# Patient Record
Sex: FEMALE | Race: WHITE | Employment: FULL TIME
[De-identification: names, ages, dates, MRNs, and addresses within clinical notes are randomized per-mention and may not be internally consistent; named-entity substitution may affect disease eponyms.]

---

## 2017-01-03 ENCOUNTER — OFFICE VISIT (OUTPATIENT)
Dept: INTERNAL MEDICINE | Facility: CLINIC | Age: 52
End: 2017-01-03

## 2017-01-03 VITALS
OXYGEN SATURATION: 96 % | HEART RATE: 103 BPM | BODY MASS INDEX: 36.98 KG/M2 | SYSTOLIC BLOOD PRESSURE: 140 MMHG | DIASTOLIC BLOOD PRESSURE: 88 MMHG | HEIGHT: 68 IN | TEMPERATURE: 98.6 F | WEIGHT: 244 LBS

## 2017-01-03 DIAGNOSIS — J45.31 MILD PERSISTENT ASTHMA WITH ACUTE EXACERBATION: Primary | ICD-10-CM

## 2017-01-03 DIAGNOSIS — J20.6 ACUTE BRONCHITIS DUE TO RHINOVIRUS: ICD-10-CM

## 2017-01-03 PROCEDURE — 99214 OFFICE O/P EST MOD 30 MIN: CPT | Performed by: NURSE PRACTITIONER

## 2017-01-03 RX ORDER — PREDNISONE 20 MG/1
20 TABLET ORAL 2 TIMES DAILY
Qty: 14 TABLET | Refills: 0 | Status: SHIPPED | OUTPATIENT
Start: 2017-01-03 | End: 2017-01-10

## 2017-01-03 RX ORDER — LEVOFLOXACIN 500 MG/1
500 TABLET, FILM COATED ORAL DAILY
Qty: 10 TABLET | Refills: 0 | Status: SHIPPED | OUTPATIENT
Start: 2017-01-03 | End: 2017-01-13

## 2017-01-03 ASSESSMENT — ENCOUNTER SYMPTOMS
DIARRHEA: 0
SORE THROAT: 0
RHINORRHEA: 1
PHOTOPHOBIA: 0
SHORTNESS OF BREATH: 1
COUGH: 1
WHEEZING: 1
CONSTIPATION: 0

## 2017-02-24 ENCOUNTER — OFFICE VISIT (OUTPATIENT)
Dept: INTERNAL MEDICINE | Facility: CLINIC | Age: 52
End: 2017-02-24

## 2017-02-24 VITALS
WEIGHT: 244 LBS | HEART RATE: 84 BPM | BODY MASS INDEX: 37.1 KG/M2 | SYSTOLIC BLOOD PRESSURE: 150 MMHG | DIASTOLIC BLOOD PRESSURE: 90 MMHG | RESPIRATION RATE: 18 BRPM

## 2017-02-24 DIAGNOSIS — Z12.31 ENCOUNTER FOR SCREENING MAMMOGRAM FOR BREAST CANCER: ICD-10-CM

## 2017-02-24 DIAGNOSIS — E66.01 MORBID OBESITY DUE TO EXCESS CALORIES (HCC): ICD-10-CM

## 2017-02-24 DIAGNOSIS — I10 ESSENTIAL HYPERTENSION: Primary | ICD-10-CM

## 2017-02-24 PROCEDURE — 99214 OFFICE O/P EST MOD 30 MIN: CPT | Performed by: NURSE PRACTITIONER

## 2017-02-24 RX ORDER — HYDROCHLOROTHIAZIDE 50 MG/1
50 TABLET ORAL DAILY
Qty: 30 TABLET | Refills: 5 | Status: SHIPPED | OUTPATIENT
Start: 2017-02-24 | End: 2017-03-07 | Stop reason: SDUPTHER

## 2017-02-24 ASSESSMENT — ENCOUNTER SYMPTOMS
WHEEZING: 0
COUGH: 0
SINUS PRESSURE: 0
VOMITING: 0
CONSTIPATION: 0
DIARRHEA: 0
BLOOD IN STOOL: 0
TROUBLE SWALLOWING: 0
ABDOMINAL PAIN: 0
NAUSEA: 0
SORE THROAT: 0
SHORTNESS OF BREATH: 0

## 2017-03-07 DIAGNOSIS — I10 ESSENTIAL HYPERTENSION: ICD-10-CM

## 2017-03-07 RX ORDER — HYDROCHLOROTHIAZIDE 50 MG/1
50 TABLET ORAL DAILY
Qty: 30 TABLET | Refills: 5 | Status: SHIPPED | OUTPATIENT
Start: 2017-03-07 | End: 2017-05-19 | Stop reason: SDUPTHER

## 2017-05-19 ENCOUNTER — HOSPITAL ENCOUNTER (OUTPATIENT)
Age: 52
Discharge: HOME | End: 2017-05-19
Payer: COMMERCIAL

## 2017-05-19 ENCOUNTER — OFFICE VISIT (OUTPATIENT)
Dept: PRIMARY CARE CLINIC | Age: 52
End: 2017-05-19
Payer: COMMERCIAL

## 2017-05-19 VITALS
RESPIRATION RATE: 18 BRPM | HEART RATE: 68 BPM | DIASTOLIC BLOOD PRESSURE: 80 MMHG | SYSTOLIC BLOOD PRESSURE: 112 MMHG | BODY MASS INDEX: 33.21 KG/M2 | HEIGHT: 69 IN | WEIGHT: 224.2 LBS

## 2017-05-19 DIAGNOSIS — I10 ESSENTIAL HYPERTENSION: Primary | ICD-10-CM

## 2017-05-19 DIAGNOSIS — Z13.6 SCREENING FOR CARDIOVASCULAR CONDITION: ICD-10-CM

## 2017-05-19 DIAGNOSIS — E03.9 HYPOTHYROIDISM, UNSPECIFIED TYPE: ICD-10-CM

## 2017-05-19 DIAGNOSIS — I10 ESSENTIAL HYPERTENSION: ICD-10-CM

## 2017-05-19 LAB
ALBUMIN SERPL-MCNC: 4.5 G/DL (ref 3.5–5.2)
ALBUMIN/GLOBULIN RATIO: 1.4 (ref 1–2.5)
ALP BLD-CCNC: 88 U/L (ref 35–104)
ALT SERPL-CCNC: 13 U/L (ref 5–33)
ANION GAP SERPL CALCULATED.3IONS-SCNC: 13 MMOL/L (ref 9–17)
AST SERPL-CCNC: 21 U/L
BILIRUB SERPL-MCNC: 0.4 MG/DL (ref 0.3–1.2)
BUN BLDV-MCNC: 20 MG/DL (ref 6–20)
BUN/CREAT BLD: NORMAL (ref 9–20)
CALCIUM SERPL-MCNC: 9.7 MG/DL (ref 8.6–10.4)
CHLORIDE BLD-SCNC: 101 MMOL/L (ref 98–107)
CHOLESTEROL/HDL RATIO: 3.3
CHOLESTEROL: 152 MG/DL
CO2: 27 MMOL/L (ref 20–31)
CREAT SERPL-MCNC: 0.8 MG/DL (ref 0.5–0.9)
GFR AFRICAN AMERICAN: >60 ML/MIN
GFR NON-AFRICAN AMERICAN: >60 ML/MIN
GFR SERPL CREATININE-BSD FRML MDRD: NORMAL ML/MIN/{1.73_M2}
GFR SERPL CREATININE-BSD FRML MDRD: NORMAL ML/MIN/{1.73_M2}
GLUCOSE BLD-MCNC: 99 MG/DL (ref 70–99)
HDLC SERPL-MCNC: 46 MG/DL
LDL CHOLESTEROL: 78 MG/DL (ref 0–130)
POTASSIUM SERPL-SCNC: 3.9 MMOL/L (ref 3.7–5.3)
SODIUM BLD-SCNC: 141 MMOL/L (ref 135–144)
TOTAL PROTEIN: 7.8 G/DL (ref 6.4–8.3)
TRIGL SERPL-MCNC: 139 MG/DL
TSH SERPL DL<=0.05 MIU/L-ACNC: 1.96 MIU/L (ref 0.3–5)
VLDLC SERPL CALC-MCNC: NORMAL MG/DL (ref 1–30)

## 2017-05-19 PROCEDURE — 80061 LIPID PANEL: CPT

## 2017-05-19 PROCEDURE — 80053 COMPREHEN METABOLIC PANEL: CPT

## 2017-05-19 PROCEDURE — 99214 OFFICE O/P EST MOD 30 MIN: CPT | Performed by: NURSE PRACTITIONER

## 2017-05-19 PROCEDURE — 36415 COLL VENOUS BLD VENIPUNCTURE: CPT

## 2017-05-19 PROCEDURE — 84443 ASSAY THYROID STIM HORMONE: CPT

## 2017-05-19 RX ORDER — HYDROCHLOROTHIAZIDE 50 MG/1
50 TABLET ORAL DAILY
Qty: 90 TABLET | Refills: 2 | Status: SHIPPED | OUTPATIENT
Start: 2017-05-19 | End: 2018-03-30 | Stop reason: SDUPTHER

## 2017-05-19 ASSESSMENT — ENCOUNTER SYMPTOMS
ABDOMINAL PAIN: 0
VOMITING: 0
BLOOD IN STOOL: 0
SORE THROAT: 0
COUGH: 0
CONSTIPATION: 0
TROUBLE SWALLOWING: 0
SINUS PRESSURE: 0
SHORTNESS OF BREATH: 0
WHEEZING: 0
DIARRHEA: 0
NAUSEA: 0

## 2017-05-19 ASSESSMENT — PATIENT HEALTH QUESTIONNAIRE - PHQ9
2. FEELING DOWN, DEPRESSED OR HOPELESS: 0
SUM OF ALL RESPONSES TO PHQ QUESTIONS 1-9: 0
1. LITTLE INTEREST OR PLEASURE IN DOING THINGS: 0
SUM OF ALL RESPONSES TO PHQ9 QUESTIONS 1 & 2: 0

## 2017-05-22 ENCOUNTER — TELEPHONE (OUTPATIENT)
Dept: PRIMARY CARE CLINIC | Age: 52
End: 2017-05-22

## 2017-07-28 DIAGNOSIS — M17.0 PRIMARY OSTEOARTHRITIS OF BOTH KNEES: ICD-10-CM

## 2017-07-28 RX ORDER — CELECOXIB 100 MG/1
CAPSULE ORAL
Qty: 60 CAPSULE | Refills: 5 | Status: SHIPPED | OUTPATIENT
Start: 2017-07-28 | End: 2018-09-16 | Stop reason: SDUPTHER

## 2017-11-17 ENCOUNTER — HOSPITAL ENCOUNTER (OUTPATIENT)
Age: 52
Discharge: HOME | End: 2017-11-17
Payer: COMMERCIAL

## 2017-11-17 ENCOUNTER — HOSPITAL ENCOUNTER (OUTPATIENT)
Dept: GENERAL RADIOLOGY | Age: 52
Discharge: HOME | End: 2017-11-17
Payer: COMMERCIAL

## 2017-11-17 ENCOUNTER — HOSPITAL ENCOUNTER (OUTPATIENT)
Dept: MAMMOGRAPHY | Age: 52
Discharge: HOME | End: 2017-11-17
Payer: COMMERCIAL

## 2017-11-17 ENCOUNTER — TELEPHONE (OUTPATIENT)
Dept: PRIMARY CARE CLINIC | Age: 52
End: 2017-11-17

## 2017-11-17 DIAGNOSIS — Z12.31 ENCOUNTER FOR SCREENING MAMMOGRAM FOR BREAST CANCER: ICD-10-CM

## 2017-11-17 DIAGNOSIS — R92.8 ABNORMAL MAMMOGRAM: Primary | ICD-10-CM

## 2017-11-17 DIAGNOSIS — N20.0 CALCULUS OF KIDNEY: ICD-10-CM

## 2017-11-17 DIAGNOSIS — N64.89 BREAST ASYMMETRY: ICD-10-CM

## 2017-11-17 PROCEDURE — 74000 XR ABDOMEN LIMITED (KUB): CPT

## 2017-11-17 PROCEDURE — G0202 SCR MAMMO BI INCL CAD: HCPCS

## 2017-11-17 NOTE — TELEPHONE ENCOUNTER
----- Message from Cherelle Garcia CNP sent at 11/17/2017  1:11 PM EST -----  Please let her know that she needs additional imaging as her mammogram shows asymmetry with dense breast tissue which makes it difficult to get a clear view on just a screening mamm.   Order placed for spot compression with possible US

## 2017-11-18 ENCOUNTER — APPOINTMENT (OUTPATIENT)
Dept: GENERAL RADIOLOGY | Age: 52
DRG: 669 | End: 2017-11-18
Payer: COMMERCIAL

## 2017-11-18 ENCOUNTER — HOSPITAL ENCOUNTER (EMERGENCY)
Age: 52
Discharge: HOME | DRG: 669 | End: 2017-11-18
Attending: EMERGENCY MEDICINE | Admitting: UROLOGY
Payer: COMMERCIAL

## 2017-11-18 ENCOUNTER — ANESTHESIA EVENT (OUTPATIENT)
Dept: OPERATING ROOM | Age: 52
DRG: 669 | End: 2017-11-18
Payer: COMMERCIAL

## 2017-11-18 ENCOUNTER — ANESTHESIA (OUTPATIENT)
Dept: OPERATING ROOM | Age: 52
DRG: 669 | End: 2017-11-18
Payer: COMMERCIAL

## 2017-11-18 ENCOUNTER — APPOINTMENT (OUTPATIENT)
Dept: CT IMAGING | Age: 52
DRG: 669 | End: 2017-11-18
Payer: COMMERCIAL

## 2017-11-18 VITALS
RESPIRATION RATE: 16 BRPM | SYSTOLIC BLOOD PRESSURE: 139 MMHG | DIASTOLIC BLOOD PRESSURE: 79 MMHG | OXYGEN SATURATION: 99 %

## 2017-11-18 VITALS
BODY MASS INDEX: 35.61 KG/M2 | TEMPERATURE: 98.8 F | RESPIRATION RATE: 16 BRPM | HEIGHT: 68 IN | SYSTOLIC BLOOD PRESSURE: 134 MMHG | OXYGEN SATURATION: 97 % | HEART RATE: 67 BPM | DIASTOLIC BLOOD PRESSURE: 80 MMHG | WEIGHT: 235 LBS

## 2017-11-18 DIAGNOSIS — N20.0 NEPHROLITHIASIS: Primary | ICD-10-CM

## 2017-11-18 DIAGNOSIS — N13.9 OBSTRUCTIVE UROPATHY: ICD-10-CM

## 2017-11-18 LAB
-: NORMAL
ABSOLUTE EOS #: 0.2 K/UL (ref 0–0.44)
ABSOLUTE IMMATURE GRANULOCYTE: 0.06 K/UL (ref 0–0.3)
ABSOLUTE LYMPH #: 2.76 K/UL (ref 1.1–3.7)
ABSOLUTE MONO #: 0.79 K/UL (ref 0.1–1.2)
AMORPHOUS: NORMAL
ANION GAP SERPL CALCULATED.3IONS-SCNC: 14 MMOL/L (ref 9–17)
BACTERIA: NORMAL
BASOPHILS # BLD: 0 % (ref 0–2)
BASOPHILS ABSOLUTE: 0.04 K/UL (ref 0–0.2)
BILIRUBIN URINE: NEGATIVE
BUN BLDV-MCNC: 18 MG/DL (ref 6–20)
BUN/CREAT BLD: ABNORMAL (ref 9–20)
CALCIUM SERPL-MCNC: 9.5 MG/DL (ref 8.6–10.4)
CASTS UA: NORMAL /LPF (ref 0–8)
CHLORIDE BLD-SCNC: 99 MMOL/L (ref 98–107)
CO2: 27 MMOL/L (ref 20–31)
COLOR: YELLOW
COMMENT UA: ABNORMAL
CREAT SERPL-MCNC: 0.75 MG/DL (ref 0.5–0.9)
CRYSTALS, UA: NORMAL /HPF
DIFFERENTIAL TYPE: ABNORMAL
EOSINOPHILS RELATIVE PERCENT: 2 % (ref 1–4)
EPITHELIAL CELLS UA: NORMAL /HPF (ref 0–5)
GFR AFRICAN AMERICAN: >60 ML/MIN
GFR NON-AFRICAN AMERICAN: >60 ML/MIN
GFR SERPL CREATININE-BSD FRML MDRD: ABNORMAL ML/MIN/{1.73_M2}
GFR SERPL CREATININE-BSD FRML MDRD: ABNORMAL ML/MIN/{1.73_M2}
GLUCOSE BLD-MCNC: 133 MG/DL (ref 70–99)
GLUCOSE URINE: NEGATIVE
HCT VFR BLD CALC: 45.2 % (ref 36.3–47.1)
HEMOGLOBIN: 14 G/DL (ref 11.9–15.1)
IMMATURE GRANULOCYTES: 1 %
KETONES, URINE: NEGATIVE
LEUKOCYTE ESTERASE, URINE: NEGATIVE
LYMPHOCYTES # BLD: 27 % (ref 24–43)
MCH RBC QN AUTO: 26.1 PG (ref 25.2–33.5)
MCHC RBC AUTO-ENTMCNC: 31 G/DL (ref 28.4–34.8)
MCV RBC AUTO: 84.2 FL (ref 82.6–102.9)
MONOCYTES # BLD: 8 % (ref 3–12)
MUCUS: NORMAL
NITRITE, URINE: NEGATIVE
OTHER OBSERVATIONS UA: NORMAL
PDW BLD-RTO: 14.1 % (ref 11.8–14.4)
PH UA: 7.5 (ref 5–8)
PLATELET # BLD: 268 K/UL (ref 138–453)
PLATELET ESTIMATE: ABNORMAL
PMV BLD AUTO: 10.3 FL (ref 8.1–13.5)
POTASSIUM SERPL-SCNC: 3.5 MMOL/L (ref 3.7–5.3)
PROTEIN UA: ABNORMAL
RBC # BLD: 5.37 M/UL (ref 3.95–5.11)
RBC # BLD: ABNORMAL 10*6/UL
RBC UA: NORMAL /HPF (ref 0–4)
RENAL EPITHELIAL, UA: NORMAL /HPF
SEG NEUTROPHILS: 62 % (ref 36–65)
SEGMENTED NEUTROPHILS ABSOLUTE COUNT: 6.56 K/UL (ref 1.5–8.1)
SODIUM BLD-SCNC: 140 MMOL/L (ref 135–144)
SPECIFIC GRAVITY UA: 1.01 (ref 1–1.03)
TRICHOMONAS: NORMAL
TURBIDITY: CLEAR
URINE HGB: ABNORMAL
UROBILINOGEN, URINE: NORMAL
WBC # BLD: 10.4 K/UL (ref 3.5–11.3)
WBC # BLD: ABNORMAL 10*3/UL
WBC UA: NORMAL /HPF (ref 0–5)
YEAST: NORMAL

## 2017-11-18 PROCEDURE — 74176 CT ABD & PELVIS W/O CONTRAST: CPT

## 2017-11-18 PROCEDURE — 80048 BASIC METABOLIC PNL TOTAL CA: CPT

## 2017-11-18 PROCEDURE — 6360000002 HC RX W HCPCS

## 2017-11-18 PROCEDURE — 96375 TX/PRO/DX INJ NEW DRUG ADDON: CPT

## 2017-11-18 PROCEDURE — C1769 GUIDE WIRE: HCPCS | Performed by: UROLOGY

## 2017-11-18 PROCEDURE — 2500000003 HC RX 250 WO HCPCS: Performed by: NURSE ANESTHETIST, CERTIFIED REGISTERED

## 2017-11-18 PROCEDURE — 6360000002 HC RX W HCPCS: Performed by: EMERGENCY MEDICINE

## 2017-11-18 PROCEDURE — 96374 THER/PROPH/DIAG INJ IV PUSH: CPT

## 2017-11-18 PROCEDURE — 85025 COMPLETE CBC W/AUTO DIFF WBC: CPT

## 2017-11-18 PROCEDURE — 7100000010 HC PHASE II RECOVERY - FIRST 15 MIN: Performed by: UROLOGY

## 2017-11-18 PROCEDURE — C2617 STENT, NON-COR, TEM W/O DEL: HCPCS | Performed by: UROLOGY

## 2017-11-18 PROCEDURE — 3600000002 HC SURGERY LEVEL 2 BASE: Performed by: UROLOGY

## 2017-11-18 PROCEDURE — 7100000011 HC PHASE II RECOVERY - ADDTL 15 MIN: Performed by: UROLOGY

## 2017-11-18 PROCEDURE — 74420 UROGRAPHY RTRGR +-KUB: CPT

## 2017-11-18 PROCEDURE — 3700000001 HC ADD 15 MINUTES (ANESTHESIA): Performed by: UROLOGY

## 2017-11-18 PROCEDURE — 99285 EMERGENCY DEPT VISIT HI MDM: CPT

## 2017-11-18 PROCEDURE — 2580000003 HC RX 258: Performed by: NURSE ANESTHETIST, CERTIFIED REGISTERED

## 2017-11-18 PROCEDURE — 2580000003 HC RX 258: Performed by: EMERGENCY MEDICINE

## 2017-11-18 PROCEDURE — 96376 TX/PRO/DX INJ SAME DRUG ADON: CPT

## 2017-11-18 PROCEDURE — 81001 URINALYSIS AUTO W/SCOPE: CPT

## 2017-11-18 PROCEDURE — C1758 CATHETER, URETERAL: HCPCS | Performed by: UROLOGY

## 2017-11-18 PROCEDURE — 6360000002 HC RX W HCPCS: Performed by: NURSE ANESTHETIST, CERTIFIED REGISTERED

## 2017-11-18 PROCEDURE — 3700000000 HC ANESTHESIA ATTENDED CARE: Performed by: UROLOGY

## 2017-11-18 PROCEDURE — 3600000012 HC SURGERY LEVEL 2 ADDTL 15MIN: Performed by: UROLOGY

## 2017-11-18 PROCEDURE — 2580000003 HC RX 258: Performed by: UROLOGY

## 2017-11-18 DEVICE — URETERAL STENT
Type: IMPLANTABLE DEVICE | Status: FUNCTIONAL
Brand: POLARIS™ ULTRA

## 2017-11-18 RX ORDER — ONDANSETRON 2 MG/ML
4 INJECTION INTRAMUSCULAR; INTRAVENOUS ONCE
Status: COMPLETED | OUTPATIENT
Start: 2017-11-18 | End: 2017-11-18

## 2017-11-18 RX ORDER — DOCUSATE SODIUM 100 MG/1
100 CAPSULE, LIQUID FILLED ORAL 2 TIMES DAILY PRN
Qty: 30 CAPSULE | Refills: 1 | Status: ON HOLD | OUTPATIENT
Start: 2017-11-18 | End: 2018-09-14

## 2017-11-18 RX ORDER — ONDANSETRON 2 MG/ML
4 INJECTION INTRAMUSCULAR; INTRAVENOUS
Status: DISCONTINUED | OUTPATIENT
Start: 2017-11-18 | End: 2017-11-18 | Stop reason: HOSPADM

## 2017-11-18 RX ORDER — HYDROCODONE BITARTRATE AND ACETAMINOPHEN 5; 325 MG/1; MG/1
2 TABLET ORAL EVERY 6 HOURS PRN
Qty: 30 TABLET | Refills: 0 | Status: SHIPPED | OUTPATIENT
Start: 2017-11-18 | End: 2018-04-30 | Stop reason: ALTCHOICE

## 2017-11-18 RX ORDER — ONDANSETRON 2 MG/ML
INJECTION INTRAMUSCULAR; INTRAVENOUS
Status: COMPLETED
Start: 2017-11-18 | End: 2017-11-18

## 2017-11-18 RX ORDER — PROMETHAZINE HYDROCHLORIDE 25 MG/ML
25 INJECTION, SOLUTION INTRAMUSCULAR; INTRAVENOUS ONCE
Status: COMPLETED | OUTPATIENT
Start: 2017-11-18 | End: 2017-11-18

## 2017-11-18 RX ORDER — SODIUM CHLORIDE 9 MG/ML
INJECTION, SOLUTION INTRAVENOUS CONTINUOUS PRN
Status: DISCONTINUED | OUTPATIENT
Start: 2017-11-18 | End: 2017-11-18 | Stop reason: SDUPTHER

## 2017-11-18 RX ORDER — DEXAMETHASONE SODIUM PHOSPHATE 10 MG/ML
INJECTION INTRAMUSCULAR; INTRAVENOUS PRN
Status: DISCONTINUED | OUTPATIENT
Start: 2017-11-18 | End: 2017-11-18 | Stop reason: SDUPTHER

## 2017-11-18 RX ORDER — FENTANYL CITRATE 50 UG/ML
50 INJECTION, SOLUTION INTRAMUSCULAR; INTRAVENOUS ONCE
Status: COMPLETED | OUTPATIENT
Start: 2017-11-18 | End: 2017-11-18

## 2017-11-18 RX ORDER — 0.9 % SODIUM CHLORIDE 0.9 %
1000 INTRAVENOUS SOLUTION INTRAVENOUS ONCE
Status: COMPLETED | OUTPATIENT
Start: 2017-11-18 | End: 2017-11-18

## 2017-11-18 RX ORDER — LIDOCAINE HYDROCHLORIDE 10 MG/ML
INJECTION, SOLUTION EPIDURAL; INFILTRATION; INTRACAUDAL; PERINEURAL PRN
Status: DISCONTINUED | OUTPATIENT
Start: 2017-11-18 | End: 2017-11-18 | Stop reason: SDUPTHER

## 2017-11-18 RX ORDER — PROPOFOL 10 MG/ML
INJECTION, EMULSION INTRAVENOUS PRN
Status: DISCONTINUED | OUTPATIENT
Start: 2017-11-18 | End: 2017-11-18 | Stop reason: SDUPTHER

## 2017-11-18 RX ORDER — FENTANYL CITRATE 50 UG/ML
INJECTION, SOLUTION INTRAMUSCULAR; INTRAVENOUS PRN
Status: DISCONTINUED | OUTPATIENT
Start: 2017-11-18 | End: 2017-11-18 | Stop reason: SDUPTHER

## 2017-11-18 RX ORDER — ONDANSETRON 2 MG/ML
INJECTION INTRAMUSCULAR; INTRAVENOUS PRN
Status: DISCONTINUED | OUTPATIENT
Start: 2017-11-18 | End: 2017-11-18 | Stop reason: SDUPTHER

## 2017-11-18 RX ORDER — FENTANYL CITRATE 50 UG/ML
25 INJECTION, SOLUTION INTRAMUSCULAR; INTRAVENOUS EVERY 5 MIN PRN
Status: DISCONTINUED | OUTPATIENT
Start: 2017-11-18 | End: 2017-11-18 | Stop reason: HOSPADM

## 2017-11-18 RX ORDER — TAMSULOSIN HYDROCHLORIDE 0.4 MG/1
0.4 CAPSULE ORAL DAILY
Qty: 30 CAPSULE | Refills: 0 | Status: SHIPPED | OUTPATIENT
Start: 2017-11-18 | End: 2017-11-27 | Stop reason: ALTCHOICE

## 2017-11-18 RX ORDER — OXYBUTYNIN CHLORIDE 10 MG/1
10 TABLET, EXTENDED RELEASE ORAL DAILY
Qty: 30 TABLET | Refills: 0 | Status: SHIPPED | OUTPATIENT
Start: 2017-11-18 | End: 2017-11-27 | Stop reason: ALTCHOICE

## 2017-11-18 RX ORDER — MEPERIDINE HYDROCHLORIDE 50 MG/ML
12.5 INJECTION INTRAMUSCULAR; INTRAVENOUS; SUBCUTANEOUS EVERY 5 MIN PRN
Status: DISCONTINUED | OUTPATIENT
Start: 2017-11-18 | End: 2017-11-18 | Stop reason: HOSPADM

## 2017-11-18 RX ORDER — MIDAZOLAM HYDROCHLORIDE 1 MG/ML
INJECTION INTRAMUSCULAR; INTRAVENOUS PRN
Status: DISCONTINUED | OUTPATIENT
Start: 2017-11-18 | End: 2017-11-18 | Stop reason: SDUPTHER

## 2017-11-18 RX ORDER — FENTANYL CITRATE 50 UG/ML
50 INJECTION, SOLUTION INTRAMUSCULAR; INTRAVENOUS EVERY 5 MIN PRN
Status: DISCONTINUED | OUTPATIENT
Start: 2017-11-18 | End: 2017-11-18 | Stop reason: HOSPADM

## 2017-11-18 RX ORDER — ONDANSETRON 2 MG/ML
4 INJECTION INTRAMUSCULAR; INTRAVENOUS ONCE
Status: DISCONTINUED | OUTPATIENT
Start: 2017-11-18 | End: 2017-11-18

## 2017-11-18 RX ORDER — CIPROFLOXACIN 2 MG/ML
INJECTION, SOLUTION INTRAVENOUS PRN
Status: DISCONTINUED | OUTPATIENT
Start: 2017-11-18 | End: 2017-11-18 | Stop reason: SDUPTHER

## 2017-11-18 RX ORDER — MAGNESIUM HYDROXIDE 1200 MG/15ML
LIQUID ORAL CONTINUOUS PRN
Status: DISCONTINUED | OUTPATIENT
Start: 2017-11-18 | End: 2017-11-18 | Stop reason: HOSPADM

## 2017-11-18 RX ADMIN — PROPOFOL 30 MG: 10 INJECTION, EMULSION INTRAVENOUS at 16:25

## 2017-11-18 RX ADMIN — FENTANYL CITRATE 50 MCG: 50 INJECTION INTRAMUSCULAR; INTRAVENOUS at 09:37

## 2017-11-18 RX ADMIN — ONDANSETRON 4 MG: 2 INJECTION, SOLUTION INTRAMUSCULAR; INTRAVENOUS at 09:37

## 2017-11-18 RX ADMIN — PROPOFOL 10 MG: 10 INJECTION, EMULSION INTRAVENOUS at 16:31

## 2017-11-18 RX ADMIN — SODIUM CHLORIDE: 9 INJECTION, SOLUTION INTRAVENOUS at 16:19

## 2017-11-18 RX ADMIN — ONDANSETRON 4 MG: 2 INJECTION, SOLUTION INTRAMUSCULAR; INTRAVENOUS at 10:38

## 2017-11-18 RX ADMIN — MIDAZOLAM HYDROCHLORIDE 1 MG: 1 INJECTION, SOLUTION INTRAMUSCULAR; INTRAVENOUS at 16:25

## 2017-11-18 RX ADMIN — HYDROMORPHONE HYDROCHLORIDE 0.5 MG: 1 INJECTION, SOLUTION INTRAMUSCULAR; INTRAVENOUS; SUBCUTANEOUS at 15:43

## 2017-11-18 RX ADMIN — HYDROMORPHONE HYDROCHLORIDE 0.5 MG: 1 INJECTION, SOLUTION INTRAMUSCULAR; INTRAVENOUS; SUBCUTANEOUS at 14:04

## 2017-11-18 RX ADMIN — LIDOCAINE HYDROCHLORIDE 50 MG: 10 INJECTION, SOLUTION EPIDURAL; INFILTRATION; INTRACAUDAL; PERINEURAL at 16:25

## 2017-11-18 RX ADMIN — HYDROMORPHONE HYDROCHLORIDE 1 MG: 1 INJECTION, SOLUTION INTRAMUSCULAR; INTRAVENOUS; SUBCUTANEOUS at 10:38

## 2017-11-18 RX ADMIN — FENTANYL CITRATE 50 MCG: 50 INJECTION INTRAMUSCULAR; INTRAVENOUS at 16:25

## 2017-11-18 RX ADMIN — CIPROFLOXACIN 400 MG: 2 INJECTION, SOLUTION INTRAVENOUS at 16:27

## 2017-11-18 RX ADMIN — ONDANSETRON 4 MG: 2 INJECTION INTRAMUSCULAR; INTRAVENOUS at 13:00

## 2017-11-18 RX ADMIN — PROMETHAZINE HYDROCHLORIDE 25 MG: 25 INJECTION INTRAMUSCULAR; INTRAVENOUS at 15:43

## 2017-11-18 RX ADMIN — SODIUM CHLORIDE 1000 ML: 9 INJECTION, SOLUTION INTRAVENOUS at 09:36

## 2017-11-18 RX ADMIN — ONDANSETRON 4 MG: 2 INJECTION INTRAMUSCULAR; INTRAVENOUS at 16:31

## 2017-11-18 RX ADMIN — DEXAMETHASONE SODIUM PHOSPHATE 10 MG: 10 INJECTION INTRAMUSCULAR; INTRAVENOUS at 16:34

## 2017-11-18 ASSESSMENT — PAIN SCALES - GENERAL
PAINLEVEL_OUTOF10: 6
PAINLEVEL_OUTOF10: 8
PAINLEVEL_OUTOF10: 6
PAINLEVEL_OUTOF10: 0
PAINLEVEL_OUTOF10: 8
PAINLEVEL_OUTOF10: 0
PAINLEVEL_OUTOF10: 2
PAINLEVEL_OUTOF10: 0
PAINLEVEL_OUTOF10: 8
PAINLEVEL_OUTOF10: 2
PAINLEVEL_OUTOF10: 5

## 2017-11-18 ASSESSMENT — ENCOUNTER SYMPTOMS
SHORTNESS OF BREATH: 0
DIARRHEA: 0
VOMITING: 0
COUGH: 0
EYE PAIN: 0
ABDOMINAL PAIN: 1
NAUSEA: 1
SORE THROAT: 0
EYE DISCHARGE: 0

## 2017-11-18 NOTE — ANESTHESIA PRE PROCEDURE
Provider, MD   solifenacin (VESICARE) 10 MG tablet Take 10 mg by mouth as needed. 12/23/14  Historical Provider, MD       Current medications:    No current facility-administered medications for this encounter. Current Outpatient Prescriptions   Medication Sig Dispense Refill    celecoxib (CELEBREX) 100 MG capsule Take 1 capsule by mouth 2 times daily 60 capsule 5    hydrochlorothiazide (HYDRODIURIL) 50 MG tablet Take 1 tablet by mouth daily 90 tablet 2    Triprolidine-Pseudoephedrine (ANTIHISTAMINE PO) Take by mouth      omeprazole (PRILOSEC) 20 MG delayed release capsule Take 1 capsule by mouth 2 times daily 60 capsule 5    solifenacin (VESICARE) 10 MG tablet Take 1 tablet by mouth as needed. 90 tablet 3    acetaminophen (TYLENOL) 500 MG tablet Take 1,000 mg by mouth every 4 hours as needed for Pain.  ibuprofen (ADVIL;MOTRIN) 600 MG tablet Take 1 tablet by mouth every 6 hours as needed for Pain. 30 tablet 0    Pot & Sod Cit-Cit Ac (POLYCITRA PO) Take 30 mg by mouth as needed.  mometasone (ASMANEX 30 METERED DOSES) 220 MCG/INH inhaler Inhale 1 puff into the lungs daily 1 Inhaler 3    fluticasone (FLONASE) 50 MCG/ACT nasal spray 1 spray by Nasal route daily 1 Bottle 3    albuterol sulfate HFA (PROAIR HFA) 108 (90 BASE) MCG/ACT inhaler Inhale 2 puffs into the lungs every 6 hours as needed for Wheezing 1 Inhaler 3    levothyroxine (SYNTHROID) 50 MCG tablet Take 1 tablet by mouth daily.  90 tablet 3    Omega-3 Fatty Acids (OMEGA 3 PO) Take 1 tablet by mouth daily          Allergies:  No Known Allergies    Problem List:    Patient Active Problem List   Diagnosis Code    Kidney stones N20.0    Hypertension I10    Hypothyroidism E03.9    CKD (chronic kidney disease) stage 1, GFR 90 ml/min or greater N18.1    Cystinuria (HCC) E72.01    Hyperuricemia E79.0    Proteinuria R80.9    Knee pain, acute M25.569    Osteoarthritis of both knees M17.0    GERD (gastroesophageal reflux disease) K21.9    Sleep apnea G47.30    Midline low back pain with right-sided sciatica M54.41    Mixed hyperlipidemia E78.2    Morbid obesity due to excess calories ([facility]) E66.01       Past Medical History:        Diagnosis Date    CKD (chronic kidney disease) stage 1, GFR 90 ml/min or greater     Cystinuria ([address]     Hypertension     resolved    Hyperuricemia     Hypothyroidism     Kidney stones     Proteinuria        Past Surgical History:        Procedure Laterality Date    KIDNEY STONE SURGERY      x2    KNEE ARTHROSCOPY Right     KNEE SURGERY Right 1998    TOTAL NEPHRECTOMY  1988    left       Social History:    Social History   Substance Use Topics    Smoking status: Never Smoker    Smokeless tobacco: Never Used    Alcohol use 0.0 oz/week      Comment: rare                                Counseling given: Not Answered      Vital Signs (Current):   Vitals:    11/18/17 0855 11/18/17 1452   BP: (!) 190/108 131/85   Pulse: 67    Resp: 17    Temp: 98.2 °F (36.8 °C)    TempSrc: Oral    SpO2: 100% 95%   Weight: 235 lb (106.6 kg)    Height: 5' 8\" (1.727 m)                                               BP Readings from Last 3 Encounters:   11/18/17 131/85   05/19/17 112/80   02/24/17 (!) 150/90       NPO Status:                                                                                 BMI:   Wt Readings from Last 3 Encounters:   11/18/17 235 lb (106.6 kg)   05/19/17 224 lb 3.2 oz (101.7 kg)   02/24/17 244 lb (110.7 kg)     Body mass index is 35.73 kg/m².     CBC:   Lab Results   Component Value Date    WBC 10.4 11/18/2017    RBC 5.37 11/18/2017    RBC 4.66 03/22/2012    HGB 14.0 11/18/2017    HCT 45.2 11/18/2017    MCV 84.2 11/18/2017    RDW 14.1 11/18/2017     11/18/2017     03/22/2012       CMP:   Lab Results   Component Value Date     11/18/2017    K 3.5 11/18/2017    CL 99 11/18/2017    CO2 27 11/18/2017    BUN 18 11/18/2017    CREATININE 0.75 11/18/2017    GFRAA >60 11/18/2017 LABGLOM >60 11/18/2017    GLUCOSE 133 11/18/2017    GLUCOSE 88 03/22/2012    PROT 7.8 05/19/2017    CALCIUM 9.5 11/18/2017    BILITOT 0.40 05/19/2017    ALKPHOS 88 05/19/2017    AST 21 05/19/2017    ALT 13 05/19/2017       POC Tests: No results for input(s): POCGLU, POCNA, POCK, POCCL, POCBUN, POCHEMO, POCHCT in the last 72 hours. Coags: No results found for: PROTIME, INR, APTT    HCG (If Applicable): No results found for: PREGTESTUR, PREGSERUM, HCG, HCGQUANT     ABGs: No results found for: PHART, PO2ART, RUH3ZEJ, UTB8NOJ, BEART, T4MKRQCK     Type & Screen (If Applicable):  No results found for: LABABO, [address]    Anesthesia Evaluation  Patient summary reviewed and Nursing notes reviewed no history of anesthetic complications:   Airway: Mallampati: III  TM distance: >3 FB   Neck ROM: full  Mouth opening: > = 3 FB Dental: normal exam         Pulmonary:normal exam    (+) sleep apnea:                             Cardiovascular:    (+) hypertension:,                   Neuro/Psych:   Negative Neuro/Psych ROS              GI/Hepatic/Renal:   (+) GERD:,           Endo/Other:    (+) hypothyroidism::., .                 Abdominal:   (+) obese,         Vascular: negative vascular ROS. Anesthesia Plan      MAC and general     ASA 3       Induction: intravenous. MIPS: Postoperative opioids intended. Anesthetic plan and risks discussed with patient. Plan discussed with CRNA.                   Evita Srivastava MD   11/18/2017

## 2017-11-18 NOTE — ED NOTES
Resting on stretcher and ivf almost complete. Awaiting possible surgery / admission orders.        Yang Cochran RN  11/18/17 4364

## 2017-11-18 NOTE — H&P
capsule by mouth 2 times daily 11/21/16  Yes Nazia Grier CNP   solifenacin (VESICARE) 10 MG tablet Take 1 tablet by mouth as needed. 12/23/14  Yes Rahel Villa CNP   acetaminophen (TYLENOL) 500 MG tablet Take 1,000 mg by mouth every 4 hours as needed for Pain. Yes Historical Provider, MD   ibuprofen (ADVIL;MOTRIN) 600 MG tablet Take 1 tablet by mouth every 6 hours as needed for Pain. 4/18/14  Yes Nikki Morales MD   Pot & Sod Cit-Cit Ac (POLYCITRA PO) Take 30 mg by mouth as needed. Yes Historical Provider, MD   mometasone (ASMANEX 30 METERED DOSES) 220 MCG/INH inhaler Inhale 1 puff into the lungs daily 12/14/16   Marialuisa Levy MD   fluticasone (FLONASE) 50 MCG/ACT nasal spray 1 spray by Nasal route daily 9/30/16   Loraine Gorman MD   albuterol sulfate HFA (PROAIR HFA) 108 (90 BASE) MCG/ACT inhaler Inhale 2 puffs into the lungs every 6 hours as needed for Wheezing 2/5/16   Eduardo Orr CNP   levothyroxine (SYNTHROID) 50 MCG tablet Take 1 tablet by mouth daily. 12/23/14   Rahel Villa CNP   Omega-3 Fatty Acids (OMEGA 3 PO) Take 1 tablet by mouth daily     Historical Provider, MD   solifenacin (VESICARE) 10 MG tablet Take 10 mg by mouth as needed. 12/23/14  Historical Provider, MD       Allergies:  Review of patient's allergies indicates no known allergies. Social History:    Social History     Social History    Marital status: Single     Spouse name: N/A    Number of children: N/A    Years of education: N/A     Occupational History    Not on file.      Social History Main Topics    Smoking status: Never Smoker    Smokeless tobacco: Never Used    Alcohol use 0.0 oz/week      Comment: rare    Drug use: No    Sexual activity: No     Other Topics Concern    Not on file     Social History Narrative    No narrative on file       Family History:    Family History   Problem Relation Age of Onset    High Blood Pressure Mother     Hypertension Mother    Sonia Melgoza Emphysema Mother

## 2017-11-18 NOTE — ED PROVIDER NOTES
Social History Narrative    No narrative on file       Family History   Problem Relation Age of Onset    High Blood Pressure Mother     Hypertension Mother     Emphysema Mother     Diabetes Father     Cancer Father     Heart Attack Father     Hypertension Father     Heart Disease Father     Hypertension Sister     Heart Disease Maternal Grandmother     Prostate Cancer Maternal Grandfather     Heart Disease Paternal Grandfather     Stroke Paternal Grandfather        Allergies:  Review of patient's allergies indicates no known allergies. Home Medications:  Prior to Admission medications    Medication Sig Start Date End Date Taking? Authorizing Provider   HYDROcodone-acetaminophen (NORCO) 5-325 MG per tablet Take 2 tablets by mouth every 6 hours as needed for Pain . 11/18/17  Yes Hipolito Villaseñor MD   docusate sodium (COLACE) 100 MG capsule Take 1 capsule by mouth 2 times daily as needed for Constipation 11/18/17  Yes Hipolito Villaseñor MD   oxybutynin (DITROPAN XL) 10 MG extended release tablet Take 1 tablet by mouth daily 11/18/17  Yes Hipolito Villaseñor MD   tamsulosin [facility]) 0.4 MG capsule Take 1 capsule by mouth daily 11/18/17  Yes Hipolito Villaseñor MD   celecoxib (CELEBREX) 100 MG capsule Take 1 capsule by mouth 2 times daily 7/28/17  Yes Nazia Rocha CNP   hydrochlorothiazide (HYDRODIURIL) 50 MG tablet Take 1 tablet by mouth daily 5/19/17  Yes Gregg Sanchez CNP   Triprolidine-Pseudoephedrine (ANTIHISTAMINE PO) Take by mouth   Yes Historical Provider, MD   omeprazole (PRILOSEC) 20 MG delayed release capsule Take 1 capsule by mouth 2 times daily 11/21/16  Yes Nazia Rocha CNP   solifenacin (VESICARE) 10 MG tablet Take 1 tablet by mouth as needed. 12/23/14  Yes Rahel Villa CNP   acetaminophen (TYLENOL) 500 MG tablet Take 1,000 mg by mouth every 4 hours as needed for Pain.    Yes Historical Provider, MD   ibuprofen (ADVIL;MOTRIN) 600 MG tablet Take 1 tablet by mouth every Phase I & II - metered glucose    Insert peripheral IV    Discharge patient       MEDICATIONS ORDERED:  Orders Placed This Encounter   Medications    ondansetron (ZOFRAN) 4 MG/2ML injection     WILFRID GILMORE: cabinet override    DISCONTD: ondansetron (ZOFRAN) injection 4 mg    fentaNYL (SUBLIMAZE) injection 50 mcg    0.9 % sodium chloride bolus    HYDROmorphone (DILAUDID) injection 1 mg    ondansetron (ZOFRAN) injection 4 mg    ondansetron (ZOFRAN) injection 4 mg    HYDROmorphone (DILAUDID) injection 0.5 mg    HYDROmorphone (DILAUDID) injection 0.5 mg    promethazine (PHENERGAN) injection 25 mg    ondansetron (ZOFRAN) injection 4 mg    meperidine (DEMEROL) injection 12.5 mg    fentaNYL (SUBLIMAZE) injection 25 mcg    fentaNYL (SUBLIMAZE) injection 50 mcg    HYDROcodone-acetaminophen (NORCO) 5-325 MG per tablet     Sig: Take 2 tablets by mouth every 6 hours as needed for Pain .      Dispense:  30 tablet     Refill:  0    docusate sodium (COLACE) 100 MG capsule     Sig: Take 1 capsule by mouth 2 times daily as needed for Constipation     Dispense:  30 capsule     Refill:  1    oxybutynin (DITROPAN XL) 10 MG extended release tablet     Sig: Take 1 tablet by mouth daily     Dispense:  30 tablet     Refill:  0    tamsulosin (FLOMAX) 0.4 MG capsule     Sig: Take 1 capsule by mouth daily     Dispense:  30 capsule     Refill:  0    sodium chloride 0.9 % irrigation       DDX: Nephrolithiasis, hydronephrosis, pyelonephritis, UTI, infected kidney stone    DIAGNOSTIC RESULTS / EMERGENCY DEPARTMENT COURSE / MDM     LABS:  Results for orders placed or performed during the hospital encounter of 11/18/17   UA W/REFLEX CULTURE   Result Value Ref Range    Color, UA YELLOW YEL    Turbidity UA CLEAR CLEAR    Glucose, Ur NEGATIVE NEG    Bilirubin Urine NEGATIVE NEG    Ketones, Urine NEGATIVE NEG    Specific Gravity, UA 1.015 1.005 - 1.030    Urine Hgb TRACE (A) NEG    pH, UA 7.5 5.0 - 8.0    Protein, UA 1+ (A) NEG

## 2017-11-19 ENCOUNTER — HOSPITAL ENCOUNTER (INPATIENT)
Age: 52
LOS: 2 days | Discharge: HOME | DRG: 669 | End: 2017-11-22
Attending: EMERGENCY MEDICINE | Admitting: UROLOGY
Payer: COMMERCIAL

## 2017-11-19 ENCOUNTER — APPOINTMENT (OUTPATIENT)
Dept: GENERAL RADIOLOGY | Age: 52
DRG: 669 | End: 2017-11-19
Payer: COMMERCIAL

## 2017-11-19 DIAGNOSIS — I10 HYPERTENSION, UNSPECIFIED TYPE: ICD-10-CM

## 2017-11-19 DIAGNOSIS — R11.0 NAUSEA: ICD-10-CM

## 2017-11-19 DIAGNOSIS — D72.829 LEUKOCYTOSIS, UNSPECIFIED TYPE: ICD-10-CM

## 2017-11-19 DIAGNOSIS — N39.0 URINARY TRACT INFECTION WITHOUT HEMATURIA, SITE UNSPECIFIED: Primary | ICD-10-CM

## 2017-11-19 LAB
-: NORMAL
ABSOLUTE EOS #: <0.03 K/UL (ref 0–0.44)
ABSOLUTE IMMATURE GRANULOCYTE: 0.15 K/UL (ref 0–0.3)
ABSOLUTE LYMPH #: 2.15 K/UL (ref 1.1–3.7)
ABSOLUTE MONO #: 1.26 K/UL (ref 0.1–1.2)
ALBUMIN SERPL-MCNC: 3.8 G/DL (ref 3.5–5.2)
ALBUMIN/GLOBULIN RATIO: 1.1 (ref 1–2.5)
ALP BLD-CCNC: 97 U/L (ref 35–104)
ALT SERPL-CCNC: 14 U/L (ref 5–33)
AMORPHOUS: NORMAL
ANION GAP SERPL CALCULATED.3IONS-SCNC: 15 MMOL/L (ref 9–17)
AST SERPL-CCNC: 21 U/L
BACTERIA: NORMAL
BASOPHILS # BLD: 0 % (ref 0–2)
BASOPHILS ABSOLUTE: 0.03 K/UL (ref 0–0.2)
BILIRUB SERPL-MCNC: 0.35 MG/DL (ref 0.3–1.2)
BILIRUBIN URINE: NEGATIVE
BUN BLDV-MCNC: 28 MG/DL (ref 6–20)
BUN/CREAT BLD: ABNORMAL (ref 9–20)
CALCIUM SERPL-MCNC: 9.4 MG/DL (ref 8.6–10.4)
CASTS UA: NORMAL /LPF (ref 0–8)
CHLORIDE BLD-SCNC: 100 MMOL/L (ref 98–107)
CO2: 24 MMOL/L (ref 20–31)
COLOR: YELLOW
COMMENT UA: ABNORMAL
CREAT SERPL-MCNC: 1.27 MG/DL (ref 0.5–0.9)
CRYSTALS, UA: NORMAL /HPF
DIFFERENTIAL TYPE: ABNORMAL
EOSINOPHILS RELATIVE PERCENT: 0 % (ref 1–4)
EPITHELIAL CELLS UA: NORMAL /HPF (ref 0–5)
GFR AFRICAN AMERICAN: 53 ML/MIN
GFR NON-AFRICAN AMERICAN: 44 ML/MIN
GFR SERPL CREATININE-BSD FRML MDRD: ABNORMAL ML/MIN/{1.73_M2}
GFR SERPL CREATININE-BSD FRML MDRD: ABNORMAL ML/MIN/{1.73_M2}
GLUCOSE BLD-MCNC: 135 MG/DL (ref 70–99)
GLUCOSE URINE: NEGATIVE
HCT VFR BLD CALC: 42.4 % (ref 36.3–47.1)
HEMOGLOBIN: 13.4 G/DL (ref 11.9–15.1)
IMMATURE GRANULOCYTES: 1 %
INR BLD: 0.9
KETONES, URINE: NEGATIVE
LACTIC ACID, WHOLE BLOOD: 3.1 MMOL/L (ref 0.7–2.1)
LEUKOCYTE ESTERASE, URINE: ABNORMAL
LYMPHOCYTES # BLD: 12 % (ref 24–43)
MCH RBC QN AUTO: 26.3 PG (ref 25.2–33.5)
MCHC RBC AUTO-ENTMCNC: 31.6 G/DL (ref 28.4–34.8)
MCV RBC AUTO: 83.1 FL (ref 82.6–102.9)
MONOCYTES # BLD: 7 % (ref 3–12)
MUCUS: NORMAL
NITRITE, URINE: NEGATIVE
OTHER OBSERVATIONS UA: NORMAL
PARTIAL THROMBOPLASTIN TIME: 21.2 SEC (ref 21.3–31.3)
PDW BLD-RTO: 13.9 % (ref 11.8–14.4)
PH UA: 6 (ref 5–8)
PLATELET # BLD: 274 K/UL (ref 138–453)
PLATELET ESTIMATE: ABNORMAL
PMV BLD AUTO: 9.2 FL (ref 8.1–13.5)
POTASSIUM SERPL-SCNC: 3.9 MMOL/L (ref 3.7–5.3)
PROTEIN UA: ABNORMAL
PROTHROMBIN TIME: 10.2 SEC (ref 9.4–12.6)
RBC # BLD: 5.1 M/UL (ref 3.95–5.11)
RBC # BLD: ABNORMAL 10*6/UL
RBC UA: NORMAL /HPF (ref 0–4)
RENAL EPITHELIAL, UA: NORMAL /HPF
SEG NEUTROPHILS: 80 % (ref 36–65)
SEGMENTED NEUTROPHILS ABSOLUTE COUNT: 14.69 K/UL (ref 1.5–8.1)
SODIUM BLD-SCNC: 139 MMOL/L (ref 135–144)
SPECIFIC GRAVITY UA: 1.02 (ref 1–1.03)
TOTAL PROTEIN: 7.2 G/DL (ref 6.4–8.3)
TRICHOMONAS: NORMAL
TURBIDITY: ABNORMAL
URINE HGB: ABNORMAL
UROBILINOGEN, URINE: NORMAL
WBC # BLD: 18.3 K/UL (ref 3.5–11.3)
WBC # BLD: ABNORMAL 10*3/UL
WBC UA: NORMAL /HPF (ref 0–5)
YEAST: NORMAL

## 2017-11-19 PROCEDURE — 80053 COMPREHEN METABOLIC PANEL: CPT

## 2017-11-19 PROCEDURE — 81001 URINALYSIS AUTO W/SCOPE: CPT

## 2017-11-19 PROCEDURE — 85025 COMPLETE CBC W/AUTO DIFF WBC: CPT

## 2017-11-19 PROCEDURE — 74000 XR ABDOMEN LIMITED (KUB): CPT

## 2017-11-19 PROCEDURE — 85730 THROMBOPLASTIN TIME PARTIAL: CPT

## 2017-11-19 PROCEDURE — 85610 PROTHROMBIN TIME: CPT

## 2017-11-19 PROCEDURE — 96375 TX/PRO/DX INJ NEW DRUG ADDON: CPT

## 2017-11-19 PROCEDURE — 83605 ASSAY OF LACTIC ACID: CPT

## 2017-11-19 PROCEDURE — 6360000002 HC RX W HCPCS: Performed by: EMERGENCY MEDICINE

## 2017-11-19 PROCEDURE — 99285 EMERGENCY DEPT VISIT HI MDM: CPT

## 2017-11-19 PROCEDURE — 87040 BLOOD CULTURE FOR BACTERIA: CPT

## 2017-11-19 PROCEDURE — 87086 URINE CULTURE/COLONY COUNT: CPT

## 2017-11-19 PROCEDURE — 96374 THER/PROPH/DIAG INJ IV PUSH: CPT

## 2017-11-19 PROCEDURE — 87205 SMEAR GRAM STAIN: CPT

## 2017-11-19 RX ORDER — ONDANSETRON 2 MG/ML
4 INJECTION INTRAMUSCULAR; INTRAVENOUS ONCE
Status: COMPLETED | OUTPATIENT
Start: 2017-11-19 | End: 2017-11-19

## 2017-11-19 RX ORDER — KETOROLAC TROMETHAMINE 30 MG/ML
30 INJECTION, SOLUTION INTRAMUSCULAR; INTRAVENOUS ONCE
Status: COMPLETED | OUTPATIENT
Start: 2017-11-19 | End: 2017-11-19

## 2017-11-19 RX ORDER — MORPHINE SULFATE 4 MG/ML
4 INJECTION, SOLUTION INTRAMUSCULAR; INTRAVENOUS ONCE
Status: COMPLETED | OUTPATIENT
Start: 2017-11-19 | End: 2017-11-19

## 2017-11-19 RX ADMIN — ONDANSETRON 4 MG: 2 INJECTION, SOLUTION INTRAMUSCULAR; INTRAVENOUS at 22:31

## 2017-11-19 RX ADMIN — MORPHINE SULFATE 4 MG: 4 INJECTION INTRAVENOUS at 22:31

## 2017-11-19 RX ADMIN — KETOROLAC TROMETHAMINE 30 MG: 30 INJECTION, SOLUTION INTRAMUSCULAR at 22:31

## 2017-11-19 ASSESSMENT — ENCOUNTER SYMPTOMS
CHOKING: 0
PHOTOPHOBIA: 0
BLOOD IN STOOL: 0
WHEEZING: 0
VOMITING: 0
SHORTNESS OF BREATH: 0
ABDOMINAL PAIN: 0
COLOR CHANGE: 0
FACIAL SWELLING: 0
STRIDOR: 0
TROUBLE SWALLOWING: 0
DIARRHEA: 0
NAUSEA: 0
CHEST TIGHTNESS: 0

## 2017-11-19 ASSESSMENT — PAIN SCALES - GENERAL
PAINLEVEL_OUTOF10: 8
PAINLEVEL_OUTOF10: 8

## 2017-11-19 ASSESSMENT — PAIN DESCRIPTION - DESCRIPTORS: DESCRIPTORS: SHARP

## 2017-11-19 ASSESSMENT — PAIN DESCRIPTION - PAIN TYPE: TYPE: CHRONIC PAIN;SURGICAL PAIN

## 2017-11-19 ASSESSMENT — PAIN SCALES - WONG BAKER: WONGBAKER_NUMERICALRESPONSE: 0

## 2017-11-19 ASSESSMENT — PAIN DESCRIPTION - FREQUENCY: FREQUENCY: CONTINUOUS

## 2017-11-19 ASSESSMENT — PAIN DESCRIPTION - ONSET: ONSET: ON-GOING

## 2017-11-19 ASSESSMENT — PAIN DESCRIPTION - PROGRESSION: CLINICAL_PROGRESSION: NOT CHANGED

## 2017-11-19 ASSESSMENT — PAIN DESCRIPTION - ORIENTATION: ORIENTATION: RIGHT

## 2017-11-20 PROBLEM — N20.1 RIGHT URETERAL STONE: Status: ACTIVE | Noted: 2017-11-20

## 2017-11-20 PROBLEM — N39.0 UTI (URINARY TRACT INFECTION): Status: ACTIVE | Noted: 2017-11-20

## 2017-11-20 PROBLEM — N17.9 ACUTE KIDNEY INJURY (HCC): Status: ACTIVE | Noted: 2017-11-20

## 2017-11-20 PROBLEM — Z90.5 SOLITARY KIDNEY, ACQUIRED: Status: ACTIVE | Noted: 2017-11-20

## 2017-11-20 PROBLEM — E66.9 OBESITY (BMI 30-39.9): Status: ACTIVE | Noted: 2017-11-20

## 2017-11-20 LAB
ANION GAP SERPL CALCULATED.3IONS-SCNC: 11 MMOL/L (ref 9–17)
BUN BLDV-MCNC: 25 MG/DL (ref 6–20)
BUN/CREAT BLD: ABNORMAL (ref 9–20)
CALCIUM SERPL-MCNC: 8.6 MG/DL (ref 8.6–10.4)
CHLORIDE BLD-SCNC: 106 MMOL/L (ref 98–107)
CO2: 26 MMOL/L (ref 20–31)
CREAT SERPL-MCNC: 1.22 MG/DL (ref 0.5–0.9)
CULTURE: NO GROWTH
CULTURE: NORMAL
GFR AFRICAN AMERICAN: 56 ML/MIN
GFR NON-AFRICAN AMERICAN: 46 ML/MIN
GFR SERPL CREATININE-BSD FRML MDRD: ABNORMAL ML/MIN/{1.73_M2}
GFR SERPL CREATININE-BSD FRML MDRD: ABNORMAL ML/MIN/{1.73_M2}
GLUCOSE BLD-MCNC: 110 MG/DL (ref 70–99)
HCT VFR BLD CALC: 40 % (ref 36.3–47.1)
HEMOGLOBIN: 12 G/DL (ref 11.9–15.1)
LACTIC ACID, WHOLE BLOOD: 2.2 MMOL/L (ref 0.7–2.1)
Lab: NORMAL
MCH RBC QN AUTO: 26.3 PG (ref 25.2–33.5)
MCHC RBC AUTO-ENTMCNC: 30 G/DL (ref 28.4–34.8)
MCV RBC AUTO: 87.7 FL (ref 82.6–102.9)
PDW BLD-RTO: 14.3 % (ref 11.8–14.4)
PLATELET # BLD: 212 K/UL (ref 138–453)
PMV BLD AUTO: 9.3 FL (ref 8.1–13.5)
POTASSIUM SERPL-SCNC: 3.7 MMOL/L (ref 3.7–5.3)
RBC # BLD: 4.56 M/UL (ref 3.95–5.11)
SODIUM BLD-SCNC: 143 MMOL/L (ref 135–144)
SPECIMEN DESCRIPTION: NORMAL
STATUS: NORMAL
WBC # BLD: 13.1 K/UL (ref 3.5–11.3)

## 2017-11-20 PROCEDURE — 99254 IP/OBS CNSLTJ NEW/EST MOD 60: CPT | Performed by: INTERNAL MEDICINE

## 2017-11-20 PROCEDURE — 83605 ASSAY OF LACTIC ACID: CPT

## 2017-11-20 PROCEDURE — G0378 HOSPITAL OBSERVATION PER HR: HCPCS

## 2017-11-20 PROCEDURE — 94640 AIRWAY INHALATION TREATMENT: CPT

## 2017-11-20 PROCEDURE — 1200000000 HC SEMI PRIVATE

## 2017-11-20 PROCEDURE — 6360000002 HC RX W HCPCS: Performed by: STUDENT IN AN ORGANIZED HEALTH CARE EDUCATION/TRAINING PROGRAM

## 2017-11-20 PROCEDURE — 6370000000 HC RX 637 (ALT 250 FOR IP): Performed by: STUDENT IN AN ORGANIZED HEALTH CARE EDUCATION/TRAINING PROGRAM

## 2017-11-20 PROCEDURE — 6360000002 HC RX W HCPCS: Performed by: EMERGENCY MEDICINE

## 2017-11-20 PROCEDURE — 2580000003 HC RX 258: Performed by: EMERGENCY MEDICINE

## 2017-11-20 PROCEDURE — 36415 COLL VENOUS BLD VENIPUNCTURE: CPT

## 2017-11-20 PROCEDURE — 80048 BASIC METABOLIC PNL TOTAL CA: CPT

## 2017-11-20 PROCEDURE — 85027 COMPLETE CBC AUTOMATED: CPT

## 2017-11-20 PROCEDURE — 2580000003 HC RX 258: Performed by: STUDENT IN AN ORGANIZED HEALTH CARE EDUCATION/TRAINING PROGRAM

## 2017-11-20 PROCEDURE — 96375 TX/PRO/DX INJ NEW DRUG ADDON: CPT

## 2017-11-20 RX ORDER — HYDROCODONE BITARTRATE AND ACETAMINOPHEN 5; 325 MG/1; MG/1
2 TABLET ORAL EVERY 4 HOURS PRN
Status: DISCONTINUED | OUTPATIENT
Start: 2017-11-20 | End: 2017-11-22 | Stop reason: HOSPADM

## 2017-11-20 RX ORDER — DOCUSATE SODIUM 100 MG/1
100 CAPSULE, LIQUID FILLED ORAL 2 TIMES DAILY PRN
Status: DISCONTINUED | OUTPATIENT
Start: 2017-11-20 | End: 2017-11-22 | Stop reason: HOSPADM

## 2017-11-20 RX ORDER — ALBUTEROL SULFATE 90 UG/1
2 AEROSOL, METERED RESPIRATORY (INHALATION) EVERY 6 HOURS PRN
Status: DISCONTINUED | OUTPATIENT
Start: 2017-11-20 | End: 2017-11-22 | Stop reason: HOSPADM

## 2017-11-20 RX ORDER — SODIUM CHLORIDE 0.9 % (FLUSH) 0.9 %
10 SYRINGE (ML) INJECTION EVERY 12 HOURS SCHEDULED
Status: DISCONTINUED | OUTPATIENT
Start: 2017-11-20 | End: 2017-11-22 | Stop reason: HOSPADM

## 2017-11-20 RX ORDER — PANTOPRAZOLE SODIUM 40 MG/1
40 TABLET, DELAYED RELEASE ORAL
Status: DISCONTINUED | OUTPATIENT
Start: 2017-11-20 | End: 2017-11-22 | Stop reason: HOSPADM

## 2017-11-20 RX ORDER — MORPHINE SULFATE 2 MG/ML
2 INJECTION, SOLUTION INTRAMUSCULAR; INTRAVENOUS
Status: DISCONTINUED | OUTPATIENT
Start: 2017-11-20 | End: 2017-11-21

## 2017-11-20 RX ORDER — OXYCODONE HYDROCHLORIDE AND ACETAMINOPHEN 5; 325 MG/1; MG/1
2 TABLET ORAL EVERY 4 HOURS PRN
Status: DISCONTINUED | OUTPATIENT
Start: 2017-11-20 | End: 2017-11-20

## 2017-11-20 RX ORDER — FLUTICASONE PROPIONATE 50 MCG
1 SPRAY, SUSPENSION (ML) NASAL DAILY
Status: DISCONTINUED | OUTPATIENT
Start: 2017-11-20 | End: 2017-11-22 | Stop reason: HOSPADM

## 2017-11-20 RX ORDER — HYDROCHLOROTHIAZIDE 50 MG/1
50 TABLET ORAL DAILY
Status: DISCONTINUED | OUTPATIENT
Start: 2017-11-20 | End: 2017-11-22 | Stop reason: HOSPADM

## 2017-11-20 RX ORDER — ONDANSETRON 2 MG/ML
4 INJECTION INTRAMUSCULAR; INTRAVENOUS EVERY 6 HOURS PRN
Status: DISCONTINUED | OUTPATIENT
Start: 2017-11-20 | End: 2017-11-22 | Stop reason: HOSPADM

## 2017-11-20 RX ORDER — LEVOTHYROXINE SODIUM 0.05 MG/1
50 TABLET ORAL DAILY
Status: DISCONTINUED | OUTPATIENT
Start: 2017-11-20 | End: 2017-11-22 | Stop reason: HOSPADM

## 2017-11-20 RX ORDER — HYDROCODONE BITARTRATE AND ACETAMINOPHEN 5; 325 MG/1; MG/1
1 TABLET ORAL EVERY 4 HOURS PRN
Status: DISCONTINUED | OUTPATIENT
Start: 2017-11-20 | End: 2017-11-22 | Stop reason: HOSPADM

## 2017-11-20 RX ORDER — TAMSULOSIN HYDROCHLORIDE 0.4 MG/1
0.4 CAPSULE ORAL DAILY
Status: DISCONTINUED | OUTPATIENT
Start: 2017-11-20 | End: 2017-11-22 | Stop reason: HOSPADM

## 2017-11-20 RX ORDER — SODIUM CHLORIDE 9 MG/ML
INJECTION, SOLUTION INTRAVENOUS CONTINUOUS
Status: DISCONTINUED | OUTPATIENT
Start: 2017-11-20 | End: 2017-11-22 | Stop reason: HOSPADM

## 2017-11-20 RX ORDER — MORPHINE SULFATE 4 MG/ML
4 INJECTION, SOLUTION INTRAMUSCULAR; INTRAVENOUS
Status: DISCONTINUED | OUTPATIENT
Start: 2017-11-20 | End: 2017-11-21

## 2017-11-20 RX ORDER — SODIUM CHLORIDE 0.9 % (FLUSH) 0.9 %
10 SYRINGE (ML) INJECTION PRN
Status: DISCONTINUED | OUTPATIENT
Start: 2017-11-20 | End: 2017-11-22 | Stop reason: HOSPADM

## 2017-11-20 RX ORDER — OXYCODONE HYDROCHLORIDE AND ACETAMINOPHEN 5; 325 MG/1; MG/1
1 TABLET ORAL EVERY 4 HOURS PRN
Status: DISCONTINUED | OUTPATIENT
Start: 2017-11-20 | End: 2017-11-20

## 2017-11-20 RX ORDER — OXYBUTYNIN CHLORIDE 10 MG/1
10 TABLET, EXTENDED RELEASE ORAL NIGHTLY
Status: DISCONTINUED | OUTPATIENT
Start: 2017-11-20 | End: 2017-11-20 | Stop reason: SDUPTHER

## 2017-11-20 RX ORDER — OXYBUTYNIN CHLORIDE 10 MG/1
10 TABLET, EXTENDED RELEASE ORAL DAILY
Status: DISCONTINUED | OUTPATIENT
Start: 2017-11-20 | End: 2017-11-22 | Stop reason: HOSPADM

## 2017-11-20 RX ORDER — CELECOXIB 100 MG/1
100 CAPSULE ORAL DAILY
Status: DISCONTINUED | OUTPATIENT
Start: 2017-11-20 | End: 2017-11-22 | Stop reason: HOSPADM

## 2017-11-20 RX ADMIN — CELECOXIB 100 MG: 100 CAPSULE ORAL at 09:39

## 2017-11-20 RX ADMIN — BECLOMETHASONE DIPROPIONATE 1 PUFF: 40 AEROSOL, METERED RESPIRATORY (INHALATION) at 08:18

## 2017-11-20 RX ADMIN — MORPHINE SULFATE 2 MG: 2 INJECTION, SOLUTION INTRAMUSCULAR; INTRAVENOUS at 11:32

## 2017-11-20 RX ADMIN — ONDANSETRON 4 MG: 2 INJECTION, SOLUTION INTRAMUSCULAR; INTRAVENOUS at 13:50

## 2017-11-20 RX ADMIN — HYDROCHLOROTHIAZIDE 50 MG: 50 TABLET ORAL at 09:39

## 2017-11-20 RX ADMIN — HYDROCODONE BITARTRATE AND ACETAMINOPHEN 2 TABLET: 5; 325 TABLET ORAL at 19:05

## 2017-11-20 RX ADMIN — ONDANSETRON 4 MG: 2 INJECTION, SOLUTION INTRAMUSCULAR; INTRAVENOUS at 22:29

## 2017-11-20 RX ADMIN — TAMSULOSIN HYDROCHLORIDE 0.4 MG: 0.4 CAPSULE ORAL at 09:39

## 2017-11-20 RX ADMIN — SODIUM CHLORIDE: 9 INJECTION, SOLUTION INTRAVENOUS at 20:22

## 2017-11-20 RX ADMIN — OXYCODONE HYDROCHLORIDE AND ACETAMINOPHEN 2 TABLET: 5; 325 TABLET ORAL at 13:52

## 2017-11-20 RX ADMIN — SODIUM CHLORIDE: 9 INJECTION, SOLUTION INTRAVENOUS at 02:57

## 2017-11-20 RX ADMIN — BECLOMETHASONE DIPROPIONATE 1 PUFF: 40 AEROSOL, METERED RESPIRATORY (INHALATION) at 20:11

## 2017-11-20 RX ADMIN — DOCUSATE SODIUM 100 MG: 100 CAPSULE, LIQUID FILLED ORAL at 19:05

## 2017-11-20 RX ADMIN — HYDROMORPHONE HYDROCHLORIDE 0.5 MG: 1 INJECTION, SOLUTION INTRAMUSCULAR; INTRAVENOUS; SUBCUTANEOUS at 23:06

## 2017-11-20 RX ADMIN — CEFTRIAXONE SODIUM 1 G: 1 INJECTION, POWDER, FOR SOLUTION INTRAMUSCULAR; INTRAVENOUS at 01:05

## 2017-11-20 RX ADMIN — FLUTICASONE PROPIONATE 1 SPRAY: 50 SPRAY, METERED NASAL at 09:42

## 2017-11-20 RX ADMIN — OXYBUTYNIN CHLORIDE 10 MG: 10 TABLET, FILM COATED, EXTENDED RELEASE ORAL at 09:39

## 2017-11-20 RX ADMIN — PANTOPRAZOLE SODIUM 40 MG: 40 TABLET, DELAYED RELEASE ORAL at 09:39

## 2017-11-20 RX ADMIN — OXYCODONE HYDROCHLORIDE AND ACETAMINOPHEN 2 TABLET: 5; 325 TABLET ORAL at 02:08

## 2017-11-20 RX ADMIN — MORPHINE SULFATE 4 MG: 4 INJECTION INTRAVENOUS at 14:59

## 2017-11-20 ASSESSMENT — PAIN SCALES - GENERAL
PAINLEVEL_OUTOF10: 3
PAINLEVEL_OUTOF10: 8
PAINLEVEL_OUTOF10: 6
PAINLEVEL_OUTOF10: 6
PAINLEVEL_OUTOF10: 7
PAINLEVEL_OUTOF10: 5
PAINLEVEL_OUTOF10: 8
PAINLEVEL_OUTOF10: 8
PAINLEVEL_OUTOF10: 4

## 2017-11-20 ASSESSMENT — PAIN DESCRIPTION - PAIN TYPE: TYPE: ACUTE PAIN

## 2017-11-20 ASSESSMENT — PAIN DESCRIPTION - ORIENTATION: ORIENTATION: RIGHT

## 2017-11-20 ASSESSMENT — ENCOUNTER SYMPTOMS
NAUSEA: 1
SHORTNESS OF BREATH: 0
COUGH: 0
BLURRED VISION: 0
BACK PAIN: 1
HEARTBURN: 0
ABDOMINAL PAIN: 1
BLOOD IN STOOL: 0
HEMOPTYSIS: 0

## 2017-11-20 ASSESSMENT — PAIN DESCRIPTION - LOCATION: LOCATION: FLANK

## 2017-11-20 NOTE — ED NOTES
Pt resting on cot with friend at bedside, rr even and unlabored, no distress, remains alert and oriented x4, monitor in place, bed locked in lowest position, will cont to monitor.       Armand Velez RN  11/19/17 3911

## 2017-11-20 NOTE — H&P
[facility], & [address] [facility], [address] [place] [address]  Urology History and Physical    Patient:  Cyndy Ragland  MRN: [id number]  YOB: 1965    CHIEF COMPLAINT:  Right flank pain    HISTORY OF PRESENT ILLNESS:   The patient is a 45 y.o. female who is POD1 from a right ureteral stent for a 4.3mm right ureteral stone. She returns to the ER with chills, nausea, and right flank pain which progressed throughout the day today and got acutely worse around 430pm. She has a history of cystine stones and two PCNLs for staghorn calculus in 2006 on the right side, as well as a left radical nephrectomy for a non-functioning kidney in 1998. She denies any dysuria, but feels pressure when urinating. WBC 18, Cr 1.2. KUB shows good position of ureteral stent. Patient's old records, notes and chart reviewed and summarized above. Past Medical History:    Past Medical History:   Diagnosis Date    CKD (chronic kidney disease) stage 1, GFR 90 ml/min or greater     Cystinuria ([address]     Hypertension     resolved    Hyperuricemia     Hypothyroidism     Kidney stones     Proteinuria        Past Surgical History:    Past Surgical History:   Procedure Laterality Date    CYSTOSCOPY Right 11/18/2017    stent insertion/retrograde pyelogram    KIDNEY STONE SURGERY      x2    KNEE ARTHROSCOPY Right     KNEE SURGERY Right 1998    TOTAL NEPHRECTOMY  1988    left     Previous Urologic Surgery: none  Medications Prior to Admission:    Prior to Admission medications    Medication Sig Start Date End Date Taking? Authorizing Provider   HYDROcodone-acetaminophen (NORCO) 5-325 MG per tablet Take 2 tablets by mouth every 6 hours as needed for Pain .  11/18/17   Barbera Ormond, MD   docusate sodium (COLACE) 100 MG capsule Take 1 capsule by mouth 2 times daily as needed for Constipation 11/18/17   Barbera Ormond, MD   oxybutynin (DITROPAN XL) 10 MG extended release tablet Take 1 tablet by mouth daily 11/18/17   Barbera Ormond, MD   tamsulosin Not on file. Social History Main Topics    Smoking status: Never Smoker    Smokeless tobacco: Never Used    Alcohol use 0.0 oz/week      Comment: rare    Drug use: No    Sexual activity: No     Other Topics Concern    Not on file     Social History Narrative    No narrative on file       Family History:    Family History   Problem Relation Age of Onset    High Blood Pressure Mother     Hypertension Mother     Emphysema Mother     Diabetes Father     Cancer Father     Heart Attack Father     Hypertension Father     Heart Disease Father     Hypertension Sister     Heart Disease Maternal Grandmother     Prostate Cancer Maternal Grandfather     Heart Disease Paternal Grandfather     Stroke Paternal Grandfather        REVIEW OF SYSTEMS:  All systems reviewed and negative except for that already noted in the HPI. Physical Exam:      This a 45 y.o. female   Patient Vitals for the past 24 hrs:   BP Temp Pulse Resp SpO2 Height Weight   11/19/17 2052 (!) 179/89 98.1 °F (36.7 °C) 74 18 97 % 5' 8\" (1.727 m) 235 lb (106.6 kg)     Constitutional: Patient in no acute distress. Neuro: Alert and oriented to person, place and time. Psych: mood and affect normal  HEENT negative  Lungs: Respiratory effort is normal  Cardiovascular: Normal peripheral pulses  Abdomen: Soft, mildly tender in the RLQ, non-distended  Right CVA TTP   Lymphatics: No palpable lymphadenopathy. Bladder non-tender and not distended.       LABS:   Recent Labs      11/18/17   0938  11/19/17   2108   WBC  10.4  18.3*   HGB  14.0  13.4   HCT  45.2  42.4   MCV  84.2  83.1   PLT  268  274     Recent Labs      11/18/17   0938  11/19/17   2108   NA  140  139   K  3.5*  3.9   CL  99  100   CO2  27  24   BUN  18  28*   CREATININE  0.75  1.27*       Additional Lab/culture results:    Urinalysis: Recent Labs      11/19/17   2242   COLORU  YELLOW   PHUR  6.0   WBCUA  20 TO 50   RBCUA  TOO NUMEROUS TO COUNT   MUCUS  NOT REPORTED   TRICHOMONAS  NOT

## 2017-11-20 NOTE — ED PROVIDER NOTES
[facility] [facility]  Emergency Department  Faculty Sign-Out Addendum     Care of Shaila Leyva was assumed from previous attending and is being seen for Post-op Problem (rt sided kidney stent placement, pt states 4.7 cm obstructive kidney stone, need surgery per pt. )  . The patient's initial evaluation and plan have been discussed with the prior provider who initially evaluated the patient.       EMERGENCY DEPARTMENT COURSE / MEDICAL DECISION MAKING:       MEDICATIONS GIVEN:  Orders Placed This Encounter   Medications    morphine (PF) injection 4 mg    ondansetron (ZOFRAN) injection 4 mg    ketorolac (TORADOL) injection 30 mg    cefTRIAXone (ROCEPHIN) 1 g in sterile water 10 mL IV syringe       LABS / RADIOLOGY:     Labs Reviewed   URINALYSIS - Abnormal; Notable for the following:        Result Value    Turbidity UA CLOUDY (*)     Urine Hgb LARGE (*)     Protein, UA 2+ (*)     Leukocyte Esterase, Urine MODERATE (*)     All other components within normal limits   CBC WITH AUTO DIFFERENTIAL - Abnormal; Notable for the following:     WBC 18.3 (*)     Seg Neutrophils 80 (*)     Lymphocytes 12 (*)     Eosinophils % 0 (*)     Immature Granulocytes 1 (*)     Segs Absolute 14.69 (*)     Absolute Mono # 1.26 (*)     All other components within normal limits   COMPREHENSIVE METABOLIC PANEL - Abnormal; Notable for the following:     Glucose 135 (*)     BUN 28 (*)     CREATININE 1.27 (*)     GFR Non- 44 (*)     GFR  53 (*)     All other components within normal limits   APTT - Abnormal; Notable for the following:     PTT 21.2 (*)     All other components within normal limits   LACTIC ACID, WHOLE BLOOD - Abnormal; Notable for the following:     Lactic Acid, Whole Blood 3.1 (*)     All other components within normal limits   CULTURE BLOOD #1   URINE CULTURE   CULTURE BLOOD #1   PROTIME-INR   LACTIC ACID, WHOLE BLOOD   MICROSCOPIC URINALYSIS       Ct Abdomen Pelvis Wo Iv

## 2017-11-20 NOTE — CARE COORDINATION
[address] Transitions 2017    Patient: Gopal Abbott Patient : 1965   MRN: [id number]    Reason for Admission: UTI    RARS: [facility] RARS 2.5/5     Readmission Risk  Patient Active Problem List   Diagnosis    Kidney stones    Hypertension    Hypothyroidism    CKD (chronic kidney disease) stage 1, GFR 90 ml/min or greater    C[facility] ([facility]    Hyperuricemia    Proteinuria    Knee pain, acute    Osteoarthritis of both knees    GERD (gastroesophageal reflux disease)    Sleep apnea    Midline low back pain with right-sided sciatica    Mixed hyperlipidemia    Morbid obesity due to excess calories ([address]    UTI (urinary tract infection)    Solitary kidney, acquired    Right ureteral stone       Inpatient Assessment  Care Transitions Summary    Care Transitions Inpatient Review  Medication Review  Are you able to afford your medications?:  Yes  How often do you have difficulty taking your medications?:  I always take them as prescribed. Housing Review  Who do you live with?:  Alone  Social Support  Do you have a ?:  No  Do you have a [address] [place] [address]?:  No  Durable Medical Equipment  Functional Review  Ability to seek help/take action for Emergent/Urgent situations i.e. fire, crime, inclement weather or health crisis. :  Independent  Ability handle personal hygiene needs (bathing/dressing/grooming): Independent  Ability to manage medications: Independent  Ability to prepare food:  Independent  Ability to maintain home (clean home, laundry): Independent  Ability to drive and/or has transportation:  Independent  Ability to do shopping:  Independent  Ability to manage finances: Independent  Is patient able to live independently?:  Yes  Hearing and Vision  Care Transitions Interventions       Care Transitions following. [facility] contact information left @ bedside.   Patient sleeping soundly    Follow Up   Future Appointments  Date Time Provider Department

## 2017-11-20 NOTE — CONSULTS
Inhalation, BID  hydrochlorothiazide (HYDRODIURIL) tablet 50 mg, 50 mg, Oral, Daily  celecoxib (CELEBREX) capsule 100 mg, 100 mg, Oral, Daily  docusate sodium (COLACE) capsule 100 mg, 100 mg, Oral, BID PRN  oxybutynin (DITROPAN-XL) extended release tablet 10 mg, 10 mg, Oral, Daily  tamsulosin (FLOMAX) capsule 0.4 mg, 0.4 mg, Oral, Daily  sodium chloride flush 0.9 % injection 10 mL, 10 mL, Intravenous, 2 times per day  sodium chloride flush 0.9 % injection 10 mL, 10 mL, Intravenous, PRN  0.9 % sodium chloride infusion, , Intravenous, Continuous  ondansetron (ZOFRAN) injection 4 mg, 4 mg, Intravenous, Q6H PRN  morphine injection 2 mg, 2 mg, Intravenous, Q2H PRN **OR** morphine (PF) injection 4 mg, 4 mg, Intravenous, Q2H PRN  [START ON 11/21/2017] cefTRIAXone (ROCEPHIN) 1 g in sterile water 10 mL IV syringe, 1 g, Intravenous, Q24H  oxyCODONE-acetaminophen (PERCOCET) 5-325 MG per tablet 1 tablet, 1 tablet, Oral, Q4H PRN **OR** oxyCODONE-acetaminophen (PERCOCET) 5-325 MG per tablet 2 tablet, 2 tablet, Oral, Q4H PRN    Allergies:  Review of patient's allergies indicates no known allergies. Social History:    TOBACCO:   reports that she has never smoked. She has never used smokeless tobacco.  ETOH:   reports that she drinks alcohol. Family History:       Problem Relation Age of Onset    High Blood Pressure Mother     Hypertension Mother     Emphysema Mother     Diabetes Father     Cancer Father     Heart Attack Father     Hypertension Father     Heart Disease Father     Hypertension Sister     Heart Disease Maternal Grandmother     Prostate Cancer Maternal Grandfather     Heart Disease Paternal Grandfather     Stroke Paternal Grandfather        REVIEW OF SYSTEMS:    Review of Systems   Constitutional: Positive for chills, diaphoresis and malaise/fatigue. Negative for fever. HENT: Negative for hearing loss. Eyes: Negative for blurred vision.    Respiratory: Negative for cough, hemoptysis and shortness NOT REPORTED     Platelet Estimate NOT REPORTED     Seg Neutrophils 80 (H) 36 - 65 %    Lymphocytes 12 (L) 24 - 43 %    Monocytes 7 3 - 12 %    Eosinophils % 0 (L) 1 - 4 %    Basophils 0 0 - 2 %    Immature Granulocytes 1 (H) 0 %    Segs Absolute 14.69 (H) 1.50 - 8.10 k/uL    Absolute Lymph # 2.15 1.10 - 3.70 k/uL    Absolute Mono # 1.26 (H) 0.10 - 1.20 k/uL    Absolute Eos # <0.03 0.00 - 0.44 k/uL    Basophils # 0.03 0.00 - 0.20 k/uL    Absolute Immature Granulocyte 0.15 0.00 - 0.30 k/uL   Comprehensive Metabolic Panel    Collection Time: 11/19/17  9:08 PM   Result Value Ref Range    Glucose 135 (H) 70 - 99 mg/dL    BUN 28 (H) 6 - 20 mg/dL    CREATININE 1.27 (H) 0.50 - 0.90 mg/dL    Bun/Cre Ratio NOT REPORTED 9 - 20    Calcium 9.4 8.6 - 10.4 mg/dL    Sodium 139 135 - 144 mmol/L    Potassium 3.9 3.7 - 5.3 mmol/L    Chloride 100 98 - 107 mmol/L    CO2 24 20 - 31 mmol/L    Anion Gap 15 9 - 17 mmol/L    Alkaline Phosphatase 97 35 - 104 U/L    ALT 14 5 - 33 U/L    AST 21 <32 U/L    Total Bilirubin 0.35 0.3 - 1.2 mg/dL    Total Protein 7.2 6.4 - 8.3 g/dL    Alb 3.8 3.5 - 5.2 g/dL    Albumin/Globulin Ratio 1.1 1.0 - 2.5    GFR Non- 44 (L) >60 mL/min    GFR  53 (L) >60 mL/min    GFR Comment          GFR Staging NOT REPORTED    Protime-INR    Collection Time: 11/19/17  9:08 PM   Result Value Ref Range    Protime 10.2 9.4 - 12.6 sec    INR 0.9    APTT    Collection Time: 11/19/17  9:08 PM   Result Value Ref Range    PTT 21.2 (L) 21.3 - 31.3 sec   Lactic Acid, Whole Blood    Collection Time: 11/19/17  9:08 PM   Result Value Ref Range    Lactic Acid, Whole Blood 3.1 (H) 0.7 - 2.1 mmol/L   Culture Blood #1    Collection Time: 11/19/17  9:18 PM   Result Value Ref Range    Specimen Description . BLOOD     Special Requests l ac 9ml     Culture NO GROWTH 17 HOURS     Culture       [address] [address] [address] [phone]    Status Pending    Urinalysis    Collection Time: 11/19/17 10:42 PM   Result Value Ref Range    Color, UA YELLOW YEL    Turbidity UA CLOUDY (A) CLEAR    Glucose, Ur NEGATIVE NEG    Bilirubin Urine NEGATIVE NEG    Ketones, Urine NEGATIVE NEG    Specific Gravity, UA 1.016 1.005 - 1.030    Urine Hgb LARGE (A) NEG    pH, UA 6.0 5.0 - 8.0    Protein, UA 2+ (A) NEG    Urobilinogen, Urine Normal NORM    Nitrite, Urine NEGATIVE NEG    Leukocyte Esterase, Urine MODERATE (A) NEG    Urinalysis Comments NOT REPORTED    Urine Culture    Collection Time: 11/19/17 10:42 PM   Result Value Ref Range    Specimen Description . URINE     Special Requests NOT REPORTED     Culture CULTURE IN PROGRESS     Culture       [address] [place] [address] Drive [address] [facility][1], [address] [place] [address] [phone]    Status Pending    Microscopic Urinalysis    Collection Time: 11/19/17 10:42 PM   Result Value Ref Range    -          WBC, UA 20 TO 50 0 - 5 /HPF    RBC, UA TOO NUMEROUS TO COUNT 0 - 4 /HPF    Casts UA 2 TO 5 HYALINE 0 - 8 /LPF    Crystals UA NOT REPORTED NONE /HPF    Epithelial Cells UA 5 TO 10 0 - 5 /HPF    Renal Epithelial, Urine NOT REPORTED 0 /HPF    Bacteria, UA NOT REPORTED NONE    Mucus, UA NOT REPORTED NONE    Trichomonas, UA NOT REPORTED NONE    Amorphous, UA NOT REPORTED NONE    Other Observations UA NOT REPORTED NREQ    Yeast, UA NOT REPORTED NONE   Lactic Acid, Whole Blood    Collection Time: 11/20/17 12:49 AM   Result Value Ref Range    Lactic Acid, Whole Blood 2.2 (H) 0.7 - 2.1 mmol/L   CBC    Collection Time: 11/20/17  1:34 PM   Result Value Ref Range    WBC 13.1 (H) 3.5 - 11.3 k/uL    RBC 4.56 3.95 - 5.11 m/uL    Hemoglobin 12.0 11.9 - 15.1 g/dL    Hematocrit 40.0 36.3 - 47.1 %    MCV 87.7 82.6 - 102.9 fL    MCH 26.3 25.2 - 33.5 pg    MCHC 30.0 28.4 - 34.8 g/dL    RDW 14.3 11.8 - 14.4 %    Platelets 953 965 - 864 k/uL    MPV 9.3 8.1 - 13.5 fL   BASIC METABOLIC PANEL    Collection Time: 11/20/17  1:34 PM   Result Value Ref Range    Glucose 110 (H) 70 - 99 mg/dL    BUN 25 (H) 6 - 20 mg/dL

## 2017-11-20 NOTE — ED NOTES
Pt continues to rest in bed, no distress noted.        Izabela Gil, LORETTA  11/20/17 [id number] Ayush Shah RN  11/20/17 5609

## 2017-11-20 NOTE — ED PROVIDER NOTES
daily 9/30/16  Yes Adan Meza MD   albuterol sulfate HFA (PROAIR HFA) 108 (90 BASE) MCG/ACT inhaler Inhale 2 puffs into the lungs every 6 hours as needed for Wheezing 2/5/16  Yes Nazia Kearney CNP   levothyroxine (SYNTHROID) 50 MCG tablet Take 1 tablet by mouth daily. 12/23/14  Yes Rahel Villa CNP   solifenacin (VESICARE) 10 MG tablet Take 1 tablet by mouth as needed. 12/23/14  Yes Rahel Villa CNP   acetaminophen (TYLENOL) 500 MG tablet Take 1,000 mg by mouth every 4 hours as needed for Pain. Yes Historical Provider, MD   ibuprofen (ADVIL;MOTRIN) 600 MG tablet Take 1 tablet by mouth every 6 hours as needed for Pain. 4/18/14  Yes Hemalatha Mazariegos MD   Omega-3 Fatty Acids (OMEGA 3 PO) Take 1 tablet by mouth daily    Yes Historical Provider, MD   Pot & Sod Cit-Cit Ac (POLYCITRA PO) Take 30 mg by mouth as needed. Yes Historical Provider, MD   solifenacin (VESICARE) 10 MG tablet Take 10 mg by mouth as needed. 12/23/14  Historical Provider, MD       REVIEW OF SYSTEMS    (2-9 systems for level 4, 10 or more for level 5)      Review of Systems   Constitutional: Positive for chills. Negative for activity change, appetite change, diaphoresis and fever. HENT: Negative for facial swelling, nosebleeds and trouble swallowing. Eyes: Negative for photophobia and visual disturbance. Respiratory: Negative for choking, chest tightness, shortness of breath, wheezing and stridor. Cardiovascular: Negative for chest pain and leg swelling. Gastrointestinal: Negative for abdominal pain, blood in stool, diarrhea, nausea and vomiting. Genitourinary: Positive for difficulty urinating and flank pain. Negative for decreased urine volume, dysuria, enuresis and hematuria. Musculoskeletal: Negative for joint swelling and neck pain. Skin: Negative for color change, pallor and rash. Neurological: Negative for dizziness, syncope, facial asymmetry, speech difficulty, light-headedness and numbness. Whole Blood    Microscopic Urinalysis    DIET GENERAL;    Telemetry Monitoring    Telemetry monitoring    Inpatient consult to Urology    Inpatient consult to Hospitalist    Insert peripheral IV    PATIENT STATUS (FROM ED OR OR/PROCEDURAL) Inpatient    PATIENT STATUS (FROM ED OR OR/PROCEDURAL) Inpatient       MEDICATIONS ORDERED:  Orders Placed This Encounter   Medications    morphine (PF) injection 4 mg    ondansetron (ZOFRAN) injection 4 mg    ketorolac (TORADOL) injection 30 mg    cefTRIAXone (ROCEPHIN) 1 g in sterile water 10 mL IV syringe    cefTRIAXone (ROCEPHIN) 1 g in sterile water 10 mL IV syringe    OR Linked Order Group     oxyCODONE-acetaminophen (PERCOCET) 5-325 MG per tablet 1 tablet     oxyCODONE-acetaminophen (PERCOCET) 5-325 MG per tablet 2 tablet       DDX: Meningitis, encephalitis, AOM, strep, URI/ viral, PNA, PE, UTI/ pyelo, abdominal (appy, GB, pancreatitis, sbp, PID), skin, neutropenic fever (chemo)    (Hyperthermia DDX: NMS/SS/MH, heat stroke, cocaine, CNS lesion)    Evaluate for: low BP, AMS, chemotherapy use, HIV, Medication use (NMS, SS, MH), environmental exposure, productive cough, urinary symptoms, abscess, meningeal symptoms, SIRS/ sepsis risk factors      DIAGNOSTIC RESULTS / EMERGENCY DEPARTMENT COURSE / MDM     LABS:  Results for orders placed or performed during the hospital encounter of 11/19/17   Culture Blood #1   Result Value Ref Range    Specimen Description . BLOOD     Special Requests l ac 9ml     Culture NO GROWTH 1 HOUR     Culture       [facility] [address] [address] [phone]    Status Pending    Urinalysis   Result Value Ref Range    Color, UA YELLOW YEL    Turbidity UA CLOUDY (A) CLEAR    Glucose, Ur NEGATIVE NEG    Bilirubin Urine NEGATIVE NEG    Ketones, Urine NEGATIVE NEG    Specific Gravity, UA 1.016 1.005 - 1.030    Urine Hgb LARGE (A) NEG    pH, UA 6.0 5.0 - 8.0    Protein, UA 2+ (A) NEG    Urobilinogen, Urine Normal NORM Nitrite, Urine NEGATIVE NEG    Leukocyte Esterase, Urine MODERATE (A) NEG    Urinalysis Comments NOT REPORTED    CBC Auto Differential   Result Value Ref Range    WBC 18.3 (H) 3.5 - 11.3 k/uL    RBC 5.10 3.95 - 5.11 m/uL    Hemoglobin 13.4 11.9 - 15.1 g/dL    Hematocrit 42.4 36.3 - 47.1 %    MCV 83.1 82.6 - 102.9 fL    MCH 26.3 25.2 - 33.5 pg    MCHC 31.6 28.4 - 34.8 g/dL    RDW 13.9 11.8 - 14.4 %    Platelets 345 659 - 174 k/uL    MPV 9.2 8.1 - 13.5 fL    Differential Type NOT REPORTED     WBC Morphology NOT REPORTED     RBC Morphology NOT REPORTED     Platelet Estimate NOT REPORTED     Seg Neutrophils 80 (H) 36 - 65 %    Lymphocytes 12 (L) 24 - 43 %    Monocytes 7 3 - 12 %    Eosinophils % 0 (L) 1 - 4 %    Basophils 0 0 - 2 %    Immature Granulocytes 1 (H) 0 %    Segs Absolute 14.69 (H) 1.50 - 8.10 k/uL    Absolute Lymph # 2.15 1.10 - 3.70 k/uL    Absolute Mono # 1.26 (H) 0.10 - 1.20 k/uL    Absolute Eos # <0.03 0.00 - 0.44 k/uL    Basophils # 0.03 0.00 - 0.20 k/uL    Absolute Immature Granulocyte 0.15 0.00 - 0.30 k/uL   Comprehensive Metabolic Panel   Result Value Ref Range    Glucose 135 (H) 70 - 99 mg/dL    BUN 28 (H) 6 - 20 mg/dL    CREATININE 1.27 (H) 0.50 - 0.90 mg/dL    Bun/Cre Ratio NOT REPORTED 9 - 20    Calcium 9.4 8.6 - 10.4 mg/dL    Sodium 139 135 - 144 mmol/L    Potassium 3.9 3.7 - 5.3 mmol/L    Chloride 100 98 - 107 mmol/L    CO2 24 20 - 31 mmol/L    Anion Gap 15 9 - 17 mmol/L    Alkaline Phosphatase 97 35 - 104 U/L    ALT 14 5 - 33 U/L    AST 21 <32 U/L    Total Bilirubin 0.35 0.3 - 1.2 mg/dL    Total Protein 7.2 6.4 - 8.3 g/dL    Alb 3.8 3.5 - 5.2 g/dL    Albumin/Globulin Ratio 1.1 1.0 - 2.5    GFR Non- 44 (L) >60 mL/min    GFR  53 (L) >60 mL/min    GFR Comment          GFR Staging NOT REPORTED    Protime-INR   Result Value Ref Range    Protime 10.2 9.4 - 12.6 sec    INR 0.9    APTT   Result Value Ref Range    PTT 21.2 (L) 21.3 - 31.3 sec   Lactic Acid, Whole

## 2017-11-21 ENCOUNTER — ANESTHESIA EVENT (OUTPATIENT)
Dept: OPERATING ROOM | Age: 52
DRG: 669 | End: 2017-11-21
Payer: COMMERCIAL

## 2017-11-21 ENCOUNTER — APPOINTMENT (OUTPATIENT)
Dept: GENERAL RADIOLOGY | Age: 52
DRG: 669 | End: 2017-11-21
Payer: COMMERCIAL

## 2017-11-21 ENCOUNTER — ANESTHESIA (OUTPATIENT)
Dept: OPERATING ROOM | Age: 52
DRG: 669 | End: 2017-11-21
Payer: COMMERCIAL

## 2017-11-21 VITALS — OXYGEN SATURATION: 90 % | TEMPERATURE: 99.1 F | SYSTOLIC BLOOD PRESSURE: 168 MMHG | DIASTOLIC BLOOD PRESSURE: 100 MMHG

## 2017-11-21 PROBLEM — G44.209 TENSION TYPE HEADACHE: Status: ACTIVE | Noted: 2017-11-21

## 2017-11-21 LAB
ABSOLUTE EOS #: 0.51 K/UL (ref 0–0.44)
ABSOLUTE IMMATURE GRANULOCYTE: 0.05 K/UL (ref 0–0.3)
ABSOLUTE LYMPH #: 2.91 K/UL (ref 1.1–3.7)
ABSOLUTE MONO #: 0.85 K/UL (ref 0.1–1.2)
ANION GAP SERPL CALCULATED.3IONS-SCNC: 15 MMOL/L (ref 9–17)
BASOPHILS # BLD: 0 % (ref 0–2)
BASOPHILS ABSOLUTE: 0.03 K/UL (ref 0–0.2)
BUN BLDV-MCNC: 22 MG/DL (ref 6–20)
BUN/CREAT BLD: ABNORMAL (ref 9–20)
CALCIUM SERPL-MCNC: 8.4 MG/DL (ref 8.6–10.4)
CHLORIDE BLD-SCNC: 105 MMOL/L (ref 98–107)
CO2: 23 MMOL/L (ref 20–31)
CREAT SERPL-MCNC: 1.09 MG/DL (ref 0.5–0.9)
DIFFERENTIAL TYPE: ABNORMAL
EOSINOPHILS RELATIVE PERCENT: 5 % (ref 1–4)
GFR AFRICAN AMERICAN: >60 ML/MIN
GFR NON-AFRICAN AMERICAN: 52 ML/MIN
GFR SERPL CREATININE-BSD FRML MDRD: ABNORMAL ML/MIN/{1.73_M2}
GFR SERPL CREATININE-BSD FRML MDRD: ABNORMAL ML/MIN/{1.73_M2}
GLUCOSE BLD-MCNC: 86 MG/DL (ref 70–99)
HCT VFR BLD CALC: 37.5 % (ref 36.3–47.1)
HEMOGLOBIN: 11.3 G/DL (ref 11.9–15.1)
IMMATURE GRANULOCYTES: 1 %
LYMPHOCYTES # BLD: 29 % (ref 24–43)
MCH RBC QN AUTO: 26.5 PG (ref 25.2–33.5)
MCHC RBC AUTO-ENTMCNC: 30.1 G/DL (ref 28.4–34.8)
MCV RBC AUTO: 88 FL (ref 82.6–102.9)
MONOCYTES # BLD: 9 % (ref 3–12)
PDW BLD-RTO: 14.6 % (ref 11.8–14.4)
PLATELET # BLD: 244 K/UL (ref 138–453)
PLATELET ESTIMATE: ABNORMAL
PMV BLD AUTO: 10.4 FL (ref 8.1–13.5)
POTASSIUM SERPL-SCNC: 4 MMOL/L (ref 3.7–5.3)
RBC # BLD: 4.26 M/UL (ref 3.95–5.11)
RBC # BLD: ABNORMAL 10*6/UL
SEG NEUTROPHILS: 56 % (ref 36–65)
SEGMENTED NEUTROPHILS ABSOLUTE COUNT: 5.54 K/UL (ref 1.5–8.1)
SODIUM BLD-SCNC: 143 MMOL/L (ref 135–144)
WBC # BLD: 9.9 K/UL (ref 3.5–11.3)
WBC # BLD: ABNORMAL 10*3/UL

## 2017-11-21 PROCEDURE — 2500000003 HC RX 250 WO HCPCS: Performed by: NURSE ANESTHETIST, CERTIFIED REGISTERED

## 2017-11-21 PROCEDURE — C2617 STENT, NON-COR, TEM W/O DEL: HCPCS | Performed by: UROLOGY

## 2017-11-21 PROCEDURE — 3700000000 HC ANESTHESIA ATTENDED CARE: Performed by: UROLOGY

## 2017-11-21 PROCEDURE — 3600000004 HC SURGERY LEVEL 4 BASE: Performed by: UROLOGY

## 2017-11-21 PROCEDURE — 1200000000 HC SEMI PRIVATE

## 2017-11-21 PROCEDURE — 6370000000 HC RX 637 (ALT 250 FOR IP): Performed by: STUDENT IN AN ORGANIZED HEALTH CARE EDUCATION/TRAINING PROGRAM

## 2017-11-21 PROCEDURE — 6360000002 HC RX W HCPCS: Performed by: NURSE ANESTHETIST, CERTIFIED REGISTERED

## 2017-11-21 PROCEDURE — 99232 SBSQ HOSP IP/OBS MODERATE 35: CPT | Performed by: INTERNAL MEDICINE

## 2017-11-21 PROCEDURE — 6360000002 HC RX W HCPCS: Performed by: ANESTHESIOLOGY

## 2017-11-21 PROCEDURE — 2580000003 HC RX 258: Performed by: STUDENT IN AN ORGANIZED HEALTH CARE EDUCATION/TRAINING PROGRAM

## 2017-11-21 PROCEDURE — 2580000003 HC RX 258: Performed by: UROLOGY

## 2017-11-21 PROCEDURE — 94640 AIRWAY INHALATION TREATMENT: CPT

## 2017-11-21 PROCEDURE — C1758 CATHETER, URETERAL: HCPCS | Performed by: UROLOGY

## 2017-11-21 PROCEDURE — 94762 N-INVAS EAR/PLS OXIMTRY CONT: CPT

## 2017-11-21 PROCEDURE — 3600000014 HC SURGERY LEVEL 4 ADDTL 15MIN: Performed by: UROLOGY

## 2017-11-21 PROCEDURE — G0378 HOSPITAL OBSERVATION PER HR: HCPCS

## 2017-11-21 PROCEDURE — 6370000000 HC RX 637 (ALT 250 FOR IP): Performed by: INTERNAL MEDICINE

## 2017-11-21 PROCEDURE — 85025 COMPLETE CBC W/AUTO DIFF WBC: CPT

## 2017-11-21 PROCEDURE — 0T768DZ DILATION OF RIGHT URETER WITH INTRALUMINAL DEVICE, VIA NATURAL OR ARTIFICIAL OPENING ENDOSCOPIC: ICD-10-PCS | Performed by: UROLOGY

## 2017-11-21 PROCEDURE — 3700000001 HC ADD 15 MINUTES (ANESTHESIA): Performed by: UROLOGY

## 2017-11-21 PROCEDURE — C1773 RET DEV, INSERTABLE: HCPCS | Performed by: UROLOGY

## 2017-11-21 PROCEDURE — 80048 BASIC METABOLIC PNL TOTAL CA: CPT

## 2017-11-21 PROCEDURE — C1769 GUIDE WIRE: HCPCS | Performed by: UROLOGY

## 2017-11-21 PROCEDURE — 82365 CALCULUS SPECTROSCOPY: CPT

## 2017-11-21 PROCEDURE — 74000 XR ABDOMEN LIMITED (KUB): CPT

## 2017-11-21 PROCEDURE — 6360000002 HC RX W HCPCS: Performed by: STUDENT IN AN ORGANIZED HEALTH CARE EDUCATION/TRAINING PROGRAM

## 2017-11-21 PROCEDURE — 7100000000 HC PACU RECOVERY - FIRST 15 MIN: Performed by: UROLOGY

## 2017-11-21 PROCEDURE — 0TC38ZZ EXTIRPATION OF MATTER FROM RIGHT KIDNEY PELVIS, VIA NATURAL OR ARTIFICIAL OPENING ENDOSCOPIC: ICD-10-PCS | Performed by: UROLOGY

## 2017-11-21 PROCEDURE — 2500000003 HC RX 250 WO HCPCS: Performed by: STUDENT IN AN ORGANIZED HEALTH CARE EDUCATION/TRAINING PROGRAM

## 2017-11-21 PROCEDURE — 7100000001 HC PACU RECOVERY - ADDTL 15 MIN: Performed by: UROLOGY

## 2017-11-21 PROCEDURE — 36415 COLL VENOUS BLD VENIPUNCTURE: CPT

## 2017-11-21 DEVICE — UNIVERSA SOFT URETERAL STENT AND POSITIONER WITH HYDROPHILIC COATING AND MONOFILAMENT TETHER
Type: IMPLANTABLE DEVICE | Site: URETER | Status: FUNCTIONAL
Brand: UNIVERSA

## 2017-11-21 RX ORDER — MEPERIDINE HYDROCHLORIDE 50 MG/ML
12.5 INJECTION INTRAMUSCULAR; INTRAVENOUS; SUBCUTANEOUS EVERY 5 MIN PRN
Status: DISCONTINUED | OUTPATIENT
Start: 2017-11-21 | End: 2017-11-21 | Stop reason: HOSPADM

## 2017-11-21 RX ORDER — BUTALBITAL, ACETAMINOPHEN AND CAFFEINE 50; 325; 40 MG/1; MG/1; MG/1
1 TABLET ORAL EVERY 4 HOURS PRN
Status: DISCONTINUED | OUTPATIENT
Start: 2017-11-21 | End: 2017-11-22 | Stop reason: HOSPADM

## 2017-11-21 RX ORDER — MAGNESIUM HYDROXIDE 1200 MG/15ML
LIQUID ORAL CONTINUOUS PRN
Status: DISCONTINUED | OUTPATIENT
Start: 2017-11-21 | End: 2017-11-21 | Stop reason: HOSPADM

## 2017-11-21 RX ORDER — FENTANYL CITRATE 50 UG/ML
25 INJECTION, SOLUTION INTRAMUSCULAR; INTRAVENOUS EVERY 5 MIN PRN
Status: DISCONTINUED | OUTPATIENT
Start: 2017-11-21 | End: 2017-11-21 | Stop reason: HOSPADM

## 2017-11-21 RX ORDER — PROPOFOL 10 MG/ML
INJECTION, EMULSION INTRAVENOUS PRN
Status: DISCONTINUED | OUTPATIENT
Start: 2017-11-21 | End: 2017-11-21 | Stop reason: SDUPTHER

## 2017-11-21 RX ORDER — FENTANYL CITRATE 50 UG/ML
50 INJECTION, SOLUTION INTRAMUSCULAR; INTRAVENOUS EVERY 5 MIN PRN
Status: DISCONTINUED | OUTPATIENT
Start: 2017-11-21 | End: 2017-11-21 | Stop reason: HOSPADM

## 2017-11-21 RX ORDER — FENTANYL CITRATE 50 UG/ML
INJECTION, SOLUTION INTRAMUSCULAR; INTRAVENOUS PRN
Status: DISCONTINUED | OUTPATIENT
Start: 2017-11-21 | End: 2017-11-21 | Stop reason: SDUPTHER

## 2017-11-21 RX ORDER — DEXAMETHASONE SODIUM PHOSPHATE 10 MG/ML
INJECTION INTRAMUSCULAR; INTRAVENOUS PRN
Status: DISCONTINUED | OUTPATIENT
Start: 2017-11-21 | End: 2017-11-21 | Stop reason: SDUPTHER

## 2017-11-21 RX ORDER — ONDANSETRON 2 MG/ML
4 INJECTION INTRAMUSCULAR; INTRAVENOUS ONCE
Status: COMPLETED | OUTPATIENT
Start: 2017-11-21 | End: 2017-11-21

## 2017-11-21 RX ORDER — ACETAMINOPHEN 325 MG/1
650 TABLET ORAL EVERY 4 HOURS PRN
Status: DISCONTINUED | OUTPATIENT
Start: 2017-11-21 | End: 2017-11-22 | Stop reason: HOSPADM

## 2017-11-21 RX ORDER — ROCURONIUM BROMIDE 10 MG/ML
INJECTION, SOLUTION INTRAVENOUS PRN
Status: DISCONTINUED | OUTPATIENT
Start: 2017-11-21 | End: 2017-11-21 | Stop reason: SDUPTHER

## 2017-11-21 RX ORDER — ONDANSETRON 2 MG/ML
INJECTION INTRAMUSCULAR; INTRAVENOUS PRN
Status: DISCONTINUED | OUTPATIENT
Start: 2017-11-21 | End: 2017-11-21 | Stop reason: SDUPTHER

## 2017-11-21 RX ORDER — GLYCOPYRROLATE 0.2 MG/ML
INJECTION INTRAMUSCULAR; INTRAVENOUS PRN
Status: DISCONTINUED | OUTPATIENT
Start: 2017-11-21 | End: 2017-11-21 | Stop reason: SDUPTHER

## 2017-11-21 RX ORDER — ONDANSETRON 2 MG/ML
4 INJECTION INTRAMUSCULAR; INTRAVENOUS
Status: DISCONTINUED | OUTPATIENT
Start: 2017-11-21 | End: 2017-11-21 | Stop reason: HOSPADM

## 2017-11-21 RX ORDER — LIDOCAINE HYDROCHLORIDE 10 MG/ML
INJECTION, SOLUTION EPIDURAL; INFILTRATION; INTRACAUDAL; PERINEURAL PRN
Status: DISCONTINUED | OUTPATIENT
Start: 2017-11-21 | End: 2017-11-21 | Stop reason: SDUPTHER

## 2017-11-21 RX ADMIN — HYDROCODONE BITARTRATE AND ACETAMINOPHEN 1 TABLET: 5; 325 TABLET ORAL at 21:31

## 2017-11-21 RX ADMIN — FENTANYL CITRATE 50 MCG: 50 INJECTION INTRAMUSCULAR; INTRAVENOUS at 12:00

## 2017-11-21 RX ADMIN — BECLOMETHASONE DIPROPIONATE 1 PUFF: 40 AEROSOL, METERED RESPIRATORY (INHALATION) at 22:08

## 2017-11-21 RX ADMIN — OXYBUTYNIN CHLORIDE 10 MG: 10 TABLET, FILM COATED, EXTENDED RELEASE ORAL at 08:30

## 2017-11-21 RX ADMIN — GLYCOPYRROLATE 0.8 MG: 0.2 INJECTION, SOLUTION INTRAMUSCULAR; INTRAVENOUS at 11:45

## 2017-11-21 RX ADMIN — ROCURONIUM BROMIDE 40 MG: 10 INJECTION INTRAVENOUS at 10:49

## 2017-11-21 RX ADMIN — DOCUSATE SODIUM 100 MG: 100 CAPSULE, LIQUID FILLED ORAL at 20:26

## 2017-11-21 RX ADMIN — ACETAMINOPHEN 650 MG: 325 TABLET ORAL at 15:40

## 2017-11-21 RX ADMIN — ONDANSETRON 4 MG: 2 INJECTION, SOLUTION INTRAMUSCULAR; INTRAVENOUS at 11:43

## 2017-11-21 RX ADMIN — ONDANSETRON 4 MG: 2 INJECTION, SOLUTION INTRAMUSCULAR; INTRAVENOUS at 10:30

## 2017-11-21 RX ADMIN — SODIUM CHLORIDE: 9 INJECTION, SOLUTION INTRAVENOUS at 20:26

## 2017-11-21 RX ADMIN — PROPOFOL 200 MG: 10 INJECTION, EMULSION INTRAVENOUS at 10:49

## 2017-11-21 RX ADMIN — TAMSULOSIN HYDROCHLORIDE 0.4 MG: 0.4 CAPSULE ORAL at 08:30

## 2017-11-21 RX ADMIN — DEXAMETHASONE SODIUM PHOSPHATE 10 MG: 10 INJECTION INTRAMUSCULAR; INTRAVENOUS at 11:01

## 2017-11-21 RX ADMIN — CEFTRIAXONE SODIUM 1 G: 1 INJECTION, POWDER, FOR SOLUTION INTRAMUSCULAR; INTRAVENOUS at 03:33

## 2017-11-21 RX ADMIN — BECLOMETHASONE DIPROPIONATE 1 PUFF: 40 AEROSOL, METERED RESPIRATORY (INHALATION) at 07:59

## 2017-11-21 RX ADMIN — HYDROCHLOROTHIAZIDE 50 MG: 50 TABLET ORAL at 08:30

## 2017-11-21 RX ADMIN — LIDOCAINE HYDROCHLORIDE 50 MG: 10 INJECTION, SOLUTION EPIDURAL; INFILTRATION; INTRACAUDAL; PERINEURAL at 10:49

## 2017-11-21 RX ADMIN — BUTALBITAL, ACETAMINOPHEN, AND CAFFEINE 1 TABLET: 50; 325; 40 TABLET ORAL at 18:52

## 2017-11-21 RX ADMIN — FENTANYL CITRATE 50 MCG: 50 INJECTION INTRAMUSCULAR; INTRAVENOUS at 12:07

## 2017-11-21 RX ADMIN — FENTANYL CITRATE 50 MCG: 50 INJECTION INTRAMUSCULAR; INTRAVENOUS at 10:53

## 2017-11-21 RX ADMIN — HYDROCODONE BITARTRATE AND ACETAMINOPHEN 1 TABLET: 5; 325 TABLET ORAL at 03:39

## 2017-11-21 RX ADMIN — FENTANYL CITRATE 50 MCG: 50 INJECTION INTRAMUSCULAR; INTRAVENOUS at 10:49

## 2017-11-21 RX ADMIN — NEOSTIGMINE METHYLSULFATE 5 MG: 1 INJECTION, SOLUTION INTRAMUSCULAR; INTRAVENOUS; SUBCUTANEOUS at 11:45

## 2017-11-21 RX ADMIN — SODIUM CHLORIDE: 9 INJECTION, SOLUTION INTRAVENOUS at 03:40

## 2017-11-21 RX ADMIN — Medication 10 ML: at 08:31

## 2017-11-21 ASSESSMENT — ENCOUNTER SYMPTOMS
BLOOD IN STOOL: 0
BLURRED VISION: 0
COUGH: 0
SPUTUM PRODUCTION: 0
DOUBLE VISION: 0
SHORTNESS OF BREATH: 0
NAUSEA: 1
VOMITING: 0

## 2017-11-21 ASSESSMENT — PAIN SCALES - GENERAL
PAINLEVEL_OUTOF10: 0
PAINLEVEL_OUTOF10: 6
PAINLEVEL_OUTOF10: 7
PAINLEVEL_OUTOF10: 2
PAINLEVEL_OUTOF10: 4
PAINLEVEL_OUTOF10: 2
PAINLEVEL_OUTOF10: 2
PAINLEVEL_OUTOF10: 6
PAINLEVEL_OUTOF10: 4
PAINLEVEL_OUTOF10: 3
PAINLEVEL_OUTOF10: 6

## 2017-11-21 ASSESSMENT — PAIN DESCRIPTION - ONSET
ONSET: GRADUAL
ONSET: ON-GOING

## 2017-11-21 ASSESSMENT — PAIN DESCRIPTION - LOCATION
LOCATION: FLANK
LOCATION: HEAD
LOCATION: HEAD
LOCATION: FLANK
LOCATION: HEAD;EYE

## 2017-11-21 ASSESSMENT — PAIN DESCRIPTION - PAIN TYPE
TYPE: ACUTE PAIN
TYPE: SURGICAL PAIN;ACUTE PAIN
TYPE: ACUTE PAIN

## 2017-11-21 ASSESSMENT — PAIN DESCRIPTION - DESCRIPTORS
DESCRIPTORS: CRAMPING;BURNING
DESCRIPTORS: ACHING;CONSTANT

## 2017-11-21 ASSESSMENT — PAIN DESCRIPTION - ORIENTATION
ORIENTATION: RIGHT

## 2017-11-21 ASSESSMENT — PAIN DESCRIPTION - PROGRESSION: CLINICAL_PROGRESSION: NOT CHANGED

## 2017-11-21 ASSESSMENT — PAIN DESCRIPTION - FREQUENCY: FREQUENCY: INTERMITTENT

## 2017-11-21 NOTE — PROGRESS NOTES
Dr Ashwini Solis notified for a signout
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  Will follow clinical response  F/u urine culture  Ambulate/EPCs/IS  Home meds  Pain control  Nausea control  NPO diet    [facility]  6:35 AM 11/20/2017
Normal rate and regular rhythm. Pulmonary/Chest: Effort normal and breath sounds normal. No respiratory distress. She has no wheezes. She has no rales. Abdominal: Soft. Bowel sounds are normal. She exhibits no distension. There is no tenderness. Musculoskeletal: She exhibits no edema or tenderness. Lymphadenopathy:     She has no cervical adenopathy. Neurological: She is alert. Skin: Skin is warm and dry. She is not diaphoretic. Data:     I/O (24Hr):     Intake/Output Summary (Last 24 hours) at 11/21/17 1836  Last data filed at 11/21/17 1824   Gross per 24 hour   Intake             1390 ml   Output             4250 ml   Net            -2860 ml       Labs:    Hematology:  Recent Labs      11/19/17 2108 11/20/17 1334 11/21/17   0632   WBC  18.3*  13.1*  9.9   HGB  13.4  12.0  11.3*   HCT  42.4  40.0  37.5   PLT  274  212  244   INR  0.9   --    --      Chemistry:  Recent Labs      11/19/17 2108 11/20/17 1334  11/21/17   0632   NA  139  143  143   K  3.9  3.7  4.0   CL  100  106  105   CO2  24  26  23   GLUCOSE  135*  110*  86   BUN  28*  25*  22*   CREATININE  1.27*  1.22*  1.09*   CALCIUM  9.4  8.6  8.4*     Recent Labs      11/19/17 2108   PROT  7.2   LABALBU  3.8   AST  21   ALT  14   ALKPHOS  97   BILITOT  0.35       Lab Results   Component Value Date/Time    SPECIAL NOT REPORTED 11/19/2017 10:42 PM     Lab Results   Component Value Date/Time    CULTURE NO GROWTH 11/19/2017 10:42 PM    CULTURE  11/19/2017 10:42 PM     [facility] [address] [facility], [address] [place] [address] [phone]       No results found for: POCPH, PHART, PH, POCPCO2, OZV2NXQ, PCO2, POCPO2, PO2ART, PO2, POCHCO3, BUM0RJW, HCO3, NBEA, PBEA, BEART, BE, THGBART, THB, MPF6LVT, CYKB5RKV, B2YZHXGN, O2SAT, FIO2    Radiology:  KUB  FINDINGS:  Intraoperative fluoroscopic spot images of the abdomen demonstrate double  pigtail right ureteral stent in place.  There are surgical clips in the left  abdomen. Enoch Garcia is

## 2017-11-22 VITALS
OXYGEN SATURATION: 97 % | RESPIRATION RATE: 18 BRPM | WEIGHT: 250.2 LBS | BODY MASS INDEX: 37.92 KG/M2 | HEIGHT: 68 IN | TEMPERATURE: 97.9 F | HEART RATE: 52 BPM | DIASTOLIC BLOOD PRESSURE: 72 MMHG | SYSTOLIC BLOOD PRESSURE: 148 MMHG

## 2017-11-22 PROBLEM — G44.201 ACUTE INTRACTABLE TENSION-TYPE HEADACHE: Status: ACTIVE | Noted: 2017-11-21

## 2017-11-22 PROBLEM — N20.0 KIDNEY STONE: Status: ACTIVE | Noted: 2017-11-22

## 2017-11-22 PROBLEM — R73.02 GLUCOSE INTOLERANCE (IMPAIRED GLUCOSE TOLERANCE): Status: ACTIVE | Noted: 2017-11-22

## 2017-11-22 LAB
ANION GAP SERPL CALCULATED.3IONS-SCNC: 15 MMOL/L (ref 9–17)
BUN BLDV-MCNC: 16 MG/DL (ref 6–20)
BUN/CREAT BLD: ABNORMAL (ref 9–20)
CALCIUM SERPL-MCNC: 8.8 MG/DL (ref 8.6–10.4)
CHLORIDE BLD-SCNC: 102 MMOL/L (ref 98–107)
CO2: 25 MMOL/L (ref 20–31)
CREAT SERPL-MCNC: 0.92 MG/DL (ref 0.5–0.9)
GFR AFRICAN AMERICAN: >60 ML/MIN
GFR NON-AFRICAN AMERICAN: >60 ML/MIN
GFR SERPL CREATININE-BSD FRML MDRD: ABNORMAL ML/MIN/{1.73_M2}
GFR SERPL CREATININE-BSD FRML MDRD: ABNORMAL ML/MIN/{1.73_M2}
GLUCOSE BLD-MCNC: 121 MG/DL (ref 70–99)
HCT VFR BLD CALC: 37.6 % (ref 36.3–47.1)
HEMOGLOBIN: 11.6 G/DL (ref 11.9–15.1)
MCH RBC QN AUTO: 26.4 PG (ref 25.2–33.5)
MCHC RBC AUTO-ENTMCNC: 30.9 G/DL (ref 28.4–34.8)
MCV RBC AUTO: 85.6 FL (ref 82.6–102.9)
PDW BLD-RTO: 13.8 % (ref 11.8–14.4)
PLATELET # BLD: 225 K/UL (ref 138–453)
PMV BLD AUTO: 9.4 FL (ref 8.1–13.5)
POTASSIUM SERPL-SCNC: 3.8 MMOL/L (ref 3.7–5.3)
RBC # BLD: 4.39 M/UL (ref 3.95–5.11)
SODIUM BLD-SCNC: 142 MMOL/L (ref 135–144)
WBC # BLD: 10 K/UL (ref 3.5–11.3)

## 2017-11-22 PROCEDURE — 6370000000 HC RX 637 (ALT 250 FOR IP): Performed by: STUDENT IN AN ORGANIZED HEALTH CARE EDUCATION/TRAINING PROGRAM

## 2017-11-22 PROCEDURE — 80048 BASIC METABOLIC PNL TOTAL CA: CPT

## 2017-11-22 PROCEDURE — 99232 SBSQ HOSP IP/OBS MODERATE 35: CPT | Performed by: INTERNAL MEDICINE

## 2017-11-22 PROCEDURE — G0378 HOSPITAL OBSERVATION PER HR: HCPCS

## 2017-11-22 PROCEDURE — 36415 COLL VENOUS BLD VENIPUNCTURE: CPT

## 2017-11-22 PROCEDURE — 94762 N-INVAS EAR/PLS OXIMTRY CONT: CPT

## 2017-11-22 PROCEDURE — 94640 AIRWAY INHALATION TREATMENT: CPT

## 2017-11-22 PROCEDURE — 6360000002 HC RX W HCPCS: Performed by: STUDENT IN AN ORGANIZED HEALTH CARE EDUCATION/TRAINING PROGRAM

## 2017-11-22 PROCEDURE — 2580000003 HC RX 258: Performed by: STUDENT IN AN ORGANIZED HEALTH CARE EDUCATION/TRAINING PROGRAM

## 2017-11-22 PROCEDURE — 85027 COMPLETE CBC AUTOMATED: CPT

## 2017-11-22 RX ORDER — OXYBUTYNIN CHLORIDE 10 MG/1
10 TABLET, EXTENDED RELEASE ORAL DAILY
Qty: 5 TABLET | Refills: 0 | Status: SHIPPED | OUTPATIENT
Start: 2017-11-22 | End: 2017-11-24 | Stop reason: SDUPTHER

## 2017-11-22 RX ORDER — DOCUSATE SODIUM 100 MG/1
100 CAPSULE, LIQUID FILLED ORAL 2 TIMES DAILY PRN
Qty: 30 CAPSULE | Refills: 1 | Status: SHIPPED | OUTPATIENT
Start: 2017-11-22 | End: 2017-11-24 | Stop reason: SDUPTHER

## 2017-11-22 RX ORDER — TAMSULOSIN HYDROCHLORIDE 0.4 MG/1
0.4 CAPSULE ORAL DAILY
Qty: 5 CAPSULE | Refills: 0 | Status: SHIPPED | OUTPATIENT
Start: 2017-11-22 | End: 2017-11-24

## 2017-11-22 RX ORDER — HYDROCODONE BITARTRATE AND ACETAMINOPHEN 5; 325 MG/1; MG/1
2 TABLET ORAL EVERY 6 HOURS PRN
Qty: 15 TABLET | Refills: 0 | Status: SHIPPED | OUTPATIENT
Start: 2017-11-22 | End: 2017-11-24 | Stop reason: SDUPTHER

## 2017-11-22 RX ADMIN — FLUTICASONE PROPIONATE 1 SPRAY: 50 SPRAY, METERED NASAL at 08:19

## 2017-11-22 RX ADMIN — CEFTRIAXONE SODIUM 1 G: 1 INJECTION, POWDER, FOR SOLUTION INTRAMUSCULAR; INTRAVENOUS at 01:07

## 2017-11-22 RX ADMIN — BECLOMETHASONE DIPROPIONATE 1 PUFF: 40 AEROSOL, METERED RESPIRATORY (INHALATION) at 07:57

## 2017-11-22 RX ADMIN — TAMSULOSIN HYDROCHLORIDE 0.4 MG: 0.4 CAPSULE ORAL at 08:19

## 2017-11-22 RX ADMIN — OXYBUTYNIN CHLORIDE 10 MG: 10 TABLET, FILM COATED, EXTENDED RELEASE ORAL at 08:19

## 2017-11-22 RX ADMIN — SODIUM CHLORIDE: 9 INJECTION, SOLUTION INTRAVENOUS at 03:51

## 2017-11-22 RX ADMIN — HYDROCHLOROTHIAZIDE 50 MG: 50 TABLET ORAL at 08:19

## 2017-11-22 RX ADMIN — PANTOPRAZOLE SODIUM 40 MG: 40 TABLET, DELAYED RELEASE ORAL at 05:59

## 2017-11-22 RX ADMIN — CELECOXIB 100 MG: 100 CAPSULE ORAL at 08:18

## 2017-11-22 ASSESSMENT — ENCOUNTER SYMPTOMS
BLOOD IN STOOL: 0
DOUBLE VISION: 0
BLURRED VISION: 0
SPUTUM PRODUCTION: 0
COUGH: 0
NAUSEA: 0
VOMITING: 0
SHORTNESS OF BREATH: 0

## 2017-11-22 ASSESSMENT — PAIN DESCRIPTION - ORIENTATION
ORIENTATION: RIGHT
ORIENTATION: RIGHT

## 2017-11-22 ASSESSMENT — PAIN SCALES - GENERAL
PAINLEVEL_OUTOF10: 2

## 2017-11-22 ASSESSMENT — PAIN DESCRIPTION - LOCATION
LOCATION: HEAD;EYE
LOCATION: EYE;HEAD

## 2017-11-22 ASSESSMENT — PAIN DESCRIPTION - PAIN TYPE
TYPE: ACUTE PAIN
TYPE: ACUTE PAIN

## 2017-11-24 ENCOUNTER — CARE COORDINATION (OUTPATIENT)
Dept: CASE MANAGEMENT | Age: 52
End: 2017-11-24

## 2017-11-24 NOTE — CARE COORDINATION
[facility] Transitions Initial Follow Up Call    Call within 2 business days of discharge: Yes    Patient: Rigo Perez Patient : 1965   MRN: [id number]    Reason for Admission: UTI, Right ureteral stent, catheter  Discharge Date: 17   RARS: [facility] Risk Score: 2.5, CM 5     Spoke with: patient  Facility[facility]  Non-face-to-face services provided:  Scheduled appointment with PCP-patient prefers to schedule appt around her own work schedule  Scheduled appointment with Sowmya Ontiveros will schedule urology  Obtained and reviewed discharge summary and/or continuity of care documents  Assessment and support for treatment adherence and medication management-reviewed meds with patient    Care Transitions 24 Hour Call    Schedule Follow Up Appointment with PCP:  Declined  Do you have any ongoing symptoms?:  Yes  Patient-reported symptoms:  Other (Comment: generalized urinary discomfort - ureteral catheter still in)  Interventions for patient-reported symptoms:  Other (Comment: fomax, norco prn, ditropan)  Do you have a copy of your discharge instructions?:  Yes  Do you have all of your prescriptions and are they filled?:  Yes  Have you been contacted by a [address]?:  No  Have you scheduled your follow up appointment?:  No (Comment: patient wants to schedule on own around work schedule)  Were you discharged with any Home Care or Post Acute Services:  No  Do you have support at home?:  Alone  Do you feel like you have everything you need to keep you well at home?:  Yes  Care Transitions Interventions       Spoke with patient who says she is feeling \"just OK. \"  Right ureteral stent catheter is still in place & patient will remove her catheter this week-end in a couple days. Patient is RN with [employer]. No questions about removal of her cath. She prefers to schedule her own urology & PCP transitional appointment because of her work schedule.   She has not taken percocet, but has it if needed. Taking flomax + ditropan. Transitional Care following.     Future Appointments  Date Time Provider Ender Oliver   12/4/2017 10:00 AM STA MAMMO RM 1 STAZ MAMMO STA Radiolog   12/4/2017 10:30 AM STA ULTRASOUND RM 3 STAZ US STA Radiolog     Spencer Cabrera RN

## 2017-11-25 LAB
CULTURE: NORMAL
CULTURE: NORMAL
Lab: NORMAL
SPECIMEN DESCRIPTION: NORMAL
STATUS: NORMAL
STONE COMPOSITION: NORMAL
STONE DESCRIPTION: NORMAL
STONE MASS: 29 MG
STONE NUMBER: 1
STONE SIZE: NORMAL MM

## 2017-11-27 ENCOUNTER — OFFICE VISIT (OUTPATIENT)
Dept: PRIMARY CARE CLINIC | Age: 52
End: 2017-11-27
Payer: COMMERCIAL

## 2017-11-27 VITALS
RESPIRATION RATE: 18 BRPM | WEIGHT: 237.2 LBS | SYSTOLIC BLOOD PRESSURE: 120 MMHG | HEART RATE: 80 BPM | OXYGEN SATURATION: 100 % | BODY MASS INDEX: 35.95 KG/M2 | DIASTOLIC BLOOD PRESSURE: 82 MMHG | HEIGHT: 68 IN

## 2017-11-27 DIAGNOSIS — R11.0 NAUSEA IN ADULT: Primary | ICD-10-CM

## 2017-11-27 DIAGNOSIS — R10.13 EPIGASTRIC PAIN: ICD-10-CM

## 2017-11-27 DIAGNOSIS — N20.1 RIGHT URETERAL STONE: ICD-10-CM

## 2017-11-27 DIAGNOSIS — R14.2 BELCHING: ICD-10-CM

## 2017-11-27 DIAGNOSIS — Z90.5 SOLITARY KIDNEY, ACQUIRED: ICD-10-CM

## 2017-11-27 PROCEDURE — 99214 OFFICE O/P EST MOD 30 MIN: CPT | Performed by: NURSE PRACTITIONER

## 2017-11-27 ASSESSMENT — ENCOUNTER SYMPTOMS
SHORTNESS OF BREATH: 0
COUGH: 0
CONSTIPATION: 0
SORE THROAT: 0
DIARRHEA: 0
SINUS PRESSURE: 0
WHEEZING: 0
VOMITING: 0
ABDOMINAL PAIN: 1
NAUSEA: 1
TROUBLE SWALLOWING: 0
BLOOD IN STOOL: 0

## 2017-11-27 NOTE — PROGRESS NOTES
Visit Information    Have you changed or started any medications since your last visit including any over-the-counter medicines, vitamins, or herbal medicines? no   Are you having any side effects from any of your medications? -  no  Have you stopped taking any of your medications? Is so, why? -  yes - see med list    Have you seen any other physician or provider since your last visit? Yes - Records Obtained  Have you had any other diagnostic tests since your last visit? Yes - Records Obtained  Have you been seen in the emergency room and/or had an admission to a hospital since we last saw you? Yes - Records Obtained  Have you had your routine dental cleaning in the past 6 months? yes - august    Have you activated your [employer] account? If not, what are your barriers?  Yes     Patient Care Team:  Polina Kumar DO as PCP - Leann Goldmann, CNP as PCP - S Attributed Provider  Vy Regan MD as Consulting Physician (Urology)  Farhat Macdonald MD as Consulting Physician  Deidra Maldonado MD as Consulting Physician (Pulmonology)  Rico Odom RN as Care Transition  Kiran Catherine RN as Registered Nurse  Cyndi Chi RN as Care Transition    Medical History Review  Past Medical, Family, and Social History reviewed and does not contribute to the patient presenting condition    Health Maintenance   Topic Date Due    Colon cancer screen colonoscopy  03/02/2013    Flu vaccine (1) 09/01/2017    Cervical cancer screen  12/11/2017    Pneumococcal med risk (1 of 1 - PPSV23) 05/19/2018 (Originally [birth year]2/1982)    DTaP/Tdap/Td vaccine (1 - Tdap) 10/15/2023 (Originally 10/16/2013)    Diabetes screen  11/02/2019    Breast cancer screen  11/17/2019    Lipid screen  05/19/2022    Hepatitis C screen  Completed    HIV screen  Completed

## 2017-11-27 NOTE — PROGRESS NOTES
by mouth 2 times daily 60 capsule 5    fluticasone (FLONASE) 50 MCG/ACT nasal spray 1 spray by Nasal route daily 1 Bottle 3    albuterol sulfate HFA (PROAIR HFA) 108 (90 BASE) MCG/ACT inhaler Inhale 2 puffs into the lungs every 6 hours as needed for Wheezing 1 Inhaler 3    solifenacin (VESICARE) 10 MG tablet Take 1 tablet by mouth as needed. 90 tablet 3    acetaminophen (TYLENOL) 500 MG tablet Take 1,000 mg by mouth every 4 hours as needed for Pain.  ibuprofen (ADVIL;MOTRIN) 600 MG tablet Take 1 tablet by mouth every 6 hours as needed for Pain. 30 tablet 0    Omega-3 Fatty Acids (OMEGA 3 PO) Take 1 tablet by mouth daily       Pot & Sod Cit-Cit Ac (POLYCITRA PO) Take 30 mg by mouth as needed.  HYDROcodone-acetaminophen (NORCO) 5-325 MG per tablet Take 2 tablets by mouth every 6 hours as needed for Pain . 30 tablet 0    mometasone (ASMANEX 30 METERED DOSES) 220 MCG/INH inhaler Inhale 1 puff into the lungs daily 1 Inhaler 3     No current facility-administered medications for this visit. No Known Allergies    Health Maintenance   Topic Date Due    Colon cancer screen colonoscopy  03/02/2013    Flu vaccine (1) 09/01/2017    Cervical cancer screen  12/11/2017    Pneumococcal med risk (1 of 1 - PPSV23) 05/19/2018 (Originally [birth year]2/1982)    DTaP/Tdap/Td vaccine (1 - Tdap) 10/15/2023 (Originally 10/16/2013)    Diabetes screen  11/02/2019    Breast cancer screen  11/17/2019    Lipid screen  05/19/2022    Hepatitis C screen  Completed    HIV screen  Completed       Subjective:      Review of Systems   Constitutional: Negative for activity change, appetite change, chills, fatigue, fever and unexpected weight change. HENT: Negative for congestion, ear pain, hearing loss, sinus pressure, sore throat and trouble swallowing. Eyes: Negative for visual disturbance. Respiratory: Negative for cough, shortness of breath and wheezing.     Cardiovascular: Negative for chest pain, palpitations and leg CANCELED: POCT Urinalysis no Micro   3. Solitary kidney, acquired  CANCELED: POCT Urinalysis no Micro   4. Belching  US Gallbladder Ruq   5. Epigastric pain  US Gallbladder Ruq             Plan:      Return if symptoms worsen or fail to improve. Orders Placed This Encounter   Procedures    US Gallbladder Ruq     Standing Status:   Future     Standing Expiration Date:   11/27/2018     Order Specific Question:   Reason for exam:     Answer:   RUQ pain     Nausea, belching, epigastric pain-US of gall bladder, if negative will consider GI referral for EGD. Has past history of acute gastroparesis in 2006 but has been off medications for past 10 years . Labs  and abdominal CT from ED reviewed and discussed   Patient given educational materials - see patient instructions. Discussed use, benefit, and side effects of prescribed medications. All patient questions answered. Pt voiced understanding. Reviewed health maintenance. Instructed to continue current medications, diet and exercise. Patient agreed with treatment plan. Follow up as directed.      Electronically signed by Davey Kayser, CNP on 11/27/2017 at 5:05 PM

## 2017-11-28 NOTE — OP NOTE
FACILITY:  [address] [place] [address]  Emanuel Everett  1965  [id number]    DATE: 11/28/17  SURGEON:  Dr. Hugo Beltran MD  ASSISTANT: Dr. Shani Hennessy MD  PREOPERATIVE DIAGNOSIS: Right sided kidney stone  POSTOPERATIVE DIAGNOSIS: Right sided kidney stone  PROCEDURES PERFORMED:  1. Cystoscopy. 2. Right sided ureteroscopy with holmium laser lithotripsy  3. Right sided stent placement. DRAINS: A Right sided 5fr x 26cm double J ureteral stent (with string)  SPECIMEN: none  ANESTHESIA: General  ESTIMATED BLOOD LOSS: None.   COMPLICATIONS: None.     INDICATIONS FOR PROCEDURE:  Skyler Cesar is a 45 y.o. female presents for Right sided calculus. After the risks, benefits, alternatives, of the procedure were discussed with the patient, informed consent was obtained. The patient elected to proceed.     DETAILS OF THE PROCEDURE:  The patient was brought back from the preoperative holding area to the  operating suite, and was transferred to the operating table where the patient lay in  supine position. EPC's were in place, connected to the machine and the machine was turned on before induction. General endotracheal anesthesia was induced, and patient was prepped and draped in standard surgical fashion after being placed in dorsolithotomy position. A proper timeout was performed, preoperative antibiotics were given. We began by inserting a cystoscope with a 22 Luxembourger sheath and 30 degree lens into the patient's urethral meatus and advancing into the bladder without complication. A pan cystoscopy was preformed and the bladder appeared unremarkable. We then focused our attention on the Right ureteral orifice, which we cannulated with our glidewire, advanced up to renal pelvis. We then used a  dual lumen catheter to place a second wire. Once in good position, we advanced our flexible ureteroscope over the working wire to the renal pelvis under direct visualization.   We identified the renal calculus and using a 200 micron holmium laser fiber we fragmented the calculus. It was dusted and the fragments appeared to be under 1 millimeter and could pass easily. At this time a complete pyeloscopy was preformed and no other substantial fragments were identified. We withdrew the ureteroscope and visualized the entire ureter. No stone fragments were identified. No damage to the ureter was identified. At this time, over the remaining glidewire, we placed a 5 x 26 double J ureteral stent in the usual fashion, and we noted appropriate placement in the upper collecting system using fluoroscopy. There was a good curl noted in the bladder. We decided to leave a string at the end of the stent, which was attached with benzoin and steri-strips. The patient's bladder was drained and removed the scope and the procedure was subsequently terminated. Dr. Cash Valdivia was present for all critical portions of the procedure.     Plan:  Discharge home in good condition  The patient can pull the stent in 3-5 days

## 2017-11-29 ENCOUNTER — HOSPITAL ENCOUNTER (OUTPATIENT)
Dept: ULTRASOUND IMAGING | Age: 52
Discharge: HOME | End: 2017-11-29
Payer: COMMERCIAL

## 2017-11-29 ENCOUNTER — CARE COORDINATION (OUTPATIENT)
Dept: CASE MANAGEMENT | Age: 52
End: 2017-11-29

## 2017-11-29 DIAGNOSIS — R14.2 BELCHING: ICD-10-CM

## 2017-11-29 DIAGNOSIS — R10.13 EPIGASTRIC PAIN: ICD-10-CM

## 2017-11-29 DIAGNOSIS — R11.0 NAUSEA IN ADULT: ICD-10-CM

## 2017-11-29 DIAGNOSIS — R05.8 COUGH PRESENT FOR GREATER THAN 3 WEEKS: ICD-10-CM

## 2017-11-29 PROCEDURE — 76705 ECHO EXAM OF ABDOMEN: CPT

## 2017-11-29 NOTE — CARE COORDINATION
[facility] [facility] Transitions Follow Up Call  2017    Patient: Lynda Tomlinson  Patient : 1965   MRN: [id number]    Reason for Admission: renal calculi  Discharge Date: 17 RARS: Risk Score: 2.5, CM 5    Spoke with: patient    Care Transitions Subsequent and Final Call    Subsequent and Final Calls  Do you have any ongoing symptoms?:  Yes  Onset of Patient-reported symptoms: In the past 7 days  Patient-reported symptoms:  Vomiting, Nausea  Interventions for patient-reported symptoms:  Other  Have your medications changed?:  No  Do you have any questions related to your medications?:  No  Do you currently have any active services?:  No  Do you have any needs or concerns that I can assist you with?:  No  Care Transitions Interventions  Other Interventions:          Patient reports nausea & vomiting resolving - taking fluids & eating. She was seen @ same day appt on  & had gall bladder US today. Has follow-up with urology on . 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     Follow Up: urology   Future Appointments  Date Time Provider Ender Oliver   2017 10:00 AM STA MAMMO RM 1 [facility] MAMMO STA Radiolog   2017 10:30 AM STA ULTRASOUND RM 3 STAZ US STA Radiolog       Glen Reynolds RN

## 2017-11-29 NOTE — TELEPHONE ENCOUNTER
stone     Obesity (BMI 30-39. 9)     Acute kidney injury ([address]     Leukocytosis     Acute intractable tension-type headache     Glucose intolerance (impaired glucose tolerance)     Kidney stone

## 2017-11-30 ENCOUNTER — TELEPHONE (OUTPATIENT)
Dept: PRIMARY CARE CLINIC | Age: 52
End: 2017-11-30

## 2017-11-30 RX ORDER — OMEPRAZOLE 20 MG/1
20 CAPSULE, DELAYED RELEASE ORAL 2 TIMES DAILY
Qty: 180 CAPSULE | Refills: 3 | Status: SHIPPED | OUTPATIENT
Start: 2017-11-30 | End: 2018-06-08 | Stop reason: SDUPTHER

## 2017-12-04 ENCOUNTER — HOSPITAL ENCOUNTER (OUTPATIENT)
Dept: MAMMOGRAPHY | Age: 52
Discharge: HOME | End: 2017-12-04
Payer: COMMERCIAL

## 2017-12-04 ENCOUNTER — HOSPITAL ENCOUNTER (OUTPATIENT)
Dept: ULTRASOUND IMAGING | Age: 52
Discharge: HOME | End: 2017-12-04
Payer: COMMERCIAL

## 2017-12-04 ENCOUNTER — HOSPITAL ENCOUNTER (OUTPATIENT)
Age: 52
Discharge: HOME | End: 2017-12-04
Payer: COMMERCIAL

## 2017-12-04 ENCOUNTER — CARE COORDINATION (OUTPATIENT)
Dept: CASE MANAGEMENT | Age: 52
End: 2017-12-04

## 2017-12-04 ENCOUNTER — HOSPITAL ENCOUNTER (OUTPATIENT)
Age: 52
End: 2017-12-04
Payer: COMMERCIAL

## 2017-12-04 DIAGNOSIS — R92.8 ABNORMAL MAMMOGRAM: ICD-10-CM

## 2017-12-04 DIAGNOSIS — N64.89 BREAST ASYMMETRY: ICD-10-CM

## 2017-12-04 DIAGNOSIS — N13.30 HYDRONEPHROSIS, UNSPECIFIED HYDRONEPHROSIS TYPE: ICD-10-CM

## 2017-12-04 LAB
ANION GAP SERPL CALCULATED.3IONS-SCNC: 13 MMOL/L (ref 9–17)
BUN BLDV-MCNC: 15 MG/DL (ref 6–20)
BUN/CREAT BLD: 19 (ref 9–20)
CALCIUM SERPL-MCNC: 9.5 MG/DL (ref 8.6–10.4)
CHLORIDE BLD-SCNC: 102 MMOL/L (ref 98–107)
CO2: 26 MMOL/L (ref 20–31)
CREAT SERPL-MCNC: 0.81 MG/DL (ref 0.5–0.9)
GFR AFRICAN AMERICAN: >60 ML/MIN
GFR NON-AFRICAN AMERICAN: >60 ML/MIN
GFR SERPL CREATININE-BSD FRML MDRD: ABNORMAL ML/MIN/{1.73_M2}
GFR SERPL CREATININE-BSD FRML MDRD: ABNORMAL ML/MIN/{1.73_M2}
GLUCOSE BLD-MCNC: 103 MG/DL (ref 70–99)
POTASSIUM SERPL-SCNC: 4.1 MMOL/L (ref 3.7–5.3)
SODIUM BLD-SCNC: 141 MMOL/L (ref 135–144)

## 2017-12-04 PROCEDURE — 76770 US EXAM ABDO BACK WALL COMP: CPT

## 2017-12-04 PROCEDURE — 80048 BASIC METABOLIC PNL TOTAL CA: CPT

## 2017-12-04 PROCEDURE — G0206 DX MAMMO INCL CAD UNI: HCPCS

## 2017-12-04 PROCEDURE — 76642 ULTRASOUND BREAST LIMITED: CPT

## 2017-12-04 PROCEDURE — 36415 COLL VENOUS BLD VENIPUNCTURE: CPT

## 2017-12-05 ENCOUNTER — TELEPHONE (OUTPATIENT)
Dept: PRIMARY CARE CLINIC | Age: 52
End: 2017-12-05

## 2018-01-29 ENCOUNTER — OFFICE VISIT (OUTPATIENT)
Dept: PRIMARY CARE CLINIC | Age: 53
End: 2018-01-29
Payer: COMMERCIAL

## 2018-01-29 VITALS
DIASTOLIC BLOOD PRESSURE: 98 MMHG | BODY MASS INDEX: 37.1 KG/M2 | TEMPERATURE: 98.1 F | HEIGHT: 68 IN | SYSTOLIC BLOOD PRESSURE: 158 MMHG | RESPIRATION RATE: 18 BRPM | WEIGHT: 244.8 LBS

## 2018-01-29 DIAGNOSIS — R20.0 BILATERAL HAND NUMBNESS: ICD-10-CM

## 2018-01-29 DIAGNOSIS — B02.9 HERPES ZOSTER WITHOUT COMPLICATION: Primary | ICD-10-CM

## 2018-01-29 PROCEDURE — 99214 OFFICE O/P EST MOD 30 MIN: CPT | Performed by: NURSE PRACTITIONER

## 2018-01-29 RX ORDER — ACYCLOVIR 50 MG/G
CREAM TOPICAL
Qty: 1 TUBE | Refills: 0 | Status: SHIPPED | OUTPATIENT
Start: 2018-01-29 | End: 2018-04-30 | Stop reason: ALTCHOICE

## 2018-01-29 RX ORDER — VALACYCLOVIR HYDROCHLORIDE 1 G/1
1000 TABLET, FILM COATED ORAL 3 TIMES DAILY
Qty: 21 TABLET | Refills: 0 | Status: SHIPPED | OUTPATIENT
Start: 2018-01-29 | End: 2018-04-30 | Stop reason: ALTCHOICE

## 2018-01-29 RX ORDER — PREDNISONE 50 MG/1
50 TABLET ORAL DAILY
Qty: 7 TABLET | Refills: 0 | Status: SHIPPED | OUTPATIENT
Start: 2018-01-29 | End: 2018-02-05

## 2018-01-29 ASSESSMENT — ENCOUNTER SYMPTOMS
CONSTIPATION: 0
COUGH: 0
BLOOD IN STOOL: 0
SINUS PRESSURE: 0
SORE THROAT: 0
VOMITING: 0
TROUBLE SWALLOWING: 0
DIARRHEA: 0
NAUSEA: 0
ABDOMINAL PAIN: 0
SHORTNESS OF BREATH: 0
WHEEZING: 0

## 2018-01-29 NOTE — PROGRESS NOTES
[address]  [address]  [place] [address]  Dept: [phone]  Dept Fax: [phone]    Mark Merrill is a 45 y.o. female who presents today for her medical conditions/complaints as noted below. Mark Merrill is c/o of   Chief Complaint   Patient presents with    Rash     discovered rash this am, on uppere right chest above breast and r axilla area, pain started over weekend    Back Pain     under right scapula, just can't get comfortable, started over weekend, feels like tight muscle pain    Numbness     bilat hands         HPI:     Here today concerned for possible shingles  Started noticing a lot of tenderness/pain along her bra line  Woke this AM with a rash in the area from breast to armpit and around to her scapular region  The pain in her scapular region is the most severe    Also asking about intermittent numbness in hands, often go numb at night but also happens during day randomly  Has been gradually worsening over the past few months      Rash   This is a new problem. The current episode started in the past 7 days. The problem has been gradually worsening since onset. The affected locations include the chest, torso and back. The rash is characterized by redness. She was exposed to nothing. Pertinent negatives include no congestion, cough, diarrhea, fatigue, fever, shortness of breath, sore throat or vomiting. Past treatments include nothing.        Hemoglobin A1C (%)   Date Value   11/02/2016 5.6             ( goal A1C is < 7)   No results found for: LABMICR  LDL Cholesterol (mg/dL)   Date Value   05/19/2017 78   11/02/2016 135 (H)   06/07/2013 123 (H)       (goal LDL is <100)   AST (U/L)   Date Value   11/19/2017 21     ALT (U/L)   Date Value   11/19/2017 14     BUN (mg/dL)   Date Value   12/04/2017 15     BP Readings from Last 3 Encounters:   01/29/18 (!) 158/98   11/27/17 120/82   11/22/17 (!) 148/72          (goal 120/80)    Past Medical History:   Diagnosis Date    Arthritis     CKD (chronic kidney disease) stage 1, GFR 90 ml/min or greater     [address] [address]     Hypertension     resolved    Hyperuricemia     Hypothyroidism     Kidney stones     Proteinuria     Snores       Past Surgical History:   Procedure Laterality Date    CYSTO/URETERO/PYELOSCOPY, CALCULUS TX Right 11/21/2017    HOLMIUM LASER - CYSTOSCOPY, RIGHT URETEROSCOPY, RIGHT STENT EXCHANGE,STONE BASKETING performed by Brien Golden MD at [facility] Right 11/18/2017    stent insertion/retrograde pyelogram    CYSTOSCOPY Right 11/18/2017    CYSTOSCOPY URETERAL STENT INSERTION,RIGHT performed by Jesusita Jean MD at [facility] Right 11/21/2017    ureteroscopy, stent, stone basket    KIDNEY STONE SURGERY      x2    KNEE ARTHROSCOPY Right     KNEE SURGERY Right 1998    TOTAL NEPHRECTOMY  1988    left       Family History   Problem Relation Age of Onset    High Blood Pressure Mother     Hypertension Mother    [employer] Emphysema Mother     Diabetes Father     Cancer Father     Heart Attack Father     Hypertension Father     Heart Disease Father     Hypertension Sister     Heart Disease Maternal Grandmother     Prostate Cancer Maternal Grandfather     Heart Disease Paternal Grandfather     Stroke Paternal Grandfather        Social History   Substance Use Topics    Smoking status: Never Smoker    Smokeless tobacco: Never Used    Alcohol use 0.0 oz/week      Comment: rare      Current Outpatient Prescriptions   Medication Sig Dispense Refill    valACYclovir (VALTREX) 1 g tablet Take 1 tablet by mouth 3 times daily 21 tablet 0    acyclovir (ZOVIRAX) 5 % CREA Apply to affected areas 2-3 times per day 1 Tube 0    predniSONE (DELTASONE) 50 MG tablet Take 1 tablet by mouth daily for 7 days 7 tablet 0    omeprazole (PRILOSEC) 20 MG delayed release capsule Take 1 capsule by mouth 2 times daily 180 capsule 3    HYDROcodone-acetaminophen fatigue, fever and unexpected weight change. HENT: Negative for congestion, ear pain, hearing loss, sinus pressure, sore throat and trouble swallowing. Eyes: Negative for visual disturbance. Respiratory: Negative for cough, shortness of breath and wheezing. Cardiovascular: Negative for chest pain, palpitations and leg swelling. Gastrointestinal: Negative for abdominal pain, blood in stool, constipation, diarrhea, nausea and vomiting. Endocrine: Negative for cold intolerance, heat intolerance, polydipsia, polyphagia and polyuria. Genitourinary: Negative for difficulty urinating, frequency, hematuria and urgency. Musculoskeletal: Negative for arthralgias and myalgias. Skin: Positive for rash. Allergic/Immunologic: Negative for environmental allergies. Neurological: Positive for numbness. Negative for dizziness, weakness, light-headedness and headaches. Psychiatric/Behavioral: Negative for confusion. The patient is not nervous/anxious. Objective:     Physical Exam   Constitutional: She is oriented to person, place, and time. She appears well-developed and well-nourished. HENT:   Head: Normocephalic. Eyes: Conjunctivae and EOM are normal. Pupils are equal, round, and reactive to light. Neck: Normal range of motion. Cardiovascular: Normal rate, regular rhythm, normal heart sounds and intact distal pulses. No murmur heard. Pulmonary/Chest: Effort normal and breath sounds normal. She has no wheezes. Abdominal: Soft. Bowel sounds are normal. She exhibits no distension. Musculoskeletal: Normal range of motion. Neurological: She is alert and oriented to person, place, and time. Skin: Skin is warm and dry. Rash noted. Rash is maculopapular. Right breast, axilla with scattered red, raise maculopapular rash, no blistering or drainage  No visible rash at this time in her area of pain on back   Psychiatric: She has a normal mood and affect.  Her behavior is normal. Judgment and thought content normal.     BP (!) 158/98 (Site: Left Arm, Position: Sitting, Cuff Size: Large Adult) Comment: patient in a lot of pain  Temp 98.1 °F (36.7 °C)   Resp 18   Ht 5' 8\" (1.727 m)   Wt 244 lb 12.8 oz (111 kg)   BMI 37.22 kg/m²     Assessment:      1. Herpes zoster without complication  valACYclovir (VALTREX) 1 g tablet    acyclovir (ZOVIRAX) 5 % CREA    predniSONE (DELTASONE) 50 MG tablet   2. Bilateral hand numbness  EMG             Plan:      Return if symptoms worsen or fail to improve. Orders Placed This Encounter   Procedures    EMG     Standing Status:   Future     Standing Expiration Date:   1/29/2019     Order Specific Question:   Which body part? Answer:   bilateral arms, hands     Orders Placed This Encounter   Medications    valACYclovir (VALTREX) 1 g tablet     Sig: Take 1 tablet by mouth 3 times daily     Dispense:  21 tablet     Refill:  0    acyclovir (ZOVIRAX) 5 % CREA     Sig: Apply to affected areas 2-3 times per day     Dispense:  1 Tube     Refill:  0    predniSONE (DELTASONE) 50 MG tablet     Sig: Take 1 tablet by mouth daily for 7 days     Dispense:  7 tablet     Refill:  0      Shingles-valtrex, zovirax cream, prednisone for pain. Once rash resolves, if area on scapular region of back remains painful will consider possible imaging as may be unrelated to her current rash  Hand numbness-suspect carpal tunnel but may be impingement at elbow or shoulder given her symptoms. Also some concern for cervical spine involvement. Will start with EMG, may need c-spine imaging   Patient given educational materials - see patient instructions. Discussed use, benefit, and side effects of prescribed medications. All patient questions answered. Pt voiced understanding. Reviewed health maintenance. Instructed to continue current medications, diet and exercise. Patient agreed with treatment plan. Follow up as directed.      Electronically signed by Adriana Jackson CNP on 1/29/2018

## 2018-02-09 ENCOUNTER — TELEPHONE (OUTPATIENT)
Dept: PRIMARY CARE CLINIC | Age: 53
End: 2018-02-09

## 2018-02-09 DIAGNOSIS — B02.29 POSTHERPETIC NEURALGIA: Primary | ICD-10-CM

## 2018-02-09 RX ORDER — GABAPENTIN 300 MG/1
CAPSULE ORAL
Qty: 90 CAPSULE | Refills: 1 | Status: SHIPPED | OUTPATIENT
Start: 2018-02-09 | End: 2018-04-30 | Stop reason: ALTCHOICE

## 2018-03-23 DIAGNOSIS — I10 ESSENTIAL HYPERTENSION: ICD-10-CM

## 2018-03-23 DIAGNOSIS — R05.8 COUGH PRESENT FOR GREATER THAN 3 WEEKS: ICD-10-CM

## 2018-03-23 RX ORDER — HYDROCHLOROTHIAZIDE 50 MG/1
50 TABLET ORAL DAILY
Qty: 90 TABLET | Refills: 2 | Status: CANCELLED | OUTPATIENT
Start: 2018-03-23

## 2018-03-23 RX ORDER — OMEPRAZOLE 20 MG/1
20 CAPSULE, DELAYED RELEASE ORAL 2 TIMES DAILY
Qty: 180 CAPSULE | Refills: 3 | Status: CANCELLED | OUTPATIENT
Start: 2018-03-23

## 2018-03-30 DIAGNOSIS — I10 ESSENTIAL HYPERTENSION: ICD-10-CM

## 2018-04-02 RX ORDER — HYDROCHLOROTHIAZIDE 50 MG/1
50 TABLET ORAL DAILY
Qty: 90 TABLET | Refills: 2 | Status: SHIPPED | OUTPATIENT
Start: 2018-04-02 | End: 2019-01-08 | Stop reason: SDUPTHER

## 2018-04-03 ENCOUNTER — HOSPITAL ENCOUNTER (EMERGENCY)
Age: 53
Discharge: HOME | End: 2018-04-03
Attending: EMERGENCY MEDICINE
Payer: COMMERCIAL

## 2018-04-03 ENCOUNTER — APPOINTMENT (OUTPATIENT)
Dept: CT IMAGING | Age: 53
End: 2018-04-03
Payer: COMMERCIAL

## 2018-04-03 ENCOUNTER — NURSE TRIAGE (OUTPATIENT)
Dept: OTHER | Facility: CLINIC | Age: 53
End: 2018-04-03

## 2018-04-03 VITALS
BODY MASS INDEX: 37.59 KG/M2 | HEIGHT: 68 IN | HEART RATE: 88 BPM | SYSTOLIC BLOOD PRESSURE: 143 MMHG | OXYGEN SATURATION: 93 % | RESPIRATION RATE: 28 BRPM | TEMPERATURE: 97.9 F | WEIGHT: 248 LBS | DIASTOLIC BLOOD PRESSURE: 74 MMHG

## 2018-04-03 DIAGNOSIS — R10.9 FLANK PAIN: Primary | ICD-10-CM

## 2018-04-03 DIAGNOSIS — N20.0 KIDNEY STONE: ICD-10-CM

## 2018-04-03 LAB
-: NORMAL
ABSOLUTE EOS #: 0.18 K/UL (ref 0–0.44)
ABSOLUTE IMMATURE GRANULOCYTE: 0.06 K/UL (ref 0–0.3)
ABSOLUTE LYMPH #: 2.66 K/UL (ref 1.1–3.7)
ABSOLUTE MONO #: 0.85 K/UL (ref 0.1–1.2)
AMORPHOUS: NORMAL
ANION GAP SERPL CALCULATED.3IONS-SCNC: 17 MMOL/L (ref 9–17)
BACTERIA: NORMAL
BASOPHILS # BLD: 0 % (ref 0–2)
BASOPHILS ABSOLUTE: <0.03 K/UL (ref 0–0.2)
BILIRUBIN URINE: NEGATIVE
BUN BLDV-MCNC: 17 MG/DL (ref 6–20)
BUN/CREAT BLD: ABNORMAL (ref 9–20)
CALCIUM SERPL-MCNC: 9.2 MG/DL (ref 8.6–10.4)
CASTS UA: NORMAL /LPF (ref 0–8)
CHLORIDE BLD-SCNC: 100 MMOL/L (ref 98–107)
CO2: 23 MMOL/L (ref 20–31)
COLOR: YELLOW
COMMENT UA: ABNORMAL
CREAT SERPL-MCNC: 0.77 MG/DL (ref 0.5–0.9)
CRYSTALS, UA: NORMAL /HPF
DIFFERENTIAL TYPE: ABNORMAL
EOSINOPHILS RELATIVE PERCENT: 2 % (ref 1–4)
EPITHELIAL CELLS UA: NORMAL /HPF (ref 0–5)
GFR AFRICAN AMERICAN: >60 ML/MIN
GFR NON-AFRICAN AMERICAN: >60 ML/MIN
GFR SERPL CREATININE-BSD FRML MDRD: ABNORMAL ML/MIN/{1.73_M2}
GFR SERPL CREATININE-BSD FRML MDRD: ABNORMAL ML/MIN/{1.73_M2}
GLUCOSE BLD-MCNC: 104 MG/DL (ref 70–99)
GLUCOSE URINE: NEGATIVE
HCT VFR BLD CALC: 42.5 % (ref 36.3–47.1)
HEMOGLOBIN: 14 G/DL (ref 11.9–15.1)
IMMATURE GRANULOCYTES: 1 %
KETONES, URINE: NEGATIVE
LEUKOCYTE ESTERASE, URINE: NEGATIVE
LYMPHOCYTES # BLD: 26 % (ref 24–43)
MCH RBC QN AUTO: 26.5 PG (ref 25.2–33.5)
MCHC RBC AUTO-ENTMCNC: 32.9 G/DL (ref 28.4–34.8)
MCV RBC AUTO: 80.3 FL (ref 82.6–102.9)
MONOCYTES # BLD: 8 % (ref 3–12)
MUCUS: NORMAL
NITRITE, URINE: NEGATIVE
NRBC AUTOMATED: 0 PER 100 WBC
OTHER OBSERVATIONS UA: NORMAL
PDW BLD-RTO: 14 % (ref 11.8–14.4)
PH UA: 7 (ref 5–8)
PLATELET # BLD: 258 K/UL (ref 138–453)
PLATELET ESTIMATE: ABNORMAL
PMV BLD AUTO: 9.8 FL (ref 8.1–13.5)
POTASSIUM SERPL-SCNC: 3.5 MMOL/L (ref 3.7–5.3)
PROTEIN UA: ABNORMAL
RBC # BLD: 5.29 M/UL (ref 3.95–5.11)
RBC # BLD: ABNORMAL 10*6/UL
RBC UA: NORMAL /HPF (ref 0–4)
RENAL EPITHELIAL, UA: NORMAL /HPF
SEG NEUTROPHILS: 64 % (ref 36–65)
SEGMENTED NEUTROPHILS ABSOLUTE COUNT: 6.68 K/UL (ref 1.5–8.1)
SODIUM BLD-SCNC: 140 MMOL/L (ref 135–144)
SPECIFIC GRAVITY UA: 1.01 (ref 1–1.03)
TRICHOMONAS: NORMAL
TURBIDITY: CLEAR
URINE HGB: ABNORMAL
UROBILINOGEN, URINE: NORMAL
WBC # BLD: 10.5 K/UL (ref 3.5–11.3)
WBC # BLD: ABNORMAL 10*3/UL
WBC UA: NORMAL /HPF (ref 0–5)
YEAST: NORMAL

## 2018-04-03 PROCEDURE — 81001 URINALYSIS AUTO W/SCOPE: CPT

## 2018-04-03 PROCEDURE — 74176 CT ABD & PELVIS W/O CONTRAST: CPT

## 2018-04-03 PROCEDURE — G0383 LEV 4 HOSP TYPE B ED VISIT: HCPCS

## 2018-04-03 PROCEDURE — 96375 TX/PRO/DX INJ NEW DRUG ADDON: CPT

## 2018-04-03 PROCEDURE — 2580000003 HC RX 258: Performed by: STUDENT IN AN ORGANIZED HEALTH CARE EDUCATION/TRAINING PROGRAM

## 2018-04-03 PROCEDURE — 96374 THER/PROPH/DIAG INJ IV PUSH: CPT

## 2018-04-03 PROCEDURE — 80048 BASIC METABOLIC PNL TOTAL CA: CPT

## 2018-04-03 PROCEDURE — 6360000002 HC RX W HCPCS: Performed by: STUDENT IN AN ORGANIZED HEALTH CARE EDUCATION/TRAINING PROGRAM

## 2018-04-03 PROCEDURE — 85025 COMPLETE CBC W/AUTO DIFF WBC: CPT

## 2018-04-03 RX ORDER — DIPHENHYDRAMINE HYDROCHLORIDE 50 MG/ML
25 INJECTION INTRAMUSCULAR; INTRAVENOUS ONCE
Status: COMPLETED | OUTPATIENT
Start: 2018-04-03 | End: 2018-04-03

## 2018-04-03 RX ORDER — KETOROLAC TROMETHAMINE 30 MG/ML
30 INJECTION, SOLUTION INTRAMUSCULAR; INTRAVENOUS ONCE
Status: COMPLETED | OUTPATIENT
Start: 2018-04-03 | End: 2018-04-03

## 2018-04-03 RX ORDER — ONDANSETRON 2 MG/ML
4 INJECTION INTRAMUSCULAR; INTRAVENOUS ONCE
Status: COMPLETED | OUTPATIENT
Start: 2018-04-03 | End: 2018-04-03

## 2018-04-03 RX ORDER — MORPHINE SULFATE 10 MG/ML
8 INJECTION, SOLUTION INTRAMUSCULAR; INTRAVENOUS ONCE
Status: COMPLETED | OUTPATIENT
Start: 2018-04-03 | End: 2018-04-03

## 2018-04-03 RX ORDER — 0.9 % SODIUM CHLORIDE 0.9 %
1000 INTRAVENOUS SOLUTION INTRAVENOUS ONCE
Status: COMPLETED | OUTPATIENT
Start: 2018-04-03 | End: 2018-04-03

## 2018-04-03 RX ORDER — OXYCODONE HYDROCHLORIDE AND ACETAMINOPHEN 5; 325 MG/1; MG/1
1 TABLET ORAL EVERY 6 HOURS PRN
Qty: 20 TABLET | Refills: 0 | Status: SHIPPED | OUTPATIENT
Start: 2018-04-03 | End: 2018-04-08

## 2018-04-03 RX ADMIN — KETOROLAC TROMETHAMINE 30 MG: 30 INJECTION, SOLUTION INTRAMUSCULAR at 14:28

## 2018-04-03 RX ADMIN — DIPHENHYDRAMINE HYDROCHLORIDE 25 MG: 50 INJECTION, SOLUTION INTRAMUSCULAR; INTRAVENOUS at 14:28

## 2018-04-03 RX ADMIN — MORPHINE SULFATE 8 MG: 10 INJECTION, SOLUTION INTRAMUSCULAR; INTRAVENOUS at 14:29

## 2018-04-03 RX ADMIN — SODIUM CHLORIDE 1000 ML: 9 INJECTION, SOLUTION INTRAVENOUS at 14:28

## 2018-04-03 RX ADMIN — ONDANSETRON 4 MG: 2 INJECTION INTRAMUSCULAR; INTRAVENOUS at 14:28

## 2018-04-03 ASSESSMENT — ENCOUNTER SYMPTOMS
NAUSEA: 1
SHORTNESS OF BREATH: 0
VOMITING: 0
EYE REDNESS: 0
COUGH: 0
EYE ITCHING: 0
RHINORRHEA: 0
DIARRHEA: 0
BACK PAIN: 0
ABDOMINAL PAIN: 1

## 2018-04-03 ASSESSMENT — PAIN DESCRIPTION - LOCATION: LOCATION: FLANK

## 2018-04-03 ASSESSMENT — PAIN DESCRIPTION - PAIN TYPE: TYPE: ACUTE PAIN

## 2018-04-03 ASSESSMENT — PAIN SCALES - GENERAL
PAINLEVEL_OUTOF10: 10
PAINLEVEL_OUTOF10: 10

## 2018-04-03 ASSESSMENT — PAIN DESCRIPTION - ORIENTATION: ORIENTATION: RIGHT

## 2018-04-04 ENCOUNTER — CARE COORDINATION (OUTPATIENT)
Dept: CARE COORDINATION | Age: 53
End: 2018-04-04

## 2018-04-12 PROBLEM — N39.0 UTI (URINARY TRACT INFECTION): Status: RESOLVED | Noted: 2017-11-20 | Resolved: 2018-04-12

## 2018-04-30 ENCOUNTER — OFFICE VISIT (OUTPATIENT)
Dept: PRIMARY CARE CLINIC | Age: 53
End: 2018-04-30
Payer: COMMERCIAL

## 2018-04-30 VITALS
OXYGEN SATURATION: 97 % | HEIGHT: 68 IN | DIASTOLIC BLOOD PRESSURE: 86 MMHG | WEIGHT: 255 LBS | SYSTOLIC BLOOD PRESSURE: 126 MMHG | RESPIRATION RATE: 18 BRPM | BODY MASS INDEX: 38.65 KG/M2 | HEART RATE: 84 BPM

## 2018-04-30 DIAGNOSIS — J45.20 MILD INTERMITTENT ASTHMA WITHOUT COMPLICATION: ICD-10-CM

## 2018-04-30 DIAGNOSIS — Z13.1 SCREENING FOR DIABETES MELLITUS: ICD-10-CM

## 2018-04-30 DIAGNOSIS — M21.611 BUNION OF GREAT TOE OF RIGHT FOOT: ICD-10-CM

## 2018-04-30 DIAGNOSIS — I10 ESSENTIAL HYPERTENSION: Primary | ICD-10-CM

## 2018-04-30 DIAGNOSIS — E78.2 MIXED HYPERLIPIDEMIA: ICD-10-CM

## 2018-04-30 PROCEDURE — 99214 OFFICE O/P EST MOD 30 MIN: CPT | Performed by: NURSE PRACTITIONER

## 2018-04-30 ASSESSMENT — ENCOUNTER SYMPTOMS
SINUS PRESSURE: 0
VOMITING: 0
NAUSEA: 0
WHEEZING: 0
CONSTIPATION: 0
BLOOD IN STOOL: 0
SORE THROAT: 0
ABDOMINAL PAIN: 0
COUGH: 0
TROUBLE SWALLOWING: 0
DIARRHEA: 0
SHORTNESS OF BREATH: 1

## 2018-05-14 ENCOUNTER — EMPLOYEE WELLNESS (OUTPATIENT)
Dept: OTHER | Age: 53
End: 2018-05-14

## 2018-05-14 LAB
CHOLESTEROL/HDL RATIO: 6.2
CHOLESTEROL: 243 MG/DL
ESTIMATED AVERAGE GLUCOSE: 117 MG/DL
GLUCOSE BLD-MCNC: 139 MG/DL (ref 70–99)
HBA1C MFR BLD: 5.7 % (ref 4–6)
HDLC SERPL-MCNC: 39 MG/DL
LDL CHOLESTEROL DIRECT: 107 MG/DL
LDL CHOLESTEROL: ABNORMAL MG/DL (ref 0–130)
PATIENT FASTING?: NO
TRIGL SERPL-MCNC: 572 MG/DL
VLDLC SERPL CALC-MCNC: ABNORMAL MG/DL (ref 1–30)

## 2018-05-21 VITALS — BODY MASS INDEX: 37.86 KG/M2 | WEIGHT: 249 LBS

## 2018-06-08 DIAGNOSIS — R05.8 COUGH PRESENT FOR GREATER THAN 3 WEEKS: ICD-10-CM

## 2018-06-08 RX ORDER — OMEPRAZOLE 20 MG/1
20 CAPSULE, DELAYED RELEASE ORAL 2 TIMES DAILY
Qty: 180 CAPSULE | Refills: 3 | Status: SHIPPED | OUTPATIENT
Start: 2018-06-08 | End: 2019-09-26 | Stop reason: SDUPTHER

## 2018-07-04 ENCOUNTER — ANESTHESIA (OUTPATIENT)
Dept: OPERATING ROOM | Age: 53
End: 2018-07-04
Payer: COMMERCIAL

## 2018-07-04 ENCOUNTER — APPOINTMENT (OUTPATIENT)
Dept: CT IMAGING | Age: 53
End: 2018-07-04
Payer: COMMERCIAL

## 2018-07-04 ENCOUNTER — APPOINTMENT (OUTPATIENT)
Dept: GENERAL RADIOLOGY | Age: 53
End: 2018-07-04
Payer: COMMERCIAL

## 2018-07-04 ENCOUNTER — HOSPITAL ENCOUNTER (EMERGENCY)
Age: 53
Discharge: HOME | End: 2018-07-05
Attending: EMERGENCY MEDICINE | Admitting: UROLOGY
Payer: COMMERCIAL

## 2018-07-04 ENCOUNTER — NURSE TRIAGE (OUTPATIENT)
Dept: OTHER | Facility: CLINIC | Age: 53
End: 2018-07-04

## 2018-07-04 ENCOUNTER — ANESTHESIA EVENT (OUTPATIENT)
Dept: OPERATING ROOM | Age: 53
End: 2018-07-04
Payer: COMMERCIAL

## 2018-07-04 DIAGNOSIS — R31.9 HEMATURIA, UNSPECIFIED TYPE: ICD-10-CM

## 2018-07-04 DIAGNOSIS — N20.0 KIDNEY STONE: ICD-10-CM

## 2018-07-04 DIAGNOSIS — N20.1 CALCULUS OF URETER: ICD-10-CM

## 2018-07-04 DIAGNOSIS — R10.9 RIGHT FLANK PAIN: Primary | ICD-10-CM

## 2018-07-04 PROBLEM — N20.9 UROLITHIASIS: Status: ACTIVE | Noted: 2018-07-04

## 2018-07-04 LAB
-: ABNORMAL
ABSOLUTE EOS #: 0.16 K/UL (ref 0–0.44)
ABSOLUTE IMMATURE GRANULOCYTE: 0.07 K/UL (ref 0–0.3)
ABSOLUTE LYMPH #: 2.45 K/UL (ref 1.1–3.7)
ABSOLUTE MONO #: 0.88 K/UL (ref 0.1–1.2)
AMORPHOUS: ABNORMAL
ANION GAP SERPL CALCULATED.3IONS-SCNC: 15 MMOL/L (ref 9–17)
BACTERIA: ABNORMAL
BASOPHILS # BLD: 0 % (ref 0–2)
BASOPHILS ABSOLUTE: 0.03 K/UL (ref 0–0.2)
BILIRUBIN URINE: NEGATIVE
BUN BLDV-MCNC: 22 MG/DL (ref 6–20)
BUN/CREAT BLD: ABNORMAL (ref 9–20)
CALCIUM SERPL-MCNC: 9.4 MG/DL (ref 8.6–10.4)
CASTS UA: ABNORMAL /LPF (ref 0–8)
CHLORIDE BLD-SCNC: 100 MMOL/L (ref 98–107)
CO2: 23 MMOL/L (ref 20–31)
COLOR: YELLOW
CREAT SERPL-MCNC: 0.82 MG/DL (ref 0.5–0.9)
CRYSTALS, UA: ABNORMAL /HPF
DIFFERENTIAL TYPE: ABNORMAL
EOSINOPHILS RELATIVE PERCENT: 1 % (ref 1–4)
EPITHELIAL CELLS UA: ABNORMAL /HPF (ref 0–5)
GFR AFRICAN AMERICAN: >60 ML/MIN
GFR NON-AFRICAN AMERICAN: >60 ML/MIN
GFR SERPL CREATININE-BSD FRML MDRD: ABNORMAL ML/MIN/{1.73_M2}
GFR SERPL CREATININE-BSD FRML MDRD: ABNORMAL ML/MIN/{1.73_M2}
GLUCOSE BLD-MCNC: 121 MG/DL (ref 70–99)
GLUCOSE URINE: NEGATIVE
HCT VFR BLD CALC: 44.2 % (ref 36.3–47.1)
HEMOGLOBIN: 13.9 G/DL (ref 11.9–15.1)
IMMATURE GRANULOCYTES: 1 %
KETONES, URINE: NEGATIVE
LEUKOCYTE ESTERASE, URINE: NEGATIVE
LYMPHOCYTES # BLD: 19 % (ref 24–43)
MCH RBC QN AUTO: 26.2 PG (ref 25.2–33.5)
MCHC RBC AUTO-ENTMCNC: 31.4 G/DL (ref 28.4–34.8)
MCV RBC AUTO: 83.4 FL (ref 82.6–102.9)
MONOCYTES # BLD: 7 % (ref 3–12)
MUCUS: ABNORMAL
NITRITE, URINE: NEGATIVE
NRBC AUTOMATED: 0 PER 100 WBC
OTHER OBSERVATIONS UA: ABNORMAL
PDW BLD-RTO: 14.9 % (ref 11.8–14.4)
PH UA: 7 (ref 5–8)
PLATELET # BLD: 260 K/UL (ref 138–453)
PLATELET ESTIMATE: ABNORMAL
PMV BLD AUTO: 9.2 FL (ref 8.1–13.5)
POTASSIUM SERPL-SCNC: 3.8 MMOL/L (ref 3.7–5.3)
PROTEIN UA: ABNORMAL
RBC # BLD: 5.3 M/UL (ref 3.95–5.11)
RBC # BLD: ABNORMAL 10*6/UL
RBC UA: ABNORMAL /HPF (ref 0–4)
RENAL EPITHELIAL, UA: ABNORMAL /HPF
SEG NEUTROPHILS: 72 % (ref 36–65)
SEGMENTED NEUTROPHILS ABSOLUTE COUNT: 9.02 K/UL (ref 1.5–8.1)
SODIUM BLD-SCNC: 138 MMOL/L (ref 135–144)
SPECIFIC GRAVITY UA: 1.02 (ref 1–1.03)
TRICHOMONAS: ABNORMAL
TURBIDITY: CLEAR
URINE HGB: ABNORMAL
UROBILINOGEN, URINE: NORMAL
WBC # BLD: 12.6 K/UL (ref 3.5–11.3)
WBC # BLD: ABNORMAL 10*3/UL
WBC UA: ABNORMAL /HPF (ref 0–5)
YEAST: ABNORMAL

## 2018-07-04 PROCEDURE — G0378 HOSPITAL OBSERVATION PER HR: HCPCS

## 2018-07-04 PROCEDURE — 7100000011 HC PHASE II RECOVERY - ADDTL 15 MIN: Performed by: UROLOGY

## 2018-07-04 PROCEDURE — 2580000003 HC RX 258: Performed by: NURSE ANESTHETIST, CERTIFIED REGISTERED

## 2018-07-04 PROCEDURE — 3700000001 HC ADD 15 MINUTES (ANESTHESIA): Performed by: UROLOGY

## 2018-07-04 PROCEDURE — 6360000002 HC RX W HCPCS: Performed by: STUDENT IN AN ORGANIZED HEALTH CARE EDUCATION/TRAINING PROGRAM

## 2018-07-04 PROCEDURE — 6370000000 HC RX 637 (ALT 250 FOR IP): Performed by: STUDENT IN AN ORGANIZED HEALTH CARE EDUCATION/TRAINING PROGRAM

## 2018-07-04 PROCEDURE — 6360000002 HC RX W HCPCS: Performed by: EMERGENCY MEDICINE

## 2018-07-04 PROCEDURE — 3700000000 HC ANESTHESIA ATTENDED CARE: Performed by: UROLOGY

## 2018-07-04 PROCEDURE — C2617 STENT, NON-COR, TEM W/O DEL: HCPCS | Performed by: UROLOGY

## 2018-07-04 PROCEDURE — 2580000003 HC RX 258: Performed by: STUDENT IN AN ORGANIZED HEALTH CARE EDUCATION/TRAINING PROGRAM

## 2018-07-04 PROCEDURE — 3600000012 HC SURGERY LEVEL 2 ADDTL 15MIN: Performed by: UROLOGY

## 2018-07-04 PROCEDURE — G0384 LEV 5 HOSP TYPE B ED VISIT: HCPCS

## 2018-07-04 PROCEDURE — 2500000003 HC RX 250 WO HCPCS: Performed by: NURSE ANESTHETIST, CERTIFIED REGISTERED

## 2018-07-04 PROCEDURE — 96375 TX/PRO/DX INJ NEW DRUG ADDON: CPT

## 2018-07-04 PROCEDURE — C1769 GUIDE WIRE: HCPCS | Performed by: UROLOGY

## 2018-07-04 PROCEDURE — 7100000010 HC PHASE II RECOVERY - FIRST 15 MIN: Performed by: UROLOGY

## 2018-07-04 PROCEDURE — 85025 COMPLETE CBC W/AUTO DIFF WBC: CPT

## 2018-07-04 PROCEDURE — 96376 TX/PRO/DX INJ SAME DRUG ADON: CPT

## 2018-07-04 PROCEDURE — 80048 BASIC METABOLIC PNL TOTAL CA: CPT

## 2018-07-04 PROCEDURE — 6360000002 HC RX W HCPCS: Performed by: NURSE ANESTHETIST, CERTIFIED REGISTERED

## 2018-07-04 PROCEDURE — 96374 THER/PROPH/DIAG INJ IV PUSH: CPT

## 2018-07-04 PROCEDURE — 74018 RADEX ABDOMEN 1 VIEW: CPT

## 2018-07-04 PROCEDURE — 74176 CT ABD & PELVIS W/O CONTRAST: CPT

## 2018-07-04 PROCEDURE — 81001 URINALYSIS AUTO W/SCOPE: CPT

## 2018-07-04 PROCEDURE — 3600000002 HC SURGERY LEVEL 2 BASE: Performed by: UROLOGY

## 2018-07-04 DEVICE — URETERAL STENT
Type: IMPLANTABLE DEVICE | Site: URETER | Status: FUNCTIONAL
Brand: POLARIS™ ULTRA

## 2018-07-04 RX ORDER — CELECOXIB 100 MG/1
100 CAPSULE ORAL DAILY
Status: CANCELLED | OUTPATIENT
Start: 2018-07-05

## 2018-07-04 RX ORDER — FLUTICASONE PROPIONATE 50 MCG
1 SPRAY, SUSPENSION (ML) NASAL DAILY
Status: CANCELLED | OUTPATIENT
Start: 2018-07-05

## 2018-07-04 RX ORDER — MEPERIDINE HYDROCHLORIDE 50 MG/ML
12.5 INJECTION INTRAMUSCULAR; INTRAVENOUS; SUBCUTANEOUS EVERY 5 MIN PRN
Status: DISCONTINUED | OUTPATIENT
Start: 2018-07-04 | End: 2018-07-05 | Stop reason: HOSPADM

## 2018-07-04 RX ORDER — HYDRALAZINE HYDROCHLORIDE 20 MG/ML
5 INJECTION INTRAMUSCULAR; INTRAVENOUS EVERY 10 MIN PRN
Status: DISCONTINUED | OUTPATIENT
Start: 2018-07-04 | End: 2018-07-05 | Stop reason: HOSPADM

## 2018-07-04 RX ORDER — MORPHINE SULFATE 10 MG/ML
8 INJECTION, SOLUTION INTRAMUSCULAR; INTRAVENOUS ONCE
Status: COMPLETED | OUTPATIENT
Start: 2018-07-04 | End: 2018-07-04

## 2018-07-04 RX ORDER — OXYCODONE HYDROCHLORIDE AND ACETAMINOPHEN 5; 325 MG/1; MG/1
1 TABLET ORAL ONCE
Status: COMPLETED | OUTPATIENT
Start: 2018-07-04 | End: 2018-07-04

## 2018-07-04 RX ORDER — KETOROLAC TROMETHAMINE 15 MG/ML
15 INJECTION, SOLUTION INTRAMUSCULAR; INTRAVENOUS ONCE
Status: COMPLETED | OUTPATIENT
Start: 2018-07-04 | End: 2018-07-04

## 2018-07-04 RX ORDER — DOCUSATE SODIUM 100 MG/1
100 CAPSULE, LIQUID FILLED ORAL 2 TIMES DAILY
Status: CANCELLED | OUTPATIENT
Start: 2018-07-04

## 2018-07-04 RX ORDER — OXYCODONE HYDROCHLORIDE AND ACETAMINOPHEN 5; 325 MG/1; MG/1
1 TABLET ORAL EVERY 4 HOURS PRN
Status: CANCELLED | OUTPATIENT
Start: 2018-07-04

## 2018-07-04 RX ORDER — PROPOFOL 10 MG/ML
INJECTION, EMULSION INTRAVENOUS PRN
Status: DISCONTINUED | OUTPATIENT
Start: 2018-07-04 | End: 2018-07-05 | Stop reason: SDUPTHER

## 2018-07-04 RX ORDER — TROSPIUM CHLORIDE 20 MG/1
20 TABLET, FILM COATED ORAL
Status: CANCELLED | OUTPATIENT
Start: 2018-07-05

## 2018-07-04 RX ORDER — DIPHENHYDRAMINE HYDROCHLORIDE 50 MG/ML
12.5 INJECTION INTRAMUSCULAR; INTRAVENOUS
Status: ACTIVE | OUTPATIENT
Start: 2018-07-04 | End: 2018-07-04

## 2018-07-04 RX ORDER — OXYCODONE HYDROCHLORIDE AND ACETAMINOPHEN 5; 325 MG/1; MG/1
1 TABLET ORAL PRN
Status: ACTIVE | OUTPATIENT
Start: 2018-07-04 | End: 2018-07-04

## 2018-07-04 RX ORDER — TAMSULOSIN HYDROCHLORIDE 0.4 MG/1
0.4 CAPSULE ORAL DAILY
Status: DISCONTINUED | OUTPATIENT
Start: 2018-07-04 | End: 2018-07-05 | Stop reason: HOSPADM

## 2018-07-04 RX ORDER — OXYCODONE HYDROCHLORIDE AND ACETAMINOPHEN 5; 325 MG/1; MG/1
2 TABLET ORAL EVERY 4 HOURS PRN
Status: CANCELLED | OUTPATIENT
Start: 2018-07-04

## 2018-07-04 RX ORDER — TAMSULOSIN HYDROCHLORIDE 0.4 MG/1
0.4 CAPSULE ORAL DAILY
Status: CANCELLED | OUTPATIENT
Start: 2018-07-05

## 2018-07-04 RX ORDER — OXYCODONE HYDROCHLORIDE AND ACETAMINOPHEN 5; 325 MG/1; MG/1
1-2 TABLET ORAL EVERY 4 HOURS PRN
Qty: 30 TABLET | Refills: 0 | Status: SHIPPED | OUTPATIENT
Start: 2018-07-04 | End: 2018-07-11

## 2018-07-04 RX ORDER — FENTANYL CITRATE 50 UG/ML
25 INJECTION, SOLUTION INTRAMUSCULAR; INTRAVENOUS EVERY 5 MIN PRN
Status: DISCONTINUED | OUTPATIENT
Start: 2018-07-04 | End: 2018-07-05 | Stop reason: HOSPADM

## 2018-07-04 RX ORDER — DOCUSATE SODIUM 100 MG/1
100 CAPSULE, LIQUID FILLED ORAL 2 TIMES DAILY PRN
Status: CANCELLED | OUTPATIENT
Start: 2018-07-04

## 2018-07-04 RX ORDER — ONDANSETRON 2 MG/ML
4 INJECTION INTRAMUSCULAR; INTRAVENOUS
Status: ACTIVE | OUTPATIENT
Start: 2018-07-04 | End: 2018-07-04

## 2018-07-04 RX ORDER — ACETAMINOPHEN 325 MG/1
650 TABLET ORAL EVERY 6 HOURS PRN
Status: CANCELLED | OUTPATIENT
Start: 2018-07-04

## 2018-07-04 RX ORDER — PROMETHAZINE HYDROCHLORIDE 25 MG/ML
12.5 INJECTION, SOLUTION INTRAMUSCULAR; INTRAVENOUS ONCE
Status: COMPLETED | OUTPATIENT
Start: 2018-07-04 | End: 2018-07-04

## 2018-07-04 RX ORDER — OXYCODONE HYDROCHLORIDE AND ACETAMINOPHEN 5; 325 MG/1; MG/1
2 TABLET ORAL PRN
Status: ACTIVE | OUTPATIENT
Start: 2018-07-04 | End: 2018-07-04

## 2018-07-04 RX ORDER — FENTANYL CITRATE 50 UG/ML
INJECTION, SOLUTION INTRAMUSCULAR; INTRAVENOUS PRN
Status: DISCONTINUED | OUTPATIENT
Start: 2018-07-04 | End: 2018-07-05 | Stop reason: SDUPTHER

## 2018-07-04 RX ORDER — MIDAZOLAM HYDROCHLORIDE 1 MG/ML
INJECTION INTRAMUSCULAR; INTRAVENOUS PRN
Status: DISCONTINUED | OUTPATIENT
Start: 2018-07-04 | End: 2018-07-05 | Stop reason: SDUPTHER

## 2018-07-04 RX ORDER — LABETALOL HYDROCHLORIDE 5 MG/ML
5 INJECTION, SOLUTION INTRAVENOUS EVERY 10 MIN PRN
Status: DISCONTINUED | OUTPATIENT
Start: 2018-07-04 | End: 2018-07-05 | Stop reason: HOSPADM

## 2018-07-04 RX ORDER — MORPHINE SULFATE 4 MG/ML
2 INJECTION, SOLUTION INTRAMUSCULAR; INTRAVENOUS EVERY 5 MIN PRN
Status: DISCONTINUED | OUTPATIENT
Start: 2018-07-04 | End: 2018-07-05 | Stop reason: HOSPADM

## 2018-07-04 RX ORDER — 0.9 % SODIUM CHLORIDE 0.9 %
1000 INTRAVENOUS SOLUTION INTRAVENOUS ONCE
Status: COMPLETED | OUTPATIENT
Start: 2018-07-04 | End: 2018-07-04

## 2018-07-04 RX ORDER — PANTOPRAZOLE SODIUM 40 MG/1
40 TABLET, DELAYED RELEASE ORAL
Status: CANCELLED | OUTPATIENT
Start: 2018-07-05

## 2018-07-04 RX ORDER — HYDROCHLOROTHIAZIDE 50 MG/1
1 TABLET ORAL DAILY
Status: CANCELLED | OUTPATIENT
Start: 2018-07-05

## 2018-07-04 RX ORDER — TAMSULOSIN HYDROCHLORIDE 0.4 MG/1
0.4 CAPSULE ORAL DAILY
Qty: 30 CAPSULE | Refills: 1 | Status: SHIPPED | OUTPATIENT
Start: 2018-07-04 | End: 2018-09-17 | Stop reason: ALTCHOICE

## 2018-07-04 RX ORDER — ONDANSETRON 2 MG/ML
4 INJECTION INTRAMUSCULAR; INTRAVENOUS ONCE
Status: COMPLETED | OUTPATIENT
Start: 2018-07-04 | End: 2018-07-04

## 2018-07-04 RX ORDER — SODIUM CHLORIDE 9 MG/ML
INJECTION, SOLUTION INTRAVENOUS CONTINUOUS
Status: CANCELLED | OUTPATIENT
Start: 2018-07-04

## 2018-07-04 RX ORDER — LIDOCAINE HYDROCHLORIDE 10 MG/ML
INJECTION, SOLUTION EPIDURAL; INFILTRATION; INTRACAUDAL; PERINEURAL PRN
Status: DISCONTINUED | OUTPATIENT
Start: 2018-07-04 | End: 2018-07-05 | Stop reason: SDUPTHER

## 2018-07-04 RX ORDER — MORPHINE SULFATE 4 MG/ML
2 INJECTION, SOLUTION INTRAMUSCULAR; INTRAVENOUS EVERY 4 HOURS PRN
Status: CANCELLED | OUTPATIENT
Start: 2018-07-04

## 2018-07-04 RX ORDER — FENTANYL CITRATE 50 UG/ML
50 INJECTION, SOLUTION INTRAMUSCULAR; INTRAVENOUS EVERY 5 MIN PRN
Status: DISCONTINUED | OUTPATIENT
Start: 2018-07-04 | End: 2018-07-05 | Stop reason: HOSPADM

## 2018-07-04 RX ORDER — ALBUTEROL SULFATE 90 UG/1
2 AEROSOL, METERED RESPIRATORY (INHALATION) EVERY 6 HOURS PRN
Status: CANCELLED | OUTPATIENT
Start: 2018-07-04

## 2018-07-04 RX ORDER — SODIUM CHLORIDE 9 MG/ML
INJECTION, SOLUTION INTRAVENOUS CONTINUOUS
Status: DISCONTINUED | OUTPATIENT
Start: 2018-07-04 | End: 2018-07-05 | Stop reason: HOSPADM

## 2018-07-04 RX ORDER — CEFAZOLIN SODIUM 1 G/3ML
INJECTION, POWDER, FOR SOLUTION INTRAMUSCULAR; INTRAVENOUS PRN
Status: DISCONTINUED | OUTPATIENT
Start: 2018-07-04 | End: 2018-07-05 | Stop reason: SDUPTHER

## 2018-07-04 RX ORDER — OXYBUTYNIN CHLORIDE 10 MG/1
10 TABLET, EXTENDED RELEASE ORAL DAILY
Qty: 30 TABLET | Refills: 0 | Status: ON HOLD | OUTPATIENT
Start: 2018-07-04 | End: 2018-09-14

## 2018-07-04 RX ORDER — ONDANSETRON 2 MG/ML
4 INJECTION INTRAMUSCULAR; INTRAVENOUS EVERY 6 HOURS PRN
Status: CANCELLED | OUTPATIENT
Start: 2018-07-04

## 2018-07-04 RX ADMIN — MORPHINE SULFATE 8 MG: 10 INJECTION INTRAVENOUS at 23:20

## 2018-07-04 RX ADMIN — MORPHINE SULFATE 8 MG: 10 INJECTION INTRAVENOUS at 20:15

## 2018-07-04 RX ADMIN — PROPOFOL 50 MG: 10 INJECTION, EMULSION INTRAVENOUS at 23:54

## 2018-07-04 RX ADMIN — MIDAZOLAM HYDROCHLORIDE 2 MG: 1 INJECTION, SOLUTION INTRAMUSCULAR; INTRAVENOUS at 23:50

## 2018-07-04 RX ADMIN — ONDANSETRON 4 MG: 2 INJECTION, SOLUTION INTRAMUSCULAR; INTRAVENOUS at 18:35

## 2018-07-04 RX ADMIN — PROPOFOL 50 MG: 10 INJECTION, EMULSION INTRAVENOUS at 23:57

## 2018-07-04 RX ADMIN — LIDOCAINE HYDROCHLORIDE 50 MG: 10 INJECTION, SOLUTION EPIDURAL; INFILTRATION; INTRACAUDAL; PERINEURAL at 23:50

## 2018-07-04 RX ADMIN — CEFAZOLIN 2000 MG: 330 INJECTION, POWDER, FOR SOLUTION INTRAMUSCULAR; INTRAVENOUS at 23:53

## 2018-07-04 RX ADMIN — SODIUM CHLORIDE, POTASSIUM CHLORIDE, SODIUM LACTATE AND CALCIUM CHLORIDE: 600; 310; 30; 20 INJECTION, SOLUTION INTRAVENOUS at 23:43

## 2018-07-04 RX ADMIN — PROMETHAZINE HYDROCHLORIDE 12.5 MG: 25 INJECTION INTRAMUSCULAR; INTRAVENOUS at 19:41

## 2018-07-04 RX ADMIN — SODIUM CHLORIDE 1000 ML: 9 INJECTION, SOLUTION INTRAVENOUS at 18:35

## 2018-07-04 RX ADMIN — TAMSULOSIN HYDROCHLORIDE 0.4 MG: 0.4 CAPSULE ORAL at 21:08

## 2018-07-04 RX ADMIN — KETOROLAC TROMETHAMINE 15 MG: 15 INJECTION, SOLUTION INTRAMUSCULAR; INTRAVENOUS at 19:51

## 2018-07-04 RX ADMIN — FENTANYL CITRATE 100 MCG: 50 INJECTION INTRAMUSCULAR; INTRAVENOUS at 23:50

## 2018-07-04 RX ADMIN — KETOROLAC TROMETHAMINE 15 MG: 15 INJECTION, SOLUTION INTRAMUSCULAR; INTRAVENOUS at 18:35

## 2018-07-04 RX ADMIN — OXYCODONE HYDROCHLORIDE AND ACETAMINOPHEN 1 TABLET: 5; 325 TABLET ORAL at 19:13

## 2018-07-04 ASSESSMENT — PAIN SCALES - GENERAL
PAINLEVEL_OUTOF10: 4
PAINLEVEL_OUTOF10: 7
PAINLEVEL_OUTOF10: 8
PAINLEVEL_OUTOF10: 8
PAINLEVEL_OUTOF10: 10
PAINLEVEL_OUTOF10: 8
PAINLEVEL_OUTOF10: 7

## 2018-07-04 ASSESSMENT — PULMONARY FUNCTION TESTS
PIF_VALUE: 1
PIF_VALUE: 29
PIF_VALUE: 7
PIF_VALUE: 1
PIF_VALUE: 20
PIF_VALUE: 1
PIF_VALUE: 19
PIF_VALUE: 28

## 2018-07-04 ASSESSMENT — PAIN DESCRIPTION - ORIENTATION
ORIENTATION: RIGHT
ORIENTATION: RIGHT

## 2018-07-04 ASSESSMENT — PAIN DESCRIPTION - FREQUENCY
FREQUENCY: CONTINUOUS
FREQUENCY: CONTINUOUS

## 2018-07-04 ASSESSMENT — ENCOUNTER SYMPTOMS
ABDOMINAL PAIN: 0
NAUSEA: 1
ABDOMINAL DISTENTION: 0
DIARRHEA: 0
CONSTIPATION: 0
SHORTNESS OF BREATH: 0
VOMITING: 1

## 2018-07-04 ASSESSMENT — PAIN DESCRIPTION - DESCRIPTORS: DESCRIPTORS: BURNING

## 2018-07-04 ASSESSMENT — PAIN DESCRIPTION - PAIN TYPE
TYPE: ACUTE PAIN
TYPE: ACUTE PAIN

## 2018-07-04 ASSESSMENT — PAIN DESCRIPTION - ONSET: ONSET: ON-GOING

## 2018-07-04 ASSESSMENT — PAIN DESCRIPTION - PROGRESSION: CLINICAL_PROGRESSION: GRADUALLY IMPROVING

## 2018-07-04 ASSESSMENT — PAIN DESCRIPTION - LOCATION
LOCATION: FLANK;BACK;GROIN
LOCATION: FLANK

## 2018-07-04 NOTE — ED PROVIDER NOTES
[address]  ED  Emergency Department Encounter  Emergency Medicine Resident     Pt Name: Gregoria Neves  MRN: [id number]  [place] 1965  Date of evaluation: 7/4/18  PCP:  MIGDALIA Salguero CNP    CHIEF COMPLAINT       Chief Complaint   Patient presents with    Flank Pain     right sided        HISTORY OF PRESENT ILLNESS  (Location/Symptom, Timing/Onset, Context/Setting, Quality, Duration, Modifying Factors, Severity.)      Gregoria Neves is a 52 y.o. female who presents with right sided flank pain similar to her previous kidney stones. She has a history of cystine kidney stones that she has had a left nephrectomy for. She started feeling unwell yesterday and this morning she started feeling \"flu-like\". She has had decreased appetite today and has only drank a few sips of diet coke today. Over the past few hours it has turned to flank pain and now groin pain. She denies any fever, changes in bowel movements or pain, burning, difficulty with urination or hematuria, chest pain, shortness of breath. She has had decreased urination today. She has been nauseous and vomited twice on the car ride here. She was seen 4/18/18 for stones and a CT showed multiple right sided renal calculi, stable moderate right sided hydronephrosis w/o obstruction and multiple punctate calculi in the bladder. PAST MEDICAL / SURGICAL / SOCIAL / FAMILY HISTORY      has a past medical history of Arthritis; CKD (chronic kidney disease) stage 1, GFR 90 ml/min or greater; Cystinuria ([address] Hypertension; Hyperuricemia; Hypothyroidism; Kidney stones; Proteinuria; and Snores. has a past surgical history that includes total nephrectomy (1988); knee surgery (Right, 1998); Knee arthroscopy (Right); Kidney stone surgery; Cystocopy (Right, 11/18/2017); Cystoscopy (Right, 11/18/2017); Cystoscopy (Right, 11/21/2017); and cysto/uretero/pyeloscopy, calculus tx (Right, 11/21/2017).     Social History     Social History    Marital status: Single     Spouse name: N/A    Number of children: N/A    Years of education: N/A     Occupational History    Not on file. Social History Main Topics    Smoking status: Never Smoker    Smokeless tobacco: Never Used    Alcohol use 0.0 oz/week      Comment: rare    Drug use: No    Sexual activity: No     Other Topics Concern    Not on file     Social History Narrative    No narrative on file       Family History   Problem Relation Age of Onset    High Blood Pressure Mother     Hypertension Mother     Emphysema Mother     Diabetes Father     Cancer Father     Heart Attack Father     Hypertension Father     Heart Disease Father     Hypertension Sister     Heart Disease Maternal Grandmother     Prostate Cancer Maternal Grandfather     Heart Disease Paternal Grandfather     Stroke Paternal Grandfather        Allergies:  Patient has no known allergies. Home Medications:  Prior to Admission medications    Medication Sig Start Date End Date Taking? Authorizing Provider   omeprazole (PRILOSEC) 20 MG delayed release capsule Take 1 capsule by mouth 2 times daily 6/8/18  Yes MIGDALIA Santamaria CNP   mometasone [facility]) 220 MCG/INH inhaler Inhale 1 puff into the lungs daily 4/30/18  Yes MIGDALIA Block CNP   hydrochlorothiazide (HYDRODIURIL) 50 MG tablet TAKE 1 TABLET BY MOUTH DAILY 4/2/18  Yes MIGDALIA Block CNP   celecoxib (CELEBREX) 100 MG capsule Take 1 capsule by mouth 2 times daily 7/28/17  Yes MIGDALIA Block CNP   albuterol sulfate HFA (PROAIR HFA) 108 (90 BASE) MCG/ACT inhaler Inhale 2 puffs into the lungs every 6 hours as needed for Wheezing 2/5/16  Yes MIGDALIA Block CNP   solifenacin (VESICARE) 10 MG tablet Take 1 tablet by mouth as needed. 12/23/14  Yes Ginger BullimMIGDALIA cedeño CNP   acetaminophen (TYLENOL) 500 MG tablet Take 1,000 mg by mouth every 4 hours as needed for Pain.    Yes Historical Provider, MD   docusate gallop and no friction rub. No murmur heard. Pulmonary/Chest: Breath sounds normal. She is in respiratory distress. She has no wheezes. She has no rales. Abdominal: Soft. Bowel sounds are normal. She exhibits no distension. There is no tenderness. There is no rebound and no guarding. Musculoskeletal: She exhibits no edema. Neurological: She is alert and oriented to person, place, and time. Skin: Skin is warm and dry. She is not diaphoretic. Psychiatric: She has a normal mood and affect.        DIFFERENTIAL  DIAGNOSIS     PLAN (LABS / IMAGING / EKG):  Orders Placed This Encounter   Procedures    CT ABDOMEN PELVIS WO CONTRAST    CBC Auto Differential    BASIC METABOLIC PANEL    URINALYSIS WITH MICROSCOPIC       MEDICATIONS ORDERED:  Orders Placed This Encounter   Medications    ketorolac (TORADOL) injection 15 mg    0.9 % sodium chloride bolus    ondansetron (ZOFRAN) injection 4 mg    oxyCODONE-acetaminophen (PERCOCET) 5-325 MG per tablet 1 tablet    tamsulosin (FLOMAX) capsule 0.4 mg    ketorolac (TORADOL) injection 15 mg    promethazine (PHENERGAN) injection 12.5 mg    morphine (PF) injection 8 mg       DDX: Kidney stone, pyelonephritis, urinary tract infection    DIAGNOSTIC RESULTS / EMERGENCY DEPARTMENT COURSE / MDM     LABS:  Results for orders placed or performed during the hospital encounter of 07/04/18   CBC Auto Differential   Result Value Ref Range    WBC 12.6 (H) 3.5 - 11.3 k/uL    RBC 5.30 (H) 3.95 - 5.11 m/uL    Hemoglobin 13.9 11.9 - 15.1 g/dL    Hematocrit 44.2 36.3 - 47.1 %    MCV 83.4 82.6 - 102.9 fL    MCH 26.2 25.2 - 33.5 pg    MCHC 31.4 28.4 - 34.8 g/dL    RDW 14.9 (H) 11.8 - 14.4 %    Platelets 839 239 - 847 k/uL    MPV 9.2 8.1 - 13.5 fL    NRBC Automated 0.0 0.0 per 100 WBC    Differential Type NOT REPORTED     Seg Neutrophils 72 (H) 36 - 65 %    Lymphocytes 19 (L) 24 - 43 %    Monocytes 7 3 - 12 %    Eosinophils % 1 1 - 4 %    Basophils 0 0 - 2 %    Immature Granulocytes Patient appears uncomfortable on room unable to find a position of comfort on bed. Her lungs are clear to auscultation, abdomen soft nontender, negative CVA tenderness. Patient complaining of nausea. She patient's symptoms with antiemetics, pain control, IV fluids. We'll obtain basic labs including CBC, BMP, UA, and will perform bedside ultrasound. Patient is stating that this pain is what her case in cephalexin the past.  Patient does have a history of cystine stones also with urology. RADIOLOGY:  None    EKG  None    All EKG's are interpreted by the Emergency Department Physician who either signs or Co-signs this chart in the absence of a cardiologist.    EMERGENCY DEPARTMENT COURSE:  Bedside ultrasound did show mild to moderate hydronephrosis. 8:25 PM multiple antiemetics, and pain medications given with minimal relief of symptoms. Patient complaining of persistent left flank pain. Patient will likely require admission for synthetic control will obtain CT abdomen and pelvis. Patient was signed out to Dr. Julio Wright. PROCEDURES:  None    CONSULTS:  None    CRITICAL CARE:  None    FINAL IMPRESSION      1. Right flank pain    2. Kidney stone    3. Hematuria, unspecified type          DISPOSITION / PLAN     DISPOSITION  Likely admission for symptomatically control, disposition per Dr. Julio Wright following CT abdomen and pelvis. PATIENT REFERRED TO:  No follow-up provider specified.     DISCHARGE MEDICATIONS:  New Prescriptions    No medications on file       Jenelle Holstein, DO  Emergency Medicine Resident    (Please note that portions of this note were completed with a voice recognition program.  Efforts were made to edit the dictations but occasionally words are mis-transcribed.)           Jenelle Holstein, DO  07/04/18 2030

## 2018-07-04 NOTE — ED NOTES
Pt to ED c/o right sided flank pain beginning this morning. Pt states that she has history of kidney stones in the past and that she feels the same as she did before. Pt states that they pain radiates from her flank into her groin, pt c/o urinary urgency but denies any dysuria.       Teri Mina RN  07/04/18 [id number]

## 2018-07-04 NOTE — ED NOTES
Dr. Matthew Amato at bedside re-evaluating patient and updating on [address] [facility]  07/04/18 1921

## 2018-07-04 NOTE — ED NOTES
Dr. Andrea Catherine at bedside re-evaluating patient and updating on lab results and poc.      Martin Truong RN  07/04/18 2401

## 2018-07-05 VITALS
OXYGEN SATURATION: 98 % | RESPIRATION RATE: 16 BRPM | DIASTOLIC BLOOD PRESSURE: 76 MMHG | TEMPERATURE: 97.5 F | BODY MASS INDEX: 37.89 KG/M2 | HEIGHT: 68 IN | HEART RATE: 75 BPM | WEIGHT: 250 LBS | SYSTOLIC BLOOD PRESSURE: 128 MMHG

## 2018-07-05 VITALS — DIASTOLIC BLOOD PRESSURE: 70 MMHG | SYSTOLIC BLOOD PRESSURE: 133 MMHG | OXYGEN SATURATION: 90 %

## 2018-07-05 PROCEDURE — 2500000003 HC RX 250 WO HCPCS: Performed by: UROLOGY

## 2018-07-05 PROCEDURE — G0378 HOSPITAL OBSERVATION PER HR: HCPCS

## 2018-07-05 PROCEDURE — 6360000002 HC RX W HCPCS: Performed by: NURSE ANESTHETIST, CERTIFIED REGISTERED

## 2018-07-05 PROCEDURE — 6360000002 HC RX W HCPCS: Performed by: ANESTHESIOLOGY

## 2018-07-05 PROCEDURE — 6370000000 HC RX 637 (ALT 250 FOR IP): Performed by: ANESTHESIOLOGY

## 2018-07-05 RX ORDER — SODIUM CHLORIDE, SODIUM LACTATE, POTASSIUM CHLORIDE, CALCIUM CHLORIDE 600; 310; 30; 20 MG/100ML; MG/100ML; MG/100ML; MG/100ML
INJECTION, SOLUTION INTRAVENOUS CONTINUOUS PRN
Status: DISCONTINUED | OUTPATIENT
Start: 2018-07-04 | End: 2018-07-05 | Stop reason: SDUPTHER

## 2018-07-05 RX ORDER — OXYCODONE HYDROCHLORIDE AND ACETAMINOPHEN 5; 325 MG/1; MG/1
1 TABLET ORAL ONCE
Status: COMPLETED | OUTPATIENT
Start: 2018-07-05 | End: 2018-07-05

## 2018-07-05 RX ORDER — WATER 1000 ML/1000ML
INJECTION, SOLUTION INTRAVENOUS PRN
Status: DISCONTINUED | OUTPATIENT
Start: 2018-07-05 | End: 2018-07-05 | Stop reason: HOSPADM

## 2018-07-05 RX ORDER — ONDANSETRON 4 MG/1
4 TABLET, FILM COATED ORAL EVERY 6 HOURS PRN
Qty: 20 TABLET | Refills: 0 | Status: SHIPPED | OUTPATIENT
Start: 2018-07-05 | End: 2019-03-18 | Stop reason: ALTCHOICE

## 2018-07-05 RX ADMIN — FENTANYL CITRATE 25 MCG: 50 INJECTION INTRAMUSCULAR; INTRAVENOUS at 00:48

## 2018-07-05 RX ADMIN — OXYCODONE HYDROCHLORIDE AND ACETAMINOPHEN 1 TABLET: 5; 325 TABLET ORAL at 01:09

## 2018-07-05 RX ADMIN — PROPOFOL 50 MG: 10 INJECTION, EMULSION INTRAVENOUS at 00:00

## 2018-07-05 RX ADMIN — FENTANYL CITRATE 25 MCG: 50 INJECTION INTRAMUSCULAR; INTRAVENOUS at 00:43

## 2018-07-05 ASSESSMENT — PULMONARY FUNCTION TESTS
PIF_VALUE: 0
PIF_VALUE: 6
PIF_VALUE: 0
PIF_VALUE: 16

## 2018-07-05 ASSESSMENT — PAIN SCALES - GENERAL
PAINLEVEL_OUTOF10: 4
PAINLEVEL_OUTOF10: 3
PAINLEVEL_OUTOF10: 3
PAINLEVEL_OUTOF10: 4
PAINLEVEL_OUTOF10: 4
PAINLEVEL_OUTOF10: 3
PAINLEVEL_OUTOF10: 3

## 2018-07-05 NOTE — CONSULTS
Lisa Eaton, Stephani Elliott, [facility]  Urology History and Physical    Patient:  Sally Cruz  MRN: [id number]  YOB: 1965    CHIEF COMPLAINT:  Obstructing right ureteral stone with solitary kidney    HISTORY OF PRESENT ILLNESS:   The patient is a 52 y.o. female who presents with right flank pain. She was found to have a two right ureteral stones, 7mm in maximum size, with moderate hydroureteronephrosis. She had her left kidney removed in 1980s by Dr. Bobby Mistry for non-functioning kidney and makes cystine stones. She does try to alkalinize her urine by taking over the counter medications. No denies any fevers or dysuria. She did pass stones up to 8mm in the past, but had right URS and HLL with  [facility], [facility] in November 2017. She states she does not tolerate stents well. Patient's old records, notes and chart reviewed and summarized above.     Past Medical History:    Past Medical History:   Diagnosis Date    Arthritis     CKD (chronic kidney disease) stage 1, GFR 90 ml/min or greater     Cystinuria ([address]     Hypertension     resolved    Hyperuricemia     Hypothyroidism     Kidney stones     Proteinuria     Snores        Past Surgical History:    Past Surgical History:   Procedure Laterality Date    CYSTO/URETERO/PYELOSCOPY, CALCULUS TX Right 11/21/2017    HOLMIUM LASER - CYSTOSCOPY, RIGHT URETEROSCOPY, RIGHT STENT EXCHANGE,STONE BASKETING performed by Wanda Rios MD at [address] [place] [address] Right 11/18/2017    stent insertion/retrograde pyelogram    CYSTOSCOPY Right 11/18/2017    CYSTOSCOPY URETERAL STENT INSERTION,RIGHT performed by Tim Lowry MD at [address] [place] [address] Right 11/21/2017    ureteroscopy, stent, stone basket    KIDNEY STONE SURGERY      x2    KNEE ARTHROSCOPY Right     KNEE SURGERY Right 1998    TOTAL NEPHRECTOMY  1988    left       Medications Prior to Admission:    Prior to Admission medications    Medication Sig Start Date End Date Taking? Authorizing Provider   omeprazole (PRILOSEC) 20 MG delayed release capsule Take 1 capsule by mouth 2 times daily 6/8/18  Yes MIGDALIA Palm CNP   mometasone [facility]) 220 MCG/INH inhaler Inhale 1 puff into the lungs daily 4/30/18  Yes MIGDALIA Parker CNP   hydrochlorothiazide (HYDRODIURIL) 50 MG tablet TAKE 1 TABLET BY MOUTH DAILY 4/2/18  Yes MIGDALIA Parker CNP   celecoxib (CELEBREX) 100 MG capsule Take 1 capsule by mouth 2 times daily 7/28/17  Yes MIGDALIA Parker CNP   albuterol sulfate HFA (PROAIR HFA) 108 (90 BASE) MCG/ACT inhaler Inhale 2 puffs into the lungs every 6 hours as needed for Wheezing 2/5/16  Yes MIGDALIA Parker CNP   solifenacin (VESICARE) 10 MG tablet Take 1 tablet by mouth as needed. 12/23/14  Yes MIGDALIA Frank CNP   acetaminophen (TYLENOL) 500 MG tablet Take 1,000 mg by mouth every 4 hours as needed for Pain. Yes Historical Provider, MD   docusate sodium (COLACE) 100 MG capsule Take 1 capsule by mouth 2 times daily as needed for Constipation 11/18/17   Cora Garcia MD   Triprolidine-Pseudoephedrine (ANTIHISTAMINE PO) Take by mouth    Historical Provider, MD   fluticasone (FLONASE) 50 MCG/ACT nasal spray 1 spray by Nasal route daily 9/30/16   Felipe Cornell MD   Omega-3 Fatty Acids (OMEGA 3 PO) Take 1 tablet by mouth daily     Historical Provider, MD   Pot & Sod Cit-Cit Ac (POLYCITRA PO) Take 30 mg by mouth as needed. Historical Provider, MD   solifenacin (VESICARE) 10 MG tablet Take 10 mg by mouth as needed. 12/23/14  Historical Provider, MD       Allergies:  Patient has no known allergies. Social History:    Social History     Social History    Marital status: Single     Spouse name: N/A    Number of children: N/A    Years of education: N/A     Occupational History    Not on file.      Social History Main Topics    Smoking status: Never Smoker    Smokeless tobacco: Never Used    Alcohol use 0.0 oz/week      Comment: rare    Drug use: No    Sexual activity: No     Other Topics Concern    Not on file     Social History Narrative    No narrative on file       Family History:    Family History   Problem Relation Age of Onset    High Blood Pressure Mother     Hypertension Mother    [place] Emphysema Mother     Diabetes Father     Cancer Father     Heart Attack Father     Hypertension Father     Heart Disease Father     Hypertension Sister     Heart Disease Maternal Grandmother     Prostate Cancer Maternal Grandfather     Heart Disease Paternal Grandfather     Stroke Paternal Grandfather        REVIEW OF SYSTEMS:    Constitutional: negative  Eyes: negative  Respiratory: negative  Cardiovascular: negative  Gastrointestinal: negative  Genitourinary: see HPI  Musculoskeletal: negative  Skin: negative   Neurological: negative  Hematological/Lymphatic: negative  Psychological: negative    Physical Exam:      This a 52 y.o. female   Patient Vitals for the past 24 hrs:   BP Temp Temp src Pulse Resp SpO2 Height Weight   07/04/18 2116 (!) 141/72 - - - - 95 % - -   07/04/18 2031 131/62 - - 76 16 97 % - -   07/04/18 2016 (!) 180/95 - - 70 21 100 % - -   07/04/18 1803 (!) 195/108 98.4 °F (36.9 °C) Oral 65 17 100 % 5' 8\" (1.727 m) 250 lb (113.4 kg)     Constitutional: Patient in no acute distress. Neuro: Alert and oriented to person, place and time. Psych: mood and affect normal  Lungs: Respiratory effort is normal  Cardiovascular: Normal peripheral pulses  Abdomen: Soft, non-tender, non-distended  Mild Right CVA tenderness to palpation    Bladder non-tender and not distended.   Lower extremities: No edema or calf tenderness bilaterally      LABS:   Recent Labs      07/04/18   1834   WBC  12.6*   HGB  13.9   HCT  44.2   MCV  83.4   PLT  260     Recent Labs      07/04/18   1834   NA  138   K  3.8   CL  100   CO2  23   BUN  22*   CREATININE  0.82       Additional Lab/culture results:    Urinalysis: Recent Labs 07/04/18 1935   COLORU  YELLOW   PHUR  7.0   WBCUA  5 TO 10   RBCUA  20 TO 50   MUCUS  NOT REPORTED   TRICHOMONAS  NOT REPORTED   YEAST  NOT REPORTED   BACTERIA  NOT REPORTED   SPECGRAV  1.018   LEUKOCYTESUR  NEGATIVE   UROBILINOGEN  Normal   BILIRUBINUR  NEGATIVE        -----------------------------------------------------------------  Imaging Results:    Imaging was independently reviewed and confirmed with report. 7/4 CT A/P without contrast:    Moderately severe hydronephrosis on the right due to 2 mid ureteroliths   measuring up to 5 x 7 mm.  Surgically absent left kidney. Assessment and Plan   Impression:      52year old female with right obstructing calculus, 7mm, with solitary right kidney.     Plan:     OR for cystoscopy, right ureteral stent placement  Informed consent obtained  Will plan for preop abx

## 2018-07-05 NOTE — ED PROVIDER NOTES
Take 1 tablet by mouth every 6 hours as needed for Nausea or Vomiting     Dispense:  20 tablet     Refill:  0    oxyCODONE-acetaminophen (PERCOCET) 5-325 MG per tablet 1 tablet       LABS / RADIOLOGY:     Labs Reviewed   CBC WITH AUTO DIFFERENTIAL - Abnormal; Notable for the following:        Result Value    WBC 12.6 (*)     RBC 5.30 (*)     RDW 14.9 (*)     Seg Neutrophils 72 (*)     Lymphocytes 19 (*)     Immature Granulocytes 1 (*)     Segs Absolute 9.02 (*)     All other components within normal limits   BASIC METABOLIC PANEL - Abnormal; Notable for the following:     Glucose 121 (*)     BUN 22 (*)     All other components within normal limits   URINALYSIS WITH MICROSCOPIC - Abnormal; Notable for the following:     Urine Hgb MODERATE (*)     Protein, UA 3+ (*)     All other components within normal limits       Ct Abdomen Pelvis Wo Contrast    Result Date: 7/4/2018  EXAMINATION: CT OF THE ABDOMEN AND PELVIS WITHOUT CONTRAST 7/4/2018 9:02 pm TECHNIQUE: CT of the abdomen and pelvis was performed without the administration of intravenous contrast. Multiplanar reformatted images are provided for review. Dose modulation, iterative reconstruction, and/or weight based adjustment of the mA/kV was utilized to reduce the radiation dose to as low as reasonably achievable. COMPARISON: April 3, 2018 HISTORY: ORDERING SYSTEM PROVIDED HISTORY: Hx kidney stones, R flank pain, hydro on bedside US FINDINGS: Lower Chest: Moderate cardiomegaly. Organs: Fat containing umbilical hernia. The liver, spleen, pancreas, and adrenals appear normal.  Gallbladder normal. Left kidney is surgically absent. There is moderately severe hydronephrosis of the right kidney due to 2 mid ureterolith measuring up to 5 x 7 mm. The bladder appears normal. GI/Bowel: The stomache,small bowel, and colon appear normal.  Normal appendix Pelvis: Uterus normal. Peritoneum/Retroperitoneum: The abdominal aorta and iliac arteries are normal in caliber.  There is

## 2018-07-05 NOTE — ANESTHESIA POSTPROCEDURE EVALUATION
Department of Anesthesiology  Postprocedure Note    Patient: Silver Ivory  MRN: [id number]  [place]: 1965  Date of evaluation: 7/5/2018  Time:  12:16 AM     Procedure Summary     Date:  07/04/18 Room / Location:  [address]    Anesthesia Start:  2343 Anesthesia Stop:  07/05/18 0012    Procedure:  CYSTOSCOPY URETERAL STENT INSERTION, RIGHT (Right ) Diagnosis:  (Hydronephrosis)    Surgeon:  Teri Rosado MD Responsible Provider:  Hernandez Hutson MD    Anesthesia Type:  MAC, general ASA Status:  2 - Emergent          Anesthesia Type: MAC, general    Imani Phase I:      Imani Phase II:      Last vitals: Reviewed and per EMR flowsheets.        Anesthesia Post Evaluation    Patient location during evaluation: PACU  Patient participation: complete - patient participated  Level of consciousness: awake and alert  Pain score: 2  Airway patency: patent  Nausea & Vomiting: no nausea and no vomiting  Complications: no  Cardiovascular status: hemodynamically stable  Respiratory status: room air  Hydration status: stable

## 2018-07-05 NOTE — ED NOTES
Patient placed on full cardiac monitor and medicated for pain. Call light within reach, will continue to monitor.       Stanton Swan RN  07/04/18 2019

## 2018-07-05 NOTE — ANESTHESIA PRE PROCEDURE
CALCIUM 9.4 07/04/2018    BILITOT 0.35 11/19/2017    ALKPHOS 97 11/19/2017    AST 21 11/19/2017    ALT 14 11/19/2017       POC Tests: No results for input(s): POCGLU, POCNA, POCK, POCCL, POCBUN, POCHEMO, POCHCT in the last 72 hours. Coags:   Lab Results   Component Value Date    PROTIME 10.2 11/19/2017    INR 0.9 11/19/2017    APTT 21.2 11/19/2017       HCG (If Applicable): No results found for: PREGTESTUR, PREGSERUM, HCG, HCGQUANT     ABGs: No results found for: PHART, PO2ART, XLB3XJF, MGD8ZBN, BEART, H8MBLKYC     Type & Screen (If Applicable):  No results found for: LABABO, LABRH    Anesthesia Evaluation    Airway: Mallampati: III  TM distance: <3 FB   Neck ROM: full   Dental:          Pulmonary:   (+) sleep apnea:  decreased breath sounds,  asthma:                            Cardiovascular:    (+) hypertension:,         Rhythm: regular  Rate: normal                    Neuro/Psych:   (+) headaches:,             GI/Hepatic/Renal:   (+) GERD:, renal disease: kidney stones,           Endo/Other:    (+) hypothyroidism::., .                 Abdominal:   (+) obese,         Vascular:                                        Anesthesia Plan      MAC and general     ASA 2 - emergent     (ESTHER HTN Hypothyroid)  Induction: intravenous. Anesthetic plan and risks discussed with patient. Plan discussed with CRNA.                   Ambreen Francois MD   7/4/2018

## 2018-07-05 NOTE — ED NOTES
Patient back to room from xray, NAD noted rr even and unlabored. Patient stating pain remains at 4/10 and is very tolerable. Patient taken off nasal cannula, oxygen saturation 97% on room air. Call light within reach, will continue to monitor.       Hosea Rojas RN  07/04/18 3627

## 2018-07-05 NOTE — ED NOTES
Patient's oxygen saturation down to 84% placed on 3 liters O2 via nasal cannula, oxygen saturation up to 98%. Patient stating her pain is down to 4/10 and much more bearable. Denies any further needs at this time, friend at bedside. Call light within reach, will continue to monitor.       Bairon Zuniga RN  07/04/18 9655

## 2018-07-05 NOTE — OP NOTE
Dr. Alida Hammonds MD      [facility] [place]  07/05/18    Patient:  Janell Dalton  MRN: [id number]  YOB: 1965    Surgeon: Alida Hammonds MD  Assistant: Sarah Francois MD    Pre-op Diagnosis: Right obstructing ureteral stone, 7mm, solitary R kidney  Post-op Diagnosis: Same    Procedure:   Cystoscopy with Right Ureteral Stent Placement    Anesthesia: Monitor Anesthesia Care  Complications: None  OR Blood Loss: Minimal  Fluids: Cystalloids  Implants: Right [id number] Double-J Ureteral Stent  Specimens: None    Narrative of the Procedure:    After informed consent was obtained in the preoperative area, the patient was taken back to the operating room and transferred to the operating table in supine position. EPC cuffs were placed. The machine was turned on. Anesthesia was induced and antibiotics were given. The patient was placed in modified dorsal lithotomy position and sterilely prepped and draped in a standard fashion. A timeout occurred. Two patient identifiers were used. We entered the urethra with a 22 [employer] scope. The Right ureteral orifice was visualized and carefully cannulated with a straight 0.035 Glidewire. This was advanced into the kidney with fluoroscopic guidance. Under direct visualization the stent was advanced over the wire until it was in proper location. The Glidewire was then removed. A curl could be seen in the Right renal pelvis under using fluoroscopic vision, and in the bladder under direct visualization. The patients bladder was drained. All instrumentation was removed. The patient was then awakened, extubated, and discharged back to the PACU in good and stable condition. Follow-Up: Dr. Katlyn Horowitz in 1 week for definitive stone management.     Sarah Francois  Electronically signed on 7/5/2018 at 12:09 AM

## 2018-07-05 NOTE — H&P
Taking? Authorizing Provider   omeprazole (PRILOSEC) 20 MG delayed release capsule Take 1 capsule by mouth 2 times daily 6/8/18  Yes MIGDALIA Brown CNP   mometasone [facility]) 220 MCG/INH inhaler Inhale 1 puff into the lungs daily 4/30/18  Yes MIGDALIA Hobbs CNP   hydrochlorothiazide (HYDRODIURIL) 50 MG tablet TAKE 1 TABLET BY MOUTH DAILY 4/2/18  Yes MIGDALIA Hobbs CNP   celecoxib (CELEBREX) 100 MG capsule Take 1 capsule by mouth 2 times daily 7/28/17  Yes MIGDALIA Hobbs CNP   albuterol sulfate HFA (PROAIR HFA) 108 (90 BASE) MCG/ACT inhaler Inhale 2 puffs into the lungs every 6 hours as needed for Wheezing 2/5/16  Yes MIGDALIA Hobbs CNP   solifenacin (VESICARE) 10 MG tablet Take 1 tablet by mouth as needed. 12/23/14  Yes MIGDALIA Frank CNP   acetaminophen (TYLENOL) 500 MG tablet Take 1,000 mg by mouth every 4 hours as needed for Pain. Yes Historical Provider, MD   docusate sodium (COLACE) 100 MG capsule Take 1 capsule by mouth 2 times daily as needed for Constipation 11/18/17   Pa Quinteros MD   Triprolidine-Pseudoephedrine (ANTIHISTAMINE PO) Take by mouth    Historical Provider, MD   fluticasone (FLONASE) 50 MCG/ACT nasal spray 1 spray by Nasal route daily 9/30/16   Bal Magana MD   Omega-3 Fatty Acids (OMEGA 3 PO) Take 1 tablet by mouth daily     Historical Provider, MD   Pot & Sod Cit-Cit Ac (POLYCITRA PO) Take 30 mg by mouth as needed. Historical Provider, MD   solifenacin (VESICARE) 10 MG tablet Take 10 mg by mouth as needed. 12/23/14  Historical Provider, MD       Allergies:  Patient has no known allergies. Social History:    Social History     Social History    Marital status: Single     Spouse name: N/A    Number of children: N/A    Years of education: N/A     Occupational History    Not on file.      Social History Main Topics    Smoking status: Never Smoker    Smokeless tobacco: Never Used    Alcohol use 0.0 oz/week      Comment: rare    Drug use: No    Sexual activity: No     Other Topics Concern    Not on file     Social History Narrative    No narrative on file       Family History:    Family History   Problem Relation Age of Onset    High Blood Pressure Mother     Hypertension Mother    [place] Emphysema Mother     Diabetes Father     Cancer Father     Heart Attack Father     Hypertension Father     Heart Disease Father     Hypertension Sister     Heart Disease Maternal Grandmother     Prostate Cancer Maternal Grandfather     Heart Disease Paternal Grandfather     Stroke Paternal Grandfather        REVIEW OF SYSTEMS:    Constitutional: negative  Eyes: negative  Respiratory: negative  Cardiovascular: negative  Gastrointestinal: negative  Genitourinary: see HPI  Musculoskeletal: negative  Skin: negative   Neurological: negative  Hematological/Lymphatic: negative  Psychological: negative    Physical Exam:      This a 52 y.o. female   Patient Vitals for the past 24 hrs:   BP Temp Temp src Pulse Resp SpO2 Height Weight   07/04/18 2116 (!) 141/72 - - - - 95 % - -   07/04/18 2031 131/62 - - 76 16 97 % - -   07/04/18 2016 (!) 180/95 - - 70 21 100 % - -   07/04/18 1803 (!) 195/108 98.4 °F (36.9 °C) Oral 65 17 100 % 5' 8\" (1.727 m) 250 lb (113.4 kg)     Constitutional: Patient in no acute distress. Neuro: Alert and oriented to person, place and time. Psych: mood and affect normal  Lungs: Respiratory effort is normal  Cardiovascular: Normal peripheral pulses  Abdomen: Soft, non-tender, non-distended  Mild Right CVA tenderness to palpation    Bladder non-tender and not distended.   Lower extremities: No edema or calf tenderness bilaterally      LABS:   Recent Labs      07/04/18   1834   WBC  12.6*   HGB  13.9   HCT  44.2   MCV  83.4   PLT  260     Recent Labs      07/04/18   1834   NA  138   K  3.8   CL  100   CO2  23   BUN  22*   CREATININE  0.82       Additional Lab/culture results:    Urinalysis: Recent Labs

## 2018-07-10 ENCOUNTER — APPOINTMENT (OUTPATIENT)
Dept: GENERAL RADIOLOGY | Age: 53
End: 2018-07-10
Attending: UROLOGY
Payer: COMMERCIAL

## 2018-07-10 ENCOUNTER — ANESTHESIA EVENT (OUTPATIENT)
Dept: OPERATING ROOM | Age: 53
End: 2018-07-10
Payer: COMMERCIAL

## 2018-07-10 ENCOUNTER — ANESTHESIA (OUTPATIENT)
Dept: OPERATING ROOM | Age: 53
End: 2018-07-10
Payer: COMMERCIAL

## 2018-07-10 ENCOUNTER — HOSPITAL ENCOUNTER (OUTPATIENT)
Age: 53
Setting detail: OUTPATIENT SURGERY
Discharge: HOME | End: 2018-07-10
Attending: UROLOGY | Admitting: UROLOGY
Payer: COMMERCIAL

## 2018-07-10 VITALS — SYSTOLIC BLOOD PRESSURE: 126 MMHG | TEMPERATURE: 95.8 F | DIASTOLIC BLOOD PRESSURE: 79 MMHG | OXYGEN SATURATION: 96 %

## 2018-07-10 VITALS
HEIGHT: 68 IN | DIASTOLIC BLOOD PRESSURE: 89 MMHG | RESPIRATION RATE: 16 BRPM | BODY MASS INDEX: 37.76 KG/M2 | WEIGHT: 249.13 LBS | TEMPERATURE: 97.9 F | HEART RATE: 78 BPM | OXYGEN SATURATION: 97 % | SYSTOLIC BLOOD PRESSURE: 122 MMHG

## 2018-07-10 DIAGNOSIS — N20.1 CALCULUS OF URETER: Primary | ICD-10-CM

## 2018-07-10 LAB
EKG ATRIAL RATE: 67 BPM
EKG P AXIS: 31 DEGREES
EKG P-R INTERVAL: 158 MS
EKG Q-T INTERVAL: 428 MS
EKG QRS DURATION: 100 MS
EKG QTC CALCULATION (BAZETT): 452 MS
EKG R AXIS: -3 DEGREES
EKG T AXIS: 18 DEGREES
EKG VENTRICULAR RATE: 67 BPM
POC POTASSIUM: 4.4 MMOL/L (ref 3.5–4.5)

## 2018-07-10 PROCEDURE — 93005 ELECTROCARDIOGRAM TRACING: CPT

## 2018-07-10 PROCEDURE — 7100000001 HC PACU RECOVERY - ADDTL 15 MIN: Performed by: UROLOGY

## 2018-07-10 PROCEDURE — 2500000003 HC RX 250 WO HCPCS: Performed by: NURSE ANESTHETIST, CERTIFIED REGISTERED

## 2018-07-10 PROCEDURE — 2580000003 HC RX 258: Performed by: NURSE ANESTHETIST, CERTIFIED REGISTERED

## 2018-07-10 PROCEDURE — 3700000000 HC ANESTHESIA ATTENDED CARE: Performed by: UROLOGY

## 2018-07-10 PROCEDURE — C1769 GUIDE WIRE: HCPCS | Performed by: UROLOGY

## 2018-07-10 PROCEDURE — 3600000014 HC SURGERY LEVEL 4 ADDTL 15MIN: Performed by: UROLOGY

## 2018-07-10 PROCEDURE — 7100000040 HC SPAR PHASE II RECOVERY - FIRST 15 MIN: Performed by: UROLOGY

## 2018-07-10 PROCEDURE — 2720000010 HC SURG SUPPLY STERILE: Performed by: UROLOGY

## 2018-07-10 PROCEDURE — C2617 STENT, NON-COR, TEM W/O DEL: HCPCS | Performed by: UROLOGY

## 2018-07-10 PROCEDURE — 3700000001 HC ADD 15 MINUTES (ANESTHESIA): Performed by: UROLOGY

## 2018-07-10 PROCEDURE — 6360000002 HC RX W HCPCS: Performed by: NURSE ANESTHETIST, CERTIFIED REGISTERED

## 2018-07-10 PROCEDURE — C1894 INTRO/SHEATH, NON-LASER: HCPCS | Performed by: UROLOGY

## 2018-07-10 PROCEDURE — 74018 RADEX ABDOMEN 1 VIEW: CPT

## 2018-07-10 PROCEDURE — 2580000003 HC RX 258: Performed by: ANESTHESIOLOGY

## 2018-07-10 PROCEDURE — 6360000002 HC RX W HCPCS

## 2018-07-10 PROCEDURE — 7100000000 HC PACU RECOVERY - FIRST 15 MIN: Performed by: UROLOGY

## 2018-07-10 PROCEDURE — 82365 CALCULUS SPECTROSCOPY: CPT

## 2018-07-10 PROCEDURE — C1773 RET DEV, INSERTABLE: HCPCS | Performed by: UROLOGY

## 2018-07-10 PROCEDURE — 3600000004 HC SURGERY LEVEL 4 BASE: Performed by: UROLOGY

## 2018-07-10 PROCEDURE — 7100000041 HC SPAR PHASE II RECOVERY - ADDTL 15 MIN: Performed by: UROLOGY

## 2018-07-10 PROCEDURE — C1758 CATHETER, URETERAL: HCPCS | Performed by: UROLOGY

## 2018-07-10 PROCEDURE — 84132 ASSAY OF SERUM POTASSIUM: CPT

## 2018-07-10 DEVICE — URETERAL STENT
Type: IMPLANTABLE DEVICE | Site: URETER | Status: FUNCTIONAL
Brand: POLARIS™ ULTRA

## 2018-07-10 RX ORDER — CIPROFLOXACIN 2 MG/ML
INJECTION, SOLUTION INTRAVENOUS PRN
Status: DISCONTINUED | OUTPATIENT
Start: 2018-07-10 | End: 2018-07-10 | Stop reason: SDUPTHER

## 2018-07-10 RX ORDER — DEXAMETHASONE SODIUM PHOSPHATE 10 MG/ML
INJECTION INTRAMUSCULAR; INTRAVENOUS PRN
Status: DISCONTINUED | OUTPATIENT
Start: 2018-07-10 | End: 2018-07-10 | Stop reason: SDUPTHER

## 2018-07-10 RX ORDER — OXYCODONE HYDROCHLORIDE AND ACETAMINOPHEN 5; 325 MG/1; MG/1
1 TABLET ORAL EVERY 6 HOURS PRN
Qty: 15 TABLET | Refills: 0 | Status: SHIPPED | OUTPATIENT
Start: 2018-07-10 | End: 2018-07-13

## 2018-07-10 RX ORDER — FENTANYL CITRATE 50 UG/ML
50 INJECTION, SOLUTION INTRAMUSCULAR; INTRAVENOUS EVERY 5 MIN PRN
Status: DISCONTINUED | OUTPATIENT
Start: 2018-07-10 | End: 2018-07-10

## 2018-07-10 RX ORDER — LIDOCAINE HYDROCHLORIDE 10 MG/ML
INJECTION, SOLUTION EPIDURAL; INFILTRATION; INTRACAUDAL; PERINEURAL PRN
Status: DISCONTINUED | OUTPATIENT
Start: 2018-07-10 | End: 2018-07-10 | Stop reason: SDUPTHER

## 2018-07-10 RX ORDER — SODIUM CHLORIDE, SODIUM LACTATE, POTASSIUM CHLORIDE, CALCIUM CHLORIDE 600; 310; 30; 20 MG/100ML; MG/100ML; MG/100ML; MG/100ML
INJECTION, SOLUTION INTRAVENOUS CONTINUOUS
Status: DISCONTINUED | OUTPATIENT
Start: 2018-07-10 | End: 2018-07-10 | Stop reason: HOSPADM

## 2018-07-10 RX ORDER — CIPROFLOXACIN 2 MG/ML
400 INJECTION, SOLUTION INTRAVENOUS
Status: DISCONTINUED | OUTPATIENT
Start: 2018-07-10 | End: 2018-07-10 | Stop reason: HOSPADM

## 2018-07-10 RX ORDER — SODIUM CHLORIDE, SODIUM LACTATE, POTASSIUM CHLORIDE, CALCIUM CHLORIDE 600; 310; 30; 20 MG/100ML; MG/100ML; MG/100ML; MG/100ML
INJECTION, SOLUTION INTRAVENOUS CONTINUOUS PRN
Status: DISCONTINUED | OUTPATIENT
Start: 2018-07-10 | End: 2018-07-10 | Stop reason: SDUPTHER

## 2018-07-10 RX ORDER — ONDANSETRON 2 MG/ML
INJECTION INTRAMUSCULAR; INTRAVENOUS PRN
Status: DISCONTINUED | OUTPATIENT
Start: 2018-07-10 | End: 2018-07-10 | Stop reason: SDUPTHER

## 2018-07-10 RX ORDER — FENTANYL CITRATE 50 UG/ML
INJECTION, SOLUTION INTRAMUSCULAR; INTRAVENOUS PRN
Status: DISCONTINUED | OUTPATIENT
Start: 2018-07-10 | End: 2018-07-10 | Stop reason: SDUPTHER

## 2018-07-10 RX ORDER — PROPOFOL 10 MG/ML
INJECTION, EMULSION INTRAVENOUS PRN
Status: DISCONTINUED | OUTPATIENT
Start: 2018-07-10 | End: 2018-07-10 | Stop reason: SDUPTHER

## 2018-07-10 RX ORDER — KETOROLAC TROMETHAMINE 30 MG/ML
INJECTION, SOLUTION INTRAMUSCULAR; INTRAVENOUS PRN
Status: DISCONTINUED | OUTPATIENT
Start: 2018-07-10 | End: 2018-07-10 | Stop reason: SDUPTHER

## 2018-07-10 RX ORDER — MIDAZOLAM HYDROCHLORIDE 1 MG/ML
INJECTION INTRAMUSCULAR; INTRAVENOUS PRN
Status: DISCONTINUED | OUTPATIENT
Start: 2018-07-10 | End: 2018-07-10 | Stop reason: SDUPTHER

## 2018-07-10 RX ORDER — FENTANYL CITRATE 50 UG/ML
INJECTION, SOLUTION INTRAMUSCULAR; INTRAVENOUS
Status: COMPLETED
Start: 2018-07-10 | End: 2018-07-10

## 2018-07-10 RX ORDER — DIPHENHYDRAMINE HYDROCHLORIDE 50 MG/ML
INJECTION INTRAMUSCULAR; INTRAVENOUS PRN
Status: DISCONTINUED | OUTPATIENT
Start: 2018-07-10 | End: 2018-07-10 | Stop reason: SDUPTHER

## 2018-07-10 RX ORDER — FENTANYL CITRATE 50 UG/ML
25 INJECTION, SOLUTION INTRAMUSCULAR; INTRAVENOUS EVERY 5 MIN PRN
Status: DISCONTINUED | OUTPATIENT
Start: 2018-07-10 | End: 2018-07-10

## 2018-07-10 RX ADMIN — LIDOCAINE HYDROCHLORIDE 50 MG: 10 INJECTION, SOLUTION EPIDURAL; INFILTRATION; INTRACAUDAL; PERINEURAL at 10:21

## 2018-07-10 RX ADMIN — PHENYLEPHRINE HYDROCHLORIDE 100 MCG: 10 INJECTION INTRAVENOUS at 11:29

## 2018-07-10 RX ADMIN — FENTANYL CITRATE 25 MCG: 50 INJECTION INTRAMUSCULAR; INTRAVENOUS at 10:55

## 2018-07-10 RX ADMIN — PHENYLEPHRINE HYDROCHLORIDE 100 MCG: 10 INJECTION INTRAVENOUS at 11:10

## 2018-07-10 RX ADMIN — FENTANYL CITRATE 25 MCG: 50 INJECTION INTRAMUSCULAR; INTRAVENOUS at 10:21

## 2018-07-10 RX ADMIN — FENTANYL CITRATE 50 MCG: 50 INJECTION INTRAMUSCULAR; INTRAVENOUS at 10:29

## 2018-07-10 RX ADMIN — ONDANSETRON 4 MG: 2 INJECTION, SOLUTION INTRAMUSCULAR; INTRAVENOUS at 12:35

## 2018-07-10 RX ADMIN — CIPROFLOXACIN 400 MG: 2 INJECTION, SOLUTION INTRAVENOUS at 10:29

## 2018-07-10 RX ADMIN — PHENYLEPHRINE HYDROCHLORIDE 100 MCG: 10 INJECTION INTRAVENOUS at 11:25

## 2018-07-10 RX ADMIN — PROPOFOL 10 MG: 10 INJECTION, EMULSION INTRAVENOUS at 12:36

## 2018-07-10 RX ADMIN — DEXAMETHASONE SODIUM PHOSPHATE 10 MG: 10 INJECTION INTRAMUSCULAR; INTRAVENOUS at 10:27

## 2018-07-10 RX ADMIN — SODIUM CHLORIDE, POTASSIUM CHLORIDE, SODIUM LACTATE AND CALCIUM CHLORIDE: 600; 310; 30; 20 INJECTION, SOLUTION INTRAVENOUS at 10:17

## 2018-07-10 RX ADMIN — KETOROLAC TROMETHAMINE 15 MG: 30 INJECTION, SOLUTION INTRAMUSCULAR; INTRAVENOUS at 12:23

## 2018-07-10 RX ADMIN — ONDANSETRON 4 MG: 2 INJECTION, SOLUTION INTRAMUSCULAR; INTRAVENOUS at 12:31

## 2018-07-10 RX ADMIN — FENTANYL CITRATE 50 MCG: 50 INJECTION, SOLUTION INTRAMUSCULAR; INTRAVENOUS at 13:08

## 2018-07-10 RX ADMIN — MIDAZOLAM HYDROCHLORIDE 2 MG: 1 INJECTION, SOLUTION INTRAMUSCULAR; INTRAVENOUS at 10:21

## 2018-07-10 RX ADMIN — DIPHENHYDRAMINE HYDROCHLORIDE 25 MG: 50 INJECTION, SOLUTION INTRAMUSCULAR; INTRAVENOUS at 12:36

## 2018-07-10 RX ADMIN — PHENYLEPHRINE HYDROCHLORIDE 100 MCG: 10 INJECTION INTRAVENOUS at 11:15

## 2018-07-10 RX ADMIN — PHENYLEPHRINE HYDROCHLORIDE 100 MCG: 10 INJECTION INTRAVENOUS at 11:21

## 2018-07-10 RX ADMIN — PHENYLEPHRINE HYDROCHLORIDE 100 MCG: 10 INJECTION INTRAVENOUS at 11:33

## 2018-07-10 RX ADMIN — SODIUM CHLORIDE, POTASSIUM CHLORIDE, SODIUM LACTATE AND CALCIUM CHLORIDE: 600; 310; 30; 20 INJECTION, SOLUTION INTRAVENOUS at 10:21

## 2018-07-10 RX ADMIN — PROPOFOL 200 MG: 10 INJECTION, EMULSION INTRAVENOUS at 10:21

## 2018-07-10 ASSESSMENT — PULMONARY FUNCTION TESTS
PIF_VALUE: 3
PIF_VALUE: 18
PIF_VALUE: 3
PIF_VALUE: 6
PIF_VALUE: 3
PIF_VALUE: 17
PIF_VALUE: 5
PIF_VALUE: 3
PIF_VALUE: 1
PIF_VALUE: 3
PIF_VALUE: 17
PIF_VALUE: 2
PIF_VALUE: 17
PIF_VALUE: 3
PIF_VALUE: 18
PIF_VALUE: 17
PIF_VALUE: 3
PIF_VALUE: 17
PIF_VALUE: 3
PIF_VALUE: 1
PIF_VALUE: 3
PIF_VALUE: 3
PIF_VALUE: 18
PIF_VALUE: 18
PIF_VALUE: 3
PIF_VALUE: 17
PIF_VALUE: 3
PIF_VALUE: 3
PIF_VALUE: 17
PIF_VALUE: 8
PIF_VALUE: 3
PIF_VALUE: 18
PIF_VALUE: 3
PIF_VALUE: 6
PIF_VALUE: 3
PIF_VALUE: 17
PIF_VALUE: 3
PIF_VALUE: 1
PIF_VALUE: 3
PIF_VALUE: 17
PIF_VALUE: 18
PIF_VALUE: 3
PIF_VALUE: 17
PIF_VALUE: 6
PIF_VALUE: 3
PIF_VALUE: 1
PIF_VALUE: 18
PIF_VALUE: 18
PIF_VALUE: 3
PIF_VALUE: 2
PIF_VALUE: 3
PIF_VALUE: 9
PIF_VALUE: 3
PIF_VALUE: 14
PIF_VALUE: 3
PIF_VALUE: 3
PIF_VALUE: 2
PIF_VALUE: 3
PIF_VALUE: 3
PIF_VALUE: 18
PIF_VALUE: 3
PIF_VALUE: 15
PIF_VALUE: 1
PIF_VALUE: 5
PIF_VALUE: 3
PIF_VALUE: 5
PIF_VALUE: 3
PIF_VALUE: 13
PIF_VALUE: 2
PIF_VALUE: 3
PIF_VALUE: 15
PIF_VALUE: 3
PIF_VALUE: 17
PIF_VALUE: 3
PIF_VALUE: 3
PIF_VALUE: 17
PIF_VALUE: 17
PIF_VALUE: 15
PIF_VALUE: 3
PIF_VALUE: 4
PIF_VALUE: 1
PIF_VALUE: 4

## 2018-07-10 ASSESSMENT — PAIN DESCRIPTION - LOCATION
LOCATION: ABDOMEN

## 2018-07-10 ASSESSMENT — PAIN DESCRIPTION - DESCRIPTORS
DESCRIPTORS: PRESSURE
DESCRIPTORS: PRESSURE;ACHING
DESCRIPTORS: SORE
DESCRIPTORS: PRESSURE
DESCRIPTORS: PRESSURE

## 2018-07-10 ASSESSMENT — PAIN SCALES - WONG BAKER
WONGBAKER_NUMERICALRESPONSE: 0
WONGBAKER_NUMERICALRESPONSE: 10

## 2018-07-10 ASSESSMENT — PAIN DESCRIPTION - PAIN TYPE
TYPE: SURGICAL PAIN

## 2018-07-10 ASSESSMENT — PAIN DESCRIPTION - ORIENTATION
ORIENTATION: LOWER

## 2018-07-10 ASSESSMENT — PAIN SCALES - GENERAL
PAINLEVEL_OUTOF10: 5
PAINLEVEL_OUTOF10: 5
PAINLEVEL_OUTOF10: 10

## 2018-07-10 ASSESSMENT — PAIN - FUNCTIONAL ASSESSMENT: PAIN_FUNCTIONAL_ASSESSMENT: 0-10

## 2018-07-10 NOTE — ANESTHESIA PRE PROCEDURE
Urolithiasis N20.9       Past Medical History:        Diagnosis Date    Arthritis     CKD (chronic kidney disease) stage 1, GFR 90 ml/min or greater     Cystinuria ([address]     Hypertension     resolved    Hyperuricemia     Hypothyroidism     Kidney stones     Proteinuria     Snores        Past Surgical History:        Procedure Laterality Date    CYSTO/URETERO/PYELOSCOPY, CALCULUS TX Right 11/21/2017    HOLMIUM LASER - CYSTOSCOPY, RIGHT URETEROSCOPY, RIGHT STENT EXCHANGE,STONE BASKETING performed by Gary Murrell MD at [address] [place] [address] Drive Right 11/18/2017    stent insertion/retrograde pyelogram    CYSTOSCOPY Right 11/18/2017    CYSTOSCOPY URETERAL STENT INSERTION,RIGHT performed by Alta Pacheco MD at [address] [place] [address] Drive Right 11/21/2017    ureteroscopy, stent, stone basket    CYSTOSCOPY Right 07/04/2018    CYSTOSCOPY URETERAL STENT INSERTION, RIGHT (Right     KIDNEY STONE SURGERY      x2    KNEE ARTHROSCOPY Right     KNEE SURGERY Right 1998    [place] CYSTOSCOPY,INSERT URETERAL STENT Right 7/4/2018    CYSTOSCOPY URETERAL STENT INSERTION, RIGHT performed by Balbina Alexis MD at [address] [place] [address] Avenue    left       Social History:    Social History   Substance Use Topics    Smoking status: Never Smoker    Smokeless tobacco: Never Used    Alcohol use 0.0 oz/week      Comment: rare                                Counseling given: Not Answered      Vital Signs (Current): There were no vitals filed for this visit.                                            BP Readings from Last 3 Encounters:   07/05/18 128/76   07/05/18 133/70   04/30/18 126/86       NPO Status:                                                                                 BMI:   Wt Readings from Last 3 Encounters:   07/04/18 250 lb (113.4 kg)   05/14/18 249 lb (112.9 kg)   04/30/18 255 lb (115.7 kg)     There is no height or weight on file to calculate BMI.    CBC:   Lab Results   Component Value Date    WBC 12.6

## 2018-07-10 NOTE — ANESTHESIA POSTPROCEDURE EVALUATION
Department of Anesthesiology  Postprocedure Note    Patient: Pamela Krueger  MRN: [id number]  [place]: 1965  Date of evaluation: 7/10/2018  Time:  2:06 PM     Procedure Summary     Date:  07/10/18 Room / Location:  [facility] OR  / [facility] OR    Anesthesia Start:  1020 Anesthesia Stop:  1244    Procedure:  CYSTOSCOPY, RIGHT URETEROSCOPY, HOLMIUM LASER LITHO WITH STONE BASKETING, RIGHT STENT EXCHANGE (N/A Bladder) Diagnosis:  (RIGHT KIDNEY STONE)    Surgeon:  Sulema Velasco MD Responsible Provider:  Bret Bishop MD    Anesthesia Type:  MAC, general ASA Status:  3 - Emergent          Anesthesia Type: MAC, general    Imani Phase I: Imani Score: 10    Imani Phase II: Imani Score: 10    Last vitals: Reviewed and per EMR flowsheets.        Anesthesia Post Evaluation    Patient location during evaluation: PACU  Patient participation: complete - patient participated  Level of consciousness: awake and alert  Pain score: 2  Airway patency: patent  Nausea & Vomiting: no nausea and no vomiting  Complications: no  Cardiovascular status: hemodynamically stable  Respiratory status: acceptable  Hydration status: euvolemic

## 2018-07-10 NOTE — OP NOTE
FACILITY:  [place], [place]  Sinan Lott  1965  [id number]    DATE: 07/10/18  SURGEON:  Dr. Umer Rod MD  ASSISTANT: None  PREOPERATIVE DIAGNOSIS: Right sided kidney stone  POSTOPERATIVE DIAGNOSIS: Right sided kidney stone  PROCEDURES PERFORMED:  1. Cystoscopy. 2. Right sided ureteroscopy with holmium laser lithotripsy, and stone basketing   3. Right sided stent exchange. DRAINS: A Right sided 6x26 double J ureteral stent (with string)  SPECIMEN: none  ANESTHESIA: General  ESTIMATED BLOOD LOSS: None.   COMPLICATIONS: None.     INDICATIONS FOR PROCEDURE:  Sinan Lott is a 52 y.o. female presents for Right sided calculus. She has history of right solitary kidney with 2 mid ureteral stones, recently stent placed 7/4/18. Her stones measure 8 mm, and 1.3 cm in the mid ureter. There is also another 8 mm stone in the upper pole. After the risks, benefits, alternatives, of the procedure were discussed with the patient, informed consent was obtained. The patient elected to proceed.     DETAILS OF THE PROCEDURE:  The patient was brought back from the preoperative holding area to the operating suite, and was transferred to the operating table where the patient lay in supine position. EPC's were in place, connected to the machine and the machine was turned on before induction. General endotracheal anesthesia was induced, and patient was prepped and draped in standard surgical fashion after being placed in dorsolithotomy position. A proper timeout was performed, preoperative antibiotics were given. We began by inserting a cystoscope with a 22 Turkmen sheath and 30 degree lens into the patient's urethral meatus and advancing into the bladder without complication. A pan cystoscopy was preformed and the bladder appeared unremarkable. We then focused our attention on the Right ureteral orifice, grasped the ureteral stent and pulled it to the meatus.   We cannulated with our glidewire, advanced up to renal pelvis. We then used a dual lumen catheter to place a second wire. Once in good position, we advanced a rigid ureteroscope under direct visualization to the stone found in the mid ureter. We used a 365 nm holmium laser fiber to fragment the stone. There was a significant stone burden. We used a escape stone basket to remove the stone burden. We then switched to a flexible ureteroscope advanced under direct visualization to the renal pelvis. We identified the upper pole renal calculus and using a 200 micron holmium laser fiber we fragmented the calculus. It was dusted and the fragments appeared to be under 1 millimeter and could pass easily. At this time a complete pyeloscopy was preformed and no other substantial fragments were identified. We withdrew the ureteroscope and visualized the entire ureter. No significant stone fragments were identified. No damage to the ureter was identified. At this time, over the remaining glidewire, we placed a 6x26 cm double J ureteral stent in the usual fashion, and we noted appropriate placement in the upper collecting system using fluoroscopy. There was a good curl noted in the bladder. We decided to leave a string at the end of the stent, which was attached with benzoin and steri-strips. The patient's bladder was drained and removed the scope and the procedure was subsequently terminated. Plan:  Discharge home in good condition  The patient can pull the stent on Monday 7/16. General recommendations are given in the discharge instructions.

## 2018-07-10 NOTE — H&P
[facility]  Urology H&P    Patient:  Bryanna Del Rio  MRN: [id number]  YOB: 1965    CHIEF COMPLAINT:  Right kidney stone    HISTORY OF PRESENT ILLNESS:   The patient is a 52 y.o. female who presents with history of right solitary kidney with 2 mid ureteral stones. She had a double J ureteral stent placed 7/4/18. Pt doing well. Pain is controlled. Denies fever, chills, nausea, vomiting, chest pain, difficulty breathing, SOB. 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 Golden Mcnamara for non-functioning kidney. She has history of cystine stones. She does try to alkalinize her urine by taking over the counter medications. No denies any fevers or dysuria. Patient's old records, notes and chart reviewed and summarized above.     Past Medical History:    Past Medical History:   Diagnosis Date    Arthritis     CKD (chronic kidney disease) stage 1, GFR 90 ml/min or greater     Cystinuria ([address]     Hypertension     resolved    Hyperuricemia     Hypothyroidism     Kidney stones     Proteinuria     Snores        Past Surgical History:    Past Surgical History:   Procedure Laterality Date    CYSTO/URETERO/PYELOSCOPY, CALCULUS [place] Right 11/21/2017    HOLMIUM LASER - CYSTOSCOPY, RIGHT URETEROSCOPY, RIGHT STENT EXCHANGE,STONE BASKETING performed by Jasson Romero MD at [address] [place] [address] Right 11/18/2017    stent insertion/retrograde pyelogram    CYSTOSCOPY Right 11/18/2017    CYSTOSCOPY URETERAL STENT INSERTION,RIGHT performed by Marciano Montoya MD at [address] [place] [address] Right 11/21/2017    ureteroscopy, stent, stone basket    CYSTOSCOPY Right 07/04/2018    CYSTOSCOPY URETERAL STENT INSERTION, RIGHT (Right     KIDNEY STONE SURGERY      x2    KNEE ARTHROSCOPY Right     KNEE SURGERY Right 1998    [place] CYSTOSCOPY,INSERT URETERAL STENT Right 7/4/2018    CYSTOSCOPY URETERAL STENT INSERTION, RIGHT performed by Melissa Poon MD at [place] [address]  TOTAL NEPHRECTOMY  1988    left     Previous Urologic Surgery: Left nephrectomy, multiple stone surgeries. Medications Prior to Admission:    Prior to Admission medications    Medication Sig Start Date End Date Taking? Authorizing Provider   ondansetron (ZOFRAN) 4 MG tablet Take 1 tablet by mouth every 6 hours as needed for Nausea or Vomiting 7/5/18  Yes Maddie Cordova MD   tamsulosin (FLOMAX) 0.4 MG capsule Take 1 capsule by mouth daily 7/4/18  Yes Maddie Cordova MD   oxybutynin (DITROPAN-XL) 10 MG extended release tablet Take 1 tablet by mouth daily 7/4/18 8/3/18 Yes Maddie Cordova MD   oxyCODONE-acetaminophen (PERCOCET) 5-325 MG per tablet Take 1-2 tablets by mouth every 4 hours as needed for Pain for up to 7 days. . 7/4/18 7/11/18 Yes Maddie Cordova MD   omeprazole (PRILOSEC) 20 MG delayed release capsule Take 1 capsule by mouth 2 times daily 6/8/18  Yes MIGDALIA Brown CNP   hydrochlorothiazide (HYDRODIURIL) 50 MG tablet TAKE 1 TABLET BY MOUTH DAILY 4/2/18  Yes MIGDALIA Hobbs CNP   celecoxib (CELEBREX) 100 MG capsule Take 1 capsule by mouth 2 times daily 7/28/17  Yes MIGDALIA Hobbs CNP   acetaminophen (TYLENOL) 500 MG tablet Take 1,000 mg by mouth every 4 hours as needed for Pain.    Yes Historical Provider, MD   Omega-3 Fatty Acids (OMEGA 3 PO) Take 1 tablet by mouth daily    Yes Historical Provider, MD   mometasone [facility]) 220 MCG/INH inhaler Inhale 1 puff into the lungs daily 4/30/18   MIGDALIA Mauricio CNP   docusate sodium (COLACE) 100 MG capsule Take 1 capsule by mouth 2 times daily as needed for Constipation 11/18/17   Pa Quinteros MD   Triprolidine-Pseudoephedrine (ANTIHISTAMINE PO) Take by mouth    Historical Provider, MD   fluticasone (FLONASE) 50 MCG/ACT nasal spray 1 spray by Nasal route daily 9/30/16   Bal Magana MD   albuterol sulfate HFA (PROAIR HFA) 108 (90 BASE) MCG/ACT inhaler Inhale 2 puffs into the lungs every 6 hours as needed for Wheezing 2/5/16   MIGDALIA Forbes CNP   solifenacin (VESICARE) 10 MG tablet Take 1 tablet by mouth as needed. 12/23/14   MIGDALIA Frank CNP   Pot & Sod Cit-Cit Ac (POLYCITRA PO) Take 30 mg by mouth as needed. Historical Provider, MD   solifenacin (VESICARE) 10 MG tablet Take 10 mg by mouth as needed. 12/23/14  Historical Provider, MD       Allergies:  Patient has no known allergies. Social History:    Social History     Social History    Marital status: Single     Spouse name: N/A    Number of children: N/A    Years of education: N/A     Occupational History    Not on file. Social History Main Topics    Smoking status: Never Smoker    Smokeless tobacco: Never Used    Alcohol use 0.0 oz/week      Comment: rare    Drug use: No    Sexual activity: No     Other Topics Concern    Not on file     Social History Narrative    No narrative on file       Family History:    Family History   Problem Relation Age of Onset    High Blood Pressure Mother     Hypertension Mother     Emphysema Mother     Diabetes Father     Cancer Father     Heart Attack Father     Hypertension Father     Heart Disease Father     Hypertension Sister     Heart Disease Maternal Grandmother     Prostate Cancer Maternal Grandfather     Heart Disease Paternal Grandfather     Stroke Paternal Grandfather      Previous Urologic Family history: none  REVIEW OF SYSTEMS:  All systems reviewed and negative except for that already noted in the HPI. Physical Exam:      This a 52 y.o. female   Patient Vitals for the past 24 hrs:   BP Temp Temp src Pulse Resp SpO2 Height Weight   07/10/18 0922 (!) 145/91 99 °F (37.2 °C) Temporal 77 16 97 % 5' 8\" (1.727 m) 249 lb 2 oz (113 kg)     Constitutional: Patient in no acute distress. Neuro: Alert and oriented to person, place and time.   Psych: mood and affect normal  HEENT negative  Lungs: Respiratory effort is normal  Cardiovascular: Normal peripheral pulses  Abdomen: Soft, non-tender, non-distended with no CVA, flank pain or hepatosplenomegaly. No hernias. Kidneys normal.  Lymphatics: No palpable lymphadenopathy. Bladder non-tender and not distended. Pelvic exam:  External genitalia normal  Urethral and urethral meatus normal  Vagina normal with no evidence of pelvic prolapse  Uterus normal  Adnexa normal  Anus and perineum normal  Rectal exam not indicated    LABS:   No results for input(s): WBC, HGB, HCT, MCV, PLT in the last 72 hours. No results for input(s): NA, K, CL, CO2, PHOS, BUN, CREATININE in the last 72 hours. Invalid input(s): CA    Additional Lab/culture results:    Urinalysis: No results for input(s): COLORU, PHUR, LABCAST, WBCUA, RBCUA, MUCUS, TRICHOMONAS, YEAST, BACTERIA, CLARITYU, SPECGRAV, LEUKOCYTESUR, UROBILINOGEN, [place] in the last 72 hours. Invalid input(s): NITRATE, GLUCOSEUKETONESUAMORPHOUS     -----------------------------------------------------------------  Imaging Results:  Right mid ureteral stones x2. Assessment and Plan   Impression:    Patient Active Problem List   Diagnosis    Kidney stones    Essential hypertension    Acquired hypothyroidism    CKD (chronic kidney disease) stage 1, GFR 90 ml/min or greater    Cystinuria ([address]    Hyperuricemia    Proteinuria    Knee pain, acute    Osteoarthritis of both knees    Gastroesophageal reflux disease without esophagitis    Obstructive sleep apnea syndrome    Midline low back pain with right-sided sciatica    Mixed hyperlipidemia    Solitary kidney, acquired    Right ureteral stone    Obesity (BMI 30-39. 9)    Acute kidney injury ([address]    Leukocytosis    Acute intractable tension-type headache    Glucose intolerance (impaired glucose tolerance)    Kidney stone    Bunion of great toe of right foot    Mild intermittent asthma without complication    Urolithiasis       Plan:    To OR for Right ureteroscopy and HLL  Cipro for preoperative antibiotics.

## 2018-07-15 LAB
STONE COMPOSITION: NORMAL
STONE DESCRIPTION: NORMAL
STONE MASS: 213 MG
STONE NUMBER: NORMAL
STONE SIZE: NORMAL MM

## 2018-08-13 ENCOUNTER — HOSPITAL ENCOUNTER (OUTPATIENT)
Dept: ULTRASOUND IMAGING | Age: 53
Discharge: HOME | End: 2018-08-15
Payer: COMMERCIAL

## 2018-08-13 DIAGNOSIS — R10.9 RIGHT FLANK PAIN: ICD-10-CM

## 2018-08-13 PROCEDURE — 76770 US EXAM ABDO BACK WALL COMP: CPT

## 2018-08-14 ENCOUNTER — HOSPITAL ENCOUNTER (OUTPATIENT)
Dept: CT IMAGING | Age: 53
Discharge: HOME | End: 2018-08-16
Payer: COMMERCIAL

## 2018-08-14 DIAGNOSIS — N20.2 CALCULUS OF KIDNEY WITH CALCULUS OF URETER: ICD-10-CM

## 2018-08-14 PROCEDURE — 74176 CT ABD & PELVIS W/O CONTRAST: CPT

## 2018-09-05 ENCOUNTER — HOSPITAL ENCOUNTER (OUTPATIENT)
Age: 53
Discharge: HOME | End: 2018-09-05
Payer: COMMERCIAL

## 2018-09-05 ENCOUNTER — HOSPITAL ENCOUNTER (OUTPATIENT)
Dept: GENERAL RADIOLOGY | Age: 53
Discharge: HOME | End: 2018-09-07
Payer: COMMERCIAL

## 2018-09-05 DIAGNOSIS — N20.2 CALCULUS OF KIDNEY AND URETER: ICD-10-CM

## 2018-09-05 PROCEDURE — 74018 RADEX ABDOMEN 1 VIEW: CPT

## 2018-09-13 ENCOUNTER — APPOINTMENT (OUTPATIENT)
Dept: CT IMAGING | Age: 53
DRG: 694 | End: 2018-09-13
Payer: COMMERCIAL

## 2018-09-13 ENCOUNTER — HOSPITAL ENCOUNTER (INPATIENT)
Age: 53
LOS: 1 days | Discharge: HOME | DRG: 694 | End: 2018-09-14
Attending: EMERGENCY MEDICINE | Admitting: UROLOGY
Payer: COMMERCIAL

## 2018-09-13 ENCOUNTER — NURSE TRIAGE (OUTPATIENT)
Dept: OTHER | Facility: CLINIC | Age: 53
End: 2018-09-13

## 2018-09-13 DIAGNOSIS — N13.2 HYDRONEPHROSIS WITH URINARY OBSTRUCTION DUE TO URETERAL CALCULUS: Primary | ICD-10-CM

## 2018-09-13 DIAGNOSIS — N20.0 KIDNEY STONES: ICD-10-CM

## 2018-09-13 DIAGNOSIS — N20.0 NEPHROLITHIASIS: ICD-10-CM

## 2018-09-13 LAB
-: NORMAL
ABSOLUTE EOS #: 0.25 K/UL (ref 0–0.44)
ABSOLUTE IMMATURE GRANULOCYTE: 0.05 K/UL (ref 0–0.3)
ABSOLUTE LYMPH #: 2.45 K/UL (ref 1.1–3.7)
ABSOLUTE MONO #: 0.81 K/UL (ref 0.1–1.2)
AMORPHOUS: NORMAL
ANION GAP SERPL CALCULATED.3IONS-SCNC: 16 MMOL/L (ref 9–17)
BACTERIA: NORMAL
BASOPHILS # BLD: 0 % (ref 0–2)
BASOPHILS ABSOLUTE: 0.03 K/UL (ref 0–0.2)
BILIRUBIN URINE: NEGATIVE
BUN BLDV-MCNC: 16 MG/DL (ref 6–20)
BUN/CREAT BLD: ABNORMAL (ref 9–20)
CALCIUM SERPL-MCNC: 10.2 MG/DL (ref 8.6–10.4)
CASTS UA: NORMAL /LPF (ref 0–2)
CHLORIDE BLD-SCNC: 101 MMOL/L (ref 98–107)
CO2: 20 MMOL/L (ref 20–31)
COLOR: YELLOW
COMMENT UA: ABNORMAL
CREAT SERPL-MCNC: 0.79 MG/DL (ref 0.5–0.9)
CRYSTALS, UA: NORMAL /HPF
DIFFERENTIAL TYPE: ABNORMAL
EOSINOPHILS RELATIVE PERCENT: 2 % (ref 1–4)
EPITHELIAL CELLS UA: NORMAL /HPF (ref 0–5)
GFR AFRICAN AMERICAN: >60 ML/MIN
GFR NON-AFRICAN AMERICAN: >60 ML/MIN
GFR SERPL CREATININE-BSD FRML MDRD: ABNORMAL ML/MIN/{1.73_M2}
GFR SERPL CREATININE-BSD FRML MDRD: ABNORMAL ML/MIN/{1.73_M2}
GLUCOSE BLD-MCNC: 120 MG/DL (ref 70–99)
GLUCOSE URINE: NEGATIVE
HCT VFR BLD CALC: 43.7 % (ref 36.3–47.1)
HEMOGLOBIN: 14.1 G/DL (ref 11.9–15.1)
IMMATURE GRANULOCYTES: 1 %
INR BLD: 0.9
KETONES, URINE: NEGATIVE
LEUKOCYTE ESTERASE, URINE: NEGATIVE
LYMPHOCYTES # BLD: 22 % (ref 24–43)
MCH RBC QN AUTO: 26.7 PG (ref 25.2–33.5)
MCHC RBC AUTO-ENTMCNC: 32.3 G/DL (ref 28.4–34.8)
MCV RBC AUTO: 82.8 FL (ref 82.6–102.9)
MONOCYTES # BLD: 7 % (ref 3–12)
MUCUS: NORMAL
NITRITE, URINE: NEGATIVE
NRBC AUTOMATED: 0 PER 100 WBC
OTHER OBSERVATIONS UA: NORMAL
PARTIAL THROMBOPLASTIN TIME: 24.2 SEC (ref 20.5–30.5)
PDW BLD-RTO: 14.4 % (ref 11.8–14.4)
PH UA: 7 (ref 5–8)
PLATELET # BLD: 287 K/UL (ref 138–453)
PLATELET ESTIMATE: ABNORMAL
PMV BLD AUTO: 9.5 FL (ref 8.1–13.5)
POTASSIUM SERPL-SCNC: 3.6 MMOL/L (ref 3.7–5.3)
PROTEIN UA: ABNORMAL
PROTHROMBIN TIME: 9.8 SEC (ref 9–12)
RBC # BLD: 5.28 M/UL (ref 3.95–5.11)
RBC # BLD: ABNORMAL 10*6/UL
RBC UA: NORMAL /HPF (ref 0–2)
RENAL EPITHELIAL, UA: NORMAL /HPF
SEG NEUTROPHILS: 68 % (ref 36–65)
SEGMENTED NEUTROPHILS ABSOLUTE COUNT: 7.46 K/UL (ref 1.5–8.1)
SODIUM BLD-SCNC: 137 MMOL/L (ref 135–144)
SPECIFIC GRAVITY UA: 1.02 (ref 1–1.03)
TRICHOMONAS: NORMAL
TURBIDITY: ABNORMAL
URINE HGB: NEGATIVE
UROBILINOGEN, URINE: NORMAL
WBC # BLD: 11.1 K/UL (ref 3.5–11.3)
WBC # BLD: ABNORMAL 10*3/UL
WBC UA: NORMAL /HPF (ref 0–5)
YEAST: NORMAL

## 2018-09-13 PROCEDURE — 2580000003 HC RX 258: Performed by: EMERGENCY MEDICINE

## 2018-09-13 PROCEDURE — 1200000000 HC SEMI PRIVATE

## 2018-09-13 PROCEDURE — 80048 BASIC METABOLIC PNL TOTAL CA: CPT

## 2018-09-13 PROCEDURE — 85730 THROMBOPLASTIN TIME PARTIAL: CPT

## 2018-09-13 PROCEDURE — 85025 COMPLETE CBC W/AUTO DIFF WBC: CPT

## 2018-09-13 PROCEDURE — 74176 CT ABD & PELVIS W/O CONTRAST: CPT

## 2018-09-13 PROCEDURE — 99285 EMERGENCY DEPT VISIT HI MDM: CPT

## 2018-09-13 PROCEDURE — 85610 PROTHROMBIN TIME: CPT

## 2018-09-13 PROCEDURE — 81001 URINALYSIS AUTO W/SCOPE: CPT

## 2018-09-13 PROCEDURE — 6360000002 HC RX W HCPCS: Performed by: EMERGENCY MEDICINE

## 2018-09-13 RX ORDER — PROMETHAZINE HYDROCHLORIDE 25 MG/ML
12.5 INJECTION, SOLUTION INTRAMUSCULAR; INTRAVENOUS ONCE
Status: COMPLETED | OUTPATIENT
Start: 2018-09-13 | End: 2018-09-13

## 2018-09-13 RX ORDER — SODIUM CHLORIDE 9 MG/ML
INJECTION, SOLUTION INTRAVENOUS CONTINUOUS
Status: DISCONTINUED | OUTPATIENT
Start: 2018-09-13 | End: 2018-09-14 | Stop reason: HOSPADM

## 2018-09-13 RX ORDER — MORPHINE SULFATE 4 MG/ML
4 INJECTION, SOLUTION INTRAMUSCULAR; INTRAVENOUS ONCE
Status: COMPLETED | OUTPATIENT
Start: 2018-09-13 | End: 2018-09-13

## 2018-09-13 RX ORDER — ONDANSETRON 2 MG/ML
4 INJECTION INTRAMUSCULAR; INTRAVENOUS ONCE
Status: COMPLETED | OUTPATIENT
Start: 2018-09-13 | End: 2018-09-13

## 2018-09-13 RX ORDER — 0.9 % SODIUM CHLORIDE 0.9 %
1000 INTRAVENOUS SOLUTION INTRAVENOUS ONCE
Status: COMPLETED | OUTPATIENT
Start: 2018-09-13 | End: 2018-09-13

## 2018-09-13 RX ADMIN — HYDROMORPHONE HYDROCHLORIDE 1 MG: 1 INJECTION, SOLUTION INTRAMUSCULAR; INTRAVENOUS; SUBCUTANEOUS at 16:34

## 2018-09-13 RX ADMIN — MORPHINE SULFATE 4 MG: 4 INJECTION INTRAVENOUS at 15:35

## 2018-09-13 RX ADMIN — HYDROMORPHONE HYDROCHLORIDE 1 MG: 1 INJECTION, SOLUTION INTRAMUSCULAR; INTRAVENOUS; SUBCUTANEOUS at 15:54

## 2018-09-13 RX ADMIN — HYDROMORPHONE HYDROCHLORIDE 0.5 MG: 1 INJECTION, SOLUTION INTRAMUSCULAR; INTRAVENOUS; SUBCUTANEOUS at 23:43

## 2018-09-13 RX ADMIN — SODIUM CHLORIDE 1000 ML: 9 INJECTION, SOLUTION INTRAVENOUS at 15:35

## 2018-09-13 RX ADMIN — ONDANSETRON 4 MG: 2 INJECTION INTRAMUSCULAR; INTRAVENOUS at 15:35

## 2018-09-13 RX ADMIN — PROMETHAZINE HYDROCHLORIDE 12.5 MG: 25 INJECTION, SOLUTION INTRAMUSCULAR; INTRAVENOUS at 23:43

## 2018-09-13 RX ADMIN — SODIUM CHLORIDE: 9 INJECTION, SOLUTION INTRAVENOUS at 21:49

## 2018-09-13 RX ADMIN — PROMETHAZINE HYDROCHLORIDE 12.5 MG: 25 INJECTION, SOLUTION INTRAMUSCULAR; INTRAVENOUS at 15:54

## 2018-09-13 ASSESSMENT — PAIN SCALES - GENERAL
PAINLEVEL_OUTOF10: 8
PAINLEVEL_OUTOF10: 5
PAINLEVEL_OUTOF10: 7
PAINLEVEL_OUTOF10: 8

## 2018-09-13 ASSESSMENT — ENCOUNTER SYMPTOMS
VOMITING: 1
NAUSEA: 1
ABDOMINAL PAIN: 1
BACK PAIN: 0
SHORTNESS OF BREATH: 0
CHEST TIGHTNESS: 0

## 2018-09-13 NOTE — H&P
[facility] [place], [place]  Urology H&P    Patient:  Colby Crowe  MRN: [id number]  YOB: 1965    CHIEF COMPLAINT:  Nephrolithiasis    HISTORY OF PRESENT ILLNESS:   The patient is a 52 y.o. female who presents with 5 hours constant, waxing/waning R flank pain radiating to groin associated with nausea, 8/10 at worst. Denies fevers, chills, vomiting, burning dysuria, frequency, chest pain, or shortness of breath. She presented from work to [place] [address] with these issues. ROSEMARIE at bedside showed HDN, and given patient's solitary kidney, CT ordered which showed 3mm R midureteral calculus with HDN. Urology consulted for these findings. Patient currently comfortable, making urine at the moment. Pain better controlled, however she continues to have mild nausea. Per chart review, makes cystine stones. Patient's old records, notes and chart reviewed and summarized above.     Past Medical History:    Past Medical History:   Diagnosis Date    Arthritis     CKD (chronic kidney disease) stage 1, GFR 90 ml/min or greater     Cystinuria ([address]     Hypertension     resolved    Hyperuricemia     Hypothyroidism     Kidney stones     Proteinuria     Snores        Past Surgical History:    Past Surgical History:   Procedure Laterality Date    CYSTO/URETERO/PYELOSCOPY, CALCULUS TX Right 11/21/2017    HOLMIUM LASER - CYSTOSCOPY, RIGHT URETEROSCOPY, RIGHT STENT EXCHANGE,STONE BASKETING performed by Peg Orozco MD at [address] Right 11/18/2017    CYSTOSCOPY URETERAL STENT INSERTION,RIGHT performed by Darius Forbes MD at [facility] Right 07/10/2018     CYSTOSCOPY, RIGHT URETEROSCOPY, HOLMIUM LASER LITHO WITH STONE BASKETING, RIGHT STENT EXCHANGE     KIDNEY STONE SURGERY      x2    KNEE ARTHROSCOPY Right     KNEE SURGERY Right 1998    [place] CYSTO/URETERO/PYELOSCOPY, CALCULUS TX N/A 7/10/2018    CYSTOSCOPY, RIGHT URETEROSCOPY, HOLMIUM LASER LITHO WITH STONE acetaminophen (TYLENOL) 500 MG tablet Take 1,000 mg by mouth every 4 hours as needed for Pain. Historical Provider, MD   Omega-3 Fatty Acids (OMEGA 3 PO) Take 1 tablet by mouth daily     Historical Provider, MD   Pot & Sod Cit-Cit Ac (POLYCITRA PO) Take 30 mg by mouth as needed. Historical Provider, MD   solifenacin (VESICARE) 10 MG tablet Take 10 mg by mouth as needed. 12/23/14  Historical Provider, MD       Allergies:  Patient has no known allergies. Social History:    Social History     Social History    Marital status: Single     Spouse name: N/A    Number of children: N/A    Years of education: N/A     Occupational History    Not on file. Social History Main Topics    Smoking status: Never Smoker    Smokeless tobacco: Never Used    Alcohol use 0.0 oz/week      Comment: rare    Drug use: No    Sexual activity: No     Other Topics Concern    Not on file     Social History Narrative    No narrative on file     Family History:    Family History   Problem Relation Age of Onset    High Blood Pressure Mother     Hypertension Mother     Emphysema Mother     Diabetes Father     Cancer Father     Heart Attack Father     Hypertension Father     Heart Disease Father     Hypertension Sister     Heart Disease Maternal Grandmother     Prostate Cancer Maternal Grandfather     Heart Disease Paternal Grandfather     Stroke Paternal Grandfather      REVIEW OF SYSTEMS:  All systems reviewed and negative except for that already noted in the HPI. Physical Exam:      This a 52 y.o. female   Patient Vitals for the past 24 hrs:   BP Temp Pulse Resp SpO2 Height Weight   09/13/18 1701 (!) 145/91 - - - 96 % - -   09/13/18 1652 (!) 146/97 - 71 - 97 % - -   09/13/18 1605 (!) 180/92 98.1 °F (36.7 °C) 85 22 100 % 5' 8\" (1.727 m) 249 lb (112.9 kg)     Constitutional: Patient in no acute distress. Neuro: Alert and oriented to person, place and time.   Psych: mood and affect normal  HEENT

## 2018-09-13 NOTE — ED NOTES
Patient ambulatory to restroom. Instructed on how to obtain clean catch urine specimen.      Radha Culver, [place]  09/13/18 3226

## 2018-09-13 NOTE — ED PROVIDER NOTES
[facility]URN [facility]  Emergency Department Encounter  Emergency Medicine Resident     Pt Name: Pamela Krueger  MRN: [id number]  [place] 1965  Date of evaluation: 9/13/18  PCP:  MIGDALIA Garcia CNP    CHIEF COMPLAINT       Chief Complaint   Patient presents with    Flank Pain     right sided       HISTORY OF PRESENT ILLNESS  (Location/Symptom, Timing/Onset, Context/Setting, Quality, Duration, Modifying Factors, Severity.)      Pamela Krueger is a 52 y.o. female who presents with Right flank pain. Patient has a known 7-1/2 mm stone. She follows with Dr. Pal Pinto with urology. She scheduled. HE and a week or so. She states that she's had increasing pain throughout the day and has become intractable. She notes last time she voided was proximal May 2 hours ago. Denies any blood. She does state this is her kidney stone pain. Patient has a history of only one kidney. Denies any recent fevers or chills. She states she's been nauseated and vomiting from the pain. 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     [facility] / SURGICAL / SOCIAL / FAMILY HISTORY      has a past medical history of Arthritis; CKD (chronic kidney disease) stage 1, GFR 90 ml/min or greater; Cystinuria ([address] Hypertension; Hyperuricemia; Hypothyroidism; Kidney stones; Proteinuria; and Snores. has a past surgical history that includes total nephrectomy (1988); knee surgery (Right, 1998); Knee arthroscopy (Right); Kidney stone surgery; Cystoscopy (Right, 11/18/2017); cysto/uretero/pyeloscopy, calculus tx (Right, 11/21/2017); pr cystoscopy,insert ureteral stent (Right, 7/4/2018); Cystoscopy (Right, 07/10/2018); and pr cysto/uretero/pyeloscopy, calculus tx (N/A, 7/10/2018). Social History     Social History    Marital status: Single     Spouse name: N/A    Number of children: N/A    Years of education: N/A     Occupational History    Not on file.      Social History Main Topics    Smoking status: Never Smoker    Smokeless tobacco: Never Used    Alcohol use 0.0 oz/week      Comment: rare    Drug use: No    Sexual activity: No     Other Topics Concern    Not on file     Social History Narrative    No narrative on file       Family History   Problem Relation Age of Onset    High Blood Pressure Mother     Hypertension Mother    Howell Dakins Emphysema Mother     Diabetes Father     Cancer Father     Heart Attack Father     Hypertension Father     Heart Disease Father     Hypertension Sister     Heart Disease Maternal Grandmother     Prostate Cancer Maternal Grandfather     Heart Disease Paternal Grandfather     Stroke Paternal Grandfather        Allergies:  Patient has no known allergies. Home Medications:  Prior to Admission medications    Medication Sig Start Date End Date Taking?  Authorizing Provider   ondansetron (ZOFRAN) 4 MG tablet Take 1 tablet by mouth every 6 hours as needed for Nausea or Vomiting 7/5/18   Izabela Wang MD   tamsulosin (FLOMAX) 0.4 MG capsule Take 1 capsule by mouth daily 7/4/18   Izabela Wang MD   oxybutynin (DITROPAN-XL) 10 MG extended release tablet Take 1 tablet by mouth daily 7/4/18 8/3/18  Izabela Wang MD   omeprazole (PRILOSEC) 20 MG delayed release capsule Take 1 capsule by mouth 2 times daily 6/8/18   MIGDALIA Martins - CNP   mometasone [facility]) 220 MCG/INH inhaler Inhale 1 puff into the lungs daily 4/30/18   MIGDALIA Salguero CNP   hydrochlorothiazide (HYDRODIURIL) 50 MG tablet TAKE 1 TABLET BY MOUTH DAILY 4/2/18   MIGDALIA Salguero CNP   docusate sodium (COLACE) 100 MG capsule Take 1 capsule by mouth 2 times daily as needed for Constipation 11/18/17   Mike Patel MD   celecoxib (CELEBREX) 100 MG capsule Take 1 capsule by mouth 2 times daily 7/28/17   MIGDALIA Salguero CNP   Triprolidine-Pseudoephedrine (ANTIHISTAMINE PO) Take by mouth    Historical Provider, MD   fluticasone (FLONASE) 50 MCG/ACT nasal spray 1 spray by moist.   Cardiovascular: Normal rate, regular rhythm and intact distal pulses. Pulmonary/Chest: Effort normal and breath sounds normal. No respiratory distress. She has no wheezes. She has no rales. Abdominal: She exhibits no distension. There is tenderness. There is no rebound and no guarding. Musculoskeletal: Normal range of motion. She exhibits no edema. Neurological: She is alert and oriented to person, place, and time. No cranial nerve deficit. She exhibits normal muscle tone. Coordination normal.   Skin: Skin is warm and dry. No rash noted. She is not diaphoretic. No erythema. Psychiatric: She has a normal mood and affect. Her behavior is normal.   Nursing note and vitals reviewed. DIFFERENTIAL  DIAGNOSIS     PLAN (LABS / IMAGING / EKG):  Orders Placed This Encounter   Procedures    CT ABDOMEN PELVIS WO CONTRAST Additional Contrast? None    URINALYSIS    CBC WITH AUTO DIFFERENTIAL    BASIC METABOLIC PANEL    Protime-INR    APTT    Microscopic Urinalysis    Basic Metabolic Panel w/ Reflex to MG    CBC    DIET GENERAL;    Diet NPO, After Midnight    Vital signs per unit routine    Tobacco cessation education    Notify physician    Up as tolerated    Intake and output    Place intermittent pneumatic compression device    Strain all urine    Full Code    Inpatient consult to Urology    Initiate Oxygen Therapy Protocol    Insert peripheral IV    PATIENT STATUS (FROM ED OR OR/PROCEDURAL) Inpatient       MEDICATIONS ORDERED:  Orders Placed This Encounter   Medications    0.9 % sodium chloride bolus    morphine (PF) injection 4 mg    ondansetron (ZOFRAN) injection 4 mg    HYDROmorphone (DILAUDID) injection 1 mg    promethazine (PHENERGAN) injection 12.5 mg    HYDROmorphone (DILAUDID) injection 1 mg    solifenacin (VESICARE) tablet 10 mg     Order Specific Question:   Please select a reason the therapeutic interchange was not accepted:      Answer:   Rabia Baez for Pharmacy to Substitute    albuterol sulfate  (90 Base) MCG/ACT inhaler 2 puff    fluticasone (FLONASE) 50 MCG/ACT nasal spray 1 spray    docusate sodium (COLACE) capsule 100 mg    hydrochlorothiazide (HYDRODIURIL) tablet 50 mg    mometasone (ASMANEX) 220 MCG/INH inhaler 1 puff     Order Specific Question:   Please select a reason the therapeutic interchange was not accepted:      Answer:   Nirmala Hays for Pharmacy to Substitute    omeprazole (PRILOSEC) delayed release capsule 20 mg    sodium chloride flush 0.9 % injection 10 mL    sodium chloride flush 0.9 % injection 10 mL    magnesium hydroxide (MILK OF MAGNESIA) 400 MG/5ML suspension 30 mL    ondansetron (ZOFRAN) injection 4 mg    0.9 % sodium chloride infusion    OR Linked Order Group     HYDROcodone-acetaminophen (NORCO) 5-325 MG per tablet 1 tablet     HYDROcodone-acetaminophen (NORCO) 5-325 MG per tablet 2 tablet    OR Linked Order Group     HYDROmorphone (DILAUDID) injection 0.25 mg     HYDROmorphone (DILAUDID) injection 0.5 mg    tamsulosin (FLOMAX) capsule 0.4 mg    0.9 % sodium chloride infusion    HYDROmorphone (DILAUDID) injection 0.5 mg    promethazine (PHENERGAN) injection 12.5 mg       DDX:nephrolithiasis, pyelonephritis    DIAGNOSTIC RESULTS / EMERGENCY DEPARTMENT COURSE / MDM     LABS:  Results for orders placed or performed during the hospital encounter of 09/13/18   URINALYSIS   Result Value Ref Range    Color, UA YELLOW YEL    Turbidity UA 2+ (A) CLEAR    Glucose, Ur NEGATIVE NEG    Bilirubin Urine NEGATIVE NEG    Ketones, Urine NEGATIVE NEG    Specific Gravity, UA 1.022 1.005 - 1.030    Urine Hgb NEGATIVE NEG    pH, UA 7.0 5.0 - 8.0    Protein, UA 3+ (A) NEG    Urobilinogen, Urine Normal NORM    Nitrite, Urine NEGATIVE NEG    Leukocyte Esterase, Urine NEGATIVE NEG    Urinalysis Comments NOT REPORTED    CBC WITH AUTO DIFFERENTIAL   Result Value Ref Range    WBC 11.1 3.5 - 11.3 k/uL    RBC 5.28 (H) 3.95 - 5.11 m/uL    Hemoglobin 14.1 11.9 IMPRESSION: Patient presents with right flank pain ongoing since today. Minimal worsening over the day. History of kidney stones as well as solitary right kidney. She is been passing urine. Last meal was at noon today, approximately 3 hours ago. Patient is distressed appearing appears uncomfortable. Plan is for labs, pain control and consultation with urology. RADIOLOGY:  Ct Abdomen Pelvis Wo Contrast Additional Contrast? None    Result Date: 9/13/2018  EXAMINATION: CT OF THE ABDOMEN AND PELVIS WITHOUT CONTRAST 9/13/2018 5:37 pm TECHNIQUE: CT of the abdomen and pelvis was performed without the administration of intravenous contrast. Multiplanar reformatted images are provided for review. Dose modulation, iterative reconstruction, and/or weight based adjustment of the mA/kV was utilized to reduce the radiation dose to as low as reasonably achievable. COMPARISON: 08/14/2018. HISTORY: ORDERING SYSTEM PROVIDED HISTORY: nephrolithiasis TECHNOLOGIST PROVIDED HISTORY: FINDINGS: Lower Chest: Patchy ground-glass opacification at the lung bases, likely hypoaeration. Organs: Redemonstration of hepatic steatosis. No focal hepatic abnormality. The unenhanced spleen, pancreas and adrenal glands are unremarkable. No acute biliary findings. Previous left nephrectomy. Moderate right-sided hydronephrosis and hydroureter with a 3 mm distal right ureteral calculus identified. Additional nonobstructive right-sided nephrolithiasis, the largest stone measuring 7 mm in the middle pole. GI/Bowel: No pericolonic inflammatory changes. Moderate distention of the appendix with gas with no acute periappendiceal inflammatory changes. The changes are relatively stable. No small bowel distension. The stomach and duodenal C-loop are intact. Pelvis: The uterus and adnexal structures are unremarkable. No free pelvic fluid. The bladder is contracted. Peritoneum/Retroperitoneum: The abdominal aorta is normal in caliber.   [address] [facility]  [address]  [phone]            DISCHARGE MEDICATIONS:  Current Discharge Medication List          Justyn Hutchins DO  Emergency Medicine Resident    (Please note that portions of this note were completed with a voice recognition program.  Efforts were made to edit the dictations but occasionally words are mis-transcribed. )        Justyn Hutchins DO  Resident  09/14/18 [id number]

## 2018-09-14 ENCOUNTER — APPOINTMENT (OUTPATIENT)
Dept: ULTRASOUND IMAGING | Age: 53
DRG: 694 | End: 2018-09-14
Payer: COMMERCIAL

## 2018-09-14 ENCOUNTER — APPOINTMENT (OUTPATIENT)
Dept: GENERAL RADIOLOGY | Age: 53
DRG: 694 | End: 2018-09-14
Payer: COMMERCIAL

## 2018-09-14 VITALS
OXYGEN SATURATION: 93 % | HEART RATE: 76 BPM | SYSTOLIC BLOOD PRESSURE: 139 MMHG | TEMPERATURE: 98.6 F | WEIGHT: 259.92 LBS | RESPIRATION RATE: 16 BRPM | HEIGHT: 68 IN | BODY MASS INDEX: 39.39 KG/M2 | DIASTOLIC BLOOD PRESSURE: 85 MMHG

## 2018-09-14 LAB
ANION GAP SERPL CALCULATED.3IONS-SCNC: 12 MMOL/L (ref 9–17)
BUN BLDV-MCNC: 14 MG/DL (ref 6–20)
BUN/CREAT BLD: ABNORMAL (ref 9–20)
CALCIUM SERPL-MCNC: 9.3 MG/DL (ref 8.6–10.4)
CHLORIDE BLD-SCNC: 102 MMOL/L (ref 98–107)
CO2: 27 MMOL/L (ref 20–31)
CREAT SERPL-MCNC: 0.81 MG/DL (ref 0.5–0.9)
GFR AFRICAN AMERICAN: >60 ML/MIN
GFR NON-AFRICAN AMERICAN: >60 ML/MIN
GFR SERPL CREATININE-BSD FRML MDRD: ABNORMAL ML/MIN/{1.73_M2}
GFR SERPL CREATININE-BSD FRML MDRD: ABNORMAL ML/MIN/{1.73_M2}
GLUCOSE BLD-MCNC: 98 MG/DL (ref 70–99)
HCT VFR BLD CALC: 39.4 % (ref 36.3–47.1)
HEMOGLOBIN: 12.4 G/DL (ref 11.9–15.1)
MAGNESIUM: 1.9 MG/DL (ref 1.6–2.6)
MCH RBC QN AUTO: 26.9 PG (ref 25.2–33.5)
MCHC RBC AUTO-ENTMCNC: 31.5 G/DL (ref 28.4–34.8)
MCV RBC AUTO: 85.5 FL (ref 82.6–102.9)
NRBC AUTOMATED: 0 PER 100 WBC
PDW BLD-RTO: 14.6 % (ref 11.8–14.4)
PLATELET # BLD: 254 K/UL (ref 138–453)
PMV BLD AUTO: 9.5 FL (ref 8.1–13.5)
POTASSIUM SERPL-SCNC: 3.4 MMOL/L (ref 3.7–5.3)
RBC # BLD: 4.61 M/UL (ref 3.95–5.11)
SODIUM BLD-SCNC: 141 MMOL/L (ref 135–144)
WBC # BLD: 8.7 K/UL (ref 3.5–11.3)

## 2018-09-14 PROCEDURE — 2580000003 HC RX 258: Performed by: STUDENT IN AN ORGANIZED HEALTH CARE EDUCATION/TRAINING PROGRAM

## 2018-09-14 PROCEDURE — 85027 COMPLETE CBC AUTOMATED: CPT

## 2018-09-14 PROCEDURE — 82365 CALCULUS SPECTROSCOPY: CPT

## 2018-09-14 PROCEDURE — 2580000003 HC RX 258: Performed by: EMERGENCY MEDICINE

## 2018-09-14 PROCEDURE — 83735 ASSAY OF MAGNESIUM: CPT

## 2018-09-14 PROCEDURE — 80048 BASIC METABOLIC PNL TOTAL CA: CPT

## 2018-09-14 PROCEDURE — 6370000000 HC RX 637 (ALT 250 FOR IP): Performed by: STUDENT IN AN ORGANIZED HEALTH CARE EDUCATION/TRAINING PROGRAM

## 2018-09-14 PROCEDURE — 76770 US EXAM ABDO BACK WALL COMP: CPT

## 2018-09-14 PROCEDURE — 36415 COLL VENOUS BLD VENIPUNCTURE: CPT

## 2018-09-14 RX ORDER — HYDROCODONE BITARTRATE AND ACETAMINOPHEN 5; 325 MG/1; MG/1
2 TABLET ORAL EVERY 4 HOURS PRN
Status: DISCONTINUED | OUTPATIENT
Start: 2018-09-14 | End: 2018-09-14 | Stop reason: HOSPADM

## 2018-09-14 RX ORDER — SODIUM CHLORIDE 9 MG/ML
INJECTION, SOLUTION INTRAVENOUS CONTINUOUS
Status: DISCONTINUED | OUTPATIENT
Start: 2018-09-14 | End: 2018-09-14

## 2018-09-14 RX ORDER — TROSPIUM CHLORIDE 20 MG/1
20 TABLET, FILM COATED ORAL
Status: DISCONTINUED | OUTPATIENT
Start: 2018-09-14 | End: 2018-09-14 | Stop reason: HOSPADM

## 2018-09-14 RX ORDER — HYDROCODONE BITARTRATE AND ACETAMINOPHEN 5; 325 MG/1; MG/1
1 TABLET ORAL EVERY 4 HOURS PRN
Status: DISCONTINUED | OUTPATIENT
Start: 2018-09-14 | End: 2018-09-14 | Stop reason: HOSPADM

## 2018-09-14 RX ORDER — FLUTICASONE PROPIONATE 110 UG/1
1 AEROSOL, METERED RESPIRATORY (INHALATION) 2 TIMES DAILY
Status: DISCONTINUED | OUTPATIENT
Start: 2018-09-14 | End: 2018-09-14 | Stop reason: HOSPADM

## 2018-09-14 RX ORDER — FLUTICASONE PROPIONATE 50 MCG
1 SPRAY, SUSPENSION (ML) NASAL DAILY
Status: DISCONTINUED | OUTPATIENT
Start: 2018-09-14 | End: 2018-09-14 | Stop reason: HOSPADM

## 2018-09-14 RX ORDER — SODIUM CHLORIDE 0.9 % (FLUSH) 0.9 %
10 SYRINGE (ML) INJECTION EVERY 12 HOURS SCHEDULED
Status: DISCONTINUED | OUTPATIENT
Start: 2018-09-14 | End: 2018-09-14 | Stop reason: HOSPADM

## 2018-09-14 RX ORDER — SODIUM CHLORIDE 0.9 % (FLUSH) 0.9 %
10 SYRINGE (ML) INJECTION PRN
Status: DISCONTINUED | OUTPATIENT
Start: 2018-09-14 | End: 2018-09-14 | Stop reason: HOSPADM

## 2018-09-14 RX ORDER — SOLIFENACIN SUCCINATE 5 MG/1
10 TABLET, FILM COATED ORAL DAILY
Status: DISCONTINUED | OUTPATIENT
Start: 2018-09-14 | End: 2018-09-14

## 2018-09-14 RX ORDER — DOCUSATE SODIUM 100 MG/1
100 CAPSULE, LIQUID FILLED ORAL 2 TIMES DAILY PRN
Status: DISCONTINUED | OUTPATIENT
Start: 2018-09-14 | End: 2018-09-14 | Stop reason: HOSPADM

## 2018-09-14 RX ORDER — HYDROCHLOROTHIAZIDE 50 MG/1
50 TABLET ORAL DAILY
Status: DISCONTINUED | OUTPATIENT
Start: 2018-09-14 | End: 2018-09-14 | Stop reason: HOSPADM

## 2018-09-14 RX ORDER — ALBUTEROL SULFATE 90 UG/1
2 AEROSOL, METERED RESPIRATORY (INHALATION) EVERY 6 HOURS PRN
Status: DISCONTINUED | OUTPATIENT
Start: 2018-09-14 | End: 2018-09-14 | Stop reason: HOSPADM

## 2018-09-14 RX ORDER — TAMSULOSIN HYDROCHLORIDE 0.4 MG/1
0.4 CAPSULE ORAL DAILY
Status: DISCONTINUED | OUTPATIENT
Start: 2018-09-14 | End: 2018-09-14 | Stop reason: HOSPADM

## 2018-09-14 RX ORDER — OMEPRAZOLE 20 MG/1
20 CAPSULE, DELAYED RELEASE ORAL 2 TIMES DAILY
Status: DISCONTINUED | OUTPATIENT
Start: 2018-09-14 | End: 2018-09-14 | Stop reason: HOSPADM

## 2018-09-14 RX ORDER — ONDANSETRON 2 MG/ML
4 INJECTION INTRAMUSCULAR; INTRAVENOUS EVERY 6 HOURS PRN
Status: DISCONTINUED | OUTPATIENT
Start: 2018-09-14 | End: 2018-09-14 | Stop reason: HOSPADM

## 2018-09-14 RX ADMIN — TAMSULOSIN HYDROCHLORIDE 0.4 MG: 0.4 CAPSULE ORAL at 14:15

## 2018-09-14 RX ADMIN — SODIUM CHLORIDE: 9 INJECTION, SOLUTION INTRAVENOUS at 00:45

## 2018-09-14 RX ADMIN — SODIUM CHLORIDE: 9 INJECTION, SOLUTION INTRAVENOUS at 00:30

## 2018-09-14 RX ADMIN — TROSPIUM CHLORIDE 20 MG: 20 TABLET ORAL at 14:15

## 2018-09-14 RX ADMIN — SODIUM CHLORIDE: 9 INJECTION, SOLUTION INTRAVENOUS at 06:44

## 2018-09-14 ASSESSMENT — PAIN DESCRIPTION - DESCRIPTORS: DESCRIPTORS: CRAMPING;SQUEEZING

## 2018-09-14 ASSESSMENT — PAIN DESCRIPTION - PAIN TYPE: TYPE: ACUTE PAIN

## 2018-09-14 ASSESSMENT — PAIN DESCRIPTION - PROGRESSION
CLINICAL_PROGRESSION: NOT CHANGED

## 2018-09-14 ASSESSMENT — PAIN DESCRIPTION - LOCATION: LOCATION: BACK

## 2018-09-14 ASSESSMENT — PAIN DESCRIPTION - ORIENTATION: ORIENTATION: RIGHT

## 2018-09-14 ASSESSMENT — PAIN DESCRIPTION - ONSET: ONSET: ON-GOING

## 2018-09-14 ASSESSMENT — PAIN DESCRIPTION - FREQUENCY: FREQUENCY: CONTINUOUS

## 2018-09-14 ASSESSMENT — PAIN SCALES - GENERAL: PAINLEVEL_OUTOF10: 2

## 2018-09-14 ASSESSMENT — PAIN DESCRIPTION - DIRECTION: RADIATING_TOWARDS: RIGHT GROIN

## 2018-09-14 NOTE — FLOWSHEET NOTE
Pre-Surgical Spiritual Care Note  Stopped to see pt per Surgery Schedule for 9/14. Pt was laying in bed in ED #30 when  stopped. She indicated that she has several kidney stones & that she had been scheduled for lithotripsy next week, but this episode came up today. Pt has a hx of kidney stones & she indicated that she hopes that maybe through tomorrow's procedure & follow up she will be able to over them for good. Pt is a RN on the Trauma Team. She told  that she used to work for [employer], so she well knows the hospital & the 's face was \"familiar\" to her. Pt expressed satisfaction as she told  about the support she received from the other Team members today. Pt was very calm & open to 's presence. She readily accepted his offer of prayer for tomorrow's procedure to go well. Chaplains will remain available to offer spiritual and emotional support as needed. Rev. Eva Cohenly, [address] [place] [address] Road     09/13/18 8701   Encounter Summary   Services provided to: Patient   Referral/Consult From: Multi-disciplinary team  (Surgery Schedule)   Support System Parent; Caodaism/varun community   Place of [address] [facility]   Continue Visiting (9/13)   Complexity of Encounter Low   Length of Encounter 15 minutes   Spiritual Assessment Completed Yes   Routine   Type Pre-procedure  (Initial)   Assessment Calm; Approachable; Anxious; Hopeful;Coping   Intervention Sustaining presence/ Ministry of presence; Active listening;Explored feelings, thoughts, concerns; Discussed illness/injury and it's impact; Discussed meaning/purpose;Nurtured hope;Prayer  (left Spiritual Care info)   Outcome Receptive; Acceptance; Coping;Encouraged; Hopeful;Engaged in conversation;Comfort;Expressed gratitude

## 2018-09-14 NOTE — ED NOTES
PT updated to POC. PT resting in bed. NAD noted. RR even and unlabored.         Leanna Bustos RN  09/13/18 9911

## 2018-09-14 NOTE — CARE COORDINATION
Discharge 1 [facility] Case Management Department  Written by: Avelina Prieto RN    Patient Name: Gregoria Neves  Attending Provider: Vincent Wright MD  Admit Date: 2018  3:10 PM  MRN: [id number]  Account: [de-identified]                     : 1965  Discharge Date:       Disposition: home    Avelina Prieto RN
Coordinator?:  No  Durable Medical Equipment  Functional Review  Ability to seek help/take action for Emergent/Urgent situations i.e. fire, crime, inclement weather or health crisis. :  Independent  Ability handle personal hygiene needs (bathing/dressing/grooming): Independent  Ability to manage medications: Independent  Ability to prepare food:  Independent  Ability to maintain home (clean home, laundry): Independent  Ability to drive and/or has transportation:  Independent  Ability to do shopping:  Independent  Ability to manage finances: Independent  Is patient able to live independently?:  Yes  Hearing and Vision  Visual Impairment:  Reading glasses  Care Transitions Interventions         Follow Up  No future appointments. Health Maintenance  There are no preventive care reminders to display for this patient.     Mela Barba MA

## 2018-09-14 NOTE — PLAN OF CARE
Problem:  Activity:  Goal: Ability to tolerate increased activity will improve  Ability to tolerate increased activity will improve   Outcome: Completed Date Met: 09/14/18      Problem: SAFETY  Goal: Free from accidental physical injury  Outcome: Completed Date Met: 09/14/18    Goal: Free from intentional harm  Outcome: Completed Date Met: 09/14/18

## 2018-09-14 NOTE — PROGRESS NOTES
Discharge and follow up instructions discussed. Questions answered. Patient's IV access removed. Patient did not want to be taken out by w/c, preferred to ambulate.

## 2018-09-14 NOTE — PLAN OF CARE
Problem: Pain:  Goal: Pain level will decrease  Pain level will decrease   Outcome: Ongoing    Goal: Control of acute pain  Control of acute pain   Outcome: Ongoing    Goal: Control of chronic pain  Control of chronic pain   Outcome: Ongoing      Problem:  Activity:  Goal: Ability to tolerate increased activity will improve  Ability to tolerate increased activity will improve  Outcome: Ongoing      Problem: SAFETY  Goal: Free from accidental physical injury  Outcome: Ongoing    Goal: Free from intentional harm  Outcome: Ongoing

## 2018-09-15 ENCOUNTER — CARE COORDINATION (OUTPATIENT)
Dept: CASE MANAGEMENT | Age: 53
End: 2018-09-15

## 2018-09-16 ENCOUNTER — CARE COORDINATION (OUTPATIENT)
Dept: CASE MANAGEMENT | Age: 53
End: 2018-09-16

## 2018-09-16 NOTE — CARE COORDINATION
[address] Transitions Initial Follow Up Call    Call within 2 business days of discharge: Yes    Patient: Lety Sawant Patient : 1965   MRN: <[id number]>  Reason for Admission: There are no discharge diagnoses documented for the most recent discharge. Discharge Date: 18 RARS: Readmission Risk Score: 8     Spoke with: [address]: [address]   Non-face-to-face services provided:  Obtained and reviewed discharge summary and/or continuity of care documents    Care Transitions 24 Hour Call    Do you have any ongoing symptoms?:  No  Do you have a copy of your discharge instructions?:  Yes  Do you have all of your prescriptions and are they filled?:  Yes  Have you been contacted by a [facility] Pharmacist?:  No  Have you scheduled your follow up appointment?:  No  Were you discharged with any Home Care or Post Acute Services:  No  Do you have support at home?:  Alone  Are you an active caregiver in your home?:  No  Care Transitions Interventions         Follow Up : Spoke with patient, she was having a lot of difficulty with her phone, was at times difficult to hear. Pt did verify the medications she was to stop taking. Pt is totally independent at home and has no needs at all. Pt has an ultrasound on 18. And she will be following up with her physician. Pt said she has been doing very well since returning home and was very appreciative of the call today. Will sign off for care transitions as no further needs identified   No future appointments.     Bao Galvan RN

## 2018-09-18 LAB
STONE COMPOSITION: NORMAL
STONE DESCRIPTION: NORMAL
STONE MASS: 13 MG
STONE NUMBER: 1
STONE SIZE: NORMAL MM

## 2018-10-09 ENCOUNTER — ANESTHESIA EVENT (OUTPATIENT)
Dept: OPERATING ROOM | Age: 53
End: 2018-10-09
Payer: COMMERCIAL

## 2018-10-10 ENCOUNTER — HOSPITAL ENCOUNTER (OUTPATIENT)
Age: 53
Setting detail: OUTPATIENT SURGERY
Discharge: HOME | End: 2018-10-10
Attending: UROLOGY | Admitting: UROLOGY
Payer: COMMERCIAL

## 2018-10-10 ENCOUNTER — APPOINTMENT (OUTPATIENT)
Dept: GENERAL RADIOLOGY | Age: 53
End: 2018-10-10
Attending: UROLOGY
Payer: COMMERCIAL

## 2018-10-10 ENCOUNTER — ANESTHESIA (OUTPATIENT)
Dept: OPERATING ROOM | Age: 53
End: 2018-10-10
Payer: COMMERCIAL

## 2018-10-10 VITALS
OXYGEN SATURATION: 95 % | HEIGHT: 68 IN | HEART RATE: 62 BPM | RESPIRATION RATE: 15 BRPM | BODY MASS INDEX: 37.76 KG/M2 | TEMPERATURE: 96.8 F | SYSTOLIC BLOOD PRESSURE: 112 MMHG | WEIGHT: 249.12 LBS | DIASTOLIC BLOOD PRESSURE: 68 MMHG

## 2018-10-10 VITALS — TEMPERATURE: 96.7 F | SYSTOLIC BLOOD PRESSURE: 118 MMHG | DIASTOLIC BLOOD PRESSURE: 78 MMHG | OXYGEN SATURATION: 100 %

## 2018-10-10 DIAGNOSIS — N20.0 KIDNEY STONES: Primary | ICD-10-CM

## 2018-10-10 PROCEDURE — 7100000040 HC SPAR PHASE II RECOVERY - FIRST 15 MIN: Performed by: UROLOGY

## 2018-10-10 PROCEDURE — 74018 RADEX ABDOMEN 1 VIEW: CPT

## 2018-10-10 PROCEDURE — 7100000000 HC PACU RECOVERY - FIRST 15 MIN: Performed by: UROLOGY

## 2018-10-10 PROCEDURE — 2500000003 HC RX 250 WO HCPCS: Performed by: NURSE ANESTHETIST, CERTIFIED REGISTERED

## 2018-10-10 PROCEDURE — 2580000003 HC RX 258: Performed by: UROLOGY

## 2018-10-10 PROCEDURE — 6360000002 HC RX W HCPCS: Performed by: ANESTHESIOLOGY

## 2018-10-10 PROCEDURE — 3700000000 HC ANESTHESIA ATTENDED CARE: Performed by: UROLOGY

## 2018-10-10 PROCEDURE — 6360000002 HC RX W HCPCS: Performed by: NURSE ANESTHETIST, CERTIFIED REGISTERED

## 2018-10-10 PROCEDURE — 3700000001 HC ADD 15 MINUTES (ANESTHESIA): Performed by: UROLOGY

## 2018-10-10 PROCEDURE — 2709999900 HC NON-CHARGEABLE SUPPLY: Performed by: UROLOGY

## 2018-10-10 PROCEDURE — 7100000001 HC PACU RECOVERY - ADDTL 15 MIN: Performed by: UROLOGY

## 2018-10-10 PROCEDURE — 6370000000 HC RX 637 (ALT 250 FOR IP): Performed by: ANESTHESIOLOGY

## 2018-10-10 PROCEDURE — 3600000004 HC SURGERY LEVEL 4 BASE: Performed by: UROLOGY

## 2018-10-10 PROCEDURE — C1769 GUIDE WIRE: HCPCS | Performed by: UROLOGY

## 2018-10-10 PROCEDURE — 2580000003 HC RX 258: Performed by: ANESTHESIOLOGY

## 2018-10-10 PROCEDURE — C1758 CATHETER, URETERAL: HCPCS | Performed by: UROLOGY

## 2018-10-10 PROCEDURE — 3600000014 HC SURGERY LEVEL 4 ADDTL 15MIN: Performed by: UROLOGY

## 2018-10-10 PROCEDURE — C2617 STENT, NON-COR, TEM W/O DEL: HCPCS | Performed by: UROLOGY

## 2018-10-10 PROCEDURE — 6360000002 HC RX W HCPCS

## 2018-10-10 PROCEDURE — 6360000002 HC RX W HCPCS: Performed by: UROLOGY

## 2018-10-10 DEVICE — UNIVERSA SOFT URETERAL STENT AND POSITIONER WITH HYDROPHILIC COATING AND MONOFILAMENT TETHER
Type: IMPLANTABLE DEVICE | Site: URETER | Status: FUNCTIONAL
Brand: UNIVERSA

## 2018-10-10 RX ORDER — PROPOFOL 10 MG/ML
INJECTION, EMULSION INTRAVENOUS PRN
Status: DISCONTINUED | OUTPATIENT
Start: 2018-10-10 | End: 2018-10-10 | Stop reason: SDUPTHER

## 2018-10-10 RX ORDER — DIPHENHYDRAMINE HYDROCHLORIDE 50 MG/ML
INJECTION INTRAMUSCULAR; INTRAVENOUS PRN
Status: DISCONTINUED | OUTPATIENT
Start: 2018-10-10 | End: 2018-10-10 | Stop reason: SDUPTHER

## 2018-10-10 RX ORDER — KETOROLAC TROMETHAMINE 30 MG/ML
INJECTION, SOLUTION INTRAMUSCULAR; INTRAVENOUS PRN
Status: DISCONTINUED | OUTPATIENT
Start: 2018-10-10 | End: 2018-10-10 | Stop reason: SDUPTHER

## 2018-10-10 RX ORDER — DOCUSATE SODIUM 100 MG/1
100 CAPSULE, LIQUID FILLED ORAL 2 TIMES DAILY
Qty: 30 CAPSULE | Refills: 0 | Status: SHIPPED | OUTPATIENT
Start: 2018-10-10 | End: 2019-03-18 | Stop reason: ALTCHOICE

## 2018-10-10 RX ORDER — FENTANYL CITRATE 50 UG/ML
INJECTION, SOLUTION INTRAMUSCULAR; INTRAVENOUS PRN
Status: DISCONTINUED | OUTPATIENT
Start: 2018-10-10 | End: 2018-10-10 | Stop reason: SDUPTHER

## 2018-10-10 RX ORDER — TAMSULOSIN HYDROCHLORIDE 0.4 MG/1
0.4 CAPSULE ORAL DAILY
Qty: 5 CAPSULE | Refills: 0 | Status: SHIPPED | OUTPATIENT
Start: 2018-10-10 | End: 2018-10-25 | Stop reason: ALTCHOICE

## 2018-10-10 RX ORDER — MAGNESIUM HYDROXIDE 1200 MG/15ML
LIQUID ORAL CONTINUOUS PRN
Status: DISCONTINUED | OUTPATIENT
Start: 2018-10-10 | End: 2018-10-10 | Stop reason: HOSPADM

## 2018-10-10 RX ORDER — OXYCODONE HYDROCHLORIDE AND ACETAMINOPHEN 5; 325 MG/1; MG/1
1 TABLET ORAL EVERY 4 HOURS PRN
Qty: 18 TABLET | Refills: 0 | Status: SHIPPED | OUTPATIENT
Start: 2018-10-10 | End: 2018-10-13

## 2018-10-10 RX ORDER — SCOLOPAMINE TRANSDERMAL SYSTEM 1 MG/1
1 PATCH, EXTENDED RELEASE TRANSDERMAL ONCE
Status: DISCONTINUED | OUTPATIENT
Start: 2018-10-10 | End: 2018-10-10 | Stop reason: HOSPADM

## 2018-10-10 RX ORDER — DEXAMETHASONE SODIUM PHOSPHATE 10 MG/ML
INJECTION INTRAMUSCULAR; INTRAVENOUS PRN
Status: DISCONTINUED | OUTPATIENT
Start: 2018-10-10 | End: 2018-10-10 | Stop reason: SDUPTHER

## 2018-10-10 RX ORDER — MAGNESIUM HYDROXIDE 1200 MG/15ML
LIQUID ORAL PRN
Status: DISCONTINUED | OUTPATIENT
Start: 2018-10-10 | End: 2018-10-10 | Stop reason: HOSPADM

## 2018-10-10 RX ORDER — DEXAMETHASONE SODIUM PHOSPHATE 10 MG/ML
8 INJECTION INTRAMUSCULAR; INTRAVENOUS ONCE
Status: COMPLETED | OUTPATIENT
Start: 2018-10-10 | End: 2018-10-10

## 2018-10-10 RX ORDER — SODIUM CHLORIDE, SODIUM LACTATE, POTASSIUM CHLORIDE, CALCIUM CHLORIDE 600; 310; 30; 20 MG/100ML; MG/100ML; MG/100ML; MG/100ML
INJECTION, SOLUTION INTRAVENOUS CONTINUOUS
Status: DISCONTINUED | OUTPATIENT
Start: 2018-10-10 | End: 2018-10-10 | Stop reason: HOSPADM

## 2018-10-10 RX ORDER — OXYCODONE HYDROCHLORIDE AND ACETAMINOPHEN 5; 325 MG/1; MG/1
1 TABLET ORAL
Status: COMPLETED | OUTPATIENT
Start: 2018-10-10 | End: 2018-10-10

## 2018-10-10 RX ORDER — LIDOCAINE HYDROCHLORIDE 10 MG/ML
INJECTION, SOLUTION EPIDURAL; INFILTRATION; INTRACAUDAL; PERINEURAL PRN
Status: DISCONTINUED | OUTPATIENT
Start: 2018-10-10 | End: 2018-10-10 | Stop reason: SDUPTHER

## 2018-10-10 RX ORDER — CEPHALEXIN 500 MG/1
500 CAPSULE ORAL 3 TIMES DAILY
Qty: 9 CAPSULE | Refills: 0 | Status: SHIPPED | OUTPATIENT
Start: 2018-10-10 | End: 2018-10-13

## 2018-10-10 RX ORDER — ONDANSETRON 2 MG/ML
INJECTION INTRAMUSCULAR; INTRAVENOUS PRN
Status: DISCONTINUED | OUTPATIENT
Start: 2018-10-10 | End: 2018-10-10 | Stop reason: SDUPTHER

## 2018-10-10 RX ADMIN — HYDROMORPHONE HYDROCHLORIDE 0.25 MG: 1 INJECTION, SOLUTION INTRAMUSCULAR; INTRAVENOUS; SUBCUTANEOUS at 09:44

## 2018-10-10 RX ADMIN — DIPHENHYDRAMINE HYDROCHLORIDE 12.5 MG: 50 INJECTION, SOLUTION INTRAMUSCULAR; INTRAVENOUS at 08:39

## 2018-10-10 RX ADMIN — HYDROMORPHONE HYDROCHLORIDE 0.5 MG: 1 INJECTION, SOLUTION INTRAMUSCULAR; INTRAVENOUS; SUBCUTANEOUS at 09:31

## 2018-10-10 RX ADMIN — FENTANYL CITRATE 25 MCG: 50 INJECTION INTRAMUSCULAR; INTRAVENOUS at 08:31

## 2018-10-10 RX ADMIN — FENTANYL CITRATE 25 MCG: 50 INJECTION INTRAMUSCULAR; INTRAVENOUS at 08:30

## 2018-10-10 RX ADMIN — HYDROMORPHONE HYDROCHLORIDE 0.25 MG: 1 INJECTION, SOLUTION INTRAMUSCULAR; INTRAVENOUS; SUBCUTANEOUS at 09:56

## 2018-10-10 RX ADMIN — DEXAMETHASONE SODIUM PHOSPHATE 10 MG: 10 INJECTION INTRAMUSCULAR; INTRAVENOUS at 08:28

## 2018-10-10 RX ADMIN — KETOROLAC TROMETHAMINE 30 MG: 30 INJECTION, SOLUTION INTRAMUSCULAR at 08:46

## 2018-10-10 RX ADMIN — ONDANSETRON 4 MG: 2 INJECTION INTRAMUSCULAR; INTRAVENOUS at 08:49

## 2018-10-10 RX ADMIN — FENTANYL CITRATE 25 MCG: 50 INJECTION INTRAMUSCULAR; INTRAVENOUS at 08:34

## 2018-10-10 RX ADMIN — PROPOFOL 200 MG: 10 INJECTION, EMULSION INTRAVENOUS at 08:24

## 2018-10-10 RX ADMIN — LIDOCAINE HYDROCHLORIDE 50 MG: 10 INJECTION, SOLUTION EPIDURAL; INFILTRATION; INTRACAUDAL; PERINEURAL at 08:24

## 2018-10-10 RX ADMIN — OXYCODONE HYDROCHLORIDE AND ACETAMINOPHEN 1 TABLET: 5; 325 TABLET ORAL at 10:18

## 2018-10-10 RX ADMIN — HYDROMORPHONE HYDROCHLORIDE 0.5 MG: 1 INJECTION, SOLUTION INTRAMUSCULAR; INTRAVENOUS; SUBCUTANEOUS at 09:37

## 2018-10-10 RX ADMIN — DEXAMETHASONE SODIUM PHOSPHATE 8 MG: 10 INJECTION INTRAMUSCULAR; INTRAVENOUS at 07:39

## 2018-10-10 RX ADMIN — FENTANYL CITRATE 25 MCG: 50 INJECTION INTRAMUSCULAR; INTRAVENOUS at 08:28

## 2018-10-10 RX ADMIN — SODIUM CHLORIDE, POTASSIUM CHLORIDE, SODIUM LACTATE AND CALCIUM CHLORIDE: 600; 310; 30; 20 INJECTION, SOLUTION INTRAVENOUS at 07:45

## 2018-10-10 RX ADMIN — Medication 2 G: at 08:28

## 2018-10-10 RX ADMIN — HYDROMORPHONE HYDROCHLORIDE 0.25 MG: 1 INJECTION, SOLUTION INTRAMUSCULAR; INTRAVENOUS; SUBCUTANEOUS at 09:51

## 2018-10-10 ASSESSMENT — PAIN SCALES - GENERAL
PAINLEVEL_OUTOF10: 3
PAINLEVEL_OUTOF10: 4
PAINLEVEL_OUTOF10: 3
PAINLEVEL_OUTOF10: 3
PAINLEVEL_OUTOF10: 6
PAINLEVEL_OUTOF10: 7
PAINLEVEL_OUTOF10: 4
PAINLEVEL_OUTOF10: 7
PAINLEVEL_OUTOF10: 7

## 2018-10-10 ASSESSMENT — PULMONARY FUNCTION TESTS
PIF_VALUE: 5
PIF_VALUE: 1
PIF_VALUE: 4
PIF_VALUE: 3
PIF_VALUE: 8
PIF_VALUE: 7
PIF_VALUE: 8
PIF_VALUE: 8
PIF_VALUE: 7
PIF_VALUE: 19
PIF_VALUE: 8
PIF_VALUE: 6
PIF_VALUE: 7
PIF_VALUE: 3
PIF_VALUE: 8
PIF_VALUE: 2
PIF_VALUE: 3
PIF_VALUE: 8
PIF_VALUE: 8
PIF_VALUE: 3
PIF_VALUE: 8
PIF_VALUE: 8
PIF_VALUE: 19
PIF_VALUE: 2
PIF_VALUE: 8
PIF_VALUE: 23
PIF_VALUE: 0
PIF_VALUE: 8
PIF_VALUE: 4
PIF_VALUE: 6
PIF_VALUE: 6
PIF_VALUE: 7
PIF_VALUE: 4
PIF_VALUE: 8
PIF_VALUE: 1

## 2018-10-10 ASSESSMENT — PAIN DESCRIPTION - LOCATION
LOCATION: PELVIS
LOCATION: PELVIS

## 2018-10-10 ASSESSMENT — PAIN - FUNCTIONAL ASSESSMENT: PAIN_FUNCTIONAL_ASSESSMENT: 0-10

## 2018-10-10 ASSESSMENT — PAIN DESCRIPTION - PAIN TYPE: TYPE: SURGICAL PAIN

## 2018-10-10 NOTE — ANESTHESIA POSTPROCEDURE EVALUATION
Department of Anesthesiology  Postprocedure Note    Patient: Shandra Roland  MRN: [id number]  [place]: 1965  Date of evaluation: 10/10/2018  Time:  12:22 PM     Procedure Summary     Date:  10/10/18 Room / Location:  [place] [address] / [facility] OR    Anesthesia Start:  0820 Anesthesia Stop:  6436    Procedure:  HOLMIUM LASER STANDBY, CYSTO, RIGHT URETEROSCOPY, RIGHT STENT PLACEMENT (Right ) Diagnosis:  (RIGHT KIDNEY STONE)    Surgeon:  Amador Vo MD Responsible Provider:  Mliton Christian MD    Anesthesia Type:  general ASA Status:  2          Anesthesia Type: general    Imani Phase I: Imani Score: 10    Imani Phase II: Imani Score: 10    Last vitals: Reviewed and per EMR flowsheets.        Anesthesia Post Evaluation    Patient location during evaluation: PACU  Patient participation: complete - patient participated  Level of consciousness: awake and alert  Airway patency: patent  Nausea & Vomiting: no nausea and no vomiting  Complications: no  Cardiovascular status: hemodynamically stable  Respiratory status: room air  Hydration status: euvolemic

## 2018-10-18 ENCOUNTER — HOSPITAL ENCOUNTER (OUTPATIENT)
Age: 53
Setting detail: SPECIMEN
Discharge: HOME | End: 2018-10-18
Payer: COMMERCIAL

## 2018-10-23 LAB — DERMATOLOGY PATHOLOGY REPORT: NORMAL

## 2018-10-25 ENCOUNTER — HOSPITAL ENCOUNTER (OUTPATIENT)
Dept: GENERAL RADIOLOGY | Age: 53
Discharge: HOME | End: 2018-10-27
Payer: COMMERCIAL

## 2018-10-25 ENCOUNTER — TELEPHONE (OUTPATIENT)
Dept: PRIMARY CARE CLINIC | Age: 53
End: 2018-10-25

## 2018-10-25 ENCOUNTER — OFFICE VISIT (OUTPATIENT)
Dept: PRIMARY CARE CLINIC | Age: 53
End: 2018-10-25
Payer: COMMERCIAL

## 2018-10-25 ENCOUNTER — HOSPITAL ENCOUNTER (OUTPATIENT)
Age: 53
Discharge: HOME | End: 2018-10-27
Payer: COMMERCIAL

## 2018-10-25 VITALS
HEART RATE: 66 BPM | DIASTOLIC BLOOD PRESSURE: 60 MMHG | RESPIRATION RATE: 18 BRPM | BODY MASS INDEX: 36.71 KG/M2 | SYSTOLIC BLOOD PRESSURE: 115 MMHG | HEIGHT: 68 IN | WEIGHT: 242.2 LBS

## 2018-10-25 DIAGNOSIS — Z23 NEED FOR PNEUMOCOCCAL VACCINATION: ICD-10-CM

## 2018-10-25 DIAGNOSIS — M25.562 ACUTE PAIN OF LEFT KNEE: Primary | ICD-10-CM

## 2018-10-25 DIAGNOSIS — M17.12 TRICOMPARTMENT OSTEOARTHRITIS OF LEFT KNEE: Primary | ICD-10-CM

## 2018-10-25 DIAGNOSIS — M25.562 ACUTE PAIN OF LEFT KNEE: ICD-10-CM

## 2018-10-25 DIAGNOSIS — M17.0 PRIMARY OSTEOARTHRITIS OF BOTH KNEES: ICD-10-CM

## 2018-10-25 PROCEDURE — 90732 PPSV23 VACC 2 YRS+ SUBQ/IM: CPT | Performed by: NURSE PRACTITIONER

## 2018-10-25 PROCEDURE — 73560 X-RAY EXAM OF KNEE 1 OR 2: CPT

## 2018-10-25 PROCEDURE — 90471 IMMUNIZATION ADMIN: CPT | Performed by: NURSE PRACTITIONER

## 2018-10-25 PROCEDURE — 99214 OFFICE O/P EST MOD 30 MIN: CPT | Performed by: NURSE PRACTITIONER

## 2018-10-25 RX ORDER — OXYBUTYNIN CHLORIDE 10 MG/1
10 TABLET, EXTENDED RELEASE ORAL DAILY
Status: ON HOLD | COMMUNITY
End: 2019-08-02 | Stop reason: SDUPTHER

## 2018-10-25 ASSESSMENT — PATIENT HEALTH QUESTIONNAIRE - PHQ9
2. FEELING DOWN, DEPRESSED OR HOPELESS: 0
1. LITTLE INTEREST OR PLEASURE IN DOING THINGS: 0
SUM OF ALL RESPONSES TO PHQ QUESTIONS 1-9: 0
SUM OF ALL RESPONSES TO PHQ QUESTIONS 1-9: 0
SUM OF ALL RESPONSES TO PHQ9 QUESTIONS 1 & 2: 0

## 2018-10-25 ASSESSMENT — ENCOUNTER SYMPTOMS
SORE THROAT: 0
BLOOD IN STOOL: 0
DIARRHEA: 0
WHEEZING: 0
CONSTIPATION: 0
SHORTNESS OF BREATH: 0
SINUS PRESSURE: 0
TROUBLE SWALLOWING: 0
COUGH: 0
NAUSEA: 0
ABDOMINAL PAIN: 0
VOMITING: 0

## 2018-10-25 NOTE — PROGRESS NOTES
[facility] PRIMARY CARE  [address] [place] [address] [address]  [place] [address]  Dept: [phone]  Dept Fax: [phone]    Alexsander Keith is a 52 y.o. female who presentstoday for her medical conditions/complaints as noted below. Alexsander Keith is c/o of  Chief Complaint   Patient presents with    Knee Pain     severe pain x2 weeks Left knee       HPI:     Here today for worsening left knee pain  Has been bothering her for a few months but the past 2 weeks has significantly worsened  She is forced to go down steps backward due to the pain  She has been using heating pad which helps some and tylenol  Has been trying to walk for exercise and has become very limited  Taking celebrex daily, did use percocet  last night that she had left over from a recent kidney stone and this seemed to help      Knee Pain    There was no injury mechanism. The pain is present in the left knee. The quality of the pain is described as aching and stabbing. The pain is at a severity of 10/10. The pain is severe. The pain has been fluctuating since onset. Pertinent negatives include no inability to bear weight, numbness or tingling. The symptoms are aggravated by weight bearing. She has tried NSAIDs, heat and elevation (compression) for the symptoms. The treatment provided mild relief.        Hemoglobin A1C (%)   Date Value   05/14/2018 5.7   11/02/2016 5.6             ( goal A1C is < 7)   No results found for: LABMICR  LDL Cholesterol (mg/dL)   Date Value   05/14/2018 05/19/2017 78   11/02/2016 135 (H)       (goal LDL is <100)   AST (U/L)   Date Value   11/19/2017 21     ALT (U/L)   Date Value   11/19/2017 14     BUN (mg/dL)   Date Value   09/14/2018 14     BP Readings from Last 3 Encounters:   10/25/18 115/60   10/10/18 118/78   10/10/18 112/68          ([id number])    Past Medical History:   Diagnosis Date    Arthritis     Asthma     CKD (chronic kidney disease) stage 1, GFR 90 ml/min or

## 2018-10-29 DIAGNOSIS — M25.562 LEFT KNEE PAIN, UNSPECIFIED CHRONICITY: Primary | ICD-10-CM

## 2018-10-31 ENCOUNTER — OFFICE VISIT (OUTPATIENT)
Dept: ORTHOPEDIC SURGERY | Age: 53
End: 2018-10-31
Payer: COMMERCIAL

## 2018-10-31 VITALS — HEIGHT: 68 IN | WEIGHT: 242 LBS | BODY MASS INDEX: 36.68 KG/M2

## 2018-10-31 DIAGNOSIS — M17.12 ARTHRITIS OF LEFT KNEE: Primary | ICD-10-CM

## 2018-10-31 PROCEDURE — 20610 DRAIN/INJ JOINT/BURSA W/O US: CPT | Performed by: ORTHOPAEDIC SURGERY

## 2018-10-31 PROCEDURE — 99243 OFF/OP CNSLTJ NEW/EST LOW 30: CPT | Performed by: ORTHOPAEDIC SURGERY

## 2018-10-31 RX ORDER — BUPIVACAINE HYDROCHLORIDE 2.5 MG/ML
2 INJECTION, SOLUTION INFILTRATION; PERINEURAL ONCE
Status: COMPLETED | OUTPATIENT
Start: 2018-10-31 | End: 2018-11-02

## 2018-10-31 RX ORDER — METHYLPREDNISOLONE ACETATE 80 MG/ML
80 INJECTION, SUSPENSION INTRA-ARTICULAR; INTRALESIONAL; INTRAMUSCULAR; SOFT TISSUE ONCE
Status: COMPLETED | OUTPATIENT
Start: 2018-10-31 | End: 2018-11-02

## 2018-10-31 ASSESSMENT — ENCOUNTER SYMPTOMS
COUGH: 0
CONSTIPATION: 0
NAUSEA: 0
DIARRHEA: 0

## 2018-10-31 NOTE — PROGRESS NOTES
[facility]  [facility]  [address] [place] [address] [address] [place] [id number]  Dept: [phone]    Ambulatory Orthopedic Consult      CHIEF COMPLAINT:    Chief Complaint   Patient presents with    Knee Pain     left       HISTORY OF PRESENT ILLNESS:      The patient is a 52 y.o. female who is being seen at the request of  [facility] for consultation and evaluation of  Left knee pain. Fady Gee  presents for left knee pain that has been present for  1 years. The patient does not recall a specific injury. Patient states that she started to walk more for weight loss and noticed increase pain to the knee. The pain improves with rest.  The pain worsens with  stair climbing and arising from a seated position. Instability is not noted. The patient has not had a previous corticosteroid injection. The patient has not had previous physical therapy for this problem. The patient has tried oral NSAIDs for this problem previously. Patient takes Celebrex daily. She also uses a Aspercream daily. 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        Past Medical History:    Past Medical History:   Diagnosis Date    Arthritis     Asthma     CKD (chronic kidney disease) stage 1, GFR 90 ml/min or greater     Cystinuria ([address]     Flank pain     Hypertension     resolved    Hyperuricemia     Hypothyroidism     Kidney stone     pt states passed one stone today,possibly surgery to be delayed    Kidney stones     Proteinuria     Snores     Wears glasses        Past Surgical History:    Past Surgical History:   Procedure Laterality Date    CYSTO/URETERO/PYELOSCOPY, CALCULUS TX Right 11/21/2017    HOLMIUM LASER - CYSTOSCOPY, RIGHT URETEROSCOPY, RIGHT STENT EXCHANGE,STONE BASKETING performed by Susi Flannery MD at [address] [place] [address] Right 11/18/2017    CYSTOSCOPY URETERAL STENT INSERTION,RIGHT performed by

## 2018-11-02 RX ADMIN — METHYLPREDNISOLONE ACETATE 80 MG: 80 INJECTION, SUSPENSION INTRA-ARTICULAR; INTRALESIONAL; INTRAMUSCULAR; SOFT TISSUE at 09:52

## 2018-11-02 RX ADMIN — BUPIVACAINE HYDROCHLORIDE 5 MG: 2.5 INJECTION, SOLUTION INFILTRATION; PERINEURAL at 09:52

## 2018-11-29 ENCOUNTER — HOSPITAL ENCOUNTER (OUTPATIENT)
Dept: ULTRASOUND IMAGING | Age: 53
Discharge: HOME | End: 2018-12-01
Payer: COMMERCIAL

## 2018-11-29 DIAGNOSIS — N20.0 KIDNEY STONES: ICD-10-CM

## 2018-11-29 DIAGNOSIS — N20.0 NEPHROLITHIASIS: ICD-10-CM

## 2018-11-29 PROCEDURE — 76770 US EXAM ABDO BACK WALL COMP: CPT

## 2018-12-07 ENCOUNTER — HOSPITAL ENCOUNTER (OUTPATIENT)
Age: 53
Discharge: HOME | End: 2018-12-09
Payer: COMMERCIAL

## 2018-12-07 ENCOUNTER — HOSPITAL ENCOUNTER (OUTPATIENT)
Age: 53
Discharge: HOME | End: 2018-12-07
Payer: COMMERCIAL

## 2018-12-07 ENCOUNTER — HOSPITAL ENCOUNTER (OUTPATIENT)
Dept: GENERAL RADIOLOGY | Age: 53
Discharge: HOME | End: 2018-12-09
Payer: COMMERCIAL

## 2018-12-07 DIAGNOSIS — R10.84 ABDOMINAL PAIN, GENERALIZED: ICD-10-CM

## 2018-12-07 LAB
CALCIUM SERPL-MCNC: 9.6 MG/DL (ref 8.6–10.4)
PTH INTACT: 55.49 PG/ML (ref 15–65)
URIC ACID: 8.1 MG/DL (ref 2.4–5.7)

## 2018-12-07 PROCEDURE — 74018 RADEX ABDOMEN 1 VIEW: CPT

## 2018-12-07 PROCEDURE — 84550 ASSAY OF BLOOD/URIC ACID: CPT

## 2018-12-07 PROCEDURE — 83970 ASSAY OF PARATHORMONE: CPT

## 2018-12-07 PROCEDURE — 36415 COLL VENOUS BLD VENIPUNCTURE: CPT

## 2018-12-07 PROCEDURE — 82310 ASSAY OF CALCIUM: CPT

## 2019-01-07 NOTE — ANESTHESIA PRE PROCEDURE
Department of Anesthesiology  Preprocedure Note       Name:  Bhavana Reyes   Age:  45 y.o.  :  1965                                          MRN:  [id number]         Date:  2017      Surgeon: Joanie Caro):  Tennille Arizmendi MD    Procedure: Procedure(s):  HOLMIUM LASER - CYSTOSCOPY, URETEROSCOPY, LITHOTRIPSY, POSSIBLE STENT PLACEMENT    Medications prior to admission:   Prior to Admission medications    Medication Sig Start Date End Date Taking? Authorizing Provider   HYDROcodone-acetaminophen (NORCO) 5-325 MG per tablet Take 2 tablets by mouth every 6 hours as needed for Pain . 17  Yes Ismael Landaverde MD   oxybutynin (DITROPAN XL) 10 MG extended release tablet Take 1 tablet by mouth daily 17  Yes Ismael Landaverde MD   tamsulosin [facility]) 0.4 MG capsule Take 1 capsule by mouth daily 17  Yes Ismael Landaverde MD   celecoxib (CELEBREX) 100 MG capsule Take 1 capsule by mouth 2 times daily 17  Yes Nazia Serrano CNP   hydrochlorothiazide (HYDRODIURIL) 50 MG tablet Take 1 tablet by mouth daily 17  Yes Nazia Serrano CNP   Triprolidine-Pseudoephedrine (ANTIHISTAMINE PO) Take by mouth   Yes Historical Provider, MD   omeprazole (PRILOSEC) 20 MG delayed release capsule Take 1 capsule by mouth 2 times daily 16  Yes Nazia Serrano CNP   solifenacin (VESICARE) 10 MG tablet Take 1 tablet by mouth as needed. 14  Yes Rahel Villa CNP   acetaminophen (TYLENOL) 500 MG tablet Take 1,000 mg by mouth every 4 hours as needed for Pain. Yes Historical Provider, MD   ibuprofen (ADVIL;MOTRIN) 600 MG tablet Take 1 tablet by mouth every 6 hours as needed for Pain.  14  Yes Drew Toledo MD   docusate sodium (COLACE) 100 MG capsule Take 1 capsule by mouth 2 times daily as needed for Constipation 17   Ismael Landaverde MD   mometasone (ASMANEX 30 METERED DOSES) 220 MCG/INH inhaler Inhale 1 puff into the lungs daily 16   Ely Mejia MD   fluticasone (FLONASE) 50 MCG/ACT nasal spray 1 spray by Nasal route daily 9/30/16   Mane Lubin MD   albuterol sulfate HFA (PROAIR HFA) 108 (90 BASE) MCG/ACT inhaler Inhale 2 puffs into the lungs every 6 hours as needed for Wheezing 2/5/16   Stanley Huffman CNP   levothyroxine (SYNTHROID) 50 MCG tablet Take 1 tablet by mouth daily. 12/23/14   Rahel Villa CNP   Omega-3 Fatty Acids (OMEGA 3 PO) Take 1 tablet by mouth daily     Historical Provider, MD   Pot & Sod Cit-Cit Ac (POLYCITRA PO) Take 30 mg by mouth as needed. Historical Provider, MD   solifenacin (VESICARE) 10 MG tablet Take 10 mg by mouth as needed.   12/23/14  Historical Provider, MD       Current medications:    Current Facility-Administered Medications   Medication Dose Route Frequency Provider Last Rate Last Dose    [MAR Hold] HYDROmorphone (DILAUDID) injection 0.5 mg  0.5 mg Intravenous Q3H PRN Chapin Sparks MD        [facility] Hold] levothyroxine (SYNTHROID) tablet 50 mcg  50 mcg Oral Daily Khai Salari        [MAR Hold] albuterol sulfate  (90 Base) MCG/ACT inhaler 2 puff  2 puff Inhalation Q6H PRN Khai Salari        [MAR Hold] fluticasone (FLONASE) 50 MCG/ACT nasal spray 1 spray  1 spray Nasal Daily Khai Salari   1 spray at 11/20/17 0942    [MAR Hold] pantoprazole (PROTONIX) tablet 40 mg  40 mg Oral QAM AC Khai Salari   40 mg at 11/20/17 0939    [MAR Hold] beclomethasone (QVAR) 40 MCG/ACT inhaler 1 puff  1 puff Inhalation BID Khai Salari   1 puff at 11/21/17 0759    [MAR Hold] hydrochlorothiazide (HYDRODIURIL) tablet 50 mg  50 mg Oral Daily Khai Salari   50 mg at 11/21/17 0830    [MAR Hold] celecoxib (CELEBREX) capsule 100 mg  100 mg Oral Daily Khai Salari   100 mg at 11/20/17 0939    [MAR Hold] docusate sodium (COLACE) capsule 100 mg  100 mg Oral BID PRN Khai Salari   100 mg at 11/20/17 1905    [MAR Hold] oxybutynin (DITROPAN-XL) extended release tablet 10 mg  10 mg Oral Daily Khai Ramírez   10 mg at 11/21/17 0830    [facility] 11/21/2017    GLUCOSE 88 03/22/2012    PROT 7.2 11/19/2017    CALCIUM 8.4 11/21/2017    BILITOT 0.35 11/19/2017    ALKPHOS 97 11/19/2017    AST 21 11/19/2017    ALT 14 11/19/2017       POC Tests: No results for input(s): POCGLU, POCNA, POCK, POCCL, POCBUN, POCHEMO, POCHCT in the last 72 hours. Coags:   Lab Results   Component Value Date    PROTIME 10.2 11/19/2017    INR 0.9 11/19/2017    APTT 21.2 11/19/2017       HCG (If Applicable): No results found for: PREGTESTUR, PREGSERUM, HCG, HCGQUANT     ABGs: No results found for: PHART, PO2ART, QKF4EUK, HKW6DJQ, BEART, B8PBNRUZ     Type & Screen (If Applicable):  No results found for: LABABO, [address]    Anesthesia Evaluation   Patient summary reviewed and Nursing notes reviewed no history of anesthetic complications:   Airway: Mallampati:  III   TM distance: >3 FB    Neck ROM: full   Mouth opening: > = 3 FB Dental:    (+) caps  Comment: Multiple caps, all intact    Pulmonary:normal exam     (+) sleep apnea:                             Cardiovascular:    (+)  hypertension:,                   Neuro/Psych:   Negative Neuro/Psych ROS              GI/Hepatic/Renal:   (+)  GERD:,           Endo/Other:    (+)  hypothyroidism::., .                 Abdominal:   (+)  obese,         Vascular: negative vascular ROS. Anesthesia Plan      MAC and general     ASA 3        Induction: intravenous. MIPS:  Postoperative opioids intended. Anesthetic plan and risks discussed with patient. Plan discussed with CRNA.                   Maria G Ho MD   11/21/2017 stable, improved Improved

## 2019-01-08 DIAGNOSIS — I10 ESSENTIAL HYPERTENSION: ICD-10-CM

## 2019-01-09 RX ORDER — HYDROCHLOROTHIAZIDE 50 MG/1
TABLET ORAL
Qty: 90 TABLET | Refills: 1 | Status: SHIPPED | OUTPATIENT
Start: 2019-01-09 | End: 2019-05-01

## 2019-01-17 ENCOUNTER — OFFICE VISIT (OUTPATIENT)
Dept: PRIMARY CARE CLINIC | Age: 54
End: 2019-01-17
Payer: COMMERCIAL

## 2019-01-17 VITALS
RESPIRATION RATE: 18 BRPM | HEIGHT: 68 IN | TEMPERATURE: 98 F | HEART RATE: 88 BPM | SYSTOLIC BLOOD PRESSURE: 138 MMHG | DIASTOLIC BLOOD PRESSURE: 88 MMHG | BODY MASS INDEX: 37.28 KG/M2 | WEIGHT: 246 LBS

## 2019-01-17 DIAGNOSIS — J01.40 ACUTE NON-RECURRENT PANSINUSITIS: Primary | ICD-10-CM

## 2019-01-17 PROCEDURE — 99213 OFFICE O/P EST LOW 20 MIN: CPT | Performed by: NURSE PRACTITIONER

## 2019-01-17 RX ORDER — AMOXICILLIN AND CLAVULANATE POTASSIUM 875; 125 MG/1; MG/1
1 TABLET, FILM COATED ORAL 2 TIMES DAILY
Qty: 20 TABLET | Refills: 0 | Status: SHIPPED | OUTPATIENT
Start: 2019-01-17 | End: 2019-01-27

## 2019-01-17 ASSESSMENT — ENCOUNTER SYMPTOMS
BLOOD IN STOOL: 0
SHORTNESS OF BREATH: 0
CHEST TIGHTNESS: 0
WHEEZING: 0
VOICE CHANGE: 1
DIARRHEA: 0
SORE THROAT: 0
CONSTIPATION: 0
VOMITING: 0
SINUS PAIN: 1
COUGH: 1
ABDOMINAL PAIN: 0
RHINORRHEA: 0
SINUS PRESSURE: 1
HOARSE VOICE: 1
NAUSEA: 0
TROUBLE SWALLOWING: 0

## 2019-02-11 RX ORDER — FLUCONAZOLE 150 MG/1
150 TABLET ORAL ONCE
Qty: 1 TABLET | Refills: 0 | Status: SHIPPED | OUTPATIENT
Start: 2019-02-11 | End: 2019-02-11

## 2019-02-19 ENCOUNTER — TELEPHONE (OUTPATIENT)
Dept: PRIMARY CARE CLINIC | Age: 54
End: 2019-02-19

## 2019-02-19 DIAGNOSIS — I10 ESSENTIAL HYPERTENSION: Primary | ICD-10-CM

## 2019-02-19 RX ORDER — LISINOPRIL 10 MG/1
10 TABLET ORAL DAILY
Qty: 30 TABLET | Refills: 3 | Status: SHIPPED | OUTPATIENT
Start: 2019-02-19 | End: 2019-05-01

## 2019-03-18 ENCOUNTER — OFFICE VISIT (OUTPATIENT)
Dept: PRIMARY CARE CLINIC | Age: 54
End: 2019-03-18
Payer: COMMERCIAL

## 2019-03-18 VITALS
SYSTOLIC BLOOD PRESSURE: 139 MMHG | HEART RATE: 80 BPM | BODY MASS INDEX: 38.95 KG/M2 | WEIGHT: 257 LBS | DIASTOLIC BLOOD PRESSURE: 76 MMHG | RESPIRATION RATE: 18 BRPM | HEIGHT: 68 IN

## 2019-03-18 DIAGNOSIS — I10 ESSENTIAL HYPERTENSION: Primary | ICD-10-CM

## 2019-03-18 PROCEDURE — 99214 OFFICE O/P EST MOD 30 MIN: CPT | Performed by: NURSE PRACTITIONER

## 2019-03-18 RX ORDER — IRBESARTAN AND HYDROCHLOROTHIAZIDE 150; 12.5 MG/1; MG/1
1 TABLET, FILM COATED ORAL DAILY
Qty: 30 TABLET | Refills: 2 | Status: SHIPPED | OUTPATIENT
Start: 2019-03-18 | End: 2019-05-01 | Stop reason: SDUPTHER

## 2019-03-18 ASSESSMENT — PATIENT HEALTH QUESTIONNAIRE - PHQ9
SUM OF ALL RESPONSES TO PHQ QUESTIONS 1-9: 0
SUM OF ALL RESPONSES TO PHQ9 QUESTIONS 1 & 2: 0
SUM OF ALL RESPONSES TO PHQ QUESTIONS 1-9: 0
2. FEELING DOWN, DEPRESSED OR HOPELESS: 0
1. LITTLE INTEREST OR PLEASURE IN DOING THINGS: 0

## 2019-03-18 ASSESSMENT — ENCOUNTER SYMPTOMS
SHORTNESS OF BREATH: 0
COUGH: 0
SORE THROAT: 0
SINUS PRESSURE: 0
BLOOD IN STOOL: 0
ABDOMINAL PAIN: 0
DIARRHEA: 0
CONSTIPATION: 0
TROUBLE SWALLOWING: 0
VOMITING: 0
NAUSEA: 0
WHEEZING: 0

## 2019-03-27 ENCOUNTER — TELEPHONE (OUTPATIENT)
Dept: PRIMARY CARE CLINIC | Age: 54
End: 2019-03-27

## 2019-05-01 ENCOUNTER — OFFICE VISIT (OUTPATIENT)
Dept: PRIMARY CARE CLINIC | Age: 54
End: 2019-05-01
Payer: COMMERCIAL

## 2019-05-01 VITALS
OXYGEN SATURATION: 97 % | DIASTOLIC BLOOD PRESSURE: 78 MMHG | HEIGHT: 68 IN | WEIGHT: 261 LBS | SYSTOLIC BLOOD PRESSURE: 134 MMHG | BODY MASS INDEX: 39.56 KG/M2 | HEART RATE: 80 BPM

## 2019-05-01 DIAGNOSIS — I10 ESSENTIAL HYPERTENSION: ICD-10-CM

## 2019-05-01 DIAGNOSIS — I10 ESSENTIAL HYPERTENSION: Primary | ICD-10-CM

## 2019-05-01 DIAGNOSIS — R53.82 CHRONIC FATIGUE: ICD-10-CM

## 2019-05-01 DIAGNOSIS — E66.01 CLASS 2 SEVERE OBESITY WITH SERIOUS COMORBIDITY AND BODY MASS INDEX (BMI) OF 39.0 TO 39.9 IN ADULT, UNSPECIFIED OBESITY TYPE (HCC): ICD-10-CM

## 2019-05-01 PROBLEM — E66.812 CLASS 2 SEVERE OBESITY WITH SERIOUS COMORBIDITY AND BODY MASS INDEX (BMI) OF 39.0 TO 39.9 IN ADULT: Status: ACTIVE | Noted: 2019-05-01

## 2019-05-01 PROCEDURE — 99213 OFFICE O/P EST LOW 20 MIN: CPT | Performed by: INTERNAL MEDICINE

## 2019-05-01 RX ORDER — HYDROCHLOROTHIAZIDE 12.5 MG/1
12.5 CAPSULE, GELATIN COATED ORAL EVERY MORNING
Qty: 60 CAPSULE | Refills: 1 | Status: CANCELLED | OUTPATIENT
Start: 2019-05-01

## 2019-05-01 RX ORDER — HYDROCHLOROTHIAZIDE 12.5 MG/1
12.5 CAPSULE, GELATIN COATED ORAL EVERY MORNING
Qty: 60 CAPSULE | Refills: 1 | Status: SHIPPED | OUTPATIENT
Start: 2019-05-01 | End: 2019-05-01 | Stop reason: SDUPTHER

## 2019-05-01 RX ORDER — HYDROCHLOROTHIAZIDE 12.5 MG/1
12.5 CAPSULE, GELATIN COATED ORAL EVERY MORNING
Qty: 60 CAPSULE | Refills: 1 | Status: SHIPPED | OUTPATIENT
Start: 2019-05-01 | End: 2019-06-03 | Stop reason: ALTCHOICE

## 2019-05-01 RX ORDER — IRBESARTAN AND HYDROCHLOROTHIAZIDE 150; 12.5 MG/1; MG/1
2 TABLET, FILM COATED ORAL DAILY
Qty: 60 TABLET | Refills: 1 | Status: SHIPPED | OUTPATIENT
Start: 2019-05-01 | End: 2019-06-03

## 2019-05-01 RX ORDER — IRBESARTAN AND HYDROCHLOROTHIAZIDE 150; 12.5 MG/1; MG/1
2 TABLET, FILM COATED ORAL DAILY
Qty: 60 TABLET | Refills: 1 | Status: SHIPPED | OUTPATIENT
Start: 2019-05-01 | End: 2019-05-01 | Stop reason: SDUPTHER

## 2019-05-01 RX ORDER — IRBESARTAN AND HYDROCHLOROTHIAZIDE 150; 12.5 MG/1; MG/1
2 TABLET, FILM COATED ORAL DAILY
Qty: 60 TABLET | Refills: 1 | Status: CANCELLED | OUTPATIENT
Start: 2019-05-01

## 2019-05-02 ASSESSMENT — ENCOUNTER SYMPTOMS
DIARRHEA: 0
COUGH: 0
CONSTIPATION: 0
SINUS PRESSURE: 0
SINUS PAIN: 0
SHORTNESS OF BREATH: 0
ABDOMINAL DISTENTION: 0
NAUSEA: 0
BACK PAIN: 0
WHEEZING: 0
ABDOMINAL PAIN: 0
VOMITING: 0

## 2019-05-03 NOTE — PROGRESS NOTES
CYSTOSCOPY Right 11/18/2017    CYSTOSCOPY URETERAL STENT INSERTION,RIGHT performed by Alexander Sandoval MD at [address] Right 07/10/2018     CYSTOSCOPY, RIGHT URETEROSCOPY, HOLMIUM LASER LITHO WITH STONE BASKETING, RIGHT STENT EXCHANGE     CYSTOURETHROSCOPY Right 10/10/2018    HOLMIUM LASER STANDBY, CYSTO, RIGHT URETEROSCOPY, RIGHT STENT PLACEMENT     KIDNEY STONE SURGERY      x2    KNEE ARTHROSCOPY Right     KNEE SURGERY Right 1998    [place] CYSTO[place]URETERO/[place]ELOS[place], [place] N/A 7/10/2018    CYSTOSCOPY, RIGHT URETEROSCOPY, HOLMIUM LASER LITHO WITH STONE BASKETING, RIGHT STENT EXCHANGE performed by Akua Carl., MD at [address] [facility], [place] Right 10/10/2018    HOLMIUM LASER STANDBY, CYSTO, RIGHT URETEROSCOPY, RIGHT STENT PLACEMENT performed by Caleb Roberson MD at [address] [facility], Right 7/4/2018    CYSTOSCOPY URETERAL STENT INSERTION, RIGHT performed by Kelvin Rendon MD at [address] [facility]    left       Family History   Problem Relation Age of Onset    High Blood Pressure Mother     Hypertension Mother    Bam  Emphysema Mother     Diabetes Father     Cancer Father     Heart Attack Father     Hypertension Father     Heart Disease Father     Hypertension Sister     Heart Disease Maternal Grandmother     Prostate Cancer Maternal Grandfather     Heart Disease Paternal Grandfather     Stroke Paternal Grandfather        Social History     Tobacco Use    Smoking status: Never Smoker    Smokeless tobacco: Never Used   Substance Use Topics    Alcohol use:  Yes     Alcohol/week: 0.0 oz     Comment: rare      Current Outpatient Medications   Medication Sig Dispense Refill    irbesartan-hydrochlorothiazide (AVALIDE) 150-12.5 MG per tablet Take 2 tablets by mouth daily 60 tablet 1    hydrochlorothiazide (MICROZIDE) 12.5 MG capsule Take 1 capsule by mouth every morning 60 capsule 1    oxybutynin (DITROPAN-XL) 10 Cardiovascular: Negative for chest pain and palpitations. Gastrointestinal: Negative for abdominal distention, abdominal pain, constipation, diarrhea, nausea and vomiting. Endocrine: Negative for cold intolerance, heat intolerance, polydipsia, polyphagia and polyuria. Genitourinary: Negative for difficulty urinating, menstrual problem, vaginal bleeding and vaginal discharge. Musculoskeletal: Negative for back pain and joint swelling. Skin: Negative for rash. Neurological: Negative for numbness and headaches. Psychiatric/Behavioral: Negative for sleep disturbance. The patient is not nervous/anxious. All other systems reviewed and are negative. Objective:     Physical Exam   Constitutional: She is oriented to person, place, and time. She appears well-developed and well-nourished. She is active. No distress. HENT:   Head: Normocephalic and atraumatic. Right Ear: Hearing normal.   Left Ear: Hearing normal.   Mouth/Throat: Uvula is midline, oropharynx is clear and moist and mucous membranes are normal.   Eyes: Pupils are equal, round, and reactive to light. Conjunctivae are normal. No scleral icterus. Neck: Normal range of motion, full passive range of motion without pain and phonation normal. Neck supple. No JVD present. No thyroid mass and no thyromegaly present. Cardiovascular: Normal rate, regular rhythm, normal heart sounds and intact distal pulses. Exam reveals no decreased pulses. No murmur heard. Pulses:       Carotid pulses are 2+ on the right side, and 2+ on the left side. Radial pulses are 2+ on the right side, and 2+ on the left side. Pulmonary/Chest: Effort normal and breath sounds normal. No accessory muscle usage. No apnea. No respiratory distress. She has no wheezes. She has no rales. Abdominal: Soft. Bowel sounds are normal. She exhibits no distension. There is no tenderness. Musculoskeletal: Normal range of motion. She exhibits no edema or deformity. Standing Status:   Future     Standing Expiration Date:   5/1/2020     Orders Placed This Encounter   Medications    DISCONTD: irbesartan-hydrochlorothiazide (AVALIDE) 150-12.5 MG per tablet     Sig: Take 2 tablets by mouth daily     Dispense:  60 tablet     Refill:  1    DISCONTD: hydrochlorothiazide (MICROZIDE) 12.5 MG capsule     Sig: Take 1 capsule by mouth every morning     Dispense:  60 capsule     Refill:  1    irbesartan-hydrochlorothiazide (AVALIDE) 150-12.5 MG per tablet     Sig: Take 2 tablets by mouth daily     Dispense:  60 tablet     Refill:  1    hydrochlorothiazide (MICROZIDE) 12.5 MG capsule     Sig: Take 1 capsule by mouth every morning     Dispense:  60 capsule     Refill:  1         Patient given educational materials - see patient instructions. Discussed use, benefit, and side effects of prescribedmedications. All patient questions answered. Pt voiced understanding. Reviewed health maintenance. Instructed to continue current medications, diet and exercise. Patient agreed with treatment plan. Follow up as directed. Of the given duration appointment visit, Dr. Emma Mack MD  spent at least 50% of the face-to-face time in counseling, explanation of diagnosis, planning of further management, and answering all questions. Electronically signed by Emma Mack MD on 5/2/2019 at 11:08 PM      Please note that this chart was generated using voice recognition Dragon dictation software. Although every effort was made to ensure the accuracy of this automatedtranscription, some errors in transcription may have occurred.

## 2019-05-29 ENCOUNTER — HOSPITAL ENCOUNTER (OUTPATIENT)
Dept: GENERAL RADIOLOGY | Age: 54
Discharge: HOME | End: 2019-05-31
Payer: COMMERCIAL

## 2019-05-29 ENCOUNTER — HOSPITAL ENCOUNTER (OUTPATIENT)
Age: 54
Discharge: HOME | End: 2019-05-31
Payer: COMMERCIAL

## 2019-05-29 DIAGNOSIS — M19.049 ARTHRITIS OF HAND: ICD-10-CM

## 2019-05-29 DIAGNOSIS — M19.049 ARTHRITIS OF HAND: Primary | ICD-10-CM

## 2019-05-29 PROCEDURE — 73130 X-RAY EXAM OF HAND: CPT

## 2019-06-03 ENCOUNTER — OFFICE VISIT (OUTPATIENT)
Dept: PRIMARY CARE CLINIC | Age: 54
End: 2019-06-03
Payer: COMMERCIAL

## 2019-06-03 VITALS
BODY MASS INDEX: 39.1 KG/M2 | SYSTOLIC BLOOD PRESSURE: 148 MMHG | HEART RATE: 80 BPM | RESPIRATION RATE: 18 BRPM | HEIGHT: 68 IN | DIASTOLIC BLOOD PRESSURE: 90 MMHG | WEIGHT: 258 LBS

## 2019-06-03 DIAGNOSIS — J45.20 MILD INTERMITTENT ASTHMA WITHOUT COMPLICATION: ICD-10-CM

## 2019-06-03 DIAGNOSIS — I10 ESSENTIAL HYPERTENSION: Primary | ICD-10-CM

## 2019-06-03 DIAGNOSIS — M72.2 PLANTAR FASCIITIS, RIGHT: ICD-10-CM

## 2019-06-03 DIAGNOSIS — G56.01 CARPAL TUNNEL SYNDROME OF RIGHT WRIST: ICD-10-CM

## 2019-06-03 DIAGNOSIS — J30.2 SEASONAL ALLERGIES: ICD-10-CM

## 2019-06-03 DIAGNOSIS — E03.9 ACQUIRED HYPOTHYROIDISM: ICD-10-CM

## 2019-06-03 DIAGNOSIS — M65.311 TRIGGER THUMB, RIGHT THUMB: ICD-10-CM

## 2019-06-03 DIAGNOSIS — R53.83 FATIGUE, UNSPECIFIED TYPE: ICD-10-CM

## 2019-06-03 PROCEDURE — 99214 OFFICE O/P EST MOD 30 MIN: CPT | Performed by: NURSE PRACTITIONER

## 2019-06-03 RX ORDER — ALBUTEROL SULFATE 90 UG/1
2 AEROSOL, METERED RESPIRATORY (INHALATION) EVERY 6 HOURS PRN
Qty: 1 INHALER | Refills: 5 | Status: SHIPPED | OUTPATIENT
Start: 2019-06-03 | End: 2021-10-28 | Stop reason: SDUPTHER

## 2019-06-03 RX ORDER — PREDNISONE 50 MG/1
50 TABLET ORAL DAILY
Qty: 7 TABLET | Refills: 0 | Status: SHIPPED | OUTPATIENT
Start: 2019-06-03 | End: 2019-06-10

## 2019-06-03 RX ORDER — IRBESARTAN AND HYDROCHLOROTHIAZIDE 300; 12.5 MG/1; MG/1
1 TABLET, FILM COATED ORAL DAILY
Qty: 30 TABLET | Refills: 3 | Status: SHIPPED | OUTPATIENT
Start: 2019-06-03 | End: 2019-08-06 | Stop reason: SINTOL

## 2019-06-03 RX ORDER — FLUTICASONE PROPIONATE 50 MCG
1 SPRAY, SUSPENSION (ML) NASAL DAILY PRN
Qty: 1 BOTTLE | Refills: 5 | Status: SHIPPED | OUTPATIENT
Start: 2019-06-03 | End: 2019-09-25

## 2019-06-03 RX ORDER — MELOXICAM 15 MG/1
15 TABLET ORAL DAILY
Qty: 90 TABLET | Refills: 1 | Status: SHIPPED | OUTPATIENT
Start: 2019-06-03 | End: 2019-11-20 | Stop reason: SDUPTHER

## 2019-06-03 ASSESSMENT — ENCOUNTER SYMPTOMS
NAUSEA: 0
SORE THROAT: 0
BLOOD IN STOOL: 0
SHORTNESS OF BREATH: 0
SINUS PRESSURE: 0
VOMITING: 0
DIARRHEA: 0
WHEEZING: 0
TROUBLE SWALLOWING: 0
ABDOMINAL PAIN: 0
CONSTIPATION: 0
COUGH: 0

## 2019-06-03 NOTE — PROGRESS NOTES
Chronic Disease Visit Information    BP Readings from Last 3 Encounters:   05/01/19 134/78   03/18/19 139/76   01/17/19 138/88          Hemoglobin A1C (%)   Date Value   05/14/2018 5.7   11/02/2016 5.6     LDL Cholesterol (mg/dL)   Date Value   05/14/2018          HDL (mg/dL)   Date Value   05/14/2018 39 (L)     BUN (mg/dL)   Date Value   09/14/2018 14     CREATININE (mg/dL)   Date Value   09/14/2018 0.81     Glucose (mg/dL)   Date Value   09/14/2018 98   03/22/2012 88            Have you changed or started any medications since your last visit including any over-the-counter medicines, vitamins, or herbal medicines? no   Are you having any side effects from any of your medications? -  no  Have you stopped taking any of your medications? Is so, why? -  no    Have you seen any other physician or provider since your last visit? No  Have you had any other diagnostic tests since your last visit? No  Have you been seen in the emergency room and/or had an admission to a hospital since we last saw you? No  Have you had your annual diabetic retinal (eye) exam? No  Have you had your routine dental cleaning in the past 6 months? yes - March    Have you activated your [employer] account? If not, what are your barriers?  Yes     Patient Care Team:  MIGDALIA Acosta CNP as PCP - General (Family Nurse Practitioner)  MIGDALIA Acosta CNP as PCP - [facility]  Shanelle Harrington MD as Consulting Physician (Urology)  Jennifer Vazquez MD as Consulting Physician  Brenda Rock MD as Consulting Physician (Pulmonology)         Medical History Review  Past Medical, Family, and Social History reviewed and does contribute to the patient presenting condition    Health Maintenance   Topic Date Due    Shingles Vaccine (1 of 2) 03/02/2013    Colon cancer screen colonoscopy  03/02/2013    Cervical cancer screen  12/11/2017    TSH testing  05/19/2018    A1C test (Diabetic or Prediabetic)  05/14/2019    DTaP/Tdap/Td vaccine (1 - Tdap) 10/15/2023 (Originally 3/2/1982)    Potassium monitoring  09/14/2019    Creatinine monitoring  09/14/2019    Breast cancer screen  12/04/2019    Lipid screen  05/14/2023    Flu vaccine  Completed    Pneumococcal 0-64 years Vaccine  Completed    Hepatitis C screen  Completed    HIV screen  Completed

## 2019-06-03 NOTE — PROGRESS NOTES
oriented to person, place, and time. She appears well-developed and well-nourished. HENT:   Head: Normocephalic. Eyes: Pupils are equal, round, and reactive to light. Conjunctivae and EOM are normal.   Neck: Normal range of motion. Cardiovascular: Normal rate, regular rhythm, normal heart sounds and intact distal pulses. No murmur heard. Pulmonary/Chest: Effort normal and breath sounds normal. She has no wheezes. Abdominal: Soft. Bowel sounds are normal. She exhibits no distension. Musculoskeletal: Normal range of motion. Neurological: She is alert and oriented to person, place, and time. Skin: Skin is warm and dry. Psychiatric: She has a normal mood and affect. Her behavior is normal. Judgment and thought content normal.     BP (!) 148/90 (Site: Left Upper Arm, Position: Sitting, Cuff Size: Large Adult)   Pulse 80   Resp 18   Ht 5' 8\" (1.727 m)   Wt 258 lb (117 kg)   BMI 39.23 kg/m²     Assessment:       Diagnosis Orders   1. Essential hypertension  irbesartan-hydrochlorothiazide (AVALIDE) 300-12.5 MG per tablet    TSH without Reflex   2. Acquired hypothyroidism     3. Carpal tunnel syndrome of right wrist  Mendoza England MD, Orthopedic Surgery, [place]    meloxicam (MOBIC) 15 MG tablet   4. Trigger thumb, right thumb  Mendoza England MD, Orthopedic Surgery, [place]    meloxicam (MOBIC) 15 MG tablet    predniSONE (DELTASONE) 50 MG tablet   5. Mild intermittent asthma without complication  albuterol sulfate HFA (PROAIR HFA) 108 (90 Base) MCG/ACT inhaler   6. Seasonal allergies  fluticasone (FLONASE) 50 MCG/ACT nasal spray   7. Plantar fasciitis, right  meloxicam (MOBIC) 15 MG tablet    predniSONE (DELTASONE) 50 MG tablet   8. Fatigue, unspecified type  TSH without Reflex             Plan:      Return in about 1 month (around 7/1/2019) for hypertension check.     Orders Placed This Encounter   Procedures    TSH without Reflex     Standing Status:   Future     Standing Expiration Date: 6/3/2020   Isma Pepe MD, Orthopedic Surgery, [place]     Referral Priority:   Routine     Referral Type:   Eval and Treat     Referral Reason:   Specialty Services Required     Referred to Provider:   Toby Dowling MD     Requested Specialty:   Orthopedic Surgery     Number of Visits Requested:   1        Orders Placed This Encounter   Medications    albuterol sulfate HFA (PROAIR HFA) 108 (90 Base) MCG/ACT inhaler     Sig: Inhale 2 puffs into the lungs every 6 hours as needed for Wheezing     Dispense:  1 Inhaler     Refill:  5    fluticasone (FLONASE) 50 MCG/ACT nasal spray     Si spray by Nasal route daily as needed for Allergies     Dispense:  1 Bottle     Refill:  5    meloxicam (MOBIC) 15 MG tablet     Sig: Take 1 tablet by mouth daily     Dispense:  90 tablet     Refill:  1    predniSONE (DELTASONE) 50 MG tablet     Sig: Take 1 tablet by mouth daily for 7 days     Dispense:  7 tablet     Refill:  0    irbesartan-hydrochlorothiazide (AVALIDE) 300-12.5 MG per tablet     Sig: Take 1 tablet by mouth daily     Dispense:  30 tablet     Refill:  3      HTN-reviewed appropriate dosage for her avalide as had not increased her dose as directed, new rx sent, continue to check home BPs, call if still no improvement  Hypothyroidism, fatigue-check TSH, not currently on medication  Right trigger thumb, carpal tunnel-ortho referral, advised OTC thumb/wrist splint. Stop celebrex, try mobic, short course prednisone  Plantar fasciitis-reviewed stretches, mobic and short course prednisone, encouraged returning to podiatry if does not improve   Patient given educational materials - see patient instructions. Discussed use, benefit, and side effects of prescribed medications. All patientquestions answered. Pt voiced understanding. Reviewed health maintenance. Instructedto continue current medications, diet and exercise. Patient agreed with treatmentplan. Follow up as directed.      Electronicallysigned by Jose Bonilla, MIGDALIA - CNP on 6/3/2019 at 5:34 PM

## 2019-06-07 ENCOUNTER — HOSPITAL ENCOUNTER (OUTPATIENT)
Age: 54
Discharge: HOME | End: 2019-06-07
Payer: COMMERCIAL

## 2019-06-07 DIAGNOSIS — I10 ESSENTIAL HYPERTENSION: ICD-10-CM

## 2019-06-07 DIAGNOSIS — R53.83 FATIGUE, UNSPECIFIED TYPE: ICD-10-CM

## 2019-06-07 LAB — TSH SERPL DL<=0.05 MIU/L-ACNC: 0.72 MIU/L (ref 0.3–5)

## 2019-06-07 PROCEDURE — 84443 ASSAY THYROID STIM HORMONE: CPT

## 2019-06-07 PROCEDURE — 36415 COLL VENOUS BLD VENIPUNCTURE: CPT

## 2019-06-10 ENCOUNTER — TELEPHONE (OUTPATIENT)
Dept: PRIMARY CARE CLINIC | Age: 54
End: 2019-06-10

## 2019-06-10 DIAGNOSIS — I10 ESSENTIAL HYPERTENSION: Primary | ICD-10-CM

## 2019-06-10 NOTE — TELEPHONE ENCOUNTER
Advise she try support hose particularly while at work  It is unlikely that this is from either medication but would like her to have labs checked

## 2019-07-22 ENCOUNTER — TELEPHONE (OUTPATIENT)
Dept: PRIMARY CARE CLINIC | Age: 54
End: 2019-07-22

## 2019-08-01 ENCOUNTER — HOSPITAL ENCOUNTER (INPATIENT)
Age: 54
LOS: 1 days | Discharge: HOME | DRG: 661 | End: 2019-08-02
Attending: EMERGENCY MEDICINE | Admitting: UROLOGY
Payer: COMMERCIAL

## 2019-08-01 ENCOUNTER — APPOINTMENT (OUTPATIENT)
Dept: CT IMAGING | Age: 54
DRG: 661 | End: 2019-08-01
Payer: COMMERCIAL

## 2019-08-01 ENCOUNTER — APPOINTMENT (OUTPATIENT)
Dept: ULTRASOUND IMAGING | Age: 54
DRG: 661 | End: 2019-08-01
Payer: COMMERCIAL

## 2019-08-01 ENCOUNTER — NURSE TRIAGE (OUTPATIENT)
Dept: OTHER | Facility: CLINIC | Age: 54
End: 2019-08-01

## 2019-08-01 DIAGNOSIS — R10.9 RIGHT FLANK PAIN: Primary | ICD-10-CM

## 2019-08-01 DIAGNOSIS — N20.0 KIDNEY STONE: ICD-10-CM

## 2019-08-01 PROBLEM — N13.8 URINARY TRACT OBSTRUCTION BY KIDNEY STONE: Status: ACTIVE | Noted: 2019-08-01

## 2019-08-01 LAB
-: NORMAL
ALBUMIN SERPL-MCNC: 4.2 G/DL (ref 3.5–5.2)
ALBUMIN/GLOBULIN RATIO: 1.2 (ref 1–2.5)
ALP BLD-CCNC: 117 U/L (ref 35–104)
ALT SERPL-CCNC: 50 U/L (ref 5–33)
AMORPHOUS: NORMAL
ANION GAP SERPL CALCULATED.3IONS-SCNC: 15 MMOL/L (ref 9–17)
AST SERPL-CCNC: 43 U/L
BACTERIA: NORMAL
BILIRUB SERPL-MCNC: 0.21 MG/DL (ref 0.3–1.2)
BILIRUBIN URINE: NEGATIVE
BUN BLDV-MCNC: 26 MG/DL (ref 6–20)
BUN/CREAT BLD: ABNORMAL (ref 9–20)
CALCIUM SERPL-MCNC: 9.7 MG/DL (ref 8.6–10.4)
CASTS UA: NORMAL /LPF (ref 0–8)
CHLORIDE BLD-SCNC: 100 MMOL/L (ref 98–107)
CO2: 21 MMOL/L (ref 20–31)
COLOR: YELLOW
COMMENT UA: ABNORMAL
CREAT SERPL-MCNC: 0.99 MG/DL (ref 0.5–0.9)
CRYSTALS, UA: NORMAL /HPF
EPITHELIAL CELLS UA: NORMAL /HPF (ref 0–5)
GFR AFRICAN AMERICAN: >60 ML/MIN
GFR NON-AFRICAN AMERICAN: 58 ML/MIN
GFR SERPL CREATININE-BSD FRML MDRD: ABNORMAL ML/MIN/{1.73_M2}
GFR SERPL CREATININE-BSD FRML MDRD: ABNORMAL ML/MIN/{1.73_M2}
GLUCOSE BLD-MCNC: 127 MG/DL (ref 70–99)
GLUCOSE URINE: NEGATIVE
HCT VFR BLD CALC: 41.9 % (ref 36.3–47.1)
HEMOGLOBIN: 13.4 G/DL (ref 11.9–15.1)
KETONES, URINE: NEGATIVE
LEUKOCYTE ESTERASE, URINE: ABNORMAL
MCH RBC QN AUTO: 26.7 PG (ref 25.2–33.5)
MCHC RBC AUTO-ENTMCNC: 32 G/DL (ref 28.4–34.8)
MCV RBC AUTO: 83.5 FL (ref 82.6–102.9)
MUCUS: NORMAL
NITRITE, URINE: NEGATIVE
NRBC AUTOMATED: 0 PER 100 WBC
OTHER OBSERVATIONS UA: NORMAL
PDW BLD-RTO: 14.8 % (ref 11.8–14.4)
PH UA: 5.5 (ref 5–8)
PLATELET # BLD: 265 K/UL (ref 138–453)
PMV BLD AUTO: 9.7 FL (ref 8.1–13.5)
POTASSIUM SERPL-SCNC: 3.8 MMOL/L (ref 3.7–5.3)
PROTEIN UA: NEGATIVE
RBC # BLD: 5.02 M/UL (ref 3.95–5.11)
RBC UA: NORMAL /HPF (ref 0–2)
RENAL EPITHELIAL, UA: NORMAL /HPF
SODIUM BLD-SCNC: 136 MMOL/L (ref 135–144)
SPECIFIC GRAVITY UA: 1.02 (ref 1–1.03)
TOTAL PROTEIN: 7.7 G/DL (ref 6.4–8.3)
TRICHOMONAS: NORMAL
TURBIDITY: ABNORMAL
URINE HGB: ABNORMAL
UROBILINOGEN, URINE: NORMAL
WBC # BLD: 8.8 K/UL (ref 3.5–11.3)
WBC UA: NORMAL /HPF (ref 0–5)
YEAST: NORMAL

## 2019-08-01 PROCEDURE — 96376 TX/PRO/DX INJ SAME DRUG ADON: CPT

## 2019-08-01 PROCEDURE — 99285 EMERGENCY DEPT VISIT HI MDM: CPT

## 2019-08-01 PROCEDURE — 2580000003 HC RX 258: Performed by: STUDENT IN AN ORGANIZED HEALTH CARE EDUCATION/TRAINING PROGRAM

## 2019-08-01 PROCEDURE — 6360000002 HC RX W HCPCS

## 2019-08-01 PROCEDURE — 87086 URINE CULTURE/COLONY COUNT: CPT

## 2019-08-01 PROCEDURE — 80053 COMPREHEN METABOLIC PANEL: CPT

## 2019-08-01 PROCEDURE — 96374 THER/PROPH/DIAG INJ IV PUSH: CPT

## 2019-08-01 PROCEDURE — 6360000002 HC RX W HCPCS: Performed by: EMERGENCY MEDICINE

## 2019-08-01 PROCEDURE — 96375 TX/PRO/DX INJ NEW DRUG ADDON: CPT

## 2019-08-01 PROCEDURE — 6360000002 HC RX W HCPCS: Performed by: STUDENT IN AN ORGANIZED HEALTH CARE EDUCATION/TRAINING PROGRAM

## 2019-08-01 PROCEDURE — 6370000000 HC RX 637 (ALT 250 FOR IP): Performed by: STUDENT IN AN ORGANIZED HEALTH CARE EDUCATION/TRAINING PROGRAM

## 2019-08-01 PROCEDURE — G0378 HOSPITAL OBSERVATION PER HR: HCPCS

## 2019-08-01 PROCEDURE — 76770 US EXAM ABDO BACK WALL COMP: CPT

## 2019-08-01 PROCEDURE — 82365 CALCULUS SPECTROSCOPY: CPT

## 2019-08-01 PROCEDURE — 74176 CT ABD & PELVIS W/O CONTRAST: CPT

## 2019-08-01 PROCEDURE — 85027 COMPLETE CBC AUTOMATED: CPT

## 2019-08-01 PROCEDURE — 81001 URINALYSIS AUTO W/SCOPE: CPT

## 2019-08-01 RX ORDER — MELOXICAM 7.5 MG/1
15 TABLET ORAL DAILY
Status: DISCONTINUED | OUTPATIENT
Start: 2019-08-01 | End: 2019-08-02 | Stop reason: HOSPADM

## 2019-08-01 RX ORDER — ACETAMINOPHEN 325 MG/1
650 TABLET ORAL EVERY 4 HOURS PRN
Status: DISCONTINUED | OUTPATIENT
Start: 2019-08-01 | End: 2019-08-02 | Stop reason: HOSPADM

## 2019-08-01 RX ORDER — IRBESARTAN AND HYDROCHLOROTHIAZIDE 300; 12.5 MG/1; MG/1
1 TABLET, FILM COATED ORAL DAILY
Status: DISCONTINUED | OUTPATIENT
Start: 2019-08-01 | End: 2019-08-01

## 2019-08-01 RX ORDER — OXYBUTYNIN CHLORIDE 10 MG/1
10 TABLET, EXTENDED RELEASE ORAL DAILY
Status: DISCONTINUED | OUTPATIENT
Start: 2019-08-01 | End: 2019-08-02 | Stop reason: HOSPADM

## 2019-08-01 RX ORDER — MORPHINE SULFATE 4 MG/ML
2 INJECTION, SOLUTION INTRAMUSCULAR; INTRAVENOUS
Status: DISCONTINUED | OUTPATIENT
Start: 2019-08-01 | End: 2019-08-02 | Stop reason: HOSPADM

## 2019-08-01 RX ORDER — DOCUSATE SODIUM 100 MG/1
100 CAPSULE, LIQUID FILLED ORAL 2 TIMES DAILY
Status: DISCONTINUED | OUTPATIENT
Start: 2019-08-01 | End: 2019-08-02 | Stop reason: HOSPADM

## 2019-08-01 RX ORDER — OXYCODONE HYDROCHLORIDE AND ACETAMINOPHEN 5; 325 MG/1; MG/1
1 TABLET ORAL EVERY 4 HOURS PRN
Status: DISCONTINUED | OUTPATIENT
Start: 2019-08-01 | End: 2019-08-02 | Stop reason: HOSPADM

## 2019-08-01 RX ORDER — ALBUTEROL SULFATE 90 UG/1
2 AEROSOL, METERED RESPIRATORY (INHALATION) EVERY 6 HOURS PRN
Status: DISCONTINUED | OUTPATIENT
Start: 2019-08-01 | End: 2019-08-02 | Stop reason: HOSPADM

## 2019-08-01 RX ORDER — LOSARTAN POTASSIUM 50 MG/1
100 TABLET ORAL DAILY
Status: DISCONTINUED | OUTPATIENT
Start: 2019-08-01 | End: 2019-08-02 | Stop reason: HOSPADM

## 2019-08-01 RX ORDER — SODIUM CHLORIDE 0.9 % (FLUSH) 0.9 %
10 SYRINGE (ML) INJECTION PRN
Status: DISCONTINUED | OUTPATIENT
Start: 2019-08-01 | End: 2019-08-02 | Stop reason: HOSPADM

## 2019-08-01 RX ORDER — SODIUM CHLORIDE 0.9 % (FLUSH) 0.9 %
10 SYRINGE (ML) INJECTION EVERY 12 HOURS SCHEDULED
Status: DISCONTINUED | OUTPATIENT
Start: 2019-08-01 | End: 2019-08-02 | Stop reason: HOSPADM

## 2019-08-01 RX ORDER — OXYCODONE HYDROCHLORIDE AND ACETAMINOPHEN 5; 325 MG/1; MG/1
2 TABLET ORAL EVERY 4 HOURS PRN
Status: DISCONTINUED | OUTPATIENT
Start: 2019-08-01 | End: 2019-08-02 | Stop reason: HOSPADM

## 2019-08-01 RX ORDER — FLUTICASONE PROPIONATE 110 UG/1
2 AEROSOL, METERED RESPIRATORY (INHALATION) 2 TIMES DAILY
Status: DISCONTINUED | OUTPATIENT
Start: 2019-08-01 | End: 2019-08-02 | Stop reason: HOSPADM

## 2019-08-01 RX ORDER — ONDANSETRON 2 MG/ML
4 INJECTION INTRAMUSCULAR; INTRAVENOUS ONCE
Status: COMPLETED | OUTPATIENT
Start: 2019-08-01 | End: 2019-08-01

## 2019-08-01 RX ORDER — SODIUM CHLORIDE 9 MG/ML
INJECTION, SOLUTION INTRAVENOUS CONTINUOUS
Status: DISCONTINUED | OUTPATIENT
Start: 2019-08-01 | End: 2019-08-02 | Stop reason: HOSPADM

## 2019-08-01 RX ORDER — PANTOPRAZOLE SODIUM 40 MG/1
40 TABLET, DELAYED RELEASE ORAL
Status: DISCONTINUED | OUTPATIENT
Start: 2019-08-02 | End: 2019-08-02 | Stop reason: HOSPADM

## 2019-08-01 RX ORDER — PROMETHAZINE HYDROCHLORIDE 25 MG/ML
12.5 INJECTION, SOLUTION INTRAMUSCULAR; INTRAVENOUS ONCE
Status: COMPLETED | OUTPATIENT
Start: 2019-08-01 | End: 2019-08-01

## 2019-08-01 RX ORDER — ONDANSETRON 2 MG/ML
INJECTION INTRAMUSCULAR; INTRAVENOUS
Status: COMPLETED
Start: 2019-08-01 | End: 2019-08-01

## 2019-08-01 RX ORDER — HYDROCHLOROTHIAZIDE 25 MG/1
12.5 TABLET ORAL DAILY
Status: DISCONTINUED | OUTPATIENT
Start: 2019-08-01 | End: 2019-08-02 | Stop reason: HOSPADM

## 2019-08-01 RX ORDER — KETOROLAC TROMETHAMINE 15 MG/ML
15 INJECTION, SOLUTION INTRAMUSCULAR; INTRAVENOUS ONCE
Status: COMPLETED | OUTPATIENT
Start: 2019-08-01 | End: 2019-08-01

## 2019-08-01 RX ORDER — FLUTICASONE PROPIONATE 50 MCG
1 SPRAY, SUSPENSION (ML) NASAL DAILY PRN
Status: DISCONTINUED | OUTPATIENT
Start: 2019-08-01 | End: 2019-08-02 | Stop reason: HOSPADM

## 2019-08-01 RX ORDER — TAMSULOSIN HYDROCHLORIDE 0.4 MG/1
0.4 CAPSULE ORAL DAILY
Status: DISCONTINUED | OUTPATIENT
Start: 2019-08-01 | End: 2019-08-02 | Stop reason: HOSPADM

## 2019-08-01 RX ADMIN — ONDANSETRON 4 MG: 2 INJECTION INTRAMUSCULAR; INTRAVENOUS at 07:22

## 2019-08-01 RX ADMIN — SODIUM CHLORIDE: 9 INJECTION, SOLUTION INTRAVENOUS at 13:06

## 2019-08-01 RX ADMIN — PROMETHAZINE HYDROCHLORIDE 12.5 MG: 25 INJECTION INTRAMUSCULAR; INTRAVENOUS at 08:10

## 2019-08-01 RX ADMIN — HYDROMORPHONE HYDROCHLORIDE 0.5 MG: 1 INJECTION, SOLUTION INTRAMUSCULAR; INTRAVENOUS; SUBCUTANEOUS at 10:06

## 2019-08-01 RX ADMIN — KETOROLAC TROMETHAMINE 15 MG: 15 INJECTION, SOLUTION INTRAMUSCULAR; INTRAVENOUS at 07:46

## 2019-08-01 RX ADMIN — TAMSULOSIN HYDROCHLORIDE 0.4 MG: 0.4 CAPSULE ORAL at 10:06

## 2019-08-01 RX ADMIN — HYDROMORPHONE HYDROCHLORIDE 0.5 MG: 1 INJECTION, SOLUTION INTRAMUSCULAR; INTRAVENOUS; SUBCUTANEOUS at 07:54

## 2019-08-01 RX ADMIN — ONDANSETRON 4 MG: 2 INJECTION INTRAMUSCULAR; INTRAVENOUS at 07:56

## 2019-08-01 RX ADMIN — HYDROMORPHONE HYDROCHLORIDE 0.5 MG: 1 INJECTION, SOLUTION INTRAMUSCULAR; INTRAVENOUS; SUBCUTANEOUS at 08:19

## 2019-08-01 ASSESSMENT — PAIN SCALES - GENERAL
PAINLEVEL_OUTOF10: 6
PAINLEVEL_OUTOF10: 9
PAINLEVEL_OUTOF10: 9
PAINLEVEL_OUTOF10: 6
PAINLEVEL_OUTOF10: 9
PAINLEVEL_OUTOF10: 8
PAINLEVEL_OUTOF10: 3

## 2019-08-01 ASSESSMENT — PAIN DESCRIPTION - ORIENTATION
ORIENTATION: RIGHT

## 2019-08-01 ASSESSMENT — PAIN DESCRIPTION - LOCATION
LOCATION: FLANK

## 2019-08-01 ASSESSMENT — PAIN DESCRIPTION - FREQUENCY
FREQUENCY: CONTINUOUS
FREQUENCY: CONTINUOUS

## 2019-08-01 ASSESSMENT — PAIN DESCRIPTION - PAIN TYPE
TYPE: ACUTE PAIN

## 2019-08-01 ASSESSMENT — PAIN DESCRIPTION - DESCRIPTORS: DESCRIPTORS: CRAMPING;CRUSHING

## 2019-08-01 NOTE — H&P
Paternal Grandfather        REVIEW OF SYSTEMS:    Constitutional: negative  Eyes: negative  Respiratory: negative  Cardiovascular: negative  Gastrointestinal: negative  Genitourinary: see HPI  Musculoskeletal: negative  Skin: negative   Neurological: negative  Hematological/Lymphatic: negative  Psychological: negative    Physical Exam:      This a 62 y.o. female   Patient Vitals for the past 24 hrs:   BP Temp Temp src Pulse Resp SpO2 Height Weight   08/01/19 0713 (!) 174/92 -- -- 68 -- 98 % -- --   08/01/19 0707 -- 97.9 °F (36.6 °C) Oral -- 19 -- 5' 9\" (1.753 m) 250 lb (113.4 kg)     Constitutional: Patient in no acute distress. Neuro: Alert and oriented to person, place and time. Psych: mood and affect normal  Lungs: Respiratory effort is normal  Cardiovascular: Normal peripheral pulses  Abdomen: Soft, non-tender, non-distended  Mild right CVA tenderness to palpation  Bladder non-tender and not distended. Lower extremities: No edema or calf tenderness bilaterally    LABS:   Recent Labs     08/01/19  0746   WBC 8.8   HGB 13.4   HCT 41.9   MCV 83.5        Recent Labs     08/01/19  0746      K 3.8      CO2 21   BUN 26*   CREATININE 0.99*       Additional Lab/culture results:    Urinalysis: No results for input(s): COLORU, PHUR, LABCAST, WBCUA, RBCUA, MUCUS, TRICHOMONAS, YEAST, BACTERIA, CLARITYU, SPECGRAV, LEUKOCYTESUR, UROBILINOGEN, BILIRUBINUR, BLOODU in the last 72 hours. Invalid input(s): NITRATE, GLUCOSEUKETONESUAMORPHOUS     -----------------------------------------------------------------  Imaging Results:    Imaging was independently reviewed and confirmed with report.     8/1/19 CT Abdomen and Pelvis without contrast  - Right hydroureteroenephrosis  - 1.5 cm staghorn calculus  - Two obstructing ureteral stones 2-5mm  - Absent left kidney    Assessment and Plan   Impression:      The patient is a 62 y.o. 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 Ruchi Rooney)  - History of Cystine stones    Plan:     - NPO with plan for right ureteral stent  - Strain all urine  - IVF at 125 cc/hr  - Flomax

## 2019-08-01 NOTE — CONSULTS
Lisa Betancourt, Shahid Feng, Jessie Haywood, & America Womack  Urology Consult    Patient:  Rafael Spann  MRN: [id number]  YOB: 1965    CHIEF COMPLAINT:  Right flank pain    HISTORY OF PRESENT ILLNESS:   The patient is a 62 y.o. female with a history of right flank pain since 5 am. She also had nausea and vomiting. No fevers. She has urinated once since then, but cannot void yet in the ER. No dysuria or gross hematuria. Patient has a history of a solitary right kidney after she had a left simple nephrectomy by Dr. Sher Moscoso in 1988 for a left non-functioning kidney that had stones. She also has a history of cystine stones and alkalinizes her urine with over the counter medications. Patient's old records, notes and chart reviewed and summarized above.     Past Medical History:    Past Medical History:   Diagnosis Date    Arthritis     Asthma     CKD (chronic kidney disease) stage 1, GFR 90 ml/min or greater     Cystinuria (HCC)     Flank pain     Hypertension     resolved    Hyperuricemia     Hypothyroidism     Kidney stone     pt states passed one stone today,possibly surgery to be delayed    Kidney stones     Proteinuria     Snores     Wears glasses        Past Surgical History:    Past Surgical History:   Procedure Laterality Date    CYSTO/URETERO/PYELOSCOPY, CALCULUS TX Right 11/21/2017    HOLMIUM LASER - CYSTOSCOPY, RIGHT URETEROSCOPY, RIGHT STENT EXCHANGE,STONE BASKETING performed by Danette Ramirez MD at [address] [place] [address] Right 11/18/2017    CYSTOSCOPY URETERAL STENT INSERTION,RIGHT performed by Elmer Shen MD at [address] [place] [address] Right 07/10/2018     CYSTOSCOPY, RIGHT URETEROSCOPY, HOLMIUM LASER LITHO WITH STONE BASKETING, RIGHT STENT EXCHANGE     CYSTOURETHROSCOPY Right 10/10/2018    HOLMIUM LASER STANDBY, CYSTO, RIGHT URETEROSCOPY, RIGHT STENT PLACEMENT     KIDNEY STONE SURGERY      x2    KNEE ARTHROSCOPY Right     KNEE SURGERY Right 1998    [place] CYSTO/URETERO/PYELOSCOPY, CALCULUS [place] N/A 7/10/2018    CYSTOSCOPY, RIGHT URETEROSCOPY, HOLMIUM LASER LITHO WITH STONE BASKETING, RIGHT STENT EXCHANGE performed by Jennifer Mcdowell MD at [address] [place] [address], CALCULUS [place] Right 10/10/2018    HOLMIUM LASER STANDBY, CYSTO, RIGHT URETEROSCOPY, RIGHT STENT PLACEMENT performed by Porter Meier MD at [address] Right 7/4/2018    CYSTOSCOPY URETERAL STENT INSERTION, RIGHT performed by Villa Rivera MD at [address] [place] [address]       Medications Prior to Admission:    Prior to Admission medications    Medication Sig Start Date End Date Taking? Authorizing Provider   albuterol sulfate HFA (PROAIR HFA) 108 (90 Base) MCG/ACT inhaler Inhale 2 puffs into the lungs every 6 hours as needed for Wheezing 6/3/19   MIGDALIA Oliver CNP   fluticasone (FLONASE) 50 MCG/ACT nasal spray 1 spray by Nasal route daily as needed for Allergies 6/3/19   MIGDALIA Oliver CNP   meloxicam (MOBIC) 15 MG tablet Take 1 tablet by mouth daily 6/3/19   MIGDALIA Oliver CNP   irbesartan-hydrochlorothiazide (AVALIDE) 300-12.5 MG per tablet Take 1 tablet by mouth daily 6/3/19   MIGDALIA Oliver CNP   oxybutynin (DITROPAN-XL) 10 MG extended release tablet Take 10 mg by mouth daily    Historical Provider, MD   omeprazole (PRILOSEC) 20 MG delayed release capsule Take 1 capsule by mouth 2 times daily 6/8/18   MookMIGDALIA Avalos CNP   mometasone [facility]) 220 MCG/INH inhaler Inhale 1 puff into the lungs daily  Patient taking differently: Inhale 1 puff into the lungs daily as needed  4/30/18   MIGDALIA Oliver CNP   acetaminophen (TYLENOL) 500 MG tablet Take 1,000 mg by mouth every 4 hours as needed for Pain.     Historical Provider, MD   Omega-3 Fatty Acids (OMEGA 3 PO) Take 1 tablet by mouth daily     Historical Provider, MD   solifenacin (VESICARE) 10 MG tablet Take 10 mg by Grandfather        REVIEW OF SYSTEMS:    Constitutional: negative  Eyes: negative  Respiratory: negative  Cardiovascular: negative  Gastrointestinal: negative  Genitourinary: see HPI  Musculoskeletal: negative  Skin: negative   Neurological: negative  Hematological/Lymphatic: negative  Psychological: negative    Physical Exam:      This a 62 y.o. female   Patient Vitals for the past 24 hrs:   BP Temp Temp src Pulse Resp SpO2 Height Weight   08/01/19 0713 (!) 174/92 -- -- 68 -- 98 % -- --   08/01/19 0707 -- 97.9 °F (36.6 °C) Oral -- 19 -- 5' 9\" (1.753 m) 250 lb (113.4 kg)     Constitutional: Patient in no acute distress. Neuro: Alert and oriented to person, place and time. Psych: mood and affect normal  Lungs: Respiratory effort is normal  Cardiovascular: Normal peripheral pulses  Abdomen: Soft, non-tender, non-distended  Mild right CVA tenderness to palpation  Bladder non-tender and not distended. Lower extremities: No edema or calf tenderness bilaterally    LABS:   Recent Labs     08/01/19  0746   WBC 8.8   HGB 13.4   HCT 41.9   MCV 83.5        Recent Labs     08/01/19  0746      K 3.8      CO2 21   BUN 26*   CREATININE 0.99*       Additional Lab/culture results:    Urinalysis: No results for input(s): COLORU, PHUR, LABCAST, WBCUA, RBCUA, MUCUS, TRICHOMONAS, YEAST, BACTERIA, CLARITYU, SPECGRAV, LEUKOCYTESUR, UROBILINOGEN, BILIRUBINUR, BLOODU in the last 72 hours. Invalid input(s): NITRATE, GLUCOSEUKETONESUAMORPHOUS     -----------------------------------------------------------------  Imaging Results:    Imaging was independently reviewed and confirmed with report.     8/1/19 CT Abdomen and Pelvis without contrast  - Right hydroureteroenephrosis  - 1.5 cm staghorn calculus  - Two obstructing ureteral stones 2-5mm  - Absent left kidney    Assessment and Plan   Impression:      The patient is a 62 y.o. female admitted with      Problem List  - Solitary right kidney s/p let simple nephrectomy

## 2019-08-01 NOTE — ED PROVIDER NOTES
Allergies:  Patient has no known allergies. Home Medications:  Prior to Admission medications    Medication Sig Start Date End Date Taking? Authorizing Provider   oxybutynin (DITROPAN-XL) 10 MG extended release tablet Take 1 tablet by mouth daily 8/2/19  Yes Shaylee Gamble MD   tamsulosin [facility]) 0.4 MG capsule Take 1 capsule by mouth daily 8/3/19  Yes Shaylee Gamble MD   phenazopyridine (PYRIDIUM) 200 MG tablet Take 1 tablet by mouth 3 times daily as needed for Pain (bladder pain) 8/2/19 8/12/19 Yes Shaylee Gamble MD   docusate sodium (COLACE, DULCOLAX) 100 MG CAPS Take 100 mg by mouth 2 times daily 8/2/19  Yes Shaylee Gamble MD   oxyCODONE-acetaminophen (PERCOCET) 5-325 MG per tablet Take 1 tablet by mouth every 4 hours as needed for Pain for up to 3 days. 8/2/19 8/5/19 Yes Shaylee Gamble MD   albuterol sulfate HFA (PROAIR HFA) 108 (90 Base) MCG/ACT inhaler Inhale 2 puffs into the lungs every 6 hours as needed for Wheezing 6/3/19   MIGDALIA Gillis CNP   fluticasone (FLONASE) 50 MCG/ACT nasal spray 1 spray by Nasal route daily as needed for Allergies 6/3/19   MIGDALIA Gillis CNP   meloxicam (MOBIC) 15 MG tablet Take 1 tablet by mouth daily 6/3/19   MIGDALIA Gillis CNP   irbesartan-hydrochlorothiazide (AVALIDE) 300-12.5 MG per tablet Take 1 tablet by mouth daily 6/3/19   MIGDALIA Gillis CNP   omeprazole (PRILOSEC) 20 MG delayed release capsule Take 1 capsule by mouth 2 times daily 6/8/18   MIGDALIA Jane CNP   mometasone [facility]) 220 MCG/INH inhaler Inhale 1 puff into the lungs daily  Patient taking differently: Inhale 1 puff into the lungs daily as needed  4/30/18   MIGDALIA Gillis CNP   acetaminophen (TYLENOL) 500 MG tablet Take 1,000 mg by mouth every 4 hours as needed for Pain.     Historical Provider, MD   Omega-3 Fatty Acids (OMEGA 3 PO) Take 1 tablet by mouth daily     Historical Provider, MD   solifenacin oxyCODONE-acetaminophen (PERCOCET) 5-325 MG per tablet     Sig: Take 1 tablet by mouth every 4 hours as needed for Pain for up to 3 days. Dispense:  15 tablet     Refill:  0     Reduce doses taken as pain becomes manageable       DDX: Nephrolithiasis, urinary tract infection, cystitis, appendicitis, ovarian torsion, appendicitis    Initial MDM/Plan: 62 y.o. female who presents with concern for sudden onset right-sided flank pain associated with nausea and vomiting. Patient has a significant history of nephrolithiasis with associated unilateral sided kidney. Patient had left kidney removed in 1988 due to cystinuria problems. Patient has history of multiple stent placement. Patient follows up with urologist currently. Plan to obtain basic labs, CMP, CBC,  urinalysis with urine culture. Plan to consult urology. Plan to get diagnostic imaging based on urology recommendations. Disposition planning pending patient's clinical picture after laboratory values and imaging studies.     DIAGNOSTIC RESULTS / EMERGENCYDEPARTMENT COURSE / MDM     LABS:  Labs Reviewed   CBC - Abnormal; Notable for the following components:       Result Value    RDW 14.8 (*)     All other components within normal limits   COMPREHENSIVE METABOLIC PANEL - Abnormal; Notable for the following components:    Glucose 127 (*)     BUN 26 (*)     CREATININE 0.99 (*)     Alkaline Phosphatase 117 (*)     ALT 50 (*)     AST 43 (*)     Total Bilirubin 0.21 (*)     GFR Non- 58 (*)     All other components within normal limits   URINE RT REFLEX TO CULTURE - Abnormal; Notable for the following components:    Turbidity UA CLOUDY (*)     Urine Hgb MODERATE (*)     Leukocyte Esterase, Urine TRACE (*)     All other components within normal limits   CBC - Abnormal; Notable for the following components:    Hemoglobin 11.6 (*)     RDW 14.8 (*)     All other components within normal limits   BASIC METABOLIC PANEL - Abnormal; Notable for the

## 2019-08-02 ENCOUNTER — ANESTHESIA EVENT (OUTPATIENT)
Dept: OPERATING ROOM | Age: 54
DRG: 661 | End: 2019-08-02
Payer: COMMERCIAL

## 2019-08-02 ENCOUNTER — APPOINTMENT (OUTPATIENT)
Dept: CT IMAGING | Age: 54
DRG: 661 | End: 2019-08-02
Payer: COMMERCIAL

## 2019-08-02 ENCOUNTER — APPOINTMENT (OUTPATIENT)
Dept: GENERAL RADIOLOGY | Age: 54
DRG: 661 | End: 2019-08-02
Payer: COMMERCIAL

## 2019-08-02 ENCOUNTER — ANESTHESIA (OUTPATIENT)
Dept: OPERATING ROOM | Age: 54
DRG: 661 | End: 2019-08-02
Payer: COMMERCIAL

## 2019-08-02 VITALS
HEIGHT: 69 IN | HEART RATE: 77 BPM | TEMPERATURE: 98.1 F | RESPIRATION RATE: 16 BRPM | SYSTOLIC BLOOD PRESSURE: 144 MMHG | OXYGEN SATURATION: 99 % | BODY MASS INDEX: 37.03 KG/M2 | WEIGHT: 250 LBS | DIASTOLIC BLOOD PRESSURE: 83 MMHG

## 2019-08-02 VITALS — DIASTOLIC BLOOD PRESSURE: 65 MMHG | OXYGEN SATURATION: 100 % | SYSTOLIC BLOOD PRESSURE: 134 MMHG

## 2019-08-02 LAB
ANION GAP SERPL CALCULATED.3IONS-SCNC: 11 MMOL/L (ref 9–17)
BUN BLDV-MCNC: 25 MG/DL (ref 6–20)
BUN/CREAT BLD: ABNORMAL (ref 9–20)
CALCIUM SERPL-MCNC: 8.6 MG/DL (ref 8.6–10.4)
CHLORIDE BLD-SCNC: 107 MMOL/L (ref 98–107)
CO2: 19 MMOL/L (ref 20–31)
CREAT SERPL-MCNC: 1.16 MG/DL (ref 0.5–0.9)
CULTURE: NORMAL
GFR AFRICAN AMERICAN: 59 ML/MIN
GFR NON-AFRICAN AMERICAN: 48 ML/MIN
GFR SERPL CREATININE-BSD FRML MDRD: ABNORMAL ML/MIN/{1.73_M2}
GFR SERPL CREATININE-BSD FRML MDRD: ABNORMAL ML/MIN/{1.73_M2}
GLUCOSE BLD-MCNC: 118 MG/DL (ref 70–99)
HCT VFR BLD CALC: 39.2 % (ref 36.3–47.1)
HEMOGLOBIN: 11.6 G/DL (ref 11.9–15.1)
Lab: NORMAL
MCH RBC QN AUTO: 26.1 PG (ref 25.2–33.5)
MCHC RBC AUTO-ENTMCNC: 29.6 G/DL (ref 28.4–34.8)
MCV RBC AUTO: 88.3 FL (ref 82.6–102.9)
NRBC AUTOMATED: 0 PER 100 WBC
PDW BLD-RTO: 14.8 % (ref 11.8–14.4)
PLATELET # BLD: 218 K/UL (ref 138–453)
PMV BLD AUTO: 9.6 FL (ref 8.1–13.5)
POTASSIUM SERPL-SCNC: 4.3 MMOL/L (ref 3.7–5.3)
RBC # BLD: 4.44 M/UL (ref 3.95–5.11)
SODIUM BLD-SCNC: 137 MMOL/L (ref 135–144)
SPECIMEN DESCRIPTION: NORMAL
WBC # BLD: 10.5 K/UL (ref 3.5–11.3)

## 2019-08-02 PROCEDURE — 80048 BASIC METABOLIC PNL TOTAL CA: CPT

## 2019-08-02 PROCEDURE — 2580000003 HC RX 258: Performed by: STUDENT IN AN ORGANIZED HEALTH CARE EDUCATION/TRAINING PROGRAM

## 2019-08-02 PROCEDURE — 2709999900 HC NON-CHARGEABLE SUPPLY: Performed by: UROLOGY

## 2019-08-02 PROCEDURE — 6360000002 HC RX W HCPCS: Performed by: STUDENT IN AN ORGANIZED HEALTH CARE EDUCATION/TRAINING PROGRAM

## 2019-08-02 PROCEDURE — 3700000000 HC ANESTHESIA ATTENDED CARE: Performed by: UROLOGY

## 2019-08-02 PROCEDURE — 2580000003 HC RX 258: Performed by: NURSE ANESTHETIST, CERTIFIED REGISTERED

## 2019-08-02 PROCEDURE — 96376 TX/PRO/DX INJ SAME DRUG ADON: CPT

## 2019-08-02 PROCEDURE — 0T768DZ DILATION OF RIGHT URETER WITH INTRALUMINAL DEVICE, VIA NATURAL OR ARTIFICIAL OPENING ENDOSCOPIC: ICD-10-PCS | Performed by: UROLOGY

## 2019-08-02 PROCEDURE — 2580000003 HC RX 258: Performed by: UROLOGY

## 2019-08-02 PROCEDURE — 6370000000 HC RX 637 (ALT 250 FOR IP): Performed by: STUDENT IN AN ORGANIZED HEALTH CARE EDUCATION/TRAINING PROGRAM

## 2019-08-02 PROCEDURE — 3600000002 HC SURGERY LEVEL 2 BASE: Performed by: UROLOGY

## 2019-08-02 PROCEDURE — C1769 GUIDE WIRE: HCPCS | Performed by: UROLOGY

## 2019-08-02 PROCEDURE — 96372 THER/PROPH/DIAG INJ SC/IM: CPT

## 2019-08-02 PROCEDURE — 3600000012 HC SURGERY LEVEL 2 ADDTL 15MIN: Performed by: UROLOGY

## 2019-08-02 PROCEDURE — 3700000001 HC ADD 15 MINUTES (ANESTHESIA): Performed by: UROLOGY

## 2019-08-02 PROCEDURE — 1200000000 HC SEMI PRIVATE

## 2019-08-02 PROCEDURE — 74018 RADEX ABDOMEN 1 VIEW: CPT

## 2019-08-02 PROCEDURE — C2617 STENT, NON-COR, TEM W/O DEL: HCPCS | Performed by: UROLOGY

## 2019-08-02 PROCEDURE — 36415 COLL VENOUS BLD VENIPUNCTURE: CPT

## 2019-08-02 PROCEDURE — 2500000003 HC RX 250 WO HCPCS: Performed by: NURSE ANESTHETIST, CERTIFIED REGISTERED

## 2019-08-02 PROCEDURE — 74176 CT ABD & PELVIS W/O CONTRAST: CPT

## 2019-08-02 PROCEDURE — 85027 COMPLETE CBC AUTOMATED: CPT

## 2019-08-02 PROCEDURE — 7100000011 HC PHASE II RECOVERY - ADDTL 15 MIN: Performed by: UROLOGY

## 2019-08-02 PROCEDURE — 6360000002 HC RX W HCPCS: Performed by: NURSE ANESTHETIST, CERTIFIED REGISTERED

## 2019-08-02 PROCEDURE — 7100000010 HC PHASE II RECOVERY - FIRST 15 MIN: Performed by: UROLOGY

## 2019-08-02 PROCEDURE — 96375 TX/PRO/DX INJ NEW DRUG ADDON: CPT

## 2019-08-02 DEVICE — URETERAL STENT
Type: IMPLANTABLE DEVICE | Status: FUNCTIONAL
Brand: POLARIS™ ULTRA

## 2019-08-02 RX ORDER — OXYCODONE HYDROCHLORIDE AND ACETAMINOPHEN 5; 325 MG/1; MG/1
1 TABLET ORAL EVERY 4 HOURS PRN
Qty: 15 TABLET | Refills: 0 | Status: SHIPPED | OUTPATIENT
Start: 2019-08-02 | End: 2019-08-05

## 2019-08-02 RX ORDER — SODIUM CHLORIDE 9 MG/ML
INJECTION, SOLUTION INTRAVENOUS CONTINUOUS PRN
Status: DISCONTINUED | OUTPATIENT
Start: 2019-08-02 | End: 2019-08-02 | Stop reason: SDUPTHER

## 2019-08-02 RX ORDER — PHENAZOPYRIDINE HYDROCHLORIDE 200 MG/1
200 TABLET, FILM COATED ORAL 3 TIMES DAILY PRN
Qty: 30 TABLET | Refills: 0 | Status: SHIPPED | OUTPATIENT
Start: 2019-08-02 | End: 2019-08-12

## 2019-08-02 RX ORDER — LIDOCAINE HYDROCHLORIDE 10 MG/ML
INJECTION, SOLUTION EPIDURAL; INFILTRATION; INTRACAUDAL; PERINEURAL PRN
Status: DISCONTINUED | OUTPATIENT
Start: 2019-08-02 | End: 2019-08-02 | Stop reason: SDUPTHER

## 2019-08-02 RX ORDER — MAGNESIUM HYDROXIDE 1200 MG/15ML
LIQUID ORAL CONTINUOUS PRN
Status: COMPLETED | OUTPATIENT
Start: 2019-08-02 | End: 2019-08-02

## 2019-08-02 RX ORDER — CEFAZOLIN SODIUM 1 G/3ML
INJECTION, POWDER, FOR SOLUTION INTRAMUSCULAR; INTRAVENOUS PRN
Status: DISCONTINUED | OUTPATIENT
Start: 2019-08-02 | End: 2019-08-02 | Stop reason: SDUPTHER

## 2019-08-02 RX ORDER — FENTANYL CITRATE 50 UG/ML
INJECTION, SOLUTION INTRAMUSCULAR; INTRAVENOUS PRN
Status: DISCONTINUED | OUTPATIENT
Start: 2019-08-02 | End: 2019-08-02 | Stop reason: SDUPTHER

## 2019-08-02 RX ORDER — OXYBUTYNIN CHLORIDE 10 MG/1
10 TABLET, EXTENDED RELEASE ORAL DAILY
Qty: 30 TABLET | Refills: 0 | Status: ON HOLD | OUTPATIENT
Start: 2019-08-02 | End: 2019-08-24 | Stop reason: SDUPTHER

## 2019-08-02 RX ORDER — PHENAZOPYRIDINE HYDROCHLORIDE 100 MG/1
200 TABLET, FILM COATED ORAL
Status: DISCONTINUED | OUTPATIENT
Start: 2019-08-02 | End: 2019-08-02 | Stop reason: HOSPADM

## 2019-08-02 RX ORDER — PROPOFOL 10 MG/ML
INJECTION, EMULSION INTRAVENOUS PRN
Status: DISCONTINUED | OUTPATIENT
Start: 2019-08-02 | End: 2019-08-02 | Stop reason: SDUPTHER

## 2019-08-02 RX ORDER — ONDANSETRON 2 MG/ML
4 INJECTION INTRAMUSCULAR; INTRAVENOUS EVERY 6 HOURS PRN
Status: DISCONTINUED | OUTPATIENT
Start: 2019-08-02 | End: 2019-08-02 | Stop reason: HOSPADM

## 2019-08-02 RX ORDER — PROPOFOL 10 MG/ML
INJECTION, EMULSION INTRAVENOUS CONTINUOUS PRN
Status: DISCONTINUED | OUTPATIENT
Start: 2019-08-02 | End: 2019-08-02 | Stop reason: SDUPTHER

## 2019-08-02 RX ORDER — PSEUDOEPHEDRINE HCL 30 MG
100 TABLET ORAL 2 TIMES DAILY
Qty: 30 CAPSULE | Refills: 0 | Status: SHIPPED | OUTPATIENT
Start: 2019-08-02 | End: 2019-08-15 | Stop reason: ALTCHOICE

## 2019-08-02 RX ORDER — TAMSULOSIN HYDROCHLORIDE 0.4 MG/1
0.4 CAPSULE ORAL DAILY
Qty: 30 CAPSULE | Refills: 0 | Status: ON HOLD | OUTPATIENT
Start: 2019-08-03 | End: 2019-08-24 | Stop reason: SDUPTHER

## 2019-08-02 RX ORDER — ONDANSETRON 2 MG/ML
4 INJECTION INTRAMUSCULAR; INTRAVENOUS
Status: DISCONTINUED | OUTPATIENT
Start: 2019-08-02 | End: 2019-08-02 | Stop reason: HOSPADM

## 2019-08-02 RX ORDER — LABETALOL HYDROCHLORIDE 5 MG/ML
5 INJECTION, SOLUTION INTRAVENOUS EVERY 10 MIN PRN
Status: DISCONTINUED | OUTPATIENT
Start: 2019-08-02 | End: 2019-08-02 | Stop reason: HOSPADM

## 2019-08-02 RX ORDER — FENTANYL CITRATE 50 UG/ML
25 INJECTION, SOLUTION INTRAMUSCULAR; INTRAVENOUS EVERY 5 MIN PRN
Status: DISCONTINUED | OUTPATIENT
Start: 2019-08-02 | End: 2019-08-02 | Stop reason: HOSPADM

## 2019-08-02 RX ORDER — ONDANSETRON 2 MG/ML
INJECTION INTRAMUSCULAR; INTRAVENOUS PRN
Status: DISCONTINUED | OUTPATIENT
Start: 2019-08-02 | End: 2019-08-02 | Stop reason: SDUPTHER

## 2019-08-02 RX ORDER — PROMETHAZINE HYDROCHLORIDE 25 MG/ML
12.5 INJECTION, SOLUTION INTRAMUSCULAR; INTRAVENOUS ONCE
Status: COMPLETED | OUTPATIENT
Start: 2019-08-02 | End: 2019-08-02

## 2019-08-02 RX ADMIN — LIDOCAINE HYDROCHLORIDE 50 MG: 10 INJECTION, SOLUTION EPIDURAL; INFILTRATION; INTRACAUDAL; PERINEURAL at 09:17

## 2019-08-02 RX ADMIN — ACETAMINOPHEN 650 MG: 325 TABLET ORAL at 10:40

## 2019-08-02 RX ADMIN — SODIUM CHLORIDE: 9 INJECTION, SOLUTION INTRAVENOUS at 00:20

## 2019-08-02 RX ADMIN — CEFAZOLIN 2000 MG: 1 INJECTION, POWDER, FOR SOLUTION INTRAMUSCULAR; INTRAVENOUS at 09:04

## 2019-08-02 RX ADMIN — SODIUM CHLORIDE: 9 INJECTION, SOLUTION INTRAVENOUS at 09:04

## 2019-08-02 RX ADMIN — ONDANSETRON 4 MG: 2 INJECTION INTRAMUSCULAR; INTRAVENOUS at 01:40

## 2019-08-02 RX ADMIN — ONDANSETRON 4 MG: 2 INJECTION, SOLUTION INTRAMUSCULAR; INTRAVENOUS at 09:19

## 2019-08-02 RX ADMIN — PROPOFOL 75 MCG/KG/MIN: 10 INJECTION, EMULSION INTRAVENOUS at 09:19

## 2019-08-02 RX ADMIN — HYDROMORPHONE HYDROCHLORIDE 0.5 MG: 1 INJECTION, SOLUTION INTRAMUSCULAR; INTRAVENOUS; SUBCUTANEOUS at 03:03

## 2019-08-02 RX ADMIN — PHENAZOPYRIDINE HYDROCHLORIDE 200 MG: 100 TABLET ORAL at 12:55

## 2019-08-02 RX ADMIN — PHENAZOPYRIDINE HYDROCHLORIDE 200 MG: 100 TABLET ORAL at 16:25

## 2019-08-02 RX ADMIN — OXYCODONE HYDROCHLORIDE AND ACETAMINOPHEN 1 TABLET: 5; 325 TABLET ORAL at 12:29

## 2019-08-02 RX ADMIN — FENTANYL CITRATE 50 MCG: 50 INJECTION INTRAMUSCULAR; INTRAVENOUS at 09:17

## 2019-08-02 RX ADMIN — MORPHINE SULFATE 2 MG: 4 INJECTION INTRAVENOUS at 01:30

## 2019-08-02 RX ADMIN — OXYCODONE HYDROCHLORIDE AND ACETAMINOPHEN 1 TABLET: 5; 325 TABLET ORAL at 16:25

## 2019-08-02 RX ADMIN — PROPOFOL 50 MG: 10 INJECTION, EMULSION INTRAVENOUS at 09:17

## 2019-08-02 RX ADMIN — FENTANYL CITRATE 50 MCG: 50 INJECTION INTRAMUSCULAR; INTRAVENOUS at 09:38

## 2019-08-02 RX ADMIN — PROMETHAZINE HYDROCHLORIDE 12.5 MG: 25 INJECTION INTRAMUSCULAR; INTRAVENOUS at 03:03

## 2019-08-02 RX ADMIN — SODIUM CHLORIDE: 9 INJECTION, SOLUTION INTRAVENOUS at 10:01

## 2019-08-02 ASSESSMENT — PAIN DESCRIPTION - DESCRIPTORS: DESCRIPTORS: HEAVINESS;DULL

## 2019-08-02 ASSESSMENT — PULMONARY FUNCTION TESTS
PIF_VALUE: 0
PIF_VALUE: 0
PIF_VALUE: 1
PIF_VALUE: 0
PIF_VALUE: 1
PIF_VALUE: 0

## 2019-08-02 ASSESSMENT — PAIN - FUNCTIONAL ASSESSMENT: PAIN_FUNCTIONAL_ASSESSMENT: 0-10

## 2019-08-02 ASSESSMENT — PAIN SCALES - GENERAL
PAINLEVEL_OUTOF10: 5
PAINLEVEL_OUTOF10: 8
PAINLEVEL_OUTOF10: 7
PAINLEVEL_OUTOF10: 4
PAINLEVEL_OUTOF10: 3
PAINLEVEL_OUTOF10: 4
PAINLEVEL_OUTOF10: 9

## 2019-08-02 NOTE — PLAN OF CARE
Problem: Pain:  Goal: Pain level will decrease  Description  Pain level will decrease  Outcome: Ongoing  Writer to give prn pain medication as ordered      Problem: Pain:  Goal: Control of acute pain  Description  Control of acute pain  Outcome: Ongoing  Writer to assess pain hourly and medicate as needed

## 2019-08-02 NOTE — OP NOTE
Dr. Ely Puckett MD      [facility]. [facility]. [place]  08/02/19    DATE:  08/02/19    SURGEON:   Ely Puckett MD    ASSISTANT:  Gus Cunningham MD.       PREOPERATIVE DIAGNOSIS:  URETERAL STONE    POSTOPERATIVE DIAGNOSIS:  same    PROCEDURE PERFORMED:  CYSTOSCOPY RT URETERAL STENT INSERTION    ANESTHESIA:  Monitor Anesthesia Care    COMPLICATIONS:  None. ESTIMATED BLOOD LOSS:  * No values recorded between 8/2/2019  9:12 AM and 8/2/2019  9:39 AM *     DRAINS:  Right 6x26 Ureteral stent(s)    SPECIMENS:  none    INDICATIONS FOR PROCEDURE:  The patient is a 62 y.o. female with a history of solitary right kidney with flank pain and multiple stones in need of stent placement. The risks and benefits of the procedure as well as possible alternatives and complications were discussed and she consented    DETAILS OF THE PROCEDURE:  The patient was correctly identified in the preoperative holding area. she was brought back to the operating room and placed in the supine position. Conscious sedation was administered, and ancef was given for prophylaxis. she was then placed in the dorsal lithotomy position and prepped and draped in the usual sterile fashion. Once an appropriate timeout had been performed a 25 Greek cystoscope with a 30 degree lens was inserted through the urethra into the bladder. A thorough and complete cystoscopy was performed with no abnormalities to the bladder mucosa. The ureteral orifices were located in the correct orthotopic location. Urine was collected for culture. The right was localized. A glidewire was then inserted through the scope into the UO and localized in the renal pelvis under fluoroscopy. A 6x26 ureteral stent was placed over the wire into the ureteral orifice. It was localized in the correct position with good curl in the renal pelvis under fluoroscopy.    The bladder was then drained and the cystoscope was removed.    The patient tolerated the procedure well and

## 2019-08-02 NOTE — CARE COORDINATION
[address] Transitions Interview     2019    Patient: Yaya Nunez Patient : 1965   MRN: [id number]  Reason for Admission: Flank Pain  RARS: Readmission Risk Score: 9         Spoke with: Yaya Nunez    Met with patient in room. 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 Patient here for flank pain under observation status. Had stent placed and plans to discharge today. Contact information for CTN provided to patient. CTN will not follow d/t observation status. Readmission Risk  Patient Active Problem List   Diagnosis    Kidney stones    Essential hypertension    Acquired hypothyroidism    CKD (chronic kidney disease) stage 1, GFR 90 ml/min or greater    [facility]ystinuria ([facility] [address]    Hyperuricemia    Proteinuria    Knee pain, acute    Osteoarthritis of both knees    Gastroesophageal reflux disease without esophagitis    Obstructive sleep apnea syndrome    Midline low back pain with right-sided sciatica    Mixed hyperlipidemia    Solitary kidney, acquired    Right ureteral stone    Obesity (BMI 30-39. 9)    Acute kidney injury ([address]    Leukocytosis    Acute intractable tension-type headache    Glucose intolerance (impaired glucose tolerance)    Kidney stone    Bunion of great toe of right foot    Mild intermittent asthma without complication    Urolithiasis    Nephrolithiasis    Class 2 severe obesity with serious comorbidity and body mass index (BMI) of 39.0 to 39.9 in adult [facility])    Urinary tract obstruction by kidney stone       Inpatient Assessment  Care Transitions Summary    Care Transitions Inpatient Review  Medication Review  Do you have all of your prescriptions and are they filled?:  Yes   Are you able to afford your medications?:  Yes  How often do you have difficulty taking your medications?:  I always take them as prescribed.   Housing Review  Who do you live with?:  Alone  Are you an active caregiver in your home?:  No  Social Support  Durable Medical Equipment  Functional Review  Ability to seek help/take action for Emergent/Urgent situations i.e. fire, crime, inclement weather or health crisis. :  Independent  Ability handle personal hygiene needs (bathing/dressing/grooming): Independent  Ability to manage medications: Independent  Ability to prepare food:  Independent  Ability to maintain home (clean home, laundry): Independent  Ability to drive and/or has transportation:  Independent  Ability to do shopping:  Independent  Ability to manage finances:   Independent  Is patient able to live independently?:  Yes  Hearing and Vision  Visual Impairment:  Reading glasses  Care Transitions Interventions         Follow Up  Future Appointments   Date Time Provider Ender Oliver   8/6/2019  3:30 PM Precious Sloan MD [id number] [facility]       Health Maintenance  Health Maintenance Due   Topic Date Due    A1C test (Diabetic or Prediabetic)  05/14/2019       Adriel Byrd RN

## 2019-08-02 NOTE — DISCHARGE SUMMARY
DISCHARGE SUMMARY NOTE:      Patient Identification  PATIENT: Trav Tsai is a 62 y.o. female. MRN: [id number]  :  1965  Admit Date:  2019  Discharge date:   19                                  Disposition: home  Discharged Condition:  good  Discharge Diagnoses:   Patient Active Problem List   Diagnosis    Kidney stones    Essential hypertension    Acquired hypothyroidism    CKD (chronic kidney disease) stage 1, GFR 90 ml/min or greater    Cystinuria ([address]    Hyperuricemia    Proteinuria    Knee pain, acute    Osteoarthritis of both knees    Gastroesophageal reflux disease without esophagitis    Obstructive sleep apnea syndrome    Midline low back pain with right-sided sciatica    Mixed hyperlipidemia    Solitary kidney, acquired    Right ureteral stone    Obesity (BMI 30-39. 9)    Acute kidney injury ([address]    Leukocytosis    Acute intractable tension-type headache    Glucose intolerance (impaired glucose tolerance)    Kidney stone    Bunion of great toe of right foot    Mild intermittent asthma without complication    Urolithiasis    Nephrolithiasis    Class 2 severe obesity with serious comorbidity and body mass index (BMI) of 39.0 to 39.9 in [facility])    Urinary tract obstruction by kidney stone       Consults: none    Surgery: CYSTOSCOPY RT URETERAL STENT INSERTION     Patient Instructions: Activity: no driving under the influence of pain medications  Diet: As tolerated  Patient told to follow up with Dr Rosemarie Melo in 1 week(s). Discharge Medications:    Sonia Harris   Home Medication Instructions [facility]:[id number]    Printed on:19 1442   Medication Information                      acetaminophen (TYLENOL) 500 MG tablet  Take 1,000 mg by mouth every 4 hours as needed for Pain.              albuterol sulfate HFA (PROAIR HFA) 108 (90 Base) MCG/ACT inhaler  Inhale 2 puffs into the lungs every 6 hours as needed for Wheezing             docusate sodium (COLACE, before discharge. The patient agrees to discharge, understands discharge instructions, and agrees to follow up.          Melvin Fox  2:42 PM 8/2/2019

## 2019-08-02 NOTE — PROGRESS NOTES
Urology Progress Note    CC:  Right ureteral stones  Subjective: Irene Warren is a 62 y.o. female. His/Her current Diet is: Diet NPO, After Midnight Exceptions are: Sips with Meds. The patient is * No surgery found * from   No acute urologic events overnight. No chest pain, shortness of breath, , fevers, chills. She reports a severe pinching pain.  Required IV narcotics  nausea, vomiting    Patient Vitals for the past 24 hrs:   BP Temp Temp src Pulse Resp SpO2 Height Weight   08/01/19 2200 136/67 98.7 °F (37.1 °C) Axillary 75 18 98 % -- --   08/01/19 1500 112/77 98.3 °F (36.8 °C) -- 82 18 98 % -- --   08/01/19 1412 (!) 103/54 -- -- -- -- -- -- --   08/01/19 1403 (!) 97/57 98.1 °F (36.7 °C) Oral 73 -- 98 % -- --   08/01/19 0713 (!) 174/92 -- -- 68 -- 98 % -- --   08/01/19 0707 -- 97.9 °F (36.6 °C) Oral -- 19 -- 5' 9\" (1.753 m) 250 lb (113.4 kg)       Intake/Output Summary (Last 24 hours) at 8/2/2019 0609  Last data filed at 8/1/2019 1800  Gross per 24 hour   Intake 1782 ml   Output 200 ml   Net 1582 ml       Recent Labs     08/01/19  0746   WBC 8.8   HGB 13.4   HCT 41.9   MCV 83.5        Recent Labs     08/01/19  0746      K 3.8      CO2 21   BUN 26*   CREATININE 0.99*       Recent Labs     08/01/19  1219   COLORU YELLOW   PHUR 5.5   WBCUA 2 TO 5   RBCUA 50    MUCUS NOT REPORTED   TRICHOMONAS NOT REPORTED   YEAST NOT REPORTED   BACTERIA NOT REPORTED   SPECGRAV 1.018   LEUKOCYTESUR TRACE*   UROBILINOGEN Normal   BILIRUBINUR NEGATIVE       Current Facility-Administered Medications   Medication Dose Route Frequency Provider Last Rate Last Dose    ondansetron (ZOFRAN) injection 4 mg  4 mg Intravenous Q6H PRN Perri Sam MD   4 mg at 08/02/19 0140    tamsulosin (FLOMAX) capsule 0.4 mg  0.4 mg Oral Daily Veronika MD Adriana   0.4 mg at 08/01/19 1006    0.9 % sodium chloride infusion   Intravenous Continuous Perri Sam  mL/hr at 08/02/19 0020      albuterol sulfate HFA calf ttp bilaterally  EPC cuffs on and functioning billaterally      Interval Imaging Findings:  CT a/p 8/2/19:  Right ureteral stones causing right hydronephrosis. Culture Results:  Pending urine culture    Impression:       The patient is a 62 y.o. female admitted with       Problem List  - Solitary right kidney s/p let simple nephrectomy for non-functioning kidney (1988 with Dr. Priscila Santiago)  - History of Cystine stones     Plan:      - NPO with plan for right ureteral stent today. Consent obtained. - Strain all urine  - IVF   - recurrence of symptoms and remaining stones on CT AP that was repeated.   - 70Mica Dunn  Urology Resident, PGY3  6:09 AM 8/2/2019

## 2019-08-02 NOTE — ANESTHESIA PRE PROCEDURE
Department of Anesthesiology  Preprocedure Note       Name:  Bruce Barth   Age:  62 y.o.  :  1965                                          MRN:  [id number]         Date:  2019      Surgeon: Belén Pride):  Travon Burns MD    Procedure: CYSTOSCOPY RT URETERAL STENT INSERTION (Right )    Medications prior to admission:   Prior to Admission medications    Medication Sig Start Date End Date Taking? Authorizing Provider   albuterol sulfate HFA (PROAIR HFA) 108 (90 Base) MCG/ACT inhaler Inhale 2 puffs into the lungs every 6 hours as needed for Wheezing 6/3/19   MIGDALIA Dunn CNP   fluticasone (FLONASE) 50 MCG/ACT nasal spray 1 spray by Nasal route daily as needed for Allergies 6/3/19   MIGDALIA Dunn CNP   meloxicam (MOBIC) 15 MG tablet Take 1 tablet by mouth daily 6/3/19   MIGDALIA Dunn CNP   irbesartan-hydrochlorothiazide (AVALIDE) 300-12.5 MG per tablet Take 1 tablet by mouth daily 6/3/19   MIGDALIA Dunn CNP   oxybutynin (DITROPAN-XL) 10 MG extended release tablet Take 10 mg by mouth daily    Historical Provider, MD   omeprazole (PRILOSEC) 20 MG delayed release capsule Take 1 capsule by mouth 2 times daily 18   MIGDALIA Crow CNP   mometasone [facility]) 220 MCG/INH inhaler Inhale 1 puff into the lungs daily  Patient taking differently: Inhale 1 puff into the lungs daily as needed  18   MIGDALIA Dunn CNP   acetaminophen (TYLENOL) 500 MG tablet Take 1,000 mg by mouth every 4 hours as needed for Pain. Historical Provider, MD   Omega-3 Fatty Acids (OMEGA 3 PO) Take 1 tablet by mouth daily     Historical Provider, MD   solifenacin (VESICARE) 10 MG tablet Take 10 mg by mouth as needed.   14  Historical Provider, MD       Current medications:    Current Facility-Administered Medications   Medication Dose Route Frequency Provider Last Rate Last Dose    [MAR Hold] ondansetron (ZOFRAN) injection 4 mg  4 mg Intravenous Q6H PRN Dangelo White 100 mg Oral Daily Precious Romero MD        And    [MAR Hold] hydrochlorothiazide (HYDRODIURIL) tablet 12.5 mg  12.5 mg Oral Daily Precious Romero MD        [MAR Hold] fluticasone (FLOVENT HFA) 110 MCG/ACT inhaler 2 puff  2 puff Inhalation BID Precious Romero MD           Allergies:  No Known Allergies    Problem List:    Patient Active Problem List   Diagnosis Code    Kidney stones N20.0    Essential hypertension I10    Acquired hypothyroidism E03.9    CKD (chronic kidney disease) stage 1, GFR 90 ml/min or greater N18.1    Cystinuria ([address] E72.01    Hyperuricemia E79.0    Proteinuria R80.9    Knee pain, acute M25.569    Osteoarthritis of both knees M17.0    Gastroesophageal reflux disease without esophagitis K21.9    Obstructive sleep apnea syndrome G47.33    Midline low back pain with right-sided sciatica M54.41    Mixed hyperlipidemia E78.2    Solitary kidney, acquired Z90.5    Right ureteral stone N20.1    Obesity (BMI 30-39. 9) E66.9    Acute kidney injury ([address] N17.9    Leukocytosis D72.829    Acute intractable tension-type headache G44. 201    Glucose intolerance (impaired glucose tolerance) R73.02    Kidney stone N20.0    Bunion of great toe of right foot M21.611    Mild intermittent asthma without complication Y83.99    Urolithiasis N20.9    Nephrolithiasis N20.0    Class 2 severe obesity with serious comorbidity and body mass index (BMI) of 39.0 to 39.9 in adult ([facility]) E66.01, Z68.39    Urinary tract obstruction by kidney stone N20.0, N13.8       Past Medical History:        Diagnosis Date    Arthritis     Asthma     CKD (chronic kidney disease) stage 1, GFR 90 ml/min or greater     Cystinuria ([address]     Flank pain     Hypertension     resolved    Hyperuricemia     Hypothyroidism     Kidney stone     pt states passed one stone today,possibly surgery to be delayed    Kidney stones     Proteinuria     Snores     Wears glasses        Past Surgical History:        Procedure Laterality Date    CYSTO/URETERO/PYELOSCOPY, CALCULUS [place] Right 11/21/2017    HOLMIUM LASER - CYSTOSCOPY, RIGHT URETEROSCOPY, RIGHT STENT EXCHANGE,STONE BASKETING performed by Daphne Ty MD at [facility] Right 11/18/2017    CYSTOSCOPY URETERAL STENT INSERTION,RIGHT performed by Deysi Krueger MD at [address] [place] [address] Right 07/10/2018     CYSTOSCOPY, RIGHT URETEROSCOPY, HOLMIUM LASER LITHO WITH STONE BASKETING, RIGHT STENT EXCHANGE     CYSTOURETHROSCOPY Right 10/10/2018    HOLMIUM LASER STANDBY, CYSTO, RIGHT URETEROSCOPY, RIGHT STENT PLACEMENT     KIDNEY STONE SURGERY      x2    KNEE ARTHROSCOPY Right     KNEE SURGERY Right 1998    CA CYSTO/URETERO/PYELOSCOPY, CALCULUS [place] N/A 7/10/2018    CYSTOSCOPY, RIGHT URETEROSCOPY, HOLMIUM LASER LITHO WITH STONE BASKETING, RIGHT STENT EXCHANGE performed by Polina Rock MD at [address] [place] [address], CALCULUS [place] Right 10/10/2018    HOLMIUM LASER STANDBY, CYSTO, RIGHT URETEROSCOPY, RIGHT STENT PLACEMENT performed by Daphne Ty MD at [facility], Right 7/4/2018    CYSTOSCOPY URETERAL STENT INSERTION, RIGHT performed by Nicole Reina MD at [facility]    left       Social History:    Social History     Tobacco Use    Smoking status: Never Smoker    Smokeless tobacco: Never Used   Substance Use Topics    Alcohol use:  Yes     Alcohol/week: 0.0 standard drinks     Comment: rare                                Counseling given: Not Answered      Vital Signs (Current):   Vitals:    08/01/19 1500 08/01/19 2200 08/02/19 0800 08/02/19 0847   BP: 112/77 136/67 (!) 148/71 132/79   Pulse: 82 75 77 84   Resp: 18 18 18 16   Temp: 98.3 °F (36.8 °C) 98.7 °F (37.1 °C) 98.9 °F (37.2 °C) 98.8 °F (37.1 °C)   TempSrc:  Axillary  Temporal   SpO2: 98% 98% 94% 95%   Weight:       Height:                                                  BP Readings from Last 3 Encounters:   08/02/19 132/79 06/03/19 (!) 148/90   05/01/19 134/78       NPO Status: Time of last liquid consumption: 2300                        Time of last solid consumption: 1830                        Date of last liquid consumption: 08/01/19                        Date of last solid food consumption: 08/01/19    BMI:   Wt Readings from Last 3 Encounters:   08/01/19 250 lb (113.4 kg)   06/03/19 258 lb (117 kg)   05/01/19 261 lb (118.4 kg)     Body mass index is 36.92 kg/m². CBC:   Lab Results   Component Value Date    WBC 10.5 08/02/2019    RBC 4.44 08/02/2019    RBC 4.66 03/22/2012    HGB 11.6 08/02/2019    HCT 39.2 08/02/2019    MCV 88.3 08/02/2019    RDW 14.8 08/02/2019     08/02/2019     03/22/2012       CMP:   Lab Results   Component Value Date     08/02/2019    K 4.3 08/02/2019     08/02/2019    CO2 19 08/02/2019    BUN 25 08/02/2019    CREATININE 1.16 08/02/2019    GFRAA 59 08/02/2019    LABGLOM 48 08/02/2019    GLUCOSE 118 08/02/2019    GLUCOSE 88 03/22/2012    PROT 7.7 08/01/2019    CALCIUM 8.6 08/02/2019    BILITOT 0.21 08/01/2019    ALKPHOS 117 08/01/2019    AST 43 08/01/2019    ALT 50 08/01/2019       POC Tests: No results for input(s): POCGLU, POCNA, POCK, POCCL, POCBUN, POCHEMO, POCHCT in the last 72 hours.     Coags:   Lab Results   Component Value Date    PROTIME 9.8 09/13/2018    INR 0.9 09/13/2018    APTT 24.2 09/13/2018       HCG (If Applicable): No results found for: PREGTESTUR, PREGSERUM, HCG, HCGQUANT     ABGs: No results found for: PHART, PO2ART, CBZ1AFS, QBE5UPC, BEART, G4TVFGUI     Type & Screen (If Applicable):  No results found for: LABABO, [address]    Anesthesia Evaluation  Patient summary reviewed and Nursing notes reviewed no history of anesthetic complications:   Airway: Mallampati: III        Dental: normal exam         Pulmonary:   (+) sleep apnea:  decreased breath sounds,  asthma:                            Cardiovascular:  Exercise tolerance: good (>4 METS),   (+) hypertension:,         Rhythm: regular                   ROS comment: NSR ; no acute changes     Neuro/Psych:   (+) neuromuscular disease:, headaches:,             GI/Hepatic/Renal:   (+) GERD:,          ROS comment: ureteral calculus. Endo/Other:    (+) hypothyroidism::., .                 Abdominal:   (+) obese,         Vascular:                                      Anesthesia Plan      general and MAC     ASA 3     (ASA 3  Plan MAC  Patient was evaluated prior to OR transfer)  Induction: intravenous. Anesthetic plan and risks discussed with patient.                       Albaro Tyson MD   8/2/2019

## 2019-08-03 ENCOUNTER — CARE COORDINATION (OUTPATIENT)
Dept: CASE MANAGEMENT | Age: 54
End: 2019-08-03

## 2019-08-04 ENCOUNTER — CARE COORDINATION (OUTPATIENT)
Dept: CASE MANAGEMENT | Age: 54
End: 2019-08-04

## 2019-08-04 DIAGNOSIS — N20.1 CALCULUS OF URETER: Primary | ICD-10-CM

## 2019-08-04 LAB
STONE COMPOSITION: NORMAL
STONE DESCRIPTION: NORMAL
STONE MASS: 16 MG
STONE NUMBER: 3
STONE SIZE: NORMAL MM

## 2019-08-04 NOTE — CARE COORDINATION
[address] [facility] Transitions Initial Follow Up Call - call attempt #2 - LVM message & then patient returned call to CTN      Call within 2 business days of discharge: Yes    Patient: Kaela Brewer Patient : 1965   MRN: [id number]  Reason for Admission: kidney stone - right ureteral stent   Discharge Date: 19 RARS: Readmission Risk Score: 9  CM 5    Last Discharge [facility]       Complaint Diagnosis Description Type Department Provider    19 Flank Pain Right flank pain . .. ED to Hosp-Admission (Discharged) (ADMITTED) Paulina Murray MD; Padmini Alba Hym. .. Facility: [facility]  Non-face-to-face services provided:  Scheduled appointment with PCP-routed for appt  Scheduled appointment with Specialist-uro - one week  Obtained and reviewed discharge summary and/or continuity of care documents   1111F completed    Patient returned call to CTN. Verbalizes \"not being operational\" with this kidney stone. No specific complaints other than discomfort for which she is taking percocet as needed. Describes how she knew she was obstructed again prior to admission - she has a history of kidney stones. Stent is in place & she will f/u with urology this upcoming week - she is calling to schedule. She confirms she is taking all prescribed medications, but has to  new supply of oxybutrin on Monday. Verbalizes frustration as to how this is interfering with her job - discussing FMLA. 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 Patient is staying hydrated. Receptive to Care Transitions - will complete MMO link for [facility] & will continue to follow. Patient informed of CTN contact number.         Follow Up  Future Appointments   Date Time Provider Ender Oliver   2019  3:30 PM Ambar Granados MD [address] [facility]       Robin Soliz RN

## 2019-08-05 ENCOUNTER — TELEPHONE (OUTPATIENT)
Dept: PRIMARY CARE CLINIC | Age: 54
End: 2019-08-05

## 2019-08-06 PROBLEM — R05.9 COUGH: Status: ACTIVE | Noted: 2019-08-06

## 2019-08-06 PROBLEM — M25.50 ARTHRALGIA: Status: ACTIVE | Noted: 2019-08-06

## 2019-08-06 PROBLEM — R06.09 DYSPNEA ON EXERTION: Status: ACTIVE | Noted: 2019-08-06

## 2019-08-06 PROBLEM — R53.83 OTHER FATIGUE: Status: ACTIVE | Noted: 2019-08-06

## 2019-08-08 ENCOUNTER — CARE COORDINATION (OUTPATIENT)
Dept: CASE MANAGEMENT | Age: 54
End: 2019-08-08

## 2019-08-14 ENCOUNTER — CARE COORDINATION (OUTPATIENT)
Dept: CASE MANAGEMENT | Age: 54
End: 2019-08-14

## 2019-08-15 ENCOUNTER — HOSPITAL ENCOUNTER (OUTPATIENT)
Dept: PREADMISSION TESTING | Age: 54
Discharge: HOME | End: 2019-08-19
Payer: COMMERCIAL

## 2019-08-15 ENCOUNTER — HOSPITAL ENCOUNTER (OUTPATIENT)
Age: 54
Discharge: HOME | End: 2019-08-17
Payer: COMMERCIAL

## 2019-08-15 ENCOUNTER — HOSPITAL ENCOUNTER (OUTPATIENT)
Dept: GENERAL RADIOLOGY | Age: 54
Discharge: HOME | End: 2019-08-17
Payer: COMMERCIAL

## 2019-08-15 ENCOUNTER — HOSPITAL ENCOUNTER (OUTPATIENT)
Age: 54
Discharge: HOME | End: 2019-08-15
Payer: COMMERCIAL

## 2019-08-15 VITALS
SYSTOLIC BLOOD PRESSURE: 120 MMHG | WEIGHT: 255 LBS | BODY MASS INDEX: 38.65 KG/M2 | DIASTOLIC BLOOD PRESSURE: 86 MMHG | OXYGEN SATURATION: 98 % | HEIGHT: 68 IN | HEART RATE: 69 BPM | RESPIRATION RATE: 16 BRPM | TEMPERATURE: 98.1 F

## 2019-08-15 DIAGNOSIS — R05.9 COUGH: ICD-10-CM

## 2019-08-15 DIAGNOSIS — N18.1 CKD (CHRONIC KIDNEY DISEASE) STAGE 1, GFR 90 ML/MIN OR GREATER: ICD-10-CM

## 2019-08-15 DIAGNOSIS — R53.83 OTHER FATIGUE: ICD-10-CM

## 2019-08-15 DIAGNOSIS — R06.09 DYSPNEA ON EXERTION: ICD-10-CM

## 2019-08-15 DIAGNOSIS — M25.50 ARTHRALGIA, UNSPECIFIED JOINT: ICD-10-CM

## 2019-08-15 LAB
ALBUMIN (CALCULATED): 4 G/DL (ref 3.2–5.2)
ALBUMIN PERCENT: 58 % (ref 45–65)
ALPHA 1 PERCENT: 3 % (ref 3–6)
ALPHA 2 PERCENT: 12 % (ref 6–13)
ALPHA-1-GLOBULIN: 0.2 G/DL (ref 0.1–0.4)
ALPHA-2-GLOBULIN: 0.8 G/DL (ref 0.5–0.9)
ANION GAP SERPL CALCULATED.3IONS-SCNC: 11 MMOL/L (ref 9–17)
BETA GLOBULIN: 0.9 G/DL (ref 0.5–1.1)
BETA PERCENT: 12 % (ref 11–19)
BUN BLDV-MCNC: 20 MG/DL (ref 6–20)
C-REACTIVE PROTEIN: 15.9 MG/L (ref 0–5)
CHLORIDE BLD-SCNC: 100 MMOL/L (ref 98–107)
CO2: 26 MMOL/L (ref 20–31)
CREAT SERPL-MCNC: 1.04 MG/DL (ref 0.5–0.9)
GAMMA GLOBULIN %: 15 % (ref 9–20)
GAMMA GLOBULIN: 1.1 G/DL (ref 0.5–1.5)
GFR AFRICAN AMERICAN: >60 ML/MIN
GFR NON-AFRICAN AMERICAN: 55 ML/MIN
GFR SERPL CREATININE-BSD FRML MDRD: ABNORMAL ML/MIN/{1.73_M2}
GFR SERPL CREATININE-BSD FRML MDRD: ABNORMAL ML/MIN/{1.73_M2}
HCT VFR BLD CALC: 43 % (ref 36.3–47.1)
HEMOGLOBIN: 13.1 G/DL (ref 11.9–15.1)
INR BLD: 0.9
MCH RBC QN AUTO: 25.7 PG (ref 25.2–33.5)
MCHC RBC AUTO-ENTMCNC: 30.5 G/DL (ref 28.4–34.8)
MCV RBC AUTO: 84.5 FL (ref 82.6–102.9)
NRBC AUTOMATED: 0 PER 100 WBC
PARTIAL THROMBOPLASTIN TIME: 22.1 SEC (ref 20.5–30.5)
PATHOLOGIST: NORMAL
PDW BLD-RTO: 14.4 % (ref 11.8–14.4)
PLATELET # BLD: 325 K/UL (ref 138–453)
PMV BLD AUTO: 8.9 FL (ref 8.1–13.5)
POTASSIUM SERPL-SCNC: 4.4 MMOL/L (ref 3.7–5.3)
PROTEIN ELECTROPHORESIS, SERUM: NORMAL
PROTHROMBIN TIME: 9.5 SEC (ref 9–12)
RBC # BLD: 5.09 M/UL (ref 3.95–5.11)
RHEUMATOID FACTOR: 12.7 IU/ML
SEDIMENTATION RATE, ERYTHROCYTE: 32 MM (ref 0–20)
SODIUM BLD-SCNC: 137 MMOL/L (ref 135–144)
THYROXINE, FREE: 1.23 NG/DL (ref 0.93–1.7)
TOTAL PROT. SUM,%: 100 % (ref 98–102)
TOTAL PROT. SUM: 7 G/DL (ref 6.3–8.2)
TOTAL PROTEIN: 7 G/DL (ref 6.4–8.3)
TSH SERPL DL<=0.05 MIU/L-ACNC: 2.5 MIU/L (ref 0.3–5)
WBC # BLD: 8.6 K/UL (ref 3.5–11.3)

## 2019-08-15 PROCEDURE — 84155 ASSAY OF PROTEIN SERUM: CPT

## 2019-08-15 PROCEDURE — 85730 THROMBOPLASTIN TIME PARTIAL: CPT

## 2019-08-15 PROCEDURE — 84520 ASSAY OF UREA NITROGEN: CPT

## 2019-08-15 PROCEDURE — 85651 RBC SED RATE NONAUTOMATED: CPT

## 2019-08-15 PROCEDURE — 80051 ELECTROLYTE PANEL: CPT

## 2019-08-15 PROCEDURE — 82565 ASSAY OF CREATININE: CPT

## 2019-08-15 PROCEDURE — 71046 X-RAY EXAM CHEST 2 VIEWS: CPT

## 2019-08-15 PROCEDURE — 84443 ASSAY THYROID STIM HORMONE: CPT

## 2019-08-15 PROCEDURE — 86038 ANTINUCLEAR ANTIBODIES: CPT

## 2019-08-15 PROCEDURE — 87086 URINE CULTURE/COLONY COUNT: CPT

## 2019-08-15 PROCEDURE — 86140 C-REACTIVE PROTEIN: CPT

## 2019-08-15 PROCEDURE — 85610 PROTHROMBIN TIME: CPT

## 2019-08-15 PROCEDURE — 85027 COMPLETE CBC AUTOMATED: CPT

## 2019-08-15 PROCEDURE — 36415 COLL VENOUS BLD VENIPUNCTURE: CPT

## 2019-08-15 PROCEDURE — 84165 PROTEIN E-PHORESIS SERUM: CPT

## 2019-08-15 PROCEDURE — 84439 ASSAY OF FREE THYROXINE: CPT

## 2019-08-15 PROCEDURE — 86431 RHEUMATOID FACTOR QUANT: CPT

## 2019-08-15 RX ORDER — OXYCODONE HYDROCHLORIDE AND ACETAMINOPHEN 5; 325 MG/1; MG/1
2 TABLET ORAL 2 TIMES DAILY PRN
Status: ON HOLD | COMMUNITY
End: 2019-08-24 | Stop reason: SDUPTHER

## 2019-08-15 RX ORDER — SODIUM CHLORIDE 9 MG/ML
INJECTION, SOLUTION INTRAVENOUS CONTINUOUS
Status: CANCELLED | OUTPATIENT
Start: 2019-08-15

## 2019-08-15 SDOH — HEALTH STABILITY: MENTAL HEALTH: HOW OFTEN DO YOU HAVE A DRINK CONTAINING ALCOHOL?: MONTHLY OR LESS

## 2019-08-15 ASSESSMENT — PAIN SCALES - GENERAL: PAINLEVEL_OUTOF10: 3

## 2019-08-15 ASSESSMENT — PAIN DESCRIPTION - ORIENTATION: ORIENTATION: RIGHT

## 2019-08-15 ASSESSMENT — PAIN DESCRIPTION - FREQUENCY: FREQUENCY: INTERMITTENT

## 2019-08-15 ASSESSMENT — PAIN DESCRIPTION - LOCATION: LOCATION: GROIN

## 2019-08-15 ASSESSMENT — PAIN DESCRIPTION - PAIN TYPE: TYPE: CHRONIC PAIN

## 2019-08-15 ASSESSMENT — PAIN DESCRIPTION - ONSET: ONSET: ON-GOING

## 2019-08-15 NOTE — H&P
times daily as needed for Pain., Disp: , Rfl:     irbesartan-hydrochlorothiazide (AVALIDE) 150-12.5 MG per tablet, Take 1 tablet by mouth daily, Disp: 30 tablet, Rfl: 2    amLODIPine (NORVASC) 2.5 MG tablet, Take 1 tablet by mouth daily, Disp: 30 tablet, Rfl: 3    oxybutynin (DITROPAN-XL) 10 MG extended release tablet, Take 1 tablet by mouth daily (Patient taking differently: Take 10 mg by mouth 2 times daily ), Disp: 30 tablet, Rfl: 0    tamsulosin (FLOMAX) 0.4 MG capsule, Take 1 capsule by mouth daily, Disp: 30 capsule, Rfl: 0    albuterol sulfate HFA (PROAIR HFA) 108 (90 Base) MCG/ACT inhaler, Inhale 2 puffs into the lungs every 6 hours as needed for Wheezing, Disp: 1 Inhaler, Rfl: 5    fluticasone (FLONASE) 50 MCG/ACT nasal spray, 1 spray by Nasal route daily as needed for Allergies, Disp: 1 Bottle, Rfl: 5    meloxicam (MOBIC) 15 MG tablet, Take 1 tablet by mouth daily, Disp: 90 tablet, Rfl: 1    omeprazole (PRILOSEC) 20 MG delayed release capsule, Take 1 capsule by mouth 2 times daily, Disp: 180 capsule, Rfl: 3    mometasone (ASMANEX) 220 MCG/INH inhaler, Inhale 1 puff into the lungs daily (Patient taking differently: Inhale 1 puff into the lungs daily as needed ), Disp: 1 Inhaler, Rfl: 3    acetaminophen (TYLENOL) 500 MG tablet, Take 1,000 mg by mouth every 4 hours as needed for Pain., Disp: , Rfl:     Omega-3 Fatty Acids (OMEGA 3 PO), Take 1 tablet by mouth daily , Disp: , Rfl:   Allergies  has No Known Allergies. Family History  family history includes Cancer in her father; Diabetes in her father; Emphysema in her paternal grandmother; Heart Attack in her maternal grandmother; Heart Disease in her father, maternal grandmother, and paternal grandfather; High Blood Pressure in her mother; Hypertension in her father and sister; Other in her father; Prostate Cancer in her maternal grandfather; Stroke in her paternal grandfather. Social History   reports that she has never smoked.  She has never used smokeless tobacco.   reports that she drinks alcohol. reports that she does not use drugs. Marital Status: single  Children: none  Occupation: Trauma Coordinator at [employer] 104:  negative   EYES:  Lens implants bilateral  HENT:  negative   RESPIRATORY:  dry cough  CARDIOVASCULAR:  negative   GASTROINTESTINAL:  nausea  GENITOURINARY:  negative hematuria and   INTEGUMENT/BREAST:  negative   HEMATOLOGIC/LYMPHATIC:  negative   ALLERGIC/IMMUNOLOGIC:  negative hay fever  ENDOCRINE:  negative   MUSCULOSKELETAL:  stiff joints  NEUROLOGICAL:  negative tremor  BEHAVIOR/PSYCH:  negative     OBJECTIVE:     VITALS:  height is 5' 8\" (1.727 m) and weight is 255 lb (115.7 kg). Her oral temperature is 98.1 °F (36.7 °C). Her blood pressure is 120/86 and her pulse is 69. Her respiration is 16 and oxygen saturation is 98%. CONSTITUTIONAL: Alert & oriented x 3, no acute distress. SKIN:  Warm and dry, no rash or erythema. HEAD:  Normocephalic, atraumatic. EYES: PERRLA. EOMs intact. EARS:  Hearing grossly normal.    NOSE:  Nares patent. Septum midline. No rhinorrhea   MOUTH/THROAT:  Unremarkable   NECK: Supple with no lymphadenopathy. LUNGS: Clear to auscultation throughout. No wheezes, rales or rhonchi. CARDIOVASCULAR: Heart rate regular. Rhythm without murmur, click, gallop or rub. ABDOMEN: Soft, non tender, non distended, no masses or organomegaly. EXTREMITIES: No clubbing, cyanosis or edema. Tremor of both hands. TESTING:     EKG: NSR 8/6/19  Labs pending: drawn 8/15/2019   Chest XRay: 8/15/19    IMPRESSION:   1.  Hydronephrosis with renal and ureteral calculus obstruction  - right  2.  has a past medical history of Arthritis, Asthma, CKD (chronic kidney disease) stage 1, GFR 90 ml/min or greater, Class 2 severe obesity with serious comorbidity and body mass index (BMI) of 39.0 to 39.9 in adult [facility]) (5/1/2019), Cystinuria ([facility] Dyspnea on exertion (8/6/2019), Flank pain, Gastroesophageal reflux disease without esophagitis, GERD (gastroesophageal reflux disease), Glucose intolerance (impaired glucose tolerance) (11/22/2017), Hypertension, Hyperuricemia, Hypothyroidism, Kidney stone, Kidney stones, Midline low back pain with right-sided sciatica (6/2/2016), Mixed hyperlipidemia (11/6/2016), Proteinuria, Snores, Solitary kidney, acquired (11/20/2017), Urinary tract obstruction by kidney stone (8/1/2019), and Wears glasses. PLAN:   1. Cystoscopy, insertion of occlusive balloon - right  2. Right nephrostomy tube placement with wire to bladder  3.  Right percutaneous nephrolithotomy    Martín MONTERO-BC, AE-C  Electronically signed 8/15/2019 at 9:57 AM

## 2019-08-16 LAB
ANTI-NUCLEAR ANTIBODY (ANA): NEGATIVE
CULTURE: NORMAL
Lab: NORMAL
SPECIMEN DESCRIPTION: NORMAL

## 2019-08-16 RX ORDER — CIPROFLOXACIN 2 MG/ML
400 INJECTION, SOLUTION INTRAVENOUS ONCE
Status: CANCELLED | OUTPATIENT
Start: 2019-08-16 | End: 2019-08-16

## 2019-08-19 ENCOUNTER — OFFICE VISIT (OUTPATIENT)
Dept: PRIMARY CARE CLINIC | Age: 54
End: 2019-08-19
Payer: COMMERCIAL

## 2019-08-19 VITALS
SYSTOLIC BLOOD PRESSURE: 130 MMHG | DIASTOLIC BLOOD PRESSURE: 64 MMHG | RESPIRATION RATE: 18 BRPM | OXYGEN SATURATION: 96 % | HEART RATE: 93 BPM

## 2019-08-19 DIAGNOSIS — N61.1 LEFT BREAST ABSCESS: Primary | ICD-10-CM

## 2019-08-19 DIAGNOSIS — R73.03 PREDIABETES: ICD-10-CM

## 2019-08-19 PROCEDURE — 99214 OFFICE O/P EST MOD 30 MIN: CPT | Performed by: INTERNAL MEDICINE

## 2019-08-19 PROCEDURE — 10060 I&D ABSCESS SIMPLE/SINGLE: CPT | Performed by: INTERNAL MEDICINE

## 2019-08-19 RX ORDER — METFORMIN HYDROCHLORIDE 500 MG/1
500 TABLET, EXTENDED RELEASE ORAL
Qty: 90 TABLET | Refills: 0 | Status: SHIPPED | OUTPATIENT
Start: 2019-08-19 | End: 2019-12-11

## 2019-08-19 RX ORDER — SULFAMETHOXAZOLE AND TRIMETHOPRIM 800; 160 MG/1; MG/1
1 TABLET ORAL 2 TIMES DAILY
Qty: 14 TABLET | Refills: 0 | Status: SHIPPED | OUTPATIENT
Start: 2019-08-19 | End: 2019-08-26

## 2019-08-20 ASSESSMENT — ENCOUNTER SYMPTOMS
SINUS PRESSURE: 0
DIARRHEA: 0
SHORTNESS OF BREATH: 0
VOMITING: 0
WHEEZING: 0
BACK PAIN: 0
NAUSEA: 0
SINUS PAIN: 0
ABDOMINAL DISTENTION: 0
ABDOMINAL PAIN: 0
CONSTIPATION: 0
COUGH: 0

## 2019-08-21 NOTE — PROGRESS NOTES
medications for this visit. No Known Allergies    Health Maintenance   Topic Date Due    Shingles Vaccine (1 of 2) 03/02/2013    Colon cancer screen colonoscopy  03/02/2013    Cervical cancer screen  12/11/2017    A1C test (Diabetic or Prediabetic)  05/14/2019    DTaP/Tdap/Td vaccine (1 - Tdap) 10/15/2023 (Originally 3/2/1982)    Flu vaccine (1) 09/01/2019    Breast cancer screen  12/04/2019    TSH testing  08/15/2020    Potassium monitoring  08/15/2020    Creatinine monitoring  08/15/2020    Lipid screen  05/14/2023    Pneumococcal 0-64 years Vaccine  Completed    Hepatitis C screen  Completed    HIV screen  Completed       Subjective:      Review of Systems   Constitutional: Negative for activity change, appetite change, chills, fatigue and fever. HENT: Negative for congestion, ear pain, hearing loss, nosebleeds, sinus pressure, sinus pain and sneezing. Eyes: Negative for visual disturbance. Respiratory: Negative for cough, shortness of breath and wheezing. Cardiovascular: Negative for chest pain and palpitations. Gastrointestinal: Negative for abdominal distention, abdominal pain, constipation, diarrhea, nausea and vomiting. Endocrine: Negative for cold intolerance, heat intolerance, polydipsia, polyphagia and polyuria. Genitourinary: Negative for difficulty urinating, menstrual problem, vaginal bleeding and vaginal discharge. Musculoskeletal: Negative for back pain and joint swelling. Skin: Negative for rash. Neurological: Negative for numbness and headaches. Psychiatric/Behavioral: Negative for sleep disturbance. The patient is not nervous/anxious. All other systems reviewed and are negative. Objective:     Physical Exam   Constitutional: She is oriented to person, place, and time. Vital signs are normal. She appears well-developed and well-nourished. She is active. No distress. HENT:   Head: Normocephalic and atraumatic.    Right Ear: Hearing normal.   Left

## 2019-08-23 ENCOUNTER — HOSPITAL ENCOUNTER (INPATIENT)
Age: 54
LOS: 1 days | Discharge: HOME | DRG: 659 | End: 2019-08-27
Attending: UROLOGY | Admitting: UROLOGY
Payer: COMMERCIAL

## 2019-08-23 ENCOUNTER — APPOINTMENT (OUTPATIENT)
Dept: GENERAL RADIOLOGY | Age: 54
DRG: 659 | End: 2019-08-23
Attending: UROLOGY
Payer: COMMERCIAL

## 2019-08-23 ENCOUNTER — ANESTHESIA (OUTPATIENT)
Dept: OPERATING ROOM | Age: 54
DRG: 659 | End: 2019-08-23
Payer: COMMERCIAL

## 2019-08-23 ENCOUNTER — ANESTHESIA EVENT (OUTPATIENT)
Dept: OPERATING ROOM | Age: 54
DRG: 659 | End: 2019-08-23
Payer: COMMERCIAL

## 2019-08-23 ENCOUNTER — ANESTHESIA (OUTPATIENT)
Dept: INTERVENTIONAL RADIOLOGY/VASCULAR | Age: 54
DRG: 659 | End: 2019-08-23
Payer: COMMERCIAL

## 2019-08-23 ENCOUNTER — ANESTHESIA EVENT (OUTPATIENT)
Dept: INTERVENTIONAL RADIOLOGY/VASCULAR | Age: 54
DRG: 659 | End: 2019-08-23
Payer: COMMERCIAL

## 2019-08-23 ENCOUNTER — HOSPITAL ENCOUNTER (OUTPATIENT)
Dept: INTERVENTIONAL RADIOLOGY/VASCULAR | Age: 54
Discharge: HOME | DRG: 659 | End: 2019-08-25
Attending: UROLOGY
Payer: COMMERCIAL

## 2019-08-23 VITALS — DIASTOLIC BLOOD PRESSURE: 87 MMHG | OXYGEN SATURATION: 99 % | SYSTOLIC BLOOD PRESSURE: 132 MMHG

## 2019-08-23 VITALS
DIASTOLIC BLOOD PRESSURE: 69 MMHG | RESPIRATION RATE: 20 BRPM | TEMPERATURE: 94.1 F | OXYGEN SATURATION: 100 % | SYSTOLIC BLOOD PRESSURE: 114 MMHG

## 2019-08-23 VITALS — OXYGEN SATURATION: 96 % | SYSTOLIC BLOOD PRESSURE: 75 MMHG | DIASTOLIC BLOOD PRESSURE: 48 MMHG | TEMPERATURE: 95.8 F

## 2019-08-23 DIAGNOSIS — N20.0 CALCULUS OF KIDNEY: Primary | ICD-10-CM

## 2019-08-23 DIAGNOSIS — R09.02 OXYGEN DESATURATION: ICD-10-CM

## 2019-08-23 DIAGNOSIS — N20.0 KIDNEY STONE: ICD-10-CM

## 2019-08-23 DIAGNOSIS — J90 PLEURAL EFFUSION ON RIGHT: ICD-10-CM

## 2019-08-23 LAB
ANION GAP SERPL CALCULATED.3IONS-SCNC: 16 MMOL/L (ref 9–17)
BUN BLDV-MCNC: 20 MG/DL (ref 6–20)
BUN/CREAT BLD: ABNORMAL (ref 9–20)
CALCIUM SERPL-MCNC: 8.7 MG/DL (ref 8.6–10.4)
CHLORIDE BLD-SCNC: 104 MMOL/L (ref 98–107)
CO2: 19 MMOL/L (ref 20–31)
CREAT SERPL-MCNC: 1.66 MG/DL (ref 0.5–0.9)
GFR AFRICAN AMERICAN: 39 ML/MIN
GFR NON-AFRICAN AMERICAN: 32 ML/MIN
GFR NON-AFRICAN AMERICAN: 47 ML/MIN
GFR SERPL CREATININE-BSD FRML MDRD: 57 ML/MIN
GFR SERPL CREATININE-BSD FRML MDRD: ABNORMAL ML/MIN/{1.73_M2}
GLUCOSE BLD-MCNC: 144 MG/DL (ref 70–99)
GLUCOSE BLD-MCNC: 97 MG/DL (ref 74–100)
HCT VFR BLD CALC: 39.2 % (ref 36.3–47.1)
HEMOGLOBIN: 11.8 G/DL (ref 11.9–15.1)
MCH RBC QN AUTO: 26.2 PG (ref 25.2–33.5)
MCHC RBC AUTO-ENTMCNC: 30.1 G/DL (ref 28.4–34.8)
MCV RBC AUTO: 86.9 FL (ref 82.6–102.9)
NRBC AUTOMATED: 0 PER 100 WBC
PDW BLD-RTO: 14.8 % (ref 11.8–14.4)
PLATELET # BLD: 261 K/UL (ref 138–453)
PMV BLD AUTO: 9.3 FL (ref 8.1–13.5)
POC CREATININE: 1.19 MG/DL (ref 0.51–1.19)
POC POTASSIUM: 4.6 MMOL/L (ref 3.5–4.5)
POTASSIUM SERPL-SCNC: 4.9 MMOL/L (ref 3.7–5.3)
RBC # BLD: 4.51 M/UL (ref 3.95–5.11)
SODIUM BLD-SCNC: 139 MMOL/L (ref 135–144)
WBC # BLD: 11.8 K/UL (ref 3.5–11.3)

## 2019-08-23 PROCEDURE — C2628 CATHETER, OCCLUSION: HCPCS | Performed by: UROLOGY

## 2019-08-23 PROCEDURE — 85027 COMPLETE CBC AUTOMATED: CPT

## 2019-08-23 PROCEDURE — 2580000003 HC RX 258: Performed by: ANESTHESIOLOGY

## 2019-08-23 PROCEDURE — 2580000003 HC RX 258: Performed by: UROLOGY

## 2019-08-23 PROCEDURE — 2709999900 HC NON-CHARGEABLE SUPPLY

## 2019-08-23 PROCEDURE — 3700000000 HC ANESTHESIA ATTENDED CARE

## 2019-08-23 PROCEDURE — 84132 ASSAY OF SERUM POTASSIUM: CPT

## 2019-08-23 PROCEDURE — 6360000002 HC RX W HCPCS: Performed by: STUDENT IN AN ORGANIZED HEALTH CARE EDUCATION/TRAINING PROGRAM

## 2019-08-23 PROCEDURE — 2500000003 HC RX 250 WO HCPCS: Performed by: ANESTHESIOLOGY

## 2019-08-23 PROCEDURE — 3700000001 HC ADD 15 MINUTES (ANESTHESIA): Performed by: UROLOGY

## 2019-08-23 PROCEDURE — 6360000002 HC RX W HCPCS: Performed by: ANESTHESIOLOGY

## 2019-08-23 PROCEDURE — 74018 RADEX ABDOMEN 1 VIEW: CPT

## 2019-08-23 PROCEDURE — C1726 CATH, BAL DIL, NON-VASCULAR: HCPCS | Performed by: UROLOGY

## 2019-08-23 PROCEDURE — 82947 ASSAY GLUCOSE BLOOD QUANT: CPT

## 2019-08-23 PROCEDURE — 3700000000 HC ANESTHESIA ATTENDED CARE: Performed by: UROLOGY

## 2019-08-23 PROCEDURE — G0378 HOSPITAL OBSERVATION PER HR: HCPCS

## 2019-08-23 PROCEDURE — C2617 STENT, NON-COR, TEM W/O DEL: HCPCS | Performed by: UROLOGY

## 2019-08-23 PROCEDURE — 2580000003 HC RX 258: Performed by: STUDENT IN AN ORGANIZED HEALTH CARE EDUCATION/TRAINING PROGRAM

## 2019-08-23 PROCEDURE — C1769 GUIDE WIRE: HCPCS

## 2019-08-23 PROCEDURE — 2720000010 HC SURG SUPPLY STERILE: Performed by: UROLOGY

## 2019-08-23 PROCEDURE — C1894 INTRO/SHEATH, NON-LASER: HCPCS | Performed by: UROLOGY

## 2019-08-23 PROCEDURE — 50433 PLMT NEPHROURETERAL CATHETER: CPT | Performed by: RADIOLOGY

## 2019-08-23 PROCEDURE — 6360000002 HC RX W HCPCS: Performed by: NURSE ANESTHETIST, CERTIFIED REGISTERED

## 2019-08-23 PROCEDURE — 6360000002 HC RX W HCPCS: Performed by: PHYSICIAN ASSISTANT

## 2019-08-23 PROCEDURE — 3600000014 HC SURGERY LEVEL 4 ADDTL 15MIN: Performed by: UROLOGY

## 2019-08-23 PROCEDURE — C1769 GUIDE WIRE: HCPCS | Performed by: UROLOGY

## 2019-08-23 PROCEDURE — 6370000000 HC RX 637 (ALT 250 FOR IP): Performed by: STUDENT IN AN ORGANIZED HEALTH CARE EDUCATION/TRAINING PROGRAM

## 2019-08-23 PROCEDURE — 80048 BASIC METABOLIC PNL TOTAL CA: CPT

## 2019-08-23 PROCEDURE — 0TC08ZZ EXTIRPATION OF MATTER FROM RIGHT KIDNEY, VIA NATURAL OR ARTIFICIAL OPENING ENDOSCOPIC: ICD-10-PCS | Performed by: UROLOGY

## 2019-08-23 PROCEDURE — 3700000001 HC ADD 15 MINUTES (ANESTHESIA)

## 2019-08-23 PROCEDURE — 6370000000 HC RX 637 (ALT 250 FOR IP): Performed by: ANESTHESIOLOGY

## 2019-08-23 PROCEDURE — 2500000003 HC RX 250 WO HCPCS: Performed by: NURSE ANESTHETIST, CERTIFIED REGISTERED

## 2019-08-23 PROCEDURE — 3600000004 HC SURGERY LEVEL 4 BASE: Performed by: UROLOGY

## 2019-08-23 PROCEDURE — 6360000004 HC RX CONTRAST MEDICATION: Performed by: UROLOGY

## 2019-08-23 PROCEDURE — 94640 AIRWAY INHALATION TREATMENT: CPT

## 2019-08-23 PROCEDURE — 2580000003 HC RX 258: Performed by: NURSE ANESTHETIST, CERTIFIED REGISTERED

## 2019-08-23 PROCEDURE — 2709999900 HC NON-CHARGEABLE SUPPLY: Performed by: UROLOGY

## 2019-08-23 PROCEDURE — 6370000000 HC RX 637 (ALT 250 FOR IP): Performed by: UROLOGY

## 2019-08-23 PROCEDURE — 82365 CALCULUS SPECTROSCOPY: CPT

## 2019-08-23 PROCEDURE — BT1D0ZZ FLUOROSCOPY OF RIGHT KIDNEY, URETER AND BLADDER USING HIGH OSMOLAR CONTRAST: ICD-10-PCS | Performed by: UROLOGY

## 2019-08-23 PROCEDURE — 3600000003 HC SURGERY LEVEL 3 BASE: Performed by: UROLOGY

## 2019-08-23 PROCEDURE — 7100000001 HC PACU RECOVERY - ADDTL 15 MIN: Performed by: UROLOGY

## 2019-08-23 PROCEDURE — 7100000000 HC PACU RECOVERY - FIRST 15 MIN: Performed by: UROLOGY

## 2019-08-23 PROCEDURE — 0T768DZ DILATION OF RIGHT URETER WITH INTRALUMINAL DEVICE, VIA NATURAL OR ARTIFICIAL OPENING ENDOSCOPIC: ICD-10-PCS | Performed by: UROLOGY

## 2019-08-23 PROCEDURE — 0TP5X0Z REMOVAL OF DRAINAGE DEVICE FROM KIDNEY, EXTERNAL APPROACH: ICD-10-PCS | Performed by: UROLOGY

## 2019-08-23 PROCEDURE — 3600000013 HC SURGERY LEVEL 3 ADDTL 15MIN: Performed by: UROLOGY

## 2019-08-23 PROCEDURE — 82565 ASSAY OF CREATININE: CPT

## 2019-08-23 PROCEDURE — 0TC68ZZ EXTIRPATION OF MATTER FROM RIGHT URETER, VIA NATURAL OR ARTIFICIAL OPENING ENDOSCOPIC: ICD-10-PCS | Performed by: UROLOGY

## 2019-08-23 DEVICE — URETERAL STENT
Type: IMPLANTABLE DEVICE | Status: FUNCTIONAL
Brand: POLARIS™ ULTRA

## 2019-08-23 RX ORDER — CIPROFLOXACIN 2 MG/ML
400 INJECTION, SOLUTION INTRAVENOUS ONCE
Status: DISCONTINUED | OUTPATIENT
Start: 2019-08-23 | End: 2019-08-23 | Stop reason: SDUPTHER

## 2019-08-23 RX ORDER — FENTANYL CITRATE 50 UG/ML
50 INJECTION, SOLUTION INTRAMUSCULAR; INTRAVENOUS EVERY 5 MIN PRN
Status: DISCONTINUED | OUTPATIENT
Start: 2019-08-23 | End: 2019-08-23

## 2019-08-23 RX ORDER — LIDOCAINE HYDROCHLORIDE 10 MG/ML
1 INJECTION, SOLUTION EPIDURAL; INFILTRATION; INTRACAUDAL; PERINEURAL
Status: COMPLETED | OUTPATIENT
Start: 2019-08-23 | End: 2019-08-23

## 2019-08-23 RX ORDER — OXYCODONE HYDROCHLORIDE AND ACETAMINOPHEN 5; 325 MG/1; MG/1
1 TABLET ORAL EVERY 4 HOURS PRN
Status: DISCONTINUED | OUTPATIENT
Start: 2019-08-23 | End: 2019-08-27 | Stop reason: HOSPADM

## 2019-08-23 RX ORDER — ROCURONIUM BROMIDE 10 MG/ML
INJECTION, SOLUTION INTRAVENOUS PRN
Status: DISCONTINUED | OUTPATIENT
Start: 2019-08-23 | End: 2019-08-23 | Stop reason: SDUPTHER

## 2019-08-23 RX ORDER — PROPOFOL 10 MG/ML
INJECTION, EMULSION INTRAVENOUS CONTINUOUS PRN
Status: DISCONTINUED | OUTPATIENT
Start: 2019-08-23 | End: 2019-08-23 | Stop reason: SDUPTHER

## 2019-08-23 RX ORDER — SCOLOPAMINE TRANSDERMAL SYSTEM 1 MG/1
1 PATCH, EXTENDED RELEASE TRANSDERMAL ONCE
Status: DISCONTINUED | OUTPATIENT
Start: 2019-08-23 | End: 2019-08-26 | Stop reason: HOSPADM

## 2019-08-23 RX ORDER — ULTRASOUND COUPLING MEDIUM
GEL (GRAM) TOPICAL PRN
Status: DISCONTINUED | OUTPATIENT
Start: 2019-08-23 | End: 2019-08-23 | Stop reason: ALTCHOICE

## 2019-08-23 RX ORDER — OXYCODONE HYDROCHLORIDE AND ACETAMINOPHEN 5; 325 MG/1; MG/1
1 TABLET ORAL EVERY 4 HOURS PRN
Status: DISCONTINUED | OUTPATIENT
Start: 2019-08-23 | End: 2019-08-23

## 2019-08-23 RX ORDER — FENTANYL CITRATE 50 UG/ML
25 INJECTION, SOLUTION INTRAMUSCULAR; INTRAVENOUS EVERY 5 MIN PRN
Status: DISCONTINUED | OUTPATIENT
Start: 2019-08-23 | End: 2019-08-23

## 2019-08-23 RX ORDER — ALBUTEROL SULFATE 90 UG/1
2 AEROSOL, METERED RESPIRATORY (INHALATION) EVERY 6 HOURS PRN
Status: DISCONTINUED | OUTPATIENT
Start: 2019-08-23 | End: 2019-08-27 | Stop reason: HOSPADM

## 2019-08-23 RX ORDER — FLUTICASONE PROPIONATE 50 MCG
1 SPRAY, SUSPENSION (ML) NASAL DAILY PRN
Status: DISCONTINUED | OUTPATIENT
Start: 2019-08-23 | End: 2019-08-27 | Stop reason: HOSPADM

## 2019-08-23 RX ORDER — FENTANYL CITRATE 50 UG/ML
INJECTION, SOLUTION INTRAMUSCULAR; INTRAVENOUS PRN
Status: DISCONTINUED | OUTPATIENT
Start: 2019-08-23 | End: 2019-08-23 | Stop reason: SDUPTHER

## 2019-08-23 RX ORDER — SODIUM CHLORIDE 9 MG/ML
INJECTION, SOLUTION INTRAVENOUS CONTINUOUS
Status: DISCONTINUED | OUTPATIENT
Start: 2019-08-23 | End: 2019-08-25

## 2019-08-23 RX ORDER — TAMSULOSIN HYDROCHLORIDE 0.4 MG/1
0.4 CAPSULE ORAL DAILY
Status: DISCONTINUED | OUTPATIENT
Start: 2019-08-23 | End: 2019-08-27 | Stop reason: HOSPADM

## 2019-08-23 RX ORDER — MIDAZOLAM HYDROCHLORIDE 1 MG/ML
1 INJECTION INTRAMUSCULAR; INTRAVENOUS EVERY 10 MIN PRN
Status: DISCONTINUED | OUTPATIENT
Start: 2019-08-23 | End: 2019-08-26 | Stop reason: HOSPADM

## 2019-08-23 RX ORDER — SODIUM CHLORIDE 9 MG/ML
INJECTION, SOLUTION INTRAVENOUS CONTINUOUS PRN
Status: DISCONTINUED | OUTPATIENT
Start: 2019-08-23 | End: 2019-08-23 | Stop reason: SDUPTHER

## 2019-08-23 RX ORDER — LABETALOL HYDROCHLORIDE 5 MG/ML
5 INJECTION, SOLUTION INTRAVENOUS EVERY 10 MIN PRN
Status: DISCONTINUED | OUTPATIENT
Start: 2019-08-23 | End: 2019-08-23

## 2019-08-23 RX ORDER — ONDANSETRON 2 MG/ML
4 INJECTION INTRAMUSCULAR; INTRAVENOUS
Status: DISCONTINUED | OUTPATIENT
Start: 2019-08-23 | End: 2019-08-23

## 2019-08-23 RX ORDER — PROPOFOL 10 MG/ML
INJECTION, EMULSION INTRAVENOUS PRN
Status: DISCONTINUED | OUTPATIENT
Start: 2019-08-23 | End: 2019-08-23 | Stop reason: SDUPTHER

## 2019-08-23 RX ORDER — PROMETHAZINE HYDROCHLORIDE 25 MG/ML
6.25 INJECTION, SOLUTION INTRAMUSCULAR; INTRAVENOUS
Status: DISCONTINUED | OUTPATIENT
Start: 2019-08-23 | End: 2019-08-23

## 2019-08-23 RX ORDER — PANTOPRAZOLE SODIUM 40 MG/1
40 TABLET, DELAYED RELEASE ORAL
Status: DISCONTINUED | OUTPATIENT
Start: 2019-08-24 | End: 2019-08-27 | Stop reason: HOSPADM

## 2019-08-23 RX ORDER — MORPHINE SULFATE 2 MG/ML
2 INJECTION, SOLUTION INTRAMUSCULAR; INTRAVENOUS EVERY 4 HOURS PRN
Status: DISCONTINUED | OUTPATIENT
Start: 2019-08-23 | End: 2019-08-27 | Stop reason: HOSPADM

## 2019-08-23 RX ORDER — CEFAZOLIN SODIUM 1 G/50ML
1 INJECTION, SOLUTION INTRAVENOUS EVERY 8 HOURS
Status: DISCONTINUED | OUTPATIENT
Start: 2019-08-23 | End: 2019-08-24

## 2019-08-23 RX ORDER — LIDOCAINE HYDROCHLORIDE 10 MG/ML
INJECTION, SOLUTION EPIDURAL; INFILTRATION; INTRACAUDAL; PERINEURAL PRN
Status: DISCONTINUED | OUTPATIENT
Start: 2019-08-23 | End: 2019-08-23 | Stop reason: SDUPTHER

## 2019-08-23 RX ORDER — SODIUM CHLORIDE 0.9 % (FLUSH) 0.9 %
10 SYRINGE (ML) INJECTION EVERY 12 HOURS SCHEDULED
Status: DISCONTINUED | OUTPATIENT
Start: 2019-08-23 | End: 2019-08-27 | Stop reason: HOSPADM

## 2019-08-23 RX ORDER — MIDAZOLAM HYDROCHLORIDE 1 MG/ML
INJECTION INTRAMUSCULAR; INTRAVENOUS
Status: DISPENSED
Start: 2019-08-23 | End: 2019-08-23

## 2019-08-23 RX ORDER — ONDANSETRON 4 MG/1
4 TABLET, FILM COATED ORAL 4 TIMES DAILY PRN
Status: DISCONTINUED | OUTPATIENT
Start: 2019-08-23 | End: 2019-08-23

## 2019-08-23 RX ORDER — GLYCOPYRROLATE 1 MG/5 ML
SYRINGE (ML) INTRAVENOUS PRN
Status: DISCONTINUED | OUTPATIENT
Start: 2019-08-23 | End: 2019-08-23 | Stop reason: SDUPTHER

## 2019-08-23 RX ORDER — 0.9 % SODIUM CHLORIDE 0.9 %
VIAL (ML) INJECTION PRN
Status: DISCONTINUED | OUTPATIENT
Start: 2019-08-23 | End: 2019-08-23 | Stop reason: ALTCHOICE

## 2019-08-23 RX ORDER — FLUTICASONE PROPIONATE 110 UG/1
2 AEROSOL, METERED RESPIRATORY (INHALATION) 2 TIMES DAILY
Status: DISCONTINUED | OUTPATIENT
Start: 2019-08-23 | End: 2019-08-27 | Stop reason: HOSPADM

## 2019-08-23 RX ORDER — CIPROFLOXACIN 2 MG/ML
400 INJECTION, SOLUTION INTRAVENOUS ONCE
Status: DISCONTINUED | OUTPATIENT
Start: 2019-08-23 | End: 2019-08-27 | Stop reason: HOSPADM

## 2019-08-23 RX ORDER — OXYCODONE HYDROCHLORIDE AND ACETAMINOPHEN 5; 325 MG/1; MG/1
2 TABLET ORAL EVERY 4 HOURS PRN
Status: DISCONTINUED | OUTPATIENT
Start: 2019-08-23 | End: 2019-08-27 | Stop reason: HOSPADM

## 2019-08-23 RX ORDER — SODIUM CHLORIDE 0.9 % (FLUSH) 0.9 %
10 SYRINGE (ML) INJECTION PRN
Status: DISCONTINUED | OUTPATIENT
Start: 2019-08-23 | End: 2019-08-27 | Stop reason: HOSPADM

## 2019-08-23 RX ORDER — 0.9 % SODIUM CHLORIDE 0.9 %
500 INTRAVENOUS SOLUTION INTRAVENOUS
Status: DISCONTINUED | OUTPATIENT
Start: 2019-08-23 | End: 2019-08-23

## 2019-08-23 RX ORDER — ONDANSETRON 2 MG/ML
4 INJECTION INTRAMUSCULAR; INTRAVENOUS EVERY 6 HOURS PRN
Status: DISCONTINUED | OUTPATIENT
Start: 2019-08-23 | End: 2019-08-27 | Stop reason: HOSPADM

## 2019-08-23 RX ORDER — FENTANYL CITRATE 50 UG/ML
25 INJECTION, SOLUTION INTRAMUSCULAR; INTRAVENOUS EVERY 5 MIN PRN
Status: DISCONTINUED | OUTPATIENT
Start: 2019-08-23 | End: 2019-08-26 | Stop reason: HOSPADM

## 2019-08-23 RX ORDER — HYDRALAZINE HYDROCHLORIDE 20 MG/ML
5 INJECTION INTRAMUSCULAR; INTRAVENOUS EVERY 10 MIN PRN
Status: DISCONTINUED | OUTPATIENT
Start: 2019-08-23 | End: 2019-08-23

## 2019-08-23 RX ORDER — ONDANSETRON 2 MG/ML
INJECTION INTRAMUSCULAR; INTRAVENOUS PRN
Status: DISCONTINUED | OUTPATIENT
Start: 2019-08-23 | End: 2019-08-23 | Stop reason: SDUPTHER

## 2019-08-23 RX ORDER — ONDANSETRON 2 MG/ML
4 INJECTION INTRAMUSCULAR; INTRAVENOUS DAILY PRN
Status: DISCONTINUED | OUTPATIENT
Start: 2019-08-23 | End: 2019-08-26 | Stop reason: HOSPADM

## 2019-08-23 RX ORDER — DOCUSATE SODIUM 100 MG/1
100 CAPSULE, LIQUID FILLED ORAL 2 TIMES DAILY
Status: DISCONTINUED | OUTPATIENT
Start: 2019-08-23 | End: 2019-08-27 | Stop reason: HOSPADM

## 2019-08-23 RX ORDER — MAGNESIUM HYDROXIDE 1200 MG/15ML
LIQUID ORAL CONTINUOUS PRN
Status: COMPLETED | OUTPATIENT
Start: 2019-08-23 | End: 2019-08-23

## 2019-08-23 RX ORDER — CEFAZOLIN SODIUM 1 G/3ML
INJECTION, POWDER, FOR SOLUTION INTRAMUSCULAR; INTRAVENOUS PRN
Status: DISCONTINUED | OUTPATIENT
Start: 2019-08-23 | End: 2019-08-23 | Stop reason: SDUPTHER

## 2019-08-23 RX ORDER — OXYBUTYNIN CHLORIDE 10 MG/1
10 TABLET, EXTENDED RELEASE ORAL DAILY
Status: DISCONTINUED | OUTPATIENT
Start: 2019-08-23 | End: 2019-08-27 | Stop reason: HOSPADM

## 2019-08-23 RX ORDER — SODIUM CHLORIDE, SODIUM LACTATE, POTASSIUM CHLORIDE, CALCIUM CHLORIDE 600; 310; 30; 20 MG/100ML; MG/100ML; MG/100ML; MG/100ML
INJECTION, SOLUTION INTRAVENOUS CONTINUOUS
Status: DISCONTINUED | OUTPATIENT
Start: 2019-08-23 | End: 2019-08-26 | Stop reason: HOSPADM

## 2019-08-23 RX ORDER — MAGNESIUM HYDROXIDE 1200 MG/15ML
LIQUID ORAL PRN
Status: DISCONTINUED | OUTPATIENT
Start: 2019-08-23 | End: 2019-08-23 | Stop reason: ALTCHOICE

## 2019-08-23 RX ORDER — SODIUM CHLORIDE 9 MG/ML
INJECTION, SOLUTION INTRAVENOUS CONTINUOUS
Status: DISCONTINUED | OUTPATIENT
Start: 2019-08-23 | End: 2019-08-23

## 2019-08-23 RX ORDER — AMLODIPINE BESYLATE 2.5 MG/1
2.5 TABLET ORAL DAILY
Status: DISCONTINUED | OUTPATIENT
Start: 2019-08-23 | End: 2019-08-27 | Stop reason: HOSPADM

## 2019-08-23 RX ORDER — DEXAMETHASONE SODIUM PHOSPHATE 10 MG/ML
INJECTION INTRAMUSCULAR; INTRAVENOUS PRN
Status: DISCONTINUED | OUTPATIENT
Start: 2019-08-23 | End: 2019-08-23 | Stop reason: SDUPTHER

## 2019-08-23 RX ORDER — DIPHENHYDRAMINE HYDROCHLORIDE 50 MG/ML
12.5 INJECTION INTRAMUSCULAR; INTRAVENOUS
Status: DISCONTINUED | OUTPATIENT
Start: 2019-08-23 | End: 2019-08-23

## 2019-08-23 RX ORDER — MEPERIDINE HYDROCHLORIDE 50 MG/ML
12.5 INJECTION INTRAMUSCULAR; INTRAVENOUS; SUBCUTANEOUS EVERY 5 MIN PRN
Status: DISCONTINUED | OUTPATIENT
Start: 2019-08-23 | End: 2019-08-23

## 2019-08-23 RX ADMIN — PHENYLEPHRINE HYDROCHLORIDE 100 MCG: 10 INJECTION INTRAVENOUS at 15:08

## 2019-08-23 RX ADMIN — LIDOCAINE HYDROCHLORIDE 50 MG: 10 INJECTION, SOLUTION EPIDURAL; INFILTRATION; INTRACAUDAL at 12:44

## 2019-08-23 RX ADMIN — FENTANYL CITRATE 25 MCG: 50 INJECTION, SOLUTION INTRAMUSCULAR; INTRAVENOUS at 17:04

## 2019-08-23 RX ADMIN — PHENYLEPHRINE HYDROCHLORIDE 100 MCG: 10 INJECTION INTRAVENOUS at 15:27

## 2019-08-23 RX ADMIN — Medication 1 MG: at 16:02

## 2019-08-23 RX ADMIN — OXYCODONE HYDROCHLORIDE AND ACETAMINOPHEN 1 TABLET: 5; 325 TABLET ORAL at 17:23

## 2019-08-23 RX ADMIN — FLUTICASONE PROPIONATE 2 PUFF: 110 AEROSOL, METERED RESPIRATORY (INHALATION) at 20:33

## 2019-08-23 RX ADMIN — PROPOFOL 200 MG: 10 INJECTION, EMULSION INTRAVENOUS at 12:44

## 2019-08-23 RX ADMIN — ONDANSETRON 4 MG: 2 INJECTION INTRAMUSCULAR; INTRAVENOUS at 23:28

## 2019-08-23 RX ADMIN — ROCURONIUM BROMIDE 40 MG: 10 INJECTION INTRAVENOUS at 12:45

## 2019-08-23 RX ADMIN — MORPHINE SULFATE 2 MG: 2 INJECTION, SOLUTION INTRAMUSCULAR; INTRAVENOUS at 21:58

## 2019-08-23 RX ADMIN — FENTANYL CITRATE 25 MCG: 50 INJECTION INTRAMUSCULAR; INTRAVENOUS at 11:51

## 2019-08-23 RX ADMIN — OXYBUTYNIN CHLORIDE 10 MG: 10 TABLET, EXTENDED RELEASE ORAL at 17:23

## 2019-08-23 RX ADMIN — MIDAZOLAM HYDROCHLORIDE 1 MG: 1 INJECTION, SOLUTION INTRAMUSCULAR; INTRAVENOUS at 11:19

## 2019-08-23 RX ADMIN — DEXAMETHASONE SODIUM PHOSPHATE 10 MG: 10 INJECTION INTRAMUSCULAR; INTRAVENOUS at 13:13

## 2019-08-23 RX ADMIN — PHENYLEPHRINE HYDROCHLORIDE 50 MCG: 10 INJECTION INTRAVENOUS at 15:02

## 2019-08-23 RX ADMIN — FENTANYL CITRATE 50 MCG: 50 INJECTION INTRAMUSCULAR; INTRAVENOUS at 12:44

## 2019-08-23 RX ADMIN — LIDOCAINE HYDROCHLORIDE 50 MG: 10 INJECTION, SOLUTION EPIDURAL; INFILTRATION; INTRACAUDAL; PERINEURAL at 11:55

## 2019-08-23 RX ADMIN — FENTANYL CITRATE 50 MCG: 50 INJECTION INTRAMUSCULAR; INTRAVENOUS at 12:36

## 2019-08-23 RX ADMIN — CEFAZOLIN 2000 MG: 1 INJECTION, POWDER, FOR SOLUTION INTRAMUSCULAR; INTRAVENOUS at 15:11

## 2019-08-23 RX ADMIN — CEFAZOLIN SODIUM 1 G: 1 INJECTION, SOLUTION INTRAVENOUS at 21:58

## 2019-08-23 RX ADMIN — FENTANYL CITRATE 100 MCG: 50 INJECTION INTRAMUSCULAR; INTRAVENOUS at 13:43

## 2019-08-23 RX ADMIN — PHENYLEPHRINE HYDROCHLORIDE 100 MCG: 10 INJECTION INTRAVENOUS at 13:31

## 2019-08-23 RX ADMIN — DOCUSATE SODIUM 100 MG: 100 CAPSULE, LIQUID FILLED ORAL at 20:54

## 2019-08-23 RX ADMIN — OXYCODONE HYDROCHLORIDE AND ACETAMINOPHEN 2 TABLET: 5; 325 TABLET ORAL at 23:28

## 2019-08-23 RX ADMIN — ONDANSETRON 4 MG: 2 INJECTION, SOLUTION INTRAMUSCULAR; INTRAVENOUS at 16:03

## 2019-08-23 RX ADMIN — ROCURONIUM BROMIDE 50 MG: 10 INJECTION INTRAVENOUS at 14:08

## 2019-08-23 RX ADMIN — PHENYLEPHRINE HYDROCHLORIDE 100 MCG: 10 INJECTION INTRAVENOUS at 13:23

## 2019-08-23 RX ADMIN — TAMSULOSIN HYDROCHLORIDE 0.4 MG: 0.4 CAPSULE ORAL at 20:54

## 2019-08-23 RX ADMIN — FENTANYL CITRATE 25 MCG: 50 INJECTION INTRAMUSCULAR; INTRAVENOUS at 11:55

## 2019-08-23 RX ADMIN — SODIUM CHLORIDE: 9 INJECTION, SOLUTION INTRAVENOUS at 11:16

## 2019-08-23 RX ADMIN — FENTANYL CITRATE 50 MCG: 50 INJECTION INTRAMUSCULAR; INTRAVENOUS at 11:48

## 2019-08-23 RX ADMIN — PROPOFOL 40 MCG/KG/MIN: 10 INJECTION, EMULSION INTRAVENOUS at 11:55

## 2019-08-23 RX ADMIN — LIDOCAINE HYDROCHLORIDE 5 MG: 10 INJECTION, SOLUTION EPIDURAL; INFILTRATION; INTRACAUDAL; PERINEURAL at 12:13

## 2019-08-23 RX ADMIN — ROCURONIUM BROMIDE 10 MG: 10 INJECTION INTRAVENOUS at 13:44

## 2019-08-23 RX ADMIN — NEOSTIGMINE METHYLSULFATE 5 MG: 1 INJECTION, SOLUTION INTRAMUSCULAR; INTRAVENOUS; SUBCUTANEOUS at 16:02

## 2019-08-23 RX ADMIN — PHENYLEPHRINE HYDROCHLORIDE 100 MCG: 10 INJECTION INTRAVENOUS at 13:29

## 2019-08-23 RX ADMIN — IOVERSOL 20 ML: 509 INJECTION INTRA-ARTERIAL; INTRAVENOUS at 13:53

## 2019-08-23 RX ADMIN — SODIUM CHLORIDE: 9 INJECTION, SOLUTION INTRAVENOUS at 14:04

## 2019-08-23 RX ADMIN — Medication 2 G: at 12:06

## 2019-08-23 RX ADMIN — PHENYLEPHRINE HYDROCHLORIDE 200 MCG: 10 INJECTION INTRAVENOUS at 13:50

## 2019-08-23 RX ADMIN — PHENYLEPHRINE HYDROCHLORIDE 50 MCG: 10 INJECTION INTRAVENOUS at 15:00

## 2019-08-23 RX ADMIN — SODIUM CHLORIDE: 9 INJECTION, SOLUTION INTRAVENOUS at 17:00

## 2019-08-23 ASSESSMENT — PULMONARY FUNCTION TESTS
PIF_VALUE: 28
PIF_VALUE: 20
PIF_VALUE: 20
PIF_VALUE: 1
PIF_VALUE: 28
PIF_VALUE: 1
PIF_VALUE: 28
PIF_VALUE: 0
PIF_VALUE: 28
PIF_VALUE: 29
PIF_VALUE: 26
PIF_VALUE: 1
PIF_VALUE: 28
PIF_VALUE: 27
PIF_VALUE: 28
PIF_VALUE: 3
PIF_VALUE: 1
PIF_VALUE: 28
PIF_VALUE: 27
PIF_VALUE: 32
PIF_VALUE: 1
PIF_VALUE: 30
PIF_VALUE: 28
PIF_VALUE: 40
PIF_VALUE: 20
PIF_VALUE: 30
PIF_VALUE: 8
PIF_VALUE: 28
PIF_VALUE: 28
PIF_VALUE: 26
PIF_VALUE: 23
PIF_VALUE: 27
PIF_VALUE: 31
PIF_VALUE: 28
PIF_VALUE: 28
PIF_VALUE: 0
PIF_VALUE: 28
PIF_VALUE: 1
PIF_VALUE: 1
PIF_VALUE: 28
PIF_VALUE: 28
PIF_VALUE: 30
PIF_VALUE: 28
PIF_VALUE: 29
PIF_VALUE: 1
PIF_VALUE: 28
PIF_VALUE: 1
PIF_VALUE: 29
PIF_VALUE: 28
PIF_VALUE: 1
PIF_VALUE: 30
PIF_VALUE: 28
PIF_VALUE: 28
PIF_VALUE: 30
PIF_VALUE: 28
PIF_VALUE: 29
PIF_VALUE: 1
PIF_VALUE: 29
PIF_VALUE: 30
PIF_VALUE: 30
PIF_VALUE: 28
PIF_VALUE: 29
PIF_VALUE: 28
PIF_VALUE: 5
PIF_VALUE: 26
PIF_VALUE: 28
PIF_VALUE: 1
PIF_VALUE: 28
PIF_VALUE: 20
PIF_VALUE: 28
PIF_VALUE: 29
PIF_VALUE: 28
PIF_VALUE: 28
PIF_VALUE: 32
PIF_VALUE: 28
PIF_VALUE: 8
PIF_VALUE: 26
PIF_VALUE: 28
PIF_VALUE: 28
PIF_VALUE: 1
PIF_VALUE: 28
PIF_VALUE: 27
PIF_VALUE: 19
PIF_VALUE: 29
PIF_VALUE: 29
PIF_VALUE: 28
PIF_VALUE: 28
PIF_VALUE: 30
PIF_VALUE: 29
PIF_VALUE: 28
PIF_VALUE: 29
PIF_VALUE: 23
PIF_VALUE: 28
PIF_VALUE: 0
PIF_VALUE: 1
PIF_VALUE: 34
PIF_VALUE: 29
PIF_VALUE: 29
PIF_VALUE: 1
PIF_VALUE: 28
PIF_VALUE: 0
PIF_VALUE: 28
PIF_VALUE: 28
PIF_VALUE: 1
PIF_VALUE: 28
PIF_VALUE: 26
PIF_VALUE: 28
PIF_VALUE: 26
PIF_VALUE: 29
PIF_VALUE: 27
PIF_VALUE: 21
PIF_VALUE: 29
PIF_VALUE: 20
PIF_VALUE: 2
PIF_VALUE: 19
PIF_VALUE: 1
PIF_VALUE: 28
PIF_VALUE: 28
PIF_VALUE: 31
PIF_VALUE: 28
PIF_VALUE: 28
PIF_VALUE: 1
PIF_VALUE: 28
PIF_VALUE: 3
PIF_VALUE: 1
PIF_VALUE: 29
PIF_VALUE: 29
PIF_VALUE: 1
PIF_VALUE: 28
PIF_VALUE: 29
PIF_VALUE: 31
PIF_VALUE: 30
PIF_VALUE: 30
PIF_VALUE: 1
PIF_VALUE: 28
PIF_VALUE: 28
PIF_VALUE: 29
PIF_VALUE: 27
PIF_VALUE: 28
PIF_VALUE: 28
PIF_VALUE: 1
PIF_VALUE: 28
PIF_VALUE: 27
PIF_VALUE: 28
PIF_VALUE: 28
PIF_VALUE: 0
PIF_VALUE: 1
PIF_VALUE: 29
PIF_VALUE: 28
PIF_VALUE: 27
PIF_VALUE: 28
PIF_VALUE: 26
PIF_VALUE: 28
PIF_VALUE: 30
PIF_VALUE: 28
PIF_VALUE: 27
PIF_VALUE: 27
PIF_VALUE: 28
PIF_VALUE: 1
PIF_VALUE: 36
PIF_VALUE: 1
PIF_VALUE: 1
PIF_VALUE: 28
PIF_VALUE: 29
PIF_VALUE: 27
PIF_VALUE: 2
PIF_VALUE: 28
PIF_VALUE: 30
PIF_VALUE: 30
PIF_VALUE: 28
PIF_VALUE: 1
PIF_VALUE: 28
PIF_VALUE: 28
PIF_VALUE: 30
PIF_VALUE: 1
PIF_VALUE: 28
PIF_VALUE: 20
PIF_VALUE: 27
PIF_VALUE: 20
PIF_VALUE: 28
PIF_VALUE: 29
PIF_VALUE: 0
PIF_VALUE: 28
PIF_VALUE: 1
PIF_VALUE: 27
PIF_VALUE: 30
PIF_VALUE: 8
PIF_VALUE: 27
PIF_VALUE: 29
PIF_VALUE: 27
PIF_VALUE: 27
PIF_VALUE: 1
PIF_VALUE: 28
PIF_VALUE: 1
PIF_VALUE: 28
PIF_VALUE: 29
PIF_VALUE: 3
PIF_VALUE: 28
PIF_VALUE: 28
PIF_VALUE: 26
PIF_VALUE: 19
PIF_VALUE: 1
PIF_VALUE: 29
PIF_VALUE: 28
PIF_VALUE: 28
PIF_VALUE: 1
PIF_VALUE: 2
PIF_VALUE: 28
PIF_VALUE: 1
PIF_VALUE: 28
PIF_VALUE: 28
PIF_VALUE: 1
PIF_VALUE: 33
PIF_VALUE: 28
PIF_VALUE: 28
PIF_VALUE: 1
PIF_VALUE: 28
PIF_VALUE: 31

## 2019-08-23 ASSESSMENT — PAIN DESCRIPTION - DESCRIPTORS
DESCRIPTORS: BURNING
DESCRIPTORS: BURNING
DESCRIPTORS: SQUEEZING;PRESSURE

## 2019-08-23 ASSESSMENT — PAIN DESCRIPTION - LOCATION: LOCATION: BACK;OTHER (COMMENT)

## 2019-08-23 ASSESSMENT — ENCOUNTER SYMPTOMS
SHORTNESS OF BREATH: 1
SHORTNESS OF BREATH: 1

## 2019-08-23 ASSESSMENT — PAIN SCALES - GENERAL
PAINLEVEL_OUTOF10: 4
PAINLEVEL_OUTOF10: 5
PAINLEVEL_OUTOF10: 10
PAINLEVEL_OUTOF10: 4
PAINLEVEL_OUTOF10: 9

## 2019-08-23 ASSESSMENT — PAIN DESCRIPTION - PAIN TYPE: TYPE: ACUTE PAIN;SURGICAL PAIN

## 2019-08-23 ASSESSMENT — PAIN DESCRIPTION - ONSET: ONSET: ON-GOING

## 2019-08-23 ASSESSMENT — PAIN DESCRIPTION - ORIENTATION: ORIENTATION: RIGHT

## 2019-08-23 ASSESSMENT — PAIN - FUNCTIONAL ASSESSMENT: PAIN_FUNCTIONAL_ASSESSMENT: 0-10

## 2019-08-23 ASSESSMENT — PAIN DESCRIPTION - FREQUENCY: FREQUENCY: CONTINUOUS

## 2019-08-23 ASSESSMENT — LIFESTYLE VARIABLES: SMOKING_STATUS: 0

## 2019-08-23 NOTE — ANESTHESIA PRE PROCEDURE
Right 11/21/2017    HOLMIUM LASER - CYSTOSCOPY, RIGHT URETEROSCOPY, RIGHT STENT EXCHANGE,STONE BASKETING performed by Jhonny Epperson MD at [address] [facility] Right 11/18/2017    CYSTOSCOPY URETERAL STENT INSERTION,RIGHT performed by Brie Light MD at [address] [facility] Right 8/2/2019    CYSTOSCOPY RT URETERAL STENT INSERTION performed by Celestino Brennan MD at [address] [address] Bilateral 2008    KNEE ARTHROSCOPY Right 1998, 2003    X2    [place] CYSTO/URETERO/PYELOSCOPY, CALCULUS [place] N/A 7/10/2018    CYSTOSCOPY, RIGHT URETEROSCOPY, HOLMIUM LASER LITHO WITH STONE BASKETING, RIGHT STENT EXCHANGE performed by Isabel Burk MD at [address] [facility], CALCULUS [place] Right 10/10/2018    HOLMIUM LASER STANDBY, CYSTO, RIGHT URETEROSCOPY, RIGHT STENT PLACEMENT performed by Jhonny Epperson MD at [facility] Right 7/4/2018    CYSTOSCOPY URETERAL STENT INSERTION, RIGHT performed by Valentine Dominguez MD at [facility] Left     TOTAL NEPHRECTOMY Left 1988       Social History:    Social History     Tobacco Use    Smoking status: Never Smoker    Smokeless tobacco: Never Used   Substance Use Topics    Alcohol use:  Yes     Alcohol/week: 0.0 standard drinks     Frequency: Monthly or less     Comment: rare                                Counseling given: Not Answered      Vital Signs (Current):   Vitals:    08/23/19 1042   BP: 133/87   Pulse: 78   Resp: 16   Temp: 99.3 °F (37.4 °C)   TempSrc: Temporal   SpO2: 97%   Weight: 255 lb (115.7 kg)   Height: 5' 8\" (1.727 m)                                              BP Readings from Last 3 Encounters:   08/23/19 133/87   08/15/19 120/86   08/19/19 130/64       NPO Status: Time of last liquid consumption: 2130                        Time of last solid consumption: 2130                        Date of last liquid consumption: 08/22/19                        Date of last solid food consumption: 08/22/19    BMI:   Wt Readings from Last 3 Encounters:   08/23/19 255 lb (115.7 kg)   08/15/19 255 lb (115.7 kg)   08/06/19 255 lb (115.7 kg)     Body mass index is 38.77 kg/m². CBC:   Lab Results   Component Value Date    WBC 8.6 08/15/2019    RBC 5.09 08/15/2019    RBC 4.66 03/22/2012    HGB 13.1 08/15/2019    HCT 43.0 08/15/2019    MCV 84.5 08/15/2019    RDW 14.4 08/15/2019     08/15/2019     03/22/2012       CMP:   Lab Results   Component Value Date     08/15/2019    K 4.4 08/15/2019     08/15/2019    CO2 26 08/15/2019    BUN 20 08/15/2019    CREATININE 1.04 08/15/2019    GFRAA >60 08/15/2019    LABGLOM 55 08/15/2019    GLUCOSE 118 08/02/2019    GLUCOSE 88 03/22/2012    PROT 7.0 08/15/2019    CALCIUM 8.6 08/02/2019    BILITOT 0.21 08/01/2019    ALKPHOS 117 08/01/2019    AST 43 08/01/2019    ALT 50 08/01/2019       POC Tests: No results for input(s): POCGLU, POCNA, POCK, POCCL, POCBUN, POCHEMO, POCHCT in the last 72 hours.     Coags:   Lab Results   Component Value Date    PROTIME 9.5 08/15/2019    INR 0.9 08/15/2019    APTT 22.1 08/15/2019       HCG (If Applicable): No results found for: PREGTESTUR, PREGSERUM, HCG, HCGQUANT     ABGs: No results found for: PHART, PO2ART, ZTY4GUY, JIC3CWO, BEART, T8CMYTRV     Type & Screen (If Applicable):  No results found for: LABABO, [address]    Anesthesia Evaluation  Patient summary reviewed no history of anesthetic complications:   Airway: Mallampati: II  TM distance: >3 FB   Neck ROM: full  Mouth opening: > = 3 FB Dental:          Pulmonary:normal exam    (+) shortness of breath: no interval change,  sleep apnea: on noncompliant,  asthma: seasonal asthma,                            Cardiovascular:    (+) hypertension: no interval change and moderate, GRANADOS:,       ECG reviewed  Rhythm: regular  Rate: normal                    Neuro/Psych:   (+) neuromuscular disease:, headaches: migraine headaches,             GI/Hepatic/Renal:   (+) GERD:

## 2019-08-23 NOTE — BRIEF OP NOTE
Brief Postoperative Note    Sherlyn De Leon  YOB: 1965  [id number]    Pre-operative Diagnosis: Right nephrolithiasis    Post-operative Diagnosis: Same    Procedure: Percutaneous catheter guidewire to bladder placement    Anesthesia: General    Surgeons/Assistants: Emanuel    Estimated Blood Loss: less than 50     Complications: None    Specimens: Was Not Obtained    Findings: posterior upper pole access    Electronically signed by Vane Stacy MD on 8/23/2019 at 2:27 PM

## 2019-08-23 NOTE — ANESTHESIA POSTPROCEDURE EVALUATION
Department of Anesthesiology  Postprocedure Note    Patient: Jeromy Oliva  MRN: [id number]  [place]: 1965  Date of evaluation: 8/23/2019  Time:  6:36 PM     Procedure Summary     Date:  08/23/19 Room / Location:  [facility] OR [address] [place] [address] OR    Anesthesia Start:  1401 Anesthesia Stop:  1628    Procedure:  HOLMIUM LASER, NEPHROLITHOTOMY PERCUTANEOUS, LITHOCLAST, C-ARM (Right ) Diagnosis:  (RIGHT KIDNEY STONE)    Surgeon:  Lester Walton MD Responsible Provider:  Fili Vega MD    Anesthesia Type:  MAC, general ASA Status:  3          Anesthesia Type: MAC, general    Imani Phase I: Imani Score: 9    Imani Phase II:      Last vitals: Reviewed and per EMR flowsheets.        Anesthesia Post Evaluation    Patient location during evaluation: PACU  Patient participation: complete - patient participated  Level of consciousness: awake and alert  Pain score: 3  Airway patency: patent  Nausea & Vomiting: no nausea and no vomiting  Complications: no  Cardiovascular status: hemodynamically stable  Respiratory status: acceptable  Hydration status: euvolemic

## 2019-08-23 NOTE — ANESTHESIA PRE PROCEDURE
reflux disease)     Glucose intolerance (impaired glucose tolerance) 11/22/2017    Hypertension     Hyperuricemia     Hypothyroidism     Kidney stones     Midline low back pain with right-sided sciatica 6/2/2016    Mixed hyperlipidemia 11/6/2016    Proteinuria     Snores     Solitary kidney, acquired 11/20/2017    Urinary tract obstruction by kidney stone 8/1/2019    Wears glasses        Past Surgical History:        Procedure Laterality Date    CYSTO/URETERO/PYELOSCOPY, CALCULUS TX Right 11/21/2017    HOLMIUM LASER - CYSTOSCOPY, RIGHT URETEROSCOPY, RIGHT STENT EXCHANGE,STONE BASKETING performed by Vale Long MD at [address] [place] [address] Right 11/18/2017    CYSTOSCOPY URETERAL STENT INSERTION,RIGHT performed by Liliane Yo MD at [address] [place] [address] Right 8/2/2019    CYSTOSCOPY RT URETERAL STENT INSERTION performed by Raven Villa MD at [id number] [place] [address] Bilateral 2008    KNEE ARTHROSCOPY Right 1998, 2003    X2    [place] CYSTO/URETERO/PYELOSCOPY, CALCULUS TX N/A 7/10/2018    CYSTOSCOPY, RIGHT URETEROSCOPY, HOLMIUM LASER LITHO WITH STONE BASKETING, RIGHT STENT EXCHANGE performed by Kayla Portillo MD at [address] [place] [address], CALCULUS TX Right 10/10/2018    HOLMIUM LASER STANDBY, CYSTO, RIGHT URETEROSCOPY, RIGHT STENT PLACEMENT performed by Vale Long MD at [facility], Right 7/4/2018    CYSTOSCOPY URETERAL STENT INSERTION, RIGHT performed by Princess Eric MD at [address] [place] [address] Left     TOTAL NEPHRECTOMY Left 1988       Social History:    Social History     Tobacco Use    Smoking status: Never Smoker    Smokeless tobacco: Never Used   Substance Use Topics    Alcohol use: Yes     Alcohol/week: 0.0 standard drinks     Frequency: Monthly or less     Comment: rare                                Counseling given: Not Answered      Vital Signs (Current):    There were no vitals filed for

## 2019-08-23 NOTE — H&P
Pre-op History and Physical  Rosina Paulson PA-C    Patient:  Leola Bhatti  MRN: [id number]  YOB: 1965    HISTORY OF PRESENT ILLNESS:     The patient is a 62 y.o. female who presents with right ureteral and kidney stones. 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 Here for cystoscopy with occlusive balloon and PCNL. Patient's old records, notes and chart reviewed and summarized above. Rosina Paulson PA-C independently reviewed the images and verified the radiology reports from:    CTAP 8/2/19 Multiple right ureteral stones measuring 4-5 mm each in distal ureter. Nonobstructing stones in kidney largest in inferior pole measure 2.0cm. Xr Chest Standard (2 Vw)    Result Date: 8/15/2019  EXAMINATION: TWO XRAY VIEWS OF THE CHEST 8/15/2019 9:28 am COMPARISON: 11/02/2016 HISTORY: ORDERING SYSTEM PROVIDED HISTORY: Other fatigue TECHNOLOGIST PROVIDED HISTORY: cough Initial exam FINDINGS: No focal infiltrate, pleural effusion or pneumothorax is demonstrated. There is stable elevation of the right hemidiaphragm. The heart is enlarged. There is no evidence of edema. No acute osseous abnormality is seen.      No acute cardiopulmonary disease         Past Medical History:    Past Medical History:   Diagnosis Date    Arthritis     Asthma     Benign familial tremor     CKD (chronic kidney disease) stage 1, GFR 90 ml/min or greater     Class 2 severe obesity with serious comorbidity and body mass index (BMI) of 39.0 to 39.9 in adult ([facility] 5/1/2019    Cystinuria ([facility]     Dyspnea on exertion 8/6/2019    Flank pain     Gastroesophageal reflux disease without esophagitis     GERD (gastroesophageal reflux disease)     Glucose intolerance (impaired glucose tolerance) 11/22/2017    Hypertension     Hyperuricemia     Hypothyroidism     Kidney stones     Midline low back pain with right-sided sciatica 6/2/2016    Mixed Financial resource strain: Not on file    Food insecurity:     Worry: Not on file     Inability: Not on file    Transportation needs:     Medical: Not on file     Non-medical: Not on file   Tobacco Use    Smoking status: Never Smoker    Smokeless tobacco: Never Used   Substance and Sexual Activity    Alcohol use:  Yes     Alcohol/week: 0.0 standard drinks     Frequency: Monthly or less     Comment: rare    Drug use: Never    Sexual activity: Not Currently   Lifestyle    Physical activity:     Days per week: Not on file     Minutes per session: Not on file    Stress: Not on file   Relationships    Social connections:     Talks on phone: Not on file     Gets together: Not on file     Attends Cheondo[employer] service: Not on file     Active member of club or organization: Not on file     Attends meetings of clubs or organizations: Not on file     Relationship status: Not on file    Intimate partner violence:     Fear of current or ex partner: Not on file     Emotionally abused: Not on file     Physically abused: Not on file     Forced sexual activity: Not on file   Other Topics Concern    Not on file   Social History Narrative    Not on file       Family History:    Family History   Problem Relation Age of Onset    High Blood Pressure Mother     Diabetes Father     Cancer Father         tongue    Hypertension Father     Heart Disease Father         stents    Other Father         Mylofibrosis    Hypertension Sister     Heart Disease Maternal Grandmother     Heart Attack Maternal Grandmother     Prostate Cancer Maternal Grandfather     Heart Disease Paternal Grandfather     Stroke Paternal Grandfather     Emphysema Paternal Grandmother        REVIEW OF SYSTEMS:  Constitutional: negative  Eyes: negative  Respiratory: negative  Cardiovascular: negative  Gastrointestinal: negative  Genitourinary: no acute issues  Musculoskeletal: negative  Skin: negative   Neurological: negative  Hematological/Lymphatic: complication    Urolithiasis    Nephrolithiasis    Class 2 severe obesity with serious comorbidity and body mass index (BMI) of 39.0 to 39.9 in adult [facility])    Urinary tract obstruction by kidney stone    Dyspnea on exertion    Arthralgia    Other fatigue    Cough       Plan: Cysto with right occlusive balloon and PCNL in OR today.     Consent obtained    Marysol Bowling PA-C  9:59 AM 8/23/2019

## 2019-08-23 NOTE — ANESTHESIA POSTPROCEDURE EVALUATION
Department of Anesthesiology  Postprocedure Note    Patient: Austin Branch  MRN: [id number]  [place]: 1965  Date of evaluation: 8/23/2019  Time:  2:14 PM     Procedure Summary     Date:  08/23/19 Room / Location:  [facility] Special Procedures    Anesthesia Start:  2492 Anesthesia Stop:      Procedure:  IR NEPHROSTOMY PERCUTANEOUS RIGHT Diagnosis:       Kidney stone      Kidney stone      (Rt wire to bladder)    Scheduled Providers:  [facility] Interventional Radiologist; MIGDALIA Lakhani - CRNA Responsible Provider:  Sil Castle MD    Anesthesia Type:  MAC ASA Status:  3          Anesthesia Type: MAC    Imani Phase I:      Imani Phase II:      Last vitals: Reviewed and per EMR flowsheets.        Anesthesia Post Evaluation    Patient location during evaluation: PACU  Patient participation: complete - patient participated  Level of consciousness: awake  Pain score: 1  Airway patency: patent  Nausea & Vomiting: no nausea and no vomiting  Complications: no  Cardiovascular status: blood pressure returned to baseline and hemodynamically stable  Respiratory status: acceptable  Hydration status: euvolemic  Comments: Kept intubated to OR

## 2019-08-23 NOTE — BRIEF OP NOTE
Brief Postoperative Note  ______________________________________________________________    Patient: Marlon Pinzon  YOB: 1965  MRN: [id number]  Date of Procedure: 8/23/2019    Pre-Op Diagnosis: RIGHT KIDNEY STONE    Post-Op Diagnosis: Same       Procedure(s):  HOLMIUM LASER, NEPHROLITHOTOMY PERCUTANEOUS, LITHOCLAST, C-ARM    Anesthesia: General, Monitor Anesthesia Care    Surgeon(s):  MD Emerson Ford MD    Assistant: Alison Musa MD    Estimated Blood Loss (mL): less than 50     Complications: None    Specimens:   ID Type Source Tests Collected by Time Destination   A : RIGHT KIDNEY STONES Stone (Calculus) Kidney SURGICAL PATHOLOGY Emerson uBnn MD 8/23/2019 1551        Implants:  Implant Name Type Inv. Item Serial No.  Lot No. LRB No. Used   STENT URET 2 POLARIS W/O GW [id number] Stent:Urological STENT URET 2 POLARIS W/O GW [id number]  [place] [employer]: [address] [place] [address] [id number] Right 1         Drains:   Nephrostomy 1 Right 18 fr (Active)       Urethral Catheter Double-lumen; Latex 16 fr (Active)       Findings: We did find balloon from the occlusion balloon catheter inflated in ureter, this was taken down immediately. Remove all stones. Significant edema of ureter around the iliac vessel region.  Stent on right placed    Alison Musa MD  Date: 8/23/2019  Time: 4:17 PM

## 2019-08-23 NOTE — ANESTHESIA PRE PROCEDURE
POCGLU, POCNA, POCK, POCCL, POCBUN, POCHEMO, POCHCT in the last 72 hours. Coags:   Lab Results   Component Value Date    PROTIME 9.5 08/15/2019    INR 0.9 08/15/2019    APTT 22.1 08/15/2019       HCG (If Applicable): No results found for: PREGTESTUR, PREGSERUM, HCG, HCGQUANT     ABGs: No results found for: PHART, PO2ART, ROE4IAD, KGC4CCS, BEART, F1HSPFFB     Type & Screen (If Applicable):  No results found for: LABABO, [address]    Anesthesia Evaluation  Patient summary reviewed and Nursing notes reviewed no history of anesthetic complications:   Airway: Mallampati: III       Mouth opening: > = 3 FB Dental: normal exam         Pulmonary:   (+) sleep apnea: on noncompliant,  decreased breath sounds,  asthma: allergic asthma,     (-) not a current smoker                           Cardiovascular:  Exercise tolerance: good (>4 METS),   (+) hypertension:, GRANADOS:,     (-) past MI and CAD      Rhythm: regular                   ROS comment: NSR ; no acute changes     Neuro/Psych:   (+) neuromuscular disease:, headaches:,    (-) seizures, TIA and CVA           GI/Hepatic/Renal:   (+) GERD: well controlled,          ROS comment: ureteral calculus. Endo/Other:    (+) hypothyroidism::., .                 Abdominal:   (+) obese,         Vascular:     - DVT and PE. Anesthesia Plan      MAC     ASA 3       Induction: intravenous. Anesthetic plan and risks discussed with patient. Plan discussed with CRNA.                   Norma Pettit MD   8/23/2019

## 2019-08-24 ENCOUNTER — APPOINTMENT (OUTPATIENT)
Dept: GENERAL RADIOLOGY | Age: 54
DRG: 659 | End: 2019-08-24
Attending: UROLOGY
Payer: COMMERCIAL

## 2019-08-24 ENCOUNTER — APPOINTMENT (OUTPATIENT)
Dept: CT IMAGING | Age: 54
DRG: 659 | End: 2019-08-24
Attending: UROLOGY
Payer: COMMERCIAL

## 2019-08-24 LAB
ANION GAP SERPL CALCULATED.3IONS-SCNC: 15 MMOL/L (ref 9–17)
BUN BLDV-MCNC: 24 MG/DL (ref 6–20)
BUN/CREAT BLD: ABNORMAL (ref 9–20)
CALCIUM SERPL-MCNC: 8.9 MG/DL (ref 8.6–10.4)
CHLORIDE BLD-SCNC: 102 MMOL/L (ref 98–107)
CO2: 21 MMOL/L (ref 20–31)
CREAT SERPL-MCNC: 1.88 MG/DL (ref 0.5–0.9)
GFR AFRICAN AMERICAN: 34 ML/MIN
GFR NON-AFRICAN AMERICAN: 28 ML/MIN
GFR SERPL CREATININE-BSD FRML MDRD: ABNORMAL ML/MIN/{1.73_M2}
GFR SERPL CREATININE-BSD FRML MDRD: ABNORMAL ML/MIN/{1.73_M2}
GLUCOSE BLD-MCNC: 147 MG/DL (ref 70–99)
HCT VFR BLD CALC: 40.8 % (ref 36.3–47.1)
HEMOGLOBIN: 11.9 G/DL (ref 11.9–15.1)
MCH RBC QN AUTO: 25.7 PG (ref 25.2–33.5)
MCHC RBC AUTO-ENTMCNC: 29.2 G/DL (ref 28.4–34.8)
MCV RBC AUTO: 88.1 FL (ref 82.6–102.9)
NRBC AUTOMATED: 0 PER 100 WBC
PDW BLD-RTO: 15.1 % (ref 11.8–14.4)
PLATELET # BLD: 334 K/UL (ref 138–453)
PMV BLD AUTO: 9.2 FL (ref 8.1–13.5)
POTASSIUM SERPL-SCNC: 4.6 MMOL/L (ref 3.7–5.3)
RBC # BLD: 4.63 M/UL (ref 3.95–5.11)
SODIUM BLD-SCNC: 138 MMOL/L (ref 135–144)
WBC # BLD: 18.2 K/UL (ref 3.5–11.3)

## 2019-08-24 PROCEDURE — G0378 HOSPITAL OBSERVATION PER HR: HCPCS

## 2019-08-24 PROCEDURE — 2580000003 HC RX 258: Performed by: STUDENT IN AN ORGANIZED HEALTH CARE EDUCATION/TRAINING PROGRAM

## 2019-08-24 PROCEDURE — 80048 BASIC METABOLIC PNL TOTAL CA: CPT

## 2019-08-24 PROCEDURE — 96365 THER/PROPH/DIAG IV INF INIT: CPT

## 2019-08-24 PROCEDURE — 6370000000 HC RX 637 (ALT 250 FOR IP): Performed by: STUDENT IN AN ORGANIZED HEALTH CARE EDUCATION/TRAINING PROGRAM

## 2019-08-24 PROCEDURE — 6360000002 HC RX W HCPCS: Performed by: STUDENT IN AN ORGANIZED HEALTH CARE EDUCATION/TRAINING PROGRAM

## 2019-08-24 PROCEDURE — 71045 X-RAY EXAM CHEST 1 VIEW: CPT

## 2019-08-24 PROCEDURE — 94640 AIRWAY INHALATION TREATMENT: CPT

## 2019-08-24 PROCEDURE — 74176 CT ABD & PELVIS W/O CONTRAST: CPT

## 2019-08-24 PROCEDURE — 96376 TX/PRO/DX INJ SAME DRUG ADON: CPT

## 2019-08-24 PROCEDURE — 94760 N-INVAS EAR/PLS OXIMETRY 1: CPT

## 2019-08-24 PROCEDURE — 96375 TX/PRO/DX INJ NEW DRUG ADDON: CPT

## 2019-08-24 PROCEDURE — 36415 COLL VENOUS BLD VENIPUNCTURE: CPT

## 2019-08-24 PROCEDURE — 51798 US URINE CAPACITY MEASURE: CPT

## 2019-08-24 PROCEDURE — 85027 COMPLETE CBC AUTOMATED: CPT

## 2019-08-24 RX ORDER — CYCLOBENZAPRINE HCL 10 MG
10 TABLET ORAL EVERY 6 HOURS PRN
Status: DISCONTINUED | OUTPATIENT
Start: 2019-08-24 | End: 2019-08-27 | Stop reason: HOSPADM

## 2019-08-24 RX ORDER — TAMSULOSIN HYDROCHLORIDE 0.4 MG/1
0.4 CAPSULE ORAL DAILY
Qty: 30 CAPSULE | Refills: 0 | Status: SHIPPED | OUTPATIENT
Start: 2019-08-24 | End: 2019-12-11

## 2019-08-24 RX ORDER — OXYBUTYNIN CHLORIDE 10 MG/1
10 TABLET, EXTENDED RELEASE ORAL DAILY
Qty: 30 TABLET | Refills: 0 | Status: ON HOLD | OUTPATIENT
Start: 2019-08-24 | End: 2019-12-12 | Stop reason: HOSPADM

## 2019-08-24 RX ORDER — CYCLOBENZAPRINE HCL 10 MG
10 TABLET ORAL ONCE
Status: COMPLETED | OUTPATIENT
Start: 2019-08-24 | End: 2019-08-24

## 2019-08-24 RX ORDER — OXYCODONE HYDROCHLORIDE AND ACETAMINOPHEN 5; 325 MG/1; MG/1
1 TABLET ORAL 2 TIMES DAILY PRN
Qty: 20 TABLET | Refills: 0 | Status: SHIPPED | OUTPATIENT
Start: 2019-08-24 | End: 2019-08-31

## 2019-08-24 RX ORDER — OXYBUTYNIN CHLORIDE 5 MG/1
5 TABLET ORAL ONCE
Status: COMPLETED | OUTPATIENT
Start: 2019-08-24 | End: 2019-08-24

## 2019-08-24 RX ADMIN — CEFAZOLIN 1 G: 1 INJECTION, POWDER, FOR SOLUTION INTRAMUSCULAR; INTRAVENOUS at 22:39

## 2019-08-24 RX ADMIN — FLUTICASONE PROPIONATE 2 PUFF: 110 AEROSOL, METERED RESPIRATORY (INHALATION) at 20:05

## 2019-08-24 RX ADMIN — HYDROMORPHONE HYDROCHLORIDE 0.5 MG: 1 INJECTION, SOLUTION INTRAMUSCULAR; INTRAVENOUS; SUBCUTANEOUS at 20:46

## 2019-08-24 RX ADMIN — DOCUSATE SODIUM 100 MG: 100 CAPSULE, LIQUID FILLED ORAL at 09:14

## 2019-08-24 RX ADMIN — MORPHINE SULFATE 2 MG: 2 INJECTION, SOLUTION INTRAMUSCULAR; INTRAVENOUS at 14:01

## 2019-08-24 RX ADMIN — CYCLOBENZAPRINE 10 MG: 10 TABLET, FILM COATED ORAL at 21:30

## 2019-08-24 RX ADMIN — OXYCODONE HYDROCHLORIDE AND ACETAMINOPHEN 1 TABLET: 5; 325 TABLET ORAL at 09:14

## 2019-08-24 RX ADMIN — CEFAZOLIN SODIUM 1 G: 1 INJECTION, SOLUTION INTRAVENOUS at 06:23

## 2019-08-24 RX ADMIN — OXYCODONE HYDROCHLORIDE AND ACETAMINOPHEN 1 TABLET: 5; 325 TABLET ORAL at 19:20

## 2019-08-24 RX ADMIN — SODIUM CHLORIDE, PRESERVATIVE FREE 10 ML: 5 INJECTION INTRAVENOUS at 20:45

## 2019-08-24 RX ADMIN — OXYCODONE HYDROCHLORIDE AND ACETAMINOPHEN 1 TABLET: 5; 325 TABLET ORAL at 13:42

## 2019-08-24 RX ADMIN — OXYCODONE HYDROCHLORIDE AND ACETAMINOPHEN 2 TABLET: 5; 325 TABLET ORAL at 23:35

## 2019-08-24 RX ADMIN — FLUTICASONE PROPIONATE 2 PUFF: 110 AEROSOL, METERED RESPIRATORY (INHALATION) at 08:17

## 2019-08-24 RX ADMIN — OXYBUTYNIN CHLORIDE 5 MG: 5 TABLET ORAL at 02:23

## 2019-08-24 RX ADMIN — MORPHINE SULFATE 2 MG: 2 INJECTION, SOLUTION INTRAMUSCULAR; INTRAVENOUS at 00:42

## 2019-08-24 RX ADMIN — CEFAZOLIN 1 G: 1 INJECTION, POWDER, FOR SOLUTION INTRAMUSCULAR; INTRAVENOUS at 13:06

## 2019-08-24 RX ADMIN — OXYCODONE HYDROCHLORIDE AND ACETAMINOPHEN 2 TABLET: 5; 325 TABLET ORAL at 04:20

## 2019-08-24 RX ADMIN — HYDROMORPHONE HYDROCHLORIDE 0.5 MG: 1 INJECTION, SOLUTION INTRAMUSCULAR; INTRAVENOUS; SUBCUTANEOUS at 17:46

## 2019-08-24 RX ADMIN — MORPHINE SULFATE 2 MG: 2 INJECTION, SOLUTION INTRAMUSCULAR; INTRAVENOUS at 03:13

## 2019-08-24 RX ADMIN — PANTOPRAZOLE SODIUM 40 MG: 40 TABLET, DELAYED RELEASE ORAL at 06:23

## 2019-08-24 RX ADMIN — CYCLOBENZAPRINE 10 MG: 10 TABLET, FILM COATED ORAL at 15:03

## 2019-08-24 RX ADMIN — OXYCODONE HYDROCHLORIDE AND ACETAMINOPHEN 1 TABLET: 5; 325 TABLET ORAL at 13:05

## 2019-08-24 RX ADMIN — DOCUSATE SODIUM 100 MG: 100 CAPSULE, LIQUID FILLED ORAL at 20:45

## 2019-08-24 RX ADMIN — OXYCODONE HYDROCHLORIDE AND ACETAMINOPHEN 1 TABLET: 5; 325 TABLET ORAL at 19:28

## 2019-08-24 ASSESSMENT — PAIN SCALES - GENERAL
PAINLEVEL_OUTOF10: 5
PAINLEVEL_OUTOF10: 9
PAINLEVEL_OUTOF10: 8
PAINLEVEL_OUTOF10: 4
PAINLEVEL_OUTOF10: 8
PAINLEVEL_OUTOF10: 9
PAINLEVEL_OUTOF10: 4
PAINLEVEL_OUTOF10: 8
PAINLEVEL_OUTOF10: 6
PAINLEVEL_OUTOF10: 10
PAINLEVEL_OUTOF10: 9
PAINLEVEL_OUTOF10: 5
PAINLEVEL_OUTOF10: 10
PAINLEVEL_OUTOF10: 9
PAINLEVEL_OUTOF10: 8
PAINLEVEL_OUTOF10: 4

## 2019-08-24 ASSESSMENT — PAIN DESCRIPTION - PAIN TYPE: TYPE: ACUTE PAIN

## 2019-08-24 ASSESSMENT — PAIN DESCRIPTION - ORIENTATION: ORIENTATION: RIGHT

## 2019-08-24 ASSESSMENT — PAIN DESCRIPTION - DESCRIPTORS: DESCRIPTORS: ACHING;CRUSHING

## 2019-08-24 ASSESSMENT — PAIN DESCRIPTION - LOCATION: LOCATION: FLANK

## 2019-08-24 ASSESSMENT — PAIN DESCRIPTION - FREQUENCY: FREQUENCY: CONTINUOUS

## 2019-08-24 NOTE — DISCHARGE SUMMARY
DISCHARGE SUMMARY NOTE:      Patient Identification  PATIENT: Nikki Pedroza is a 62 y.o. female. MRN: [id number]  :  1965  Admit Date:  2019  Discharge date:   No discharge date for patient encounter. Disposition: home  Discharged Condition:  good  Discharge Diagnoses:   Patient Active Problem List   Diagnosis    Kidney stones    Essential hypertension    Acquired hypothyroidism    CKD (chronic kidney disease) stage 1, GFR 90 ml/min or greater    Cystinuria ([address]    Hyperuricemia    Proteinuria    Knee pain, acute    Osteoarthritis of both knees    Gastroesophageal reflux disease without esophagitis    ESTHER (obstructive sleep apnea)    Midline low back pain with right-sided sciatica    Mixed hyperlipidemia    Solitary kidney, acquired    Right ureteral stone    Obesity (BMI 30-39. 9)    Acute kidney injury ([address]    Leukocytosis    Acute intractable tension-type headache    Glucose intolerance (impaired glucose tolerance)    Kidney stone    Bunion of great toe of right foot    Mild intermittent asthma without complication    Urolithiasis    Nephrolithiasis    Class 2 severe obesity with serious comorbidity and body mass index (BMI) of 39.0 to 39.9 in adult [facility])    Urinary tract obstruction by kidney stone    Dyspnea on exertion    Arthralgia    Other fatigue    Cough       Consults: none    Surgery: Procedure(s):  HOLMIUM LASER, NEPHROLITHOTOMY PERCUTANEOUS, LITHOCLAST, C-ARM     Patient Instructions: Activity: As tolerated  Diet: As tolerated  Patient told to follow up with Dr. Kory Higginbotham  in 2 week(s). Discharge Medications:    Warren Mascorro   Home Medication Instructions ORP:[id number]    Printed on:19 1342   Medication Information                      acetaminophen (TYLENOL) 500 MG tablet  Take 1,000 mg by mouth every 4 hours as needed for Pain.              albuterol sulfate HFA (PROAIR HFA) 108 (90 Base) MCG/ACT

## 2019-08-25 ENCOUNTER — APPOINTMENT (OUTPATIENT)
Dept: GENERAL RADIOLOGY | Age: 54
DRG: 659 | End: 2019-08-25
Attending: UROLOGY
Payer: COMMERCIAL

## 2019-08-25 LAB
ANION GAP SERPL CALCULATED.3IONS-SCNC: 12 MMOL/L (ref 9–17)
BUN BLDV-MCNC: 27 MG/DL (ref 6–20)
BUN/CREAT BLD: ABNORMAL (ref 9–20)
CALCIUM SERPL-MCNC: 8.7 MG/DL (ref 8.6–10.4)
CHLORIDE BLD-SCNC: 103 MMOL/L (ref 98–107)
CO2: 22 MMOL/L (ref 20–31)
CREAT SERPL-MCNC: 1.92 MG/DL (ref 0.5–0.9)
D-DIMER QUANTITATIVE: 0.46 MG/L FEU
GFR AFRICAN AMERICAN: 33 ML/MIN
GFR NON-AFRICAN AMERICAN: 27 ML/MIN
GFR SERPL CREATININE-BSD FRML MDRD: ABNORMAL ML/MIN/{1.73_M2}
GFR SERPL CREATININE-BSD FRML MDRD: ABNORMAL ML/MIN/{1.73_M2}
GLUCOSE BLD-MCNC: 125 MG/DL (ref 70–99)
HCT VFR BLD CALC: 37 % (ref 36.3–47.1)
HEMOGLOBIN: 10.6 G/DL (ref 11.9–15.1)
MCH RBC QN AUTO: 25.3 PG (ref 25.2–33.5)
MCHC RBC AUTO-ENTMCNC: 28.6 G/DL (ref 28.4–34.8)
MCV RBC AUTO: 88.3 FL (ref 82.6–102.9)
NRBC AUTOMATED: 0 PER 100 WBC
PDW BLD-RTO: 15.3 % (ref 11.8–14.4)
PLATELET # BLD: 247 K/UL (ref 138–453)
PMV BLD AUTO: 8.8 FL (ref 8.1–13.5)
POTASSIUM SERPL-SCNC: 4.8 MMOL/L (ref 3.7–5.3)
RBC # BLD: 4.19 M/UL (ref 3.95–5.11)
SODIUM BLD-SCNC: 137 MMOL/L (ref 135–144)
WBC # BLD: 12.8 K/UL (ref 3.5–11.3)

## 2019-08-25 PROCEDURE — 6370000000 HC RX 637 (ALT 250 FOR IP): Performed by: STUDENT IN AN ORGANIZED HEALTH CARE EDUCATION/TRAINING PROGRAM

## 2019-08-25 PROCEDURE — 94660 CPAP INITIATION&MGMT: CPT

## 2019-08-25 PROCEDURE — 6360000002 HC RX W HCPCS: Performed by: STUDENT IN AN ORGANIZED HEALTH CARE EDUCATION/TRAINING PROGRAM

## 2019-08-25 PROCEDURE — 94640 AIRWAY INHALATION TREATMENT: CPT

## 2019-08-25 PROCEDURE — 36600 WITHDRAWAL OF ARTERIAL BLOOD: CPT

## 2019-08-25 PROCEDURE — 85379 FIBRIN DEGRADATION QUANT: CPT

## 2019-08-25 PROCEDURE — 96376 TX/PRO/DX INJ SAME DRUG ADON: CPT

## 2019-08-25 PROCEDURE — 96375 TX/PRO/DX INJ NEW DRUG ADDON: CPT

## 2019-08-25 PROCEDURE — G0378 HOSPITAL OBSERVATION PER HR: HCPCS

## 2019-08-25 PROCEDURE — 71045 X-RAY EXAM CHEST 1 VIEW: CPT

## 2019-08-25 PROCEDURE — 85027 COMPLETE CBC AUTOMATED: CPT

## 2019-08-25 PROCEDURE — 2700000000 HC OXYGEN THERAPY PER DAY

## 2019-08-25 PROCEDURE — 2580000003 HC RX 258: Performed by: STUDENT IN AN ORGANIZED HEALTH CARE EDUCATION/TRAINING PROGRAM

## 2019-08-25 PROCEDURE — 96366 THER/PROPH/DIAG IV INF ADDON: CPT

## 2019-08-25 PROCEDURE — 82803 BLOOD GASES ANY COMBINATION: CPT

## 2019-08-25 PROCEDURE — 94760 N-INVAS EAR/PLS OXIMETRY 1: CPT

## 2019-08-25 PROCEDURE — 36415 COLL VENOUS BLD VENIPUNCTURE: CPT

## 2019-08-25 PROCEDURE — 80048 BASIC METABOLIC PNL TOTAL CA: CPT

## 2019-08-25 RX ORDER — ALBUTEROL SULFATE 2.5 MG/3ML
2.5 SOLUTION RESPIRATORY (INHALATION)
Status: DISCONTINUED | OUTPATIENT
Start: 2019-08-25 | End: 2019-08-27 | Stop reason: HOSPADM

## 2019-08-25 RX ORDER — ACETAMINOPHEN 325 MG/1
650 TABLET ORAL ONCE
Status: COMPLETED | OUTPATIENT
Start: 2019-08-25 | End: 2019-08-25

## 2019-08-25 RX ADMIN — CEFAZOLIN 1 G: 1 INJECTION, POWDER, FOR SOLUTION INTRAMUSCULAR; INTRAVENOUS at 22:44

## 2019-08-25 RX ADMIN — OXYBUTYNIN CHLORIDE 10 MG: 10 TABLET, EXTENDED RELEASE ORAL at 12:24

## 2019-08-25 RX ADMIN — TAMSULOSIN HYDROCHLORIDE 0.4 MG: 0.4 CAPSULE ORAL at 12:25

## 2019-08-25 RX ADMIN — DOCUSATE SODIUM 100 MG: 100 CAPSULE, LIQUID FILLED ORAL at 12:24

## 2019-08-25 RX ADMIN — ACETAMINOPHEN 650 MG: 325 TABLET ORAL at 22:42

## 2019-08-25 RX ADMIN — HYDROMORPHONE HYDROCHLORIDE 0.5 MG: 1 INJECTION, SOLUTION INTRAMUSCULAR; INTRAVENOUS; SUBCUTANEOUS at 06:29

## 2019-08-25 RX ADMIN — ONDANSETRON 4 MG: 2 INJECTION INTRAMUSCULAR; INTRAVENOUS at 19:20

## 2019-08-25 RX ADMIN — OXYCODONE HYDROCHLORIDE AND ACETAMINOPHEN 2 TABLET: 5; 325 TABLET ORAL at 03:30

## 2019-08-25 RX ADMIN — CEFAZOLIN 1 G: 1 INJECTION, POWDER, FOR SOLUTION INTRAMUSCULAR; INTRAVENOUS at 16:54

## 2019-08-25 RX ADMIN — HYDROMORPHONE HYDROCHLORIDE 0.5 MG: 1 INJECTION, SOLUTION INTRAMUSCULAR; INTRAVENOUS; SUBCUTANEOUS at 02:00

## 2019-08-25 RX ADMIN — CYCLOBENZAPRINE 10 MG: 10 TABLET, FILM COATED ORAL at 14:36

## 2019-08-25 RX ADMIN — CYCLOBENZAPRINE 10 MG: 10 TABLET, FILM COATED ORAL at 03:30

## 2019-08-25 RX ADMIN — SODIUM CHLORIDE, PRESERVATIVE FREE 10 ML: 5 INJECTION INTRAVENOUS at 22:43

## 2019-08-25 RX ADMIN — FLUTICASONE PROPIONATE 2 PUFF: 110 AEROSOL, METERED RESPIRATORY (INHALATION) at 08:50

## 2019-08-25 RX ADMIN — PANTOPRAZOLE SODIUM 40 MG: 40 TABLET, DELAYED RELEASE ORAL at 06:29

## 2019-08-25 ASSESSMENT — PAIN SCALES - GENERAL
PAINLEVEL_OUTOF10: 7
PAINLEVEL_OUTOF10: 8
PAINLEVEL_OUTOF10: 5
PAINLEVEL_OUTOF10: 2
PAINLEVEL_OUTOF10: 8
PAINLEVEL_OUTOF10: 7

## 2019-08-26 ENCOUNTER — APPOINTMENT (OUTPATIENT)
Dept: CT IMAGING | Age: 54
DRG: 659 | End: 2019-08-26
Attending: UROLOGY
Payer: COMMERCIAL

## 2019-08-26 PROBLEM — R09.02 OXYGEN DESATURATION: Status: ACTIVE | Noted: 2019-08-26

## 2019-08-26 LAB
ALLEN TEST: POSITIVE
ANION GAP SERPL CALCULATED.3IONS-SCNC: 10 MMOL/L (ref 9–17)
BUN BLDV-MCNC: 13 MG/DL (ref 6–20)
BUN/CREAT BLD: ABNORMAL (ref 9–20)
CALCIUM SERPL-MCNC: 8.9 MG/DL (ref 8.6–10.4)
CHLORIDE BLD-SCNC: 104 MMOL/L (ref 98–107)
CO2: 23 MMOL/L (ref 20–31)
CREAT SERPL-MCNC: 0.73 MG/DL (ref 0.5–0.9)
FIO2: 1
GFR AFRICAN AMERICAN: >60 ML/MIN
GFR NON-AFRICAN AMERICAN: >60 ML/MIN
GFR SERPL CREATININE-BSD FRML MDRD: ABNORMAL ML/MIN/{1.73_M2}
GFR SERPL CREATININE-BSD FRML MDRD: ABNORMAL ML/MIN/{1.73_M2}
GLUCOSE BLD-MCNC: 113 MG/DL (ref 70–99)
HCT VFR BLD CALC: 36 % (ref 36.3–47.1)
HEMOGLOBIN: 10.4 G/DL (ref 11.9–15.1)
MCH RBC QN AUTO: 25.9 PG (ref 25.2–33.5)
MCHC RBC AUTO-ENTMCNC: 28.9 G/DL (ref 28.4–34.8)
MCV RBC AUTO: 89.6 FL (ref 82.6–102.9)
MODE: ABNORMAL
NEGATIVE BASE EXCESS, ART: 1 (ref 0–2)
NRBC AUTOMATED: 0 PER 100 WBC
O2 DEVICE/FLOW/%: ABNORMAL
PATIENT TEMP: ABNORMAL
PDW BLD-RTO: 14.8 % (ref 11.8–14.4)
PLATELET # BLD: 215 K/UL (ref 138–453)
PMV BLD AUTO: 8.9 FL (ref 8.1–13.5)
POC HCO3: 24.8 MMOL/L (ref 21–28)
POC O2 SATURATION: 40 % (ref 94–98)
POC PCO2 TEMP: ABNORMAL MM HG
POC PCO2: 43.3 MM HG (ref 35–48)
POC PH TEMP: ABNORMAL
POC PH: 7.37 (ref 7.35–7.45)
POC PO2 TEMP: ABNORMAL MM HG
POC PO2: 23.8 MM HG (ref 83–108)
POSITIVE BASE EXCESS, ART: ABNORMAL (ref 0–3)
POTASSIUM SERPL-SCNC: 4.3 MMOL/L (ref 3.7–5.3)
RBC # BLD: 4.02 M/UL (ref 3.95–5.11)
SAMPLE SITE: ABNORMAL
SODIUM BLD-SCNC: 137 MMOL/L (ref 135–144)
TCO2 (CALC), ART: 26 MMOL/L (ref 22–29)
WBC # BLD: 9.3 K/UL (ref 3.5–11.3)

## 2019-08-26 PROCEDURE — 96366 THER/PROPH/DIAG IV INF ADDON: CPT

## 2019-08-26 PROCEDURE — 6370000000 HC RX 637 (ALT 250 FOR IP): Performed by: STUDENT IN AN ORGANIZED HEALTH CARE EDUCATION/TRAINING PROGRAM

## 2019-08-26 PROCEDURE — 71250 CT THORAX DX C-: CPT

## 2019-08-26 PROCEDURE — 6360000002 HC RX W HCPCS: Performed by: STUDENT IN AN ORGANIZED HEALTH CARE EDUCATION/TRAINING PROGRAM

## 2019-08-26 PROCEDURE — 36415 COLL VENOUS BLD VENIPUNCTURE: CPT

## 2019-08-26 PROCEDURE — 80048 BASIC METABOLIC PNL TOTAL CA: CPT

## 2019-08-26 PROCEDURE — 99254 IP/OBS CNSLTJ NEW/EST MOD 60: CPT | Performed by: INTERNAL MEDICINE

## 2019-08-26 PROCEDURE — 94640 AIRWAY INHALATION TREATMENT: CPT

## 2019-08-26 PROCEDURE — 1200000000 HC SEMI PRIVATE

## 2019-08-26 PROCEDURE — 96376 TX/PRO/DX INJ SAME DRUG ADON: CPT

## 2019-08-26 PROCEDURE — 85027 COMPLETE CBC AUTOMATED: CPT

## 2019-08-26 PROCEDURE — 2580000003 HC RX 258: Performed by: STUDENT IN AN ORGANIZED HEALTH CARE EDUCATION/TRAINING PROGRAM

## 2019-08-26 RX ORDER — ACETAMINOPHEN 325 MG/1
650 TABLET ORAL EVERY 4 HOURS PRN
Status: DISCONTINUED | OUTPATIENT
Start: 2019-08-26 | End: 2019-08-27 | Stop reason: HOSPADM

## 2019-08-26 RX ORDER — IRBESARTAN AND HYDROCHLOROTHIAZIDE 150; 12.5 MG/1; MG/1
1 TABLET, FILM COATED ORAL DAILY
Status: CANCELLED | OUTPATIENT
Start: 2019-08-26

## 2019-08-26 RX ORDER — PROMETHAZINE HYDROCHLORIDE 25 MG/ML
6.25 INJECTION, SOLUTION INTRAMUSCULAR; INTRAVENOUS ONCE
Status: COMPLETED | OUTPATIENT
Start: 2019-08-26 | End: 2019-08-26

## 2019-08-26 RX ADMIN — OXYCODONE HYDROCHLORIDE AND ACETAMINOPHEN 1 TABLET: 5; 325 TABLET ORAL at 20:35

## 2019-08-26 RX ADMIN — CEFAZOLIN 1 G: 1 INJECTION, POWDER, FOR SOLUTION INTRAMUSCULAR; INTRAVENOUS at 06:38

## 2019-08-26 RX ADMIN — ACETAMINOPHEN 650 MG: 325 TABLET ORAL at 08:21

## 2019-08-26 RX ADMIN — ACETAMINOPHEN 650 MG: 325 TABLET ORAL at 16:50

## 2019-08-26 RX ADMIN — SODIUM CHLORIDE, PRESERVATIVE FREE 10 ML: 5 INJECTION INTRAVENOUS at 20:53

## 2019-08-26 RX ADMIN — AMLODIPINE BESYLATE 2.5 MG: 2.5 TABLET ORAL at 08:21

## 2019-08-26 RX ADMIN — PANTOPRAZOLE SODIUM 40 MG: 40 TABLET, DELAYED RELEASE ORAL at 06:38

## 2019-08-26 RX ADMIN — ONDANSETRON 4 MG: 2 INJECTION INTRAMUSCULAR; INTRAVENOUS at 08:18

## 2019-08-26 RX ADMIN — CEFAZOLIN 1 G: 1 INJECTION, POWDER, FOR SOLUTION INTRAMUSCULAR; INTRAVENOUS at 17:54

## 2019-08-26 RX ADMIN — DOCUSATE SODIUM 100 MG: 100 CAPSULE, LIQUID FILLED ORAL at 08:21

## 2019-08-26 RX ADMIN — OXYBUTYNIN CHLORIDE 10 MG: 10 TABLET, EXTENDED RELEASE ORAL at 08:21

## 2019-08-26 RX ADMIN — PROMETHAZINE HYDROCHLORIDE 6.25 MG: 25 INJECTION INTRAMUSCULAR; INTRAVENOUS at 20:29

## 2019-08-26 RX ADMIN — FLUTICASONE PROPIONATE 2 PUFF: 110 AEROSOL, METERED RESPIRATORY (INHALATION) at 08:52

## 2019-08-26 RX ADMIN — FLUTICASONE PROPIONATE 2 PUFF: 110 AEROSOL, METERED RESPIRATORY (INHALATION) at 20:09

## 2019-08-26 RX ADMIN — TAMSULOSIN HYDROCHLORIDE 0.4 MG: 0.4 CAPSULE ORAL at 08:21

## 2019-08-26 RX ADMIN — ONDANSETRON 4 MG: 2 INJECTION INTRAMUSCULAR; INTRAVENOUS at 16:50

## 2019-08-26 ASSESSMENT — PAIN DESCRIPTION - LOCATION
LOCATION: FLANK
LOCATION: HEAD;FLANK

## 2019-08-26 ASSESSMENT — PAIN DESCRIPTION - PROGRESSION

## 2019-08-26 ASSESSMENT — PAIN SCALES - GENERAL
PAINLEVEL_OUTOF10: 5
PAINLEVEL_OUTOF10: 3
PAINLEVEL_OUTOF10: 6
PAINLEVEL_OUTOF10: 4
PAINLEVEL_OUTOF10: 5
PAINLEVEL_OUTOF10: 1

## 2019-08-26 ASSESSMENT — PAIN DESCRIPTION - ORIENTATION: ORIENTATION: RIGHT

## 2019-08-26 ASSESSMENT — PAIN DESCRIPTION - PAIN TYPE
TYPE: ACUTE PAIN
TYPE: ACUTE PAIN

## 2019-08-26 ASSESSMENT — PAIN DESCRIPTION - ONSET: ONSET: ON-GOING

## 2019-08-26 ASSESSMENT — PAIN DESCRIPTION - FREQUENCY: FREQUENCY: INTERMITTENT

## 2019-08-26 ASSESSMENT — PAIN DESCRIPTION - DESCRIPTORS: DESCRIPTORS: ACHING;CRUSHING

## 2019-08-26 NOTE — PROGRESS NOTES
Urology Progress Note      Subjective: Gail Young is a 62 y.o. female. His/Her current Diet is: DIET GENERAL;. The patient is  Post-Op from Procedure(s):  HOLMIUM LASER, NEPHROLITHOTOMY PERCUTANEOUS, LITHOCLAST, C-ARM  Saturating in 90s on 2 L NC oxygen. No fevers or chills. No nausea or vomiting. No calf pain. Pain Controlled. Ambulating. Tolerating PO Diet. Vitals and Labs:  Vitals:    08/25/19 1942 08/25/19 2004 08/25/19 2243 08/26/19 0102   BP: (!) 148/87  (!) 141/79 132/77   Pulse: 87  70 67   Resp: 26 16 26 24   Temp: 99.5 °F (37.5 °C)  98.7 °F (37.1 °C) 98.2 °F (36.8 °C)   TempSrc: Oral  Axillary Temporal   SpO2: 92%  91% 93%   Weight:       Height:         I/O last 3 completed shifts: In: 525 [P.O.:400; I.V.:125]  Out: 1450 [Urine:1450]    Recent Labs     08/23/19  1702 08/24/19  1525 08/25/19  0922   WBC 11.8* 18.2* 12.8*   HGB 11.8* 11.9 10.6*   HCT 39.2 40.8 37.0   MCV 86.9 88.1 88.3    334 247     Recent Labs     08/23/19  1702 08/24/19  1525 08/25/19  0922    138 137   K 4.9 4.6 4.8    102 103   CO2 19* 21 22   BUN 20 24* 27*   CREATININE 1.66* 1.88* 1.92*       No results for input(s): COLORU, PHUR, LABCAST, WBCUA, RBCUA, MUCUS, TRICHOMONAS, YEAST, BACTERIA, CLARITYU, SPECGRAV, LEUKOCYTESUR, UROBILINOGEN, BILIRUBINUR, BLOODU in the last 72 hours. Invalid input(s): NITRATE, GLUCOSEUKETONESUAMORPHOUS      Physical Exam:  NAD  A/O x 3  RRR  Non labored breathing. Abdomen soft, appropriately-tender, non-distended. No CVA tenderness. PCNT removed, dressing in place  No calf pain. EPCs on. Machine turned on. CXR: 8/25/19:  Bibasilar opacities possibly representing pleural effusions and atelectasis. Impression:    Gail Young is a 62 y.o. female with   Problem(s):  - renal stone s/p right PCNL on 8/23  - oxygen desaturations     Plan:  Regular diet. Pain control. Incentive spirometry times 10 per hour. EPCs. Ambulate.   F/u am labs  Appreciate pulmonology recommendations, now satting mid 90s on mask ventilation      Ben Jones  6:46 AM 8/26/2019     Attending Physician Statement  I have discussed the care of Eliel Alejandro including pertinent history and exam findings with the resident. I have seen and examined the patient and the key elements of all parts of the encounter have been performed by me. I agree with the assessment, plan, and orders as documented by the resident. (GC Modifier). I agree. Appreciate pulmonary recs. Supportive care.     Dr. Gordo Caldwell MD

## 2019-08-26 NOTE — PLAN OF CARE
Problem: Falls - Risk of:  Goal: Will remain free from falls  Description  Will remain free from falls  Outcome: Ongoing  Pt remained free from falls this shift. Pt educated on the use of the call light and the importance of non skid footwear. Pt remained positioned in bed with call light within reach and side rails up x2.   Goal: Absence of physical injury  Description  Absence of physical injury  Outcome: Ongoing

## 2019-08-26 NOTE — PROGRESS NOTES
Katy Tavera, PPatient Assessment complete. Urolithiasis [N20.9] . Vitals:    08/25/19 2004   BP:    Pulse:    Resp: 16   Temp:    SpO2:    . Patients home meds are   Prior to Admission medications    Medication Sig Start Date End Date Taking? Authorizing Provider   oxyCODONE-acetaminophen (PERCOCET) 5-325 MG per tablet Take 1 tablet by mouth 2 times daily as needed for Pain for up to 7 days. 8/24/19 8/31/19 Yes Francisca Villeda MD   tamsulosin [facility]) 0.4 MG capsule Take 1 capsule by mouth daily 8/24/19  Yes Francisca Villeda MD   oxybutynin (DITROPAN-XL) 10 MG extended release tablet Take 1 tablet by mouth daily 8/24/19  Yes Francisca Villeda MD   sulfamethoxazole-trimethoprim (BACTRIM DS) 800-160 MG per tablet Take 1 tablet by mouth 2 times daily for 7 days 8/19/19 8/26/19 Yes Amy Ayoub MD   irbesartan-hydrochlorothiazide (AVALIDE) 150-12.5 MG per tablet Take 1 tablet by mouth daily 8/6/19  Yes Junaid Love MD   amLODIPine (NORVASC) 2.5 MG tablet Take 1 tablet by mouth daily 8/6/19  Yes Junaid Love MD   albuterol sulfate HFA (PROAIR HFA) 108 (90 Base) MCG/ACT inhaler Inhale 2 puffs into the lungs every 6 hours as needed for Wheezing 6/3/19  Yes MIGDALIA Jimenez CNP   meloxicam (MOBIC) 15 MG tablet Take 1 tablet by mouth daily 6/3/19  Yes MIGDALIA Loving CNP   omeprazole (PRILOSEC) 20 MG delayed release capsule Take 1 capsule by mouth 2 times daily 6/8/18  Yes MIGDALIA Stockton CNP   mometasone [facility]) 220 MCG/INH inhaler Inhale 1 puff into the lungs daily  Patient taking differently: Inhale 1 puff into the lungs daily as needed  4/30/18  Yes MIGDALIA Jimenez CNP   acetaminophen (TYLENOL) 500 MG tablet Take 1,000 mg by mouth every 4 hours as needed for Pain.    Yes Historical Provider, MD   Omega-3 Fatty Acids (OMEGA 3 PO) Take 1 tablet by mouth daily    Yes Historical Provider, MD   metFORMIN (GLUCOPHAGE-XR) 500 MG extended release tablet Take 1 tablet by mouth daily

## 2019-08-27 ENCOUNTER — APPOINTMENT (OUTPATIENT)
Dept: GENERAL RADIOLOGY | Age: 54
DRG: 659 | End: 2019-08-27
Attending: UROLOGY
Payer: COMMERCIAL

## 2019-08-27 VITALS
TEMPERATURE: 97.6 F | RESPIRATION RATE: 20 BRPM | WEIGHT: 255 LBS | HEIGHT: 68 IN | SYSTOLIC BLOOD PRESSURE: 139 MMHG | HEART RATE: 62 BPM | OXYGEN SATURATION: 96 % | BODY MASS INDEX: 38.65 KG/M2 | DIASTOLIC BLOOD PRESSURE: 85 MMHG

## 2019-08-27 LAB
ANION GAP SERPL CALCULATED.3IONS-SCNC: 8 MMOL/L (ref 9–17)
BUN BLDV-MCNC: 11 MG/DL (ref 6–20)
BUN/CREAT BLD: ABNORMAL (ref 9–20)
CALCIUM SERPL-MCNC: 9.2 MG/DL (ref 8.6–10.4)
CHLORIDE BLD-SCNC: 102 MMOL/L (ref 98–107)
CO2: 29 MMOL/L (ref 20–31)
CREAT SERPL-MCNC: 0.76 MG/DL (ref 0.5–0.9)
GFR AFRICAN AMERICAN: >60 ML/MIN
GFR NON-AFRICAN AMERICAN: >60 ML/MIN
GFR SERPL CREATININE-BSD FRML MDRD: ABNORMAL ML/MIN/{1.73_M2}
GFR SERPL CREATININE-BSD FRML MDRD: ABNORMAL ML/MIN/{1.73_M2}
GLUCOSE BLD-MCNC: 90 MG/DL (ref 70–99)
HCT VFR BLD CALC: 34.7 % (ref 36.3–47.1)
HEMOGLOBIN: 10.1 G/DL (ref 11.9–15.1)
MCH RBC QN AUTO: 25.4 PG (ref 25.2–33.5)
MCHC RBC AUTO-ENTMCNC: 29.1 G/DL (ref 28.4–34.8)
MCV RBC AUTO: 87.4 FL (ref 82.6–102.9)
NRBC AUTOMATED: 0 PER 100 WBC
PDW BLD-RTO: 14.6 % (ref 11.8–14.4)
PLATELET # BLD: 233 K/UL (ref 138–453)
PMV BLD AUTO: 9.1 FL (ref 8.1–13.5)
POTASSIUM SERPL-SCNC: 4.5 MMOL/L (ref 3.7–5.3)
RBC # BLD: 3.97 M/UL (ref 3.95–5.11)
SODIUM BLD-SCNC: 139 MMOL/L (ref 135–144)
WBC # BLD: 8.7 K/UL (ref 3.5–11.3)

## 2019-08-27 PROCEDURE — 6370000000 HC RX 637 (ALT 250 FOR IP): Performed by: STUDENT IN AN ORGANIZED HEALTH CARE EDUCATION/TRAINING PROGRAM

## 2019-08-27 PROCEDURE — 99233 SBSQ HOSP IP/OBS HIGH 50: CPT | Performed by: INTERNAL MEDICINE

## 2019-08-27 PROCEDURE — 6360000002 HC RX W HCPCS: Performed by: STUDENT IN AN ORGANIZED HEALTH CARE EDUCATION/TRAINING PROGRAM

## 2019-08-27 PROCEDURE — 36415 COLL VENOUS BLD VENIPUNCTURE: CPT

## 2019-08-27 PROCEDURE — 71046 X-RAY EXAM CHEST 2 VIEWS: CPT

## 2019-08-27 PROCEDURE — 85027 COMPLETE CBC AUTOMATED: CPT

## 2019-08-27 PROCEDURE — 80048 BASIC METABOLIC PNL TOTAL CA: CPT

## 2019-08-27 PROCEDURE — 2580000003 HC RX 258: Performed by: STUDENT IN AN ORGANIZED HEALTH CARE EDUCATION/TRAINING PROGRAM

## 2019-08-27 RX ORDER — POLYETHYLENE GLYCOL 3350 17 G/17G
17 POWDER, FOR SOLUTION ORAL DAILY
Qty: 510 G | Refills: 0 | Status: SHIPPED | OUTPATIENT
Start: 2019-08-27 | End: 2019-09-11

## 2019-08-27 RX ORDER — ONDANSETRON 4 MG/1
4 TABLET, FILM COATED ORAL EVERY 8 HOURS PRN
Qty: 30 TABLET | Refills: 0 | Status: SHIPPED | OUTPATIENT
Start: 2019-08-27 | End: 2020-01-07 | Stop reason: ALTCHOICE

## 2019-08-27 RX ADMIN — AMLODIPINE BESYLATE 2.5 MG: 2.5 TABLET ORAL at 09:24

## 2019-08-27 RX ADMIN — PANTOPRAZOLE SODIUM 40 MG: 40 TABLET, DELAYED RELEASE ORAL at 05:07

## 2019-08-27 RX ADMIN — ONDANSETRON 4 MG: 2 INJECTION INTRAMUSCULAR; INTRAVENOUS at 16:23

## 2019-08-27 RX ADMIN — OXYCODONE HYDROCHLORIDE AND ACETAMINOPHEN 1 TABLET: 5; 325 TABLET ORAL at 16:23

## 2019-08-27 RX ADMIN — CEFAZOLIN 1 G: 1 INJECTION, POWDER, FOR SOLUTION INTRAMUSCULAR; INTRAVENOUS at 03:39

## 2019-08-27 RX ADMIN — TAMSULOSIN HYDROCHLORIDE 0.4 MG: 0.4 CAPSULE ORAL at 09:24

## 2019-08-27 RX ADMIN — OXYBUTYNIN CHLORIDE 10 MG: 10 TABLET, EXTENDED RELEASE ORAL at 09:24

## 2019-08-27 RX ADMIN — FLUTICASONE PROPIONATE 2 PUFF: 110 AEROSOL, METERED RESPIRATORY (INHALATION) at 08:44

## 2019-08-27 RX ADMIN — CEFAZOLIN 1 G: 1 INJECTION, POWDER, FOR SOLUTION INTRAMUSCULAR; INTRAVENOUS at 11:49

## 2019-08-27 RX ADMIN — DOCUSATE SODIUM 100 MG: 100 CAPSULE, LIQUID FILLED ORAL at 09:24

## 2019-08-27 RX ADMIN — SODIUM CHLORIDE, PRESERVATIVE FREE 10 ML: 5 INJECTION INTRAVENOUS at 09:24

## 2019-08-27 RX ADMIN — OXYCODONE HYDROCHLORIDE AND ACETAMINOPHEN 1 TABLET: 5; 325 TABLET ORAL at 01:19

## 2019-08-27 ASSESSMENT — PAIN SCALES - GENERAL
PAINLEVEL_OUTOF10: 5

## 2019-08-27 NOTE — PROGRESS NOTES
Urology Progress Note      Subjective: Amanda Bautista is a 62 y.o. female. His/Her current Diet is: DIET GENERAL;. The patient is  Post-Op from Procedure(s):  HOLMIUM LASER, NEPHROLITHOTOMY PERCUTANEOUS, LITHOCLAST, C-ARM  Saturating in 90s on 2 L NC oxygen. Drops to 80s off oxygen. No fevers or chills. No chest pain. No nausea or vomiting. No calf pain. Pain Controlled. Ambulating in hallway  Tolerating PO Diet. Vitals and Labs:  Vitals:    08/26/19 2124 08/26/19 2253 08/27/19 0115 08/27/19 0330   BP: (!) 147/79 (!) 123/52 (!) 145/85 (!) 114/59   Pulse: 62 56 58 53   Resp: 20 19 17   Temp: 98.2 °F (36.8 °C) 97.8 °F (36.6 °C)  98.4 °F (36.9 °C)   TempSrc: Oral Oral  Oral   SpO2: 96% 98%  95%   Weight:       Height:         I/O last 3 completed shifts: In: 900 [P.O.:800; I.V.:100]  Out: 4650 [Urine:4650]    Recent Labs     08/24/19  1525 08/25/19  0922 08/26/19  1104   WBC 18.2* 12.8* 9.3   HGB 11.9 10.6* 10.4*   HCT 40.8 37.0 36.0*   MCV 88.1 88.3 89.6    247 215     Recent Labs     08/24/19  1525 08/25/19  0922 08/26/19  1104    137 137   K 4.6 4.8 4.3    103 104   CO2 21 22 23   BUN 24* 27* 13   CREATININE 1.88* 1.92* 0.73       No results for input(s): COLORU, PHUR, LABCAST, WBCUA, RBCUA, MUCUS, TRICHOMONAS, YEAST, BACTERIA, CLARITYU, SPECGRAV, LEUKOCYTESUR, UROBILINOGEN, BILIRUBINUR, BLOODU in the last 72 hours. Invalid input(s): NITRATE, GLUCOSEUKETONESUAMORPHOUS      Physical Exam:  NAD  A/O x 3  RRR  Non labored breathing. Abdomen soft, appropriately-tender, non-distended. No CVA tenderness. PCNT removed, dressing in place  No calf pain. EPCs on. Machine turned on. CXR: 8/25/19:  Bibasilar opacities possibly representing pleural effusions and atelectasis.    CT Chest 8/26/19: moderate rt Pleural effusion  Impression:    Amanda Bautista is a 62 y.o. female with   Problem(s):  - renal stone s/p right PCNL on 8/23  - oxygen desaturations     Plan:  Regular diet.  Pain control. Incentive spirometry times 10 per hour. EPCs. Ambulate. Appreciate pulmonology recommendations, now satting mid 90s on NC oxygen. Vladimirrach Thurmanr  6:19 AM 8/27/2019     Attending Physician Statement  I have discussed the care of the patient, including pertinent history and exam findings, with the resident. I have seen and examined the patient and the key elements of all parts of the encounter have been performed by me. I have reviewed all laboratory findings and imaging reports/films. I agree with the assessment, plan and orders as documented by the resident.   (GC Modifier)

## 2019-08-27 NOTE — PROGRESS NOTES
PULMONARY PROGRESS NOTE      Patient:  Juhi Gomez  YOB: 1965    MRN: [id number]     Acct: [de-identified]     Admit date: 8/23/2019    REASON FOR INITIAL CONSULT:-   Acute hypoxic respiratory failure/  Postop pleural effusion/atelectasis    Pt seen and Chart reviewed. Events since yesterday reviewed, chart seen imaging studies reviewed and lab seen. Overnight her hypoxia since yesterday continue to improve she was on 2 L nasal cannula yesterday and then this morning she was down to 1 L and at rest she is on room air. She had used BiPAP intermittently during the daytime yesterday when she was sleeping a total of 2 to 3 hours she did not use BiPAP last night  She has home O2 evaluation done ordered by urology service and on room air at rest she was 93% and on walking she dropped to 87% and home O2 was suggested as planned by urology to discharge the patient today. Her creatinine is improved and it is normalized today creatinine is about 0.76, hemoglobin is 10 and platelet count 320 with WBC count of 8.7. She had a chest x-ray done this morning which showed improvement in lung volume improving pleural effusion and shows right basilar subsegmental atelectasis. Subjective:     According to patient she is feeling much better as compared to yesterday, she is ambulating   She does have cough cough is mostly dry mostly when ambulating and when she tried to take deep breath and does incentive spirometry. Denies chest pain, hemoptysis, fever, sweating. She denies vomiting abdominal pain and bleeding per rectum denies hematuria.       Review of Systems -   CONSTITUTIONAL:  negative for  fevers, chills, sweats, malaise, anorexia and weight loss  EYES:  negative for  double vision, blurred vision, dry eyes, eye discharge, visual disturbance, irritation, redness and icterus  HEENT:  negative for  nasal congestion, epistaxis, sore mouth, sore throat, hoarseness and voice change  RESPIRATORY:   Positive for CALCIUM 9.2     Ionized Calcium:No results for input(s): IONCA in the last 72 hours. Magnesium:No results for input(s): MG in the last 72 hours. Phosphorus:No results for input(s): PHOS in the last 72 hours. BNP:No results for input(s): BNP in the last 72 hours. Glucose:No results for input(s): POCGLU in the last 72 hours. HgbA1C: No results for input(s): LABA1C in the last 72 hours. INR: No results for input(s): INR in the last 72 hours. Hepatic: No results for input(s): ALKPHOS, ALT, AST, PROT, BILITOT, BILIDIR, LABALBU in the last 72 hours. Amylase and Lipase:No results for input(s): LACTA, AMYLASE in the last 72 hours. Lactic Acid: No results for input(s): LACTA in the last 72 hours. CARDIAC ENZYMES:No results for input(s): CKTOTAL, CKMB, CKMBINDEX, TROPONINI in the last 72 hours. BNP: No results for input(s): BNP in the last 72 hours. Lipids: No results for input(s): CHOL, TRIG, HDL, LDLCALC in the last 72 hours. Invalid input(s): LDL  ABGs: No results found for: PH, PCO2, PO2, HCO3, O2SAT  Thyroid:   Lab Results   Component Value Date    TSH 2.50 08/15/2019      Urinalysis: No results for input(s): BACTERIA, BLOODU, CLARITYU, COLORU, PHUR, PROTEINU, RBCUA, SPECGRAV, BILIRUBINUR, NITRU, WBCUA, LEUKOCYTESUR, GLUCOSEU in the last 72 hours. CULTURES:    CXR  08/27/2019 and 08/25/2019  Pleural effusion on right has improved bilateral lung volume is better able to see right costophrenic angle much better today as compared to yesterday and now more atelectasis is apparent at the base likely secondary to increase in pleural effusion on the right side, mild left basilar atelectasis is present. CT Scans  Scan chest 08/26/2019  Moderate right pleural effusion.  Right lower lobe opacity with air   bronchograms may represent atelectasis versus pneumonia.       Recommend follow-up to exclude an underlying lesion.  Consider 3 months.         CT scan abdomen 08/24/2019  1.  Interval placement of right

## 2019-08-27 NOTE — DISCHARGE SUMMARY
albuterol sulfate HFA (PROAIR HFA) 108 (90 Base) MCG/ACT inhaler  Inhale 2 puffs into the lungs every 6 hours as needed for Wheezing             amLODIPine (NORVASC) 2.5 MG tablet  Take 1 tablet by mouth daily             fluticasone (FLONASE) 50 MCG/ACT nasal spray  1 spray by Nasal route daily as needed for Allergies             irbesartan-hydrochlorothiazide (AVALIDE) 150-12.5 MG per tablet  Take 1 tablet by mouth daily             meloxicam (MOBIC) 15 MG tablet  Take 1 tablet by mouth daily             metFORMIN (GLUCOPHAGE-XR) 500 MG extended release tablet  Take 1 tablet by mouth daily (with breakfast)             mometasone (ASMANEX) 220 MCG/INH inhaler  Inhale 1 puff into the lungs daily             Omega-3 Fatty Acids (OMEGA 3 PO)  Take 1 tablet by mouth daily              omeprazole (PRILOSEC) 20 MG delayed release capsule  Take 1 capsule by mouth 2 times daily             ondansetron (ZOFRAN) 4 MG tablet  Take 1 tablet by mouth every 8 hours as needed for Nausea or Vomiting             oxybutynin (DITROPAN-XL) 10 MG extended release tablet  Take 1 tablet by mouth daily             oxyCODONE-acetaminophen (PERCOCET) 5-325 MG per tablet  Take 1 tablet by mouth 2 times daily as needed for Pain for up to 7 days. polyethylene glycol (GLYCOLAX) powder  Take 17 g by mouth daily             tamsulosin (FLOMAX) 0.4 MG capsule  Take 1 capsule by mouth daily                 Hospital course: Manny Figueredo is a 63 yo admitted after right PCNL. She is solitary kidney. They were admitted for postop pain control and monitor urine output and pain. POD2 she did have decreased oxygen saturation and some confusion. CXR on 8/25/19 did show bilbasilar opacities favoring pleural effusion and pulmonology was consulted. CT chest was done 8/26/19 showing right pleural effusion. POD3 she was sating in the 90s on 2L NC oxygen and was weaned to room air POD4.  Home O2 consult was found to need continued oxygen at home and pulmonology did order. Pulmonology ok with discharge as of POD4 with repeat cxr in 1 week. Hemoglobin and creatinine stable at time of discharge. The patient had pain controlled with PO meds, tolerated diet, and was ambulating appropriately prior to discharge. The patient was afebrile for 24 hrs before discharge. All unnecessary drains and catheters were removed at the appropriate times. The patient agrees to discharge, understands discharge instructions, and agrees to follow up in 2 weeks with Dr Jennifer Valencia for repeat ureteroscopy. She will remain off work until this time and a note was given.       Meme Mccall PA-C  4:42 PM 8/27/2019

## 2019-08-28 ENCOUNTER — TELEPHONE (OUTPATIENT)
Dept: PRIMARY CARE CLINIC | Age: 54
End: 2019-08-28

## 2019-08-28 ENCOUNTER — CARE COORDINATION (OUTPATIENT)
Dept: CASE MANAGEMENT | Age: 54
End: 2019-08-28

## 2019-08-28 DIAGNOSIS — N20.0 KIDNEY STONES: Primary | ICD-10-CM

## 2019-08-28 LAB
STONE COMPOSITION: NORMAL
STONE DESCRIPTION: NORMAL
STONE MASS: 435 MG
STONE NUMBER: NORMAL
STONE SIZE: > 9 MM

## 2019-08-29 ENCOUNTER — TELEPHONE (OUTPATIENT)
Dept: SURGERY | Age: 54
End: 2019-08-29

## 2019-09-02 ENCOUNTER — HOSPITAL ENCOUNTER (OUTPATIENT)
Dept: GENERAL RADIOLOGY | Age: 54
Discharge: HOME | End: 2019-09-04
Payer: COMMERCIAL

## 2019-09-02 ENCOUNTER — HOSPITAL ENCOUNTER (OUTPATIENT)
Age: 54
Discharge: HOME | End: 2019-09-04
Payer: COMMERCIAL

## 2019-09-02 DIAGNOSIS — J90 PLEURAL EFFUSION ON RIGHT: ICD-10-CM

## 2019-09-02 PROCEDURE — 71046 X-RAY EXAM CHEST 2 VIEWS: CPT

## 2019-09-03 ENCOUNTER — OFFICE VISIT (OUTPATIENT)
Dept: PULMONOLOGY | Age: 54
End: 2019-09-03
Payer: COMMERCIAL

## 2019-09-03 VITALS
DIASTOLIC BLOOD PRESSURE: 100 MMHG | OXYGEN SATURATION: 95 % | HEART RATE: 68 BPM | BODY MASS INDEX: 38.65 KG/M2 | RESPIRATION RATE: 16 BRPM | TEMPERATURE: 97.8 F | SYSTOLIC BLOOD PRESSURE: 166 MMHG | WEIGHT: 255 LBS | HEIGHT: 68 IN

## 2019-09-03 DIAGNOSIS — Z98.890 POSTOPERATIVE HYPOXIA: Primary | ICD-10-CM

## 2019-09-03 DIAGNOSIS — J45.20 MILD INTERMITTENT ASTHMA WITHOUT COMPLICATION: ICD-10-CM

## 2019-09-03 DIAGNOSIS — R09.02 POSTOPERATIVE HYPOXIA: Primary | ICD-10-CM

## 2019-09-03 DIAGNOSIS — G47.33 OBSTRUCTIVE SLEEP APNEA: ICD-10-CM

## 2019-09-03 DIAGNOSIS — J90 PLEURAL EFFUSION ON RIGHT: ICD-10-CM

## 2019-09-03 PROCEDURE — 99214 OFFICE O/P EST MOD 30 MIN: CPT | Performed by: INTERNAL MEDICINE

## 2019-09-04 ENCOUNTER — CARE COORDINATION (OUTPATIENT)
Dept: CASE MANAGEMENT | Age: 54
End: 2019-09-04

## 2019-09-04 NOTE — CARE COORDINATION
[facility] Transitions Follow Up Call    2019    Patient: Juan Jose Machado  Patient : 1965   MRN: [id number]  Reason for Admission: Right renal stones, lithotomy, oxygen desaturation  Discharge Date: 19 RARS: Readmission Risk Score: 9         1st attempt to reach patient for Care Transitions.  Voice mail full and unable to leave message or contact information      Follow Up  Future Appointments   Date Time Provider Ender Oliver   2019  1:45 PM SCHEDULE, [facility]ULAR ECHO [facility] [address] Heart a   2019  2:15 PM Vicky Sarah MD [facility] [address] Heart a   2019  1:00 PM SCHEDULE, [facility] RESPIRATORY SPEC Resp Spec [id number]   2019  1:30 PM Leona Neff MD Resp Spec [facility]   2019  3:00 PM MIGDALIA Uribe - CNP [facility] Bib Swift RN

## 2019-09-05 PROBLEM — R05.9 COUGH: Status: RESOLVED | Noted: 2019-08-06 | Resolved: 2019-09-05

## 2019-09-06 ENCOUNTER — CARE COORDINATION (OUTPATIENT)
Dept: CASE MANAGEMENT | Age: 54
End: 2019-09-06

## 2019-09-10 ENCOUNTER — CARE COORDINATION (OUTPATIENT)
Dept: CASE MANAGEMENT | Age: 54
End: 2019-09-10

## 2019-09-11 RX ORDER — OXYCODONE HYDROCHLORIDE AND ACETAMINOPHEN 5; 325 MG/1; MG/1
2 TABLET ORAL 2 TIMES DAILY PRN
Status: ON HOLD | COMMUNITY
End: 2019-09-13 | Stop reason: SDUPTHER

## 2019-09-13 ENCOUNTER — ANESTHESIA (OUTPATIENT)
Dept: OPERATING ROOM | Age: 54
End: 2019-09-13
Payer: COMMERCIAL

## 2019-09-13 ENCOUNTER — ANESTHESIA EVENT (OUTPATIENT)
Dept: OPERATING ROOM | Age: 54
End: 2019-09-13
Payer: COMMERCIAL

## 2019-09-13 ENCOUNTER — HOSPITAL ENCOUNTER (OUTPATIENT)
Age: 54
Setting detail: OUTPATIENT SURGERY
Discharge: HOME | End: 2019-09-13
Attending: UROLOGY | Admitting: UROLOGY
Payer: COMMERCIAL

## 2019-09-13 ENCOUNTER — APPOINTMENT (OUTPATIENT)
Dept: GENERAL RADIOLOGY | Age: 54
End: 2019-09-13
Attending: UROLOGY
Payer: COMMERCIAL

## 2019-09-13 VITALS
SYSTOLIC BLOOD PRESSURE: 133 MMHG | RESPIRATION RATE: 18 BRPM | WEIGHT: 255 LBS | OXYGEN SATURATION: 96 % | HEART RATE: 73 BPM | HEIGHT: 68 IN | TEMPERATURE: 98.4 F | DIASTOLIC BLOOD PRESSURE: 76 MMHG | BODY MASS INDEX: 38.65 KG/M2

## 2019-09-13 VITALS
SYSTOLIC BLOOD PRESSURE: 114 MMHG | OXYGEN SATURATION: 99 % | TEMPERATURE: 96.7 F | DIASTOLIC BLOOD PRESSURE: 74 MMHG | RESPIRATION RATE: 18 BRPM

## 2019-09-13 DIAGNOSIS — G89.18 POST-OP PAIN: Primary | ICD-10-CM

## 2019-09-13 PROCEDURE — 6360000002 HC RX W HCPCS: Performed by: NURSE ANESTHETIST, CERTIFIED REGISTERED

## 2019-09-13 PROCEDURE — 7100000000 HC PACU RECOVERY - FIRST 15 MIN: Performed by: UROLOGY

## 2019-09-13 PROCEDURE — 6370000000 HC RX 637 (ALT 250 FOR IP): Performed by: ANESTHESIOLOGY

## 2019-09-13 PROCEDURE — 2580000003 HC RX 258: Performed by: NURSE ANESTHETIST, CERTIFIED REGISTERED

## 2019-09-13 PROCEDURE — C2617 STENT, NON-COR, TEM W/O DEL: HCPCS | Performed by: UROLOGY

## 2019-09-13 PROCEDURE — 2709999900 HC NON-CHARGEABLE SUPPLY: Performed by: UROLOGY

## 2019-09-13 PROCEDURE — 3600000004 HC SURGERY LEVEL 4 BASE: Performed by: UROLOGY

## 2019-09-13 PROCEDURE — 6360000002 HC RX W HCPCS: Performed by: ANESTHESIOLOGY

## 2019-09-13 PROCEDURE — 6360000002 HC RX W HCPCS: Performed by: STUDENT IN AN ORGANIZED HEALTH CARE EDUCATION/TRAINING PROGRAM

## 2019-09-13 PROCEDURE — 6360000004 HC RX CONTRAST MEDICATION: Performed by: UROLOGY

## 2019-09-13 PROCEDURE — 3600000014 HC SURGERY LEVEL 4 ADDTL 15MIN: Performed by: UROLOGY

## 2019-09-13 PROCEDURE — 2580000003 HC RX 258: Performed by: ANESTHESIOLOGY

## 2019-09-13 PROCEDURE — C1769 GUIDE WIRE: HCPCS | Performed by: UROLOGY

## 2019-09-13 PROCEDURE — 7100000040 HC SPAR PHASE II RECOVERY - FIRST 15 MIN: Performed by: UROLOGY

## 2019-09-13 PROCEDURE — C1758 CATHETER, URETERAL: HCPCS | Performed by: UROLOGY

## 2019-09-13 PROCEDURE — 2720000010 HC SURG SUPPLY STERILE: Performed by: UROLOGY

## 2019-09-13 PROCEDURE — 2580000003 HC RX 258: Performed by: UROLOGY

## 2019-09-13 PROCEDURE — 2500000003 HC RX 250 WO HCPCS: Performed by: NURSE ANESTHETIST, CERTIFIED REGISTERED

## 2019-09-13 PROCEDURE — 3700000001 HC ADD 15 MINUTES (ANESTHESIA): Performed by: UROLOGY

## 2019-09-13 PROCEDURE — 74018 RADEX ABDOMEN 1 VIEW: CPT

## 2019-09-13 PROCEDURE — 3700000000 HC ANESTHESIA ATTENDED CARE: Performed by: UROLOGY

## 2019-09-13 PROCEDURE — 6370000000 HC RX 637 (ALT 250 FOR IP): Performed by: NURSE ANESTHETIST, CERTIFIED REGISTERED

## 2019-09-13 PROCEDURE — 7100000001 HC PACU RECOVERY - ADDTL 15 MIN: Performed by: UROLOGY

## 2019-09-13 DEVICE — URETERAL STENT
Type: IMPLANTABLE DEVICE | Site: URETER | Status: FUNCTIONAL
Brand: POLARIS™ ULTRA

## 2019-09-13 RX ORDER — ROCURONIUM BROMIDE 10 MG/ML
INJECTION, SOLUTION INTRAVENOUS PRN
Status: DISCONTINUED | OUTPATIENT
Start: 2019-09-13 | End: 2019-09-13 | Stop reason: SDUPTHER

## 2019-09-13 RX ORDER — PROPOFOL 10 MG/ML
INJECTION, EMULSION INTRAVENOUS PRN
Status: DISCONTINUED | OUTPATIENT
Start: 2019-09-13 | End: 2019-09-13 | Stop reason: SDUPTHER

## 2019-09-13 RX ORDER — LIDOCAINE HYDROCHLORIDE 10 MG/ML
INJECTION, SOLUTION EPIDURAL; INFILTRATION; INTRACAUDAL; PERINEURAL PRN
Status: DISCONTINUED | OUTPATIENT
Start: 2019-09-13 | End: 2019-09-13 | Stop reason: SDUPTHER

## 2019-09-13 RX ORDER — ONDANSETRON 2 MG/ML
4 INJECTION INTRAMUSCULAR; INTRAVENOUS
Status: DISCONTINUED | OUTPATIENT
Start: 2019-09-13 | End: 2019-09-13 | Stop reason: HOSPADM

## 2019-09-13 RX ORDER — OXYCODONE HYDROCHLORIDE AND ACETAMINOPHEN 5; 325 MG/1; MG/1
1 TABLET ORAL ONCE
Status: COMPLETED | OUTPATIENT
Start: 2019-09-13 | End: 2019-09-13

## 2019-09-13 RX ORDER — MAGNESIUM HYDROXIDE 1200 MG/15ML
LIQUID ORAL CONTINUOUS PRN
Status: COMPLETED | OUTPATIENT
Start: 2019-09-13 | End: 2019-09-13

## 2019-09-13 RX ORDER — SODIUM CHLORIDE, SODIUM LACTATE, POTASSIUM CHLORIDE, CALCIUM CHLORIDE 600; 310; 30; 20 MG/100ML; MG/100ML; MG/100ML; MG/100ML
INJECTION, SOLUTION INTRAVENOUS CONTINUOUS PRN
Status: DISCONTINUED | OUTPATIENT
Start: 2019-09-13 | End: 2019-09-13 | Stop reason: SDUPTHER

## 2019-09-13 RX ORDER — SODIUM CHLORIDE, SODIUM LACTATE, POTASSIUM CHLORIDE, CALCIUM CHLORIDE 600; 310; 30; 20 MG/100ML; MG/100ML; MG/100ML; MG/100ML
INJECTION, SOLUTION INTRAVENOUS CONTINUOUS
Status: DISCONTINUED | OUTPATIENT
Start: 2019-09-13 | End: 2019-09-13 | Stop reason: HOSPADM

## 2019-09-13 RX ORDER — MIDAZOLAM HYDROCHLORIDE 1 MG/ML
1 INJECTION INTRAMUSCULAR; INTRAVENOUS
Status: COMPLETED | OUTPATIENT
Start: 2019-09-13 | End: 2019-09-13

## 2019-09-13 RX ORDER — OXYCODONE HYDROCHLORIDE AND ACETAMINOPHEN 5; 325 MG/1; MG/1
1 TABLET ORAL EVERY 6 HOURS PRN
Qty: 10 TABLET | Refills: 0 | Status: SHIPPED | OUTPATIENT
Start: 2019-09-13 | End: 2019-09-23

## 2019-09-13 RX ORDER — ALBUTEROL SULFATE 90 UG/1
AEROSOL, METERED RESPIRATORY (INHALATION) PRN
Status: DISCONTINUED | OUTPATIENT
Start: 2019-09-13 | End: 2019-09-13 | Stop reason: SDUPTHER

## 2019-09-13 RX ORDER — FENTANYL CITRATE 50 UG/ML
INJECTION, SOLUTION INTRAMUSCULAR; INTRAVENOUS PRN
Status: DISCONTINUED | OUTPATIENT
Start: 2019-09-13 | End: 2019-09-13 | Stop reason: SDUPTHER

## 2019-09-13 RX ORDER — FENTANYL CITRATE 50 UG/ML
25 INJECTION, SOLUTION INTRAMUSCULAR; INTRAVENOUS EVERY 5 MIN PRN
Status: DISCONTINUED | OUTPATIENT
Start: 2019-09-13 | End: 2019-09-13 | Stop reason: HOSPADM

## 2019-09-13 RX ORDER — DEXAMETHASONE SODIUM PHOSPHATE 10 MG/ML
INJECTION INTRAMUSCULAR; INTRAVENOUS PRN
Status: DISCONTINUED | OUTPATIENT
Start: 2019-09-13 | End: 2019-09-13 | Stop reason: SDUPTHER

## 2019-09-13 RX ORDER — CIPROFLOXACIN 500 MG/1
500 TABLET, FILM COATED ORAL 2 TIMES DAILY
Qty: 6 TABLET | Refills: 0 | Status: SHIPPED | OUTPATIENT
Start: 2019-09-13 | End: 2019-09-16

## 2019-09-13 RX ORDER — LIDOCAINE HYDROCHLORIDE 10 MG/ML
1 INJECTION, SOLUTION EPIDURAL; INFILTRATION; INTRACAUDAL; PERINEURAL
Status: DISCONTINUED | OUTPATIENT
Start: 2019-09-13 | End: 2019-09-13 | Stop reason: HOSPADM

## 2019-09-13 RX ORDER — ONDANSETRON 2 MG/ML
INJECTION INTRAMUSCULAR; INTRAVENOUS PRN
Status: DISCONTINUED | OUTPATIENT
Start: 2019-09-13 | End: 2019-09-13 | Stop reason: SDUPTHER

## 2019-09-13 RX ORDER — POTASSIUM CITRATE 10 MEQ/1
10 TABLET, EXTENDED RELEASE ORAL
Qty: 90 TABLET | Refills: 1 | Status: SHIPPED | OUTPATIENT
Start: 2019-09-13 | End: 2019-12-11

## 2019-09-13 RX ORDER — ALBUTEROL SULFATE 2.5 MG/3ML
2.5 SOLUTION RESPIRATORY (INHALATION) EVERY 6 HOURS PRN
Status: DISCONTINUED | OUTPATIENT
Start: 2019-09-13 | End: 2019-09-13 | Stop reason: HOSPADM

## 2019-09-13 RX ORDER — SCOLOPAMINE TRANSDERMAL SYSTEM 1 MG/1
1 PATCH, EXTENDED RELEASE TRANSDERMAL
Status: DISCONTINUED | OUTPATIENT
Start: 2019-09-13 | End: 2019-09-13 | Stop reason: HOSPADM

## 2019-09-13 RX ORDER — LABETALOL HYDROCHLORIDE 5 MG/ML
5 INJECTION, SOLUTION INTRAVENOUS EVERY 10 MIN PRN
Status: DISCONTINUED | OUTPATIENT
Start: 2019-09-13 | End: 2019-09-13 | Stop reason: HOSPADM

## 2019-09-13 RX ADMIN — FENTANYL CITRATE 25 MCG: 50 INJECTION INTRAMUSCULAR; INTRAVENOUS at 09:47

## 2019-09-13 RX ADMIN — ALBUTEROL SULFATE 2 PUFF: 90 AEROSOL, METERED RESPIRATORY (INHALATION) at 08:38

## 2019-09-13 RX ADMIN — FENTANYL CITRATE 25 MCG: 50 INJECTION INTRAMUSCULAR; INTRAVENOUS at 09:42

## 2019-09-13 RX ADMIN — ONDANSETRON 4 MG: 2 INJECTION, SOLUTION INTRAMUSCULAR; INTRAVENOUS at 08:21

## 2019-09-13 RX ADMIN — LIDOCAINE HYDROCHLORIDE 50 MG: 10 INJECTION, SOLUTION EPIDURAL; INFILTRATION; INTRACAUDAL; PERINEURAL at 08:26

## 2019-09-13 RX ADMIN — ALBUTEROL SULFATE 2.5 MG: 2.5 SOLUTION RESPIRATORY (INHALATION) at 07:16

## 2019-09-13 RX ADMIN — SODIUM CHLORIDE, POTASSIUM CHLORIDE, SODIUM LACTATE AND CALCIUM CHLORIDE: 600; 310; 30; 20 INJECTION, SOLUTION INTRAVENOUS at 07:38

## 2019-09-13 RX ADMIN — FENTANYL CITRATE 100 MCG: 50 INJECTION INTRAMUSCULAR; INTRAVENOUS at 08:26

## 2019-09-13 RX ADMIN — OXYCODONE HYDROCHLORIDE AND ACETAMINOPHEN 1 TABLET: 5; 325 TABLET ORAL at 10:17

## 2019-09-13 RX ADMIN — MIDAZOLAM HYDROCHLORIDE 1 MG: 1 INJECTION, SOLUTION INTRAMUSCULAR; INTRAVENOUS at 07:39

## 2019-09-13 RX ADMIN — DEXAMETHASONE SODIUM PHOSPHATE 10 MG: 10 INJECTION INTRAMUSCULAR; INTRAVENOUS at 08:34

## 2019-09-13 RX ADMIN — SODIUM CHLORIDE, POTASSIUM CHLORIDE, SODIUM LACTATE AND CALCIUM CHLORIDE: 600; 310; 30; 20 INJECTION, SOLUTION INTRAVENOUS at 08:19

## 2019-09-13 RX ADMIN — Medication 2 G: at 08:34

## 2019-09-13 RX ADMIN — ROCURONIUM BROMIDE 30 MG: 10 INJECTION INTRAVENOUS at 08:26

## 2019-09-13 RX ADMIN — PROPOFOL 200 MG: 10 INJECTION, EMULSION INTRAVENOUS at 08:26

## 2019-09-13 ASSESSMENT — PAIN DESCRIPTION - PROGRESSION
CLINICAL_PROGRESSION: NOT CHANGED
CLINICAL_PROGRESSION: RAPIDLY WORSENING
CLINICAL_PROGRESSION: GRADUALLY IMPROVING
CLINICAL_PROGRESSION: NOT CHANGED

## 2019-09-13 ASSESSMENT — PAIN DESCRIPTION - FREQUENCY
FREQUENCY: CONTINUOUS
FREQUENCY: CONTINUOUS

## 2019-09-13 ASSESSMENT — PULMONARY FUNCTION TESTS
PIF_VALUE: 28
PIF_VALUE: 27
PIF_VALUE: 2
PIF_VALUE: 22
PIF_VALUE: 29
PIF_VALUE: 30
PIF_VALUE: 28
PIF_VALUE: 28
PIF_VALUE: 31
PIF_VALUE: 0
PIF_VALUE: 29
PIF_VALUE: 23
PIF_VALUE: 29
PIF_VALUE: 1
PIF_VALUE: 28
PIF_VALUE: 3
PIF_VALUE: 29
PIF_VALUE: 28
PIF_VALUE: 0
PIF_VALUE: 1
PIF_VALUE: 27
PIF_VALUE: 28
PIF_VALUE: 28
PIF_VALUE: 2
PIF_VALUE: 28
PIF_VALUE: 29
PIF_VALUE: 28
PIF_VALUE: 0
PIF_VALUE: 28
PIF_VALUE: 0
PIF_VALUE: 3
PIF_VALUE: 28
PIF_VALUE: 28
PIF_VALUE: 2
PIF_VALUE: 26
PIF_VALUE: 28
PIF_VALUE: 27
PIF_VALUE: 29
PIF_VALUE: 28
PIF_VALUE: 2
PIF_VALUE: 28

## 2019-09-13 ASSESSMENT — PAIN DESCRIPTION - LOCATION
LOCATION: FLANK
LOCATION: PERINEUM
LOCATION: OTHER (COMMENT)
LOCATION: PERINEUM

## 2019-09-13 ASSESSMENT — PAIN DESCRIPTION - PAIN TYPE
TYPE: SURGICAL PAIN
TYPE: ACUTE PAIN
TYPE: SURGICAL PAIN
TYPE: SURGICAL PAIN

## 2019-09-13 ASSESSMENT — PAIN SCALES - GENERAL
PAINLEVEL_OUTOF10: 5
PAINLEVEL_OUTOF10: 4
PAINLEVEL_OUTOF10: 4
PAINLEVEL_OUTOF10: 7
PAINLEVEL_OUTOF10: 5
PAINLEVEL_OUTOF10: 5
PAINLEVEL_OUTOF10: 7
PAINLEVEL_OUTOF10: 7

## 2019-09-13 ASSESSMENT — PAIN DESCRIPTION - DESCRIPTORS
DESCRIPTORS: BURNING
DESCRIPTORS: ACHING;CRAMPING
DESCRIPTORS: BURNING
DESCRIPTORS: BURNING

## 2019-09-13 ASSESSMENT — PAIN SCALES - WONG BAKER: WONGBAKER_NUMERICALRESPONSE: 0

## 2019-09-13 ASSESSMENT — ENCOUNTER SYMPTOMS: SHORTNESS OF BREATH: 1

## 2019-09-13 ASSESSMENT — PAIN DESCRIPTION - ORIENTATION: ORIENTATION: RIGHT

## 2019-09-13 ASSESSMENT — PAIN - FUNCTIONAL ASSESSMENT
PAIN_FUNCTIONAL_ASSESSMENT: PREVENTS OR INTERFERES WITH MANY ACTIVE NOT PASSIVE ACTIVITIES
PAIN_FUNCTIONAL_ASSESSMENT: PREVENTS OR INTERFERES SOME ACTIVE ACTIVITIES AND ADLS

## 2019-09-13 ASSESSMENT — PAIN DESCRIPTION - ONSET
ONSET: AWAKENED FROM SLEEP
ONSET: ON-GOING

## 2019-09-13 NOTE — ANESTHESIA PRE PROCEDURE
Department of Anesthesiology  Preprocedure Note       Name:  Kal Wilson   Age:  62 y.o.  :  1965                                          MRN:  [id number]         Date:  2019      Surgeon: Carolyn Carter):  Reyes Daunt, MD    Procedure: HOLMIUM - CYSTO, URETEROSCOPY, LITHOTRIPSY, STENT PLACEMENT (Right )    Medications prior to admission:   Prior to Admission medications    Medication Sig Start Date End Date Taking? Authorizing Provider   ondansetron (ZOFRAN) 4 MG tablet Take 1 tablet by mouth every 8 hours as needed for Nausea or Vomiting 19  Yes Jennifer Godinez PA-C   tamsulosin (FLOMAX) 0.4 MG capsule Take 1 capsule by mouth daily 19  Yes Emeterio Arreola MD   oxybutynin (DITROPAN-XL) 10 MG extended release tablet Take 1 tablet by mouth daily  Patient taking differently: Take 10 mg by mouth 2 times daily  19  Yes Emeterio Arreola MD   irbesartan-hydrochlorothiazide (AVALIDE) 150-12.5 MG per tablet Take 1 tablet by mouth daily 19  Yes Tania Layton MD   amLODIPine (NORVASC) 2.5 MG tablet Take 1 tablet by mouth daily 19  Yes Tania Layton MD   albuterol sulfate HFA (PROAIR HFA) 108 (90 Base) MCG/ACT inhaler Inhale 2 puffs into the lungs every 6 hours as needed for Wheezing 6/3/19  Yes MIGDALIA Beckett CNP   meloxicam (MOBIC) 15 MG tablet Take 1 tablet by mouth daily 6/3/19  Yes MIGDALIA Beckett CNP   omeprazole (PRILOSEC) 20 MG delayed release capsule Take 1 capsule by mouth 2 times daily 18  Yes MargareMIGDALIA Perez CNP   OMEGA-3 FATTY ACIDS PO Take 1 tablet by mouth daily    Historical Provider, MD   oxyCODONE-acetaminophen (PERCOCET) 5-325 MG per tablet Take 2 tablets by mouth 2 times daily as needed.     Historical Provider, MD   metFORMIN (GLUCOPHAGE-XR) 500 MG extended release tablet Take 1 tablet by mouth daily (with breakfast) 19   Livier Arreola MD   fluticasone (FLONASE) 50 MCG/ACT nasal spray 1 spray by Nasal route daily as needed for Allergies 6/3/19   Ricardo Shay, APRN - CNP   acetaminophen (TYLENOL) 500 MG tablet Take 1,000 mg by mouth every 4 hours as needed for Pain. Historical Provider, MD   solifenacin (VESICARE) 10 MG tablet Take 10 mg by mouth as needed. 12/23/14  Historical Provider, MD       Current medications:    Current Facility-Administered Medications   Medication Dose Route Frequency Provider Last Rate Last Dose    ceFAZolin (ANCEF) 2 g in dextrose 5 % 50 mL IVPB  2 g Intravenous On Call to Hi Marie MD        lactated ringers infusion   Intravenous Continuous Spenser Stauffer MD        lidocaine PF 1 % injection 1 mL  1 mL Intradermal Once PRN Spenser Stauffer MD        midazolam (VERSED) injection 1 mg  1 mg Intravenous Once PRN Spenser Stauffer MD        albuterol (PROVENTIL) nebulizer solution 2.5 mg  2.5 mg Nebulization Q6H PRN Spenser Stauffer MD           Allergies: Allergies   Allergen Reactions    Seasonal Itching and Other (See Comments)     STUFFY NOSE, HEADACHE. ITCHY NOSE, FACE. Problem List:    Patient Active Problem List   Diagnosis Code    Kidney stones N20.0    Essential hypertension I10    Acquired hypothyroidism E03.9    CKD (chronic kidney disease) stage 1, GFR 90 ml/min or greater N18.1    Cystinuria ([address] E72.01    Hyperuricemia E79.0    Proteinuria R80.9    Knee pain, acute M25.569    Osteoarthritis of both knees M17.0    Gastroesophageal reflux disease without esophagitis K21.9    ESTHER (obstructive sleep apnea) G47.33    Midline low back pain with right-sided sciatica M54.41    Mixed hyperlipidemia E78.2    Solitary kidney, acquired Z90.5    Right ureteral stone N20.1    Obesity (BMI 30-39. 9) E66.9    LILIAN (acute kidney injury) ([address] N17.9    Leukocytosis D72.829    Acute intractable tension-type headache G44. 201    Glucose intolerance (impaired glucose tolerance) R73.02    Kidney stone N20.0    Bunion of great toe of right foot M21.611    Mild Component Value Date    WBC 8.7 08/27/2019    RBC 3.97 08/27/2019    RBC 4.66 03/22/2012    HGB 10.1 08/27/2019    HCT 34.7 08/27/2019    MCV 87.4 08/27/2019    RDW 14.6 08/27/2019     08/27/2019     03/22/2012       CMP:   Lab Results   Component Value Date     08/27/2019    K 4.5 08/27/2019     08/27/2019    CO2 29 08/27/2019    BUN 11 08/27/2019    CREATININE 0.76 08/27/2019    GFRAA >60 08/27/2019    LABGLOM >60 08/27/2019    GLUCOSE 90 08/27/2019    GLUCOSE 88 03/22/2012    PROT 7.0 08/15/2019    CALCIUM 9.2 08/27/2019    BILITOT 0.21 08/01/2019    ALKPHOS 117 08/01/2019    AST 43 08/01/2019    ALT 50 08/01/2019       POC Tests: No results for input(s): POCGLU, POCNA, POCK, POCCL, POCBUN, POCHEMO, POCHCT in the last 72 hours. Coags:   Lab Results   Component Value Date    PROTIME 9.5 08/15/2019    INR 0.9 08/15/2019    APTT 22.1 08/15/2019       HCG (If Applicable): No results found for: PREGTESTUR, PREGSERUM, HCG, HCGQUANT     ABGs: No results found for: PHART, PO2ART, GHJ0UHT, USW4GIZ, BEART, M2SFWCNL     Type & Screen (If Applicable):  No results found for: LABABO, [address]    Anesthesia Evaluation  Patient summary reviewed history of anesthetic complications (post op resp insufficiency): Airway: Mallampati: III        Dental: normal exam         Pulmonary:   (+) shortness of breath:  sleep apnea:  decreased breath sounds,  asthma:                           ROS comment: Bilateral basal atelectasis : improved from last week. No SOB at rest.   Cardiovascular:    (+) hypertension:, GRANADOS:,         Rhythm: regular                   ROS comment: Denies chest pain or dizziness or SOB at rest.   Has been using o2 at home since last surgery and respiratory difficulties     Neuro/Psych:   (+) neuromuscular disease:, headaches:,             GI/Hepatic/Renal:   (+) GERD:,          ROS comment: Nephrolithiasis  Has had ureteral stent placement 8/23/19.    Endo/Other:    (+) Diabetes,

## 2019-09-15 NOTE — OP NOTE
would not advance through the stent. The stent was encrusted. We then elected to go alongside the stent with a flexible ureteroscope until we are able to enter into the ureter and passed a wire under direct visualization into the upper collecting system. We then used a  dual lumen catheter to place a second wire after removing the old stent. Once in good position, we advanced our flexible ureteroscope over the working wire to the renal pelvis under direct visualization. we noted the proximal curl of the indwelling stent was encrusted with stones. We began lasering the stones into many small fragments. We identified   Other small renal calculus and using a 200 micron holmium laser fiber we fragmented the calculus. It was dusted and the fragments appeared to be under 1 millimeter and could pass easily. At this time a complete pyeloscopy was performed and no other substantial fragments were identified. We did not use a stone basket to basket out any larger fragments. We withdrew the ureteroscope and visualized the entire ureter. No stone fragments were identified. No damage to the ureter was identified. At this time, over the remaining glidewire, we placed a 6 x 26 stent in the usual fashion, and we noted appropriate placement in the upper collecting system using fluoroscopy. There was a good curl noted in the bladder. We decided to leave a string at the end of the stent, which was attached with benzoin and steri-strips. The patient's bladder was drained and removed the scope and the procedure was subsequently terminated. I was present for all critical portions of the procedure. Plan:  Discharge home in good condition  The patient can pull the stent in 5 days   the patient has cysteine stones and will be started on potassium citrate therapy.

## 2019-09-18 ENCOUNTER — HOSPITAL ENCOUNTER (OUTPATIENT)
Age: 54
Discharge: HOME | End: 2019-09-18
Payer: COMMERCIAL

## 2019-09-18 PROCEDURE — 87086 URINE CULTURE/COLONY COUNT: CPT

## 2019-09-19 LAB
CULTURE: NORMAL
Lab: NORMAL
SPECIMEN DESCRIPTION: NORMAL

## 2019-09-20 PROBLEM — R93.1 ABNORMAL ECHOCARDIOGRAM: Status: ACTIVE | Noted: 2019-09-20

## 2019-09-20 PROBLEM — I51.7 LVH (LEFT VENTRICULAR HYPERTROPHY): Status: ACTIVE | Noted: 2019-09-20

## 2019-09-23 ENCOUNTER — OFFICE VISIT (OUTPATIENT)
Dept: PRIMARY CARE CLINIC | Age: 54
End: 2019-09-23
Payer: COMMERCIAL

## 2019-09-23 ENCOUNTER — HOSPITAL ENCOUNTER (OUTPATIENT)
Age: 54
Discharge: HOME | End: 2019-09-23
Payer: COMMERCIAL

## 2019-09-23 VITALS
HEART RATE: 88 BPM | RESPIRATION RATE: 18 BRPM | SYSTOLIC BLOOD PRESSURE: 130 MMHG | HEIGHT: 68 IN | DIASTOLIC BLOOD PRESSURE: 88 MMHG | BODY MASS INDEX: 38.65 KG/M2 | WEIGHT: 255 LBS

## 2019-09-23 DIAGNOSIS — N60.02 CYST OF LEFT BREAST: ICD-10-CM

## 2019-09-23 DIAGNOSIS — R11.0 NAUSEA IN ADULT: Primary | ICD-10-CM

## 2019-09-23 DIAGNOSIS — R73.09 ELEVATED GLUCOSE: ICD-10-CM

## 2019-09-23 DIAGNOSIS — E66.01 CLASS 2 SEVERE OBESITY DUE TO EXCESS CALORIES WITH SERIOUS COMORBIDITY AND BODY MASS INDEX (BMI) OF 38.0 TO 38.9 IN ADULT (HCC): ICD-10-CM

## 2019-09-23 DIAGNOSIS — D64.9 LOW HEMOGLOBIN: ICD-10-CM

## 2019-09-23 DIAGNOSIS — R35.0 URINARY FREQUENCY: ICD-10-CM

## 2019-09-23 DIAGNOSIS — Z12.39 SCREENING FOR BREAST CANCER: ICD-10-CM

## 2019-09-23 DIAGNOSIS — N20.1 RIGHT URETERAL STONE: ICD-10-CM

## 2019-09-23 LAB
-: ABNORMAL
ABSOLUTE EOS #: 0.33 K/UL (ref 0–0.44)
ABSOLUTE IMMATURE GRANULOCYTE: 0.04 K/UL (ref 0–0.3)
ABSOLUTE LYMPH #: 2.63 K/UL (ref 1.1–3.7)
ABSOLUTE MONO #: 0.74 K/UL (ref 0.1–1.2)
AMORPHOUS: ABNORMAL
BACTERIA: ABNORMAL
BASOPHILS # BLD: 0 % (ref 0–2)
BASOPHILS ABSOLUTE: 0.03 K/UL (ref 0–0.2)
CASTS UA: ABNORMAL /LPF (ref 0–2)
CRYSTALS, UA: ABNORMAL /HPF
DIFFERENTIAL TYPE: ABNORMAL
EOSINOPHILS RELATIVE PERCENT: 3 % (ref 1–4)
EPITHELIAL CELLS UA: ABNORMAL /HPF (ref 0–5)
FERRITIN: 117 UG/L (ref 13–150)
HCT VFR BLD CALC: 42.7 % (ref 36.3–47.1)
HEMOGLOBIN: 12.6 G/DL (ref 11.9–15.1)
IMMATURE GRANULOCYTES: 0 %
IRON SATURATION: 13 % (ref 20–55)
IRON: 39 UG/DL (ref 37–145)
LYMPHOCYTES # BLD: 23 % (ref 24–43)
MCH RBC QN AUTO: 25.1 PG (ref 25.2–33.5)
MCHC RBC AUTO-ENTMCNC: 29.5 G/DL (ref 28.4–34.8)
MCV RBC AUTO: 85.1 FL (ref 82.6–102.9)
MONOCYTES # BLD: 7 % (ref 3–12)
MUCUS: ABNORMAL
NRBC AUTOMATED: 0 PER 100 WBC
OTHER OBSERVATIONS UA: ABNORMAL
PDW BLD-RTO: 14.9 % (ref 11.8–14.4)
PLATELET # BLD: 258 K/UL (ref 138–453)
PLATELET ESTIMATE: ABNORMAL
PMV BLD AUTO: 10.3 FL (ref 8.1–13.5)
RBC # BLD: 5.02 M/UL (ref 3.95–5.11)
RBC # BLD: ABNORMAL 10*6/UL
RBC UA: ABNORMAL /HPF (ref 0–2)
RENAL EPITHELIAL, UA: ABNORMAL /HPF
SEG NEUTROPHILS: 67 % (ref 36–65)
SEGMENTED NEUTROPHILS ABSOLUTE COUNT: 7.52 K/UL (ref 1.5–8.1)
TOTAL IRON BINDING CAPACITY: 301 UG/DL (ref 250–450)
TRICHOMONAS: ABNORMAL
UNSATURATED IRON BINDING CAPACITY: 262 UG/DL (ref 112–347)
WBC # BLD: 11.3 K/UL (ref 3.5–11.3)
WBC # BLD: ABNORMAL 10*3/UL
WBC UA: ABNORMAL /HPF (ref 0–5)
YEAST: ABNORMAL

## 2019-09-23 PROCEDURE — 83540 ASSAY OF IRON: CPT

## 2019-09-23 PROCEDURE — 83036 HEMOGLOBIN GLYCOSYLATED A1C: CPT

## 2019-09-23 PROCEDURE — 36415 COLL VENOUS BLD VENIPUNCTURE: CPT

## 2019-09-23 PROCEDURE — 81001 URINALYSIS AUTO W/SCOPE: CPT

## 2019-09-23 PROCEDURE — 85025 COMPLETE CBC W/AUTO DIFF WBC: CPT

## 2019-09-23 PROCEDURE — 99215 OFFICE O/P EST HI 40 MIN: CPT | Performed by: NURSE PRACTITIONER

## 2019-09-23 PROCEDURE — 83550 IRON BINDING TEST: CPT

## 2019-09-23 PROCEDURE — 87086 URINE CULTURE/COLONY COUNT: CPT

## 2019-09-23 PROCEDURE — 82728 ASSAY OF FERRITIN: CPT

## 2019-09-23 ASSESSMENT — ENCOUNTER SYMPTOMS
WHEEZING: 0
NAUSEA: 1
SINUS PRESSURE: 0
COUGH: 0
VOMITING: 0
TROUBLE SWALLOWING: 0
SORE THROAT: 0
DIARRHEA: 0
BLOOD IN STOOL: 0
CONSTIPATION: 0
SHORTNESS OF BREATH: 0
ABDOMINAL PAIN: 0

## 2019-09-24 ENCOUNTER — HOSPITAL ENCOUNTER (OUTPATIENT)
Dept: MAMMOGRAPHY | Age: 54
Discharge: HOME | End: 2019-09-26
Payer: COMMERCIAL

## 2019-09-24 DIAGNOSIS — R92.8 ABNORMAL MAMMOGRAM: Primary | ICD-10-CM

## 2019-09-24 DIAGNOSIS — Z12.39 SCREENING FOR BREAST CANCER: ICD-10-CM

## 2019-09-24 LAB
ESTIMATED AVERAGE GLUCOSE: 123 MG/DL
HBA1C MFR BLD: 5.9 % (ref 4–6)

## 2019-09-24 PROCEDURE — 77063 BREAST TOMOSYNTHESIS BI: CPT

## 2019-09-25 ENCOUNTER — HOSPITAL ENCOUNTER (OUTPATIENT)
Age: 54
Discharge: HOME | End: 2019-09-27
Payer: COMMERCIAL

## 2019-09-25 ENCOUNTER — OFFICE VISIT (OUTPATIENT)
Dept: PULMONOLOGY | Age: 54
End: 2019-09-25
Payer: COMMERCIAL

## 2019-09-25 ENCOUNTER — HOSPITAL ENCOUNTER (OUTPATIENT)
Dept: GENERAL RADIOLOGY | Age: 54
Discharge: HOME | End: 2019-09-27
Payer: COMMERCIAL

## 2019-09-25 VITALS
WEIGHT: 255 LBS | HEART RATE: 90 BPM | BODY MASS INDEX: 38.65 KG/M2 | OXYGEN SATURATION: 97 % | SYSTOLIC BLOOD PRESSURE: 129 MMHG | DIASTOLIC BLOOD PRESSURE: 88 MMHG | RESPIRATION RATE: 12 BRPM | HEIGHT: 68 IN

## 2019-09-25 DIAGNOSIS — J45.20 MILD INTERMITTENT ASTHMA WITHOUT COMPLICATION: ICD-10-CM

## 2019-09-25 DIAGNOSIS — J90 PLEURAL EFFUSION ON RIGHT: Primary | ICD-10-CM

## 2019-09-25 DIAGNOSIS — G47.33 OSA (OBSTRUCTIVE SLEEP APNEA): ICD-10-CM

## 2019-09-25 DIAGNOSIS — R09.02 POSTOPERATIVE HYPOXIA: ICD-10-CM

## 2019-09-25 DIAGNOSIS — J90 PLEURAL EFFUSION ON RIGHT: ICD-10-CM

## 2019-09-25 DIAGNOSIS — Z98.890 POSTOPERATIVE HYPOXIA: ICD-10-CM

## 2019-09-25 LAB
CULTURE: NORMAL
Lab: NORMAL
SPECIMEN DESCRIPTION: NORMAL

## 2019-09-25 PROCEDURE — 94618 PULMONARY STRESS TESTING: CPT | Performed by: INTERNAL MEDICINE

## 2019-09-25 PROCEDURE — 99214 OFFICE O/P EST MOD 30 MIN: CPT | Performed by: INTERNAL MEDICINE

## 2019-09-25 PROCEDURE — 71046 X-RAY EXAM CHEST 2 VIEWS: CPT

## 2019-09-25 NOTE — PROGRESS NOTES
atelectasis post first surgery and chest x-ray on last visit showed improvement in pleural effusion and atelectasis and she was supposed to have chest x-ray done today she did not have the chest x-ray done and she told me that she will get it done in [place]. Sleep questionnaire  Snores at night, denies waking up with a snoring. Has no witnessed apnea. Has waking up with choking and gasping sensation. No dry mouth upon awakening. Some time fatigue and tiredness during the day. Positive history of sleep apnea. Goes to sleep at 10 to 11  pm, wakes up 6 am. It takes 30 minutes to fall asleep. Wakes up 2-3 times at night to go to bathroom. Takes no nap during the day. No headache in am. No car wrecks or near wrecks because of the sleepiness. No nodding off while driving. No weight gain. No forgetfulness or decreased concentration. No nasal congestion or obstruction at night. No significant caffienated drinks or alcohol. No leg jerks during sleep. No restless feelings in legs at night. No numbness or burning in leg or feet. No leg aches or cramps . Initial history and evaluation  She was recently admitted to hospital for urology procedure postoperatively she had hypoxia and she was seen in today she is for follow-up appointment.   She has history of nephrolithiasis history of left nephrectomy in the past for nonfunctioning kidney and admitted this time on 08/23/2019 for percutaneous nephro lithotomy, procedure was done and her nephrostomy tube was removed 2 days later and she started having hypoxia postprocedure, requiring oxygen and BiPAP she had initial CT scan on 24th which shows a small right pleural effusion and bibasilar atelectasis, a CT scan of the chest was done in the hospital which was negative for pulmonary embolism and shows increase right basilar atelectasis and small pleural effusion, while she was in the hospital she gradually improved with ambulation physical therapy BiPAP and oxygen and her inhaled corticosteroids as symptoms are not persistent  If she has persistent symptoms during the spring then she may need ICS during the spring. Flonase to be used as needed. Vaccinations recommended for flu and she want to take it at work  Up to date with vaccinations from [address] an active lifestyle     Discussed sleep study on last visit few weeks ago, she does not want sleep study at this time. Information provided about sleep apnea  Wt loss is recommended   Follow good sleep hygeine instructions  Questions answered to pt's/family's satisfaction    Follow-up in office in 3-4 months  It was my pleasure to evaluate Mara Wilson today. Please call with questions. Please note that this chart was generated using voice recognition Dragon dictation software. Although every effort was made to ensure the accuracy of this automated transcription, some errors in transcription may have occurred.     Roland Tamez MD             9/25/2019, 1:44 PM

## 2019-09-26 ENCOUNTER — HOSPITAL ENCOUNTER (OUTPATIENT)
Dept: WOMENS IMAGING | Age: 54
Discharge: HOME | End: 2019-09-28
Payer: COMMERCIAL

## 2019-09-26 DIAGNOSIS — N63.20 BREAST MASS, LEFT: ICD-10-CM

## 2019-09-26 DIAGNOSIS — R05.8 COUGH PRESENT FOR GREATER THAN 3 WEEKS: ICD-10-CM

## 2019-09-26 PROCEDURE — 76642 ULTRASOUND BREAST LIMITED: CPT

## 2019-09-27 RX ORDER — OMEPRAZOLE 20 MG/1
CAPSULE, DELAYED RELEASE ORAL
Qty: 180 CAPSULE | Refills: 1 | Status: SHIPPED | OUTPATIENT
Start: 2019-09-27 | End: 2019-11-20 | Stop reason: SDUPTHER

## 2019-10-22 ENCOUNTER — HOSPITAL ENCOUNTER (OUTPATIENT)
Dept: ULTRASOUND IMAGING | Age: 54
Discharge: HOME | End: 2019-10-24
Payer: COMMERCIAL

## 2019-10-22 DIAGNOSIS — N20.0 CALCULUS OF KIDNEY: ICD-10-CM

## 2019-10-22 PROCEDURE — 76775 US EXAM ABDO BACK WALL LIM: CPT

## 2019-10-28 ENCOUNTER — OFFICE VISIT (OUTPATIENT)
Dept: PRIMARY CARE CLINIC | Age: 54
End: 2019-10-28
Payer: COMMERCIAL

## 2019-10-28 VITALS
HEART RATE: 68 BPM | TEMPERATURE: 99 F | SYSTOLIC BLOOD PRESSURE: 112 MMHG | HEIGHT: 68 IN | RESPIRATION RATE: 18 BRPM | DIASTOLIC BLOOD PRESSURE: 74 MMHG | WEIGHT: 246 LBS | BODY MASS INDEX: 37.28 KG/M2

## 2019-10-28 DIAGNOSIS — N20.0 RENAL STONES: ICD-10-CM

## 2019-10-28 DIAGNOSIS — N61.1 ABSCESS OF BREAST, RIGHT: Primary | ICD-10-CM

## 2019-10-28 PROCEDURE — 99214 OFFICE O/P EST MOD 30 MIN: CPT | Performed by: NURSE PRACTITIONER

## 2019-10-28 RX ORDER — SULFAMETHOXAZOLE AND TRIMETHOPRIM 800; 160 MG/1; MG/1
1 TABLET ORAL 2 TIMES DAILY
Qty: 28 TABLET | Refills: 0 | Status: SHIPPED | OUTPATIENT
Start: 2019-10-28 | End: 2019-12-11

## 2019-10-28 ASSESSMENT — ENCOUNTER SYMPTOMS
SORE THROAT: 0
SINUS PRESSURE: 0
COUGH: 0
BLOOD IN STOOL: 0
TROUBLE SWALLOWING: 0
VOMITING: 0
NAUSEA: 0
SHORTNESS OF BREATH: 0
WHEEZING: 0
CONSTIPATION: 0
ABDOMINAL PAIN: 0
DIARRHEA: 0

## 2019-11-01 ENCOUNTER — HOSPITAL ENCOUNTER (OUTPATIENT)
Dept: CT IMAGING | Age: 54
Discharge: HOME | End: 2019-11-03
Payer: COMMERCIAL

## 2019-11-01 DIAGNOSIS — N20.0 CALCULUS OF KIDNEY: ICD-10-CM

## 2019-11-01 PROCEDURE — 74176 CT ABD & PELVIS W/O CONTRAST: CPT

## 2019-11-20 ENCOUNTER — TELEPHONE (OUTPATIENT)
Dept: PRIMARY CARE CLINIC | Age: 54
End: 2019-11-20

## 2019-11-20 DIAGNOSIS — R05.8 COUGH PRESENT FOR GREATER THAN 3 WEEKS: ICD-10-CM

## 2019-11-20 DIAGNOSIS — M65.311 TRIGGER THUMB, RIGHT THUMB: ICD-10-CM

## 2019-11-20 DIAGNOSIS — M72.2 PLANTAR FASCIITIS, RIGHT: ICD-10-CM

## 2019-11-20 DIAGNOSIS — G56.01 CARPAL TUNNEL SYNDROME OF RIGHT WRIST: ICD-10-CM

## 2019-11-20 RX ORDER — MELOXICAM 15 MG/1
15 TABLET ORAL DAILY
Qty: 90 TABLET | Refills: 3 | Status: SHIPPED | OUTPATIENT
Start: 2019-11-20 | End: 2021-01-04

## 2019-11-20 RX ORDER — OMEPRAZOLE 20 MG/1
CAPSULE, DELAYED RELEASE ORAL
Qty: 180 CAPSULE | Refills: 3 | Status: SHIPPED | OUTPATIENT
Start: 2019-11-20 | End: 2021-03-22

## 2019-11-22 ENCOUNTER — OFFICE VISIT (OUTPATIENT)
Dept: ORTHOPEDIC SURGERY | Age: 54
End: 2019-11-22
Payer: COMMERCIAL

## 2019-11-22 VITALS — WEIGHT: 246.03 LBS | HEIGHT: 68 IN | BODY MASS INDEX: 37.29 KG/M2

## 2019-11-22 DIAGNOSIS — M75.42 SHOULDER IMPINGEMENT, LEFT: Primary | ICD-10-CM

## 2019-11-22 PROCEDURE — 20610 DRAIN/INJ JOINT/BURSA W/O US: CPT | Performed by: ORTHOPAEDIC SURGERY

## 2019-11-22 PROCEDURE — 99213 OFFICE O/P EST LOW 20 MIN: CPT | Performed by: ORTHOPAEDIC SURGERY

## 2019-11-22 RX ORDER — BUPIVACAINE HYDROCHLORIDE 2.5 MG/ML
2 INJECTION, SOLUTION INFILTRATION; PERINEURAL ONCE
Status: COMPLETED | OUTPATIENT
Start: 2019-11-22 | End: 2019-11-22

## 2019-11-22 RX ORDER — METHYLPREDNISOLONE ACETATE 80 MG/ML
80 INJECTION, SUSPENSION INTRA-ARTICULAR; INTRALESIONAL; INTRAMUSCULAR; SOFT TISSUE ONCE
Status: COMPLETED | OUTPATIENT
Start: 2019-11-22 | End: 2019-11-22

## 2019-11-22 RX ADMIN — BUPIVACAINE HYDROCHLORIDE 5 MG: 2.5 INJECTION, SOLUTION INFILTRATION; PERINEURAL at 14:54

## 2019-11-22 RX ADMIN — METHYLPREDNISOLONE ACETATE 80 MG: 80 INJECTION, SUSPENSION INTRA-ARTICULAR; INTRALESIONAL; INTRAMUSCULAR; SOFT TISSUE at 14:55

## 2019-11-22 ASSESSMENT — ENCOUNTER SYMPTOMS
NAUSEA: 0
COUGH: 0
DIARRHEA: 0
CONSTIPATION: 0

## 2019-12-06 ENCOUNTER — OFFICE VISIT (OUTPATIENT)
Dept: PRIMARY CARE CLINIC | Age: 54
End: 2019-12-06
Payer: COMMERCIAL

## 2019-12-06 VITALS
HEART RATE: 63 BPM | SYSTOLIC BLOOD PRESSURE: 126 MMHG | WEIGHT: 233.2 LBS | OXYGEN SATURATION: 97 % | RESPIRATION RATE: 15 BRPM | DIASTOLIC BLOOD PRESSURE: 86 MMHG | BODY MASS INDEX: 35.34 KG/M2 | HEIGHT: 68 IN

## 2019-12-06 DIAGNOSIS — G89.29 CHRONIC LEFT SHOULDER PAIN: ICD-10-CM

## 2019-12-06 DIAGNOSIS — G47.62 SLEEP RELATED LEG CRAMPS: ICD-10-CM

## 2019-12-06 DIAGNOSIS — M25.562 PAIN IN JOINT OF LEFT KNEE: ICD-10-CM

## 2019-12-06 DIAGNOSIS — I10 ESSENTIAL HYPERTENSION: Primary | ICD-10-CM

## 2019-12-06 DIAGNOSIS — N61.1 BREAST ABSCESS: ICD-10-CM

## 2019-12-06 DIAGNOSIS — M25.512 CHRONIC LEFT SHOULDER PAIN: ICD-10-CM

## 2019-12-06 DIAGNOSIS — N20.0 KIDNEY STONES: ICD-10-CM

## 2019-12-06 PROCEDURE — 99214 OFFICE O/P EST MOD 30 MIN: CPT | Performed by: NURSE PRACTITIONER

## 2019-12-06 RX ORDER — TRAMADOL HYDROCHLORIDE 50 MG/1
50 TABLET ORAL EVERY 8 HOURS PRN
Qty: 21 TABLET | Refills: 0 | Status: ON HOLD | OUTPATIENT
Start: 2019-12-06 | End: 2019-12-12 | Stop reason: HOSPADM

## 2019-12-06 ASSESSMENT — ENCOUNTER SYMPTOMS
WHEEZING: 0
BLOOD IN STOOL: 0
TROUBLE SWALLOWING: 0
SINUS PRESSURE: 0
BACK PAIN: 1
DIARRHEA: 0
CONSTIPATION: 0
SHORTNESS OF BREATH: 0

## 2019-12-07 ENCOUNTER — HOSPITAL ENCOUNTER (OUTPATIENT)
Age: 54
Discharge: HOME | End: 2019-12-07
Payer: COMMERCIAL

## 2019-12-07 DIAGNOSIS — G47.62 SLEEP RELATED LEG CRAMPS: ICD-10-CM

## 2019-12-07 LAB
ANION GAP SERPL CALCULATED.3IONS-SCNC: 15 MMOL/L (ref 9–17)
BUN BLDV-MCNC: 31 MG/DL (ref 6–20)
BUN/CREAT BLD: ABNORMAL (ref 9–20)
CALCIUM SERPL-MCNC: 9.9 MG/DL (ref 8.6–10.4)
CHLORIDE BLD-SCNC: 105 MMOL/L (ref 98–107)
CO2: 22 MMOL/L (ref 20–31)
CREAT SERPL-MCNC: 0.78 MG/DL (ref 0.5–0.9)
GFR AFRICAN AMERICAN: >60 ML/MIN
GFR NON-AFRICAN AMERICAN: >60 ML/MIN
GFR SERPL CREATININE-BSD FRML MDRD: ABNORMAL ML/MIN/{1.73_M2}
GFR SERPL CREATININE-BSD FRML MDRD: ABNORMAL ML/MIN/{1.73_M2}
GLUCOSE BLD-MCNC: 97 MG/DL (ref 70–99)
MAGNESIUM, IONIZED: 0.63 MMOL/L (ref 0.45–0.6)
POTASSIUM SERPL-SCNC: 4.5 MMOL/L (ref 3.7–5.3)
SODIUM BLD-SCNC: 142 MMOL/L (ref 135–144)

## 2019-12-07 PROCEDURE — 83735 ASSAY OF MAGNESIUM: CPT

## 2019-12-07 PROCEDURE — 80048 BASIC METABOLIC PNL TOTAL CA: CPT

## 2019-12-07 PROCEDURE — 36415 COLL VENOUS BLD VENIPUNCTURE: CPT

## 2019-12-12 ENCOUNTER — HOSPITAL ENCOUNTER (OUTPATIENT)
Age: 54
Setting detail: OUTPATIENT SURGERY
Discharge: HOME | End: 2019-12-12
Attending: UROLOGY | Admitting: UROLOGY
Payer: COMMERCIAL

## 2019-12-12 ENCOUNTER — ANESTHESIA EVENT (OUTPATIENT)
Dept: OPERATING ROOM | Age: 54
End: 2019-12-12
Payer: COMMERCIAL

## 2019-12-12 ENCOUNTER — APPOINTMENT (OUTPATIENT)
Dept: GENERAL RADIOLOGY | Age: 54
End: 2019-12-12
Attending: UROLOGY
Payer: COMMERCIAL

## 2019-12-12 ENCOUNTER — ANESTHESIA (OUTPATIENT)
Dept: OPERATING ROOM | Age: 54
End: 2019-12-12
Payer: COMMERCIAL

## 2019-12-12 VITALS
RESPIRATION RATE: 18 BRPM | BODY MASS INDEX: 34.86 KG/M2 | HEIGHT: 68 IN | WEIGHT: 230 LBS | SYSTOLIC BLOOD PRESSURE: 138 MMHG | DIASTOLIC BLOOD PRESSURE: 76 MMHG | OXYGEN SATURATION: 92 % | HEART RATE: 58 BPM | TEMPERATURE: 97.2 F

## 2019-12-12 VITALS — DIASTOLIC BLOOD PRESSURE: 89 MMHG | TEMPERATURE: 96.1 F | OXYGEN SATURATION: 93 % | SYSTOLIC BLOOD PRESSURE: 165 MMHG

## 2019-12-12 DIAGNOSIS — G89.18 POST-OPERATIVE PAIN: Primary | ICD-10-CM

## 2019-12-12 PROCEDURE — C2617 STENT, NON-COR, TEM W/O DEL: HCPCS | Performed by: UROLOGY

## 2019-12-12 PROCEDURE — 3600000014 HC SURGERY LEVEL 4 ADDTL 15MIN: Performed by: UROLOGY

## 2019-12-12 PROCEDURE — 6360000002 HC RX W HCPCS: Performed by: NURSE ANESTHETIST, CERTIFIED REGISTERED

## 2019-12-12 PROCEDURE — 3700000000 HC ANESTHESIA ATTENDED CARE: Performed by: UROLOGY

## 2019-12-12 PROCEDURE — C1758 CATHETER, URETERAL: HCPCS | Performed by: UROLOGY

## 2019-12-12 PROCEDURE — 7100000000 HC PACU RECOVERY - FIRST 15 MIN: Performed by: UROLOGY

## 2019-12-12 PROCEDURE — 2500000003 HC RX 250 WO HCPCS: Performed by: NURSE ANESTHETIST, CERTIFIED REGISTERED

## 2019-12-12 PROCEDURE — 2720000010 HC SURG SUPPLY STERILE: Performed by: UROLOGY

## 2019-12-12 PROCEDURE — 6360000002 HC RX W HCPCS: Performed by: ANESTHESIOLOGY

## 2019-12-12 PROCEDURE — 3600000004 HC SURGERY LEVEL 4 BASE: Performed by: UROLOGY

## 2019-12-12 PROCEDURE — 6370000000 HC RX 637 (ALT 250 FOR IP): Performed by: ANESTHESIOLOGY

## 2019-12-12 PROCEDURE — 3700000001 HC ADD 15 MINUTES (ANESTHESIA): Performed by: UROLOGY

## 2019-12-12 PROCEDURE — 7100000040 HC SPAR PHASE II RECOVERY - FIRST 15 MIN: Performed by: UROLOGY

## 2019-12-12 PROCEDURE — 6360000002 HC RX W HCPCS: Performed by: PHYSICIAN ASSISTANT

## 2019-12-12 PROCEDURE — C1769 GUIDE WIRE: HCPCS | Performed by: UROLOGY

## 2019-12-12 PROCEDURE — 7100000001 HC PACU RECOVERY - ADDTL 15 MIN: Performed by: UROLOGY

## 2019-12-12 PROCEDURE — 7100000041 HC SPAR PHASE II RECOVERY - ADDTL 15 MIN: Performed by: UROLOGY

## 2019-12-12 PROCEDURE — 2580000003 HC RX 258: Performed by: NURSE ANESTHETIST, CERTIFIED REGISTERED

## 2019-12-12 PROCEDURE — 2709999900 HC NON-CHARGEABLE SUPPLY: Performed by: UROLOGY

## 2019-12-12 PROCEDURE — 74018 RADEX ABDOMEN 1 VIEW: CPT

## 2019-12-12 DEVICE — UNIVERSA SOFT URETERAL STENT AND POSITIONER WITH HYDROPHILIC COATING AND MONOFILAMENT TETHER
Type: IMPLANTABLE DEVICE | Site: URETER | Status: FUNCTIONAL
Brand: UNIVERSA

## 2019-12-12 RX ORDER — DEXAMETHASONE SODIUM PHOSPHATE 10 MG/ML
INJECTION INTRAMUSCULAR; INTRAVENOUS PRN
Status: DISCONTINUED | OUTPATIENT
Start: 2019-12-12 | End: 2019-12-12 | Stop reason: SDUPTHER

## 2019-12-12 RX ORDER — PROPOFOL 10 MG/ML
INJECTION, EMULSION INTRAVENOUS CONTINUOUS PRN
Status: DISCONTINUED | OUTPATIENT
Start: 2019-12-12 | End: 2019-12-12 | Stop reason: SDUPTHER

## 2019-12-12 RX ORDER — OXYCODONE HYDROCHLORIDE AND ACETAMINOPHEN 5; 325 MG/1; MG/1
1 TABLET ORAL PRN
Status: DISCONTINUED | OUTPATIENT
Start: 2019-12-12 | End: 2019-12-12 | Stop reason: HOSPADM

## 2019-12-12 RX ORDER — METOPROLOL TARTRATE 5 MG/5ML
INJECTION INTRAVENOUS PRN
Status: DISCONTINUED | OUTPATIENT
Start: 2019-12-12 | End: 2019-12-12 | Stop reason: SDUPTHER

## 2019-12-12 RX ORDER — OXYBUTYNIN CHLORIDE 10 MG/1
10 TABLET, EXTENDED RELEASE ORAL DAILY
Qty: 30 TABLET | Refills: 3 | Status: SHIPPED | OUTPATIENT
Start: 2019-12-12 | End: 2021-04-06

## 2019-12-12 RX ORDER — MIDAZOLAM HYDROCHLORIDE 1 MG/ML
INJECTION INTRAMUSCULAR; INTRAVENOUS PRN
Status: DISCONTINUED | OUTPATIENT
Start: 2019-12-12 | End: 2019-12-12 | Stop reason: SDUPTHER

## 2019-12-12 RX ORDER — PROPOFOL 10 MG/ML
INJECTION, EMULSION INTRAVENOUS PRN
Status: DISCONTINUED | OUTPATIENT
Start: 2019-12-12 | End: 2019-12-12 | Stop reason: SDUPTHER

## 2019-12-12 RX ORDER — ONDANSETRON 2 MG/ML
INJECTION INTRAMUSCULAR; INTRAVENOUS PRN
Status: DISCONTINUED | OUTPATIENT
Start: 2019-12-12 | End: 2019-12-12 | Stop reason: SDUPTHER

## 2019-12-12 RX ORDER — HYDRALAZINE HYDROCHLORIDE 20 MG/ML
5 INJECTION INTRAMUSCULAR; INTRAVENOUS EVERY 10 MIN PRN
Status: DISCONTINUED | OUTPATIENT
Start: 2019-12-12 | End: 2019-12-12 | Stop reason: HOSPADM

## 2019-12-12 RX ORDER — ONDANSETRON 2 MG/ML
4 INJECTION INTRAMUSCULAR; INTRAVENOUS
Status: CANCELLED | OUTPATIENT
Start: 2019-12-12 | End: 2019-12-12

## 2019-12-12 RX ORDER — TAMSULOSIN HYDROCHLORIDE 0.4 MG/1
0.4 CAPSULE ORAL DAILY
Qty: 90 CAPSULE | Refills: 3 | Status: SHIPPED | OUTPATIENT
Start: 2019-12-12 | End: 2020-01-07

## 2019-12-12 RX ORDER — SODIUM CHLORIDE, SODIUM LACTATE, POTASSIUM CHLORIDE, CALCIUM CHLORIDE 600; 310; 30; 20 MG/100ML; MG/100ML; MG/100ML; MG/100ML
INJECTION, SOLUTION INTRAVENOUS CONTINUOUS PRN
Status: DISCONTINUED | OUTPATIENT
Start: 2019-12-12 | End: 2019-12-12 | Stop reason: SDUPTHER

## 2019-12-12 RX ORDER — OXYCODONE HYDROCHLORIDE AND ACETAMINOPHEN 5; 325 MG/1; MG/1
2 TABLET ORAL PRN
Status: DISCONTINUED | OUTPATIENT
Start: 2019-12-12 | End: 2019-12-12 | Stop reason: HOSPADM

## 2019-12-12 RX ORDER — SUCCINYLCHOLINE/SOD CL,ISO/PF 100 MG/5ML
SYRINGE (ML) INTRAVENOUS PRN
Status: DISCONTINUED | OUTPATIENT
Start: 2019-12-12 | End: 2019-12-12 | Stop reason: SDUPTHER

## 2019-12-12 RX ORDER — MEPERIDINE HYDROCHLORIDE 50 MG/ML
12.5 INJECTION INTRAMUSCULAR; INTRAVENOUS; SUBCUTANEOUS EVERY 5 MIN PRN
Status: DISCONTINUED | OUTPATIENT
Start: 2019-12-12 | End: 2019-12-12 | Stop reason: HOSPADM

## 2019-12-12 RX ORDER — FENTANYL CITRATE 50 UG/ML
25 INJECTION, SOLUTION INTRAMUSCULAR; INTRAVENOUS EVERY 5 MIN PRN
Status: DISCONTINUED | OUTPATIENT
Start: 2019-12-12 | End: 2019-12-12 | Stop reason: HOSPADM

## 2019-12-12 RX ORDER — FENTANYL CITRATE 50 UG/ML
50 INJECTION, SOLUTION INTRAMUSCULAR; INTRAVENOUS EVERY 5 MIN PRN
Status: DISCONTINUED | OUTPATIENT
Start: 2019-12-12 | End: 2019-12-12 | Stop reason: HOSPADM

## 2019-12-12 RX ORDER — SULFAMETHOXAZOLE AND TRIMETHOPRIM 800; 160 MG/1; MG/1
1 TABLET ORAL 2 TIMES DAILY
Qty: 6 TABLET | Refills: 0 | Status: SHIPPED | OUTPATIENT
Start: 2019-12-12 | End: 2019-12-15

## 2019-12-12 RX ORDER — LIDOCAINE HYDROCHLORIDE 10 MG/ML
1 INJECTION, SOLUTION EPIDURAL; INFILTRATION; INTRACAUDAL; PERINEURAL
Status: CANCELLED | OUTPATIENT
Start: 2019-12-12 | End: 2019-12-12

## 2019-12-12 RX ORDER — LIDOCAINE HYDROCHLORIDE 10 MG/ML
INJECTION, SOLUTION EPIDURAL; INFILTRATION; INTRACAUDAL; PERINEURAL PRN
Status: DISCONTINUED | OUTPATIENT
Start: 2019-12-12 | End: 2019-12-12 | Stop reason: SDUPTHER

## 2019-12-12 RX ORDER — FENTANYL CITRATE 50 UG/ML
25 INJECTION, SOLUTION INTRAMUSCULAR; INTRAVENOUS EVERY 5 MIN PRN
Status: CANCELLED | OUTPATIENT
Start: 2019-12-12

## 2019-12-12 RX ORDER — LABETALOL HYDROCHLORIDE 5 MG/ML
5 INJECTION, SOLUTION INTRAVENOUS EVERY 10 MIN PRN
Status: DISCONTINUED | OUTPATIENT
Start: 2019-12-12 | End: 2019-12-12 | Stop reason: HOSPADM

## 2019-12-12 RX ORDER — SODIUM CHLORIDE, SODIUM LACTATE, POTASSIUM CHLORIDE, CALCIUM CHLORIDE 600; 310; 30; 20 MG/100ML; MG/100ML; MG/100ML; MG/100ML
INJECTION, SOLUTION INTRAVENOUS CONTINUOUS
Status: CANCELLED | OUTPATIENT
Start: 2019-12-12

## 2019-12-12 RX ORDER — ONDANSETRON 2 MG/ML
4 INJECTION INTRAMUSCULAR; INTRAVENOUS
Status: DISCONTINUED | OUTPATIENT
Start: 2019-12-12 | End: 2019-12-12 | Stop reason: HOSPADM

## 2019-12-12 RX ORDER — DOCUSATE SODIUM 100 MG/1
100 CAPSULE, LIQUID FILLED ORAL 2 TIMES DAILY
Qty: 60 CAPSULE | Refills: 0 | Status: SHIPPED | OUTPATIENT
Start: 2019-12-12 | End: 2019-12-26 | Stop reason: ALTCHOICE

## 2019-12-12 RX ORDER — MORPHINE SULFATE 2 MG/ML
2 INJECTION, SOLUTION INTRAMUSCULAR; INTRAVENOUS EVERY 5 MIN PRN
Status: DISCONTINUED | OUTPATIENT
Start: 2019-12-12 | End: 2019-12-12 | Stop reason: HOSPADM

## 2019-12-12 RX ORDER — GLYCOPYRROLATE 1 MG/5 ML
SYRINGE (ML) INTRAVENOUS PRN
Status: DISCONTINUED | OUTPATIENT
Start: 2019-12-12 | End: 2019-12-12 | Stop reason: SDUPTHER

## 2019-12-12 RX ORDER — HYDROCODONE BITARTRATE AND ACETAMINOPHEN 5; 325 MG/1; MG/1
1 TABLET ORAL EVERY 6 HOURS PRN
Qty: 15 TABLET | Refills: 0 | Status: SHIPPED | OUTPATIENT
Start: 2019-12-12 | End: 2019-12-15

## 2019-12-12 RX ORDER — SCOLOPAMINE TRANSDERMAL SYSTEM 1 MG/1
1 PATCH, EXTENDED RELEASE TRANSDERMAL
Status: DISCONTINUED | OUTPATIENT
Start: 2019-12-12 | End: 2019-12-12 | Stop reason: HOSPADM

## 2019-12-12 RX ORDER — DIPHENHYDRAMINE HYDROCHLORIDE 50 MG/ML
12.5 INJECTION INTRAMUSCULAR; INTRAVENOUS
Status: DISCONTINUED | OUTPATIENT
Start: 2019-12-12 | End: 2019-12-12 | Stop reason: HOSPADM

## 2019-12-12 RX ORDER — NEOSTIGMINE METHYLSULFATE 5 MG/5 ML
SYRINGE (ML) INTRAVENOUS PRN
Status: DISCONTINUED | OUTPATIENT
Start: 2019-12-12 | End: 2019-12-12 | Stop reason: SDUPTHER

## 2019-12-12 RX ORDER — ROCURONIUM BROMIDE 10 MG/ML
INJECTION, SOLUTION INTRAVENOUS PRN
Status: DISCONTINUED | OUTPATIENT
Start: 2019-12-12 | End: 2019-12-12 | Stop reason: SDUPTHER

## 2019-12-12 RX ADMIN — Medication 2 G: at 12:48

## 2019-12-12 RX ADMIN — Medication 0.4 MG: at 13:14

## 2019-12-12 RX ADMIN — ONDANSETRON 4 MG: 2 INJECTION, SOLUTION INTRAMUSCULAR; INTRAVENOUS at 12:45

## 2019-12-12 RX ADMIN — Medication 100 MG: at 12:38

## 2019-12-12 RX ADMIN — METOPROLOL TARTRATE 2 MG: 1 INJECTION, SOLUTION INTRAVENOUS at 13:18

## 2019-12-12 RX ADMIN — PROPOFOL 50 MG: 10 INJECTION, EMULSION INTRAVENOUS at 12:47

## 2019-12-12 RX ADMIN — ROCURONIUM BROMIDE 30 MG: 10 INJECTION INTRAVENOUS at 12:48

## 2019-12-12 RX ADMIN — PROPOFOL 100 MG: 10 INJECTION, EMULSION INTRAVENOUS at 12:42

## 2019-12-12 RX ADMIN — PROPOFOL 150 MG: 10 INJECTION, EMULSION INTRAVENOUS at 12:38

## 2019-12-12 RX ADMIN — MIDAZOLAM HYDROCHLORIDE 2 MG: 1 INJECTION, SOLUTION INTRAMUSCULAR; INTRAVENOUS at 12:36

## 2019-12-12 RX ADMIN — PROPOFOL 150 MCG/KG/MIN: 10 INJECTION, EMULSION INTRAVENOUS at 12:42

## 2019-12-12 RX ADMIN — METOPROLOL TARTRATE 3 MG: 1 INJECTION, SOLUTION INTRAVENOUS at 13:11

## 2019-12-12 RX ADMIN — PROPOFOL 50 MG: 10 INJECTION, EMULSION INTRAVENOUS at 12:50

## 2019-12-12 RX ADMIN — FENTANYL CITRATE 25 MCG: 50 INJECTION, SOLUTION INTRAMUSCULAR; INTRAVENOUS at 14:09

## 2019-12-12 RX ADMIN — DEXAMETHASONE SODIUM PHOSPHATE 10 MG: 10 INJECTION INTRAMUSCULAR; INTRAVENOUS at 12:45

## 2019-12-12 RX ADMIN — FENTANYL CITRATE 25 MCG: 50 INJECTION, SOLUTION INTRAMUSCULAR; INTRAVENOUS at 14:15

## 2019-12-12 RX ADMIN — LIDOCAINE HYDROCHLORIDE 50 MG: 10 INJECTION, SOLUTION EPIDURAL; INFILTRATION; INTRACAUDAL; PERINEURAL at 12:38

## 2019-12-12 RX ADMIN — Medication 2 MG: at 13:14

## 2019-12-12 RX ADMIN — SODIUM CHLORIDE, POTASSIUM CHLORIDE, SODIUM LACTATE AND CALCIUM CHLORIDE: 600; 310; 30; 20 INJECTION, SOLUTION INTRAVENOUS at 11:53

## 2019-12-12 RX ADMIN — FENTANYL CITRATE 25 MCG: 50 INJECTION, SOLUTION INTRAMUSCULAR; INTRAVENOUS at 14:04

## 2019-12-12 ASSESSMENT — PULMONARY FUNCTION TESTS
PIF_VALUE: 0
PIF_VALUE: 27
PIF_VALUE: 6
PIF_VALUE: 1
PIF_VALUE: 26
PIF_VALUE: 25
PIF_VALUE: 28
PIF_VALUE: 20
PIF_VALUE: 25
PIF_VALUE: 27
PIF_VALUE: 20
PIF_VALUE: 26
PIF_VALUE: 26
PIF_VALUE: 23
PIF_VALUE: 26
PIF_VALUE: 26
PIF_VALUE: 25
PIF_VALUE: 42
PIF_VALUE: 26
PIF_VALUE: 25
PIF_VALUE: 25
PIF_VALUE: 22
PIF_VALUE: 25
PIF_VALUE: 1
PIF_VALUE: 2
PIF_VALUE: 26
PIF_VALUE: 1
PIF_VALUE: 27
PIF_VALUE: 26
PIF_VALUE: 27
PIF_VALUE: 25
PIF_VALUE: 28
PIF_VALUE: 21
PIF_VALUE: 27
PIF_VALUE: 0
PIF_VALUE: 27
PIF_VALUE: 1
PIF_VALUE: 6
PIF_VALUE: 26
PIF_VALUE: 24
PIF_VALUE: 27
PIF_VALUE: 25
PIF_VALUE: 2
PIF_VALUE: 4
PIF_VALUE: 3
PIF_VALUE: 28
PIF_VALUE: 11
PIF_VALUE: 25
PIF_VALUE: 27
PIF_VALUE: 21
PIF_VALUE: 34
PIF_VALUE: 27
PIF_VALUE: 5
PIF_VALUE: 1
PIF_VALUE: 0

## 2019-12-12 ASSESSMENT — PAIN DESCRIPTION - DESCRIPTORS
DESCRIPTORS: BURNING
DESCRIPTORS: CONSTANT
DESCRIPTORS: BURNING

## 2019-12-12 ASSESSMENT — PAIN SCALES - GENERAL
PAINLEVEL_OUTOF10: 6
PAINLEVEL_OUTOF10: 2
PAINLEVEL_OUTOF10: 5
PAINLEVEL_OUTOF10: 3
PAINLEVEL_OUTOF10: 3
PAINLEVEL_OUTOF10: 6

## 2019-12-12 ASSESSMENT — PAIN DESCRIPTION - PAIN TYPE
TYPE: SURGICAL PAIN
TYPE: SURGICAL PAIN
TYPE: CHRONIC PAIN;SURGICAL PAIN
TYPE: SURGICAL PAIN

## 2019-12-12 ASSESSMENT — PAIN DESCRIPTION - LOCATION
LOCATION: FLANK
LOCATION: PERINEUM

## 2019-12-12 ASSESSMENT — ENCOUNTER SYMPTOMS: SHORTNESS OF BREATH: 1

## 2019-12-12 ASSESSMENT — PAIN DESCRIPTION - ORIENTATION: ORIENTATION: RIGHT

## 2019-12-12 NOTE — H&P (VIEW-ONLY)
Kortney Haney MD at [address] [facility] Right 8/23/2019    CYSTOSCOPY, RIGHT STENT REMOVAL, INSERTION OCCLUSIVE BALLOON, PT GOING TO INTERVENTIONAL performed by Kortney Haney MD at [facility] Right 09/13/2019    Laser and stent placement    INTRAOCULAR LENS PROSTHESIS INSERTION Bilateral 2008    KNEE ARTHROSCOPY Right 1998, 2003    X2    NEPHROLITHOTOMY Right 8/23/2019    HOLMIUM LASER, NEPHROLITHOTOMY PERCUTANEOUS, LITHOCLAST, C-ARM performed by Kortney Haney MD at [address] Right 08/23/2019    HOLMIUM LASER, NEPHROLITHOTOMY PERCUTANEOUS, LITHOCLAST, C-ARM (Right )    ID CYSTO/URETERO/PYELOSCOPY, [place] N/A 7/10/2018    CYSTOSCOPY, RIGHT URETEROSCOPY, HOLMIUM LASER LITHO WITH STONE BASKETING, RIGHT STENT EXCHANGE performed by Mark Dhillon MD at [address] [facility], [place] Right 10/10/2018    HOLMIUM LASER STANDBY, CYSTO, RIGHT URETEROSCOPY, RIGHT STENT PLACEMENT performed by Ernestine Aguirre MD at [address] [facility] Right 7/4/2018    CYSTOSCOPY URETERAL STENT INSERTION, RIGHT performed by Niels Gayle MD at [facility] Left     TOTAL NEPHRECTOMY Left 1988       Medications Prior to Admission:    Prior to Admission medications    Medication Sig Start Date End Date Taking? Authorizing Provider   POTASSIUM CITRATE PO Take 5 mLs by mouth three times daily   Yes Historical Provider, MD   traMADol (ULTRAM) 50 MG tablet Take 1 tablet by mouth every 8 hours as needed for Pain for up to 7 days. Intended supply: 7 days. Take lowest dose possible to manage pain 12/6/19 12/13/19 Yes MIGDALIA Linton CNP   omeprazole (PRILOSEC) 20 MG delayed release capsule TAKE 1 CAPSULE BY MOUTH TWICE DAILY.  11/20/19  Yes MIGDALIA Peters CNP   meloxicam (MOBIC) 15 MG tablet Take 1 tablet by mouth daily 11/20/19  Yes MIGDALIA Linton CNP   irbesartan-hydrochlorothiazide (Cyntha Laney) today.    Consent obtained    Vaughn Brandt PA-C  11:21 AM 12/12/2019

## 2019-12-31 ENCOUNTER — HOSPITAL ENCOUNTER (OUTPATIENT)
Age: 54
Setting detail: OUTPATIENT SURGERY
Discharge: HOME | End: 2019-12-31
Attending: UROLOGY | Admitting: UROLOGY
Payer: COMMERCIAL

## 2019-12-31 ENCOUNTER — ANESTHESIA (OUTPATIENT)
Dept: OPERATING ROOM | Age: 54
End: 2019-12-31
Payer: COMMERCIAL

## 2019-12-31 ENCOUNTER — ANESTHESIA EVENT (OUTPATIENT)
Dept: OPERATING ROOM | Age: 54
End: 2019-12-31
Payer: COMMERCIAL

## 2019-12-31 VITALS
OXYGEN SATURATION: 99 % | DIASTOLIC BLOOD PRESSURE: 82 MMHG | SYSTOLIC BLOOD PRESSURE: 130 MMHG | TEMPERATURE: 96.8 F | HEIGHT: 68 IN | WEIGHT: 225 LBS | BODY MASS INDEX: 34.1 KG/M2 | RESPIRATION RATE: 22 BRPM | HEART RATE: 66 BPM

## 2019-12-31 VITALS — SYSTOLIC BLOOD PRESSURE: 121 MMHG | OXYGEN SATURATION: 87 % | DIASTOLIC BLOOD PRESSURE: 79 MMHG

## 2019-12-31 PROCEDURE — 3600000013 HC SURGERY LEVEL 3 ADDTL 15MIN: Performed by: UROLOGY

## 2019-12-31 PROCEDURE — 7100000041 HC SPAR PHASE II RECOVERY - ADDTL 15 MIN: Performed by: UROLOGY

## 2019-12-31 PROCEDURE — 2580000003 HC RX 258: Performed by: ANESTHESIOLOGY

## 2019-12-31 PROCEDURE — 3600000003 HC SURGERY LEVEL 3 BASE: Performed by: UROLOGY

## 2019-12-31 PROCEDURE — 2709999900 HC NON-CHARGEABLE SUPPLY: Performed by: UROLOGY

## 2019-12-31 PROCEDURE — 6360000002 HC RX W HCPCS: Performed by: NURSE ANESTHETIST, CERTIFIED REGISTERED

## 2019-12-31 PROCEDURE — 3700000000 HC ANESTHESIA ATTENDED CARE: Performed by: UROLOGY

## 2019-12-31 PROCEDURE — 6360000002 HC RX W HCPCS: Performed by: STUDENT IN AN ORGANIZED HEALTH CARE EDUCATION/TRAINING PROGRAM

## 2019-12-31 PROCEDURE — 2580000003 HC RX 258: Performed by: UROLOGY

## 2019-12-31 PROCEDURE — 2500000003 HC RX 250 WO HCPCS: Performed by: NURSE ANESTHETIST, CERTIFIED REGISTERED

## 2019-12-31 PROCEDURE — 7100000040 HC SPAR PHASE II RECOVERY - FIRST 15 MIN: Performed by: UROLOGY

## 2019-12-31 PROCEDURE — 3700000001 HC ADD 15 MINUTES (ANESTHESIA): Performed by: UROLOGY

## 2019-12-31 RX ORDER — PROPOFOL 10 MG/ML
INJECTION, EMULSION INTRAVENOUS PRN
Status: DISCONTINUED | OUTPATIENT
Start: 2019-12-31 | End: 2019-12-31 | Stop reason: SDUPTHER

## 2019-12-31 RX ORDER — MAGNESIUM HYDROXIDE 1200 MG/15ML
LIQUID ORAL PRN
Status: DISCONTINUED | OUTPATIENT
Start: 2019-12-31 | End: 2019-12-31 | Stop reason: ALTCHOICE

## 2019-12-31 RX ORDER — HYDROCODONE BITARTRATE AND ACETAMINOPHEN 5; 325 MG/1; MG/1
1 TABLET ORAL EVERY 6 HOURS PRN
Qty: 5 TABLET | Refills: 0 | Status: SHIPPED | OUTPATIENT
Start: 2019-12-31 | End: 2020-01-07 | Stop reason: ALTCHOICE

## 2019-12-31 RX ORDER — LIDOCAINE HYDROCHLORIDE 10 MG/ML
1 INJECTION, SOLUTION EPIDURAL; INFILTRATION; INTRACAUDAL; PERINEURAL
Status: DISCONTINUED | OUTPATIENT
Start: 2019-12-31 | End: 2019-12-31 | Stop reason: HOSPADM

## 2019-12-31 RX ORDER — PROPOFOL 10 MG/ML
INJECTION, EMULSION INTRAVENOUS CONTINUOUS PRN
Status: DISCONTINUED | OUTPATIENT
Start: 2019-12-31 | End: 2019-12-31 | Stop reason: SDUPTHER

## 2019-12-31 RX ORDER — SCOLOPAMINE TRANSDERMAL SYSTEM 1 MG/1
1 PATCH, EXTENDED RELEASE TRANSDERMAL ONCE
Status: DISCONTINUED | OUTPATIENT
Start: 2019-12-31 | End: 2019-12-31 | Stop reason: HOSPADM

## 2019-12-31 RX ORDER — LIDOCAINE HYDROCHLORIDE 10 MG/ML
INJECTION, SOLUTION EPIDURAL; INFILTRATION; INTRACAUDAL; PERINEURAL PRN
Status: DISCONTINUED | OUTPATIENT
Start: 2019-12-31 | End: 2019-12-31 | Stop reason: SDUPTHER

## 2019-12-31 RX ORDER — SODIUM CHLORIDE, SODIUM LACTATE, POTASSIUM CHLORIDE, CALCIUM CHLORIDE 600; 310; 30; 20 MG/100ML; MG/100ML; MG/100ML; MG/100ML
INJECTION, SOLUTION INTRAVENOUS CONTINUOUS
Status: DISCONTINUED | OUTPATIENT
Start: 2019-12-31 | End: 2019-12-31 | Stop reason: HOSPADM

## 2019-12-31 RX ORDER — MAGNESIUM HYDROXIDE 1200 MG/15ML
LIQUID ORAL CONTINUOUS PRN
Status: COMPLETED | OUTPATIENT
Start: 2019-12-31 | End: 2019-12-31

## 2019-12-31 RX ORDER — FENTANYL CITRATE 50 UG/ML
25 INJECTION, SOLUTION INTRAMUSCULAR; INTRAVENOUS EVERY 5 MIN PRN
Status: DISCONTINUED | OUTPATIENT
Start: 2019-12-31 | End: 2019-12-31 | Stop reason: HOSPADM

## 2019-12-31 RX ORDER — ONDANSETRON 4 MG/1
4 TABLET, FILM COATED ORAL EVERY 6 HOURS PRN
Qty: 20 TABLET | Refills: 0 | Status: SHIPPED | OUTPATIENT
Start: 2019-12-31 | End: 2020-01-07 | Stop reason: ALTCHOICE

## 2019-12-31 RX ORDER — ONDANSETRON 2 MG/ML
4 INJECTION INTRAMUSCULAR; INTRAVENOUS DAILY PRN
Status: DISCONTINUED | OUTPATIENT
Start: 2019-12-31 | End: 2019-12-31 | Stop reason: HOSPADM

## 2019-12-31 RX ORDER — MIDAZOLAM HYDROCHLORIDE 1 MG/ML
1 INJECTION INTRAMUSCULAR; INTRAVENOUS EVERY 10 MIN PRN
Status: DISCONTINUED | OUTPATIENT
Start: 2019-12-31 | End: 2019-12-31 | Stop reason: HOSPADM

## 2019-12-31 RX ADMIN — LIDOCAINE HYDROCHLORIDE 50 MG: 10 INJECTION, SOLUTION EPIDURAL; INFILTRATION; INTRACAUDAL; PERINEURAL at 11:05

## 2019-12-31 RX ADMIN — SODIUM CHLORIDE, POTASSIUM CHLORIDE, SODIUM LACTATE AND CALCIUM CHLORIDE: 600; 310; 30; 20 INJECTION, SOLUTION INTRAVENOUS at 09:17

## 2019-12-31 RX ADMIN — PROPOFOL 40 MG: 10 INJECTION, EMULSION INTRAVENOUS at 11:10

## 2019-12-31 RX ADMIN — PROPOFOL 60 MG: 10 INJECTION, EMULSION INTRAVENOUS at 11:05

## 2019-12-31 RX ADMIN — PROPOFOL 40 MG: 10 INJECTION, EMULSION INTRAVENOUS at 11:08

## 2019-12-31 RX ADMIN — Medication 2 G: at 11:04

## 2019-12-31 RX ADMIN — PROPOFOL 100 MCG/KG/MIN: 10 INJECTION, EMULSION INTRAVENOUS at 11:06

## 2019-12-31 RX ADMIN — PROPOFOL 40 MG: 10 INJECTION, EMULSION INTRAVENOUS at 11:13

## 2019-12-31 RX ADMIN — PROPOFOL 20 MG: 10 INJECTION, EMULSION INTRAVENOUS at 11:18

## 2019-12-31 RX ADMIN — PROPOFOL 20 MG: 10 INJECTION, EMULSION INTRAVENOUS at 11:15

## 2019-12-31 ASSESSMENT — PULMONARY FUNCTION TESTS
PIF_VALUE: 0
PIF_VALUE: 1
PIF_VALUE: 0
PIF_VALUE: 1
PIF_VALUE: 0
PIF_VALUE: 1
PIF_VALUE: 0
PIF_VALUE: 1

## 2019-12-31 ASSESSMENT — PAIN - FUNCTIONAL ASSESSMENT: PAIN_FUNCTIONAL_ASSESSMENT: 0-10

## 2019-12-31 ASSESSMENT — PAIN SCALES - GENERAL
PAINLEVEL_OUTOF10: 0
PAINLEVEL_OUTOF10: 0

## 2019-12-31 NOTE — INTERVAL H&P NOTE
H&P Update    Patient's History and Physical from December 12, 2019 was reviewed. Patient examined. There has been no change.     Electronically signed by Anne Cohen MD on 12/31/19 at 9:03 AM

## 2019-12-31 NOTE — OP NOTE
Dr. Abrahan Lee MD  Urologic Surgery      [address] [place]  12/31/19    Patient:  Elvis Mckeon  MRN: [id number]  YOB: 1965    Surgeon: Dr. Abrahan Lee MD  Assistant: Harshad Garcia MD    Pre-op Diagnosis: Cystine stones, stent  Post-op Diagnosis: Same. Procedure:   Cystoscopy with Right Ureteral Stent Removal.    Anesthesia: Monitor Anesthesia Care  Complications: None  OR Blood Loss: Minimal  Fluids: Cystalloids  Specimens: None ureteral stent. Indication:  62year old female with solitary right kidney, cystines stones, and stent in place from prior procedure. She has had encrustation before. Narrative of the Procedure:    After informed consent was obtained in the preoperative area, the patient was taken back to the operating room and transferred to the operating table and remained on the hospital gurney. The patient was sterilely prepped and draped in a standard fashion. A timeout occurred. Two patient identifiers were used. The cystoscope was inserted carefully into the bladder. The Right ureteral orifice was visualized and the ureteral stent was removed intact. The patients bladder was drained. All instrumentation was removed. The patient tolerated the procedure well and was discharge back to the PACU in good and stable condition.     Follow-Up:   - As scheduled with attending    Harshad Garcia  Electronically signed on 6/6/2017 at 11:27 AM

## 2020-01-07 ENCOUNTER — HOSPITAL ENCOUNTER (OUTPATIENT)
Age: 55
Setting detail: SPECIMEN
Discharge: HOME | End: 2020-01-07
Payer: COMMERCIAL

## 2020-01-07 ENCOUNTER — OFFICE VISIT (OUTPATIENT)
Dept: OBGYN CLINIC | Age: 55
End: 2020-01-07
Payer: COMMERCIAL

## 2020-01-07 VITALS
HEART RATE: 66 BPM | SYSTOLIC BLOOD PRESSURE: 138 MMHG | DIASTOLIC BLOOD PRESSURE: 88 MMHG | WEIGHT: 225 LBS | BODY MASS INDEX: 34.1 KG/M2 | HEIGHT: 68 IN

## 2020-01-07 PROCEDURE — 99386 PREV VISIT NEW AGE 40-64: CPT | Performed by: OBSTETRICS & GYNECOLOGY

## 2020-01-07 ASSESSMENT — ENCOUNTER SYMPTOMS
CONSTIPATION: 0
ABDOMINAL PAIN: 0
BACK PAIN: 0
SHORTNESS OF BREATH: 0
COUGH: 0

## 2020-01-07 NOTE — PROGRESS NOTES
[facility]  [facility]  [address] [facility]  Dept: [phone]    Chief complaint:   Chief Complaint   Patient presents with   [address] Gynecologic Exam     pt says is been a few years since her last pap       History Present Illness: Ashley Lund is a 63 yo female who presents for her annual exam and to establish care. She says it has been 4-5 years since her last exam.  She has had 2 episodes of postmenopausal bleeding in the last 2 months. She says it was more when she was wiping that she noticed the bleeding, but it did last for 2 days both time. She has a h/o abscess underneath both breasts that were treated with antibiotics and incision. She started a new diet in October that is keto based, and she isn't sure if that is why she had the bleeding. She is having vaginal dryness as well, and intercourse was painful previously. She is not sexually active. She denies any bowel or bladder issues. Current Medications (OTC/Herbal):   Current Outpatient Medications   Medication Sig Dispense Refill    oxybutynin (DITROPAN XL) 10 MG extended release tablet Take 1 tablet by mouth daily 30 tablet 3    POTASSIUM CITRATE PO Take 5 mLs by mouth three times daily      omeprazole (PRILOSEC) 20 MG delayed release capsule TAKE 1 CAPSULE BY MOUTH TWICE DAILY. (Patient taking differently: Take 20 mg by mouth 2 times daily TAKE 1 CAPSULE BY MOUTH TWICE DAILY. ) 180 capsule 3    meloxicam (MOBIC) 15 MG tablet Take 1 tablet by mouth daily 90 tablet 3    irbesartan-hydrochlorothiazide (AVALIDE) 150-12.5 MG per tablet Take 1 tablet by mouth daily 90 tablet 3    OMEGA-3 FATTY ACIDS PO Take 1 tablet by mouth daily      albuterol sulfate HFA (PROAIR HFA) 108 (90 Base) MCG/ACT inhaler Inhale 2 puffs into the lungs every 6 hours as needed for Wheezing 1 Inhaler 5    acetaminophen (TYLENOL) 500 MG tablet Take 1,000 mg by mouth every 4 hours as needed for Pain. No current facility-administered medications for this visit.       Allergies: No Known Allergies  Past Medical History:   Past Medical History:   Diagnosis Date    Arthritis     Asthma     Benign familial tremor     Cancer ([facility]     skin    CKD (chronic kidney disease) stage 1, GFR 90 ml/min or greater     Class 2 severe obesity with serious comorbidity and body mass index (BMI) of 39.0 to 39.9 in adult ([facility] 5/1/2019    Cystinuria ([facility]     Dyspnea on exertion 8/6/2019    Flank pain     RIGHT    Gastroesophageal reflux disease without esophagitis     GERD (gastroesophageal reflux disease)     Hypertension     Hyperuricemia     Hypothyroidism     Kidney stones     Midline low back pain with right-sided sciatica 6/2/2016    Proteinuria     Shoulder impingement 12/2019    left    Snores     Solitary kidney, acquired 11/20/2017    Urinary tract obstruction by kidney stone 8/1/2019    Wears glasses      Past Surgical History:   Past Surgical History:   Procedure Laterality Date    CYSTO/URETERO/PYELOSCOPY, [place] Right 11/21/2017    HOLMIUM LASER - CYSTOSCOPY, RIGHT URETEROSCOPY, RIGHT STENT EXCHANGE,STONE BASKETING performed by Nathan Treviño MD at [address] [place] [address] [address], [place] Right 9/13/2019    HOLMIUM - CYSTO, URETEROSCOPY, LITHOTRIPSY, STENT PLACEMENT performed by Loraine Bansal MD at [address] [facility] [address], [place] [address] Right 12/12/2019    HOLMIUM- CYSTO, URETEROSCOPY, LASER LITHO, STENT PLACEMENT AND RIGHT URETERAL BASKET STONE EXTRACTION performed by Nathan Treviño MD at [address] [place] [address] Right 11/18/2017    CYSTOSCOPY URETERAL STENT INSERTION,RIGHT performed by Mell Rios MD at [address] [place] [address] Right 8/2/2019    CYSTOSCOPY RT URETERAL STENT INSERTION performed by Loraine Bansal MD at [address] [place] [address] Right 8/23/2019    CYSTOSCOPY, RIGHT STENT REMOVAL, INSERTION OCCLUSIVE BALLOON, PT GOING TO INTERVENTIONAL performed by Loraine Bansal MD at [address] Right 2019    CYSTO, URETEROSCOPY, LASER LITHO, STENT PLACEMENT AND RIGHT URETERAL BASKET STONE EXTRACTION     CYSTOSCOPY Right 2019    stent removal    CYSTOSCOPY INSERTION / REMOVAL STENT / STONE Right 2019    CYSTOSCOPY, STENT REMOVAL performed by [place]o Murillo MD at [address] Bilateral 2008    KNEE ARTHROSCOPY Right , 2003    X2    NEPHROLITHOTOMY Right 2019    HOLMIUM LASER, NEPHROLITHOTOMY PERCUTANEOUS, LITHOCLAST, C-ARM performed by Bonne Meckel, MD at [address] [place] [address], [place] [address] N/A 7/10/2018    CYSTOSCOPY, RIGHT URETEROSCOPY, HOLMIUM LASER LITHO WITH STONE BASKETING, RIGHT STENT EXCHANGE performed by Aaron Mcgregor MD at [address] [place] [address], [place] Right 10/10/2018    HOLMIUM LASER STANDBY, CYSTO, RIGHT URETEROSCOPY, RIGHT STENT PLACEMENT performed by Calixto Murillo MD at [address] [facility], Right 2018    CYSTOSCOPY URETERAL STENT INSERTION, RIGHT performed by Pk Gonzales MD [address]     TOTAL NEPHRECTOMY Left    [address] Right 2019     Obstetric History:   0  Para 0  Gynecologic History: LMP postmenopausal   Last Pap: 4-5 years ago       Any history of abnormal paps no    PriorColpo/Biopsy no     Last Mammogram 19  Result normal  Contraception: postmenopausal  Complications: none  STDs: none  Psychosocial History: 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 Birth control and barrier recommendations discussed. STD counseling and prevention reviewed. Routine health maintenance per patients PCP.   Pt to follow up for annual exam in 1 year     Joaquim Aparicio MD  [facility] [address]

## 2020-01-09 ENCOUNTER — HOSPITAL ENCOUNTER (OUTPATIENT)
Dept: ULTRASOUND IMAGING | Age: 55
Discharge: HOME | End: 2020-01-11
Payer: COMMERCIAL

## 2020-01-09 LAB
CYTOLOGY REPORT: NORMAL
HPV SAMPLE: NORMAL
HPV, GENOTYPE 16: NOT DETECTED
HPV, GENOTYPE 18: NOT DETECTED
HPV, HIGH RISK OTHER: NOT DETECTED
HPV, INTERPRETATION: NORMAL
SPECIMEN DESCRIPTION: NORMAL

## 2020-01-09 PROCEDURE — 76830 TRANSVAGINAL US NON-OB: CPT

## 2020-01-09 PROCEDURE — 76856 US EXAM PELVIC COMPLETE: CPT

## 2020-01-28 ENCOUNTER — OFFICE VISIT (OUTPATIENT)
Dept: PRIMARY CARE CLINIC | Age: 55
End: 2020-01-28
Payer: COMMERCIAL

## 2020-01-28 VITALS
SYSTOLIC BLOOD PRESSURE: 122 MMHG | WEIGHT: 221 LBS | OXYGEN SATURATION: 97 % | HEIGHT: 68 IN | BODY MASS INDEX: 33.49 KG/M2 | DIASTOLIC BLOOD PRESSURE: 76 MMHG | RESPIRATION RATE: 16 BRPM | HEART RATE: 63 BPM

## 2020-01-28 PROCEDURE — 99213 OFFICE O/P EST LOW 20 MIN: CPT | Performed by: NURSE PRACTITIONER

## 2020-01-28 RX ORDER — DOXYCYCLINE HYCLATE 100 MG
100 TABLET ORAL 2 TIMES DAILY
Qty: 20 TABLET | Refills: 0 | Status: SHIPPED | OUTPATIENT
Start: 2020-01-28 | End: 2020-02-07

## 2020-01-28 ASSESSMENT — ENCOUNTER SYMPTOMS
CHEST TIGHTNESS: 0
DIARRHEA: 0
ABDOMINAL PAIN: 0
NAUSEA: 0
SORE THROAT: 0
SHORTNESS OF BREATH: 0
RHINORRHEA: 0
COLOR CHANGE: 0
VOMITING: 0

## 2020-01-28 ASSESSMENT — PATIENT HEALTH QUESTIONNAIRE - PHQ9
1. LITTLE INTEREST OR PLEASURE IN DOING THINGS: 0
SUM OF ALL RESPONSES TO PHQ QUESTIONS 1-9: 0
SUM OF ALL RESPONSES TO PHQ QUESTIONS 1-9: 0
SUM OF ALL RESPONSES TO PHQ9 QUESTIONS 1 & 2: 0
2. FEELING DOWN, DEPRESSED OR HOPELESS: 0

## 2020-01-28 NOTE — PATIENT INSTRUCTIONS
abscess to allow it to continue draining. Follow your doctor's instructions on bathing and caring for the wound. · If you are breastfeeding, continue breastfeeding or pumping breast milk, as your doctor advises. It is important to empty your breasts regularly. However, your doctor may advise you to discard the milk from the affected breast until the abscess heals. ? Before breastfeeding, place a warm, wet washcloth over the breast for about 15 minutes. Try this at least 3 times a day. ? If pus is no longer draining from the abscess, breastfeed on both sides. ? Gently massage your breast to stimulate milk flow. ? Pump or hand-express a small amount of breast milk before breastfeeding if your breasts are too full with milk or if it hurts too much to nurse. This will make your breasts less full and may make it easier for your baby to nurse. ? Try feeding from the healthy breast. Then, after your milk is flowing, breastfeed from the affected breast until it feels soft. · Be safe with medicines. Take pain medicines exactly as directed. ? If your doctor gave you a prescription medicine for pain, take it as prescribed. ? If you are not taking a prescription pain medicine, ask your doctor if you can take an over-the-counter medicine. ? Do not take two or more pain medicines at the same time unless your doctor told you to. Many pain medicines have acetaminophen, which is Tylenol. Too much acetaminophen (Tylenol) can be harmful. · Put ice or a cold pack on your breast for 10 to 15 minutes at a time to reduce pain and swelling. If you are breastfeeding, do this between feedings. Put a thin cloth between the ice and your skin. · If pus is draining from your infected breast, wash the nipple gently and let it air-dry before you put your bra back on. A disposable breast pad placed in the bra cup will help absorb the pus. When should you call for help? Call 911anytime you think you may need emergency care.  For example, call if:    · You have trouble breathing.     · You passed out (lost consciousness).    Call your doctor now or seek immediate medical care if:    · You have new or worse nausea or vomiting.     · Your symptoms of infection get worse. This may include:  ? Increased pain, swelling, redness, or warmth around a breast.  ? Red streaks extending from the breast.  ? Pus draining from a breast.  ? A new or higher fever.    Watch closely for changes in your health, and be sure to contact your doctor if:    · You do not get better as expected. Where can you learn more? Go to https://Yatedopepiceweb.WP Fail-Safe. org and sign in to your Coworks account. Enter Q564 in the [employer] box to learn more about \"Breast Abscess: Care Instructions. \"     If you do not have an account, please click on the \"Sign Up Now\" link. Current as of: February 19, 2019  Content Version: 12.3  © 1359-7183 [employer]. Care instructions adapted under license by [facility] ([facility]). If you have questions about a medical condition or this instruction, always ask your healthcare professional. [place] [address] any warranty or liability for your use of this information. Patient Education        Learning About Staph Infection  What is a staph infection? Staphylococcus aureus (staph) is a type of bacteria that can cause infections. Staph bacteria normally live on the skin. They don't usually cause problems. They only become a problem when they cause infection. The infection has a higher chance of becoming serious in people who are weak or ill or who are being treated in the hospital. Sometimes staph bacteria can cause more serious widespread infection. In the hospital, staph infections are more likely to occur in wounds, burns, or places where there is a break in the skin or where tubes enter the body.  In the community, these infections are more likely to occur among people who have cuts or wounds treatment. · If your doctor prescribed antibiotics, take them as directed. Do not stop taking them just because you feel better. You need to take the full course of antibiotics. · If you're in the hospital, remind doctors and nurses to wash their hands before they touch you. Follow-up care is a key part of your treatment and safety. Be sure to make and go to all appointments, and call your doctor if you are having problems. It's also a good idea to know your test results and keep a list of the medicines you take. Where can you learn more? Go to https://Futurestream NetworkspeBritely.DrawQuest. org and sign in to your Purkinje account. Enter Z147 in the [employer] box to learn more about \"Learning About Staph Infection. \"     If you do not have an account, please click on the \"Sign Up Now\" link. Current as of: June 9, 2019  Content Version: 12.3  © 1599-0706 Healthwise, Incorporated. Care instructions adapted under license by [facility] ([facility]). If you have questions about a medical condition or this instruction, always ask your healthcare professional. [place] [address] any warranty or liability for your use of this information.

## 2020-01-28 NOTE — PROGRESS NOTES
[address] PRIMARY CARE  [address] [place]  [address] [address]  Dept: [phone]  Dept Fax: [phone]    Chauncey Zimmerman is a 62 y.o. female who presentstoday for her medical conditions/complaints as noted below. Chauncey Zimmerman is c/o of  Chief Complaint   Patient presents with    Other     Abcess under rt breast         HPI:     Here with complaint of abscess under right breast, had 1 before in same location, resolved with atb and has returned, denies pain or fever  Had hx of abscess under left breast that was lanced in office approximately 6 months ago, scar noted, abscess has not returned  Has switched bras and was hoping that would help, needs under wire support due to breast size  Had abnormal screening mammogram 2019, US confirmed presence of cyst in left upper outer breast, no masses, repeat 1 year    Has appt scheduled with GYN Feb 10, 2020      Other   Pertinent negatives include no abdominal pain, arthralgias, chest pain, congestion, fatigue, fever, headaches, myalgias, nausea, sore throat, vomiting or weakness.        Hemoglobin A1C (%)   Date Value   2019 5.9   2018 5.7   2016 5.6             ( goal A1C is < 7)   No results found for: LABMICR  LDL Cholesterol (mg/dL)   Date Value   2018 78   2016 135 (H)       (goal LDL is <100)   AST (U/L)   Date Value   2019 43 (H)     ALT (U/L)   Date Value   2019 50 (H)     BUN (mg/dL)   Date Value   2019 31 (H)     BP Readings from Last 3 Encounters:   20 122/76   20 138/88   19 130/82          ([id number])    Past Medical History:   Diagnosis Date    Arthritis     Asthma     Benign familial tremor     Cancer (HCC)     skin    CKD (chronic kidney disease) stage 1, GFR 90 ml/min or greater     Class 2 severe obesity with serious comorbidity and body mass index (BMI) of 39.0 to 39.9 in adult [facility]) 2019    Cystinuria ([address]     Dyspnea on exertion 8/6/2019    Flank pain     RIGHT    Gastroesophageal reflux disease without esophagitis     GERD (gastroesophageal reflux disease)     Hypertension     Hyperuricemia     Hypothyroidism     Kidney stones     Midline low back pain with right-sided sciatica 6/2/2016    Proteinuria     Shoulder impingement 12/2019    left    Snores     Solitary kidney, acquired 11/20/2017    Urinary tract obstruction by kidney stone 8/1/2019    Wears glasses       Past Surgical History:   Procedure Laterality Date    CYSTO/URETERO/PYELOSCOPY, CALCULUS TX Right 11/21/2017    HOLMIUM LASER - CYSTOSCOPY, RIGHT URETEROSCOPY, RIGHT STENT EXCHANGE,STONE BASKETING performed by Ernestine Aguirre MD at [address], [place] [address] Right 9/13/2019    HOLMIUM - CYSTO, URETEROSCOPY, LITHOTRIPSY, STENT PLACEMENT performed by Kortney Haney MD at [address] [facility] [address], [place] [address] Right 12/12/2019    HOLMIUM- CYSTO, URETEROSCOPY, LASER LITHO, STENT PLACEMENT AND RIGHT URETERAL BASKET STONE EXTRACTION performed by Ernestine Aguirre MD at [address] [place] [address] Right 11/18/2017    CYSTOSCOPY URETERAL STENT INSERTION,RIGHT performed by Yeimy Mobley MD at [address] [place] [address] Right 8/2/2019    CYSTOSCOPY RT URETERAL STENT INSERTION performed by Kortney Haney MD at [address] [place] [address] Right 8/23/2019    CYSTOSCOPY, RIGHT STENT REMOVAL, INSERTION OCCLUSIVE BALLOON, PT GOING TO INTERVENTIONAL performed by Kortney Haney MD at [address] [place] [address] Right 12/12/2019    CYSTO, URETEROSCOPY, LASER LITHO, STENT PLACEMENT AND RIGHT URETERAL BASKET STONE EXTRACTION     CYSTOSCOPY Right 12/31/2019    stent removal    CYSTOSCOPY INSERTION / REMOVAL STENT / STONE Right 12/31/2019    CYSTOSCOPY, STENT REMOVAL performed by Ernestine Aguirre MD at [address] [address] Bilateral 2008    KNEE ARTHROSCOPY Right 1998, 2003    X2    NEPHROLITHOTOMY Right 8/23/2019 delayed release capsule TAKE 1 CAPSULE BY MOUTH TWICE DAILY. (Patient taking differently: Take 20 mg by mouth 2 times daily TAKE 1 CAPSULE BY MOUTH TWICE DAILY. ) 180 capsule 3    meloxicam (MOBIC) 15 MG tablet Take 1 tablet by mouth daily 90 tablet 3    irbesartan-hydrochlorothiazide (AVALIDE) 150-12.5 MG per tablet Take 1 tablet by mouth daily 90 tablet 3    OMEGA-3 FATTY ACIDS PO Take 1 tablet by mouth daily      albuterol sulfate HFA (PROAIR HFA) 108 (90 Base) MCG/ACT inhaler Inhale 2 puffs into the lungs every 6 hours as needed for Wheezing 1 Inhaler 5    acetaminophen (TYLENOL) 500 MG tablet Take 1,000 mg by mouth every 4 hours as needed for Pain. No current facility-administered medications for this visit. No Known Allergies    Health Maintenance   Topic Date Due    Shingles Vaccine (1 of 2) 03/02/2013    Colon cancer screen colonoscopy  03/02/2013    DTaP/Tdap/Td vaccine (1 - Tdap) 10/15/2023 (Originally 3/2/1976)    TSH testing  08/15/2020    A1C test (Diabetic or Prediabetic)  09/23/2020    Potassium monitoring  12/07/2020    Creatinine monitoring  12/07/2020    Breast cancer screen  09/24/2021    Lipid screen  05/14/2023    Cervical cancer screen  01/07/2025    Flu vaccine  Completed    Pneumococcal 0-64 years Vaccine  Completed    Hepatitis C screen  Completed    HIV screen  Completed       Subjective:      Review of Systems   Constitutional: Negative for activity change, fatigue and fever. HENT: Negative for congestion, rhinorrhea and sore throat. Eyes: Negative for visual disturbance. Respiratory: Negative for chest tightness and shortness of breath. Cardiovascular: Negative for chest pain and palpitations. Gastrointestinal: Negative for abdominal pain, diarrhea, nausea and vomiting. Endocrine: Negative for polydipsia. Genitourinary: Negative for difficulty urinating. Musculoskeletal: Negative for arthralgias and myalgias.    Skin: Negative for color

## 2020-01-30 ENCOUNTER — HOSPITAL ENCOUNTER (OUTPATIENT)
Dept: ULTRASOUND IMAGING | Age: 55
Discharge: HOME | End: 2020-02-01
Payer: COMMERCIAL

## 2020-01-30 PROCEDURE — 76775 US EXAM ABDO BACK WALL LIM: CPT

## 2020-02-10 ENCOUNTER — PROCEDURE VISIT (OUTPATIENT)
Dept: OBGYN CLINIC | Age: 55
End: 2020-02-10
Payer: COMMERCIAL

## 2020-02-10 ENCOUNTER — HOSPITAL ENCOUNTER (OUTPATIENT)
Age: 55
Setting detail: SPECIMEN
Discharge: HOME | End: 2020-02-10
Payer: COMMERCIAL

## 2020-02-10 VITALS
BODY MASS INDEX: 32.89 KG/M2 | DIASTOLIC BLOOD PRESSURE: 84 MMHG | SYSTOLIC BLOOD PRESSURE: 145 MMHG | HEART RATE: 64 BPM | WEIGHT: 217 LBS | HEIGHT: 68 IN

## 2020-02-10 PROCEDURE — 58100 BIOPSY OF UTERUS LINING: CPT | Performed by: OBSTETRICS & GYNECOLOGY

## 2020-02-10 NOTE — PROGRESS NOTES
[facility] PHYSICIANS  [facility] [address]  [facility] [address]  Dept: [phone]  2/10/2020    Estefanía Foreman is a 62 y.o. female,       No LMP recorded.  Patient is postmenopausal.    Chief Complaint   Patient presents with    Procedure     Endometrial Biopsy    Breast Problem     left breast lump       Urine pregnancy test: postmenopausal    Past Medical History:   Diagnosis Date    Arthritis     Asthma     Benign familial tremor     Cancer ([address]     skin    CKD (chronic kidney disease) stage 1, GFR 90 ml/min or greater     Class 2 severe obesity with serious comorbidity and body mass index (BMI) of 39.0 to 39.9 in adult ([address] 2019    Cystinuria ([facility] [address] [facility]     Dyspnea on exertion 2019    Flank pain     RIGHT    Gastroesophageal reflux disease without esophagitis     GERD (gastroesophageal reflux disease)     Hypertension     Hyperuricemia     Hypothyroidism     Kidney stones     Midline low back pain with right-sided sciatica 2016    Proteinuria     Shoulder impingement 2019    left    Snores     Solitary kidney, acquired 2017    Urinary tract obstruction by kidney stone 2019    Wears glasses          Past Surgical History:   Procedure Laterality Date    CYSTO/URETERO/PYELOSCOPY, CALCULUS TX Right 2017    HOLMIUM LASER - CYSTOSCOPY, RIGHT URETEROSCOPY, RIGHT STENT EXCHANGE,STONE BASKETING performed by Mandy Davenport MD at [address] [facility] [address] , [place] [address] Right 2019    HOLMIUM - CYSTO, URETEROSCOPY, LITHOTRIPSY, STENT PLACEMENT performed by Henrry Peguero MD at [address] [place] [address] [address] [address] Right 2019    HOLMIUM- CYSTO, URETEROSCOPY, LASER LITHO, STENT PLACEMENT AND RIGHT URETERAL BASKET STONE EXTRACTION performed by Mandy Davenport MD at [address] [place] [address] Right 2017    CYSTOSCOPY URETERAL STENT INSERTION,RIGHT performed by Mickey Holt MD at [address] [place] [address] Left nephrectomy. Images demonstrate no mass in the left renal fossa. Right hydronephrosis; a distal obstructing calculus is not excluded. . Echogenic shadowing lesion inferior pole right kidney measuring up to 0.9 cm most likely a calculus. Blood pressure (!) 145/84, pulse 64, height 5' 8\" (1.727 m), weight 217 lb (98.4 kg), not currently breastfeeding. The patient was counseled on the procedure. Risks, benefits and alternatives were reviewed. The patient is aware that this is diagnostic and not curative and a second procedure may be needed. A consent was reviewed and obtained. The patient was positioned comfortably on the exam table. After a bi-manual exam; the uterus was found to be  anteverted with a size of 6 cm. There was no adnexal masses and the bladder was smooth, non-tender and without palpable masses. A sterile speculum was placed into the vagina and the cervix was identified. It was stabilized with a tenaculum. It was cleansed with betadine and the aspirator was then gently passed into the endometrial cavity. Tissue was obtained and sent to pathology. The patient tolerated the procedure well. Post procedure restrictions were reviewed and given to the patient. .  All counts and instruments were correct at the end of the procedure. Assessment:   Diagnosis Orders   1. Postmenopausal bleeding  17899 - MT BIOPSY OF UTERUS LINING    Surgical Pathology   2.  Left breast lump  US BREAST COMPLETE LEFT     Patient Active Problem List    Diagnosis Date Noted    Abnormal echocardiogram 09/20/2019     Priority: High    LVH (left ventricular hypertrophy) 09/20/2019     Priority: High    Chronic left shoulder pain 12/06/2019    Pain in joint of left knee 12/06/2019    Acute respiratory failure with hypoxia (HCC)     Mild persistent asthma without complication     Pleural effusion on right     Atelectasis     Oxygen desaturation 08/26/2019    Dyspnea on exertion 08/06/2019    Arthralgia

## 2020-02-12 LAB — SURGICAL PATHOLOGY REPORT: NORMAL

## 2020-05-07 ENCOUNTER — HOSPITAL ENCOUNTER (OUTPATIENT)
Dept: GENERAL RADIOLOGY | Age: 55
Discharge: HOME | End: 2020-05-09
Payer: COMMERCIAL

## 2020-05-07 ENCOUNTER — HOSPITAL ENCOUNTER (OUTPATIENT)
Age: 55
Discharge: HOME | End: 2020-05-09
Payer: COMMERCIAL

## 2020-05-07 PROCEDURE — 74018 RADEX ABDOMEN 1 VIEW: CPT

## 2020-06-04 ENCOUNTER — HOSPITAL ENCOUNTER (OUTPATIENT)
Dept: CT IMAGING | Age: 55
Discharge: HOME | End: 2020-06-06
Payer: COMMERCIAL

## 2020-06-04 ENCOUNTER — HOSPITAL ENCOUNTER (OUTPATIENT)
Age: 55
Discharge: HOME | End: 2020-06-04
Payer: COMMERCIAL

## 2020-06-04 LAB
CALCIUM SERPL-MCNC: 9.4 MG/DL (ref 8.6–10.4)
URIC ACID: 6.8 MG/DL (ref 2.4–5.7)

## 2020-06-04 PROCEDURE — 74176 CT ABD & PELVIS W/O CONTRAST: CPT

## 2020-06-04 PROCEDURE — 82310 ASSAY OF CALCIUM: CPT

## 2020-06-04 PROCEDURE — 36415 COLL VENOUS BLD VENIPUNCTURE: CPT

## 2020-06-04 PROCEDURE — 84550 ASSAY OF BLOOD/URIC ACID: CPT

## 2020-06-19 ENCOUNTER — NURSE TRIAGE (OUTPATIENT)
Dept: OTHER | Facility: CLINIC | Age: 55
End: 2020-06-19

## 2020-06-19 ENCOUNTER — OFFICE VISIT (OUTPATIENT)
Dept: PRIMARY CARE CLINIC | Age: 55
End: 2020-06-19
Payer: COMMERCIAL

## 2020-06-19 VITALS
SYSTOLIC BLOOD PRESSURE: 107 MMHG | HEIGHT: 68 IN | BODY MASS INDEX: 31.83 KG/M2 | OXYGEN SATURATION: 97 % | TEMPERATURE: 97 F | HEART RATE: 67 BPM | WEIGHT: 210 LBS | DIASTOLIC BLOOD PRESSURE: 67 MMHG

## 2020-06-19 PROCEDURE — 99213 OFFICE O/P EST LOW 20 MIN: CPT | Performed by: NURSE PRACTITIONER

## 2020-06-19 RX ORDER — FLUCONAZOLE 150 MG/1
150 TABLET ORAL ONCE
Qty: 1 TABLET | Refills: 0 | Status: SHIPPED | OUTPATIENT
Start: 2020-06-19 | End: 2020-06-19

## 2020-06-19 RX ORDER — SULFAMETHOXAZOLE AND TRIMETHOPRIM 800; 160 MG/1; MG/1
1 TABLET ORAL 2 TIMES DAILY
Qty: 14 TABLET | Refills: 0 | Status: SHIPPED | OUTPATIENT
Start: 2020-06-19 | End: 2020-06-26

## 2020-06-19 RX ORDER — CEPHALEXIN 500 MG/1
500 CAPSULE ORAL 4 TIMES DAILY
Qty: 28 CAPSULE | Refills: 0 | Status: SHIPPED | OUTPATIENT
Start: 2020-06-19 | End: 2020-06-26

## 2020-06-19 ASSESSMENT — ENCOUNTER SYMPTOMS
SINUS PAIN: 0
SORE THROAT: 0
VOMITING: 0
COUGH: 0
SHORTNESS OF BREATH: 0
NAUSEA: 0
DIARRHEA: 0
ABDOMINAL PAIN: 0

## 2020-06-19 NOTE — TELEPHONE ENCOUNTER
Reason for Disposition   [1] Boil AND [2] not improved > 3 days following CARE ADVICE    Answer Assessment - Initial Assessment Questions  1. APPEARANCE of BOIL: \"What does the boil look like? \"       Red, tender, there is a \"head\" to it. She tried to squeeze it a week ago and nothing came out. Yesterday it is now bright red and tender and size had increased. 2. LOCATION: \"Where is the boil located? \"       Left breast    3. NUMBER: \"How many boils are there? \"       One    4. SIZE: \"How big is the boil? \" (e.g., inches, cm; compare to size of a coin or other object)      2-3 cm  5. ONSET: \"When did the boil start?\"      1 week ago    6. PAIN: \"Is there any pain? \" If so, ask: \"How bad is the pain? \"   (Scale 1-10; or mild, moderate, severe)      1/10    7. FEVER: \"Do you have a fever? \" If so, ask: \"What is it, how was it measured, and when did it start? \"       Denies    8. SOURCE: \"Have you been around anyone with boils or other Staph infections? \" \"Have you ever had boils before? \"      Has had two other boils before. Both in different places. One was lanced and treated with ABX and the other was treated with ABX. The last one was within the past year. 9. OTHER SYMPTOMS: \"Do you have any other symptoms? \" (e.g., shaking chills, weakness, rash elsewhere on body)      Denies    10. PREGNANCY: \"Is there any chance you are pregnant? \" \"When was your last menstrual period? \"        n/a    Protocols used: BOIL (SKIN ABSCESS)-ADULT-    Pod 1    Able to do a virtual visit    Received call from [employer]. Call soft transferred to [address] to schedule appointment. Please do not reply to the triage nurse through this encounter. Any subsequent communication should be directly with the patient.

## 2020-06-19 NOTE — PROGRESS NOTES
days 14 tablet 0    fluconazole (DIFLUCAN) 150 MG tablet Take 1 tablet by mouth once for 1 dose 1 tablet 0    irbesartan-hydrochlorothiazide (AVALIDE) 150-12.5 MG per tablet TAKE 1 TABLET BY MOUTH ONE TIME A DAY 30 tablet 2    oxybutynin (DITROPAN XL) 10 MG extended release tablet Take 1 tablet by mouth daily 30 tablet 3    POTASSIUM CITRATE PO Take 5 mLs by mouth three times daily      omeprazole (PRILOSEC) 20 MG delayed release capsule TAKE 1 CAPSULE BY MOUTH TWICE DAILY. (Patient taking differently: Take 20 mg by mouth 2 times daily TAKE 1 CAPSULE BY MOUTH TWICE DAILY. ) 180 capsule 3    meloxicam (MOBIC) 15 MG tablet Take 1 tablet by mouth daily 90 tablet 3    OMEGA-3 FATTY ACIDS PO Take 1 tablet by mouth daily      albuterol sulfate HFA (PROAIR HFA) 108 (90 Base) MCG/ACT inhaler Inhale 2 puffs into the lungs every 6 hours as needed for Wheezing 1 Inhaler 5    acetaminophen (TYLENOL) 500 MG tablet Take 1,000 mg by mouth every 4 hours as needed for Pain.  Estradiol (IMVEXXY MAINTENANCE PACK) 4 MCG INST Place 1 capsule vaginally Twice a Week (Patient not taking: Reported on 6/19/2020) 8 each 12     No current facility-administered medications for this visit. No Known Allergies    Subjective:      Review of Systems   Constitutional: Negative for chills and fever. HENT: Negative for ear pain, sinus pain and sore throat. Respiratory: Negative for cough and shortness of breath. Cardiovascular: Negative for chest pain and palpitations. Gastrointestinal: Negative for abdominal pain, diarrhea, nausea and vomiting. Skin: Positive for wound (left sided chest cellulitis). Neurological: Negative for dizziness and headaches. All other systems reviewed and are negative. Objective:     Physical Exam  Vitals signs and nursing note reviewed. Cardiovascular:      Rate and Rhythm: Normal rate. Pulmonary:      Effort: Pulmonary effort is normal.      Breath sounds: Normal breath sounds.

## 2020-06-19 NOTE — PATIENT INSTRUCTIONS
Patient Education        Cellulitis: Care Instructions  Your Care Instructions     Cellulitis is a skin infection caused by bacteria, most often strep or staph. It often occurs after a break in the skin from a scrape, cut, bite, or puncture, or after a rash. Cellulitis may be treated without doing tests to find out what caused it. But your doctor may do tests, if needed, to look for a specific bacteria, like methicillin-resistant Staphylococcus aureus (MRSA). The doctor has checked you carefully, but problems can develop later. If you notice any problems or new symptoms, get medical treatment right away. Follow-up care is a key part of your treatment and safety. Be sure to make and go to all appointments, and call your doctor if you are having problems. It's also a good idea to know your test results and keep a list of the medicines you take. How can you care for yourself at home? · Take your antibiotics as directed. Do not stop taking them just because you feel better. You need to take the full course of antibiotics. · Prop up the infected area on pillows to reduce pain and swelling. Try to keep the area above the level of your heart as often as you can. · If your doctor told you how to care for your wound, follow your doctor's instructions. If you did not get instructions, follow this general advice:  ? Wash the wound with clean water 2 times a day. Don't use hydrogen peroxide or alcohol, which can slow healing. ? You may cover the wound with a thin layer of petroleum jelly, such as Vaseline, and a nonstick bandage. ? Apply more petroleum jelly and replace the bandage as needed. · Be safe with medicines. Take pain medicines exactly as directed. ? If the doctor gave you a prescription medicine for pain, take it as prescribed. ? If you are not taking a prescription pain medicine, ask your doctor if you can take an over-the-counter medicine.   To prevent cellulitis in the future  · Try to prevent cuts, scrapes, or other injuries to your skin. Cellulitis most often occurs where there is a break in the skin. · If you get a scrape, cut, mild burn, or bite, wash the wound with clean water as soon as you can to help avoid infection. Don't use hydrogen peroxide or alcohol, which can slow healing. · If you have swelling in your legs (edema), support stockings and good skin care may help prevent leg sores and cellulitis. · Take care of your feet, especially if you have diabetes or other conditions that increase the risk of infection. Wear shoes and socks. Do not go barefoot. If you have athlete's foot or other skin problems on your feet, talk to your doctor about how to treat them. When should you call for help? Call your doctor now or seek immediate medical care if:  · You have signs that your infection is getting worse, such as:  ? Increased pain, swelling, warmth, or redness. ? Red streaks leading from the area. ? Pus draining from the area. ? A fever. · You get a rash. Watch closely for changes in your health, and be sure to contact your doctor if:  · You do not get better as expected. Where can you learn more? Go to https://Hotelicopter.Hornet Networks. org and sign in to your RadiumOne account. Enter B238 in the [facility] box to learn more about \"Cellulitis: Care Instructions. \"     If you do not have an account, please click on the \"Sign Up Now\" link. Current as of: October 31, 2019               Content Version: 12.5  © 3766-8016 Healthwise, Incorporated. Care instructions adapted under license by [facility] ([facility]). If you have questions about a medical condition or this instruction, always ask your healthcare professional. [place] [address] any warranty or liability for your use of this information.

## 2020-08-18 ENCOUNTER — EMPLOYEE WELLNESS (OUTPATIENT)
Dept: OTHER | Age: 55
End: 2020-08-18

## 2020-08-18 LAB
CHOLESTEROL/HDL RATIO: 3.8
CHOLESTEROL: 175 MG/DL
GLUCOSE BLD-MCNC: 96 MG/DL (ref 70–99)
HDLC SERPL-MCNC: 46 MG/DL
LDL CHOLESTEROL: 90 MG/DL (ref 0–130)
PATIENT FASTING?: YES
TRIGL SERPL-MCNC: 194 MG/DL
VLDLC SERPL CALC-MCNC: ABNORMAL MG/DL (ref 1–30)

## 2020-09-15 ENCOUNTER — HOSPITAL ENCOUNTER (OUTPATIENT)
Age: 55
Discharge: HOME | End: 2020-09-17
Payer: COMMERCIAL

## 2020-09-15 ENCOUNTER — HOSPITAL ENCOUNTER (OUTPATIENT)
Dept: GENERAL RADIOLOGY | Age: 55
Discharge: HOME | End: 2020-09-17
Payer: COMMERCIAL

## 2020-09-15 PROCEDURE — 74018 RADEX ABDOMEN 1 VIEW: CPT

## 2020-09-16 ENCOUNTER — OFFICE VISIT (OUTPATIENT)
Dept: PRIMARY CARE CLINIC | Age: 55
End: 2020-09-16
Payer: COMMERCIAL

## 2020-09-16 VITALS
BODY MASS INDEX: 32.28 KG/M2 | SYSTOLIC BLOOD PRESSURE: 118 MMHG | OXYGEN SATURATION: 96 % | HEART RATE: 58 BPM | WEIGHT: 213 LBS | DIASTOLIC BLOOD PRESSURE: 88 MMHG | HEIGHT: 68 IN | RESPIRATION RATE: 16 BRPM

## 2020-09-16 PROCEDURE — 99213 OFFICE O/P EST LOW 20 MIN: CPT | Performed by: FAMILY MEDICINE

## 2020-09-16 RX ORDER — DOXYCYCLINE HYCLATE 100 MG
100 TABLET ORAL 2 TIMES DAILY
Qty: 14 TABLET | Refills: 0 | Status: SHIPPED | OUTPATIENT
Start: 2020-09-16 | End: 2020-09-23

## 2020-09-16 ASSESSMENT — ENCOUNTER SYMPTOMS
VOMITING: 0
NAUSEA: 0
SHORTNESS OF BREATH: 0

## 2020-09-16 NOTE — PROGRESS NOTES
[address] PRIMARY CARE  [address] [facility] [address]  [address] [address]  Dept: [phone]  Dept Fax: [phone]    Jesús Padilla is a 60 y.o. female who presents today for her medical conditions/complaints as noted below. Jesús Padilla is c/o of  Chief Complaint   Patient presents with    Other     Lt breast infection       HPI:     HPI     Pt here due to breast infection of left breast.   Has been getting recurrent breast infections ( cellulitis and abscess) . Had it before on R side and this is third time on left. Has been going on for about a year and a half with recurrent infections that clear up after antibiotics. Rash under left breast this time since Saturday, was really red and indurated and not as indurated now and has new spot of redness slightly raised next to the area that was indurated previously. She has been on bactrim and doxycyline in past which have both worked for her. Denies any hx of diabetes.    Be well within labs show fasting glucose of 96    Hemoglobin A1C (%)   Date Value   2019 5.9   2018 5.7   2016 5.6             ( goal A1C is < 7)   No results found for: LABMICR  LDL Cholesterol (mg/dL)   Date Value   2020 90   2018 78       (goal LDL is <100)   AST (U/L)   Date Value   2019 43 (H)     ALT (U/L)   Date Value   2019 50 (H)     BUN (mg/dL)   Date Value   2019 31 (H)     BP Readings from Last 3 Encounters:   20 118/88   20 107/67   02/10/20 (!) 145/84          (goal 120/80)    Past Medical History:   Diagnosis Date    Arthritis     Asthma     Benign familial tremor     Cancer ([facility] [address]     skin    CKD (chronic kidney disease) stage 1, GFR 90 ml/min or greater     Class 2 severe obesity with serious comorbidity and body mass index (BMI) of 39.0 to 39.9 in adult ([address] 2019    Cystinuria ([facility] [address]     Dyspnea on exertion 2019    Flank pain     RIGHT    Gastroesophageal reflux disease without esophagitis     GERD (gastroesophageal reflux disease)     Hypertension     Hyperuricemia     Hypothyroidism     Kidney stones     Midline low back pain with right-sided sciatica 6/2/2016    Proteinuria     Shoulder impingement 12/2019    left    Snores     Solitary kidney, acquired 11/20/2017    Urinary tract obstruction by kidney stone 8/1/2019    Wears glasses       Past Surgical History:   Procedure Laterality Date    CYSTO/URETERO/PYELOSCOPY, CALCULUS [place] Right 11/21/2017    HOLMIUM LASER - CYSTOSCOPY, RIGHT URETEROSCOPY, RIGHT STENT EXCHANGE,STONE BASKETING performed by Car Ellison MD at [address], [place] [address] Right 9/13/2019    HOLMIUM - CYSTO, URETEROSCOPY, LITHOTRIPSY, STENT PLACEMENT performed by Erica Pierce MD at [address], [place] [address] Right 12/12/2019    HOLMIUM- CYSTO, URETEROSCOPY, LASER LITHO, STENT PLACEMENT AND RIGHT URETERAL BASKET STONE EXTRACTION performed by Car Ellison MD at [address] Right 11/18/2017    CYSTOSCOPY URETERAL STENT INSERTION,RIGHT performed by Carolann Silva MD at [address] Right 8/2/2019    CYSTOSCOPY RT URETERAL STENT INSERTION performed by Erica Pierce MD at [address] Right 8/23/2019    CYSTOSCOPY, RIGHT STENT REMOVAL, INSERTION OCCLUSIVE BALLOON, PT GOING TO INTERVENTIONAL performed by Erica Pierce MD at [address] Right 12/12/2019    CYSTO, URETEROSCOPY, LASER LITHO, STENT PLACEMENT AND RIGHT URETERAL BASKET STONE EXTRACTION     CYSTOSCOPY Right 12/31/2019    stent removal    CYSTOSCOPY INSERTION / REMOVAL STENT / STONE Right 12/31/2019    CYSTOSCOPY, STENT REMOVAL performed by Car Ellison MD at [address] Bilateral 2008    KNEE ARTHROSCOPY Right 1998, 2003    X2    NEPHROLITHOTOMY Right 8/23/2019    HOLMIUM LASER, NEPHROLITHOTOMY PERCUTANEOUS, LITHOCLAST, C-ARM performed by Erica Pierce MD at [address] [facility], [place] [address] N/A 7/10/2018    CYSTOSCOPY, RIGHT URETEROSCOPY, HOLMIUM LASER LITHO WITH STONE BASKETING, RIGHT STENT EXCHANGE performed by Lucy Paredes MD at [address] [facility], [place] Right 10/10/2018    HOLMIUM LASER STANDBY, CYSTO, RIGHT URETEROSCOPY, RIGHT STENT PLACEMENT performed by Car Ellison MD at [address] [facility] Right 7/4/2018    CYSTOSCOPY URETERAL STENT INSERTION, RIGHT performed by India Farmer MD at [facility] Left     TOTAL NEPHRECTOMY Left 1988    URETER STENT PLACEMENT Right 12/12/2019       Family History   Problem Relation Age of Onset    High Blood Pressure Mother     Diabetes Father     Cancer Father         tongue    Hypertension Father     Heart Disease Father         stents    Other Father         Mylofibrosis    Hypertension Sister     Heart Disease Maternal Grandmother     Heart Attack Maternal Grandmother     Prostate Cancer Maternal Grandfather     Heart Disease Paternal Grandfather     Stroke Paternal Grandfather     Emphysema Paternal Grandmother        Social History     Tobacco Use    Smoking status: Never Smoker    Smokeless tobacco: Never Used   Substance Use Topics    Alcohol use: Yes     Alcohol/week: 0.0 standard drinks     Frequency: Monthly or less     Comment: rare      Current Outpatient Medications   Medication Sig Dispense Refill    doxycycline hyclate (VIBRA-TABS) 100 MG tablet Take 1 tablet by mouth 2 times daily for 7 days 14 tablet 0    irbesartan-hydrochlorothiazide (AVALIDE) 150-12.5 MG per tablet TAKE 1 TABLET BY MOUTH ONE TIME A DAY 30 tablet 5    POTASSIUM CITRATE PO Take 5 mLs by mouth three times daily      omeprazole (PRILOSEC) 20 MG delayed release capsule TAKE 1 CAPSULE BY MOUTH TWICE DAILY. (Patient taking differently: Take 20 mg by mouth 2 times daily TAKE 1 CAPSULE BY MOUTH TWICE DAILY. ) equal, round, and reactive to light. Neck:      Musculoskeletal: Neck supple. Cardiovascular:      Rate and Rhythm: Normal rate and regular rhythm. Heart sounds: Normal heart sounds. Pulmonary:      Effort: Pulmonary effort is normal.      Breath sounds: Normal breath sounds. Skin:     General: Skin is warm and dry. Comments: Left lower breast with small dry area that appears to be healing and one other area adjacent to it with slightly raised area and erythematous, no fluctuance or pustule noted   Neurological:      Mental Status: She is alert and oriented to person, place, and time. Psychiatric:         Behavior: Behavior normal.         Thought Content: Thought content normal.       /88   Pulse 58   Resp 16   Ht 5' 8\" (1.727 m)   Wt 213 lb (96.6 kg) Comment: Pt refused weight  SpO2 96%   Breastfeeding No   BMI 32.39 kg/m²     Assessment:      1. Cellulitis of left breast  - no fluctuance or cyst felt on exam. Recommend doxycyline for cellulitis. I did recommend she follow up with her OBGYN and to consider ID in future if needed given recurrent infections. Plan:      Return if symptoms worsen or fail to improve. No orders of the defined types were placed in this encounter. Orders Placed This Encounter   Medications    doxycycline hyclate (VIBRA-TABS) 100 MG tablet     Sig: Take 1 tablet by mouth 2 times daily for 7 days     Dispense:  14 tablet     Refill:  0        Patient given educational materials - see patient instructions. Discussed use, benefit, and side effects of prescribed medications. All patient questions answered. Pt voiced understanding. Reviewed healthmaintenance. Instructed to continue current medications, diet and exercise. Patient agreed with treatment plan. Follow up as directed.      Electronically signed by Ashley Zabala DO on 9/16/2020 at 4:40 PM

## 2020-09-18 ENCOUNTER — NURSE TRIAGE (OUTPATIENT)
Dept: OTHER | Facility: CLINIC | Age: 55
End: 2020-09-18

## 2020-09-18 ENCOUNTER — HOSPITAL ENCOUNTER (EMERGENCY)
Age: 55
Discharge: HOME | End: 2020-09-18
Attending: EMERGENCY MEDICINE
Payer: COMMERCIAL

## 2020-09-18 ENCOUNTER — APPOINTMENT (OUTPATIENT)
Dept: CT IMAGING | Age: 55
End: 2020-09-18
Payer: COMMERCIAL

## 2020-09-18 VITALS
HEIGHT: 68 IN | DIASTOLIC BLOOD PRESSURE: 91 MMHG | TEMPERATURE: 98 F | RESPIRATION RATE: 16 BRPM | OXYGEN SATURATION: 99 % | BODY MASS INDEX: 31.52 KG/M2 | WEIGHT: 208 LBS | HEART RATE: 59 BPM | SYSTOLIC BLOOD PRESSURE: 160 MMHG

## 2020-09-18 LAB
-: ABNORMAL
ABSOLUTE EOS #: 0.18 K/UL (ref 0–0.44)
ABSOLUTE IMMATURE GRANULOCYTE: <0.03 K/UL (ref 0–0.3)
ABSOLUTE LYMPH #: 2.75 K/UL (ref 1.1–3.7)
ABSOLUTE MONO #: 0.62 K/UL (ref 0.1–1.2)
AMORPHOUS: ABNORMAL
ANION GAP SERPL CALCULATED.3IONS-SCNC: 10 MMOL/L (ref 9–17)
BACTERIA: ABNORMAL
BASOPHILS # BLD: 0 % (ref 0–2)
BASOPHILS ABSOLUTE: 0.03 K/UL (ref 0–0.2)
BILIRUBIN URINE: NEGATIVE
BUN BLDV-MCNC: 29 MG/DL (ref 6–20)
BUN/CREAT BLD: ABNORMAL (ref 9–20)
CALCIUM SERPL-MCNC: 9.6 MG/DL (ref 8.6–10.4)
CASTS UA: ABNORMAL /LPF (ref 0–8)
CHLORIDE BLD-SCNC: 96 MMOL/L (ref 98–107)
CO2: 25 MMOL/L (ref 20–31)
COLOR: YELLOW
CREAT SERPL-MCNC: 0.84 MG/DL (ref 0.5–0.9)
CRYSTALS, UA: ABNORMAL /HPF
DIFFERENTIAL TYPE: ABNORMAL
EOSINOPHILS RELATIVE PERCENT: 2 % (ref 1–4)
EPITHELIAL CELLS UA: ABNORMAL /HPF (ref 0–5)
GFR AFRICAN AMERICAN: >60 ML/MIN
GFR NON-AFRICAN AMERICAN: >60 ML/MIN
GFR SERPL CREATININE-BSD FRML MDRD: ABNORMAL ML/MIN/{1.73_M2}
GFR SERPL CREATININE-BSD FRML MDRD: ABNORMAL ML/MIN/{1.73_M2}
GLUCOSE BLD-MCNC: 95 MG/DL (ref 70–99)
GLUCOSE URINE: NEGATIVE
HCT VFR BLD CALC: 45.3 % (ref 36.3–47.1)
HEMOGLOBIN: 14.1 G/DL (ref 11.9–15.1)
IMMATURE GRANULOCYTES: 0 %
KETONES, URINE: NEGATIVE
LEUKOCYTE ESTERASE, URINE: NEGATIVE
LYMPHOCYTES # BLD: 33 % (ref 24–43)
MCH RBC QN AUTO: 27 PG (ref 25.2–33.5)
MCHC RBC AUTO-ENTMCNC: 31.1 G/DL (ref 28.4–34.8)
MCV RBC AUTO: 86.6 FL (ref 82.6–102.9)
MONOCYTES # BLD: 7 % (ref 3–12)
MUCUS: ABNORMAL
NITRITE, URINE: NEGATIVE
NRBC AUTOMATED: 0 PER 100 WBC
OTHER OBSERVATIONS UA: ABNORMAL
PDW BLD-RTO: 13.7 % (ref 11.8–14.4)
PH UA: 8 (ref 5–8)
PLATELET # BLD: 249 K/UL (ref 138–453)
PLATELET ESTIMATE: ABNORMAL
PMV BLD AUTO: 11.2 FL (ref 8.1–13.5)
POTASSIUM SERPL-SCNC: 4.3 MMOL/L (ref 3.7–5.3)
PROTEIN UA: ABNORMAL
RBC # BLD: 5.23 M/UL (ref 3.95–5.11)
RBC # BLD: ABNORMAL 10*6/UL
RBC UA: ABNORMAL /HPF (ref 0–4)
RENAL EPITHELIAL, UA: ABNORMAL /HPF
SARS-COV-2, RAPID: NOT DETECTED
SARS-COV-2: NORMAL
SARS-COV-2: NORMAL
SEG NEUTROPHILS: 58 % (ref 36–65)
SEGMENTED NEUTROPHILS ABSOLUTE COUNT: 4.86 K/UL (ref 1.5–8.1)
SODIUM BLD-SCNC: 131 MMOL/L (ref 135–144)
SOURCE: NORMAL
SPECIFIC GRAVITY UA: 1.02 (ref 1–1.03)
TRICHOMONAS: ABNORMAL
TURBIDITY: CLEAR
URINE HGB: NEGATIVE
UROBILINOGEN, URINE: NORMAL
WBC # BLD: 8.5 K/UL (ref 3.5–11.3)
WBC # BLD: ABNORMAL 10*3/UL
WBC UA: ABNORMAL /HPF (ref 0–5)
YEAST: ABNORMAL

## 2020-09-18 PROCEDURE — U0002 COVID-19 LAB TEST NON-CDC: HCPCS

## 2020-09-18 PROCEDURE — 96372 THER/PROPH/DIAG INJ SC/IM: CPT

## 2020-09-18 PROCEDURE — 6370000000 HC RX 637 (ALT 250 FOR IP): Performed by: STUDENT IN AN ORGANIZED HEALTH CARE EDUCATION/TRAINING PROGRAM

## 2020-09-18 PROCEDURE — 74176 CT ABD & PELVIS W/O CONTRAST: CPT

## 2020-09-18 PROCEDURE — 99284 EMERGENCY DEPT VISIT MOD MDM: CPT

## 2020-09-18 PROCEDURE — 96375 TX/PRO/DX INJ NEW DRUG ADDON: CPT

## 2020-09-18 PROCEDURE — 80048 BASIC METABOLIC PNL TOTAL CA: CPT

## 2020-09-18 PROCEDURE — 81001 URINALYSIS AUTO W/SCOPE: CPT

## 2020-09-18 PROCEDURE — 85025 COMPLETE CBC W/AUTO DIFF WBC: CPT

## 2020-09-18 PROCEDURE — 96374 THER/PROPH/DIAG INJ IV PUSH: CPT

## 2020-09-18 PROCEDURE — 6360000002 HC RX W HCPCS: Performed by: STUDENT IN AN ORGANIZED HEALTH CARE EDUCATION/TRAINING PROGRAM

## 2020-09-18 PROCEDURE — 96376 TX/PRO/DX INJ SAME DRUG ADON: CPT

## 2020-09-18 RX ORDER — ONDANSETRON 2 MG/ML
4 INJECTION INTRAMUSCULAR; INTRAVENOUS ONCE
Status: DISCONTINUED | OUTPATIENT
Start: 2020-09-18 | End: 2020-09-18

## 2020-09-18 RX ORDER — TAMSULOSIN HYDROCHLORIDE 0.4 MG/1
0.4 CAPSULE ORAL DAILY
Qty: 5 CAPSULE | Refills: 0 | Status: SHIPPED | OUTPATIENT
Start: 2020-09-18 | End: 2020-09-24 | Stop reason: ALTCHOICE

## 2020-09-18 RX ORDER — OXYCODONE HYDROCHLORIDE AND ACETAMINOPHEN 5; 325 MG/1; MG/1
1-2 TABLET ORAL EVERY 6 HOURS PRN
Qty: 20 TABLET | Refills: 0 | Status: SHIPPED | OUTPATIENT
Start: 2020-09-18 | End: 2020-09-25

## 2020-09-18 RX ORDER — TAMSULOSIN HYDROCHLORIDE 0.4 MG/1
0.4 CAPSULE ORAL DAILY
Status: DISCONTINUED | OUTPATIENT
Start: 2020-09-18 | End: 2020-09-18 | Stop reason: HOSPADM

## 2020-09-18 RX ORDER — FENTANYL CITRATE 50 UG/ML
100 INJECTION, SOLUTION INTRAMUSCULAR; INTRAVENOUS ONCE
Status: COMPLETED | OUTPATIENT
Start: 2020-09-18 | End: 2020-09-18

## 2020-09-18 RX ORDER — ONDANSETRON 2 MG/ML
4 INJECTION INTRAMUSCULAR; INTRAVENOUS ONCE
Status: COMPLETED | OUTPATIENT
Start: 2020-09-18 | End: 2020-09-18

## 2020-09-18 RX ORDER — PROMETHAZINE HYDROCHLORIDE 25 MG/ML
25 INJECTION, SOLUTION INTRAMUSCULAR; INTRAVENOUS ONCE
Status: COMPLETED | OUTPATIENT
Start: 2020-09-18 | End: 2020-09-18

## 2020-09-18 RX ORDER — MORPHINE SULFATE 4 MG/ML
4 INJECTION, SOLUTION INTRAMUSCULAR; INTRAVENOUS ONCE
Status: COMPLETED | OUTPATIENT
Start: 2020-09-18 | End: 2020-09-18

## 2020-09-18 RX ORDER — ONDANSETRON 4 MG/1
4 TABLET, ORALLY DISINTEGRATING ORAL EVERY 8 HOURS PRN
Qty: 20 TABLET | Refills: 0 | Status: SHIPPED | OUTPATIENT
Start: 2020-09-18 | End: 2022-08-12

## 2020-09-18 RX ADMIN — MORPHINE SULFATE 4 MG: 4 INJECTION INTRAVENOUS at 14:52

## 2020-09-18 RX ADMIN — PROMETHAZINE HYDROCHLORIDE 25 MG: 25 INJECTION INTRAMUSCULAR; INTRAVENOUS at 17:18

## 2020-09-18 RX ADMIN — FENTANYL CITRATE 100 MCG: 50 INJECTION, SOLUTION INTRAMUSCULAR; INTRAVENOUS at 15:32

## 2020-09-18 RX ADMIN — TAMSULOSIN HYDROCHLORIDE 0.4 MG: 0.4 CAPSULE ORAL at 14:52

## 2020-09-18 RX ADMIN — ONDANSETRON 4 MG: 2 INJECTION INTRAMUSCULAR; INTRAVENOUS at 14:52

## 2020-09-18 RX ADMIN — ONDANSETRON 4 MG: 2 INJECTION INTRAMUSCULAR; INTRAVENOUS at 15:30

## 2020-09-18 RX ADMIN — FENTANYL CITRATE 100 MCG: 50 INJECTION, SOLUTION INTRAMUSCULAR; INTRAVENOUS at 17:16

## 2020-09-18 ASSESSMENT — ENCOUNTER SYMPTOMS
CONSTIPATION: 0
DIARRHEA: 0
SHORTNESS OF BREATH: 0
VOMITING: 0
WHEEZING: 0
BACK PAIN: 0
COUGH: 0
NAUSEA: 1
ABDOMINAL PAIN: 1
CHEST TIGHTNESS: 0
COLOR CHANGE: 0

## 2020-09-18 ASSESSMENT — PAIN DESCRIPTION - PAIN TYPE
TYPE: ACUTE PAIN
TYPE: ACUTE PAIN

## 2020-09-18 ASSESSMENT — PAIN DESCRIPTION - LOCATION: LOCATION: ABDOMEN;FLANK

## 2020-09-18 ASSESSMENT — PAIN SCALES - GENERAL
PAINLEVEL_OUTOF10: 8
PAINLEVEL_OUTOF10: 8
PAINLEVEL_OUTOF10: 7

## 2020-09-18 ASSESSMENT — PAIN DESCRIPTION - ORIENTATION: ORIENTATION: RIGHT;LOWER

## 2020-09-18 NOTE — ED NOTES
Pt arrived to ED alert and oriented x4. Pt c/o abdominal and flank pain. Pt reports that her pain is in her RLQ and radiates into her right, lower flank. Pt reports that her pain began this morning but was not severe, states that she has hx of kidney stones and has only one kidney. Pt reports that she believes that this is r/t a kidney stone. Pt denies vomiting or diarrhea. Pt denies having been around anyone suspected to have COVID-19 or anyone that has been sick, denies recent travel outside the [facility] or [address]. RR even and unlabored. NAD noted. Will continue monitor.      Thierry Tejada RN  09/18/20 [id number]

## 2020-09-18 NOTE — ED PROVIDER NOTES
cysto/uretero/pyeloscopy, calculus tx (N/A, 7/10/2018); pr cysto/uretero/pyeloscopy, calculus tx (Right, 10/10/2018); Cystoscopy (Right, 8/2/2019); Rotator cuff repair (Left); Intraocular lens insertion (Bilateral, 2008); Cystoscopy (Right, 8/23/2019); NEPHROLITHOTOMY (Right, 8/23/2019); cysto/uretero/pyeloscopy, calculus tx (Right, 9/13/2019); Cystoscopy (Right, 12/12/2019); Ureter stent placement (Right, 12/12/2019); cysto/uretero/pyeloscopy, calculus tx (Right, 12/12/2019); Cystocopy (Right, 12/31/2019); and CYSTOSCOPY INSERTION / REMOVAL STENT / STONE (Right, 12/31/2019). Social History     Socioeconomic History    Marital status: Single     Spouse name: Not on file    Number of children: 0    Years of education: graduate degree    Highest education level: Not on file   Occupational History    Occupation: trauma cordinator RN     Employer: [employer]   Social Needs    Financial resource strain: Not on file    Food insecurity     Worry: Not on file     Inability: Not on file   ROLI needs     Medical: Not on file     Non-medical: Not on file   Tobacco Use    Smoking status: Never Smoker    Smokeless tobacco: Never Used   Substance and Sexual Activity    Alcohol use:  Yes     Alcohol/week: 0.0 standard drinks     Frequency: Monthly or less     Comment: rare    Drug use: Never    Sexual activity: Not Currently   Lifestyle    Physical activity     Days per week: Not on file     Minutes per session: Not on file    Stress: Not on file   Relationships    Social connections     Talks on phone: Not on file     Gets together: Not on file     Attends [employer] service: Not on file     Active member of club or organization: Not on file     Attends meetings of clubs or organizations: Not on file     Relationship status: Not on file    Intimate partner violence     Fear of current or ex partner: Not on file     Emotionally abused: Not on file     Physically abused: Not on file     Forced sexual activity: Not on file   Other Topics Concern    Not on file   Social History Narrative    Not on file       Family History   Problem Relation Age of Onset    High Blood Pressure Mother     Diabetes Father     Cancer Father         tongue    Hypertension Father     Heart Disease Father         stents    Other Father         Mylofibrosis    Hypertension Sister     Heart Disease Maternal Grandmother     Heart Attack Maternal Grandmother     Prostate Cancer Maternal Grandfather     Heart Disease Paternal Grandfather     Stroke Paternal Grandfather     Emphysema Paternal Grandmother         Allergies:  Patient has no known allergies. Home Medications:  Prior to Admission medications    Medication Sig Start Date End Date Taking? Authorizing Provider   oxyCODONE-acetaminophen (PERCOCET) 5-325 MG per tablet Take 1-2 tablets by mouth every 6 hours as needed for Pain for up to 7 days.  9/18/20 9/25/20 Yes Corby Milner DO   ondansetron (ZOFRAN ODT) 4 MG disintegrating tablet Take 1 tablet by mouth every 8 hours as needed for Nausea 9/18/20  Yes Compa Milner DO   tamsulosin (FLOMAX) 0.4 MG capsule Take 1 capsule by mouth daily for 5 doses 9/18/20 9/23/20 Yes Corby Milner DO   doxycycline hyclate (VIBRA-TABS) 100 MG tablet Take 1 tablet by mouth 2 times daily for 7 days 9/16/20 9/23/20  Felisa Decker DO   irbesartan-hydrochlorothiazide (AVALIDE) 150-12.5 MG per tablet TAKE 1 TABLET BY MOUTH ONE TIME A DAY 7/20/20   Bala Arauz MD   Estradiol [facility] MAINTENANCE PACK) 4 MCG INST Place 1 capsule vaginally Twice a Week  Patient not taking: Reported on 6/19/2020 1/9/20   Paolo Lewis MD   oxybutynin (DITROPAN XL) 10 MG extended release tablet Take 1 tablet by mouth daily  Patient not taking: Reported on 9/16/2020 12/12/19   Janet Bowser PA-C   POTASSIUM CITRATE PO Take 5 mLs by mouth three times daily    Historical Provider, MD   omeprazole (PRILOSEC) 20 MG delayed release capsule TAKE 1 CAPSULE BY MOUTH TWICE DAILY. Patient taking differently: Take 20 mg by mouth 2 times daily TAKE 1 CAPSULE BY MOUTH TWICE DAILY. 11/20/19   MIGDALIA Alvarenga CNP   meloxicam (MOBIC) 15 MG tablet Take 1 tablet by mouth daily 11/20/19   MIGDALIA Alvarenga CNP   OMEGA-3 FATTY ACIDS PO Take 1 tablet by mouth daily    Historical Provider, MD   albuterol sulfate HFA (PROAIR HFA) 108 (90 Base) MCG/ACT inhaler Inhale 2 puffs into the lungs every 6 hours as needed for Wheezing 6/3/19   MIGDALIA Alvarenga CNP   acetaminophen (TYLENOL) 500 MG tablet Take 1,000 mg by mouth every 4 hours as needed for Pain. Historical Provider, MD   solifenacin (VESICARE) 10 MG tablet Take 10 mg by mouth as needed. 12/23/14  Historical Provider, MD       REVIEW OFSYSTEMS    (2-9 systems for level 4, 10 or more for level 5)      Review of Systems   Constitutional: Negative for chills, diaphoresis, fatigue and fever. Respiratory: Negative for cough, chest tightness, shortness of breath and wheezing. Cardiovascular: Negative for chest pain, palpitations and leg swelling. Gastrointestinal: Positive for abdominal pain (RLQ, R flank) and nausea. Negative for constipation, diarrhea and vomiting. Genitourinary: Negative for difficulty urinating, dysuria and urgency. Musculoskeletal: Negative for arthralgias, back pain, neck pain and neck stiffness. Skin: Negative for color change, pallor and rash. Neurological: Negative for dizziness, weakness, light-headedness and headaches. PHYSICAL EXAM   (up to 7 for level 4, 8 or more forlevel 5)      INITIAL VITALS:   ED Triage Vitals [09/18/20 1438]   BP Temp Temp Source Pulse Resp SpO2 Height Weight   (!) 191/98 98 °F (36.7 °C) Oral 65 16 98 % 5' 8\" (1.727 m) 208 lb (94.3 kg)       Physical Exam  Vitals signs and nursing note reviewed.    Constitutional:       General: She is in acute distress (Very uncomfortable appearing, hunched over with a emesis basin). Appearance: She is well-developed. She is not diaphoretic. HENT:      Head: Normocephalic and atraumatic. Eyes:      General: No scleral icterus. Conjunctiva/sclera: Conjunctivae normal.      Pupils: Pupils are equal, round, and reactive to light. Neck:      Musculoskeletal: Normal range of motion and neck supple. Vascular: No JVD. Trachea: No tracheal deviation. Cardiovascular:      Rate and Rhythm: Regular rhythm. Tachycardia present. Heart sounds: Normal heart sounds. No murmur. No friction rub. Pulmonary:      Effort: Pulmonary effort is normal. No respiratory distress. Breath sounds: Normal breath sounds. No wheezing. Chest:      Chest wall: No tenderness. Abdominal:      General: Bowel sounds are normal. There is no distension. Palpations: Abdomen is soft. Tenderness: There is abdominal tenderness in the right lower quadrant. There is no guarding. Skin:     General: Skin is warm and dry. Capillary Refill: Capillary refill takes less than 2 seconds. Coloration: Skin is not pale. Findings: No erythema. Neurological:      General: No focal deficit present. Mental Status: She is alert and oriented to person, place, and time. Cranial Nerves: No cranial nerve deficit. Sensory: No sensory deficit.    Psychiatric:         Mood and Affect: Mood normal.         Behavior: Behavior normal.         DIFFERENTIAL  DIAGNOSIS     PLAN (LABS / IMAGING / EKG):  Orders Placed This Encounter   Procedures    CT ABDOMEN PELVIS WO CONTRAST Additional Contrast? None    Urinalysis with microscopic    CBC Auto Differential    Basic Metabolic Panel w/ Reflex to MG    COVID-19    Inpatient consult to Urology       MEDICATIONS ORDERED:  Orders Placed This Encounter   Medications    morphine injection 4 mg    DISCONTD: tamsulosin (FLOMAX) capsule 0.4 mg    ondansetron (ZOFRAN) injection 4 mg    ondansetron (ZOFRAN) injection 4 mg  fentaNYL (SUBLIMAZE) injection 100 mcg    fentaNYL (SUBLIMAZE) injection 100 mcg    DISCONTD: ondansetron (ZOFRAN) injection 4 mg    promethazine (PHENERGAN) injection 25 mg    oxyCODONE-acetaminophen (PERCOCET) 5-325 MG per tablet     Sig: Take 1-2 tablets by mouth every 6 hours as needed for Pain for up to 7 days. Dispense:  20 tablet     Refill:  0    ondansetron (ZOFRAN ODT) 4 MG disintegrating tablet     Sig: Take 1 tablet by mouth every 8 hours as needed for Nausea     Dispense:  20 tablet     Refill:  0    tamsulosin (FLOMAX) 0.4 MG capsule     Sig: Take 1 capsule by mouth daily for 5 doses     Dispense:  5 capsule     Refill:  0       DDX: Nephrolithiasis, pyelonephritis, ureterolithiasis, UTI,    Initial MDM/Plan: 60 y.o. female who presents with right flank and groin pain. History of recurrent stones the past, has a known kidney stone that she thinks may have moved. Had been in discussions previously with Dr. Rossi Arambula about a stent placement, however wanted to try and get through the summer first.  Patient only has a solitary right kidney. Will discuss with patient with urology and follow the recommendations. Symptomatic management with morphine, Zofran, may use fentanyl as needed as well. Possible admission.     DIAGNOSTIC RESULTS / EMERGENCYDEPARTMENT COURSE / MDM     LABS:  Labs Reviewed   URINALYSIS WITH MICROSCOPIC - Abnormal; Notable for the following components:       Result Value    Protein, UA 1+ (*)     All other components within normal limits   CBC WITH AUTO DIFFERENTIAL - Abnormal; Notable for the following components:    RBC 5.23 (*)     All other components within normal limits   BASIC METABOLIC PANEL W/ REFLEX TO MG FOR LOW K - Abnormal; Notable for the following components:    BUN 29 (*)     Sodium 131 (*)     Chloride 96 (*)     All other components within normal limits   COVID-19         RADIOLOGY:  Ct Abdomen Pelvis Wo Contrast Additional Contrast? None    Result Date: 9/18/2020  EXAMINATION: CT OF THE ABDOMEN AND PELVIS WITHOUT CONTRAST 9/18/2020 4:57 pm TECHNIQUE: CT of the abdomen and pelvis was performed without the administration of intravenous contrast. Multiplanar reformatted images are provided for review. Dose modulation, iterative reconstruction, and/or weight based adjustment of the mA/kV was utilized to reduce the radiation dose to as low as reasonably achievable. COMPARISON: June 4, 2020 HISTORY: ORDERING SYSTEM PROVIDED HISTORY: right flank pain TECHNOLOGIST PROVIDED HISTORY: right flank pain stone protocol Reason for Exam: right flank pain, stone protocol FINDINGS: Lower Chest: No pleural effusions. Organs: Evaluation of the solid organs is limited without intravenous contrast. No liver lesion. No gallstones. No splenomegaly. No adrenal lesion. Left nephrectomy. Moderate right-sided hydronephrosis with calyceal dilatation. Moderate right hydroureter. Within the right distal ureter there is a 5 mm calculus. Additional nonobstructing 7 mm calculus within the right kidney. GI/Bowel: No bowel obstruction. No acute inflammatory process. Pelvis: No free fluid. No bladder calculus. Peritoneum/Retroperitoneum: No aortic aneurysm. No adenopathy. Bones/Soft Tissues: Small umbilical hernia contains fat. No acute soft tissue abnormality. Remote left rib fracture. Lumbar spine degenerative changes. Moderate right-sided hydronephrosis and hydroureter. Within the right distal ureter there is a 5 mm calculus. EMERGENCY DEPARTMENT COURSE:  ED Course as of Sep 18 1950   Fri Sep 18, 2020   1600 Bedside ultrasound performed and shows moderate hydronephrosis    [JG]   1633 Creatinine: 0.84 [JG]   1633 RBC(!): 5.23 [JG]      ED Course User Index  [JG] Vitaly Chavira DO   Urology was consulted. Patient scheduled for the OR tomorrow at 8:30 AM for stent placement. Patient given medications for symptomatic management upon discharge.   Patient comfortable

## 2020-09-18 NOTE — TELEPHONE ENCOUNTER
Reason for Disposition   [1] SEVERE pain (e.g., excruciating, scale 8-10) AND [2] present > 1 hour    Answer Assessment - Initial Assessment Questions  1. LOCATION: \"Where does it hurt? \" (e.g., left, right)      Right flank  2. ONSET: \"When did the pain start? \"     Increasing pain today  3. SEVERITY: \"How bad is the pain? \" (e.g., Scale 1-10; mild, moderate, or severe)    - MILD (1-3): doesn't interfere with normal activities     - MODERATE (4-7): interferes with normal activities or awakens from sleep     - SEVERE (8-10): excruciating pain and patient unable to do normal activities (stays in bed)        severe  4. PATTERN: \"Does the pain come and go, or is it constant? \"     constant  5. CAUSE: \"What do you think is causing the pain? \"  Kidney stone  6. OTHER SYMPTOMS:  \"Do you have any other symptoms? \" (e.g., fever, abdominal pain, vomiting, leg weakness, burning with urination, blood in urine)   nausea  7. PREGNANCY:  \"Is there any chance you are pregnant? \" \"When was your last menstrual period? \"    no    Protocols used: FLANK PAIN-ADULT-    Patient has history of kidney stones and had a MD appointment next week. 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 Will walk down to the ED to be evaluated.

## 2020-09-18 NOTE — CONSULTS
Lisa Jain, [facility]   Urology Consultation      Patient:  Tyree Garcia  MRN: [id number]  YOB: 1965    CHIEF COMPLAINT:  Solitary right kidney; known right kidney stones; right flank pain    HISTORY OF PRESENT ILLNESS:   The patient is a 60 y.o. 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 She is well known to urology for nephrolithiasis and has history of nephroureterectomy on the left in 1988. She has a known right kidney stone ~9mm that has been under surveillance since January. She had CT A/P June 2020, then KUB three days ago which showed radiodenisty over right kidney. Patient started having flank pain along with nausea earlier today, progressively worsening. Pain has been constant, and feels similar to prior kidney stone obstruction. Creatinine is normal at 0.84; wbc normal. UA is pending, vital signs are stable. CT Abdomen/pelvis pending. Patient states pain is better controlled with pain medication given in ED. Patient's old records, notes and chart reviewed and summarized above.     Past Medical History:    Past Medical History:   Diagnosis Date    Arthritis     Asthma     Benign familial tremor     Cancer ([facility]     skin    CKD (chronic kidney disease) stage 1, GFR 90 ml/min or greater     Class 2 severe obesity with serious comorbidity and body mass index (BMI) of 39.0 to 39.9 in adult ([address] 5/1/2019    Cystinuria ([facility]     Dyspnea on exertion 8/6/2019    Flank pain     RIGHT    Gastroesophageal reflux disease without esophagitis     GERD (gastroesophageal reflux disease)     Hypertension     Hyperuricemia     Hypothyroidism     Kidney stones     Midline low back pain with right-sided sciatica 6/2/2016    Proteinuria     Shoulder impingement 12/2019    left    Snores     Solitary kidney, acquired 11/20/2017    Urinary tract obstruction by kidney stone 8/1/2019    Wears glasses        Past Surgical History:    Past Surgical History:   Procedure Laterality Date    CYSTO/URETERO/PYELOSCOPY, CALCULUS TX Right 11/21/2017    HOLMIUM LASER - CYSTOSCOPY, RIGHT URETEROSCOPY, RIGHT STENT EXCHANGE,STONE BASKETING performed by Terry Turner MD at [address] [place] [address], [address] Right 9/13/2019    HOLMIUM - CYSTO, URETEROSCOPY, LITHOTRIPSY, STENT PLACEMENT performed by Daniella Oleary MD at [address] [place] [address], [address] Right 12/12/2019    HOLMIUM- CYSTO, URETEROSCOPY, LASER LITHO, STENT PLACEMENT AND RIGHT URETERAL BASKET STONE EXTRACTION performed by Terry Turner MD at [address] [place] [address] Right 11/18/2017    CYSTOSCOPY URETERAL STENT INSERTION,RIGHT performed by Liliana Cooper MD at [address] [place] [address] Right 8/2/2019    CYSTOSCOPY RT URETERAL STENT INSERTION performed by Daniella Oleary MD at [address] [place] [address] Right 8/23/2019    CYSTOSCOPY, RIGHT STENT REMOVAL, INSERTION OCCLUSIVE BALLOON, PT GOING TO INTERVENTIONAL performed by Daniella Oleary MD at [address] [place] [address] Right 12/12/2019    CYSTO, URETEROSCOPY, LASER LITHO, STENT PLACEMENT AND RIGHT URETERAL BASKET STONE EXTRACTION     CYSTOSCOPY Right 12/31/2019    stent removal    CYSTOSCOPY INSERTION / REMOVAL STENT / STONE Right 12/31/2019    CYSTOSCOPY, STENT REMOVAL performed by Terry Turner MD at [address] [address] Bilateral 2008    KNEE ARTHROSCOPY Right 1998, 2003    X2    NEPHROLITHOTOMY Right 8/23/2019    HOLMIUM LASER, NEPHROLITHOTOMY PERCUTANEOUS, LITHOCLAST, C-ARM performed by Daniella Oleary MD at [address] [place] [address] 54, CALCULUS [place] N/A 7/10/2018    CYSTOSCOPY, RIGHT URETEROSCOPY, HOLMIUM LASER LITHO WITH STONE BASKETING, RIGHT STENT EXCHANGE performed by Florentino Goncalves MD at [address] [place] [address] 54, CALCULUS [place] Right 10/10/2018    HOLMIUM LASER STANDBY, CYSTO, RIGHT URETEROSCOPY, RIGHT STENT SYSTEMS:  A comprehensive 14 point review of systems was obtained. Constitutional: No fatigue  Eyes: No blurry vision  Ears, nose, mouth, throat, face: No ringing in the ears; no facial droop  Respiratory: No cough or cold  Cardiovascular: No palpitations  Gastrointestinal: No diarrhea or constipation  Genitourinary: See HPI  Integument/Skin: No rashes  Hematologic/Lymphatic: No easy bruising  Musculoskeletal: No muscle pains  Neurologic: No weakness in the extremities  Psychiatric: No depression or suicidal thoughts  Endocrine: No heat or cold intolerances  Allergic/Immunologic: No current seasonal allergies; no skin hives    Physical Exam:      This a 60 y.o. female   Vitals:    09/18/20 1438   BP: (!) 191/98   Pulse: 65   Resp: 16   Temp: 98 °F (36.7 °C)   SpO2: 98%       Constitutional: Patient in no acute distress  Neuro: Alert and oriented to person place and time   Psych: Mood and affect normal  Head: Atraumatic and normocephalic  Neck: Trachea midline  Lungs: Respiratory effort normal  Cardiovascular:  Regular rhythm  Abdomen: Soft, non-tender, non-distended. CVA tenderness +right flank  Bladder: Non-tender and not distended  Ext: 2+ DP pulses bilaterally  Skin: No rashes or bruising present  Lymphatics: No palpable lymphadenopathy  Pelvic exam: Deferred  Rectal exam: Not indicated    Labs:  Recent Labs     09/18/20  1550   WBC 8.5   HGB 14.1   HCT 45.3   MCV 86.6        Recent Labs     09/18/20  1550   *   K 4.3   CL 96*   CO2 25   BUN 29*   CREATININE 0.84       Recent Labs     09/18/20  1649   COLORU YELLOW   PHUR 8.0   WBCUA 0 TO 2   RBCUA 2 TO 5   MUCUS NOT REPORTED   TRICHOMONAS NOT REPORTED   YEAST NOT REPORTED   BACTERIA NOT REPORTED   SPECGRAV 1.017   LEUKOCYTESUR NEGATIVE   UROBILINOGEN Normal   BILIRUBINUR NEGATIVE       Culture results:  pending    -----------------------------------------------------------------  Imaging Results:  CT A/P reviewed:   Moderate hydro noted with right renal calculi still noted in renal pelvis. Distal 5mm ureteral stone noted on the right. Assessment and Plan   Impression:  61 yo female with solitary right kidney  -Right nephrolithiasis  -Right ureterolithiasis  -Moderate right hydronephrosis    Patient Active Problem List   Diagnosis    Kidney stones    Essential hypertension    Acquired hypothyroidism    CKD (chronic kidney disease) stage 1, GFR 90 ml/min or greater    Cystinuria (HCC)    Hyperuricemia    Proteinuria    Knee pain, acute    Osteoarthritis of both knees    Gastroesophageal reflux disease without esophagitis    ESTHER (obstructive sleep apnea)    Midline low back pain with right-sided sciatica    Mixed hyperlipidemia    Solitary kidney, acquired    Right ureteral stone    Obesity (BMI 30-39. 9)    LILIAN (acute kidney injury) ([facility]    Leukocytosis    Acute intractable tension-type headache    Glucose intolerance (impaired glucose tolerance)    Kidney stone    Bunion of great toe of right foot    Mild intermittent asthma without complication    Urolithiasis    Renal stones    Class 2 severe obesity due to excess calories with serious comorbidity and body mass index (BMI) of 38.0 to 38.9 in adult [facility])    Urinary tract obstruction by kidney stone    Dyspnea on exertion    Arthralgia    Other fatigue    Oxygen desaturation    Acute respiratory failure with hypoxia (HCC)    Mild persistent asthma without complication    Pleural effusion on right    Atelectasis    Abnormal echocardiogram    LVH (left ventricular hypertrophy)    Chronic left shoulder pain    Pain in joint of left knee       Plan:   -CT revealed 5mm distal ureteral stone with moderate hydroureteronephrosis. (Additional ~7mm right kidney stone)  -UA reviewed; no signs of infection. Creatinine normal at 0.84. No leukocytosis.   -Flomax, pain control, Zofran  -Patient added-on for Cystoscopy, ureteroscopy, holmium laser lithotripsy and stent placement (RIGHT) TOMORROW, 9/19/2020 at 8:30am. Patient instructed to report at 0630. (patient ate at 12pm today)  -Patient comfortable with this plan and pain control at home. Instructed to return overnight if any symptoms worsen, she is unable to urinate, or fevers, etc.  NPO at midnight tonight.  -Call with questions. Thank you for involving us in the care of Bridget Elizondo. Should you have any questions, please do not hesitate to contact us at any time.     Ernestina Smith PA-C  Urology Service   5:09 PM 9/18/2020

## 2020-09-18 NOTE — ED PROVIDER NOTES
FACULTY SIGN-OUT  ADDENDUM     Care of this patient was assumed from previous attending physician. The patient's initial evaluation and plan have been discussed with the prior provider who initially evaluated the patient. Attestation  I was available and discussed any additional care issues that arose and coordinated the management plans with the resident(s) caring for the patient during my duty period. Any areas of disagreement with resident's documentation of care or procedures are noted on the chart. I was personally present for the key portions of any/all procedures, during my duty period. I have documented in the chart those procedures where I was not present during the key portions. ED COURSE      The patient was given the following medications:  Orders Placed This Encounter   Medications    morphine injection 4 mg    tamsulosin (FLOMAX) capsule 0.4 mg    ondansetron (ZOFRAN) injection 4 mg    ondansetron (ZOFRAN) injection 4 mg    fentaNYL (SUBLIMAZE) injection 100 mcg       RECENT VITALS:   Temp: 98 °F (36.7 °C), Pulse: 65, Resp: 16, BP: (!) 191/98    MEDICAL DECISION MAKING        Khadijah Vivar is a 60 y.o. female who presents to the Emergency Department with complaints of flank pain uroloogy consulted       Shanta Flaherty MD, F.A.C.E.P.   Attending Emergency Physician    (Please note that portions of this note were completed with a voice recognition program.  Efforts were made to edit the dictations but occasionally words are mis-transcribed.)        Shanta Flaherty MD  09/18/20 8017

## 2020-09-19 ENCOUNTER — ANESTHESIA EVENT (OUTPATIENT)
Dept: OPERATING ROOM | Age: 55
End: 2020-09-19
Payer: COMMERCIAL

## 2020-09-19 ENCOUNTER — APPOINTMENT (OUTPATIENT)
Dept: GENERAL RADIOLOGY | Age: 55
End: 2020-09-19
Attending: UROLOGY
Payer: COMMERCIAL

## 2020-09-19 ENCOUNTER — ANESTHESIA (OUTPATIENT)
Dept: OPERATING ROOM | Age: 55
End: 2020-09-19
Payer: COMMERCIAL

## 2020-09-19 ENCOUNTER — HOSPITAL ENCOUNTER (OUTPATIENT)
Age: 55
Setting detail: OUTPATIENT SURGERY
Discharge: HOME | End: 2020-09-19
Attending: UROLOGY | Admitting: UROLOGY
Payer: COMMERCIAL

## 2020-09-19 VITALS
SYSTOLIC BLOOD PRESSURE: 121 MMHG | BODY MASS INDEX: 31.52 KG/M2 | RESPIRATION RATE: 16 BRPM | DIASTOLIC BLOOD PRESSURE: 67 MMHG | WEIGHT: 208 LBS | TEMPERATURE: 96.8 F | HEART RATE: 52 BPM | OXYGEN SATURATION: 98 % | HEIGHT: 68 IN

## 2020-09-19 VITALS — SYSTOLIC BLOOD PRESSURE: 136 MMHG | TEMPERATURE: 97 F | OXYGEN SATURATION: 100 % | DIASTOLIC BLOOD PRESSURE: 82 MMHG

## 2020-09-19 PROCEDURE — 3700000000 HC ANESTHESIA ATTENDED CARE: Performed by: UROLOGY

## 2020-09-19 PROCEDURE — C1758 CATHETER, URETERAL: HCPCS | Performed by: UROLOGY

## 2020-09-19 PROCEDURE — 2720000010 HC SURG SUPPLY STERILE: Performed by: UROLOGY

## 2020-09-19 PROCEDURE — 74018 RADEX ABDOMEN 1 VIEW: CPT

## 2020-09-19 PROCEDURE — 2500000003 HC RX 250 WO HCPCS: Performed by: NURSE ANESTHETIST, CERTIFIED REGISTERED

## 2020-09-19 PROCEDURE — 3600000004 HC SURGERY LEVEL 4 BASE: Performed by: UROLOGY

## 2020-09-19 PROCEDURE — 7100000000 HC PACU RECOVERY - FIRST 15 MIN: Performed by: UROLOGY

## 2020-09-19 PROCEDURE — 7100000010 HC PHASE II RECOVERY - FIRST 15 MIN: Performed by: UROLOGY

## 2020-09-19 PROCEDURE — 2580000003 HC RX 258: Performed by: UROLOGY

## 2020-09-19 PROCEDURE — C2617 STENT, NON-COR, TEM W/O DEL: HCPCS | Performed by: UROLOGY

## 2020-09-19 PROCEDURE — 6370000000 HC RX 637 (ALT 250 FOR IP): Performed by: ANESTHESIOLOGY

## 2020-09-19 PROCEDURE — 7100000001 HC PACU RECOVERY - ADDTL 15 MIN: Performed by: UROLOGY

## 2020-09-19 PROCEDURE — C1769 GUIDE WIRE: HCPCS | Performed by: UROLOGY

## 2020-09-19 PROCEDURE — 6360000004 HC RX CONTRAST MEDICATION: Performed by: UROLOGY

## 2020-09-19 PROCEDURE — 2709999900 HC NON-CHARGEABLE SUPPLY: Performed by: UROLOGY

## 2020-09-19 PROCEDURE — 7100000011 HC PHASE II RECOVERY - ADDTL 15 MIN: Performed by: UROLOGY

## 2020-09-19 PROCEDURE — 3600000014 HC SURGERY LEVEL 4 ADDTL 15MIN: Performed by: UROLOGY

## 2020-09-19 PROCEDURE — 6360000002 HC RX W HCPCS: Performed by: NURSE ANESTHETIST, CERTIFIED REGISTERED

## 2020-09-19 PROCEDURE — 6360000002 HC RX W HCPCS: Performed by: UROLOGY

## 2020-09-19 PROCEDURE — 3700000001 HC ADD 15 MINUTES (ANESTHESIA): Performed by: UROLOGY

## 2020-09-19 DEVICE — UNIVERSA SOFT URETERAL STENT AND POSITIONER WITH HYDROPHILIC COATING AND MONOFILAMENT TETHER
Type: IMPLANTABLE DEVICE | Status: FUNCTIONAL
Brand: UNIVERSA

## 2020-09-19 RX ORDER — CEFAZOLIN SODIUM 1 G/3ML
INJECTION, POWDER, FOR SOLUTION INTRAMUSCULAR; INTRAVENOUS PRN
Status: DISCONTINUED | OUTPATIENT
Start: 2020-09-19 | End: 2020-09-19 | Stop reason: SDUPTHER

## 2020-09-19 RX ORDER — ONDANSETRON 2 MG/ML
INJECTION INTRAMUSCULAR; INTRAVENOUS PRN
Status: DISCONTINUED | OUTPATIENT
Start: 2020-09-19 | End: 2020-09-19 | Stop reason: SDUPTHER

## 2020-09-19 RX ORDER — FENTANYL CITRATE 50 UG/ML
25 INJECTION, SOLUTION INTRAMUSCULAR; INTRAVENOUS EVERY 5 MIN PRN
Status: DISCONTINUED | OUTPATIENT
Start: 2020-09-19 | End: 2020-09-19 | Stop reason: HOSPADM

## 2020-09-19 RX ORDER — FENTANYL CITRATE 50 UG/ML
50 INJECTION, SOLUTION INTRAMUSCULAR; INTRAVENOUS EVERY 5 MIN PRN
Status: DISCONTINUED | OUTPATIENT
Start: 2020-09-19 | End: 2020-09-19 | Stop reason: HOSPADM

## 2020-09-19 RX ORDER — FENTANYL CITRATE 50 UG/ML
INJECTION, SOLUTION INTRAMUSCULAR; INTRAVENOUS PRN
Status: DISCONTINUED | OUTPATIENT
Start: 2020-09-19 | End: 2020-09-19 | Stop reason: SDUPTHER

## 2020-09-19 RX ORDER — DEXAMETHASONE SODIUM PHOSPHATE 4 MG/ML
INJECTION, SOLUTION INTRA-ARTICULAR; INTRALESIONAL; INTRAMUSCULAR; INTRAVENOUS; SOFT TISSUE PRN
Status: DISCONTINUED | OUTPATIENT
Start: 2020-09-19 | End: 2020-09-19 | Stop reason: SDUPTHER

## 2020-09-19 RX ORDER — MIDAZOLAM HYDROCHLORIDE 1 MG/ML
INJECTION INTRAMUSCULAR; INTRAVENOUS PRN
Status: DISCONTINUED | OUTPATIENT
Start: 2020-09-19 | End: 2020-09-19 | Stop reason: SDUPTHER

## 2020-09-19 RX ORDER — TAMSULOSIN HYDROCHLORIDE 0.4 MG/1
0.4 CAPSULE ORAL DAILY
Qty: 30 CAPSULE | Refills: 1 | Status: SHIPPED | OUTPATIENT
Start: 2020-09-19 | End: 2021-04-06

## 2020-09-19 RX ORDER — HYDROCODONE BITARTRATE AND ACETAMINOPHEN 5; 325 MG/1; MG/1
2 TABLET ORAL PRN
Status: COMPLETED | OUTPATIENT
Start: 2020-09-19 | End: 2020-09-19

## 2020-09-19 RX ORDER — SODIUM CHLORIDE, SODIUM LACTATE, POTASSIUM CHLORIDE, CALCIUM CHLORIDE 600; 310; 30; 20 MG/100ML; MG/100ML; MG/100ML; MG/100ML
INJECTION, SOLUTION INTRAVENOUS CONTINUOUS
Status: DISCONTINUED | OUTPATIENT
Start: 2020-09-19 | End: 2020-09-19 | Stop reason: HOSPADM

## 2020-09-19 RX ORDER — PROPOFOL 10 MG/ML
INJECTION, EMULSION INTRAVENOUS PRN
Status: DISCONTINUED | OUTPATIENT
Start: 2020-09-19 | End: 2020-09-19 | Stop reason: SDUPTHER

## 2020-09-19 RX ORDER — LIDOCAINE HYDROCHLORIDE 10 MG/ML
INJECTION, SOLUTION EPIDURAL; INFILTRATION; INTRACAUDAL; PERINEURAL PRN
Status: DISCONTINUED | OUTPATIENT
Start: 2020-09-19 | End: 2020-09-19 | Stop reason: SDUPTHER

## 2020-09-19 RX ORDER — HYDROCODONE BITARTRATE AND ACETAMINOPHEN 5; 325 MG/1; MG/1
1 TABLET ORAL EVERY 6 HOURS PRN
Qty: 20 TABLET | Refills: 0 | Status: SHIPPED | OUTPATIENT
Start: 2020-09-19 | End: 2020-09-26

## 2020-09-19 RX ORDER — HYDROCODONE BITARTRATE AND ACETAMINOPHEN 5; 325 MG/1; MG/1
1 TABLET ORAL PRN
Status: COMPLETED | OUTPATIENT
Start: 2020-09-19 | End: 2020-09-19

## 2020-09-19 RX ORDER — DIPHENHYDRAMINE HYDROCHLORIDE 50 MG/ML
INJECTION INTRAMUSCULAR; INTRAVENOUS PRN
Status: DISCONTINUED | OUTPATIENT
Start: 2020-09-19 | End: 2020-09-19 | Stop reason: SDUPTHER

## 2020-09-19 RX ADMIN — LIDOCAINE HYDROCHLORIDE 50 MG: 10 INJECTION, SOLUTION EPIDURAL; INFILTRATION; INTRACAUDAL; PERINEURAL at 10:42

## 2020-09-19 RX ADMIN — FENTANYL CITRATE 25 MCG: 50 INJECTION, SOLUTION INTRAMUSCULAR; INTRAVENOUS at 09:07

## 2020-09-19 RX ADMIN — FENTANYL CITRATE 25 MCG: 50 INJECTION, SOLUTION INTRAMUSCULAR; INTRAVENOUS at 10:31

## 2020-09-19 RX ADMIN — SODIUM CHLORIDE, POTASSIUM CHLORIDE, SODIUM LACTATE AND CALCIUM CHLORIDE: 600; 310; 30; 20 INJECTION, SOLUTION INTRAVENOUS at 07:40

## 2020-09-19 RX ADMIN — MIDAZOLAM HYDROCHLORIDE 2 MG: 1 INJECTION, SOLUTION INTRAMUSCULAR; INTRAVENOUS at 10:42

## 2020-09-19 RX ADMIN — ONDANSETRON 4 MG: 2 INJECTION, SOLUTION INTRAMUSCULAR; INTRAVENOUS at 10:46

## 2020-09-19 RX ADMIN — CEFAZOLIN 2000 MG: 330 INJECTION, POWDER, FOR SOLUTION INTRAMUSCULAR; INTRAVENOUS at 10:53

## 2020-09-19 RX ADMIN — PROPOFOL 200 MG: 10 INJECTION, EMULSION INTRAVENOUS at 10:42

## 2020-09-19 RX ADMIN — HYDROCODONE BITARTRATE AND ACETAMINOPHEN 2 TABLET: 5; 325 TABLET ORAL at 13:28

## 2020-09-19 RX ADMIN — Medication 12.5 MG: at 10:46

## 2020-09-19 RX ADMIN — DEXAMETHASONE SODIUM PHOSPHATE 4 MG: 4 INJECTION, SOLUTION INTRAMUSCULAR; INTRAVENOUS at 10:46

## 2020-09-19 RX ADMIN — FENTANYL CITRATE 25 MCG: 50 INJECTION INTRAMUSCULAR; INTRAVENOUS at 11:09

## 2020-09-19 RX ADMIN — FENTANYL CITRATE 75 MCG: 50 INJECTION INTRAMUSCULAR; INTRAVENOUS at 10:42

## 2020-09-19 ASSESSMENT — PULMONARY FUNCTION TESTS
PIF_VALUE: 12
PIF_VALUE: 2
PIF_VALUE: 14
PIF_VALUE: 23
PIF_VALUE: 14
PIF_VALUE: 13
PIF_VALUE: 15
PIF_VALUE: 14
PIF_VALUE: 14
PIF_VALUE: 15
PIF_VALUE: 14
PIF_VALUE: 15
PIF_VALUE: 12
PIF_VALUE: 13
PIF_VALUE: 14
PIF_VALUE: 8
PIF_VALUE: 15
PIF_VALUE: 1
PIF_VALUE: 15
PIF_VALUE: 15
PIF_VALUE: 14
PIF_VALUE: 15
PIF_VALUE: 15
PIF_VALUE: 6
PIF_VALUE: 14
PIF_VALUE: 2
PIF_VALUE: 12
PIF_VALUE: 11
PIF_VALUE: 11
PIF_VALUE: 15
PIF_VALUE: 0
PIF_VALUE: 15
PIF_VALUE: 14
PIF_VALUE: 4
PIF_VALUE: 0
PIF_VALUE: 11
PIF_VALUE: 1
PIF_VALUE: 2
PIF_VALUE: 12
PIF_VALUE: 0
PIF_VALUE: 12
PIF_VALUE: 14
PIF_VALUE: 2
PIF_VALUE: 14
PIF_VALUE: 15
PIF_VALUE: 15
PIF_VALUE: 10
PIF_VALUE: 14
PIF_VALUE: 14
PIF_VALUE: 12

## 2020-09-19 ASSESSMENT — PAIN SCALES - GENERAL
PAINLEVEL_OUTOF10: 6
PAINLEVEL_OUTOF10: 6
PAINLEVEL_OUTOF10: 0
PAINLEVEL_OUTOF10: 0
PAINLEVEL_OUTOF10: 3
PAINLEVEL_OUTOF10: 5
PAINLEVEL_OUTOF10: 7

## 2020-09-19 ASSESSMENT — PAIN - FUNCTIONAL ASSESSMENT: PAIN_FUNCTIONAL_ASSESSMENT: 0-10

## 2020-09-19 ASSESSMENT — ENCOUNTER SYMPTOMS: SHORTNESS OF BREATH: 1

## 2020-09-19 NOTE — OP NOTE
FACILITY:  [address] [place] [address]  Heather Dale  1965  [id number]    DATE: 09/19/20  SURGEON:  Dr. Yoel Yao MD  ASSISTANT: Dr. Joey Hester MD  PREOPERATIVE DIAGNOSIS: Right sided kidney stone  POSTOPERATIVE DIAGNOSIS: Right sided kidney stone  PROCEDURES PERFORMED:  1. Cystoscopy. 2. Right sided ureteroscopy with holmium laser lithotripsy  3. Right sided stent placement. DRAINS: A Right sided 5x28 double J ureteral stent ( with string)  SPECIMEN: none  ANESTHESIA: General  ESTIMATED BLOOD LOSS: None.   COMPLICATIONS: None.     INDICATIONS FOR PROCEDURE:  Heather Dale is a 60 y.o. female presents for Right sided calculus. After the risks, benefits, alternatives, of the procedure were discussed with the patient, informed consent was obtained. The patient elected to proceed.     DETAILS OF THE PROCEDURE:  The patient was brought back from the preoperative holding area to the  operating suite, and was transferred to the operating table where the patient lay in  supine position. EPC's were in place, connected to the machine and the machine was turned on before induction. General endotracheal anesthesia was induced, and patient was prepped and draped in standard surgical fashion after being placed in dorsolithotomy position. A proper timeout was performed, preoperative antibiotics were given. We began by inserting a cystoscope with a 22 Mongolian sheath and 30 degree lens into the patient's urethral meatus and advancing into the bladder without complication. A pan cystoscopy was preformed and the bladder appeared unremarkable. We then focused our attention on the Right ureteral orifice, which we cannulated with our glidewire, advanced up to renal pelvis. We then used a  dual lumen catheter to place a second wire. Once in good position, we advanced our flexible ureteroscope over the working wire to the renal pelvis under direct visualization.   We identified the renal

## 2020-09-19 NOTE — ANESTHESIA POSTPROCEDURE EVALUATION
Department of Anesthesiology  Postprocedure Note    Patient: Sherman Hinkle  MRN: [id number]  [place]: 1965  Date of evaluation: 9/19/2020  Time:  3:32 PM     Procedure Summary     Date:  09/19/20 Room / Location:  [address]    Anesthesia Start:  7784 Anesthesia Stop:  3972    Procedure:  CYSTOSCOPY, URETEROSCOPY, HOLMIUM LASER LITHOTRIPSY, STENT PLACEMENT (Right ) Diagnosis:  (RIGHT KIDNEY STONE)    Surgeon:  Nicole Oseguera MD Responsible Provider:  Chanel Holcomb MD    Anesthesia Type:  general ASA Status:  2          Anesthesia Type: general    Imani Phase I: Imani Score: 10    Imani Phase II: Imani Score: 10    Last vitals: Reviewed and per EMR flowsheets.        Anesthesia Post Evaluation    Patient location during evaluation: PACU  Patient participation: complete - patient participated  Level of consciousness: awake and alert  Pain score: 2  Airway patency: patent  Nausea & Vomiting: no nausea and no vomiting  Complications: no  Cardiovascular status: hemodynamically stable  Respiratory status: acceptable  Hydration status: euvolemic

## 2020-09-19 NOTE — H&P
Lisa Sesay, Criselda armijo, [facility], & [address] [facility], [address] [facility]  Urology History and Physical    Patient:  Micheline Liriano  MRN: [id number]  YOB: 1965    CHIEF COMPLAINT: Right obstructing ureteral stone    HISTORY OF PRESENT ILLNESS:   The patient is a 60 y.o. female with flank pain found to have a right distal ureteral stone in her solitary right kidney. History of cystine stones. Presents for treatment of her stone today. Patient's old records, notes and chart reviewed and summarized above.     Past Medical History:    Past Medical History:   Diagnosis Date    Arthritis     Asthma     Benign familial tremor     Cancer ([address]     skin    CKD (chronic kidney disease) stage 1, GFR 90 ml/min or greater     Class 2 severe obesity with serious comorbidity and body mass index (BMI) of 39.0 to 39.9 in adult ([address] 5/1/2019    Cystinuria ([address]     Dyspnea on exertion 8/6/2019    Flank pain     RIGHT    Gastroesophageal reflux disease without esophagitis     GERD (gastroesophageal reflux disease)     Hypertension     Hyperuricemia     Hypothyroidism     Kidney stones     Midline low back pain with right-sided sciatica 6/2/2016    Proteinuria     Shoulder impingement 12/2019    left    Snores     Solitary kidney, acquired 11/20/2017    Urinary tract obstruction by kidney stone 8/1/2019    Wears glasses        Past Surgical History:    Past Surgical History:   Procedure Laterality Date    CYSTO/URETERO/PYELOSCOPY, CALCULUS [place] Right 11/21/2017    HOLMIUM LASER - CYSTOSCOPY, RIGHT URETEROSCOPY, RIGHT STENT EXCHANGE,STONE BASKETING performed by Keanu Guy MD at [address] [facility] [address] [place] [address] Right 9/13/2019    HOLMIUM - CYSTO, URETEROSCOPY, LITHOTRIPSY, STENT PLACEMENT performed by Nallely Lee MD at [address] [facility] Right 12/12/2019    HOLMIUM- CYSTO, URETEROSCOPY, LASER LITHO, STENT PLACEMENT AND RIGHT URETERAL BASKET STONE EXTRACTION performed by Alberto Light MD at [address] [place] [address] Right 11/18/2017    CYSTOSCOPY URETERAL STENT INSERTION,RIGHT performed by Pearl Cheng MD at [address] [place] [address] Right 8/2/2019    CYSTOSCOPY RT URETERAL STENT INSERTION performed by Alfred Templeton MD at [address] [place] [address] Right 8/23/2019    CYSTOSCOPY, RIGHT STENT REMOVAL, INSERTION OCCLUSIVE BALLOON, PT GOING TO INTERVENTIONAL performed by Alfred Templeton MD at [address] [place] [address] Right 12/12/2019    CYSTO, URETEROSCOPY, LASER LITHO, STENT PLACEMENT AND RIGHT URETERAL BASKET STONE EXTRACTION     CYSTOSCOPY Right 12/31/2019    stent removal    CYSTOSCOPY INSERTION / REMOVAL STENT / STONE Right 12/31/2019    CYSTOSCOPY, STENT REMOVAL performed by Alberto Light MD at [address] [place] [address] Bilateral 2008    KNEE ARTHROSCOPY Right 1998, 2003    X2    NEPHROLITHOTOMY Right 8/23/2019    HOLMIUM LASER, NEPHROLITHOTOMY PERCUTANEOUS, LITHOCLAST, C-ARM performed by Alfred Templeton MD at [address] [place] [address], CALCULUS [place] N/A 7/10/2018    CYSTOSCOPY, RIGHT URETEROSCOPY, HOLMIUM LASER LITHO WITH STONE BASKETING, RIGHT STENT EXCHANGE performed by Nicole Oseguera MD at [address] [place] [address], CALCULUS [place] Right 10/10/2018    HOLMIUM LASER STANDBY, CYSTO, RIGHT URETEROSCOPY, RIGHT STENT PLACEMENT performed by Alberto Light MD at [facility] Right 7/4/2018    CYSTOSCOPY URETERAL STENT INSERTION, RIGHT performed by Natty Cross MD at [address] [place] [address] Left     TOTAL NEPHRECTOMY Left 1988    URETER STENT PLACEMENT Right 12/12/2019       Medications Prior to Admission:    Prior to Admission medications    Medication Sig Start Date End Date Taking?  Authorizing Provider   ondansetron (ZOFRAN ODT) 4 MG disintegrating tablet Take 1 tablet by mouth every 8 hours as needed for Nausea 9/18/20  Yes Nicole Milner, DO   tamsulosin (FLOMAX) 0.4 MG capsule Take 1 capsule by mouth daily for 5 doses 9/18/20 9/23/20 Yes Corby Milner DO   doxycycline hyclate (VIBRA-TABS) 100 MG tablet Take 1 tablet by mouth 2 times daily for 7 days 9/16/20 9/23/20 Yes Felisa Decker DO   irbesartan-hydrochlorothiazide (AVALIDE) 150-12.5 MG per tablet TAKE 1 TABLET BY MOUTH ONE TIME A DAY 7/20/20  Yes Meryle Coombs, MD   POTASSIUM CITRATE PO Take 5 mLs by mouth three times daily   Yes Historical Provider, MD   omeprazole (PRILOSEC) 20 MG delayed release capsule TAKE 1 CAPSULE BY MOUTH TWICE DAILY. Patient taking differently: Take 20 mg by mouth 2 times daily TAKE 1 CAPSULE BY MOUTH TWICE DAILY. 11/20/19  Yes MIGDALIA Lira CNP   meloxicam (MOBIC) 15 MG tablet Take 1 tablet by mouth daily 11/20/19  Yes MIGDALIA Lira CNP   OMEGA-3 FATTY ACIDS PO Take 1 tablet by mouth daily   Yes Historical Provider, MD   acetaminophen (TYLENOL) 500 MG tablet Take 1,000 mg by mouth every 4 hours as needed for Pain. Yes Historical Provider, MD   oxyCODONE-acetaminophen (PERCOCET) 5-325 MG per tablet Take 1-2 tablets by mouth every 6 hours as needed for Pain for up to 7 days. 9/18/20 9/25/20  Iggy Truong DO   Estradiol (IMVEXXY MAINTENANCE PACK) 4 MCG INST Place 1 capsule vaginally Twice a Week  Patient not taking: Reported on 6/19/2020 1/9/20   Vanita Blackman MD   oxybutynin (DITROPAN XL) 10 MG extended release tablet Take 1 tablet by mouth daily  Patient not taking: Reported on 9/16/2020 12/12/19   Janet Bowser PA-C   albuterol sulfate HFA (PROAIR HFA) 108 (90 Base) MCG/ACT inhaler Inhale 2 puffs into the lungs every 6 hours as needed for Wheezing 6/3/19   MIGDALIA Darling CNP   solifenacin (VESICARE) 10 MG tablet Take 10 mg by mouth as needed. 12/23/14  Historical Provider, MD       Allergies:  Patient has no known allergies.     Social History:    Social History     Socioeconomic History    Marital status: Single     Spouse name: Not on file  Number of children: 0    Years of education: graduate degree    Highest education level: Not on file   Occupational History    Occupation: trauma cordinator RN     Employer: [address] Financial resource strain: Not on file    Food insecurity     Worry: Not on file     Inability: Not on file   [employer] needs     Medical: Not on file     Non-medical: Not on file   Tobacco Use    Smoking status: Never Smoker    Smokeless tobacco: Never Used   Substance and Sexual Activity    Alcohol use:  Yes     Alcohol/week: 0.0 standard drinks     Frequency: Monthly or less     Comment: rare    Drug use: Never    Sexual activity: Not Currently   Lifestyle    Physical activity     Days per week: Not on file     Minutes per session: Not on file    Stress: Not on file   Relationships    Social connections     Talks on phone: Not on file     Gets together: Not on file     Attends Congregational service: Not on file     Active member of club or organization: Not on file     Attends meetings of clubs or organizations: Not on file     Relationship status: Not on file    Intimate partner violence     Fear of current or ex partner: Not on file     Emotionally abused: Not on file     Physically abused: Not on file     Forced sexual activity: Not on file   Other Topics Concern    Not on file   Social History Narrative    Not on file       Family History:    Family History   Problem Relation Age of Onset    High Blood Pressure Mother     Diabetes Father     Cancer Father         tongue    Hypertension Father     Heart Disease Father         stents    Other Father         Mylofibrosis    Hypertension Sister     Heart Disease Maternal Grandmother     Heart Attack Maternal Grandmother     Prostate Cancer Maternal Grandfather     Heart Disease Paternal Grandfather     Stroke Paternal Grandfather     Emphysema Paternal Grandmother        REVIEW OF SYSTEMS:    Constitutional: negative  Eyes: negative  Respiratory: negative  Cardiovascular: negative  Gastrointestinal: negative  Genitourinary: see HPI  Musculoskeletal: negative  Skin: negative   Neurological: negative  Hematological/Lymphatic: negative  Psychological: negative    Physical Exam:      This a 60 y.o. female   Patient Vitals for the past 24 hrs:   BP Temp Temp src Pulse Resp SpO2 Height Weight   09/19/20 0909 -- -- -- -- -- 99 % -- --   09/19/20 0734 107/67 97.9 °F (36.6 °C) Temporal 66 18 100 % 5' 8\" (1.727 m) 208 lb (94.3 kg)     Constitutional: Patient in no acute distress. Neuro: Alert and oriented to person, place and time. Psych: mood and affect normal  Lungs: Respiratory effort is normal  Cardiovascular: Normal peripheral pulses  Abdomen: Soft, non-tender, non-distended  Bladder non-tender and not distended. Lower extremities: No edema or calf tenderness bilaterally      LABS:   Recent Labs     09/18/20  1550   WBC 8.5   HGB 14.1   HCT 45.3   MCV 86.6        Recent Labs     09/18/20  1550   *   K 4.3   CL 96*   CO2 25   BUN 29*   CREATININE 0.84       Additional Lab/culture results:    Urinalysis:   Recent Labs     09/18/20  1649   COLORU YELLOW   PHUR 8.0   WBCUA 0 TO 2   RBCUA 2 TO 5   MUCUS NOT REPORTED   TRICHOMONAS NOT REPORTED   YEAST NOT REPORTED   BACTERIA NOT REPORTED   SPECGRAV 1.017   LEUKOCYTESUR NEGATIVE   UROBILINOGEN Normal   BILIRUBINUR NEGATIVE        -----------------------------------------------------------------  Imaging Results:    Imaging was independently reviewed and confirmed with report.       Assessment and Plan   Impression:      The patient is a 60 y.o. female admitted with      Problem List  - Right distal ureteral stone, 5mm    Plan:     - NPO for right ROSEMARIE, HLL, stent  - COVID negative

## 2020-09-19 NOTE — ANESTHESIA PRE PROCEDURE
fatigue R53.83    Oxygen desaturation R09.02    Acute respiratory failure with hypoxia (HCC) J96.01    Mild persistent asthma without complication H54.84    Pleural effusion on right J90    Atelectasis J98.11    Abnormal echocardiogram R93.1    LVH (left ventricular hypertrophy) I51.7    Chronic left shoulder pain M25.512, G89.29    Pain in joint of left knee M25.562       Past Medical History:        Diagnosis Date    Arthritis     Asthma     Benign familial tremor     Cancer (HCC)     skin    CKD (chronic kidney disease) stage 1, GFR 90 ml/min or greater     Class 2 severe obesity with serious comorbidity and body mass index (BMI) of 39.0 to 39.9 in adult ([facility] 5/1/2019    Cystinuria ([facility]     Dyspnea on exertion 8/6/2019    Flank pain     RIGHT    Gastroesophageal reflux disease without esophagitis     GERD (gastroesophageal reflux disease)     Hypertension     Hyperuricemia     Hypothyroidism     Kidney stones     Midline low back pain with right-sided sciatica 6/2/2016    Proteinuria     Shoulder impingement 12/2019    left    Snores     Solitary kidney, acquired 11/20/2017    Urinary tract obstruction by kidney stone 8/1/2019    Wears glasses        Past Surgical History:        Procedure Laterality Date    CYSTO/URETERO/PYELOSCOPY, CALCULUS [place] Right 11/21/2017    HOLMIUM LASER - CYSTOSCOPY, RIGHT URETEROSCOPY, RIGHT STENT EXCHANGE,STONE BASKETING performed by Curlee Gowers, MD at [address], [place] Right 9/13/2019    HOLMIUM - CYSTO, URETEROSCOPY, LITHOTRIPSY, STENT PLACEMENT performed by Moon Castañeda MD at [address], [place] Right 12/12/2019    HOLMIUM- CYSTO, URETEROSCOPY, LASER LITHO, STENT PLACEMENT AND RIGHT URETERAL BASKET STONE EXTRACTION performed by Curlee Gowers, MD at [facility] Right 11/18/2017    CYSTOSCOPY URETERAL STENT INSERTION,RIGHT performed by Dean Bruner MD at [facility] Right 8/2/2019    CYSTOSCOPY RT URETERAL STENT INSERTION performed by Dl Campos MD at [address] [place] [address] Right 8/23/2019    CYSTOSCOPY, RIGHT STENT REMOVAL, INSERTION OCCLUSIVE BALLOON, PT GOING TO INTERVENTIONAL performed by Dl Campos MD at [address] [place] [address] Right 12/12/2019    CYSTO, URETEROSCOPY, LASER LITHO, STENT PLACEMENT AND RIGHT URETERAL BASKET STONE EXTRACTION     CYSTOSCOPY Right 12/31/2019    stent removal    CYSTOSCOPY INSERTION / REMOVAL STENT / STONE Right 12/31/2019    CYSTOSCOPY, STENT REMOVAL performed by Delma Paul MD at [id number] [place] [address] Bilateral 2008    KNEE ARTHROSCOPY Right 1998, 2003    X2    NEPHROLITHOTOMY Right 8/23/2019    HOLMIUM LASER, NEPHROLITHOTOMY PERCUTANEOUS, LITHOCLAST, C-ARM performed by Dl Campos MD at [address] [place] [address], [place] N/A 7/10/2018    CYSTOSCOPY, RIGHT URETEROSCOPY, HOLMIUM LASER LITHO WITH STONE BASKETING, RIGHT STENT EXCHANGE performed by Kasey Heredia MD at [address] [place] [address], [place] Right 10/10/2018    HOLMIUM LASER STANDBY, CYSTO, RIGHT URETEROSCOPY, RIGHT STENT PLACEMENT performed by Delma Paul MD at [facility], Right 7/4/2018    CYSTOSCOPY URETERAL STENT INSERTION, RIGHT performed by Jacqui Cortes MD at [facility] Left     TOTAL NEPHRECTOMY Left 1988   [address] Right 12/12/2019       Social History:    Social History     Tobacco Use    Smoking status: Never Smoker    Smokeless tobacco: Never Used   Substance Use Topics    Alcohol use: Yes     Alcohol/week: 0.0 standard drinks     Frequency: Monthly or less     Comment: rare                                Counseling given: Not Answered      Vital Signs (Current): There were no vitals filed for this visit.                                            BP Readings from Last 3 Encounters:   09/18/20 (!) 160/91   09/16/20 118/88   06/19/20 107/67       NPO Status:                                                                                 BMI:   Wt Readings from Last 3 Encounters:   09/18/20 208 lb (94.3 kg)   09/16/20 213 lb (96.6 kg)   08/18/20 220 lb (99.8 kg)     There is no height or weight on file to calculate BMI.    CBC:   Lab Results   Component Value Date    WBC 8.5 09/18/2020    RBC 5.23 09/18/2020    RBC 4.66 03/22/2012    HGB 14.1 09/18/2020    HCT 45.3 09/18/2020    MCV 86.6 09/18/2020    RDW 13.7 09/18/2020     09/18/2020     03/22/2012       CMP:   Lab Results   Component Value Date     09/18/2020    K 4.3 09/18/2020    CL 96 09/18/2020    CO2 25 09/18/2020    BUN 29 09/18/2020    CREATININE 0.84 09/18/2020    GFRAA >60 09/18/2020    LABGLOM >60 09/18/2020    GLUCOSE 95 09/18/2020    GLUCOSE 88 03/22/2012    PROT 7.0 08/15/2019    CALCIUM 9.6 09/18/2020    BILITOT 0.21 08/01/2019    ALKPHOS 117 08/01/2019    AST 43 08/01/2019    ALT 50 08/01/2019       POC Tests: No results for input(s): POCGLU, POCNA, POCK, POCCL, POCBUN, POCHEMO, POCHCT in the last 72 hours.     Coags:   Lab Results   Component Value Date    PROTIME 9.5 08/15/2019    INR 0.9 08/15/2019    APTT 22.1 08/15/2019       HCG (If Applicable): No results found for: PREGTESTUR, PREGSERUM, HCG, HCGQUANT     ABGs: No results found for: PHART, PO2ART, KGG2PWG, UYL7PGZ, BEART, Q5ERBNHH     Type & Screen (If Applicable):  No results found for: LABABO, LABRH    Anesthesia Evaluation    Airway: Mallampati: II  TM distance: >3 FB   Neck ROM: full  Mouth opening: > = 3 FB Dental: normal exam         Pulmonary: breath sounds clear to auscultation  (+) shortness of breath:  sleep apnea:  asthma:                            Cardiovascular:    (+) hypertension:, GRANADOS:,         Rhythm: regular  Rate: normal                    Neuro/Psych:   (+) neuromuscular disease:, headaches:,             GI/Hepatic/Renal:   (+) GERD:, renal disease: kidney stones, ROS comment: Only one Kidney. Endo/Other:    (+) hypothyroidism::., .                 Abdominal:   (+) obese,         Vascular:                                          Anesthesia Plan      general     ASA 2     (ESTHER  HTn  Single kidney)  Induction: intravenous. MIPS: Postoperative opioids intended and Prophylactic antiemetics administered. Anesthetic plan and risks discussed with patient. Plan discussed with CRNA.                   Tamela Sandhu MD   9/19/2020

## 2020-09-19 NOTE — PROGRESS NOTES
Dr. Sarah Espinoza called in. States that he talked with Dr. Seymour Diaz pt is OK to pull stent. Pt to BR et stent removed per self without difficulty. Pt to return to see Dr. Betty Cochran on [place], Sept 21, 2020.

## 2020-09-19 NOTE — PROGRESS NOTES
Dr. Kari Virk notified that xray complete. Awaiting his visit to examine. Pt in and out of bathroom with incontinence. Wearing disposable panties and pad.

## 2020-09-21 ENCOUNTER — CARE COORDINATION (OUTPATIENT)
Dept: OTHER | Facility: CLINIC | Age: 55
End: 2020-09-21

## 2020-09-21 NOTE — CARE COORDINATION
[facility] Transitions Initial Follow Up Call    Call within 2 business days of discharge: Yes    Patient: Alfredito Bond Patient : 1965   MRN: [id number]  Reason for Admission: Cystoscopy, Ureteroscopy, Holmium laser lithotripsy, Stent placement (right)  Discharge Date: 20 RARS: Readmission Risk Score: 7      Last Discharge [facility]       Complaint Diagnosis Description Type Department Provider    20  Kidney stones Admission (Discharged) Eden Castellon MD           Spoke with: N/A    Facility: [address] [place] [address] attempted to reach patient for Care Transitions call. HIPAA compliant message left requesting a return phone call at patients convenience. Will continue to outreach. [facility] also sent unable to reach letter to patient via [address] Bruce Roach RN BSN       Associate Care Manager       Phone: [phone]       Email: [email or web address]

## 2020-09-23 ENCOUNTER — CARE COORDINATION (OUTPATIENT)
Dept: OTHER | Facility: CLINIC | Age: 55
End: 2020-09-23

## 2020-09-23 NOTE — CARE COORDINATION
[facility] Transitions Follow Up Call    2020    Patient: Jorge A Charles  Patient : 1965   MRN: [id number]  Reason for Admission: Cystoscopy, Ureteroscopy, Holmium laser lithotripsy, Stent placement (right)  Discharge Date: 20 RARS: Readmission Risk Score: 7         Spoke with: N/A    ACM attempted 2nd outreach to reach patient for introduction to Associate Care Management. No answer; unable to leave voicemail due to mailbox full. Unable to reach letter sent to patient via [address] Will continue to outreach patient. Athena Cheadle, RN BSN  Associate Care Manager  Phone: [phone]  Email: [email or web address]

## 2020-09-24 ENCOUNTER — OFFICE VISIT (OUTPATIENT)
Dept: PRIMARY CARE CLINIC | Age: 55
End: 2020-09-24
Payer: COMMERCIAL

## 2020-09-24 VITALS
WEIGHT: 209.4 LBS | HEART RATE: 63 BPM | BODY MASS INDEX: 32.87 KG/M2 | TEMPERATURE: 98.6 F | OXYGEN SATURATION: 97 % | DIASTOLIC BLOOD PRESSURE: 78 MMHG | SYSTOLIC BLOOD PRESSURE: 134 MMHG | HEIGHT: 67 IN

## 2020-09-24 PROBLEM — J45.20 MILD INTERMITTENT ASTHMA WITHOUT COMPLICATION: Status: RESOLVED | Noted: 2018-04-30 | Resolved: 2020-09-24

## 2020-09-24 PROBLEM — N20.9 UROLITHIASIS: Status: RESOLVED | Noted: 2018-07-04 | Resolved: 2020-09-24

## 2020-09-24 PROBLEM — M25.562 PAIN IN JOINT OF LEFT KNEE: Status: RESOLVED | Noted: 2019-12-06 | Resolved: 2020-09-24

## 2020-09-24 PROBLEM — N20.0 URINARY TRACT OBSTRUCTION BY KIDNEY STONE: Status: RESOLVED | Noted: 2019-08-01 | Resolved: 2020-09-24

## 2020-09-24 PROBLEM — N13.2 HYDRONEPHROSIS WITH RENAL AND URETERAL CALCULOUS OBSTRUCTION: Status: ACTIVE | Noted: 2020-09-24

## 2020-09-24 PROBLEM — N20.1 RIGHT URETERAL STONE: Status: RESOLVED | Noted: 2017-11-20 | Resolved: 2020-09-24

## 2020-09-24 PROBLEM — N17.9 AKI (ACUTE KIDNEY INJURY) (HCC): Status: RESOLVED | Noted: 2017-11-20 | Resolved: 2020-09-24

## 2020-09-24 PROBLEM — M21.611 BUNION OF GREAT TOE OF RIGHT FOOT: Status: RESOLVED | Noted: 2018-04-30 | Resolved: 2020-09-24

## 2020-09-24 PROBLEM — N20.0 RENAL STONES: Status: RESOLVED | Noted: 2018-09-13 | Resolved: 2020-09-24

## 2020-09-24 PROBLEM — N13.8 URINARY TRACT OBSTRUCTION BY KIDNEY STONE: Status: RESOLVED | Noted: 2019-08-01 | Resolved: 2020-09-24

## 2020-09-24 PROBLEM — R93.1 ABNORMAL ECHOCARDIOGRAM: Status: RESOLVED | Noted: 2019-09-20 | Resolved: 2020-09-24

## 2020-09-24 PROBLEM — N20.0 KIDNEY STONE: Status: RESOLVED | Noted: 2017-11-22 | Resolved: 2020-09-24

## 2020-09-24 PROCEDURE — 99396 PREV VISIT EST AGE 40-64: CPT | Performed by: NURSE PRACTITIONER

## 2020-09-24 ASSESSMENT — ENCOUNTER SYMPTOMS
COUGH: 0
SINUS PRESSURE: 0
BACK PAIN: 1
WHEEZING: 0
SHORTNESS OF BREATH: 0
TROUBLE SWALLOWING: 0
ABDOMINAL PAIN: 0
BLOOD IN STOOL: 0
SORE THROAT: 0
VOMITING: 0
CONSTIPATION: 0
NAUSEA: 1
DIARRHEA: 0

## 2020-09-24 NOTE — PROGRESS NOTES
[facility] PRIMARY CARE  [facility] [address]  [address] [address]  Dept: [phone]  Dept Fax: [phone]    Susan Dawson is a 60 y.o. female who presentstoday for her medical conditions/complaints as noted below. Susan Dawson is c/o of  Chief Complaint   Patient presents with    Annual Exam         HPI:     Patient is here for a physical.  Requires follow-up for be well within for weight loss, triglycerides, and hypertension    Recently had surgery on an obstructed kidney stone on Saturday. Saw urologist on  Monday. Reports feeling fatigued since surgery and is having nausea. Denies, has not interfered with maintaining adequate fluid intake  It is improving and she is expecting to return to work tomorrow. States it is sometimes uncomfortable to void and she is taking medication for this. Has lost about 50lbs since last October with diet and exercise changes. Reports has decided to see Dr Rabia Berger for her recurrent breast cellulitis  She completed a course of antibiotics with some improvement but remains with several red and inflamed areas on the left breast    Hypertension   This is a chronic problem. The current episode started more than 1 year ago. The problem is unchanged. The problem is controlled. Pertinent negatives include no chest pain, headaches, palpitations or shortness of breath. There are no associated agents to hypertension. Past treatments include diuretics. There are no compliance problems.         Hemoglobin A1C (%)   Date Value   09/23/2019 5.9   05/14/2018 5.7   11/02/2016 5.6             ( goal A1C is < 7)   No results found for: LABMICR  LDL Cholesterol (mg/dL)   Date Value   08/18/2020 90   05/14/2018 05/19/2017 78       (goal LDL is <100)   AST (U/L)   Date Value   08/01/2019 43 (H)     ALT (U/L)   Date Value   08/01/2019 50 (H)     BUN (mg/dL)   Date Value   09/18/2020 29 (H)     BP Readings from Last 3 Encounters: 09/24/20 134/78   09/19/20 121/67   09/19/20 136/82          ([id number])    Past Medical History:   Diagnosis Date    Arthritis     Asthma     Benign familial tremor     Cancer ([facility]     skin    CKD (chronic kidney disease) stage 1, GFR 90 ml/min or greater     Class 2 severe obesity with serious comorbidity and body mass index (BMI) of 39.0 to 39.9 in adult ([facility] 5/1/2019    [facility]     Dyspnea on exertion 8/6/2019    Flank pain     RIGHT    Gastroesophageal reflux disease without esophagitis     GERD (gastroesophageal reflux disease)     Hypertension     Hyperuricemia     Hypothyroidism     Kidney stones     Midline low back pain with right-sided sciatica 6/2/2016    Proteinuria     Shoulder impingement 12/2019    left    Snores     Solitary kidney, acquired 11/20/2017    Urinary tract obstruction by kidney stone 8/1/2019    Wears glasses       Past Surgical History:   Procedure Laterality Date    CYSTO/URETERO/PYELOSCOPY, [place]UL[place] Right 11/21/2017    HOLMIUM LASER - CYSTOSCOPY, RIGHT URETEROSCOPY, RIGHT STENT EXCHANGE,STONE BASKETING performed by Terry Turner MD at [address], [place] Right 9/13/2019    HOLMIUM - CYSTO, URETEROSCOPY, LITHOTRIPSY, STENT PLACEMENT performed by Daniella Oleary MD at [address], [place] [address] Right 12/12/2019    HOLMIUM- CYSTO, URETEROSCOPY, LASER LITHO, STENT PLACEMENT AND RIGHT URETERAL BASKET STONE EXTRACTION performed by Terry Turner MD at [address] [place] [address] Right 11/18/2017    CYSTOSCOPY URETERAL STENT INSERTION,RIGHT performed by Liliana Cooper MD at [address] [place] [address] Right 8/2/2019    CYSTOSCOPY RT URETERAL STENT INSERTION performed by Daniella Oleary MD at [address] [place] [address] Right 8/23/2019    CYSTOSCOPY, RIGHT STENT REMOVAL, INSERTION OCCLUSIVE BALLOON, PT GOING TO INTERVENTIONAL performed by Daniella Oleary MD at [address] [place] [address] Right 12/12/2019    CYSTO, URETEROSCOPY, LASER LITHO, STENT PLACEMENT AND RIGHT URETERAL BASKET STONE EXTRACTION     CYSTOSCOPY Right 12/31/2019    stent removal    CYSTOSCOPY Right 09/19/2020    litho, rt stent    CYSTOSCOPY Right 9/19/2020    CYSTOSCOPY, URETEROSCOPY, HOLMIUM LASER LITHOTRIPSY, STENT PLACEMENT performed by Destinee Stewart MD at [address] / [place] / STONE Right 12/31/2019    CYSTOSCOPY, STENT REMOVAL performed by Zulay Gentile MD at [address] [address] Bilateral 2008    KNEE ARTHROSCOPY Right 1998, 2003    X2    NEPHROLITHOTOMY Right 8/23/2019    HOLMIUM LASER, NEPHROLITHOTOMY PERCUTANEOUS, LITHOCLAST, C-ARM performed by Fracisco Moody MD at [address], [place] [address] N/A 7/10/2018    CYSTOSCOPY, RIGHT URETEROSCOPY, HOLMIUM LASER LITHO WITH STONE BASKETING, RIGHT STENT EXCHANGE performed by Destinee Stewart MD at [address], [place] Right 10/10/2018    HOLMIUM LASER STANDBY, CYSTO, RIGHT URETEROSCOPY, RIGHT STENT PLACEMENT performed by Zulay Gentile MD at [address] [facility] Right 7/4/2018    CYSTOSCOPY URETERAL STENT INSERTION, RIGHT performed by Judy Matute MD at [facility] Left     TOTAL NEPHRECTOMY Left 1988    URETER STENT PLACEMENT Right 12/12/2019       Family History   Problem Relation Age of Onset    High Blood Pressure Mother     Diabetes Father     Cancer Father         tongue    Hypertension Father     Heart Disease Father         stents    Other Father         Mylofibrosis    Hypertension Sister     Heart Disease Maternal Grandmother     Heart Attack Maternal Grandmother     Prostate Cancer Maternal Grandfather     Heart Disease Paternal Grandfather     Stroke Paternal Grandfather     Emphysema Paternal Grandmother           Social History     Tobacco Use    Smoking status: Never Smoker    Smokeless tobacco: Never Used   Substance Use  Flu vaccine (1) 09/01/2020    A1C test (Diabetic or Prediabetic)  09/23/2020    DTaP/Tdap/Td vaccine (1 - Tdap) 10/15/2023 (Originally 3/2/1982)    Potassium monitoring  09/18/2021    Creatinine monitoring  09/18/2021    Breast cancer screen  09/24/2021    Cervical cancer screen  01/07/2025    Lipid screen  08/18/2025    Pneumococcal 0-64 years Vaccine  Completed    Hepatitis C screen  Completed    HIV screen  Completed    Hepatitis A vaccine  Aged Out    Hepatitis B vaccine  Aged Out    Hib vaccine  Aged Out    Meningococcal (ACWY) vaccine  Aged Out       Subjective:      Review of Systems   Constitutional: Positive for fatigue. Negative for activity change, appetite change, chills, fever and unexpected weight change. HENT: Negative for congestion, ear pain, hearing loss, sinus pressure, sore throat and trouble swallowing. Eyes: Negative for visual disturbance. Respiratory: Negative for cough, shortness of breath and wheezing. Cardiovascular: Negative for chest pain, palpitations and leg swelling. Gastrointestinal: Positive for nausea. Negative for abdominal pain, blood in stool, constipation, diarrhea and vomiting. Endocrine: Negative for cold intolerance, heat intolerance, polydipsia, polyphagia and polyuria. Genitourinary: Positive for dysuria. Negative for difficulty urinating, frequency, hematuria and urgency. Musculoskeletal: Positive for back pain (recent kindney stone removal). Negative for arthralgias and myalgias. Skin: Negative for rash. Right breast areas of inflammation/cellulitis   Allergic/Immunologic: Negative for environmental allergies. Neurological: Negative for dizziness, weakness, light-headedness and headaches. Psychiatric/Behavioral: Positive for suicidal ideas. Negative for confusion. The patient is not nervous/anxious. Objective:     Physical Exam  Constitutional:       Appearance: She is well-developed.    HENT:      Head: corrected with surgery 5 to 6 days ago, she is progressing. Having some mild nausea, does have ondansetron at home. She will follow-up with urology as planned  Recurrent breast cellulitis- reports has mammogram scheduled, she will also be seeing breast specialist Dr. Jaspreet Yost in early October       Patient given educational materials - see patient instructions. Discussed use, benefit, and side effects of prescribed medications. All patientquestions answered. Pt voiced understanding. Reviewed health maintenance. Instructedto continue current medications, diet and exercise. Patient agreed with treatmentplan. Follow up as directed.      Electronicallysigned by MIGDALIA Romo CNP on 9/24/2020 at 8:50 PM

## 2020-09-25 ENCOUNTER — CARE COORDINATION (OUTPATIENT)
Dept: OTHER | Facility: CLINIC | Age: 55
End: 2020-09-25

## 2020-09-25 NOTE — CARE COORDINATION
[facility] ED Follow Up Call    2020    Patient: Jamin Martini Patient : 1965   MRN: [id number]  Reason for Admission: Cystoscopy, Ureteroscopy, Holmium laser lithotripsy, Stent placement (right)  Discharge Date: 20    ACM attempted third and final call to patient for introduction to Associate Care Management. HIPAA compliant message left requesting a return phone call at patients convenience. ACM sent Final unable to contact letter to patient via [address] ACM will sign off, if no return call. Zacarias Primrose, RN BSN  Associate Care Manager  Phone: [phone]  Email: [email or web address]

## 2020-09-29 ENCOUNTER — HOSPITAL ENCOUNTER (OUTPATIENT)
Dept: MAMMOGRAPHY | Age: 55
Discharge: HOME | End: 2020-10-01
Payer: COMMERCIAL

## 2020-09-29 PROCEDURE — 77063 BREAST TOMOSYNTHESIS BI: CPT

## 2020-10-19 VITALS — WEIGHT: 220 LBS | BODY MASS INDEX: 33.45 KG/M2

## 2020-10-23 ENCOUNTER — HOSPITAL ENCOUNTER (OUTPATIENT)
Dept: WOMENS IMAGING | Age: 55
Discharge: HOME | End: 2020-10-25
Payer: COMMERCIAL

## 2020-10-23 PROCEDURE — G0279 TOMOSYNTHESIS, MAMMO: HCPCS

## 2020-11-04 ENCOUNTER — HOSPITAL ENCOUNTER (OUTPATIENT)
Dept: GENERAL RADIOLOGY | Age: 55
Discharge: HOME | End: 2020-11-06
Payer: COMMERCIAL

## 2020-11-04 ENCOUNTER — HOSPITAL ENCOUNTER (OUTPATIENT)
Age: 55
Discharge: HOME | End: 2020-11-06
Payer: COMMERCIAL

## 2020-11-04 PROCEDURE — 74018 RADEX ABDOMEN 1 VIEW: CPT

## 2021-02-02 ENCOUNTER — TELEPHONE (OUTPATIENT)
Dept: OBGYN CLINIC | Age: 56
End: 2021-02-02

## 2021-02-26 ENCOUNTER — HOSPITAL ENCOUNTER (OUTPATIENT)
Age: 56
Discharge: HOME | End: 2021-02-28
Payer: COMMERCIAL

## 2021-02-26 ENCOUNTER — HOSPITAL ENCOUNTER (OUTPATIENT)
Dept: GENERAL RADIOLOGY | Age: 56
Discharge: HOME | End: 2021-02-28
Payer: COMMERCIAL

## 2021-02-26 DIAGNOSIS — N20.0 KIDNEY STONE: ICD-10-CM

## 2021-02-26 PROCEDURE — 74018 RADEX ABDOMEN 1 VIEW: CPT

## 2021-03-16 ENCOUNTER — HOSPITAL ENCOUNTER (OUTPATIENT)
Dept: ULTRASOUND IMAGING | Age: 56
Discharge: HOME | End: 2021-03-18
Payer: COMMERCIAL

## 2021-03-16 DIAGNOSIS — N20.0 KIDNEY STONES: ICD-10-CM

## 2021-03-16 PROCEDURE — 76770 US EXAM ABDO BACK WALL COMP: CPT

## 2021-03-17 ENCOUNTER — HOSPITAL ENCOUNTER (OUTPATIENT)
Dept: CT IMAGING | Age: 56
Discharge: HOME | End: 2021-03-19
Payer: COMMERCIAL

## 2021-03-17 ENCOUNTER — HOSPITAL ENCOUNTER (OUTPATIENT)
Age: 56
Discharge: HOME | End: 2021-03-17
Payer: COMMERCIAL

## 2021-03-17 DIAGNOSIS — R10.9 RECURRENT ABDOMINAL PAIN: ICD-10-CM

## 2021-03-17 LAB
ANION GAP SERPL CALCULATED.3IONS-SCNC: 10 MMOL/L (ref 9–17)
BUN BLDV-MCNC: 32 MG/DL (ref 6–20)
BUN/CREAT BLD: ABNORMAL (ref 9–20)
CALCIUM SERPL-MCNC: 9.6 MG/DL (ref 8.6–10.4)
CHLORIDE BLD-SCNC: 104 MMOL/L (ref 98–107)
CO2: 24 MMOL/L (ref 20–31)
CREAT SERPL-MCNC: 0.78 MG/DL (ref 0.5–0.9)
GFR AFRICAN AMERICAN: >60 ML/MIN
GFR NON-AFRICAN AMERICAN: >60 ML/MIN
GFR SERPL CREATININE-BSD FRML MDRD: ABNORMAL ML/MIN/{1.73_M2}
GFR SERPL CREATININE-BSD FRML MDRD: ABNORMAL ML/MIN/{1.73_M2}
GLUCOSE BLD-MCNC: 104 MG/DL (ref 70–99)
POTASSIUM SERPL-SCNC: 4.2 MMOL/L (ref 3.7–5.3)
SODIUM BLD-SCNC: 138 MMOL/L (ref 135–144)

## 2021-03-17 PROCEDURE — 80048 BASIC METABOLIC PNL TOTAL CA: CPT

## 2021-03-17 PROCEDURE — 36415 COLL VENOUS BLD VENIPUNCTURE: CPT

## 2021-03-17 PROCEDURE — 74176 CT ABD & PELVIS W/O CONTRAST: CPT

## 2021-04-06 ENCOUNTER — OFFICE VISIT (OUTPATIENT)
Dept: DERMATOLOGY | Age: 56
End: 2021-04-06
Payer: COMMERCIAL

## 2021-04-06 VITALS
WEIGHT: 201 LBS | OXYGEN SATURATION: 95 % | SYSTOLIC BLOOD PRESSURE: 103 MMHG | TEMPERATURE: 97.4 F | BODY MASS INDEX: 30.46 KG/M2 | HEIGHT: 68 IN | DIASTOLIC BLOOD PRESSURE: 67 MMHG | HEART RATE: 67 BPM

## 2021-04-06 DIAGNOSIS — L57.0 KERATOSIS, ACTINIC: ICD-10-CM

## 2021-04-06 DIAGNOSIS — L70.0 ACNE VULGARIS: Primary | ICD-10-CM

## 2021-04-06 DIAGNOSIS — L72.9 CYST OF SKIN: ICD-10-CM

## 2021-04-06 DIAGNOSIS — L73.2 HIDRADENITIS SUPPURATIVA: ICD-10-CM

## 2021-04-06 PROCEDURE — 99204 OFFICE O/P NEW MOD 45 MIN: CPT | Performed by: DERMATOLOGY

## 2021-04-06 NOTE — PATIENT INSTRUCTIONS
- Start using benzoyl peroxide wash in the shower daily under the breasts. Lather, let sit for 5 minutes before rinsing off. '    Recommendations for over-the-counter Benzoyl Peroxide cleansers:  -- Panoxyl Wash (various strengths of benzoyl peroxide)  -- Acne Free brand oil-free acne cleanser with 2.5% benzoyl peroxide and Acne Free brand Acne Pore cleanser maximum strength (benzoyl peroxide 5%)  -- Neutrogena Clear Pore Cleanser/Mask with 3.5% benzoyl peroxide  -- Clean and Clear advantage 3 in 1 exfoliating cleanser that contains benzoyl peroxide 5%  -- Clean and Clear Continuous Control Acne Cleanser 10% benzoyl peroxide  -- Oxy maximum face wash 10% benzoyl peroxide    Benzoyl peroxide may dry out your skin and cause some irritation, especially when you first start using it. Moisturize with a non-comedogenic (non pore clogging) lotion after washing. Choose lower percentage benzoyl peroxide washes if you have sensitive skin. If it's still too irritating, try using every other day.     - Apply clindamycin lotion to the affected area when flaring    Cryotherapy

## 2021-04-06 NOTE — PROGRESS NOTES
Dermatology Patient Note  [facility] [address]  [address]  [address]  Dept: [phone]  Dept Fax: [phone]      VISITDATE: 4/6/2021   REFERRING PROVIDER: Kenneth Tan is a 60 y.o. female  who presents today in the office for:    New Patient (Pt had folliculitis 5 months ago under the breasts, but it is all cleared up. Pt got antibiotics and a lotion that cleared it up. Only concern today is a deep bump on rt side of the nose.)      PERTINENT HISTORY NOT LISTED ABOVE:  Patient presents for evaluation of bumps under breasts  - since making the appt they have mostly resolved but left scars  - has used bpo wash and clinda soln, also took doxycycline for some time  - also complains of bump on right nose    CURRENT MEDICATIONS:   Current Outpatient Medications   Medication Sig Dispense Refill    omeprazole (PRILOSEC) 20 MG delayed release capsule TAKE 1 CAPSULE BY MOUTH 2 TIMES A  capsule 3    meloxicam (MOBIC) 15 MG tablet TAKE 1 TABLET BY MOUTH ONE TIME A DAY 90 tablet 3    ondansetron (ZOFRAN ODT) 4 MG disintegrating tablet Take 1 tablet by mouth every 8 hours as needed for Nausea 20 tablet 0    irbesartan-hydrochlorothiazide (AVALIDE) 150-12.5 MG per tablet TAKE 1 TABLET BY MOUTH ONE TIME A DAY 30 tablet 5    POTASSIUM CITRATE PO Take 5 mLs by mouth three times daily      OMEGA-3 FATTY ACIDS PO Take 1 tablet by mouth daily      albuterol sulfate HFA (PROAIR HFA) 108 (90 Base) MCG/ACT inhaler Inhale 2 puffs into the lungs every 6 hours as needed for Wheezing 1 Inhaler 5    acetaminophen (TYLENOL) 500 MG tablet Take 1,000 mg by mouth every 4 hours as needed for Pain. No current facility-administered medications for this visit.         ALLERGIES:   No Known Allergies    SOCIAL HISTORY:  Social History     Tobacco Use    Smoking status: Never Smoker    Smokeless tobacco: Never Used   Substance Use Topics    Alcohol use: Yes     Alcohol/week: 0.0 standard drinks     Frequency: Monthly or less     Comment: rare       Pertinent ROS:  Review of Systems  Skin: Denies any new changing, growing or bleeding lesions or rashes except as described in the HPI   Constitutional: Denies fevers, chills, and malaise. PHYSICAL EXAM:   /67 (Site: Left Upper Arm, Position: Sitting, Cuff Size: Large Adult)   Pulse 67   Temp 97.4 °F (36.3 °C)   Ht 5' 8\" (1.727 m)   Wt 201 lb (91.2 kg)   SpO2 95%   BMI 30.56 kg/m²     The patient is generally well appearing, well nourished, alert and conversational. Affect is normal.    Cutaneous Exam:  Physical Exam  Sun-exposed skin, which includes the head/face, neck, both arms, digits and/or nails was examined. + inflammatory folds    Diagnoses/exam findings/medical history pertinent to this visit are listed below:    Assessment and Plan:  Assessment   1. Acne vulgaris  2. Hidradenitis suppurativa  - mild, quiescent currently  - stable chronic illness  - bpo wash daily and clindamycin lotion for flares  - benzoyl peroxide 5 % external liquid; Wash affected areas once daily  Dispense: 227 g; Refill: 3  - clindamycin (CLEOCIN T) 1 % lotion; Apply to affected areas daily  Dispense: 60 mL; Refill: 3    3. Keratosis, actinic  - patient gets chemical peels at [facility] dermatology, unsure what type  - she has another one scheduled this week. The peel may treat the AK on her cheek but if not they should be able to freeze it there    4. Cyst of skin  - barely palpable sc nodule of right nasal side wall  reassurance and education   Call if enlarging                Patient Instructions   - Start using benzoyl peroxide wash in the shower daily under the breasts.  Lather, let sit for 5 minutes before rinsing off. '    Recommendations for over-the-counter Benzoyl Peroxide cleansers:  -- Panoxyl Wash (various strengths of benzoyl peroxide)  -- Acne Free brand oil-free acne cleanser with 2.5% benzoyl peroxide and Acne Free brand Acne Pore cleanser maximum strength (benzoyl peroxide 5%)  -- Neutrogena Clear Pore Cleanser/Mask with 3.5% benzoyl peroxide  -- Clean and Clear advantage 3 in 1 exfoliating cleanser that contains benzoyl peroxide 5%  -- Clean and Clear Continuous Control Acne Cleanser 10% benzoyl peroxide  -- Oxy maximum face wash 10% benzoyl peroxide    Benzoyl peroxide may dry out your skin and cause some irritation, especially when you first start using it. Moisturize with a non-comedogenic (non pore clogging) lotion after washing. Choose lower percentage benzoyl peroxide washes if you have sensitive skin. If it's still too irritating, try using every other day. - Apply clindamycin lotion to the affected area when flaring    Cryotherapy        This note was created with the assistance of a speech-recognition program.  Although the intention is to generate a document that actually reflects the content of the visit, no guarantees can be provided that every mistake has been identified and corrected byediting.     Electronically signed by Caio Yanez MD on 4/6/21 at 2:28 PM EDT

## 2021-04-07 RX ORDER — CLINDAMYCIN PHOSPHATE 10 UG/ML
LOTION TOPICAL
Qty: 60 ML | Refills: 3 | Status: SHIPPED | OUTPATIENT
Start: 2021-04-07

## 2021-07-17 LAB
CHOLESTEROL/HDL RATIO: 4.5
CHOLESTEROL: 224 MG/DL
GLUCOSE BLD-MCNC: 98 MG/DL (ref 70–99)
HDLC SERPL-MCNC: 50 MG/DL
LDL CHOLESTEROL: 115 MG/DL (ref 0–130)
PATIENT FASTING?: YES
TRIGL SERPL-MCNC: 293 MG/DL
VLDLC SERPL CALC-MCNC: ABNORMAL MG/DL (ref 1–30)

## 2021-07-30 ENCOUNTER — TELEPHONE (OUTPATIENT)
Dept: PRIMARY CARE CLINIC | Age: 56
End: 2021-07-30

## 2021-07-30 NOTE — TELEPHONE ENCOUNTER
----- Message from Rolo Dunlap sent at 7/29/2021  1:24 PM EDT -----  Subject: Appointment Request    Reason for Call: Routine Physical Exam    QUESTIONS  Type of Appointment? Established Patient  Reason for appointment request? Available appointments did not meet   patient need  Additional Information for Provider? Pt wants a physical she is off of   work and needs to try and get a physical soon. Free Friday, Monday, and   Tuesday, she is also free Wednesdays morning as well. She said if she cant   get in its okay.  ---------------------------------------------------------------------------  --------------  CALL BACK INFO  What is the best way for the office to contact you? OK to leave message on   voicemail  Preferred Call Back Phone Number? [phone]  ---------------------------------------------------------------------------  --------------  SCRIPT ANSWERS  Relationship to Patient? Self  (If the patient has Medicare as their primary insurance coverage ask this   question) Are you requesting a Medicare Annual Wellness Visit? No  (Is the patient requesting a pap smear with their physical exam?)? No  (Is the patient requesting their annual physical and does not need PAP or   AWV per above?)? Yes   Have you been diagnosed with, awaiting test results for, or told that you   are suspected of having COVID-19 (Coronavirus)? (If patient has tested   negative or was tested as a requirement for work, school, or travel and   not based on symptoms, answer no)? No  Do you currently have flu-like symptoms including fever or chills, cough,   shortness of breath, difficulty breathing, or new loss of taste or smell? No  Have you had close contact with someone with COVID-19 in the last 14 days? No  (Service Expert  click yes below to proceed with [email or web address] As Usual   Scheduling)?  Yes

## 2021-08-02 ENCOUNTER — HOSPITAL ENCOUNTER (OUTPATIENT)
Age: 56
Setting detail: SPECIMEN
Discharge: HOME | End: 2021-08-02
Payer: COMMERCIAL

## 2021-08-02 ENCOUNTER — OFFICE VISIT (OUTPATIENT)
Dept: PRIMARY CARE CLINIC | Age: 56
End: 2021-08-02
Payer: COMMERCIAL

## 2021-08-02 VITALS
DIASTOLIC BLOOD PRESSURE: 86 MMHG | HEART RATE: 77 BPM | HEIGHT: 68 IN | SYSTOLIC BLOOD PRESSURE: 130 MMHG | WEIGHT: 239.6 LBS | OXYGEN SATURATION: 98 % | RESPIRATION RATE: 18 BRPM | BODY MASS INDEX: 36.31 KG/M2

## 2021-08-02 DIAGNOSIS — F43.21 SITUATIONAL DEPRESSION: ICD-10-CM

## 2021-08-02 DIAGNOSIS — E03.9 ACQUIRED HYPOTHYROIDISM: ICD-10-CM

## 2021-08-02 DIAGNOSIS — Z13.0 SCREENING, ANEMIA, DEFICIENCY, IRON: ICD-10-CM

## 2021-08-02 DIAGNOSIS — Z00.00 ANNUAL PHYSICAL EXAM: Primary | ICD-10-CM

## 2021-08-02 DIAGNOSIS — M79.10 MYALGIA: ICD-10-CM

## 2021-08-02 DIAGNOSIS — I10 ESSENTIAL HYPERTENSION: ICD-10-CM

## 2021-08-02 DIAGNOSIS — E66.01 CLASS 2 SEVERE OBESITY DUE TO EXCESS CALORIES WITH SERIOUS COMORBIDITY AND BODY MASS INDEX (BMI) OF 36.0 TO 36.9 IN ADULT (HCC): ICD-10-CM

## 2021-08-02 DIAGNOSIS — Z13.21 ENCOUNTER FOR VITAMIN DEFICIENCY SCREENING: ICD-10-CM

## 2021-08-02 LAB
ABSOLUTE EOS #: 0.28 K/UL (ref 0–0.44)
ABSOLUTE IMMATURE GRANULOCYTE: 0.04 K/UL (ref 0–0.3)
ABSOLUTE LYMPH #: 2.4 K/UL (ref 1.1–3.7)
ABSOLUTE MONO #: 0.66 K/UL (ref 0.1–1.2)
ALBUMIN SERPL-MCNC: 4.1 G/DL (ref 3.5–5.2)
ALBUMIN/GLOBULIN RATIO: 1.2 (ref 1–2.5)
ALP BLD-CCNC: 126 U/L (ref 35–104)
ALT SERPL-CCNC: 19 U/L (ref 5–33)
ANION GAP SERPL CALCULATED.3IONS-SCNC: 15 MMOL/L (ref 9–17)
AST SERPL-CCNC: 21 U/L
BASOPHILS # BLD: 0 % (ref 0–2)
BASOPHILS ABSOLUTE: 0.03 K/UL (ref 0–0.2)
BILIRUB SERPL-MCNC: 0.41 MG/DL (ref 0.3–1.2)
BUN BLDV-MCNC: 20 MG/DL (ref 6–20)
BUN/CREAT BLD: ABNORMAL (ref 9–20)
CALCIUM SERPL-MCNC: 9.6 MG/DL (ref 8.6–10.4)
CHLORIDE BLD-SCNC: 103 MMOL/L (ref 98–107)
CO2: 22 MMOL/L (ref 20–31)
CREAT SERPL-MCNC: 0.77 MG/DL (ref 0.5–0.9)
DIFFERENTIAL TYPE: ABNORMAL
EOSINOPHILS RELATIVE PERCENT: 3 % (ref 1–4)
GFR AFRICAN AMERICAN: >60 ML/MIN
GFR NON-AFRICAN AMERICAN: >60 ML/MIN
GFR SERPL CREATININE-BSD FRML MDRD: ABNORMAL ML/MIN/{1.73_M2}
GFR SERPL CREATININE-BSD FRML MDRD: ABNORMAL ML/MIN/{1.73_M2}
GLUCOSE BLD-MCNC: 96 MG/DL (ref 70–99)
HCT VFR BLD CALC: 44 % (ref 36.3–47.1)
HEMOGLOBIN: 13.6 G/DL (ref 11.9–15.1)
IMMATURE GRANULOCYTES: 1 %
LYMPHOCYTES # BLD: 30 % (ref 24–43)
MAGNESIUM: 2 MG/DL (ref 1.6–2.6)
MCH RBC QN AUTO: 26 PG (ref 25.2–33.5)
MCHC RBC AUTO-ENTMCNC: 30.9 G/DL (ref 28.4–34.8)
MCV RBC AUTO: 84 FL (ref 82.6–102.9)
MONOCYTES # BLD: 8 % (ref 3–12)
NRBC AUTOMATED: 0 PER 100 WBC
PDW BLD-RTO: 14.5 % (ref 11.8–14.4)
PLATELET # BLD: 258 K/UL (ref 138–453)
PLATELET ESTIMATE: ABNORMAL
PMV BLD AUTO: 9.7 FL (ref 8.1–13.5)
POTASSIUM SERPL-SCNC: 4.4 MMOL/L (ref 3.7–5.3)
RBC # BLD: 5.24 M/UL (ref 3.95–5.11)
RBC # BLD: ABNORMAL 10*6/UL
SEG NEUTROPHILS: 58 % (ref 36–65)
SEGMENTED NEUTROPHILS ABSOLUTE COUNT: 4.72 K/UL (ref 1.5–8.1)
SODIUM BLD-SCNC: 140 MMOL/L (ref 135–144)
TOTAL PROTEIN: 7.4 G/DL (ref 6.4–8.3)
TSH SERPL DL<=0.05 MIU/L-ACNC: 1.6 MIU/L (ref 0.3–5)
VITAMIN D 25-HYDROXY: 32.9 NG/ML (ref 30–100)
WBC # BLD: 8.1 K/UL (ref 3.5–11.3)
WBC # BLD: ABNORMAL 10*3/UL

## 2021-08-02 PROCEDURE — 99396 PREV VISIT EST AGE 40-64: CPT | Performed by: NURSE PRACTITIONER

## 2021-08-02 SDOH — ECONOMIC STABILITY: FOOD INSECURITY: WITHIN THE PAST 12 MONTHS, THE FOOD YOU BOUGHT JUST DIDN'T LAST AND YOU DIDN'T HAVE MONEY TO GET MORE.: NEVER TRUE

## 2021-08-02 SDOH — ECONOMIC STABILITY: FOOD INSECURITY: WITHIN THE PAST 12 MONTHS, YOU WORRIED THAT YOUR FOOD WOULD RUN OUT BEFORE YOU GOT MONEY TO BUY MORE.: NEVER TRUE

## 2021-08-02 ASSESSMENT — ENCOUNTER SYMPTOMS
TROUBLE SWALLOWING: 0
SORE THROAT: 0
CONSTIPATION: 0
ABDOMINAL PAIN: 0
SHORTNESS OF BREATH: 0
COUGH: 0
SINUS PRESSURE: 0
NAUSEA: 0
VOMITING: 0
DIARRHEA: 0
WHEEZING: 0
BLOOD IN STOOL: 0

## 2021-08-02 ASSESSMENT — PATIENT HEALTH QUESTIONNAIRE - PHQ9
1. LITTLE INTEREST OR PLEASURE IN DOING THINGS: 0
SUM OF ALL RESPONSES TO PHQ QUESTIONS 1-9: 0
SUM OF ALL RESPONSES TO PHQ QUESTIONS 1-9: 0
SUM OF ALL RESPONSES TO PHQ9 QUESTIONS 1 & 2: 0
SUM OF ALL RESPONSES TO PHQ QUESTIONS 1-9: 0
2. FEELING DOWN, DEPRESSED OR HOPELESS: 0

## 2021-08-02 ASSESSMENT — SOCIAL DETERMINANTS OF HEALTH (SDOH): HOW HARD IS IT FOR YOU TO PAY FOR THE VERY BASICS LIKE FOOD, HOUSING, MEDICAL CARE, AND HEATING?: NOT HARD AT ALL

## 2021-08-02 NOTE — PROGRESS NOTES
Hypertension     Hyperuricemia     Hypothyroidism     Kidney stones     Midline low back pain with right-sided sciatica 6/2/2016    Proteinuria     Shoulder impingement 12/2019    left    Snores     Solitary kidney, acquired 11/20/2017    Urinary tract obstruction by kidney stone 8/1/2019    Wears glasses       Past Surgical History:   Procedure Laterality Date    CYSTO/URETERO/PYELOSCOPY, CALCULUS TX Right 11/21/2017    HOLMIUM LASER - CYSTOSCOPY, RIGHT URETEROSCOPY, RIGHT STENT EXCHANGE,STONE BASKETING performed by Carolyn Chen MD at [address], [place] [address] Right 9/13/2019    HOLMIUM - CYSTO, URETEROSCOPY, LITHOTRIPSY, STENT PLACEMENT performed by Diamond Covington MD at [address], [place] [address] Right 12/12/2019    HOLMIUM- CYSTO, URETEROSCOPY, LASER LITHO, STENT PLACEMENT AND RIGHT URETERAL BASKET STONE EXTRACTION performed by Carolyn Chen MD at [address] Right 11/18/2017    CYSTOSCOPY URETERAL STENT INSERTION,RIGHT performed by Marylen Platts, MD at [address] Right 8/2/2019    CYSTOSCOPY RT URETERAL STENT INSERTION performed by Diamond Covington MD at [address] Right 8/23/2019    CYSTOSCOPY, RIGHT STENT REMOVAL, INSERTION OCCLUSIVE BALLOON, PT GOING TO INTERVENTIONAL performed by Diamond Covington MD at [address] Right 12/12/2019    CYSTO, URETEROSCOPY, LASER LITHO, STENT PLACEMENT AND RIGHT URETERAL BASKET STONE EXTRACTION     CYSTOSCOPY Right 12/31/2019    stent removal    CYSTOSCOPY Right 09/19/2020    litho, rt stent    CYSTOSCOPY Right 9/19/2020    CYSTOSCOPY, URETEROSCOPY, HOLMIUM LASER LITHOTRIPSY, STENT PLACEMENT performed by Nura Cabrera MD at [address] [place] / [address] / STONE Right 12/31/2019    CYSTOSCOPY, STENT REMOVAL performed by Carolyn Chen MD at [address] [address] Bilateral 2008    KNEE ARTHROSCOPY Right 1998, 2003    X2    Take 1 tablet by mouth every 8 hours as needed for Nausea 20 tablet 0    irbesartan-hydrochlorothiazide (AVALIDE) 150-12.5 MG per tablet TAKE 1 TABLET BY MOUTH ONE TIME A DAY 30 tablet 5    POTASSIUM CITRATE PO Take 5 mLs by mouth three times daily      OMEGA-3 FATTY ACIDS PO Take 1 tablet by mouth daily      albuterol sulfate HFA (PROAIR HFA) 108 (90 Base) MCG/ACT inhaler Inhale 2 puffs into the lungs every 6 hours as needed for Wheezing 1 Inhaler 5    acetaminophen (TYLENOL) 500 MG tablet Take 1,000 mg by mouth every 4 hours as needed for Pain. No current facility-administered medications for this visit. No Known Allergies    Health Maintenance   Topic Date Due    Colon cancer screen colonoscopy  Never done    Shingles Vaccine (1 of 2) Never done    A1C test (Diabetic or Prediabetic)  09/23/2020    DTaP/Tdap/Td vaccine (1 - Tdap) 10/15/2023 (Originally 3/2/1982)    Flu vaccine (1) 09/01/2021    TSH testing  08/02/2022    Potassium monitoring  08/02/2022    Creatinine monitoring  08/02/2022    Breast cancer screen  10/23/2022    Cervical cancer screen  01/07/2025    Lipid screen  07/17/2026    Pneumococcal 0-64 years Vaccine (2 of 2 - PPSV23) 03/02/2028    COVID-19 Vaccine  Completed    Hepatitis C screen  Completed    HIV screen  Completed    Hepatitis A vaccine  Aged Out    Hepatitis B vaccine  Aged Out    Hib vaccine  Aged Out    Meningococcal (ACWY) vaccine  Aged Out       Subjective:      Review of Systems   Constitutional: Negative for activity change, appetite change, chills, fatigue, fever and unexpected weight change. HENT: Negative for congestion, ear pain, hearing loss, sinus pressure, sore throat and trouble swallowing. Eyes: Negative for visual disturbance. Respiratory: Negative for cough, shortness of breath and wheezing. Cardiovascular: Negative for chest pain, palpitations and leg swelling.    Gastrointestinal: Negative for abdominal pain, blood in stool, constipation, diarrhea, nausea and vomiting. Endocrine: Negative for cold intolerance, heat intolerance, polydipsia, polyphagia and polyuria. Genitourinary: Negative for difficulty urinating, frequency, hematuria and urgency. Musculoskeletal: Positive for myalgias. Negative for arthralgias. Skin: Negative for rash. Allergic/Immunologic: Negative for environmental allergies. Neurological: Negative for dizziness, weakness, light-headedness and headaches. Psychiatric/Behavioral: Positive for dysphoric mood. Negative for confusion. The patient is not nervous/anxious. Objective:     Physical Exam  Constitutional:       Appearance: She is well-developed. HENT:      Head: Normocephalic. Eyes:      Conjunctiva/sclera: Conjunctivae normal.      Pupils: Pupils are equal, round, and reactive to light. Cardiovascular:      Rate and Rhythm: Normal rate and regular rhythm. Heart sounds: Normal heart sounds. No murmur heard. Pulmonary:      Effort: Pulmonary effort is normal.      Breath sounds: Normal breath sounds. No wheezing. Abdominal:      General: Bowel sounds are normal. There is no distension. Palpations: Abdomen is soft. Musculoskeletal:         General: Normal range of motion. Cervical back: Normal range of motion. Skin:     General: Skin is warm and dry. Neurological:      Mental Status: She is alert and oriented to person, place, and time. Psychiatric:         Behavior: Behavior normal.         Thought Content: Thought content normal.         Judgment: Judgment normal.       /86   Pulse 77   Resp 18   Ht 5' 7.75\" (1.721 m)   Wt 239 lb 9.6 oz (108.7 kg)   SpO2 98%   BMI 36.70 kg/m²     Assessment:       Diagnosis Orders   1. Annual physical exam     2. Acquired hypothyroidism  TSH With Reflex Ft4   3. Essential hypertension  Comprehensive Metabolic Panel   4. Encounter for vitamin deficiency screening  Vitamin D 25 Hydroxy   5.  Screening, anemia, deficiency, iron  CBC Auto Differential   6. Myalgia  Magnesium   7. Class 2 severe obesity due to excess calories with serious comorbidity and body mass index (BMI) of 36.0 to 36.9 in adult ([address]     8. Situational depression               Plan:      Return in about 6 months (around 2/2/2022) for hypertension check. Annual exam, obesity-reviewed and discussed lipid panel from employer health screen, will add additional labs. Lengthy discussion on weight loss options, suggested weight watchers, encouraged walking for exercise. Advised to check with insurance on coverage for weight loss meds and to return to office if decides would like proceed  Hypothyroid-due for TSH  HTN-well controlled on current meds, check CMP  Myalgias-suspect related to her depression and weight gain, check labs, encouraged exercise like walking  Situational depression-offered medication assistance but she declines for now. Encouraged to consider counseling, dementia care support groups  Orders Placed This Encounter   Procedures    TSH With Reflex Ft4     Standing Status:   Future     Number of Occurrences:   1     Standing Expiration Date:   8/2/2022    Comprehensive Metabolic Panel     Standing Status:   Future     Number of Occurrences:   1     Standing Expiration Date:   8/2/2022    CBC Auto Differential     Standing Status:   Future     Number of Occurrences:   1     Standing Expiration Date:   8/2/2022    Vitamin D 25 Hydroxy     Standing Status:   Future     Number of Occurrences:   1     Standing Expiration Date:   8/2/2022    Magnesium     Standing Status:   Future     Number of Occurrences:   1     Standing Expiration Date:   8/2/2022      No orders of the defined types were placed in this encounter. Patient given educational materials - see patient instructions. Discussed use, benefit, and side effects of prescribed medications. All patientquestions answered. Pt voiced understanding. Reviewed health maintenance. Instructedto continue current medications, diet and exercise. Patient agreed with treatmentplan. Follow up as directed.      Electronicallysigned by MIGDALIA Storey CNP on 8/3/2021 at 9:57 AM

## 2021-08-04 ENCOUNTER — OFFICE VISIT (OUTPATIENT)
Dept: OBGYN CLINIC | Age: 56
End: 2021-08-04
Payer: COMMERCIAL

## 2021-08-04 VITALS
HEIGHT: 68 IN | DIASTOLIC BLOOD PRESSURE: 95 MMHG | SYSTOLIC BLOOD PRESSURE: 148 MMHG | HEART RATE: 77 BPM | BODY MASS INDEX: 36.53 KG/M2 | WEIGHT: 241 LBS

## 2021-08-04 DIAGNOSIS — Z01.419 ENCOUNTER FOR WELL WOMAN EXAM WITH ROUTINE GYNECOLOGICAL EXAM: Primary | ICD-10-CM

## 2021-08-04 DIAGNOSIS — Z12.31 BREAST CANCER SCREENING BY MAMMOGRAM: ICD-10-CM

## 2021-08-04 PROCEDURE — 99396 PREV VISIT EST AGE 40-64: CPT | Performed by: OBSTETRICS & GYNECOLOGY

## 2021-08-04 ASSESSMENT — ENCOUNTER SYMPTOMS
COUGH: 0
SHORTNESS OF BREATH: 0
BACK PAIN: 0
ABDOMINAL PAIN: 0

## 2021-08-04 NOTE — PROGRESS NOTES
[facility] PHYS[facility]ANS  [facility] - [address] [facility] [address] [facility]  Dept: [phone]    Chief complaint:   Chief Complaint   Patient presents with    Gynecologic Exam     Last pap 1/7/20 WNL - HPV Last jessica 9/24/19       History Present Illness: Fracisco Kan is a 59 yo female who presents for her annual exam.  She denies any GYN complaints. She hasn't had any postmenopausal bleeding since her EMB. She is not sexually active at this time. She is having vaginal dryness and thinking that she might want something, but isn't sure. She denies any pelvic pain. She denies any bowel or bladder issues. Current Medications (OTC/Herbal):   Current Outpatient Medications   Medication Sig Dispense Refill    benzoyl peroxide 5 % external liquid Wash affected areas once daily 227 g 3    clindamycin (CLEOCIN T) 1 % lotion Apply to affected areas daily 60 mL 3    omeprazole (PRILOSEC) 20 MG delayed release capsule TAKE 1 CAPSULE BY MOUTH 2 TIMES A  capsule 3    meloxicam (MOBIC) 15 MG tablet TAKE 1 TABLET BY MOUTH ONE TIME A DAY 90 tablet 3    ondansetron (ZOFRAN ODT) 4 MG disintegrating tablet Take 1 tablet by mouth every 8 hours as needed for Nausea 20 tablet 0    irbesartan-hydrochlorothiazide (AVALIDE) 150-12.5 MG per tablet TAKE 1 TABLET BY MOUTH ONE TIME A DAY 30 tablet 5    POTASSIUM CITRATE PO Take 5 mLs by mouth three times daily      OMEGA-3 FATTY ACIDS PO Take 1 tablet by mouth daily      albuterol sulfate HFA (PROAIR HFA) 108 (90 Base) MCG/ACT inhaler Inhale 2 puffs into the lungs every 6 hours as needed for Wheezing 1 Inhaler 5    acetaminophen (TYLENOL) 500 MG tablet Take 1,000 mg by mouth every 4 hours as needed for Pain. No current facility-administered medications for this visit.      Allergies: No Known Allergies  Past Medical History:   Past Medical History:   Diagnosis Date    Arthritis     Asthma     Benign familial tremor     Cancer ([facility] [address] skin    CKD (chronic kidney disease) stage 1, GFR 90 ml/min or greater     Class 2 severe obesity with serious comorbidity and body mass index (BMI) of 39.0 to 39.9 in adult ([facility]) 5/1/2019    Cystinuria ([facility])     Dyspnea on exertion 8/6/2019    Flank pain     RIGHT    Gastroesophageal reflux disease without esophagitis     GERD (gastroesophageal reflux disease)     Hypertension     Hyperuricemia     Hypothyroidism     Kidney stones     Midline low back pain with right-sided sciatica 6/2/2016    Proteinuria     Shoulder impingement 12/2019    left    Snores     Solitary kidney, acquired 11/20/2017    Urinary tract obstruction by kidney stone 8/1/2019    Wears glasses      Past Surgical History:   Past Surgical History:   Procedure Laterality Date    CYSTO/URETERO/PYELOSCOPY, CALCULUS TX Right 11/21/2017    HOLMIUM LASER - CYSTOSCOPY, RIGHT URETEROSCOPY, RIGHT STENT EXCHANGE,STONE BASKETING performed by Andre Childers MD at [address], [place] Right 9/13/2019    HOLMIUM - CYSTO, URETEROSCOPY, LITHOTRIPSY, STENT PLACEMENT performed by Justin Arce MD at [address], [place] [address] Right 12/12/2019    HOLMIUM- CYSTO, URETEROSCOPY, LASER LITHO, STENT PLACEMENT AND RIGHT URETERAL BASKET STONE EXTRACTION performed by Andre Childers MD at [place] [address] Right 11/18/2017    CYSTOSCOPY URETERAL STENT INSERTION,RIGHT performed by Chris Jane MD at [place] [address] Right 8/2/2019    CYSTOSCOPY RT URETERAL STENT INSERTION performed by Justin Arce MD at [place] [address] Right 8/23/2019    CYSTOSCOPY, RIGHT STENT REMOVAL, INSERTION OCCLUSIVE BALLOON, PT GOING TO INTERVENTIONAL performed by Justin Arce MD at [place] [address] Right 12/12/2019    CYSTO, URETEROSCOPY, LASER LITHO, STENT PLACEMENT AND RIGHT URETERAL BASKET STONE EXTRACTION     CYSTOSCOPY Right 12/31/2019    stent removal    CYSTOSCOPY Right 09/19/2020    litho, rt stent  CYSTOSCOPY Right 2020    CYSTOSCOPY, URETEROSCOPY, HOLMIUM LASER LITHOTRIPSY, STENT PLACEMENT performed by Arsalan Andre MD at [address] / REMOVAL STENT / STONE Right 2019    CYSTOSCOPY, STENT REMOVAL performed by India Duron MD at [address] [address] Bilateral 2008    KNEE ARTHROSCOPY Right , 2003    X2    NEPHROLITHOTOMY Right 2019    HOLMIUM LASER, NEPHROLITHOTOMY PERCUTANEOUS, LITHOCLAST, C-ARM performed by Wendy Desouza MD at [address] [facility], [place] [address] N/A 7/10/2018    CYSTOSCOPY, RIGHT URETEROSCOPY, HOLMIUM LASER LITHO WITH STONE BASKETING, RIGHT STENT EXCHANGE performed by Arsalan Andre MD at [address] [facility], [place] Right 10/10/2018    HOLMIUM LASER STANDBY, CYSTO, RIGHT URETEROSCOPY, RIGHT STENT PLACEMENT performed by India Duron MD at [address] [facility] Right 2018    CYSTOSCOPY URETERAL STENT INSERTION, RIGHT performed by Tracy Sanchez MD at [address] [facility] Left     TOTAL NEPHRECTOMY Left    [address] Right 2019     Obstetric History:   0  Para 0  Gynecologic History: LMP postmenopausal   Menarche 9    Last Pap: 18       Any history of abnormal paps no    PriorColpo/Biopsy no     Last Mammogram 17  Result normal  Contraception: postmenopausal  Complications: none  STDs: none  Psychosocial History: 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 Gastrointestinal: Negative for abdominal pain. Genitourinary: Negative for pelvic pain and vaginal discharge. Musculoskeletal: Negative for back pain. Neurological: Negative for dizziness and light-headedness. Psychiatric/Behavioral: The patient is not nervous/anxious. Physical exam:  vitals:  Height   5  ft    7 in,  Weight    241 lbs,   148/95 BP  Gen: alert, no apparent distress  HEENT:No pathologic skin lesions noted,NC/AT,PERRL, normal midline nontender thyroid   Lung Exam: Clear to auscultation in all fields bilaterally, without wheezes,rales or rhonchi. Cardiac Exam: Normal sinus rhythm andrate, without murmurs, rubs or gallops appreciated. Breast Exam: Symmetric without pathological skin changes, nontender without discrete suspicious masses palpated, supraclavicular or axillary adenopathy or nipple discharge noted. Abdominal Exam: Nontender to deep palpation without organomegaly, masses or CVAT appreciated, BS positive. No spinal deformation or tenderness. External Genitalia: Normal development without vulvar,vaginal or cervical lesions noted. Normal vaginal discharge, uterus anterior, 4-6 weeks without CMT. Adnexa nontender without abnormal masses bilaterally. Rectal Exam: Omitted. Extremities: Nontender without clubbing, cyanosis or edema. F.R.O.M. Neurologic Exam: Grossly intact without noted sensorimotor deficits and oriented x 3. Assessment/Plan:   Unremarkable annual Gyn exam.    Cervical Cytology Evaluation begins at 24years old. If Negative Cytology, Follow-up screening per current guidelines. Mammograms every 1year. If 37 yo and last mammogram was negative. Vaginal dryness - Imvexxy samples given. If pt likes she will let us know if she would like a rx  Calcium and Vitamin D dosing reviewed. Colonoscopy screening reviewed as well as onset for bone density testing.   Birth control and barrier recommendations discussed. STD counseling and prevention reviewed. Routine health maintenance per patients PCP.   Pt to follow up for annual exam in 1 year    Katlyn Silva MD  [address]

## 2021-09-10 ENCOUNTER — HOSPITAL ENCOUNTER (OUTPATIENT)
Dept: GENERAL RADIOLOGY | Age: 56
Discharge: HOME | End: 2021-09-12
Payer: COMMERCIAL

## 2021-09-10 ENCOUNTER — HOSPITAL ENCOUNTER (OUTPATIENT)
Age: 56
Discharge: HOME | End: 2021-09-12
Payer: COMMERCIAL

## 2021-09-10 DIAGNOSIS — N20.0 CALCULUS, KIDNEY: ICD-10-CM

## 2021-09-10 PROCEDURE — 74018 RADEX ABDOMEN 1 VIEW: CPT

## 2021-09-23 ENCOUNTER — CARE COORDINATION (OUTPATIENT)
Dept: OTHER | Facility: CLINIC | Age: 56
End: 2021-09-23

## 2021-09-23 NOTE — CARE COORDINATION
Ambulatory Care Coordination Note  9/23/2021  CM Risk Score: 5  Charlson 10 Year Mortality Risk Score: 47%     ACC: Kathryn Castorena RN    Summary Note: ACM attempted to reach patient for introduction to Associate Care Management related to risk score of 18%. HIPAA compliant message left requesting a return phone call. Will attempt to outreach patient again. No future appointments.

## 2021-09-30 ENCOUNTER — CARE COORDINATION (OUTPATIENT)
Dept: OTHER | Facility: CLINIC | Age: 56
End: 2021-09-30

## 2021-09-30 ENCOUNTER — TELEMEDICINE (OUTPATIENT)
Dept: PRIMARY CARE CLINIC | Age: 56
End: 2021-09-30
Payer: COMMERCIAL

## 2021-09-30 DIAGNOSIS — G56.01 CARPAL TUNNEL SYNDROME OF RIGHT WRIST: ICD-10-CM

## 2021-09-30 DIAGNOSIS — I10 ESSENTIAL HYPERTENSION: ICD-10-CM

## 2021-09-30 DIAGNOSIS — M65.311 TRIGGER THUMB, RIGHT THUMB: ICD-10-CM

## 2021-09-30 DIAGNOSIS — R20.2 PARESTHESIA OF BOTH LOWER EXTREMITIES: ICD-10-CM

## 2021-09-30 DIAGNOSIS — M72.2 PLANTAR FASCIITIS, RIGHT: ICD-10-CM

## 2021-09-30 DIAGNOSIS — R05.8 COUGH PRESENT FOR GREATER THAN 3 WEEKS: ICD-10-CM

## 2021-09-30 DIAGNOSIS — E66.01 CLASS 2 SEVERE OBESITY DUE TO EXCESS CALORIES WITH SERIOUS COMORBIDITY AND BODY MASS INDEX (BMI) OF 36.0 TO 36.9 IN ADULT (HCC): ICD-10-CM

## 2021-09-30 DIAGNOSIS — Z12.11 COLON CANCER SCREENING: ICD-10-CM

## 2021-09-30 DIAGNOSIS — F50.81 BINGE EATING DISORDER: Primary | ICD-10-CM

## 2021-09-30 PROCEDURE — 99214 OFFICE O/P EST MOD 30 MIN: CPT | Performed by: NURSE PRACTITIONER

## 2021-09-30 RX ORDER — OMEPRAZOLE 20 MG/1
CAPSULE, DELAYED RELEASE ORAL
Qty: 180 CAPSULE | Refills: 3 | Status: SHIPPED | OUTPATIENT
Start: 2021-09-30 | End: 2022-04-01

## 2021-09-30 RX ORDER — MELOXICAM 15 MG/1
TABLET ORAL
Qty: 90 TABLET | Refills: 3 | Status: SHIPPED | OUTPATIENT
Start: 2021-09-30 | End: 2022-08-15

## 2021-09-30 RX ORDER — IRBESARTAN AND HYDROCHLOROTHIAZIDE 150; 12.5 MG/1; MG/1
TABLET, FILM COATED ORAL
Qty: 90 TABLET | Refills: 3 | Status: SHIPPED | OUTPATIENT
Start: 2021-09-30 | End: 2022-10-10

## 2021-09-30 ASSESSMENT — PATIENT HEALTH QUESTIONNAIRE - PHQ9
SUM OF ALL RESPONSES TO PHQ QUESTIONS 1-9: 0
SUM OF ALL RESPONSES TO PHQ9 QUESTIONS 1 & 2: 0
SUM OF ALL RESPONSES TO PHQ QUESTIONS 1-9: 0
1. LITTLE INTEREST OR PLEASURE IN DOING THINGS: 0
SUM OF ALL RESPONSES TO PHQ QUESTIONS 1-9: 0
2. FEELING DOWN, DEPRESSED OR HOPELESS: 0

## 2021-09-30 ASSESSMENT — ENCOUNTER SYMPTOMS
ABDOMINAL PAIN: 0
SINUS PRESSURE: 0
DIARRHEA: 0
VOMITING: 0
WHEEZING: 0
SHORTNESS OF BREATH: 0
TROUBLE SWALLOWING: 0
NAUSEA: 0
CONSTIPATION: 0
SORE THROAT: 0
BLOOD IN STOOL: 0
COUGH: 0

## 2021-09-30 NOTE — PROGRESS NOTES
[facility] PRIMARY CARE  [facility] [facility] [address]  [address] [address]  Dept: [phone]  Dept Fax: [phone]    Shweta Gomez is a 60 y.o. female who presentstoday for her medical conditions/complaints as noted below.   Shweta Gomez is c/o of  Chief Complaint   Patient presents with    Joint Pain     Bilateral knees and feet x 1 week; worsening       HPI:     Presents today via video virtual visit  Continues to voice concern about difficulties losing weight and maintaining any weight loss  She discussed this at her visit approximately 2 months ago but since that time he has continued to gain weight  She states that she seems to eat \"uncontrollably\", she provides example of eating entire bag of [employer] candy  \"Once I start I cannot seem to get it under control to stop\"    She also complains of bilateral knee and leg pain  This is not a new problem for her but she cannot seem to get this under control  She states she is now having a burning sensation from the knees down  Her oral anti-inflammatory does not help much with this particular pain  She states \"I don't feel healthy at all\"      Hemoglobin A1C (%)   Date Value   2019 5.9   2018 5.7   2016 5.6             ( goal A1C is < 7)   No results found for: LABMICR  LDL Cholesterol (mg/dL)   Date Value   2021 115   2020 90   2018            (goal LDL is <100)   AST (U/L)   Date Value   2021 21     ALT (U/L)   Date Value   2021 19     BUN (mg/dL)   Date Value   2021 20     BP Readings from Last 3 Encounters:   21 (!) 148/95   21 130/86   21 103/67          ([id number])    Past Medical History:   Diagnosis Date    Arthritis     Asthma     Benign familial tremor     Cancer ([facility] [address]     skin    CKD (chronic kidney disease) stage 1, GFR 90 ml/min or greater     Class 2 severe obesity with serious comorbidity and body mass index (BMI) of 39.0 to 39.9 in adult [facility]) 5/1/2019    Cystinuria ([address]     Dyspnea on exertion 8/6/2019    Flank pain     RIGHT    Gastroesophageal reflux disease without esophagitis     GERD (gastroesophageal reflux disease)     Hypertension     Hyperuricemia     Hypothyroidism     Kidney stones     Midline low back pain with right-sided sciatica 6/2/2016    Proteinuria     Shoulder impingement 12/2019    left    Snores     Solitary kidney, acquired 11/20/2017    Urinary tract obstruction by kidney stone 8/1/2019    Wears glasses       Past Surgical History:   Procedure Laterality Date    CYSTO/URETERO/PYELOSCOPY, [place] Right 11/21/2017    HOLMIUM LASER - CYSTOSCOPY, RIGHT URETEROSCOPY, RIGHT STENT EXCHANGE,STONE BASKETING performed by Tiffany Rivero MD at [address], [place] Right 9/13/2019    HOLMIUM - CYSTO, URETEROSCOPY, LITHOTRIPSY, STENT PLACEMENT performed by Kyleigh Dempsey MD at [address], [place] [address] Right 12/12/2019    HOLMIUM- CYSTO, URETEROSCOPY, LASER LITHO, STENT PLACEMENT AND RIGHT URETERAL BASKET STONE EXTRACTION performed by Tiffany Rivero MD at [address] [facility] Right 11/18/2017    CYSTOSCOPY URETERAL STENT INSERTION,RIGHT performed by Geo Chavez MD at [address] Right 8/2/2019    CYSTOSCOPY RT URETERAL STENT INSERTION performed by Kyleigh Dempsey MD at [facility] Right 8/23/2019    CYSTOSCOPY, RIGHT STENT REMOVAL, INSERTION OCCLUSIVE BALLOON, PT GOING TO INTERVENTIONAL performed by Kyleigh Dempsey MD at [address] [place] [address] Right 12/12/2019    CYSTO, URETEROSCOPY, LASER LITHO, STENT PLACEMENT AND RIGHT URETERAL BASKET STONE EXTRACTION     CYSTOSCOPY Right 12/31/2019    stent removal    CYSTOSCOPY Right 09/19/2020    litho, rt stent    CYSTOSCOPY Right 9/19/2020    CYSTOSCOPY, URETEROSCOPY, HOLMIUM LASER LITHOTRIPSY, STENT PLACEMENT performed by Garry Sierra MD at [address] / REMOVAL [address] / STONE Right 12/31/2019    CYSTOSCOPY, STENT REMOVAL performed by Tamie Khan MD at [address] Bilateral 2008    KNEE ARTHROSCOPY Right 1998, 2003    X2    NEPHROLITHOTOMY Right 8/23/2019    HOLMIUM LASER, NEPHROLITHOTOMY PERCUTANEOUS, LITHOCLAST, C-ARM performed by Elizabeth Garcia MD at [address] [place] [address], [place] [address] N/A 7/10/2018    CYSTOSCOPY, RIGHT URETEROSCOPY, HOLMIUM LASER LITHO WITH STONE BASKETING, RIGHT STENT EXCHANGE performed by Lela Samaniego MD at [address] [place] [address], [place] Right 10/10/2018    HOLMIUM LASER STANDBY, CYSTO, RIGHT URETEROSCOPY, RIGHT STENT PLACEMENT performed by Tamie Khan MD at [address],[address] Right 7/4/2018    CYSTOSCOPY URETERAL STENT INSERTION, RIGHT performed by Tomas Verde MD at [facility] Left     TOTAL NEPHRECTOMY Left 1988    URETER STENT PLACEMENT Right 12/12/2019       Family History   Problem Relation Age of Onset    High Blood Pressure Mother     Diabetes Father     Cancer Father         tongue    Hypertension Father     Heart Disease Father         stents    Other Father         Mylofibrosis    Hypertension Sister     Heart Disease Maternal Grandmother     Heart Attack Maternal Grandmother     Prostate Cancer Maternal Grandfather     Heart Disease Paternal Grandfather     Stroke Paternal Grandfather     Emphysema Paternal Grandmother           Social History     Tobacco Use    Smoking status: Never Smoker    Smokeless tobacco: Never Used   Substance Use Topics    Alcohol use:  Yes     Alcohol/week: 0.0 standard drinks     Comment: rare      Current Outpatient Medications   Medication Sig Dispense Refill    irbesartan-hydroCHLOROthiazide (AVALIDE) 150-12.5 MG per tablet TAKE 1 TABLET BY MOUTH ONE TIME A DAY 90 tablet 3    meloxicam (MOBIC) 15 MG tablet TAKE 1 TABLET BY MOUTH ONE TIME A DAY 90 tablet 3    omeprazole (PRILOSEC) 20 MG delayed release capsule TAKE 1 CAPSULE BY MOUTH 2 TIMES A  capsule 3    Lisdexamfetamine Dimesylate (VYVANSE) 30 MG capsule Take 1 capsule by mouth daily for 30 days. 30 capsule 0    benzoyl peroxide 5 % external liquid Wash affected areas once daily 227 g 3    clindamycin (CLEOCIN T) 1 % lotion Apply to affected areas daily 60 mL 3    ondansetron (ZOFRAN ODT) 4 MG disintegrating tablet Take 1 tablet by mouth every 8 hours as needed for Nausea 20 tablet 0    POTASSIUM CITRATE PO Take 5 mLs by mouth three times daily      OMEGA-3 FATTY ACIDS PO Take 1 tablet by mouth daily      albuterol sulfate HFA (PROAIR HFA) 108 (90 Base) MCG/ACT inhaler Inhale 2 puffs into the lungs every 6 hours as needed for Wheezing 1 Inhaler 5    acetaminophen (TYLENOL) 500 MG tablet Take 1,000 mg by mouth every 4 hours as needed for Pain. No current facility-administered medications for this visit. No Known Allergies    Health Maintenance   Topic Date Due    Colon cancer screen colonoscopy  Never done    Shingles Vaccine (1 of 2) Never done    A1C test (Diabetic or Prediabetic)  09/23/2020    Flu vaccine (1) 09/01/2021    DTaP/Tdap/Td vaccine (1 - Tdap) 10/15/2023 (Originally 3/2/1982)    TSH testing  08/02/2022    Potassium monitoring  08/02/2022    Creatinine monitoring  08/02/2022    Breast cancer screen  10/23/2022    Cervical cancer screen  01/07/2025    Lipid screen  07/17/2026    Pneumococcal 0-64 years Vaccine (2 of 2 - PPSV23) 03/02/2028    COVID-19 Vaccine  Completed    Hepatitis C screen  Completed    HIV screen  Completed    Hepatitis A vaccine  Aged Out    Hepatitis B vaccine  Aged Out    Hib vaccine  Aged Out    Meningococcal (ACWY) vaccine  Aged Out       Subjective:      Review of Systems   Constitutional: Positive for fatigue. Negative for activity change, appetite change, chills, fever and unexpected weight change.    HENT: Negative for congestion, ear pain, hearing loss, sinus pressure, sore throat and trouble swallowing. Eyes: Negative for visual disturbance. Respiratory: Negative for cough, shortness of breath and wheezing. Cardiovascular: Negative for chest pain, palpitations and leg swelling. Gastrointestinal: Negative for abdominal pain, blood in stool, constipation, diarrhea, nausea and vomiting. Endocrine: Negative for cold intolerance, heat intolerance, polydipsia, polyphagia and polyuria. Genitourinary: Negative for difficulty urinating, frequency, hematuria and urgency. Musculoskeletal: Positive for arthralgias. Negative for myalgias. Skin: Negative for rash. Allergic/Immunologic: Negative for environmental allergies. Neurological: Negative for dizziness, weakness, light-headedness and headaches. Burning pain bilateral lower extremities   Psychiatric/Behavioral: Negative for confusion. The patient is not nervous/anxious. Objective:     Physical Exam  Constitutional:       Appearance: Normal appearance. She is obese. Pulmonary:      Effort: Pulmonary effort is normal.   Neurological:      Mental Status: She is alert and oriented to person, place, and time. There were no vitals taken for this visit. Assessment:       Diagnosis Orders   1. Binge eating disorder  Lisdexamfetamine Dimesylate (VYVANSE) 30 MG capsule   2. Carpal tunnel syndrome of right wrist  meloxicam (MOBIC) 15 MG tablet   3. Trigger thumb, right thumb  meloxicam (MOBIC) 15 MG tablet   4. Plantar fasciitis, right  meloxicam (MOBIC) 15 MG tablet   5. Cough present for greater than 3 weeks  omeprazole (PRILOSEC) 20 MG delayed release capsule   6. Colon cancer screening  External Referral To Gastroenterology   7. Class 2 severe obesity due to excess calories with serious comorbidity and body mass index (BMI) of 36.0 to 36.9 in adult ([facility]     8.  Essential hypertension  irbesartan-hydroCHLOROthiazide (AVALIDE) 150-12.5 MG per tablet 9. Paresthesia of both lower extremities  EMG             Plan:      Return in about 4 weeks (around 10/28/2021) for weight check. Binge eating disorder, obesity -lengthy discussion, start Vyvanse, reviewed appropriate diet choices and to try to include some type of regular exercise daily  Reviewed potential for bariatric surgical consult and/role of nutrition/dietitian consult through weight management center. She will consider  Refill on NSAID for her chronic bilateral hand pain and foot pain  Hypertension-remains well controlled on current medication-refill provided  Paresthesia bilateral lower extremities-check EMG to rule out neuropathic issue, if EMG negative may consider vascular work-up    Orders Placed This Encounter   Procedures    External Referral To Gastroenterology     Referral Priority:   Routine     Referral Type:   Consult for Advice and Opinion     Referral Reason:   Specialty Services Required     Referred to Provider:   Mckayla Paez MD     Requested Specialty:   Gastroenterology     Number of Visits Requested:   1    EMG     Standing Status:   Future     Standing Expiration Date:   11/29/2021     Order Specific Question:   Which body part? Answer:   lower extremities        Orders Placed This Encounter   Medications    irbesartan-hydroCHLOROthiazide (AVALIDE) 150-12.5 MG per tablet     Sig: TAKE 1 TABLET BY MOUTH ONE TIME A DAY     Dispense:  90 tablet     Refill:  3    meloxicam (MOBIC) 15 MG tablet     Sig: TAKE 1 TABLET BY MOUTH ONE TIME A DAY     Dispense:  90 tablet     Refill:  3    omeprazole (PRILOSEC) 20 MG delayed release capsule     Sig: TAKE 1 CAPSULE BY MOUTH 2 TIMES A DAY     Dispense:  180 capsule     Refill:  3    Lisdexamfetamine Dimesylate (VYVANSE) 30 MG capsule     Sig: Take 1 capsule by mouth daily for 30 days. Dispense:  30 capsule     Refill:  0       Patient given educational materials - see patient instructions. Discussed use, benefit, and side effects of prescribed medications. All patientquestions answered. Pt voiced understanding. Reviewed health maintenance. Instructedto continue current medications, diet and exercise. Patient agreed with treatmentplan. Follow up as directed.      Electronicallysigned by MIGDALIA Scott CNP on 9/30/2021 at 5:51 PM

## 2021-09-30 NOTE — CARE COORDINATION
Ambulatory Care Coordination Note  9/30/2021  CM Risk Score: 5  Charlson 10 Year Mortality Risk Score: 47%     ACC: Loan Gonzales RN    Summary Note: ACM attempted 2nd outreach to reach patient for introduction to Associate Care Management. HIPAA compliant message left requesting a return phone call at patients convenience. Unable to Reach Letter sent to patient via [employer]. Will continue to outreach patient. No future appointments.

## 2021-09-30 NOTE — CARE COORDINATION
Ambulatory Care Coordination Note  9/30/2021  CM Risk Score: 5  Charlson 10 Year Mortality Risk Score: 47%     ACC: Loan Gonzales RN    Summary Note: ACM contacted patient. Patient would like a call back after 3 PM.       No future appointments.

## 2021-10-01 ENCOUNTER — TELEPHONE (OUTPATIENT)
Dept: PRIMARY CARE CLINIC | Age: 56
End: 2021-10-01

## 2021-10-06 ENCOUNTER — CARE COORDINATION (OUTPATIENT)
Dept: OTHER | Facility: CLINIC | Age: 56
End: 2021-10-06

## 2021-10-06 NOTE — CARE COORDINATION
Ambulatory Care Coordination Note  10/6/2021  CM Risk Score: 5  Charlson 10 Year Mortality Risk Score: 47%     ACC: Delfino Lui, LORETTA    Summary Note: ACM attempted third and final call to patient for introduction to Associate Care Management. HIPAA compliant message left requesting a return phone call at patients convenience. Final Unable to Reach Letter sent via [employer]. No further outreach scheduled with this ACM, ACM will sign off care team at this time. Patient has been provided with this ACM's contact information.          Future Appointments   Date Time Provider nEder Oliver   10/28/2021  7:40 AM MIGDALIA Sanches - FRANCISCO [place] STEVE Cantu

## 2021-10-21 ENCOUNTER — HOSPITAL ENCOUNTER (OUTPATIENT)
Dept: MAMMOGRAPHY | Age: 56
Discharge: HOME | End: 2021-10-23
Payer: COMMERCIAL

## 2021-10-21 VITALS — HEIGHT: 68 IN | BODY MASS INDEX: 35.61 KG/M2 | WEIGHT: 235 LBS

## 2021-10-21 DIAGNOSIS — Z12.31 BREAST CANCER SCREENING BY MAMMOGRAM: ICD-10-CM

## 2021-10-21 PROCEDURE — 77063 BREAST TOMOSYNTHESIS BI: CPT

## 2021-10-28 ENCOUNTER — OFFICE VISIT (OUTPATIENT)
Dept: PRIMARY CARE CLINIC | Age: 56
End: 2021-10-28
Payer: COMMERCIAL

## 2021-10-28 VITALS
DIASTOLIC BLOOD PRESSURE: 78 MMHG | WEIGHT: 253.2 LBS | RESPIRATION RATE: 13 BRPM | SYSTOLIC BLOOD PRESSURE: 126 MMHG | BODY MASS INDEX: 38.37 KG/M2 | OXYGEN SATURATION: 96 % | HEART RATE: 78 BPM | HEIGHT: 68 IN

## 2021-10-28 DIAGNOSIS — E66.01 CLASS 2 SEVERE OBESITY DUE TO EXCESS CALORIES WITH SERIOUS COMORBIDITY AND BODY MASS INDEX (BMI) OF 36.0 TO 36.9 IN ADULT (HCC): ICD-10-CM

## 2021-10-28 DIAGNOSIS — R05.3 PERSISTENT DRY COUGH: ICD-10-CM

## 2021-10-28 DIAGNOSIS — F50.81 BINGE EATING DISORDER: Primary | ICD-10-CM

## 2021-10-28 DIAGNOSIS — J45.20 MILD INTERMITTENT ASTHMA WITHOUT COMPLICATION: ICD-10-CM

## 2021-10-28 DIAGNOSIS — I10 ESSENTIAL HYPERTENSION: ICD-10-CM

## 2021-10-28 PROCEDURE — 99214 OFFICE O/P EST MOD 30 MIN: CPT | Performed by: NURSE PRACTITIONER

## 2021-10-28 RX ORDER — ALBUTEROL SULFATE 90 UG/1
2 AEROSOL, METERED RESPIRATORY (INHALATION) EVERY 6 HOURS PRN
Qty: 1 EACH | Refills: 3 | Status: SHIPPED | OUTPATIENT
Start: 2021-10-28

## 2021-10-28 ASSESSMENT — ENCOUNTER SYMPTOMS
TROUBLE SWALLOWING: 0
CONSTIPATION: 0
SINUS PRESSURE: 0
SHORTNESS OF BREATH: 0
NAUSEA: 0
COUGH: 1
CHEST TIGHTNESS: 1
BLOOD IN STOOL: 0

## 2021-10-28 NOTE — PROGRESS NOTES
[facility] PRIMARY CARE  [address] [facility] [address]  [address] [address]  Dept: [phone]  Dept Fax: [phone]    Candice Waldron is a 60 y.o. female who presentstoday for her medical conditions/complaints as noted below. Candice Waldron is c/o of  Chief Complaint   Patient presents with    Cough     x 10 days     Wheezing       HPI:     Here today for follow up  Has not yet started Vyvanse as was unable to obtain from pharmacy due to prior Auth requirement  Approval has been received as of yesterday  She states that since her last visit she has not had any change or improvement in her ability to control her eating habits  Overall she states she feels poorly, \"something must be wrong\"  Reports saw cardiologist this week for an initial evaluation as she was concerned about intermittent leg swelling and feeling of chest tightness  Will be having echo and home sleep study but she reports was told \"low concern\" for her condition    She also has continued to have a dry cough, reports past hx of seasonal asthma and she does not have an inhaler  Has used with relief in the past    Hypertension  This is a chronic problem. The current episode started more than 1 year ago. The problem is controlled. Pertinent negatives include no chest pain, palpitations, peripheral edema or shortness of breath. Past treatments include angiotensin blockers and diuretics. The current treatment provides significant improvement. There is no history of CAD/MI or CVA.        Hemoglobin A1C (%)   Date Value   2019 5.9   2018 5.7   2016 5.6             ( goal A1C is < 7)   No results found for: LABMICR  LDL Cholesterol (mg/dL)   Date Value   2021 115   2020 90   2018            (goal LDL is <100)   AST (U/L)   Date Value   2021 21     ALT (U/L)   Date Value   2021 19     BUN (mg/dL)   Date Value   2021 20     BP Readings from Last 3 Encounters: LASER LITHO, STENT PLACEMENT AND RIGHT URETERAL BASKET STONE EXTRACTION     CYSTOSCOPY Right 12/31/2019    stent removal    CYSTOSCOPY Right 09/19/2020    litho, rt stent    CYSTOSCOPY Right 9/19/2020    CYSTOSCOPY, URETEROSCOPY, HOLMIUM LASER LITHOTRIPSY, STENT PLACEMENT performed by Kem Williamson MD at [address] [facility] [address] [facility] [facility] [address] / STONE Right 12/31/2019    CYSTOSCOPY, STENT REMOVAL performed by Lacie Pan MD at [address] [address] Bilateral [address]    KNEE ARTHROSCOPY Right 1998, 2003    X2    NEPHROLITHOTOMY Right 8/23/2019    HOLMIUM LASER, NEPHROLITHOTOMY PERCUTANEOUS, LITHOCLAST, C-ARM performed by Stephanie Feng MD at [address] [place] [address], [place] [address] N/A 7/10/2018    CYSTOSCOPY, RIGHT URETEROSCOPY, HOLMIUM LASER LITHO WITH STONE BASKETING, RIGHT STENT EXCHANGE performed by Kem Williamson MD at [address] [place] [address], [place] Right 10/10/2018    HOLMIUM LASER STANDBY, CYSTO, RIGHT URETEROSCOPY, RIGHT STENT PLACEMENT performed by Lacie Pan MD at [address] [facility], Right 7/4/2018    CYSTOSCOPY URETERAL STENT INSERTION, RIGHT performed by Mi Romero MD at [address] Left     TOTAL NEPHRECTOMY Left 1988    URETER STENT PLACEMENT Right 12/12/2019       Family History   Problem Relation Age of Onset    High Blood Pressure Mother     Diabetes Father     Cancer Father         tongue    Hypertension Father     Heart Disease Father         stents    Other Father         Mylofibrosis    Hypertension Sister     Heart Disease Maternal Grandmother     Heart Attack Maternal Grandmother     Prostate Cancer Maternal Grandfather     Heart Disease Paternal Grandfather     Stroke Paternal Grandfather     Emphysema Paternal Grandmother           Social History     Tobacco Use    Smoking status: Never Smoker    Smokeless tobacco: Never Used Substance Use Topics    Alcohol use: Yes     Alcohol/week: 0.0 standard drinks     Comment: rare      Current Outpatient Medications   Medication Sig Dispense Refill    albuterol sulfate HFA (PROAIR HFA) 108 (90 Base) MCG/ACT inhaler Inhale 2 puffs into the lungs every 6 hours as needed for Wheezing 1 each 3    lisdexamfetamine (VYVANSE) 30 MG capsule Take 1 capsule by mouth daily for 30 days. 30 capsule 0    irbesartan-hydroCHLOROthiazide (AVALIDE) 150-12.5 MG per tablet TAKE 1 TABLET BY MOUTH ONE TIME A DAY 90 tablet 3    meloxicam (MOBIC) 15 MG tablet TAKE 1 TABLET BY MOUTH ONE TIME A DAY 90 tablet 3    omeprazole (PRILOSEC) 20 MG delayed release capsule TAKE 1 CAPSULE BY MOUTH 2 TIMES A  capsule 3    benzoyl peroxide 5 % external liquid Wash affected areas once daily 227 g 3    clindamycin (CLEOCIN T) 1 % lotion Apply to affected areas daily 60 mL 3    ondansetron (ZOFRAN ODT) 4 MG disintegrating tablet Take 1 tablet by mouth every 8 hours as needed for Nausea 20 tablet 0    POTASSIUM CITRATE PO Take 5 mLs by mouth three times daily      OMEGA-3 FATTY ACIDS PO Take 1 tablet by mouth daily      acetaminophen (TYLENOL) 500 MG tablet Take 1,000 mg by mouth every 4 hours as needed for Pain. No current facility-administered medications for this visit.      No Known Allergies    Health Maintenance   Topic Date Due    Colon cancer screen colonoscopy  Never done    Shingles Vaccine (1 of 2) Never done    A1C test (Diabetic or Prediabetic)  09/23/2020    Flu vaccine (1) 09/01/2021    DTaP/Tdap/Td vaccine (1 - Tdap) 10/15/2023 (Originally 3/2/1982)    TSH testing  08/02/2022    Potassium monitoring  08/02/2022    Creatinine monitoring  08/02/2022    Breast cancer screen  10/21/2023    Cervical cancer screen  01/07/2025    Lipid screen  07/17/2026    Pneumococcal 0-64 years Vaccine (2 of 2 - PPSV23) 03/02/2028    COVID-19 Vaccine  Completed    Hepatitis C screen  Completed    HIV screen  Completed    Hepatitis A vaccine  Aged Out    Hepatitis B vaccine  Aged Out    Hib vaccine  Aged Out    Meningococcal (ACWY) vaccine  Aged Out       Subjective:      Review of Systems   Constitutional: Negative for activity change, appetite change, fatigue and unexpected weight change. HENT: Negative for congestion, hearing loss, sinus pressure and trouble swallowing. Eyes: Negative for visual disturbance. Respiratory: Positive for cough and chest tightness. Negative for shortness of breath. Cardiovascular: Negative for chest pain, palpitations and leg swelling. Gastrointestinal: Negative for blood in stool, constipation and nausea. Endocrine: Negative for cold intolerance, heat intolerance, polydipsia, polyphagia and polyuria. Genitourinary: Negative for difficulty urinating, frequency, hematuria and urgency. Musculoskeletal: Positive for arthralgias. Negative for myalgias. Allergic/Immunologic: Negative for environmental allergies. Neurological: Negative for dizziness, weakness and light-headedness. Psychiatric/Behavioral: Negative for confusion. The patient is not nervous/anxious. Objective:     Physical Exam  Constitutional:       Appearance: She is well-developed. HENT:      Head: Normocephalic. Eyes:      Conjunctiva/sclera: Conjunctivae normal.      Pupils: Pupils are equal, round, and reactive to light. Cardiovascular:      Rate and Rhythm: Normal rate and regular rhythm. Heart sounds: Normal heart sounds. No murmur heard. Pulmonary:      Effort: Pulmonary effort is normal.      Breath sounds: Normal breath sounds. No wheezing. Abdominal:      General: Bowel sounds are normal. There is no distension. Palpations: Abdomen is soft. Musculoskeletal:         General: Normal range of motion. Cervical back: Normal range of motion. Skin:     General: Skin is warm and dry.    Neurological:      Mental Status: She is alert and oriented to person, place, and time. Psychiatric:         Behavior: Behavior normal.         Thought Content: Thought content normal.         Judgment: Judgment normal.       /78   Pulse 78   Resp 13   Ht 5' 8\" (1.727 m)   Wt 253 lb 3.2 oz (114.9 kg)   SpO2 96%   Breastfeeding No   BMI 38.50 kg/m²     Assessment:       Diagnosis Orders   1. Binge eating disorder  lisdexamfetamine (VYVANSE) 30 MG capsule   2. Mild intermittent asthma without complication  albuterol sulfate HFA (PROAIR HFA) 108 (90 Base) MCG/ACT inhaler   3. Essential hypertension     4. Persistent dry cough     5. Class 2 severe obesity due to excess calories with serious comorbidity and body mass index (BMI) of 36.0 to 36.9 in [facility])               Plan:      Return in about 3 months (around 1/28/2022) for binge eating disorder. Binge eating disorder, obesity-approval received yesterday for Vyvanse, will resend prescription to her pharmacy. Reviewed benefits and side effects and to call if experiences any adverse problems. Also discussed may need dose titration in order to reach effectiveness  Asthma, dry cough-discussed may also be stress related as she admits to increased stress at work, she is compliant with daily PPI, will add albuterol as needed. Strongly encouraged if does not improve with use of inhaler to call/return to office for further evaluation. Also offered course of oral steroids but she prefers to try inhaler first hypertension-remains well controlled with current medications       Orders Placed This Encounter   Medications    albuterol sulfate HFA (PROAIR HFA) 108 (90 Base) MCG/ACT inhaler     Sig: Inhale 2 puffs into the lungs every 6 hours as needed for Wheezing     Dispense:  1 each     Refill:  3    lisdexamfetamine (VYVANSE) 30 MG capsule     Sig: Take 1 capsule by mouth daily for 30 days. Dispense:  30 capsule     Refill:  0       Patient given educational materials - see patient instructions. Discussed use, benefit, and side effects of prescribed medications. All patientquestions answered. Pt voiced understanding. Reviewed health maintenance. Instructedto continue current medications, diet and exercise. Patient agreed with treatmentplan. Follow up as directed.      Electronicallysigned by MIGDALIA Hankins CNP on 10/28/2021 at 10:59 AM

## 2021-11-03 ENCOUNTER — HOSPITAL ENCOUNTER (OUTPATIENT)
Age: 56
Discharge: HOME | End: 2021-11-03
Payer: COMMERCIAL

## 2021-11-03 LAB
ANION GAP SERPL CALCULATED.3IONS-SCNC: 12 MMOL/L (ref 9–17)
BNP INTERPRETATION: NORMAL
BUN BLDV-MCNC: 16 MG/DL (ref 6–20)
BUN/CREAT BLD: ABNORMAL (ref 9–20)
CALCIUM SERPL-MCNC: 9.5 MG/DL (ref 8.6–10.4)
CHLORIDE BLD-SCNC: 101 MMOL/L (ref 98–107)
CO2: 25 MMOL/L (ref 20–31)
CREAT SERPL-MCNC: 0.77 MG/DL (ref 0.5–0.9)
GFR AFRICAN AMERICAN: >60 ML/MIN
GFR NON-AFRICAN AMERICAN: >60 ML/MIN
GFR SERPL CREATININE-BSD FRML MDRD: ABNORMAL ML/MIN/{1.73_M2}
GFR SERPL CREATININE-BSD FRML MDRD: ABNORMAL ML/MIN/{1.73_M2}
GLUCOSE BLD-MCNC: 106 MG/DL (ref 70–99)
HCT VFR BLD CALC: 42.5 % (ref 36.3–47.1)
HEMOGLOBIN: 13.6 G/DL (ref 11.9–15.1)
MCH RBC QN AUTO: 27.3 PG (ref 25.2–33.5)
MCHC RBC AUTO-ENTMCNC: 32 G/DL (ref 28.4–34.8)
MCV RBC AUTO: 85.3 FL (ref 82.6–102.9)
NRBC AUTOMATED: 0 PER 100 WBC
PDW BLD-RTO: 14.1 % (ref 11.8–14.4)
PLATELET # BLD: 266 K/UL (ref 138–453)
PMV BLD AUTO: 9.2 FL (ref 8.1–13.5)
POTASSIUM SERPL-SCNC: 4.1 MMOL/L (ref 3.7–5.3)
PRO-BNP: 93 PG/ML
RBC # BLD: 4.98 M/UL (ref 3.95–5.11)
SODIUM BLD-SCNC: 138 MMOL/L (ref 135–144)
WBC # BLD: 10.5 K/UL (ref 3.5–11.3)

## 2021-11-03 PROCEDURE — 85027 COMPLETE CBC AUTOMATED: CPT

## 2021-11-03 PROCEDURE — 36415 COLL VENOUS BLD VENIPUNCTURE: CPT

## 2021-11-03 PROCEDURE — 83880 ASSAY OF NATRIURETIC PEPTIDE: CPT

## 2021-11-03 PROCEDURE — 80048 BASIC METABOLIC PNL TOTAL CA: CPT

## 2021-12-08 ENCOUNTER — HOSPITAL ENCOUNTER (OUTPATIENT)
Dept: CARDIAC CATH/INVASIVE PROCEDURES | Age: 56
Discharge: HOME | End: 2021-12-08
Payer: COMMERCIAL

## 2021-12-08 VITALS
SYSTOLIC BLOOD PRESSURE: 145 MMHG | TEMPERATURE: 97.1 F | RESPIRATION RATE: 18 BRPM | OXYGEN SATURATION: 97 % | HEART RATE: 67 BPM | DIASTOLIC BLOOD PRESSURE: 54 MMHG

## 2021-12-08 LAB
GFR NON-AFRICAN AMERICAN: >60 ML/MIN
GFR SERPL CREATININE-BSD FRML MDRD: >60 ML/MIN
GFR SERPL CREATININE-BSD FRML MDRD: NORMAL ML/MIN/{1.73_M2}
GLUCOSE BLD-MCNC: 123 MG/DL (ref 74–100)
PLATELET # BLD: 238 K/UL (ref 138–453)
POC BUN: 22 MG/DL (ref 8–26)
POC CHLORIDE: 105 MMOL/L (ref 98–107)
POC CREATININE: 0.71 MG/DL (ref 0.51–1.19)
POC HEMATOCRIT: 40 % (ref 36–46)
POC HEMOGLOBIN: 13.7 G/DL (ref 12–16)
POC LACTIC ACID: 1.59 MMOL/L (ref 0.56–1.39)
POC POTASSIUM: 4.1 MMOL/L (ref 3.5–4.5)
POC SODIUM: 141 MMOL/L (ref 138–146)

## 2021-12-08 PROCEDURE — 84520 ASSAY OF UREA NITROGEN: CPT

## 2021-12-08 PROCEDURE — 2580000003 HC RX 258: Performed by: INTERNAL MEDICINE

## 2021-12-08 PROCEDURE — 99152 MOD SED SAME PHYS/QHP 5/>YRS: CPT

## 2021-12-08 PROCEDURE — 2500000003 HC RX 250 WO HCPCS

## 2021-12-08 PROCEDURE — 7100000001 HC PACU RECOVERY - ADDTL 15 MIN

## 2021-12-08 PROCEDURE — 7100000000 HC PACU RECOVERY - FIRST 15 MIN

## 2021-12-08 PROCEDURE — 2709999900 HC NON-CHARGEABLE SUPPLY

## 2021-12-08 PROCEDURE — 83605 ASSAY OF LACTIC ACID: CPT

## 2021-12-08 PROCEDURE — 6360000002 HC RX W HCPCS

## 2021-12-08 PROCEDURE — 82435 ASSAY OF BLOOD CHLORIDE: CPT

## 2021-12-08 PROCEDURE — 82565 ASSAY OF CREATININE: CPT

## 2021-12-08 PROCEDURE — 99153 MOD SED SAME PHYS/QHP EA: CPT

## 2021-12-08 PROCEDURE — 85014 HEMATOCRIT: CPT

## 2021-12-08 PROCEDURE — 85049 AUTOMATED PLATELET COUNT: CPT

## 2021-12-08 PROCEDURE — 82947 ASSAY GLUCOSE BLOOD QUANT: CPT

## 2021-12-08 PROCEDURE — C1760 CLOSURE DEV, VASC: HCPCS

## 2021-12-08 PROCEDURE — 6360000004 HC RX CONTRAST MEDICATION

## 2021-12-08 PROCEDURE — 93458 L HRT ARTERY/VENTRICLE ANGIO: CPT | Performed by: INTERNAL MEDICINE

## 2021-12-08 PROCEDURE — 84295 ASSAY OF SERUM SODIUM: CPT

## 2021-12-08 PROCEDURE — 84132 ASSAY OF SERUM POTASSIUM: CPT

## 2021-12-08 RX ORDER — ACETAMINOPHEN 325 MG/1
650 TABLET ORAL EVERY 4 HOURS PRN
Status: DISCONTINUED | OUTPATIENT
Start: 2021-12-08 | End: 2021-12-09 | Stop reason: HOSPADM

## 2021-12-08 RX ORDER — SODIUM CHLORIDE 0.9 % (FLUSH) 0.9 %
5-40 SYRINGE (ML) INJECTION PRN
Status: DISCONTINUED | OUTPATIENT
Start: 2021-12-08 | End: 2021-12-09 | Stop reason: HOSPADM

## 2021-12-08 RX ORDER — SODIUM CHLORIDE 9 MG/ML
INJECTION, SOLUTION INTRAVENOUS CONTINUOUS
Status: DISCONTINUED | OUTPATIENT
Start: 2021-12-08 | End: 2021-12-09 | Stop reason: HOSPADM

## 2021-12-08 RX ORDER — FUROSEMIDE 20 MG/1
20 TABLET ORAL DAILY
Qty: 60 TABLET | Refills: 3 | Status: SHIPPED | OUTPATIENT
Start: 2021-12-08 | End: 2022-06-08 | Stop reason: ALTCHOICE

## 2021-12-08 RX ORDER — SODIUM CHLORIDE 0.9 % (FLUSH) 0.9 %
5-40 SYRINGE (ML) INJECTION EVERY 12 HOURS SCHEDULED
Status: DISCONTINUED | OUTPATIENT
Start: 2021-12-08 | End: 2021-12-09 | Stop reason: HOSPADM

## 2021-12-08 RX ORDER — SODIUM CHLORIDE 9 MG/ML
25 INJECTION, SOLUTION INTRAVENOUS PRN
Status: DISCONTINUED | OUTPATIENT
Start: 2021-12-08 | End: 2021-12-09 | Stop reason: HOSPADM

## 2021-12-08 RX ADMIN — SODIUM CHLORIDE: 9 INJECTION, SOLUTION INTRAVENOUS at 08:04

## 2021-12-08 NOTE — PROGRESS NOTES
Discharge teaching completed and IV removed. DSD placed over puncture site and armboard placed on right wrist. All questions answered.

## 2021-12-08 NOTE — H&P
[facility]  Procedure History and Physical Update          Patient Name: Yuliana Ralph  MRN:    [id number]  YOB: 1965  Date of evaluation:  12/8/2021    Procedure:    Cardiac cath +/- PCI    Indication for procedure:  Abnormal stress test      Please refer to the office note completed by Dr. Delma Lopez on 11/22/21 in the medical record and note that:    [x] I have examined the patient and reviewed the H&P/Consult and there are no changes to be made to the assessment or plan.     [] I have examined the patient and reviewed the H&P/Consult and have noted the following changes:    Past Medical History:   Diagnosis Date    Arthritis     Asthma     Benign familial tremor     Cancer ([address]     skin    CKD (chronic kidney disease) stage 1, GFR 90 ml/min or greater     Class 2 severe obesity with serious comorbidity and body mass index (BMI) of 39.0 to 39.9 in adult ([address] 5/1/2019    Cystinuria ([facility]     Dyspnea on exertion 8/6/2019    Flank pain     RIGHT    Gastroesophageal reflux disease without esophagitis     GERD (gastroesophageal reflux disease)     Hypertension     Hyperuricemia     Hypothyroidism     Kidney stones     Midline low back pain with right-sided sciatica 6/2/2016    Proteinuria     Shoulder impingement 12/2019    left    Snores     Solitary kidney, acquired 11/20/2017    Urinary tract obstruction by kidney stone 8/1/2019    Wears glasses        Past Surgical History:   Procedure Laterality Date    CARDIAC CATHETERIZATION  12/08/2021    CYSTO/URETERO/PYELOSCOPY, CALCULUS [place] Right 11/21/2017    HOLMIUM LASER - CYSTOSCOPY, RIGHT URETEROSCOPY, RIGHT STENT EXCHANGE,STONE BASKETING performed by Kaley Gonzalez MD at [address], [place] [address] Right 9/13/2019    HOLMIUM - CYSTO, URETEROSCOPY, LITHOTRIPSY, STENT PLACEMENT performed by Christie Lozano MD at [address], [place] [address] Right 12/12/2019    HOLMIUM- CYSTO, URETEROSCOPY, LASER LITHO, STENT PLACEMENT AND RIGHT URETERAL BASKET STONE EXTRACTION performed by Giuliana Márquez MD at [address] [place] [address] Right 11/18/2017    CYSTOSCOPY URETERAL STENT INSERTION,RIGHT performed by Andres Guy MD at [address] [place] [address] Right 8/2/2019    CYSTOSCOPY RT URETERAL STENT INSERTION performed by Adriane Bradshaw MD at [address] [place] [address] Right 8/23/2019    CYSTOSCOPY, RIGHT STENT REMOVAL, INSERTION OCCLUSIVE BALLOON, PT GOING TO INTERVENTIONAL performed by Adriane Bradshaw MD at [address] [place] [address] Right 12/12/2019    CYSTO, URETEROSCOPY, LASER LITHO, STENT PLACEMENT AND RIGHT URETERAL BASKET STONE EXTRACTION     CYSTOSCOPY Right 12/31/2019    stent removal    CYSTOSCOPY Right 09/19/2020    litho, rt stent    CYSTOSCOPY Right 9/19/2020    CYSTOSCOPY, URETEROSCOPY, HOLMIUM LASER LITHOTRIPSY, STENT PLACEMENT performed by Griselda Mancini MD at [address] [facility] [address] Right 12/31/2019    CYSTOSCOPY, STENT REMOVAL performed by Giuliana Márquez MD at [id number] [place] [address] Bilateral 2008    KNEE ARTHROSCOPY Right 1998, 2003    X2    NEPHROLITHOTOMY Right 8/23/2019    HOLMIUM LASER, NEPHROLITHOTOMY PERCUTANEOUS, LITHOCLAST, C-ARM performed by Adriane Bradshaw MD at [address] [place] [address], CALCULUS [place] N/A 7/10/2018    CYSTOSCOPY, RIGHT URETEROSCOPY, HOLMIUM LASER LITHO WITH STONE BASKETING, RIGHT STENT EXCHANGE performed by Griselda Mancini MD at [address] [place] [address], CALCULUS [place] Right 10/10/2018    HOLMIUM LASER STANDBY, CYSTO, RIGHT URETEROSCOPY, RIGHT STENT PLACEMENT performed by Giuliana Márquez MD at [address] Right 7/4/2018    CYSTOSCOPY URETERAL STENT INSERTION, RIGHT performed by Senthil Liao MD at [address] [place] [address] Left     TOTAL NEPHRECTOMY Left 1988    URETER STENT PLACEMENT Right 12/12/2019       Family History   Problem Relation Age of Onset    High Blood Pressure Mother     Diabetes Father     Cancer Father         tongue    Hypertension Father     Heart Disease Father         stents    Other Father         Mylofibrosis    Hypertension Sister     Heart Disease Maternal Grandmother     Heart Attack Maternal Grandmother     Prostate Cancer Maternal Grandfather     Heart Disease Paternal Grandfather     Stroke Paternal Grandfather     Emphysema Paternal Grandmother        No Known Allergies    Prior to Admission medications    Medication Sig Start Date End Date Taking? Authorizing Provider   irbesartan-hydroCHLOROthiazide (AVALIDE) 150-12.5 MG per tablet TAKE 1 TABLET BY MOUTH ONE TIME A DAY 9/30/21  Yes MIGDALIA Chacon CNP   meloxicam (MOBIC) 15 MG tablet TAKE 1 TABLET BY MOUTH ONE TIME A DAY 9/30/21  Yes MIGDALIA Chacon CNP   omeprazole (PRILOSEC) 20 MG delayed release capsule TAKE 1 CAPSULE BY MOUTH 2 TIMES A DAY 9/30/21  Yes MIGDALIA Chacon CNP   POTASSIUM CITRATE PO Take 5 mLs by mouth three times daily   Yes Historical Provider, MD   OMEGA-3 FATTY ACIDS PO Take 1 tablet by mouth daily   Yes Historical Provider, MD   acetaminophen (TYLENOL) 500 MG tablet Take 1,000 mg by mouth every 4 hours as needed for Pain. Yes Historical Provider, MD   albuterol sulfate HFA (PROAIR HFA) 108 (90 Base) MCG/ACT inhaler Inhale 2 puffs into the lungs every 6 hours as needed for Wheezing 10/28/21   MIGDALIA Solorzano CNP   lisdexamfetamine (VYVANSE) 30 MG capsule Take 1 capsule by mouth daily for 30 days.  10/28/21 11/27/21  MIGDALIA Solorzano CNP   benzoyl peroxide 5 % external liquid Wash affected areas once daily 4/7/21   Melina Evans MD   clindamycin (CLEOCIN T) 1 % lotion Apply to affected areas daily 4/7/21   Marialuisa Stuart MD   ondansetron (ZOFRAN ODT) 4 MG disintegrating tablet Take 1 tablet by mouth every 8 hours as needed for Nausea 9/18/20   Xavi Sandoval, DO solifenacin (VESICARE) 10 MG tablet Take 10 mg by mouth as needed. 12/23/14  Historical Provider, MD         There were no vitals filed for this visit. Constitutional and General Appearance:   alert, cooperative, no distress and appears stated age  [de-identified]:  · PERRL, EOMI  Respiratory:  · Normal excursion and expansion without use of accessory muscles  · Resp Auscultation:  Good respiratory effort. No for increased work of breathing. On auscultation: clear to auscultation bilaterally  Cardiovascular:  · Regular rate and rhythm. · S1/S2  · No murmurs. · The apical impulse is not displaced  Abdomen:  · Soft  · Bowel sounds present  · Non-tender to palpation  Extremities:  · No cyanosis or clubbing  · Lower extremity edema: No.  Skin:  · Warm and dry  Neurological:  · Alert and oriented    DATA:      Impression:  1. Positive stress test with small anterior & anterolateral wall ischemia. 2. Chest Pain  3. HTN  4. GERD    Plan:  · Proceed with planned procedure. · Further orders to follow. Risks, benefits, and alternatives of cardiac catheterization were discussed, in detail, with patient. Risks include, but not limited to, bleeding, requiring blood transfusion, vascular complication requiring surgery, renal failure with need of dialysis, CVA, MI, death and anesthesia complications including intubation were discussed. Patient verbalized understanding and agreed to proceed with the procedure understanding the above risks and alternatives to the procedure. Risks, benefits, alternatives, and details discussed extensively. Accepts and consents.        Pre Procedure Conscious Sedation Data:  ASA Class:                  [] I [x] II [] III [] IV     Mallampati Class:       [] I [x] II [] III [] Rafa Anand MD  Fellow, [address]

## 2021-12-08 NOTE — PROGRESS NOTES
Patient admitted, consent signed, all questions answered. Pt ready for procedure. Bed in low position, call light to reach with side rails up 2 of 2. Groins  clipped. Friend at bedside with patient.

## 2021-12-08 NOTE — OP NOTE
____________________________________________________________________    History and Risk Factors    [x] Hypertension     [] Family history of CAD  [] Hyperlipidemia     [] Cerebrovascular Disease   [] Prior MI       [] Peripheral Vascular disease   [] Prior PCI              [] Diabetes Mellitus    [] Left Main PCI. [] Currently on Dialysis. [] Prior CABG. [] Currently smoker. [] Cardiac Arrest outside of healthcare facility. [] Yes    [x] No        Witnessed     [] Yes   [] No     Arrest after arrival of EMS  [] Yes   [] No     [] Cardiac Arrest at other Facility. [] Yes   [x] No    Pre-Procedure Information. Heart Failure       [] Yes    [x] No        Class  [] I      [] II  [] III    [] IV. New Diagnosis    [] Yes  [] No    HF Type      [] Systolic   [] Diastolic          [] Unknown. Diagnostic Test:   EKG       [] Normal   [x] Abnormal    New antiarrhythmia medications:    [] Yes   [] No   New onset atrial fibrillation / Flutter     [] Yes   [] No   ECG Abnormalities:      [] V. Fib   [] Jaylin V. Tach           [] NS V. T   [] New LBBB           [] T. Inv  []  ST dev > 0.5 mm         [] PVC's freq  [] PVC's infrequent    Stress Test Performed:      [x] Yes    [] No     Type:     [] Stress Echo   [] Exercise Stress Test (no imaging)      [x] Stress Nuclear  [] Stress Imaging     Results   [] Negative   [x] Positive        [] Indeterminate  [] Unavailable     If Positive/ Risk / Extent of Ischemia:       [] Low  [] Intermediate         [] High  [] Unavailable      Cardiac CTA Performed:     [] Yes    [x] No      Results   [] CAD   [] Non obstructive CAD      [] No CAD   [] Uncertain      [] Unknown   [] Structural Disease.      Pre Procedure Medications:   [x] Yes    [] No         [x] ASA   [] Beta Blockers      [] Nitrate   [] Ca Channel Blockers      [] Ranolazine   [] Statin       [] Plavix/Others antiplatelets      Electronically signed on 12/8/2021 at 8:59 AM by:    Donald Manjarrez MD  Fellow, [address] [place] [address] HUMBERTO Joe MD.   [facility]

## 2021-12-18 ENCOUNTER — HOSPITAL ENCOUNTER (OUTPATIENT)
Age: 56
Discharge: HOME | End: 2021-12-18
Payer: COMMERCIAL

## 2021-12-18 LAB
ANION GAP SERPL CALCULATED.3IONS-SCNC: 14 MMOL/L (ref 9–17)
BUN BLDV-MCNC: 17 MG/DL (ref 6–20)
BUN/CREAT BLD: NORMAL (ref 9–20)
CALCIUM SERPL-MCNC: 9.5 MG/DL (ref 8.6–10.4)
CHLORIDE BLD-SCNC: 102 MMOL/L (ref 98–107)
CO2: 24 MMOL/L (ref 20–31)
CREAT SERPL-MCNC: 0.77 MG/DL (ref 0.5–0.9)
GFR AFRICAN AMERICAN: >60 ML/MIN
GFR NON-AFRICAN AMERICAN: >60 ML/MIN
GFR SERPL CREATININE-BSD FRML MDRD: NORMAL ML/MIN/{1.73_M2}
GFR SERPL CREATININE-BSD FRML MDRD: NORMAL ML/MIN/{1.73_M2}
GLUCOSE BLD-MCNC: 94 MG/DL (ref 70–99)
POTASSIUM SERPL-SCNC: 4.3 MMOL/L (ref 3.7–5.3)
SODIUM BLD-SCNC: 140 MMOL/L (ref 135–144)

## 2021-12-18 PROCEDURE — 80048 BASIC METABOLIC PNL TOTAL CA: CPT

## 2021-12-18 PROCEDURE — 36415 COLL VENOUS BLD VENIPUNCTURE: CPT

## 2022-02-06 DIAGNOSIS — F50.81 BINGE EATING DISORDER: ICD-10-CM

## 2022-02-07 RX ORDER — LISDEXAMFETAMINE DIMESYLATE 30 MG/1
CAPSULE ORAL
Qty: 30 CAPSULE | Refills: 0 | Status: SHIPPED | OUTPATIENT
Start: 2022-02-07 | End: 2022-06-08 | Stop reason: ALTCHOICE

## 2022-03-09 ENCOUNTER — HOSPITAL ENCOUNTER (OUTPATIENT)
Dept: GENERAL RADIOLOGY | Age: 57
Discharge: HOME | End: 2022-03-11
Payer: COMMERCIAL

## 2022-03-09 ENCOUNTER — HOSPITAL ENCOUNTER (OUTPATIENT)
Age: 57
Discharge: HOME | End: 2022-03-11
Payer: COMMERCIAL

## 2022-03-09 DIAGNOSIS — N20.0 CALCULUS OF KIDNEY: ICD-10-CM

## 2022-03-09 PROCEDURE — 74018 RADEX ABDOMEN 1 VIEW: CPT

## 2022-03-31 DIAGNOSIS — R05.8 COUGH PRESENT FOR GREATER THAN 3 WEEKS: ICD-10-CM

## 2022-04-01 RX ORDER — OMEPRAZOLE 20 MG/1
CAPSULE, DELAYED RELEASE ORAL
Qty: 180 CAPSULE | Refills: 3 | Status: SHIPPED | OUTPATIENT
Start: 2022-04-01

## 2022-04-04 ENCOUNTER — OFFICE VISIT (OUTPATIENT)
Dept: PRIMARY CARE CLINIC | Age: 57
End: 2022-04-04
Payer: COMMERCIAL

## 2022-04-04 VITALS
HEART RATE: 78 BPM | DIASTOLIC BLOOD PRESSURE: 87 MMHG | TEMPERATURE: 98.2 F | SYSTOLIC BLOOD PRESSURE: 142 MMHG | OXYGEN SATURATION: 95 %

## 2022-04-04 DIAGNOSIS — L03.313 CELLULITIS OF CHEST WALL: Primary | ICD-10-CM

## 2022-04-04 PROCEDURE — 99213 OFFICE O/P EST LOW 20 MIN: CPT | Performed by: INTERNAL MEDICINE

## 2022-04-04 RX ORDER — CLINDAMYCIN PHOSPHATE 10 MG/G
GEL TOPICAL
Qty: 60 G | Refills: 1 | Status: SHIPPED | OUTPATIENT
Start: 2022-04-04 | End: 2022-04-11

## 2022-04-04 RX ORDER — IRBESARTAN 150 MG/1
TABLET ORAL
COMMUNITY
Start: 2022-03-21 | End: 2022-06-08 | Stop reason: ALTCHOICE

## 2022-04-04 RX ORDER — CEPHALEXIN 500 MG/1
500 CAPSULE ORAL 3 TIMES DAILY
Qty: 30 CAPSULE | Refills: 0 | Status: SHIPPED | OUTPATIENT
Start: 2022-04-04 | End: 2022-06-08 | Stop reason: ALTCHOICE

## 2022-04-04 NOTE — PROGRESS NOTES
[facility] IN CARE  [address] [address]  Dept: [phone]  Dept Fax: [phone]    Mago Leon is a 59 y.o. female who presents to the urgent care today for her medicalconditions/complaints as noted below. Mago Leon is c/o of Cellulitis (right breast , come and goes )      HPI:     Rash  This is a new problem. The current episode started 1 to 4 weeks ago. The problem has been waxing and waning since onset. The affected locations include the chest (lefgt breast). The rash is characterized by redness and pain. Past treatments include nothing. The treatment provided no relief. Past Medical History:   Diagnosis Date    Arthritis     Asthma     Benign familial tremor     Cancer ([address]     skin    CKD (chronic kidney disease) stage 1, GFR 90 ml/min or greater     Class 2 severe obesity with serious comorbidity and body mass index (BMI) of 39.0 to 39.9 in adult ([address] 5/1/2019    Cystinuria ([address]     Dyspnea on exertion 8/6/2019    Flank pain     RIGHT    Gastroesophageal reflux disease without esophagitis     GERD (gastroesophageal reflux disease)     Hypertension     Hyperuricemia     Hypothyroidism     Kidney stones     Midline low back pain with right-sided sciatica 6/2/2016    Proteinuria     Shoulder impingement 12/2019    left    Snores     Solitary kidney, acquired 11/20/2017    Urinary tract obstruction by kidney stone 8/1/2019    Wears glasses         Current Outpatient Medications   Medication Sig Dispense Refill    irbesartan (AVAPRO) 150 MG tablet       cephALEXin (KEFLEX) 500 MG capsule Take 1 capsule by mouth 3 times daily 30 capsule 0    clindamycin (CLEOCIN-T) 1 % gel Apply topically 2 times daily.  60 g 1    omeprazole (PRILOSEC) 20 MG delayed release capsule TAKE 1 CAPSULE BY MOUTH 2 TIMES A  capsule 3    furosemide (LASIX) 20 MG tablet Take 1 tablet by mouth daily 60 tablet 3    albuterol sulfate HFA (PROAIR HFA) 108 (90 Base) MCG/ACT inhaler Inhale 2 puffs into the lungs every 6 hours as needed for Wheezing 1 each 3    irbesartan-hydroCHLOROthiazide (AVALIDE) 150-12.5 MG per tablet TAKE 1 TABLET BY MOUTH ONE TIME A DAY 90 tablet 3    meloxicam (MOBIC) 15 MG tablet TAKE 1 TABLET BY MOUTH ONE TIME A DAY 90 tablet 3    benzoyl peroxide 5 % external liquid Wash affected areas once daily 227 g 3    clindamycin (CLEOCIN T) 1 % lotion Apply to affected areas daily 60 mL 3    ondansetron (ZOFRAN ODT) 4 MG disintegrating tablet Take 1 tablet by mouth every 8 hours as needed for Nausea 20 tablet 0    POTASSIUM CITRATE PO Take 5 mLs by mouth three times daily      OMEGA-3 FATTY ACIDS PO Take 1 tablet by mouth daily      acetaminophen (TYLENOL) 500 MG tablet Take 1,000 mg by mouth every 4 hours as needed for Pain.  VYVANSE 30 MG capsule TAKE 1 CAPSULE BY MOUTH ONCE DAILY FOR 30 DAYS. 30 capsule 0     No current facility-administered medications for this visit.      No Known Allergies    Health Maintenance   Topic Date Due    Colorectal Cancer Screen  Never done    Shingles Vaccine (1 of 2) Never done    A1C test (Diabetic or Prediabetic)  09/23/2020    COVID-19 Vaccine (3 - Booster for Moderna series) 07/08/2021    DTaP/Tdap/Td vaccine (1 - Tdap) 10/15/2023 (Originally [birth year]/2/1982)    TSH testing  08/02/2022    Flu vaccine (Season Ended) 09/01/2022    Depression Monitoring  09/30/2022    Potassium monitoring  12/18/2022    Creatinine monitoring  12/18/2022    Breast cancer screen  10/21/2023    Cervical cancer screen  01/07/2025    Lipid screen  07/17/2026    Pneumococcal 0-64 years Vaccine (2 of 2 - PPSV23) 03/02/2028    Hepatitis C screen  Completed    HIV screen  Completed    Hepatitis A vaccine  Aged Out    Hepatitis B vaccine  Aged Out    Hib vaccine  Aged Out    Meningococcal (ACWY) vaccine  Aged Out       Subjective:      Review of Systems   Skin: Positive for rash. All other systems reviewed and are negative. Objective:     Physical Exam  Vitals reviewed. Constitutional:       Appearance: Normal appearance. HENT:      Head: Normocephalic and atraumatic. Skin:     General: Skin is warm and dry. Neurological:      General: No focal deficit present. Mental Status: She is alert and oriented to person, place, and time. Psychiatric:         Mood and Affect: Mood normal.         Behavior: Behavior normal.       BP (!) 142/87   Pulse 78   Temp 98.2 °F (36.8 °C) (Temporal)   SpO2 95%       Assessment:       Diagnosis Orders   1. Cellulitis of chest wall  cephALEXin (KEFLEX) 500 MG capsule    clindamycin (CLEOCIN-T) 1 % gel       Plan:      No follow-ups on file. Orders Placed This Encounter   Medications    cephALEXin (KEFLEX) 500 MG capsule     Sig: Take 1 capsule by mouth 3 times daily     Dispense:  30 capsule     Refill:  0    clindamycin (CLEOCIN-T) 1 % gel     Sig: Apply topically 2 times daily. Dispense:  60 g     Refill:  1     No orders of the defined types were placed in this encounter. Patient given educational materials - see patient instructions. Discussed use, benefit, and side effects of prescribed medications. All patientquestions answered. Pt voiced understanding.     Electronically signed by Vannessa Garland MD on 4/4/2022at 11:42 AM

## 2022-06-08 ENCOUNTER — OFFICE VISIT (OUTPATIENT)
Dept: OBGYN CLINIC | Age: 57
End: 2022-06-08
Payer: COMMERCIAL

## 2022-06-08 VITALS
SYSTOLIC BLOOD PRESSURE: 147 MMHG | HEIGHT: 68 IN | BODY MASS INDEX: 39.4 KG/M2 | DIASTOLIC BLOOD PRESSURE: 93 MMHG | HEART RATE: 78 BPM | WEIGHT: 260 LBS

## 2022-06-08 DIAGNOSIS — N95.0 POSTMENOPAUSAL BLEEDING: Primary | ICD-10-CM

## 2022-06-08 PROCEDURE — 99213 OFFICE O/P EST LOW 20 MIN: CPT | Performed by: OBSTETRICS & GYNECOLOGY

## 2022-06-08 ASSESSMENT — ENCOUNTER SYMPTOMS
BACK PAIN: 0
COUGH: 0
SHORTNESS OF BREATH: 0
ABDOMINAL PAIN: 0

## 2022-06-08 NOTE — PROGRESS NOTES
[facility] - [address] [facility] [address]  Dept: [phone]  Dept Fax: [phone]    22    Chief Complaint   Patient presents with    Vaginal Bleeding     PMB       Aldo Teran 59 y.o. has a complaint of postmenopausal bleeding. She says in the month of May she had more postmenopausal bleeding. She says that she was bloating and was having spotting every couple of days for the month. Review of Systems   Constitutional: Negative for chills. HENT: Negative for congestion. Respiratory: Negative for cough and shortness of breath. Cardiovascular: Negative for chest pain and palpitations. Gastrointestinal: Negative for abdominal pain. Genitourinary: Negative for dyspareunia, pelvic pain and vaginal discharge. Musculoskeletal: Negative for back pain. Neurological: Negative for dizziness and light-headedness. Psychiatric/Behavioral: The patient is not nervous/anxious. Gynecologic History  No LMP recorded.  Patient is postmenopausal.  Contraception: post menopausal status  Last Pap: 20  Results: normal  Last Mammogram: 10/21/21  Results: normal    Obstetric History  : 0  Para: 0  AB: 0    Past Medical History:   Diagnosis Date    Arthritis     Asthma     Benign familial tremor     Cancer ([address]     skin    CKD (chronic kidney disease) stage 1, GFR 90 ml/min or greater     Class 2 severe obesity with serious comorbidity and body mass index (BMI) of 39.0 to 39.9 in adult ([address] 2019    Cystinuria ([facility] [address]     Dyspnea on exertion 2019    Flank pain     RIGHT    Gastroesophageal reflux disease without esophagitis     GERD (gastroesophageal reflux disease)     Hypertension     Hyperuricemia     Hypothyroidism     Kidney stones     Midline low back pain with right-sided sciatica 2016    Proteinuria     Shoulder impingement 2019    left    Snores     Solitary kidney, acquired 11/20/2017    Urinary tract obstruction by kidney stone 8/1/2019    Wears glasses      Past Surgical History:   Procedure Laterality Date    CARDIAC CATHETERIZATION  12/08/2021    CYSTO/URETERO/PYELOSCOPY, [place] Right 11/21/2017    HOLMIUM LASER - CYSTOSCOPY, RIGHT URETEROSCOPY, RIGHT STENT EXCHANGE,STONE BASKETING performed by Taylor Valencia MD at [address], [place] [address] Right 9/13/2019    HOLMIUM - CYSTO, URETEROSCOPY, LITHOTRIPSY, STENT PLACEMENT performed by Vernon Peoples MD at [address], [place] [address] Right 12/12/2019    HOLMIUM- CYSTO, URETEROSCOPY, LASER LITHO, STENT PLACEMENT AND RIGHT URETERAL BASKET STONE EXTRACTION performed by Taylor Valencia MD at [address] [place] [address] Drive Right 11/18/2017    CYSTOSCOPY URETERAL STENT INSERTION,RIGHT performed by Theron Angel MD at [address] [place] [address] Drive Right 8/2/2019    CYSTOSCOPY RT URETERAL STENT INSERTION performed by Vernon Peoples MD at [address] [place] [address] Drive Right 8/23/2019    CYSTOSCOPY, RIGHT STENT REMOVAL, INSERTION OCCLUSIVE BALLOON, PT GOING TO INTERVENTIONAL performed by Vernon Peoples MD at [address] [place] [address] Drive Right 12/12/2019    CYSTO, URETEROSCOPY, LASER LITHO, STENT PLACEMENT AND RIGHT URETERAL BASKET STONE EXTRACTION     CYSTOSCOPY Right 12/31/2019    stent removal    CYSTOSCOPY Right 09/19/2020    litho, rt stent    CYSTOSCOPY Right 9/19/2020    CYSTOSCOPY, URETEROSCOPY, HOLMIUM LASER LITHOTRIPSY, STENT PLACEMENT performed by Leeanna Dee MD at [address] / [place] / STONE Right 12/31/2019    CYSTOSCOPY, STENT REMOVAL performed by Taylor Valencia MD at [address] Bilateral 2008    KNEE ARTHROSCOPY Right 1998, 2003    X2    NEPHROLITHOTOMY Right 8/23/2019    HOLMIUM LASER, NEPHROLITHOTOMY PERCUTANEOUS, LITHOCLAST, C-ARM performed by Vernon Peoples MD at [place] [address] [place] N/A 7/10/2018    CYSTOSCOPY, RIGHT URETEROSCOPY, HOLMIUM LASER LITHO WITH STONE BASKETING, RIGHT STENT EXCHANGE performed by Yvonne Sutherland MD at [address], [place] Right 10/10/2018    HOLMIUM LASER STANDBY, CYSTO, RIGHT URETEROSCOPY, RIGHT STENT PLACEMENT performed by Valeri Walker MD at [address] [facility] Right 7/4/2018    CYSTOSCOPY URETERAL STENT INSERTION, RIGHT performed by Theo Whitney MD at [address] [facility] Left     TOTAL NEPHRECTOMY Left 1988    URETER [address] Right 12/12/2019     No Known Allergies  Current Outpatient Medications   Medication Sig Dispense Refill    omeprazole (PRILOSEC) 20 MG delayed release capsule TAKE 1 CAPSULE BY MOUTH 2 TIMES A  capsule 3    albuterol sulfate HFA (PROAIR HFA) 108 (90 Base) MCG/ACT inhaler Inhale 2 puffs into the lungs every 6 hours as needed for Wheezing 1 each 3    irbesartan-hydroCHLOROthiazide (AVALIDE) 150-12.5 MG per tablet TAKE 1 TABLET BY MOUTH ONE TIME A DAY 90 tablet 3    meloxicam (MOBIC) 15 MG tablet TAKE 1 TABLET BY MOUTH ONE TIME A DAY 90 tablet 3    benzoyl peroxide 5 % external liquid Wash affected areas once daily 227 g 3    clindamycin (CLEOCIN T) 1 % lotion Apply to affected areas daily 60 mL 3    ondansetron (ZOFRAN ODT) 4 MG disintegrating tablet Take 1 tablet by mouth every 8 hours as needed for Nausea 20 tablet 0    POTASSIUM CITRATE PO Take 5 mLs by mouth three times daily      OMEGA-3 FATTY ACIDS PO Take 1 tablet by mouth daily      acetaminophen (TYLENOL) 500 MG tablet Take 1,000 mg by mouth every 4 hours as needed for Pain. No current facility-administered medications for this visit.      Social History     Socioeconomic History    Marital status: Single     Spouse name: Not on file    Number of children: 0    Years of education: graduate degree    Highest education level: Not on file   Occupational History    Occupation: trauma cordinator RN     Employer: [employer]   Tobacco Use    Smoking status: Never Smoker    Smokeless tobacco: Never Used   Vaping Use    Vaping Use: Never used   Substance and Sexual Activity    Alcohol use: Yes     Alcohol/week: 0.0 standard drinks     Comment: rare    Drug use: Never    Sexual activity: Not Currently   Other Topics Concern    Not on file   Social History Narrative    Not on file     Social Determinants of Health     Financial Resource Strain: Low Risk     Difficulty of Paying Living Expenses: Not hard at all   Food Insecurity: No Food Insecurity    Worried About [address] [facility] in the Last Year: Never true    [facility] in the Last Year: Never true   Transportation Needs:     Lack of Transportation (Medical): Not on file    Lack of Transportation (Non-Medical):  Not on file   Physical Activity:     Days of Exercise per Week: Not on file    Minutes of Exercise per Session: Not on file   Stress:     Feeling of Stress : Not on file   Social Connections:     Frequency of Communication with Friends and Family: Not on file    Frequency of Social Gatherings with Friends and Family: Not on file    Attends [employer] Services: Not on file    Active Member of [address] [place] [address] or Organizations: Not on file    Attends Club or Organization Meetings: Not on file    Marital Status: Not on file   Intimate Partner Violence:     Fear of Current or Ex-Partner: Not on file    Emotionally Abused: Not on file    Physically Abused: Not on file    Sexually Abused: Not on file   Housing Stability:     Unable to Pay for Housing in the Last Year: Not on file    Number of [place] in the Last Year: Not on file    Unstable Housing in the Last Year: Not on file     Family History   Problem Relation Age of Onset    High Blood Pressure Mother     Diabetes Father     Cancer Father         tongue    Hypertension Father     Heart Disease Father         stents    Other Father Mylofibrosis    Hypertension Sister     Heart Disease Maternal Grandmother     Heart Attack Maternal Grandmother     Prostate Cancer Maternal Grandfather     Heart Disease Paternal Grandfather     Stroke Paternal Grandfather     Emphysema Paternal Grandmother        Physical exam Physical Exam  Constitutional:       Appearance: Normal appearance. She is obese. HENT:      Head: Normocephalic. Eyes:      Extraocular Movements: Extraocular movements intact. Pulmonary:      Effort: Pulmonary effort is normal.   Neurological:      Mental Status: She is alert and oriented to person, place, and time. Psychiatric:         Mood and Affect: Mood normal.         Behavior: Behavior normal.         Thought Content: Thought content normal.         Judgment: Judgment normal.         Assessment/Plan  1. Postmenopausal bleeding  - 2nd episode of PMB. - Previously had a thickened EMB - will have pt perform u/s again  - Recommended that if pt has another thickened stripe to do a D&C and give progesterone therapy. Recommended IUD, but pt declines. She might be interested in provera vs aygestin.       Pt to follow up for u/s then will call with recommendations  Tessa Pond MD  [address]

## 2022-06-10 ENCOUNTER — TELEPHONE (OUTPATIENT)
Dept: OBGYN CLINIC | Age: 57
End: 2022-06-10

## 2022-06-14 LAB
CHOLESTEROL/HDL RATIO: 5.3
CHOLESTEROL: 227 MG/DL
GLUCOSE BLD-MCNC: 111 MG/DL (ref 70–99)
HDLC SERPL-MCNC: 43 MG/DL
LDL CHOLESTEROL: 106 MG/DL (ref 0–130)
PATIENT FASTING?: YES
TRIGL SERPL-MCNC: 392 MG/DL

## 2022-06-29 ENCOUNTER — ANESTHESIA EVENT (OUTPATIENT)
Dept: OPERATING ROOM | Age: 57
End: 2022-06-29
Payer: COMMERCIAL

## 2022-06-29 PROBLEM — Z98.890 POSTOPERATIVE STATE: Status: ACTIVE | Noted: 2022-06-29

## 2022-06-29 NOTE — H&P
OB/GYN Pre-Op H&P  [address]    Patient Name: Jacqui Roca     Patient : 1965  Room/Bed: [facility]/NONE  Admission Date/Time: 2022  8:32 AM  Primary Care Physician: MIGDALIA Hartman CNP  MRN: [id number]    Date: 2022  Time: 10:08 AM    The patient was seen in pre-op holding. She is here for previously scheduled hysteroscopy, dilation and curettage. The patient has been experiencing postmenopausal bleeding since May 2022, with spotting every couple of days. TVUS completed on 2022 revealed thickened endometrial stripe of 1.8 cm. Patient is agreeable to endometrial sampling in the OR setting. The procedure risks and complications were reviewed. The labs, Consent, and H&P were reviewed and updated. The patient was counseled on the possibility of  the need of a second surgery. The patient voiced understanding and had all of her questions answered. The possibility of incomplete removal of abnormal tissue was discussed. OBSTETRICAL HISTORY:   OB History    Para Term  AB Living   0 0 0 0 0 0   SAB IAB Ectopic Molar Multiple Live Births   0 0 0 0 0 0       PAST MEDICAL HISTORY:   has a past medical history of Arthritis, Asthma, Benign familial tremor, Cancer ([address] CKD (chronic kidney disease) stage 1, GFR 90 ml/min or greater, Class 2 severe obesity with serious comorbidity and body mass index (BMI) of 39.0 to 39.9 in adult ([address] Cystinuria ([address] Dyspnea on exertion, Flank pain, Gastroesophageal reflux disease without esophagitis, GERD (gastroesophageal reflux disease), Hypertension, Hyperuricemia, Hypothyroidism, Kidney stones, Midline low back pain with right-sided sciatica, Proteinuria, Shoulder impingement, Snores, Solitary kidney, acquired, Urinary tract obstruction by kidney stone, and Wears glasses. PAST SURGICAL HISTORY:   has a past surgical history that includes total nephrectomy (Left, );  Knee arthroscopy (Right, , 2003); Cystoscopy (Right, 11/18/2017); cysto/uretero/pyeloscopy, calculus tx (Right, 11/21/2017); pr cystoscopy,insert ureteral stent (Right, 7/4/2018); pr cysto/uretero/pyeloscopy, calculus tx (N/A, 7/10/2018); pr cysto/uretero/pyeloscopy, calculus tx (Right, 10/10/2018); Cystoscopy (Right, 8/2/2019); Rotator cuff repair (Left); Intraocular lens insertion (Bilateral, 2008); Cystoscopy (Right, 8/23/2019); NEPHROLITHOTOMY (Right, 8/23/2019); cysto/uretero/pyeloscopy, calculus tx (Right, 9/13/2019); Cystoscopy (Right, 12/12/2019); Ureter stent placement (Right, 12/12/2019); cysto/uretero/pyeloscopy, calculus tx (Right, 12/12/2019); Cystocopy (Right, 12/31/2019); CYSTOSCOPY INSERTION / REMOVAL STENT / STONE (Right, 12/31/2019); Cystocopy (Right, 09/19/2020); Cystoscopy (Right, 9/19/2020); and Cardiac catheterization (12/08/2021). ALLERGIES:  Allergies as of 06/14/2022    (No Known Allergies)       MEDICATIONS:  Current Facility-Administered Medications   Medication Dose Route Frequency Provider Last Rate Last Admin    scopolamine (TRANSDERM-SCOP) transdermal patch 1 patch  1 patch TransDERmal Once Jose Daniel Lopez MD   1 patch at 06/30/22 8180       FAMILY HISTORY:  family history includes Cancer in her father; Diabetes in her father; Emphysema in her paternal grandmother; Heart Attack in her maternal grandmother; Heart Disease in her father, maternal grandmother, and paternal grandfather; High Blood Pressure in her mother; Hypertension in her father and sister; Other in her father; Prostate Cancer in her maternal grandfather; Stroke in her paternal grandfather. SOCIAL HISTORY:   reports that she has never smoked. She has never used smokeless tobacco. She reports current alcohol use. She reports that she does not use drugs.     VITALS:  Vitals:    06/27/22 1200 06/30/22 0854   BP:  (!) 159/95   Pulse:  74   Resp:  18   Temp:  97.9 °F (36.6 °C)   TempSrc:  Temporal   SpO2:  96%   Weight: 260 lb (117.9 kg) 260 lb (117.9 kg)   Height: 5' 8\" (1.727 m) 5' 8\" (1.727 m)                                                                                                                               PHYSICAL EXAM:     Unchanged from Prior H&P  CONSTITUTIONAL:  Alert and oriented, no acute distress  HEAD: normocephalic, atraumatic  EYES: Pupils equal and reactive to light, Extraocular muscles intact, sclera non icteric  ENT: Mucus membranes moist, No otorrhea, no rhinorrhea  NECK:  supple, symmetrical, trachea midline   LUNGS:  Good air movement bilaterally, unlabored respirations, no wheezes or rhonchi  CARDIOVASCULAR: Regular rate and rhythm, no murmurs rubs or gallops  ABDOMEN: soft, non tender, non distended, no rebound or guarding, no hernias, no hepatomegaly, no splenomegly  MUSCULOSKELETAL:  Equal strength bilaterally, normal muscle tone  SKIN: No abscess or rash  NEUROLOGIC:  Cranial nerves 2-12 grossly intact, no focal deficits  PSYCH: affect appropriate  Pelvic Exam: deferred to OR      LAB RESULTS:  Admission on 06/30/2022   Component Date Value Ref Range Status    WBC 06/30/2022 8.0  3.5 - 11.3 k/uL Final    RBC 06/30/2022 4.92  3.95 - 5.11 m/uL Final    Hemoglobin 06/30/2022 13.1  11.9 - 15.1 g/dL Final    Hematocrit 06/30/2022 41.3  36.3 - 47.1 % Final    MCV 06/30/2022 83.9  82.6 - 102.9 fL Final    MCH 06/30/2022 26.6  25.2 - 33.5 pg Final    MCHC 06/30/2022 31.7  28.4 - 34.8 g/dL Final    RDW 06/30/2022 14.7* 11.8 - 14.4 % Final    Platelets 34/85/4433 250  138 - 453 k/uL Final    MPV 06/30/2022 9.5  8.1 - 13.5 fL Final    NRBC Automated 06/30/2022 0.0  0.0 per 100 WBC Final    Ventricular Rate 06/30/2022 70  BPM Preliminary    Atrial Rate 06/30/2022 70  BPM Preliminary    P-R Interval 06/30/2022 160  ms Preliminary    QRS Duration 06/30/2022 98  ms Preliminary    Q-T Interval 06/30/2022 428  ms Preliminary    QTc Calculation (Bazett) 06/30/2022 462  ms Preliminary    P Axis 06/30/2022 28  degrees Preliminary    R Axis 06/30/2022 -10  degrees Preliminary    T Axis 06/30/2022 13  degrees Preliminary    POC Creatinine 06/30/2022 0.86  0.51 - 1.19 mg/dL Final    GFR Comment 06/30/2022 >60  >60 mL/min Final    GFR Non- 06/30/2022 >60  >60 mL/min Final    GFR Comment 06/30/2022        Final    Comment: Average GFR for 52-63 years old:   80 mL/min/1.73sq m  Chronic Kidney Disease:   <60 mL/min/1.73sq m  Kidney failure:   <15 mL/min/1.73sq m              eGFR calculated using average adult body mass. Additional eGFR calculator available at:        iStreamPlan[employer]            POC BUN 06/30/2022 20  8 - 26 mg/dL Final    POC Glucose 06/30/2022 108* 74 - 100 mg/dL Final   Orders Only on 06/14/2022   Component Date Value Ref Range Status    Glucose 06/14/2022 111* 70 - 99 mg/dL Final    Cholesterol 06/14/2022 227* <200 mg/dL Final    Comment:    Cholesterol Guidelines:      <200  Desirable   200-240  Borderline      >240  Undesirable         HDL 06/14/2022 43  >40 mg/dL Final    Comment:    HDL Guidelines:    <40     Undesirable   40-59    Borderline    >59     Desirable         LDL Cholesterol 06/14/2022 106  0 - 130 mg/dL Final    Comment:    LDL Guidelines:     <100    Desirable   100-129   Near to/above Desirable   130-159   Borderline      >159   Undesirable     Direct (measured) LDL and calculated LDL are not interchangeable tests.  Chol/HDL Ratio 06/14/2022 5.3* <5 Final            Triglycerides 06/14/2022 392* <150 mg/dL Final    Comment:    Triglyceride Guidelines:     <150   Desirable   150-199  Borderline   200-499  High     >499   Very high   Based on AHA Guidelines for fasting triglyceride, October 2012.          Patient Fasting? 06/14/2022 YES   Final       DIAGNOSTICS:    US PELVIS COMPLETE NON-OB TRANSABDOMINAL AND TRANSVAGINAL    Result Date: 6/10/2022  UTERUS: 5.8 x 4.2 x 3.1 cm Nabothian cysts seen w/in rodríguez   ENDO: 1.8 cm Cystic structure seen w/in fundal endo = 0.9 x 1.3 x 1.4 cm (endo measurement does not include this cyst)   RT. OVARY: 2.1 x 1.4 x 1.4 cm unremarkable   LT. OVARY: not seen at this time   no pelvic free fluid seen Thickened endometrial stripe. Recommend sampling with D&C since she had an EMB previously. Merlinda Signs, MD       DIAGNOSIS & PLAN:  1. Postmenopausal bleeding  2. Thickened endometrial lining   - Proceed with planned procedure: Hysteroscopy, Dilation and Curettage   - Consent signed, on chart. - The patient is ready for transport to the operative suite. Counseling: The patient was counseled on all options both medical and surgical, conservative as well as definitive. She has elected to proceed with the procedure as stated above. The patient was counseled on the procedure. Risks and complications were reviewed in detail. The patients orders, labs, consents have been completed. The history and physical as well as all supporting surgical documentation will be forwarded to the pre-operative holding area. The patient is aware that this procedure may not alleviate her symptoms. That there may be a necessity for a second surgery and that there may be an incomplete removal of abnormal tissue.     Heide Babcock DO  Ob/Gyn Resident  [address] [facility]  6/30/2022, 10:08 AM

## 2022-06-30 ENCOUNTER — HOSPITAL ENCOUNTER (OUTPATIENT)
Age: 57
Setting detail: OUTPATIENT SURGERY
Discharge: HOME | End: 2022-06-30
Attending: OBSTETRICS & GYNECOLOGY | Admitting: OBSTETRICS & GYNECOLOGY
Payer: COMMERCIAL

## 2022-06-30 ENCOUNTER — ANESTHESIA (OUTPATIENT)
Dept: OPERATING ROOM | Age: 57
End: 2022-06-30
Payer: COMMERCIAL

## 2022-06-30 VITALS
DIASTOLIC BLOOD PRESSURE: 77 MMHG | TEMPERATURE: 96.8 F | SYSTOLIC BLOOD PRESSURE: 146 MMHG | HEIGHT: 68 IN | HEART RATE: 70 BPM | WEIGHT: 260 LBS | OXYGEN SATURATION: 94 % | BODY MASS INDEX: 39.4 KG/M2 | RESPIRATION RATE: 18 BRPM

## 2022-06-30 DIAGNOSIS — R93.89 THICKENED ENDOMETRIUM: ICD-10-CM

## 2022-06-30 DIAGNOSIS — N95.0 POST-MENOPAUSAL BLEEDING: ICD-10-CM

## 2022-06-30 LAB
ABO/RH: NORMAL
ANTIBODY SCREEN: NEGATIVE
ARM BAND NUMBER: NORMAL
EXPIRATION DATE: NORMAL
GFR NON-AFRICAN AMERICAN: >60 ML/MIN
GFR SERPL CREATININE-BSD FRML MDRD: >60 ML/MIN
GFR SERPL CREATININE-BSD FRML MDRD: NORMAL ML/MIN/{1.73_M2}
GLUCOSE BLD-MCNC: 108 MG/DL (ref 74–100)
HCT VFR BLD CALC: 41.3 % (ref 36.3–47.1)
HEMOGLOBIN: 13.1 G/DL (ref 11.9–15.1)
MCH RBC QN AUTO: 26.6 PG (ref 25.2–33.5)
MCHC RBC AUTO-ENTMCNC: 31.7 G/DL (ref 28.4–34.8)
MCV RBC AUTO: 83.9 FL (ref 82.6–102.9)
NRBC AUTOMATED: 0 PER 100 WBC
PDW BLD-RTO: 14.7 % (ref 11.8–14.4)
PLATELET # BLD: 250 K/UL (ref 138–453)
PMV BLD AUTO: 9.5 FL (ref 8.1–13.5)
POC BUN: 20 MG/DL (ref 8–26)
POC CREATININE: 0.86 MG/DL (ref 0.51–1.19)
RBC # BLD: 4.92 M/UL (ref 3.95–5.11)
WBC # BLD: 8 K/UL (ref 3.5–11.3)

## 2022-06-30 PROCEDURE — 3700000001 HC ADD 15 MINUTES (ANESTHESIA): Performed by: OBSTETRICS & GYNECOLOGY

## 2022-06-30 PROCEDURE — 93005 ELECTROCARDIOGRAM TRACING: CPT | Performed by: ANESTHESIOLOGY

## 2022-06-30 PROCEDURE — 85027 COMPLETE CBC AUTOMATED: CPT

## 2022-06-30 PROCEDURE — 2709999900 HC NON-CHARGEABLE SUPPLY: Performed by: OBSTETRICS & GYNECOLOGY

## 2022-06-30 PROCEDURE — 6360000002 HC RX W HCPCS: Performed by: ANESTHESIOLOGY

## 2022-06-30 PROCEDURE — 3600000013 HC SURGERY LEVEL 3 ADDTL 15MIN: Performed by: OBSTETRICS & GYNECOLOGY

## 2022-06-30 PROCEDURE — 86850 RBC ANTIBODY SCREEN: CPT

## 2022-06-30 PROCEDURE — 86900 BLOOD TYPING SEROLOGIC ABO: CPT

## 2022-06-30 PROCEDURE — 2720000010 HC SURG SUPPLY STERILE: Performed by: OBSTETRICS & GYNECOLOGY

## 2022-06-30 PROCEDURE — 6360000002 HC RX W HCPCS

## 2022-06-30 PROCEDURE — 84520 ASSAY OF UREA NITROGEN: CPT

## 2022-06-30 PROCEDURE — 2580000003 HC RX 258: Performed by: ANESTHESIOLOGY

## 2022-06-30 PROCEDURE — 3700000000 HC ANESTHESIA ATTENDED CARE: Performed by: OBSTETRICS & GYNECOLOGY

## 2022-06-30 PROCEDURE — 7100000011 HC PHASE II RECOVERY - ADDTL 15 MIN: Performed by: OBSTETRICS & GYNECOLOGY

## 2022-06-30 PROCEDURE — 58558 HYSTEROSCOPY BIOPSY: CPT | Performed by: OBSTETRICS & GYNECOLOGY

## 2022-06-30 PROCEDURE — 2580000003 HC RX 258: Performed by: OBSTETRICS & GYNECOLOGY

## 2022-06-30 PROCEDURE — 7100000001 HC PACU RECOVERY - ADDTL 15 MIN: Performed by: OBSTETRICS & GYNECOLOGY

## 2022-06-30 PROCEDURE — 6370000000 HC RX 637 (ALT 250 FOR IP): Performed by: OBSTETRICS & GYNECOLOGY

## 2022-06-30 PROCEDURE — 82947 ASSAY GLUCOSE BLOOD QUANT: CPT

## 2022-06-30 PROCEDURE — 86901 BLOOD TYPING SEROLOGIC RH(D): CPT

## 2022-06-30 PROCEDURE — 3600000003 HC SURGERY LEVEL 3 BASE: Performed by: OBSTETRICS & GYNECOLOGY

## 2022-06-30 PROCEDURE — 7100000010 HC PHASE II RECOVERY - FIRST 15 MIN: Performed by: OBSTETRICS & GYNECOLOGY

## 2022-06-30 PROCEDURE — 6370000000 HC RX 637 (ALT 250 FOR IP): Performed by: ANESTHESIOLOGY

## 2022-06-30 PROCEDURE — 88305 TISSUE EXAM BY PATHOLOGIST: CPT

## 2022-06-30 PROCEDURE — 82565 ASSAY OF CREATININE: CPT

## 2022-06-30 PROCEDURE — 2500000003 HC RX 250 WO HCPCS: Performed by: ANESTHESIOLOGY

## 2022-06-30 PROCEDURE — 7100000000 HC PACU RECOVERY - FIRST 15 MIN: Performed by: OBSTETRICS & GYNECOLOGY

## 2022-06-30 RX ORDER — OXYCODONE HYDROCHLORIDE AND ACETAMINOPHEN 5; 325 MG/1; MG/1
1 TABLET ORAL ONCE
Status: COMPLETED | OUTPATIENT
Start: 2022-06-30 | End: 2022-06-30

## 2022-06-30 RX ORDER — UBIDECARENONE 100 MG
CAPSULE ORAL
COMMUNITY

## 2022-06-30 RX ORDER — METOCLOPRAMIDE HYDROCHLORIDE 5 MG/ML
10 INJECTION INTRAMUSCULAR; INTRAVENOUS ONCE
Status: COMPLETED | OUTPATIENT
Start: 2022-06-30 | End: 2022-06-30

## 2022-06-30 RX ORDER — FENTANYL CITRATE 50 UG/ML
50 INJECTION, SOLUTION INTRAMUSCULAR; INTRAVENOUS EVERY 5 MIN PRN
Status: DISCONTINUED | OUTPATIENT
Start: 2022-06-30 | End: 2022-06-30 | Stop reason: HOSPADM

## 2022-06-30 RX ORDER — DEXAMETHASONE SODIUM PHOSPHATE 10 MG/ML
INJECTION INTRAMUSCULAR; INTRAVENOUS PRN
Status: DISCONTINUED | OUTPATIENT
Start: 2022-06-30 | End: 2022-06-30 | Stop reason: SDUPTHER

## 2022-06-30 RX ORDER — LIDOCAINE HYDROCHLORIDE 10 MG/ML
INJECTION, SOLUTION EPIDURAL; INFILTRATION; INTRACAUDAL; PERINEURAL PRN
Status: DISCONTINUED | OUTPATIENT
Start: 2022-06-30 | End: 2022-06-30 | Stop reason: SDUPTHER

## 2022-06-30 RX ORDER — PROMETHAZINE HYDROCHLORIDE 25 MG/ML
6.25 INJECTION, SOLUTION INTRAMUSCULAR; INTRAVENOUS ONCE
Status: DISCONTINUED | OUTPATIENT
Start: 2022-06-30 | End: 2022-06-30

## 2022-06-30 RX ORDER — KETOROLAC TROMETHAMINE 30 MG/ML
INJECTION, SOLUTION INTRAMUSCULAR; INTRAVENOUS PRN
Status: DISCONTINUED | OUTPATIENT
Start: 2022-06-30 | End: 2022-06-30 | Stop reason: SDUPTHER

## 2022-06-30 RX ORDER — FENTANYL CITRATE 50 UG/ML
INJECTION, SOLUTION INTRAMUSCULAR; INTRAVENOUS PRN
Status: DISCONTINUED | OUTPATIENT
Start: 2022-06-30 | End: 2022-06-30 | Stop reason: SDUPTHER

## 2022-06-30 RX ORDER — SODIUM CHLORIDE 9 MG/ML
INJECTION, SOLUTION INTRAVENOUS PRN
Status: DISCONTINUED | OUTPATIENT
Start: 2022-06-30 | End: 2022-06-30 | Stop reason: HOSPADM

## 2022-06-30 RX ORDER — ONDANSETRON 2 MG/ML
INJECTION INTRAMUSCULAR; INTRAVENOUS PRN
Status: DISCONTINUED | OUTPATIENT
Start: 2022-06-30 | End: 2022-06-30 | Stop reason: SDUPTHER

## 2022-06-30 RX ORDER — PROPOFOL 10 MG/ML
INJECTION, EMULSION INTRAVENOUS PRN
Status: DISCONTINUED | OUTPATIENT
Start: 2022-06-30 | End: 2022-06-30 | Stop reason: SDUPTHER

## 2022-06-30 RX ORDER — SCOLOPAMINE TRANSDERMAL SYSTEM 1 MG/1
1 PATCH, EXTENDED RELEASE TRANSDERMAL ONCE
Status: DISCONTINUED | OUTPATIENT
Start: 2022-06-30 | End: 2022-06-30 | Stop reason: HOSPADM

## 2022-06-30 RX ORDER — B-COMPLEX WITH VITAMIN C
TABLET ORAL
COMMUNITY

## 2022-06-30 RX ORDER — IBUPROFEN 600 MG/1
600 TABLET ORAL EVERY 6 HOURS PRN
Qty: 60 TABLET | Refills: 1 | Status: SHIPPED | OUTPATIENT
Start: 2022-06-30 | End: 2022-10-20

## 2022-06-30 RX ORDER — SODIUM CHLORIDE 0.9 % (FLUSH) 0.9 %
5-40 SYRINGE (ML) INJECTION PRN
Status: DISCONTINUED | OUTPATIENT
Start: 2022-06-30 | End: 2022-06-30 | Stop reason: HOSPADM

## 2022-06-30 RX ORDER — SODIUM CHLORIDE 0.9 % (FLUSH) 0.9 %
5-40 SYRINGE (ML) INJECTION EVERY 12 HOURS SCHEDULED
Status: DISCONTINUED | OUTPATIENT
Start: 2022-06-30 | End: 2022-06-30 | Stop reason: HOSPADM

## 2022-06-30 RX ORDER — MIDAZOLAM HYDROCHLORIDE 1 MG/ML
INJECTION INTRAMUSCULAR; INTRAVENOUS
Status: COMPLETED
Start: 2022-06-30 | End: 2022-06-30

## 2022-06-30 RX ORDER — PARSLEY 450 MG
1 CAPSULE ORAL DAILY
COMMUNITY

## 2022-06-30 RX ORDER — ONDANSETRON 4 MG/1
4 TABLET, FILM COATED ORAL EVERY 8 HOURS PRN
Qty: 20 TABLET | Refills: 0 | Status: SHIPPED | OUTPATIENT
Start: 2022-06-30 | End: 2022-10-20

## 2022-06-30 RX ORDER — MAGNESIUM HYDROXIDE 1200 MG/15ML
LIQUID ORAL CONTINUOUS PRN
Status: DISCONTINUED | OUTPATIENT
Start: 2022-06-30 | End: 2022-06-30 | Stop reason: HOSPADM

## 2022-06-30 RX ORDER — DOCUSATE SODIUM 100 MG/1
100 CAPSULE, LIQUID FILLED ORAL 2 TIMES DAILY PRN
Qty: 60 CAPSULE | Refills: 0 | Status: SHIPPED | OUTPATIENT
Start: 2022-06-30 | End: 2022-07-15 | Stop reason: ALTCHOICE

## 2022-06-30 RX ORDER — FENTANYL CITRATE 50 UG/ML
25 INJECTION, SOLUTION INTRAMUSCULAR; INTRAVENOUS EVERY 5 MIN PRN
Status: DISCONTINUED | OUTPATIENT
Start: 2022-06-30 | End: 2022-06-30 | Stop reason: HOSPADM

## 2022-06-30 RX ORDER — DIPHENHYDRAMINE HYDROCHLORIDE 50 MG/ML
INJECTION INTRAMUSCULAR; INTRAVENOUS PRN
Status: DISCONTINUED | OUTPATIENT
Start: 2022-06-30 | End: 2022-06-30 | Stop reason: SDUPTHER

## 2022-06-30 RX ORDER — SODIUM CHLORIDE, SODIUM LACTATE, POTASSIUM CHLORIDE, CALCIUM CHLORIDE 600; 310; 30; 20 MG/100ML; MG/100ML; MG/100ML; MG/100ML
INJECTION, SOLUTION INTRAVENOUS CONTINUOUS PRN
Status: DISCONTINUED | OUTPATIENT
Start: 2022-06-30 | End: 2022-06-30 | Stop reason: SDUPTHER

## 2022-06-30 RX ORDER — METOPROLOL SUCCINATE 25 MG/1
25 TABLET, EXTENDED RELEASE ORAL NIGHTLY
COMMUNITY

## 2022-06-30 RX ORDER — MIDAZOLAM HYDROCHLORIDE 2 MG/2ML
2 INJECTION, SOLUTION INTRAMUSCULAR; INTRAVENOUS ONCE
Status: COMPLETED | OUTPATIENT
Start: 2022-06-30 | End: 2022-06-30

## 2022-06-30 RX ADMIN — METOCLOPRAMIDE 10 MG: 5 INJECTION, SOLUTION INTRAMUSCULAR; INTRAVENOUS at 13:02

## 2022-06-30 RX ADMIN — LIDOCAINE HYDROCHLORIDE 50 MG: 10 INJECTION, SOLUTION EPIDURAL; INFILTRATION; INTRACAUDAL; PERINEURAL at 11:25

## 2022-06-30 RX ADMIN — FENTANYL CITRATE 50 MCG: 50 INJECTION, SOLUTION INTRAMUSCULAR; INTRAVENOUS at 11:25

## 2022-06-30 RX ADMIN — KETOROLAC TROMETHAMINE 15 MG: 30 INJECTION, SOLUTION INTRAMUSCULAR at 12:05

## 2022-06-30 RX ADMIN — PROPOFOL 200 MG: 10 INJECTION, EMULSION INTRAVENOUS at 11:25

## 2022-06-30 RX ADMIN — SODIUM CHLORIDE, POTASSIUM CHLORIDE, SODIUM LACTATE AND CALCIUM CHLORIDE: 600; 310; 30; 20 INJECTION, SOLUTION INTRAVENOUS at 11:20

## 2022-06-30 RX ADMIN — DIPHENHYDRAMINE HYDROCHLORIDE 12.5 MG: 50 INJECTION, SOLUTION INTRAMUSCULAR; INTRAVENOUS at 11:37

## 2022-06-30 RX ADMIN — FENTANYL CITRATE 50 MCG: 50 INJECTION, SOLUTION INTRAMUSCULAR; INTRAVENOUS at 11:41

## 2022-06-30 RX ADMIN — ONDANSETRON 4 MG: 2 INJECTION INTRAMUSCULAR; INTRAVENOUS at 12:02

## 2022-06-30 RX ADMIN — PROPOFOL 50 MG: 10 INJECTION, EMULSION INTRAVENOUS at 11:29

## 2022-06-30 RX ADMIN — FENTANYL CITRATE 50 MCG: 50 INJECTION, SOLUTION INTRAMUSCULAR; INTRAVENOUS at 11:29

## 2022-06-30 RX ADMIN — DEXAMETHASONE SODIUM PHOSPHATE 10 MG: 10 INJECTION INTRAMUSCULAR; INTRAVENOUS at 11:36

## 2022-06-30 RX ADMIN — MIDAZOLAM HYDROCHLORIDE 2 MG: 2 INJECTION, SOLUTION INTRAMUSCULAR; INTRAVENOUS at 10:37

## 2022-06-30 RX ADMIN — FENTANYL CITRATE 25 MCG: 50 INJECTION, SOLUTION INTRAMUSCULAR; INTRAVENOUS at 11:50

## 2022-06-30 RX ADMIN — FENTANYL CITRATE 25 MCG: 50 INJECTION, SOLUTION INTRAMUSCULAR; INTRAVENOUS at 11:51

## 2022-06-30 RX ADMIN — MIDAZOLAM HYDROCHLORIDE 2 MG: 1 INJECTION, SOLUTION INTRAMUSCULAR; INTRAVENOUS at 10:37

## 2022-06-30 RX ADMIN — OXYCODONE HYDROCHLORIDE AND ACETAMINOPHEN 1 TABLET: 5; 325 TABLET ORAL at 13:03

## 2022-06-30 ASSESSMENT — ENCOUNTER SYMPTOMS: SHORTNESS OF BREATH: 1

## 2022-06-30 ASSESSMENT — PAIN DESCRIPTION - LOCATION: LOCATION: ABDOMEN

## 2022-06-30 ASSESSMENT — PAIN SCALES - GENERAL
PAINLEVEL_OUTOF10: 4
PAINLEVEL_OUTOF10: 4

## 2022-06-30 ASSESSMENT — PAIN DESCRIPTION - DESCRIPTORS: DESCRIPTORS: CRAMPING

## 2022-06-30 ASSESSMENT — PAIN - FUNCTIONAL ASSESSMENT: PAIN_FUNCTIONAL_ASSESSMENT: NONE - DENIES PAIN

## 2022-06-30 NOTE — OP NOTE
endocervical and endometrial polyps, obstructing the view of the entire endometrial cavity. Hysteroscope was then removed. Polyp forceps were utilized to blindly remove polyps. Hysteroscope was re-inserted with Myosure resector which was utilized to remove remaining polyps. Endometrial curettage was carried out with sharp curettage yielding curettings which were collected and sent for pathology examination. Hysteroscope was re-inserted revealing normal appearing endocervical and endometrial cavity, without polyps. All instruments were then removed. Hemostasis was visualized at the tenaculum site on the cervix. Instrument, sponge, and needle counts were correct at the conclusion of the case. SCDs for DVT prophylaxis remain in place for the post operative period. Dr. Jen Argueta was present for the entire operation.     Findings:  Approximately 8 week mid position uterus, normal appearing external genitalia, vaginal mucosa, and cervix, multiple endocervical and endometrial polyps, bilateral tubal ostia  Estimated Blood Loss: 10 ml  Drains:  None  Total IV Fluids: 600 ml  Total fluid deficit: 380 ml with significant amount noted on OR floor  Urine output: 200 ml clear urine   Specimens:  Myosure curettings, sharp endometrial curettings, polyps  Instrument and Sponge Count: Correct  Complications: none  Condition: stable, transfer to post anesthesia recovery    Susan Chamberlain DO  Ob/Gyn Resident  6/30/2022, 12:15 PM

## 2022-06-30 NOTE — ANESTHESIA POSTPROCEDURE EVALUATION
Department of Anesthesiology  Postprocedure Note    Patient: Shanique Morgan  MRN: [id number]  [place]: 1965  Date of evaluation: 6/30/2022      Procedure Summary     Date: 06/30/22 Room / Location: [facility] 10 / [facility]    Anesthesia Start: 1120 Anesthesia Stop: 6017    Procedure: DILATATION AND CURETTAGE, HYSTEROSCOPY (N/A ) Diagnosis:       Post-menopausal bleeding      Thickened endometrium      (POST MENOPAUSAL BLEEDING, THICKENED ENDOMETRIUM)    Surgeons: Mark Jimenez MD Responsible Provider: Van Strickland MD    Anesthesia Type: general ASA Status: 2          Anesthesia Type: No value filed.     Imani Phase I: Imani Score: 10    Imani Phase II: Imani Score: 10      Anesthesia Post Evaluation    Patient location during evaluation: PACU  Patient participation: complete - patient participated  Level of consciousness: awake and alert  Pain score: 2  Airway patency: patent  Nausea & Vomiting: no nausea and no vomiting  Complications: no  Cardiovascular status: hemodynamically stable  Respiratory status: acceptable  Hydration status: euvolemic

## 2022-06-30 NOTE — PLAN OF CARE
Discharge instructions given to patient and friend. Prescriptions picked up in pharmacy.  No questions or concerns

## 2022-06-30 NOTE — ANESTHESIA PRE PROCEDURE
Department of Anesthesiology  Preprocedure Note       Name:  Shanique Morgan   Age:  59 y.o.  :  1965                                          MRN:  [id number]         Date:  2022      Surgeon: Patty Oliveira):  Mark Jimenez MD    Procedure: DILATATION AND CURETTAGE (N/A )    Medications prior to admission:   Prior to Admission medications    Medication Sig Start Date End Date Taking? Authorizing Provider   B Complex Vitamins (VITAMIN B COMPLEX) TABS     Historical Provider, MD   Ashwagandha 500 MG CAPS     Historical Provider, MD   vitamin D (D3-1000) 25 MCG (1000 UT) CAPS     Historical Provider, MD   metoprolol succinate (TOPROL XL) 25 MG extended release tablet     Historical Provider, MD   omeprazole (PRILOSEC) 20 MG delayed release capsule TAKE 1 CAPSULE BY MOUTH 2 TIMES A DAY 22   MIGDALIA Potts CNP   albuterol sulfate HFA (PROAIR HFA) 108 (90 Base) MCG/ACT inhaler Inhale 2 puffs into the lungs every 6 hours as needed for Wheezing 10/28/21   MIGDALIA Chinchilla CNP   irbesartan-hydroCHLOROthiazide (AVALIDE) 150-12.5 MG per tablet TAKE 1 TABLET BY MOUTH ONE TIME A DAY 21   MIGDALIA Chinchilla CNP   meloxicam (MOBIC) 15 MG tablet TAKE 1 TABLET BY MOUTH ONE TIME A DAY 21   MIGDALIA Chinchilla CNP   benzoyl peroxide 5 % external liquid Wash affected areas once daily 21   Asya Reese MD   clindamycin (CLEOCIN T) 1 % lotion Apply to affected areas daily 21   Asya Reese MD   ondansetron (ZOFRAN ODT) 4 MG disintegrating tablet Take 1 tablet by mouth every 8 hours as needed for Nausea  Patient not taking: Reported on 2022   Bhavesh Milner DO   POTASSIUM CITRATE PO Take 5 mLs by mouth three times daily    Historical Provider, MD   OMEGA-3 FATTY ACIDS PO Take 1 tablet by mouth daily    Historical Provider, MD   acetaminophen (TYLENOL) 500 MG tablet Take 1,000 mg by mouth every 4 hours as needed for Pain.     Historical Provider, MD solifenacin (VESICARE) 10 MG tablet Take 10 mg by mouth as needed. 12/23/14  Historical Provider, MD       Current medications:    No current facility-administered medications for this visit. No current outpatient medications on file. Facility-Administered Medications Ordered in Other Visits   Medication Dose Route Frequency Provider Last Rate Last Admin    scopolamine (TRANSDERM-SCOP) transdermal patch 1 patch  1 patch TransDERmal Once Laura Peralta MD           Allergies:  No Known Allergies    Problem List:    Patient Active Problem List   Diagnosis Code    Kidney stones N20.0    Essential hypertension I10    Acquired hypothyroidism E03.9    CKD (chronic kidney disease) stage 1, GFR 90 ml/min or greater N18.1    Cystinuria ([address] E72.01    Hyperuricemia E79.0    Osteoarthritis of both knees M17.0    Gastroesophageal reflux disease without esophagitis K21.9    ESTHER (obstructive sleep apnea) G47.33    Midline low back pain with right-sided sciatica M54.41    Mixed hyperlipidemia E78.2    Solitary kidney, acquired Z90.5    Obesity (BMI 30-39. 9) E66.9    Acute intractable tension-type headache G44. 201    Glucose intolerance (impaired glucose tolerance) R73.02    Class 2 severe obesity due to excess calories with serious comorbidity and body mass index (BMI) of 36.0 to 36.9 in adult (HCC) E66.01, Z68.36    Dyspnea on exertion R06.00    Arthralgia M25.50    Other fatigue R53.83    Oxygen desaturation R09.02    Mild persistent asthma without complication W77.06    LVH (left ventricular hypertrophy) I51.7    Chronic left shoulder pain M25.512, G89.29    Hydronephrosis with renal and ureteral calculous obstruction N13.2    S/p Hscope, D&C 6/30/22 ** Z98.890       Past Medical History:        Diagnosis Date    Arthritis     Asthma     Benign familial tremor     Cancer (HCC)     skin    CKD (chronic kidney disease) stage 1, GFR 90 ml/min or greater     Class 2 severe obesity with serious comorbidity and body mass index (BMI) of 39.0 to 39.9 in adult ([facility] 5/1/2019    Cystinuria ([facility]     Dyspnea on exertion 8/6/2019    Flank pain     RIGHT    Gastroesophageal reflux disease without esophagitis     GERD (gastroesophageal reflux disease)     Hypertension     Hyperuricemia     Hypothyroidism     Kidney stones     Midline low back pain with right-sided sciatica 6/2/2016    Proteinuria     Shoulder impingement 12/2019    left    Snores     Solitary kidney, acquired 11/20/2017    Urinary tract obstruction by kidney stone 8/1/2019    Wears glasses        Past Surgical History:        Procedure Laterality Date    CARDIAC CATHETERIZATION  12/08/2021    CYSTO/URETERO/PYELOSCOPY, [place] Right 11/21/2017    HOLMIUM LASER - CYSTOSCOPY, RIGHT URETEROSCOPY, RIGHT STENT EXCHANGE,STONE BASKETING performed by Marshal Quinteros MD at [address], [place] Right 9/13/2019    HOLMIUM - CYSTO, URETEROSCOPY, LITHOTRIPSY, STENT PLACEMENT performed by Sukumar Ahuja MD at [address], [place] [address] Right 12/12/2019    HOLMIUM- CYSTO, URETEROSCOPY, LASER LITHO, STENT PLACEMENT AND RIGHT URETERAL BASKET STONE EXTRACTION performed by Marshal Quinteros MD at [address] [facility] Right 11/18/2017    CYSTOSCOPY URETERAL STENT INSERTION,RIGHT performed by Marko Rendon MD at [address] [place] [address] Right 8/2/2019    CYSTOSCOPY RT URETERAL STENT INSERTION performed by Sukumar Ahuja MD at [address] [place] [address] Right 8/23/2019    CYSTOSCOPY, RIGHT STENT REMOVAL, INSERTION OCCLUSIVE BALLOON, PT GOING TO INTERVENTIONAL performed by Sukumar Ahuja MD at [address] [place] [address] Right 12/12/2019    CYSTO, URETEROSCOPY, LASER LITHO, STENT PLACEMENT AND RIGHT URETERAL BASKET STONE EXTRACTION     CYSTOSCOPY Right 12/31/2019    stent removal    CYSTOSCOPY Right 09/19/2020    litho, rt stent    CYSTOSCOPY Right 9/19/2020    CYSTOSCOPY, URETEROSCOPY, HOLMIUM LASER LITHOTRIPSY, STENT PLACEMENT performed by Gypsy Morales MD at [address] [facility] / [facility] [place] / STONE Right 12/31/2019    CYSTOSCOPY, STENT REMOVAL performed by Cortney Helton MD at [address] [address] Bilateral 2008    KNEE ARTHROSCOPY Right 1998, 2003    X2    NEPHROLITHOTOMY Right 8/23/2019    HOLMIUM LASER, NEPHROLITHOTOMY PERCUTANEOUS, LITHOCLAST, C-ARM performed by Raysa Yanes MD at [address] [facility], [place] N/A 7/10/2018    CYSTOSCOPY, RIGHT URETEROSCOPY, HOLMIUM LASER LITHO WITH STONE BASKETING, RIGHT STENT EXCHANGE performed by Gypsy Morales MD at [address] [facility], [place] Right 10/10/2018    HOLMIUM LASER STANDBY, CYSTO, RIGHT URETEROSCOPY, RIGHT STENT PLACEMENT performed by Cortney Helton MD at [address] [facility] Right 7/4/2018    CYSTOSCOPY URETERAL STENT INSERTION, RIGHT performed by Grayson Chisholm MD at [address] [facility] Left     TOTAL NEPHRECTOMY Left 1988   [address] Right 12/12/2019       Social History:    Social History     Tobacco Use    Smoking status: Never Smoker    Smokeless tobacco: Never Used   Substance Use Topics    Alcohol use: Yes     Alcohol/week: 0.0 standard drinks     Comment: occasionally                                Counseling given: Not Answered      Vital Signs (Current): There were no vitals filed for this visit.                                            BP Readings from Last 3 Encounters:   06/30/22 (!) 159/95   06/08/22 (!) 147/93   04/04/22 (!) 142/87       NPO Status:                                                                                 BMI:   Wt Readings from Last 3 Encounters:   06/30/22 260 lb (117.9 kg)   06/08/22 260 lb (117.9 kg)   10/28/21 253 lb 3.2 oz (114.9 kg)     There is no height or weight on file to calculate BMI.    CBC:   Lab Results   Component Value Date/Time    WBC 10.5 11/03/2021 04:04 PM    RBC 4.98 11/03/2021 04:04 PM    RBC 4.66 03/22/2012 11:41 AM    HGB 13.6 11/03/2021 04:04 PM    HCT 42.5 11/03/2021 04:04 PM    MCV 85.3 11/03/2021 04:04 PM    RDW 14.1 11/03/2021 04:04 PM     12/08/2021 08:16 AM     03/22/2012 11:41 AM       CMP:   Lab Results   Component Value Date/Time     12/18/2021 11:22 AM    K 4.3 12/18/2021 11:22 AM     12/18/2021 11:22 AM    CO2 24 12/18/2021 11:22 AM    BUN 17 12/18/2021 11:22 AM    CREATININE 0.77 12/18/2021 11:22 AM    GFRAA >60 12/18/2021 11:22 AM    LABGLOM >60 12/18/2021 11:22 AM    GLUCOSE 111 06/14/2022 08:00 AM    GLUCOSE 88 03/22/2012 11:41 AM    PROT 7.4 08/02/2021 11:33 AM    CALCIUM 9.5 12/18/2021 11:22 AM    BILITOT 0.41 08/02/2021 11:33 AM    ALKPHOS 126 08/02/2021 11:33 AM    AST 21 08/02/2021 11:33 AM    ALT 19 08/02/2021 11:33 AM       POC Tests: No results for input(s): POCGLU, POCNA, POCK, POCCL, POCBUN, POCHEMO, POCHCT in the last 72 hours. Coags:   Lab Results   Component Value Date/Time    PROTIME 9.5 08/15/2019 09:50 AM    INR 0.9 08/15/2019 09:50 AM    APTT 22.1 08/15/2019 09:50 AM       HCG (If Applicable): No results found for: PREGTESTUR, PREGSERUM, HCG, HCGQUANT     ABGs: No results found for: PHART, PO2ART, LPY0QUD, PXL3JVG, BEART, J1XYASIC     Type & Screen (If Applicable):  No results found for: LABABO, LABRH    Anesthesia Evaluation    Airway: Mallampati: II  TM distance: >3 FB   Neck ROM: full  Mouth opening: > = 3 FB   Dental: normal exam         Pulmonary: breath sounds clear to auscultation  (+) shortness of breath:  sleep apnea:  asthma:                            Cardiovascular:    (+) hypertension:, GRANADOS:,         Rhythm: regular  Rate: normal                    Neuro/Psych:   (+) neuromuscular disease:, headaches:,             GI/Hepatic/Renal:   (+) GERD:, renal disease: kidney stones,          ROS comment: Only one Kidney.    Endo/Other:    (+) hypothyroidism::., .                 Abdominal:   (+) obese,           Vascular: Other Findings:             Anesthesia Plan      general     ASA 2     (ESTHER  HTn  Single kidney)  Induction: intravenous. MIPS: Postoperative opioids intended and Prophylactic antiemetics administered. Anesthetic plan and risks discussed with patient. Plan discussed with CRNA.                     Akua Goldstein MD   6/30/2022

## 2022-07-01 LAB
EKG ATRIAL RATE: 70 BPM
EKG P AXIS: 28 DEGREES
EKG P-R INTERVAL: 160 MS
EKG Q-T INTERVAL: 428 MS
EKG QRS DURATION: 98 MS
EKG QTC CALCULATION (BAZETT): 462 MS
EKG R AXIS: -10 DEGREES
EKG T AXIS: 13 DEGREES
EKG VENTRICULAR RATE: 70 BPM
SURGICAL PATHOLOGY REPORT: NORMAL

## 2022-07-01 PROCEDURE — 93010 ELECTROCARDIOGRAM REPORT: CPT | Performed by: INTERNAL MEDICINE

## 2022-07-15 ENCOUNTER — OFFICE VISIT (OUTPATIENT)
Dept: OBGYN CLINIC | Age: 57
End: 2022-07-15

## 2022-07-15 VITALS
BODY MASS INDEX: 39.1 KG/M2 | HEIGHT: 68 IN | WEIGHT: 258 LBS | SYSTOLIC BLOOD PRESSURE: 166 MMHG | HEART RATE: 74 BPM | DIASTOLIC BLOOD PRESSURE: 96 MMHG

## 2022-07-15 DIAGNOSIS — Z09 POSTOP CHECK: Primary | ICD-10-CM

## 2022-07-15 DIAGNOSIS — N84.0 ENDOMETRIAL POLYP: ICD-10-CM

## 2022-07-15 DIAGNOSIS — N95.0 POSTMENOPAUSAL BLEEDING: ICD-10-CM

## 2022-07-15 PROCEDURE — 99024 POSTOP FOLLOW-UP VISIT: CPT | Performed by: OBSTETRICS & GYNECOLOGY

## 2022-07-15 NOTE — PROGRESS NOTES
[address] [facility] [address]  [address] [address]  Dept: [phone]    Tre Mendiola  7/15/2022  10:43 AM      Tre Mendiola  Procedure: Hysteroscopy D&C      Tre Mendiola is a 59 y.o. female       The patient was seen, she denies any complaints. She denied any shortness of breath, chest pain or dizziness. She denied any nausea, vomiting, or diarrhea. There is no fever, chills, or rigors. The patient denies any vaginal bleeding, discharge or odor. All of her pre-operative complaints are now resolved. Blood pressure (!) 166/96, pulse 74, height 5' 8\" (1.727 m), weight 258 lb (117 kg), not currently breastfeeding. Abdominal Exam: soft non-tender. Good bowel sounds. No guarding, rebound or rigidity. No costal vertebral angle tenderness bilateral. No hernias    Extremities: No edema or calf pain noted bilaterally. Pelvic Exam:  Exam deferred. Results for orders placed or performed during the hospital encounter of 22   CBC   Result Value Ref Range    WBC 8.0 3.5 - 11.3 k/uL    RBC 4.92 3.95 - 5.11 m/uL    Hemoglobin 13.1 11.9 - 15.1 g/dL    Hematocrit 41.3 36.3 - 47.1 %    MCV 83.9 82.6 - 102.9 fL    MCH 26.6 25.2 - 33.5 pg    MCHC 31.7 28.4 - 34.8 g/dL    RDW 14.7 (H) 11.8 - 14.4 %    Platelets 977 551 - 148 k/uL    MPV 9.5 8.1 - 13.5 fL    NRBC Automated 0.0 0.0 per 100 WBC   SURGICAL PATHOLOGY REPORT   Result Value Ref Range    Surgical Pathology Report       -- Diagnosis --    Endometrial biopsy:  Benign endometrial polyps.        Lalo Markham M.D.  **Electronically Signed Out**         rdd/2022       Clinical Information  Pre-op Diagnosis:  POST MENOPAUSAL BLEEDING, THICKENED ENDOMETRIUM   Operative Findings:  POLYPS   Operation Performed:  DILATATION AND CURETTAGE, HYSTEROSCOPY          Source of Specimen  A: POLYPS    Gross Description  \"GILA TSREETER, POLYPS\" received in formalin are two Telfa pads with  a 2.0 x 0.8 x 0.5 cm aggregate of pink-red rubbery hemorrhagic tissue. Also, received within an aspiration stocking is a 2.0 x 2.0 x 0.8 cm  aggregate of pink to red rubbery tissue. The specimen is submitted  entirely with the tissue from the Telfa in cassette \"1\" and from the  stocking in cassette \"2\". bf  js       Microscopic Description  Sample consists of fragments of benign endometrial polyps. There is  no atypia or malignancy in the sample. SURGICAL PATHOLOGY CONSULTATION       Patient Name: Freida Me: [id number]  Path Number: [id number]    [facility] [address] [place], [address]  [phone]  Fax: [phone]     Creatinine W/GFR Point of Care   Result Value Ref Range    POC Creatinine 0.86 0.51 - 1.19 mg/dL    GFR Comment >60 >60 mL/min    GFR Non-African American >60 >60 mL/min    GFR Comment         POCT urea (BUN)   Result Value Ref Range    POC BUN 20 8 - 26 mg/dL   POCT Glucose   Result Value Ref Range    POC Glucose 108 (H) 74 - 100 mg/dL   EKG 12 Lead   Result Value Ref Range    Ventricular Rate 70 BPM    Atrial Rate 70 BPM    P-R Interval 160 ms    QRS Duration 98 ms    Q-T Interval 428 ms    QTc Calculation (Bazett) 462 ms    P Axis 28 degrees    R Axis -10 degrees    T Axis 13 degrees   TYPE AND SCREEN   Result Value Ref Range    Expiration Date 07/03/2022,2359     Arm Band Number BE [id number]     ABO/Rh B POSITIVE     Antibody Screen NEGATIVE            Assessment:      Diagnosis Orders   1. Postop check        2. Postmenopausal bleeding        3.  Endometrial polyp             Patient Active Problem List    Diagnosis Date Noted    LVH (left ventricular hypertrophy) 09/20/2019     Priority: High    Endometrial polyp 07/15/2022     Priority: Medium    Postmenopausal bleeding      Priority: Medium    S/p Hscope, D&C 6/30/22 06/29/2022     Priority: Medium Hydronephrosis with renal and ureteral calculous obstruction 09/24/2020    Chronic left shoulder pain 12/06/2019    Mild persistent asthma without complication     Oxygen desaturation 08/26/2019    Dyspnea on exertion 08/06/2019    Arthralgia 08/06/2019    Other fatigue 08/06/2019    Class 2 severe obesity due to excess calories with serious comorbidity and body mass index (BMI) of 36.0 to 36.9 in adult ([facility] 05/01/2019    Glucose intolerance (impaired glucose tolerance) 11/22/2017    Acute intractable tension-type headache 11/21/2017    Solitary kidney, acquired 11/20/2017    Obesity (BMI 30-39.9) 11/20/2017    Mixed hyperlipidemia 11/06/2016    Midline low back pain with right-sided sciatica 06/02/2016    Gastroesophageal reflux disease without esophagitis     ESTHER (obstructive sleep apnea)     Osteoarthritis of both knees 06/23/2014    Kidney stones     Essential hypertension     Acquired hypothyroidism     CKD (chronic kidney disease) stage 1, GFR 90 ml/min or greater     Cystinuria ([facility]     Hyperuricemia           POD# 15   Procedure: see above   Stable   Pathology reviewed and found to be benign.  Yes    Plan:   Return for annual exam.    Elvan Schirmer, MD

## 2022-08-10 ENCOUNTER — TELEPHONE (OUTPATIENT)
Dept: PRIMARY CARE CLINIC | Age: 57
End: 2022-08-10

## 2022-08-10 NOTE — TELEPHONE ENCOUNTER
Patient notified and verbalized understanding. She states that she first noticed symptoms around Aug 2nd. Patients states she will continue to monitor and will plan on coming in on Friday.

## 2022-08-10 NOTE — TELEPHONE ENCOUNTER
When did she first notice this? Treatment is best initiated within 3 days of symptom onset.  This consists of a course of prednisone that I can send in if it started Monday   If she is past that time frame she is advised to monitor for any changes and if worsens go to ED  Otherwise will see her Friday as scheduled

## 2022-08-12 ENCOUNTER — OFFICE VISIT (OUTPATIENT)
Dept: PRIMARY CARE CLINIC | Age: 57
End: 2022-08-12
Payer: COMMERCIAL

## 2022-08-12 VITALS
HEART RATE: 85 BPM | OXYGEN SATURATION: 95 % | DIASTOLIC BLOOD PRESSURE: 82 MMHG | WEIGHT: 265 LBS | SYSTOLIC BLOOD PRESSURE: 136 MMHG | BODY MASS INDEX: 40.29 KG/M2

## 2022-08-12 DIAGNOSIS — M79.89 LEG SWELLING: ICD-10-CM

## 2022-08-12 DIAGNOSIS — E66.01 CLASS 3 SEVERE OBESITY DUE TO EXCESS CALORIES WITH SERIOUS COMORBIDITY AND BODY MASS INDEX (BMI) OF 40.0 TO 44.9 IN ADULT (HCC): ICD-10-CM

## 2022-08-12 DIAGNOSIS — G51.0 BELL'S PALSY: Primary | ICD-10-CM

## 2022-08-12 DIAGNOSIS — R53.83 OTHER FATIGUE: ICD-10-CM

## 2022-08-12 DIAGNOSIS — M17.0 PRIMARY OSTEOARTHRITIS OF BOTH KNEES: ICD-10-CM

## 2022-08-12 PROBLEM — E66.813 CLASS 3 SEVERE OBESITY DUE TO EXCESS CALORIES WITH SERIOUS COMORBIDITY AND BODY MASS INDEX (BMI) OF 40.0 TO 44.9 IN ADULT: Status: ACTIVE | Noted: 2019-05-01

## 2022-08-12 PROCEDURE — 99214 OFFICE O/P EST MOD 30 MIN: CPT | Performed by: NURSE PRACTITIONER

## 2022-08-12 SDOH — ECONOMIC STABILITY: FOOD INSECURITY: WITHIN THE PAST 12 MONTHS, THE FOOD YOU BOUGHT JUST DIDN'T LAST AND YOU DIDN'T HAVE MONEY TO GET MORE.: NEVER TRUE

## 2022-08-12 SDOH — ECONOMIC STABILITY: FOOD INSECURITY: WITHIN THE PAST 12 MONTHS, YOU WORRIED THAT YOUR FOOD WOULD RUN OUT BEFORE YOU GOT MONEY TO BUY MORE.: NEVER TRUE

## 2022-08-12 ASSESSMENT — PATIENT HEALTH QUESTIONNAIRE - PHQ9
3. TROUBLE FALLING OR STAYING ASLEEP: 0
10. IF YOU CHECKED OFF ANY PROBLEMS, HOW DIFFICULT HAVE THESE PROBLEMS MADE IT FOR YOU TO DO YOUR WORK, TAKE CARE OF THINGS AT HOME, OR GET ALONG WITH OTHER PEOPLE: 0
SUM OF ALL RESPONSES TO PHQ QUESTIONS 1-9: 0
4. FEELING TIRED OR HAVING LITTLE ENERGY: 0
6. FEELING BAD ABOUT YOURSELF - OR THAT YOU ARE A FAILURE OR HAVE LET YOURSELF OR YOUR FAMILY DOWN: 0
2. FEELING DOWN, DEPRESSED OR HOPELESS: 0
SUM OF ALL RESPONSES TO PHQ QUESTIONS 1-9: 0
SUM OF ALL RESPONSES TO PHQ QUESTIONS 1-9: 0
7. TROUBLE CONCENTRATING ON THINGS, SUCH AS READING THE NEWSPAPER OR WATCHING TELEVISION: 0
9. THOUGHTS THAT YOU WOULD BE BETTER OFF DEAD, OR OF HURTING YOURSELF: 0
5. POOR APPETITE OR OVEREATING: 0
SUM OF ALL RESPONSES TO PHQ QUESTIONS 1-9: 0
SUM OF ALL RESPONSES TO PHQ9 QUESTIONS 1 & 2: 0
1. LITTLE INTEREST OR PLEASURE IN DOING THINGS: 0
8. MOVING OR SPEAKING SO SLOWLY THAT OTHER PEOPLE COULD HAVE NOTICED. OR THE OPPOSITE, BEING SO FIGETY OR RESTLESS THAT YOU HAVE BEEN MOVING AROUND A LOT MORE THAN USUAL: 0

## 2022-08-12 ASSESSMENT — ENCOUNTER SYMPTOMS
SHORTNESS OF BREATH: 0
SINUS PRESSURE: 0
DIARRHEA: 0
VOMITING: 0
ABDOMINAL PAIN: 0
TROUBLE SWALLOWING: 0
SORE THROAT: 0
NAUSEA: 0
BLOOD IN STOOL: 0
CONSTIPATION: 0
COUGH: 0
WHEEZING: 0

## 2022-08-12 ASSESSMENT — SOCIAL DETERMINANTS OF HEALTH (SDOH): HOW HARD IS IT FOR YOU TO PAY FOR THE VERY BASICS LIKE FOOD, HOUSING, MEDICAL CARE, AND HEATING?: NOT HARD AT ALL

## 2022-08-12 NOTE — PROGRESS NOTES
[address] PRIMARY CARE  [facility]  [address] [address]  Dept: [phone]  Dept Fax: [phone]    Chantal Guy is a 59 y.o. female who presentstoday for her medical conditions/complaints as noted below.   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, decided was [employer] Palsy so she didn't seek ED treatment  She states is improving, was biting tongue frequently on the right side and this has stopped  She never has issues with closing her eye, mainly the drooping was right side of mouth    Voicing concern about generalized joint aches and tendonitis  Feels her weight is a contributor and is currently making efforts with logging calories and cutting portions  Has trouble exercising due to joint pain  She is taking her meloxicam daily  Has never had rheumatology consult    Has been noticing bilateral leg swelling in evenings  Intermittently wears compression knee highs and admits the swelling is much less when wearing the knee hi's      Hemoglobin A1C (%)   Date Value   09/23/2019 5.9   05/14/2018 5.7   11/02/2016 5.6             ( goal A1C is < 7)   No results found for: LABMICR  LDL Cholesterol (mg/dL)   Date Value   06/14/2022 106   07/17/2021 115   08/18/2020 90       (goal LDL is <100)   AST (U/L)   Date Value   08/02/2021 21     ALT (U/L)   Date Value   08/02/2021 19     BUN (mg/dL)   Date Value   12/18/2021 17     BP Readings from Last 3 Encounters:   08/12/22 136/82   07/15/22 (!) 166/96   06/30/22 (!) 146/77          ([id number])    Past Medical History:   Diagnosis Date    Arthritis     Asthma     Benign familial tremor     Cancer ([facility] [address]     skin    CKD (chronic kidney disease) stage 1, GFR 90 ml/min or greater     Class 2 severe obesity with serious comorbidity and body mass index (BMI) of 39.0 to 39.9 in adult ([facility] [address] 5/1/2019    Cystinuria ([facility] [address]     Dyspnea on exertion 8/6/2019    Flank pain     RIGHT    Gastroesophageal reflux disease without esophagitis     GERD (gastroesophageal reflux disease)     Hypertension     Hyperuricemia     Hypothyroidism     Kidney stones     Midline low back pain with right-sided sciatica 6/2/2016    Proteinuria     Shoulder impingement 12/2019    left    Snores     Solitary kidney, acquired 11/20/2017    Urinary tract obstruction by kidney stone 8/1/2019    Wears glasses       Past Surgical History:   Procedure Laterality Date    CARDIAC CATHETERIZATION  12/08/2021    CYSTO/URETERO/PYELOSCOPY, CALCULUS TX Right 11/21/2017    HOLMIUM LASER - CYSTOSCOPY, RIGHT URETEROSCOPY, RIGHT STENT EXCHANGE,STONE BASKETING performed by Kaley Gonzalez MD at [facility], [place] Right 9/13/2019    HOLMIUM - CYSTO, URETEROSCOPY, LITHOTRIPSY, STENT PLACEMENT performed by Christie Lozano MD at [facility] [place] [address] Right 12/12/2019    HOLMIUM- CYSTO, URETEROSCOPY, LASER LITHO, STENT PLACEMENT AND RIGHT URETERAL BASKET STONE EXTRACTION performed by Kaley Gonzalez MD at [place] [address] Right 11/18/2017    CYSTOSCOPY URETERAL STENT INSERTION,RIGHT performed by Xiomara Zaidi MD at [place] [address] Right 8/2/2019    CYSTOSCOPY RT URETERAL STENT INSERTION performed by Christie Lozano MD at [place] [address] Right 8/23/2019    CYSTOSCOPY, RIGHT STENT REMOVAL, INSERTION OCCLUSIVE BALLOON, PT GOING TO INTERVENTIONAL performed by Christie Lozano MD at [place] [address] Right 12/12/2019    CYSTO, URETEROSCOPY, LASER LITHO, STENT PLACEMENT AND RIGHT URETERAL BASKET STONE EXTRACTION     CYSTOSCOPY Right 12/31/2019    stent removal    CYSTOSCOPY Right 09/19/2020    litho, rt stent    CYSTOSCOPY Right 9/19/2020    CYSTOSCOPY, URETEROSCOPY, HOLMIUM LASER LITHOTRIPSY, STENT PLACEMENT performed by Braydon Ellis MD at [facility] [address] / STONE Right 12/31/2019    CYSTOSCOPY, STENT REMOVAL performed by Odilon Murphy MD at [facility] N/A 6/30/2022    DILATATION AND CURETTAGE, HYSTEROSCOPY performed by Alec Izaguirre MD at [facility], VAGINAL  06/30/2022    with D&C    INTRAOCULAR LENS PROSTHESIS INSERTION Bilateral 2008    KNEE ARTHROSCOPY Right 1998, 2003    X2    NEPHROLITHOTOMY Right 8/23/2019    HOLMIUM LASER, NEPHROLITHOTOMY PERCUTANEOUS, LITHOCLAST, C-ARM performed by Braden Weir MD at [place] [address], [place] [address] N/A 7/10/2018    CYSTOSCOPY, RIGHT URETEROSCOPY, HOLMIUM LASER LITHO WITH STONE BASKETING, RIGHT STENT EXCHANGE performed by Braydon Ellis MD at [place] [address], [place] Right 10/10/2018    HOLMIUM LASER STANDBY, CYSTO, RIGHT URETEROSCOPY, RIGHT STENT PLACEMENT performed by Odilon Murphy MD at [address] [facility] Right 7/4/2018    CYSTOSCOPY URETERAL STENT INSERTION, RIGHT performed by Kimberly Chou MD at [facility]  Left     TOTAL NEPHRECTOMY Left [facility] Right 12/12/2019       Family History   Problem Relation Age of Onset    High Blood Pressure Mother     Diabetes Father     Cancer Father         tongue    Hypertension Father     Heart Disease Father         stents    Other Father         Mylofibrosis    Hypertension Sister     Heart Disease Maternal Grandmother     Heart Attack Maternal Grandmother     Prostate Cancer Maternal Grandfather     Heart Disease Paternal Grandfather     Stroke Paternal Grandfather     Emphysema Paternal Grandmother           Social History     Tobacco Use    Smoking status: Never    Smokeless tobacco: Never   Substance Use Topics    Alcohol use:  Yes     Alcohol/week: 0.0 standard drinks     Comment: occasionally      Current Outpatient Medications   Medication Sig Dispense Refill    B Complex Vitamins (VITAMIN B COMPLEX) TABS       Ashwagandha 500 MG CAPS       vitamin D (D3-1000) 25 MCG (1000 UT) CAPS       metoprolol succinate (TOPROL XL) 25 MG extended release tablet       ibuprofen (ADVIL;MOTRIN) 600 MG tablet Take 1 tablet by mouth every 6 hours as needed for Pain 60 tablet 1    ondansetron (ZOFRAN) 4 MG tablet Take 1 tablet by mouth every 8 hours as needed for Nausea or Vomiting 20 tablet 0    omeprazole (PRILOSEC) 20 MG delayed release capsule TAKE 1 CAPSULE BY MOUTH 2 TIMES A  capsule 3    albuterol sulfate HFA (PROAIR HFA) 108 (90 Base) MCG/ACT inhaler Inhale 2 puffs into the lungs every 6 hours as needed for Wheezing 1 each 3    irbesartan-hydroCHLOROthiazide (AVALIDE) 150-12.5 MG per tablet TAKE 1 TABLET BY MOUTH ONE TIME A DAY 90 tablet 3    meloxicam (MOBIC) 15 MG tablet TAKE 1 TABLET BY MOUTH ONE TIME A DAY 90 tablet 3    benzoyl peroxide 5 % external liquid Wash affected areas once daily 227 g 3    clindamycin (CLEOCIN T) 1 % lotion Apply to affected areas daily 60 mL 3    POTASSIUM CITRATE PO Take 5 mLs by mouth three times daily      OMEGA-3 FATTY ACIDS PO Take 1 tablet by mouth daily      acetaminophen (TYLENOL) 500 MG tablet Take 1,000 mg by mouth every 4 hours as needed for Pain. No current facility-administered medications for this visit.      No Known Allergies    Health Maintenance   Topic Date Due    Colorectal Cancer Screen  Never done    Shingles vaccine (1 of 2) Never done    Pneumococcal 0-64 years Vaccine (2 - PCV) 10/25/2019    A1C test (Diabetic or Prediabetic)  09/23/2020    COVID-19 Vaccine (4 - Booster for Moderna series) 02/26/2022    DTaP/Tdap/Td vaccine (1 - Tdap) 10/15/2023 (Originally [birth year]/2/1982)    Flu vaccine (1) 09/01/2022    Depression Monitoring  09/30/2022    Breast cancer screen  10/21/2023    Lipids  06/14/2027    Hepatitis C screen  Completed    HIV screen  Completed    Hepatitis A vaccine  Aged Out    Hepatitis B vaccine  Aged Out    Hib vaccine  Aged Out    Meningococcal (ACWY) vaccine  Aged Out       Subjective:      Review of Systems   Constitutional:  Negative for activity change, appetite change, chills, fatigue, fever and unexpected weight change. HENT:  Negative for congestion, ear pain, hearing loss, sinus pressure, sore throat and trouble swallowing. Eyes:  Negative for visual disturbance. Respiratory:  Negative for cough, shortness of breath and wheezing. Cardiovascular:  Positive for leg swelling. Negative for chest pain and palpitations. Gastrointestinal:  Negative for abdominal pain, blood in stool, constipation, diarrhea, nausea and vomiting. Endocrine: Negative for cold intolerance, heat intolerance, polydipsia, polyphagia and polyuria. Genitourinary:  Negative for difficulty urinating, frequency, hematuria and urgency. Musculoskeletal:  Positive for arthralgias. Negative for myalgias. Skin:  Negative for rash. Allergic/Immunologic: Negative for environmental allergies. Neurological:  Negative for dizziness, weakness, light-headedness and headaches. Psychiatric/Behavioral:  Negative for confusion. The patient is not nervous/anxious. Objective:     Physical Exam  Constitutional:       Appearance: Normal appearance. She is well-developed. HENT:      Head: Normocephalic. Eyes:      Conjunctiva/sclera: Conjunctivae normal.      Pupils: Pupils are equal, round, and reactive to light. Cardiovascular:      Rate and Rhythm: Normal rate and regular rhythm. Heart sounds: Normal heart sounds. No murmur heard. Pulmonary:      Effort: Pulmonary effort is normal.      Breath sounds: Normal breath sounds. No wheezing. Abdominal:      General: Bowel sounds are normal. There is no distension. Palpations: Abdomen is soft. Musculoskeletal:         General: Normal range of motion.       Cervical back: Normal range current medications, diet and exercise. Patient agreed with treatmentplan. Follow up as directed.      Electronicallysigned by MIGDALIA Dickens CNP on 8/12/2022 at 9:31 AM

## 2022-08-15 DIAGNOSIS — M72.2 PLANTAR FASCIITIS, RIGHT: ICD-10-CM

## 2022-08-15 DIAGNOSIS — M65.311 TRIGGER THUMB, RIGHT THUMB: ICD-10-CM

## 2022-08-15 DIAGNOSIS — G56.01 CARPAL TUNNEL SYNDROME OF RIGHT WRIST: ICD-10-CM

## 2022-08-15 RX ORDER — MELOXICAM 15 MG/1
TABLET ORAL
Qty: 90 TABLET | Refills: 3 | Status: SHIPPED | OUTPATIENT
Start: 2022-08-15

## 2022-09-08 ENCOUNTER — HOSPITAL ENCOUNTER (OUTPATIENT)
Age: 57
Discharge: HOME | End: 2022-09-08
Payer: COMMERCIAL

## 2022-09-08 LAB
ABSOLUTE EOS #: 0.29 K/UL (ref 0–0.44)
ABSOLUTE IMMATURE GRANULOCYTE: 0.04 K/UL (ref 0–0.3)
ABSOLUTE LYMPH #: 2.53 K/UL (ref 1.1–3.7)
ABSOLUTE MONO #: 0.68 K/UL (ref 0.1–1.2)
ALBUMIN SERPL-MCNC: 4.4 G/DL (ref 3.5–5.2)
ALBUMIN/GLOBULIN RATIO: 1.4 (ref 1–2.5)
ALP BLD-CCNC: 115 U/L (ref 35–104)
ALT SERPL-CCNC: 24 U/L (ref 5–33)
ANION GAP SERPL CALCULATED.3IONS-SCNC: 13 MMOL/L (ref 9–17)
AST SERPL-CCNC: 34 U/L
BASOPHILS # BLD: 0 % (ref 0–2)
BASOPHILS ABSOLUTE: 0.04 K/UL (ref 0–0.2)
BILIRUB SERPL-MCNC: 0.4 MG/DL (ref 0.3–1.2)
BUN BLDV-MCNC: 20 MG/DL (ref 6–20)
C-REACTIVE PROTEIN: 9 MG/L (ref 0–5)
CALCIUM SERPL-MCNC: 9.7 MG/DL (ref 8.6–10.4)
CHLORIDE BLD-SCNC: 102 MMOL/L (ref 98–107)
CO2: 25 MMOL/L (ref 20–31)
CREAT SERPL-MCNC: 0.83 MG/DL (ref 0.5–0.9)
EOSINOPHILS RELATIVE PERCENT: 3 % (ref 1–4)
GFR AFRICAN AMERICAN: >60 ML/MIN
GFR NON-AFRICAN AMERICAN: >60 ML/MIN
GFR SERPL CREATININE-BSD FRML MDRD: ABNORMAL ML/MIN/{1.73_M2}
GLUCOSE BLD-MCNC: 102 MG/DL (ref 70–99)
HCT VFR BLD CALC: 40.1 % (ref 36.3–47.1)
HEMOGLOBIN: 13.3 G/DL (ref 11.9–15.1)
IMMATURE GRANULOCYTES: 0 %
LYMPHOCYTES # BLD: 26 % (ref 24–43)
MCH RBC QN AUTO: 27.4 PG (ref 25.2–33.5)
MCHC RBC AUTO-ENTMCNC: 33.2 G/DL (ref 28.4–34.8)
MCV RBC AUTO: 82.5 FL (ref 82.6–102.9)
MONOCYTES # BLD: 7 % (ref 3–12)
NRBC AUTOMATED: 0 PER 100 WBC
PDW BLD-RTO: 14.9 % (ref 11.8–14.4)
PLATELET # BLD: 263 K/UL (ref 138–453)
PMV BLD AUTO: 9.5 FL (ref 8.1–13.5)
POTASSIUM SERPL-SCNC: 4.2 MMOL/L (ref 3.7–5.3)
RBC # BLD: 4.86 M/UL (ref 3.95–5.11)
RBC # BLD: ABNORMAL 10*6/UL
RHEUMATOID FACTOR: <10 IU/ML
SEDIMENTATION RATE, ERYTHROCYTE: 35 MM/HR (ref 0–30)
SEG NEUTROPHILS: 64 % (ref 36–65)
SEGMENTED NEUTROPHILS ABSOLUTE COUNT: 6.03 K/UL (ref 1.5–8.1)
SODIUM BLD-SCNC: 140 MMOL/L (ref 135–144)
TOTAL PROTEIN: 7.5 G/DL (ref 6.4–8.3)
WBC # BLD: 9.6 K/UL (ref 3.5–11.3)

## 2022-09-08 PROCEDURE — 85652 RBC SED RATE AUTOMATED: CPT

## 2022-09-08 PROCEDURE — 36415 COLL VENOUS BLD VENIPUNCTURE: CPT

## 2022-09-08 PROCEDURE — 86140 C-REACTIVE PROTEIN: CPT

## 2022-09-08 PROCEDURE — 86431 RHEUMATOID FACTOR QUANT: CPT

## 2022-09-08 PROCEDURE — 80053 COMPREHEN METABOLIC PANEL: CPT

## 2022-09-08 PROCEDURE — 85025 COMPLETE CBC W/AUTO DIFF WBC: CPT

## 2022-09-08 PROCEDURE — 86200 CCP ANTIBODY: CPT

## 2022-09-09 ENCOUNTER — HOSPITAL ENCOUNTER (OUTPATIENT)
Dept: GENERAL RADIOLOGY | Age: 57
Discharge: HOME | End: 2022-09-11
Payer: COMMERCIAL

## 2022-09-09 ENCOUNTER — HOSPITAL ENCOUNTER (OUTPATIENT)
Age: 57
Discharge: HOME | End: 2022-09-11
Payer: COMMERCIAL

## 2022-09-09 DIAGNOSIS — N20.0 KIDNEY STONE: ICD-10-CM

## 2022-09-09 PROCEDURE — 74018 RADEX ABDOMEN 1 VIEW: CPT

## 2022-09-10 LAB — CCP IGG ANTIBODIES: 0.7 U/ML (ref 0–7)

## 2022-09-21 ENCOUNTER — HOSPITAL ENCOUNTER (OUTPATIENT)
Dept: CT IMAGING | Age: 57
Discharge: HOME | End: 2022-09-23
Payer: COMMERCIAL

## 2022-09-21 DIAGNOSIS — N20.0 CALCULUS OF KIDNEY: ICD-10-CM

## 2022-09-21 PROCEDURE — 74176 CT ABD & PELVIS W/O CONTRAST: CPT

## 2022-10-08 DIAGNOSIS — I10 ESSENTIAL HYPERTENSION: ICD-10-CM

## 2022-10-10 RX ORDER — IRBESARTAN AND HYDROCHLOROTHIAZIDE 150; 12.5 MG/1; MG/1
TABLET, FILM COATED ORAL
Qty: 90 TABLET | Refills: 3 | Status: SHIPPED | OUTPATIENT
Start: 2022-10-10

## 2022-10-18 RX ORDER — SODIUM CHLORIDE, SODIUM LACTATE, POTASSIUM CHLORIDE, CALCIUM CHLORIDE 600; 310; 30; 20 MG/100ML; MG/100ML; MG/100ML; MG/100ML
1000 INJECTION, SOLUTION INTRAVENOUS CONTINUOUS
Status: CANCELLED | OUTPATIENT
Start: 2022-10-18

## 2022-10-18 NOTE — DISCHARGE INSTRUCTIONS
Pre-operative Instructions           NOTHING to eat or drink after midnight the night prior to surgery   (This includes gum, candy, mints, chewing tobacco, etc). Please arrive at the surgery center (Entrance B) by  9:00 AM on 11/1/2022 (or as directed by your surgeon's office). See Directons to Surgery Center below. Please take only the following medication(s) the day of surgery with a small sip of water: Metoprolol (Toprol), Irbesartan-HCTZ (Avalide), Omeprazole (Prilosec)    Please stop any blood thinning medications: Meloxicam (Mobic), Ibuprofen (Motrin), Omega 3, Ashwaganda   Failure to stop these medications as instructed (too soon or too late) may result in injury to you, or your surgery may need to be rescheduled. Below is a list of some examples for your reference. Antiplatelets : (stop blood cells (called platelets) from sticking together and forming a blood clot):   Aspirin (Bufferin, Ecotrin), Clopidogrel (Plavix), Ticagrelor (Brilinta), Prasugrel (Effient), Dipyridamole/aspirin (Aggrenox), Ticlopidine (Ticlid), Eptifibatide (Integrilin)    Anticoagulants: (slow down your body's process of making clots): Warfarin (Coumadin), Rivaroxaban (Xarelto), Dabigatran (Pradaxa), Apixaban (Eliquis), Edoxaban (Savaysa), Heparin/Enoxaparin (Lovenox), Fondaparinux (Arixtra)    NSAIDS: Aspirin (Bufferin, Ecotrin), Ibuprofen (Motrin, Nuprin,Advil), Naproxen (Aleve),Meloxicam (Mobic), Celecoxib (Celebrex), Diclofenac (Voltaren), Etodolac (Lodine), Indomethacin, Ketorolac, Nabumetone, Oxaprozin (Daypro), Piroxicam (Feldene), Excedrin (has aspirin in it)    Herbal supplements: Bromelain, Cinnamon, [employer], [employer] [place] (female ginseng), Fish oil, Garlic, Rahel,Ginkgo biloba, Grape seed extract, Turmeric, Vitamin E, etc....)    You may continue the rest of your medications through the night before surgery unless instructed otherwise.     If applicable:   Do not take diabetic medications on the day of surgery. Please use/bring daily inhalers with you     10/20/22  11:56 AM      ___________________  _______________________  Signature (Provider)              Signature (Patient)     Day of Surgery/Procedure    As a patient at [facility] you can expect quality medical and nursing care that is centered on your individual needs. Our goal is to make your surgical experience as comfortable as possible    Directions to the [address] [place] [address]. Jamin is located in the Emergency Room parking lot on [facility] or there is additional parking across the street. The address is [address] [place] [address]. Please check in at the [facility] upon arrival.     Patient Instructions  In case of any illness please contact your surgeons office for instructions prior to coming to the hospital.    Due to current restrictions you are only allowed 2 adults to be with you. Masks are to be worn and screening will take place on arrival.     Bring your current list of medications, vitamins, herbals and anything you might take on an  \"as needed \" basis. It is important we have a correct list with dosages and frequencies. Please verify your list with your medications at home or bring all of your bottles with you. If you have been given a blood band be sure to bring it with you on the day of surgery. Do not put it on or close the clasp. Use and bring any inhalers if you are currently using one. It is ok to brush your teeth but do not swallow any water. You may be required to provide a urine sample upon your arrival to the pre-op area, so please take this into consideration prior to using the restroom. No jewelry or piercing's to be worn into surgery because you might be injured because of them. No contact lenses to be worn. It is ok to wear your glasses in pre op but they will be removed prior to going into the operating room.     Dentures/partials will likely need to be removed in pre op depending on your type of anesthesia, please do not use adhesives on the day of surgery. Bathe as instructed with the special soap given to you. No lotions, powders or creams after bathing. Wear loose comfortable clothing / shoes that are easy to get on and off over wounds or casts. If you are going to be admitted after surgery please bring your Cpap or BiPap if you have it at home. Keep patient belongings to a minimum and leave valuables at home. If you are staying overnight with us, please bring a SMALL bag of personal items. We cannot accommodate large items, like suitcases. If you are going home after anesthesia or sedation then you must have a responsible adult with you to take you home and to be with you for the first 24 hours after surgery. If you do not have someone to stay with you your surgery might get cancelled. Please contact your surgeon's office to see if other arrangements can be made if you can not find someone to stay with you. If you have any other questions on the day of surgery please contact [phone] or [phone]    If you have any other questions regarding your procedure/surgery please call  your surgeon's office.

## 2022-10-20 ENCOUNTER — HOSPITAL ENCOUNTER (OUTPATIENT)
Dept: PREADMISSION TESTING | Age: 57
Discharge: HOME | End: 2022-10-24
Payer: COMMERCIAL

## 2022-10-20 ENCOUNTER — HOSPITAL ENCOUNTER (OUTPATIENT)
Age: 57
Discharge: HOME | End: 2022-10-20
Payer: COMMERCIAL

## 2022-10-20 VITALS
SYSTOLIC BLOOD PRESSURE: 145 MMHG | DIASTOLIC BLOOD PRESSURE: 85 MMHG | HEIGHT: 68 IN | RESPIRATION RATE: 18 BRPM | BODY MASS INDEX: 41.07 KG/M2 | TEMPERATURE: 97.7 F | OXYGEN SATURATION: 95 % | WEIGHT: 271 LBS | HEART RATE: 79 BPM

## 2022-10-20 LAB
ANION GAP SERPL CALCULATED.3IONS-SCNC: 16 MMOL/L (ref 9–17)
BUN BLDV-MCNC: 19 MG/DL (ref 6–20)
CALCIUM SERPL-MCNC: 10.2 MG/DL (ref 8.6–10.4)
CHLORIDE BLD-SCNC: 98 MMOL/L (ref 98–107)
CO2: 24 MMOL/L (ref 20–31)
CREAT SERPL-MCNC: 0.81 MG/DL (ref 0.5–0.9)
GFR SERPL CREATININE-BSD FRML MDRD: >60 ML/MIN/1.73M2
GLUCOSE BLD-MCNC: 99 MG/DL (ref 70–99)
POTASSIUM SERPL-SCNC: 3.9 MMOL/L (ref 3.7–5.3)
SODIUM BLD-SCNC: 138 MMOL/L (ref 135–144)

## 2022-10-20 PROCEDURE — 80048 BASIC METABOLIC PNL TOTAL CA: CPT

## 2022-10-20 PROCEDURE — 85610 PROTHROMBIN TIME: CPT

## 2022-10-20 PROCEDURE — 87086 URINE CULTURE/COLONY COUNT: CPT

## 2022-10-20 PROCEDURE — 36415 COLL VENOUS BLD VENIPUNCTURE: CPT

## 2022-10-20 PROCEDURE — 82947 ASSAY GLUCOSE BLOOD QUANT: CPT

## 2022-10-20 PROCEDURE — 84520 ASSAY OF UREA NITROGEN: CPT

## 2022-10-20 PROCEDURE — 85730 THROMBOPLASTIN TIME PARTIAL: CPT

## 2022-10-20 PROCEDURE — 80051 ELECTROLYTE PANEL: CPT

## 2022-10-20 PROCEDURE — 85027 COMPLETE CBC AUTOMATED: CPT

## 2022-10-20 PROCEDURE — 82565 ASSAY OF CREATININE: CPT

## 2022-10-20 RX ORDER — CIPROFLOXACIN 2 MG/ML
400 INJECTION, SOLUTION INTRAVENOUS ONCE
Status: CANCELLED | OUTPATIENT
Start: 2022-10-20

## 2022-10-20 RX ORDER — FUROSEMIDE 20 MG/1
20 TABLET ORAL DAILY
COMMUNITY
End: 2022-11-15 | Stop reason: ALTCHOICE

## 2022-10-20 RX ORDER — SODIUM CHLORIDE 9 MG/ML
INJECTION, SOLUTION INTRAVENOUS CONTINUOUS
Status: CANCELLED | OUTPATIENT
Start: 2022-10-20

## 2022-10-20 ASSESSMENT — PAIN DESCRIPTION - LOCATION: LOCATION: OTHER (COMMENT)

## 2022-10-20 ASSESSMENT — PAIN DESCRIPTION - ORIENTATION: ORIENTATION: RIGHT

## 2022-10-20 ASSESSMENT — PAIN SCALES - GENERAL: PAINLEVEL_OUTOF10: 3

## 2022-10-20 ASSESSMENT — PAIN DESCRIPTION - PAIN TYPE: TYPE: ACUTE PAIN

## 2022-10-20 ASSESSMENT — PAIN DESCRIPTION - FREQUENCY: FREQUENCY: CONTINUOUS

## 2022-10-20 NOTE — H&P (VIEW-ONLY)
History and Physical    Pt Name: Dio Jackson  MRN: [id number]  YOB: 1965  Date of evaluation: 10/20/2022  Primary Care Physician: MIGDALIA Hull CNP    SUBJECTIVE:   History of Chief Complaint:    Dio Jackson is a 59 y.o. female who presents for PAT appointment. Patient complains of right sided flank pain for several months. Patient states she had a known kidney stone that was being monitored over time and has grown in size. She denies any hematuria, dysuria, frequency, urgency or abdominal pain. Patient has been scheduled for CYSTOSCOPY INSERTION OCCLUSIVE BALLOON ** PATIENT GOING TO IR** - Right, HOLMIUM LASER NEPHROLITHOTOMY PERCUTANEOUS (LITHOCLAST, C-ARM) **PATIENT COMING FROM IR** - Right  Allergies  has No Known Allergies. Medications  Prior to Admission medications    Medication Sig Start Date End Date Taking?  Authorizing Provider   Melatonin 5 MG CAPS Take by mouth   Yes Historical Provider, MD   furosemide (LASIX) 20 MG tablet Take 20 mg by mouth daily   Yes Historical Provider, MD   irbesartan-hydroCHLOROthiazide (AVALIDE) 150-12.5 MG per tablet TAKE 1 TABLET BY MOUTH ONE TIME A DAY 10/10/22   MIGDALIA Hull CNP   meloxicam (MOBIC) 15 MG tablet TAKE 1 TABLET BY MOUTH ONE TIME A DAY 8/15/22   MIGDALIA Delgadillo CNP   B Complex Vitamins (VITAMIN B COMPLEX) TABS     Historical Provider, MD   Ashwagandha 500 MG CAPS Take 1 capsule by mouth daily    Historical Provider, MD   vitamin D (D3-1000) 25 MCG (1000 UT) CAPS     Historical Provider, MD   metoprolol succinate (TOPROL XL) 25 MG extended release tablet Take 25 mg by mouth nightly 1/2 tab nightly    Historical Provider, MD   omeprazole (PRILOSEC) 20 MG delayed release capsule TAKE 1 CAPSULE BY MOUTH 2 TIMES A DAY 4/1/22   MIGDALIA Delgadillo CNP   albuterol sulfate HFA (PROAIR HFA) 108 (90 Base) MCG/ACT inhaler Inhale 2 puffs into the lungs every 6 hours as needed for Wheezing 10/28/21   Nazia CORDON MIGDALIA Lainez - CNP   benzoyl peroxide 5 % external liquid Wash affected areas once daily 4/7/21   Urban Lucas MD   clindamycin (CLEOCIN T) 1 % lotion Apply to affected areas daily 4/7/21   Jacqueline Stuart MD   POTASSIUM CITRATE PO Take 5 mLs by mouth three times daily    Historical Provider, MD   OMEGA-3 FATTY ACIDS PO Take 1 tablet by mouth daily    Historical Provider, MD   acetaminophen (TYLENOL) 500 MG tablet Take 1,000 mg by mouth every 4 hours as needed for Pain. Historical Provider, MD   solifenacin (VESICARE) 10 MG tablet Take 10 mg by mouth as needed. 12/23/14  Historical Provider, MD     Past Medical History    has a past medical history of Arthritis, Asthma, Benign familial tremor, CAD (coronary artery disease), Cancer ([facility] [address] CKD (chronic kidney disease) stage 1, GFR 90 ml/min or greater, Class 2 severe obesity with serious comorbidity and body mass index (BMI) of 39.0 to 39.9 in adult ([facility] [address] Cystinuria ([facility] [address] Dyspnea on exertion, Flank pain, Gastroesophageal reflux disease without esophagitis, GERD (gastroesophageal reflux disease), Hypertension, Hyperuricemia, Hypothyroidism, Kidney stones, Midline low back pain with right-sided sciatica, Proteinuria, Shoulder impingement, Snores, Solitary kidney, acquired, Urinary tract obstruction by kidney stone, and Wears glasses. Past Surgical History   has a past surgical history that includes total nephrectomy (Left, 1988); Knee arthroscopy (Right, 1998, 2003); Cystoscopy (Right, 11/18/2017); cysto/uretero/pyeloscopy, calculus tx (Right, 11/21/2017); pr cystoscopy,insert ureteral stent (Right, 07/04/2018); pr cysto/uretero/pyeloscopy, calculus tx (N/A, 07/10/2018); pr cysto/uretero/pyeloscopy, calculus tx (Right, 10/10/2018); Cystoscopy (Right, 08/02/2019); Rotator cuff repair (Left); Intraocular lens insertion (Bilateral, 2008);  Cystoscopy (Right, 08/23/2019); NEPHROLITHOTOMY (Right, 08/23/2019); cysto/uretero/pyeloscopy, calculus tx (Right, 2019); Cystoscopy (Right, 2019); Ureter stent placement (Right, 2019); cysto/uretero/pyeloscopy, calculus tx (Right, 2019); Cystocopy (Right, 2019); CYSTOSCOPY INSERTION / REMOVAL STENT / STONE (Right, 2019); Cystocopy (Right, 2020); Cystoscopy (Right, 2020); Cardiac catheterization (2021); and Dilation and curettage of uterus (N/A, 2022). Social History   reports that she has never smoked. She has never used smokeless tobacco.    reports current alcohol use. reports no history of drug use. Marital Status single  Children none  Occupation RN trauma coordinator  Family History  Family Status   Relation Name Status    Mother  Alive    Father      Sister  Alive    Sister  Alive    MGM      MGF      PGM      PGF       family history includes Cancer in her father; Dementia in her mother; Diabetes in her father; Emphysema in her paternal grandmother; Heart Attack in her maternal grandmother; Heart Disease in her father, maternal grandmother, and paternal grandfather; High Blood Pressure in her mother; Hypertension in her father and sister; Other in her father; Prostate Cancer in her maternal grandfather; Stroke in her paternal grandfather.     Review of Systems:  CONSTITUTIONAL:   negative for fevers, chills, fatigue and malaise    EYES:   negative for double vision, blurred vision and photophobia    HEENT:   negative for tinnitus, epistaxis and sore throat     RESPIRATORY:   negative for cough, shortness of breath, wheezing     CARDIOVASCULAR:   negative for chest pain, palpitations, syncope, edema     GASTROINTESTINAL:   negative for nausea, vomiting     GENITOURINARY:   negative for incontinence right flank pain   MUSCULOSKELETAL:   negative for neck or back pain     NEUROLOGICAL:   Negative for weakness and tingling  negative for headaches and dizziness     PSYCHIATRIC:   negative for anxiety OBJECTIVE:   VITALS:  height is 5' 8\" (1.727 m) and weight is 271 lb (122.9 kg). Her infrared temperature is 97.7 °F (36.5 °C). Her blood pressure is 145/85 (abnormal) and her pulse is 79. Her respiration is 18 and oxygen saturation is 95%. CONSTITUTIONAL:alert & oriented x 3, no acute distress. Calm and pleasant. SKIN:  Warm and dry, no rashes to exposed areas of skin. HEAD:  Normocephalic, atraumatic. EYES: PERRL. EOMs intact. EARS:  Intact and equal bilaterally. Hearing grossly WNL. NOSE:  Nares patent. No rhinorrhea   MOUTH/THROAT:  Mucous membranes pink and moist, teeth appear to be intact   NECK:supple, good ROM. LUNGS: Respirations even and non-labored. Clear to auscultation bilaterally, no wheezes, rales, or rhonchi. CARDIOVASCULAR: Regular rate and rhythm, no murmurs. ABDOMEN: soft, non-tender, rotund, bowel sounds active x 4   EXTREMITIES: 2+ edema to bilateral lower extremities (patient reports she was recently started on lasix). Wearing bilateral compression stockings. NEUROLOGIC: CN II-XII are grossly intact. Gait is smooth. Testing:   EKG: 10/20/2022  Labs pending: drawn 10/20/2022   IMPRESSIONS:   Kidney stone.    PLANS:   CYSTOSCOPY INSERTION OCCLUSIVE BALLOON ** PATIENT GOING TO ** - Right, HOLMIUM LASER NEPHROLITHOTOMY PERCUTANEOUS (LITHOCLAST, C-ARM) **PATIENT COMING FROM ** - Right    MIGDALIA Robison CNP  Electronically signed 10/20/2022 at 12:14 PM

## 2022-10-20 NOTE — H&P
History and Physical    Pt Name: Hetal Joseph  MRN: [id number]  YOB: 1965  Date of evaluation: 10/20/2022  Primary Care Physician: MIDGALIA Venegas CNP    SUBJECTIVE:   History of Chief Complaint:    Hetal Joseph is a 59 y.o. female who presents for PAT appointment. Patient complains of right sided flank pain for several months. Patient states she had a known kidney stone that was being monitored over time and has grown in size. She denies any hematuria, dysuria, frequency, urgency or abdominal pain. Patient has been scheduled for CYSTOSCOPY INSERTION OCCLUSIVE BALLOON ** PATIENT GOING TO IR** - Right, HOLMIUM LASER NEPHROLITHOTOMY PERCUTANEOUS (LITHOCLAST, C-ARM) **PATIENT COMING FROM IR** - Right  Allergies  has No Known Allergies. Medications  Prior to Admission medications    Medication Sig Start Date End Date Taking?  Authorizing Provider   Melatonin 5 MG CAPS Take by mouth   Yes Historical Provider, MD   furosemide (LASIX) 20 MG tablet Take 20 mg by mouth daily   Yes Historical Provider, MD   irbesartan-hydroCHLOROthiazide (AVALIDE) 150-12.5 MG per tablet TAKE 1 TABLET BY MOUTH ONE TIME A DAY 10/10/22   MIGDALIA Venegas CNP   meloxicam (MOBIC) 15 MG tablet TAKE 1 TABLET BY MOUTH ONE TIME A DAY 8/15/22   MIGDALIA Truong CNP   B Complex Vitamins (VITAMIN B COMPLEX) TABS     Historical Provider, MD   Ashwagandha 500 MG CAPS Take 1 capsule by mouth daily    Historical Provider, MD   vitamin D (D3-1000) 25 MCG (1000 UT) CAPS     Historical Provider, MD   metoprolol succinate (TOPROL XL) 25 MG extended release tablet Take 25 mg by mouth nightly 1/2 tab nightly    Historical Provider, MD   omeprazole (PRILOSEC) 20 MG delayed release capsule TAKE 1 CAPSULE BY MOUTH 2 TIMES A DAY 4/1/22   MIGDALIA Truong CNP   albuterol sulfate HFA (PROAIR HFA) 108 (90 Base) MCG/ACT inhaler Inhale 2 puffs into the lungs every 6 hours as needed for Wheezing 10/28/21   Nazia CORDON Lucy Monsivais, APRN - CNP   benzoyl peroxide 5 % external liquid Wash affected areas once daily 4/7/21   Joanne Blandon MD   clindamycin (CLEOCIN T) 1 % lotion Apply to affected areas daily 4/7/21   Jacqueline Stuart MD   POTASSIUM CITRATE PO Take 5 mLs by mouth three times daily    Historical Provider, MD   OMEGA-3 FATTY ACIDS PO Take 1 tablet by mouth daily    Historical Provider, MD   acetaminophen (TYLENOL) 500 MG tablet Take 1,000 mg by mouth every 4 hours as needed for Pain. Historical Provider, MD   solifenacin (VESICARE) 10 MG tablet Take 10 mg by mouth as needed. 12/23/14  Historical Provider, MD     Past Medical History    has a past medical history of Arthritis, Asthma, Benign familial tremor, CAD (coronary artery disease), Cancer ([facility] [address] CKD (chronic kidney disease) stage 1, GFR 90 ml/min or greater, Class 2 severe obesity with serious comorbidity and body mass index (BMI) of 39.0 to 39.9 in adult ([facility] [address] Cystinuria ([facility] [address] Dyspnea on exertion, Flank pain, Gastroesophageal reflux disease without esophagitis, GERD (gastroesophageal reflux disease), Hypertension, Hyperuricemia, Hypothyroidism, Kidney stones, Midline low back pain with right-sided sciatica, Proteinuria, Shoulder impingement, Snores, Solitary kidney, acquired, Urinary tract obstruction by kidney stone, and Wears glasses. Past Surgical History   has a past surgical history that includes total nephrectomy (Left, 1988); Knee arthroscopy (Right, 1998, 2003); Cystoscopy (Right, 11/18/2017); cysto/uretero/pyeloscopy, calculus tx (Right, 11/21/2017); pr cystoscopy,insert ureteral stent (Right, 07/04/2018); pr cysto/uretero/pyeloscopy, calculus tx (N/A, 07/10/2018); pr cysto/uretero/pyeloscopy, calculus tx (Right, 10/10/2018); Cystoscopy (Right, 08/02/2019); Rotator cuff repair (Left); Intraocular lens insertion (Bilateral, 2008);  Cystoscopy (Right, 08/23/2019); NEPHROLITHOTOMY (Right, 08/23/2019); cysto/uretero/pyeloscopy, calculus tx (Right, 2019); Cystoscopy (Right, 2019); Ureter stent placement (Right, 2019); cysto/uretero/pyeloscopy, calculus tx (Right, 2019); Cystocopy (Right, 2019); CYSTOSCOPY INSERTION / REMOVAL STENT / STONE (Right, 2019); Cystocopy (Right, 2020); Cystoscopy (Right, 2020); Cardiac catheterization (2021); and Dilation and curettage of uterus (N/A, 2022). Social History   reports that she has never smoked. She has never used smokeless tobacco.    reports current alcohol use. reports no history of drug use. Marital Status single  Children none  Occupation RN trauma coordinator  Family History  Family Status   Relation Name Status    Mother  Alive    Father      Sister  Alive    Sister  Alive    MGM      MGF      PGM      PGF       family history includes Cancer in her father; Dementia in her mother; Diabetes in her father; Emphysema in her paternal grandmother; Heart Attack in her maternal grandmother; Heart Disease in her father, maternal grandmother, and paternal grandfather; High Blood Pressure in her mother; Hypertension in her father and sister; Other in her father; Prostate Cancer in her maternal grandfather; Stroke in her paternal grandfather.     Review of Systems:  CONSTITUTIONAL:   negative for fevers, chills, fatigue and malaise    EYES:   negative for double vision, blurred vision and photophobia    HEENT:   negative for tinnitus, epistaxis and sore throat     RESPIRATORY:   negative for cough, shortness of breath, wheezing     CARDIOVASCULAR:   negative for chest pain, palpitations, syncope, edema     GASTROINTESTINAL:   negative for nausea, vomiting     GENITOURINARY:   negative for incontinence right flank pain   MUSCULOSKELETAL:   negative for neck or back pain     NEUROLOGICAL:   Negative for weakness and tingling  negative for headaches and dizziness     PSYCHIATRIC:   negative for anxiety       OBJECTIVE: VITALS:  height is 5' 8\" (1.727 m) and weight is 271 lb (122.9 kg). Her infrared temperature is 97.7 °F (36.5 °C). Her blood pressure is 145/85 (abnormal) and her pulse is 79. Her respiration is 18 and oxygen saturation is 95%. CONSTITUTIONAL:alert & oriented x 3, no acute distress. Calm and pleasant. SKIN:  Warm and dry, no rashes to exposed areas of skin. HEAD:  Normocephalic, atraumatic. EYES: PERRL. EOMs intact. EARS:  Intact and equal bilaterally. Hearing grossly WNL. NOSE:  Nares patent. No rhinorrhea   MOUTH/THROAT:  Mucous membranes pink and moist, teeth appear to be intact   NECK:supple, good ROM. LUNGS: Respirations even and non-labored. Clear to auscultation bilaterally, no wheezes, rales, or rhonchi. CARDIOVASCULAR: Regular rate and rhythm, no murmurs. ABDOMEN: soft, non-tender, rotund, bowel sounds active x 4   EXTREMITIES: 2+ edema to bilateral lower extremities (patient reports she was recently started on lasix). Wearing bilateral compression stockings. NEUROLOGIC: CN II-XII are grossly intact. Gait is smooth. Testing:   EKG: 10/20/2022  Labs pending: drawn 10/20/2022   IMPRESSIONS:   Kidney stone.    PLANS:   CYSTOSCOPY INSERTION OCCLUSIVE BALLOON ** PATIENT GOING TO ** - Right, HOLMIUM LASER NEPHROLITHOTOMY PERCUTANEOUS (LITHOCLAST, C-ARM) **PATIENT COMING FROM ** - Right    MIGDALIA David - CNP  Electronically signed 10/20/2022 at 12:14 PM

## 2022-10-20 NOTE — PROGRESS NOTES
Anesthesia Focused Assessment    Hx of anesthesia complications:  no  Family hx of anesthesia complications:  no      Prior + Covid-19 test? no        STOP-BANG Sleep Apnea Questionnaire    SNORE loudly (heard through closed doors)? Yes  TIRED, fatigued, sleepy during daytime? No  OBSERVED stopping breathing during sleep? No  High blood PRESSURE or being treated? Yes    BMI over 35? Yes  AGE over 48? Yes  NECK circumference over 16\"? No  GENDER (male)? No             Total 4  High risk 5-8  Intermediate risk 3-4  Low risk 0-2    ----------------------------------------------------------------------------------------------------------------------  ESTHER                              No  If yes, machine? DM1                                            No  DM2                   No    Coronary Artery Disease      Yes, mild per cardiac cath 12/2021; no stents  HTN         Yes  Defib/AICD/Pacemaker               No             Renal Failure                   No  If yes, on dialysis           Active smoker? No  Drinks alcohol? Yes, occasional  Illicit drugs? No  Dentition?        benign      Past Medical History:   Diagnosis Date    Arthritis     Asthma     Benign familial tremor     CAD (coronary artery disease) 12/2021    mild per cardiac cath 12/2021; no stents.  Dr. Thelma Torres last visit 10/2022    Cancer [facility])     skin    CKD (chronic kidney disease) stage 1, GFR 90 ml/min or greater     Class 2 severe obesity with serious comorbidity and body mass index (BMI) of 39.0 to 39.9 in adult ([facility] [address] 05/01/2019    Cystinuria ([facility] [address]     Dyspnea on exertion 08/06/2019    Flank pain     RIGHT    Gastroesophageal reflux disease without esophagitis     GERD (gastroesophageal reflux disease)     Hypertension     Hyperuricemia     Hypothyroidism     no meds    Kidney stones     Midline low back pain with right-sided sciatica 06/02/2016    Proteinuria     Shoulder impingement 12/2019    left    Snores     no sleep apnea per sleep study    Solitary kidney, acquired 11/20/2017    Urinary tract obstruction by kidney stone 08/01/2019    Wears glasses          Patient was evaluated in PAT & anesthesia guidelines were applied. NPO guidelines, medication instructions and scheduled arrival time were reviewed with patient. Anesthesia contacted:   no    Medical or cardiac clearance ordered: no, last visit with cardiology 2 weeks ago, will obtain office notes, EKG and formal clearance.      MIGDALIA Michael - CNP   10/20/22  12:17 PM

## 2022-10-21 LAB
CULTURE: NORMAL
SPECIMEN DESCRIPTION: NORMAL

## 2022-11-01 ENCOUNTER — ANESTHESIA (OUTPATIENT)
Dept: INTERVENTIONAL RADIOLOGY/VASCULAR | Age: 57
End: 2022-11-01
Payer: COMMERCIAL

## 2022-11-01 ENCOUNTER — HOSPITAL ENCOUNTER (OUTPATIENT)
Dept: INTERVENTIONAL RADIOLOGY/VASCULAR | Age: 57
Setting detail: OUTPATIENT SURGERY
Discharge: HOME | End: 2022-11-03
Attending: UROLOGY
Payer: COMMERCIAL

## 2022-11-01 ENCOUNTER — ANESTHESIA EVENT (OUTPATIENT)
Dept: INTERVENTIONAL RADIOLOGY/VASCULAR | Age: 57
End: 2022-11-01
Payer: COMMERCIAL

## 2022-11-01 ENCOUNTER — ANESTHESIA (OUTPATIENT)
Dept: OPERATING ROOM | Age: 57
End: 2022-11-01
Payer: COMMERCIAL

## 2022-11-01 ENCOUNTER — APPOINTMENT (OUTPATIENT)
Dept: GENERAL RADIOLOGY | Age: 57
End: 2022-11-01
Attending: UROLOGY
Payer: COMMERCIAL

## 2022-11-01 ENCOUNTER — ANESTHESIA EVENT (OUTPATIENT)
Dept: OPERATING ROOM | Age: 57
End: 2022-11-01
Payer: COMMERCIAL

## 2022-11-01 ENCOUNTER — HOSPITAL ENCOUNTER (OUTPATIENT)
Age: 57
Setting detail: OBSERVATION
Discharge: HOME | End: 2022-11-02
Attending: UROLOGY | Admitting: UROLOGY
Payer: COMMERCIAL

## 2022-11-01 DIAGNOSIS — N20.0 RIGHT KIDNEY STONE: ICD-10-CM

## 2022-11-01 DIAGNOSIS — G89.18 ACUTE POSTOPERATIVE PAIN: Primary | ICD-10-CM

## 2022-11-01 LAB
ANION GAP SERPL CALCULATED.3IONS-SCNC: 11 MMOL/L (ref 9–17)
BUN BLDV-MCNC: 19 MG/DL (ref 6–20)
CALCIUM SERPL-MCNC: 9 MG/DL (ref 8.6–10.4)
CHLORIDE BLD-SCNC: 100 MMOL/L (ref 98–107)
CO2: 27 MMOL/L (ref 20–31)
CREAT SERPL-MCNC: 1 MG/DL (ref 0.5–0.9)
GFR SERPL CREATININE-BSD FRML MDRD: >60 ML/MIN/1.73M2
GLUCOSE BLD-MCNC: 166 MG/DL (ref 70–99)
HCT VFR BLD CALC: 39.7 % (ref 36.3–47.1)
HEMOGLOBIN: 12.4 G/DL (ref 11.9–15.1)
MCH RBC QN AUTO: 26.6 PG (ref 25.2–33.5)
MCHC RBC AUTO-ENTMCNC: 31.2 G/DL (ref 28.4–34.8)
MCV RBC AUTO: 85 FL (ref 82.6–102.9)
NRBC AUTOMATED: 0 PER 100 WBC
PDW BLD-RTO: 14.5 % (ref 11.8–14.4)
PLATELET # BLD: 261 K/UL (ref 138–453)
PMV BLD AUTO: 9.3 FL (ref 8.1–13.5)
POC POTASSIUM: 4 MMOL/L (ref 3.5–4.5)
POTASSIUM SERPL-SCNC: 4.2 MMOL/L (ref 3.7–5.3)
RBC # BLD: 4.67 M/UL (ref 3.95–5.11)
SODIUM BLD-SCNC: 138 MMOL/L (ref 135–144)
WBC # BLD: 13.4 K/UL (ref 3.5–11.3)

## 2022-11-01 PROCEDURE — 2580000003 HC RX 258

## 2022-11-01 PROCEDURE — 3700000000 HC ANESTHESIA ATTENDED CARE: Performed by: UROLOGY

## 2022-11-01 PROCEDURE — 3700000000 HC ANESTHESIA ATTENDED CARE

## 2022-11-01 PROCEDURE — 3600000004 HC SURGERY LEVEL 4 BASE: Performed by: UROLOGY

## 2022-11-01 PROCEDURE — C1769 GUIDE WIRE: HCPCS | Performed by: UROLOGY

## 2022-11-01 PROCEDURE — C1769 GUIDE WIRE: HCPCS

## 2022-11-01 PROCEDURE — C1726 CATH, BAL DIL, NON-VASCULAR: HCPCS | Performed by: UROLOGY

## 2022-11-01 PROCEDURE — 3600000012 HC SURGERY LEVEL 2 ADDTL 15MIN: Performed by: UROLOGY

## 2022-11-01 PROCEDURE — 3600000002 HC SURGERY LEVEL 2 BASE: Performed by: UROLOGY

## 2022-11-01 PROCEDURE — 50433 PLMT NEPHROURETERAL CATHETER: CPT

## 2022-11-01 PROCEDURE — C2628 CATHETER, OCCLUSION: HCPCS | Performed by: UROLOGY

## 2022-11-01 PROCEDURE — 7100000000 HC PACU RECOVERY - FIRST 15 MIN: Performed by: UROLOGY

## 2022-11-01 PROCEDURE — 6360000002 HC RX W HCPCS

## 2022-11-01 PROCEDURE — G0378 HOSPITAL OBSERVATION PER HR: HCPCS

## 2022-11-01 PROCEDURE — C1894 INTRO/SHEATH, NON-LASER: HCPCS

## 2022-11-01 PROCEDURE — 6360000002 HC RX W HCPCS: Performed by: PHYSICIAN ASSISTANT

## 2022-11-01 PROCEDURE — 6360000004 HC RX CONTRAST MEDICATION: Performed by: UROLOGY

## 2022-11-01 PROCEDURE — C1758 CATHETER, URETERAL: HCPCS | Performed by: UROLOGY

## 2022-11-01 PROCEDURE — 82365 CALCULUS SPECTROSCOPY: CPT

## 2022-11-01 PROCEDURE — 2580000003 HC RX 258: Performed by: STUDENT IN AN ORGANIZED HEALTH CARE EDUCATION/TRAINING PROGRAM

## 2022-11-01 PROCEDURE — 3700000001 HC ADD 15 MINUTES (ANESTHESIA)

## 2022-11-01 PROCEDURE — 3209999900 FLUORO FOR SURGICAL PROCEDURES

## 2022-11-01 PROCEDURE — 2580000003 HC RX 258: Performed by: UROLOGY

## 2022-11-01 PROCEDURE — A4216 STERILE WATER/SALINE, 10 ML: HCPCS | Performed by: UROLOGY

## 2022-11-01 PROCEDURE — 80048 BASIC METABOLIC PNL TOTAL CA: CPT

## 2022-11-01 PROCEDURE — 85027 COMPLETE CBC AUTOMATED: CPT

## 2022-11-01 PROCEDURE — 2500000003 HC RX 250 WO HCPCS

## 2022-11-01 PROCEDURE — 3600000014 HC SURGERY LEVEL 4 ADDTL 15MIN: Performed by: UROLOGY

## 2022-11-01 PROCEDURE — 2720000010 HC SURG SUPPLY STERILE

## 2022-11-01 PROCEDURE — 2709999900 HC NON-CHARGEABLE SUPPLY

## 2022-11-01 PROCEDURE — 6360000002 HC RX W HCPCS: Performed by: ANESTHESIOLOGY

## 2022-11-01 PROCEDURE — 2720000010 HC SURG SUPPLY STERILE: Performed by: UROLOGY

## 2022-11-01 PROCEDURE — 6360000002 HC RX W HCPCS: Performed by: STUDENT IN AN ORGANIZED HEALTH CARE EDUCATION/TRAINING PROGRAM

## 2022-11-01 PROCEDURE — 2709999900 HC NON-CHARGEABLE SUPPLY: Performed by: UROLOGY

## 2022-11-01 PROCEDURE — 6370000000 HC RX 637 (ALT 250 FOR IP): Performed by: STUDENT IN AN ORGANIZED HEALTH CARE EDUCATION/TRAINING PROGRAM

## 2022-11-01 PROCEDURE — 7100000001 HC PACU RECOVERY - ADDTL 15 MIN: Performed by: UROLOGY

## 2022-11-01 PROCEDURE — 3700000001 HC ADD 15 MINUTES (ANESTHESIA): Performed by: UROLOGY

## 2022-11-01 PROCEDURE — 84132 ASSAY OF SERUM POTASSIUM: CPT

## 2022-11-01 RX ORDER — HYDROCODONE BITARTRATE AND ACETAMINOPHEN 5; 325 MG/1; MG/1
1 TABLET ORAL EVERY 6 HOURS PRN
Qty: 12 TABLET | Refills: 0 | Status: SHIPPED | OUTPATIENT
Start: 2022-11-01 | End: 2022-11-06

## 2022-11-01 RX ORDER — LIDOCAINE HYDROCHLORIDE 10 MG/ML
1 INJECTION, SOLUTION INFILTRATION; PERINEURAL
Status: DISCONTINUED | OUTPATIENT
Start: 2022-11-01 | End: 2022-11-01 | Stop reason: HOSPADM

## 2022-11-01 RX ORDER — SODIUM CHLORIDE, SODIUM LACTATE, POTASSIUM CHLORIDE, CALCIUM CHLORIDE 600; 310; 30; 20 MG/100ML; MG/100ML; MG/100ML; MG/100ML
1000 INJECTION, SOLUTION INTRAVENOUS CONTINUOUS
Status: DISCONTINUED | OUTPATIENT
Start: 2022-11-01 | End: 2022-11-01 | Stop reason: HOSPADM

## 2022-11-01 RX ORDER — ROCURONIUM BROMIDE 10 MG/ML
INJECTION, SOLUTION INTRAVENOUS PRN
Status: DISCONTINUED | OUTPATIENT
Start: 2022-11-01 | End: 2022-11-01 | Stop reason: SDUPTHER

## 2022-11-01 RX ORDER — SODIUM CHLORIDE 0.9 % (FLUSH) 0.9 %
5-40 SYRINGE (ML) INJECTION EVERY 12 HOURS SCHEDULED
Status: DISCONTINUED | OUTPATIENT
Start: 2022-11-01 | End: 2022-11-02 | Stop reason: HOSPADM

## 2022-11-01 RX ORDER — SPIRONOLACTONE 25 MG/1
25 TABLET ORAL DAILY
COMMUNITY
End: 2022-11-15 | Stop reason: ALTCHOICE

## 2022-11-01 RX ORDER — SODIUM CHLORIDE 0.9 % (FLUSH) 0.9 %
5-40 SYRINGE (ML) INJECTION PRN
Status: DISCONTINUED | OUTPATIENT
Start: 2022-11-01 | End: 2022-11-01 | Stop reason: HOSPADM

## 2022-11-01 RX ORDER — OXYCODONE HYDROCHLORIDE 5 MG/1
10 TABLET ORAL EVERY 4 HOURS PRN
Status: DISCONTINUED | OUTPATIENT
Start: 2022-11-01 | End: 2022-11-02 | Stop reason: HOSPADM

## 2022-11-01 RX ORDER — POLYETHYLENE GLYCOL 3350 17 G/17G
17 POWDER, FOR SOLUTION ORAL DAILY
Qty: 510 G | Refills: 0 | Status: SHIPPED | OUTPATIENT
Start: 2022-11-01 | End: 2022-11-15 | Stop reason: ALTCHOICE

## 2022-11-01 RX ORDER — MAGNESIUM HYDROXIDE 1200 MG/15ML
LIQUID ORAL CONTINUOUS PRN
Status: DISCONTINUED | OUTPATIENT
Start: 2022-11-01 | End: 2022-11-01 | Stop reason: HOSPADM

## 2022-11-01 RX ORDER — SODIUM CHLORIDE 9 MG/ML
INJECTION, SOLUTION INTRAVENOUS PRN
Status: DISCONTINUED | OUTPATIENT
Start: 2022-11-01 | End: 2022-11-01 | Stop reason: HOSPADM

## 2022-11-01 RX ORDER — SODIUM CHLORIDE 9 MG/ML
INJECTION, SOLUTION INTRAVENOUS PRN
Status: DISCONTINUED | OUTPATIENT
Start: 2022-11-01 | End: 2022-11-02 | Stop reason: HOSPADM

## 2022-11-01 RX ORDER — OXYCODONE HYDROCHLORIDE 5 MG/1
5 TABLET ORAL EVERY 4 HOURS PRN
Status: DISCONTINUED | OUTPATIENT
Start: 2022-11-01 | End: 2022-11-02 | Stop reason: HOSPADM

## 2022-11-01 RX ORDER — HYDROCHLOROTHIAZIDE 25 MG/1
12.5 TABLET ORAL DAILY
Status: DISCONTINUED | OUTPATIENT
Start: 2022-11-02 | End: 2022-11-02 | Stop reason: HOSPADM

## 2022-11-01 RX ORDER — PROPOFOL 10 MG/ML
INJECTION, EMULSION INTRAVENOUS CONTINUOUS PRN
Status: DISCONTINUED | OUTPATIENT
Start: 2022-11-01 | End: 2022-11-01 | Stop reason: SDUPTHER

## 2022-11-01 RX ORDER — DOCUSATE SODIUM 100 MG/1
100 CAPSULE, LIQUID FILLED ORAL 2 TIMES DAILY
Qty: 60 CAPSULE | Refills: 0 | Status: SHIPPED | OUTPATIENT
Start: 2022-11-01 | End: 2022-11-15 | Stop reason: ALTCHOICE

## 2022-11-01 RX ORDER — ONDANSETRON 2 MG/ML
4 INJECTION INTRAMUSCULAR; INTRAVENOUS
Status: COMPLETED | OUTPATIENT
Start: 2022-11-01 | End: 2022-11-01

## 2022-11-01 RX ORDER — FENTANYL CITRATE 50 UG/ML
INJECTION, SOLUTION INTRAMUSCULAR; INTRAVENOUS PRN
Status: DISCONTINUED | OUTPATIENT
Start: 2022-11-01 | End: 2022-11-01 | Stop reason: SDUPTHER

## 2022-11-01 RX ORDER — DIPHENHYDRAMINE HYDROCHLORIDE 50 MG/ML
12.5 INJECTION INTRAMUSCULAR; INTRAVENOUS
Status: DISCONTINUED | OUTPATIENT
Start: 2022-11-01 | End: 2022-11-02 | Stop reason: HOSPADM

## 2022-11-01 RX ORDER — FENTANYL CITRATE 50 UG/ML
50 INJECTION, SOLUTION INTRAMUSCULAR; INTRAVENOUS EVERY 5 MIN PRN
Status: DISCONTINUED | OUTPATIENT
Start: 2022-11-01 | End: 2022-11-01 | Stop reason: HOSPADM

## 2022-11-01 RX ORDER — SODIUM CHLORIDE 9 MG/ML
INJECTION, SOLUTION INTRAVENOUS CONTINUOUS
Status: DISCONTINUED | OUTPATIENT
Start: 2022-11-01 | End: 2022-11-01

## 2022-11-01 RX ORDER — MORPHINE SULFATE 10 MG/ML
INJECTION, SOLUTION INTRAMUSCULAR; INTRAVENOUS PRN
Status: DISCONTINUED | OUTPATIENT
Start: 2022-11-01 | End: 2022-11-01 | Stop reason: SDUPTHER

## 2022-11-01 RX ORDER — MIDAZOLAM HYDROCHLORIDE 1 MG/ML
INJECTION INTRAMUSCULAR; INTRAVENOUS PRN
Status: DISCONTINUED | OUTPATIENT
Start: 2022-11-01 | End: 2022-11-01 | Stop reason: SDUPTHER

## 2022-11-01 RX ORDER — FUROSEMIDE 20 MG/1
20 TABLET ORAL DAILY
Status: DISCONTINUED | OUTPATIENT
Start: 2022-11-01 | End: 2022-11-02 | Stop reason: HOSPADM

## 2022-11-01 RX ORDER — TROSPIUM CHLORIDE 20 MG/1
20 TABLET, FILM COATED ORAL 2 TIMES DAILY PRN
Status: DISCONTINUED | OUTPATIENT
Start: 2022-11-01 | End: 2022-11-02 | Stop reason: HOSPADM

## 2022-11-01 RX ORDER — ONDANSETRON 2 MG/ML
4 INJECTION INTRAMUSCULAR; INTRAVENOUS EVERY 6 HOURS PRN
Status: DISCONTINUED | OUTPATIENT
Start: 2022-11-01 | End: 2022-11-02 | Stop reason: HOSPADM

## 2022-11-01 RX ORDER — FENTANYL CITRATE 50 UG/ML
25 INJECTION, SOLUTION INTRAMUSCULAR; INTRAVENOUS EVERY 5 MIN PRN
Status: DISCONTINUED | OUTPATIENT
Start: 2022-11-01 | End: 2022-11-01 | Stop reason: HOSPADM

## 2022-11-01 RX ORDER — SODIUM CHLORIDE, SODIUM LACTATE, POTASSIUM CHLORIDE, CALCIUM CHLORIDE 600; 310; 30; 20 MG/100ML; MG/100ML; MG/100ML; MG/100ML
INJECTION, SOLUTION INTRAVENOUS CONTINUOUS PRN
Status: DISCONTINUED | OUTPATIENT
Start: 2022-11-01 | End: 2022-11-01 | Stop reason: SDUPTHER

## 2022-11-01 RX ORDER — SPIRONOLACTONE 25 MG/1
25 TABLET ORAL DAILY
Status: DISCONTINUED | OUTPATIENT
Start: 2022-11-01 | End: 2022-11-02 | Stop reason: HOSPADM

## 2022-11-01 RX ORDER — SODIUM CHLORIDE 0.9 % (FLUSH) 0.9 %
5-40 SYRINGE (ML) INJECTION PRN
Status: DISCONTINUED | OUTPATIENT
Start: 2022-11-01 | End: 2022-11-02 | Stop reason: HOSPADM

## 2022-11-01 RX ORDER — POLYETHYLENE GLYCOL 3350 17 G/17G
17 POWDER, FOR SOLUTION ORAL DAILY
Status: DISCONTINUED | OUTPATIENT
Start: 2022-11-01 | End: 2022-11-02 | Stop reason: HOSPADM

## 2022-11-01 RX ORDER — CIPROFLOXACIN 2 MG/ML
400 INJECTION, SOLUTION INTRAVENOUS ONCE
Status: COMPLETED | OUTPATIENT
Start: 2022-11-02 | End: 2022-11-02

## 2022-11-01 RX ORDER — SODIUM CHLORIDE, SODIUM LACTATE, POTASSIUM CHLORIDE, CALCIUM CHLORIDE 600; 310; 30; 20 MG/100ML; MG/100ML; MG/100ML; MG/100ML
INJECTION, SOLUTION INTRAVENOUS CONTINUOUS
Status: DISCONTINUED | OUTPATIENT
Start: 2022-11-01 | End: 2022-11-01 | Stop reason: HOSPADM

## 2022-11-01 RX ORDER — MAGNESIUM HYDROXIDE 1200 MG/15ML
LIQUID ORAL PRN
Status: DISCONTINUED | OUTPATIENT
Start: 2022-11-01 | End: 2022-11-01 | Stop reason: HOSPADM

## 2022-11-01 RX ORDER — METOPROLOL SUCCINATE 25 MG/1
25 TABLET, EXTENDED RELEASE ORAL NIGHTLY
Status: DISCONTINUED | OUTPATIENT
Start: 2022-11-01 | End: 2022-11-02 | Stop reason: HOSPADM

## 2022-11-01 RX ORDER — SODIUM CHLORIDE 0.9 % (FLUSH) 0.9 %
5-40 SYRINGE (ML) INJECTION EVERY 12 HOURS SCHEDULED
Status: DISCONTINUED | OUTPATIENT
Start: 2022-11-01 | End: 2022-11-01 | Stop reason: HOSPADM

## 2022-11-01 RX ORDER — TAMSULOSIN HYDROCHLORIDE 0.4 MG/1
0.4 CAPSULE ORAL DAILY
Status: DISCONTINUED | OUTPATIENT
Start: 2022-11-01 | End: 2022-11-02 | Stop reason: HOSPADM

## 2022-11-01 RX ORDER — PROPOFOL 10 MG/ML
INJECTION, EMULSION INTRAVENOUS PRN
Status: DISCONTINUED | OUTPATIENT
Start: 2022-11-01 | End: 2022-11-01 | Stop reason: SDUPTHER

## 2022-11-01 RX ORDER — SODIUM CHLORIDE 9 MG/ML
INJECTION INTRAVENOUS PRN
Status: DISCONTINUED | OUTPATIENT
Start: 2022-11-01 | End: 2022-11-01 | Stop reason: HOSPADM

## 2022-11-01 RX ORDER — MAGNESIUM HYDROXIDE 1200 MG/15ML
LIQUID ORAL PRN
Status: DISCONTINUED | OUTPATIENT
Start: 2022-11-01 | End: 2022-11-01 | Stop reason: ALTCHOICE

## 2022-11-01 RX ORDER — PANTOPRAZOLE SODIUM 40 MG/1
40 TABLET, DELAYED RELEASE ORAL
Status: DISCONTINUED | OUTPATIENT
Start: 2022-11-02 | End: 2022-11-02 | Stop reason: HOSPADM

## 2022-11-01 RX ORDER — OXYCODONE HYDROCHLORIDE 5 MG/1
5 TABLET ORAL EVERY 4 HOURS PRN
Status: DISCONTINUED | OUTPATIENT
Start: 2022-11-01 | End: 2022-11-01

## 2022-11-01 RX ORDER — ACETAMINOPHEN 325 MG/1
650 TABLET ORAL EVERY 6 HOURS PRN
Status: DISCONTINUED | OUTPATIENT
Start: 2022-11-01 | End: 2022-11-02 | Stop reason: HOSPADM

## 2022-11-01 RX ORDER — FENTANYL CITRATE 50 UG/ML
25 INJECTION, SOLUTION INTRAMUSCULAR; INTRAVENOUS EVERY 5 MIN PRN
Status: DISCONTINUED | OUTPATIENT
Start: 2022-11-01 | End: 2022-11-02 | Stop reason: HOSPADM

## 2022-11-01 RX ORDER — DEXAMETHASONE SODIUM PHOSPHATE 10 MG/ML
INJECTION INTRAMUSCULAR; INTRAVENOUS PRN
Status: DISCONTINUED | OUTPATIENT
Start: 2022-11-01 | End: 2022-11-01 | Stop reason: SDUPTHER

## 2022-11-01 RX ORDER — OXYCODONE HYDROCHLORIDE 5 MG/1
10 TABLET ORAL EVERY 4 HOURS PRN
Status: DISCONTINUED | OUTPATIENT
Start: 2022-11-01 | End: 2022-11-01

## 2022-11-01 RX ORDER — GLYCOPYRROLATE 0.2 MG/ML
INJECTION INTRAMUSCULAR; INTRAVENOUS PRN
Status: DISCONTINUED | OUTPATIENT
Start: 2022-11-01 | End: 2022-11-01 | Stop reason: SDUPTHER

## 2022-11-01 RX ORDER — NEOSTIGMINE METHYLSULFATE 5 MG/5 ML
SYRINGE (ML) INTRAVENOUS PRN
Status: DISCONTINUED | OUTPATIENT
Start: 2022-11-01 | End: 2022-11-01 | Stop reason: SDUPTHER

## 2022-11-01 RX ORDER — PHENYLEPHRINE HCL IN 0.9% NACL 1 MG/10 ML
SYRINGE (ML) INTRAVENOUS PRN
Status: DISCONTINUED | OUTPATIENT
Start: 2022-11-01 | End: 2022-11-01 | Stop reason: SDUPTHER

## 2022-11-01 RX ORDER — SODIUM CHLORIDE 9 MG/ML
INJECTION, SOLUTION INTRAVENOUS CONTINUOUS
Status: DISCONTINUED | OUTPATIENT
Start: 2022-11-01 | End: 2022-11-02 | Stop reason: HOSPADM

## 2022-11-01 RX ORDER — FENTANYL CITRATE 50 UG/ML
25 INJECTION, SOLUTION INTRAMUSCULAR; INTRAVENOUS
Status: COMPLETED | OUTPATIENT
Start: 2022-11-01 | End: 2022-11-01

## 2022-11-01 RX ORDER — ALBUTEROL SULFATE 90 UG/1
2 AEROSOL, METERED RESPIRATORY (INHALATION) EVERY 6 HOURS PRN
Status: DISCONTINUED | OUTPATIENT
Start: 2022-11-01 | End: 2022-11-02 | Stop reason: HOSPADM

## 2022-11-01 RX ORDER — FENTANYL CITRATE 50 UG/ML
50 INJECTION, SOLUTION INTRAMUSCULAR; INTRAVENOUS
Status: COMPLETED | OUTPATIENT
Start: 2022-11-01 | End: 2022-11-01

## 2022-11-01 RX ORDER — DIPHENHYDRAMINE HYDROCHLORIDE 50 MG/ML
12.5 INJECTION INTRAMUSCULAR; INTRAVENOUS
Status: DISCONTINUED | OUTPATIENT
Start: 2022-11-01 | End: 2022-11-01 | Stop reason: HOSPADM

## 2022-11-01 RX ORDER — ONDANSETRON 4 MG/1
4 TABLET, ORALLY DISINTEGRATING ORAL EVERY 8 HOURS PRN
Status: DISCONTINUED | OUTPATIENT
Start: 2022-11-01 | End: 2022-11-02 | Stop reason: HOSPADM

## 2022-11-01 RX ORDER — MAGNESIUM HYDROXIDE 1200 MG/15ML
LIQUID ORAL CONTINUOUS PRN
Status: COMPLETED | OUTPATIENT
Start: 2022-11-01 | End: 2022-11-01

## 2022-11-01 RX ORDER — ONDANSETRON 2 MG/ML
4 INJECTION INTRAMUSCULAR; INTRAVENOUS
Status: DISCONTINUED | OUTPATIENT
Start: 2022-11-01 | End: 2022-11-02 | Stop reason: HOSPADM

## 2022-11-01 RX ORDER — MIDAZOLAM HYDROCHLORIDE 2 MG/2ML
1 INJECTION, SOLUTION INTRAMUSCULAR; INTRAVENOUS EVERY 10 MIN PRN
Status: DISCONTINUED | OUTPATIENT
Start: 2022-11-01 | End: 2022-11-01 | Stop reason: HOSPADM

## 2022-11-01 RX ORDER — ONDANSETRON 2 MG/ML
INJECTION INTRAMUSCULAR; INTRAVENOUS PRN
Status: DISCONTINUED | OUTPATIENT
Start: 2022-11-01 | End: 2022-11-01 | Stop reason: SDUPTHER

## 2022-11-01 RX ORDER — FENTANYL CITRATE 50 UG/ML
50 INJECTION, SOLUTION INTRAMUSCULAR; INTRAVENOUS EVERY 5 MIN PRN
Status: DISCONTINUED | OUTPATIENT
Start: 2022-11-01 | End: 2022-11-02 | Stop reason: HOSPADM

## 2022-11-01 RX ORDER — LANOLIN ALCOHOL/MO/W.PET/CERES
5 CREAM (GRAM) TOPICAL NIGHTLY
Status: DISCONTINUED | OUTPATIENT
Start: 2022-11-01 | End: 2022-11-02 | Stop reason: HOSPADM

## 2022-11-01 RX ORDER — OMEPRAZOLE 20 MG/1
20 CAPSULE, DELAYED RELEASE ORAL
Status: DISCONTINUED | OUTPATIENT
Start: 2022-11-01 | End: 2022-11-01 | Stop reason: CLARIF

## 2022-11-01 RX ORDER — CIPROFLOXACIN 2 MG/ML
400 INJECTION, SOLUTION INTRAVENOUS ONCE
Status: COMPLETED | OUTPATIENT
Start: 2022-11-01 | End: 2022-11-01

## 2022-11-01 RX ORDER — SODIUM CHLORIDE 9 MG/ML
INJECTION, SOLUTION INTRAVENOUS CONTINUOUS
Status: DISCONTINUED | OUTPATIENT
Start: 2022-11-01 | End: 2022-11-01 | Stop reason: HOSPADM

## 2022-11-01 RX ORDER — PROMETHAZINE HYDROCHLORIDE 25 MG/ML
12.5 INJECTION, SOLUTION INTRAMUSCULAR; INTRAVENOUS EVERY 6 HOURS PRN
Status: DISCONTINUED | OUTPATIENT
Start: 2022-11-01 | End: 2022-11-02 | Stop reason: HOSPADM

## 2022-11-01 RX ADMIN — SODIUM CHLORIDE, POTASSIUM CHLORIDE, SODIUM LACTATE AND CALCIUM CHLORIDE: 600; 310; 30; 20 INJECTION, SOLUTION INTRAVENOUS at 10:50

## 2022-11-01 RX ADMIN — FENTANYL CITRATE 25 MCG: 50 INJECTION, SOLUTION INTRAMUSCULAR; INTRAVENOUS at 15:35

## 2022-11-01 RX ADMIN — MIDAZOLAM 2 MG: 1 INJECTION INTRAMUSCULAR; INTRAVENOUS at 10:54

## 2022-11-01 RX ADMIN — MORPHINE SULFATE 2 MG: 10 INJECTION, SOLUTION INTRAMUSCULAR; INTRAVENOUS at 13:17

## 2022-11-01 RX ADMIN — FENTANYL CITRATE 25 MCG: 50 INJECTION, SOLUTION INTRAMUSCULAR; INTRAVENOUS at 12:46

## 2022-11-01 RX ADMIN — ONDANSETRON 4 MG: 2 INJECTION INTRAMUSCULAR; INTRAVENOUS at 17:30

## 2022-11-01 RX ADMIN — FENTANYL CITRATE 25 MCG: 50 INJECTION, SOLUTION INTRAMUSCULAR; INTRAVENOUS at 11:58

## 2022-11-01 RX ADMIN — Medication 3 MG: at 14:20

## 2022-11-01 RX ADMIN — Medication 100 MCG: at 12:24

## 2022-11-01 RX ADMIN — MORPHINE SULFATE 2 MG: 10 INJECTION, SOLUTION INTRAMUSCULAR; INTRAVENOUS at 14:40

## 2022-11-01 RX ADMIN — ROCURONIUM BROMIDE 50 MG: 10 INJECTION INTRAVENOUS at 10:59

## 2022-11-01 RX ADMIN — IOPAMIDOL 38 ML: 755 INJECTION, SOLUTION INTRAVENOUS at 12:53

## 2022-11-01 RX ADMIN — DEXAMETHASONE SODIUM PHOSPHATE 10 MG: 10 INJECTION INTRAMUSCULAR; INTRAVENOUS at 11:10

## 2022-11-01 RX ADMIN — FENTANYL CITRATE 50 MCG: 50 INJECTION, SOLUTION INTRAMUSCULAR; INTRAVENOUS at 11:42

## 2022-11-01 RX ADMIN — MORPHINE SULFATE 2 MG: 10 INJECTION, SOLUTION INTRAMUSCULAR; INTRAVENOUS at 13:45

## 2022-11-01 RX ADMIN — SODIUM CHLORIDE, POTASSIUM CHLORIDE, SODIUM LACTATE AND CALCIUM CHLORIDE 1000 ML: 600; 310; 30; 20 INJECTION, SOLUTION INTRAVENOUS at 10:18

## 2022-11-01 RX ADMIN — ROCURONIUM BROMIDE 10 MG: 10 INJECTION INTRAVENOUS at 13:26

## 2022-11-01 RX ADMIN — FENTANYL CITRATE 25 MCG: 50 INJECTION, SOLUTION INTRAMUSCULAR; INTRAVENOUS at 15:45

## 2022-11-01 RX ADMIN — Medication 100 MCG: at 12:22

## 2022-11-01 RX ADMIN — PROMETHAZINE HYDROCHLORIDE 12.5 MG: 25 INJECTION INTRAMUSCULAR; INTRAVENOUS at 20:54

## 2022-11-01 RX ADMIN — CIPROFLOXACIN 400 MG: 2 INJECTION, SOLUTION INTRAVENOUS at 11:08

## 2022-11-01 RX ADMIN — ONDANSETRON 4 MG: 2 INJECTION INTRAMUSCULAR; INTRAVENOUS at 14:17

## 2022-11-01 RX ADMIN — OXYCODONE 10 MG: 5 TABLET ORAL at 17:44

## 2022-11-01 RX ADMIN — PROPOFOL 75 MCG/KG/MIN: 10 INJECTION, EMULSION INTRAVENOUS at 11:35

## 2022-11-01 RX ADMIN — ROCURONIUM BROMIDE 30 MG: 10 INJECTION, SOLUTION INTRAVENOUS at 12:46

## 2022-11-01 RX ADMIN — Medication 200 MCG: at 12:13

## 2022-11-01 RX ADMIN — GLYCOPYRROLATE 0.6 MG: 0.2 INJECTION INTRAMUSCULAR; INTRAVENOUS at 14:20

## 2022-11-01 RX ADMIN — FENTANYL CITRATE 25 MCG: 50 INJECTION, SOLUTION INTRAMUSCULAR; INTRAVENOUS at 10:34

## 2022-11-01 RX ADMIN — SODIUM CHLORIDE, POTASSIUM CHLORIDE, SODIUM LACTATE AND CALCIUM CHLORIDE: 600; 310; 30; 20 INJECTION, SOLUTION INTRAVENOUS at 13:01

## 2022-11-01 RX ADMIN — ROCURONIUM BROMIDE 20 MG: 10 INJECTION INTRAVENOUS at 11:19

## 2022-11-01 RX ADMIN — Medication 100 MCG: at 11:56

## 2022-11-01 RX ADMIN — SODIUM CHLORIDE: 9 INJECTION, SOLUTION INTRAVENOUS at 18:53

## 2022-11-01 RX ADMIN — FENTANYL CITRATE 50 MCG: 50 INJECTION, SOLUTION INTRAMUSCULAR; INTRAVENOUS at 11:20

## 2022-11-01 RX ADMIN — FENTANYL CITRATE 50 MCG: 50 INJECTION, SOLUTION INTRAMUSCULAR; INTRAVENOUS at 10:59

## 2022-11-01 RX ADMIN — SODIUM CHLORIDE, POTASSIUM CHLORIDE, SODIUM LACTATE AND CALCIUM CHLORIDE: 600; 310; 30; 20 INJECTION, SOLUTION INTRAVENOUS at 11:35

## 2022-11-01 RX ADMIN — PROPOFOL 75 MCG/KG/MIN: 10 INJECTION, EMULSION INTRAVENOUS at 13:01

## 2022-11-01 RX ADMIN — PROPOFOL 30 MCG/KG/MIN: 10 INJECTION, EMULSION INTRAVENOUS at 11:22

## 2022-11-01 RX ADMIN — FENTANYL CITRATE 50 MCG: 50 INJECTION, SOLUTION INTRAMUSCULAR; INTRAVENOUS at 16:20

## 2022-11-01 RX ADMIN — Medication 100 MCG: at 12:33

## 2022-11-01 RX ADMIN — PROPOFOL 200 MG: 10 INJECTION, EMULSION INTRAVENOUS at 10:59

## 2022-11-01 RX ADMIN — Medication 100 MCG: at 12:06

## 2022-11-01 ASSESSMENT — PAIN SCALES - GENERAL
PAINLEVEL_OUTOF10: 4
PAINLEVEL_OUTOF10: 4
PAINLEVEL_OUTOF10: 7
PAINLEVEL_OUTOF10: 4
PAINLEVEL_OUTOF10: 7

## 2022-11-01 ASSESSMENT — PAIN DESCRIPTION - ORIENTATION: ORIENTATION: RIGHT

## 2022-11-01 ASSESSMENT — PAIN DESCRIPTION - DESCRIPTORS: DESCRIPTORS: ACHING

## 2022-11-01 ASSESSMENT — PAIN DESCRIPTION - LOCATION: LOCATION: FLANK

## 2022-11-01 ASSESSMENT — LIFESTYLE VARIABLES
SMOKING_STATUS: 0

## 2022-11-01 ASSESSMENT — ENCOUNTER SYMPTOMS: SHORTNESS OF BREATH: 1

## 2022-11-01 ASSESSMENT — PAIN - FUNCTIONAL ASSESSMENT: PAIN_FUNCTIONAL_ASSESSMENT: 0-10

## 2022-11-01 NOTE — ANESTHESIA POSTPROCEDURE EVALUATION
Department of Anesthesiology  Postprocedure Note    Patient: Enedina Basurto  MRN: [id number]  [place]: 1965  Date of evaluation: 11/1/2022      Procedure Summary     Date: 11/01/22 Room / Location: [address] [place] [address] 83 Procedures    Anesthesia Start: 8475 Anesthesia Stop: 1300    Procedure: IR NEPHROSTOMY PERCUTANEOUS RIGHT Diagnosis:       Kidney stone      (wire to bladder, stone rt side)    Scheduled Providers: Stv Interventional Radiologist Responsible Provider: Tegan Matson MD    Anesthesia Type: general ASA Status: 3          Anesthesia Type: No value filed. Imani Phase I:      Imani Phase II:        Anesthesia Post Evaluation    Patient location during evaluation: bedside  Patient participation: complete - patient cannot participate  Level of consciousness: sedated and ventilated  Pain score: 0  Nausea & Vomiting: no vomiting and no nausea  Complications: no  Cardiovascular status: hemodynamically stable  Respiratory status: ventilator and intubated  Hydration status: stable    Patient was transferred from  to OR for last part of her operative course today. She remained intubated, sedated and in satisfactory condition during the transfer and handover to [address] [place] [address] staff.

## 2022-11-01 NOTE — ANESTHESIA PRE PROCEDURE
Department of Anesthesiology  Preprocedure Note       Name:  Chloe Salguero   Age:  59 y.o.  :  1965                                          MRN:  [id number]         Date:  2022      Surgeon: * Surgery not found *    Procedure: IR NEPHROSTOMY PERCUTANEOUS RIGHT    Medications prior to admission:   Prior to Admission medications    Medication Sig Start Date End Date Taking?  Authorizing Provider   spironolactone (ALDACTONE) 25 MG tablet Take 25 mg by mouth daily    Historical Provider, MD   Melatonin 5 MG CAPS Take by mouth    Historical Provider, MD   furosemide (LASIX) 20 MG tablet Take 20 mg by mouth daily    Historical Provider, MD   irbesartan-hydroCHLOROthiazide (AVALIDE) 150-12.5 MG per tablet TAKE 1 TABLET BY MOUTH ONE TIME A DAY 10/10/22   MIGDALIA Cruz CNP   meloxicam (MOBIC) 15 MG tablet TAKE 1 TABLET BY MOUTH ONE TIME A DAY 8/15/22   MIGDALIA Sue CNP   B Complex Vitamins (VITAMIN B COMPLEX) TABS     Historical Provider, MD   Ashwagandha 500 MG CAPS Take 1 capsule by mouth daily    Historical Provider, MD   vitamin D (D3-1000) 25 MCG (1000 UT) CAPS     Historical Provider, MD   metoprolol succinate (TOPROL XL) 25 MG extended release tablet Take 25 mg by mouth nightly 1/2 tab nightly    Historical Provider, MD   omeprazole (PRILOSEC) 20 MG delayed release capsule TAKE 1 CAPSULE BY MOUTH 2 TIMES A DAY 22   MIGDALIA Sue CNP   albuterol sulfate HFA (PROAIR HFA) 108 (90 Base) MCG/ACT inhaler Inhale 2 puffs into the lungs every 6 hours as needed for Wheezing 10/28/21   MIGDALIA Cruz CNP   benzoyl peroxide 5 % external liquid Wash affected areas once daily 21   Justin Hackett MD   clindamycin (CLEOCIN T) 1 % lotion Apply to affected areas daily 21   Justin Hackett MD   POTASSIUM CITRATE PO Take 5 mLs by mouth three times daily    Historical Provider, MD   OMEGA-3 FATTY ACIDS PO Take 1 tablet by mouth daily    Historical Provider, MD acetaminophen (TYLENOL) 500 MG tablet Take 1,000 mg by mouth every 4 hours as needed for Pain. Historical Provider, MD   solifenacin (VESICARE) 10 MG tablet Take 10 mg by mouth as needed. 12/23/14  Historical Provider, MD       Current medications:    No current facility-administered medications for this encounter. No current outpatient medications on file.      Facility-Administered Medications Ordered in Other Encounters   Medication Dose Route Frequency Provider Last Rate Last Admin    lactated ringers infusion 1,000 mL  1,000 mL IntraVENous Continuous Isidra Salinas MD 50 mL/hr at 11/01/22 1018 1,000 mL at 11/01/22 1018    0.9 % sodium chloride infusion   IntraVENous Continuous MANFRED Barber        lidocaine 1 % injection 1 mL  1 mL IntraDERmal Once PRN Mary Goldberg MD        lactated ringers infusion   IntraVENous Continuous Mary Goldberg MD        sodium chloride flush 0.9 % injection 5-40 mL  5-40 mL IntraVENous 2 times per day Mary Goldberg MD        sodium chloride flush 0.9 % injection 5-40 mL  5-40 mL IntraVENous PRN Mary Goldberg MD        0.9 % sodium chloride infusion   IntraVENous PRN Mary Goldberg MD        midazolam PF (VERSED) injection 1 mg  1 mg IntraVENous Q10 Min PRN Mayr Goldberg MD        lactated ringers infusion   IntraVENous Continuous PRN Fulshear Binet, APRN - CRNA   New Bag at 11/01/22 1050    midazolam (VERSED) injection   IntraVENous PRN Polly Binet, APRN - CRNA   2 mg at 11/01/22 1054    rocuronium (ZEMURON) injection   IntraVENous PRN Fulshear Binet, APRN - CRNA   50 mg at 11/01/22 1059    fentaNYL (SUBLIMAZE) injection   IntraVENous PRN Polly Binet, APRN - CRNA   50 mcg at 11/01/22 1059    propofol injection   IntraVENous PRN Polly Binet, APRN - CRNA   200 mg at 11/01/22 1059    dexamethasone (DECADRON) injection   IntraVENous PRN Fulshear Binet, APRN - CRNA   10 mg at 11/01/22 1110       Allergies:  No Known Allergies    Problem List: Patient Active Problem List   Diagnosis Code    Kidney stones N20.0    Essential hypertension I10    Acquired hypothyroidism E03.9    CKD (chronic kidney disease) stage 1, GFR 90 ml/min or greater N18.1    Cystinuria ([address] E72.01    Hyperuricemia E79.0    Osteoarthritis of both knees M17.0    Gastroesophageal reflux disease without esophagitis K21.9    ESTHER (obstructive sleep apnea) G47.33    Midline low back pain with right-sided sciatica M54.41    Mixed hyperlipidemia E78.2    Solitary kidney, acquired Z90.5    Obesity (BMI 30-39. 9) E66.9    Acute intractable tension-type headache G44. 201    Glucose intolerance (impaired glucose tolerance) R73.02    Class 3 severe obesity due to excess calories with serious comorbidity and body mass index (BMI) of 40.0 to 44.9 in adult ([facility]) E66.01, Z68.41    Dyspnea on exertion R06.09    Arthralgia M25.50    Other fatigue R53.83    Oxygen desaturation R09.02    Mild persistent asthma without complication G49.17    LVH (left ventricular hypertrophy) I51.7    Chronic left shoulder pain M25.512, G89.29    Hydronephrosis with renal and ureteral calculous obstruction N13.2    S/p Hscope, D&C 6/30/22 Z98.890    Postmenopausal bleeding N95.0    Endometrial polyp N84.0       Past Medical History:        Diagnosis Date    Arthritis     Asthma     Benign familial tremor     CAD (coronary artery disease) 12/2021    mild per cardiac cath 12/2021; no stents.  Dr. Tomasa Ricardo last visit 10/2022    Cancer [facility])     skin    CKD (chronic kidney disease) stage 1, GFR 90 ml/min or greater     Class 2 severe obesity with serious comorbidity and body mass index (BMI) of 39.0 to 39.9 in adult [facility]) 05/01/2019    Cystinuria ([facility] [address]     Dyspnea on exertion 08/06/2019    Flank pain     RIGHT    Gastroesophageal reflux disease without esophagitis     GERD (gastroesophageal reflux disease)     Hypertension     Hyperuricemia     Hypothyroidism     no meds    Kidney stones     Midline low back pain with right-sided sciatica 06/02/2016    Proteinuria     Shoulder impingement 12/2019    left    Snores     no sleep apnea per sleep study    Solitary kidney, acquired 11/20/2017    Urinary tract obstruction by kidney stone 08/01/2019    Wears glasses        Past Surgical History:        Procedure Laterality Date    CARDIAC CATHETERIZATION  12/08/2021    CYSTO/URETERO/PYELOSCOPY, CALCULUS [place] Right 11/21/2017    HOLMIUM LASER - CYSTOSCOPY, RIGHT URETEROSCOPY, RIGHT STENT EXCHANGE,STONE BASKETING performed by Tremaine Chapa MD at [address], [place] Right 09/13/2019    HOLMIUM - CYSTO, URETEROSCOPY, LITHOTRIPSY, STENT PLACEMENT performed by Teja Urena MD at [address], [address] [address] Right 12/12/2019    HOLMIUM- CYSTO, URETEROSCOPY, LASER LITHO, STENT PLACEMENT AND RIGHT URETERAL BASKET STONE EXTRACTION performed by Tremaine Chapa MD at [address] Right 11/18/2017    CYSTOSCOPY URETERAL STENT INSERTION,RIGHT performed by Sunitha Cesar MD at [address] Right 08/02/2019    CYSTOSCOPY RT URETERAL STENT INSERTION performed by Teja Urena MD at [address] Right 08/23/2019    CYSTOSCOPY, RIGHT STENT REMOVAL, INSERTION OCCLUSIVE BALLOON, PT GOING TO INTERVENTIONAL performed by Teja Urena MD at [address] Right 12/12/2019    CYSTO, URETEROSCOPY, LASER LITHO, STENT PLACEMENT AND RIGHT URETERAL BASKET STONE EXTRACTION     CYSTOSCOPY Right 12/31/2019    stent removal    CYSTOSCOPY Right 09/19/2020    litho, rt stent    CYSTOSCOPY Right 09/19/2020    CYSTOSCOPY, URETEROSCOPY, HOLMIUM LASER LITHOTRIPSY, STENT PLACEMENT performed by Shilpa Williamson MD at [facility] [place] Right 12/31/2019    CYSTOSCOPY, STENT REMOVAL performed by Tremaine Chapa MD at [address] [place] [address] N/A 06/30/2022    DILATATION AND CURETTAGE, HYSTEROSCOPY performed by Ruben Araiza MD at [address] [address] Bilateral 2008    KNEE ARTHROSCOPY Right 1998, 2003    X2    NEPHROLITHOTOMY Right 08/23/2019    HOLMIUM LASER, NEPHROLITHOTOMY PERCUTANEOUS, LITHOCLAST, C-ARM performed by Juan David James MD at [address] [place] [address], CALCULUS [place] N/A 07/10/2018    CYSTOSCOPY, RIGHT URETEROSCOPY, HOLMIUM LASER LITHO WITH STONE BASKETING, RIGHT STENT EXCHANGE performed by Miguelina Corona MD at [address] [place] [address], CALCULUS [place] Right 10/10/2018    HOLMIUM LASER STANDBY, CYSTO, RIGHT URETEROSCOPY, RIGHT STENT PLACEMENT performed by Tameka Avalos MD at [address] [facility] Right 07/04/2018    CYSTOSCOPY URETERAL STENT INSERTION, RIGHT performed by Madolyn Sandhoff, MD at [address] [place] [address] Left     TOTAL NEPHRECTOMY Left 1988   [address] Right 12/12/2019       Social History:    Social History     Tobacco Use    Smoking status: Never    Smokeless tobacco: Never   Substance Use Topics    Alcohol use: Yes     Alcohol/week: 0.0 standard drinks     Comment: occasionally                                Counseling given: Not Answered      Vital Signs (Current): There were no vitals filed for this visit.                                            BP Readings from Last 3 Encounters:   11/01/22 (!) 171/95   10/20/22 (!) 145/85   08/12/22 136/82       NPO Status:                                                                                 BMI:   Wt Readings from Last 3 Encounters:   11/01/22 271 lb (122.9 kg)   10/20/22 271 lb (122.9 kg)   08/12/22 265 lb (120.2 kg)     There is no height or weight on file to calculate BMI.    CBC:   Lab Results   Component Value Date/Time    WBC 9.9 10/20/2022 12:18 PM    RBC 5.19 10/20/2022 12:18 PM    RBC 4.66 03/22/2012 11:41 AM    HGB 13.9 10/20/2022 12:18 PM    HCT 43.8 10/20/2022 12:18 PM    MCV 84.4 10/20/2022 12:18 PM    RDW 14.4 10/20/2022 12:18 PM     10/20/2022 12:18 PM     03/22/2012 11:41 AM       CMP:   Lab Results   Component Value Date/Time     10/20/2022 12:18 PM    K 4.2 10/20/2022 12:18 PM     10/20/2022 12:18 PM    CO2 23 10/20/2022 12:18 PM    BUN 20 10/20/2022 12:18 PM    CREATININE 0.83 10/20/2022 12:18 PM    GFRAA >60 09/08/2022 11:58 AM    LABGLOM >60 10/20/2022 12:18 PM    GLUCOSE 98 10/20/2022 12:18 PM    GLUCOSE 88 03/22/2012 11:41 AM    PROT 7.5 09/08/2022 11:58 AM    CALCIUM 10.2 10/20/2022 12:12 PM    BILITOT 0.4 09/08/2022 11:58 AM    ALKPHOS 115 09/08/2022 11:58 AM    AST 34 09/08/2022 11:58 AM    ALT 24 09/08/2022 11:58 AM       POC Tests:   Recent Labs     11/01/22  1017   POCK 4.0       Coags:   Lab Results   Component Value Date/Time    PROTIME 10.3 10/20/2022 12:18 PM    INR 1.0 10/20/2022 12:18 PM    APTT 23.9 10/20/2022 12:18 PM       HCG (If Applicable): No results found for: PREGTESTUR, PREGSERUM, HCG, HCGQUANT     ABGs: No results found for: PHART, PO2ART, KDK6KWK, KNC9WHZ, BEART, H9FFJHJB     Type & Screen (If Applicable):  No results found for: LABABO, LABRH    EKG 6/2022  Normal sinus rhythm  Minimal voltage criteria for LVH, may be normal variant  Borderline ECG  When compared with ECG of 10-JUL-2018 09:54,  No significant change was found      TTE 2019  Technically difficult echocardiogram.   · Normal left ventricular size with normal systolic function. EF 55-60%.     · Moderate left ventricular hypertrophy. · Trivial mitral regurgitation. · Trivial tricuspid regurgitation. Unable to assess right heart pressures. · Trivial pulmonic insufficiency. · No prior echo to use for comparison.        Anesthesia Evaluation  Patient summary reviewed and Nursing notes reviewed no history of anesthetic complications:   Airway: Mallampati: III       Mouth opening: > = 3 FB   Dental: normal exam         Pulmonary:   (+) shortness of breath:  sleep apnea: on noncompliant,  decreased breath sounds asthma: allergic asthma,     (-) not a current smoker                           Cardiovascular:  Exercise tolerance: good (>4 METS),   (+) hypertension:, GRANADOS:,     (-) past MI and CAD      Rhythm: regular                   ROS comment: NSR ; no acute changes     Neuro/Psych:   (+) neuromuscular disease:, headaches:,    (-) seizures, TIA and CVA           GI/Hepatic/Renal:   (+) GERD: well controlled, renal disease: kidney stones, morbid obesity         ROS comment: ureteral calculus. Endo/Other:    (+) hypothyroidism::., .                 Abdominal:   (+) obese,           Vascular:     - DVT and PE. Other Findings:             Anesthesia Plan      general     ASA 3       Induction: intravenous. Anesthetic plan and risks discussed with patient. Plan discussed with CRNA.             will keep intubated for 3 part procedures        Giles Jacobsen MD   11/1/2022

## 2022-11-01 NOTE — ANESTHESIA PRE PROCEDURE
Department of Anesthesiology  Preprocedure Note       Name:  Marcos Lau   Age:  59 y.o.  :  1965                                          MRN:  [id number]         Date:  2022      Surgeon: Emily Rizzo):  Jerald Lino MD    Procedure: CYSTOSCOPY INSERTION OCCLUSIVE BALLOON   (PT. GOING TO INTERVENTIONAL RAD) (Right)    Medications prior to admission:   Prior to Admission medications    Medication Sig Start Date End Date Taking?  Authorizing Provider   spironolactone (ALDACTONE) 25 MG tablet Take 25 mg by mouth daily    Historical Provider, MD   Melatonin 5 MG CAPS Take by mouth    Historical Provider, MD   furosemide (LASIX) 20 MG tablet Take 20 mg by mouth daily    Historical Provider, MD   irbesartan-hydroCHLOROthiazide (AVALIDE) 150-12.5 MG per tablet TAKE 1 TABLET BY MOUTH ONE TIME A DAY 10/10/22   MIGDALIA Abdalla CNP   meloxicam (MOBIC) 15 MG tablet TAKE 1 TABLET BY MOUTH ONE TIME A DAY 8/15/22   MIGDALIA Go CNP   B Complex Vitamins (VITAMIN B COMPLEX) TABS     Historical Provider, MD   Ashwagandha 500 MG CAPS Take 1 capsule by mouth daily    Historical Provider, MD   vitamin D (D3-1000) 25 MCG (1000 UT) CAPS     Historical Provider, MD   metoprolol succinate (TOPROL XL) 25 MG extended release tablet Take 25 mg by mouth nightly /2 tab nightly    Historical Provider, MD   omeprazole (PRILOSEC) 20 MG delayed release capsule TAKE 1 CAPSULE BY MOUTH 2 TIMES A DAY 22   MIGDALIA Go CNP   albuterol sulfate HFA (PROAIR HFA) 108 (90 Base) MCG/ACT inhaler Inhale 2 puffs into the lungs every 6 hours as needed for Wheezing 10/28/21   MIGDALIA Abdalla CNP   benzoyl peroxide 5 % external liquid Wash affected areas once daily 21   Orlin Flores MD   clindamycin (CLEOCIN T) 1 % lotion Apply to affected areas daily 21   Jacqueline Stuart MD   POTASSIUM CITRATE PO Take 5 mLs by mouth three times daily    Historical Provider, MD   OMEGA-3 FATTY ACIDS PO Take 1 tablet by mouth daily    Historical Provider, MD   acetaminophen (TYLENOL) 500 MG tablet Take 1,000 mg by mouth every 4 hours as needed for Pain. Historical Provider, MD   solifenacin (VESICARE) 10 MG tablet Take 10 mg by mouth as needed. 12/23/14  Historical Provider, MD       Current medications:    No current facility-administered medications for this visit. No current outpatient medications on file. Facility-Administered Medications Ordered in Other Visits   Medication Dose Route Frequency Provider Last Rate Last Admin    lactated ringers infusion 1,000 mL  1,000 mL IntraVENous Continuous Ebenezer Crooks MD        0.9 % sodium chloride infusion   IntraVENous Continuous MANFRED Kaplan        ciprofloxacin (CIPRO) IVPB 400 mg  400 mg IntraVENous Once MANFRED Kaplan           Allergies:  No Known Allergies    Problem List:    Patient Active Problem List   Diagnosis Code    Kidney stones N20.0    Essential hypertension I10    Acquired hypothyroidism E03.9    CKD (chronic kidney disease) stage 1, GFR 90 ml/min or greater N18.1    Cystinuria ([facility] E72.01    Hyperuricemia E79.0    Osteoarthritis of both knees M17.0    Gastroesophageal reflux disease without esophagitis K21.9    ESTHER (obstructive sleep apnea) G47.33    Midline low back pain with right-sided sciatica M54.41    Mixed hyperlipidemia E78.2    Solitary kidney, acquired Z90.5    Obesity (BMI 30-39. 9) E66.9    Acute intractable tension-type headache G44. 201    Glucose intolerance (impaired glucose tolerance) R73.02    Class 3 severe obesity due to excess calories with serious comorbidity and body mass index (BMI) of 40.0 to 44.9 in adult (HCC) E66.01, Z68.41    Dyspnea on exertion R06.09    Arthralgia M25.50    Other fatigue R53.83    Oxygen desaturation R09.02    Mild persistent asthma without complication Y08.81    LVH (left ventricular hypertrophy) I51.7    Chronic left shoulder pain M25.512, G89.29    Hydronephrosis with renal and ureteral calculous obstruction N13.2    S/p Hscope, D&C 6/30/22 Z98.890    Postmenopausal bleeding N95.0    Endometrial polyp N84.0       Past Medical History:        Diagnosis Date    Arthritis     Asthma     Benign familial tremor     CAD (coronary artery disease) 12/2021    mild per cardiac cath 12/2021; no stents.  Dr. Devaughn Rashid last visit 10/2022    Cancer [facility])     skin    CKD (chronic kidney disease) stage 1, GFR 90 ml/min or greater     Class 2 severe obesity with serious comorbidity and body mass index (BMI) of 39.0 to 39.9 in adult ([facility] [address] 05/01/2019    Cystinuria ([facility]     Dyspnea on exertion 08/06/2019    Flank pain     RIGHT    Gastroesophageal reflux disease without esophagitis     GERD (gastroesophageal reflux disease)     Hypertension     Hyperuricemia     Hypothyroidism     no meds    Kidney stones     Midline low back pain with right-sided sciatica 06/02/2016    Proteinuria     Shoulder impingement 12/2019    left    Snores     no sleep apnea per sleep study    Solitary kidney, acquired 11/20/2017    Urinary tract obstruction by kidney stone 08/01/2019    Wears glasses        Past Surgical History:        Procedure Laterality Date    CARDIAC CATHETERIZATION  12/08/2021    CYSTO/URETERO/PYELOSCOPY, CALCULUS TX Right 11/21/2017    HOLMIUM LASER - CYSTOSCOPY, RIGHT URETEROSCOPY, RIGHT STENT EXCHANGE,STONE BASKETING performed by Jeferson Andrade MD at [address] Right 09/13/2019    HOLMIUM - CYSTO, URETEROSCOPY, LITHOTRIPSY, STENT PLACEMENT performed by Susi Best MD at [address], [place] [address] Right 12/12/2019    HOLMIUM- CYSTO, URETEROSCOPY, LASER LITHO, STENT PLACEMENT AND RIGHT URETERAL BASKET STONE EXTRACTION performed by Jeferson Andrade MD at [place] [address] Right 11/18/2017    CYSTOSCOPY URETERAL STENT INSERTION,RIGHT performed by Nadiya Barahona MD at [place] [address] Right 08/02/2019    CYSTOSCOPY RT URETERAL STENT INSERTION performed by Scottie Medina MD at [address] Right 08/23/2019    CYSTOSCOPY, RIGHT STENT REMOVAL, INSERTION OCCLUSIVE BALLOON, PT GOING TO INTERVENTIONAL performed by Scottie Medina MD at [address] Right 12/12/2019    CYSTO, URETEROSCOPY, LASER LITHO, STENT PLACEMENT AND RIGHT URETERAL BASKET STONE EXTRACTION     CYSTOSCOPY Right 12/31/2019    stent removal    CYSTOSCOPY Right 09/19/2020    litho, rt stent    CYSTOSCOPY Right 09/19/2020    CYSTOSCOPY, URETEROSCOPY, HOLMIUM LASER LITHOTRIPSY, STENT PLACEMENT performed by Jeny Menendez MD at [facility] / [address] / [place] Right 12/31/2019    CYSTOSCOPY, STENT REMOVAL performed by Bridget Quinn MD at [address] N/A 06/30/2022    DILATATION AND CURETTAGE, HYSTEROSCOPY performed by Derian Tom MD at [address] [address] Bilateral 2008    KNEE ARTHROSCOPY Right 1998, 2003    X2    NEPHROLITHOTOMY Right 08/23/2019    HOLMIUM LASER, NEPHROLITHOTOMY PERCUTANEOUS, LITHOCLAST, C-ARM performed by Scottie Medina MD at [address] [place] [address], CALCULUS [place] N/A 07/10/2018    CYSTOSCOPY, RIGHT URETEROSCOPY, HOLMIUM LASER LITHO WITH STONE BASKETING, RIGHT STENT EXCHANGE performed by Jeny Menendez MD at [address] [place] [address], CALCULUS [place] Right 10/10/2018    HOLMIUM LASER STANDBY, CYSTO, RIGHT URETEROSCOPY, RIGHT STENT PLACEMENT performed by Bridget Quinn MD at [address] Right 07/04/2018    CYSTOSCOPY URETERAL STENT INSERTION, RIGHT performed by Jennifer Witt MD at [address] Left     TOTAL NEPHRECTOMY Left 1988   [address] Right 12/12/2019       Social History:    Social History     Tobacco Use    Smoking status: Never    Smokeless tobacco: Never   Substance Use Topics    Alcohol use: Yes     Alcohol/week: 0.0 standard drinks     Comment: occasionally                                Counseling given: Not Answered      Vital Signs (Current): There were no vitals filed for this visit. BP Readings from Last 3 Encounters:   10/20/22 (!) 145/85   08/12/22 136/82   07/15/22 (!) 166/96       NPO Status:                                                                                 BMI:   Wt Readings from Last 3 Encounters:   10/20/22 271 lb (122.9 kg)   08/12/22 265 lb (120.2 kg)   07/15/22 258 lb (117 kg)     There is no height or weight on file to calculate BMI.    CBC:   Lab Results   Component Value Date/Time    WBC 9.9 10/20/2022 12:18 PM    RBC 5.19 10/20/2022 12:18 PM    RBC 4.66 03/22/2012 11:41 AM    HGB 13.9 10/20/2022 12:18 PM    HCT 43.8 10/20/2022 12:18 PM    MCV 84.4 10/20/2022 12:18 PM    RDW 14.4 10/20/2022 12:18 PM     10/20/2022 12:18 PM     03/22/2012 11:41 AM       CMP:   Lab Results   Component Value Date/Time     10/20/2022 12:18 PM    K 4.2 10/20/2022 12:18 PM     10/20/2022 12:18 PM    CO2 23 10/20/2022 12:18 PM    BUN 20 10/20/2022 12:18 PM    CREATININE 0.83 10/20/2022 12:18 PM    GFRAA >60 09/08/2022 11:58 AM    LABGLOM >60 10/20/2022 12:18 PM    GLUCOSE 98 10/20/2022 12:18 PM    GLUCOSE 88 03/22/2012 11:41 AM    PROT 7.5 09/08/2022 11:58 AM    CALCIUM 10.2 10/20/2022 12:12 PM    BILITOT 0.4 09/08/2022 11:58 AM    ALKPHOS 115 09/08/2022 11:58 AM    AST 34 09/08/2022 11:58 AM    ALT 24 09/08/2022 11:58 AM       POC Tests: No results for input(s): POCGLU, POCNA, POCK, POCCL, POCBUN, POCHEMO, POCHCT in the last 72 hours.     Coags:   Lab Results   Component Value Date/Time    PROTIME 10.3 10/20/2022 12:18 PM    INR 1.0 10/20/2022 12:18 PM    APTT 23.9 10/20/2022 12:18 PM       HCG (If Applicable): No results found for: PREGTESTUR, PREGSERUM, HCG, HCGQUANT     ABGs: No results found for: PHART, PO2ART, WVM2WTM, GGX5JIN, BEART, M2MFTQRO     Type & Screen (If Applicable):  No results found for: LABABO, [address]    Anesthesia Evaluation  Patient summary reviewed and Nursing notes reviewed no history of anesthetic complications:   Airway: Mallampati: III       Mouth opening: > = 3 FB   Dental: normal exam         Pulmonary:   (+) sleep apnea: on noncompliant,  decreased breath sounds asthma: allergic asthma,     (-) not a current smoker                           Cardiovascular:  Exercise tolerance: good (>4 METS),   (+) hypertension:, GRANADOS:,     (-) past MI and CAD      Rhythm: regular                   ROS comment: NSR ; no acute changes     Neuro/Psych:   (+) neuromuscular disease:, headaches:,    (-) seizures, TIA and CVA           GI/Hepatic/Renal:   (+) GERD: well controlled,          ROS comment: ureteral calculus. Endo/Other:    (+) hypothyroidism::., .                 Abdominal:   (+) obese,           Vascular:     - DVT and PE. Other Findings:             Anesthesia Plan      general     ASA 3       Induction: intravenous. Anesthetic plan and risks discussed with patient. Plan discussed with CRNA.             will keep intubated for 3 part procedures        Breann Reddy MD   11/1/2022

## 2022-11-01 NOTE — BRIEF OP NOTE
Brief Postoperative Note    Lona Aguilar  YOB: 1965  [id number]    Pre-operative Diagnosis: Rt Lower pole kidney stones    Post-operative Diagnosis: Same    Procedure: Wire to bladder for PCNL    Medications Given: general anesthesia    Anesthesia: Local    Surgeons/Assistants: Artem Gomez MD    Estimated Blood Loss: minimal    Complications: none    Specimens: were not obtained    Findings: Wire to bladder completed via lower pole calyx at stone containing calyx, with 4 F catheter and Amplatz wire in the bladder. VSS. Pt to OR for PCNL.     Electronically signed by Artem Gomez MD on 11/1/2022 at 12:53 PM

## 2022-11-01 NOTE — ANESTHESIA PRE PROCEDURE
Department of Anesthesiology  Preprocedure Note       Name:  Gerardo Mata   Age:  59 y.o.  :  1965                                          MRN:  [id number]         Date:  2022      Surgeon: Allison Lopez):  Naz Martinez MD    Procedure: HOLMIUM LASER PERCUTANEOUS NEPHROLITHOTOMY, LITHOCLAST, C-ARM  (PT.  COMING FROM INTERVENTIONAL RAD) (Right)    Medications prior to admission:   Prior to Admission medications    Medication Sig Start Date End Date Taking?  Authorizing Provider   spironolactone (ALDACTONE) 25 MG tablet Take 25 mg by mouth daily    Historical Provider, MD   Melatonin 5 MG CAPS Take by mouth    Historical Provider, MD   furosemide (LASIX) 20 MG tablet Take 20 mg by mouth daily    Historical Provider, MD   irbesartan-hydroCHLOROthiazide (AVALIDE) 150-12.5 MG per tablet TAKE 1 TABLET BY MOUTH ONE TIME A DAY 10/10/22   MIGDALIA Rodríguez CNP   meloxicam (MOBIC) 15 MG tablet TAKE 1 TABLET BY MOUTH ONE TIME A DAY 8/15/22   MIGDALIA Perry CNP   B Complex Vitamins (VITAMIN B COMPLEX) TABS     Historical Provider, MD   Ashwagandha 500 MG CAPS Take 1 capsule by mouth daily    Historical Provider, MD   vitamin D (D3-1000) 25 MCG (1000 UT) CAPS     Historical Provider, MD   metoprolol succinate (TOPROL XL) 25 MG extended release tablet Take 25 mg by mouth nightly 1/2 tab nightly    Historical Provider, MD   omeprazole (PRILOSEC) 20 MG delayed release capsule TAKE 1 CAPSULE BY MOUTH 2 TIMES A DAY 22   MIGDALIA Perry CNP   albuterol sulfate HFA (PROAIR HFA) 108 (90 Base) MCG/ACT inhaler Inhale 2 puffs into the lungs every 6 hours as needed for Wheezing 10/28/21   MIGDALIA Rodríguez CNP   benzoyl peroxide 5 % external liquid Wash affected areas once daily 21   Myles Eddy MD   clindamycin (CLEOCIN T) 1 % lotion Apply to affected areas daily 21   Myles Eddy MD   POTASSIUM CITRATE PO Take 5 mLs by mouth three times daily    Historical Provider, MD OMEGA-3 FATTY ACIDS PO Take 1 tablet by mouth daily    Historical Provider, MD   acetaminophen (TYLENOL) 500 MG tablet Take 1,000 mg by mouth every 4 hours as needed for Pain. Historical Provider, MD   solifenacin (VESICARE) 10 MG tablet Take 10 mg by mouth as needed. 12/23/14  Historical Provider, MD       Current medications:    No current facility-administered medications for this visit. No current outpatient medications on file.      Facility-Administered Medications Ordered in Other Visits   Medication Dose Route Frequency Provider Last Rate Last Admin    lactated ringers infusion 1,000 mL  1,000 mL IntraVENous Continuous Richard Mccullough MD 50 mL/hr at 11/01/22 1018 1,000 mL at 11/01/22 1018    0.9 % sodium chloride infusion   IntraVENous Continuous MANFRED Barber        lidocaine 1 % injection 1 mL  1 mL IntraDERmal Once PRN Renetta Martinez MD        lactated ringers infusion   IntraVENous Continuous Renetta Martinez MD        sodium chloride flush 0.9 % injection 5-40 mL  5-40 mL IntraVENous 2 times per day Renetta Martinez MD        sodium chloride flush 0.9 % injection 5-40 mL  5-40 mL IntraVENous PRN Renetta Martinez MD        0.9 % sodium chloride infusion   IntraVENous PRN Renetta Martinez MD        midazolam PF (VERSED) injection 1 mg  1 mg IntraVENous Q10 Min PRN Renetta Martinez MD        fentaNYL (SUBLIMAZE) injection   IntraVENous PRN Lara Jaramillo APRN - CRNA   25 mcg at 11/01/22 1158    lactated ringers infusion   IntraVENous Continuous PRN Lara Jaramillo APRN - CRNA   New Bag at 11/01/22 1135    0.9 % sodium chloride infusion   IntraVENous Continuous Nicolette Resendez MD        sodium chloride flush 0.9 % injection 5-40 mL  5-40 mL IntraVENous 2 times per day Nicolette Resendez MD        sodium chloride flush 0.9 % injection 5-40 mL  5-40 mL IntraVENous PRN Nicolette Resendez MD        0.9 % sodium chloride infusion   IntraVENous PRN Nicolette Resendez MD        polyethylene glycol (GLYCOLAX) packet 17 g  17 g Oral Daily Sascha Beltran MD        ondansetron (ZOFRAN-ODT) disintegrating tablet 4 mg  4 mg Oral Q8H PRN Sascha Beltran MD        Or    ondansetron [facility]) injection 4 mg  4 mg IntraVENous Q6H PRN Sascha Beltran MD        tamsulosin [facility]) capsule 0.4 mg  0.4 mg Oral Daily Sascha Beltran MD        [facility]) tablet 20 mg  20 mg Oral BID PRN Sascha Beltran MD        HYDROmorphone (DILAUDID) injection 0.5 mg  0.5 mg IntraVENous Q4H PRN Sascha Beltran MD        acetaminophen (TYLENOL) tablet 650 mg  650 mg Oral Q6H PRN Sascha Beltran MD        oxyCODONE (ROXICODONE) immediate release tablet 10 mg  10 mg Oral Q4H PRN Sascha Beltran MD        Or    oxyCODONE (ROXICODONE) immediate release tablet 5 mg  5 mg Oral Q4H PRN Sascha Beltran MD        propofol injection   IntraVENous PRN Kaley Urena, APRN - CRNA   75 mcg/kg/min at 11/01/22 1135    0.9 % sodium chloride infusion   IntraVENous Continuous Sascha Beltran MD        phenylephrine (REKHA-SYNEPHRINE) 1 MG/10ML prefilled syringe (Push Dose)   IntraVENous PRN Elizabeth Lencho, APRN - CRNA   100 mcg at 11/01/22 1233       Allergies:  No Known Allergies    Problem List:    Patient Active Problem List   Diagnosis Code    Kidney stones N20.0    Essential hypertension I10    Acquired hypothyroidism E03.9    CKD (chronic kidney disease) stage 1, GFR 90 ml/min or greater N18.1    Cystinuria ([facility] E72.01    Hyperuricemia E79.0    Osteoarthritis of both knees M17.0    Gastroesophageal reflux disease without esophagitis K21.9    ESTHER (obstructive sleep apnea) G47.33    Midline low back pain with right-sided sciatica M54.41    Mixed hyperlipidemia E78.2    Solitary kidney, acquired Z90.5    Obesity (BMI 30-39. 9) E66.9    Acute intractable tension-type headache G44. 201    Glucose intolerance (impaired glucose tolerance) R73.02    Class 3 severe obesity due to excess calories with serious comorbidity and body mass index (BMI) of 40.0 to 44.9 in adult ([facility] E66.01, Z68.41    Dyspnea on exertion R06.09    Arthralgia M25.50    Other fatigue R53.83    Oxygen desaturation R09.02    Mild persistent asthma without complication R44.67    LVH (left ventricular hypertrophy) I51.7    Chronic left shoulder pain M25.512, G89.29    Hydronephrosis with renal and ureteral calculous obstruction N13.2    S/p Hscope, D&C 6/30/22 Z98.890    Postmenopausal bleeding N95.0    Endometrial polyp N84.0    Right kidney stone N20.0       Past Medical History:        Diagnosis Date    Arthritis     Asthma     Benign familial tremor     CAD (coronary artery disease) 12/2021    mild per cardiac cath 12/2021; no stents.  Dr. Annette Aguirre last visit 10/2022    Cancer [facility])     skin    CKD (chronic kidney disease) stage 1, GFR 90 ml/min or greater     Class 2 severe obesity with serious comorbidity and body mass index (BMI) of 39.0 to 39.9 in adult ([facility] 05/01/2019    Cystinuria ([facility]     Dyspnea on exertion 08/06/2019    Flank pain     RIGHT    Gastroesophageal reflux disease without esophagitis     GERD (gastroesophageal reflux disease)     Hypertension     Hyperuricemia     Hypothyroidism     no meds    Kidney stones     Midline low back pain with right-sided sciatica 06/02/2016    Proteinuria     Shoulder impingement 12/2019    left    Snores     no sleep apnea per sleep study    Solitary kidney, acquired 11/20/2017    Urinary tract obstruction by kidney stone 08/01/2019    Wears glasses        Past Surgical History:        Procedure Laterality Date    CARDIAC CATHETERIZATION  12/08/2021    CYSTO/URETERO/PYELOSCOPY, CALCULUS [place] Right 11/21/2017    HOLMIUM LASER - CYSTOSCOPY, RIGHT URETEROSCOPY, RIGHT STENT EXCHANGE,STONE BASKETING performed by Nahid Bellamy MD at [address] Right 09/13/2019    HOLMIUM - CYSTO, URETEROSCOPY, LITHOTRIPSY, STENT PLACEMENT performed by Alisia Burns MD at [address] CYSTO/URETERO/PYELOSCOPY, CALCULUS [place] Right 12/12/2019    HOLMIUM- CYSTO, URETEROSCOPY, LASER LITHO, STENT PLACEMENT AND RIGHT URETERAL BASKET STONE EXTRACTION performed by Jailene Chen MD at [place] [address] Right 11/18/2017    CYSTOSCOPY URETERAL STENT INSERTION,RIGHT performed by Katie Pettit MD at [place] [address] Right 08/02/2019    CYSTOSCOPY RT URETERAL STENT INSERTION performed by Maureen Hameed MD at [place] [address] Right 08/23/2019    CYSTOSCOPY, RIGHT STENT REMOVAL, INSERTION OCCLUSIVE BALLOON, PT GOING TO INTERVENTIONAL performed by Maureen Hameed MD at [place] [address] Right 12/12/2019    CYSTO, URETEROSCOPY, LASER LITHO, STENT PLACEMENT AND RIGHT URETERAL BASKET STONE EXTRACTION     CYSTOSCOPY Right 12/31/2019    stent removal    CYSTOSCOPY Right 09/19/2020    litho, rt stent    CYSTOSCOPY Right 09/19/2020    CYSTOSCOPY, URETEROSCOPY, HOLMIUM LASER LITHOTRIPSY, STENT PLACEMENT performed by Zachery Snow MD at [address] / Vivian Shen / Arash Saul Right 12/31/2019    CYSTOSCOPY, STENT REMOVAL performed by Jailene Chen MD at [address] N/A 06/30/2022    DILATATION AND CURETTAGE, HYSTEROSCOPY performed by Jamil Ramírez MD at [address] [address] Bilateral 2008    KNEE ARTHROSCOPY Right 1998, 2003    X2    NEPHROLITHOTOMY Right 08/23/2019    HOLMIUM LASER, NEPHROLITHOTOMY PERCUTANEOUS, LITHOCLAST, C-ARM performed by Maureen Hameed MD at [address] [place] [address], CALCULUS [place] N/A 07/10/2018    CYSTOSCOPY, RIGHT URETEROSCOPY, HOLMIUM LASER LITHO WITH STONE BASKETING, RIGHT STENT EXCHANGE performed by Zachery Snow MD at [address] [place] [address], CALCULUS [place] Right 10/10/2018    HOLMIUM LASER STANDBY, CYSTO, RIGHT URETEROSCOPY, RIGHT STENT PLACEMENT performed by Jailene Chen MD at [address] [facility], Right 07/04/2018 CYSTOSCOPY URETERAL STENT INSERTION, RIGHT performed by Jennifer Witt MD at [address] [place] [address] Left     TOTAL NEPHRECTOMY Left 1988    URETER STENT PLACEMENT Right 12/12/2019       Social History:    Social History     Tobacco Use    Smoking status: Never    Smokeless tobacco: Never   Substance Use Topics    Alcohol use: Yes     Alcohol/week: 0.0 standard drinks     Comment: occasionally                                Counseling given: Not Answered      Vital Signs (Current): There were no vitals filed for this visit.                                            BP Readings from Last 3 Encounters:   11/01/22 (!) 171/95   10/20/22 (!) 145/85   08/12/22 136/82       NPO Status:                                                                                 BMI:   Wt Readings from Last 3 Encounters:   11/01/22 271 lb (122.9 kg)   10/20/22 271 lb (122.9 kg)   08/12/22 265 lb (120.2 kg)     There is no height or weight on file to calculate BMI.    CBC:   Lab Results   Component Value Date/Time    WBC 9.9 10/20/2022 12:18 PM    RBC 5.19 10/20/2022 12:18 PM    RBC 4.66 03/22/2012 11:41 AM    HGB 13.9 10/20/2022 12:18 PM    HCT 43.8 10/20/2022 12:18 PM    MCV 84.4 10/20/2022 12:18 PM    RDW 14.4 10/20/2022 12:18 PM     10/20/2022 12:18 PM     03/22/2012 11:41 AM       CMP:   Lab Results   Component Value Date/Time     10/20/2022 12:18 PM    K 4.2 10/20/2022 12:18 PM     10/20/2022 12:18 PM    CO2 23 10/20/2022 12:18 PM    BUN 20 10/20/2022 12:18 PM    CREATININE 0.83 10/20/2022 12:18 PM    GFRAA >60 09/08/2022 11:58 AM    LABGLOM >60 10/20/2022 12:18 PM    GLUCOSE 98 10/20/2022 12:18 PM    GLUCOSE 88 03/22/2012 11:41 AM    PROT 7.5 09/08/2022 11:58 AM    CALCIUM 10.2 10/20/2022 12:12 PM    BILITOT 0.4 09/08/2022 11:58 AM    ALKPHOS 115 09/08/2022 11:58 AM    AST 34 09/08/2022 11:58 AM    ALT 24 09/08/2022 11:58 AM       POC Tests:   Recent Labs     11/01/22  1017   POCK 4.0 Coags:   Lab Results   Component Value Date/Time    PROTIME 10.3 10/20/2022 12:18 PM    INR 1.0 10/20/2022 12:18 PM    APTT 23.9 10/20/2022 12:18 PM       HCG (If Applicable): No results found for: PREGTESTUR, PREGSERUM, HCG, HCGQUANT     ABGs: No results found for: PHART, PO2ART, PAQ5MCL, GZR1DSZ, BEART, Z1RXHSZZ     Type & Screen (If Applicable):  No results found for: [facility]    Anesthesia Evaluation  Patient summary reviewed and Nursing notes reviewed no history of anesthetic complications:   Airway: Mallampati: III       Mouth opening: > = 3 FB   Dental: normal exam         Pulmonary:   (+) sleep apnea: on noncompliant,  decreased breath sounds asthma: allergic asthma,     (-) not a current smoker                           Cardiovascular:  Exercise tolerance: good (>4 METS),   (+) hypertension:, GRANADOS:,     (-) past MI and CAD      Rhythm: regular                   ROS comment: NSR ; no acute changes     Neuro/Psych:   (+) neuromuscular disease:, headaches:,    (-) seizures, TIA and CVA           GI/Hepatic/Renal:   (+) GERD: well controlled,          ROS comment: ureteral calculus. Endo/Other:    (+) hypothyroidism::., .                 Abdominal:   (+) obese,           Vascular:     - DVT and PE. Other Findings:             Anesthesia Plan      general     ASA 3       Induction: intravenous. Anesthetic plan and risks discussed with patient. Plan discussed with CRNA.             intubated for 3 part procedures        Shravan Dawson MD   11/1/2022

## 2022-11-01 NOTE — INTERVAL H&P NOTE
Update History & Physical    The patient's History and Physical of October 20, 2022 was reviewed with the patient and I examined the patient. There was no change. The surgical site was confirmed by the patient and me. Plan: The risks, benefits, expected outcome, and alternative to the recommended procedure have been discussed with the patient. Patient understands and wants to proceed with the procedure.      Electronically signed by Augustina Dejesus MD on 11/1/2022 at 9:14 AM

## 2022-11-01 NOTE — ANESTHESIA POSTPROCEDURE EVALUATION
Department of Anesthesiology  Postprocedure Note    Patient: Alison Gonzalez  MRN: [id number]  [place]: 1965  Date of evaluation: 11/1/2022      Procedure Summary     Date: 11/01/22 Room / Location: [address]    Anesthesia Start: 1301 Anesthesia Stop: 7002    Procedure: RIGHT PERCUTANEOUS NEPHROLITHOTOMY, NEPHROSTOMY PLACEMENT (Right) Diagnosis:       Right kidney stone      (RIGHT KIDNEY STONE)    Surgeons: Shelly Pepe MD Responsible Provider: Fabby Gallardo MD    Anesthesia Type: general ASA Status: 3          Anesthesia Type: No value filed.     Imani Phase I: Imani Score: 8    Imani Phase II:        Anesthesia Post Evaluation    Patient location during evaluation: bedside  Patient participation: complete - patient participated  Level of consciousness: awake  Airway patency: patent  Nausea & Vomiting: no nausea and no vomiting  Complications: no  Cardiovascular status: blood pressure returned to baseline  Respiratory status: acceptable  Hydration status: euvolemic  Comments: /80   Pulse 77   Temp 97.1 °F (36.2 °C)   Resp 21   Ht 5' 8\" (1.727 m)   Wt 271 lb (122.9 kg)   SpO2 97%   BMI 41.21 kg/m²

## 2022-11-01 NOTE — ANESTHESIA POSTPROCEDURE EVALUATION
Department of Anesthesiology  Postprocedure Note    Patient: Aregnis Zaidi  MRN: [id number]  YOB: 1965  Date of evaluation: 11/1/2022      Procedure Summary     Date: 11/01/22 Room / Location: [address]    Anesthesia Start: 1050 Anesthesia Stop: 1347    Procedure: [address] (Right) Diagnosis:       Kidney stone on right side      (RIGHT KIDNEY STONE)    Surgeons: Bjorn Sierra MD Responsible Provider: Aletha Mcdonald MD    Anesthesia Type: general ASA Status: 3          Anesthesia Type: No value filed. Imani Phase I:      Imani Phase II:        Anesthesia Post Evaluation    Patient location during evaluation: PACU  Patient participation: complete - patient cannot participate  Level of consciousness: sedated and ventilated  Pain score: 0  Airway patency: patent  Nausea & Vomiting: no nausea and no vomiting  Complications: no  Cardiovascular status: blood pressure returned to baseline and hemodynamically stable  Respiratory status: acceptable and intubated  Hydration status: euvolemic  Comments:  To IR intubated for next part of procedure

## 2022-11-01 NOTE — OP NOTE
Operative Note      Patient: Aimee Harrison  YOB: 1965  MRN: [id number]    Date of Procedure: 11/1/2022    Pre-Op Diagnosis: RIGHT KIDNEY STONE    Post-Op Diagnosis: Same       Procedure(s):  Cystoscopy, right occlusion balloon catheter placement, woodward catheter placement    Surgeon(s):  Bridget Quinn MD    Assistant:   Alex Fontana MD PGY-3  Mary Anne Reese MD PGY-5    Anesthesia: General    Estimated Blood Loss (mL): None    Complications: None    Specimens:   * No specimens in log *    Implants:  * No surgical log found *        DRAINS:  Right Occlusion balloon catheter  Woodward catheter      INDICATIONS FOR PROCEDURE:  The patient is a 59 y.o. female with a history of right lower pole kidney stone requiring percutaneous nephrolithotomy. She is currently here to undergo occlusion balloon catheter placement prior to percutaneous access. The risks and benefits of the procedure as well as possible alternatives and complications were discussed and she consented. DETAILS OF THE PROCEDURE:  The patient was correctly identified in the preoperative holding area. she was brought back to the operating room and placed in the dorsal lithotomy position. EPC cuffs were in place, turned on, and fully functional. General endotracheal anesthesia was administered. She was given Cipro 400mg  IV  for antibiotic prophylaxis. The patient was then prepped and draped in the usual sterile fashion. After appropriate time-out was performed with all parties agreeing, 25 Maori cystoscope with a 30 degree lens was inserted through the urethra into the bladder. The right ureteral orifice was localized. A glidewire was inserted through the scope into the ureteral orifice and localized in the right renal pelvis under fluoroscopy. An occlusion balloon catheter was placed over the wire into into the pelvis. It was localized in the correct position under fluoroscopy.  A retrograde pyelogram was performed to delineate the collecting system. A 16 Setswana Keen catheter was inserted to the bladder and the balloon filled with 10 cc of water. The occlusion balloon was secured to the Keen using umbilical tape. The patient tolerated the procedure well and was sent to PACU for postoperative monitoring. Dr. Denise Gudino was present and scrubbed for all critical portions of the procedure. DISPOSITION:  She will go to the radiology suite for placement of percutaneous nephrostomy tube and a wire into the urinary collecting system preferably through the lower pole.       Electronically signed by Maritza Griggs MD on 11/1/2022 at 11:38 AM

## 2022-11-01 NOTE — OP NOTE
Operative Note      Patient: Marlin Abbott  YOB: 1965  MRN: [id number]    Date of Procedure: 11/1/2022    Pre-Op Diagnosis: RIGHT KIDNEY STONE    Post-Op Diagnosis: Same       Procedure(s):  Right percutaneous nephrolithotomy  Right dilation of percutaneous nephrostomy tract  Placement of right nephrostomy    Surgeon(s):  Lakshmi Negron MD    Assistant:   Resident: Nir Blunt MD PGY-3, Sameer Hurley MD PGY-4    Anesthesia: General    Estimated Blood Loss (mL): 40 cc    Complications: none    Specimens:   ID Type Source Tests Collected by Time Destination   1 : RIGHT KIDNEY STONE FOR ANALYSIS Stone (Calculus) Kidney STONE ANALYSIS Lakshmi Negron MD 11/1/2022 1422        Implants:  * No implants in log *      Drains:   Nephrostomy Tube 1 Right Flank 20 fr (Active)   Collection Container Standard 11/01/22 1448   Status Draining 11/01/22 1448   Urine Color Red 11/01/22 1448   Urine Appearance Clear 11/01/22 1448       Urinary Catheter 11/01/22 Keen (Active)   Catheter Indications Perioperative use for selected surgical procedures 11/01/22 1448   Site Assessment No urethral drainage 11/01/22 1448   Urine Color Cherry 11/01/22 1448   Urine Appearance Clear 11/01/22 1448   Collection Container Standard 11/01/22 1448   Securement Method Leg strap 11/01/22 1448   Status Draining 11/01/22 1448       Findings:   Right nephroscopy: 1 cm stone in right Renal Pelvis      INDICATIONS FOR PROCEDURE:  The patient is a 59 y.o. female with a history of right kidney stones with total stone burden of 1.2 x 0.6 cm. The risks and benefits of the procedure as well as possible alternatives and complications were discussed and she consented. DETAILS OF THE PROCEDURE:  The patient underwent balloon catheter placement followed by percutaneous access with interventional radiology.  Once this was completed the patient was brought back to the operating room, placed under general endotracheal anesthesia, prepped and draped in the prone position with right side bumped. She was given appropriate antibiotic re-dosing. A second time out was performed. The percutaneous drain was removed and a dual lumen catheter was placed over the previously placed wire. A second wire was advanced into the ureter as visualized on fluoroscopy. The catheter was removed and a skin incision was made. The nephromax dilator was advanced into the calyx and inflated to 20 mmHg pressure under fluoroscopic guidance. The sheath was advanced and the nephroscope was inserted. Stone was located in the pelvis. The Swiss Trilogy lithoclast was inserted and through a combination of  US lithotripsy, pneumatic impactor, and grasping, the stones were fragmented and removed. A flexible cystoscope was inserted and a thorough pyeloscopy was performed. Additional stones were not identified. The occlusion balloon catheter was removed. At this point the patient appeared to be stone free. A 18 Fr Keen catheter was inserted though the nephrostomy sheath and appeared to be in good position under pyelogram. The tube was sutured in place with 0 Ethibond U-stitch and hemostasis was achieved. Pressure dressing was placed. The patient tolerated the procedure well and was sent to PACU for postoperative monitoring. DISPOSITION:  The patient was admitted to the floor for post-operative monitoring. A CT scan will be performed in the morning to assess for any residual stones.        Electronically signed by Sinai Keen MD on 11/1/2022 at 3:24 PM

## 2022-11-02 ENCOUNTER — APPOINTMENT (OUTPATIENT)
Dept: CT IMAGING | Age: 57
End: 2022-11-02
Attending: UROLOGY
Payer: COMMERCIAL

## 2022-11-02 VITALS
BODY MASS INDEX: 41.07 KG/M2 | HEART RATE: 75 BPM | RESPIRATION RATE: 20 BRPM | DIASTOLIC BLOOD PRESSURE: 78 MMHG | HEIGHT: 68 IN | TEMPERATURE: 98.3 F | WEIGHT: 271 LBS | OXYGEN SATURATION: 99 % | SYSTOLIC BLOOD PRESSURE: 123 MMHG

## 2022-11-02 LAB
ANION GAP SERPL CALCULATED.3IONS-SCNC: 12 MMOL/L (ref 9–17)
BUN BLDV-MCNC: 19 MG/DL (ref 6–20)
CALCIUM SERPL-MCNC: 8.6 MG/DL (ref 8.6–10.4)
CHLORIDE BLD-SCNC: 100 MMOL/L (ref 98–107)
CO2: 24 MMOL/L (ref 20–31)
CREAT SERPL-MCNC: 0.85 MG/DL (ref 0.5–0.9)
GFR SERPL CREATININE-BSD FRML MDRD: >60 ML/MIN/1.73M2
GLUCOSE BLD-MCNC: 119 MG/DL (ref 70–99)
HCT VFR BLD CALC: 36.7 % (ref 36.3–47.1)
HEMOGLOBIN: 11.6 G/DL (ref 11.9–15.1)
MCH RBC QN AUTO: 26.3 PG (ref 25.2–33.5)
MCHC RBC AUTO-ENTMCNC: 31.6 G/DL (ref 28.4–34.8)
MCV RBC AUTO: 83.2 FL (ref 82.6–102.9)
NRBC AUTOMATED: 0 PER 100 WBC
PDW BLD-RTO: 14.4 % (ref 11.8–14.4)
PLATELET # BLD: 267 K/UL (ref 138–453)
PMV BLD AUTO: 9.7 FL (ref 8.1–13.5)
POTASSIUM SERPL-SCNC: 4.2 MMOL/L (ref 3.7–5.3)
RBC # BLD: 4.41 M/UL (ref 3.95–5.11)
SODIUM BLD-SCNC: 136 MMOL/L (ref 135–144)
WBC # BLD: 14.6 K/UL (ref 3.5–11.3)

## 2022-11-02 PROCEDURE — 74176 CT ABD & PELVIS W/O CONTRAST: CPT

## 2022-11-02 PROCEDURE — 6370000000 HC RX 637 (ALT 250 FOR IP): Performed by: STUDENT IN AN ORGANIZED HEALTH CARE EDUCATION/TRAINING PROGRAM

## 2022-11-02 PROCEDURE — 6360000002 HC RX W HCPCS: Performed by: STUDENT IN AN ORGANIZED HEALTH CARE EDUCATION/TRAINING PROGRAM

## 2022-11-02 PROCEDURE — 36415 COLL VENOUS BLD VENIPUNCTURE: CPT

## 2022-11-02 PROCEDURE — G0378 HOSPITAL OBSERVATION PER HR: HCPCS

## 2022-11-02 PROCEDURE — 96372 THER/PROPH/DIAG INJ SC/IM: CPT

## 2022-11-02 PROCEDURE — 96365 THER/PROPH/DIAG IV INF INIT: CPT

## 2022-11-02 PROCEDURE — 96375 TX/PRO/DX INJ NEW DRUG ADDON: CPT

## 2022-11-02 PROCEDURE — 85027 COMPLETE CBC AUTOMATED: CPT

## 2022-11-02 PROCEDURE — 80048 BASIC METABOLIC PNL TOTAL CA: CPT

## 2022-11-02 RX ORDER — OXYBUTYNIN CHLORIDE 10 MG/1
10 TABLET, EXTENDED RELEASE ORAL DAILY
Qty: 30 TABLET | Refills: 0 | Status: SHIPPED | OUTPATIENT
Start: 2022-11-02 | End: 2022-11-15 | Stop reason: ALTCHOICE

## 2022-11-02 RX ORDER — ONDANSETRON 4 MG/1
4 TABLET, FILM COATED ORAL DAILY PRN
Qty: 30 TABLET | Refills: 0 | Status: SHIPPED | OUTPATIENT
Start: 2022-11-02

## 2022-11-02 RX ORDER — CEFADROXIL 500 MG/1
500 CAPSULE ORAL 2 TIMES DAILY
Qty: 6 CAPSULE | Refills: 0 | Status: SHIPPED | OUTPATIENT
Start: 2022-11-02 | End: 2022-11-05

## 2022-11-02 RX ADMIN — ONDANSETRON 4 MG: 2 INJECTION INTRAMUSCULAR; INTRAVENOUS at 04:14

## 2022-11-02 RX ADMIN — PANTOPRAZOLE SODIUM 40 MG: 40 TABLET, DELAYED RELEASE ORAL at 09:15

## 2022-11-02 RX ADMIN — CIPROFLOXACIN 400 MG: 2 INJECTION, SOLUTION INTRAVENOUS at 00:02

## 2022-11-02 RX ADMIN — FUROSEMIDE 20 MG: 20 TABLET ORAL at 10:56

## 2022-11-02 RX ADMIN — OXYCODONE 10 MG: 5 TABLET ORAL at 09:01

## 2022-11-02 RX ADMIN — OXYCODONE 5 MG: 5 TABLET ORAL at 04:15

## 2022-11-02 RX ADMIN — POLYETHYLENE GLYCOL 3350 17 G: 17 POWDER, FOR SOLUTION ORAL at 10:57

## 2022-11-02 RX ADMIN — HYDROMORPHONE HYDROCHLORIDE 0.5 MG: 1 INJECTION, SOLUTION INTRAMUSCULAR; INTRAVENOUS; SUBCUTANEOUS at 07:40

## 2022-11-02 RX ADMIN — TAMSULOSIN HYDROCHLORIDE 0.4 MG: 0.4 CAPSULE ORAL at 10:57

## 2022-11-02 RX ADMIN — HYDROCHLOROTHIAZIDE 12.5 MG: 25 TABLET ORAL at 10:56

## 2022-11-02 RX ADMIN — PROMETHAZINE HYDROCHLORIDE 12.5 MG: 25 INJECTION INTRAMUSCULAR; INTRAVENOUS at 07:37

## 2022-11-02 ASSESSMENT — PAIN SCALES - GENERAL
PAINLEVEL_OUTOF10: 7
PAINLEVEL_OUTOF10: 6
PAINLEVEL_OUTOF10: 7
PAINLEVEL_OUTOF10: 3
PAINLEVEL_OUTOF10: 1
PAINLEVEL_OUTOF10: 3

## 2022-11-02 ASSESSMENT — PAIN DESCRIPTION - DESCRIPTORS
DESCRIPTORS: PRESSURE;THROBBING
DESCRIPTORS: SHARP;OTHER (COMMENT)
DESCRIPTORS: PRESSURE;THROBBING

## 2022-11-02 ASSESSMENT — PAIN DESCRIPTION - LOCATION: LOCATION: ABDOMEN;HEAD;FLANK

## 2022-11-02 ASSESSMENT — PAIN DESCRIPTION - ORIENTATION
ORIENTATION: RIGHT
ORIENTATION: RIGHT;LOWER
ORIENTATION: RIGHT;LOWER

## 2022-11-02 ASSESSMENT — PAIN - FUNCTIONAL ASSESSMENT: PAIN_FUNCTIONAL_ASSESSMENT: PREVENTS OR INTERFERES SOME ACTIVE ACTIVITIES AND ADLS

## 2022-11-02 NOTE — DISCHARGE INSTRUCTIONS
General Discharge Instructions:     Patient ok to discharge home in good condition  No heavy lifting, >10 lbs for 4-6 week, or until cleared by attending physician  Patient should avoid strenuous activity for 4-6 weeks  Patient should walk moderately at home 8-10x per day or every hour   Patient may resume diet as tolerated: Wean off of narcotic pain medication to plain tylenol or ibuprofen if able to take. Please take all of antibiotic if prescribed. Patient should take prescriptions as directed  No driving while on narcotics  Patient ok to shower in 24 hrs after discharge while keeping incision clean / dry  No submerging incisions for 2 weeks    Please call attending physician or hospital  with questions  Call or Present to ED if fever (> 101F), intractable nausea/vomiting or pain, if incisions become red/swollen or drain pus/fluid, or if calves become red swollen and tender. Patient should follow up with Dr. Babita Avina, in 2-3 weeks. Call office to confirm appointment.

## 2022-11-02 NOTE — PLAN OF CARE
Problem: Pain  Goal: Verbalizes/displays adequate comfort level or baseline comfort level  11/2/2022 0440 by Merary Andrade RN  Outcome: Progressing

## 2022-11-02 NOTE — CARE COORDINATION
11/02/22 1106   Service Assessment   Patient Orientation Alert and Oriented   Cognition Alert   History Provided By Patient   Primary Caregiver Self   Support Systems Family Members   PCP Verified by CM Yes   Last Visit to PCP Within last 3 months   Prior Functional Level Independent in ADLs/IADLs   Current Functional Level Independent in ADLs/IADLs   Can patient return to prior living arrangement Yes   Ability to make needs known: Good   Family able to assist with home care needs: Yes   Would you like for me to discuss the discharge plan with any other family members/significant others, and if so, who? No   Social/Functional History   Lives With Alone   Type of Home House   Home Layout Two level   Home Access Stairs to enter without rails   Entrance Stairs - Number of Steps 2   Entrance Stairs - Rails None   Bathroom Shower/Tub Walk-in shower; Shower chair with back   [address] bars in shower   Bathroom Accessibility Not accessible   Receives Help From Family   ADL Assistance Independent   Homemaking Assistance Independent   Homemaking Responsibilities Yes   Ambulation Assistance Independent   Transfer Assistance Independent   Active  Yes   Mode of Transportation SUV   Occupation Full time employment   Type of Occupation Nurse   Discharge Planning   Type of Residence House   Living Arrangements Alone  (plans to go to mothers home at discharge)   Current Services Prior To Admission None   Potential Assistance Needed N/A   DME Ordered? No   Potential Assistance Purchasing Medications No  (Fills at [employer] in [place])   Type of [address] None   Patient expects to be discharged to: House   One/Two Story Residence Two story   Interior Rails Right   Lift Chair Available No   History of falls?  0   Services At/After Discharge   Services At/After Discharge None   Mode of Transport at Discharge   (family)   Condition of Participation: Discharge Planning   The Plan for Transition of Care is related to the following treatment goals: pain control

## 2022-11-02 NOTE — PROGRESS NOTES
Urology Progress Note      Subjective: Marlin Abbott is a 59 y.o. female. His/Her current Diet is: ADULT DIET; Regular. Since the previous note, the patient reports the following:  No acute issues overnight. No fevers or chills. Had nausea but not vomiting  No calf pain. Pain Controlled. Ambulating. Tolerating PO Diet. WBC 14.6 (13.4)  Hgb 11.6 (12.4)  Cr pending      Vitals and Labs:  Vitals:    11/01/22 1845 11/01/22 2357 11/02/22 0408 11/02/22 0630   BP: 135/86 130/75 124/67 (!) 113/58   Pulse: 81 76 82 70   Resp:  16 16 16   Temp: 97.8 °F (36.6 °C) 97.9 °F (36.6 °C) 98.2 °F (36.8 °C) 98.3 °F (36.8 °C)   TempSrc: Oral Oral Oral Oral   SpO2: 93% 93% 92% 92%   Weight:       Height:         I/O last 3 completed shifts: In: 2084 [I.V.:1775]  Out: 490 [Urine:450; Blood:40]    Recent Labs     11/01/22  1445 11/02/22  0557   WBC 13.4* 14.6*   HGB 12.4 11.6*   HCT 39.7 36.7   MCV 85.0 83.2    267     Recent Labs     11/01/22  1445      K 4.2      CO2 27   BUN 19   CREATININE 1.00*       No results for input(s): COLORU, PHUR, LABCAST, WBCUA, RBCUA, MUCUS, TRICHOMONAS, YEAST, BACTERIA, CLARITYU, SPECGRAV, LEUKOCYTESUR, UROBILINOGEN, [place] in the last 72 hours. Invalid input(s): NITRATE, GLUCOSEUKETONESUAMORPHOUS    Physical Exam:  NAD  A/O x 3  RRR  No accessory muscles of inspiration  Abdomen soft, non-tender, non-distended. Minimal Right CVA tenderness. Nephrostomy draining pink urine  Keen in place draining pink urine  No calf pain. EPCs on. Machine turned on.     Impression: 58 yo female with  Right renal pelvis 1 cm stone s/p right PCNL  Patient Active Problem List   Diagnosis    Kidney stones    Essential hypertension    Acquired hypothyroidism    CKD (chronic kidney disease) stage 1, GFR 90 ml/min or greater    Cystinuria (HCC)    Hyperuricemia    Osteoarthritis of both knees    Gastroesophageal reflux disease without esophagitis    ESTHER (obstructive sleep apnea) Midline low back pain with right-sided sciatica    Mixed hyperlipidemia    Solitary kidney, acquired    Obesity (BMI 30-39. 9)    Acute intractable tension-type headache    Glucose intolerance (impaired glucose tolerance)    Class 3 severe obesity due to excess calories with serious comorbidity and body mass index (BMI) of 40.0 to 44.9 in [facility])    Dyspnea on exertion    Arthralgia    Other fatigue    Oxygen desaturation    Mild persistent asthma without complication    LVH (left ventricular hypertrophy)    Chronic left shoulder pain    Hydronephrosis with renal and ureteral calculous obstruction    S/p Hscope, D&C 6/30/22    Postmenopausal bleeding    Endometrial polyp    Right kidney stone       Plan:  Urology clamped nephrostomy and deflated nephrostomy balloon this morning  Void trial this morning  Follow up CT abdomen/pelvis without contrast  Remove nephrostomy later if passes clamp trial  Pain control and antiemetics  Encourage ambulation  Incentive spirometry  Plan for discharge later today    Garth Mckeon MD  6:58 AM 11/2/2022

## 2022-11-02 NOTE — CARE COORDINATION
Discharge [address] [facility]  Clinical Case Management Department  Written by: Tammy Adam RN    Patient Name: Lucrecia Bhandari  Attending Provider: No att. providers found  Admit Date: 2022  9:13 AM  MRN: [id number]  Account: [de-identified]                     : 1965  Discharge Date: 2022      Disposition: home    Tammy Adam RN

## 2022-11-04 LAB
STONE COMPOSITION: NORMAL
STONE DESCRIPTION: NORMAL
STONE MASS: 514 MG

## 2022-11-08 NOTE — DISCHARGE SUMMARY
DISCHARGE SUMMARY NOTE:        Patient Identification  PATIENT: Marcos Lau is a 59 y.o. female. MRN: [id number]  :  1965  Admit Date:  2022  Discharge date:   2022                                  Disposition: home  Discharged Condition:  good  Discharge Diagnoses:   Right renal stone  S/p Right PCNL  Patient Active Problem List   Diagnosis    Kidney stones    Essential hypertension    Acquired hypothyroidism    CKD (chronic kidney disease) stage 1, GFR 90 ml/min or greater    Cystinuria ([facility] [address]    Hyperuricemia    Osteoarthritis of both knees    Gastroesophageal reflux disease without esophagitis    ESTHER (obstructive sleep apnea)    Midline low back pain with right-sided sciatica    Mixed hyperlipidemia    Solitary kidney, acquired    Obesity (BMI 30-39. 9)    Acute intractable tension-type headache    Glucose intolerance (impaired glucose tolerance)    Class 3 severe obesity due to excess calories with serious comorbidity and body mass index (BMI) of 40.0 to 44.9 in [facility])    Dyspnea on exertion    Arthralgia    Other fatigue    Oxygen desaturation    Mild persistent asthma without complication    LVH (left ventricular hypertrophy)    Chronic left shoulder pain    Hydronephrosis with renal and ureteral calculous obstruction    S/p Hscope, D&C 22    Postmenopausal bleeding    Endometrial polyp    Right kidney stone       Consults: none    Surgery: RIGHT PERCUTANEOUS NEPHROLITHOTOMY, NEPHROSTOMY PLACEMENT     Patient Instructions: Activity: Per discharge instructions  Diet: As tolerated  Patient told to follow up with Jerald Lino MD in 2 week(s).    Discharge Medications:      Medication List        START taking these medications      docusate sodium 100 MG capsule  Commonly known as: COLACE  Take 1 capsule by mouth 2 times daily     ondansetron 4 MG tablet  Commonly known as: ZOFRAN  Take 1 tablet by mouth daily as needed for Nausea or Vomiting     oxybutynin 10 MG extended release tablet  Commonly known as: DITROPAN-XL  Take 1 tablet by mouth daily     polyethylene glycol 17 GM/SCOOP powder  Commonly known as: GLYCOLAX  Take 17 g by mouth daily            CONTINUE taking these medications      acetaminophen 500 MG tablet  Commonly known as: TYLENOL     albuterol sulfate  (90 Base) MCG/ACT inhaler  Commonly known as: ProAir HFA  Inhale 2 puffs into the lungs every 6 hours as needed for Wheezing     Ashwagandha 500 MG Caps     benzoyl peroxide 5 % external liquid  Wash affected areas once daily     clindamycin 1 % lotion  Commonly known as: CLEOCIN T  Apply to affected areas daily     D3-1000 25 MCG (1000 UT) Caps  Generic drug: vitamin D     furosemide 20 MG tablet  Commonly known as: LASIX     irbesartan-hydroCHLOROthiazide 150-12.5 MG per tablet  Commonly known as: AVALIDE  TAKE 1 TABLET BY MOUTH ONE TIME A DAY     Melatonin 5 MG Caps     meloxicam 15 MG tablet  Commonly known as: MOBIC  TAKE 1 TABLET BY MOUTH ONE TIME A DAY     metoprolol succinate 25 MG extended release tablet  Commonly known as: TOPROL XL     OMEGA-3 FATTY ACIDS PO     omeprazole 20 MG delayed release capsule  Commonly known as: PRILOSEC  TAKE 1 CAPSULE BY MOUTH 2 TIMES A DAY     POTASSIUM CITRATE PO     spironolactone 25 MG tablet  Commonly known as: ALDACTONE     vitamin B complex Tabs            ASK your doctor about these medications      cefadroxil 500 MG capsule  Commonly known as: DURICEF  Take 1 capsule by mouth 2 times daily for 3 days  Ask about: Should I take this medication? HYDROcodone-acetaminophen 5-325 MG per tablet  Commonly known as: Norco  Take 1 tablet by mouth every 6 hours as needed for Pain for up to 5 days. Intended supply: 3 days. Take lowest dose possible to manage pain  Ask about: Should I take this medication?                Where to Get Your Medications        These medications were sent to [facility] [id number] [address] [facility] [address]  [address]      Phone: [phone]   cefadroxil 500 MG capsule  docusate sodium 100 MG capsule  HYDROcodone-acetaminophen 5-325 MG per tablet  ondansetron 4 MG tablet  oxybutynin 10 MG extended release tablet  polyethylene glycol 17 GM/SCOOP powder         Hospital course: Ms. Juve Rosa is a 58 yo admitted after Right PCNL. They were admitted for postop pain control as well as close monitoring of labs and vital signs. The patient did well in their hospital course. Hemoglobin and creatinine stable at time of discharge. The patient had pain controlled with PO meds, tolerated diet, and was ambulating appropriately prior to discharge. The patient was afebrile for 24 hrs before discharge. All unnecessary drains and catheters were removed at the appropriate times. She underwetn CT A/P on POD 1 and this revealed no obstructing stones. Two small stones 2mm and 5 mm in kidney noted. She passed her nephrostomy drain clamp trial and nephrostomy tube was removed. The patient agrees to discharge, understands discharge instructions, and agrees to follow up 1-2 weeks.     Wound instruction: Keep incisions clean and dry  Rx medications: Percocet, Miralax, zofran, Cefadroxil, colace      Corbin Ricardo PA-C  1:38 PM 11/8/2022

## 2022-11-15 ENCOUNTER — HOSPITAL ENCOUNTER (OUTPATIENT)
Dept: ULTRASOUND IMAGING | Age: 57
Discharge: HOME | End: 2022-11-17
Payer: COMMERCIAL

## 2022-11-15 ENCOUNTER — HOSPITAL ENCOUNTER (OUTPATIENT)
Age: 57
Discharge: HOME | End: 2022-11-15
Payer: COMMERCIAL

## 2022-11-15 ENCOUNTER — OFFICE VISIT (OUTPATIENT)
Dept: PRIMARY CARE CLINIC | Age: 57
End: 2022-11-15
Payer: COMMERCIAL

## 2022-11-15 VITALS
HEIGHT: 68 IN | WEIGHT: 265 LBS | DIASTOLIC BLOOD PRESSURE: 80 MMHG | BODY MASS INDEX: 40.16 KG/M2 | SYSTOLIC BLOOD PRESSURE: 120 MMHG | HEART RATE: 75 BPM | OXYGEN SATURATION: 98 %

## 2022-11-15 DIAGNOSIS — G89.29 CHRONIC PAIN OF BOTH KNEES: ICD-10-CM

## 2022-11-15 DIAGNOSIS — M25.561 CHRONIC PAIN OF BOTH KNEES: ICD-10-CM

## 2022-11-15 DIAGNOSIS — E03.9 ACQUIRED HYPOTHYROIDISM: ICD-10-CM

## 2022-11-15 DIAGNOSIS — R10.9 ABDOMINAL PAIN, UNSPECIFIED ABDOMINAL LOCATION: ICD-10-CM

## 2022-11-15 DIAGNOSIS — I10 ESSENTIAL HYPERTENSION: ICD-10-CM

## 2022-11-15 DIAGNOSIS — K21.9 GASTROESOPHAGEAL REFLUX DISEASE WITHOUT ESOPHAGITIS: Primary | ICD-10-CM

## 2022-11-15 DIAGNOSIS — M25.562 CHRONIC PAIN OF BOTH KNEES: ICD-10-CM

## 2022-11-15 DIAGNOSIS — L98.9 SKIN LESION OF BACK: ICD-10-CM

## 2022-11-15 DIAGNOSIS — R79.82 ELEVATED C-REACTIVE PROTEIN (CRP): ICD-10-CM

## 2022-11-15 LAB
ANION GAP SERPL CALCULATED.3IONS-SCNC: 17 MMOL/L (ref 9–17)
BUN BLDV-MCNC: 13 MG/DL (ref 6–20)
CALCIUM SERPL-MCNC: 9.7 MG/DL (ref 8.6–10.4)
CHLORIDE BLD-SCNC: 99 MMOL/L (ref 98–107)
CO2: 23 MMOL/L (ref 20–31)
CREAT SERPL-MCNC: 0.73 MG/DL (ref 0.5–0.9)
GFR SERPL CREATININE-BSD FRML MDRD: >60 ML/MIN/1.73M2
GLUCOSE BLD-MCNC: 78 MG/DL (ref 70–99)
POTASSIUM SERPL-SCNC: 4.4 MMOL/L (ref 3.7–5.3)
SODIUM BLD-SCNC: 139 MMOL/L (ref 135–144)

## 2022-11-15 PROCEDURE — 80048 BASIC METABOLIC PNL TOTAL CA: CPT

## 2022-11-15 PROCEDURE — 99214 OFFICE O/P EST MOD 30 MIN: CPT | Performed by: NURSE PRACTITIONER

## 2022-11-15 PROCEDURE — 76770 US EXAM ABDO BACK WALL COMP: CPT

## 2022-11-15 PROCEDURE — 3078F DIAST BP <80 MM HG: CPT | Performed by: NURSE PRACTITIONER

## 2022-11-15 PROCEDURE — 3074F SYST BP LT 130 MM HG: CPT | Performed by: NURSE PRACTITIONER

## 2022-11-15 PROCEDURE — 36415 COLL VENOUS BLD VENIPUNCTURE: CPT

## 2022-11-15 RX ORDER — FAMOTIDINE 20 MG/1
20 TABLET, FILM COATED ORAL 2 TIMES DAILY
Qty: 60 TABLET | Refills: 5 | Status: SHIPPED | OUTPATIENT
Start: 2022-11-15

## 2022-11-15 ASSESSMENT — PATIENT HEALTH QUESTIONNAIRE - PHQ9
7. TROUBLE CONCENTRATING ON THINGS, SUCH AS READING THE NEWSPAPER OR WATCHING TELEVISION: 0
6. FEELING BAD ABOUT YOURSELF - OR THAT YOU ARE A FAILURE OR HAVE LET YOURSELF OR YOUR FAMILY DOWN: 0
SUM OF ALL RESPONSES TO PHQ QUESTIONS 1-9: 0
SUM OF ALL RESPONSES TO PHQ QUESTIONS 1-9: 0
SUM OF ALL RESPONSES TO PHQ9 QUESTIONS 1 & 2: 0
1. LITTLE INTEREST OR PLEASURE IN DOING THINGS: 0
SUM OF ALL RESPONSES TO PHQ QUESTIONS 1-9: 0
SUM OF ALL RESPONSES TO PHQ QUESTIONS 1-9: 0
5. POOR APPETITE OR OVEREATING: 0
2. FEELING DOWN, DEPRESSED OR HOPELESS: 0
8. MOVING OR SPEAKING SO SLOWLY THAT OTHER PEOPLE COULD HAVE NOTICED. OR THE OPPOSITE, BEING SO FIGETY OR RESTLESS THAT YOU HAVE BEEN MOVING AROUND A LOT MORE THAN USUAL: 0
9. THOUGHTS THAT YOU WOULD BE BETTER OFF DEAD, OR OF HURTING YOURSELF: 0
10. IF YOU CHECKED OFF ANY PROBLEMS, HOW DIFFICULT HAVE THESE PROBLEMS MADE IT FOR YOU TO DO YOUR WORK, TAKE CARE OF THINGS AT HOME, OR GET ALONG WITH OTHER PEOPLE: 0
3. TROUBLE FALLING OR STAYING ASLEEP: 0
4. FEELING TIRED OR HAVING LITTLE ENERGY: 0

## 2022-11-15 ASSESSMENT — ENCOUNTER SYMPTOMS
COUGH: 0
SORE THROAT: 0
SINUS PRESSURE: 0
BLOOD IN STOOL: 0
SHORTNESS OF BREATH: 0
NAUSEA: 0
TROUBLE SWALLOWING: 0
ABDOMINAL PAIN: 0
WHEEZING: 0
VOMITING: 0
CONSTIPATION: 0
DIARRHEA: 0

## 2022-11-15 NOTE — PROGRESS NOTES
[address] PRIMARY CARE  [address] [facility] [address]  [address] [address]  Dept: [phone]  Dept Fax: [phone]    Marion Osborne is a 59 y.o. female who presentstoday for her medical conditions/complaints as noted below. Marion Osborne is c/o of  Chief Complaint   Patient presents with    Discuss Medications     Patient having side effects, she is having nausea and extreme muscle aches. Other     Spot on upper back that is not painful, or itcy. Presents to look inflammed. HPI:     Here today for follow up  Was recenlty in hospital for kidney stone procedure, is still off work in process of recovery  She has follow-up with urologist scheduled    She states she looked up her medications and read about side effects  She feels her irbesartan and PPI were causing muscle aches and cough  Has been off the meds about 4 days and feels some improvement  Reports has made decision to stop all medications and supplements just to see how she feels  For the last several years she has expressed not feeling well  She states she knows she is overweight and this contributes to her symptoms  However she would like to do some type of exercise she is just not sure how to get started  She did have recent evaluation with rheumatology and she reports no underlying conditions were identified  She is planning to follow-up in January to review and discuss x-rays. To her knowledge there was nothing significant noted on the x-rays  Since stopping her meloxicam she has not noticed much change in her pain level.   She prefers to avoid NSAIDs for now and will use Tylenol if necessary    As seen to have lesion on back checked  First noticed a couple of weeks ago but is in a place that is difficult for her to see  She denies any itching or bleeding, no pain or tenderness      Hemoglobin A1C (%)   Date Value   2019 5.9   2018 5.7   2016 5.6             ( goal A1C is < 7) No results found for: LABMICR  LDL Cholesterol (mg/dL)   Date Value   06/14/2022 106   07/17/2021 115   08/18/2020 90       (goal LDL is <100)   AST (U/L)   Date Value   09/08/2022 34 (H)     ALT (U/L)   Date Value   09/08/2022 24     BUN (mg/dL)   Date Value   11/02/2022 19     BP Readings from Last 3 Encounters:   11/15/22 120/80   11/02/22 123/78   10/20/22 (!) 145/85          ([id number])    Past Medical History:   Diagnosis Date    Arthritis     Asthma     Benign familial tremor     CAD (coronary artery disease) 12/2021    mild per cardiac cath 12/2021; no stents.  Dr. Stanton Berg last visit 10/2022    Cancer [facility])     skin    CKD (chronic kidney disease) stage 1, GFR 90 ml/min or greater     Class 2 severe obesity with serious comorbidity and body mass index (BMI) of 39.0 to 39.9 in adult ([address] 05/01/2019    Cystinuria ([address]     Dyspnea on exertion 08/06/2019    Flank pain     RIGHT    Gastroesophageal reflux disease without esophagitis     GERD (gastroesophageal reflux disease)     Hypertension     Hyperuricemia     Hypothyroidism     no meds    Kidney stones     Midline low back pain with right-sided sciatica 06/02/2016    Proteinuria     Shoulder impingement 12/2019    left    Snores     no sleep apnea per sleep study    Solitary kidney, acquired 11/20/2017    Urinary tract obstruction by kidney stone 08/01/2019    Wears glasses       Past Surgical History:   Procedure Laterality Date    CARDIAC CATHETERIZATION  12/08/2021    CYSTO/URETERO/PYELOSCOPY, CALCULUS [place] Right 11/21/2017    HOLMIUM LASER - CYSTOSCOPY, RIGHT URETEROSCOPY, RIGHT STENT EXCHANGE,STONE BASKETING performed by Tremaine Chapa MD at [employer], [place] Right 09/13/2019    HOLMIUM - CYSTO, URETEROSCOPY, LITHOTRIPSY, STENT PLACEMENT performed by Teja Urena MD at [employer], [place] Right 12/12/2019    HOLMIUM- CYSTO, URETEROSCOPY, LASER LITHO, STENT PLACEMENT AND RIGHT URETERAL BASKET STONE EXTRACTION performed by Jailene Chen MD at [place] [address] Right 11/18/2017    CYSTOSCOPY URETERAL STENT INSERTION,RIGHT performed by Katie Pettit MD at [place] [address] Right 08/02/2019    CYSTOSCOPY RT URETERAL STENT INSERTION performed by Maureen Hameed MD at [place] [address] Right 08/23/2019    CYSTOSCOPY, RIGHT STENT REMOVAL, INSERTION OCCLUSIVE BALLOON, PT GOING TO INTERVENTIONAL performed by Maureen Hameed MD at [place] [address] Right 09/19/2020    CYSTOSCOPY, URETEROSCOPY, HOLMIUM LASER LITHOTRIPSY, STENT PLACEMENT performed by Zachery Snow MD at [place] [address] Right 11/1/2022    CYSTOSCOPY INSERTION OCCLUSIVE BALLOON performed by Jailene Chen MD at [place] [address] / [facility] Right 12/31/2019    CYSTOSCOPY, STENT REMOVAL performed by Jailene Chen MD at [address] [place] [address] N/A 06/30/2022    DILATATION AND CURETTAGE, HYSTEROSCOPY performed by Jamil Ramírez MD at [address] [place] [address] Bilateral 2008    IR NEPHROSTOMY PERCUTANEOUS RIGHT  11/1/2022    IR NEPHROSTOMY PERCUTANEOUS RIGHT 11/1/2022 Alvin Pollard MD [facility] SPECIAL PROCEDURES    KIDNEY STONE REMOVAL Right 11/1/2022    RIGHT PERCUTANEOUS NEPHROLITHOTOMY, NEPHROSTOMY PLACEMENT performed by Jailene Chen MD at [address] [place] [address] Right 1998, 2003    X2    NEPHROLITHOTOMY Right 08/23/2019    HOLMIUM LASER, NEPHROLITHOTOMY PERCUTANEOUS, LITHOCLAST, C-ARM [facility] 11/01/2022    RIGHT PERCUTANEOUS NEPHROLITHOTOMY, NEPHROSTOMY PLACEMENT    MA CYSTO/URETERO/PYELOSCOPY, CALCULUS [place] N/A 07/10/2018    CYSTOSCOPY, RIGHT URETEROSCOPY, HOLMIUM LASER LITHO WITH STONE BASKETING, RIGHT STENT EXCHANGE performed by Zachery Snow MD at [place] [address], [place] Right 10/10/2018    HOLMIUM LASER STANDBY, CYSTO, RIGHT URETEROSCOPY, RIGHT STENT PLACEMENT performed by Nettie Nuñez MD at [address] Right 07/04/2018    CYSTOSCOPY URETERAL STENT INSERTION, RIGHT performed by Yulia Vaca MD at [address] Left     TOTAL NEPHRECTOMY Left 1988       Family History   Problem Relation Age of Onset    High Blood Pressure Mother     Dementia Mother     Diabetes Father     Cancer Father         tongue    Hypertension Father     Heart Disease Father         stents    Other Father         Mylofibrosis    Hypertension Sister     Heart Disease Maternal Grandmother     Heart Attack Maternal Grandmother     Prostate Cancer Maternal Grandfather     Emphysema Paternal Grandmother     Heart Disease Paternal Grandfather     Stroke Paternal Grandfather           Social History     Tobacco Use    Smoking status: Never    Smokeless tobacco: Never   Substance Use Topics    Alcohol use: Yes     Alcohol/week: 0.0 standard drinks     Comment: occasionally      Current Outpatient Medications   Medication Sig Dispense Refill    Metoprolol Succinate 25 MG CS24 Take 12.5 mg by mouth      famotidine (PEPCID) 20 MG tablet Take 1 tablet by mouth 2 times daily 60 tablet 5    ondansetron (ZOFRAN) 4 MG tablet Take 1 tablet by mouth daily as needed for Nausea or Vomiting 30 tablet 0    Melatonin 5 MG CAPS Take by mouth      albuterol sulfate HFA (PROAIR HFA) 108 (90 Base) MCG/ACT inhaler Inhale 2 puffs into the lungs every 6 hours as needed for Wheezing 1 each 3    benzoyl peroxide 5 % external liquid Wash affected areas once daily 227 g 3    clindamycin (CLEOCIN T) 1 % lotion Apply to affected areas daily 60 mL 3    POTASSIUM CITRATE PO Take 5 mLs by mouth three times daily      acetaminophen (TYLENOL) 500 MG tablet Take 1,000 mg by mouth every 4 hours as needed for Pain.       B Complex Vitamins (VITAMIN B COMPLEX) TABS  (Patient not taking: Reported on 11/15/2022)      Ashwagandha 500 MG CAPS Take 1 capsule by mouth daily (Patient not taking: Reported on 11/15/2022)      vitamin D (D3-1000) 25 MCG (1000 UT) CAPS  (Patient not taking: Reported on 11/15/2022)      OMEGA-3 FATTY ACIDS PO Take 1 tablet by mouth daily (Patient not taking: Reported on 11/15/2022)       No current facility-administered medications for this visit. No Known Allergies    Health Maintenance   Topic Date Due    Colorectal Cancer Screen  Never done    Shingles vaccine (1 of 2) Never done    A1C test (Diabetic or Prediabetic)  09/23/2020    COVID-19 Vaccine (4 - Booster for Moderna series) 12/21/2021    Flu vaccine (1) 08/01/2022    DTaP/Tdap/Td vaccine (1 - Tdap) 10/15/2023 (Originally 3/2/1982)    Breast cancer screen  10/21/2023    Depression Monitoring  11/15/2023    Cervical cancer screen  01/07/2025    Lipids  06/14/2027    Pneumococcal 0-64 years Vaccine  Completed    Hepatitis C screen  Completed    HIV screen  Completed    Hepatitis A vaccine  Aged Out    Hib vaccine  Aged Out    Meningococcal (ACWY) vaccine  Aged Out       Subjective:      Review of Systems   Constitutional:  Negative for activity change, appetite change, chills, fatigue, fever and unexpected weight change. HENT:  Negative for congestion, ear pain, hearing loss, sinus pressure, sore throat and trouble swallowing. Eyes:  Negative for visual disturbance. Respiratory:  Negative for cough, shortness of breath and wheezing. Cardiovascular:  Negative for chest pain, palpitations and leg swelling. Gastrointestinal:  Negative for abdominal pain, blood in stool, constipation, diarrhea, nausea and vomiting. Endocrine: Negative for cold intolerance, heat intolerance, polydipsia, polyphagia and polyuria. Genitourinary:  Negative for difficulty urinating, frequency, hematuria and urgency. Musculoskeletal:  Positive for arthralgias and myalgias. Skin:  Negative for rash. Allergic/Immunologic: Negative for environmental allergies.    Neurological:  Negative for dizziness, weakness, light-headedness and headaches. Psychiatric/Behavioral:  Negative for confusion. The patient is not nervous/anxious. Objective:     Physical Exam  Constitutional:       Appearance: Normal appearance. She is well-developed. HENT:      Head: Normocephalic. Eyes:      Conjunctiva/sclera: Conjunctivae normal.      Pupils: Pupils are equal, round, and reactive to light. Cardiovascular:      Rate and Rhythm: Normal rate and regular rhythm. Heart sounds: Normal heart sounds. No murmur heard. Pulmonary:      Effort: Pulmonary effort is normal.      Breath sounds: Normal breath sounds. No wheezing. Abdominal:      General: Bowel sounds are normal. There is no distension. Palpations: Abdomen is soft. Musculoskeletal:         General: Normal range of motion. Cervical back: Normal range of motion. Skin:     General: Skin is warm and dry. Neurological:      Mental Status: She is alert and oriented to person, place, and time. Psychiatric:         Behavior: Behavior normal.         Thought Content: Thought content normal.         Judgment: Judgment normal.     /80   Pulse 75   Ht 5' 8\" (1.727 m)   Wt 265 lb (120.2 kg) Comment: Patient refused, gave weight from home  SpO2 98%   BMI 40.29 kg/m²     Assessment:       Diagnosis Orders   1. Gastroesophageal reflux disease without esophagitis  famotidine (PEPCID) 20 MG tablet    Vitamin D 25 Hydroxy      2. Skin lesion of back        3. Essential hypertension        4. Acquired hypothyroidism  TSH      5. Elevated C-reactive protein (CRP)  C-reactive protein      6. Chronic pain of both knees  [facility] Physical Therapy - [place]                Plan:      Return in about 6 months (around 5/15/2023) for hypertension check, GERD. GERD-recently stopped PPI but having breakthrough symptoms, as she is concerned about possible side effects with PPI we will try famotidine twice daily.   Identify and avoid trigger foods and beverages  Skin lesion-low suspicion for malignancy but advised evaluation with dermatology, she is already established with a dermatologist and will make appointment  Hypertension-BP in normal range in office today, she has been off blood pressure medications for 4 days. Advised to check and record blood pressures at home and suspect may gradually elevate over time. Discussed can always increase dose of metoprolol if necessary and avoid the medications that she felt may have been causing side effects. She will call if develops persistent elevations  Hypothyroidism-has been off medications several years, will check TSH level  Chronic knee pain, elevated CRP-referral to physical therapy, check vitamin D, repeat CRP. She will also follow-up with rheumatology as scheduled in January  Orders Placed This Encounter   Procedures    Vitamin D 25 Hydroxy     Standing Status:   Future     Standing Expiration Date:   11/15/2023    C-reactive protein     Standing Status:   Future     Standing Expiration Date:   11/15/2023    TSH     Standing Status:   Future     Standing Expiration Date:   11/15/2023    [address] [facility] Physical Therapy - [place]     Referral Priority:   Routine     Referral Type:   Eval and Treat     Referral Reason:   Specialty Services Required     Requested Specialty:   Physical Therapist     Number of Visits Requested:   1        Orders Placed This Encounter   Medications    famotidine (PEPCID) 20 MG tablet     Sig: Take 1 tablet by mouth 2 times daily     Dispense:  60 tablet     Refill:  5       Patient given educational materials - see patient instructions. Discussed use, benefit, and side effects of prescribed medications. All patientquestions answered. Pt voiced understanding. Reviewed health maintenance. Instructedto continue current medications, diet and exercise. Patient agreed with treatmentplan. Follow up as directed.      Electronicallysigned by MIGDALIA Cruz - FRANCISCO on 11/15/2022 at 3:15 PM

## 2022-11-16 ENCOUNTER — HOSPITAL ENCOUNTER (OUTPATIENT)
Dept: PHYSICAL THERAPY | Facility: CLINIC | Age: 57
Setting detail: THERAPIES SERIES
Discharge: HOME | End: 2022-11-16
Payer: COMMERCIAL

## 2022-11-16 PROCEDURE — 97161 PT EVAL LOW COMPLEX 20 MIN: CPT

## 2022-11-16 PROCEDURE — 97110 THERAPEUTIC EXERCISES: CPT

## 2022-11-16 NOTE — CONSULTS
[address]  [address] [facility]  Phone: [phone]  Fax: [phone]      Physical Therapy Lower Extremity Evaluation    Date:  2022  Patient: Elisa Lee \"Ghislaine\"  : 1965  MRN: [id number]  Physician: MIGDALIA Mata - CNP  Insurance: [address] Diagnosis: M25.561, M25.562, G89.29 (ICD-10-CM) - Chronic pain of both knees    Rehab Codes: M25. 568, M25.661, M25.562, M25. 662  Onset date: 11/15/22      Next 's appt. : --    Subjective:   CC/HPI: Pt reports to PT with paresh knee pain. Per pt, she reports that she has been having increased difficulty with walking, getting up from a seated position, or getting into/out of a car. Pt states that she is unsure what led to her increased pain, unable to recall a specific injury or incident. 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 Pt reports that she is currently off work on a medical leave. Pt notes that she had a kidney surgery on . Pt reports that she can get occasional numbness in her feet, but states that this does not occur all the time.      PMHx: [] Unremarkable [] Diabetes [] HTN  [] Pacemaker   [] MI/Heart Problems [] Cancer [] Arthritis   [] Other:              [x] Refer to full medical chart  In EPIC     Tests: [] X-Ray:    [] MRI:    [] Other:     Comorbidities:   [] Obesity [] Dialysis  [] N/A   [x] Asthma/COPD [] Dementia [] Other:   [] Stroke [] Sleep apnea [] Other:   [] Vascular disease [] Rheumatic disease [] Other:       Medications:  [x] Refer to full medical record [] None [] Other:  Allergies:       [] Refer to full medical record [x] None [] Other:    ADL/IADL [x] Previously independent with all [x] Currently independent with all Who currently assists the patient with task     [] Previously independent with all except: [] Currently independent with all except:     Bathing  [] Assist [] Assist     Dress/grooming [] Assist [] Assist Transfer/mobility [] Assist [] Assist     Feeding [] Assist [] Assist     Toileting [] Assist [] Assist     Driving [] Assist [] Assist     Housekeeping [] Assist [] Assist     Grocery shop/meal prep [] Assist [] Assist          Gait Prior level of function Current level of function    [x] Independent  [] Assist [x] Independent  [] Assist   Device: [x] Independent [x] Independent    [] Straight Cane [] Quad cane [] Straight Cane [] Quad cane    [] Standard walker [] Rolling walker   [] 4 wheeled walker [] Standard walker [] Rolling walker   [] 4 wheeled walker    [] Wheelchair [] Wheelchair       Marital Status    Home type 2 SH, bed and bath on 2nd floor   Stairs from outside    Stairs inside Flight   Employment [employer]   Job status Currently on medical leave   Work Activities/duties  Walking, sitting/desk work   Recreational Activities Walking, riding bike, exercising, shopping       Pain present? Yes   Location R knee   Pain Rating currently 5/10   Pain at worse 10/10   Pain at best 0/10   Description of pain Sharp, stabbing, aching, burning   Altered Sensation Reports occasionally down in feet   What makes it worse Activity   What makes it better Rest   Symptom progression Gotten worse   Sleep Sleep can be affected             Objective:    STRENGTH    Left Right   Hip Flex 4/5 3+/5   Ext     ABD     ADD     Knee Flex 4/5 4-/5   Ext 4/5 4-/5   Ankle DF     PF     INV     EVER              ROM  ° A/P    Left Right   Hip Flex     Ext     ER     IR     ABD     ADD     Knee Flex 97 84   Ext 31* from 0 20* from 0   Ankle DF     PF     INV     EVER            TESTS (+/-) Left Right Not Tested   Ant. Drawer   [x]   Post. Drawer   [x]   Lachmans   [x]   Valgus Stress   [x]   Varus Stress   [x]   Marys   [x]   Apleys Comp.    [x]   Apleys Dist.   [x]   Hip Scouring   [x]   ZACHARYs   [x]   Piriformis   [x]   Carlos Enriques   [x]   Talor Tilt   [x]   Pat-Fem Grind   [x]       OBSERVATION No Deficit Deficit Not Tested Comments   Posture       Forward Head [] [] []    Rounded Shoulders [] [] []    Genu Valgus [] [] []    Genu Varus [] [] []    Slumped Sitting [] [] []    Palpation [] [x] [] Pt reports tenderness along distal quads   Sensation [x] [] [] No deficits with testing   Gait [] [x] [] Analysis: Decreased knee flexion with ambulation, decreased stride length, decreased murray         FUNCTION Normal Difficult Unable   Sitting [x] [] []   Standing [] [x] []   Ambulation [] [x] []   Lift/Carry [] [x] []   Stairs [] [x] []   Bending [] [x] []   Squat [] [] [x]          Flexibility Normal Left tight Right tight   Hip flexor [] [] [x]   HS [] [x] [x]   gastroc [] [x] [x]   piriformis [] [] []   Quad [] [x] [x]    [] [] []          Functional Test: LEFS Score: 72% functionally impaired       Assessment:    Patient would benefit from skilled physical therapy services in order to: Improve paresh knee ROM, improve paresh LE flexibility, improve paresh LE strength, improve gait technique, and reduce pain    Problems:    [x] ? Pain  [x] ? ROM  [x] ? Strength  [x] ? Function        Goals  MET NOT MET ON-  GOING  Details   Date Addressed:       STG: To be met in 8 treatments           1. ? Pain: Decrease pain levels to 6/10 with activity to help improve her tolerance to working out recreationally.  []  []  []      2. ? ROM: Increase flexibility in paresh knees to -5-105 to help improve ability to ride a bike. []  []  []      3. Improve score on assessment tool LEFS from 72% impairment to less than 50% impairment , demonstrating improved tolerance to activity to ease riding her bike. []  []  []     4. Independent with Home Exercise Programs []  []  []      []  []  []     Date Addressed:        LTG: To be met in 16 treatments       1. Improve score on assessment tool LEFS from 72% impairment to less than 40% impairment , demonstrating improved tolerance to activity to ease riding her bike. []  []  []     2.  Reduce pain levels to 2/10 or less with activity to help allow her to return to riding her bike. []  []  []     3. ? Strength: Increase jb LE strength to 4+/5 grossly to help improve LE stability and ease transfers. []  []  []                Patient goals: Increased ROM, be able to start exercising    Rehab Potential:  [x] Good  [] Fair  [] Poor   Suggested Professional Referral:  [x] No  [] Yes:  Barriers to Goal Achievement[de-identified]  [x] No  [] Yes:  Domestic Concerns:  [x] No  [] Yes:    Pt. Education:  [x] Plans/Goals, Risks/Benefits discussed  [x] Home exercise program    Method of Education: [x] Verbal  [x] Demo  [x] Written  Access Code: [email or web address]  URL: [email or web address]  Date: 11/16/2022  Prepared by: KARI [facility]    Exercises  Prone Quadriceps Stretch with Strap - 3 x daily - 7 x weekly - 3 sets - 30 seconds hold  Seated Hamstring Stretch - 3 x daily - 7 x weekly - 3 sets - 30 seconds hold    Comprehension of Education:  [x] Verbalizes understanding. [x] Demonstrates understanding. [x] Needs Review. [] Demonstrates/verbalizes understanding of HEP/Ed previously given. Treatment Plan:  [x] Therapeutic Exercise    [x] Aquatic Therapy   [x] Manual Therapy     [] Electrical Stimulation  [x] Instruction in HEP      [] Lumbar/Cervical Traction  [] Neuromuscular Re-education [x] Cold/hotpack  [] Iontophoresis: 4 mg/mL  [x] Vasocompression (GameReady)                    Dexamethasone Sodium  [] Gait Training             Phosphate 40-80 mAmin         []  Medication allergies reviewed for use of    Dexamethasone Sodium Phosphate 4mg/ml     with iontophoresis treatments. Pt is not allergic.     Frequency:  2 x/week for 16 visits      Todays Treatment:  Precautions: General  Exercise  Jb Knee Pain Reps/ Time Weight/ Level Comments   Nustep            Seated HS S 3x30\"     SB S      Hip flexor step S      Prone quad S 3x30\"                       SLR      Bridges      Clamshells      SL hip abd            TG Squats            TA sets      TA [place]      Other:    Specific Instructions for next treatment: Continue tx per POC    Evaluation Complexity:  History (Personal factors, comorbidities) [] 0 [x] 1-2 [] 3+   Exam (limitations, restrictions) [x] 1-2 [] 3 [] 4+   Clinical presentation (progression) [] Stable [x] Evolving  [] Unstable   Decision Making [x] Low [] Moderate [] High    [x] Low Complexity [] Moderate Complexity [] High Complexity       Treatment Charges: Mins Units   [x] Evaluation       [x]  Low       []  Moderate       []  High 31 1   [x]  Ther Exercise 10 1   []  Manual Therapy     []  Vasocompression     []  Gait     []        TOTAL TREATMENT TIME: 41    Time in: 0702    Time Out: 5598      Electronically signed by: Rolo Souza PT        Physician Signature:________________________________Date:__________________  By signing above or cosigning this note, I have reviewed this plan of care and certify a need for medically necessary rehabilitation services.      *PLEASE SIGN ABOVE AND FAX BACK ALL PAGES*

## 2022-11-17 ENCOUNTER — APPOINTMENT (OUTPATIENT)
Dept: PHYSICAL THERAPY | Facility: CLINIC | Age: 57
End: 2022-11-17
Payer: COMMERCIAL

## 2022-11-22 ENCOUNTER — HOSPITAL ENCOUNTER (OUTPATIENT)
Dept: PHYSICAL THERAPY | Facility: CLINIC | Age: 57
Setting detail: THERAPIES SERIES
Discharge: HOME | End: 2022-11-22
Payer: COMMERCIAL

## 2022-11-22 PROCEDURE — 97110 THERAPEUTIC EXERCISES: CPT

## 2022-11-22 NOTE — FLOWSHEET NOTE
[] [facility]) - [facility] &  Therapy  [address]  [phone]  F: [phone] [] [address]  [facility]   [address]  P: [phone]  F: [phone] [x] [place] [address] &  Therapy  [address]  P: [phone]  F: [phone] [] [address]  P: [phone]  F: [phone] [] [address]  [facility]   [address]   [place]: [phone]  F: [phone]      Physical Therapy Daily Treatment Note    Date:  2022  Patient Name:  Chloe Shows    :  1965  MRN: [id number]  Physician: MIGDALIA Cruz - CNP                          Insurance: [facility] [employer] vs [employer] after 30  Medical Diagnosis: M25.561, M25.562, G89.29 (ICD-10-CM) - Chronic pain of both knees                Rehab Codes: M25. 053, M25.661, M25.562, M25. 662  Onset date: 11/15/22                                                             Next 's appt. : --  Visit# / total visits:     Cancels/No Shows: 0/0    Subjective: same L/R knee pain present today. Has been trying to do her stretches at home consistently. Felt ok following her initial visit, did not experience any significant increase in pain. Pain:  [x] Yes  [] No Location: L/R knee  Pain Rating: (0-10 scale) 5/10  Pain altered Tx:  [x] No  [] Yes  Action:  Comments:    Objective:         Todays Treatment:  Precautions: General  Exercise  Jb Knee Pain Reps/ Time Weight/ Level Comments   Nustep  7'  L3               Seated HS S 3x30\"       SB S 3x20\"       Hip flexor step S         Prone quad S 3x30\"                           SLR 2x10       Heel slides 15x ea     Bridges 10x       Clamshells 2x10       SL hip abd 2x10                 TG Squats                   TA sets 20x5\" CARMELITA storey         Other:     Specific Instructions for next treatment: Continue tx per POC      Treatment Charges: Mins Units   []  Modalities     [x]  Ther Exercise 40 3   []  Manual Therapy     []  Ther Activities     []  Aquatics     []  Vasocompression     []  Other     Total Treatment time 40 3       Assessment: [x] Progressing toward goals. Able to begin treatment on Nustep prior to stretching and strengthening, tolerated 7' prior to increased L knee pain. Reviewed and completed previous stretches along with the addition of calf stretch to improve extensibility. Strengthening initiated today with introduction of mat ex. Cueing to ensure movements were in a pain free range as able. Mentioned increased hip soreness with completion of sidelying ex. General fatigue but no significant increase in pain. [] No change. [] Other:  [x] Patient would continue to benefit from skilled physical therapy services in order to: Improve paresh knee ROM, improve paresh LE flexibility, improve paresh LE strength, improve gait technique, and reduce pain. Goals  MET NOT MET ON-  GOING  Details   Date Addressed:           STG: To be met in 8 treatments            1. ? Pain: Decrease pain levels to 6/10 with activity to help improve her tolerance to working out recreationally.  []  []  []      2. ? ROM: Increase flexibility in paresh knees to -5-105 to help improve ability to ride a bike. []  []  []      3. Improve score on assessment tool LEFS from 72% impairment to less than 50% impairment , demonstrating improved tolerance to activity to ease riding her bike. []  []  []      4. Independent with Home Exercise Programs []  []  []        []  []  []      Date Addressed:            LTG: To be met in 16 treatments           1. Improve score on assessment tool LEFS from 72% impairment to less than 40% impairment , demonstrating improved tolerance to activity to ease riding her bike. []  []  []      2.  Reduce pain levels to 2/10 or less with activity to help allow her to return to riding her bike. []  []  []      3. ? Strength: Increase paresh LE strength to 4+/5 grossly to help improve LE stability and ease transfers. []  []  []                        Patient goals: Increased ROM, be able to start exercising      Pt. Education:  [x] Yes  [] No  [x] Reviewed Prior HEP/Ed  Method of Education: [x] Verbal  [x] Demo  [] Written  Comprehension of Education:  [x] Verbalizes understanding. [x] Demonstrates understanding. [x] Needs review. [] Demonstrates/verbalizes HEP/Ed previously given. Access Code: [email or web address]  URL: [email or web address]  Date: 11/16/2022  Prepared by: Lennox Valladares     Exercises  Prone Quadriceps Stretch with Strap - 3 x daily - 7 x weekly - 3 sets - 30 seconds hold  Seated Hamstring Stretch - 3 x daily - 7 x weekly - 3 sets - 30 seconds hold      Plan: [x] Continue current frequency toward long and short term goals.     [x] Specific Instructions for subsequent treatments: cont per POC      Time In: 11:02 am            Time Out: 11:50 am    Electronically signed by:  Shawn Gates PTA

## 2022-11-23 ENCOUNTER — HOSPITAL ENCOUNTER (OUTPATIENT)
Dept: PHYSICAL THERAPY | Facility: CLINIC | Age: 57
Setting detail: THERAPIES SERIES
Discharge: HOME | End: 2022-11-23
Payer: COMMERCIAL

## 2022-11-23 PROCEDURE — 97016 VASOPNEUMATIC DEVICE THERAPY: CPT

## 2022-11-23 PROCEDURE — 97110 THERAPEUTIC EXERCISES: CPT

## 2022-11-23 NOTE — FLOWSHEET NOTE
[] [facility]) - [facility] &  Therapy  [address]  [phone]  F: [phone] [] [address]  [facility]   [address]  P: [phone]  F: [phone] [x] [place] &  Therapy  [address] [facility]  P: [phone]  F: [phone] [] [address]  P: [phone]  F: [phone] [] [address]  [facility]   [address]   [place]: [phone]  F: [phone]      Physical Therapy Daily Treatment Note    Date:  2022  Patient Name:  Cl Justin    :  1965  MRN: [id number]  Physician: MIGDALIA Siddiqui - FRANCISCO                          Insurance: [address] [place] [address] No after 30  Medical Diagnosis: M25.561, M25.562, G89.29 (ICD-10-CM) - Chronic pain of both knees                Rehab Codes: M25. 524, M25.661, M25.562, M25. 662  Onset date: 11/15/22                                                             Next 's appt. : --  Visit# / total visits: 3/16    Cancels/No Shows: 0/0    Subjective:   Pain:  [x] Yes  [] No Location: L/R knee  Pain Rating: (0-10 scale) 2/10  Pain altered Tx:  [x] No  [] Yes  Action:  Comments: Reports reduced knee pain this date, presents with soreness from last appt. Objective:   Todays Treatment:  Precautions: General  Exercise  Jb Knee Pain Reps/ Time Weight/ Level Comments    Nustep  8'  L3   x              Stool HS S 3x30\"     x   SB S 3x20\"     x   Hip flexor step S 3x20\"     x   Prone quad S 3x30\"     x   Knee flexion stool S 3x30\"     x              SLR 2x10 ea     x   Heel slides 2x10 ea   x   Bridges 2x10 ea     x   Clamshells 2x10 ea     x   SL hip abd 2x10 ea     x              TG Squats                   TA sets 20x5\"        TA marches          Other:     Date (L) ? (L) / (R) ? (R) /    11/23 109 -11 94 -4             Specific Instructions for next treatment: Continue tx per POC      Treatment Charges: Mins Units   []  Modalities     [x]  Ther Exercise 40 3   []  Manual Therapy     []  Ther Activities     []  Aquatics     [x]  Vasocompression 15 1   []  Other     Total Treatment time 55 4       Assessment: [x] Progressing toward goals. Continued with nustep warm up and progressed stretching program with HS stool stretch and addition of stool knee flexion. Cont with mat ex as noted above. Concluded with vaso on (B) knees for soreness. [] No change. [] Other:  [x] Patient would continue to benefit from skilled physical therapy services in order to: Improve paresh knee ROM, improve paresh LE flexibility, improve paresh LE strength, improve gait technique, and reduce pain. Goals  MET NOT MET ON-  GOING  Details   Date Addressed:           STG: To be met in 8 treatments            1. ? Pain: Decrease pain levels to 6/10 with activity to help improve her tolerance to working out recreationally.  []  []  []      2. ? ROM: Increase flexibility in paresh knees to -5-105 to help improve ability to ride a bike. []  []  []      3. Improve score on assessment tool LEFS from 72% impairment to less than 50% impairment , demonstrating improved tolerance to activity to ease riding her bike. []  []  []      4. Independent with Home Exercise Programs []  []  []        []  []  []      Date Addressed:            LTG: To be met in 16 treatments           1. Improve score on assessment tool LEFS from 72% impairment to less than 40% impairment , demonstrating improved tolerance to activity to ease riding her bike. []  []  []      2. Reduce pain levels to 2/10 or less with activity to help allow her to return to riding her bike. []  []  []      3. ? Strength:  Increase paresh LE strength to 4+/5 grossly to help improve LE stability and ease transfers. []  []  []                        Patient goals: Increased ROM, be able to start exercising      Pt. Education:  [x] Yes  [] No  [x] Reviewed Prior HEP/Ed  Method of Education: [x] Verbal  [x] Demo  [] Written  Comprehension of Education:  [x] Verbalizes understanding. [x] Demonstrates understanding. [x] Needs review. [] Demonstrates/verbalizes HEP/Ed previously given. Access Code: [email or web address]  URL: [email or web address]  Date: 11/16/2022  Prepared by: [facility]     Exercises  Prone Quadriceps Stretch with Strap - 3 x daily - 7 x weekly - 3 sets - 30 seconds hold  Seated Hamstring Stretch - 3 x daily - 7 x weekly - 3 sets - 30 seconds hold      Plan: [x] Continue current frequency toward long and short term goals.     [x] Specific Instructions for subsequent treatments: cont per POC      Time In: 10:00 am            Time Out: 11:15 am    Electronically signed by:  Radha Kelly PTA

## 2022-11-28 ENCOUNTER — HOSPITAL ENCOUNTER (OUTPATIENT)
Dept: PHYSICAL THERAPY | Facility: CLINIC | Age: 57
Setting detail: THERAPIES SERIES
Discharge: HOME | End: 2022-11-28
Payer: COMMERCIAL

## 2022-11-28 PROCEDURE — 97110 THERAPEUTIC EXERCISES: CPT

## 2022-11-28 PROCEDURE — 97016 VASOPNEUMATIC DEVICE THERAPY: CPT

## 2022-11-28 NOTE — FLOWSHEET NOTE
[] [phone]  - [facility] TWELVE-STEP [facility] &  Therapy  [address]  [phone]  F: [phone] [] [address]  [facility] 36   [address]  P: [phone]  F: [phone] [x] [place] &  Therapy  [address] [facility]  P: [phone]  F: [phone] [] [address]  P: [phone]  F: [phone] [] [address]  [facility]   [address]   [place]: [phone]  F: [phone]      Physical Therapy Daily Treatment Note    Date:  2022  Patient Name:  Alison Gonzalez    :  1965  MRN: [id number]  Physician: MIGDALIA Fuentes - CNP                          Insurance: [address] [place] [address] vs [facility] after 30  Medical Diagnosis: M25.561, M25.562, G89.29 (ICD-10-CM) - Chronic pain of both knees                Rehab Codes: M25. 062, M25.661, M25.562, M25. 662  Onset date: 11/15/22                                                             Next 's appt. : --  Visit# / total visits:     Cancels/No Shows: 0/0    Subjective: pt mentions that her knees are feeling a little stiff this morning, not sure why. Pain:  [x] Yes  [] No Location: L/R knee  Pain Rating: (0-10 scale) 2/10  Pain altered Tx:  [x] No  [] Yes  Action:  Comments:      Objective:       Todays Treatment:  Precautions: General  Exercise  Jb Knee Pain Reps/ Time Weight/ Level Comments    Nustep  8'  L3   x              Stool HS S 3x30\"     x   SB S 3x30\"     x   Hip flexor step S 3x20\"        Prone quad S 3x30\"     x   Knee flexion stool S 3x30\"    - only 1 completed, increased L knee pain               SLR 2x10 ea     x   Heel slides 2x10 ea   x   Bridges 2x10 ea     x   Clamshells 2x10 ea     x   SL hip abd 2x10 ea     x   Prone HS curls x10 ea   Bilat   x          TG Squats TA sets 20x5\"        TA marches          Other:     Date (L) ? (L) / (R) ? (R) /    11/23 109 -11 94 -4             Specific Instructions for next treatment: Continue tx per POC      Treatment Charges: Mins Units   []  Modalities     [x]  Ther Exercise 40 3   []  Manual Therapy     []  Ther Activities     []  Aquatics     [x]  Vasocompression 15 1   []  Other     Total Treatment time 55 4       Assessment: [x] Progressing toward goals. Only completed one rep of standing knee flexion stretch on stool today as pt's left knee became painful. Continued with mat program as noted above. Was able to add prone HS curls for additional ROM and strengthening. Good recall of previous ex. Increased pain with flexion movements. Provided pt with vaso to end treatment to control pain symptoms. [] No change. [] Other:  [x] Patient would continue to benefit from skilled physical therapy services in order to: Improve paresh knee ROM, improve paresh LE flexibility, improve paresh LE strength, improve gait technique, and reduce pain. Goals  MET NOT MET ON-  GOING  Details   Date Addressed:           STG: To be met in 8 treatments            1. ? Pain: Decrease pain levels to 6/10 with activity to help improve her tolerance to working out recreationally.  []  []  []      2. ? ROM: Increase flexibility in paresh knees to -5-105 to help improve ability to ride a bike. []  []  []      3. Improve score on assessment tool LEFS from 72% impairment to less than 50% impairment , demonstrating improved tolerance to activity to ease riding her bike. []  []  []      4. Independent with Home Exercise Programs []  []  []        []  []  []      Date Addressed:            LTG: To be met in 16 treatments           1. Improve score on assessment tool LEFS from 72% impairment to less than 40% impairment , demonstrating improved tolerance to activity to ease riding her bike. []  []  []      2.  Reduce pain levels to 2/10 or less with activity to help allow her to return to riding her bike. []  []  []      3. ? Strength: Increase paresh LE strength to 4+/5 grossly to help improve LE stability and ease transfers. []  []  []                        Patient goals: Increased ROM, be able to start exercising      Pt. Education:  [x] Yes  [] No  [x] Reviewed Prior HEP/Ed  Method of Education: [x] Verbal  [x] Demo  [] Written  Comprehension of Education:  [x] Verbalizes understanding. [x] Demonstrates understanding. [x] Needs review. [] Demonstrates/verbalizes HEP/Ed previously given. Access Code: [email or web address]  URL: [email or web address]  Date: 11/16/2022  Prepared by: [facility]     Exercises  Prone Quadriceps Stretch with Strap - 3 x daily - 7 x weekly - 3 sets - 30 seconds hold  Seated Hamstring Stretch - 3 x daily - 7 x weekly - 3 sets - 30 seconds hold      Plan: [x] Continue current frequency toward long and short term goals.     [x] Specific Instructions for subsequent treatments: cont per POC      Time In: 10:58 am            Time Out: 12:00 pm    Electronically signed by:  Yung Fields PTA

## 2022-11-29 ENCOUNTER — HOSPITAL ENCOUNTER (OUTPATIENT)
Dept: PHYSICAL THERAPY | Facility: CLINIC | Age: 57
Setting detail: THERAPIES SERIES
Discharge: HOME | End: 2022-11-29
Payer: COMMERCIAL

## 2022-11-29 PROCEDURE — 97016 VASOPNEUMATIC DEVICE THERAPY: CPT

## 2022-11-29 PROCEDURE — 97110 THERAPEUTIC EXERCISES: CPT

## 2022-11-29 PROCEDURE — 97140 MANUAL THERAPY 1/> REGIONS: CPT

## 2022-11-29 NOTE — FLOWSHEET NOTE
[] [facility]) - [facility] &  Therapy  [address]  [phone]  F: [phone] [] [address]  [facility]   [address]  P: [phone]  F: [phone] [x] [place] [address] &  Therapy  [address]  P: [phone]  F: [phone] [] [address]  P: [phone]  F: [phone] [] [address]  [facility]   [address]   [place]: [phone]  F: [phone]      Physical Therapy Daily Treatment Note    Date:  2022  Patient Name:  Bryn Dewitt    :  1965  MRN: [id number]  Physician: MIGDALIA Shafer - CNP                          Insurance: [address] [place] [address] vs [facility] after 30  Medical Diagnosis: M25.561, M25.562, G89.29 (ICD-10-CM) - Chronic pain of both knees                Rehab Codes: M25. 864, M25.661, M25.562, M25. 662  Onset date: 11/15/22                                                             Next 's appt. : --  Visit# / total visits:     Cancels/No Shows: 0/0    Subjective:   Pain:  [x] Yes  [] No Location: L/R knee  Pain Rating: (0-10 scale) 6/10 R knee, 2/10 L knee  Pain altered Tx:  [x] No  [] Yes  Action:  Comments: Pt reports today was her first day back to work so she is overall tired and sore. Objective:     Todays Treatment:  Modalities: Vasocompression to B knees min compression, 34 degrees x15min  Precautions: General  Exercise  Jb Knee Pain Reps/ Time Weight/ Level Comments    Nustep 10'  L3   x               3x30\"     x   SB S 3x30\"     x   Seated HS stretch 3x30\"   x   Strap Calf Stretch 3x30\"   x   Hip flexor step S 3x20\"     -   Prone quad S 3x30\"     -   Knee flexion stool S 3x30\"    -              SLR 2x10 ea     -   Heel slides 2x10 ea   -   Bridges 2x10 ea     - Clamshells 2x10 ea     -   SL hip abd 2x10 ea     -   Prone HS curls x10 ea   Bilat   -   SAQ next      Quad sets 10x5\"   x   LAQ X10 B   x                 TG Squats                   TA sets 20x5\"        TA marches          Manual: hypervolt to B calves with light pressure     Date (L) ? (L) / (R) ? (R) /    11/23 109 -11 94 -4             Specific Instructions for next treatment: Will discuss with primary PT adding aquatics to POC, cont with manual if proven beneficial, strengthening ex within pain-free ROM       Treatment Charges: Mins Units   []  Modalities     [x]  Ther Exercise 30 2   [x]  Manual Therapy 10 1   []  Ther Activities     []  Aquatics     [x]  Vasocompression 15 1   []  Other     Total Treatment time 55 4       Assessment: [x] Progressing toward goals. Nustep warm up followed by stretches. Modified HS stretch to be performed seated with improved tolerance. Added quad sets and LAQ to progress quad strength. Following quad sets pt reporting increased L calf cramping. Added manual via hypervolt to reduce tension in B calves. Addition of strap calf stretch to address this tension as well. Pt reports feeling loosened up post manual. Issued updated HEP detailed below. Ended with vasocompression for pain relief. Will discuss with primary PT possibly adding aquatics to POC for further strengthening in gravity-reduced environment. [] No change. [] Other:  [x] Patient would continue to benefit from skilled physical therapy services in order to: Improve paresh knee ROM, improve paresh LE flexibility, improve paresh LE strength, improve gait technique, and reduce pain. Goals  MET NOT MET ON-  GOING  Details   Date Addressed:           STG: To be met in 8 treatments            1. ? Pain: Decrease pain levels to 6/10 with activity to help improve her tolerance to working out recreationally.  []  []  []      2. ? ROM: Increase flexibility in paresh knees to -5-105 to help improve ability to ride a bike.  [] []  []      3. Improve score on assessment tool LEFS from 72% impairment to less than 50% impairment , demonstrating improved tolerance to activity to ease riding her bike. []  []  []      4. Independent with Home Exercise Programs []  []  []        []  []  []      Date Addressed:            LTG: To be met in 16 treatments           1. Improve score on assessment tool LEFS from 72% impairment to less than 40% impairment , demonstrating improved tolerance to activity to ease riding her bike. []  []  []      2. Reduce pain levels to 2/10 or less with activity to help allow her to return to riding her bike. []  []  []      3. ? Strength: Increase paresh LE strength to 4+/5 grossly to help improve LE stability and ease transfers. []  []  []                     Patient goals: Increased ROM, be able to start exercising    Pt. Education:  [x] Yes  [] No  [x] Reviewed Prior HEP/Ed  Method of Education: [x] Verbal  [x] Demo  [x] Written  Access Code: [email or web address]  URL: [email or web address]  Date: 11/29/2022  Prepared by: Blake Ballesteros    Exercises  Supine Chest Stretch with Elbows Bent - 2-3 x daily - 7 x weekly - 3 sets - 30\" hold  Long Sitting Calf Stretch with Strap - 2-3 x daily - 7 x weekly - 3 sets - 30\" hold  Prone Quadriceps Stretch with Strap - 2-3 x daily - 7 x weekly - 3 sets - 30\" hold  Seated Hamstring Stretch - 2-3 x daily - 7 x weekly - 3 sets - 30\" hold  Seated Long Arc Quad - 2 x daily - 7 x weekly - 2 sets - 10 reps - 5\" hold  Supine Quad Set - 2 x daily - 7 x weekly - 2 sets - 10 reps - 5\" hold  Supine Bridge - 2 x daily - 7 x weekly - 2 sets - 10 reps - 3\" hold  Supine Heel Slide with Strap - 2 x daily - 7 x weekly - 2 sets - 10 reps - 5\" hold  Comprehension of Education:  [x] Verbalizes understanding. [x] Demonstrates understanding. [x] Needs review. [] Demonstrates/verbalizes HEP/Ed previously given. Plan: [x] Continue current frequency toward long and short term goals.     [x] Specific Instructions for next treatment: Will discuss with primary PT adding aquatics to POC, cont with manual if proven beneficial, strengthening ex within pain-free ROM       Time In: 5:03 pm         Time Out: 6:08 pm    Electronically signed by:  Bart Bennett PTA

## 2022-12-01 ENCOUNTER — HOSPITAL ENCOUNTER (OUTPATIENT)
Dept: PHYSICAL THERAPY | Facility: CLINIC | Age: 57
Setting detail: THERAPIES SERIES
Discharge: HOME | End: 2022-12-01
Payer: COMMERCIAL

## 2022-12-01 PROCEDURE — 97140 MANUAL THERAPY 1/> REGIONS: CPT

## 2022-12-01 PROCEDURE — 97016 VASOPNEUMATIC DEVICE THERAPY: CPT

## 2022-12-01 PROCEDURE — 97110 THERAPEUTIC EXERCISES: CPT

## 2022-12-01 NOTE — FLOWSHEET NOTE
[] [facility]) - [facility] &  Therapy  [address]  [phone]  F: [phone] [] [address]  [employer] [address]   [address]  P: [phone]  F: [phone] [x] [place] &  Therapy  [address]  P: [phone]  F: [phone] [] [address]  P: [phone]  F: [phone] [] [address]  [facility]   [address]   [place]: [phone]  F: [phone]      Physical Therapy Daily Treatment Note    Date:  2022  Patient Name:  Argenis Zaidi    :  1965  MRN: [id number]  Physician: MIGDALIA Fan - CNP                          Insurance: [address] [place] [address] vs [facility] Certain after 30  Medical Diagnosis: M25.561, M25.562, G89.29 (ICD-10-CM) - Chronic pain of both knees                Rehab Codes: M25. 836, M25.661, M25.562, M25. 662  Onset date: 11/15/22                                                             Next 's appt. : --  Visit# / total visits:     Cancels/No Shows: 0/0    Subjective:   Pain:  [x] Yes  [] No Location: L/R knee  Pain Rating: (0-10 scale) 6/10 R knee, 2/10 L knee  Pain altered Tx:  [x] No  [] Yes  Action:  Comments: pt arrives from work feeling a overall tired but having knee pain as well. Felt a little better following her previous visit. Objective:     Todays Treatment:  Modalities: Vasocompression to B knees min compression, 34 degrees x15min  Precautions: General  Exercise  Jb Knee Pain Reps/ Time Weight/ Level Comments    Nustep 10'  L3   x               3x30\"     x   SB S 3x30\"     x   Seated HS stretch 3x30\"   x   Strap Calf Stretch 3x30\"   x   Hip flexor step S 3x20\"     -   Prone quad S 3x30\"     -   Knee flexion stool S 3x30\"    -              SLR 2x10 ea     -   Heel slides 2x10 ea   -   Bridges 2x10 ea     x   Clamshells 2x10 ea     x   SL hip abd 2x10 ea     x   Prone HS curls x10 ea   Bilat   -   SAQ next      Quad sets 10x5\"   x   LAQ X10 B                    TG Squats                   TA sets 20x5\"        TA marches          Manual: hypervolt to B calves/ HSwith light pressure     Date (L) ? (L) / (R) ? (R) /    11/23 109 -11 94 -4             Specific Instructions for next treatment: , cont with manual if proven beneficial, strengthening ex within pain-free ROM       Treatment Charges: Mins Units   []  Modalities     [x]  Ther Exercise 30 2   [x]  Manual Therapy 10 1   []  Ther Activities     []  Aquatics     [x]  Vasocompression 15 1   []  Other     Total Treatment time 55 4       Assessment: [x] Progressing toward goals. Nustep and stretches completed as previous, stretches completed after. Able to resume mat strengthening ex as noted per chart above. Good recall of ex. Increased soreness with progression through treatment. Utilized hypervolt again to address lower leg tension. Hypervolt also used for L/R HS as well as calves. Most tender into medial calves. Continued with use of vaso to end treatment. Did still have increased L knee pain following vaso. [] No change. [x] Other: pt to complete (1) pool visit a week to aide in decreasing pain symptoms add allow pt to make further progressions with land PT. [x] Patient would continue to benefit from skilled physical therapy services in order to: Improve paresh knee ROM, improve paresh LE flexibility, improve paresh LE strength, improve gait technique, and reduce pain. Goals  MET NOT MET ON-  GOING  Details   Date Addressed:           STG: To be met in 8 treatments            1. ? Pain: Decrease pain levels to 6/10 with activity to help improve her tolerance to working out recreationally.  []  []  []      2. ? ROM: Increase flexibility in paresh knees to -5-105 to help improve ability to ride a bike. []  []  []      3. Improve score on assessment tool LEFS from 72% impairment to less than 50% impairment , demonstrating improved tolerance to activity to ease riding her bike. []  []  []      4. Independent with Home Exercise Programs []  []  []        []  []  []      Date Addressed:            LTG: To be met in 16 treatments           1. Improve score on assessment tool LEFS from 72% impairment to less than 40% impairment , demonstrating improved tolerance to activity to ease riding her bike. []  []  []      2. Reduce pain levels to 2/10 or less with activity to help allow her to return to riding her bike. []  []  []      3. ? Strength: Increase paresh LE strength to 4+/5 grossly to help improve LE stability and ease transfers. []  []  []                     Patient goals: Increased ROM, be able to start exercising    Pt. Education:  [x] Yes  [] No  [x] Reviewed Prior HEP/Ed  Method of Education: [x] Verbal  [x] Demo  [x] Written  Access Code: [email or web address]  URL: [email or web address]  Date: 11/29/2022  Prepared by: Bere Mims    Exercises  Supine Chest Stretch with Elbows Bent - 2-3 x daily - 7 x weekly - 3 sets - 30\" hold  Long Sitting Calf Stretch with Strap - 2-3 x daily - 7 x weekly - 3 sets - 30\" hold  Prone Quadriceps Stretch with Strap - 2-3 x daily - 7 x weekly - 3 sets - 30\" hold  Seated Hamstring Stretch - 2-3 x daily - 7 x weekly - 3 sets - 30\" hold  Seated Long Arc Quad - 2 x daily - 7 x weekly - 2 sets - 10 reps - 5\" hold  Supine Quad Set - 2 x daily - 7 x weekly - 2 sets - 10 reps - 5\" hold  Supine Bridge - 2 x daily - 7 x weekly - 2 sets - 10 reps - 3\" hold  Supine Heel Slide with Strap - 2 x daily - 7 x weekly - 2 sets - 10 reps - 5\" hold  Comprehension of Education:  [x] Verbalizes understanding. [x] Demonstrates understanding. [x] Needs review. [] Demonstrates/verbalizes HEP/Ed previously given. Plan: [x] Continue current frequency toward long and short term goals.     [x] Specific Instructions for next treatment:       Time In: 5:01 pm         Time Out: 6:02 pm    Electronically signed by:  Julisa Thorne PTA

## 2022-12-05 ENCOUNTER — HOSPITAL ENCOUNTER (OUTPATIENT)
Age: 57
Discharge: HOME | End: 2022-12-05

## 2022-12-05 ENCOUNTER — HOSPITAL ENCOUNTER (OUTPATIENT)
Dept: PHYSICAL THERAPY | Facility: CLINIC | Age: 57
Setting detail: THERAPIES SERIES
Discharge: HOME | End: 2022-12-05
Payer: COMMERCIAL

## 2022-12-05 ENCOUNTER — HOSPITAL ENCOUNTER (OUTPATIENT)
Age: 57
Discharge: HOME | End: 2022-12-05
Payer: COMMERCIAL

## 2022-12-05 DIAGNOSIS — K21.9 GASTROESOPHAGEAL REFLUX DISEASE WITHOUT ESOPHAGITIS: ICD-10-CM

## 2022-12-05 DIAGNOSIS — E03.9 ACQUIRED HYPOTHYROIDISM: ICD-10-CM

## 2022-12-05 DIAGNOSIS — R79.82 ELEVATED C-REACTIVE PROTEIN (CRP): ICD-10-CM

## 2022-12-05 LAB
ANION GAP SERPL CALCULATED.3IONS-SCNC: 13 MMOL/L (ref 9–17)
ANION GAP SERPL CALCULATED.3IONS-SCNC: 13 MMOL/L (ref 9–17)
BUN BLDV-MCNC: 12 MG/DL (ref 6–20)
BUN BLDV-MCNC: 12 MG/DL (ref 6–20)
C-REACTIVE PROTEIN: 10 MG/L (ref 0–5)
CALCIUM SERPL-MCNC: 9.3 MG/DL (ref 8.6–10.4)
CALCIUM SERPL-MCNC: 9.4 MG/DL (ref 8.6–10.4)
CHLORIDE BLD-SCNC: 100 MMOL/L (ref 98–107)
CHLORIDE BLD-SCNC: 102 MMOL/L (ref 98–107)
CO2: 25 MMOL/L (ref 20–31)
CO2: 25 MMOL/L (ref 20–31)
CREAT SERPL-MCNC: 0.68 MG/DL (ref 0.5–0.9)
CREAT SERPL-MCNC: 0.75 MG/DL (ref 0.5–0.9)
GFR SERPL CREATININE-BSD FRML MDRD: >60 ML/MIN/1.73M2
GFR SERPL CREATININE-BSD FRML MDRD: >60 ML/MIN/1.73M2
GLUCOSE BLD-MCNC: 100 MG/DL (ref 70–99)
GLUCOSE BLD-MCNC: 99 MG/DL (ref 70–99)
MAGNESIUM: 2 MG/DL (ref 1.6–2.6)
POTASSIUM SERPL-SCNC: 3.9 MMOL/L (ref 3.7–5.3)
POTASSIUM SERPL-SCNC: 4 MMOL/L (ref 3.7–5.3)
SODIUM BLD-SCNC: 138 MMOL/L (ref 135–144)
SODIUM BLD-SCNC: 140 MMOL/L (ref 135–144)
TSH SERPL DL<=0.05 MIU/L-ACNC: 1.31 UIU/ML (ref 0.3–5)
VITAMIN D 25-HYDROXY: 30 NG/ML
VITAMIN D 25-HYDROXY: 35.6 NG/ML

## 2022-12-05 PROCEDURE — 97140 MANUAL THERAPY 1/> REGIONS: CPT

## 2022-12-05 PROCEDURE — 97016 VASOPNEUMATIC DEVICE THERAPY: CPT

## 2022-12-05 PROCEDURE — 82306 VITAMIN D 25 HYDROXY: CPT

## 2022-12-05 PROCEDURE — 97110 THERAPEUTIC EXERCISES: CPT

## 2022-12-05 PROCEDURE — 84443 ASSAY THYROID STIM HORMONE: CPT

## 2022-12-05 PROCEDURE — 80048 BASIC METABOLIC PNL TOTAL CA: CPT

## 2022-12-05 PROCEDURE — 86140 C-REACTIVE PROTEIN: CPT

## 2022-12-05 PROCEDURE — 36415 COLL VENOUS BLD VENIPUNCTURE: CPT

## 2022-12-05 PROCEDURE — 83735 ASSAY OF MAGNESIUM: CPT

## 2022-12-05 NOTE — FLOWSHEET NOTE
[] [facility]) - [facility] &  Therapy  [address]  [phone]  F: [phone] [] [address]  [facility]   [address]  P: [phone]  F: [phone] [x] [place] &  Therapy  [address]  P: [phone]  F: [phone] [] [address]  P: [phone]  F: [phone] [] [address]  [facility]   [address]   [place]: [phone]  F: [phone]      Physical Therapy Daily Treatment Note    Date:  2022  Patient Name:  Marion Osborne    :  1965  MRN: [id number]  Physician: MIGDALIA Mendoza - CNP                          Insurance: [address] [place] [address] vs [facility] Rash after 30  Medical Diagnosis: M25.561, M25.562, G89.29 (ICD-10-CM) - Chronic pain of both knees                Rehab Codes: M25. 865, M25.661, M25.562, M25. 662  Onset date: 11/15/22                                                             Next 's appt. : --  Visit# / total visits:     Cancels/No Shows: 0/0    Subjective:   Pain:  [x] Yes  [] No Location: L/R knee  Pain Rating: (0-10 scale) 5/10 R knee, 5/10 L knee  Pain altered Tx:  [x] No  [] Yes  Action:  Comments: knee continue to be painful after a day of work. Antalgic gait. Objective:     Todays Treatment:  Modalities: Vasocompression to B knees min compression, 34 degrees x15min  Precautions: General  Exercise  Jb Knee Pain Reps/ Time Weight/ Level Comments    Nustep 10'  L3   x               3x30\"     x   SB S 3x30\"     x   Seated HS stretch 3x30\"   x   Strap Calf Stretch 3x30\"   x   Hip flexor step S 3x20\"     -   Prone quad S 3x30\"     -   Knee flexion stool S 3x30\"    -              SLR 2x10 ea     -   Heel slides 2x10 ea   -   Bridges 2x10 ea     x   Clamshells 2x10 ea     x SL hip abd 2x10 ea     x   Prone HS curls x10 ea   Bilat   x   SAQ next      Quad sets 10x5\"      LAQ X10 B   x                 TG Squats                   TA sets 20x5\"        TA marches          Manual: hypervolt to B calves/ HS/ quad with light pressure     Date (L) ? (L) / (R) ? (R) / 11/23 109 -11 94 -4             Specific Instructions for next treatment: , cont with manual if proven beneficial, strengthening ex within pain-free ROM       Treatment Charges: Mins Units   []  Modalities     [x]  Ther Exercise 30 2   [x]  Manual Therapy 12 1   []  Ther Activities     []  Aquatics     [x]  Vasocompression 15 1   []  Other     Total Treatment time 57 4       Assessment: [x] Progressing toward goals. Continued with stretches and warm up. Mat ex completed as previous with also resuming prone HS curls to address LE strength. Increased soreness and pain with progression through strengthening ex. Moves slowly through change of positions. Used hypervolt on quads as well as calves and HS to address tension, increased pain and tenderness but pt able to tolerate light pressure. Increased soreness post ex even with use of vaso post ex. PT to begin aquatics next visit. [] No change. [x] Other: pt to complete (1) pool visit a week to aide in decreasing pain symptoms add allow pt to make further progressions with land PT. [x] Patient would continue to benefit from skilled physical therapy services in order to: Improve paresh knee ROM, improve paresh LE flexibility, improve paresh LE strength, improve gait technique, and reduce pain. Goals  MET NOT MET ON-  GOING  Details   Date Addressed:           STG: To be met in 8 treatments            1. ? Pain: Decrease pain levels to 6/10 with activity to help improve her tolerance to working out recreationally.  []  []  []      2. ? ROM: Increase flexibility in paresh knees to -5-105 to help improve ability to ride a bike. []  []  []      3.  Improve score on assessment tool LEFS from 72% impairment to less than 50% impairment , demonstrating improved tolerance to activity to ease riding her bike. []  []  []      4. Independent with Home Exercise Programs []  []  []        []  []  []      Date Addressed:            LTG: To be met in 16 treatments           1. Improve score on assessment tool LEFS from 72% impairment to less than 40% impairment , demonstrating improved tolerance to activity to ease riding her bike. []  []  []      2. Reduce pain levels to 2/10 or less with activity to help allow her to return to riding her bike. []  []  []      3. ? Strength: Increase paresh LE strength to 4+/5 grossly to help improve LE stability and ease transfers. []  []  []                     Patient goals: Increased ROM, be able to start exercising    Pt. Education:  [x] Yes  [] No  [x] Reviewed Prior HEP/Ed  Method of Education: [x] Verbal  [x] Demo  [x] Written  Access Code: [email or web address]  URL: [email or web address]  Date: 11/29/2022  Prepared by: Elizabeth Thorne    Exercises  Supine Chest Stretch with Elbows Bent - 2-3 x daily - 7 x weekly - 3 sets - 30\" hold  Long Sitting Calf Stretch with Strap - 2-3 x daily - 7 x weekly - 3 sets - 30\" hold  Prone Quadriceps Stretch with Strap - 2-3 x daily - 7 x weekly - 3 sets - 30\" hold  Seated Hamstring Stretch - 2-3 x daily - 7 x weekly - 3 sets - 30\" hold  Seated Long Arc Quad - 2 x daily - 7 x weekly - 2 sets - 10 reps - 5\" hold  Supine Quad Set - 2 x daily - 7 x weekly - 2 sets - 10 reps - 5\" hold  Supine Bridge - 2 x daily - 7 x weekly - 2 sets - 10 reps - 3\" hold  Supine Heel Slide with Strap - 2 x daily - 7 x weekly - 2 sets - 10 reps - 5\" hold  Comprehension of Education:  [x] Verbalizes understanding. [x] Demonstrates understanding. [x] Needs review. [] Demonstrates/verbalizes HEP/Ed previously given. Plan: [x] Continue current frequency toward long and short term goals.     [x] Specific Instructions for next treatment:       Time In: 5:01 pm         Time Out: 6:02 pm    Electronically signed by:  Arturo Campos PTA

## 2022-12-08 ENCOUNTER — HOSPITAL ENCOUNTER (OUTPATIENT)
Dept: PHYSICAL THERAPY | Facility: CLINIC | Age: 57
Setting detail: THERAPIES SERIES
Discharge: HOME | End: 2022-12-08
Payer: COMMERCIAL

## 2022-12-08 PROCEDURE — 97113 AQUATIC THERAPY/EXERCISES: CPT

## 2022-12-08 NOTE — FLOWSHEET NOTE
[] [facility]) - [facility] &  Therapy  [address]  [phone]  F: [phone] [] [address]  [employer] [address]   [address]  P: [phone]  F: [phone] [x] [place] &  Therapy  [address] [facility] [address]  P: [phone]  F: [phone] [] [address]  P: [phone]  F: [phone] [] [address]  [facility]   [address]   [place]: [phone]  F: [phone]      Physical Therapy Daily Aquatic Treatment Note    Date:  2022  Patient Name:  Argenis Zaidi    :  1965  MRN: [id number]  Physician: MIGDALIA Fan - CNP                          Insurance: [address] [place] [address] Certain after 30  Medical Diagnosis: M25.561, M25.562, G89.29 (ICD-10-CM) - Chronic pain of both knees                Rehab Codes: M25. 809, M25.661, M25.562, M25. 662  Onset date: 11/15/22                                                             Next 's appt. : --  Visit# / total visits:     Cancels/No Shows: 0/0    Subjective:   Pain:  [x] Yes  [] No Location: L/R knee  Pain Rating: (0-10 scale) 4/10 R knee, 4/10 L knee  Pain altered Tx:  [x] No  [] Yes  Action:  Comments: States she finally feels like she is getting some relief in sx from PT. States ambulating is a little easier. Mentions that standing upright was painful today. Objective:     Todays Treatment:  Precautions: General  KEY  B = Belt G = Gloves P= Paddles   C = Collar K = Kickboard T = Theratube   DB = Dumbells N = Noodle W = Weights     Exercises/Activities  Warm-up/Amb 12/8 Dynamic Exercises 12/   Forward 3L March 3L   Sideways 3L Squat    Backwards 3L Retro HS curls      Retro SLR    Stretches  Braiding    Gastroc/Soleus  Heel to Toe amb    Hamstring 3x30\" at wall Toe amb    Hip flexor  Heel amb    Piriformis      SKTC      Pec Stretch      Post Deltoid  Static Exercises UE      Shoulder flex/ext    Static Exercises LE  Shoulder abd/add    Heel/toe raises 15 Shoulder H.  abd/add    Marches 15 Shoulder IR/ER    Mini-squats 15 Rowing 15   4-way hip  15 Arm Circles    Hamstring curls 15 UT shrugs/rolls 15 posterior   Hip Circles/Fig 8  Scap squeezes    Ankle ROM  Diagonals 1/2    Lunges   Elbow flex/ext      Pron/Sup    Functional Exercise  Wrist AROM    Step next     Wall Push-ups  Deep H20/    SLS  Bike 3m   Breast Stroke on Noodle  Hip abd/add    Noodle Twist  Hip flex/ext    Noodle Push down  Hip IR/ER    Kickboard push/pull  Knee flex/ext      Push/pull on Rail      Hang 5-10m purple weights   Other:    Specific Instructions for next treatment:    Treatment Charges: Mins Units   []  Modalities     []  Ther Exercise     []  Manual Therapy     []  Ther Activities     [x]  Aquatics 35 2   []  Vasocompression     []  Other     Total Treatment time         Assessment: [x] Progressing toward goals. Initiated aquatic ex today as noted above with good tolerance. Education provided on proper depth to complete ex in for the most benefit along with demonstration of all. Emphasized TA activation and postural awareness throughout tx. Cues to keep ex within comfortable ROM. Ended with deep water ex for pain relief and decompression. Pt reports feeling good post treatment but sore. Instructed to use ice or heat later for pain relief. [] No change. [x] Other:   [x] Patient would continue to benefit from skilled physical therapy services in order to: Improve paresh knee ROM, improve paresh LE flexibility, improve paresh LE strength, improve gait technique, and reduce pain.        Goals  MET NOT MET ON-  GOING  Details   Date Addressed:           STG: To be met in 8 treatments            1. ? Pain: Decrease pain levels to 6/10 with activity to help improve her tolerance to working out recreationally.  []  []  []      2. ? ROM: Increase flexibility in paresh knees to -5-105 to help improve ability to ride a bike. []  []  []      3. Improve score on assessment tool LEFS from 72% impairment to less than 50% impairment , demonstrating improved tolerance to activity to ease riding her bike. []  []  []      4. Independent with Home Exercise Programs []  []  []        []  []  []      Date Addressed:            LTG: To be met in 16 treatments           1. Improve score on assessment tool LEFS from 72% impairment to less than 40% impairment , demonstrating improved tolerance to activity to ease riding her bike. []  []  []      2. Reduce pain levels to 2/10 or less with activity to help allow her to return to riding her bike. []  []  []      3. ? Strength: Increase paresh LE strength to 4+/5 grossly to help improve LE stability and ease transfers. []  []  []                     Patient goals: Increased ROM, be able to start exercising    Pt. Education:  [x] Yes  [] No  [x] Reviewed Prior HEP/Ed  Method of Education: [x] Verbal  [x] Demo  [x] Written  Access Code: [email or web address]  URL: [email or web address]  Date: 11/29/2022  Prepared by: Madison Harris    Exercises  Supine Chest Stretch with Elbows Bent - 2-3 x daily - 7 x weekly - 3 sets - 30\" hold  Long Sitting Calf Stretch with Strap - 2-3 x daily - 7 x weekly - 3 sets - 30\" hold  Prone Quadriceps Stretch with Strap - 2-3 x daily - 7 x weekly - 3 sets - 30\" hold  Seated Hamstring Stretch - 2-3 x daily - 7 x weekly - 3 sets - 30\" hold  Seated Long Arc Quad - 2 x daily - 7 x weekly - 2 sets - 10 reps - 5\" hold  Supine Quad Set - 2 x daily - 7 x weekly - 2 sets - 10 reps - 5\" hold  Supine Bridge - 2 x daily - 7 x weekly - 2 sets - 10 reps - 3\" hold  Supine Heel Slide with Strap - 2 x daily - 7 x weekly - 2 sets - 10 reps - 5\" hold  Comprehension of Education:  [x] Verbalizes understanding. [x] Demonstrates understanding. [x] Needs review.   [] Demonstrates/verbalizes HEP/Ed previously given. Plan: [x] Continue current frequency toward long and short term goals.     [x] Specific Instructions for next treatment:       Time In: 4:50 pm       Time Out: 5:55 pm    Electronically signed by:  Judie Leiva PTA

## 2022-12-09 ENCOUNTER — HOSPITAL ENCOUNTER (OUTPATIENT)
Dept: PHYSICAL THERAPY | Facility: CLINIC | Age: 57
Setting detail: THERAPIES SERIES
Discharge: HOME | End: 2022-12-09
Payer: COMMERCIAL

## 2022-12-09 PROCEDURE — 97140 MANUAL THERAPY 1/> REGIONS: CPT

## 2022-12-09 PROCEDURE — 97016 VASOPNEUMATIC DEVICE THERAPY: CPT

## 2022-12-09 NOTE — FLOWSHEET NOTE
Clamshells 2x10 ea        SL hip abd 2x10 ea        Prone HS curls x10 ea   Bilat      SAQ next      Quad sets 10x5\"      LAQ X10 B                    TG Squats                   TA sets 20x5\"        TA marches          Manual: hypervolt to B calves/ HS/ quad with light pressure     Date (L) ? (L) / (R) ? (R) /    11/23 109 -11 94 -4             Specific Instructions for next treatment: Cont with manual if proven beneficial, strengthening ex within pain-free ROM       Treatment Charges: Mins Units   []  Modalities     [x]  Ther Exercise 5 nc   [x]  Manual Therapy 25 2   []  Ther Activities     []  Aquatics     [x]  Vasocompression 15 1   []  Other     Total Treatment time 45 3       Assessment: [x] Progressing toward goals. Cont with nustep warm up and stretches, adding anterior tib stretch. Cont manual to posterior chain mm and lateral anterior shin with sig TTP and referred pain from anterior shin to posterior knee. Good release achieved. Held further ex d/t time. Concluded with vaso to reduce soreness. [] No change. [x] Other: pt to complete (1) pool visit a week to aide in decreasing pain symptoms add allow pt to make further progressions with land PT. [x] Patient would continue to benefit from skilled physical therapy services in order to: Improve paresh knee ROM, improve paresh LE flexibility, improve paresh LE strength, improve gait technique, and reduce pain. Goals  MET NOT MET ON-  GOING  Details   Date Addressed:           STG: To be met in 8 treatments            1. ? Pain: Decrease pain levels to 6/10 with activity to help improve her tolerance to working out recreationally.  []  []  []      2. ? ROM: Increase flexibility in paresh knees to -5-105 to help improve ability to ride a bike. []  []  []      3. Improve score on assessment tool LEFS from 72% impairment to less than 50% impairment , demonstrating improved tolerance to activity to ease riding her bike. []  []  []      4.  Independent with Home Exercise Programs []  []  []        []  []  []      Date Addressed:            LTG: To be met in 16 treatments           1. Improve score on assessment tool LEFS from 72% impairment to less than 40% impairment , demonstrating improved tolerance to activity to ease riding her bike. []  []  []      2. Reduce pain levels to 2/10 or less with activity to help allow her to return to riding her bike. []  []  []      3. ? Strength: Increase paresh LE strength to 4+/5 grossly to help improve LE stability and ease transfers. []  []  []                     Patient goals: Increased ROM, be able to start exercising    Pt. Education:  [x] Yes  [] No  [x] Reviewed Prior HEP/Ed  Method of Education: [x] Verbal  [x] Demo  [x] Written  Access Code: [email or web address]  URL: [email or web address]  Date: 11/29/2022  Prepared by: Kayla Legacy    Exercises  Supine Chest Stretch with Elbows Bent - 2-3 x daily - 7 x weekly - 3 sets - 30\" hold  Long Sitting Calf Stretch with Strap - 2-3 x daily - 7 x weekly - 3 sets - 30\" hold  Prone Quadriceps Stretch with Strap - 2-3 x daily - 7 x weekly - 3 sets - 30\" hold  Seated Hamstring Stretch - 2-3 x daily - 7 x weekly - 3 sets - 30\" hold  Seated Long Arc Quad - 2 x daily - 7 x weekly - 2 sets - 10 reps - 5\" hold  Supine Quad Set - 2 x daily - 7 x weekly - 2 sets - 10 reps - 5\" hold  Supine Bridge - 2 x daily - 7 x weekly - 2 sets - 10 reps - 3\" hold  Supine Heel Slide with Strap - 2 x daily - 7 x weekly - 2 sets - 10 reps - 5\" hold  Comprehension of Education:  [x] Verbalizes understanding. [x] Demonstrates understanding. [x] Needs review. [] Demonstrates/verbalizes HEP/Ed previously given. Plan: [x] Continue current frequency toward long and short term goals.     [x] Specific Instructions for next treatment:       Time In: 5:05 pm         Time Out: 6:10 pm    Electronically signed by:  Bridgette Sorto PTA

## 2022-12-12 ENCOUNTER — HOSPITAL ENCOUNTER (OUTPATIENT)
Dept: PHYSICAL THERAPY | Facility: CLINIC | Age: 57
Setting detail: THERAPIES SERIES
Discharge: HOME | End: 2022-12-12
Payer: COMMERCIAL

## 2022-12-12 PROCEDURE — 97113 AQUATIC THERAPY/EXERCISES: CPT

## 2022-12-12 NOTE — FLOWSHEET NOTE
[] [facility]) - [facility] &  Therapy  [address]  [phone]  F: [phone] [] [address]  [facility]   [address]  P: [phone]  F: [phone] [x] [place] &  Therapy  [address] [facility]  P: [phone]  F: [phone] [] [address]  P: [phone]  F: [phone] [] [address]  [facility]   [address]   [place]: [phone]  F: [phone]      Physical Therapy Daily Aquatic Treatment Note    Date:  2022  Patient Name:  Aashish Anaya    :  1965  MRN: [id number]  Physician: MIGDALIA Hdz - FRANCISCO                          Insurance: [address] [place] [address] vs [facility] after 30  Medical Diagnosis: M25.561, M25.562, G89.29 (ICD-10-CM) - Chronic pain of both knees                Rehab Codes: M25. 141, M25.661, M25.562, M25. 662  Onset date: 11/15/22                                                             Next 's appt. : --  Visit# / total visits: 10/16    Cancels/No Shows: 0/0    Subjective:   Pain:  [x] Yes  [] No Location: L/R knee  Pain Rating: (0-10 scale) 4/10 R knee, 4/10 L knee  Pain altered Tx:  [x] No  [] Yes  Action:  Comments: Pt reports feeling sore in her knees as well as tight in her HS today. LB sore following last treatment on land. Objective:     Todays Treatment:  Precautions: General  KEY  B = Belt G = Gloves P= Paddles   C = Collar K = Kickboard T = Theratube   DB = Dumbells N = Noodle W = Weights     Exercises/Activities  Warm-up/Amb 12/12 Dynamic Exercises /   Forward 3L March 3L   Sideways 3L Squat    Backwards 3L Retro HS curls next     Retro SLR    Stretches  Braiding    Gastroc/Soleus  Heel to Toe amb    Hamstring 3x30\" at wall Toe amb    Hip flexor  Heel amb Piriformis      SKTC      Pec Stretch      Post Deltoid  Static Exercises UE      Shoulder flex/ext    Static Exercises LE  Shoulder abd/add    Heel/toe raises 20 Shoulder H.  abd/add    Marches 20 Shoulder IR/ER    Mini-squats 20 Rowing 20   4-way hip  20 Arm Circles    Hamstring curls 20 UT shrugs/rolls 20 posterior   Hip Circles/Fig 8  Scap squeezes    Ankle ROM  Diagonals 1/2    Lunges   Elbow flex/ext      Pron/Sup    Functional Exercise  Wrist AROM    Step next     Wall Push-ups  Deep H20/    SLS  Bike 3m   Breast Stroke on Noodle  Hip abd/add 3m   Noodle Twist  Hip flex/ext    Noodle Push down  Hip IR/ER    Kickboard push/pull  Knee flex/ext      Push/pull on Rail      Hang 5-10m purple weights   Other:    Specific Instructions for next treatment:    Treatment Charges: Mins Units   []  Modalities     []  Ther Exercise     []  Manual Therapy     []  Ther Activities     [x]  Aquatics 35 2   []  Vasocompression     []  Other     Total Treatment time         Assessment: [x] Progressing toward goals. Cues during warm up ambulation for heel strike and toe off. Able to increase reps across program. Min discomfort and \"tightness\" with knee flexion ex. Cues provided throughout treatment for postural awareness and to keep ex within comfortable ROM. Ended with deep water ex, with addition of hip abd/add, for pain relief and muscle relaxation. Will monitor response to session and progress per tolerance. [] No change. [x] Other:   [x] Patient would continue to benefit from skilled physical therapy services in order to: Improve paresh knee ROM, improve paresh LE flexibility, improve paresh LE strength, improve gait technique, and reduce pain.        Goals  MET NOT MET ON-  GOING  Details   Date Addressed:           STG: To be met in 8 treatments            1. ? Pain: Decrease pain levels to 6/10 with activity to help improve her tolerance to working out recreationally.  []  []  []      2. ? ROM: Increase flexibility in frequency toward long and short term goals.     [x] Specific Instructions for next treatment:       Time In: 10:32 am     Time Out: 11:25 am    Electronically signed by:  Juaquin Siemens, PTA

## 2022-12-14 ENCOUNTER — HOSPITAL ENCOUNTER (OUTPATIENT)
Dept: PHYSICAL THERAPY | Facility: CLINIC | Age: 57
Setting detail: THERAPIES SERIES
Discharge: HOME | End: 2022-12-14
Payer: COMMERCIAL

## 2022-12-14 ENCOUNTER — TRANSCRIBE ORDERS (OUTPATIENT)
Dept: ADMINISTRATIVE | Age: 57
End: 2022-12-14

## 2022-12-14 DIAGNOSIS — N13.30 HYDRONEPHROSIS, UNSPECIFIED HYDRONEPHROSIS TYPE: Primary | ICD-10-CM

## 2022-12-14 PROCEDURE — 97016 VASOPNEUMATIC DEVICE THERAPY: CPT

## 2022-12-14 PROCEDURE — 97110 THERAPEUTIC EXERCISES: CPT

## 2022-12-14 NOTE — FLOWSHEET NOTE
[] [facility]) - [facility] &  Therapy  [address]  [phone]  F: [phone] [] [address]  [employer] [facility]   [address]  P: [phone]  F: [phone] [x] [place] [address] &  Therapy  [address] [facility]  P: [phone]  F: [phone] [] [address]  P: [phone]  F: [phone] [] [address]  [facility]   [address]   [place]: [phone]  F: [phone]      Physical Therapy Daily Treatment Note    Date:  2022  Patient Name:  Loli Chahal    :  1965  MRN: [id number]  Physician: MIGDALIA Helm - CNP                          Insurance: [address] [place] [address] vs [facility] after 30  Medical Diagnosis: M25.561, M25.562, G89.29 (ICD-10-CM) - Chronic pain of both knees                Rehab Codes: M25. 465, M25.661, M25.562, M25. 662  Onset date: 11/15/22                                                             Next 's appt. : --  Visit# / total visits:     Cancels/No Shows: 0/0    Subjective:   Pain:  [x] Yes  [] No Location: L/R knee  Pain Rating: (0-10 scale) 6/10 (B) knees  Pain altered Tx:  [x] No  [] Yes  Action:  Comments: Pt reports she is looking into new shoes for more support, very stiff this am and nervous to RTW. Objective:     Todays Treatment:  Modalities: Vasocompression to B knees min compression, 34 degrees x15min  Precautions: General  Exercise  Jb Knee Pain Reps/ Time Weight/ Level Comments    Nustep 8'  L5   x              Anterior tib stretch 3x30\"        SB S 3x30\"     x   Stool HS stretch 3x30\"   x   Strap Calf Stretch 3x30\"   -   Hip flexor step S 3x20\"     -   Prone quad S 3x30\"     -   Knee flexion stool S 3x30\"    -              SLR 2x10 ea     -   Heel slides 2x10 tien Virk 2x10 ea        Clamshells 2x10 ea lime Seated 12/14 x   SL hip abd 2x10 ea        Prone HS curls 2x10 ea lime Seated 12/14 x   SAQ next      Quad sets 10x5\"      LAQ 2x10 B   x          TG Squats                   // HR x20     x   // step up x10 ea 4\"  forward x   // lateral step up  2x10 ea 2\"  x   // mini squat x20   x   // mini lunge x10 ea   x   // heel tap x10 ea 2\"     Manual: NT     Date (L) ? (L) / (R) ? (R) /    11/23 109 -11 94 -4             Specific Instructions for next treatment: Cont with manual if proven beneficial, strengthening ex within pain-free ROM       Treatment Charges: Mins Units   []  Modalities     [x]  Ther Exercise 30 2   []  Manual Therapy     []  Ther Activities     []  Aquatics     [x]  Vasocompression 15 1   []  Other     Total Treatment time 45 3       Assessment: [x] Progressing toward goals. Cont with nustep warm up - able to inc resistance this date with good sadia. Cont stretches and able to complete mix of standing and mat ex this date. All standing ex in // with (B) UE support prn. Rest breaks prn, cues for posterior mm chain engagement throughout. Seated ex for hip and quad strength. Concluded with vaso to reduce soreness. [] No change. [x] Other: pt to complete (1) pool visit a week to aide in decreasing pain symptoms add allow pt to make further progressions with land PT. [x] Patient would continue to benefit from skilled physical therapy services in order to: Improve paresh knee ROM, improve paresh LE flexibility, improve paresh LE strength, improve gait technique, and reduce pain. Goals  MET NOT MET ON-  GOING  Details   Date Addressed:           STG: To be met in 8 treatments            1. ? Pain: Decrease pain levels to 6/10 with activity to help improve her tolerance to working out recreationally.  []  []  []      2. ? ROM: Increase flexibility in paresh knees to -5-105 to help improve ability to ride a bike. []  []  []      3.  Improve score on assessment tool LEFS from 72% impairment to less than 50% impairment , demonstrating improved tolerance to activity to ease riding her bike. []  []  []      4. Independent with Home Exercise Programs []  []  []        []  []  []      Date Addressed:            LTG: To be met in 16 treatments           1. Improve score on assessment tool LEFS from 72% impairment to less than 40% impairment , demonstrating improved tolerance to activity to ease riding her bike. []  []  []      2. Reduce pain levels to 2/10 or less with activity to help allow her to return to riding her bike. []  []  []      3. ? Strength: Increase paresh LE strength to 4+/5 grossly to help improve LE stability and ease transfers. []  []  []                     Patient goals: Increased ROM, be able to start exercising    Pt. Education:  [x] Yes  [] No  [x] Reviewed Prior HEP/Ed  Method of Education: [x] Verbal  [x] Demo  [x] Written  Access Code: [email or web address]  URL: [email or web address]  Date: 11/29/2022  Prepared by: Christian Negrete    Exercises  Supine Chest Stretch with Elbows Bent - 2-3 x daily - 7 x weekly - 3 sets - 30\" hold  Long Sitting Calf Stretch with Strap - 2-3 x daily - 7 x weekly - 3 sets - 30\" hold  Prone Quadriceps Stretch with Strap - 2-3 x daily - 7 x weekly - 3 sets - 30\" hold  Seated Hamstring Stretch - 2-3 x daily - 7 x weekly - 3 sets - 30\" hold  Seated Long Arc Quad - 2 x daily - 7 x weekly - 2 sets - 10 reps - 5\" hold  Supine Quad Set - 2 x daily - 7 x weekly - 2 sets - 10 reps - 5\" hold  Supine Bridge - 2 x daily - 7 x weekly - 2 sets - 10 reps - 3\" hold  Supine Heel Slide with Strap - 2 x daily - 7 x weekly - 2 sets - 10 reps - 5\" hold  Comprehension of Education:  [x] Verbalizes understanding. [x] Demonstrates understanding. [x] Needs review. [] Demonstrates/verbalizes HEP/Ed previously given. Plan: [x] Continue current frequency toward long and short term goals.     [x] Specific Instructions for next treatment:       Time In: 5:05 pm Time Out: 6:00 pm    Electronically signed by:  Adrián Bradley PTA

## 2022-12-15 ENCOUNTER — HOSPITAL ENCOUNTER (OUTPATIENT)
Dept: MAMMOGRAPHY | Age: 57
Discharge: HOME | End: 2022-12-17
Payer: COMMERCIAL

## 2022-12-15 ENCOUNTER — HOSPITAL ENCOUNTER (OUTPATIENT)
Dept: PHYSICAL THERAPY | Facility: CLINIC | Age: 57
Setting detail: THERAPIES SERIES
Discharge: HOME | End: 2022-12-15
Payer: COMMERCIAL

## 2022-12-15 DIAGNOSIS — Z12.31 VISIT FOR SCREENING MAMMOGRAM: ICD-10-CM

## 2022-12-15 PROCEDURE — 77067 SCR MAMMO BI INCL CAD: CPT

## 2022-12-15 PROCEDURE — 97113 AQUATIC THERAPY/EXERCISES: CPT

## 2022-12-15 NOTE — FLOWSHEET NOTE
[] [facility]) [facility] &  Therapy  [address]  [phone]  F: [phone] [] [address]  [facility]   [address]  P: [phone]  F: [phone] [x] [place] [address] &  Therapy  [address] [facility] [address]  P: [phone]  F: [phone] [] [address]  P: [phone]  F: [phone] [] [address]  [facility]   [address]   [place]: [phone]  F: [phone]      Physical Therapy Daily Aquatic Treatment Note    Date:  12/15/2022  Patient Name:  Karis Gaitan    :  1965  MRN: [id number]  Physician: MIGDALIA Menard - CNP                          Insurance: [address] [place] [address] vs [facility] after 30  Medical Diagnosis: M25.561, M25.562, G89.29 (ICD-10-CM) - Chronic pain of both knees                Rehab Codes: M25. 528, M25.661, M25.562, M25. 662  Onset date: 11/15/22                                                             Next 's appt. : --  Visit# / total visits:     Cancels/No Shows: 0/0    Subjective:   Pain:  [x] Yes  [] No Location: L/R knee  Pain Rating: (0-10 scale) 2/10  Pain altered Tx:  [x] No  [] Yes  Action:  Comments: Pt reports feeling reduced pain upon arrival.     Objective:     Todays Treatment:  Precautions: General  KEY  B = Belt G = Gloves P= Paddles   C = Collar K = Kickboard T = Theratube   DB = Dumbells N = Noodle W = Weights     Exercises/Activities  Warm-up/Amb 12/15 Dynamic Exercises 12/15   Forward 3L March 3L   Sideways 3L Squat    Backwards 3L Retro HS curls 3L     Retro SLR    Stretches  Braiding    Gastroc/Soleus  Heel to Toe amb    Hamstring 3x30\" at wall Toe amb    Hip flexor  Heel amb    Piriformis      SKTC      Pec Stretch      Post Deltoid  Static Exercises UE      Shoulder flex/ext    Static Exercises LE  Shoulder abd/add    Heel/toe raises 20 Shoulder H.  abd/add    Marches 20 Shoulder IR/ER    Mini-squats 20 Rowing 20   4-way hip  20 Arm Circles    Hamstring curls 20 UT shrugs/rolls 20 posterior   Hip Circles/Fig 8  Scap squeezes    Ankle ROM  Diagonals 1/2    Lunges   Elbow flex/ext      Pron/Sup    Functional Exercise  Wrist AROM    Step 20     Wall Push-ups  Deep H20/    SLS  Bike 3m   Breast Stroke on Noodle  Hip abd/add 3m   Noodle Twist  Hip flex/ext    Noodle Push down  Hip IR/ER    Kickboard push/pull  Knee flex/ext      Push/pull on [employer] 5-10m   Other:    Specific Instructions for next treatment:    Treatment Charges: Mins Units   []  Modalities     []  Ther Exercise     []  Manual Therapy     []  Ther Activities     [x]  Aquatics 35 2   []  Vasocompression     []  Other     Total Treatment time         Assessment: [x] Progressing toward goals. Added hamstring curls to promote knee flexion and dynamic balance. Continued to progress with step ups, otherwise cont with current program. Cues prn for sequence and TA brace. Concluded with deep water ex and stretching to inc LE ROM. Will cont to monitor and progress as appropriate. [] No change. [x] Other:   [x] Patient would continue to benefit from skilled physical therapy services in order to: Improve paresh knee ROM, improve paresh LE flexibility, improve paresh LE strength, improve gait technique, and reduce pain. Goals  MET NOT MET ON-  GOING  Details   Date Addressed:           STG: To be met in 8 treatments            1. ? Pain: Decrease pain levels to 6/10 with activity to help improve her tolerance to working out recreationally.  []  []  []      2. ? ROM: Increase flexibility in paresh knees to -5-105 to help improve ability to ride a bike. []  []  []      3.  Improve score on assessment tool LEFS from 72% impairment to less than 50% impairment , demonstrating improved tolerance to activity to ease riding her bike. []  []  []      4. Independent with Home Exercise Programs []  []  []        []  []  []      Date Addressed:            LTG: To be met in 16 treatments           1. Improve score on assessment tool LEFS from 72% impairment to less than 40% impairment , demonstrating improved tolerance to activity to ease riding her bike. []  []  []      2. Reduce pain levels to 2/10 or less with activity to help allow her to return to riding her bike. []  []  []      3. ? Strength: Increase paresh LE strength to 4+/5 grossly to help improve LE stability and ease transfers. []  []  []                     Patient goals: Increased ROM, be able to start exercising    Pt. Education:  [x] Yes  [] No  [x] Reviewed Prior HEP/Ed  Method of Education: [x] Verbal  [x] Demo  [x] Written  Access Code: [email or web address]  URL: [email or web address]  Date: 11/29/2022  Prepared by: Rafat Kong    Exercises  Supine Chest Stretch with Elbows Bent - 2-3 x daily - 7 x weekly - 3 sets - 30\" hold  Long Sitting Calf Stretch with Strap - 2-3 x daily - 7 x weekly - 3 sets - 30\" hold  Prone Quadriceps Stretch with Strap - 2-3 x daily - 7 x weekly - 3 sets - 30\" hold  Seated Hamstring Stretch - 2-3 x daily - 7 x weekly - 3 sets - 30\" hold  Seated Long Arc Quad - 2 x daily - 7 x weekly - 2 sets - 10 reps - 5\" hold  Supine Quad Set - 2 x daily - 7 x weekly - 2 sets - 10 reps - 5\" hold  Supine Bridge - 2 x daily - 7 x weekly - 2 sets - 10 reps - 3\" hold  Supine Heel Slide with Strap - 2 x daily - 7 x weekly - 2 sets - 10 reps - 5\" hold    Comprehension of Education:  [x] Verbalizes understanding. [x] Demonstrates understanding. [x] Needs review. [] Demonstrates/verbalizes HEP/Ed previously given. Plan: [x] Continue current frequency toward long and short term goals.     [x] Specific Instructions for next treatment:       Time In: 9:00 am     Time Out: 10:10 am    Electronically signed by:  Justo Card PTA

## 2022-12-16 ENCOUNTER — APPOINTMENT (OUTPATIENT)
Dept: PHYSICAL THERAPY | Facility: CLINIC | Age: 57
End: 2022-12-16
Payer: COMMERCIAL

## 2022-12-19 ENCOUNTER — HOSPITAL ENCOUNTER (OUTPATIENT)
Dept: ULTRASOUND IMAGING | Age: 57
Discharge: HOME | End: 2022-12-21
Payer: COMMERCIAL

## 2022-12-19 ENCOUNTER — HOSPITAL ENCOUNTER (OUTPATIENT)
Dept: PHYSICAL THERAPY | Facility: CLINIC | Age: 57
Setting detail: THERAPIES SERIES
Discharge: HOME | End: 2022-12-19
Payer: COMMERCIAL

## 2022-12-19 DIAGNOSIS — N13.30 HYDRONEPHROSIS, UNSPECIFIED HYDRONEPHROSIS TYPE: ICD-10-CM

## 2022-12-19 PROCEDURE — 97113 AQUATIC THERAPY/EXERCISES: CPT

## 2022-12-19 PROCEDURE — 76770 US EXAM ABDO BACK WALL COMP: CPT

## 2022-12-19 NOTE — FLOWSHEET NOTE
[] [facility]) - [facility] &  Therapy  [address]  [phone]  F: [phone] [] [address]  [place] [address]   [address]  P: [phone]  F: [phone] [x] [address]  [address] [facility] [address]  P: [phone]  F: [phone] [] [address]  P: [phone]  F: [phone] [] [address]  [facility]   [address]   [place]: [phone]  F: [phone]      Physical Therapy Daily Aquatic Treatment Note    Date:  2022  Patient Name:  Yeny Gaytan    :  1965  MRN: [id number]  Physician: MIGDALIA Hill - [facility]                          Insurance: [address] [place] [address] vs [facility] after 30  Medical Diagnosis: M25.561, M25.562, G89.29 (ICD-10-CM) - Chronic pain of both knees                Rehab Codes: M25. 907, M25.661, M25.562, M25. 662  Onset date: 11/15/22                                                             Next 's appt. : --  Visit# / total visits:     Cancels/No Shows: 0/0    Subjective:   Pain:  [x] Yes  [] No Location: L/R knee  Pain Rating: (0-10 scale) 2/10  Pain altered Tx:  [x] No  [] Yes  Action:  Comments: Pt reports knees with only slight pain, at tx time LE feel tired from increased walking today. Pt having LB tightness from increased walking at work. Also noticed increased knee pain the next day after working all day Saturday. Objective:     Todays Treatment:  Precautions: General  KEY  B = Belt G = Gloves P= Paddles   C = Collar K = Kickboard T = Theratube   DB = Dumbells N = Noodle W = Weights     Exercises/Activities  Warm-up/Amb  Dynamic Exercises    Forward 3L March 3L   Sideways 3L Squat    Backwards 3L Retro HS curls 3L     Retro SLR    Stretches  Braiding Gastroc/Soleus  Heel to Toe amb    Hamstring 3x30\" at wall Toe amb    Hip flexor  Heel amb    Piriformis      SKTC      Pec Stretch      Post Deltoid  Static Exercises UE TA contr     Shoulder flex/ext 20   Static Exercises LE  Shoulder abd/add 20   Heel/toe raises 20 Shoulder H.  abd/add    Marches 20 Shoulder IR/ER    Mini-squats 20 Rowing 20   4-way hip  20 Arm Circles    Hamstring curls 20 UT shrugs/rolls 20 posterior   Hip Circles/Fig 8  Scap squeezes    Ankle ROM  Diagonals 1/2    Lunges   Elbow flex/ext      Pron/Sup    Functional Exercise  Wrist AROM    Step 20     Wall Push-ups  Deep H20/    SLS  Bike 3m   Breast Stroke on Noodle  Hip abd/add 3m   Noodle Twist  Hip flex/ext    Noodle Push down  Hip IR/ER    Kickboard push/pull  Knee flex/ext      Push/pull on [employer] 5-10m   Other:    Specific Instructions for next treatment:    Treatment Charges: Mins Units   []  Modalities     []  Ther Exercise     []  Manual Therapy     []  Ther Activities     [x]  Aquatics 35 2   []  Vasocompression     []  Other     Total Treatment time         Assessment: [x] Progressing toward goals. Discussion about pacing herself at work with amb and sitting as needed. Also discussed using her stationary bike at home to help loosen up knees, and supine heel slides for AROM ex. Cont with aquatic ex per flow sheet with good sadia and added to UE ex for postural and core strengthening and stability. Pt notes increased burning pain in knees during UE ex and can feel the strain with having to stabilize to keep her balance. Pt feels fatigue in LE after tx. Will cont to progress ex as pt sadia. [] No change. [x] Other:   [x] Patient would continue to benefit from skilled physical therapy services in order to: Improve paresh knee ROM, improve paresh LE flexibility, improve paresh LE strength, improve gait technique, and reduce pain.        Goals  MET NOT MET ON-  GOING  Details   Date Addressed:           STG: To be met in 8 treatments            1. ? Pain: Decrease pain levels to 6/10 with activity to help improve her tolerance to working out recreationally.  []  []  []      2. ? ROM: Increase flexibility in paresh knees to -5-105 to help improve ability to ride a bike. []  []  []      3. Improve score on assessment tool LEFS from 72% impairment to less than 50% impairment , demonstrating improved tolerance to activity to ease riding her bike. []  []  []      4. Independent with Home Exercise Programs []  []  []        []  []  []      Date Addressed:            LTG: To be met in 16 treatments           1. Improve score on assessment tool LEFS from 72% impairment to less than 40% impairment , demonstrating improved tolerance to activity to ease riding her bike. []  []  []      2. Reduce pain levels to 2/10 or less with activity to help allow her to return to riding her bike. []  []  []      3. ? Strength: Increase paresh LE strength to 4+/5 grossly to help improve LE stability and ease transfers. []  []  []                     Patient goals: Increased ROM, be able to start exercising    Pt. Education:  [x] Yes  [] No  [x] Reviewed Prior HEP/Ed  Method of Education: [x] Verbal  [x] Demo  [x] Written  Access Code: [email or web address]  URL: [email or web address]  Date: 11/29/2022  Prepared by: Jm Fuelling    Exercises  Supine Chest Stretch with Elbows Bent - 2-3 x daily - 7 x weekly - 3 sets - 30\" hold  Long Sitting Calf Stretch with Strap - 2-3 x daily - 7 x weekly - 3 sets - 30\" hold  Prone Quadriceps Stretch with Strap - 2-3 x daily - 7 x weekly - 3 sets - 30\" hold  Seated Hamstring Stretch - 2-3 x daily - 7 x weekly - 3 sets - 30\" hold  Seated Long Arc Quad - 2 x daily - 7 x weekly - 2 sets - 10 reps - 5\" hold  Supine Quad Set - 2 x daily - 7 x weekly - 2 sets - 10 reps - 5\" hold  Supine Bridge - 2 x daily - 7 x weekly - 2 sets - 10 reps - 3\" hold  Supine Heel Slide with Strap - 2 x daily - 7 x weekly - 2 sets - 10 reps - 5\" hold    Comprehension of Education:  [x] Verbalizes understanding. [x] Demonstrates understanding. [x] Needs review. [] Demonstrates/verbalizes HEP/Ed previously given. Plan: [x] Continue current frequency toward long and short term goals.     [x] Specific Instructions for next treatment:       Time In: 5:00 pm    Time Out: 6:15  pm    Electronically signed by:  Cassius Pandya PTA

## 2022-12-21 ENCOUNTER — HOSPITAL ENCOUNTER (OUTPATIENT)
Dept: PHYSICAL THERAPY | Facility: CLINIC | Age: 57
Setting detail: THERAPIES SERIES
Discharge: HOME | End: 2022-12-21
Payer: COMMERCIAL

## 2022-12-21 PROCEDURE — 97110 THERAPEUTIC EXERCISES: CPT

## 2022-12-21 NOTE — PROGRESS NOTES
[] [facility]) - [facility] &  Therapy  [address]  [phone]  F: [phone] [] [address]  [facility]   [address]  P: [phone]  F: [phone] [x] [place] &  Therapy  [address]  P: [phone]  F: [phone] [] [address]  P: [phone]  F: [phone] [] [address]  [facility]   [address]   [place]: [phone]  F: [phone]      Physical Therapy Daily Treatment Note/Progress Report    Date:  2022  Patient Name:  Neptali Castañeda    :  1965  MRN: [id number]  Physician: MIGDALIA Calabrese - CNP                          Insurance: [address] [place] [address] vs [facility] Frida Freeman after 30  Medical Diagnosis: M25.561, M25.562, G89.29 (ICD-10-CM) - Chronic pain of both knees                Rehab Codes: M25. 500, M25.661, M25.562, M25. 662  Onset date: 11/15/22                                                             Next 's appt. : --  Visit# / total visits:     Cancels/No Shows: 0/0    Subjective: Pt arriving to PT with 5/10 pain today, but denies anything new since her last session.    Pain:  [x] Yes  [] No  Location: L/R knee  Pain Rating: (0-10 scale) 5/10  Pain altered Tx:  [x] No  [] Yes  Action:  Comments:     Objective:  Modalities: Vasocompression to B knees min compression, 34 degrees x15min  Precautions: General  Exercise  Jb Knee Pain Reps/ Time Weight/ Level Comments     Nustep 10'  L3   x               Anterior tib stretch 3x30\"        SB S 3x30\"     x   HS step stretch 3x30\"     x   Strap Calf Stretch 3x30\"     -   Hip flexor step S 3x20\"     -   Prone quad S 3x30\"     -   Knee flexion stool S 3x30\"     -               SLR 2x15 ea     x   Heel slides 2x10 ea        Bridges 2x10 ea Clamshells 2x15 ea     x   SL hip abd 2x10 ea         Prone HS curls x10 ea   Bilat       SAQ 2x10     x    Quad sets 10x5\"         LAQ x10 B                                 TG Squats                       TA sets 20x5\"         CARMELITA storey           Manual: hypervolt to B calves/ HS/ quad with light pressure- Held today        Specific Instructions for next treatment: Continue tx per POC    Treatment Charges: Mins Units   []  Modalities     [x]  Ther Exercise 32 2   []  Manual Therapy     []  Ther Activities     []  Aquatics     []  Vasocompression     []  Other: Re-assess 19 0   Total Treatment time 51 2       Assessment: [x] Progressing toward goals. Completed tx per log focusing on stretches and mat strengthening program. Increased reps with SLR and clamshells, as well as adding SAQ today with good tolerance. Cueing needed with clamshells to avoid rolling hips, good carryover. Since starting her last session, pt reports that she has seen good improvement in her knees to date. Pt states that she has been able to get into and out of her car easier, she is able to walk longer distances, she feels that she has an easier time walking while transitioning from sitting, and she is not having to navigate stairs backwards anymore. Currently, pt reports that she still struggles with standing, she can still have issues with gait, she still has weakness, and she still has issues when starting to walk from a seated position, and can still have issues with stairs and balance. At this time pt reports that she would like to continue with her combination of both land and aquatic therapy at this time per POC. Following end of current POC, pt reports that she would like to continue with additional PT d/t progress she has made to date. [] No change. [] Other:   [x] Patient would continue to benefit from skilled physical therapy services in order to:  Improve paresh knee ROM, improve paresh LE flexibility, improve paresh LE strength, improve gait technique, and reduce pain. Goals  MET NOT MET ON-  GOING  Details   Date Addressed: 12/21/22           STG: To be met in 8 treatments            1. ? Pain: Decrease pain levels to 6/10 with activity to help improve her tolerance to working out recreationally.  []  []  [x]  Reports still up to 9/10    2. ? ROM: Increase flexibility in paresh knees to -5-105 to help improve ability to ride a bike. []  []  [x]  L: -17 -100   R: -14-92   3. Improve score on assessment tool LEFS from 72% impairment to less than 50% impairment , demonstrating improved tolerance to activity to ease riding her bike. []  []  [x]  Pt scored 58% impaired    4. Independent with Home Exercise Programs [x]  []  []        []  []  []      Date Addressed:            LTG: To be met in 16 treatments           1. Improve score on assessment tool LEFS from 72% impairment to less than 40% impairment , demonstrating improved tolerance to activity to ease riding her bike. []  []  []      2. Reduce pain levels to 2/10 or less with activity to help allow her to return to riding her bike. []  []  []      3. ? Strength: Increase paresh LE strength to 4+/5 grossly to help improve LE stability and ease transfers. []  []  []                     Patient goals: Increased ROM, be able to start exercising    Pt. Education:  [x] Yes  [] No  [x] Reviewed Prior HEP/Ed  Method of Education: [x] Verbal  [x] Demo  [] Written  Comprehension of Education:  [x] Verbalizes understanding. [x] Demonstrates understanding. [x] Needs review. [] Demonstrates/verbalizes HEP/Ed previously given. Plan: [x] Continue current frequency toward long and short term goals. [x] Specific Instructions for next treatment: Continue tx per POC      Time In: 4390     Time Out: 4696    Electronically signed by:   Radha Del Real PT

## 2022-12-23 ENCOUNTER — APPOINTMENT (OUTPATIENT)
Dept: PHYSICAL THERAPY | Facility: CLINIC | Age: 57
End: 2022-12-23
Payer: COMMERCIAL

## 2022-12-27 ENCOUNTER — HOSPITAL ENCOUNTER (OUTPATIENT)
Dept: PHYSICAL THERAPY | Facility: CLINIC | Age: 57
Setting detail: THERAPIES SERIES
Discharge: HOME | End: 2022-12-27
Payer: COMMERCIAL

## 2022-12-27 PROCEDURE — 97110 THERAPEUTIC EXERCISES: CPT

## 2022-12-27 PROCEDURE — 97016 VASOPNEUMATIC DEVICE THERAPY: CPT

## 2022-12-27 NOTE — FLOWSHEET NOTE
[] [facility]) - [facility] &  Therapy  [address]  [phone]  F: [phone] [] [address]  [address]   [address]  P: [phone]  F: [phone] [x] [place] &  Therapy  [address]  P: [phone]  F: [phone] [] [address]  P: [phone]  F: [phone] [] [address]  [facility]   [address]   [place]: [phone]  F: [phone]      Physical Therapy Daily Treatment Note/Progress Report    Date:  2022  Patient Name:  Gerardo Mata    :  1965  MRN: [id number]  Physician: MIGDALIA Rodríguez - CNP                          Insurance: [address] [place] [address] vs cal. Cathryne Litten after 30  Medical Diagnosis: M25.561, M25.562, G89.29 (ICD-10-CM) - Chronic pain of both knees                Rehab Codes: M25. 767, M25.661, M25.562, M25. 662  Onset date: 11/15/22                                                             Next 's appt. : --  Visit# / total visits: 15/16    Cancels/No Shows: 0/0    Subjective:   Pain:  [x] Yes  [] No  Location: L/R knee  Pain Rating: (0-10 scale) 2-3/10  Pain altered Tx:  [x] No  [] Yes  Action:  Comments: Pt reports feeling under the weather today with a head cold, has been resting most of the day.      Objective:  Modalities: Vasocompression to B knees min compression, 34 degrees x15min  Manual: hypervolt to B calves/ HS/ quad with light pressure- Held today  Precautions: General  Exercise  Jb Knee Pain Reps/ Time Weight/ Level Comments     Scifit 8 L2   x               SB S 3x30\"     x   HS step stretch 3x30\"     x   Knee flexion stool S 3x30\"     x               SLR 2x15 ea        Heel slides 2x10 ea        Bridges 2x10 ea        Clamshells 2x15 ea  orange   x   SL hip abd 2x10 ea        Prone HS curls x10 ea   Bilat      SAQ 2x10     x   Quad sets 2x10     x   LAQ 3x10 B     x                // HR x20         // step up x20 4\"/2\"  Fwd/lateral     // mini squat x30     x   // mini lunge x20     pain   // 3-way hip x20             Specific Instructions for next treatment: Continue tx per POC    Treatment Charges: Mins Units   []  Modalities     [x]  Ther Exercise 35 2   []  Manual Therapy     []  Ther Activities     []  Aquatics     [x]  Vasocompression 15 1   []  Other: Total Treatment time 50 3       Assessment: [x] Progressing toward goals. Reduced duration of warm up to conserve energy per pt feeling under the weather. Cont with stretches, adding knee flexion with inc pain noted on (R). Cont // ex, pt able to complete inc set of mini squats but poor tolerance to lunges this date. Cued for inc posterior chain mm activation with no improvement noted, held and cont with mat ex. Added resistance to clamshells with good sadia. Concluded with vaso for soreness. [] No change. [] Other:   [x] Patient would continue to benefit from skilled physical therapy services in order to: Improve paresh knee ROM, improve paresh LE flexibility, improve paresh LE strength, improve gait technique, and reduce pain. Goals  MET NOT MET ON-  GOING  Details   Date Addressed: 12/21/22           STG: To be met in 8 treatments            1. ? Pain: Decrease pain levels to 6/10 with activity to help improve her tolerance to working out recreationally.  []  []  [x]  Reports still up to 9/10    2. ? ROM: Increase flexibility in paresh knees to -5-105 to help improve ability to ride a bike. []  []  [x]  L: -17 -100   R: -14-92   3. Improve score on assessment tool LEFS from 72% impairment to less than 50% impairment , demonstrating improved tolerance to activity to ease riding her bike. []  []  [x]  Pt scored 58% impaired    4.  Independent with Home Exercise Programs [x]  []  []        []  []  []      Date Addressed:            LTG: To be met in 16 treatments           1. Improve score on assessment tool LEFS from 72% impairment to less than 40% impairment , demonstrating improved tolerance to activity to ease riding her bike. []  []  []      2. Reduce pain levels to 2/10 or less with activity to help allow her to return to riding her bike. []  []  []      3. ? Strength: Increase paresh LE strength to 4+/5 grossly to help improve LE stability and ease transfers. []  []  []                     Patient goals: Increased ROM, be able to start exercising    Pt. Education:  [x] Yes  [] No  [x] Reviewed Prior HEP/Ed  Method of Education: [x] Verbal  [x] Demo  [] Written  Comprehension of Education:  [x] Verbalizes understanding. [x] Demonstrates understanding. [x] Needs review. [] Demonstrates/verbalizes HEP/Ed previously given. Plan: [x] Continue current frequency toward long and short term goals.     [x] Specific Instructions for next treatment: Continue tx per POC      Time In: 5:00 pm   Time Out: 6:00 pm    Electronically signed by:  Bridgette Sorto PTA

## 2022-12-29 ENCOUNTER — APPOINTMENT (OUTPATIENT)
Dept: PHYSICAL THERAPY | Facility: CLINIC | Age: 57
End: 2022-12-29
Payer: COMMERCIAL

## 2022-12-29 ENCOUNTER — HOSPITAL ENCOUNTER (OUTPATIENT)
Dept: PHYSICAL THERAPY | Facility: CLINIC | Age: 57
Setting detail: THERAPIES SERIES
Discharge: HOME | End: 2022-12-29
Payer: COMMERCIAL

## 2022-12-29 NOTE — FLOWSHEET NOTE
[] [facility]) - [facility] &  Therapy  [address]  [phone]  F: [phone] [] [address]  [facility]   [address]  P: [phone]  F: [phone] [x] [address]  [address]  P: [phone]  F: [phone] [] [address]  P: [phone]  F: [phone] [] [address]  [facility]   [address]   [place]: [phone]  F: [phone]      Physical Therapy Daily Aquatic Treatment Note    Date:  2022  Patient Name:  Nohemy South    :  1965  MRN: [id number]  Physician: MIGDALIA De La Cruz - CNP                          Insurance: [address] [place] [address] vs [facility] after 30  Medical Diagnosis: M25.561, M25.562, G89.29 (ICD-10-CM) - Chronic pain of both knees                Rehab Codes: M25. 531, M25.661, M25.562, M25. 662  Onset date: 11/15/22                                                             Next 's appt. : --  Visit# / total visits:     Cancels/No Shows: 0/0    Subjective:   Pain:  [x] Yes  [] No Location: L/R knee  Pain Rating: (0-10 scale) 0/10  Pain altered Tx:  [x] No  [] Yes  Action:  Comments: Pt reports no pain upon arrival this date. Objective:     Todays Treatment:  Precautions: General  KEY  B = Belt G = Gloves P= Paddles   C = Collar K = Kickboard T = Theratube   DB = Dumbells N = Noodle W = Weights     Exercises/Activities  Warm-up/Amb  Dynamic Exercises    Forward 3L March 3L   Sideways 3L Squat    Backwards 3L Retro HS curls 3L     Retro SLR    Stretches  Braiding    Gastroc/Soleus  Heel to Toe amb    Hamstring 3x30\" at wall Toe amb    Hip flexor  Heel amb    Piriformis      SKTC      Pec Stretch      Post Deltoid  Static Exercises UE Gloves     Shoulder flex/ext 20   Static Exercises LE  Shoulder abd/add 20   Heel/toe raises 20 Shoulder H.  abd/add    Marches 20 Shoulder IR/ER    Mini-squats 20 Rowing 20   4-way hip  20 Arm Circles    Hamstring curls 20 UT shrugs/rolls 20 posterior   Hip Circles/Fig 8  Scap squeezes    Ankle ROM  Diagonals 1/2    Lunges   Elbow flex/ext      Pron/Sup    Functional Exercise  Wrist AROM    Step 20     Wall Push-ups  Deep H20/    SLS  Bike 3m   Breast Stroke on Noodle  Hip abd/add 3m   Noodle Twist  Hip flex/ext    Noodle Push down  Hip IR/ER    Kickboard push/pull  Knee flex/ext      Push/pull on [employer] 5-10m   Other:    Specific Instructions for next treatment: Continue tx per POC    Treatment Charges: Mins Units   []  Modalities     []  Ther Exercise     []  Manual Therapy     []  Ther Activities     [x]  Aquatics 35 2   []  Vasocompression     []  Other     Total Treatment time         Assessment: [x] Progressing toward goals. Per dec pain today, inc resistance for UE ex to cont promoting improvement in TA activation for gait. Good sadia. Otherwise cont with current program, min cueing for sequence prn. Per primary PT will cont with 12 additional visits. [] No change. [x] Other: Pt to continue with PT for additional 12 visits at 1-2x/week per POC- SB, PT  [x] Patient would continue to benefit from skilled physical therapy services in order to: Improve paresh knee ROM, improve paresh LE flexibility, improve paresh LE strength, improve gait technique, and reduce pain. Goals  MET NOT MET ON-  GOING  Details   Date Addressed:           STG: To be met in 8 treatments            1. ? Pain: Decrease pain levels to 6/10 with activity to help improve her tolerance to working out recreationally.  []  []  []      2. ? ROM: Increase flexibility in paresh knees to -5-105 to help improve ability to ride a bike. []  []  []      3.  Improve score on assessment tool LEFS from 72% impairment to less than 50% impairment , demonstrating improved tolerance to activity to ease riding her bike. []  []  []      4. Independent with Home Exercise Programs []  []  []        []  []  []      Date Addressed:            LTG: To be met in 16 treatments           1. Improve score on assessment tool LEFS from 72% impairment to less than 40% impairment , demonstrating improved tolerance to activity to ease riding her bike. []  []  []      2. Reduce pain levels to 2/10 or less with activity to help allow her to return to riding her bike. []  []  []      3. ? Strength: Increase paresh LE strength to 4+/5 grossly to help improve LE stability and ease transfers. []  []  []                     Patient goals: Increased ROM, be able to start exercising    Pt. Education:  [x] Yes  [] No  [x] Reviewed Prior HEP/Ed  Method of Education: [x] Verbal  [x] Demo  [x] Written  Access Code: [email or web address]  URL: [email or web address]  Date: 11/29/2022  Prepared by: Melchor Fairly    Exercises  Supine Chest Stretch with Elbows Bent - 2-3 x daily - 7 x weekly - 3 sets - 30\" hold  Long Sitting Calf Stretch with Strap - 2-3 x daily - 7 x weekly - 3 sets - 30\" hold  Prone Quadriceps Stretch with Strap - 2-3 x daily - 7 x weekly - 3 sets - 30\" hold  Seated Hamstring Stretch - 2-3 x daily - 7 x weekly - 3 sets - 30\" hold  Seated Long Arc Quad - 2 x daily - 7 x weekly - 2 sets - 10 reps - 5\" hold  Supine Quad Set - 2 x daily - 7 x weekly - 2 sets - 10 reps - 5\" hold  Supine Bridge - 2 x daily - 7 x weekly - 2 sets - 10 reps - 3\" hold  Supine Heel Slide with Strap - 2 x daily - 7 x weekly - 2 sets - 10 reps - 5\" hold    Comprehension of Education:  [x] Verbalizes understanding. [x] Demonstrates understanding. [x] Needs review. [] Demonstrates/verbalizes HEP/Ed previously given. Plan: [] Continue current frequency toward long and short term goals.     [x] Specific Instructions for next treatment: Continue tx per POC  X   Continue with PT at 1-2x/week x additional 12 visits- SB, PT      Time In: 10:35 am    Time Out: 11:40 am    Electronically signed by:  David Crystal PTA

## 2023-01-05 ENCOUNTER — HOSPITAL ENCOUNTER (OUTPATIENT)
Dept: PHYSICAL THERAPY | Facility: CLINIC | Age: 58
Setting detail: THERAPIES SERIES
Discharge: HOME | End: 2023-01-05
Payer: COMMERCIAL

## 2023-01-05 PROCEDURE — 97113 AQUATIC THERAPY/EXERCISES: CPT

## 2023-01-05 NOTE — FLOWSHEET NOTE
[] [facility]) - [facility] &  Therapy  [address]  [phone]  F: [phone] [] [address]  [facility]   [address]  P: [phone]  F: [phone] [x] [address]  [address]  P: [phone]  F: [phone] [] [address]  P: [phone]  F: [phone] [] [address]  [facility]   [address]   [place]: [phone]  F: [phone]      Physical Therapy Daily Aquatic Treatment Note    Date:  2023  Patient Name:  Jenaro Rojas    :  1965  MRN: [id number]  Physician: MIGDALIA Solorzano CNP                          Insurance: [address] [place] [address] vs [facility] after 30  Medical Diagnosis: M25.561, M25.562, G89.29 (ICD-10-CM) - Chronic pain of both knees                Rehab Codes: M25. 828, M25.661, M25.562, M25. 662  Onset date: 11/15/22                                                             Next 's appt. : --  Visit# / total visits:     Cancels/No Shows: 0/0    Subjective:   Pain:  [x] Yes  [] No Location: L/R knee  Pain Rating: (0-10 scale) L knee 7/10, R knee 4/10  Pain altered Tx:  [x] No  [] Yes  Action:  Comments: Pt reports elevated pain in L knee this date. She stated she had to get on her knees last night and did some extra walking this AM and believes that maybe why. Objective:     Todays Treatment:  Precautions: General  KEY  B = Belt G = Gloves P= Paddles   C = Collar K = Kickboard T = Theratube   DB = Dumbells N = Noodle W = Weights     Exercises/Activities  Warm-up/Amb  Dynamic Exercises    Forward 3L 3L March 3L 3L   Sideways 3L 3L Squat     Backwards 3L 3L Retro HS curls 3L 3L      Retro SLR     Stretches   Braiding     Gastroc/Soleus   Heel to Toe amb     Hamstring 3x30\" at wall 3x30\" at wall  Toe amb     Hip flexor   Heel amb     Piriformis        SKTC        Pec Stretch        Post Deltoid   Static Exercises UE Gloves Gloves       Shoulder flex/ext 20 20   Static Exercises LE   Shoulder abd/add 20 20   Heel/toe raises 20 20 Shoulder H.  abd/add     Marches 20 20 Shoulder IR/ER     Mini-squats 20 20 Rowing 20 20   4-way hip  20 20 Arm Circles     Hamstring curls 20 20 UT shrugs/rolls 20 posterior 20 post   Hip Circles/Fig 8   Scap squeezes     Ankle ROM   Diagonals 1/2     Lunges    Elbow flex/ext        Pron/Sup     Functional Exercise   Wrist AROM     Step 20 20      Wall Push-ups   Deep H20/     SLS   Bike 3m 3m   Breast Stroke on Noodle   Hip abd/add 3m 3m   Noodle Twist   Hip flex/ext     Noodle Push down   Hip IR/ER     Kickboard push/pull   Knee flex/ext        Push/pull on Rail        Hang 5-10m 5-10m   Other:    Specific Instructions for next treatment: Continue tx per POC    Treatment Charges: Mins Units   []  Modalities     []  Ther Exercise     []  Manual Therapy     []  Ther Activities     [x]  Aquatics 35 2   []  Vasocompression     []  Other     Total Treatment time         Assessment: [x] Progressing toward goals. Continued ex regiment d/t increased knee pain on arrival. Pt demo good knowledge of ex with min cues required in body mechanics. By half way in, pt reported minimal improvement in R knee but continued pain behind L knee, pt was instructed to perform gentle HS stretch then proceed to complete regiment with good carryover reported. Pt was able to complete 20 reps paresh during forward step ups but had to modify to 10 reps during lateral step up with LLE d/t increasing pain. Allowed pt to extend her hang time from 10 to 15' to relieve L knee discomfort with reported improvement post.       [] No change. [] Other:   [x] Patient would continue to benefit from skilled physical therapy services in order to:  Improve paresh knee ROM, improve paresh LE flexibility, improve paresh LE strength, improve gait technique, and reduce pain. Goals  MET NOT MET ON-  GOING  Details   Date Addressed:           STG: To be met in 8 treatments            1. ? Pain: Decrease pain levels to 6/10 with activity to help improve her tolerance to working out recreationally.  []  []  []      2. ? ROM: Increase flexibility in paresh knees to -5-105 to help improve ability to ride a bike. []  []  []      3. Improve score on assessment tool LEFS from 72% impairment to less than 50% impairment , demonstrating improved tolerance to activity to ease riding her bike. []  []  []      4. Independent with Home Exercise Programs []  []  []        []  []  []      Date Addressed:            LTG: To be met in 16 treatments           1. Improve score on assessment tool LEFS from 72% impairment to less than 40% impairment , demonstrating improved tolerance to activity to ease riding her bike. []  []  []      2. Reduce pain levels to 2/10 or less with activity to help allow her to return to riding her bike. []  []  []      3. ? Strength: Increase paresh LE strength to 4+/5 grossly to help improve LE stability and ease transfers. []  []  []                     Patient goals: Increased ROM, be able to start exercising    Pt. Education:  [x] Yes  [] No  [x] Reviewed Prior HEP/Ed  Method of Education: [x] Verbal  [x] Demo  [x] Written  Access Code: [email or web address]  URL: [email or web address]  Date: 11/29/2022  Prepared by: Katerin Allen    Exercises  Supine Chest Stretch with Elbows Bent - 2-3 x daily - 7 x weekly - 3 sets - 30\" hold  Long Sitting Calf Stretch with Strap - 2-3 x daily - 7 x weekly - 3 sets - 30\" hold  Prone Quadriceps Stretch with Strap - 2-3 x daily - 7 x weekly - 3 sets - 30\" hold  Seated Hamstring Stretch - 2-3 x daily - 7 x weekly - 3 sets - 30\" hold  Seated Long Arc Quad - 2 x daily - 7 x weekly - 2 sets - 10 reps - 5\" hold  Supine Quad Set - 2 x daily - 7 x weekly - 2 sets - 10 reps - 5\" hold  Supine Bridge - 2 x daily - 7 x weekly - 2 sets - 10 reps - 3\" hold  Supine Heel Slide with Strap - 2 x daily - 7 x weekly - 2 sets - 10 reps - 5\" hold    Comprehension of Education:  [x] Verbalizes understanding. [x] Demonstrates understanding. [x] Needs review. [] Demonstrates/verbalizes HEP/Ed previously given. Plan: [] Continue current frequency toward long and short term goals.     [x] Specific Instructions for next treatment: Continue tx per POC        Time In: 2:00pm   Time Out: 3:08pm    Electronically signed by:  Williams Spears PTA

## 2023-01-06 ENCOUNTER — HOSPITAL ENCOUNTER (OUTPATIENT)
Dept: PHYSICAL THERAPY | Facility: CLINIC | Age: 58
Setting detail: THERAPIES SERIES
Discharge: HOME | End: 2023-01-06
Payer: COMMERCIAL

## 2023-01-06 PROCEDURE — 97110 THERAPEUTIC EXERCISES: CPT

## 2023-01-06 PROCEDURE — 97016 VASOPNEUMATIC DEVICE THERAPY: CPT

## 2023-01-06 NOTE — FLOWSHEET NOTE
[] [facility]) [facility] CENTER &  Therapy  [address]  [phone]  F: [phone] [] [address]  [employer] [address]   [address]  P: [phone]  F: [phone] [x] [address]  [address]  P: [phone]  F: [phone] [] [address]  P: [phone]  F: [phone] [] [address]  [facility]   [address]   [place]: [phone]  F: [phone]      Physical Therapy Daily Treatment Note/Progress Report    Date:  2023  Patient Name:  Chantal Guy    :  1965  MRN: [id number]  Physician: MIGDALIA Garcia - CNP                          Insurance: [facility] [employer] vs [employer] after 30  Medical Diagnosis: M25.561, M25.562, G89.29 (ICD-10-CM) - Chronic pain of both knees                Rehab Codes: M25. 857, M25.661, M25.562, M25. 662  Onset date: 11/15/22                                                             Next 's appt. : --  Visit# / total visits:     Cancels/No Shows: 0/0    Subjective:   Pain:  [x] Yes  [] No  Location: L/R knee  Pain Rating: (0-10 scale) 5/10  Pain altered Tx:  [x] No  [] Yes  Action:  Comments: Pt reports feeling under the weather today with a head cold, has been resting most of the day.      Objective:  Modalities: Vasocompression to B knees min compression, 34 degrees x15min  Manual: hypervolt to B calves/ HS/ quad with light pressure- Held today  Precautions: General  Exercise  Jb Knee Pain Reps/ Time Weight/ Level Comments     Scifit 8 L2   x               SB S 3x30\"     x   HS step stretch 3x30\"     x   Knee flexion stool S 3x30\"                    SLR 2x15 ea     x   Heel slides 2x10 ea        Bridges 2x10 ea     x   Clamshells 2x15 ea  orange   x   SL hip abd 2x10 ea Prone HS curls x10 ea   Bilat      SAQ 2x10     x   Quad sets 2x10        LAQ 3x10 B     x                // HR x20     x   // step up x20 4\"/2\"  Fwd/lateral x   // mini squat x20     x   // mini lunge x20     pain   // 3-way hip x20                SB march x20   x       Specific Instructions for next treatment: Continue tx per POC    Treatment Charges: Mins Units   []  Modalities     [x]  Ther Exercise 35 2   []  Manual Therapy     []  Ther Activities     []  Aquatics     [x]  Vasocompression 15 1   []  Other: Total Treatment time 50 3       Assessment: [x] Progressing toward goals. [] No change. [] Other:   [x] Patient would continue to benefit from skilled physical therapy services in order to: Improve paresh knee ROM, improve paresh LE flexibility, improve paresh LE strength, improve gait technique, and reduce pain. Goals  MET NOT MET ON-  GOING  Details   Date Addressed: 12/21/22           STG: To be met in 8 treatments            1. ? Pain: Decrease pain levels to 6/10 with activity to help improve her tolerance to working out recreationally.  []  []  [x]  Reports still up to 9/10    2. ? ROM: Increase flexibility in paresh knees to -5-105 to help improve ability to ride a bike. []  []  [x]  L: -17 -100   R: -14-92   3. Improve score on assessment tool LEFS from 72% impairment to less than 50% impairment , demonstrating improved tolerance to activity to ease riding her bike. []  []  [x]  Pt scored 58% impaired    4. Independent with Home Exercise Programs [x]  []  []        []  []  []      Date Addressed:            LTG: To be met in 16 treatments           1. Improve score on assessment tool LEFS from 72% impairment to less than 40% impairment , demonstrating improved tolerance to activity to ease riding her bike. []  []  []      2. Reduce pain levels to 2/10 or less with activity to help allow her to return to riding her bike. []  []  []      3. ? Strength:  Increase paresh LE strength to 4+/5 grossly to help improve LE stability and ease transfers. []  []  []                     Patient goals: Increased ROM, be able to start exercising    Pt. Education:  [x] Yes  [] No  [x] Reviewed Prior HEP/Ed  Method of Education: [x] Verbal  [x] Demo  [] Written  Comprehension of Education:  [x] Verbalizes understanding. [x] Demonstrates understanding. [x] Needs review. [] Demonstrates/verbalizes HEP/Ed previously given. Plan: [x] Continue current frequency toward long and short term goals.     [x] Specific Instructions for next treatment: Continue tx per POC      Time In: 5:00 pm   Time Out: 6:00 pm    Electronically signed by:  Veronica Harrison PTA

## 2023-01-09 ENCOUNTER — APPOINTMENT (OUTPATIENT)
Dept: PHYSICAL THERAPY | Facility: CLINIC | Age: 58
End: 2023-01-09
Payer: COMMERCIAL

## 2023-01-09 DIAGNOSIS — M72.2 PLANTAR FASCIITIS, RIGHT: ICD-10-CM

## 2023-01-09 DIAGNOSIS — G56.01 CARPAL TUNNEL SYNDROME OF RIGHT WRIST: ICD-10-CM

## 2023-01-09 DIAGNOSIS — M65.311 TRIGGER THUMB, RIGHT THUMB: ICD-10-CM

## 2023-01-09 RX ORDER — MELOXICAM 15 MG/1
TABLET ORAL
Qty: 90 TABLET | Refills: 3 | Status: SHIPPED | OUTPATIENT
Start: 2023-01-09

## 2023-01-10 ENCOUNTER — HOSPITAL ENCOUNTER (OUTPATIENT)
Dept: PHYSICAL THERAPY | Facility: CLINIC | Age: 58
Setting detail: THERAPIES SERIES
Discharge: HOME | End: 2023-01-10
Payer: COMMERCIAL

## 2023-01-10 PROCEDURE — 97016 VASOPNEUMATIC DEVICE THERAPY: CPT

## 2023-01-10 PROCEDURE — 97110 THERAPEUTIC EXERCISES: CPT

## 2023-01-10 NOTE — FLOWSHEET NOTE
[] [facility]) - [facility] &  Therapy  [address]  [phone]  F: [phone] [] [address]  [facility] [address]   [address]  P: [phone]  F: [phone] [x] [place] &  Therapy  [address]  P: [phone]  F: [phone] [] [address]  P: [phone]  F: [phone] [] [address]  [facility]   [address]   [place]: [phone]  F: [phone]      Physical Therapy Daily Treatment Note/Progress Report    Date:  1/10/2023  Patient Name:  Chloe Salguero    :  1965  MRN: [id number]  Physician: MIGDALIA Cruz - CNP                          Insurance: [facility] [employer] vs [facility] after 30  Medical Diagnosis: M25.561, M25.562, G89.29 (ICD-10-CM) - Chronic pain of both knees                Rehab Codes: M25. 716, M25.661, M25.562, M25. 662  Onset date: 11/15/22                                                             Next 's appt. : --  Visit# / total visits:         Cancels/No Shows: 0/0    Subjective:   Pain:  [x] Yes  [] No  Location: L/R knee  Pain Rating: (0-10 scale) 5/10  Pain altered Tx:  [x] No  [] Yes  Action:  Comments: Pt reports overall fatigue this date, both knees are painful.       Objective:  Modalities: Vasocompression to B knees min compression, 34 degrees x15min  Manual: hypervolt to B calves/ HS/ quad with light pressure- Held today  Precautions: General  Exercise  Jb Knee Pain Reps/ Time Weight/ Level Comments     Scifit 8 L2   x               SB S 3x30\"     x   HS step stretch 3x30\"     x   Knee flexion stool S 3x30\"     pain               SLR 2x15 ea        Heel slides 2x10 ea        Bridges 2x10 ea        Clamshells 2x15 ea  orange   x   SL hip abd 2x10 ea        Prone HS curls x10 ea   Bilat      SAQ 2x10     x   Quad sets 2x10        LAQ 3x10 B                    // HR x20     x   // step up x20 4\" Fwd/lateral x   // mini squat x20     x   // mini lunge x20     pain   // 3-way hip x20 orange   x           SB marches x20 plum  x   SB LAQ X10 ea plum  x       Specific Instructions for next treatment: Continue tx per POC    Treatment Charges: Mins Units   []  Modalities     [x]  Ther Exercise 45 3   []  Manual Therapy     []  Ther Activities     []  Aquatics     [x]  Vasocompression 15 1   []  Other: Total Treatment time 60 4       Assessment: [x] Progressing toward goals. Resumed warm up and stretching program as noted above. Cont to progress with further standing ex. Pt demo's fair sadia overall, some knee pain with SB LAQ. Held additional set per pain. Concluded with vaso for soreness. [] No change. [] Other:   [x] Patient would continue to benefit from skilled physical therapy services in order to: Improve paresh knee ROM, improve paresh LE flexibility, improve paresh LE strength, improve gait technique, and reduce pain. Goals  MET NOT MET ON-  GOING  Details   Date Addressed: 12/21/22           STG: To be met in 8 treatments            1. ? Pain: Decrease pain levels to 6/10 with activity to help improve her tolerance to working out recreationally.  []  []  [x]  Reports still up to 9/10    2. ? ROM: Increase flexibility in paresh knees to -5-105 to help improve ability to ride a bike. []  []  [x]  L: -17 -100   R: -14-92   3. Improve score on assessment tool LEFS from 72% impairment to less than 50% impairment , demonstrating improved tolerance to activity to ease riding her bike. []  []  [x]  Pt scored 58% impaired    4. Independent with Home Exercise Programs [x]  []  []        []  []  []      Date Addressed:            LTG: To be met in 16 treatments           1.  Improve score on assessment tool LEFS from 72% impairment to less than 40% impairment , demonstrating improved tolerance to activity to ease riding her bike. []  []  []      2. Reduce pain levels to 2/10 or less with activity to help allow her to return to riding her bike. []  []  []      3. ? Strength: Increase paresh LE strength to 4+/5 grossly to help improve LE stability and ease transfers. []  []  []                     Patient goals: Increased ROM, be able to start exercising    Pt. Education:  [x] Yes  [] No  [x] Reviewed Prior HEP/Ed  Method of Education: [x] Verbal  [x] Demo  [] Written  Comprehension of Education:  [x] Verbalizes understanding. [x] Demonstrates understanding. [x] Needs review. [] Demonstrates/verbalizes HEP/Ed previously given. Plan: [x] Continue current frequency toward long and short term goals.     [x] Specific Instructions for next treatment: Continue tx per POC      Time In: 5:00 pm   Time Out: 6:10 pm    Electronically signed by:  Uriel Alan PTA

## 2023-01-11 ENCOUNTER — PATIENT MESSAGE (OUTPATIENT)
Dept: PRIMARY CARE CLINIC | Age: 58
End: 2023-01-11

## 2023-01-12 ENCOUNTER — HOSPITAL ENCOUNTER (OUTPATIENT)
Dept: PHYSICAL THERAPY | Facility: CLINIC | Age: 58
Setting detail: THERAPIES SERIES
Discharge: HOME | End: 2023-01-12
Payer: COMMERCIAL

## 2023-01-12 ENCOUNTER — APPOINTMENT (OUTPATIENT)
Dept: PHYSICAL THERAPY | Facility: CLINIC | Age: 58
End: 2023-01-12
Payer: COMMERCIAL

## 2023-01-12 PROCEDURE — 97113 AQUATIC THERAPY/EXERCISES: CPT

## 2023-01-12 RX ORDER — PANTOPRAZOLE SODIUM 20 MG/1
20 TABLET, DELAYED RELEASE ORAL
Qty: 90 TABLET | Refills: 3 | Status: SHIPPED | OUTPATIENT
Start: 2023-01-12

## 2023-01-12 NOTE — TELEPHONE ENCOUNTER
From: Yuly Phillips  To: Jose Preciado  Sent: 1/11/2023 8:56 PM EST  Subject: Famotidine not working well    Bassam Dacosta, we changed Omeprazole to Famotidine at my last appointment. My cough is completely gone (yah!) but I've noticed increasing belching and burning in my stomach. I'm getting pretty uncomfortable and taking TUMS daily but it doesn't always work. I'm getting concerned about what the acid may do to my stomach lining. Any thoughts or med changes would be appreciated! Thanks! Ghislaine Castañeda  ps. ..the physical therapy has been working great and I'm noticing a lot of improvement. Thanks for ordering it.

## 2023-01-12 NOTE — FLOWSHEET NOTE
[] [facility]) - [facility] CENTER &  Therapy  [address]  [phone]  F: [phone] [] [address]  [facility]   [address]  P: [phone]  F: [phone] [x] [address]  [address]  P: [phone]  F: [phone] [] [address]  P: [phone]  F: [phone] [] [address]  [facility]   [address]   [place]: [phone]  F: [phone]      Physical Therapy Daily Aquatic Treatment Note    Date:  2023  Patient Name:  June Anderson    :  1965  MRN: [id number]  Physician: MIGDALIA Bruno - CNP                          Insurance: [employer] vs [employer] after 30  Medical Diagnosis: M25.561, M25.562, G89.29 (ICD-10-CM) - Chronic pain of both knees                Rehab Codes: M25. 708, M25.661, M25.562, M25. 662  Onset date: 11/15/22                                                             Next 's appt. : --  Visit# / total visits:     Cancels/No Shows: 0/0    Subjective:   Pain:  [x] Yes  [] No Location: L/R knee  Pain Rating: (0-10 scale) L knee 4/10, R knee 2/10  Pain altered Tx:  [x] No  [] Yes  Action:  Comments: Pt reports her legs feel tired and tight. Attempted to take the stairs at work today but was sore. Objective:     Todays Treatment:  Precautions: General  KEY  B = Belt G = Gloves P= Paddles   C = Collar K = Kickboard T = Theratube   DB = Dumbells N = Noodle W = Weights     Exercises/Activities  Warm-up/Amb /12 Dynamic Exercises 12   Forward 3L March 3L   Sideways 3L Squat    Backwards 3L Retro HS curls 3L     Retro SLR    Stretches  Braiding    Gastroc/Soleus  Heel to Toe amb    Hamstring 3x30\" at wall  Toe amb    Hip flexor  Heel amb    Piriformis      SKTC Pec Stretch      Post Deltoid  Static Exercises UE Gloves  - Held 1/12     Shoulder flex/ext 20   Static Exercises LE  Shoulder abd/add 20   Heel/toe raises 20 Shoulder H.  abd/add    Marches 20 Shoulder IR/ER    Mini-squats 20 Rowing 20   4-way hip  20 Arm Circles    Hamstring curls 20 UT shrugs/rolls 20 post   Hip Circles/Fig 8  Scap squeezes    Ankle ROM  Diagonals 1/2    Lunges   Elbow flex/ext      Pron/Sup    Functional Exercise  Wrist AROM    Step 20 fwd, 10 lat     Wall Push-ups  Deep H20/    SLS  Bike 3m   Breast Stroke on Noodle  Hip abd/add 3m   Noodle Twist  Hip flex/ext    Noodle Push down  Hip IR/ER    Kickboard push/pull  Knee flex/ext      Push/pull on [employer] 5-10m   Other:    Specific Instructions for next treatment: Continue tx per POC    Treatment Charges: Mins Units   []  Modalities     []  Ther Exercise     []  Manual Therapy     []  Ther Activities     [x]  Aquatics 35 2   []  Vasocompression     []  Other     Total Treatment time         Assessment: [x] Progressing toward goals. Cont with aquatic ex as noted in log above with good tolerance. Cues with warm up laps for heel strike and increased knee flexion. Increased discomfort with most flexion ex. Held UE ex d/t time constraints. Ended with deep water ex for joint decompression and pain relief. [] No change. [] Other:   [x] Patient would continue to benefit from skilled physical therapy services in order to: Improve paresh knee ROM, improve paresh LE flexibility, improve paresh LE strength, improve gait technique, and reduce pain. Goals  MET NOT MET ON-  GOING  Details   Date Addressed:           STG: To be met in 8 treatments            1. ? Pain: Decrease pain levels to 6/10 with activity to help improve her tolerance to working out recreationally.  []  []  []      2. ? ROM: Increase flexibility in paresh knees to -5-105 to help improve ability to ride a bike. []  []  []      3.  Improve score on assessment tool LEFS from 72% impairment to less than 50% impairment , demonstrating improved tolerance to activity to ease riding her bike. []  []  []      4. Independent with Home Exercise Programs []  []  []        []  []  []      Date Addressed:            LTG: To be met in 16 treatments           1. Improve score on assessment tool LEFS from 72% impairment to less than 40% impairment , demonstrating improved tolerance to activity to ease riding her bike. []  []  []      2. Reduce pain levels to 2/10 or less with activity to help allow her to return to riding her bike. []  []  []      3. ? Strength: Increase paresh LE strength to 4+/5 grossly to help improve LE stability and ease transfers. []  []  []                     Patient goals: Increased ROM, be able to start exercising    Pt. Education:  [x] Yes  [] No  [x] Reviewed Prior HEP/Ed  Method of Education: [x] Verbal  [x] Demo  [x] Written  Access Code: [email or web address]  URL: [email or web address]  Date: 11/29/2022  Prepared by: Erick Nye    Exercises  Supine Chest Stretch with Elbows Bent - 2-3 x daily - 7 x weekly - 3 sets - 30\" hold  Long Sitting Calf Stretch with Strap - 2-3 x daily - 7 x weekly - 3 sets - 30\" hold  Prone Quadriceps Stretch with Strap - 2-3 x daily - 7 x weekly - 3 sets - 30\" hold  Seated Hamstring Stretch - 2-3 x daily - 7 x weekly - 3 sets - 30\" hold  Seated Long Arc Quad - 2 x daily - 7 x weekly - 2 sets - 10 reps - 5\" hold  Supine Quad Set - 2 x daily - 7 x weekly - 2 sets - 10 reps - 5\" hold  Supine Bridge - 2 x daily - 7 x weekly - 2 sets - 10 reps - 3\" hold  Supine Heel Slide with Strap - 2 x daily - 7 x weekly - 2 sets - 10 reps - 5\" hold    Comprehension of Education:  [x] Verbalizes understanding. [x] Demonstrates understanding. [x] Needs review. [] Demonstrates/verbalizes HEP/Ed previously given. Plan: [] Continue current frequency toward long and short term goals.     [x] Specific Instructions for next treatment: Continue tx per POC        Time In: 5:10 pm   Time Out: 6:00 pm    Electronically signed by:  Derrick Marte PTA

## 2023-01-17 ENCOUNTER — HOSPITAL ENCOUNTER (OUTPATIENT)
Dept: PHYSICAL THERAPY | Facility: CLINIC | Age: 58
Setting detail: THERAPIES SERIES
Discharge: HOME | End: 2023-01-17
Payer: COMMERCIAL

## 2023-01-17 PROCEDURE — 97016 VASOPNEUMATIC DEVICE THERAPY: CPT

## 2023-01-17 PROCEDURE — 97140 MANUAL THERAPY 1/> REGIONS: CPT

## 2023-01-17 PROCEDURE — 97110 THERAPEUTIC EXERCISES: CPT

## 2023-01-18 NOTE — FLOWSHEET NOTE
[] [facility]) - [facility] &  Therapy  [address]  [phone]  F: [phone] [] [address]  [employer] [address]   [address]  P: [phone]  F: [phone] [x] [place] &  Therapy  [address]  P: [phone]  F: [phone] [] [email or web address]  P: [phone]  F: [phone] [] [address]  [facility]   [address]   [place]: [phone]  F: [phone]      Physical Therapy Daily Treatment Note/Progress Report    Date:  2023  Patient Name:  Vasyl Adhikari    :  1965  MRN: [id number]  Physician: MIGDALIA Brothers - FRANCISCO                          Insurance: [address] [place] [address] vs [employer] [facility] after 30  Medical Diagnosis: M25.561, M25.562, G89.29 (ICD-10-CM) - Chronic pain of both knees                Rehab Codes: M25. 187, M25.661, M25.562, M25. 662  Onset date: 11/15/22                                                             Next 's appt. : --  Visit# / total visits:         Cancels/No Shows: 0/0    Subjective:   Pain:  [x] Yes  [] No  Location: R knee  Pain Rating: (0-10 scale) 2/10  Pain altered Tx:  [x] No  [] Yes  Action:  Comments: Pt arrives today with minimal pain, but states she is feeling very tight and would like to increase her stretching this tx. Pt also states that her pain increased d/t walking a lot during the day.       Objective:  Modalities: Vasocompression to B knees min compression, 34 degrees x15min  Manual: hypervolt to Bilateral post hip area/ calves/ HS/  Precautions: General  Exercise  Jb Knee Pain Reps/ Time Weight/ Level Comments     Nustep 8 L2   x               SB S 3x30\"     x   HS step stretch 3x30\"     x   Knee flexion stool S 3x30\"  NT   pain   Prone quad S 3x30\" ea      x SLR 2x15 ea        Heel slides 2x10 ea        Bridges 2x10 ea        Clamshells 2x15 ea  orange      SL hip abd 2x10 ea        Prone HS curls x10 ea   Bilat      SAQ 2x10        Quad sets 2x10        LAQ 3x10 B                    // HR x20     x   // step up x20 4\" Fwd/lateral x   // mini squat x20     x   // mini lunge x10     x   // 3-way hip x20 orange   x           SB marches x20 plum     SB LAQ X10 ea plum         Specific Instructions for next treatment: Continue tx per POC    Treatment Charges: Mins Units   []  Modalities     [x]  Ther Exercise 27 2   [x]  Manual Therapy 16 1   []  Ther Activities     []  Aquatics     [x]  Vasocompression 15 1   []  Other: Total Treatment time 58 4       Assessment: [x] Progressing toward goals. Today's treatment began with warm up and stretching, added prone quad stretch to reduce tightness and increase ROM. Manual was done to post hip area, hamstrings and calves. Cont with standing ex. Pt demo'd an increase ROM in mini squat and demo'd fair tolerance overall, some knee pain with mini lunge, therefore held additional set. Concluded with vaso to reduce soreness post tx.    [] No change. [] Other:   [x] Patient would continue to benefit from skilled physical therapy services in order to: Improve paresh knee ROM, improve paresh LE flexibility, improve paresh LE strength, improve gait technique, and reduce pain. Goals  MET NOT MET ON-  GOING  Details   Date Addressed: 12/21/22           STG: To be met in 8 treatments            1. ? Pain: Decrease pain levels to 6/10 with activity to help improve her tolerance to working out recreationally.  []  []  [x]  Reports still up to 9/10    2. ? ROM: Increase flexibility in paresh knees to -5-105 to help improve ability to ride a bike. []  []  [x]  L: -17 -100   R: -14-92   3. Improve score on assessment tool LEFS from 72% impairment to less than 50% impairment , demonstrating improved tolerance to activity to ease riding her bike. []  []  [x]  Pt scored 58% impaired    4. Independent with Home Exercise Programs [x]  []  []        []  []  []      Date Addressed:            LTG: To be met in 16 treatments           1. Improve score on assessment tool LEFS from 72% impairment to less than 40% impairment , demonstrating improved tolerance to activity to ease riding her bike. []  []  []      2. Reduce pain levels to 2/10 or less with activity to help allow her to return to riding her bike. []  []  []      3. ? Strength: Increase paresh LE strength to 4+/5 grossly to help improve LE stability and ease transfers. []  []  []                     Patient goals: Increased ROM, be able to start exercising    Pt. Education:  [x] Yes  [] No  [x] Reviewed Prior HEP/Ed  Method of Education: [x] Verbal  [x] Demo  [] Written  Comprehension of Education:  [x] Verbalizes understanding. [x] Demonstrates understanding. [x] Needs review. [] Demonstrates/verbalizes HEP/Ed previously given. Plan: [x] Continue current frequency toward long and short term goals.     [x] Specific Instructions for next treatment: Continue tx per POC      Time In: 5:05 pm   Time Out: 6:18 pm    Electronically signed by:  Jorge A Sotelo

## 2023-01-19 ENCOUNTER — HOSPITAL ENCOUNTER (OUTPATIENT)
Dept: PHYSICAL THERAPY | Facility: CLINIC | Age: 58
Setting detail: THERAPIES SERIES
Discharge: HOME | End: 2023-01-19
Payer: COMMERCIAL

## 2023-01-19 PROCEDURE — 97113 AQUATIC THERAPY/EXERCISES: CPT

## 2023-01-19 NOTE — FLOWSHEET NOTE
[] [facility]) [facility] &  Therapy  [address]  [phone]  F: [phone] [] [address]  [facility]   [address]  P: [phone]  F: [phone] [x] [address]  [address]  P: [phone]  F: [phone] [] [address]  P: [phone]  F: [phone] [] [address]  [facility]   [address]   [place]: [phone]  F: [phone]      Physical Therapy Daily Aquatic Treatment Note    Date:  2023  Patient Name:  Padmini Gallegos    :  1965  MRN: [id number]  Physician: MIGDALIA Aj - CNP                          Insurance: [address] [place] [address] vs [facility] after 30  Medical Diagnosis: M25.561, M25.562, G89.29 (ICD-10-CM) - Chronic pain of both knees                Rehab Codes: M25. 759, M25.661, M25.562, M25. 662  Onset date: 11/15/22                                                             Next 's appt. : --  Visit# / total visits:     Cancels/No Shows: 0/0    Subjective:   Pain:  [x] Yes  [] No Location: L/R knee  Pain Rating: (0-10 scale) L knee 2/10, R knee 2/10  Pain altered Tx:  [x] No  [] Yes  Action:  Comments: Pt states the manual at last treatment was very beneficial in reducing her pain. Relief didn't last long, feeling tight today. Objective:     Todays Treatment:  Precautions: General  KEY  B = Belt G = Gloves P= Paddles   C = Collar K = Kickboard T = Theratube   DB = Dumbells N = Noodle W = Weights     Exercises/Activities  Warm-up/Amb  Dynamic Exercises    Forward 3L March 3L   Sideways 3L Squat    Backwards 3L Retro HS curls 3L     Retro SLR    Stretches  Braiding    Gastroc/Soleus 3x30\" wall Heel to Toe amb    Hamstring 3x30\" at wall  Toe amb    Hip flexor Heel amb    Piriformis      SKTC      Pec Stretch      Post Deltoid  Static Exercises UE Gloves  - Held 1/19     Shoulder flex/ext 20   Static Exercises LE  Shoulder abd/add 20   Heel/toe raises 20 Shoulder H.  abd/add    Marches 20 Shoulder IR/ER    Mini-squats 20 Rowing 20   4-way hip  20 Arm Circles    Hamstring curls 20 UT shrugs/rolls 20 post   Hip Circles/Fig 8  Scap squeezes    Ankle ROM  Diagonals 1/2    Lunges  10 Elbow flex/ext      Pron/Sup    Functional Exercise  Wrist AROM    Step 20 fwd, 10 lat     Wall Push-ups  Deep H20/    SLS  Bike 3m   Breast Stroke on Noodle  Hip abd/add 3m   Noodle Twist  Hip flex/ext    Noodle Push down  Hip IR/ER    Kickboard push/pull  Knee flex/ext      Push/pull on [employer] 5-10m   Other:    Specific Instructions for next treatment: Continue tx per POC    Treatment Charges: Mins Units   []  Modalities     []  Ther Exercise     []  Manual Therapy     []  Ther Activities     [x]  Aquatics 35 2   []  Vasocompression     []  Other     Total Treatment time         Assessment: [x] Progressing toward goals. Cont with aquatic ex as noted in log above with good tolerance. Cues to keep ex within comfortable ROM. Added lunges to progress LE strength without issue. Ended with deep water ex followed by stretching for pain relief and flexibility. [] No change. [] Other:   [x] Patient would continue to benefit from skilled physical therapy services in order to: Improve paresh knee ROM, improve paresh LE flexibility, improve paresh LE strength, improve gait technique, and reduce pain. Goals  MET NOT MET ON-  GOING  Details   Date Addressed:           STG: To be met in 8 treatments            1. ? Pain: Decrease pain levels to 6/10 with activity to help improve her tolerance to working out recreationally.  []  []  []      2. ? ROM: Increase flexibility in paresh knees to -5-105 to help improve ability to ride a bike. []  []  []      3.  Improve score on assessment tool LEFS from 72% impairment to less than 50% impairment , demonstrating improved tolerance to activity to ease riding her bike. []  []  []      4. Independent with Home Exercise Programs []  []  []        []  []  []      Date Addressed:            LTG: To be met in 16 treatments           1. Improve score on assessment tool LEFS from 72% impairment to less than 40% impairment , demonstrating improved tolerance to activity to ease riding her bike. []  []  []      2. Reduce pain levels to 2/10 or less with activity to help allow her to return to riding her bike. []  []  []      3. ? Strength: Increase paresh LE strength to 4+/5 grossly to help improve LE stability and ease transfers. []  []  []                     Patient goals: Increased ROM, be able to start exercising    Pt. Education:  [x] Yes  [] No  [x] Reviewed Prior HEP/Ed  Method of Education: [x] Verbal  [x] Demo  [x] Written  Access Code: [email or web address]  URL: [email or web address]  Date: 11/29/2022  Prepared by: Ladonna Gonzalez    Exercises  Supine Chest Stretch with Elbows Bent - 2-3 x daily - 7 x weekly - 3 sets - 30\" hold  Long Sitting Calf Stretch with Strap - 2-3 x daily - 7 x weekly - 3 sets - 30\" hold  Prone Quadriceps Stretch with Strap - 2-3 x daily - 7 x weekly - 3 sets - 30\" hold  Seated Hamstring Stretch - 2-3 x daily - 7 x weekly - 3 sets - 30\" hold  Seated Long Arc Quad - 2 x daily - 7 x weekly - 2 sets - 10 reps - 5\" hold  Supine Quad Set - 2 x daily - 7 x weekly - 2 sets - 10 reps - 5\" hold  Supine Bridge - 2 x daily - 7 x weekly - 2 sets - 10 reps - 3\" hold  Supine Heel Slide with Strap - 2 x daily - 7 x weekly - 2 sets - 10 reps - 5\" hold    Comprehension of Education:  [x] Verbalizes understanding. [x] Demonstrates understanding. [x] Needs review. [] Demonstrates/verbalizes HEP/Ed previously given. Plan: [x] Continue current frequency toward long and short term goals.     [x] Specific Instructions for next treatment: Continue tx per POC    Time In: 5:02 pm   Time Out: 6:00 pm    Electronically signed by:  Chuy Valdez PTA

## 2023-01-24 ENCOUNTER — HOSPITAL ENCOUNTER (OUTPATIENT)
Dept: PHYSICAL THERAPY | Facility: CLINIC | Age: 58
Setting detail: THERAPIES SERIES
Discharge: HOME | End: 2023-01-24
Payer: COMMERCIAL

## 2023-01-24 PROCEDURE — 97140 MANUAL THERAPY 1/> REGIONS: CPT

## 2023-01-24 PROCEDURE — 97110 THERAPEUTIC EXERCISES: CPT

## 2023-01-24 PROCEDURE — 97016 VASOPNEUMATIC DEVICE THERAPY: CPT

## 2023-01-24 NOTE — FLOWSHEET NOTE
[] [facility]) - [facility] &  Therapy  [address]  [phone]  F: [phone] [] [address]  [facility]   [address]  P: [phone]  F: [phone] [x] [place] &  Therapy  [address]  P: [phone]  F: [phone] [] [address]  P: [phone]  F: [phone] [] [address]  [facility]   [address]   [place]: [phone]  F: [phone]      Physical Therapy Daily Treatment Note/Progress Report    Date:  2023  Patient Name:  Kyaw Duarte    :  1965  MRN: [id number]  Physician: MIGDALIA Salvador - FRANCISCO                          Insurance: [address] [place] [address] vs [facility]  Medical Diagnosis: M25.561, M25.562, G89.29 (ICD-10-CM) - Chronic pain of both knees                Rehab Codes: M25. 471, M25.661, M25.562, M25. 662  Onset date: 11/15/22                                                             Next 's appt. : --  Visit# / total visits:         Cancels/No Shows: 0/0    Subjective:   Pain:  [x] Yes  [] No  Location: R knee  Pain Rating: (0-10 scale) 4/10 (paresh knee)   Pain altered Tx:  [x] No  [] Yes  Action:  Comments: Pt arrives today stating she is feeling pretty painful and is having a lot of tightness. Pt states limping all day and having pain with walking. Pt had a positive response to the manual therapy last tx stating she felt much better post tx, but feels like she may have overdone it with the strengthening ex. Pt is starting to feel pain in her R post hip area.      Objective:  Modalities: Vasocompression to B knees min compression, 34 degrees x15min  Manual: hypervolt to Bilateral post hip area/ calves/ HS/  Precautions: General  Exercise  Paresh Knee Pain Reps/ Time Weight/ Level Comments     Nustep 8 L3   x               SB S 3x30\"     x   HS step stretch 3x30\"     x   Knee flexion stool S 3x30\"  NT   pain   Prone quad S 3x30\" ea      x          SLR 2x15 ea    only one set this date d/t pain x   Heel slides 2x10 ea    x   Bridges 2x10 ea    only one set this date d/t pain x   Clamshells 2x15 ea  orange     SL hip abd 2x10 ea        Prone HS curls x10 ea   Bilat      SAQ 2x10        Quad sets 2x10        LAQ 3x10 B                    // HR x20        // step up x20 4\" Fwd/lateral    // mini squat x20        // mini lunge x10        // 3-way hip x20 orange             SB marches x20 plum     SB LAQ X10 ea plum         Specific Instructions for next treatment: Continue tx per POC, postural edu at trunk    Treatment Charges: Mins Units   []  Modalities     [x]  Ther Exercise 27 2   [x]  Manual Therapy 13 1   []  Ther Activities     []  Aquatics     [x]  Vasocompression 15 1   []  Other: Total Treatment time 55 4       Assessment: [x] Progressing toward goals. Began today's tx with warmup and stretching to reduce muscle tension and increase ROM. Cont with manual to post hip area, hamstrings, and calves with mod/max muscle tightness noted. Held on dynamic parallel bar ex this tx d/t inc pain upon arrival this tx. Cont with light supine ex for strengthening. Pt demo'd painful gestures throughout tx, therefore strengthening was held. Pt reported having inc pain in the R lat knee area with bridges. Concluded with vaso to reduce soreness post tx. Educated on stretching on own throughout day to reduce tightness and lack of ROM and on benefits of hypervolt and discussed purchasing one for her to use at home to help reduce some residual pain coming from tightness in paresh LE.     [] No change. [] Other:   [x] Patient would continue to benefit from skilled physical therapy services in order to:  Improve paresh knee ROM, improve paresh LE flexibility, improve paresh LE strength, improve gait technique, and reduce pain. Goals  MET NOT MET ON-  GOING  Details   Date Addressed: 12/21/22           STG: To be met in 8 treatments            1. ? Pain: Decrease pain levels to 6/10 with activity to help improve her tolerance to working out recreationally.  []  []  [x]  Reports still up to 9/10    2. ? ROM: Increase flexibility in paresh knees to -5-105 to help improve ability to ride a bike. []  []  [x]  L: -17 -100   R: -14-92   3. Improve score on assessment tool LEFS from 72% impairment to less than 50% impairment , demonstrating improved tolerance to activity to ease riding her bike. []  []  [x]  Pt scored 58% impaired    4. Independent with Home Exercise Programs [x]  []  []        []  []  []      Date Addressed:            LTG: To be met in 16 treatments           1. Improve score on assessment tool LEFS from 72% impairment to less than 40% impairment , demonstrating improved tolerance to activity to ease riding her bike. []  []  []      2. Reduce pain levels to 2/10 or less with activity to help allow her to return to riding her bike. []  []  []      3. ? Strength: Increase paresh LE strength to 4+/5 grossly to help improve LE stability and ease transfers. []  []  []                     Patient goals: Increased ROM, be able to start exercising    Pt. Education:  [x] Yes  [] No  [x] Reviewed Prior HEP/Ed  Method of Education: [x] Verbal  [x] Demo  [] Written  Comprehension of Education:  [x] Verbalizes understanding. [x] Demonstrates understanding. [x] Needs review. [] Demonstrates/verbalizes HEP/Ed previously given. Plan: [x] Continue current frequency toward long and short term goals.     [x] Specific Instructions for next treatment: Continue tx per POC      Time In: 5:04 pm   Time Out: 6:09  pm    Electronically signed by:  Francisca López    The above documentation was completed by Francisca López, Student Physical Therapist Assistant, was reviewed and accepted by supervising Clinical Instructor Erin Schreiber PTA.

## 2023-01-26 ENCOUNTER — APPOINTMENT (OUTPATIENT)
Dept: PHYSICAL THERAPY | Facility: CLINIC | Age: 58
End: 2023-01-26
Payer: COMMERCIAL

## 2023-01-30 ENCOUNTER — OFFICE VISIT (OUTPATIENT)
Dept: PRIMARY CARE CLINIC | Age: 58
End: 2023-01-30
Payer: COMMERCIAL

## 2023-01-30 VITALS
BODY MASS INDEX: 39.4 KG/M2 | HEART RATE: 76 BPM | HEIGHT: 68 IN | WEIGHT: 260 LBS | SYSTOLIC BLOOD PRESSURE: 130 MMHG | DIASTOLIC BLOOD PRESSURE: 80 MMHG | OXYGEN SATURATION: 98 %

## 2023-01-30 DIAGNOSIS — R20.0 NUMBNESS AND TINGLING OF RIGHT ARM: ICD-10-CM

## 2023-01-30 DIAGNOSIS — R20.2 NUMBNESS AND TINGLING OF RIGHT ARM: ICD-10-CM

## 2023-01-30 DIAGNOSIS — M43.6 STIFFNESS OF NECK: ICD-10-CM

## 2023-01-30 DIAGNOSIS — R29.898 WEAKNESS OF RIGHT ARM: Primary | ICD-10-CM

## 2023-01-30 PROCEDURE — 99214 OFFICE O/P EST MOD 30 MIN: CPT | Performed by: NURSE PRACTITIONER

## 2023-01-30 PROCEDURE — 3079F DIAST BP 80-89 MM HG: CPT | Performed by: NURSE PRACTITIONER

## 2023-01-30 PROCEDURE — 3075F SYST BP GE 130 - 139MM HG: CPT | Performed by: NURSE PRACTITIONER

## 2023-01-30 ASSESSMENT — ENCOUNTER SYMPTOMS
COUGH: 0
BLOOD IN STOOL: 0
VOMITING: 0
DIARRHEA: 0
WHEEZING: 0
ABDOMINAL PAIN: 0
SINUS PRESSURE: 0
CONSTIPATION: 0
SHORTNESS OF BREATH: 0
TROUBLE SWALLOWING: 0
NAUSEA: 0
SORE THROAT: 0

## 2023-01-30 ASSESSMENT — PATIENT HEALTH QUESTIONNAIRE - PHQ9
4. FEELING TIRED OR HAVING LITTLE ENERGY: 0
8. MOVING OR SPEAKING SO SLOWLY THAT OTHER PEOPLE COULD HAVE NOTICED. OR THE OPPOSITE, BEING SO FIGETY OR RESTLESS THAT YOU HAVE BEEN MOVING AROUND A LOT MORE THAN USUAL: 0
9. THOUGHTS THAT YOU WOULD BE BETTER OFF DEAD, OR OF HURTING YOURSELF: 0
5. POOR APPETITE OR OVEREATING: 0
2. FEELING DOWN, DEPRESSED OR HOPELESS: 0
10. IF YOU CHECKED OFF ANY PROBLEMS, HOW DIFFICULT HAVE THESE PROBLEMS MADE IT FOR YOU TO DO YOUR WORK, TAKE CARE OF THINGS AT HOME, OR GET ALONG WITH OTHER PEOPLE: 0
3. TROUBLE FALLING OR STAYING ASLEEP: 0
SUM OF ALL RESPONSES TO PHQ QUESTIONS 1-9: 0
1. LITTLE INTEREST OR PLEASURE IN DOING THINGS: 0
7. TROUBLE CONCENTRATING ON THINGS, SUCH AS READING THE NEWSPAPER OR WATCHING TELEVISION: 0
SUM OF ALL RESPONSES TO PHQ QUESTIONS 1-9: 0
SUM OF ALL RESPONSES TO PHQ QUESTIONS 1-9: 0
SUM OF ALL RESPONSES TO PHQ9 QUESTIONS 1 & 2: 0
SUM OF ALL RESPONSES TO PHQ QUESTIONS 1-9: 0
6. FEELING BAD ABOUT YOURSELF - OR THAT YOU ARE A FAILURE OR HAVE LET YOURSELF OR YOUR FAMILY DOWN: 0

## 2023-01-30 NOTE — PROGRESS NOTES
[address] PRIMARY CARE  [address] [facility] 15[address]  [address] [address]  Dept: [phone]  Dept Fax: [phone]    Lea Keating is a 59 y.o. female who presentstoday for her medical conditions/complaints as noted below. Lea Keating is c/o of  Chief Complaint   Patient presents with    Other     Lost control of right arm this morning, didn't last very long. She is having some numbness in her right palm off and on. Cramp feeling in armpit, that has since subsided, but has had that before. Only lasts for seconds. HPI:     Here today for sudden onset weakness and \"loss of control\" in her right arm  Went to lift toothbrush this AM and she was \"unable to control the arm \"  She was able to set the toothbrush down and then the arm felt tingly for about 1-2 minutes  As day progressed she has had intermittent tingling in her right hand, mainly the palm  She states she does have a history of neck soreness and has recently had quite a bit of tightness across the back of her neck  At the time of the event she denies any loss of consciousness, denies any change in her speech or facial drooping and has had not had any other concerning neurologic symptoms since that time      Hemoglobin A1C (%)   Date Value   2019 5.9   2018 5.7   2016 5.6             ( goal A1C is < 7)   No results found for: LABMICR  LDL Cholesterol (mg/dL)   Date Value   2022 106   2021 115   2020 90       (goal LDL is <100)   AST (U/L)   Date Value   2022 34 (H)     ALT (U/L)   Date Value   2022 24     BUN (mg/dL)   Date Value   2022 12   2022 12     BP Readings from Last 3 Encounters:   23 130/80   11/15/22 120/80   22 123/78          ([id number])    Past Medical History:   Diagnosis Date    Arthritis     Asthma     Benign familial tremor     CAD (coronary artery disease) 2021    mild per cardiac cath 2021; no stents. Dr. Bryant Parent last visit 10/2022    Cancer [facility])     skin    CKD (chronic kidney disease) stage 1, GFR 90 ml/min or greater     Class 2 severe obesity with serious comorbidity and body mass index (BMI) of 39.0 to 39.9 in adult ([facility] 05/01/2019    Cystinuria ([facility]     Dyspnea on exertion 08/06/2019    Flank pain     RIGHT    Gastroesophageal reflux disease without esophagitis     GERD (gastroesophageal reflux disease)     Hypertension     Hyperuricemia     Hypothyroidism     no meds    Kidney stones     Midline low back pain with right-sided sciatica 06/02/2016    Proteinuria     Shoulder impingement 12/2019    left    Snores     no sleep apnea per sleep study    Solitary kidney, acquired 11/20/2017    Urinary tract obstruction by kidney stone 08/01/2019    Wears glasses       Past Surgical History:   Procedure Laterality Date    CARDIAC CATHETERIZATION  12/08/2021    CYSTO/URETERO/PYELOSCOPY, CALCULUS TX Right 11/21/2017    HOLMIUM LASER - CYSTOSCOPY, RIGHT URETEROSCOPY, RIGHT STENT EXCHANGE,STONE BASKETING [facility] [address] [facility] [place] Right 09/13/2019    HOLMIUM - CYSTO, URETEROSCOPY, LITHOTRIPSY, STENT PLACEMENT performed [facility] Alisia Burns [place] [facility] [address] [facility] [place] [place] [facility] Right 12/12/2019    HOLMIUM- CYSTO, URETEROSCOPY, LASER LITHO, STENT PLACEMENT AND RIGHT URETERAL BASKET STONE EXTRACTION performed by Nahid Bellamy MD at [address] [facility] Right 11/18/2017    CYSTOSCOPY URETERAL STENT INSERTION,RIGHT performed by Yee Avalos MD at [id number] [place] [address] Right 08/02/2019    CYSTOSCOPY RT URETERAL STENT INSERTION performed by Alisia Burns MD at [address] [place] [address] Right 08/23/2019    CYSTOSCOPY, RIGHT STENT REMOVAL, INSERTION OCCLUSIVE BALLOON, PT GOING TO INTERVENTIONAL performed by Alisia Burns MD at [address] [facility] Right 09/19/2020    CYSTOSCOPY, URETEROSCOPY, HOLMIUM LASER LITHOTRIPSY, STENT PLACEMENT performed by Diamante Wise Isidoro Wang MD at [facility] OR    CYSTOSCOPY Right 11/1/2022    CYSTOSCOPY INSERTION OCCLUSIVE BALLOON performed by Bismark Amaya MD at [facility] OR    CYSTOSCOPY INSERTION / REMOVAL STENT / STONE Right 12/31/2019    CYSTOSCOPY, STENT REMOVAL performed by Bismark Amaya MD at [facility] OR    DILATION AND CURETTAGE OF UTERUS N/A 06/30/2022    DILATATION AND CURETTAGE, HYSTEROSCOPY performed by Loretta Chanel MD at [facility] OR    INTRAOCULAR LENS PROSTHESIS INSERTION Bilateral 2008    IR NEPHROSTOMY PERCUTANEOUS RIGHT  11/1/2022    IR NEPHROSTOMY PERCUTANEOUS RIGHT 11/1/2022 Xavi Agosto MD [facility] SPECIAL PROCEDURES    KIDNEY STONE REMOVAL Right 11/1/2022    RIGHT PERCUTANEOUS NEPHROLITHOTOMY, NEPHROSTOMY PLACEMENT performed by Bismark Amaya MD at [facility] OR    KNEE ARTHROSCOPY Right 1998, 2003    X2    NEPHROLITHOTOMY Right 08/23/2019    HOLMIUM LASER, NEPHROLITHOTOMY PERCUTANEOUS, LITHOCLAST, C-ARM performed by Venkat Wang MD at [facility] OR    PERCUTANEOUS NEPHROSTOMY Right 11/01/2022    RIGHT PERCUTANEOUS NEPHROLITHOTOMY, NEPHROSTOMY PLACEMENT    RI CYSTO W/INSERT URETERAL STENT Right 07/04/2018    CYSTOSCOPY URETERAL STENT INSERTION, RIGHT performed by Pedro Friend MD at [facility] OR    RI CYSTO W/URETEROSCOPY W/RMVL/MANJ STONES N/A 07/10/2018    CYSTOSCOPY, RIGHT URETEROSCOPY, HOLMIUM LASER LITHO WITH STONE BASKETING, RIGHT STENT EXCHANGE performed by Frederick Chaudhry Jr., MD at [facility] OR    RI CYSTO W/URETEROSCOPY W/RMVL/MANJ STONES Right 10/10/2018    HOLMIUM LASER STANDBY, CYSTO, RIGHT URETEROSCOPY, RIGHT STENT PLACEMENT performed by Bismark Amaya MD at [facility] OR    ROTATOR CUFF REPAIR Left     TOTAL NEPHRECTOMY Left 1988       Family History   Problem Relation Age of Onset    High Blood Pressure Mother     Dementia Mother     Diabetes Father     Cancer Father         tongue    Hypertension Father     Heart Disease Father         stents    Other Father         Mylofibrosis    Hypertension Sister     Heart Disease Maternal Grandmother   Heart Attack Maternal Grandmother     Prostate Cancer Maternal Grandfather     Emphysema Paternal Grandmother     Heart Disease Paternal Grandfather     Stroke Paternal Grandfather           Social History     Tobacco Use    Smoking status: Never    Smokeless tobacco: Never   Substance Use Topics    Alcohol use: Yes     Alcohol/week: 0.0 standard drinks     Comment: occasionally      Current Outpatient Medications   Medication Sig Dispense Refill    pantoprazole (PROTONIX) 20 MG tablet Take 1 tablet by mouth every morning (before breakfast) 90 tablet 3    meloxicam (MOBIC) 15 MG tablet TAKE 1 TABLET BY MOUTH ONE TIME A DAY 90 tablet 3    Metoprolol Succinate 25 MG CS24 Take 12.5 mg by mouth      famotidine (PEPCID) 20 MG tablet Take 1 tablet by mouth 2 times daily 60 tablet 5    ondansetron (ZOFRAN) 4 MG tablet Take 1 tablet by mouth daily as needed for Nausea or Vomiting 30 tablet 0    Melatonin 5 MG CAPS Take by mouth      B Complex Vitamins (VITAMIN B COMPLEX) TABS  (Patient not taking: Reported on 11/15/2022)      Ashwagandha 500 MG CAPS Take 1 capsule by mouth daily (Patient not taking: Reported on 11/15/2022)      vitamin D (D3-1000) 25 MCG (1000 UT) CAPS  (Patient not taking: Reported on 11/15/2022)      albuterol sulfate HFA (PROAIR HFA) 108 (90 Base) MCG/ACT inhaler Inhale 2 puffs into the lungs every 6 hours as needed for Wheezing 1 each 3    benzoyl peroxide 5 % external liquid Wash affected areas once daily 227 g 3    clindamycin (CLEOCIN T) 1 % lotion Apply to affected areas daily 60 mL 3    POTASSIUM CITRATE PO Take 5 mLs by mouth three times daily      OMEGA-3 FATTY ACIDS PO Take 1 tablet by mouth daily (Patient not taking: Reported on 11/15/2022)      acetaminophen (TYLENOL) 500 MG tablet Take 1,000 mg by mouth every 4 hours as needed for Pain. No current facility-administered medications for this visit.      No Known Allergies    Health Maintenance   Topic Date Due    Colorectal Cancer Screen  Never done    Shingles vaccine (1 of 2) Never done    A1C test (Diabetic or Prediabetic)  09/23/2020    COVID-19 Vaccine (4 - Booster for Moderna series) 12/21/2021    Flu vaccine (1) 08/01/2022    DTaP/Tdap/Td vaccine (1 - Tdap) 10/15/2023 (Originally 3/2/1984)    Depression Monitoring  01/30/2024    Breast cancer screen  12/15/2024    Cervical cancer screen  01/07/2025    Lipids  06/14/2027    Pneumococcal 0-64 years Vaccine  Completed    Hepatitis C screen  Completed    HIV screen  Completed    Hepatitis A vaccine  Aged Out    Hib vaccine  Aged Out    Meningococcal (ACWY) vaccine  Aged Out       Subjective:      Review of Systems   Constitutional:  Negative for activity change, appetite change, chills, fatigue, fever and unexpected weight change. HENT:  Negative for congestion, ear pain, hearing loss, sinus pressure, sore throat and trouble swallowing. Eyes:  Negative for visual disturbance. Respiratory:  Negative for cough, shortness of breath and wheezing. Cardiovascular:  Negative for chest pain, palpitations and leg swelling. Gastrointestinal:  Negative for abdominal pain, blood in stool, constipation, diarrhea, nausea and vomiting. Endocrine: Negative for cold intolerance, heat intolerance, polydipsia, polyphagia and polyuria. Genitourinary:  Negative for difficulty urinating, frequency, hematuria and urgency. Musculoskeletal:  Positive for arthralgias. Negative for myalgias. Skin:  Negative for rash. Allergic/Immunologic: Negative for environmental allergies. Neurological:  Positive for numbness (Intermittent right palm). Negative for dizziness, tremors, facial asymmetry, speech difficulty, weakness, light-headedness and headaches. Psychiatric/Behavioral:  Negative for confusion. The patient is not nervous/anxious. Objective:     Physical Exam  Constitutional:       Appearance: Normal appearance. She is well-developed. HENT:      Head: Normocephalic.    Eyes: Conjunctiva/sclera: Conjunctivae normal.      Pupils: Pupils are equal, round, and reactive to light. Cardiovascular:      Rate and Rhythm: Normal rate and regular rhythm. Heart sounds: Normal heart sounds. No murmur heard. Pulmonary:      Effort: Pulmonary effort is normal.      Breath sounds: Normal breath sounds. No wheezing. Abdominal:      General: Bowel sounds are normal. There is no distension. Palpations: Abdomen is soft. Musculoskeletal:         General: Normal range of motion. Cervical back: Normal range of motion. Skin:     General: Skin is warm and dry. Neurological:      Mental Status: She is alert and oriented to person, place, and time. Cranial Nerves: No cranial nerve deficit. Sensory: No sensory deficit. Motor: No weakness. Psychiatric:         Behavior: Behavior normal.         Thought Content: Thought content normal.         Judgment: Judgment normal.     /80   Pulse 76   Ht 5' 8\" (1.727 m)   Wt 260 lb (117.9 kg) Comment: patient reported  SpO2 98%   BMI 39.53 kg/m²     Assessment:       Diagnosis Orders   1. Weakness of right arm  CT HEAD W CONTRAST      2. Numbness and tingling of right arm  XR CERVICAL SPINE FLEXION AND EXTENSION    CT HEAD W CONTRAST      3. Stiffness of neck  XR CERVICAL SPINE FLEXION AND EXTENSION                Plan:      Return if symptoms worsen or fail to improve. Right arm weakness, numbness and tingling, stiff neck-duration of symptoms was very brief but some concern for possible TIA versus other neurological issue versus cervical radicular issue. Check CT head, x-ray cervical spine. Discussed if symptoms return to seek emergent treatment/call 911 as the best time for evaluation is at the time of the symptoms.   At the time of her office visit she is asymptomatic and her blood pressure is well controlled  Orders Placed This Encounter   Procedures    XR CERVICAL SPINE FLEXION AND EXTENSION     Standing Status: Future     Standing Expiration Date:   1/30/2024     Order Specific Question:   Reason for exam:     Answer:   cervical pain    CT HEAD W CONTRAST     Standing Status:   Future     Standing Expiration Date:   1/30/2024     Order Specific Question:   STAT Creatinine as needed:     Answer: Yes            Patient given educational materials - see patient instructions. Discussed use, benefit, and side effects of prescribed medications. All patientquestions answered. Pt voiced understanding. Reviewed health maintenance. Instructedto continue current medications, diet and exercise. Patient agreed with treatmentplan. Follow up as directed.      Electronicallysigned by MIGDALIA Mendoza CNP on 1/30/2023 at 5:31 PM

## 2023-01-31 ENCOUNTER — HOSPITAL ENCOUNTER (OUTPATIENT)
Dept: PHYSICAL THERAPY | Facility: CLINIC | Age: 58
Setting detail: THERAPIES SERIES
Discharge: HOME | End: 2023-01-31
Payer: COMMERCIAL

## 2023-01-31 NOTE — FLOWSHEET NOTE
[] [facility]) [facility] &  Therapy  [address]    [phone]  F: [phone]   [] [email or web address]  [facility]   [address]  P: [phone]  F: [phone]  [] [address]  [address]  P: [phone]  F: [phone] [] [address]  P: [phone]  F: [phone]  [] [address]  [address] [facility] [address]   [address]   [place]: [phone]  F: [phone]   [] [facility]  [address] [facility] [address]  [place]: [phone]   F: [phone]     Physical Therapy Cancel/No Show note    Date: 2023  Patient: Alison Gonzalez  : 1965  MRN: [id number]    Cancels/No Shows to date:     For today's appointment patient:    [x]  Cancelled    [] Rescheduled appointment    [] No-show     Reason given by patient:    []  Patient ill    []  Conflicting appointment    [] No transportation      [] Conflict with work    [] No reason given    [] Weather related    [] COVID-19    [x] Other:      Comments: flat tire         [] Next appointment was confirmed    Electronically signed by: Marcelino Edward

## 2023-02-02 ENCOUNTER — HOSPITAL ENCOUNTER (OUTPATIENT)
Dept: PHYSICAL THERAPY | Facility: CLINIC | Age: 58
Setting detail: THERAPIES SERIES
Discharge: HOME | End: 2023-02-02
Payer: COMMERCIAL

## 2023-02-02 PROCEDURE — 97113 AQUATIC THERAPY/EXERCISES: CPT

## 2023-02-02 NOTE — FLOWSHEET NOTE
[] [facility]) - [facility] &  Therapy  [address]  [phone]  F: [phone] [] [address]  [facility] [address]   [address]  P: [phone]  F: [phone] [x] [address]  [address]  P: [phone]  F: [phone] [] [address]  P: [phone]  F: [phone] [] [address]  [facility]   [address]   [place]: [phone]  F: [phone]      Physical Therapy Daily Aquatic Treatment Note    Date:  2023  Patient Name:  Girish Saucedo    :  1965  MRN: [id number]  Physician: MIGDALIA Zamora - FRANCISCO                          Insurance: [address] [place] [address] vs [facility] after 30  Medical Diagnosis: M25.561, M25.562, G89.29 (ICD-10-CM) - Chronic pain of both knees                Rehab Codes: M25. 273, M25.661, M25.562, M25. 662  Onset date: 11/15/22                                                             Next 's appt. : --  Visit# / total visits:     Cancels/No Shows: 0/0    Subjective:   Pain:  [x] Yes  [] No Location: L/R knee  Pain Rating: (0-10 scale) L knee 3/10, R knee 2/10  Pain altered Tx:  [x] No  [] Yes  Action:  Comments: Pt reports 3 days ago she was brushing her teeth and lost control of her R arm, sx resolved after about 1 minute. She went to her PCP later that day and she ordered a CT of her head and xray of her neck which she is scheduled for tomorrow. Pt thinks her new reflux medicine is causing increased joint pain. Objective:     Todays Treatment:  Precautions: General  KEY  B = Belt G = Gloves P= Paddles   C = Collar K = Kickboard T = Theratube   DB = Dumbells N = Noodle W = Weights     Exercises/Activities  Warm-up/Amb 2/2 Dynamic Exercises 2/2   Forward 3L March 3L   Sideways 3L Squat    Backwards 3L Retro HS curls 3L     Retro SLR    Stretches  Braiding    Gastroc/Soleus 3x30\" wall Heel to Toe amb    Hamstring 3x30\" at wall  Toe amb    Hip flexor  Heel amb    Piriformis      SKTC      Pec Stretch      Post Deltoid  Static Exercises UE Gloves  - Held 2/2     Shoulder flex/ext 20   Static Exercises LE  Shoulder abd/add 20   Heel/toe raises 20 Shoulder H.  abd/add    Marches 20 Shoulder IR/ER    Mini-squats 20 Rowing 20   4-way hip  20 Arm Circles    Hamstring curls 20 UT shrugs/rolls 20 post   Hip Circles/Fig 8  Scap squeezes    Ankle ROM  Diagonals 1/2    Lunges  10 Elbow flex/ext      Pron/Sup    Functional Exercise  Wrist AROM    Step 20 fwd, 10 lat     Wall Push-ups  Deep H20/    SLS  Bike 3m   Breast Stroke on Noodle  Hip abd/add 3m   Noodle Twist  Hip flex/ext    Noodle Push down  Hip IR/ER    Kickboard push/pull  Knee flex/ext      Push/pull on [employer] 5-10m   Other:    Specific Instructions for next treatment: Continue tx per POC    Treatment Charges: Mins Units   []  Modalities     []  Ther Exercise     []  Manual Therapy     []  Ther Activities     [x]  Aquatics 35 2   []  Vasocompression     []  Other     Total Treatment time         Assessment: [x] Progressing toward goals. Cont with aquatic ex as noted in log above with fair tolerance. Cues with ambulation laps for heel strike and increased knee flexion. Some facial grimacing noted with lunges, had her move to deeper water to reduce WB with improved tolerance. Ended with deep water ex for joint decompression and pain relief. [] No change. [] Other:   [x] Patient would continue to benefit from skilled physical therapy services in order to: Improve paresh knee ROM, improve paresh LE flexibility, improve paresh LE strength, improve gait technique, and reduce pain.        Goals  MET NOT MET ON-  GOING  Details   Date Addressed:           STG: To be met in 8 treatments            1. ? Pain: Decrease pain levels to 6/10 with activity to help improve her tolerance to working out recreationally.  []  []  []      2. ? ROM: Increase flexibility in paresh knees to -5-105 to help improve ability to ride a bike. []  []  []      3. Improve score on assessment tool LEFS from 72% impairment to less than 50% impairment , demonstrating improved tolerance to activity to ease riding her bike. []  []  []      4. Independent with Home Exercise Programs []  []  []        []  []  []      Date Addressed:            LTG: To be met in 16 treatments           1. Improve score on assessment tool LEFS from 72% impairment to less than 40% impairment , demonstrating improved tolerance to activity to ease riding her bike. []  []  []      2. Reduce pain levels to 2/10 or less with activity to help allow her to return to riding her bike. []  []  []      3. ? Strength: Increase paresh LE strength to 4+/5 grossly to help improve LE stability and ease transfers. []  []  []                     Patient goals: Increased ROM, be able to start exercising    Pt. Education:  [x] Yes  [] No  [x] Reviewed Prior HEP/Ed  Method of Education: [x] Verbal  [x] Demo  [x] Written  Access Code: [email or web address]  URL: [email or web address]  Date: 11/29/2022  Prepared by: García Knutson    Exercises  Supine Chest Stretch with Elbows Bent - 2-3 x daily - 7 x weekly - 3 sets - 30\" hold  Long Sitting Calf Stretch with Strap - 2-3 x daily - 7 x weekly - 3 sets - 30\" hold  Prone Quadriceps Stretch with Strap - 2-3 x daily - 7 x weekly - 3 sets - 30\" hold  Seated Hamstring Stretch - 2-3 x daily - 7 x weekly - 3 sets - 30\" hold  Seated Long Arc Quad - 2 x daily - 7 x weekly - 2 sets - 10 reps - 5\" hold  Supine Quad Set - 2 x daily - 7 x weekly - 2 sets - 10 reps - 5\" hold  Supine Bridge - 2 x daily - 7 x weekly - 2 sets - 10 reps - 3\" hold  Supine Heel Slide with Strap - 2 x daily - 7 x weekly - 2 sets - 10 reps - 5\" hold    Comprehension of Education:  [x] Verbalizes understanding.   [x] Demonstrates understanding. [x] Needs review. [] Demonstrates/verbalizes HEP/Ed previously given. Plan: [x] Continue current frequency toward long and short term goals.     [x] Specific Instructions for next treatment: Continue tx per POC    Time In: 5:05 pm   Time Out: 6:00 pm    Electronically signed by:  Alison Perkins PTA

## 2023-02-03 ENCOUNTER — HOSPITAL ENCOUNTER (OUTPATIENT)
Dept: CT IMAGING | Age: 58
Discharge: HOME | End: 2023-02-03
Payer: COMMERCIAL

## 2023-02-03 ENCOUNTER — HOSPITAL ENCOUNTER (OUTPATIENT)
Dept: GENERAL RADIOLOGY | Age: 58
End: 2023-02-03
Payer: COMMERCIAL

## 2023-02-03 ENCOUNTER — HOSPITAL ENCOUNTER (OUTPATIENT)
Age: 58
End: 2023-02-03
Payer: COMMERCIAL

## 2023-02-03 ENCOUNTER — HOSPITAL ENCOUNTER (OUTPATIENT)
Age: 58
Discharge: HOME | End: 2023-02-03
Payer: COMMERCIAL

## 2023-02-03 DIAGNOSIS — R20.0 NUMBNESS AND TINGLING OF RIGHT ARM: ICD-10-CM

## 2023-02-03 DIAGNOSIS — R20.2 NUMBNESS AND TINGLING OF RIGHT ARM: ICD-10-CM

## 2023-02-03 DIAGNOSIS — R29.898 WEAKNESS OF RIGHT ARM: ICD-10-CM

## 2023-02-03 DIAGNOSIS — R20.0 NUMBNESS AND TINGLING OF RIGHT ARM: Primary | ICD-10-CM

## 2023-02-03 DIAGNOSIS — M43.6 STIFFNESS OF NECK: ICD-10-CM

## 2023-02-03 DIAGNOSIS — R20.2 NUMBNESS AND TINGLING OF RIGHT ARM: Primary | ICD-10-CM

## 2023-02-03 LAB
CREAT SERPL-MCNC: 0.78 MG/DL (ref 0.5–0.9)
GFR SERPL CREATININE-BSD FRML MDRD: >60 ML/MIN/1.73M2

## 2023-02-03 PROCEDURE — 6360000004 HC RX CONTRAST MEDICATION: Performed by: NURSE PRACTITIONER

## 2023-02-03 PROCEDURE — 72040 X-RAY EXAM NECK SPINE 2-3 VW: CPT

## 2023-02-03 PROCEDURE — 70460 CT HEAD/BRAIN W/DYE: CPT

## 2023-02-03 PROCEDURE — 82565 ASSAY OF CREATININE: CPT

## 2023-02-03 PROCEDURE — 36415 COLL VENOUS BLD VENIPUNCTURE: CPT

## 2023-02-03 RX ADMIN — IOPAMIDOL 75 ML: 755 INJECTION, SOLUTION INTRAVENOUS at 14:10

## 2023-02-04 DIAGNOSIS — R29.898 WEAKNESS OF RIGHT ARM: ICD-10-CM

## 2023-02-04 DIAGNOSIS — M47.812 SPONDYLOSIS OF CERVICAL SPINE: Primary | ICD-10-CM

## 2023-02-07 ENCOUNTER — HOSPITAL ENCOUNTER (OUTPATIENT)
Dept: PHYSICAL THERAPY | Facility: CLINIC | Age: 58
Setting detail: THERAPIES SERIES
Discharge: HOME | End: 2023-02-07
Payer: COMMERCIAL

## 2023-02-07 PROCEDURE — 97110 THERAPEUTIC EXERCISES: CPT

## 2023-02-07 PROCEDURE — 97140 MANUAL THERAPY 1/> REGIONS: CPT

## 2023-02-07 PROCEDURE — 97016 VASOPNEUMATIC DEVICE THERAPY: CPT

## 2023-02-07 NOTE — FLOWSHEET NOTE
[] [facility]) - [facility] CENTER &  Therapy  [address]  [phone]  F: [phone] [] [address]  [employer] [address]   [address]  P: [phone]  F: [phone] [x] [address]  [address]  P: [phone]  F: [phone] [] [address]  P: [phone]  F: [phone] [] [address]  [facility]   [address]   [place]: [phone]  F: [phone]      Physical Therapy Daily Treatment Note/Progress Report    Date:  2023  Patient Name:  Evlyn Dubin    :  1965  MRN: [id number]  Physician: MIGDALIA Peck - CNP                          Insurance: [address] [place] [address] vs [facility] after [facility]  Medical Diagnosis: M25.561, M25.562, G89.29 (ICD-10-CM) - Chronic pain of both knees                Rehab Codes: M25. 120, M25.661, M25.562, M25. 662  Onset date: 11/15/22                                                             Next 's appt. : --  Visit# / total visits:         Cancels/No Shows: 0/0    Subjective:   Pain:  [x] Yes  [] No  Location: R knee  Pain Rating: (0-10 scale) 4/10 (paresh knee)   Pain altered Tx:  [x] No  [] Yes  Action:  Comments: Pt arrives today with paresh knee pain, stating it feels like arthritic pain, and feels like it is medication related. Pt states she is feeling swollen. States she has been compliant with her stretches and HEP.      Objective:  Modalities: Vasocompression to B knees min compression, 34 degrees x15min  Manual: hypervolt to Bilateral post hip area/ calves/ HS/  Precautions: General  Exercise  Paresh Knee Pain Reps/ Time Weight/ Level Comments     Nustep 8 L3   x               SB S 3x30\"     x   HS step stretch 3x30\"     x   Knee flexion stool S 3x30\"  NT   pain   Prone quad S 3x30\" ea      x   Rocking piriformis S 3x30\" ea   x          SLR 2x10 ea        Heel slides 2x10 ea       Bridges 2x10 ea    1 set on 2/7/23 x   Clamshells 2x15 ea  orange     SL hip abd 2x10 ea        Prone HS curls x10 ea   Bilat      SAQ 2x10        Quad sets 2x10        LAQ 3x10 B     x               // HR x20     x   // step up x20 4\" Fwd/lateral    // mini squat x20        // mini lunge x10        // 3-way hip x20 orange      // marches x20   x   // HS curls x20   x          SB marches x20 plum     SB LAQ X10 ea plum         Specific Instructions for next treatment: Continue tx per POC, postural edu at trunk    Treatment Charges: Mins Units   []  Modalities     [x]  Ther Exercise 27 2   [x]  Manual Therapy 13 1   []  Ther Activities     []  Aquatics     [x]  Vasocompression 15 1   []  Other: Total Treatment time 55 4       Assessment: [x] Progressing toward goals. Began today's tx with warmup and stretching to inc blood flow, reduce muscle tension, and increase ROM. Added paresh piriformis stretch d/t pt being TTP with max tension during assessment of mm. Cont with manual to post hip area, hamstrings, and calves with mod/max muscle tightness noted, but good release noted per pt. Cont with supine ex above with poor sadia noted, dec to one set, but pt was not able to sadia 10 reps d/t pain and discomfort. Pt demo'd painful behaviors, such as facial grimacing and gasping throughout ex. Pt experienced a cramp in (R) calf and (L) hamstring with supine bridges and could not lay supine any longer, therefore moved onto seated and standing ex, pt demo'd good sadia. Inc time and rest breaks needed to complete ex d/t pt being sig painful this tx. Concluded with vaso to paresh knees to reduce soreness post tx. Pt cont to show concerns about the amount of pain and discomfort she is experiencing, edu on what pt can do on her own at home and to follow up with doc if sx do not improve. [] No change.      [] Other:   [x] Patient would continue to benefit from skilled physical therapy services in order to: Improve paresh knee ROM, improve paresh LE flexibility, improve paresh LE strength, improve gait technique, and reduce pain. Goals  MET NOT MET ON-  GOING  Details   Date Addressed: 12/21/22           STG: To be met in 8 treatments            1. ? Pain: Decrease pain levels to 6/10 with activity to help improve her tolerance to working out recreationally.  []  []  [x]  Reports still up to 9/10    2. ? ROM: Increase flexibility in paresh knees to -5-105 to help improve ability to ride a bike. []  []  [x]  L: -17 -100   R: -14-92   3. Improve score on assessment tool LEFS from 72% impairment to less than 50% impairment , demonstrating improved tolerance to activity to ease riding her bike. []  []  [x]  Pt scored 58% impaired    4. Independent with Home Exercise Programs [x]  []  []        []  []  []      Date Addressed:            LTG: To be met in 16 treatments           1. Improve score on assessment tool LEFS from 72% impairment to less than 40% impairment , demonstrating improved tolerance to activity to ease riding her bike. []  []  []      2. Reduce pain levels to 2/10 or less with activity to help allow her to return to riding her bike. []  []  []      3. ? Strength: Increase paresh LE strength to 4+/5 grossly to help improve LE stability and ease transfers. []  []  []                     Patient goals: Increased ROM, be able to start exercising    Pt. Education:  [x] Yes  [] No  [x] Reviewed Prior HEP/Ed  Method of Education: [x] Verbal  [x] Demo  [] Written  Comprehension of Education:  [x] Verbalizes understanding. [x] Demonstrates understanding. [x] Needs review. [] Demonstrates/verbalizes HEP/Ed previously given. Plan: [x] Continue current frequency toward long and short term goals.     [x] Specific Instructions for next treatment: Continue tx per POC      Time In: 5:00 pm   Time Out: 6:12  pm    Electronically signed by:  Meggan Butt Elenita    The above documentation was completed by Armida Ocampo, Student Physical Therapist Assistant, was reviewed and accepted by supervising Clinical Instructor Kelsey Landaverde PTA.

## 2023-02-08 ENCOUNTER — TELEPHONE (OUTPATIENT)
Dept: OBGYN CLINIC | Age: 58
End: 2023-02-08

## 2023-02-08 NOTE — TELEPHONE ENCOUNTER
Pt called she is wondering if she can get lab work for [facility] she would like to go through them for her hormones please advise

## 2023-02-09 ENCOUNTER — HOSPITAL ENCOUNTER (OUTPATIENT)
Dept: PHYSICAL THERAPY | Facility: CLINIC | Age: 58
Setting detail: THERAPIES SERIES
Discharge: HOME | End: 2023-02-09
Payer: COMMERCIAL

## 2023-02-09 ENCOUNTER — APPOINTMENT (OUTPATIENT)
Dept: PHYSICAL THERAPY | Facility: CLINIC | Age: 58
End: 2023-02-09
Payer: COMMERCIAL

## 2023-02-09 PROCEDURE — 97113 AQUATIC THERAPY/EXERCISES: CPT

## 2023-02-09 NOTE — FLOWSHEET NOTE
[] [facility]) - [facility] CENTER &  Therapy  [address]  [phone]  F: [phone] [] [address]  [facility]   [address]  P: [phone]  F: [phone] [x] [address]  [address] [facility]  P: [phone]  F: [phone] [] [address] [facility]  P: [phone]  F: [phone] [] [address]  [facility]   [address]   [place]: [phone]  F: [phone]      Physical Therapy Daily Aquatic Treatment Note    Date:  2023  Patient Name:  Evlyn Dubin    :  1965  MRN: [id number]  Physician: MIGDALIA Peck - CNP                          Insurance: [address] [place] [address] vs [facility]  Medical Diagnosis: M25.561, M25.562, G89.29 (ICD-10-CM) - Chronic pain of both knees                Rehab Codes: M25. 206, M25.661, M25.562, M25. 662  Onset date: 11/15/22                                                             Next 's appt. : --  Visit# / total visits:     Cancels/No Shows: 0/0    Subjective:   Pain:  [x] Yes  [] No Location: L/R knee  Pain Rating: (0-10 scale) L knee 3/10, R knee 3/10  Pain altered Tx:  [x] No  [] Yes  Action:  Comments: Pt reports very sore after last visit on land. Had a busy day at work today so increased pain today as well. Objective:     Todays Treatment:  Precautions: General  KEY  B = Belt G = Gloves P= Paddles   C = Collar K = Kickboard T = Theratube   DB = Dumbells N = Noodle W = Weights     Exercises/Activities  Warm-up/Amb 2/9 Dynamic Exercises 2/9   Forward 3L March 3L   Sideways 3L Squat    Backwards 3L Retro HS curls 3L     Retro SLR    Stretches  Braiding    Gastroc/Soleus 3x30\" wall Heel to Toe amb    Hamstring 3x30\" at wall  Toe amb    Hip flexor  Heel amb   Piriformis      SKTC      Pec Stretch      Post Deltoid  Static Exercises UE Gloves  - Held 2/9     Shoulder flex/ext 20   Static Exercises LE  Shoulder abd/add 20   Heel/toe raises 20 Shoulder H.  abd/add    Marches 20 Shoulder IR/ER    Mini-squats 20 Rowing 20   4-way hip  20 Arm Circles    Hamstring curls 20 UT shrugs/rolls 20 post   Hip Circles/Fig 8  Scap squeezes    Ankle ROM  Diagonals 1/2    Lunges  20 Elbow flex/ext      Pron/Sup    Functional Exercise  Wrist AROM    Step 20 fwd/lat     Wall Push-ups  Deep H20/    SLS  Bike 3m   Breast Stroke on Noodle  Hip abd/add 3m   Noodle Twist  Hip flex/ext    Noodle Push down  Hip IR/ER    Kickboard push/pull  Knee flex/ext      Push/pull on Rail      Hang 5-10m   Other:    Specific Instructions for next treatment: Continue tx per POC    Treatment Charges: Mins Units   []  Modalities     []  Ther Exercise     []  Manual Therapy     []  Ther Activities     [x]  Aquatics 35 2   []  Vasocompression     []  Other     Total Treatment time         Assessment: [x] Progressing toward goals. Cont with aquatic ex as noted in log above with good tolerance. Able to increase reps for lateral step ups and lunges. She reports overall less pain with ex in the pool compared to land. Ended with deep water ex to reduce WB and promote pain relief. She reports feeling better in the water.       [] No change.     [] Other:   [x] Patient would continue to benefit from skilled physical therapy services in order to: Improve paresh knee ROM, improve paresh LE flexibility, improve paresh LE strength, improve gait technique, and reduce pain.       Goals  MET NOT MET ON-  GOING  Details   Date Addressed:           STG: To be met in 8 treatments            1. ? Pain: Decrease pain levels to 6/10 with activity to help improve her tolerance to working out recreationally.  []  []  []      2. ? ROM: Increase flexibility in paresh knees to -5-105 to help improve ability to ride a bike. []  []  []      3.  Improve score on assessment tool LEFS from 72% impairment to less than 50% impairment , demonstrating improved tolerance to activity to ease riding her bike. []  []  []      4. Independent with Home Exercise Programs []  []  []        []  []  []      Date Addressed:            LTG: To be met in 16 treatments           1. Improve score on assessment tool LEFS from 72% impairment to less than 40% impairment , demonstrating improved tolerance to activity to ease riding her bike. []  []  []      2. Reduce pain levels to 2/10 or less with activity to help allow her to return to riding her bike. []  []  []      3. ? Strength: Increase paresh LE strength to 4+/5 grossly to help improve LE stability and ease transfers. []  []  []                     Patient goals: Increased ROM, be able to start exercising    Pt. Education:  [x] Yes  [] No  [x] Reviewed Prior HEP/Ed  Method of Education: [x] Verbal  [x] Demo  [x] Written  Access Code: [email or web address]  URL: [email or web address]  Date: 11/29/2022  Prepared by: Nguyễn Gaffney    Exercises  Supine Chest Stretch with Elbows Bent - 2-3 x daily - 7 x weekly - 3 sets - 30\" hold  Long Sitting Calf Stretch with Strap - 2-3 x daily - 7 x weekly - 3 sets - 30\" hold  Prone Quadriceps Stretch with Strap - 2-3 x daily - 7 x weekly - 3 sets - 30\" hold  Seated Hamstring Stretch - 2-3 x daily - 7 x weekly - 3 sets - 30\" hold  Seated Long Arc Quad - 2 x daily - 7 x weekly - 2 sets - 10 reps - 5\" hold  Supine Quad Set - 2 x daily - 7 x weekly - 2 sets - 10 reps - 5\" hold  Supine Bridge - 2 x daily - 7 x weekly - 2 sets - 10 reps - 3\" hold  Supine Heel Slide with Strap - 2 x daily - 7 x weekly - 2 sets - 10 reps - 5\" hold    Comprehension of Education:  [x] Verbalizes understanding. [x] Demonstrates understanding. [x] Needs review. [] Demonstrates/verbalizes HEP/Ed previously given. Plan: [x] Continue current frequency toward long and short term goals.     [x] Specific Instructions for next treatment: Continue tx per POC    Time In: 5:05 pm   Time Out: 6:00 pm    Electronically signed by:  Yun Henley PTA

## 2023-02-10 DIAGNOSIS — N95.0 POSTMENOPAUSAL BLEEDING: Primary | ICD-10-CM

## 2023-02-15 ENCOUNTER — HOSPITAL ENCOUNTER (OUTPATIENT)
Dept: PHYSICAL THERAPY | Facility: CLINIC | Age: 58
Setting detail: THERAPIES SERIES
End: 2023-02-15
Payer: COMMERCIAL

## 2023-02-16 ENCOUNTER — HOSPITAL ENCOUNTER (OUTPATIENT)
Dept: PHYSICAL THERAPY | Facility: CLINIC | Age: 58
Setting detail: THERAPIES SERIES
Discharge: HOME | End: 2023-02-16
Payer: COMMERCIAL

## 2023-02-16 PROCEDURE — 97113 AQUATIC THERAPY/EXERCISES: CPT

## 2023-02-16 NOTE — FLOWSHEET NOTE
[] [facility]) [facility] CENTER &  Therapy  [address]  [phone]  F: [phone] [] [address]  [facility]   [address]  P: [phone]  F: [phone] [x] [address]  [address] [facility] [address]  P: [phone]  F: [phone] [] [address]  P: [phone]  F: [phone] [] [address]  [facility]   [address]   [place]: [phone]  F: [phone]      Physical Therapy Daily Aquatic Treatment Note    Date:  2023  Patient Name:  Melody Castañeda    :  1965  MRN: [id number]  Physician: MIGDALIA Gonsalez - FRANCISCO                          Insurance: [address] [place] [address] vs [facility] after 30  Medical Diagnosis: M25.561, M25.562, G89.29 (ICD-10-CM) - Chronic pain of both knees                Rehab Codes: M25. 926, M25.661, M25.562, M25. 662  Onset date: 11/15/22                                                             Next 's appt. : --  Visit# / total visits:  (10 in )   Cancels/No Shows: 0/0    Subjective:   Pain:  [] Yes  [x] No Location: L/R knee  Pain Rating: (0-10 scale) L knee 0/10, R knee 0/10  Pain altered Tx:  [x] No  [] Yes  Action:  Comments: Pt states she is pain free at arrival. Notes some stiffness in quads and LB. Objective:     Todays Treatment:  Precautions: General  KEY  B = Belt G = Gloves P= Paddles   C = Collar K = Kickboard T = Theratube   DB = Dumbells N = Noodle W = Weights     Exercises/Activities  Warm-up/Amb  Dynamic Exercises    Forward 3L March 3L   Sideways 3L Squat    Backwards 3L Retro HS curls 3L     Retro SLR    Stretches  Braiding    Gastroc/Soleus 3x30\" wall Heel to Toe amb    Hamstring 3x30\" at wall  Toe amb    Hip flexor  Heel amb    Piriformis      SKTC Pec Stretch      Post Deltoid  Static Exercises UE Gloves  - Held 2/16     Shoulder flex/ext 20   Static Exercises LE  Shoulder abd/add 20   Heel/toe raises 20 Shoulder H.  abd/add    Marches 20 Shoulder IR/ER    Mini-squats 20 Rowing 20   4-way hip  20 Arm Circles    Hamstring curls 20 UT shrugs/rolls 20 post   Hip Circles/Fig 8  Scap squeezes    Ankle ROM  Diagonals 1/2    Lunges  20 Elbow flex/ext      Pron/Sup    Functional Exercise  Wrist AROM    Step 20 fwd/lat     Wall Push-ups  Deep H20/    SLS  Bike 3m   Breast Stroke on Noodle  Hip abd/add 3m   Noodle Twist  Hip flex/ext    Noodle Push down  Hip IR/ER    Kickboard push/pull  Knee flex/ext      Push/pull on [employer] 5-10m   Other:    Specific Instructions for next treatment: Continue tx per POC    Treatment Charges: Mins Units   []  Modalities     []  Ther Exercise     []  Manual Therapy     []  Ther Activities     [x]  Aquatics 35 2   []  Vasocompression     []  Other     Total Treatment time         Assessment: [x] Progressing toward goals. Warm up performed at increased speed today. Cues for heel strike and toe off as well as increased knee flexion, good carry over. Min cues for ex recall. Instruction provided to keep ex within comfortable ROM. Ended with deep water ex for further strengthening as well as pain relief and joint decompression. Scheduled for recheck with primary therapist at next visit. [] No change. [] Other:   [x] Patient would continue to benefit from skilled physical therapy services in order to: Improve paresh knee ROM, improve paresh LE flexibility, improve paresh LE strength, improve gait technique, and reduce pain.        Goals  MET NOT MET ON-  GOING  Details   Date Addressed:           STG: To be met in 8 treatments            1. ? Pain: Decrease pain levels to 6/10 with activity to help improve her tolerance to working out recreationally.  []  []  []      2. ? ROM: Increase flexibility in paresh knees to -5-105 to help improve ability to ride a bike. []  []  []      3. Improve score on assessment tool LEFS from 72% impairment to less than 50% impairment , demonstrating improved tolerance to activity to ease riding her bike. []  []  []      4. Independent with Home Exercise Programs []  []  []        []  []  []      Date Addressed:            LTG: To be met in 16 treatments           1. Improve score on assessment tool LEFS from 72% impairment to less than 40% impairment , demonstrating improved tolerance to activity to ease riding her bike. []  []  []      2. Reduce pain levels to 2/10 or less with activity to help allow her to return to riding her bike. []  []  []      3. ? Strength: Increase paresh LE strength to 4+/5 grossly to help improve LE stability and ease transfers. []  []  []                     Patient goals: Increased ROM, be able to start exercising    Pt. Education:  [x] Yes  [] No  [x] Reviewed Prior HEP/Ed  Method of Education: [x] Verbal  [x] Demo  [x] Written  Access Code: [email or web address]  URL: [email or web address]  Date: 11/29/2022  Prepared by: Harpal Yanceying    Exercises  Supine Chest Stretch with Elbows Bent - 2-3 x daily - 7 x weekly - 3 sets - 30\" hold  Long Sitting Calf Stretch with Strap - 2-3 x daily - 7 x weekly - 3 sets - 30\" hold  Prone Quadriceps Stretch with Strap - 2-3 x daily - 7 x weekly - 3 sets - 30\" hold  Seated Hamstring Stretch - 2-3 x daily - 7 x weekly - 3 sets - 30\" hold  Seated Long Arc Quad - 2 x daily - 7 x weekly - 2 sets - 10 reps - 5\" hold  Supine Quad Set - 2 x daily - 7 x weekly - 2 sets - 10 reps - 5\" hold  Supine Bridge - 2 x daily - 7 x weekly - 2 sets - 10 reps - 3\" hold  Supine Heel Slide with Strap - 2 x daily - 7 x weekly - 2 sets - 10 reps - 5\" hold    Comprehension of Education:  [x] Verbalizes understanding. [x] Demonstrates understanding. [x] Needs review. [] Demonstrates/verbalizes HEP/Ed previously given.        Plan: [x] Continue current frequency toward long and short term goals.     [x] Specific Instructions for next treatment: Continue tx per POC    Time In: 3:25 pm  Time Out: 4:20 pm    Electronically signed by:  Nicky Bumpers, PTA

## 2023-02-23 ENCOUNTER — HOSPITAL ENCOUNTER (OUTPATIENT)
Dept: PHYSICAL THERAPY | Facility: CLINIC | Age: 58
Setting detail: THERAPIES SERIES
Discharge: HOME | End: 2023-02-23
Payer: COMMERCIAL

## 2023-02-23 PROCEDURE — 97113 AQUATIC THERAPY/EXERCISES: CPT

## 2023-02-23 NOTE — PROGRESS NOTES
[] [facility]) - [facility] &  Therapy  [address]  [phone]  F: [phone] [] [address]  [employer] [address]   [address]  P: [phone]  F: [phone] [x] [address]  [address] [facility] [address]  P: [phone]  F: [phone] [] [address]  P: [phone]  F: [phone] [] [address]  [facility]   [address]   [place]: [phone]  F: [phone]      Physical Therapy Progress Report    Date:  2023  Patient Name:  Lauren Kennedy    :  1965  MRN: [id number]  Physician: MIGDALIA Ward - FRANCISCO                          Insurance: [address] [place] [address] vs [facility] after 30  Medical Diagnosis: M25.561, M25.562, G89.29 (ICD-10-CM) - Chronic pain of both knees                Rehab Codes: M25. 352, M25.661, M25.562, M25. 662  Onset date: 11/15/22                                                             Next 's appt. : --  Visit# / total visits:     Cancels/No Shows: 0/0    Subjective: Pt reporting to PT with no pain stating that she feels good after getting out of the pool.   Pain:  [] Yes  [x] No  Location: L/R knee   Pain Rating: (0-10 scale) 0/10  Pain altered Tx:  [x] No  [] Yes  Action:  Comments:     Objective:  Modalities: Vasocompression to B knees min compression, 34 degrees x15min- Held today  Precautions: General  Exercise  Jb Knee Pain Reps/ Time Weight/ Level Comments     Nustep 10'  L3                 Anterior tib stretch 3x30\"        SB S 3x30\"        HS step stretch 3x30\"        Strap Calf Stretch 3x30\"        Hip flexor step S 3x20\"        Prone quad S 3x30\"        Knee flexion stool S 3x30\"                   SLR 2x15 ea        Heel slides 2x10 ea        Bridges 2x10 ea        Clamshells 2x15 ea        SL hip abd 2x10 ea        Prone HS curls x10 ea   Bilat      SAQ 2x10        Quad sets 10x5\"         LAQ x10 B                                 TG Squats                       TA sets 20x5\"         TA marchanurag           Manual: hypervolt to B calves/ HS/ quad with light pressure- Held today        Specific Instructions for next treatment: Pt to be DC to TEP    Treatment Charges: Mins Units   []  Modalities     []  Ther Exercise     []  Manual Therapy     []  Ther Activities     []  Aquatics     []  Vasocompression     [x]  Other: Re-assess 26 0   Total Treatment time 26 0       Assessment: [x] Progressing toward goals. Since last progress report, pt reports that she was seeing god progress in her knees, however had a set back a few weeks ago which led to higher pain levels in her knees and increased difficulty with exercises. Per pt, she reports that she was unaware of what led to her increased pain. Since her last re-assessment, pt continues to display similar knee ROM bilaterally, however has demonstrated improved score on LEFS and strength compared to evaluation. However, pt still with self-reported limitations and impairment at this time. D/t pt still reporting deficits and having completed 28 visits to date, discussed the plan moving forward with pt. Discussed that a visit with orthopedics would be most beneficial at this time to see if they are able to do anything to improve her symptoms at this time as she has yet to have appointment with ortho. At this time, pt to be DC to TEP and recommended that she reach out to referring physician to have referral sent to orthopedics, pt verbalizing understanding and agreeing to plan.     [] No change. [] Other:   [] Patient would continue to benefit from skilled physical therapy services in order to: Improve paresh knee ROM, improve paresh LE flexibility, improve paresh LE strength, improve gait technique, and reduce pain.        Goals  MET NOT MET ON-  GOING  Details Date Addressed: 2/23/23           STG: To be met in 8 treatments            1. ? Pain: Decrease pain levels to 6/10 with activity to help improve her tolerance to working out recreationally. [x]  []  []      2. ? ROM: Increase flexibility in paresh knees to -5-105 to help improve ability to ride a bike. []  [x]  []  L: -22 -100   R: -15-91   3. Improve score on assessment tool LEFS from 72% impairment to less than 50% impairment , demonstrating improved tolerance to activity to ease riding her bike. [x]  []  []  Pt scored 45% impaired    4. Independent with Home Exercise Programs [x]  []  []        []  []  []      Date Addressed: 2/23/23           LTG: To be met in 16 treatments           1. Improve score on assessment tool LEFS from 72% impairment to less than 40% impairment , demonstrating improved tolerance to activity to ease riding her bike. []  [x]  []  Pt scored 45% impaired    2. Reduce pain levels to 2/10 or less with activity to help allow her to return to riding her bike. [x]  []  []      3. ? Strength: Increase paresh LE strength to 4+/5 grossly to help improve LE stability and ease transfers. [x]  []  []                     Patient goals: Increased ROM, be able to start exercising    Pt. Education:  [x] Yes  [] No  [] Reviewed Prior HEP/Ed  Method of Education: [x] Verbal  [] Demo  [] Written  Comprehension of Education:  [x] Verbalizes understanding. [] Demonstrates understanding. [] Needs review. [] Demonstrates/verbalizes HEP/Ed previously given. Plan: [] Continue current frequency toward long and short term goals. [x] Specific Instructions for next treatment: Pt to be DC to TEP      Time In: 1800     Time Out: [address]    Electronically signed by:   Marko Britt PT

## 2023-02-23 NOTE — FLOWSHEET NOTE
[] [facility]) [facility] &  Therapy  [address]  [phone]  F: [phone] [] [address]  [facility]   [address]  P: [phone]  F: [phone] [x] [address]  [address]  P: [phone]  F: [phone] [] [address]  P: [phone]  F: [phone] [] [address]  [facility]   [address]   [place]: [phone]  F: [phone]      Physical Therapy Daily Aquatic Treatment Note    Date:  2023  Patient Name:  Yeny Gaytan    :  1965  MRN: [id number]  Physician: MIGDALIA Hill - FRANCISCO                          Insurance: [address] [place] [address] [place] [address]  Medical Diagnosis: M25.561, M25.562, G89.29 (ICD-10-CM) - Chronic pain of both knees                Rehab Codes: M25. 258, M25.661, M25.562, M25. 662  Onset date: 11/15/22                                                             Next 's appt. : --  Visit# / total visits:  (11 in )   Cancels/No Shows: 0/0    Subjective:   Pain:  [x] Yes  [] No Location: L/R knee  Pain Rating: (0-10 scale) L knee -/10, R knee -/10  Pain altered Tx:  [x] No  [] Yes  Action:  Comments: Pt states she feels tight in her knees, but no pain. Mentions the pain today is in her legs (2/10) and LB. Frustrated at her lack of progress. Objective:     Todays Treatment:  Precautions: General  KEY  B = Belt G = Gloves P= Paddles   C = Collar K = Kickboard T = Theratube   DB = Dumbells N = Noodle W = Weights     Exercises/Activities  Warm-up/Amb  Dynamic Exercises    Forward 3L March 3L   Sideways 3L Squat    Backwards 3L Retro HS curls 3L     Retro SLR    Stretches  Braiding    Gastroc/Soleus 3x30\" wall Heel to Toe amb    Hamstring 3x30\" at wall  Toe amb    Hip flexor  Heel amb    Piriformis      SKTC      Pec Stretch      Post Deltoid  Static Exercises UE Gloves  - Held 2/23     Shoulder flex/ext 20   Static Exercises LE  Shoulder abd/add 20   Heel/toe raises 20 Shoulder H.  abd/add    Marches 20 Shoulder IR/ER    Mini-squats 20 Rowing 20   4-way hip  20 Arm Circles    Hamstring curls 20 UT shrugs/rolls 20 post   Hip Circles/Fig 8  Scap squeezes    Ankle ROM  Diagonals 1/2    Lunges  20 Elbow flex/ext      Pron/Sup    Functional Exercise  Wrist AROM    Step 20 fwd/lat     Wall Push-ups  Deep H20/ Red weights   SLS  Bike 3m   Breast Stroke on Noodle  Hip abd/add 3m   Noodle Twist  Hip flex/ext    Noodle Push down  Hip IR/ER    Kickboard push/pull  Knee flex/ext      Push/pull on [employer] 5-10m   Other:    Specific Instructions for next treatment: Continue tx per POC    Treatment Charges: Mins Units   []  Modalities     []  Ther Exercise     []  Manual Therapy     []  Ther Activities     [x]  Aquatics 35 2   []  Vasocompression     []  Other     Total Treatment time         Assessment: [x] Progressing toward goals. Cont with aquatic ex as noted in log above with fair tolerance. She reports increased L knee soreness with completion of ex today. Encouraged to keep ex within comfortable ROM as much as possible. Utilized small ankle weights for deep water ex to increase joint distraction and promote pain relief. [] No change. [] Other:   [x] Patient would continue to benefit from skilled physical therapy services in order to: Improve paresh knee ROM, improve paresh LE flexibility, improve paresh LE strength, improve gait technique, and reduce pain.        Goals  MET NOT MET ON-  GOING  Details   Date Addressed:           STG: To be met in 8 treatments            1. ? Pain: Decrease pain levels to 6/10 with activity to help improve her tolerance to working out recreationally.  []  []  []      2. ? ROM: Increase flexibility in paresh knees to -5-105 to help improve ability to ride a bike. []  []  []      3. Improve score on assessment tool LEFS from 72% impairment to less than 50% impairment , demonstrating improved tolerance to activity to ease riding her bike. []  []  []      4. Independent with Home Exercise Programs []  []  []        []  []  []      Date Addressed:            LTG: To be met in 16 treatments           1. Improve score on assessment tool LEFS from 72% impairment to less than 40% impairment , demonstrating improved tolerance to activity to ease riding her bike. []  []  []      2. Reduce pain levels to 2/10 or less with activity to help allow her to return to riding her bike. []  []  []      3. ? Strength: Increase paresh LE strength to 4+/5 grossly to help improve LE stability and ease transfers. []  []  []                     Patient goals: Increased ROM, be able to start exercising    Pt. Education:  [x] Yes  [] No  [x] Reviewed Prior HEP/Ed  Method of Education: [x] Verbal  [x] Demo  [x] Written  Access Code: [email or web address]  URL: [email or web address]  Date: 11/29/2022  Prepared by: Evelio Merida    Exercises  Supine Chest Stretch with Elbows Bent - 2-3 x daily - 7 x weekly - 3 sets - 30\" hold  Long Sitting Calf Stretch with Strap - 2-3 x daily - 7 x weekly - 3 sets - 30\" hold  Prone Quadriceps Stretch with Strap - 2-3 x daily - 7 x weekly - 3 sets - 30\" hold  Seated Hamstring Stretch - 2-3 x daily - 7 x weekly - 3 sets - 30\" hold  Seated Long Arc Quad - 2 x daily - 7 x weekly - 2 sets - 10 reps - 5\" hold  Supine Quad Set - 2 x daily - 7 x weekly - 2 sets - 10 reps - 5\" hold  Supine Bridge - 2 x daily - 7 x weekly - 2 sets - 10 reps - 3\" hold  Supine Heel Slide with Strap - 2 x daily - 7 x weekly - 2 sets - 10 reps - 5\" hold    Comprehension of Education:  [x] Verbalizes understanding. [x] Demonstrates understanding. [x] Needs review. [] Demonstrates/verbalizes HEP/Ed previously given.        Plan: [x] Continue current frequency toward long and short term goals.     [x] Specific Instructions for next treatment: Continue tx per POC    Time In: 4:50 pm   Time Out: 5:45 pm    Electronically signed by:  Judie Leiva PTA

## 2023-02-24 ENCOUNTER — OFFICE VISIT (OUTPATIENT)
Dept: FAMILY MEDICINE CLINIC | Age: 58
End: 2023-02-24
Payer: COMMERCIAL

## 2023-02-24 ENCOUNTER — HOSPITAL ENCOUNTER (OUTPATIENT)
Age: 58
Setting detail: SPECIMEN
Discharge: HOME | End: 2023-02-24

## 2023-02-24 VITALS
RESPIRATION RATE: 16 BRPM | SYSTOLIC BLOOD PRESSURE: 136 MMHG | OXYGEN SATURATION: 98 % | TEMPERATURE: 98.1 F | DIASTOLIC BLOOD PRESSURE: 82 MMHG | HEART RATE: 73 BPM

## 2023-02-24 DIAGNOSIS — N61.1 LEFT BREAST ABSCESS: Primary | ICD-10-CM

## 2023-02-24 PROCEDURE — 3075F SYST BP GE 130 - 139MM HG: CPT | Performed by: NURSE PRACTITIONER

## 2023-02-24 PROCEDURE — 3079F DIAST BP 80-89 MM HG: CPT | Performed by: NURSE PRACTITIONER

## 2023-02-24 PROCEDURE — 99213 OFFICE O/P EST LOW 20 MIN: CPT | Performed by: NURSE PRACTITIONER

## 2023-02-24 RX ORDER — SULFAMETHOXAZOLE AND TRIMETHOPRIM 800; 160 MG/1; MG/1
1 TABLET ORAL 2 TIMES DAILY
Qty: 14 TABLET | Refills: 0 | Status: SHIPPED | OUTPATIENT
Start: 2023-02-24 | End: 2023-03-03

## 2023-02-24 RX ORDER — CEPHALEXIN 500 MG/1
500 CAPSULE ORAL 4 TIMES DAILY
Qty: 28 CAPSULE | Refills: 0 | Status: SHIPPED | OUTPATIENT
Start: 2023-02-24 | End: 2023-03-03

## 2023-02-24 SDOH — ECONOMIC STABILITY: FOOD INSECURITY: WITHIN THE PAST 12 MONTHS, YOU WORRIED THAT YOUR FOOD WOULD RUN OUT BEFORE YOU GOT MONEY TO BUY MORE.: NEVER TRUE

## 2023-02-24 SDOH — ECONOMIC STABILITY: INCOME INSECURITY: HOW HARD IS IT FOR YOU TO PAY FOR THE VERY BASICS LIKE FOOD, HOUSING, MEDICAL CARE, AND HEATING?: NOT HARD AT ALL

## 2023-02-24 SDOH — ECONOMIC STABILITY: HOUSING INSECURITY
IN THE LAST 12 MONTHS, WAS THERE A TIME WHEN YOU DID NOT HAVE A STEADY PLACE TO SLEEP OR SLEPT IN A SHELTER (INCLUDING NOW)?: NO

## 2023-02-24 SDOH — ECONOMIC STABILITY: FOOD INSECURITY: WITHIN THE PAST 12 MONTHS, THE FOOD YOU BOUGHT JUST DIDN'T LAST AND YOU DIDN'T HAVE MONEY TO GET MORE.: NEVER TRUE

## 2023-02-24 ASSESSMENT — ENCOUNTER SYMPTOMS
NAUSEA: 0
COLOR CHANGE: 1
VOMITING: 0

## 2023-02-25 DIAGNOSIS — N61.1 LEFT BREAST ABSCESS: ICD-10-CM

## 2023-02-26 LAB
MICROORGANISM SPEC CULT: ABNORMAL
MICROORGANISM/AGENT SPEC: ABNORMAL
MICROORGANISM/AGENT SPEC: ABNORMAL
SPECIMEN DESCRIPTION: ABNORMAL

## 2023-02-28 ENCOUNTER — HOSPITAL ENCOUNTER (OUTPATIENT)
Age: 58
Discharge: HOME | End: 2023-02-28
Payer: COMMERCIAL

## 2023-02-28 ENCOUNTER — HOSPITAL ENCOUNTER (OUTPATIENT)
Dept: MRI IMAGING | Age: 58
Discharge: HOME | End: 2023-03-02
Payer: COMMERCIAL

## 2023-02-28 DIAGNOSIS — M50.30 DISC-OSTEOPHYTE COMPLEX: Primary | ICD-10-CM

## 2023-02-28 DIAGNOSIS — M25.78 DISC-OSTEOPHYTE COMPLEX: Primary | ICD-10-CM

## 2023-02-28 DIAGNOSIS — M47.812 SPONDYLOSIS OF CERVICAL SPINE: ICD-10-CM

## 2023-02-28 DIAGNOSIS — R20.2 NUMBNESS AND TINGLING OF RIGHT ARM: ICD-10-CM

## 2023-02-28 DIAGNOSIS — N95.0 POSTMENOPAUSAL BLEEDING: ICD-10-CM

## 2023-02-28 DIAGNOSIS — R20.0 NUMBNESS AND TINGLING OF RIGHT ARM: ICD-10-CM

## 2023-02-28 DIAGNOSIS — R29.898 WEAKNESS OF RIGHT ARM: ICD-10-CM

## 2023-02-28 LAB
25(OH)D3 SERPL-MCNC: 49 NG/ML
CORTISOL: 16.2 UG/DL (ref 2.7–18.4)
ESTRADIOL LEVEL: 11.5 PG/ML (ref 5–50)
PROGEST SERPL-MCNC: 0.18 NG/ML
SHBG SERPL-SCNC: 19 NMOL/L (ref 30–135)
TESTOST FREE MFR SERPL: 4.5 PG/ML (ref 0.6–3.8)
TESTOST SERPL-MCNC: 19 NG/DL (ref 20–70)

## 2023-02-28 PROCEDURE — 82533 TOTAL CORTISOL: CPT

## 2023-02-28 PROCEDURE — 82670 ASSAY OF TOTAL ESTRADIOL: CPT

## 2023-02-28 PROCEDURE — 84270 ASSAY OF SEX HORMONE GLOBUL: CPT

## 2023-02-28 PROCEDURE — 72141 MRI NECK SPINE W/O DYE: CPT

## 2023-02-28 PROCEDURE — 82679 ASSAY OF ESTRONE: CPT

## 2023-02-28 PROCEDURE — 82306 VITAMIN D 25 HYDROXY: CPT

## 2023-02-28 PROCEDURE — 82627 DEHYDROEPIANDROSTERONE: CPT

## 2023-02-28 PROCEDURE — 84403 ASSAY OF TOTAL TESTOSTERONE: CPT

## 2023-02-28 PROCEDURE — 84144 ASSAY OF PROGESTERONE: CPT

## 2023-03-01 LAB — DHEA-S SERPL-MCNC: 68.1 UG/DL (ref 26–200)

## 2023-03-02 LAB — ESTRONE: 40.4 PG/ML

## 2023-03-24 ENCOUNTER — HOSPITAL ENCOUNTER (OUTPATIENT)
Age: 58
Discharge: HOME | End: 2023-03-24
Payer: COMMERCIAL

## 2023-03-24 ENCOUNTER — HOSPITAL ENCOUNTER (OUTPATIENT)
Dept: GENERAL RADIOLOGY | Age: 58
End: 2023-03-24
Payer: COMMERCIAL

## 2023-03-24 DIAGNOSIS — M54.2 NECK PAIN: ICD-10-CM

## 2023-03-24 PROCEDURE — 72020 X-RAY EXAM OF SPINE 1 VIEW: CPT

## 2023-03-29 SDOH — HEALTH STABILITY: PHYSICAL HEALTH: ON AVERAGE, HOW MANY DAYS PER WEEK DO YOU ENGAGE IN MODERATE TO STRENUOUS EXERCISE (LIKE A BRISK WALK)?: 0 DAYS

## 2023-03-30 ENCOUNTER — OFFICE VISIT (OUTPATIENT)
Dept: ORTHOPEDIC SURGERY | Age: 58
End: 2023-03-30
Payer: COMMERCIAL

## 2023-03-30 VITALS — BODY MASS INDEX: 40.16 KG/M2 | WEIGHT: 265 LBS | HEIGHT: 68 IN

## 2023-03-30 DIAGNOSIS — M25.562 PAIN IN BOTH KNEES, UNSPECIFIED CHRONICITY: ICD-10-CM

## 2023-03-30 DIAGNOSIS — M25.561 PAIN IN BOTH KNEES, UNSPECIFIED CHRONICITY: ICD-10-CM

## 2023-03-30 DIAGNOSIS — M17.0 ARTHRITIS OF BOTH KNEES: Primary | ICD-10-CM

## 2023-03-30 PROCEDURE — 99204 OFFICE O/P NEW MOD 45 MIN: CPT | Performed by: PHYSICIAN ASSISTANT

## 2023-03-30 NOTE — PROGRESS NOTES
[address] [facility]  [address] [facility] [place] [address]  Dept: [phone]    Ambulatory Orthopedic New Patient Visit      CHIEF COMPLAINT:    Chief Complaint   Patient presents with    New Patient     B/L KNEE PAIN       HISTORY OF PRESENT ILLNESS:      The patient is a 59 y.o. female who is being seen  for consultation and evaluation of right knee pain. Saravanan Craft  presents for bilateral knee pain (R>L) that has been present for  >6 months. The patient does not recall a specific injury. The pain improves with  rest.  The pain worsens with  prolonged walking, prolonged standing, stair climbing and arising from a seated position. Instability is  noted. The patient has not had a previous corticosteroid injection. Patient did have a right knee arthroscopy with Dr. Fabrice Prary at [facility] in [address] [place] [address]. At that time she was found to have avascular necrosis of the distal femur and a microfracture procedure was completed. After the surgery the patient notes that she had very minimal discomfort until recently. The patient has had previous physical therapy for this problem. She notes she was recently in physical therapy from November 2022 to February 2023. She notes improvement in her strength but no improvement in her knee pain. The patient has tried oral NSAIDs for this problem previously. Her current BMI is 40.29 (265lbs). She is a non-diabetic and a non-smoker. Past Medical History:    Past Medical History:   Diagnosis Date    Arthritis     Asthma     Benign familial tremor     CAD (coronary artery disease) 12/2021    mild per cardiac cath 12/2021; no stents.  Dr. Rossi Ac last visit 10/2022    Cancer [facility])     skin    CKD (chronic kidney disease) stage 1, GFR 90 ml/min or greater     Class 2 severe obesity with serious comorbidity and body mass index (BMI) of 39.0 to 39.9 in adult [facility]) 05/01/2019    Cystinuria ([facility] [address]

## 2023-03-31 ASSESSMENT — ENCOUNTER SYMPTOMS
SHORTNESS OF BREATH: 0
VOMITING: 0
COUGH: 0
COLOR CHANGE: 0

## 2023-05-02 ENCOUNTER — TELEMEDICINE (OUTPATIENT)
Dept: PRIMARY CARE CLINIC | Age: 58
End: 2023-05-02
Payer: COMMERCIAL

## 2023-05-02 DIAGNOSIS — K21.9 GASTROESOPHAGEAL REFLUX DISEASE WITHOUT ESOPHAGITIS: ICD-10-CM

## 2023-05-02 DIAGNOSIS — I10 ESSENTIAL HYPERTENSION: ICD-10-CM

## 2023-05-02 DIAGNOSIS — Z78.0 POSTMENOPAUSAL: ICD-10-CM

## 2023-05-02 DIAGNOSIS — N61.1 BREAST ABSCESS OF FEMALE: ICD-10-CM

## 2023-05-02 DIAGNOSIS — M17.11 LOCALIZED OSTEOARTHRITIS OF RIGHT KNEE: ICD-10-CM

## 2023-05-02 DIAGNOSIS — Z01.818 PRE-OP EXAM: Primary | ICD-10-CM

## 2023-05-02 PROCEDURE — 99214 OFFICE O/P EST MOD 30 MIN: CPT | Performed by: NURSE PRACTITIONER

## 2023-05-02 RX ORDER — DOXYCYCLINE HYCLATE 100 MG
100 TABLET ORAL 2 TIMES DAILY
Qty: 28 TABLET | Refills: 0 | Status: SHIPPED | OUTPATIENT
Start: 2023-05-02 | End: 2023-05-16

## 2023-05-02 SDOH — ECONOMIC STABILITY: INCOME INSECURITY: HOW HARD IS IT FOR YOU TO PAY FOR THE VERY BASICS LIKE FOOD, HOUSING, MEDICAL CARE, AND HEATING?: NOT HARD AT ALL

## 2023-05-02 SDOH — ECONOMIC STABILITY: FOOD INSECURITY: WITHIN THE PAST 12 MONTHS, THE FOOD YOU BOUGHT JUST DIDN'T LAST AND YOU DIDN'T HAVE MONEY TO GET MORE.: NEVER TRUE

## 2023-05-02 SDOH — ECONOMIC STABILITY: FOOD INSECURITY: WITHIN THE PAST 12 MONTHS, YOU WORRIED THAT YOUR FOOD WOULD RUN OUT BEFORE YOU GOT MONEY TO BUY MORE.: NEVER TRUE

## 2023-05-02 ASSESSMENT — PATIENT HEALTH QUESTIONNAIRE - PHQ9
SUM OF ALL RESPONSES TO PHQ QUESTIONS 1-9: 0
SUM OF ALL RESPONSES TO PHQ QUESTIONS 1-9: 0
1. LITTLE INTEREST OR PLEASURE IN DOING THINGS: 0
3. TROUBLE FALLING OR STAYING ASLEEP: 0
2. FEELING DOWN, DEPRESSED OR HOPELESS: 0
SUM OF ALL RESPONSES TO PHQ QUESTIONS 1-9: 0
7. TROUBLE CONCENTRATING ON THINGS, SUCH AS READING THE NEWSPAPER OR WATCHING TELEVISION: 0
8. MOVING OR SPEAKING SO SLOWLY THAT OTHER PEOPLE COULD HAVE NOTICED. OR THE OPPOSITE, BEING SO FIGETY OR RESTLESS THAT YOU HAVE BEEN MOVING AROUND A LOT MORE THAN USUAL: 0
4. FEELING TIRED OR HAVING LITTLE ENERGY: 0
SUM OF ALL RESPONSES TO PHQ QUESTIONS 1-9: 0
6. FEELING BAD ABOUT YOURSELF - OR THAT YOU ARE A FAILURE OR HAVE LET YOURSELF OR YOUR FAMILY DOWN: 0
9. THOUGHTS THAT YOU WOULD BE BETTER OFF DEAD, OR OF HURTING YOURSELF: 0
10. IF YOU CHECKED OFF ANY PROBLEMS, HOW DIFFICULT HAVE THESE PROBLEMS MADE IT FOR YOU TO DO YOUR WORK, TAKE CARE OF THINGS AT HOME, OR GET ALONG WITH OTHER PEOPLE: 0
SUM OF ALL RESPONSES TO PHQ9 QUESTIONS 1 & 2: 0
5. POOR APPETITE OR OVEREATING: 0

## 2023-05-02 ASSESSMENT — ENCOUNTER SYMPTOMS
SINUS PRESSURE: 0
COUGH: 0
BLOOD IN STOOL: 0
CONSTIPATION: 0
SORE THROAT: 0
TROUBLE SWALLOWING: 0
NAUSEA: 0
DIARRHEA: 0
VOMITING: 0
WHEEZING: 0
SHORTNESS OF BREATH: 0
ABDOMINAL PAIN: 0

## 2023-05-02 NOTE — PROGRESS NOTES
[facility] PRIMARY CARE  [facility] [address] [facility] [address]  [address] [address]  Dept: [phone]  Dept Fax: [phone]    Nguyễn Mendoza is a 59 y.o. female who presentstoday for her medical conditions/complaints as noted below. Nguyễn Mendoza is c/o of  Chief Complaint   Patient presents with    Pre-op Exam     Total right knee    Other     Sores on breast. Was seen at walk in on 23 for this and has not gone away since then           HPI:     Presents today for pre-op clearance via video virtual visit  She reports has had knee pain for several years but had significant increase  pain and decreased tolerance to activity while recently on vacation  She sought orthopedic evaluation and was found to have significant arthritis \"bone-on-bone in the right knee. She also has significant degeneration in the left but the right is worse. She is scheduled for surgery  for TKR and has pre-op testing   She has continued meloxicam for now.   She is aware to stop 7 to 10 days prior to her surgery    Asking about her reflux meds, wondering if she should continue long term  She states her sx are well controlled at this time    Reports her left breast abscess has flared up again  Had sit with walk in clinic and was prescribed 2 antibiotics in Feb  She states the area improved but never fully resolved  A few weeks ago she was able to express a small amount of yellowish tissue and blood and thought it was going to go away but it has persisted  She also has a small cyst on the right  She is just concerned as she does not want this to interfere with her ability to get her knee surgery completed  She also has appointment with her GYN coming up in  but has not discussed these current abscesses with them  Also reports GYN is helping with hormone replacement therapy through compounding pharmacy  Since starting this medication she states she feels much better overall        Hemoglobin

## 2023-05-11 ENCOUNTER — HOSPITAL ENCOUNTER (OUTPATIENT)
Dept: GENERAL RADIOLOGY | Age: 58
Discharge: HOME | End: 2023-05-13
Payer: COMMERCIAL

## 2023-05-11 ENCOUNTER — HOSPITAL ENCOUNTER (OUTPATIENT)
Dept: PREADMISSION TESTING | Age: 58
Discharge: HOME | End: 2023-05-11
Payer: COMMERCIAL

## 2023-05-11 VITALS
RESPIRATION RATE: 16 BRPM | TEMPERATURE: 97.8 F | HEART RATE: 72 BPM | BODY MASS INDEX: 41.22 KG/M2 | HEIGHT: 68 IN | OXYGEN SATURATION: 98 % | DIASTOLIC BLOOD PRESSURE: 85 MMHG | SYSTOLIC BLOOD PRESSURE: 144 MMHG | WEIGHT: 272 LBS

## 2023-05-11 DIAGNOSIS — Z01.818 PRE-OP TESTING: ICD-10-CM

## 2023-05-11 DIAGNOSIS — M17.11 PRIMARY OSTEOARTHRITIS OF ONE KNEE, RIGHT: ICD-10-CM

## 2023-05-11 LAB
ABO/RH: NORMAL
ALBUMIN SERPL-MCNC: 4.1 G/DL (ref 3.5–5.2)
ALP SERPL-CCNC: 106 U/L (ref 35–104)
ALT SERPL-CCNC: 17 U/L (ref 5–33)
ANION GAP SERPL CALCULATED.3IONS-SCNC: 12 MMOL/L (ref 9–17)
ANTIBODY SCREEN: NEGATIVE
ARM BAND NUMBER: NORMAL
AST SERPL-CCNC: 23 U/L
BILIRUB SERPL-MCNC: 0.4 MG/DL (ref 0.3–1.2)
BILIRUBIN URINE: NEGATIVE
BUN SERPL-MCNC: 20 MG/DL (ref 8–23)
BUN/CREAT BLD: 24 (ref 9–20)
CALCIUM SERPL-MCNC: 9.1 MG/DL (ref 8.6–10.4)
CHLORIDE SERPL-SCNC: 101 MMOL/L (ref 98–107)
CO2 SERPL-SCNC: 24 MMOL/L (ref 20–31)
COLOR: YELLOW
CREAT SERPL-MCNC: 0.84 MG/DL (ref 0.5–0.9)
EPITHELIAL CELLS UA: NORMAL /HPF (ref 0–5)
EXPIRATION DATE: NORMAL
GFR SERPL CREATININE-BSD FRML MDRD: >60 ML/MIN/1.73M2
GLUCOSE SERPL-MCNC: 119 MG/DL (ref 70–99)
GLUCOSE UR STRIP.AUTO-MCNC: NEGATIVE MG/DL
HCT VFR BLD AUTO: 40.5 % (ref 36.3–47.1)
HGB BLD-MCNC: 12.6 G/DL (ref 11.9–15.1)
KETONES UR STRIP.AUTO-MCNC: NEGATIVE MG/DL
LEUKOCYTE ESTERASE UR QL STRIP.AUTO: NEGATIVE
MCH RBC QN AUTO: 26.1 PG (ref 25.2–33.5)
MCHC RBC AUTO-ENTMCNC: 31.1 G/DL (ref 28.4–34.8)
MCV RBC AUTO: 83.9 FL (ref 82.6–102.9)
NITRITE UR QL STRIP.AUTO: NEGATIVE
NRBC AUTOMATED: 0 PER 100 WBC
PDW BLD-RTO: 15.1 % (ref 11.8–14.4)
PLATELET # BLD AUTO: 242 K/UL (ref 138–453)
PMV BLD AUTO: 9 FL (ref 8.1–13.5)
POTASSIUM SERPL-SCNC: 4.3 MMOL/L (ref 3.7–5.3)
PROT SERPL-MCNC: 7.3 G/DL (ref 6.4–8.3)
PROT UR STRIP.AUTO-MCNC: 8 MG/DL (ref 5–8)
PROT UR STRIP.AUTO-MCNC: ABNORMAL MG/DL
RBC # BLD: 4.83 M/UL (ref 3.95–5.11)
RBC CLUMPS #/AREA URNS AUTO: NORMAL /HPF (ref 0–2)
SODIUM SERPL-SCNC: 137 MMOL/L (ref 135–144)
SPECIFIC GRAVITY UA: 1.01 (ref 1–1.03)
TURBIDITY: ABNORMAL
URINE HGB: ABNORMAL
UROBILINOGEN, URINE: NORMAL
WBC # BLD AUTO: 8.7 K/UL (ref 3.5–11.3)
WBC UA: NORMAL /HPF (ref 0–5)

## 2023-05-11 PROCEDURE — 93005 ELECTROCARDIOGRAM TRACING: CPT | Performed by: ANESTHESIOLOGY

## 2023-05-11 PROCEDURE — 81001 URINALYSIS AUTO W/SCOPE: CPT

## 2023-05-11 PROCEDURE — 80053 COMPREHEN METABOLIC PANEL: CPT

## 2023-05-11 PROCEDURE — 87641 MR-STAPH DNA AMP PROBE: CPT

## 2023-05-11 PROCEDURE — 85027 COMPLETE CBC AUTOMATED: CPT

## 2023-05-11 PROCEDURE — 86901 BLOOD TYPING SEROLOGIC RH(D): CPT

## 2023-05-11 PROCEDURE — 36415 COLL VENOUS BLD VENIPUNCTURE: CPT

## 2023-05-11 PROCEDURE — 71046 X-RAY EXAM CHEST 2 VIEWS: CPT

## 2023-05-11 PROCEDURE — 86900 BLOOD TYPING SEROLOGIC ABO: CPT

## 2023-05-11 PROCEDURE — 87086 URINE CULTURE/COLONY COUNT: CPT

## 2023-05-11 PROCEDURE — 86850 RBC ANTIBODY SCREEN: CPT

## 2023-05-11 RX ORDER — CELECOXIB 200 MG/1
200 CAPSULE ORAL ONCE
OUTPATIENT
Start: 2023-05-22

## 2023-05-11 RX ORDER — GABAPENTIN 300 MG/1
300 CAPSULE ORAL ONCE
OUTPATIENT
Start: 2023-05-22

## 2023-05-11 RX ORDER — ACETAMINOPHEN 500 MG
1000 TABLET ORAL ONCE
OUTPATIENT
Start: 2023-05-22

## 2023-05-11 RX ORDER — IRBESARTAN 150 MG/1
1 TABLET ORAL DAILY
COMMUNITY

## 2023-05-11 RX ORDER — VITAMIN B COMPLEX
1 CAPSULE ORAL DAILY
COMMUNITY

## 2023-05-11 ASSESSMENT — PROMIS GLOBAL HEALTH SCALE
IN GENERAL, HOW WOULD YOU RATE YOUR PHYSICAL HEALTH [ON A SCALE OF 1 (POOR) TO 5 (EXCELLENT)]?: 2
TO WHAT EXTENT ARE YOU ABLE TO CARRY OUT YOUR EVERYDAY PHYSICAL ACTIVITIES SUCH AS WALKING, CLIMBING STAIRS, CARRYING GROCERIES, OR MOVING A CHAIR [ON A SCALE OF 1 (NOT AT ALL) TO 5 (COMPLETELY)]?: 1
IN GENERAL, HOW WOULD YOU RATE YOUR MENTAL HEALTH, INCLUDING YOUR MOOD AND YOUR ABILITY TO THINK [ON A SCALE OF 1 (POOR) TO 5 (EXCELLENT)]?: 4
IN THE PAST 7 DAYS, HOW WOULD YOU RATE YOUR PAIN ON AVERAGE [ON A SCALE FROM 0 (NO PAIN) TO 10 (WORST IMAGINABLE PAIN)]?: 6
IN THE PAST 7 DAYS, HOW OFTEN HAVE YOU BEEN BOTHERED BY EMOTIONAL PROBLEMS, SUCH AS FEELING ANXIOUS, DEPRESSED, OR IRRITABLE [ON A SCALE FROM 1 (NEVER) TO 5 (ALWAYS)]?: 4
SUM OF RESPONSES TO QUESTIONS 3, 6, 7, & 8: 12
HOW IS THE PROMIS V1.1 BEING ADMINISTERED?: 0
IN GENERAL, WOULD YOU SAY YOUR HEALTH IS...[ON A SCALE OF 1 (POOR) TO 5 (EXCELLENT)]: 2
IN THE PAST 7 DAYS, HOW WOULD YOU RATE YOUR FATIGUE ON AVERAGE [ON A SCALE FROM 1 (NONE) TO 5 (VERY SEVERE)]?: 3
IN GENERAL, HOW WOULD YOU RATE YOUR SATISFACTION WITH YOUR SOCIAL ACTIVITIES AND RELATIONSHIPS [ON A SCALE OF 1 (POOR) TO 5 (EXCELLENT)]?: 2
IN GENERAL, WOULD YOU SAY YOUR QUALITY OF LIFE IS...[ON A SCALE OF 1 (POOR) TO 5 (EXCELLENT)]: 2
IN GENERAL, PLEASE RATE HOW WELL YOU CARRY OUT YOUR USUAL SOCIAL ACTIVITIES (INCLUDES ACTIVITIES AT HOME, AT WORK, AND IN YOUR COMMUNITY, AND RESPONSIBILITIES AS A PARENT, CHILD, SPOUSE, EMPLOYEE, FRIEND, ETC) [ON A SCALE OF 1 (POOR) TO 5 (EXCELLENT)]?: 2
WHO IS THE PERSON COMPLETING THE PROMIS V1.1 SURVEY?: 0
SUM OF RESPONSES TO QUESTIONS 2, 4, 5, & 10: 12

## 2023-05-11 ASSESSMENT — KOOS JR
GOING UP OR DOWN STAIRS: 3
RISING FROM SITTING: 3
KOOS JR TOTAL INTERVAL SCORE: 39.625
STANDING UPRIGHT: 2
BENDING TO THE FLOOR TO PICK UP OBJECT: 2
STRAIGHTENING KNEE FULLY: 4
HOW SEVERE IS YOUR KNEE STIFFNESS AFTER FIRST WAKING IN MORNING: 2
TWISING OR PIVOTING ON KNEE: 3

## 2023-05-11 ASSESSMENT — PAIN DESCRIPTION - ORIENTATION: ORIENTATION: RIGHT

## 2023-05-11 ASSESSMENT — PAIN SCALES - GENERAL: PAINLEVEL_OUTOF10: 6

## 2023-05-11 ASSESSMENT — PAIN DESCRIPTION - DESCRIPTORS: DESCRIPTORS: SHARP;ACHING

## 2023-05-11 ASSESSMENT — PAIN DESCRIPTION - LOCATION: LOCATION: KNEE

## 2023-05-11 NOTE — PRE-PROCEDURE INSTRUCTIONS
any type of anesthesia, you are to have NOTHING to eat or drink after midnight the night before surgery. This includes no gum, hard candy, mints or water. The only exception to this is small sips of water to take the medications listed above. No smoking or chewing tobacco after midnight. No alcoholic beverages for 24 hours prior to surgery. 2. You may brush your teeth but do not swallow the water. 3. If you wear glasses bring a case for them if you have one. No contacts should be worn the day of surgery. You may also bring your hearing aids. If you have dentures, most surgical procedures involving anesthesia will require that you remove them prior to surgery. 4. If you sleep with a CPAP or BiPAP machine at home and plan on staying in the hospital overnight after surgery, please bring your machine with you. 5. Do not wear any jewelry or body piercings the day of surgery. No nail polish on the operative extremity (arm/hand or leg/foot surgeries)   6. If you are staying overnight with us, you may bring a small bag of necessary personal items. 7. Please wear loose, comfortable clothing. If you are potentially going to have a cast, sling, brace or bulky dressing, make sure to wear clothing that will fit over it. 8. In case of illness - If you have cold or flu like symptoms (high fever, runny nose, sore throat, cough, etc.) rash, nausea, vomiting, loose stools, and/or recent contact with someone who has a contagious disease (chicken pox, measles, COVID-19, etc.). Please call your surgeon before coming to the hospital.    Transportation After Your Surgery/Procedure: If you are going home the same day of surgery you need someone to drive you home. Your  must be at least 25years of age. A taxi cab or other nonmedical public transportation is not acceptable unless you have someone to ride home in the vehicle with you.    For your safety, someone must remain with you for the first 24 hours after

## 2023-05-12 LAB
EKG ATRIAL RATE: 69 BPM
EKG P AXIS: 29 DEGREES
EKG P-R INTERVAL: 156 MS
EKG Q-T INTERVAL: 414 MS
EKG QRS DURATION: 102 MS
EKG QTC CALCULATION (BAZETT): 443 MS
EKG R AXIS: 2 DEGREES
EKG T AXIS: 19 DEGREES
EKG VENTRICULAR RATE: 69 BPM
MICROORGANISM SPEC CULT: NORMAL
MRSA, DNA, NASAL: NEGATIVE
SPECIMEN DESCRIPTION: NORMAL
SPECIMEN DESCRIPTION: NORMAL

## 2023-05-12 PROCEDURE — 93010 ELECTROCARDIOGRAM REPORT: CPT | Performed by: INTERNAL MEDICINE

## 2023-05-16 DIAGNOSIS — Z98.890 STATUS POST SURGERY: ICD-10-CM

## 2023-05-16 DIAGNOSIS — M17.11 PRIMARY OSTEOARTHRITIS OF ONE KNEE, RIGHT: Primary | ICD-10-CM

## 2023-05-16 NOTE — PROGRESS NOTES
[facility] ([facility]) Joint Replacement Pre-surgical Assessment    Scheduled Surgery Date: 05/22/2023  Surgery Time: 0730    Surgeon: Annette Oneill  Procedure: right Total Knee    Primary Insurance Coverage [facility] EM[employer]EE  Pre-op class attended YES 0930    PCP: MIGDALIA Abdalla - CNP  Clearance received by PCP: Yes    Anticipated Discharge Plan: home  Agency (if applicable): OPPT    Significant PMH:   Surgical History    Procedure Laterality Date Comment Source   CARDIAC CATHETERIZATION  12/08/2021     CYSTO/URETERO/PYELOSCOPY, CALCULUS TX Right 11/21/2017 HOLMIUM LASER - CYSTOSCOPY, RIGHT URETEROSCOPY, RIGHT STENT EXCHANGE,STONE BASKETING [facility] [address] [facility] [place] Right 09/13/2019 HOLMIUM - CYSTO, URETEROSCOPY, LITHOTRIPSY, STENT PLACEMENT performed [facility] Jesica [facility] [address] [facility] CALCULUS [place] Right 12/12/2019 HOLMIUM- CYSTO, URETEROSCOPY, LASER LITHO, STENT PLACEMENT AND RIGHT URETERAL BASKET STONE EXTRACTION performed by Jerald Lino MD at [facility] Right 11/18/2017 CYSTOSCOPY URETERAL STENT INSERTION,RIGHT performed by Russell Meza MD at [facility] Right 08/02/2019 CYSTOSCOPY RT URETERAL STENT INSERTION performed by Jesica Gonzalez MD at [facility] Right 08/23/2019 CYSTOSCOPY, RIGHT STENT REMOVAL, INSERTION OCCLUSIVE BALLOON, PT GOING TO INTERVENTIONAL performed by Jesica Gonzalez MD at [facility] Right 09/19/2020 CYSTOSCOPY, URETEROSCOPY, HOLMIUM LASER LITHOTRIPSY, STENT PLACEMENT performed by Morris Berry MD at [facility] Right 11/1/2022 CYSTOSCOPY INSERTION OCCLUSIVE BALLOON performed by Jerald Lino MD at [address] / [facility] Right 12/31/2019 CYSTOSCOPY, STENT REMOVAL performed by Jerald Lino MD at [address] [place] [address] N/A 06/30/2022 DILATATION AND CURETTAGE, HYSTEROSCOPY performed by Mirlande Craig MD

## 2023-05-22 ENCOUNTER — ANESTHESIA EVENT (OUTPATIENT)
Dept: OPERATING ROOM | Age: 58
End: 2023-05-22
Payer: COMMERCIAL

## 2023-05-22 ENCOUNTER — APPOINTMENT (OUTPATIENT)
Dept: GENERAL RADIOLOGY | Age: 58
End: 2023-05-22
Attending: ORTHOPAEDIC SURGERY
Payer: COMMERCIAL

## 2023-05-22 ENCOUNTER — ANESTHESIA (OUTPATIENT)
Dept: OPERATING ROOM | Age: 58
End: 2023-05-22
Payer: COMMERCIAL

## 2023-05-22 ENCOUNTER — HOSPITAL ENCOUNTER (OUTPATIENT)
Age: 58
Discharge: HOME | End: 2023-05-22
Attending: ORTHOPAEDIC SURGERY | Admitting: ORTHOPAEDIC SURGERY
Payer: COMMERCIAL

## 2023-05-22 VITALS
SYSTOLIC BLOOD PRESSURE: 122 MMHG | TEMPERATURE: 97.7 F | HEART RATE: 78 BPM | BODY MASS INDEX: 41.36 KG/M2 | RESPIRATION RATE: 16 BRPM | DIASTOLIC BLOOD PRESSURE: 78 MMHG | WEIGHT: 272 LBS | OXYGEN SATURATION: 95 %

## 2023-05-22 DIAGNOSIS — Z96.651 S/P TOTAL KNEE ARTHROPLASTY, RIGHT: Primary | ICD-10-CM

## 2023-05-22 PROCEDURE — 2709999900 HC NON-CHARGEABLE SUPPLY: Performed by: ORTHOPAEDIC SURGERY

## 2023-05-22 PROCEDURE — 3600000015 HC SURGERY LEVEL 5 ADDTL 15MIN: Performed by: ORTHOPAEDIC SURGERY

## 2023-05-22 PROCEDURE — 2500000003 HC RX 250 WO HCPCS: Performed by: ANESTHESIOLOGY

## 2023-05-22 PROCEDURE — 97535 SELF CARE MNGMENT TRAINING: CPT

## 2023-05-22 PROCEDURE — 3700000000 HC ANESTHESIA ATTENDED CARE: Performed by: ORTHOPAEDIC SURGERY

## 2023-05-22 PROCEDURE — 97162 PT EVAL MOD COMPLEX 30 MIN: CPT

## 2023-05-22 PROCEDURE — 2580000003 HC RX 258: Performed by: ORTHOPAEDIC SURGERY

## 2023-05-22 PROCEDURE — 6360000002 HC RX W HCPCS: Performed by: ANESTHESIOLOGY

## 2023-05-22 PROCEDURE — 6370000000 HC RX 637 (ALT 250 FOR IP): Performed by: ANESTHESIOLOGY

## 2023-05-22 PROCEDURE — 2580000003 HC RX 258: Performed by: ANESTHESIOLOGY

## 2023-05-22 PROCEDURE — 2720000010 HC SURG SUPPLY STERILE: Performed by: ORTHOPAEDIC SURGERY

## 2023-05-22 PROCEDURE — 97166 OT EVAL MOD COMPLEX 45 MIN: CPT

## 2023-05-22 PROCEDURE — 6360000002 HC RX W HCPCS

## 2023-05-22 PROCEDURE — 7100000001 HC PACU RECOVERY - ADDTL 15 MIN: Performed by: ORTHOPAEDIC SURGERY

## 2023-05-22 PROCEDURE — 6360000002 HC RX W HCPCS: Performed by: NURSE ANESTHETIST, CERTIFIED REGISTERED

## 2023-05-22 PROCEDURE — C1776 JOINT DEVICE (IMPLANTABLE): HCPCS | Performed by: ORTHOPAEDIC SURGERY

## 2023-05-22 PROCEDURE — 2580000003 HC RX 258

## 2023-05-22 PROCEDURE — 64447 NJX AA&/STRD FEMORAL NRV IMG: CPT | Performed by: ANESTHESIOLOGY

## 2023-05-22 PROCEDURE — C1713 ANCHOR/SCREW BN/BN,TIS/BN: HCPCS | Performed by: ORTHOPAEDIC SURGERY

## 2023-05-22 PROCEDURE — 6360000002 HC RX W HCPCS: Performed by: ORTHOPAEDIC SURGERY

## 2023-05-22 PROCEDURE — 6370000000 HC RX 637 (ALT 250 FOR IP)

## 2023-05-22 PROCEDURE — 7100000000 HC PACU RECOVERY - FIRST 15 MIN: Performed by: ORTHOPAEDIC SURGERY

## 2023-05-22 PROCEDURE — 73562 X-RAY EXAM OF KNEE 3: CPT

## 2023-05-22 PROCEDURE — C9290 INJ, BUPIVACAINE LIPOSOME: HCPCS | Performed by: ANESTHESIOLOGY

## 2023-05-22 PROCEDURE — 97530 THERAPEUTIC ACTIVITIES: CPT

## 2023-05-22 PROCEDURE — 97110 THERAPEUTIC EXERCISES: CPT

## 2023-05-22 PROCEDURE — 2500000003 HC RX 250 WO HCPCS: Performed by: NURSE ANESTHETIST, CERTIFIED REGISTERED

## 2023-05-22 PROCEDURE — 3700000001 HC ADD 15 MINUTES (ANESTHESIA): Performed by: ORTHOPAEDIC SURGERY

## 2023-05-22 PROCEDURE — 97116 GAIT TRAINING THERAPY: CPT

## 2023-05-22 PROCEDURE — 3600000005 HC SURGERY LEVEL 5 BASE: Performed by: ORTHOPAEDIC SURGERY

## 2023-05-22 DEVICE — FIXED TIBIAL TRAY CEMENTED RIGHT, SIZE 5
Type: IMPLANTABLE DEVICE | Site: KNEE | Status: FUNCTIONAL
Brand: GMK PRIMARY TOTAL KNEE SYSTEM

## 2023-05-22 DEVICE — CEMENT BNE 40GM HI VISC RADPQ FOR REV SURG: Type: IMPLANTABLE DEVICE | Site: KNEE | Status: FUNCTIONAL

## 2023-05-22 DEVICE — RESURFACING PATELLA SIZE 4
Type: IMPLANTABLE DEVICE | Site: KNEE | Status: FUNCTIONAL
Brand: GMK PRIMARY TOTAL KNEE SYSTEM

## 2023-05-22 DEVICE — FEMUR SPHERE CEMENTED RIGHT, SIZE 4
Type: IMPLANTABLE DEVICE | Site: KNEE | Status: FUNCTIONAL
Brand: GMK SPHERE TOTAL KNEE SYSTEM

## 2023-05-22 RX ORDER — SODIUM CHLORIDE 0.9 % (FLUSH) 0.9 %
5-40 SYRINGE (ML) INJECTION PRN
Status: DISCONTINUED | OUTPATIENT
Start: 2023-05-22 | End: 2023-05-22 | Stop reason: HOSPADM

## 2023-05-22 RX ORDER — OXYCODONE HYDROCHLORIDE 5 MG/1
10 TABLET ORAL EVERY 4 HOURS PRN
Status: DISCONTINUED | OUTPATIENT
Start: 2023-05-22 | End: 2023-05-22 | Stop reason: HOSPADM

## 2023-05-22 RX ORDER — FAMOTIDINE 20 MG/1
20 TABLET, FILM COATED ORAL DAILY
Status: DISCONTINUED | OUTPATIENT
Start: 2023-05-22 | End: 2023-05-22 | Stop reason: HOSPADM

## 2023-05-22 RX ORDER — LIDOCAINE HYDROCHLORIDE 10 MG/ML
INJECTION, SOLUTION INFILTRATION; PERINEURAL PRN
Status: DISCONTINUED | OUTPATIENT
Start: 2023-05-22 | End: 2023-05-22 | Stop reason: SDUPTHER

## 2023-05-22 RX ORDER — LIDOCAINE HYDROCHLORIDE 10 MG/ML
INJECTION, SOLUTION EPIDURAL; INFILTRATION; INTRACAUDAL; PERINEURAL PRN
Status: DISCONTINUED | OUTPATIENT
Start: 2023-05-22 | End: 2023-05-22 | Stop reason: SDUPTHER

## 2023-05-22 RX ORDER — ACETAMINOPHEN 500 MG
1000 TABLET ORAL EVERY 6 HOURS PRN
Status: DISCONTINUED | OUTPATIENT
Start: 2023-05-22 | End: 2023-05-22 | Stop reason: HOSPADM

## 2023-05-22 RX ORDER — ONDANSETRON 2 MG/ML
4 INJECTION INTRAMUSCULAR; INTRAVENOUS EVERY 6 HOURS PRN
Status: DISCONTINUED | OUTPATIENT
Start: 2023-05-22 | End: 2023-05-22 | Stop reason: HOSPADM

## 2023-05-22 RX ORDER — ACETAMINOPHEN 500 MG
1000 TABLET ORAL ONCE
Status: COMPLETED | OUTPATIENT
Start: 2023-05-22 | End: 2023-05-22

## 2023-05-22 RX ORDER — GABAPENTIN 300 MG/1
300 CAPSULE ORAL ONCE
Status: COMPLETED | OUTPATIENT
Start: 2023-05-22 | End: 2023-05-22

## 2023-05-22 RX ORDER — SODIUM CHLORIDE 0.9 % (FLUSH) 0.9 %
5-40 SYRINGE (ML) INJECTION EVERY 12 HOURS SCHEDULED
Status: DISCONTINUED | OUTPATIENT
Start: 2023-05-22 | End: 2023-05-22 | Stop reason: HOSPADM

## 2023-05-22 RX ORDER — SODIUM CHLORIDE 9 MG/ML
INJECTION, SOLUTION INTRAVENOUS PRN
Status: DISCONTINUED | OUTPATIENT
Start: 2023-05-22 | End: 2023-05-22 | Stop reason: HOSPADM

## 2023-05-22 RX ORDER — BUPIVACAINE HYDROCHLORIDE 7.5 MG/ML
INJECTION, SOLUTION INTRASPINAL PRN
Status: DISCONTINUED | OUTPATIENT
Start: 2023-05-22 | End: 2023-05-22 | Stop reason: SDUPTHER

## 2023-05-22 RX ORDER — OXYCODONE HCL 5 MG/5 ML
5 SOLUTION, ORAL ORAL
Status: COMPLETED | OUTPATIENT
Start: 2023-05-22 | End: 2023-05-22

## 2023-05-22 RX ORDER — VITAMIN B COMPLEX
1 CAPSULE ORAL DAILY
Status: DISCONTINUED | OUTPATIENT
Start: 2023-05-22 | End: 2023-05-22 | Stop reason: RX

## 2023-05-22 RX ORDER — TRANEXAMIC ACID 100 MG/ML
INJECTION, SOLUTION INTRAVENOUS
Status: COMPLETED
Start: 2023-05-22 | End: 2023-05-22

## 2023-05-22 RX ORDER — PROPOFOL 10 MG/ML
INJECTION, EMULSION INTRAVENOUS PRN
Status: DISCONTINUED | OUTPATIENT
Start: 2023-05-22 | End: 2023-05-22 | Stop reason: SDUPTHER

## 2023-05-22 RX ORDER — LIDOCAINE HYDROCHLORIDE 20 MG/ML
INJECTION, SOLUTION EPIDURAL; INFILTRATION; INTRACAUDAL; PERINEURAL PRN
Status: DISCONTINUED | OUTPATIENT
Start: 2023-05-22 | End: 2023-05-22 | Stop reason: SDUPTHER

## 2023-05-22 RX ORDER — TRANEXAMIC ACID 100 MG/ML
INJECTION, SOLUTION INTRAVENOUS PRN
Status: DISCONTINUED | OUTPATIENT
Start: 2023-05-22 | End: 2023-05-22 | Stop reason: SDUPTHER

## 2023-05-22 RX ORDER — HYDROMORPHONE HYDROCHLORIDE 1 MG/ML
0.5 INJECTION, SOLUTION INTRAMUSCULAR; INTRAVENOUS; SUBCUTANEOUS EVERY 5 MIN PRN
Status: DISCONTINUED | OUTPATIENT
Start: 2023-05-22 | End: 2023-05-22 | Stop reason: HOSPADM

## 2023-05-22 RX ORDER — OXYCODONE HYDROCHLORIDE AND ACETAMINOPHEN 5; 325 MG/1; MG/1
1-2 TABLET ORAL EVERY 6 HOURS PRN
Qty: 56 TABLET | Refills: 0 | Status: SHIPPED | OUTPATIENT
Start: 2023-05-22 | End: 2023-05-29

## 2023-05-22 RX ORDER — DOCUSATE SODIUM 100 MG/1
100 CAPSULE, LIQUID FILLED ORAL 2 TIMES DAILY
Status: DISCONTINUED | OUTPATIENT
Start: 2023-05-22 | End: 2023-05-22 | Stop reason: HOSPADM

## 2023-05-22 RX ORDER — AMLODIPINE BESYLATE 5 MG/1
5 TABLET ORAL DAILY
Status: DISCONTINUED | OUTPATIENT
Start: 2023-05-22 | End: 2023-05-22 | Stop reason: HOSPADM

## 2023-05-22 RX ORDER — MIDAZOLAM HYDROCHLORIDE 1 MG/ML
2 INJECTION INTRAMUSCULAR; INTRAVENOUS ONCE
Status: COMPLETED | OUTPATIENT
Start: 2023-05-22 | End: 2023-05-22

## 2023-05-22 RX ORDER — CELECOXIB 200 MG/1
200 CAPSULE ORAL ONCE
Status: COMPLETED | OUTPATIENT
Start: 2023-05-22 | End: 2023-05-22

## 2023-05-22 RX ORDER — CLINDAMYCIN PHOSPHATE 10 UG/ML
LOTION TOPICAL ONCE
Status: DISCONTINUED | OUTPATIENT
Start: 2023-05-22 | End: 2023-05-22 | Stop reason: RX

## 2023-05-22 RX ORDER — VANCOMYCIN HYDROCHLORIDE 1 G/20ML
INJECTION, POWDER, LYOPHILIZED, FOR SOLUTION INTRAVENOUS
Status: DISCONTINUED
Start: 2023-05-22 | End: 2023-05-22 | Stop reason: HOSPADM

## 2023-05-22 RX ORDER — PREGABALIN 75 MG/1
75 CAPSULE ORAL 2 TIMES DAILY
Qty: 42 CAPSULE | Refills: 0 | Status: SHIPPED | OUTPATIENT
Start: 2023-05-22 | End: 2023-06-12

## 2023-05-22 RX ORDER — DEXAMETHASONE SODIUM PHOSPHATE 10 MG/ML
INJECTION, SOLUTION INTRAMUSCULAR; INTRAVENOUS PRN
Status: DISCONTINUED | OUTPATIENT
Start: 2023-05-22 | End: 2023-05-22 | Stop reason: SDUPTHER

## 2023-05-22 RX ORDER — POLYETHYLENE GLYCOL 3350 17 G/17G
17 POWDER, FOR SOLUTION ORAL DAILY PRN
Status: DISCONTINUED | OUTPATIENT
Start: 2023-05-22 | End: 2023-05-22 | Stop reason: HOSPADM

## 2023-05-22 RX ORDER — FENTANYL CITRATE 50 UG/ML
25 INJECTION, SOLUTION INTRAMUSCULAR; INTRAVENOUS EVERY 5 MIN PRN
Status: DISCONTINUED | OUTPATIENT
Start: 2023-05-22 | End: 2023-05-22 | Stop reason: HOSPADM

## 2023-05-22 RX ORDER — BUPIVACAINE HYDROCHLORIDE 2.5 MG/ML
INJECTION, SOLUTION EPIDURAL; INFILTRATION; INTRACAUDAL PRN
Status: DISCONTINUED | OUTPATIENT
Start: 2023-05-22 | End: 2023-05-22 | Stop reason: SDUPTHER

## 2023-05-22 RX ORDER — FENTANYL CITRATE 50 UG/ML
INJECTION, SOLUTION INTRAMUSCULAR; INTRAVENOUS PRN
Status: DISCONTINUED | OUTPATIENT
Start: 2023-05-22 | End: 2023-05-22 | Stop reason: SDUPTHER

## 2023-05-22 RX ORDER — LOSARTAN POTASSIUM 25 MG/1
25 TABLET ORAL DAILY
Status: DISCONTINUED | OUTPATIENT
Start: 2023-05-22 | End: 2023-05-22 | Stop reason: HOSPADM

## 2023-05-22 RX ORDER — PROPOFOL 10 MG/ML
INJECTION, EMULSION INTRAVENOUS CONTINUOUS PRN
Status: DISCONTINUED | OUTPATIENT
Start: 2023-05-22 | End: 2023-05-22 | Stop reason: SDUPTHER

## 2023-05-22 RX ORDER — MAGNESIUM HYDROXIDE 1200 MG/15ML
LIQUID ORAL CONTINUOUS PRN
Status: COMPLETED | OUTPATIENT
Start: 2023-05-22 | End: 2023-05-22

## 2023-05-22 RX ORDER — PREGABALIN 75 MG/1
75 CAPSULE ORAL 2 TIMES DAILY
Status: DISCONTINUED | OUTPATIENT
Start: 2023-05-22 | End: 2023-05-22 | Stop reason: HOSPADM

## 2023-05-22 RX ORDER — LANOLIN ALCOHOL/MO/W.PET/CERES
6 CREAM (GRAM) TOPICAL NIGHTLY PRN
Status: DISCONTINUED | OUTPATIENT
Start: 2023-05-22 | End: 2023-05-22 | Stop reason: HOSPADM

## 2023-05-22 RX ORDER — ASPIRIN 81 MG/1
81 TABLET ORAL 2 TIMES DAILY
Qty: 84 TABLET | Refills: 0 | Status: SHIPPED | OUTPATIENT
Start: 2023-05-22 | End: 2023-07-03

## 2023-05-22 RX ORDER — OXYCODONE HYDROCHLORIDE 5 MG/1
5 TABLET ORAL EVERY 4 HOURS PRN
Status: DISCONTINUED | OUTPATIENT
Start: 2023-05-22 | End: 2023-05-22 | Stop reason: HOSPADM

## 2023-05-22 RX ORDER — ONDANSETRON 2 MG/ML
4 INJECTION INTRAMUSCULAR; INTRAVENOUS
Status: COMPLETED | OUTPATIENT
Start: 2023-05-22 | End: 2023-05-22

## 2023-05-22 RX ORDER — DOCUSATE SODIUM 100 MG/1
100 CAPSULE, LIQUID FILLED ORAL 2 TIMES DAILY PRN
Qty: 60 CAPSULE | Refills: 0 | Status: SHIPPED | OUTPATIENT
Start: 2023-05-22

## 2023-05-22 RX ORDER — SODIUM CHLORIDE, SODIUM LACTATE, POTASSIUM CHLORIDE, CALCIUM CHLORIDE 600; 310; 30; 20 MG/100ML; MG/100ML; MG/100ML; MG/100ML
INJECTION, SOLUTION INTRAVENOUS CONTINUOUS
Status: DISCONTINUED | OUTPATIENT
Start: 2023-05-22 | End: 2023-05-22 | Stop reason: HOSPADM

## 2023-05-22 RX ORDER — ASPIRIN 81 MG/1
81 TABLET, CHEWABLE ORAL 2 TIMES DAILY
Status: DISCONTINUED | OUTPATIENT
Start: 2023-05-23 | End: 2023-05-22 | Stop reason: HOSPADM

## 2023-05-22 RX ORDER — VANCOMYCIN HYDROCHLORIDE 1 G/20ML
INJECTION, POWDER, LYOPHILIZED, FOR SOLUTION INTRAVENOUS PRN
Status: DISCONTINUED | OUTPATIENT
Start: 2023-05-22 | End: 2023-05-22 | Stop reason: ALTCHOICE

## 2023-05-22 RX ORDER — CELECOXIB 200 MG/1
200 CAPSULE ORAL 2 TIMES DAILY
Status: DISCONTINUED | OUTPATIENT
Start: 2023-05-22 | End: 2023-05-22 | Stop reason: HOSPADM

## 2023-05-22 RX ORDER — PANTOPRAZOLE SODIUM 20 MG/1
20 TABLET, DELAYED RELEASE ORAL
Status: DISCONTINUED | OUTPATIENT
Start: 2023-05-23 | End: 2023-05-22 | Stop reason: HOSPADM

## 2023-05-22 RX ORDER — ALBUTEROL SULFATE 90 UG/1
2 AEROSOL, METERED RESPIRATORY (INHALATION) EVERY 6 HOURS PRN
Status: DISCONTINUED | OUTPATIENT
Start: 2023-05-22 | End: 2023-05-22 | Stop reason: HOSPADM

## 2023-05-22 RX ORDER — ONDANSETRON 4 MG/1
4 TABLET, ORALLY DISINTEGRATING ORAL EVERY 8 HOURS PRN
Status: DISCONTINUED | OUTPATIENT
Start: 2023-05-22 | End: 2023-05-22 | Stop reason: HOSPADM

## 2023-05-22 RX ORDER — KETOROLAC TROMETHAMINE 30 MG/ML
30 INJECTION, SOLUTION INTRAMUSCULAR; INTRAVENOUS EVERY 6 HOURS PRN
Status: DISCONTINUED | OUTPATIENT
Start: 2023-05-22 | End: 2023-05-22 | Stop reason: HOSPADM

## 2023-05-22 RX ADMIN — PROPOFOL 20 MG: 10 INJECTION, EMULSION INTRAVENOUS at 07:31

## 2023-05-22 RX ADMIN — SODIUM CHLORIDE, POTASSIUM CHLORIDE, SODIUM LACTATE AND CALCIUM CHLORIDE: 600; 310; 30; 20 INJECTION, SOLUTION INTRAVENOUS at 06:55

## 2023-05-22 RX ADMIN — FENTANYL CITRATE 25 MCG: 50 INJECTION INTRAMUSCULAR; INTRAVENOUS at 07:51

## 2023-05-22 RX ADMIN — CEFAZOLIN 2000 MG: 10 INJECTION, POWDER, FOR SOLUTION INTRAVENOUS at 16:35

## 2023-05-22 RX ADMIN — LOSARTAN POTASSIUM 25 MG: 25 TABLET, FILM COATED ORAL at 13:37

## 2023-05-22 RX ADMIN — PROPOFOL 50 MCG/KG/MIN: 10 INJECTION, EMULSION INTRAVENOUS at 07:31

## 2023-05-22 RX ADMIN — FENTANYL CITRATE 25 MCG: 50 INJECTION INTRAMUSCULAR; INTRAVENOUS at 07:31

## 2023-05-22 RX ADMIN — KETOROLAC TROMETHAMINE 30 MG: 30 INJECTION, SOLUTION INTRAMUSCULAR; INTRAVENOUS at 13:36

## 2023-05-22 RX ADMIN — DEXAMETHASONE SODIUM PHOSPHATE 10 MG: 10 INJECTION, SOLUTION INTRAMUSCULAR; INTRAVENOUS at 07:53

## 2023-05-22 RX ADMIN — HYDROMORPHONE HYDROCHLORIDE 0.5 MG: 1 INJECTION, SOLUTION INTRAMUSCULAR; INTRAVENOUS; SUBCUTANEOUS at 10:53

## 2023-05-22 RX ADMIN — CELECOXIB 200 MG: 200 CAPSULE ORAL at 07:10

## 2023-05-22 RX ADMIN — FAMOTIDINE 20 MG: 20 TABLET, FILM COATED ORAL at 13:37

## 2023-05-22 RX ADMIN — DOCUSATE SODIUM 100 MG: 100 CAPSULE, LIQUID FILLED ORAL at 13:37

## 2023-05-22 RX ADMIN — BUPIVACAINE HYDROCHLORIDE IN DEXTROSE 1.8 ML: 7.5 INJECTION, SOLUTION SUBARACHNOID at 07:30

## 2023-05-22 RX ADMIN — FENTANYL CITRATE 25 MCG: 50 INJECTION INTRAMUSCULAR; INTRAVENOUS at 08:12

## 2023-05-22 RX ADMIN — FENTANYL CITRATE 25 MCG: 50 INJECTION INTRAMUSCULAR; INTRAVENOUS at 07:35

## 2023-05-22 RX ADMIN — LIDOCAINE HYDROCHLORIDE 3 ML: 10 INJECTION, SOLUTION EPIDURAL; INFILTRATION; INTRACAUDAL; PERINEURAL at 07:30

## 2023-05-22 RX ADMIN — TRANEXAMIC ACID 1000 MG: 100 INJECTION, SOLUTION INTRAVENOUS at 08:49

## 2023-05-22 RX ADMIN — TRANEXAMIC ACID 1000 MG: 100 INJECTION, SOLUTION INTRAVENOUS at 07:48

## 2023-05-22 RX ADMIN — OXYCODONE 5 MG: 5 TABLET ORAL at 14:29

## 2023-05-22 RX ADMIN — CELECOXIB 200 MG: 200 CAPSULE ORAL at 13:47

## 2023-05-22 RX ADMIN — OXYCODONE HYDROCHLORIDE 5 MG: 5 SOLUTION ORAL at 13:37

## 2023-05-22 RX ADMIN — Medication 12.5 MG: at 13:48

## 2023-05-22 RX ADMIN — SODIUM CHLORIDE, POTASSIUM CHLORIDE, SODIUM LACTATE AND CALCIUM CHLORIDE: 600; 310; 30; 20 INJECTION, SOLUTION INTRAVENOUS at 10:51

## 2023-05-22 RX ADMIN — GABAPENTIN 300 MG: 300 CAPSULE ORAL at 07:10

## 2023-05-22 RX ADMIN — MIDAZOLAM 2 MG: 1 INJECTION INTRAMUSCULAR; INTRAVENOUS at 07:02

## 2023-05-22 RX ADMIN — AMLODIPINE BESYLATE 5 MG: 5 TABLET ORAL at 13:37

## 2023-05-22 RX ADMIN — ONDANSETRON 4 MG: 2 INJECTION INTRAMUSCULAR; INTRAVENOUS at 10:57

## 2023-05-22 RX ADMIN — Medication 3000 MG: at 07:38

## 2023-05-22 RX ADMIN — ACETAMINOPHEN 1000 MG: 500 TABLET ORAL at 07:10

## 2023-05-22 RX ADMIN — ACETAMINOPHEN 1000 MG: 500 TABLET ORAL at 13:48

## 2023-05-22 RX ADMIN — ONDANSETRON 4 MG: 2 INJECTION INTRAMUSCULAR; INTRAVENOUS at 13:36

## 2023-05-22 RX ADMIN — LIDOCAINE HYDROCHLORIDE 100 MG: 20 INJECTION, SOLUTION EPIDURAL; INFILTRATION; INTRACAUDAL; PERINEURAL at 07:31

## 2023-05-22 RX ADMIN — PREGABALIN 75 MG: 75 CAPSULE ORAL at 13:37

## 2023-05-22 RX ADMIN — SODIUM CHLORIDE, POTASSIUM CHLORIDE, SODIUM LACTATE AND CALCIUM CHLORIDE: 600; 310; 30; 20 INJECTION, SOLUTION INTRAVENOUS at 08:05

## 2023-05-22 RX ADMIN — LIDOCAINE HYDROCHLORIDE 3 ML: 10 INJECTION, SOLUTION INFILTRATION; PERINEURAL at 07:03

## 2023-05-22 RX ADMIN — OXYCODONE 10 MG: 5 TABLET ORAL at 11:27

## 2023-05-22 RX ADMIN — BUPIVACAINE 10 ML: 13.3 INJECTION, SUSPENSION, LIPOSOMAL INFILTRATION at 07:03

## 2023-05-22 RX ADMIN — BUPIVACAINE HYDROCHLORIDE 20 ML: 2.5 INJECTION, SOLUTION EPIDURAL; INFILTRATION; INTRACAUDAL; PERINEURAL at 07:03

## 2023-05-22 RX ADMIN — OXYCODONE 5 MG: 5 TABLET ORAL at 15:33

## 2023-05-22 RX ADMIN — HYDROMORPHONE HYDROCHLORIDE 0.5 MG: 1 INJECTION, SOLUTION INTRAMUSCULAR; INTRAVENOUS; SUBCUTANEOUS at 10:48

## 2023-05-22 ASSESSMENT — PAIN SCALES - GENERAL
PAINLEVEL_OUTOF10: 4
PAINLEVEL_OUTOF10: 8
PAINLEVEL_OUTOF10: 9
PAINLEVEL_OUTOF10: 7
PAINLEVEL_OUTOF10: 8
PAINLEVEL_OUTOF10: 8
PAINLEVEL_OUTOF10: 9
PAINLEVEL_OUTOF10: 6
PAINLEVEL_OUTOF10: 7

## 2023-05-22 ASSESSMENT — PAIN DESCRIPTION - LOCATION
LOCATION: KNEE

## 2023-05-22 ASSESSMENT — PAIN DESCRIPTION - ORIENTATION: ORIENTATION: RIGHT

## 2023-05-22 ASSESSMENT — PAIN DESCRIPTION - DESCRIPTORS
DESCRIPTORS: SHARP
DESCRIPTORS: BURNING

## 2023-05-22 ASSESSMENT — PAIN - FUNCTIONAL ASSESSMENT: PAIN_FUNCTIONAL_ASSESSMENT: 0-10

## 2023-05-22 NOTE — PROGRESS NOTES
.  Herbal and Nutritional Product Restrictions      The following herbal, alternative, and/or nutritional/dietary supplement product(s) has been discontinued per [facility] approved policy:    B complex vitamins once daily  Benzoyl peroxide 5% once daily  Clindamycin lotion to affected areas      Please reorder upon discharge if appropriate.     Thank you,  Kassandra Kaur [facility]  5/22/2023 11:20 AM
CLINICAL PHARMACY NOTE: MEDS TO BEDS    Total # of Prescriptions Filled: 4   The following medications were delivered to the patient:  Aspirin 81 mg   Docusate 100 mg  Pregabalin 75 mg  Oxycodone- acetaminophen 5/325 mg    Additional Documentation:
Dr Milagros Urena was in to reevaluate the pt.   New orders received
Orthopedic Coordinator Note    Patient s/p right total Knee replacement on 05/22/20023 WITH DR. Cr Ramon    The following appointments are currently scheduled:    Post-op with surgeon  06/01/2023 AT 1000 IN [place]. Physical Therapy 05/25/2023 AT 1500 IN [place]. Wheeled walker order is  NOT entered  Face to face documentation is N/A-PT HAS A FWW SHE WILL BRING. DVT Prophylaxis: 81mg ec asa BID X 35 DAYS.       Any questions please contact Maddison York RN, MSN  [phone]      Electronically signed by: Maddison York RN on 5/22/2023 at 9:00 AM
Patient prepared for PNB. Patient connected to all monitoring equipment. Oxygen placed on at 2 liters per nasal cannula. Time Out at 7:01. Versed 2 mg IV administered at 7:02. Procedure started at 7:03. Procedure end time 7:06. Patient tolerated procedure without difficulty. SR up x2. Bed remains in lowest locked position. Frequent nursing rounds. Instructed to call with any needs for assistance.
Physical Therapy  [facility]  Physical Therapy Initial Assessment    Name: Lucila Duarte  : 1965  MRN: [id number]  Date of Service: 2023    Discharge Recommendations:  Patient would benefit from continued therapy after discharge    Pt presenting with new musculoskeletal dysfunction and would benefit from additional therapy at time of discharge. Please refer to the AM-PAC score for current functional status. Patient underwent R TKA 23 with Dr. Sandy Villa        Patient Diagnosis(es): The encounter diagnosis was S/P total knee arthroplasty, right. Past Medical History:  has a past medical history of Arthritis, Asthma, Benign familial tremor, Benign familial tremor, CAD (coronary artery disease), Cancer ([address] CKD (chronic kidney disease) stage 1, GFR 90 ml/min or greater, Class 2 severe obesity with serious comorbidity and body mass index (BMI) of 39.0 to 39.9 in adult ([address] Cystinuria ([address] Dyspnea on exertion, Flank pain, Gastroesophageal reflux disease without esophagitis, GERD (gastroesophageal reflux disease), Hypertension, Hyperuricemia, Hypothyroidism, Kidney stones, Midline low back pain with right-sided sciatica, Proteinuria, Shoulder impingement, Snores, Solitary kidney, acquired, Urinary tract obstruction by kidney stone, and Wears glasses. Past Surgical History:  has a past surgical history that includes total nephrectomy (Left, ); Knee arthroscopy (Right, , ); Cystoscopy (Right, 2017); cysto/uretero/pyeloscopy, calculus tx (Right, 2017); pr cysto w/insert ureteral stent (Right, 2018); pr cysto w/ureteroscopy w/rmvl/manj stones (N/A, 07/10/2018); pr cysto w/ureteroscopy w/rmvl/manj stones (Right, 10/10/2018); Cystoscopy (Right, 2019); Rotator cuff repair (Left); Intraocular lens insertion (Bilateral, );  Cystoscopy (Right, 2019); NEPHROLITHOTOMY (Right, 2019); cysto/uretero/pyeloscopy, calculus tx (Right,
Pt admitted to room 2014. Oriented to room, call light and bed mechanics. Side rails up x2. Call light within reach. Orders reviewed. Her sister is at the bedside. She stated she is a   \" same day surg\" and was hoping to leave later today.  She was instructed on the requirements that need to be met prior to discharge
The pt was discharged to home. The discharge instructions were given and reviewed with the pt and her sister. She was escorted to the exit per wheelchair in stable condition.
08/23/2019); cysto/uretero/pyeloscopy, calculus tx (Right, 09/13/2019); cysto/uretero/pyeloscopy, calculus tx (Right, 12/12/2019); CYSTOSCOPY INSERTION / REMOVAL STENT / STONE (Right, 12/31/2019); Cystoscopy (Right, 09/19/2020); Cardiac catheterization (12/08/2021); Dilation and curettage of uterus (N/A, 06/30/2022); Percutaneous nephrostomy (Right, 11/01/2022); Cystoscopy (Right, 11/1/2022); Kidney stone removal (Right, 11/1/2022); IR GUIDED NEPHROSTOMY CATH PLACEMENT RIGHT (11/1/2022); and Total knee arthroplasty (Right, 5/22/2023). RIGHT KNEE TOTAL ARTHROPLASTY - MEDACTA by Dr. Donald Oconnell, DO     Assessment   Performance deficits / Impairments: Decreased strength;Decreased endurance;Decreased functional mobility ; Decreased ADL status; Decreased balance  Assessment: Skilled OT is indicated to increase overall IND and safety with self-care and functional tasks to return to PLOF.   Prognosis: Good  Decision Making: Medium Complexity  REQUIRES OT FOLLOW-UP: Yes  Activity Tolerance  Activity Tolerance: Patient limited by pain        Plan   Occupational Therapy Plan  Times Per Week: 4-5x per week  Current Treatment Recommendations: Strengthening, Balance training, Functional mobility training, Endurance training, Patient/Caregiver education & training, Safety education & training, Equipment evaluation, education, & procurement, Self-Care / ADL, Positioning     Restrictions  Restrictions/Precautions  Restrictions/Precautions: General Precautions, Up as Tolerated, Weight Bearing  Required Braces or Orthoses?: No  Lower Extremity Weight Bearing Restrictions  Right Lower Extremity Weight Bearing: Weight Bearing As Tolerated  Position Activity Restriction  Other position/activity restrictions: RLE WBAT, up with assist    Subjective   General  Chart Reviewed: Yes  Patient assessed for rehabilitation services?: Yes  Family / Caregiver Present: No  Subjective  Subjective: pt is pleasant and agreeable to OT

## 2023-05-22 NOTE — OP NOTE
Operative Note      Patient: Deep Salcido  YOB: 1965  MRN: [id number]    Date of Procedure: 5/22/2023    Pre-Op Diagnosis Codes:     * Primary osteoarthritis of right knee [M17.11]    Post-Op Diagnosis: Same       Procedure(s):  RIGHT KNEE TOTAL ARTHROPLASTY, kinematically aligned, calipered measured resection    Surgeon(s):  Danielle Holt DO    Assistant:   Surgical Assistant: Sia Ovalles RN  Resident: Janee Gallo DO    Anesthesia: Spinal    Estimated Blood Loss (mL): 583    Complications: None    Specimens:   * No specimens in log *    Implants:  Implant Name Type Inv. Item Serial No.  Lot No. LRB No. Used Action   CEMENT BNE 40GM HI VISC RADPQ FOR REV SURG - [id number]  CEMENT BNE 40GM HI VISC RADPQ FOR REV SURG  [employer] [id number] Right 1 Implanted   CEMENT BNE 40GM HI VISC RADPQ FOR REV SURG - [id number]  CEMENT BNE 40GM HI VISC RADPQ FOR REV SURG  [employer] [id number] Right 1 Implanted   COMPONENT FEM SZ 4 RT CARLOS GMK SPHR - [id number]  COMPONENT FEM SZ 4 RT CARLOS GMK SPHR  MEDACTA [facility] [id number] Right 1 Implanted   COMPONENT PAT SZ 4 RESURF AUG GMK - [id number]  COMPONENT PAT SZ 4 RESURF AUG GMK  [employer] [facility] [id number] Right 1 Implanted   COMPONENT TIB SZ 5 RT FIX CARLOS GMK - [id number]  COMPONENT TIB SZ 5 RT FIX CARLOS GMK  [employer]ACT[employer] [facility] [id number] Right 1 Implanted   MEDACTA TIBIAL INSERT FIXED SIZE 5 RIGHT HT 12MM TYPE FLEX     [id number] Right 1 Implanted         Drains: * No LDAs found *    Findings: Per operative note      Detailed Description of Procedure:     Clarissa Aguilar  is a 25-year-old year-old female who presented to [facility] surgical department for right total knee arthroplasty. The patient has had progressively worsening right knee pain which has failed to improve despite conservative therapy to include activity modification.   Her symptoms are greatly affecting  usual activities of daily living and as a

## 2023-05-22 NOTE — ANESTHESIA PROCEDURE NOTES
Peripheral Block    Patient location during procedure: pre-op  Reason for block: post-op pain management and at surgeon's request  Start time: 5/22/2023 7:01 AM  End time: 5/22/2023 7:06 AM  Staffing  Performed: anesthesiologist   Anesthesiologist: Trista Guy DO  Preanesthetic Checklist  Completed: patient identified, IV checked, site marked, risks and benefits discussed, surgical/procedural consents, equipment checked, pre-op evaluation, timeout performed, anesthesia consent given, oxygen available and monitors applied/VS acknowledged  Peripheral Block   Patient position: supine  Prep: ChloraPrep  Provider prep: mask and sterile gloves  Patient monitoring: cardiac monitor, continuous pulse ox, frequent blood pressure checks, IV access, oxygen and responsive to questions  Block type: Femoral  Adductor canal  Laterality: right  Injection technique: single-shot  Guidance: ultrasound guided  Local infiltration: lidocaine  Infiltration strength: 1 %  Local infiltration: lidocaine  Dose: 3 mL    Needle   Needle type: insulated echogenic nerve stimulator needle   Needle gauge: 21 G  Needle localization: ultrasound guidance  Needle length: 10 cm  Assessment   Injection assessment: negative aspiration for heme, no paresthesia on injection and local visualized surrounding nerve on ultrasound  Paresthesia pain: none  Slow fractionated injection: yes  Hemodynamics: stable  Real-time US image taken/store: yes  Outcomes: uncomplicated    Additional Notes  Right adductor canal single shot   10ml 1.3% exparel + 20ml 0.25% bupivacaine

## 2023-05-22 NOTE — ANESTHESIA POSTPROCEDURE EVALUATION
Department of Anesthesiology  Postprocedure Note    Patient: Reba Salinas  MRN: [id number]  YOB: 1965  Date of evaluation: 5/22/2023      Procedure Summary     Date: 05/22/23 Room / Location: [address] [place] [address] / [facility] - INPATIENT    Anesthesia Start: 5863 Anesthesia Stop: 0929    Procedure: RIGHT KNEE TOTAL ARTHROPLASTY (Right: Knee) Diagnosis:       Primary osteoarthritis of right knee      (Primary osteoarthritis of right knee [M17.11])    Surgeons: Jojo Brambila DO Responsible Provider: Charli Patricia DO    Anesthesia Type: Spinal ASA Status: 3          Anesthesia Type: Spinal    Imani Phase I: Imani Score: 10    Imani Phase II:        Anesthesia Post Evaluation    Patient location during evaluation: PACU  Patient participation: complete - patient participated  Level of consciousness: awake and alert  Airway patency: patent  Nausea & Vomiting: no nausea and no vomiting  Complications: no  Cardiovascular status: hemodynamically stable  Respiratory status: acceptable  Hydration status: stable

## 2023-05-22 NOTE — DISCHARGE INSTRUCTIONS
ANY ORTHOPEDIC QUESTIONS OR ANY OTHER CONCERNS YOU MAY CALL THE ORTHOPEDIC COORDINATOR:  Sharmila Og RN, MSN  [phone]  [email or web address]    DISCHARGE INSTRUCTIONS  Caring for yourself after joint replacement surgery (Total Hip and Total Knee Replacement)    Activity and Therapy  Receive physical therapy three times per week. (Pain medication one hour prior to therapy)   Perform PT exercises on own when not receiving home or outpatient PT. Ideally exercises should be at least two times a day. Increase level of activity and ambulation each day. Perform deep breathing exercises daily. Patient provides self-care when possible. Work on Range of motion for Total knee patients. No pillow under the knee for Total knee patients. Elevate the surgical leg when seated. No driving until cleared by surgeon      Diet:  Increase oral intake of fruits, fiber and water to prevent constipation. Drink fluids frequently and take stool softeners to aid in bowel motility. Increase protein intake/reduce high-sugar intake to help promote healing and prevent infection. Incision Care:  Keep Aquacel or other dressing intact until seen and removed by surgeon, unless saturated, in which case, call surgeon and request instructions. If dressing falls off, call surgeon. [facility] on in the am and off in the pm to reduce swelling. Ice affected area four times a day, for twenty minutes. Pain Medications and Anticoagulant  You have been place on an anticoagulant to prevent blood clots. Take this medication exactly as prescribed. Be alert for signs of bleeding. Take care not to injure yourself. You have been provided pain medicine to control your pain. Do not take more narcotics than prescribed. You may begin weaning from narcotics as your pain level improves by decreasing the amount or frequency of the narcotics. You may also take plain acetaminophen as an alternate to the narcotics.    Never exceed the

## 2023-05-22 NOTE — H&P
pantoprazole (PROTONIX) 20 MG tablet Take 1 tablet by mouth every morning (before breakfast) 90 tablet 3    meloxicam (MOBIC) 15 MG tablet TAKE 1 TABLET BY MOUTH ONE TIME A DAY 90 tablet 3    Metoprolol Succinate 25 MG CS24 Take 12.5 mg by mouth        famotidine (PEPCID) 20 MG tablet Take 1 tablet by mouth 2 times daily 60 tablet 5    ondansetron (ZOFRAN) 4 MG tablet Take 1 tablet by mouth daily as needed for Nausea or Vomiting 30 tablet 0    Melatonin 5 MG CAPS Take by mouth        vitamin D (D3-1000) 25 MCG (1000 UT) CAPS          albuterol sulfate HFA (PROAIR HFA) 108 (90 Base) MCG/ACT inhaler Inhale 2 puffs into the lungs every 6 hours as needed for Wheezing 1 each 3    benzoyl peroxide 5 % external liquid Wash affected areas once daily 227 g 3    clindamycin (CLEOCIN T) 1 % lotion Apply to affected areas daily 60 mL 3    POTASSIUM CITRATE PO Take 5 mLs by mouth three times daily        acetaminophen (TYLENOL) 500 MG tablet Take 2 tablets by mouth every 4 hours as needed for Pain          No current facility-administered medications for this visit. No Known Allergies          Health Maintenance   Topic Date Due    Colorectal Cancer Screen  Never done    Shingles vaccine (1 of 2) Never done    A1C test (Diabetic or Prediabetic)  09/23/2020    COVID-19 Vaccine (4 - Booster for Moderna series) 12/21/2021    DTaP/Tdap/Td vaccine (1 - Tdap) 10/15/2023 (Originally 3/2/1982)    Flu vaccine (Season Ended) 08/01/2023    Depression Monitoring  01/30/2024    Breast cancer screen  12/15/2024    Cervical cancer screen  01/07/2025    Lipids  06/14/2027    Pneumococcal 0-64 years Vaccine  Completed    Hepatitis C screen  Completed    HIV screen  Completed    Hepatitis A vaccine  Aged Out    Hib vaccine  Aged Out    Meningococcal (ACWY) vaccine  Aged Out         Subjective:      Review of Systems   Constitutional:  Negative for activity change, appetite change, chills, fatigue, fever and unexpected weight change.

## 2023-05-22 NOTE — BRIEF OP NOTE
Brief Postoperative Note      Patient: Miriam Lee  YOB: 1965  MRN: [id number]    Date of Procedure: 5/22/2023    Pre-Op Diagnosis Codes:  Right knee osteoarthritis    Post-Op Diagnosis: Same       Procedure(s):  Right total knee arthroplasty     Surgeon(s):  Gisselle Roldan DO    Assistant:  Surgical Assistant: Emory Hernandez RN  Resident: Paco Choi DO    Anesthesia: Spinal    Estimated Blood Loss (mL): 150 cc's    Fluids: 1,900 mL crystalloids    Complications: None    Specimens:   * No specimens in log *    Implants:  Implant Name Type Inv.  Item Serial No.  Lot No. LRB No. Used Action   CEMENT BNE 40GM HI VISC RADPQ FOR REV SURG - [id number]  CEMENT BNE 40GM HI VISC RADPQ FOR REV SURG  [employer] [id number] Right 1 Implanted   CEMENT BNE 40GM HI VISC RADPQ FOR REV SURG - [id number]  CEMENT BNE 40GM HI VISC RADPQ FOR REV SURG  [employer] [id number] Right 1 Implanted   COMPONENT FEM SZ 4 RT CARLOS GMK SPHR - [id number]  COMPONENT FEM SZ 4 RT CARLOS GMK SPHR  MEDACTA [facility] [id number] Right 1 Implanted   COMPONENT PAT SZ 4 RESURF AUG GMK - [id number]  COMPONENT PAT SZ 4 RESURF AUG GMK  MEDACTA [facility]-WD [id number] Right 1 Implanted   COMPONENT TIB SZ 5 RT FIX CARLOS GMK - [id number]  COMPONENT TIB SZ 5 RT FIX CARLOS GMK  [employer]ACT[employer] [facility] [id number] Right 1 Implanted   MEDACTA TIBIAL INSERT FIXED SIZE 5 RIGHT HT 12MM TYPE FLEX     [id number] Right 1 Implanted         Drains: * No LDAs found *    Findings: See op note for details      Electronically signed by Paco Choi DO on 5/22/2023 at 9:31 AM

## 2023-05-22 NOTE — ANESTHESIA PROCEDURE NOTES
Spinal Block    Patient location during procedure: OR  End time: 5/22/2023 7:30 AM  Reason for block: primary anesthetic and at surgeon's request  Staffing  Performed: resident/CRNA   Anesthesiologist: Danielle Lloyd DO  Resident/CRNA: Julian Blocker, APRN - CRNA  Spinal Block  Patient position: sitting  Prep: ChloraPrep  Patient monitoring: continuous pulse ox and frequent blood pressure checks  Approach: midline  Location: L4/L5  Guidance: paresthesia technique  Provider prep: mask  Local infiltration: lidocaine  Needle  Needle type: Pencan   Needle gauge: 24 G  Needle length: 4 in  Assessment  Sensory level: T10  Events: cerebrospinal fluid  Swirl obtained: Yes  CSF: clear  Attempts: 1  Hemodynamics: stable  Additional Notes  Pt tolerated procedure well, VSS  Preanesthetic Checklist  Completed: patient identified, IV checked, site marked, risks and benefits discussed, surgical/procedural consents, equipment checked, pre-op evaluation, timeout performed, anesthesia consent given, oxygen available, monitors applied/VS acknowledged, fire risk safety assessment completed and verbalized and blood product R/B/A discussed and consented

## 2023-05-22 NOTE — ANESTHESIA PRE PROCEDURE
Department of Anesthesiology  Preprocedure Note       Name:  Chilo Mary   Age:  59 y.o.  :  1965                                          MRN:  [id number]         Date:  2023      Surgeon: Margaret Valencia):  Emerson Hdez DO    Procedure: Procedure(s):  RIGHT KNEE TOTAL ARTHROPLASTY - MEDACTA    Medications prior to admission:   Prior to Admission medications    Medication Sig Start Date End Date Taking?  Authorizing Provider   VITAMIN D-VITAMIN K PO Take 1 tablet by mouth daily    Historical Provider, MD   AMLODIPINE BESYLATE PO Take 5 mg by mouth daily    Historical Provider, MD   irbesartan (AVAPRO) 150 MG tablet Take 1 tablet by mouth daily    Historical Provider, MD   ESTRADIOL PO Take 1 capsule by mouth daily    Historical Provider, MD   b complex vitamins capsule Take 1 capsule by mouth daily    Historical Provider, MD   pantoprazole (PROTONIX) 20 MG tablet Take 1 tablet by mouth every morning (before breakfast) 23   MIGDALIA Guzman CNP   meloxicam (MOBIC) 15 MG tablet TAKE 1 TABLET BY MOUTH ONE TIME A DAY 23   MIGDALIA Sandoval CNP   Metoprolol Succinate 25 MG CS24 Take 12.5 mg by mouth daily    Historical Provider, MD   famotidine (PEPCID) 20 MG tablet Take 1 tablet by mouth 2 times daily  Patient taking differently: Take 1 tablet by mouth daily 11/15/22   MIGDALIA Guzman CNP   Melatonin 5 MG CAPS Take 1 capsule by mouth nightly as needed    Historical Provider, MD   albuterol sulfate HFA (PROAIR HFA) 108 (90 Base) MCG/ACT inhaler Inhale 2 puffs into the lungs every 6 hours as needed for Wheezing 10/28/21   MIGDALIA Guzman CNP   benzoyl peroxide 5 % external liquid Wash affected areas once daily 21   Albina Lott MD   clindamycin (CLEOCIN T) 1 % lotion Apply to affected areas daily 21   Albina oLtt MD   acetaminophen (TYLENOL) 500 MG tablet Take 2 tablets by mouth every 4 hours as needed for Pain    Historical Provider, MD

## 2023-05-22 NOTE — PLAN OF CARE
Problem: Discharge Planning  Goal: Discharge to home or other facility with appropriate resources  5/22/2023 1744 by Paige Quintanilla RN  Outcome: Completed  5/22/2023 1715 by Paige Quintanilla RN  Outcome: Progressing     Problem: Pain  Goal: Verbalizes/displays adequate comfort level or baseline comfort level  Outcome: Completed     Problem: Safety - Adult  Goal: Free from fall injury  Outcome: Completed

## 2023-05-25 ENCOUNTER — HOSPITAL ENCOUNTER (OUTPATIENT)
Dept: PHYSICAL THERAPY | Facility: CLINIC | Age: 58
Setting detail: THERAPIES SERIES
Discharge: HOME | End: 2023-05-25
Payer: COMMERCIAL

## 2023-05-25 PROCEDURE — 97016 VASOPNEUMATIC DEVICE THERAPY: CPT

## 2023-05-25 PROCEDURE — 97161 PT EVAL LOW COMPLEX 20 MIN: CPT

## 2023-05-25 PROCEDURE — 97110 THERAPEUTIC EXERCISES: CPT

## 2023-05-25 NOTE — CONSULTS
[] [facility]) - [facility] &  Therapy  [address]  [phone]  F: [phone] [] [address]  [address]   [address]  P: [phone]  F: [phone] [x] [address] &  Therapy  [address]  P: [phone]  F: [phone] [] [address]  P: [phone]  F: [phone] [] [address]  [facility]   [address]   [place]: [phone]  F: [phone]      Physical Therapy Lower Extremity Evaluation    Date:  2023  Patient: Louis Gonzalez  : 1965  MRN: [id number]  Physician: Jahaira Seay DO   Insurance: [employer] (27 visit limt, no hard max, auth required, auth after 30 visits)  Medical Diagnosis: Primary osteoarthritis of one knee, right, Status post surgery  Rehab Codes: M25.561, R26.89  Onset date: 23  Next Dr's appt. : 23    Subjective:   CC: Per Dr. Jahaira Seay DO, [facility]  Malik Forde is a 59-year-old female who presents to the office today for recheck of right knee pain. She was last seen in the office on 3/30/2023 by BRITTNEY Lu. Patient has right greater than the pain and has had pain for at least 6 months. The pain is worse with walking standing stairclimbing and rising from a seated position. Patient feels like her knee is unstable at times. She has also tried nonsteroidal anti-inflammatory medications without improvement in her symptoms. She has had previous physical therapy for this problem without improvement. In PT today: Had surgery Monday, , have had headaches, nausea, grogginess from medications. Feeling pain in knee and swelling. Taking tylenol and ibuprofen and cut out recommended medication for knee.  Have not eaten much due to nausea and headaches have felt

## 2023-05-30 ENCOUNTER — HOSPITAL ENCOUNTER (OUTPATIENT)
Dept: PHYSICAL THERAPY | Facility: CLINIC | Age: 58
Setting detail: THERAPIES SERIES
Discharge: HOME | End: 2023-05-30
Payer: COMMERCIAL

## 2023-05-30 ENCOUNTER — TELEPHONE (OUTPATIENT)
Dept: ORTHOPEDIC SURGERY | Age: 58
End: 2023-05-30

## 2023-05-30 PROCEDURE — G0283 ELEC STIM OTHER THAN WOUND: HCPCS

## 2023-05-30 PROCEDURE — 97110 THERAPEUTIC EXERCISES: CPT

## 2023-05-30 PROCEDURE — 97016 VASOPNEUMATIC DEVICE THERAPY: CPT

## 2023-05-30 NOTE — FLOWSHEET NOTE
[] [facility]) [facility] &  Therapy  [address]  [phone]  F: [phone] [] [address]  [address]   [address]  P: [phone]  F: [phone] [x] [address]  [address]  P: [phone]  F: [phone] [] [address]  P: [phone]  F: [phone] [] [address]  [facility]   [address]   [place]: [phone]  F: [phone]      Physical Therapy Daily Treatment Note    Date:  2023  Patient Name:  Yash Nieves    :  1965  MRN: [id number]  Physician: Cruz Weeks DO                                Insurance: [employer] (27 visit limt, no hard max, auth required, auth after 30 visits)  Medical Diagnosis: Primary osteoarthritis of one knee, right, Status post surgery             Rehab Codes: M25.561, R26.89  Onset date: 23               Next Dr's appt. : 23  Visit# / total visits:   Cancels/No Shows: 0    Subjective:    Pain:  [x] Yes  [] No Location: R knee  Pain Rating: (0-10 scale) 6/10  Pain altered Tx:  [x] No  [] Yes  Action:  Comments:pt arrived with pain in R knee. Experienced lots of pain in knee last night and this morning, so took oxy medication. Also experienced swelling from knee to the groin.      Objective:  Modalities: vaso compression R knee elevated, 34deg, medium pressure, 15 min, VMS electrotherapy : 18mA, 10 sec on/10 sec off, 10 min duration, pads on VMO/VML  Precautions: standard   Exercises:  Exercise Reps/ Time Weight/ Level Comments   Nustep 10'       Seated Knee flexion 2x30\"       Quad sets x20    with VMS   Heel prop/strap quad set x20       Supine Hip abduction x20       Supine Heel slides x20       Supine SLR x20  Assist with concentric    Other:

## 2023-05-30 NOTE — TELEPHONE ENCOUNTER
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 She would like a call back regarding her pain and medications. She has questions about using her arthritic meds, she's trying not to take the oxycodone. Please call her at [phone].

## 2023-06-01 ENCOUNTER — OFFICE VISIT (OUTPATIENT)
Dept: ORTHOPEDIC SURGERY | Age: 58
End: 2023-06-01

## 2023-06-01 ENCOUNTER — HOSPITAL ENCOUNTER (OUTPATIENT)
Dept: PHYSICAL THERAPY | Facility: CLINIC | Age: 58
Setting detail: THERAPIES SERIES
Discharge: HOME | End: 2023-06-01
Payer: COMMERCIAL

## 2023-06-01 VITALS — HEIGHT: 68 IN | RESPIRATION RATE: 14 BRPM | BODY MASS INDEX: 40.71 KG/M2 | WEIGHT: 268.6 LBS

## 2023-06-01 DIAGNOSIS — Z96.651 S/P TOTAL KNEE ARTHROPLASTY, RIGHT: Primary | ICD-10-CM

## 2023-06-01 PROCEDURE — 99024 POSTOP FOLLOW-UP VISIT: CPT | Performed by: ORTHOPAEDIC SURGERY

## 2023-06-01 PROCEDURE — 97016 VASOPNEUMATIC DEVICE THERAPY: CPT

## 2023-06-01 PROCEDURE — 97110 THERAPEUTIC EXERCISES: CPT

## 2023-06-01 RX ORDER — OXYCODONE HYDROCHLORIDE AND ACETAMINOPHEN 5; 325 MG/1; MG/1
1 TABLET ORAL EVERY 6 HOURS PRN
Qty: 28 TABLET | Refills: 0 | Status: SHIPPED | OUTPATIENT
Start: 2023-06-01 | End: 2023-06-08

## 2023-06-01 ASSESSMENT — ENCOUNTER SYMPTOMS
EYE DISCHARGE: 0
ROS SKIN COMMENTS: NEGATIVE FOR RASH
ABDOMINAL PAIN: 0
SHORTNESS OF BREATH: 0

## 2023-06-01 NOTE — FLOWSHEET NOTE
[] [facility]  [address]  [phone]  F: [phone] [] [address]  [facility] [address]   [address]  P: [phone]  F: [phone] [x] [address] &  Therapy  [address]  P: [phone]  F: [phone] [] [address]  P: [phone]  F: [phone] [] [address]  [facility]   [address]   [place]: [phone]  F: [phone]      Physical Therapy Daily Treatment Note    Date:  2023  Patient Name:  Yash Nieves    :  1965  MRN: [id number]  Physician: Cruz Weeks DO                                Insurance: [employer] (27 visit limt, no hard max, auth required, auth after 30 visits)  Medical Diagnosis: Primary osteoarthritis of one knee, right, Status post surgery             Rehab Codes: M25.561, R26.89  Onset date: 23               Next Dr's appt.: 23  Visit# / total visits: 3/20  Cancels/No Shows: 0    Subjective:    Pain:  [x] Yes  [] No Location: R knee  Pain Rating: (0-10 scale) 6/10  Pain altered Tx:  [x] No  [] Yes  Action:  Comments: Had f/u with surgeon this morning and was told she needs to work on mobility and swelling reduction. She states she walked a lot to get to appointment so more fatigued today.  Doctor told her she is able to resume Meloxicam.     Objective:  Modalities: vaso compression R knee elevated, 34deg, medium pressure, 15 min, VMS electrotherapy : 18mA, 10 sec on/10 sec off, 10 min duration, pads on VMO/VML - held estim   Precautions: standard   Exercises:  Exercise Reps/ Time Weight/ Level Comments   Nustep 10'  L1 Assist          Seated Knee flexion 10x5\"       Seated HR/TR 2x10     Bridge w/ SAQ      Quad sets 10x10\"    0      Heel prop/strap quad

## 2023-06-01 NOTE — PROGRESS NOTES
comfortably in our office. Ortho Exam  MS:   Midline right knee incisions healing well without signs of infection. Mild to moderate diffuse welling about the knee and calf and pretibial area with some ecchymosis of the pretibial areas noted. Calves are supple and negative Vonne Cotta is appreciated. Moderate quadriceps weakness is noted but the extensor mechanism appears intact. Motor deficits, vascular examination right knee and right lower extremity is grossly intact without focal deficits  Neuro: alert. oriented  Eyes: Extra-ocular muscles intact  Mouth: Oral mucosa moist. No perioral lesions  Pulm: Respirations unlabored and regular. Skin: warm, well perfused  Psych:   Patient has good fund of knowledge and displays understanging of exam, diagnosis, and plan. Radiology:  XR CHEST (2 VW)    Result Date: 5/11/2023  EXAMINATION: TWO XRAY VIEWS OF THE CHEST 5/11/2023 9:23 am COMPARISON: None. HISTORY: ORDERING SYSTEM PROVIDED HISTORY: Primary osteoarthritis of one knee, right TECHNOLOGIST PROVIDED HISTORY: Reason for Exam: Pre op for total knee. No chest complaints. FINDINGS: The lungs are without acute focal process. There is no effusion or pneumothorax. The cardiomediastinal silhouette is without acute process. The osseous structures are without acute process. No acute process. XR KNEE RIGHT (3 VIEWS)    Result Date: 6/1/2023  History:  Right  total knee arthroplasty Findings: AP, lateral, merchant view x-rays of the Right  knee done in the office standing today shows Right total knee arthroplasty in good position without  complications. No loosening of components is appreciated. No evidence of fracture, subluxation, dislocation, radiopaque foreign body, radiopaque tumors noted. Alignment is near-anatomic. Impression: Status post Right total knee arthroplasty as described above.     XR KNEE RIGHT (3 VIEWS)    Result Date: 5/22/2023  EXAMINATION: THREE XRAY VIEWS OF THE RIGHT KNEE 5/22/2023 9:53 am

## 2023-06-02 ENCOUNTER — HOSPITAL ENCOUNTER (OUTPATIENT)
Dept: PHYSICAL THERAPY | Facility: CLINIC | Age: 58
Setting detail: THERAPIES SERIES
Discharge: HOME | End: 2023-06-02
Payer: COMMERCIAL

## 2023-06-02 ENCOUNTER — TELEPHONE (OUTPATIENT)
Dept: ORTHOPEDIC SURGERY | Age: 58
End: 2023-06-02

## 2023-06-02 PROCEDURE — 97110 THERAPEUTIC EXERCISES: CPT

## 2023-06-02 PROCEDURE — 97016 VASOPNEUMATIC DEVICE THERAPY: CPT

## 2023-06-02 NOTE — FLOWSHEET NOTE
[] [facility]  [address]  [phone]  F: [phone] [] [address]  [employer] [address]   [address]  P: [phone]  F: [phone] [x] [address]  Therapy  [address]  P: [phone]  F: [phone] [] [address]  P: [phone]  F: [phone] [] [address]  [facility]   [address]   [place]: [phone]  F: [phone]      Physical Therapy Daily Treatment Note    Date:  2023  Patient Name:  Jessica Escalera    :  1965  MRN: [id number]  Physician: Luis Enrique Willams DO                                Insurance: [facility] (27 visit limt, no hard max, auth required, auth after 30 visits)  Medical Diagnosis: Primary osteoarthritis of one knee, right, Status post surgery             Rehab Codes: M25.561, R26.89  Onset date: 23               Next Dr's appt.: 23  Visit# / total visits:   Cancels/No Shows: 0    Subjective:    Pain:  [x] Yes  [] No Location: R knee  Pain Rating: (0-10 scale) 5/10  Pain altered Tx:  [x] No  [] Yes  Action:  Comments: Still feeling very sore today. Anterior hip feeling a little better. Took a pain pill prior to coming to therapy today.      Objective:  Modalities: vaso compression R knee elevated, 34deg, medium pressure, 15 min, VMS electrotherapy : 18mA, 10 sec on/10 sec off, 10 min duration, pads on VMO/VML - held estim 66  Precautions: standard   Exercises:  Exercise Reps/ Time Weight/ Level Comments   Nustep 10'  L1 Assist          SB stretch 3x30\" L2    Heel raise x10     Weight Shifts x10     Alt March x10           Seated Knee flexion 10x5\"       Bridge w/ SAQ      Quad sets 10x10\"         Heel prop/strap quad set x20       Supine Hip abduction x20

## 2023-06-06 ENCOUNTER — HOSPITAL ENCOUNTER (OUTPATIENT)
Dept: PHYSICAL THERAPY | Facility: CLINIC | Age: 58
Setting detail: THERAPIES SERIES
Discharge: HOME | End: 2023-06-06
Payer: COMMERCIAL

## 2023-06-06 PROCEDURE — 97016 VASOPNEUMATIC DEVICE THERAPY: CPT

## 2023-06-06 PROCEDURE — 97110 THERAPEUTIC EXERCISES: CPT

## 2023-06-06 NOTE — FLOWSHEET NOTE
set 10x10\"       Supine Hip abduction x20       Supine Heel slides x20       Supine SLR x20  Assist with concentric    Other:    5/25 6/1 6/2 6/6          10-80 10-82 9-85 5-88  Flexion seated, ext with overpressure                  Specific Instructions for next treatment: progress quad and glute strengthening. Continue exercises for knee ext and flexion ROM    Treatment Charges: Mins Units   []  Modalities     [x]  Ther Exercise 45 3   []  Manual Therapy     []  Ther Activities     []  Neuro Re-ed     [x]  Vasocompression 10 1   [] Gait     []  Other     Total Treatment time 55 4   10' warmup not billed    Assessment: [x] Progressing toward goals. She cont to have intense \"burning\" pain throughout duration of treatment, frequent rest breaks needed. Progressed standing ex with additional set of heel raises and addition of TKE. Cues with ambulation to avoid toeing out. Updated measurement taken noted above. Cont to end with vaso for soreness and swelling relief. Reviewed HEP with pt verbalizing understanding. [] No change. [] Other:  [x] Patient would continue to benefit from skilled physical therapy services in order to:  decrease R knee pain, increase knee flex and ext ROM, increase glute max,med and quad strength, and improve gait to return to walking without assistive devices and return to ADLs independently. STG: (to be met in 10 treatments)  ? Pain:<3/10 R knee pain during walking and exercises   ? ROM: Increase knee flex and extension for normal walking mechanics. ? Strength: Increase glute med, max, and quad strength to return to independent walking and work   ? Function: Able to ambulate without walker with increased knee extension during stance phase  Patient to be independent with home exercise program as demonstrated by performance with correct form without cues.   LTG: (to be met in 20 treatments)  <0/10 R knee pain with walking and exercises to return to work   <15% LEFI to return to

## 2023-06-07 ENCOUNTER — HOSPITAL ENCOUNTER (OUTPATIENT)
Dept: PHYSICAL THERAPY | Facility: CLINIC | Age: 58
Setting detail: THERAPIES SERIES
Discharge: HOME | End: 2023-06-07
Payer: COMMERCIAL

## 2023-06-07 PROCEDURE — 97110 THERAPEUTIC EXERCISES: CPT

## 2023-06-07 PROCEDURE — 97016 VASOPNEUMATIC DEVICE THERAPY: CPT

## 2023-06-07 NOTE — FLOWSHEET NOTE
<15% LEFI to return to work and ADLs independently                       Pt. Education:  [x] Plans/Goals, Risks/Benefits discussed  [x] Home exercise program    Method of Education: [x] Verbal  [] Demo  [x] Written  Comprehension of Education:  [x] Verbalizes understanding. [x] Demonstrates understanding. [x] Needs Review. [] Demonstrates/verbalizes understanding of HEP/Ed previously given. Access Code: [email or web address]  URL: [employer]/  Date: 05/25/2023  Prepared by: Irish Johnson     Program Notes  Total Knee Arthroplasty     Exercises  - Supine Ankle Pumps  - 2-3 x daily - 7 x weekly - 1 sets - 15 reps  - Supine Quadricep Sets  - 2-3 x daily - 7 x weekly - 1 sets - 15 reps - 5 hold  - Supine Heel Slide  - 2-3 x daily - 7 x weekly - 1 sets - 15 reps  - Seated Knee Flexion Slide   - 2-3 x daily - 7 x weekly - 1 sets - 15 reps     Plan: [x] Continue current frequency toward long and short term goals.     [] Specific Instructions for subsequent treatments: stated above      Time In: 9:50 am          Time Out: 11:10 am    Electronically signed by:  Derrick Marte PTA

## 2023-06-09 ENCOUNTER — HOSPITAL ENCOUNTER (OUTPATIENT)
Dept: PHYSICAL THERAPY | Facility: CLINIC | Age: 58
Setting detail: THERAPIES SERIES
Discharge: HOME | End: 2023-06-09
Payer: COMMERCIAL

## 2023-06-09 PROCEDURE — 97016 VASOPNEUMATIC DEVICE THERAPY: CPT

## 2023-06-09 PROCEDURE — 97110 THERAPEUTIC EXERCISES: CPT

## 2023-06-09 NOTE — FLOWSHEET NOTE
increased knee extension during stance phase  Patient to be independent with home exercise program as demonstrated by performance with correct form without cues. LTG: (to be met in 20 treatments)  <0/10 R knee pain with walking and exercises to return to work   <15% LEFI to return to work and ADLs independently                       Pt. Education:  [x] Plans/Goals, Risks/Benefits discussed  [x] Home exercise program    Method of Education: [x] Verbal  [] Demo  [x] Written  Comprehension of Education:  [x] Verbalizes understanding. [x] Demonstrates understanding. [x] Needs Review. [] Demonstrates/verbalizes understanding of HEP/Ed previously given. Access Code: [email or web address]  URL: [employer]/  Date: 05/25/2023  Prepared by: Gina Arellano     Program Notes  Total Knee Arthroplasty     Exercises  - Supine Ankle Pumps  - 2-3 x daily - 7 x weekly - 1 sets - 15 reps  - Supine Quadricep Sets  - 2-3 x daily - 7 x weekly - 1 sets - 15 reps - 5 hold  - Supine Heel Slide  - 2-3 x daily - 7 x weekly - 1 sets - 15 reps  - Seated Knee Flexion Slide   - 2-3 x daily - 7 x weekly - 1 sets - 15 reps     Plan: [x] Continue current frequency toward long and short term goals.     [] Specific Instructions for subsequent treatments: stated above      Time In: 1:00 pm          Time Out: 2:20 pm    Electronically signed by:  Ronaldo Orozco PTA

## 2023-06-19 PROBLEM — E66.01 SEVERE OBESITY (BMI 35.0-39.9) WITH COMORBIDITY (HCC): Status: ACTIVE | Noted: 2023-06-19

## 2023-06-20 ENCOUNTER — HOSPITAL ENCOUNTER (OUTPATIENT)
Dept: PHYSICAL THERAPY | Facility: CLINIC | Age: 58
Setting detail: THERAPIES SERIES
Discharge: HOME | End: 2023-06-20
Payer: COMMERCIAL

## 2023-06-20 PROCEDURE — 97110 THERAPEUTIC EXERCISES: CPT

## 2023-06-20 PROCEDURE — 97016 VASOPNEUMATIC DEVICE THERAPY: CPT

## 2023-06-22 ENCOUNTER — HOSPITAL ENCOUNTER (OUTPATIENT)
Dept: PHYSICAL THERAPY | Facility: CLINIC | Age: 58
Setting detail: THERAPIES SERIES
Discharge: HOME | End: 2023-06-22
Payer: COMMERCIAL

## 2023-06-22 PROCEDURE — 97016 VASOPNEUMATIC DEVICE THERAPY: CPT

## 2023-06-22 PROCEDURE — 97110 THERAPEUTIC EXERCISES: CPT

## 2023-06-22 NOTE — FLOWSHEET NOTE
[] [facility]  [address]  [phone]  F: [phone] [] [address]  [facility]   [address]  P: [phone]  F: [phone] [x] Bonnie [address]  Therapy  [address] [facility]  P: [phone]  F: [phone] [] [address]  P: [phone]  F: [phone] [] [address]  [facility]   [address]   [place]: [phone]  F: [phone]      Physical Therapy Daily Treatment Note    Date:  2023  Patient Name:  Yash Nieves    :  1965  MRN: [id number]  Physician: Cruz Weeks DO                                Insurance: Jonetta Canavan (27 visit limt, no hard max, auth required, auth after 30 visits) (12 visits used beginning of year)  Medical Diagnosis: Primary osteoarthritis of one knee, right, Status post surgery             Rehab Codes: M25.561, R26.89  Onset date: 23               Next Dr's appt.: 23  Visit# / total visits:  (Needs Auth for more visits after 18)  Cancels/No Shows: 0    Subjective:    Pain:  [x] Yes  [] No Location: R knee  Pain Rating: (0-10 scale) 2-3/10  Pain altered Tx:  [x] No  [] Yes  Action:  Comments: Had a really good day in terms of pain yesterday, but feels she may have overdid it. Still trying to walk with cane at home but still needs walker for longer distances.      Objective:  Modalities: vaso compression R knee elevated, 34deg, LIGHT pressure, 10 min  Precautions: standard   Exercises:  Exercise Reps/ Time Weight/ Level Comments    Nustep 10'  L3 Assist prn x          SB stretch 3x30\" L2  x   HS stretch 3x30\"   x   Stool flexion stretch 5x10\"   x   Heel raise 2x10   x   Weight Shifts X10    x   Alt March x10   x   R HS curl x10   x   Step Ups - fwd/lat

## 2023-06-27 ENCOUNTER — HOSPITAL ENCOUNTER (OUTPATIENT)
Dept: PHYSICAL THERAPY | Facility: CLINIC | Age: 58
Setting detail: THERAPIES SERIES
Discharge: HOME | End: 2023-06-27
Payer: COMMERCIAL

## 2023-06-27 PROCEDURE — 97016 VASOPNEUMATIC DEVICE THERAPY: CPT

## 2023-06-27 PROCEDURE — 97110 THERAPEUTIC EXERCISES: CPT

## 2023-06-29 ENCOUNTER — HOSPITAL ENCOUNTER (OUTPATIENT)
Dept: PHYSICAL THERAPY | Facility: CLINIC | Age: 58
Setting detail: THERAPIES SERIES
Discharge: HOME | End: 2023-06-29
Payer: COMMERCIAL

## 2023-06-29 ENCOUNTER — OFFICE VISIT (OUTPATIENT)
Dept: ORTHOPEDIC SURGERY | Age: 58
End: 2023-06-29

## 2023-06-29 VITALS — BODY MASS INDEX: 39.56 KG/M2 | RESPIRATION RATE: 14 BRPM | WEIGHT: 261 LBS | HEIGHT: 68 IN

## 2023-06-29 DIAGNOSIS — Z96.651 S/P TOTAL KNEE ARTHROPLASTY, RIGHT: Primary | ICD-10-CM

## 2023-06-29 PROCEDURE — 97110 THERAPEUTIC EXERCISES: CPT

## 2023-06-29 PROCEDURE — 97140 MANUAL THERAPY 1/> REGIONS: CPT

## 2023-06-29 PROCEDURE — 97016 VASOPNEUMATIC DEVICE THERAPY: CPT

## 2023-06-29 RX ORDER — OXYCODONE HYDROCHLORIDE AND ACETAMINOPHEN 5; 325 MG/1; MG/1
1 TABLET ORAL 2 TIMES DAILY
Qty: 14 TABLET | Refills: 0 | Status: SHIPPED | OUTPATIENT
Start: 2023-06-29 | End: 2023-07-06

## 2023-07-02 ASSESSMENT — ENCOUNTER SYMPTOMS
EYE DISCHARGE: 0
ROS SKIN COMMENTS: NEGATIVE FOR RASH
ABDOMINAL PAIN: 0
SHORTNESS OF BREATH: 0

## 2023-07-03 ENCOUNTER — HOSPITAL ENCOUNTER (OUTPATIENT)
Dept: PHYSICAL THERAPY | Facility: CLINIC | Age: 58
Setting detail: THERAPIES SERIES
Discharge: HOME | End: 2023-07-03
Payer: COMMERCIAL

## 2023-07-03 PROCEDURE — 97016 VASOPNEUMATIC DEVICE THERAPY: CPT

## 2023-07-03 PROCEDURE — 97110 THERAPEUTIC EXERCISES: CPT

## 2023-07-03 PROCEDURE — 97140 MANUAL THERAPY 1/> REGIONS: CPT

## 2023-07-03 NOTE — FLOWSHEET NOTE
[] [address] &  Therapy  4600 [facility].  [phone]  F: [phone] [] 204 [facility]  [address]   [address]  P: [phone]  F: [phone] [x] [address]  [address] [facility]  P: [phone]  F: [phone] [] Dougie Catalina  P: [phone]  F: [phone] [] 224 [facility]  [address]   [address]   [place]: [phone]  F: [phone]      Physical Therapy Daily Treatment Note    Date:  7/3/2023  Patient Name:  Lisa Almodovar    :  1965  MRN: [id number]  Physician: Henry Ervin DO                                Insurance: [employer] (27 visit limt, no hard max, auth required, auth after 30 visits) (12 visits used beginning of year)  Medical Diagnosis: Primary osteoarthritis of one knee, right, Status post surgery             Rehab Codes: M25.561, R26.89  Onset date: 23               Next Dr's appt.: 23  Visit# / total visits:  (Needs Auth for more visits after 18)  Cancels/No Shows: 0    Subjective: pt reporting muscles are feeling pretty tight this morning. Had pain and burning at incision which caused her to wake up in the middle of night.     Pain:  [] Yes  [x] No Location: R knee  Pain Rating: (0-10 scale) 0/10  Pain altered Tx:  [x] No  [] Yes  Action:  Comments:     Objective:  Modalities: vaso compression R knee elevated, 34deg, LIGHT pressure, 10 min  Precautions: standard   Exercises:  Exercise Reps/ Time Weight/ Level Comments           Bike 10' Seat 6 Stool, SBA x   Nustep  10'  This date x   SB stretch 3x30\" L2  -   HS stretch 3x30\"   -   Stool flexion stretch 5x10\"   -   Heel raise 2x10   -   Weight Shifts X10    -   Alt March x10   -   R HS curl x10   -   Step Ups - fwd/lat

## 2023-07-06 ENCOUNTER — HOSPITAL ENCOUNTER (OUTPATIENT)
Dept: PHYSICAL THERAPY | Facility: CLINIC | Age: 58
Setting detail: THERAPIES SERIES
Discharge: HOME | End: 2023-07-06
Payer: COMMERCIAL

## 2023-07-06 PROCEDURE — 97016 VASOPNEUMATIC DEVICE THERAPY: CPT

## 2023-07-06 PROCEDURE — 97140 MANUAL THERAPY 1/> REGIONS: CPT

## 2023-07-06 PROCEDURE — 97110 THERAPEUTIC EXERCISES: CPT

## 2023-07-06 NOTE — FLOWSHEET NOTE
[] [address]  4600 [facility].  [phone]  F: [phone] [] 204 [facility]  [address]   [address]  P: [phone]  F: [phone] [x] [address] &  Therapy  [address]  P: [phone]  F: [phone] [] [facility]  P: [phone]  F: [phone] [] [address]  [address]   [place]: [phone]  F: [phone]      Physical Therapy Daily Treatment Note    Date:  2023  Patient Name:  Lennox Brunson    :  1965  MRN: [id number]  Physician: Eliana Salguero DO                                Insurance: [employer] (27 visit limt, no hard max, auth required, auth after 30 visits) (12 visits used beginning of year)  Medical Diagnosis: Primary osteoarthritis of one knee, right, Status post surgery             Rehab Codes: M25.561, R26.89  Onset date: 23               Next Dr's appt.: 23  Visit# / total visits: 15/20 (Needs Auth for more visits after 18)  Cancels/No Shows: 0    Subjective:   Pain:  [x] Yes  [] No Location: R knee  Pain Rating: (0-10 scale) 3/10  Pain altered Tx:  [x] No  [] Yes  Action:  Comments: States she cleaned her house for the  and was very sore. Has been exercising in her pool at home. RTW date 23, as of now she doesn't feel she will be ready.      Objective:  Modalities: vaso compression R knee elevated, 34deg, LIGHT pressure, 15 min  Precautions: standard   Exercises:  Exercise Reps/ Time Weight/ Level Comments           Bike 5' Seat 6 Stool, SBA x   Nustep  5'  This date x   SB stretch 3x30\" L2  -   HS stretch 3x30\"   -   Stool flexion stretch 5x10\"   -   Heel raise 2x10   -   Weight Shifts X10    -   Alt March x10   -   R HS curl x10   -

## 2023-07-11 ENCOUNTER — HOSPITAL ENCOUNTER (OUTPATIENT)
Dept: PHYSICAL THERAPY | Facility: CLINIC | Age: 58
Setting detail: THERAPIES SERIES
Discharge: HOME | End: 2023-07-11
Payer: COMMERCIAL

## 2023-07-11 PROCEDURE — 97016 VASOPNEUMATIC DEVICE THERAPY: CPT

## 2023-07-11 PROCEDURE — 97110 THERAPEUTIC EXERCISES: CPT

## 2023-07-11 PROCEDURE — 97140 MANUAL THERAPY 1/> REGIONS: CPT

## 2023-07-11 NOTE — PROGRESS NOTES
[] [address] &  Therapy  4600 [facility].  [phone]  F: [phone] [] 204 [facility]  [address]   [address]  P: [phone]  F: [phone] [x] [address] &  Therapy  [address] [facility]  P: [phone]  F: [phone] [] Dougie Arnold  P: [phone]  F: [phone] [] 224 [facility]  [address]   [address]   [place]: [phone]  F: [phone]      Physical Therapy Progress Note    Date: 2023      Patient: Ulises Rodriguez  : 1965  MRN: [id number]    Physician: Kaylee Barrios DO                                Insurance: [facility] (27 visit limt, no hard max, auth required, auth after 30 visits) (12 visits used beginning of year)  Medical Diagnosis: Primary osteoarthritis of one knee, right, Status post surgery             Rehab Codes: M25.561, R26.89  Onset date: 23               Next Dr's appt.: 23  Visit# / total visits:  (Needs Auth for more visits after 18)  Cancels/No Shows: 0     Subjective:   Pain:  [x] Yes  [] No   Location: R knee         Pain Rating: (0-10 scale) 3/10  Pain altered Tx:  [x] No  [] Yes  Action:  Comments: Pt reports she has pain with bending and straightening of the knee. Also pain after being on feet for standing and walking but not as much during.  Pt reports her CC is lack of endurance for standing and walking     Objective:   Knee Flexion ROM   6/20 6/27 6/29 7/3 7/6 7/11    3-95 3-96 2-96 2-100 0-100  AAROM 0-103  AAROM       Gait:  Pt ambulates without device demonstrating lack of TKE in stance and is stiff throughout swing phase with decreased stance time on R and forward flexed trunk      Assessment:  Pt ambulating without cane this date but is still demonstrating

## 2023-07-11 NOTE — FLOWSHEET NOTE
[] [address]  4600 [facility].  [phone]  F: [phone] [] [address]  [address]   [address]  P: [phone]  F: [phone] [x] [address] &  Therapy  [address] [facility] [address]  P: [phone]  F: [phone] [] [facility]  P: [phone]  F: [phone] [] [address]  [address]   [place]: [phone]  F: [phone]      Physical Therapy Daily Treatment Note    Date:  2023  Patient Name:  Diann Campuzano    :  1965  MRN: [id number]  Physician: Marilee Miller DO                                Insurance: [employer] (27 visit limt, no hard max, auth required, auth after 30 visits) (12 visits used beginning of year)  Medical Diagnosis: Primary osteoarthritis of one knee, right, Status post surgery             Rehab Codes: M25.561, R26.89  Onset date: 23               Next Dr's appt.: 23  Visit# / total visits:  (Needs Auth for more visits after 18)  Cancels/No Shows: 0    Subjective:   Pain:  [x] Yes  [] No Location: R knee  Pain Rating: (0-10 scale) 3/10  Pain altered Tx:  [x] No  [] Yes  Action:  Comments: Pt reports she has pain with bending and straightening of the knee. Also pain after being on feet for standing and walking but not as much during.  Pt reports her CC is lack of endurance for standing and walking    Objective:  Modalities: vaso compression R knee elevated, 34deg, medium pressure, 15 min  Precautions: standard   Exercises:  Exercise Reps/ Time Weight/ Level Comments           Bike 10' Seat 5 Stool, SBA x   Nustep  5'      SB stretch 3x30\" L2  x   HS stretch 3x30\"   x   Stool flexion stretch 5x10\"   x   Heel raise 3x10   x   Weight Shifts X10    -   Alt

## 2023-07-13 ENCOUNTER — HOSPITAL ENCOUNTER (OUTPATIENT)
Dept: PHYSICAL THERAPY | Facility: CLINIC | Age: 58
Setting detail: THERAPIES SERIES
Discharge: HOME | End: 2023-07-13
Payer: COMMERCIAL

## 2023-07-13 PROCEDURE — 97110 THERAPEUTIC EXERCISES: CPT

## 2023-07-13 PROCEDURE — 97016 VASOPNEUMATIC DEVICE THERAPY: CPT

## 2023-07-13 NOTE — FLOWSHEET NOTE
[] [address]  4601 [facility].  [phone]  F: [phone] [] 204 [facility]  [address]   [address]  P: [phone]  F: [phone] [x] [address] &  Therapy  [address] [facility] [address]  P: [phone]  F: [phone] [] Dougie Arnold  P: [phone]  F: [phone] [] [address]  [address]   [place]: [phone]  F: [phone]      Physical Therapy Daily Treatment Note    Date:  2023  Patient Name:  Diann Campuzano    :  1965  MRN: [id number]  Physician: Marilee Miller DO                                Insurance: [employer] (27 visit limt, no hard max, auth required, auth after 30 visits) (12 visits used beginning of year)  Medical Diagnosis: Primary osteoarthritis of one knee, right, Status post surgery             Rehab Codes: M25.561, R26.89  Onset date: 23               Next Dr's appt.: 23  Visit# / total visits:  (Needs Auth for more visits after 18)  Cancels/No Shows: 0    Subjective:   Pain:  [x] Yes  [] [id number]: R knee  Pain Rating: (0-10 scale) 2/10  Pain altered Tx:  [x] No  [] Yes  Action:  Comments: Pt reports she had to take a Gabapentin after last visit due to increased pain    Objective:  Modalities: vaso compression R knee elevated, 34deg, medium pressure, 15 min  Precautions: standard   Exercises:  Exercise Reps/ Time Weight/ Level Comments           Bike 10' Seat 5 Stool, SBA x   Nustep  5'   x   SB stretch 3x30\" L2  x   HS stretch 3x30\"   x   Stool flexion stretch 5x10\"   x   Heel raise 3x10      Weight Shifts X10    -   Standing hip flexion 2x10  B x   R HS curl x10   -   Step Ups - fwd/lat x15ea 6in/4in  x   Step downs x10 6in  -   TKE 10x10\" purple  x

## 2023-07-18 ENCOUNTER — HOSPITAL ENCOUNTER (OUTPATIENT)
Dept: PHYSICAL THERAPY | Facility: CLINIC | Age: 58
Setting detail: THERAPIES SERIES
Discharge: HOME | End: 2023-07-18
Payer: COMMERCIAL

## 2023-07-18 PROCEDURE — 97016 VASOPNEUMATIC DEVICE THERAPY: CPT

## 2023-07-18 PROCEDURE — 97110 THERAPEUTIC EXERCISES: CPT

## 2023-07-18 NOTE — FLOWSHEET NOTE
[] [address]  4[address] [facility].  [phone]  F: [phone] [] 204 [facility]  [address]   [address]  P: [phone]  F: [phone] [x] [address] &  Therapy  [address]  P: [phone]  F: [phone] [] Dougie Catalina  P: [phone]  F: [phone] [] [address]  [address]   [place]: [phone]  F: [phone]      Physical Therapy Daily Treatment Note    Date:  2023  Patient Name:  Dimitry Linder    :  1965  MRN: [id number]  Physician: Khadijah Elias DO                                Insurance: [employer] (27 visit limt, no hard max, auth required, auth after 30 visits) (12 visits used beginning of year)  Medical Diagnosis: Primary osteoarthritis of one knee, right, Status post surgery             Rehab Codes: M25.561, R26.89  Onset date: 23               Next Dr's appt.: 23  Visit# / total visits:  (Needs Auth for more visits after )  Cancels/No Shows: 0    Subjective:   Pain:  [x] Yes  [] No  Location: R knee  Pain Rating: (0-10 scale) 1/10  Pain altered Tx:  [x] No  [] Yes  Action:  Comments:  Minimal pain at arrival. Mostly walking without AD, uses cane PRN.      Objective:  Modalities: vaso compression R knee elevated, 34deg, light  pressure, 15 min  Precautions: standard   Exercises:  Exercise Reps/ Time Weight/ Level Comments           Bike 5' Seat 5 Stool, SBA x   Nustep  5'   x   SB stretch 3x30\" L2  x   HS stretch 3x30\"   x   Stool flexion stretch 5x10\"   x   Heel raise 2x10   x   Standing hip flexion 2x10  B -   Step Ups - fwd/lat x15ea 6in  x   Step downs x10 6in  -   TKE 10x10\" purple Increase resistance next x   4-way hip  x15 lime B x   TG

## 2023-07-21 ENCOUNTER — HOSPITAL ENCOUNTER (OUTPATIENT)
Dept: PHYSICAL THERAPY | Facility: CLINIC | Age: 58
Setting detail: THERAPIES SERIES
Discharge: HOME | End: 2023-07-21
Payer: COMMERCIAL

## 2023-07-21 PROCEDURE — 97110 THERAPEUTIC EXERCISES: CPT

## 2023-07-21 NOTE — FLOWSHEET NOTE
knee extension during stance phase  Patient to be independent with home exercise program as demonstrated by performance with correct form without cues. LTG: (to be met in 20 treatments)  <0/10 R knee pain with walking and exercises to return to work   <15% LEFI to return to work and ADLs independently                       Pt. Education:  [x] Plans/Goals, Risks/Benefits discussed  [x] Home exercise program    Method of Education: [x] Verbal  [] Demo  [x] Written  Comprehension of Education:  [x] Verbalizes understanding. [x] Demonstrates understanding. [x] Needs Review. [] Demonstrates/verbalizes understanding of HEP/Ed previously given. Access Code: [email or web address]  URL: [email or web address]  Date: 05/25/2023  Prepared by: Debra Crowell     Program Notes  Total Knee Arthroplasty     Exercises  - Supine Ankle Pumps  - 2-3 x daily - 7 x weekly - 1 sets - 15 reps  - Supine Quadricep Sets  - 2-3 x daily - 7 x weekly - 1 sets - 15 reps - 5 hold  - Supine Heel Slide  - 2-3 x daily - 7 x weekly - 1 sets - 15 reps  - Seated Knee Flexion Slide   - 2-3 x daily - 7 x weekly - 1 sets - 15 reps     Plan: [x] Continue current frequency toward long and short term goals.     [x] Specific Instructions for subsequent treatments: stated above      Time In: 9:58 am      Time Out: 11:15 am    Electronically signed by:  Arcenio Cooley PTA

## 2023-07-25 ENCOUNTER — HOSPITAL ENCOUNTER (OUTPATIENT)
Dept: PHYSICAL THERAPY | Facility: CLINIC | Age: 58
Setting detail: THERAPIES SERIES
Discharge: HOME | End: 2023-07-25
Payer: COMMERCIAL

## 2023-07-25 PROCEDURE — 97110 THERAPEUTIC EXERCISES: CPT

## 2023-07-25 PROCEDURE — 97016 VASOPNEUMATIC DEVICE THERAPY: CPT

## 2023-07-25 NOTE — FLOWSHEET NOTE
Strength: Increase glute med, max, and quad strength to return to independent walking and work   ? Function: Able to ambulate without walker with increased knee extension during stance phase  Patient to be independent with home exercise program as demonstrated by performance with correct form without cues. LTG: (to be met in 20 treatments)  <0/10 R knee pain with walking and exercises to return to work   <15% LEFI to return to work and ADLs independently                       Pt. Education:  [x] Plans/Goals, Risks/Benefits discussed  [x] Home exercise program    Method of Education: [x] Verbal  [] Demo  [x] Written  Comprehension of Education:  [x] Verbalizes understanding. [x] Demonstrates understanding. [x] Needs Review. [] Demonstrates/verbalizes understanding of HEP/Ed previously given. Access Code: [email or web address]  URL: [email or web address]  Date: 05/25/2023  Prepared by: Alicia Skill     Program Notes  Total Knee Arthroplasty     Exercises  - Supine Ankle Pumps  - 2-3 x daily - 7 x weekly - 1 sets - 15 reps  - Supine Quadricep Sets  - 2-3 x daily - 7 x weekly - 1 sets - 15 reps - 5 hold  - Supine Heel Slide  - 2-3 x daily - 7 x weekly - 1 sets - 15 reps  - Seated Knee Flexion Slide   - 2-3 x daily - 7 x weekly - 1 sets - 15 reps     Plan: [x] Continue current frequency toward long and short term goals.     [x] Specific Instructions for subsequent treatments: stated above      Time In: 10:51 am     Time Out: 12:10 pm    Electronically signed by:  Al Cruz PTA

## 2023-07-27 ENCOUNTER — OFFICE VISIT (OUTPATIENT)
Dept: ORTHOPEDIC SURGERY | Age: 58
End: 2023-07-27

## 2023-07-27 ENCOUNTER — HOSPITAL ENCOUNTER (OUTPATIENT)
Dept: PHYSICAL THERAPY | Facility: CLINIC | Age: 58
Setting detail: THERAPIES SERIES
Discharge: HOME | End: 2023-07-27
Payer: COMMERCIAL

## 2023-07-27 VITALS — BODY MASS INDEX: 41.07 KG/M2 | WEIGHT: 271 LBS | HEIGHT: 68 IN | RESPIRATION RATE: 14 BRPM

## 2023-07-27 DIAGNOSIS — Z96.651 S/P TOTAL KNEE ARTHROPLASTY, RIGHT: Primary | ICD-10-CM

## 2023-07-27 PROCEDURE — 97110 THERAPEUTIC EXERCISES: CPT

## 2023-07-27 PROCEDURE — 99024 POSTOP FOLLOW-UP VISIT: CPT | Performed by: ORTHOPAEDIC SURGERY

## 2023-07-27 PROCEDURE — 97016 VASOPNEUMATIC DEVICE THERAPY: CPT

## 2023-07-27 NOTE — FLOWSHEET NOTE
[] [facility]) [facility] &  Therapy  [address] [facility].  [phone]  F: [phone] [] 204 [facility]  [address]   [address]  P: [phone]  F: [phone] [x] [address]  [address] [facility]  P: [phone]  F: [phone] [] [facility]  P: [phone]  F: [phone] [] 224 [facility]  [address] [facility] [address]   [address]   [place]: [phone]  F: [phone]      Physical Therapy Daily Treatment Note    Date:  2023  Patient Name:  Livia Mariscal    :  1965  MRN: [id number]  Physician: Josephine Grandchild, DO                                Insurance: [address] [facility] [employer] (27 visit limt, no hard max, auth required, auth after 30 visits) (12 visits used beginning of year)  Medical Diagnosis: Primary osteoarthritis of one knee, right, Status post surgery             Rehab Codes: M25.561, R26.89  Onset date: 23               Next Dr's appt. : 23  Visit# / total visits:  (Needs Auth for more visits after 18)   **Auth has been submitted, she is okay to be seen. Will update once Auth received  Cancels/No Shows: 0    Subjective:   Pain:  [x] Yes  [] No  Location: R knee  Pain Rating: (0-10 scale) 2/10  Pain altered Tx:  [x] No  [] Yes  Action:  Comments:  Had f/u with Dr. Fam Anne this morning who told her she is doing well. She states she pulled her back this morning and is sore everywhere. Concerned about RTW.      Objective:  Modalities: vasocompression, min compression, 34 degrees x15min  Precautions: standard   Exercises:  Exercise Reps/ Time Weight/ Level Comments           Bike 10' Seat 6 Stool, SBA x   SB stretch 3x30\" L2  x   HS stretch 3x30\"   x   Stool flexion stretch 5x10\"   x   Heel raise 3x10   x   Standing hip flexion 2x10  B

## 2023-07-28 NOTE — PROGRESS NOTES
[address] [facility]  [address] [place] [address] [address] [facility] [address]  Dept: [phone]    Ambulatory Orthopedic Office Visit      CHIEF COMPLAINT:    Chief Complaint   Patient presents with    Follow-up     Right total knee arthroplasty. DOS 5/22/23       HISTORY OF PRESENT ILLNESS:      The patient is a 59 y. o.female who is being seen for routine follow-up 9 weeks postop from a right total knee arthroplasty. Overall patient is doing significantly better. She states her pain is significantly improved and she has progressed to ambulation without a walker. She is able to flex her knee significantly more than her prior visit and rarely utilizes her medications. She denies any radiating pains to her right lower extremity. She has been using vitamin E to scar massage her right knee. Past Medical History:    Past Medical History:   Diagnosis Date    Arthritis     Asthma     Benign familial tremor     Benign familial tremor     CAD (coronary artery disease) 12/2021    mild per cardiac cath 12/2021; no stents.  Dr. Claude Speaker last visit 10/2022    Cancer [facility])     skin    CKD (chronic kidney disease) stage 1, GFR 90 ml/min or greater     Class 2 severe obesity with serious comorbidity and body mass index (BMI) of 39.0 to 39.9 in adult ([address]) 05/01/2019    Cystinuria ([address])     Dyspnea on exertion 08/06/2019    Flank pain     RIGHT    Gastroesophageal reflux disease without esophagitis     GERD (gastroesophageal reflux disease)     Hypertension     Hyperuricemia     Hypothyroidism     no meds    Kidney stones     Midline low back pain with right-sided sciatica 06/02/2016    Proteinuria     Shoulder impingement 12/2019    left    Snores     no sleep apnea per sleep study    Solitary kidney, acquired 11/20/2017    Urinary tract obstruction by kidney stone 08/01/2019    Wears glasses        Past Surgical History:    Past

## 2023-08-01 ENCOUNTER — HOSPITAL ENCOUNTER (OUTPATIENT)
Dept: PHYSICAL THERAPY | Facility: CLINIC | Age: 58
Setting detail: THERAPIES SERIES
Discharge: HOME | End: 2023-08-01
Payer: COMMERCIAL

## 2023-08-01 PROCEDURE — 97110 THERAPEUTIC EXERCISES: CPT

## 2023-08-01 PROCEDURE — 97016 VASOPNEUMATIC DEVICE THERAPY: CPT

## 2023-08-01 PROCEDURE — 97140 MANUAL THERAPY 1/> REGIONS: CPT

## 2023-08-01 NOTE — FLOWSHEET NOTE
[] [facility] ([facility]) - [facility] &  Therapy  4600 [facility] [place] [facility].  [phone]  F: [phone] [] 204 [facility]  [address]   [address]  P: [phone]  F: [phone] [x] [address]  [address]  P: [phone]  F: [phone] [] [facility]  P: [phone]  F: [phone] [] [address] [facility]  [address]   [address]   [place]: [phone]  F: [phone]      Physical Therapy Daily Treatment Note    Date:  2023  Patient Name:  Josie Rodriguez    :  1965  MRN: [id number]  Physician: Rogelio Alexandra DO                                Insurance: [employer] (27 visit limt, no hard max, auth required, auth after 30 visits) (12 visits used beginning of year)  Medical Diagnosis: Primary osteoarthritis of one knee, right, Status post surgery             Rehab Codes: M25.561, R26.89  Onset date: 23               Next Dr's appt. : 23  Visit# / total visits:  (Needs Auth for more visits after 18)   **Auth has been submitted, she is okay to be seen. Will update once Auth received  Cancels/No Shows: 0    Subjective:   Pain:  [x] Yes  [] No  Location: R knee  Pain Rating: (0-10 scale) 3/10  Pain altered Tx:  [x] No  [] Yes  Action:  Comments: Pt reports inc pain/stiffness this date, took tylenol upon arrival. Has been up and running errands without her cane d/t forgetting it. Reports some cording in scar tissue, tension in LE mm.      Objective:  Modalities: vasocompression, min compression, 34 degrees x15min  Precautions: standard   Exercises:  Exercise Reps/ Time Weight/ Level Comments    Alter G   Next, held  per inc sx    Bike 10' Seat 6 Stool, SBA x   SB stretch 3x30\" L2  x   HS stretch 3x30\"   x   Stool flexion stretch

## 2023-08-02 ENCOUNTER — TELEPHONE (OUTPATIENT)
Dept: ORTHOPEDIC SURGERY | Age: 58
End: 2023-08-02

## 2023-08-02 ENCOUNTER — HOSPITAL ENCOUNTER (OUTPATIENT)
Dept: PHYSICAL THERAPY | Facility: CLINIC | Age: 58
Setting detail: THERAPIES SERIES
Discharge: HOME | End: 2023-08-02
Payer: COMMERCIAL

## 2023-08-02 PROCEDURE — 97016 VASOPNEUMATIC DEVICE THERAPY: CPT

## 2023-08-02 PROCEDURE — 97110 THERAPEUTIC EXERCISES: CPT

## 2023-08-02 PROCEDURE — 97140 MANUAL THERAPY 1/> REGIONS: CPT

## 2023-08-02 NOTE — TELEPHONE ENCOUNTER
Patient was calling to let us know that Dr. Sinan Retana wanted her to extend her leave 2-4 more weeks and she wants to do 2 weeks. DeYapa should be sending paperwork or calling.  DOS 5/22/23 R TKA

## 2023-08-02 NOTE — FLOWSHEET NOTE
and ext ROM, increase glute max,med and quad strength, and improve gait to return to walking without assistive devices and return to ADLs independently. STG: (to be met in 10 treatments)  ? Pain:<3/10 R knee pain during walking and exercises   ? ROM: Increase knee flex and extension for normal walking mechanics. ? Strength: Increase glute med, max, and quad strength to return to independent walking and work   ? Function: Able to ambulate without walker with increased knee extension during stance phase  Patient to be independent with home exercise program as demonstrated by performance with correct form without cues. LTG: (to be met in 20 treatments)  <0/10 R knee pain with walking and exercises to return to work   <15% LEFI to return to work and ADLs independently                       Pt. Education:  [x] Plans/Goals, Risks/Benefits discussed  [x] Home exercise program    Method of Education: [x] Verbal  [] Demo  [x] Written  Comprehension of Education:  [x] Verbalizes understanding. [x] Demonstrates understanding. [x] Needs Review. [] Demonstrates/verbalizes understanding of HEP/Ed previously given. Access Code: [email or web address]  URL: [email or web address]/  Date: 05/25/2023  Prepared by: [email or web address]     Program Notes  Total Knee Arthroplasty     Exercises  - Supine Ankle Pumps  - 2-3 x daily - 7 x weekly - 1 sets - 15 reps  - Supine Quadricep Sets  - 2-3 x daily - 7 x weekly - 1 sets - 15 reps - 5 hold  - Supine Heel Slide  - 2-3 x daily - 7 x weekly - 1 sets - 15 reps  - Seated Knee Flexion Slide   - 2-3 x daily - 7 x weekly - 1 sets - 15 reps     Plan: [x] Continue current frequency toward long and short term goals.     [x] Specific Instructions for subsequent treatments: stated above      Time In: 10:50 am     Time Out: 12:20 pm    Electronically signed by:  Danette Olmedo PTA

## 2023-08-08 ENCOUNTER — HOSPITAL ENCOUNTER (OUTPATIENT)
Dept: PHYSICAL THERAPY | Facility: CLINIC | Age: 58
Setting detail: THERAPIES SERIES
Discharge: HOME | End: 2023-08-08
Payer: COMMERCIAL

## 2023-08-08 ENCOUNTER — PATIENT MESSAGE (OUTPATIENT)
Dept: PRIMARY CARE CLINIC | Age: 58
End: 2023-08-08

## 2023-08-08 DIAGNOSIS — L03.313 CELLULITIS OF CHEST WALL: ICD-10-CM

## 2023-08-08 PROCEDURE — 97140 MANUAL THERAPY 1/> REGIONS: CPT

## 2023-08-08 PROCEDURE — 97110 THERAPEUTIC EXERCISES: CPT

## 2023-08-08 PROCEDURE — 97016 VASOPNEUMATIC DEVICE THERAPY: CPT

## 2023-08-08 RX ORDER — CLINDAMYCIN PHOSPHATE 10 MG/G
GEL TOPICAL
Qty: 60 G | Refills: 1 | Status: SHIPPED | OUTPATIENT
Start: 2023-08-08 | End: 2023-08-15

## 2023-08-08 NOTE — FLOWSHEET NOTE
[] [facility]) - [facility] &  Therapy  [address] [facility] [place] [facility].  [phone]  F: [phone] [] [address] [facility]  [address]   [address]  P: [phone]  F: [phone] [x] [address] &  Therapy  [address]  P: [phone]  F: [phone] [] [facility]  P: [phone]  F: [phone] [] [address] [facility]  [address] [facility] [address]   [address]   [place]: [phone]  F: [phone]      Physical Therapy Daily Treatment Note    Date:  2023  Patient Name:  Steff Swain    :  1965  MRN: [id number]  Physician: Brittny Berg DO                                Insurance: Amanda Grip (30 visit limt, no hard max, auth required, auth after 30 visits)   APPROVED 12 VISITS 23-23 REF # [id number]  [id number], [id number], [address] [place] [address], [id number], [id number],  Medical Diagnosis: Primary osteoarthritis of one knee, right, Status post surgery             Rehab Codes: M25.561, R26.89  Onset date: 23               Next Dr's appt. : 23  Visit# / total visits:    Cancels/No Shows: 0    Subjective:   Pain:  [x] Yes  [] No  Location: R knee  Pain Rating: (0-10 scale) 1/10  Pain altered Tx:  [x] No  [] Yes  Action:  Comments: Pt reports cont stiffness, some soreness from yesterday's tx. Completed massage gun at home last night. Some inc LB soreness.      Objective:  Modalities: vasocompression, min compression, 34 degrees x15min  Precautions: standard   Exercises:  Exercise Reps/ Time Weight/ Level Comments    Alter G NEXT      Bike 10' Seat 6 Stool, SBA x   SB stretch 3x30\" L2  x   HS stretch 3x30\"   x   Stool flexion stretch 5x10\"   x   Heel raise 3x10   -   Standing hip flexion 2x10  B -   Step Ups - fwd x15 ea 6\"  -   Lateral step ups X10 ea 6\" (R) LE  -   Step downs 2x10

## 2023-08-08 NOTE — TELEPHONE ENCOUNTER
From: Jacob Griffith  To: Tarik Butts  Sent: 8/8/2023 3:28 PM EDT  Subject: Renew Clindamycin Phosphate 1% gel    Bassam Mandujano, any chance you could renew the topical 1% Clinda gel. Dr. Jeyson Rodriguez originally ordered it in 2020 and I have no refills left. I use [address] [place] [address] [phone]    Please let me know if you have any questions.   Kind regards,  Maximilian Kemp

## 2023-08-10 ENCOUNTER — HOSPITAL ENCOUNTER (OUTPATIENT)
Age: 58
Setting detail: OBSERVATION
Discharge: HOME | End: 2023-08-11
Attending: EMERGENCY MEDICINE | Admitting: UROLOGY
Payer: COMMERCIAL

## 2023-08-10 ENCOUNTER — APPOINTMENT (OUTPATIENT)
Dept: INTERVENTIONAL RADIOLOGY/VASCULAR | Age: 58
End: 2023-08-10
Payer: COMMERCIAL

## 2023-08-10 ENCOUNTER — HOSPITAL ENCOUNTER (OUTPATIENT)
Dept: PHYSICAL THERAPY | Facility: CLINIC | Age: 58
Setting detail: THERAPIES SERIES
Discharge: HOME | End: 2023-08-10
Payer: COMMERCIAL

## 2023-08-10 ENCOUNTER — NURSE TRIAGE (OUTPATIENT)
Dept: OTHER | Facility: CLINIC | Age: 58
End: 2023-08-10

## 2023-08-10 ENCOUNTER — APPOINTMENT (OUTPATIENT)
Dept: CT IMAGING | Age: 58
End: 2023-08-10
Payer: COMMERCIAL

## 2023-08-10 DIAGNOSIS — N20.1 URETEROLITHIASIS: Primary | ICD-10-CM

## 2023-08-10 DIAGNOSIS — N13.2 HYDRONEPHROSIS WITH URINARY OBSTRUCTION DUE TO URETERAL CALCULUS: ICD-10-CM

## 2023-08-10 PROBLEM — N13.30 HYDRONEPHROSIS OF RIGHT KIDNEY: Status: ACTIVE | Noted: 2023-08-10

## 2023-08-10 LAB
ANION GAP SERPL CALCULATED.3IONS-SCNC: 13 MMOL/L (ref 9–17)
BACTERIA URNS QL MICRO: ABNORMAL
BASOPHILS # BLD: 0.04 K/UL (ref 0–0.2)
BASOPHILS NFR BLD: 0 % (ref 0–2)
BILIRUB UR QL STRIP: NEGATIVE
BUN SERPL-MCNC: 17 MG/DL (ref 8–23)
CALCIUM SERPL-MCNC: 10.5 MG/DL (ref 8.6–10.4)
CHLORIDE SERPL-SCNC: 104 MMOL/L (ref 98–107)
CLARITY UR: ABNORMAL
CO2 SERPL-SCNC: 22 MMOL/L (ref 20–31)
COLOR UR: YELLOW
CREAT SERPL-MCNC: 1 MG/DL (ref 0.5–0.9)
EOSINOPHIL # BLD: 0.21 K/UL (ref 0–0.44)
EOSINOPHILS RELATIVE PERCENT: 2 % (ref 1–4)
EPI CELLS #/AREA URNS HPF: ABNORMAL /HPF (ref 0–5)
ERYTHROCYTE [DISTWIDTH] IN BLOOD BY AUTOMATED COUNT: 15 % (ref 11.8–14.4)
GFR SERPL CREATININE-BSD FRML MDRD: >60 ML/MIN/1.73M2
GLUCOSE SERPL-MCNC: 133 MG/DL (ref 70–99)
GLUCOSE UR STRIP-MCNC: NEGATIVE MG/DL
HCT VFR BLD AUTO: 42.3 % (ref 36.3–47.1)
HGB BLD-MCNC: 13 G/DL (ref 11.9–15.1)
HGB UR QL STRIP.AUTO: ABNORMAL
IMM GRANULOCYTES # BLD AUTO: 0.05 K/UL (ref 0–0.3)
IMM GRANULOCYTES NFR BLD: 1 %
INR PPP: 1
KETONES UR STRIP-MCNC: NEGATIVE MG/DL
LEUKOCYTE ESTERASE UR QL STRIP: ABNORMAL
LYMPHOCYTES NFR BLD: 2.07 K/UL (ref 1.1–3.7)
LYMPHOCYTES RELATIVE PERCENT: 19 % (ref 24–43)
MCH RBC QN AUTO: 25.2 PG (ref 25.2–33.5)
MCHC RBC AUTO-ENTMCNC: 30.7 G/DL (ref 28.4–34.8)
MCV RBC AUTO: 82.1 FL (ref 82.6–102.9)
MONOCYTES NFR BLD: 0.75 K/UL (ref 0.1–1.2)
MONOCYTES NFR BLD: 7 % (ref 3–12)
NEUTROPHILS NFR BLD: 71 % (ref 36–65)
NEUTS SEG NFR BLD: 7.55 K/UL (ref 1.5–8.1)
NITRITE UR QL STRIP: NEGATIVE
NRBC BLD-RTO: 0 PER 100 WBC
PH UR STRIP: 6.5 [PH] (ref 5–8)
PLATELET # BLD AUTO: 315 K/UL (ref 138–453)
PMV BLD AUTO: 9.2 FL (ref 8.1–13.5)
POTASSIUM SERPL-SCNC: 4.1 MMOL/L (ref 3.7–5.3)
PROT UR STRIP-MCNC: ABNORMAL MG/DL
PROTHROMBIN TIME: 12.8 SEC (ref 11.7–14.9)
RBC # BLD AUTO: 5.15 M/UL (ref 3.95–5.11)
RBC # BLD: ABNORMAL 10*6/UL
RBC #/AREA URNS HPF: ABNORMAL /HPF (ref 0–2)
SODIUM SERPL-SCNC: 139 MMOL/L (ref 135–144)
SP GR UR STRIP: 1.01 (ref 1–1.03)
UROBILINOGEN UR STRIP-ACNC: NORMAL EU/DL (ref 0–1)
WBC #/AREA URNS HPF: ABNORMAL /HPF (ref 0–5)
WBC OTHER # BLD: 10.7 K/UL (ref 3.5–11.3)

## 2023-08-10 PROCEDURE — 99285 EMERGENCY DEPT VISIT HI MDM: CPT

## 2023-08-10 PROCEDURE — G0378 HOSPITAL OBSERVATION PER HR: HCPCS

## 2023-08-10 PROCEDURE — 80048 BASIC METABOLIC PNL TOTAL CA: CPT

## 2023-08-10 PROCEDURE — 6360000004 HC RX CONTRAST MEDICATION: Performed by: UROLOGY

## 2023-08-10 PROCEDURE — 50432 PLMT NEPHROSTOMY CATHETER: CPT

## 2023-08-10 PROCEDURE — 96376 TX/PRO/DX INJ SAME DRUG ADON: CPT

## 2023-08-10 PROCEDURE — 96375 TX/PRO/DX INJ NEW DRUG ADDON: CPT

## 2023-08-10 PROCEDURE — 87086 URINE CULTURE/COLONY COUNT: CPT

## 2023-08-10 PROCEDURE — 6360000002 HC RX W HCPCS: Performed by: PHYSICIAN ASSISTANT

## 2023-08-10 PROCEDURE — 2580000003 HC RX 258: Performed by: PHYSICIAN ASSISTANT

## 2023-08-10 PROCEDURE — 99152 MOD SED SAME PHYS/QHP 5/>YRS: CPT

## 2023-08-10 PROCEDURE — 99153 MOD SED SAME PHYS/QHP EA: CPT

## 2023-08-10 PROCEDURE — C1769 GUIDE WIRE: HCPCS

## 2023-08-10 PROCEDURE — 81001 URINALYSIS AUTO W/SCOPE: CPT

## 2023-08-10 PROCEDURE — 6360000002 HC RX W HCPCS: Performed by: RADIOLOGY

## 2023-08-10 PROCEDURE — 85610 PROTHROMBIN TIME: CPT

## 2023-08-10 PROCEDURE — 74176 CT ABD & PELVIS W/O CONTRAST: CPT

## 2023-08-10 PROCEDURE — 6370000000 HC RX 637 (ALT 250 FOR IP): Performed by: PHYSICIAN ASSISTANT

## 2023-08-10 PROCEDURE — 96361 HYDRATE IV INFUSION ADD-ON: CPT

## 2023-08-10 PROCEDURE — 96374 THER/PROPH/DIAG INJ IV PUSH: CPT

## 2023-08-10 PROCEDURE — 6360000002 HC RX W HCPCS: Performed by: STUDENT IN AN ORGANIZED HEALTH CARE EDUCATION/TRAINING PROGRAM

## 2023-08-10 PROCEDURE — 85025 COMPLETE CBC W/AUTO DIFF WBC: CPT

## 2023-08-10 PROCEDURE — 2580000003 HC RX 258: Performed by: RADIOLOGY

## 2023-08-10 RX ORDER — ONDANSETRON 2 MG/ML
4 INJECTION INTRAMUSCULAR; INTRAVENOUS ONCE
Status: COMPLETED | OUTPATIENT
Start: 2023-08-10 | End: 2023-08-10

## 2023-08-10 RX ORDER — METOPROLOL SUCCINATE 25 MG/1
12.5 TABLET, EXTENDED RELEASE ORAL DAILY
Status: DISCONTINUED | OUTPATIENT
Start: 2023-08-10 | End: 2023-08-11 | Stop reason: HOSPADM

## 2023-08-10 RX ORDER — ALBUTEROL SULFATE 90 UG/1
2 AEROSOL, METERED RESPIRATORY (INHALATION) EVERY 6 HOURS PRN
Status: DISCONTINUED | OUTPATIENT
Start: 2023-08-10 | End: 2023-08-11 | Stop reason: HOSPADM

## 2023-08-10 RX ORDER — MIDAZOLAM HYDROCHLORIDE 5 MG/ML
INJECTION INTRAMUSCULAR; INTRAVENOUS PRN
Status: COMPLETED | OUTPATIENT
Start: 2023-08-10 | End: 2023-08-10

## 2023-08-10 RX ORDER — FENTANYL CITRATE 50 UG/ML
INJECTION, SOLUTION INTRAMUSCULAR; INTRAVENOUS PRN
Status: COMPLETED | OUTPATIENT
Start: 2023-08-10 | End: 2023-08-10

## 2023-08-10 RX ORDER — MORPHINE SULFATE 4 MG/ML
4 INJECTION, SOLUTION INTRAMUSCULAR; INTRAVENOUS ONCE
Status: COMPLETED | OUTPATIENT
Start: 2023-08-10 | End: 2023-08-10

## 2023-08-10 RX ORDER — POLYETHYLENE GLYCOL 3350 17 G/17G
17 POWDER, FOR SOLUTION ORAL DAILY PRN
Status: DISCONTINUED | OUTPATIENT
Start: 2023-08-10 | End: 2023-08-11 | Stop reason: HOSPADM

## 2023-08-10 RX ORDER — AMLODIPINE BESYLATE 10 MG/1
5 TABLET ORAL DAILY
Status: DISCONTINUED | OUTPATIENT
Start: 2023-08-10 | End: 2023-08-11 | Stop reason: HOSPADM

## 2023-08-10 RX ORDER — ONDANSETRON 2 MG/ML
4 INJECTION INTRAMUSCULAR; INTRAVENOUS EVERY 6 HOURS PRN
Status: DISCONTINUED | OUTPATIENT
Start: 2023-08-10 | End: 2023-08-11 | Stop reason: HOSPADM

## 2023-08-10 RX ORDER — PROMETHAZINE HYDROCHLORIDE 12.5 MG/1
12.5 TABLET ORAL EVERY 6 HOURS PRN
Status: DISCONTINUED | OUTPATIENT
Start: 2023-08-10 | End: 2023-08-11 | Stop reason: HOSPADM

## 2023-08-10 RX ORDER — SODIUM CHLORIDE 9 MG/ML
INJECTION, SOLUTION INTRAVENOUS PRN
Status: DISCONTINUED | OUTPATIENT
Start: 2023-08-10 | End: 2023-08-11 | Stop reason: HOSPADM

## 2023-08-10 RX ORDER — TAMSULOSIN HYDROCHLORIDE 0.4 MG/1
0.4 CAPSULE ORAL DAILY
Status: DISCONTINUED | OUTPATIENT
Start: 2023-08-10 | End: 2023-08-11 | Stop reason: HOSPADM

## 2023-08-10 RX ORDER — SODIUM CHLORIDE 0.9 % (FLUSH) 0.9 %
5-40 SYRINGE (ML) INJECTION EVERY 12 HOURS SCHEDULED
Status: DISCONTINUED | OUTPATIENT
Start: 2023-08-10 | End: 2023-08-11 | Stop reason: HOSPADM

## 2023-08-10 RX ORDER — HYDROCODONE BITARTRATE AND ACETAMINOPHEN 5; 325 MG/1; MG/1
1 TABLET ORAL EVERY 4 HOURS PRN
Qty: 12 TABLET | Refills: 0 | Status: SHIPPED | OUTPATIENT
Start: 2023-08-10 | End: 2023-08-13

## 2023-08-10 RX ORDER — DOCUSATE SODIUM 100 MG/1
100 CAPSULE, LIQUID FILLED ORAL 2 TIMES DAILY PRN
Status: DISCONTINUED | OUTPATIENT
Start: 2023-08-10 | End: 2023-08-11 | Stop reason: HOSPADM

## 2023-08-10 RX ORDER — LOSARTAN POTASSIUM 50 MG/1
50 TABLET ORAL DAILY
Status: DISCONTINUED | OUTPATIENT
Start: 2023-08-10 | End: 2023-08-11 | Stop reason: HOSPADM

## 2023-08-10 RX ORDER — OXYCODONE HYDROCHLORIDE 5 MG/1
10 TABLET ORAL EVERY 4 HOURS PRN
Status: DISCONTINUED | OUTPATIENT
Start: 2023-08-10 | End: 2023-08-11 | Stop reason: HOSPADM

## 2023-08-10 RX ORDER — SODIUM CHLORIDE 0.9 % (FLUSH) 0.9 %
5-40 SYRINGE (ML) INJECTION PRN
Status: DISCONTINUED | OUTPATIENT
Start: 2023-08-10 | End: 2023-08-11 | Stop reason: HOSPADM

## 2023-08-10 RX ORDER — MORPHINE SULFATE 4 MG/ML
2 INJECTION, SOLUTION INTRAMUSCULAR; INTRAVENOUS
Status: DISCONTINUED | OUTPATIENT
Start: 2023-08-10 | End: 2023-08-11 | Stop reason: HOSPADM

## 2023-08-10 RX ORDER — PANTOPRAZOLE SODIUM 20 MG/1
20 TABLET, DELAYED RELEASE ORAL
Status: DISCONTINUED | OUTPATIENT
Start: 2023-08-11 | End: 2023-08-11 | Stop reason: HOSPADM

## 2023-08-10 RX ORDER — FENTANYL CITRATE 50 UG/ML
50 INJECTION, SOLUTION INTRAMUSCULAR; INTRAVENOUS ONCE
Status: COMPLETED | OUTPATIENT
Start: 2023-08-10 | End: 2023-08-10

## 2023-08-10 RX ORDER — TAMSULOSIN HYDROCHLORIDE 0.4 MG/1
0.4 CAPSULE ORAL DAILY
Qty: 90 CAPSULE | Refills: 0 | Status: SHIPPED | OUTPATIENT
Start: 2023-08-10

## 2023-08-10 RX ORDER — SODIUM CHLORIDE 9 MG/ML
INJECTION, SOLUTION INTRAVENOUS CONTINUOUS
Status: DISCONTINUED | OUTPATIENT
Start: 2023-08-10 | End: 2023-08-11 | Stop reason: HOSPADM

## 2023-08-10 RX ORDER — FAMOTIDINE 20 MG/1
20 TABLET, FILM COATED ORAL 2 TIMES DAILY
Status: DISCONTINUED | OUTPATIENT
Start: 2023-08-10 | End: 2023-08-11 | Stop reason: HOSPADM

## 2023-08-10 RX ORDER — OXYCODONE HYDROCHLORIDE 5 MG/1
5 TABLET ORAL EVERY 4 HOURS PRN
Status: DISCONTINUED | OUTPATIENT
Start: 2023-08-10 | End: 2023-08-11 | Stop reason: HOSPADM

## 2023-08-10 RX ORDER — PREGABALIN 75 MG/1
75 CAPSULE ORAL 2 TIMES DAILY
Status: DISCONTINUED | OUTPATIENT
Start: 2023-08-10 | End: 2023-08-11 | Stop reason: HOSPADM

## 2023-08-10 RX ORDER — CHOLECALCIFEROL (VITAMIN D3) 125 MCG
1 CAPSULE ORAL NIGHTLY PRN
Status: DISCONTINUED | OUTPATIENT
Start: 2023-08-10 | End: 2023-08-11 | Stop reason: HOSPADM

## 2023-08-10 RX ORDER — MORPHINE SULFATE 4 MG/ML
4 INJECTION, SOLUTION INTRAMUSCULAR; INTRAVENOUS
Status: DISCONTINUED | OUTPATIENT
Start: 2023-08-10 | End: 2023-08-11 | Stop reason: HOSPADM

## 2023-08-10 RX ORDER — CIPROFLOXACIN 500 MG/1
500 TABLET, FILM COATED ORAL 2 TIMES DAILY
Qty: 14 TABLET | Refills: 0 | Status: SHIPPED | OUTPATIENT
Start: 2023-08-10 | End: 2023-08-17

## 2023-08-10 RX ADMIN — OXYCODONE HYDROCHLORIDE 5 MG: 5 TABLET ORAL at 20:43

## 2023-08-10 RX ADMIN — ONDANSETRON 4 MG: 2 INJECTION INTRAMUSCULAR; INTRAVENOUS at 09:54

## 2023-08-10 RX ADMIN — PROMETHAZINE HYDROCHLORIDE 12.5 MG: 12.5 TABLET ORAL at 13:21

## 2023-08-10 RX ADMIN — CEFTRIAXONE SODIUM 2000 MG: 1 INJECTION, POWDER, FOR SOLUTION INTRAMUSCULAR; INTRAVENOUS at 14:22

## 2023-08-10 RX ADMIN — IOPAMIDOL 7 ML: 755 INJECTION, SOLUTION INTRAVENOUS at 14:41

## 2023-08-10 RX ADMIN — SODIUM CHLORIDE: 9 INJECTION, SOLUTION INTRAVENOUS at 23:42

## 2023-08-10 RX ADMIN — SODIUM CHLORIDE: 9 INJECTION, SOLUTION INTRAVENOUS at 13:23

## 2023-08-10 RX ADMIN — MORPHINE SULFATE 4 MG: 4 INJECTION INTRAVENOUS at 13:21

## 2023-08-10 RX ADMIN — MORPHINE SULFATE 4 MG: 4 INJECTION INTRAVENOUS at 09:53

## 2023-08-10 RX ADMIN — FENTANYL CITRATE 50 MCG: 50 INJECTION, SOLUTION INTRAMUSCULAR; INTRAVENOUS at 08:38

## 2023-08-10 RX ADMIN — TAMSULOSIN HYDROCHLORIDE 0.4 MG: 0.4 CAPSULE ORAL at 17:42

## 2023-08-10 RX ADMIN — ONDANSETRON 4 MG: 2 INJECTION INTRAMUSCULAR; INTRAVENOUS at 08:38

## 2023-08-10 RX ADMIN — PREGABALIN 75 MG: 75 CAPSULE ORAL at 20:34

## 2023-08-10 RX ADMIN — AMLODIPINE BESYLATE 5 MG: 10 TABLET ORAL at 17:42

## 2023-08-10 RX ADMIN — METOPROLOL SUCCINATE 12.5 MG: 25 TABLET, EXTENDED RELEASE ORAL at 17:42

## 2023-08-10 RX ADMIN — FENTANYL CITRATE 50 MCG: 50 INJECTION, SOLUTION INTRAMUSCULAR; INTRAVENOUS at 14:31

## 2023-08-10 RX ADMIN — FENTANYL CITRATE 50 MCG: 50 INJECTION, SOLUTION INTRAMUSCULAR; INTRAVENOUS at 14:22

## 2023-08-10 RX ADMIN — OXYCODONE HYDROCHLORIDE 10 MG: 5 TABLET ORAL at 16:08

## 2023-08-10 RX ADMIN — FAMOTIDINE 20 MG: 20 TABLET, FILM COATED ORAL at 20:34

## 2023-08-10 RX ADMIN — MIDAZOLAM HYDROCHLORIDE 0.5 MG: 5 INJECTION INTRAMUSCULAR; INTRAVENOUS at 14:23

## 2023-08-10 ASSESSMENT — ENCOUNTER SYMPTOMS
WHEEZING: 0
NAUSEA: 1
SHORTNESS OF BREATH: 0
COUGH: 0
DIARRHEA: 0
CHEST TIGHTNESS: 0
ABDOMINAL PAIN: 0
VOMITING: 1

## 2023-08-10 ASSESSMENT — PAIN SCALES - GENERAL
PAINLEVEL_OUTOF10: 0
PAINLEVEL_OUTOF10: 9
PAINLEVEL_OUTOF10: 6
PAINLEVEL_OUTOF10: 8
PAINLEVEL_OUTOF10: 6

## 2023-08-10 ASSESSMENT — PAIN DESCRIPTION - DESCRIPTORS: DESCRIPTORS: DISCOMFORT

## 2023-08-10 ASSESSMENT — PAIN DESCRIPTION - LOCATION: LOCATION: ABDOMEN

## 2023-08-10 NOTE — PRE SEDATION
Sedation Pre-Procedure Note    Patient Name: Alfreda Negron   YOB: 1965  Room/Bed: 0327/0327-02  Medical Record Number: [id number]  Date: 8/10/2023   Time: 2:19 PM       Indication:  Nephrostomy tube    Consent: I have discussed with the patient and/or the patient representative the indication, alternatives, and the possible risks and/or complications of the planned procedure and the anesthesia methods. The patient and/or patient representative appear to understand and agree to proceed. Vital Signs:   Vitals:    08/10/23 1412   BP: (!) 165/86   Pulse: 69   Resp: 21   Temp:    SpO2:        Past Medical History:   has a past medical history of Arthritis, Asthma, Benign familial tremor, Benign familial tremor, CAD (coronary artery disease), Cancer ([address]), CKD (chronic kidney disease) stage 1, GFR 90 ml/min or greater, Class 2 severe obesity with serious comorbidity and body mass index (BMI) of 39.0 to 39.9 in adult ([address]), Cystinuria ([address]), Dyspnea on exertion, Flank pain, Gastroesophageal reflux disease without esophagitis, GERD (gastroesophageal reflux disease), Hypertension, Hyperuricemia, Hypothyroidism, Kidney stones, Midline low back pain with right-sided sciatica, Proteinuria, Shoulder impingement, Snores, Solitary kidney, acquired, Urinary tract obstruction by kidney stone, and Wears glasses. Past Surgical History:   has a past surgical history that includes total nephrectomy (Left, 1988); Knee arthroscopy (Right, 1998, 2003); Cystoscopy (Right, 11/18/2017); cysto/uretero/pyeloscopy, calculus tx (Right, 11/21/2017); pr cysto w/insert ureteral stent (Right, 07/04/2018); pr cysto w/ureteroscopy w/rmvl/manj stones (N/A, 07/10/2018); pr cysto w/ureteroscopy w/rmvl/manj stones (Right, 10/10/2018); Cystoscopy (Right, 08/02/2019); Rotator cuff repair (Left); Intraocular lens insertion (Bilateral, 2008);  Cystoscopy (Right, 08/23/2019); NEPHROLITHOTOMY (Right, 08/23/2019); cysto/uretero/pyeloscopy,

## 2023-08-10 NOTE — H&P
right ureteral calculus. Additional punctate distal right ureteral calculi present. Nonobstructing staghorn-type calculi in the right renal pelvis. 2. Fatty liver. 3. Absent left kidney/total left nephrectomy. 4. Nonvisualization of the appendix. Mild colonic diverticulosis. Mild-to-moderate stool burden. 5. Cardiomegaly. Assessment and Plan   Impression: 58 yo female with   -Right solitary kidney  -Right severe hydroureteronephrosis  -3cm X 1cm right distal ureteral stone  -Right Staghorn calculi   Patient Active Problem List   Diagnosis    Kidney stones    Essential hypertension    Acquired hypothyroidism    CKD (chronic kidney disease) stage 1, GFR 90 ml/min or greater    Hyperuricemia    Osteoarthritis of both knees    Gastroesophageal reflux disease without esophagitis    ESTHER (obstructive sleep apnea)    Midline low back pain with right-sided sciatica    Mixed hyperlipidemia    Solitary kidney, acquired    Obesity (BMI 30-39. 9)    Acute intractable tension-type headache    Glucose intolerance (impaired glucose tolerance)    Class 3 severe obesity due to excess calories with serious comorbidity and body mass index (BMI) of 40.0 to 44.9 in [facility])    Dyspnea on exertion    Arthralgia    Other fatigue    Oxygen desaturation    Mild persistent asthma without complication    LVH (left ventricular hypertrophy)    Chronic left shoulder pain    Hydronephrosis with renal and ureteral calculous obstruction    S/p Hscope, D&C 6/30/22    Postmenopausal bleeding    Endometrial polyp    Right kidney stone    Left breast abscess    S/P total knee arthroplasty, right    Severe obesity (BMI 35.0-39. 9) with comorbidity ([address])    Hydronephrosis of right kidney         Plan:   -NPO, plan for IR consult to place right nephrostomy tube  -Stat coags, initiate abx  -UA/Culture ordered  -Will need outpatient right PCNL for staghorn calculi and ureteral stones  -Admit to urology service, however if patient has nephrostomy placed and feels better later, may consider discharge with outpatient follow up  -Pain control, anti-emetics, Flomax ordered  -Daily labs, trend Cr, WBC  -OK for diet after nephrostomy placement    I directly discussed this patient and plan of care with Dr Orion Mejia who made all pertinent medical decisions and agrees with above.       Carla Bocanegra PA-C  12:51 PM 8/10/2023

## 2023-08-10 NOTE — FLOWSHEET NOTE
[] [facility] ([facility]) - [facility] &  Therapy  [address] [facility].    [phone]  F: [phone]   [] [address]  [address]   [address]  P: [phone]  F: [phone]  [] [address]  [address]  P: [phone]  F: [phone] [] [place]  P: [phone]  F: [phone]  [] [address] [facility]  [address]   [place]: [phone]  F: [phone]   [] [address] [facility]  [address]  [place]: [phone]   F: [phone]     Physical Therapy Cancel/No Show note    Date: 8/10/2023  Patient: Flower Wen  : 1965  MRN: [id number]    Cancels/No Shows to date:     For today's appointment patient:    [x]  Cancelled    [] Rescheduled appointment    [] No-show     Reason given by patient:    [x]  Patient ill    []  Conflicting appointment    [] No transportation      [] Conflict with work    [] No reason given    [] Weather related    [] -08    [] Other:      Comments:        [] Next appointment was confirmed    Electronically signed by: Jaquan Anderson

## 2023-08-10 NOTE — ED NOTES
12/12/2019    HOLMIUM- CYSTO, URETEROSCOPY, LASER LITHO, STENT PLACEMENT AND RIGHT URETERAL BASKET STONE EXTRACTION performed by Darian Irizarry MD at [facility] Right 11/18/2017    CYSTOSCOPY URETERAL STENT INSERTION,RIGHT performed by Alec Brooks MD at [facility] Right 08/02/2019    CYSTOSCOPY RT URETERAL STENT INSERTION performed by Dat Bruce MD at [facility] Right 08/23/2019    CYSTOSCOPY, RIGHT STENT REMOVAL, INSERTION OCCLUSIVE BALLOON, PT GOING TO INTERVENTIONAL performed by Dat Bruce MD at [facility] Right 09/19/2020    CYSTOSCOPY, URETEROSCOPY, HOLMIUM LASER LITHOTRIPSY, STENT PLACEMENT performed by Natanael Morrissey MD at [facility] Right 11/1/2022    CYSTOSCOPY INSERTION OCCLUSIVE BALLOON performed by Darian Irizarry MD at [address] / [facility] / Alysha Arechig[place]alani Right 12/31/2019    CYSTOSCOPY, STENT REMOVAL performed by Darian Irizarry MD at [address] [facility] N/A 06/30/2022    DILATATION AND CURETTAGE, HYSTEROSCOPY performed by Valentín Tipton MD at [address] [facility] Bilateral 2008    IR NEPHROSTOMY PERCUTANEOUS RIGHT  11/1/2022    IR NEPHROSTOMY PERCUTANEOUS RIGHT 11/1/2022 Alexa Parker MD [facility] SPECIAL PROCEDURES    KIDNEY STONE REMOVAL Right 11/1/2022    RIGHT PERCUTANEOUS NEPHROLITHOTOMY, NEPHROSTOMY PLACEMENT performed by Darian Irizarry MD at [facility] Right 1998, 2003    X2    NEPHROLITHOTOMY Right 08/23/2019    HOLMIUM LASER, NEPHROLITHOTOMY PERCUTANEOUS, LITHOCLAST, C-ARM performed by Dat Bruce MD at [facility] [address] NEPHROSTOMY Right 11/01/2022    RIGHT PERCUTANEOUS NEPHROLITHOTOMY, NEPHROSTOMY PLACEMENT    OR CYSTO W/INSERT URETERAL STENT Right 07/04/2018    CYSTOSCOPY URETERAL STENT INSERTION, RIGHT performed by Joanie Diaz MD at [address] [facility] 07/10/2018    CYSTOSCOPY, RIGHT URETEROSCOPY, HOLMIUM LASER LITHO WITH STONE BASKETING, RIGHT STENT EXCHANGE performed by Roland Ramos MD at [address] Right 10/10/2018    HOLMIUM LASER STANDBY, CYSTO, RIGHT URETEROSCOPY, RIGHT STENT PLACEMENT performed by Tricia Rowell MD at [address] Left     TOTAL KNEE ARTHROPLASTY Right 5/22/2023    RIGHT KNEE TOTAL ARTHROPLASTY performed by Charity Rosa DO at [address] [facility] Left 1988       PAST MEDICAL HISTORY       Past Medical History:   Diagnosis Date    Arthritis     Asthma     Benign familial tremor     Benign familial tremor     CAD (coronary artery disease) 12/2021    mild per cardiac cath 12/2021; no stents.  Dr. Ross Hernandez last visit 10/2022    Cancer [facility])     skin    CKD (chronic kidney disease) stage 1, GFR 90 ml/min or greater     Class 2 severe obesity with serious comorbidity and body mass index (BMI) of 39.0 to 39.9 in adult ([address]) 05/01/2019    Cystinuria ([address])     Dyspnea on exertion 08/06/2019    Flank pain     RIGHT    Gastroesophageal reflux disease without esophagitis     GERD (gastroesophageal reflux disease)     Hypertension     Hyperuricemia     Hypothyroidism     no meds    Kidney stones     Midline low back pain with right-sided sciatica 06/02/2016    Proteinuria     Shoulder impingement 12/2019    left    Snores     no sleep apnea per sleep study    Solitary kidney, acquired 11/20/2017    Urinary tract obstruction by kidney stone 08/01/2019    Wears glasses        Labs:  Labs Reviewed   BASIC METABOLIC PANEL - Abnormal; Notable for the following components:       Result Value    Glucose 133 (*)     Creatinine 1.0 (*)     Calcium 10.5 (*)     All other components within normal limits   CBC WITH AUTO DIFFERENTIAL - Abnormal; Notable for the following components:    RBC 5.15 (*)     MCV 82.1 (*)     RDW 15.0 (*)     Neutrophils % 71 (*)     Lymphocytes % 19 (*)     Immature Granulocytes 1 (*)

## 2023-08-10 NOTE — POST SEDATION
Sedation Post Procedure Note    Patient Name: Steff Swain   YOB: 1965  Room/Bed: 6641/5008-16  Medical Record Number: [id number]  Date: 8/10/2023   Time: 2:50 PM         Physicians/Assistants: MANFRED Gomez    Procedure Performed:  Nephrostomy tube    Post-Sedation Vital Signs:  Vitals:    08/10/23 1440   BP: (!) 166/89   Pulse: 78   Resp: 17   Temp:    SpO2:       Vital signs were reviewed and were stable after the procedure (see flow sheet for vitals)            Post-Sedation Exam: Pt remains stable           Complications: none    Electronically signed by MANFRED Maynard on 8/10/2023 at 2:50 PM

## 2023-08-10 NOTE — BRIEF OP NOTE
Brief Postoperative Note    America Borrero  YOB: 1965  [id number]    Pre-operative Diagnosis: Hydronephrosis    Post-operative Diagnosis: Same    Procedure: Nephrostomy tube placement    Anesthesia: Local and Moderate Sedation    Surgeons/Assistants: Romana Kennedy MD    Estimated Blood Loss: less than 50     Complications: None    Specimens: Was Obtained:     Findings: Successful placement of 10 fr right nephrostomy tube. Clear yellow fluid obtained.      Electronically signed by MANFRED Hahn on 8/10/2023 at 2:51 PM

## 2023-08-10 NOTE — ED TRIAGE NOTES
Patient presents to ED from home with complaints of right sided flank pain since last night. Patient has hx of kidney stones and states it feels like last time. Patient states she passed part of a stone last night and is still able to urinate. Patient also reports nausea. Patient took Tylenol this morning for pain. Patient denies chest pain and SOB. Patient is not on blood thinners. Patient is A&O x4 and connected to full cardiac monitor. IV placed and call light within reach.

## 2023-08-10 NOTE — DISCHARGE INSTRUCTIONS
General Discharge Instructions:     Patient ok to discharge home in good condition  No heavy lifting, >10 lbs  Patient should walk moderately at home 8-10x per day or every hour   Patient may resume diet as tolerated: Wean off of narcotic pain medication to plain tylenol or ibuprofen if able to take. Please take all of antibiotic if prescribed. Patient should take prescriptions as directed  No driving while on narcotics  Patient ok to shower in 24 hrs after discharge while keeping incision clean / dry  No soaking with nephrostomy drain in place. Empty bag when less than 3/4 way full. Keep area covered, dry/clean. Please call attending physician or hospital  with questions  Call or Present to ED if fever (> 101F), intractable nausea/vomiting or pain, if incisions become red/swollen or drain pus/fluid, or if calves become red swollen and tender. Patient should follow up with Dr. Isiah Guzmán for definitve stone treatment. He would like you to call the office for further discussion and scheduling.

## 2023-08-11 VITALS
RESPIRATION RATE: 18 BRPM | WEIGHT: 267.2 LBS | HEART RATE: 92 BPM | HEIGHT: 68 IN | OXYGEN SATURATION: 94 % | SYSTOLIC BLOOD PRESSURE: 157 MMHG | DIASTOLIC BLOOD PRESSURE: 83 MMHG | BODY MASS INDEX: 40.5 KG/M2 | TEMPERATURE: 98.2 F

## 2023-08-11 LAB
ANION GAP SERPL CALCULATED.3IONS-SCNC: 9 MMOL/L (ref 9–17)
BASOPHILS # BLD: <0.03 K/UL (ref 0–0.2)
BASOPHILS NFR BLD: 0 % (ref 0–2)
BUN SERPL-MCNC: 18 MG/DL (ref 8–23)
CALCIUM SERPL-MCNC: 9 MG/DL (ref 8.6–10.4)
CHLORIDE SERPL-SCNC: 103 MMOL/L (ref 98–107)
CO2 SERPL-SCNC: 27 MMOL/L (ref 20–31)
CREAT SERPL-MCNC: 1.1 MG/DL (ref 0.5–0.9)
EOSINOPHIL # BLD: 0.12 K/UL (ref 0–0.44)
EOSINOPHILS RELATIVE PERCENT: 1 % (ref 1–4)
ERYTHROCYTE [DISTWIDTH] IN BLOOD BY AUTOMATED COUNT: 15.4 % (ref 11.8–14.4)
GFR SERPL CREATININE-BSD FRML MDRD: 58 ML/MIN/1.73M2
GLUCOSE SERPL-MCNC: 114 MG/DL (ref 70–99)
HCT VFR BLD AUTO: 38.2 % (ref 36.3–47.1)
HGB BLD-MCNC: 11.6 G/DL (ref 11.9–15.1)
IMM GRANULOCYTES # BLD AUTO: 0.05 K/UL (ref 0–0.3)
IMM GRANULOCYTES NFR BLD: 0 %
LYMPHOCYTES NFR BLD: 1.8 K/UL (ref 1.1–3.7)
LYMPHOCYTES RELATIVE PERCENT: 15 % (ref 24–43)
MCH RBC QN AUTO: 25.4 PG (ref 25.2–33.5)
MCHC RBC AUTO-ENTMCNC: 30.4 G/DL (ref 28.4–34.8)
MCV RBC AUTO: 83.6 FL (ref 82.6–102.9)
MICROORGANISM SPEC CULT: NO GROWTH
MONOCYTES NFR BLD: 0.76 K/UL (ref 0.1–1.2)
MONOCYTES NFR BLD: 7 % (ref 3–12)
NEUTROPHILS NFR BLD: 77 % (ref 36–65)
NEUTS SEG NFR BLD: 8.95 K/UL (ref 1.5–8.1)
NRBC BLD-RTO: 0 PER 100 WBC
PLATELET # BLD AUTO: 314 K/UL (ref 138–453)
PMV BLD AUTO: 9.3 FL (ref 8.1–13.5)
POTASSIUM SERPL-SCNC: 4.3 MMOL/L (ref 3.7–5.3)
RBC # BLD AUTO: 4.57 M/UL (ref 3.95–5.11)
RBC # BLD: ABNORMAL 10*6/UL
SODIUM SERPL-SCNC: 139 MMOL/L (ref 135–144)
SPECIMEN DESCRIPTION: NORMAL
WBC OTHER # BLD: 11.7 K/UL (ref 3.5–11.3)

## 2023-08-11 PROCEDURE — 80048 BASIC METABOLIC PNL TOTAL CA: CPT

## 2023-08-11 PROCEDURE — G0378 HOSPITAL OBSERVATION PER HR: HCPCS

## 2023-08-11 PROCEDURE — 85025 COMPLETE CBC W/AUTO DIFF WBC: CPT

## 2023-08-11 PROCEDURE — 36415 COLL VENOUS BLD VENIPUNCTURE: CPT

## 2023-08-11 PROCEDURE — 96361 HYDRATE IV INFUSION ADD-ON: CPT

## 2023-08-11 PROCEDURE — 6370000000 HC RX 637 (ALT 250 FOR IP): Performed by: STUDENT IN AN ORGANIZED HEALTH CARE EDUCATION/TRAINING PROGRAM

## 2023-08-11 PROCEDURE — 6370000000 HC RX 637 (ALT 250 FOR IP): Performed by: PHYSICIAN ASSISTANT

## 2023-08-11 RX ORDER — ACETAMINOPHEN 500 MG
1000 TABLET ORAL ONCE
Status: COMPLETED | OUTPATIENT
Start: 2023-08-11 | End: 2023-08-11

## 2023-08-11 RX ADMIN — PANTOPRAZOLE SODIUM 20 MG: 20 TABLET, DELAYED RELEASE ORAL at 09:13

## 2023-08-11 RX ADMIN — OXYCODONE HYDROCHLORIDE 5 MG: 5 TABLET ORAL at 02:42

## 2023-08-11 RX ADMIN — ACETAMINOPHEN 1000 MG: 500 TABLET ORAL at 09:14

## 2023-08-11 RX ADMIN — TAMSULOSIN HYDROCHLORIDE 0.4 MG: 0.4 CAPSULE ORAL at 09:13

## 2023-08-11 ASSESSMENT — PAIN SCALES - GENERAL
PAINLEVEL_OUTOF10: 5
PAINLEVEL_OUTOF10: 3

## 2023-08-11 ASSESSMENT — PAIN DESCRIPTION - LOCATION: LOCATION: ABDOMEN

## 2023-08-11 NOTE — DISCHARGE SUMMARY
DISCHARGE SUMMARY NOTE:      Patient Identification  PATIENT: Livia Mariscal is a 59 y.o. female. MRN: [id number]  :  1965  Admit Date:  8/10/2023  Discharge date: 2023                                  Disposition: home  Discharged Condition:  good  Discharge Diagnoses:   Right hydroureteronephrosis  Right obstructing 3 cm distal ureteral stone    Consults: Interventional radiology    Surgery: Right nephrostomy tube placement per IR    Patient Instructions: Activity: As tolerated  Diet: As tolerated  Patient told to follow up with Dr. Polly Eckert for definitive stone treatment  Discharge Medications:      Medication List        START taking these medications      ciprofloxacin 500 MG tablet  Commonly known as: CIPRO  Take 1 tablet by mouth 2 times daily for 7 days     HYDROcodone-acetaminophen 5-325 MG per tablet  Commonly known as: Norco  Take 1 tablet by mouth every 4 hours as needed for Pain for up to 3 days. Intended supply: 3 days. Take lowest dose possible to manage pain Max Daily Amount: 6 tablets     tamsulosin 0.4 MG capsule  Commonly known as: FLOMAX  Take 1 capsule by mouth daily            CONTINUE taking these medications      albuterol sulfate  (90 Base) MCG/ACT inhaler  Commonly known as: ProAir HFA  Inhale 2 puffs into the lungs every 6 hours as needed for Wheezing     amLODIPine 5 MG tablet  Commonly known as: NORVASC  TAKE ONE TABLET BY MOUTH ONE TIME A DAY     b complex vitamins capsule     benzoyl peroxide 5 % external liquid  Wash affected areas once daily     * clindamycin 1 % lotion  Commonly known as: CLEOCIN T  Apply to affected areas daily     * clindamycin 1 % gel  Commonly known as: Cleocin-T  Apply topically 2 times daily.      ESTRADIOL PO     famotidine 20 MG tablet  Commonly known as: PEPCID  Take 1 tablet by mouth 2 times daily     irbesartan 150 MG tablet  Commonly known as: AVAPRO     Melatonin 5 MG Caps     Metoprolol Succinate 25 MG Cs24     oxyCODONE-acetaminophen

## 2023-08-14 ENCOUNTER — CARE COORDINATION (OUTPATIENT)
Dept: OTHER | Facility: CLINIC | Age: 58
End: 2023-08-14

## 2023-08-14 NOTE — CARE COORDINATION
Care Transitions Outreach Attempt    Call within 2 business days of discharge: Yes   Attempted to reach patient for transitions of care follow up. Unable to reach patient. Patient: Ezio Yu Patient : 1965 MRN: [id number]    Last Discharge [address]       Date Complaint Diagnosis Description Type Department Provider    8/10/23 Flank Pain Ureterolithiasis . .. ED to Hosp-Admission (Discharged) (ADMITTED) Rojas Pelaez MD; Fabiana Garcia. .. Was this an external facility discharge? No Discharge Facility: [address] [place] [address] following upcoming appointments from discharge chart review:   [facility] follow up appointment(s):   Future Appointments   Date Time Provider [address]   8/15/2023  5:00 PM [address], [place] [address]   2023  5:00 PM [address] [address]   2023  5:00 PM Alicia Lilly,  [address]   2023  8:30 AM [address] [place] [address],  [place] ORT SP MHTOLPP   2023  1:00 PM Alvin Potts MD AFL TCC TOLE AFL COTY C     Non-[facility] follow up appointment(s): None    ACM attempted to reach patient for Care Transitions call. HIPAA compliant message left requesting a return phone call at patient convenience. Will attempt to outreach again. LILIAN Betts RN  Associate Care Manager  Phone: [phone]  Email: [email or web address]

## 2023-08-15 ENCOUNTER — HOSPITAL ENCOUNTER (OUTPATIENT)
Dept: PHYSICAL THERAPY | Facility: CLINIC | Age: 58
Setting detail: THERAPIES SERIES
Discharge: HOME | End: 2023-08-15
Payer: COMMERCIAL

## 2023-08-15 NOTE — FLOWSHEET NOTE
[] [facility]) - [facility] &  Therapy  [address] [facility].    [phone]  F: [phone]   [] [address] [facility]  [address]   [address]  P: [phone]  F: [phone]  [] [address]  [address]  P: [phone]  F: [phone] [] [facility]  P: [phone]  F: [phone]  [] [address]   [address]   [place]: [phone]  F: [phone]   [] [address] [facility]  [address]  [place]: [phone]   F: [phone]     Physical Therapy Cancel/No Show note    Date: 8/15/2023  Patient: Laney Lazo  : 1965  MRN: [id number]    Cancels/No Shows to date:     For today's appointment patient:    [x]  Cancelled    [] Rescheduled appointment    [] No-show     Reason given by patient:    [x]  Patient ill    []  Conflicting appointment    [] No transportation      [] Conflict with work    [] No reason given    [] Weather related    [] [id number]    [] Other:      Comments:  Needs clearance from urologist before continuing with PT.      [] Next appointment was confirmed    Electronically signed by: Richardean Hatchet, PTA

## 2023-08-16 ENCOUNTER — CARE COORDINATION (OUTPATIENT)
Dept: OTHER | Facility: CLINIC | Age: 58
End: 2023-08-16

## 2023-08-16 NOTE — CARE COORDINATION
Care Transitions Outreach Attempt    Call within 2 business days of discharge: Yes   Attempted to reach patient for transitions of care follow up. Unable to reach patient. Patient: Ulises Rodriguez Patient : 1965 MRN: [id number]    Last Discharge [address] [facility]       Date Complaint Diagnosis Description Type Department Provider    8/10/23 Flank Pain Ureterolithiasis . .. ED to Hosp-Admission (Discharged) (ADMITTED) Teri Bautista MD; Yancy Graham. .. Was this an external facility discharge? No Discharge Facility: [address] [place] [address]    Noted following upcoming appointments from discharge chart review:   [address] [facility] follow up appointment(s):   Future Appointments   Date Time Provider [address]   2023  5:00 PM [address] [place] [address] [address]   2023  5:00 PM Precious Breech,  [address]   2023  8:30 AM [address] [place] [address] ORT SP MHTOLPP   2023  1:00 PM MD CARLOS ENRIQUE Camacho TCC TOLE CARLOS ENRIQUE CESARO PATRICA     Non-BS follow up appointment(s): None noted    ACM attempted 2nd outreach to patient for introduction to Associate Care Management. HIPAA compliant message left requesting a return phone call at patient convenience. Unable to Reach Letter sent to patient via [employer]. Will continue to outreach. Future Appointments   Date Time Provider [address]   2023  5:00 PM [address] [place] [address] [address]   2023  5:00 PM Precious Ninaech, PT [facility]   2023  8:30 AM [address] [place] [address], [facility] ORT SP MHTOLPP   2023  1:00 PM MD CARLOS ENRIQUE Camacho TCC TOLE AFL ANSARI C         Mónica Lenz, MSN RN  Associate Care Manager  Phone: [phone]  Email: [email or web address]

## 2023-08-17 ENCOUNTER — TELEPHONE (OUTPATIENT)
Dept: ORTHOPEDIC SURGERY | Age: 58
End: 2023-08-17

## 2023-08-17 ENCOUNTER — APPOINTMENT (OUTPATIENT)
Dept: PHYSICAL THERAPY | Facility: CLINIC | Age: 58
End: 2023-08-17
Payer: COMMERCIAL

## 2023-08-17 NOTE — TELEPHONE ENCOUNTER
I talked to Dr. Christian Khan and he said there is nothing he would do and it would be up to her nephrology dr if they wanted to do anything. Patient was called.

## 2023-08-17 NOTE — TELEPHONE ENCOUNTER
DOS 5/22/23 R TKA patient is having kidney problems and is planning on having surgery in the next week for percutaneous nephrolithotomy is there any recommendations for this?

## 2023-08-21 ENCOUNTER — ANESTHESIA EVENT (OUTPATIENT)
Dept: OPERATING ROOM | Age: 58
End: 2023-08-21
Payer: COMMERCIAL

## 2023-08-21 ENCOUNTER — ANESTHESIA (OUTPATIENT)
Dept: INTERVENTIONAL RADIOLOGY/VASCULAR | Age: 58
End: 2023-08-21
Payer: COMMERCIAL

## 2023-08-21 ENCOUNTER — ANESTHESIA EVENT (OUTPATIENT)
Dept: INTERVENTIONAL RADIOLOGY/VASCULAR | Age: 58
End: 2023-08-21
Payer: COMMERCIAL

## 2023-08-21 ENCOUNTER — APPOINTMENT (OUTPATIENT)
Dept: GENERAL RADIOLOGY | Age: 58
End: 2023-08-21
Attending: UROLOGY
Payer: COMMERCIAL

## 2023-08-21 ENCOUNTER — ANESTHESIA (OUTPATIENT)
Dept: OPERATING ROOM | Age: 58
End: 2023-08-21
Payer: COMMERCIAL

## 2023-08-21 ENCOUNTER — HOSPITAL ENCOUNTER (OUTPATIENT)
Age: 58
Setting detail: OBSERVATION
Discharge: HOME | End: 2023-08-22
Attending: UROLOGY | Admitting: UROLOGY
Payer: COMMERCIAL

## 2023-08-21 ENCOUNTER — HOSPITAL ENCOUNTER (OUTPATIENT)
Dept: INTERVENTIONAL RADIOLOGY/VASCULAR | Age: 58
Discharge: HOME | End: 2023-08-23
Payer: COMMERCIAL

## 2023-08-21 VITALS
WEIGHT: 265 LBS | HEART RATE: 75 BPM | HEIGHT: 68 IN | BODY MASS INDEX: 40.16 KG/M2 | DIASTOLIC BLOOD PRESSURE: 81 MMHG | TEMPERATURE: 97 F | RESPIRATION RATE: 18 BRPM | OXYGEN SATURATION: 96 % | SYSTOLIC BLOOD PRESSURE: 138 MMHG

## 2023-08-21 DIAGNOSIS — G89.18 POST-OP PAIN: Primary | ICD-10-CM

## 2023-08-21 DIAGNOSIS — N20.0 RIGHT KIDNEY STONE: ICD-10-CM

## 2023-08-21 PROBLEM — N28.89 RIGHT RENAL MASS: Status: ACTIVE | Noted: 2023-08-21

## 2023-08-21 LAB
ANION GAP SERPL CALCULATED.3IONS-SCNC: 12 MMOL/L (ref 9–17)
BUN BLD-MCNC: 19 MG/DL (ref 8–26)
BUN SERPL-MCNC: 19 MG/DL (ref 8–23)
CALCIUM SERPL-MCNC: 9.2 MG/DL (ref 8.6–10.4)
CHLORIDE SERPL-SCNC: 103 MMOL/L (ref 98–107)
CO2 SERPL-SCNC: 23 MMOL/L (ref 20–31)
CREAT SERPL-MCNC: 1.1 MG/DL (ref 0.5–0.9)
EGFR, POC: >60 ML/MIN/1.73M2
ERYTHROCYTE [DISTWIDTH] IN BLOOD BY AUTOMATED COUNT: 15.1 % (ref 11.8–14.4)
GFR SERPL CREATININE-BSD FRML MDRD: 58 ML/MIN/1.73M2
GLUCOSE BLD-MCNC: 96 MG/DL (ref 74–100)
GLUCOSE SERPL-MCNC: 139 MG/DL (ref 70–99)
HCT VFR BLD AUTO: 39.2 % (ref 36.3–47.1)
HGB BLD-MCNC: 11.8 G/DL (ref 11.9–15.1)
MCH RBC QN AUTO: 25.3 PG (ref 25.2–33.5)
MCHC RBC AUTO-ENTMCNC: 30.1 G/DL (ref 28.4–34.8)
MCV RBC AUTO: 83.9 FL (ref 82.6–102.9)
NRBC BLD-RTO: 0 PER 100 WBC
PLATELET # BLD AUTO: 291 K/UL (ref 138–453)
PMV BLD AUTO: 8.9 FL (ref 8.1–13.5)
POC CREATININE: 0.9 MG/DL (ref 0.51–1.19)
POTASSIUM SERPL-SCNC: 4.3 MMOL/L (ref 3.7–5.3)
RBC # BLD AUTO: 4.67 M/UL (ref 3.95–5.11)
SODIUM SERPL-SCNC: 138 MMOL/L (ref 135–144)
WBC OTHER # BLD: 11.5 K/UL (ref 3.5–11.3)

## 2023-08-21 PROCEDURE — C1758 CATHETER, URETERAL: HCPCS | Performed by: UROLOGY

## 2023-08-21 PROCEDURE — 6360000002 HC RX W HCPCS: Performed by: NURSE ANESTHETIST, CERTIFIED REGISTERED

## 2023-08-21 PROCEDURE — 7100000000 HC PACU RECOVERY - FIRST 15 MIN: Performed by: UROLOGY

## 2023-08-21 PROCEDURE — 3600000014 HC SURGERY LEVEL 4 ADDTL 15MIN: Performed by: UROLOGY

## 2023-08-21 PROCEDURE — 84520 ASSAY OF UREA NITROGEN: CPT

## 2023-08-21 PROCEDURE — 3600000004 HC SURGERY LEVEL 4 BASE: Performed by: UROLOGY

## 2023-08-21 PROCEDURE — 80048 BASIC METABOLIC PNL TOTAL CA: CPT

## 2023-08-21 PROCEDURE — C1747 HC ENDOSCOPE, SINGLE, URINARY TRACT: HCPCS | Performed by: UROLOGY

## 2023-08-21 PROCEDURE — 2580000003 HC RX 258: Performed by: UROLOGY

## 2023-08-21 PROCEDURE — 6360000004 HC RX CONTRAST MEDICATION: Performed by: UROLOGY

## 2023-08-21 PROCEDURE — 85027 COMPLETE CBC AUTOMATED: CPT

## 2023-08-21 PROCEDURE — C1769 GUIDE WIRE: HCPCS

## 2023-08-21 PROCEDURE — G0378 HOSPITAL OBSERVATION PER HR: HCPCS

## 2023-08-21 PROCEDURE — 2580000003 HC RX 258: Performed by: NURSE ANESTHETIST, CERTIFIED REGISTERED

## 2023-08-21 PROCEDURE — 87086 URINE CULTURE/COLONY COUNT: CPT

## 2023-08-21 PROCEDURE — 3700000001 HC ADD 15 MINUTES (ANESTHESIA)

## 2023-08-21 PROCEDURE — 7100000001 HC PACU RECOVERY - ADDTL 15 MIN: Performed by: UROLOGY

## 2023-08-21 PROCEDURE — 2720000010 HC SURG SUPPLY STERILE: Performed by: UROLOGY

## 2023-08-21 PROCEDURE — 3700000000 HC ANESTHESIA ATTENDED CARE: Performed by: UROLOGY

## 2023-08-21 PROCEDURE — 3700000000 HC ANESTHESIA ATTENDED CARE

## 2023-08-21 PROCEDURE — C1769 GUIDE WIRE: HCPCS | Performed by: UROLOGY

## 2023-08-21 PROCEDURE — 6360000002 HC RX W HCPCS: Performed by: ANESTHESIOLOGY

## 2023-08-21 PROCEDURE — 6370000000 HC RX 637 (ALT 250 FOR IP): Performed by: STUDENT IN AN ORGANIZED HEALTH CARE EDUCATION/TRAINING PROGRAM

## 2023-08-21 PROCEDURE — 2500000003 HC RX 250 WO HCPCS: Performed by: NURSE ANESTHETIST, CERTIFIED REGISTERED

## 2023-08-21 PROCEDURE — C9399 UNCLASSIFIED DRUGS OR BIOLOG: HCPCS | Performed by: NURSE ANESTHETIST, CERTIFIED REGISTERED

## 2023-08-21 PROCEDURE — 50434 CONVERT NEPHROSTOMY CATHETER: CPT

## 2023-08-21 PROCEDURE — 82365 CALCULUS SPECTROSCOPY: CPT

## 2023-08-21 PROCEDURE — 2709999900 HC NON-CHARGEABLE SUPPLY: Performed by: UROLOGY

## 2023-08-21 PROCEDURE — 82565 ASSAY OF CREATININE: CPT

## 2023-08-21 PROCEDURE — 82947 ASSAY GLUCOSE BLOOD QUANT: CPT

## 2023-08-21 PROCEDURE — 3700000001 HC ADD 15 MINUTES (ANESTHESIA): Performed by: UROLOGY

## 2023-08-21 PROCEDURE — C1726 CATH, BAL DIL, NON-VASCULAR: HCPCS | Performed by: UROLOGY

## 2023-08-21 PROCEDURE — 6360000002 HC RX W HCPCS: Performed by: STUDENT IN AN ORGANIZED HEALTH CARE EDUCATION/TRAINING PROGRAM

## 2023-08-21 RX ORDER — ALBUTEROL SULFATE 90 UG/1
2 AEROSOL, METERED RESPIRATORY (INHALATION) EVERY 6 HOURS PRN
Status: DISCONTINUED | OUTPATIENT
Start: 2023-08-21 | End: 2023-08-22 | Stop reason: HOSPADM

## 2023-08-21 RX ORDER — ROCURONIUM BROMIDE 10 MG/ML
INJECTION, SOLUTION INTRAVENOUS PRN
Status: DISCONTINUED | OUTPATIENT
Start: 2023-08-21 | End: 2023-08-21 | Stop reason: SDUPTHER

## 2023-08-21 RX ORDER — DEXAMETHASONE SODIUM PHOSPHATE 10 MG/ML
INJECTION, SOLUTION INTRAMUSCULAR; INTRAVENOUS PRN
Status: DISCONTINUED | OUTPATIENT
Start: 2023-08-21 | End: 2023-08-21 | Stop reason: SDUPTHER

## 2023-08-21 RX ORDER — TAMSULOSIN HYDROCHLORIDE 0.4 MG/1
0.4 CAPSULE ORAL DAILY
Status: DISCONTINUED | OUTPATIENT
Start: 2023-08-21 | End: 2023-08-22 | Stop reason: HOSPADM

## 2023-08-21 RX ORDER — PROPOFOL 10 MG/ML
INJECTION, EMULSION INTRAVENOUS PRN
Status: DISCONTINUED | OUTPATIENT
Start: 2023-08-21 | End: 2023-08-21 | Stop reason: SDUPTHER

## 2023-08-21 RX ORDER — POTASSIUM CHLORIDE 20 MEQ/1
40 TABLET, EXTENDED RELEASE ORAL PRN
Status: DISCONTINUED | OUTPATIENT
Start: 2023-08-21 | End: 2023-08-22 | Stop reason: HOSPADM

## 2023-08-21 RX ORDER — SODIUM CHLORIDE, SODIUM LACTATE, POTASSIUM CHLORIDE, CALCIUM CHLORIDE 600; 310; 30; 20 MG/100ML; MG/100ML; MG/100ML; MG/100ML
INJECTION, SOLUTION INTRAVENOUS CONTINUOUS PRN
Status: DISCONTINUED | OUTPATIENT
Start: 2023-08-21 | End: 2023-08-21 | Stop reason: SDUPTHER

## 2023-08-21 RX ORDER — MORPHINE SULFATE 2 MG/ML
4 INJECTION, SOLUTION INTRAMUSCULAR; INTRAVENOUS
Status: DISCONTINUED | OUTPATIENT
Start: 2023-08-21 | End: 2023-08-22 | Stop reason: HOSPADM

## 2023-08-21 RX ORDER — SODIUM CHLORIDE 0.9 % (FLUSH) 0.9 %
5-40 SYRINGE (ML) INJECTION PRN
Status: DISCONTINUED | OUTPATIENT
Start: 2023-08-21 | End: 2023-08-22 | Stop reason: HOSPADM

## 2023-08-21 RX ORDER — MORPHINE SULFATE 2 MG/ML
2 INJECTION, SOLUTION INTRAMUSCULAR; INTRAVENOUS
Status: DISCONTINUED | OUTPATIENT
Start: 2023-08-21 | End: 2023-08-22 | Stop reason: HOSPADM

## 2023-08-21 RX ORDER — CHOLECALCIFEROL (VITAMIN D3) 125 MCG
5 CAPSULE ORAL NIGHTLY PRN
Status: DISCONTINUED | OUTPATIENT
Start: 2023-08-21 | End: 2023-08-22 | Stop reason: HOSPADM

## 2023-08-21 RX ORDER — OXYCODONE HYDROCHLORIDE 5 MG/1
10 TABLET ORAL EVERY 4 HOURS PRN
Status: DISCONTINUED | OUTPATIENT
Start: 2023-08-21 | End: 2023-08-22 | Stop reason: HOSPADM

## 2023-08-21 RX ORDER — ONDANSETRON 2 MG/ML
4 INJECTION INTRAMUSCULAR; INTRAVENOUS EVERY 6 HOURS PRN
Status: DISCONTINUED | OUTPATIENT
Start: 2023-08-21 | End: 2023-08-22 | Stop reason: HOSPADM

## 2023-08-21 RX ORDER — ESTRADIOL 1 MG/1
1 TABLET ORAL DAILY
Status: DISCONTINUED | OUTPATIENT
Start: 2023-08-21 | End: 2023-08-22 | Stop reason: HOSPADM

## 2023-08-21 RX ORDER — LIDOCAINE HYDROCHLORIDE 20 MG/ML
15 SOLUTION OROPHARYNGEAL
Status: DISCONTINUED | OUTPATIENT
Start: 2023-08-21 | End: 2023-08-22 | Stop reason: HOSPADM

## 2023-08-21 RX ORDER — CALCIUM CARBONATE 500 MG/1
500 TABLET, CHEWABLE ORAL 3 TIMES DAILY PRN
Status: DISCONTINUED | OUTPATIENT
Start: 2023-08-21 | End: 2023-08-22 | Stop reason: HOSPADM

## 2023-08-21 RX ORDER — SODIUM CHLORIDE 9 MG/ML
INJECTION, SOLUTION INTRAVENOUS PRN
Status: DISCONTINUED | OUTPATIENT
Start: 2023-08-21 | End: 2023-08-21 | Stop reason: HOSPADM

## 2023-08-21 RX ORDER — PREGABALIN 75 MG/1
75 CAPSULE ORAL 2 TIMES DAILY
Status: DISCONTINUED | OUTPATIENT
Start: 2023-08-21 | End: 2023-08-22 | Stop reason: HOSPADM

## 2023-08-21 RX ORDER — OXYBUTYNIN CHLORIDE 10 MG/1
10 TABLET, EXTENDED RELEASE ORAL DAILY PRN
Status: DISCONTINUED | OUTPATIENT
Start: 2023-08-21 | End: 2023-08-22 | Stop reason: HOSPADM

## 2023-08-21 RX ORDER — SODIUM CHLORIDE 0.9 % (FLUSH) 0.9 %
5-40 SYRINGE (ML) INJECTION EVERY 12 HOURS SCHEDULED
Status: DISCONTINUED | OUTPATIENT
Start: 2023-08-21 | End: 2023-08-21 | Stop reason: HOSPADM

## 2023-08-21 RX ORDER — SODIUM CHLORIDE 0.9 % (FLUSH) 0.9 %
5-40 SYRINGE (ML) INJECTION PRN
Status: DISCONTINUED | OUTPATIENT
Start: 2023-08-21 | End: 2023-08-21 | Stop reason: HOSPADM

## 2023-08-21 RX ORDER — SODIUM CHLORIDE 9 MG/ML
INJECTION, SOLUTION INTRAVENOUS PRN
Status: DISCONTINUED | OUTPATIENT
Start: 2023-08-21 | End: 2023-08-22 | Stop reason: HOSPADM

## 2023-08-21 RX ORDER — MIDAZOLAM HYDROCHLORIDE 1 MG/ML
INJECTION INTRAMUSCULAR; INTRAVENOUS PRN
Status: DISCONTINUED | OUTPATIENT
Start: 2023-08-21 | End: 2023-08-21 | Stop reason: SDUPTHER

## 2023-08-21 RX ORDER — DIPHENHYDRAMINE HYDROCHLORIDE 50 MG/ML
12.5 INJECTION INTRAMUSCULAR; INTRAVENOUS
Status: DISCONTINUED | OUTPATIENT
Start: 2023-08-21 | End: 2023-08-21 | Stop reason: HOSPADM

## 2023-08-21 RX ORDER — ONDANSETRON 2 MG/ML
INJECTION INTRAMUSCULAR; INTRAVENOUS PRN
Status: DISCONTINUED | OUTPATIENT
Start: 2023-08-21 | End: 2023-08-21 | Stop reason: SDUPTHER

## 2023-08-21 RX ORDER — AMLODIPINE BESYLATE 5 MG/1
5 TABLET ORAL NIGHTLY
Status: DISCONTINUED | OUTPATIENT
Start: 2023-08-21 | End: 2023-08-22 | Stop reason: HOSPADM

## 2023-08-21 RX ORDER — PANTOPRAZOLE SODIUM 20 MG/1
20 TABLET, DELAYED RELEASE ORAL
Status: DISCONTINUED | OUTPATIENT
Start: 2023-08-22 | End: 2023-08-22 | Stop reason: HOSPADM

## 2023-08-21 RX ORDER — METOPROLOL SUCCINATE 25 MG/1
12.5 TABLET, EXTENDED RELEASE ORAL EVERY EVENING
Status: DISCONTINUED | OUTPATIENT
Start: 2023-08-21 | End: 2023-08-22 | Stop reason: HOSPADM

## 2023-08-21 RX ORDER — FENTANYL CITRATE 50 UG/ML
25 INJECTION, SOLUTION INTRAMUSCULAR; INTRAVENOUS EVERY 5 MIN PRN
Status: DISCONTINUED | OUTPATIENT
Start: 2023-08-21 | End: 2023-08-21 | Stop reason: HOSPADM

## 2023-08-21 RX ORDER — MINERAL OIL AND WHITE PETROLATUM 150; 830 MG/G; MG/G
OINTMENT OPHTHALMIC PRN
Status: DISCONTINUED | OUTPATIENT
Start: 2023-08-21 | End: 2023-08-22 | Stop reason: HOSPADM

## 2023-08-21 RX ORDER — POLYETHYLENE GLYCOL 3350 17 G/17G
17 POWDER, FOR SOLUTION ORAL DAILY PRN
Status: DISCONTINUED | OUTPATIENT
Start: 2023-08-21 | End: 2023-08-22 | Stop reason: HOSPADM

## 2023-08-21 RX ORDER — BISACODYL 5 MG/1
5 TABLET, DELAYED RELEASE ORAL DAILY PRN
Status: DISCONTINUED | OUTPATIENT
Start: 2023-08-21 | End: 2023-08-22 | Stop reason: HOSPADM

## 2023-08-21 RX ORDER — MAGNESIUM HYDROXIDE 1200 MG/15ML
LIQUID ORAL CONTINUOUS PRN
Status: DISCONTINUED | OUTPATIENT
Start: 2023-08-21 | End: 2023-08-21 | Stop reason: HOSPADM

## 2023-08-21 RX ORDER — PROMETHAZINE HYDROCHLORIDE 12.5 MG/1
12.5 TABLET ORAL EVERY 6 HOURS PRN
Status: DISCONTINUED | OUTPATIENT
Start: 2023-08-21 | End: 2023-08-22 | Stop reason: HOSPADM

## 2023-08-21 RX ORDER — ACETAMINOPHEN 325 MG/1
650 TABLET ORAL EVERY 6 HOURS
Status: DISCONTINUED | OUTPATIENT
Start: 2023-08-21 | End: 2023-08-22 | Stop reason: HOSPADM

## 2023-08-21 RX ORDER — POTASSIUM CHLORIDE 7.45 MG/ML
10 INJECTION INTRAVENOUS PRN
Status: DISCONTINUED | OUTPATIENT
Start: 2023-08-21 | End: 2023-08-22 | Stop reason: HOSPADM

## 2023-08-21 RX ORDER — LIDOCAINE HYDROCHLORIDE 10 MG/ML
INJECTION, SOLUTION EPIDURAL; INFILTRATION; INTRACAUDAL; PERINEURAL PRN
Status: DISCONTINUED | OUTPATIENT
Start: 2023-08-21 | End: 2023-08-21 | Stop reason: SDUPTHER

## 2023-08-21 RX ORDER — CEFAZOLIN SODIUM 1 G/3ML
INJECTION, POWDER, FOR SOLUTION INTRAMUSCULAR; INTRAVENOUS PRN
Status: DISCONTINUED | OUTPATIENT
Start: 2023-08-21 | End: 2023-08-21 | Stop reason: SDUPTHER

## 2023-08-21 RX ORDER — SODIUM CHLORIDE 9 MG/ML
INJECTION, SOLUTION INTRAVENOUS CONTINUOUS
Status: DISCONTINUED | OUTPATIENT
Start: 2023-08-21 | End: 2023-08-22 | Stop reason: HOSPADM

## 2023-08-21 RX ORDER — FENTANYL CITRATE 50 UG/ML
INJECTION, SOLUTION INTRAMUSCULAR; INTRAVENOUS PRN
Status: DISCONTINUED | OUTPATIENT
Start: 2023-08-21 | End: 2023-08-21 | Stop reason: SDUPTHER

## 2023-08-21 RX ORDER — METHOCARBAMOL 500 MG/1
500 TABLET, FILM COATED ORAL 4 TIMES DAILY
Status: DISCONTINUED | OUTPATIENT
Start: 2023-08-21 | End: 2023-08-22 | Stop reason: HOSPADM

## 2023-08-21 RX ORDER — ONDANSETRON 2 MG/ML
4 INJECTION INTRAMUSCULAR; INTRAVENOUS
Status: COMPLETED | OUTPATIENT
Start: 2023-08-21 | End: 2023-08-21

## 2023-08-21 RX ORDER — CIPROFLOXACIN 2 MG/ML
INJECTION, SOLUTION INTRAVENOUS PRN
Status: DISCONTINUED | OUTPATIENT
Start: 2023-08-21 | End: 2023-08-21 | Stop reason: SDUPTHER

## 2023-08-21 RX ORDER — OXYCODONE HYDROCHLORIDE 5 MG/1
5 TABLET ORAL EVERY 4 HOURS PRN
Status: DISCONTINUED | OUTPATIENT
Start: 2023-08-21 | End: 2023-08-22 | Stop reason: HOSPADM

## 2023-08-21 RX ORDER — PROPOFOL 10 MG/ML
INJECTION, EMULSION INTRAVENOUS CONTINUOUS PRN
Status: DISCONTINUED | OUTPATIENT
Start: 2023-08-21 | End: 2023-08-21 | Stop reason: SDUPTHER

## 2023-08-21 RX ORDER — FENTANYL CITRATE 50 UG/ML
50 INJECTION, SOLUTION INTRAMUSCULAR; INTRAVENOUS EVERY 5 MIN PRN
Status: DISCONTINUED | OUTPATIENT
Start: 2023-08-21 | End: 2023-08-21 | Stop reason: HOSPADM

## 2023-08-21 RX ORDER — SODIUM CHLORIDE 0.9 % (FLUSH) 0.9 %
5-40 SYRINGE (ML) INJECTION EVERY 12 HOURS SCHEDULED
Status: DISCONTINUED | OUTPATIENT
Start: 2023-08-21 | End: 2023-08-22 | Stop reason: HOSPADM

## 2023-08-21 RX ADMIN — LIDOCAINE HYDROCHLORIDE 50 MG: 10 INJECTION, SOLUTION EPIDURAL; INFILTRATION; INTRACAUDAL; PERINEURAL at 11:35

## 2023-08-21 RX ADMIN — ONDANSETRON 4 MG: 2 INJECTION INTRAMUSCULAR; INTRAVENOUS at 18:38

## 2023-08-21 RX ADMIN — FENTANYL CITRATE 50 MCG: 50 INJECTION, SOLUTION INTRAMUSCULAR; INTRAVENOUS at 18:51

## 2023-08-21 RX ADMIN — ROCURONIUM BROMIDE 30 MG: 10 INJECTION, SOLUTION INTRAVENOUS at 12:20

## 2023-08-21 RX ADMIN — DEXAMETHASONE SODIUM PHOSPHATE 4 MG: 10 INJECTION, SOLUTION INTRAMUSCULAR; INTRAVENOUS at 11:55

## 2023-08-21 RX ADMIN — SODIUM CHLORIDE, POTASSIUM CHLORIDE, SODIUM LACTATE AND CALCIUM CHLORIDE: 600; 310; 30; 20 INJECTION, SOLUTION INTRAVENOUS at 14:24

## 2023-08-21 RX ADMIN — Medication 2000 MG: at 20:16

## 2023-08-21 RX ADMIN — SODIUM CHLORIDE, POTASSIUM CHLORIDE, SODIUM LACTATE AND CALCIUM CHLORIDE: 600; 310; 30; 20 INJECTION, SOLUTION INTRAVENOUS at 11:00

## 2023-08-21 RX ADMIN — PROPOFOL 150 MG: 10 INJECTION, EMULSION INTRAVENOUS at 11:35

## 2023-08-21 RX ADMIN — HYOSCYAMINE SULFATE 125 MCG: 0.12 TABLET ORAL; SUBLINGUAL at 20:09

## 2023-08-21 RX ADMIN — SUGAMMADEX 200 MG: 100 INJECTION, SOLUTION INTRAVENOUS at 14:20

## 2023-08-21 RX ADMIN — CIPROFLOXACIN 400 MG: 2 INJECTION, SOLUTION INTRAVENOUS at 12:04

## 2023-08-21 RX ADMIN — ROCURONIUM BROMIDE 50 MG: 10 INJECTION, SOLUTION INTRAVENOUS at 11:35

## 2023-08-21 RX ADMIN — METOPROLOL SUCCINATE 12.5 MG: 25 TABLET, FILM COATED, EXTENDED RELEASE ORAL at 20:09

## 2023-08-21 RX ADMIN — ROCURONIUM BROMIDE 10 MG: 10 INJECTION INTRAVENOUS at 13:44

## 2023-08-21 RX ADMIN — PROPOFOL 75 MCG/KG/MIN: 10 INJECTION, EMULSION INTRAVENOUS at 12:20

## 2023-08-21 RX ADMIN — METHOCARBAMOL TABLETS 500 MG: 500 TABLET, COATED ORAL at 20:09

## 2023-08-21 RX ADMIN — FENTANYL CITRATE 50 MCG: 50 INJECTION, SOLUTION INTRAMUSCULAR; INTRAVENOUS at 18:41

## 2023-08-21 RX ADMIN — AMLODIPINE BESYLATE 5 MG: 5 TABLET ORAL at 20:10

## 2023-08-21 RX ADMIN — FENTANYL CITRATE 100 MCG: 50 INJECTION, SOLUTION INTRAMUSCULAR; INTRAVENOUS at 11:35

## 2023-08-21 RX ADMIN — ROCURONIUM BROMIDE 10 MG: 10 INJECTION INTRAVENOUS at 13:08

## 2023-08-21 RX ADMIN — ONDANSETRON 4 MG: 2 INJECTION INTRAMUSCULAR; INTRAVENOUS at 14:05

## 2023-08-21 RX ADMIN — SODIUM CHLORIDE, POTASSIUM CHLORIDE, SODIUM LACTATE AND CALCIUM CHLORIDE: 600; 310; 30; 20 INJECTION, SOLUTION INTRAVENOUS at 10:00

## 2023-08-21 RX ADMIN — MIDAZOLAM 2 MG: 1 INJECTION INTRAMUSCULAR; INTRAVENOUS at 11:33

## 2023-08-21 RX ADMIN — CEFAZOLIN 2 G: 1 INJECTION, POWDER, FOR SOLUTION INTRAMUSCULAR; INTRAVENOUS at 11:50

## 2023-08-21 RX ADMIN — SUGAMMADEX 300 MG: 100 INJECTION, SOLUTION INTRAVENOUS at 14:18

## 2023-08-21 RX ADMIN — PREGABALIN 75 MG: 75 CAPSULE ORAL at 20:09

## 2023-08-21 RX ADMIN — OXYCODONE HYDROCHLORIDE 10 MG: 5 TABLET ORAL at 20:08

## 2023-08-21 RX ADMIN — FENTANYL CITRATE 50 MCG: 50 INJECTION, SOLUTION INTRAMUSCULAR; INTRAVENOUS at 12:33

## 2023-08-21 RX ADMIN — IOPAMIDOL 19 ML: 755 INJECTION, SOLUTION INTRAVENOUS at 12:23

## 2023-08-21 ASSESSMENT — PAIN DESCRIPTION - LOCATION
LOCATION: FLANK
LOCATION: FLANK
LOCATION: ABDOMEN

## 2023-08-21 ASSESSMENT — PAIN DESCRIPTION - DESCRIPTORS
DESCRIPTORS: DISCOMFORT
DESCRIPTORS: ACHING

## 2023-08-21 ASSESSMENT — PAIN SCALES - GENERAL
PAINLEVEL_OUTOF10: 8
PAINLEVEL_OUTOF10: 7
PAINLEVEL_OUTOF10: 8
PAINLEVEL_OUTOF10: 7
PAINLEVEL_OUTOF10: 7
PAINLEVEL_OUTOF10: 9
PAINLEVEL_OUTOF10: 7
PAINLEVEL_OUTOF10: 8

## 2023-08-21 ASSESSMENT — PAIN DESCRIPTION - ORIENTATION
ORIENTATION: RIGHT
ORIENTATION: RIGHT

## 2023-08-21 ASSESSMENT — ENCOUNTER SYMPTOMS
SHORTNESS OF BREATH: 0
SHORTNESS OF BREATH: 0

## 2023-08-21 ASSESSMENT — LIFESTYLE VARIABLES
SMOKING_STATUS: 0
SMOKING_STATUS: 0

## 2023-08-21 ASSESSMENT — PAIN - FUNCTIONAL ASSESSMENT: PAIN_FUNCTIONAL_ASSESSMENT: 0-10

## 2023-08-21 NOTE — ANESTHESIA POSTPROCEDURE EVALUATION
Department of Anesthesiology  Postprocedure Note    Patient: Linsey Hernandez  MRN: [id number]  YOB: 1965  Date of evaluation: 8/21/2023      Procedure Summary     Date: 08/21/23 Room / Location: [facility] Special Procedures    Anesthesia Start: 2385 Anesthesia Stop: 3894    Procedure: IR NEPHROSTOMY PERCUTANEOUS RIGHT Diagnosis: (Right wire to bladder)    Scheduled Providers: Stv Interventional Radiologist Responsible Provider: Jesse Key MD    Anesthesia Type: general ASA Status: 3          Anesthesia Type: No value filed.     Imani Phase I:      Imani Phase II:        Anesthesia Post Evaluation    Patient location during evaluation: ICU  Patient participation: complete - patient cannot participate  Level of consciousness: sedated and ventilated  Pain score: 0  Airway patency: patent  Nausea & Vomiting: no nausea and no vomiting  Complications: no  Cardiovascular status: blood pressure returned to baseline  Respiratory status: intubated  Hydration status: euvolemic  Comments:   Mechanical Ventilation Data  SETTINGS (Comprehensive)     Additional Respiratory Assessments  Pulse: 75  Respirations: 18  SpO2: 96 %    ABG   No results found for: PH, PCO2, PO2, HCO3, O2SAT  Lab Results       Component                Value               Date/Time                  MODE                     NOT REPORTED        08/25/2019 06:33 PM       Pain management: adequate

## 2023-08-21 NOTE — BRIEF OP NOTE
Brief Postoperative Note Wire To Bladder    Clarissa Cleary  YOB: 1965  [id number]    Pre-operative Diagnosis: Nephrolithiasis       Post-operative Diagnosis: Same    Procedure: Wire to bladder    Medication Given:  Local and general anesthesia    Anesthesia: 1%Lidocaine     Surgeons/Assistants: Jalyn Ruiz MD    Estimated Blood Loss: Minimal    Complications: none    Successful wire to bladder through existing access.       Electronically signed by MANFRED Willingham on 8/21/2023 at 12:17 PM

## 2023-08-21 NOTE — ANESTHESIA PRE PROCEDURE
Department of Anesthesiology  Preprocedure Note       Name:  Livia Mariscal   Age:  59 y.o.  :  1965                                          MRN:  [id number]         Date:  2023      Surgeon: * No surgeons listed *    Procedure: IR NEPHROSTOMY PERCUTANEOUS RIGHT    Medications prior to admission:   Prior to Admission medications    Medication Sig Start Date End Date Taking? Authorizing Provider   tamsulosin (FLOMAX) 0.4 MG capsule Take 1 capsule by mouth daily 8/10/23   Janet Bowser PA-C   amLODIPine (NORVASC) 5 MG tablet TAKE ONE TABLET BY MOUTH ONE TIME A DAY  Patient taking differently: every evening 23   Fawad Morales MD   oxyCODONE-acetaminophen (PERCOCET) 5-325 MG per tablet Take 1 tablet by mouth every 4 hours as needed for Pain. Historical Provider, MD   docusate sodium (COLACE) 100 MG capsule Take 1 capsule by mouth 2 times daily as needed for Constipation  Patient not taking: Reported on 2023   Corina Goel DO   pregabalin (LYRICA) 75 MG capsule Take 1 capsule by mouth 2 times daily for 21 days.  Max Daily Amount: 150 mg 23  Corina Goel DO   VITAMIN D-VITAMIN K PO Take 1 tablet by mouth daily    Historical Provider, MD   irbesartan (AVAPRO) 150 MG tablet Take 1 tablet by mouth daily    Historical Provider, MD   ESTRADIOL PO Take 1 capsule by mouth daily    Historical Provider, MD   b complex vitamins capsule Take 1 capsule by mouth daily    Historical Provider, MD   pantoprazole (PROTONIX) 20 MG tablet Take 1 tablet by mouth every morning (before breakfast) 23   MIGDALIA Dhillon CNP   Metoprolol Succinate 25 MG CS24 Take 12.5 mg by mouth every evening    Historical Provider, MD   famotidine (PEPCID) 20 MG tablet Take 1 tablet by mouth 2 times daily  Patient taking differently: Take 1 tablet by mouth daily 11/15/22   MIGDALIA Dhillon CNP   Melatonin 5 MG CAPS Take 1 capsule by mouth nightly as needed    Historical Provider, MD

## 2023-08-21 NOTE — H&P
Lisa Aviles, & [facility]  Urology History & Physical      Patient:  Seferino Chiu  MRN: [id number]  YOB: 1965    CHIEF COMPLAINT: Right urolithiasis    HISTORY OF PRESENT ILLNESS:   The patient is a 59 y.o. female with right renal stones and right distal collection of ureteral stones. Patient presented 2 weeks prior with right flank pain secondary to the obstructing ureteral stones. She had a right nephrostomy tube placed at that time and presents today for definitive stone treatment with PCNL. Patient's old records, notes and chart reviewed and summarized above. Past Medical History:    Past Medical History:   Diagnosis Date    Arthritis     Asthma     Benign familial tremor     CAD (coronary artery disease) 12/2021    mild per cardiac cath 12/2021; no stents.  Dr. Sri Contreras last visit 10/2022    Cancer [facility])     skin    CKD (chronic kidney disease) stage 1, GFR 90 ml/min or greater     Class 2 severe obesity with serious comorbidity and body mass index (BMI) of 39.0 to 39.9 in adult [facility]) 05/01/2019    COVID-19 vaccine administered     Cystinuria ([address])     Dyspnea on exertion 08/06/2019    Flank pain     RIGHT    GERD (gastroesophageal reflux disease)     Hypertension     Hyperuricemia     Hypothyroidism     no meds    Kidney stones     Midline low back pain with right-sided sciatica 06/02/2016    Nephrostomy status ([address])     8/2023 currently has right nephrostomy tube    Shoulder impingement 12/2019    left    Snores     no sleep apnea per sleep study    Solitary kidney, acquired 11/20/2017    Urinary tract obstruction by kidney stone 08/01/2019    Wears glasses     Wellness examination     Natty Resendez, FRANCISCO, PCP       Past Surgical History:    Past Surgical History:   Procedure Laterality Date    CARDIAC CATHETERIZATION  12/08/2021    CYSTO/URETERO/PYELOSCOPY, CALCULUS TX Right 11/21/2017    HOLMIUM LASER - CYSTOSCOPY, RIGHT URETEROSCOPY, RIGHT STENT EXCHANGE,STONE BASKETING Transportation Needs: Unknown    Lack of Transportation (Medical): Not on file    Lack of Transportation (Non-Medical): No   Physical Activity: Unknown    Days of Exercise per Week: 0 days    Minutes of Exercise per Session: Not on file   Stress: Not on file   Social Connections: Not on file   Intimate Partner Violence: Not At Risk    Fear of Current or Ex-Partner: No    Emotionally Abused: No    Physically Abused: No    Sexually Abused: No   Housing Stability: Unknown    Unable to Pay for Housing in the Last Year: Not on file    Number of Places Lived in the Last Year: Not on file    Unstable Housing in the Last Year: No       Family History:    Family History   Problem Relation Age of Onset    High Blood Pressure Mother     Dementia Mother     High Blood Pressure Father     Diabetes Father     Cancer Father         tongue    Hypertension Father     Heart Disease Father         stents    Other Father         Mylofibrosis    Hypertension Sister     Heart Disease Maternal Grandmother     Heart Attack Maternal Grandmother     Prostate Cancer Maternal Grandfather     Emphysema Paternal Grandmother     Heart Disease Paternal Grandfather     Stroke Paternal Grandfather        REVIEW OF SYSTEMS:  A comprehensive 14 point review of systems was obtained. Constitutional: No fatigue  Eyes: No blurry vision  Ears, nose, mouth, throat, face: No ringing in the ears; no facial droop. Respiratory: No cough or cold. Cardiovascular: No palpitations  Gastrointestinal: No diarrhea or constipation. Genitourinary: No burning with urination  Integument/Skin: No rashes  Hematologic/Lymphatic: No easy bruising  Musculoskeletal: No muscle pains  Neurologic: No weakness in the extremities. Psychiatric: No depression or suicidal thoughts. Endocrine: No heat or cold intolerances. Allergic/Immunologic: No current seasonal allergies; no skin hives.       Physical Exam:      This a 59 y.o. female   There were no vitals filed for this

## 2023-08-21 NOTE — ANESTHESIA PRE PROCEDURE
Department of Anesthesiology  Preprocedure Note       Name:  Lennox Brunson   Age:  59 y.o.  :  1965                                          MRN:  [id number]         Date:  2023      Surgeon: Karen Garcia):  Leatha Gutierrez MD    Procedure: HOLMIUM, PERCUTANEOUS NEPHROLITHOTOMY, LITHOCLAST, C-ARM  (PT. COMING FROM INTERVENTIONAL RAD) (Right)    Medications prior to admission:   Prior to Admission medications    Medication Sig Start Date End Date Taking? Authorizing Provider   tamsulosin (FLOMAX) 0.4 MG capsule Take 1 capsule by mouth daily 8/10/23   Janet Bowser PA-C   amLODIPine (NORVASC) 5 MG tablet TAKE ONE TABLET BY MOUTH ONE TIME A DAY  Patient taking differently: every evening 23   Aleksandr Fall MD   oxyCODONE-acetaminophen (PERCOCET) 5-325 MG per tablet Take 1 tablet by mouth every 4 hours as needed for Pain. Historical Provider, MD   docusate sodium (COLACE) 100 MG capsule Take 1 capsule by mouth 2 times daily as needed for Constipation  Patient not taking: Reported on 2023   Otilio Stephens DO   pregabalin (LYRICA) 75 MG capsule Take 1 capsule by mouth 2 times daily for 21 days.  Max Daily Amount: 150 mg 23  Otilio Stephens DO   VITAMIN D-VITAMIN K PO Take 1 tablet by mouth daily    Historical Provider, MD   irbesartan (AVAPRO) 150 MG tablet Take 1 tablet by mouth daily    Historical Provider, MD   ESTRADIOL PO Take 1 capsule by mouth daily    Historical Provider, MD   b complex vitamins capsule Take 1 capsule by mouth daily    Historical Provider, MD   pantoprazole (PROTONIX) 20 MG tablet Take 1 tablet by mouth every morning (before breakfast) 23   MIGDALIA Sigala CNP   Metoprolol Succinate 25 MG CS24 Take 12.5 mg by mouth every evening    Historical Provider, MD   famotidine (PEPCID) 20 MG tablet Take 1 tablet by mouth 2 times daily  Patient taking differently: Take 1 tablet by mouth daily 11/15/22   MIGDALIA Sigala CNP   Melatonin 5 MG

## 2023-08-22 ENCOUNTER — APPOINTMENT (OUTPATIENT)
Dept: PHYSICAL THERAPY | Facility: CLINIC | Age: 58
End: 2023-08-22
Payer: COMMERCIAL

## 2023-08-22 ENCOUNTER — APPOINTMENT (OUTPATIENT)
Dept: CT IMAGING | Age: 58
End: 2023-08-22
Attending: UROLOGY
Payer: COMMERCIAL

## 2023-08-22 VITALS
TEMPERATURE: 98.4 F | OXYGEN SATURATION: 100 % | HEART RATE: 57 BPM | BODY MASS INDEX: 40.16 KG/M2 | SYSTOLIC BLOOD PRESSURE: 133 MMHG | HEIGHT: 68 IN | WEIGHT: 265 LBS | RESPIRATION RATE: 18 BRPM | DIASTOLIC BLOOD PRESSURE: 70 MMHG

## 2023-08-22 LAB
ANION GAP SERPL CALCULATED.3IONS-SCNC: 13 MMOL/L (ref 9–17)
BUN SERPL-MCNC: 15 MG/DL (ref 8–23)
CALCIUM SERPL-MCNC: 8.6 MG/DL (ref 8.6–10.4)
CHLORIDE SERPL-SCNC: 107 MMOL/L (ref 98–107)
CO2 SERPL-SCNC: 21 MMOL/L (ref 20–31)
CREAT SERPL-MCNC: 1 MG/DL (ref 0.5–0.9)
ERYTHROCYTE [DISTWIDTH] IN BLOOD BY AUTOMATED COUNT: 14.9 % (ref 11.8–14.4)
GFR SERPL CREATININE-BSD FRML MDRD: >60 ML/MIN/1.73M2
GLUCOSE SERPL-MCNC: 121 MG/DL (ref 70–99)
HCT VFR BLD AUTO: 36.8 % (ref 36.3–47.1)
HGB BLD-MCNC: 10.8 G/DL (ref 11.9–15.1)
MCH RBC QN AUTO: 25.1 PG (ref 25.2–33.5)
MCHC RBC AUTO-ENTMCNC: 29.3 G/DL (ref 28.4–34.8)
MCV RBC AUTO: 85.6 FL (ref 82.6–102.9)
MICROORGANISM SPEC CULT: NO GROWTH
NRBC BLD-RTO: 0 PER 100 WBC
PLATELET # BLD AUTO: 300 K/UL (ref 138–453)
PMV BLD AUTO: 8.8 FL (ref 8.1–13.5)
POTASSIUM SERPL-SCNC: 4.4 MMOL/L (ref 3.7–5.3)
RBC # BLD AUTO: 4.3 M/UL (ref 3.95–5.11)
SODIUM SERPL-SCNC: 141 MMOL/L (ref 135–144)
SPECIMEN DESCRIPTION: NORMAL
WBC OTHER # BLD: 12 K/UL (ref 3.5–11.3)

## 2023-08-22 PROCEDURE — G0378 HOSPITAL OBSERVATION PER HR: HCPCS

## 2023-08-22 PROCEDURE — 6360000002 HC RX W HCPCS: Performed by: STUDENT IN AN ORGANIZED HEALTH CARE EDUCATION/TRAINING PROGRAM

## 2023-08-22 PROCEDURE — 80048 BASIC METABOLIC PNL TOTAL CA: CPT

## 2023-08-22 PROCEDURE — 74176 CT ABD & PELVIS W/O CONTRAST: CPT

## 2023-08-22 PROCEDURE — 6370000000 HC RX 637 (ALT 250 FOR IP): Performed by: STUDENT IN AN ORGANIZED HEALTH CARE EDUCATION/TRAINING PROGRAM

## 2023-08-22 PROCEDURE — 85027 COMPLETE CBC AUTOMATED: CPT

## 2023-08-22 PROCEDURE — 36415 COLL VENOUS BLD VENIPUNCTURE: CPT

## 2023-08-22 RX ORDER — HYDROCODONE BITARTRATE AND ACETAMINOPHEN 5; 325 MG/1; MG/1
1 TABLET ORAL EVERY 4 HOURS PRN
Qty: 15 TABLET | Refills: 0 | Status: SHIPPED | OUTPATIENT
Start: 2023-08-22 | End: 2023-08-25

## 2023-08-22 RX ORDER — HYOSCYAMINE SULFATE 0.12 MG/1
125 TABLET SUBLINGUAL EVERY 4 HOURS PRN
Qty: 40 EACH | Refills: 0 | Status: SHIPPED | OUTPATIENT
Start: 2023-08-22

## 2023-08-22 RX ORDER — TAMSULOSIN HYDROCHLORIDE 0.4 MG/1
0.4 CAPSULE ORAL DAILY
Qty: 90 CAPSULE | Refills: 3 | Status: SHIPPED | OUTPATIENT
Start: 2023-08-22

## 2023-08-22 RX ORDER — CEPHALEXIN 500 MG/1
500 CAPSULE ORAL 3 TIMES DAILY
Qty: 9 CAPSULE | Refills: 0 | Status: SHIPPED | OUTPATIENT
Start: 2023-08-22 | End: 2023-08-25

## 2023-08-22 RX ORDER — CEFADROXIL 500 MG/1
500 CAPSULE ORAL 2 TIMES DAILY
Qty: 6 CAPSULE | Refills: 0 | Status: SHIPPED | OUTPATIENT
Start: 2023-08-22 | End: 2023-08-22 | Stop reason: HOSPADM

## 2023-08-22 RX ADMIN — HYOSCYAMINE SULFATE 125 MCG: 0.12 TABLET ORAL; SUBLINGUAL at 08:12

## 2023-08-22 RX ADMIN — METHOCARBAMOL TABLETS 500 MG: 500 TABLET, COATED ORAL at 08:04

## 2023-08-22 RX ADMIN — OXYCODONE HYDROCHLORIDE 10 MG: 5 TABLET ORAL at 06:11

## 2023-08-22 RX ADMIN — HYOSCYAMINE SULFATE 125 MCG: 0.12 TABLET ORAL; SUBLINGUAL at 00:11

## 2023-08-22 RX ADMIN — PREGABALIN 75 MG: 75 CAPSULE ORAL at 08:04

## 2023-08-22 RX ADMIN — ACETAMINOPHEN 650 MG: 325 TABLET ORAL at 12:19

## 2023-08-22 RX ADMIN — Medication 2000 MG: at 04:14

## 2023-08-22 RX ADMIN — HYOSCYAMINE SULFATE 125 MCG: 0.12 TABLET ORAL; SUBLINGUAL at 04:14

## 2023-08-22 RX ADMIN — HYOSCYAMINE SULFATE 125 MCG: 0.12 TABLET ORAL; SUBLINGUAL at 15:02

## 2023-08-22 RX ADMIN — TAMSULOSIN HYDROCHLORIDE 0.4 MG: 0.4 CAPSULE ORAL at 08:04

## 2023-08-22 RX ADMIN — ACETAMINOPHEN 650 MG: 325 TABLET ORAL at 04:29

## 2023-08-22 RX ADMIN — PANTOPRAZOLE SODIUM 20 MG: 20 TABLET, DELAYED RELEASE ORAL at 06:11

## 2023-08-22 RX ADMIN — ACETAMINOPHEN 650 MG: 325 TABLET ORAL at 00:11

## 2023-08-22 ASSESSMENT — PAIN SCALES - GENERAL
PAINLEVEL_OUTOF10: 4
PAINLEVEL_OUTOF10: 7
PAINLEVEL_OUTOF10: 7
PAINLEVEL_OUTOF10: 1
PAINLEVEL_OUTOF10: 5

## 2023-08-22 ASSESSMENT — PAIN DESCRIPTION - DESCRIPTORS
DESCRIPTORS: SPASM;ACHING
DESCRIPTORS: ACHING
DESCRIPTORS: ACHING

## 2023-08-22 ASSESSMENT — PAIN DESCRIPTION - LOCATION
LOCATION: FLANK
LOCATION: HEAD
LOCATION: FLANK

## 2023-08-22 ASSESSMENT — PAIN DESCRIPTION - ORIENTATION
ORIENTATION: RIGHT
ORIENTATION: RIGHT

## 2023-08-22 NOTE — OP NOTE
Operative Note      Patient: Roe Coley  YOB: 1965  MRN: [id number]    Date of Procedure: 8/21/2023    Pre-Op Diagnosis Codes:     * Right kidney stone [N20.0]    Post-Op Diagnosis: Same       Procedure(s):  Right percutaneous nephrolithotomy (>2 cm)  Dilation of right nephrostomy tract  Right antegrade pyelogram  Right antegrade ureteroscopy with stone basketing  Right nephroureteral catheter placement    Surgeon(s):  Devaughn Hussein MD    Assistant:   Resident: Bob Hernandez MD PGY-4    Anesthesia: General    Estimated Blood Loss (mL): Minimal    Complications: None    Specimens:   ID Type Source Tests Collected by Time Destination   1 : URINE CULTURE Urine Urine, indwelling catheter CULTURE, URINE Devaughn Hussein MD 8/21/2023 1323    2 : RIGHT RENAL CALCULI Stone (Calculus) Kidney STONE ANALYSIS Devaughn Hussein MD 8/21/2023 1408        Implants:  * No implants in log *      Drains:   Nephrostomy Tube Right Flank 24 fr (Active)   Site Assessment ANAND 08/21/23 1800   Dressing Status Clean, dry & intact 08/21/23 1800   Dressing Type Dry dressing 08/21/23 1800   Collection Container Standard 08/21/23 1800   Status Draining 08/21/23 1800   Urine Color Red 08/21/23 1800   Output (mL) 250 mL 08/21/23 1700       Urinary Catheter 08/21/23 Keen (Active)   Catheter Indications Perioperative use for selected surgical procedures 08/21/23 1930   Site Assessment No urethral drainage 08/21/23 1930   Urine Color Pink 08/21/23 1930   Securement Method Leg strap 08/21/23 1930   Catheter Best Practices  Catheter secured to thigh 08/21/23 1930   Status Draining 08/21/23 1930   Output (mL) 200 mL 08/21/23 1700       [REMOVED] Nephrostomy Tube 1 Right Flank 10 fr (Removed)   Site Assessment No urethral drainage 08/10/23 2019   Dressing Status Clean, dry & intact 08/21/23 1022   Dressing Type Foam 08/10/23 2019   Collection Container Standard 08/10/23 2019   Status Patent;Draining 08/10/23 2019   Urine Color Red 08/10/23 2019   Urine Appearance Clear 08/10/23 2019   Output (mL) 350 mL 08/11/23 0247       Findings:   Right nephroscopy: Left 2.5 cm renal stone  Right ureteroscopy: Left stone fragments in proximal ureter, no stones in rest of ureter    INDICATIONS FOR PROCEDURE:  The patient is a 59 y.o. female with solitary right kidney and right staghorn kidney stone with total stone burden of 2.5 cm. The risks and benefits of the procedure as well as possible alternatives and complications were discussed and she consented    DETAILS OF THE PROCEDURE:  The patient had a right nephrostomy in place from a prior admission and underwent placement of wire to bladder access with interventional radiology. Once this was completed the patient was brought back to the operating room, placed under general endotracheal anesthesia, prepped and draped in the prone position with right side bumped. She was was given appropriate antibiotic re-dosing. A second time out was performed. The percutaneous drain was removed and a dual lumen catheter was placed over the previously placed wire. An antegrade pyelogram was performed which delineated the right collecting system. A second wire was advanced into the ureter as visualized on fluoroscopy. The catheter was removed and a skin incision was made. The nephromax dilator was advanced into the calyx and inflated to 18 mmHg pressure under fluoroscopic guidance. The sheath was advanced and the nephroscope was inserted. Stones were located in the renal pelvis. The Swiss Trilogy lithoclast was inserted and through a combination of pneumatic/ballistic energy and grasping, the stones were fragmented and removed. Further pyeloscopy with the nephroscope and also a flexible ureteroscope revealed no additional stones in the kidney.      The flexible ureteroscope was inserted down the right ureter antegrade ureteroscopy was performed which showed stone fragments in the right proximal ureter and no

## 2023-08-22 NOTE — PROGRESS NOTES
CLINICAL PHARMACY NOTE: MEDS TO BEDS    Total # of Prescriptions Filled: 2   The following medications were delivered to the patient:  Isabel Jyothi 500MG   [address]    Additional Documentation:

## 2023-08-22 NOTE — CARE COORDINATION
.Case Management Assessment  Initial Evaluation    Date/Time of Evaluation: 8/22/2023 10:39 AM  Assessment Completed by: Tariq Roche RN    If patient is discharged prior to next notation, then this note serves as note for discharge by case management. Patient Name: Carrillo Sierra                   YOB: 1965  Diagnosis: Right kidney stone [N20.0]  Right renal mass [N28.89]  Right renal stone [N20.0]                   Date / Time: 8/21/2023  9:32 AM    Patient Admission Status: Observation   Readmission Risk (Low < 19, Mod (19-27), High > 27): No data recorded  Current PCP: MIGDALIA Hoover CNP  PCP verified by CM? (P) Yes    Chart Reviewed: Yes      History Provided by: Patient  Patient Orientation: Alert and Oriented    Patient Cognition: Alert    Hospitalization in the last 30 days (Readmission):  Yes    If yes, Readmission Assessment in CM Navigator will be completed. Advance Directives:      Code Status: Full Code   Patient's Primary Decision Maker is: (P) Named in Scanned ACP Document    Primary Decision Maker: Janell Griggs - Brother/Sister - [phone]    Discharge Planning:    Patient lives with: (P) Alone Type of Home: (P) House  Primary Care Giver: Self  Patient Support Systems include: Family Members   Current Financial resources:    Current community resources:    Current services prior to admission: (P) None            Current DME:              Type of Home Care services:  (P) None    ADLS  Prior functional level: (P) Independent in ADLs/IADLs  Current functional level: (P) Independent in ADLs/IADLs    PT AM-PAC:   /24  OT AM-PAC:   /24    Family can provide assistance at DC: (P) Yes  Would you like Case Management to discuss the discharge plan with any other family members/significant others, and if so, who?     Plans to Return to Present Housing: (P) Yes  Other Identified Issues/Barriers to RETURNING to current housing: none  Potential Assistance needed at discharge: (P) N/A            Potential DME:    Patient expects to discharge to: (P) House  Plan for transportation at discharge: (P) Family    Financial    Payor: Javier Diaz / Plan: HUONG BCZULEMA [facility] / Product Type: *No Product type* /     Does insurance require precert for SNF: Yes    Potential assistance Purchasing Medications:    Meds-to-Beds request:        [address], [address] [facility]  [address], [address] [facility]  [id number] [place] [address]  Phone: [phone] Fax: [phone]    Dariela Borja [id number] [facility], [address] [place] [address] [place] [phone] - F [phone]  [address]  [address] [address]  Phone: [phone] Fax: [phone]      Notes:    Factors facilitating achievement of predicted outcomes: Family support, Motivated, Cooperative, and Pleasant    Barriers to discharge: Pain, Medical complications, and Medication managment    Additional Case Management Notes: plan is to return home, has no current voiced needs    The Plan for Transition of Care is related to the following treatment goals of Right kidney stone [N20.0]  Right renal mass [N28.89]  Right renal stone [Q46.2]    IF APPLICABLE: The Patient and/or patient representative Susan Le and her family were provided with a choice of provider and agrees with the discharge plan. Freedom of choice list with basic dialogue that supports the patient's individualized plan of care/goals and shares the quality data associated with the providers was provided to: (P) Patient   Patient Representative Name:       The Patient and/or Patient Representative Agree with the Discharge Plan?  (P) Yes    Shannan Palencia RN  Case Management Department  Ph: [phone]

## 2023-08-22 NOTE — DISCHARGE INSTRUCTIONS
PCNL Discharge Instructions:     Patient ok to discharge home in good condition  No heavy lifting, >10 lbs for 4-6 week, or until cleared by attending physician  Patient should avoid strenuous activity for 4-6 weeks  Patient should walk moderately at home 8-10x per day or every hour   Patient may resume diet as tolerated: Wean off of narcotic pain medication to plain tylenol or ibuprofen if able to take. Please take all of antibiotic if prescribed. Patient should take prescriptions as directed  No driving while on narcotics  Patient ok to shower in 24 hrs after discharge while keeping incision clean / dry (and covered)  No submerging incisions for 2 weeks  Keep flank incision clean and dry and covered until well healed. Change dressing as needed if saturated and at least once a day. Please call attending physician or hospital  with questions  Call or Present to ED if fever (> 101F), intractable nausea/vomiting or pain, if incisions become red/swollen or drain pus/fluid, or if calves become red swollen and tender. Patient should follow up with Dr. Lydia Sarmiento, in 2 weeks. Call office to confirm appointment.

## 2023-08-22 NOTE — PROGRESS NOTES
CLINICAL PHARMACY NOTE: MEDS TO BEDS    Total # of Prescriptions Filled: 1   The following medications were delivered to the patient:  Hyoscyamine 0.125 subl tab     Additional Documentation:

## 2023-08-22 NOTE — DISCHARGE SUMMARY
DISCHARGE SUMMARY NOTE:        Patient Identification  PATIENT: Laney Lazo is a 59 y.o. female. MRN: [id number]  :  1965  Admit Date:  2023  Discharge date:   23                                  Disposition: home  Discharged Condition:  good  Discharge Diagnoses:   Right renal stone >2cm s/p right PCNL  Patient Active Problem List   Diagnosis    Kidney stones    Essential hypertension    Acquired hypothyroidism    CKD (chronic kidney disease) stage 1, GFR 90 ml/min or greater    Hyperuricemia    Osteoarthritis of both knees    Gastroesophageal reflux disease without esophagitis    ESTHER (obstructive sleep apnea)    Midline low back pain with right-sided sciatica    Mixed hyperlipidemia    Solitary kidney, acquired    Obesity (BMI 30-39. 9)    Acute intractable tension-type headache    Glucose intolerance (impaired glucose tolerance)    Class 3 severe obesity due to excess calories with serious comorbidity and body mass index (BMI) of 40.0 to 44.9 in [facility])    Dyspnea on exertion    Arthralgia    Other fatigue    Oxygen desaturation    Mild persistent asthma without complication    LVH (left ventricular hypertrophy)    Chronic left shoulder pain    Hydronephrosis with renal and ureteral calculous obstruction    S/p Hscope, D&C 22    Postmenopausal bleeding    Endometrial polyp    Right kidney stone    Left breast abscess    S/P total knee arthroplasty, right    Severe obesity (BMI 35.0-39. 9) with comorbidity ([address])    Hydronephrosis of right kidney    Right renal mass    Right renal stone       Consults: IR for wire to bladder access via right nephrostomy access already in place    Surgery: HOLMIUM, PERCUTANEOUS NEPHROLITHOTOMY, DIALATION OF TRACT, ANTIGRADE URETEROSCOPY, STONE BASKETTING,  INSERTION OF NEPHROSTOMY TUBE     Patient Instructions: Activity: Per discharge instructions  Diet: As tolerated  Patient told to follow up with Alec Brooks MD in 1 week(s).    Discharge up 1-2 weeks in office. Wound instruction: Keep incisions clean and dry. Change flank dressing once per day if needed; as needed if dressing is saturated/gets moisture underneath.      Julene Dubin, PA-C  3:53 PM 8/22/2023

## 2023-08-22 NOTE — CARE COORDINATION
08/22/23 1034   Readmission Assessment   Number of Days since last admission? 8-30 days   Previous Disposition Home with Family   Who is being Interviewed Patient   What was the patient's/caregiver's perception as to why they think they needed to return back to the hospital? Other (Comment)  (scheduled procedure)   Did you visit your Primary Care Physician after you left the hospital, before you returned this time? No   Why weren't you able to visit your PCP? Did not have an appointment   Did you see a specialist, such as Cardiac, Pulmonary, Orthopedic Physician, etc. after you left the hospital? No   Who advised the patient to return to the hospital? Physician's Nurse/Office staff   Does the patient report anything that got in the way of taking their medications? No   In our efforts to provide the best possible care to you and others like you, can you think of anything that we could have done to help you after you left the hospital the first time, so that you might not have needed to return so soon? Other (Comment); Additional Community resources available for illness support  (scheduled procedure)

## 2023-08-23 ENCOUNTER — CARE COORDINATION (OUTPATIENT)
Dept: OTHER | Facility: CLINIC | Age: 58
End: 2023-08-23

## 2023-08-23 NOTE — CARE COORDINATION
Care Transitions Outreach Attempt    Call within 2 business days of discharge: Yes   Attempted to reach patient for transitions of care follow up. Unable to reach patient. Patient: Ulises Rodriguez Patient : 1965 MRN: [id number]    Last Discharge [address]       Date Complaint Diagnosis Description Type Department Provider    23  Post-op pain . .. Admission (Discharged) Jonny Pavon MD              Was this an external facility discharge? No Discharge Facility: [facility]    Noted following upcoming appointments from discharge chart review:   [facility] follow up appointment(s):   Future Appointments   Date Time Provider [address]   2023  8:30  [facility], DO PBURG ORT SP [facility]   2023  1:00 PM Kem Odom MD AFL T[facility] TOLE [facility] C[facility]Y [facility]     Non-BS follow up appointment(s): None    ACM attempted to reach patient for Care Transitions call. HIPAA compliant message left requesting a return phone call at patient convenience. Will attempt to outreach again. UTR letter sent via [address] [place] [address]. Mónica Lenz, MSN RN  Associate Care Manager  Phone: [phone]  Email: [email or web address]

## 2023-08-23 NOTE — ANESTHESIA POSTPROCEDURE EVALUATION
Department of Anesthesiology  Postprocedure Note    Patient: Josie Rodriguez  MRN: [id number]  [address] [facility] [place]: 1965  Date of evaluation: 8/23/2023      Procedure Summary     Date: 08/21/23 Room / [facility] OR 09 / [address]     Anesthesia Start: 1233 Anesthesia Stop: 1313    Procedure: HOLMIUM, PERCUTANEOUS NEPHROLITHOTOMY, DIALATION OF TRACT, ANTIGRADE URETEROSCOPY, STONE BASKETTING,  INSERTION OF NEPHROSTOMY TUBE (Right) Diagnosis:       Right kidney stone      (Right kidney stone [N20.0])    Surgeons: Hilario Frye MD Responsible Provider: Demetrius Arizmendi MD    Anesthesia Type: general ASA Status: 3          Anesthesia Type: No value filed.     Imani Phase I: Imani Score: 8    Imani Phase II:        Anesthesia Post Evaluation    Patient location during evaluation: PACU  Patient participation: complete - patient participated  Level of consciousness: awake  Pain score: 1  Airway patency: patent  Nausea & Vomiting: no nausea and no vomiting  Complications: no  Cardiovascular status: blood pressure returned to baseline and hemodynamically stable  Respiratory status: acceptable  Hydration status: euvolemic  Pain management: adequate

## 2023-08-24 ENCOUNTER — CARE COORDINATION (OUTPATIENT)
Dept: OTHER | Facility: CLINIC | Age: 58
End: 2023-08-24

## 2023-08-24 LAB
STONE COMPOSITION: NORMAL
STONE DESCRIPTION: NORMAL
STONE MASS: 644 MG

## 2023-08-24 NOTE — CARE COORDINATION
Final Transition of Care Outreach Attempt     ACM attempted to reach patient for final Care Transitions call. HIPAA compliant message left requesting a return phone call at patient convenience. Final Unable to Reach Letter sent via [employer] and mail. No further outreach scheduled with this ACM, patient has been provided with this ACM's contact information. ACM will sign off care team at this time. Episode of care resolved. Future Appointments   Date Time Provider [address] [place] [address]   9/7/2023  8:30 AM Jose Dominguez DO PBURG ORT SP [facility]   12/11/2023  1:00 PM Nena Onofre MD AFL TCC KRISTINAE AFL COTY BURCIAGA      ACM mailed the following handouts with this letter: [address] [facility], [address] [facility] [address], and Nurse [employer]. LILIAN Mendieta RN  Associate Care Manager  Phone: [phone]  Email: [email or web address]

## 2023-08-30 ENCOUNTER — TRANSCRIBE ORDERS (OUTPATIENT)
Dept: ADMINISTRATIVE | Age: 58
End: 2023-08-30

## 2023-08-30 DIAGNOSIS — Z98.890 OTHER SPECIFIED POSTPROCEDURAL STATES: Primary | ICD-10-CM

## 2023-08-30 DIAGNOSIS — M79.662 PAIN IN BOTH LOWER LEGS: ICD-10-CM

## 2023-08-30 DIAGNOSIS — M79.661 PAIN IN BOTH LOWER LEGS: ICD-10-CM

## 2023-08-31 ENCOUNTER — HOSPITAL ENCOUNTER (OUTPATIENT)
Dept: CT IMAGING | Age: 58
Discharge: HOME | End: 2023-09-02
Payer: COMMERCIAL

## 2023-08-31 ENCOUNTER — HOSPITAL ENCOUNTER (OUTPATIENT)
Dept: VASCULAR LAB | Age: 58
Discharge: HOME | End: 2023-09-02
Payer: COMMERCIAL

## 2023-08-31 DIAGNOSIS — Z98.890 OTHER SPECIFIED POSTPROCEDURAL STATES: ICD-10-CM

## 2023-08-31 DIAGNOSIS — M79.662 PAIN IN BOTH LOWER LEGS: ICD-10-CM

## 2023-08-31 DIAGNOSIS — M79.661 PAIN IN BOTH LOWER LEGS: ICD-10-CM

## 2023-08-31 PROCEDURE — 93970 EXTREMITY STUDY: CPT

## 2023-08-31 PROCEDURE — 74176 CT ABD & PELVIS W/O CONTRAST: CPT

## 2023-08-31 PROCEDURE — 93970 EXTREMITY STUDY: CPT | Performed by: SURGERY

## 2023-09-05 DIAGNOSIS — Z96.651 S/P TOTAL KNEE ARTHROPLASTY, RIGHT: Primary | ICD-10-CM

## 2023-09-07 ENCOUNTER — OFFICE VISIT (OUTPATIENT)
Dept: ORTHOPEDIC SURGERY | Age: 58
End: 2023-09-07
Payer: COMMERCIAL

## 2023-09-07 VITALS — HEIGHT: 68 IN | RESPIRATION RATE: 14 BRPM | BODY MASS INDEX: 39.25 KG/M2 | WEIGHT: 259 LBS

## 2023-09-07 DIAGNOSIS — M17.11 PRIMARY OSTEOARTHRITIS OF ONE KNEE, RIGHT: Primary | ICD-10-CM

## 2023-09-07 DIAGNOSIS — Z96.651 S/P TOTAL KNEE ARTHROPLASTY, RIGHT: ICD-10-CM

## 2023-09-07 PROCEDURE — 99213 OFFICE O/P EST LOW 20 MIN: CPT | Performed by: ORTHOPAEDIC SURGERY

## 2023-09-11 ENCOUNTER — TELEPHONE (OUTPATIENT)
Dept: ORTHOPEDIC SURGERY | Age: 58
End: 2023-09-11

## 2023-09-11 NOTE — TELEPHONE ENCOUNTER
Pt saw Dr Ondina Paula at [facility] on 9/7 - gave [place]  paperwork - pt calling on the status of those. [facility]  [facility]   Please check and advise pt  Respectfuly/bb

## 2023-09-14 ENCOUNTER — HOSPITAL ENCOUNTER (OUTPATIENT)
Age: 58
Discharge: HOME | End: 2023-09-16
Payer: COMMERCIAL

## 2023-09-14 ENCOUNTER — HOSPITAL ENCOUNTER (OUTPATIENT)
Dept: GENERAL RADIOLOGY | Age: 58
Discharge: HOME | End: 2023-09-16
Payer: COMMERCIAL

## 2023-09-14 DIAGNOSIS — R05.1 ACUTE COUGH: ICD-10-CM

## 2023-09-14 PROCEDURE — 71046 X-RAY EXAM CHEST 2 VIEWS: CPT

## 2023-09-19 ENCOUNTER — HOSPITAL ENCOUNTER (OUTPATIENT)
Dept: PHYSICAL THERAPY | Facility: CLINIC | Age: 58
Setting detail: THERAPIES SERIES
Discharge: HOME | End: 2023-09-19
Payer: COMMERCIAL

## 2023-09-19 PROCEDURE — 97110 THERAPEUTIC EXERCISES: CPT

## 2023-09-19 NOTE — FLOWSHEET NOTE
[] [facility]) - [facility] &  Therapy  [address] [facility].  [phone]  F: [phone] [] [address] [facility]  [address]   [address]  P: [phone]  F: [phone] [x] [address] &  Therapy  [address]  P: [phone]  F: [phone] [] [facility]  P: [phone]  F: [phone] [] 224 [facility]  [address]   [address]   [place]: [phone]  F: [phone]      Physical Therapy Daily Treatment Note    Date:  2023  Patient Name:  Laney Lazo    :  1965  MRN: [id number]  Physician: Jeffery Delgado DO                                Insurance: Oliver Lai (30 visit limt, no hard max, auth required, auth after 30 visits)   APPROVED 12 VISITS 23-23 REF # [id number]  [id number], [id number], [address] [place] [address], [id number], [id number],  Medical Diagnosis: Primary osteoarthritis of one knee, right, Status post surgery             Rehab Codes: M25.561, R26.89  Onset date: 23               Next Dr's appt. : 23  Visit# / total visits: 25   Cancels/No Shows: 0    Subjective:   Pain:  [x] Yes  [] No  Location: R knee  Pain Rating: (0-10 scale) 0/10  Pain altered Tx:  [x] No  [] Yes  Action:  Comments: Pt reports she has not been to PT due to kidney stone that required surgery.      Objective:  Modalities: vasocompression, min compression, 34 degrees x15min  Precautions: standard   Exercises:  Exercise Reps/ Time Weight/ Level Comments    Alter G NEXT      Bike 10' Seat 6 Stool, SBA x   SB stretch 3x30\" L2  x   HS stretch 3x30\"  Seated foot on stool x   Stool flexion stretch 10x10\"   x   Heel raise 3x10   -   Standing hip flexion 2x10  B -   Step Ups - fwd x15 ea 6\"  -   Lateral step ups X10 ea 6\" (R) LE  -   Step downs 2x10 4in     TKE 10x10\" gray

## 2023-09-26 ENCOUNTER — HOSPITAL ENCOUNTER (OUTPATIENT)
Dept: PHYSICAL THERAPY | Facility: CLINIC | Age: 58
Setting detail: THERAPIES SERIES
Discharge: HOME | End: 2023-09-26
Payer: COMMERCIAL

## 2023-09-26 LAB
CHOLEST SERPL-MCNC: 223 MG/DL (ref 0–199)
CHOLESTEROL/HDL RATIO: 6
GLUCOSE SERPL-MCNC: 109 MG/DL (ref 74–99)
HDLC SERPL-MCNC: 40 MG/DL (ref 0–40)
LDLC SERPL CALC-MCNC: 119 MG/DL (ref 0–100)
PATIENT FASTING?: YES
TRIGL SERPL-MCNC: 323 MG/DL (ref 0–149)
VLDLC SERPL CALC-MCNC: 65 MG/DL

## 2023-09-26 PROCEDURE — 97110 THERAPEUTIC EXERCISES: CPT

## 2023-09-26 NOTE — FLOWSHEET NOTE
[] [address] &  Therapy  [address] [facility].  [phone]  F: [phone] [] 204 [facility]  [address]   [address]  P: [phone]  F: [phone] [x] [address] &  Therapy  [address] [facility]  P: [phone]  F: [phone] [] [facility]  P: [phone]  F: [phone] [] 224 [facility]  [address]   [address]   [place]: [phone]  F: [phone]      Physical Therapy Daily Treatment Note    Date:  2023  Patient Name:  Josie Rodriguez    :  1965  MRN: [id number]  Physician: Rogelio Alexandra DO                                Insurance: Penelope Wallace (30 visit limt, no hard max, auth required, auth after 30 visits)   APPROVED 12 VISITS 23-23 REF # [id number]  [id number], [id number], [address] [place] [address], [id number], [id number],  Medical Diagnosis: Primary osteoarthritis of one knee, right, Status post surgery             Rehab Codes: M25.561, R26.89  Onset date: 23               Next 's appt. : 23  Visit# / total visits:    Cancels/No Shows: 0    Subjective:   Pain:  [x] Yes  [] No  Location: R knee  Pain Rating: (0-10 scale) 0/10  Pain altered Tx:  [x] No  [] Yes  Action:  Comments: Feels she is lacking the most in overall endurance, has difficulty standing for prolonged periods of time. Feels her LLE is more limiting at this point than her RLE.      Objective:  Modalities: vasocompression, min compression, 34 degrees x15min - not today  Precautions: standard   Exercises:  Exercise Reps/ Time Weight/ Level Comments    Alter G 8'  60% WB, 3XL shorts, 1.5mph x   Alter G: SL heel raise 3x10 60%  x   Alter G: SL squats 2x10 60%  x   Bike 10' Seat 6 Stool, SBA    SB stretch 3x30\" L2  x   HS stretch 3x30\"  Seated foot on stool x   Stool

## 2023-10-03 ENCOUNTER — HOSPITAL ENCOUNTER (OUTPATIENT)
Dept: PHYSICAL THERAPY | Facility: CLINIC | Age: 58
Setting detail: THERAPIES SERIES
Discharge: HOME | End: 2023-10-03
Payer: COMMERCIAL

## 2023-10-03 PROCEDURE — 97110 THERAPEUTIC EXERCISES: CPT

## 2023-10-03 NOTE — FLOWSHEET NOTE
[] [address]  [address] [facility] [place] [facility].  [phone]  F: [phone] [] 204 [facility]  [address]   [address]  P: [phone]  F: [phone] [x] [address] &  Therapy  [address]  P: [phone]  F: [phone] [] [facility]  P: [phone]  F: [phone] [] [address]  [address]   [address]   [place]: [phone]  F: [phone]      Physical Therapy Daily Treatment Note    Date:  10/3/2023  Patient Name:  Francisco J Guerra    :  1965  MRN: [id number]  Physician: Niya Brantley,                                 Insurance: Nathalie Mirza (30 visit limt, no hard max, auth required, auth after 30 visits)   APPROVED 12 VISITS 23-23 REF # [id number]  [id number], [id number], [address] [place] [address], [id number], [id number],  Medical Diagnosis: Primary osteoarthritis of one knee, right, Status post surgery             Rehab Codes: M25.561, R26.89  Onset date: 23               Next 's appt. : 23  Visit# / total visits: 27   Cancels/No Shows: 0    Subjective:   Pain:  [x] Yes  [] No  Location: R knee  Pain Rating: (0-10 scale) 0/10  Pain altered Tx:  [x] No  [] Yes  Action:  Comments: States knee is a little painful today. Feels she needs to sit throughout the day at work secondary to pain and/or lack of endurance.      Objective:  Modalities: vasocompression, min compression, 34 degrees x15min - not today  Precautions: standard   Exercises:  Exercise Reps/ Time Weight/ Level Comments    Alter G 8'  60% WB, 3XL shorts, 1.5mph x   Alter G: SL heel raise 3x10 60%  x   Alter G: SL squats 2x10 60%  x   Bike 10' Seat 6 Stool, SBA    SB stretch 3x30\" L2  x   HS stretch 3x30\"  Seated foot on stool x   Stool flexion stretch 10x10\"   x   Heel

## 2023-10-10 ENCOUNTER — APPOINTMENT (OUTPATIENT)
Dept: PHYSICAL THERAPY | Facility: CLINIC | Age: 58
End: 2023-10-10
Payer: COMMERCIAL

## 2023-10-11 ENCOUNTER — TELEPHONE (OUTPATIENT)
Dept: PRIMARY CARE CLINIC | Age: 58
End: 2023-10-11

## 2023-10-11 ENCOUNTER — OFFICE VISIT (OUTPATIENT)
Dept: FAMILY MEDICINE CLINIC | Age: 58
End: 2023-10-11
Payer: COMMERCIAL

## 2023-10-11 VITALS
WEIGHT: 265 LBS | RESPIRATION RATE: 16 BRPM | BODY MASS INDEX: 40.29 KG/M2 | DIASTOLIC BLOOD PRESSURE: 72 MMHG | HEART RATE: 72 BPM | OXYGEN SATURATION: 96 % | SYSTOLIC BLOOD PRESSURE: 124 MMHG

## 2023-10-11 DIAGNOSIS — L08.9 BACTERIAL SKIN INFECTION: Primary | ICD-10-CM

## 2023-10-11 DIAGNOSIS — B96.89 BACTERIAL SKIN INFECTION: Primary | ICD-10-CM

## 2023-10-11 PROCEDURE — 3074F SYST BP LT 130 MM HG: CPT | Performed by: NURSE PRACTITIONER

## 2023-10-11 PROCEDURE — 99213 OFFICE O/P EST LOW 20 MIN: CPT | Performed by: NURSE PRACTITIONER

## 2023-10-11 PROCEDURE — 3078F DIAST BP <80 MM HG: CPT | Performed by: NURSE PRACTITIONER

## 2023-10-11 RX ORDER — SULFAMETHOXAZOLE AND TRIMETHOPRIM 800; 160 MG/1; MG/1
1 TABLET ORAL 2 TIMES DAILY
Qty: 20 TABLET | Refills: 0 | Status: SHIPPED | OUTPATIENT
Start: 2023-10-11 | End: 2023-10-21

## 2023-10-11 ASSESSMENT — ENCOUNTER SYMPTOMS
SHORTNESS OF BREATH: 0
CHEST TIGHTNESS: 0
WHEEZING: 0

## 2023-10-11 NOTE — TELEPHONE ENCOUNTER
----- Message from Natalie Liriano sent at 10/11/2023  1:18 PM EDT -----  Subject: Message to Provider    QUESTIONS  Information for Provider? Patient is wanting to know if an antibiotic can   be called in for a ruptured breast abscess or if she should visit the walk   in due to nothing available with [facility] until 10/27/23, please advise   patient on what Ivonne Doss would like her to do  ---------------------------------------------------------------------------  --------------  [address] [place]  [phone]; OK to leave message on voicemail  ---------------------------------------------------------------------------  --------------  SCRIPT ANSWERS  Relationship to Patient?  Self

## 2023-10-12 NOTE — TELEPHONE ENCOUNTER
Writer spoke with patient. Patient states she ended up coming to the walk in clinic last night and was taken care of.  She wants to thank Kate Reddy for offering an appt for today

## 2023-10-17 ENCOUNTER — HOSPITAL ENCOUNTER (OUTPATIENT)
Dept: PHYSICAL THERAPY | Facility: CLINIC | Age: 58
Setting detail: THERAPIES SERIES
Discharge: HOME | End: 2023-10-17
Payer: COMMERCIAL

## 2023-10-17 PROCEDURE — 97110 THERAPEUTIC EXERCISES: CPT

## 2023-10-17 NOTE — FLOWSHEET NOTE
Stool flexion stretch 10x10\"   x   Heel raise 3x10   -   Standing hip flexion 2x10  B -   Step Ups - fwd x15 ea 6\"  -   Lateral step ups X10 ea 6\" (R) LE  -   Step downs 2x10 4in  x   Heel taps - fwd/lat 2x10ea 4in  -   TKE 10x10\" gray With towel  x   4-way hip  x10 Blue B    Lunges 2x10   x   Leg press 2x10 90#     SL leg press 2x10 45#     TG squats/HR x20ea L18     TG eccentric SL squats 2x10 L15     SLS 3x20\" ea      STS x10  x2 foam pads    Palloff press x15 ea Blue bnge     Palloff step out x15 ea Blue bnge Arms in 8/2, trial arms out next     Seated Knee flexion 10x5\"     -   LAQ 2x10 5#  -   Supine Heel prop  2' 4#  -   Manual flexion stretch x5      Quad sets 10x10\"          Heel prop/strap quad set 5x10\"        Manual Ext stretch 5x10\"      Supine Hip abduction x20        Supine Heel slides x15        Supine SLR x20  Assist with concentric     Manual:hypervolt quad and calf, patellar mobs     6/20 6/27 6/29 7/3 7/6 7/11 7/18 7/21 7/25 7/27 9/19 10/17    3-95 3-96 2-96 2-100 0-100  AAROM 0-103  AAROM 0-103 0-109 0-100 0-104 0-105 0-106        Specific Instructions for next treatment:     Treatment Charges: Mins Units   []  Modalities      [x]  Ther Exercise 48 3   []  Manual Therapy     []  Ther Activities     []  Neuro Re-ed     []  Vasocompression     [] Gait     []  Other     Total Treatment time 48 3       Assessment: [x] Progressing toward goals. She is demonstrating improvements in ROM, pain tolerance, endurance, quad strength, gait mechanics, and stair negotiation. She cont to lack the ability to perform reciprocal gait pattern descending stairs. She also still has some glut max weakness and lack of eccentric quad control. At this point, plan to DC to HEP. Discussed with pt emphasizing gluts and quad. Pt feels confident completing ex on her own. See goal updates below. [] No change.      [] Other:  [x] Patient would continue to benefit from skilled physical therapy services in order to:

## 2023-10-23 ENCOUNTER — HOSPITAL ENCOUNTER (OUTPATIENT)
Dept: GENERAL RADIOLOGY | Age: 58
Discharge: HOME | End: 2023-10-25
Payer: COMMERCIAL

## 2023-10-23 ENCOUNTER — HOSPITAL ENCOUNTER (OUTPATIENT)
Age: 58
Discharge: HOME | End: 2023-10-25
Payer: COMMERCIAL

## 2023-10-23 DIAGNOSIS — N20.0 CALCULUS OF KIDNEY: ICD-10-CM

## 2023-10-23 PROCEDURE — 74018 RADEX ABDOMEN 1 VIEW: CPT

## 2023-10-26 ENCOUNTER — OFFICE VISIT (OUTPATIENT)
Dept: FAMILY MEDICINE CLINIC | Age: 58
End: 2023-10-26
Payer: COMMERCIAL

## 2023-10-26 VITALS
SYSTOLIC BLOOD PRESSURE: 122 MMHG | HEART RATE: 84 BPM | WEIGHT: 265 LBS | RESPIRATION RATE: 16 BRPM | DIASTOLIC BLOOD PRESSURE: 74 MMHG | BODY MASS INDEX: 40.29 KG/M2 | OXYGEN SATURATION: 97 %

## 2023-10-26 DIAGNOSIS — L30.9 DERMATITIS: Primary | ICD-10-CM

## 2023-10-26 PROCEDURE — 3074F SYST BP LT 130 MM HG: CPT | Performed by: NURSE PRACTITIONER

## 2023-10-26 PROCEDURE — 99213 OFFICE O/P EST LOW 20 MIN: CPT | Performed by: NURSE PRACTITIONER

## 2023-10-26 PROCEDURE — 3078F DIAST BP <80 MM HG: CPT | Performed by: NURSE PRACTITIONER

## 2023-10-26 RX ORDER — PREDNISONE 50 MG/1
50 TABLET ORAL DAILY
Qty: 5 TABLET | Refills: 0 | Status: SHIPPED | OUTPATIENT
Start: 2023-10-26 | End: 2023-10-31

## 2023-10-26 ASSESSMENT — ENCOUNTER SYMPTOMS
RHINORRHEA: 0
COUGH: 0
VOMITING: 0
NAUSEA: 0
TROUBLE SWALLOWING: 0
ABDOMINAL PAIN: 0
WHEEZING: 0
SHORTNESS OF BREATH: 0
SORE THROAT: 0

## 2023-10-26 NOTE — PROGRESS NOTES
10/11/2023  Fawn PETERSON PA-C   docusate sodium (COLACE) 100 MG capsule Take 1 capsule by mouth 2 times daily as needed for Constipation  Patient not taking: Reported on 6/19/2023  Corina Goel DO   pregabalin (LYRICA) 75 MG capsule Take 1 capsule by mouth 2 times daily for 21 days. Max Daily Amount: 150 mg  Patient not taking: Reported on 10/11/2023  Corina Goel DO   solifenacin (VESICARE) 10 MG tablet Take 10 mg by mouth as needed. Provider, Guido, MD       No Known Allergies      Subjective:      Review of Systems   Constitutional:  Negative for chills and fever. HENT:  Negative for congestion, ear pain, postnasal drip, rhinorrhea, sore throat and trouble swallowing. Respiratory:  Negative for cough, shortness of breath and wheezing. Cardiovascular:  Negative for chest pain and palpitations. Gastrointestinal:  Negative for abdominal pain, nausea and vomiting. Genitourinary:  Negative for dysuria and frequency. Skin:  Positive for rash. Neurological:  Negative for dizziness and headaches. Objective:     Physical Exam  Vitals reviewed. Constitutional:       General: She is not in acute distress. Appearance: Normal appearance. She is normal weight. She is not ill-appearing, toxic-appearing or diaphoretic. HENT:      Head: Normocephalic. Mouth/Throat:      Mouth: Mucous membranes are moist.      Pharynx: Oropharynx is clear. Eyes:      Conjunctiva/sclera: Conjunctivae normal.      Pupils: Pupils are equal, round, and reactive to light. Cardiovascular:      Rate and Rhythm: Normal rate and regular rhythm. Pulses: Normal pulses. Heart sounds: Normal heart sounds. Pulmonary:      Effort: Pulmonary effort is normal. No respiratory distress. Breath sounds: Normal breath sounds. No wheezing. Abdominal:      General: Abdomen is flat. Palpations: Abdomen is soft. Musculoskeletal:      Cervical back: Normal range of motion.    Skin:     General:

## 2023-11-02 ENCOUNTER — TRANSCRIBE ORDERS (OUTPATIENT)
Dept: ADMINISTRATIVE | Age: 58
End: 2023-11-02

## 2023-11-02 DIAGNOSIS — N20.0 CALCULUS OF KIDNEY: Primary | ICD-10-CM

## 2023-11-03 ENCOUNTER — TELEPHONE (OUTPATIENT)
Dept: PRIMARY CARE CLINIC | Age: 58
End: 2023-11-03

## 2023-11-03 NOTE — TELEPHONE ENCOUNTER
----- Message from [facility] sent at 11/3/2023  3:03 PM EDT -----  Subject: Appointment Request    Reason for Call: Established Patient Appointment needed: Routine Physical   Exam    QUESTIONS    Reason for appointment request? No appointments available during search     Additional Information for Provider? Pt needs her be well for work before   11/10/2023 early morn or late afternoon pref/ pt is also still having   burning in her back after finishing her shingles treatments.  Please   contact   ---------------------------------------------------------------------------  --------------  Judy TY  [phone]; OK to leave message on voicemail  ---------------------------------------------------------------------------  --------------  SCRIPT ANSWERS

## 2023-11-06 ENCOUNTER — HOSPITAL ENCOUNTER (OUTPATIENT)
Dept: CT IMAGING | Age: 58
Discharge: HOME | End: 2023-11-08
Attending: UROLOGY
Payer: COMMERCIAL

## 2023-11-06 DIAGNOSIS — N20.0 CALCULUS OF KIDNEY: ICD-10-CM

## 2023-11-06 PROCEDURE — 74176 CT ABD & PELVIS W/O CONTRAST: CPT

## 2023-11-06 RX ORDER — PANTOPRAZOLE SODIUM 20 MG/1
20 TABLET, DELAYED RELEASE ORAL
Qty: 240 TABLET | Refills: 1 | Status: SHIPPED | OUTPATIENT
Start: 2023-11-06

## 2023-11-09 ENCOUNTER — OFFICE VISIT (OUTPATIENT)
Dept: PRIMARY CARE CLINIC | Age: 58
End: 2023-11-09
Payer: COMMERCIAL

## 2023-11-09 VITALS
DIASTOLIC BLOOD PRESSURE: 78 MMHG | WEIGHT: 272.4 LBS | OXYGEN SATURATION: 96 % | HEART RATE: 86 BPM | BODY MASS INDEX: 41.42 KG/M2 | SYSTOLIC BLOOD PRESSURE: 126 MMHG

## 2023-11-09 DIAGNOSIS — Z96.651 S/P TOTAL KNEE ARTHROPLASTY, RIGHT: ICD-10-CM

## 2023-11-09 DIAGNOSIS — N20.0 RIGHT KIDNEY STONE: ICD-10-CM

## 2023-11-09 DIAGNOSIS — N95.0 POST-MENOPAUSAL BLEEDING: ICD-10-CM

## 2023-11-09 DIAGNOSIS — R21 RASH: Primary | ICD-10-CM

## 2023-11-09 PROBLEM — Z96.659 STATUS POST KNEE REPLACEMENT: Status: ACTIVE | Noted: 2023-11-09

## 2023-11-09 PROBLEM — Z96.659 STATUS POST KNEE REPLACEMENT: Status: RESOLVED | Noted: 2023-11-09 | Resolved: 2023-11-09

## 2023-11-09 PROCEDURE — 3078F DIAST BP <80 MM HG: CPT | Performed by: NURSE PRACTITIONER

## 2023-11-09 PROCEDURE — 3074F SYST BP LT 130 MM HG: CPT | Performed by: NURSE PRACTITIONER

## 2023-11-09 PROCEDURE — 99214 OFFICE O/P EST MOD 30 MIN: CPT | Performed by: NURSE PRACTITIONER

## 2023-11-09 ASSESSMENT — ENCOUNTER SYMPTOMS
SINUS PRESSURE: 0
WHEEZING: 0
DIARRHEA: 0
SORE THROAT: 0
VOMITING: 0
TROUBLE SWALLOWING: 0
NAUSEA: 0
CONSTIPATION: 0
SHORTNESS OF BREATH: 0
ABDOMINAL PAIN: 0
BLOOD IN STOOL: 0
COUGH: 0

## 2023-11-09 NOTE — PROGRESS NOTES
[address] [facility] CARE  [address] [address]  Dept: [phone]  Dept Fax: [phone]    Francisco J Guerra is a 59 y.o. female who presentstoday for her medical conditions/complaints as noted below. Francisco J Guerra is c/o of  Chief Complaint   Patient presents with    Herpes Zoster     Shingles dx 10/26/23, still ongoing, finished steroids a week ago    Health Maintenance     Due for colonoscopy    Discuss Labs     Discuss CT           HPI:     Here today for follow up  Reports has fully recovered from her right knee replacement that she had in May  Currently having issues with recurrent right sided kidney stone  She states she has pain every day, \"literally feel my kidney\"  He has appt Monday with her urologist    Has had a persistent rash on right scapular region for the past couple of weeks  Went to walk in clinic and suspected shingles though no vesicales were noted. She has also been vaccinated against shingles.   She reports when she had shingles several years ago it seemed a lot different than what she is currently experiencing  She describes a burning and itching sensation in the area, no drainage  No previous hx of rashes in the area, denies hx of psoriasis or eczema  Completed 5 days with slight improvement in the burning sensation  Currently bothering her mainly in evenings  The area is difficult for her to reach that she has not tried anything topically    Asking about finding on CT of possible fibroid in uterus  She reports has been having brown-tinged drainage for the past few weeks to the degree where she has to wear a pad  She states she has a GYN appointment but not until mid December  Denies any previous issues with postmenopausal vaginal bleeding        Hemoglobin A1C (%)   Date Value   09/23/2019 5.9   05/14/2018 5.7   11/02/2016 5.6             ( goal A1C is < 7)   No components found for: \"LABMICR\"  LDL Cholesterol (mg/dL)

## 2023-11-14 DIAGNOSIS — G56.01 CARPAL TUNNEL SYNDROME OF RIGHT WRIST: ICD-10-CM

## 2023-11-14 DIAGNOSIS — M72.2 PLANTAR FASCIITIS, RIGHT: ICD-10-CM

## 2023-11-14 DIAGNOSIS — M65.311 TRIGGER THUMB, RIGHT THUMB: ICD-10-CM

## 2023-11-14 RX ORDER — MELOXICAM 15 MG/1
15 TABLET ORAL DAILY
Qty: 90 TABLET | Refills: 3 | Status: SHIPPED | OUTPATIENT
Start: 2023-11-14

## 2023-11-17 ENCOUNTER — HOSPITAL ENCOUNTER (OUTPATIENT)
Age: 58
Discharge: HOME | End: 2023-11-17
Payer: COMMERCIAL

## 2023-11-17 VITALS — WEIGHT: 267 LBS | HEIGHT: 68 IN | BODY MASS INDEX: 40.47 KG/M2

## 2023-11-17 DIAGNOSIS — G47.33 OSA (OBSTRUCTIVE SLEEP APNEA): ICD-10-CM

## 2023-11-17 DIAGNOSIS — I10 ESSENTIAL HYPERTENSION: ICD-10-CM

## 2023-11-17 DIAGNOSIS — I51.7 LVH (LEFT VENTRICULAR HYPERTROPHY): ICD-10-CM

## 2023-11-17 LAB
ECHO AO ROOT DIAM: 3.1 CM
ECHO AO ROOT INDEX: 1.34 CM/M2
ECHO BSA: 2.41 M2
ECHO IVC EXP: 1.2 CM
ECHO IVC INSP: 0.6 CM
ECHO LA AREA 2C: 22.9 CM2
ECHO LA AREA 4C: 16.1 CM2
ECHO LA DIAMETER INDEX: 1.56 CM/M2
ECHO LA DIAMETER: 3.6 CM
ECHO LA MAJOR AXIS: 5 CM
ECHO LA MINOR AXIS: 5.9 CM
ECHO LA TO AORTIC ROOT RATIO: 1.16
ECHO LA VOL BP: 61 ML (ref 22–52)
ECHO LA VOL MOD A2C: 75 ML (ref 22–52)
ECHO LA VOL MOD A4C: 42 ML (ref 22–52)
ECHO LA VOL/BSA BIPLANE: 26 ML/M2 (ref 16–34)
ECHO LA VOLUME INDEX MOD A2C: 32 ML/M2 (ref 16–34)
ECHO LA VOLUME INDEX MOD A4C: 18 ML/M2 (ref 16–34)
ECHO LV E' LATERAL VELOCITY: 6 CM/S
ECHO LV E' SEPTAL VELOCITY: 8 CM/S
ECHO LV FRACTIONAL SHORTENING: 38 % (ref 28–44)
ECHO LV INTERNAL DIMENSION DIASTOLE INDEX: 1.95 CM/M2
ECHO LV INTERNAL DIMENSION DIASTOLIC: 4.5 CM (ref 3.9–5.3)
ECHO LV INTERNAL DIMENSION SYSTOLIC INDEX: 1.21 CM/M2
ECHO LV INTERNAL DIMENSION SYSTOLIC: 2.8 CM
ECHO LV IVSD: 1.3 CM (ref 0.6–0.9)
ECHO LV MASS 2D: 222.6 G (ref 67–162)
ECHO LV MASS INDEX 2D: 96.3 G/M2 (ref 43–95)
ECHO LV POSTERIOR WALL DIASTOLIC: 1.3 CM (ref 0.6–0.9)
ECHO LV RELATIVE WALL THICKNESS RATIO: 0.58
ECHO LVOT AREA: 3.5 CM2
ECHO LVOT DIAM: 2.1 CM
ECHO MV A VELOCITY: 0.64 M/S
ECHO MV E DECELERATION TIME (DT): 304 MS
ECHO MV E VELOCITY: 0.55 M/S
ECHO MV E/A RATIO: 0.86
ECHO MV E/E' LATERAL: 9.17
ECHO MV E/E' RATIO (AVERAGED): 8.02
ECHO RV BASAL DIMENSION: 3.6 CM
ECHO RV FREE WALL PEAK S': 11 CM/S

## 2023-11-17 PROCEDURE — 93306 TTE W/DOPPLER COMPLETE: CPT

## 2023-11-17 PROCEDURE — 93306 TTE W/DOPPLER COMPLETE: CPT | Performed by: INTERNAL MEDICINE

## 2023-11-21 ENCOUNTER — HOSPITAL ENCOUNTER (OUTPATIENT)
Dept: PREADMISSION TESTING | Age: 58
Discharge: HOME | End: 2023-11-25

## 2023-11-21 VITALS — BODY MASS INDEX: 40.16 KG/M2 | HEIGHT: 68 IN | WEIGHT: 265 LBS

## 2023-11-21 NOTE — PROGRESS NOTES
Pre-op Instructions For Out-Patient Endoscopy Surgery    Medication Instructions:  Please stop herbs and any supplements now (includes vitamins and minerals). Please contact your surgeon and prescribing physician for pre-op instructions for any blood thinners. If you have inhalers/aerosol treatments at home, please use them the morning of your surgery and bring the inhalers with you to the hospital.    Please take the following medications the morning of your surgery with a sip of water:    Carvedilol and Inhaler Proair    Surgery Instructions:  After midnight before surgery:  Do not eat or drink anything, including water, mints, gum, and hard candy. You may brush your teeth without swallowing. No smoking, chewing tobacco, or street drugs. Please shower or bathe before surgery. Please do not wear any cologne, lotion, powder, jewelry, piercings, perfume, makeup, nail polish, hair accessories, or hair spray on the day of surgery. Wear loose comfortable clothing. Leave your valuables at home but bring a payment source for any after-surgery prescriptions you plan to fill at [facility]. Bring a storage case for any glasses/contacts. An adult who is responsible for you MUST drive you home and should be with you for the first 24 hours after surgery. The Day of Surgery:  Arrive at [facility] AT [facility] Surgery Entrance at the time directed by your surgeon and check in at the desk. If you have a living will or healthcare power of , please bring a copy. You will be taken to the pre-op holding area where you will be prepared for surgery. A physical assessment will be performed by a nurse practitioner or house officer. Your IV will be started and you will meet your anesthesiologist.    When you go to surgery, your family will be directed to the surgical waiting room, where the doctor should speak with them after your surgery.     After surgery, you will be taken to the

## 2023-11-27 ENCOUNTER — ANESTHESIA EVENT (OUTPATIENT)
Dept: OPERATING ROOM | Age: 58
End: 2023-11-27
Payer: COMMERCIAL

## 2023-11-27 NOTE — PRE-PROCEDURE INSTRUCTIONS
No answer, left message ? Unable to leave message ? When were you told to arrive at hospital ?  -1215    Do you have a  ?-YES    Are you on any blood thinners ? -NONE                    If yes when did you stop taking ? Do you have your prep Rx filled and instruction ? -N/A     Nothing to eat the day before , only clear liquids. -N/A    Are you experiencing any covid symptoms ? -NONE    Do you have any infections or rash we should be aware of ?-NONE      Do you have the Hibiclens soap to use the night before and the morning of surgery ?-N/A    Nothing to eat or drink after midnight, only a sip of water to take any medication instructed to take the night before. -INSTRUCTED    Wear comfortable clothing, leave any valuables at home, remove any jewelry and body piercing . -INSTRUCTED

## 2023-11-28 ENCOUNTER — HOSPITAL ENCOUNTER (OUTPATIENT)
Age: 58
Setting detail: OUTPATIENT SURGERY
Discharge: HOME | End: 2023-11-28
Attending: UROLOGY | Admitting: UROLOGY
Payer: COMMERCIAL

## 2023-11-28 ENCOUNTER — APPOINTMENT (OUTPATIENT)
Dept: GENERAL RADIOLOGY | Age: 58
End: 2023-11-28
Attending: UROLOGY
Payer: COMMERCIAL

## 2023-11-28 ENCOUNTER — TELEPHONE (OUTPATIENT)
Dept: OBGYN CLINIC | Age: 58
End: 2023-11-28

## 2023-11-28 ENCOUNTER — ANESTHESIA (OUTPATIENT)
Dept: OPERATING ROOM | Age: 58
End: 2023-11-28
Payer: COMMERCIAL

## 2023-11-28 VITALS
HEIGHT: 68 IN | BODY MASS INDEX: 40.16 KG/M2 | DIASTOLIC BLOOD PRESSURE: 73 MMHG | SYSTOLIC BLOOD PRESSURE: 129 MMHG | RESPIRATION RATE: 19 BRPM | HEART RATE: 66 BPM | OXYGEN SATURATION: 97 % | WEIGHT: 265 LBS | TEMPERATURE: 97 F

## 2023-11-28 DIAGNOSIS — N20.0 RIGHT RENAL STONE: Primary | ICD-10-CM

## 2023-11-28 PROCEDURE — 2709999900 HC NON-CHARGEABLE SUPPLY: Performed by: UROLOGY

## 2023-11-28 PROCEDURE — 3600000003 HC SURGERY LEVEL 3 BASE: Performed by: UROLOGY

## 2023-11-28 PROCEDURE — 2500000003 HC RX 250 WO HCPCS

## 2023-11-28 PROCEDURE — 3600000013 HC SURGERY LEVEL 3 ADDTL 15MIN: Performed by: UROLOGY

## 2023-11-28 PROCEDURE — 7100000001 HC PACU RECOVERY - ADDTL 15 MIN: Performed by: UROLOGY

## 2023-11-28 PROCEDURE — C2617 STENT, NON-COR, TEM W/O DEL: HCPCS | Performed by: UROLOGY

## 2023-11-28 PROCEDURE — 7100000000 HC PACU RECOVERY - FIRST 15 MIN: Performed by: UROLOGY

## 2023-11-28 PROCEDURE — 2580000003 HC RX 258: Performed by: ANESTHESIOLOGY

## 2023-11-28 PROCEDURE — C1769 GUIDE WIRE: HCPCS | Performed by: UROLOGY

## 2023-11-28 PROCEDURE — 6370000000 HC RX 637 (ALT 250 FOR IP): Performed by: ANESTHESIOLOGY

## 2023-11-28 PROCEDURE — 3700000000 HC ANESTHESIA ATTENDED CARE: Performed by: UROLOGY

## 2023-11-28 PROCEDURE — 2720000010 HC SURG SUPPLY STERILE: Performed by: UROLOGY

## 2023-11-28 PROCEDURE — 6360000002 HC RX W HCPCS

## 2023-11-28 PROCEDURE — 3700000001 HC ADD 15 MINUTES (ANESTHESIA): Performed by: UROLOGY

## 2023-11-28 DEVICE — URETERAL STENT
Type: IMPLANTABLE DEVICE | Site: URETER | Status: FUNCTIONAL
Brand: POLARIS™ ULTRA

## 2023-11-28 RX ORDER — SODIUM CHLORIDE 0.9 % (FLUSH) 0.9 %
5-40 SYRINGE (ML) INJECTION EVERY 12 HOURS SCHEDULED
Status: CANCELLED | OUTPATIENT
Start: 2023-11-28

## 2023-11-28 RX ORDER — DIPHENHYDRAMINE HYDROCHLORIDE 50 MG/ML
12.5 INJECTION INTRAMUSCULAR; INTRAVENOUS
Status: CANCELLED | OUTPATIENT
Start: 2023-11-28 | End: 2023-11-29

## 2023-11-28 RX ORDER — SODIUM CHLORIDE 0.9 % (FLUSH) 0.9 %
5-40 SYRINGE (ML) INJECTION PRN
Status: CANCELLED | OUTPATIENT
Start: 2023-11-28

## 2023-11-28 RX ORDER — DEXAMETHASONE SODIUM PHOSPHATE 4 MG/ML
INJECTION, SOLUTION INTRA-ARTICULAR; INTRALESIONAL; INTRAMUSCULAR; INTRAVENOUS; SOFT TISSUE PRN
Status: DISCONTINUED | OUTPATIENT
Start: 2023-11-28 | End: 2023-11-28 | Stop reason: SDUPTHER

## 2023-11-28 RX ORDER — CEFAZOLIN SODIUM 1 G/3ML
INJECTION, POWDER, FOR SOLUTION INTRAMUSCULAR; INTRAVENOUS PRN
Status: DISCONTINUED | OUTPATIENT
Start: 2023-11-28 | End: 2023-11-28 | Stop reason: SDUPTHER

## 2023-11-28 RX ORDER — HYDROCODONE BITARTRATE AND ACETAMINOPHEN 5; 325 MG/1; MG/1
1 TABLET ORAL EVERY 6 HOURS PRN
Qty: 28 TABLET | Refills: 0 | Status: SHIPPED | OUTPATIENT
Start: 2023-11-28 | End: 2023-12-05

## 2023-11-28 RX ORDER — FENTANYL CITRATE 0.05 MG/ML
25 INJECTION, SOLUTION INTRAMUSCULAR; INTRAVENOUS EVERY 5 MIN PRN
Status: CANCELLED | OUTPATIENT
Start: 2023-11-28

## 2023-11-28 RX ORDER — LIDOCAINE HYDROCHLORIDE 10 MG/ML
INJECTION, SOLUTION EPIDURAL; INFILTRATION; INTRACAUDAL; PERINEURAL PRN
Status: DISCONTINUED | OUTPATIENT
Start: 2023-11-28 | End: 2023-11-28 | Stop reason: SDUPTHER

## 2023-11-28 RX ORDER — ONDANSETRON 2 MG/ML
INJECTION INTRAMUSCULAR; INTRAVENOUS PRN
Status: DISCONTINUED | OUTPATIENT
Start: 2023-11-28 | End: 2023-11-28 | Stop reason: SDUPTHER

## 2023-11-28 RX ORDER — ONDANSETRON 2 MG/ML
4 INJECTION INTRAMUSCULAR; INTRAVENOUS
Status: CANCELLED | OUTPATIENT
Start: 2023-11-28 | End: 2023-11-29

## 2023-11-28 RX ORDER — SODIUM CHLORIDE 9 MG/ML
INJECTION, SOLUTION INTRAVENOUS PRN
Status: DISCONTINUED | OUTPATIENT
Start: 2023-11-28 | End: 2023-11-28 | Stop reason: HOSPADM

## 2023-11-28 RX ORDER — LIDOCAINE HYDROCHLORIDE 10 MG/ML
1 INJECTION, SOLUTION EPIDURAL; INFILTRATION; INTRACAUDAL; PERINEURAL
Status: DISCONTINUED | OUTPATIENT
Start: 2023-11-28 | End: 2023-11-28 | Stop reason: HOSPADM

## 2023-11-28 RX ORDER — SODIUM CHLORIDE 0.9 % (FLUSH) 0.9 %
5-40 SYRINGE (ML) INJECTION PRN
Status: DISCONTINUED | OUTPATIENT
Start: 2023-11-28 | End: 2023-11-28 | Stop reason: HOSPADM

## 2023-11-28 RX ORDER — ACETAMINOPHEN 500 MG
1000 TABLET ORAL ONCE
Status: COMPLETED | OUTPATIENT
Start: 2023-11-28 | End: 2023-11-28

## 2023-11-28 RX ORDER — GABAPENTIN 300 MG/1
300 CAPSULE ORAL ONCE
Status: COMPLETED | OUTPATIENT
Start: 2023-11-28 | End: 2023-11-28

## 2023-11-28 RX ORDER — PROPOFOL 10 MG/ML
INJECTION, EMULSION INTRAVENOUS PRN
Status: DISCONTINUED | OUTPATIENT
Start: 2023-11-28 | End: 2023-11-28 | Stop reason: SDUPTHER

## 2023-11-28 RX ORDER — SODIUM CHLORIDE 9 MG/ML
INJECTION, SOLUTION INTRAVENOUS PRN
Status: CANCELLED | OUTPATIENT
Start: 2023-11-28

## 2023-11-28 RX ORDER — SODIUM CHLORIDE 0.9 % (FLUSH) 0.9 %
5-40 SYRINGE (ML) INJECTION EVERY 12 HOURS SCHEDULED
Status: DISCONTINUED | OUTPATIENT
Start: 2023-11-28 | End: 2023-11-28 | Stop reason: HOSPADM

## 2023-11-28 RX ORDER — MIDAZOLAM HYDROCHLORIDE 1 MG/ML
INJECTION INTRAMUSCULAR; INTRAVENOUS PRN
Status: DISCONTINUED | OUTPATIENT
Start: 2023-11-28 | End: 2023-11-28 | Stop reason: SDUPTHER

## 2023-11-28 RX ORDER — CEPHALEXIN 500 MG/1
500 CAPSULE ORAL 3 TIMES DAILY
Qty: 15 CAPSULE | Refills: 0 | Status: SHIPPED | OUTPATIENT
Start: 2023-11-28

## 2023-11-28 RX ORDER — HYDROCODONE BITARTRATE AND ACETAMINOPHEN 5; 325 MG/1; MG/1
1 TABLET ORAL
Status: CANCELLED | OUTPATIENT
Start: 2023-11-28 | End: 2023-11-29

## 2023-11-28 RX ORDER — FENTANYL CITRATE 50 UG/ML
INJECTION, SOLUTION INTRAMUSCULAR; INTRAVENOUS PRN
Status: DISCONTINUED | OUTPATIENT
Start: 2023-11-28 | End: 2023-11-28 | Stop reason: SDUPTHER

## 2023-11-28 RX ORDER — SODIUM CHLORIDE, SODIUM LACTATE, POTASSIUM CHLORIDE, CALCIUM CHLORIDE 600; 310; 30; 20 MG/100ML; MG/100ML; MG/100ML; MG/100ML
INJECTION, SOLUTION INTRAVENOUS CONTINUOUS
Status: DISCONTINUED | OUTPATIENT
Start: 2023-11-28 | End: 2023-11-28 | Stop reason: HOSPADM

## 2023-11-28 RX ORDER — FENTANYL CITRATE 0.05 MG/ML
50 INJECTION, SOLUTION INTRAMUSCULAR; INTRAVENOUS EVERY 5 MIN PRN
Status: CANCELLED | OUTPATIENT
Start: 2023-11-28

## 2023-11-28 RX ADMIN — PROPOFOL 20 MG: 10 INJECTION, EMULSION INTRAVENOUS at 14:30

## 2023-11-28 RX ADMIN — FENTANYL CITRATE 50 MCG: 50 INJECTION, SOLUTION INTRAMUSCULAR; INTRAVENOUS at 14:27

## 2023-11-28 RX ADMIN — DEXAMETHASONE SODIUM PHOSPHATE 8 MG: 4 INJECTION INTRA-ARTICULAR; INTRALESIONAL; INTRAMUSCULAR; INTRAVENOUS; SOFT TISSUE at 14:32

## 2023-11-28 RX ADMIN — PROPOFOL 180 MG: 10 INJECTION, EMULSION INTRAVENOUS at 14:27

## 2023-11-28 RX ADMIN — MIDAZOLAM 2 MG: 1 INJECTION INTRAMUSCULAR; INTRAVENOUS at 14:25

## 2023-11-28 RX ADMIN — GABAPENTIN 300 MG: 300 CAPSULE ORAL at 13:54

## 2023-11-28 RX ADMIN — ONDANSETRON 4 MG: 2 INJECTION INTRAMUSCULAR; INTRAVENOUS at 14:32

## 2023-11-28 RX ADMIN — FENTANYL CITRATE 25 MCG: 50 INJECTION, SOLUTION INTRAMUSCULAR; INTRAVENOUS at 14:32

## 2023-11-28 RX ADMIN — LIDOCAINE HYDROCHLORIDE 40 MG: 10 INJECTION, SOLUTION EPIDURAL; INFILTRATION; INTRACAUDAL; PERINEURAL at 14:27

## 2023-11-28 RX ADMIN — FENTANYL CITRATE 25 MCG: 50 INJECTION, SOLUTION INTRAMUSCULAR; INTRAVENOUS at 14:55

## 2023-11-28 RX ADMIN — CEFAZOLIN 2 G: 1 INJECTION, POWDER, FOR SOLUTION INTRAMUSCULAR; INTRAVENOUS at 14:35

## 2023-11-28 RX ADMIN — SODIUM CHLORIDE, POTASSIUM CHLORIDE, SODIUM LACTATE AND CALCIUM CHLORIDE: 600; 310; 30; 20 INJECTION, SOLUTION INTRAVENOUS at 13:55

## 2023-11-28 RX ADMIN — ACETAMINOPHEN 1000 MG: 500 TABLET ORAL at 13:54

## 2023-11-28 ASSESSMENT — ENCOUNTER SYMPTOMS
DIARRHEA: 0
BACK PAIN: 0
SHORTNESS OF BREATH: 0
SORE THROAT: 0
VOMITING: 0
CONSTIPATION: 0
ABDOMINAL PAIN: 0
NAUSEA: 0
COUGH: 0

## 2023-11-28 ASSESSMENT — PAIN - FUNCTIONAL ASSESSMENT: PAIN_FUNCTIONAL_ASSESSMENT: 0-10

## 2023-11-28 NOTE — BRIEF OP NOTE
Brief Postoperative Note      Patient: Josie Rodriguez  YOB: 1965  MRN: [id number]    Date of Procedure: 11/28/2023    Pre-Op Diagnosis Codes:     * Renal calculus, right [N20.0]    Post-Op Diagnosis: Same       Procedure(s):  CYSTOSCOPY RIGHT URETEROSCOPY HOLMIUM LASER LITHOTRIPSY RIGHT STENT PLACEMENT    Surgeon(s):  Katya Silvestre MD    Assistant:  * No surgical staff found *    Anesthesia: General    Estimated Blood Loss (mL): Minimal    Complications: None    Specimens:   * No specimens in log *    Implants:  * No surgical log found *      Drains:   Nephrostomy Tube Right Flank 24 fr (Active)       Findings: d      Electronically signed by Katya Silvestre MD on 11/28/2023 at 1:07 PM

## 2023-11-28 NOTE — TELEPHONE ENCOUNTER
GYN pt with an annual schedule 12/6 called in stating that she started having some heavy bleeding for about 3 days a few days before thanksgiving then stopped. Pt started spotting about 3-4 days ago. Pt had a abdominal pelvic ct due to kidney stones and it showed she has a fibroid at the fundus. Pt also states she is on the setradial cream and is wondering if she should stop it or not. Pls advise.

## 2023-11-28 NOTE — DISCHARGE INSTRUCTIONS
Ureteral Stent Placement: What to Expect at [address]     A ureteral (say \"you-REE-ter-ul\") stent is a thin, hollow tube that is placed in the ureter to help urine pass from the kidney into the bladder. Ureters are the tubes that connect the kidneys to the bladder. You may have a small amount of blood in your urine for 1 to 3 days after the procedure. While the stent is in place, you may have to urinate more often, feel a sudden need to urinate, or feel like you can't completely empty your bladder. You may feel some pain when you urinate or do strenuous activity. You also may notice a small amount of blood in your urine after strenuous activities. These side effects usually don't prevent people from doing their normal daily activities. You may have a thin string coming out of your urethra. Your urethra is the tube that carries urine from your bladder to outside your body. This string is attached to the stent. Try not to pull on the string. It will be used to pull out the stent when you no longer need it. After the procedure, urine may flow better from your kidneys to your bladder. A ureteral stent may be left in place for several days or for as long as several months. Your doctor will take it out when you no longer need it. Or, in some cases, it may be taken out at home. This care sheet gives you a general idea about how long it will take for you to recover. But each person recovers at a different pace. Follow the steps below to get better as quickly as possible. How can you care for yourself at home? Activity    Rest when you feel tired. Getting enough sleep will help you recover. Avoid strenuous activities, such as bicycle riding, jogging, weight lifting, or aerobic exercise, until your doctor says it is okay. Ask your doctor when you can drive again. Most people are able to return to work the day after the procedure.  If your work requires intense activity, you may feel pain in

## 2023-11-28 NOTE — PROGRESS NOTES
CLINICAL PHARMACY NOTE: MEDS TO BEDS    Total # of Prescriptions Filled: 2   The following medications were delivered to the patient:  Hydrocodone-acetaminophen5-325mg  Cephalexin 500mg    Additional Documentation: Patient is Eligible to Utilize Meds To Beds for Their Discharge Medications Delivered patients medication to surgery waiting room

## 2023-11-28 NOTE — ANESTHESIA PRE PROCEDURE
Applicable):  No results found for: \"HIV\", \"HEPCAB\"    COVID-19 Screening (If Applicable):   Lab Results   Component Value Date/Time    COVID19 Not Detected 09/18/2020 05:35 PM           Anesthesia Evaluation  Patient summary reviewed and Nursing notes reviewed no history of anesthetic complications:   Airway: Mallampati: II  TM distance: >3 FB   Neck ROM: full  Mouth opening: > = 3 FB   Dental: normal exam         Pulmonary:normal exam  breath sounds clear to auscultation  (+) sleep apnea:  asthma:                            Cardiovascular:    (+) hypertension:, CAD:, GRANADOS:, hyperlipidemia        Rhythm: regular  Rate: normal           Beta Blocker:  Dose within 24 Hrs         Neuro/Psych:   (+) neuromuscular disease:, headaches:,             GI/Hepatic/Renal:   (+) GERD:, renal disease: kidney stones and CRI, morbid obesity          Endo/Other:    (+) hypothyroidism: arthritis: OA., .                  ROS comment: Acquired Solitary kidney Abdominal:             Vascular: negative vascular ROS. Other Findings:           Anesthesia Plan      general     ASA 3     (LMA)  Induction: intravenous. MIPS: Postoperative opioids intended and Prophylactic antiemetics administered. Anesthetic plan and risks discussed with patient. Plan discussed with CRNA.                     Ann Dominguez MD   11/28/2023

## 2023-11-29 NOTE — OP NOTE
[facility]                 [address] [place], [address] [facility]                                OPERATIVE REPORT    PATIENT NAME: Clarissa Cleary                   :        1965  MED REC NO:   [id number]                              ROOM:  ACCOUNT NO:   [de-identified]                           ADMIT DATE: 2023  PROVIDER:     Eloisa Dancer    DATE OF PROCEDURE:  2023    PREOPERATIVE DIAGNOSIS:  Right renal stone. POSTOPERATIVE DIAGNOSIS:  Right renal stone. OPERATION PERFORMED:  Cystoscopy, right ureteroscopy, right holmium  laser lithotripsy, right stent placement. SURGEON:  Eloisa Dancer, MD.    ASSISTANT:  None. ANESTHESIA:  General.    COMPLICATIONS:  None. ESTIMATED BLOOD LOSS:  Minimal.    NARRATIVE OF THE PROCEDURE:  This is a 64-year-old white female who has  a history of stones. She was found to have a right renal stone and  given that she has a solitary kidney, she wanted to prophylactically  treat the stone, thus the above procedures were scheduled. All the  risks and benefits were explained to the patient. Informed consent was  obtained. This 64-year-old white female was brought back to the operating room and  positioned in the modified dorsal lithotomy position. After general  anesthesia was started, she was sterilely prepped and draped in a  standard fashion. A 22-Croatian rigid cystoscope was inserted into her  urethra and advanced into her bladder. The bladder was examined. No  tumors or stones were noted. The right ureteral orifice was identified. A wire was placed through this into the right ureter. Dual-lumen  ureteral catheter was used to place a second wire. Then, a flexible  ureteroscope was advanced over the first wire into the bladder, into the  right ureter, and into the kidney. I examined all the calyces of the  kidney. A stone was identified in the lower pole of the calyx.   I was  embedded into the

## 2023-11-29 NOTE — TELEPHONE ENCOUNTER
Pt friend took message as pt was in surgery for kidney stone removal and would have pt give us a call to get scheduled.

## 2023-11-29 NOTE — TELEPHONE ENCOUNTER
I called pt to follow up on the message I gave to her friend yesterday.      Got pt scheduled for US on 12/1/23

## 2023-12-05 NOTE — H&P
Pre-op History and Physical  Gregg Galeano PA-C    Patient:  Rubio Ferris  MRN: [id number]  YOB: 1965    HISTORY OF PRESENT ILLNESS:     The patient is a 59 y.o. female who presents with history of kidney stones. She is s/p left nephrectomy in 1988. Right PCNL 8/21/2023, residual renal stone which was treated with URS/HLL  11/28/23. Here today for right ureteral stent removal     Patient's old records, notes and chart reviewed and summarized above. Past Medical History:    Past Medical History:   Diagnosis Date    Abnormal uterine bleeding (AUB) 11/2023    Arthritis     Asthma     Benign familial tremor     CAD (coronary artery disease) 12/2021    mild per cardiac cath 12/2021; no stents. Dr. Anastasia Snowden last visit 10/2022    Cancer [facility])     skin    CKD (chronic kidney disease) stage 1, GFR 90 ml/min or greater     Class 2 severe obesity with serious comorbidity and body mass index (BMI) of 39.0 to 39.9 in adult [facility]) 05/01/2019    COVID-19 vaccine administered     Cystinuria ([address])     Dyspnea on exertion 08/06/2019    Flank pain     RIGHT    GERD (gastroesophageal reflux disease)     Hypertension     Hyperuricemia     Hypothyroidism     no meds    Kidney stones     Leg swelling     by the end of the day    Midline low back pain with right-sided sciatica 06/02/2016    Nephrostomy status ([address])     neph tube removed    Shoulder impingement 12/2019    left    Snores     no sleep apnea per sleep study    Solitary kidney, acquired 11/20/2017    Under care of service provider 12/05/2023    pcp-JEFRY Romero, [address] [facility] [place] [address] visit nov 2023    Under care of service provider 12/05/2023    [id number]-dp vincent-last visit nov 2023    Under care of service provider 12/05/2023    urology-[address]-last visit nov 2023    Urinary tract obstruction by kidney stone 08/01/2019    Wears glasses        Past Surgical History:    Past Surgical History:   Procedure Laterality Date

## 2023-12-05 NOTE — PROGRESS NOTES
[address] [facility]  [facility] - [address] [place] [address]  Dept: [phone]    Chief complaint:   Chief Complaint   Patient presents with    Annual Exam     Pap 1/7/20 wnl/hpv neg  Efrain 12/15/22 br2       History Present Illness: Dio Walker is a 58 yo female who presents for her annual exam.  She continues to have PMB about once a year for a couple of days each time. She is not having any bleeding now. She has had a hysteroscopy D&C and EMB in the last 2 years and both have been negative and she was found to have polyps. She has been on HT and has stopped with the bleeding. She is doing ok without the hormones. She is not having any vaginal discharge or irritation. She denies any hot flushes or night sweats. She denies any pelvic pain. She denies any bowel or bladder issues. Current Medications (OTC/Herbal):   Current Outpatient Medications   Medication Sig Dispense Refill    phenazopyridine (PYRIDIUM) 200 MG tablet Take 1 tablet by mouth 3 times daily as needed for Pain 6 tablet 0    HYDROcodone-acetaminophen (NORCO) 5-325 MG per tablet Take 1 tablet by mouth every 6 hours as needed for Pain for up to 3 days. Intended supply: 3 days.  Take lowest dose possible to manage pain Max Daily Amount: 4 tablets 12 tablet 0    meloxicam (MOBIC) 15 MG tablet Take 1 tablet by mouth daily 90 tablet 3    carvedilol (COREG) 3.125 MG tablet Take 1 tablet by mouth 2 times daily 60 tablet 3    pantoprazole (PROTONIX) 20 MG tablet TAKE ONE TABLET BY MOUTH EVERY MORNING BEFORE BREAKFAST 240 tablet 1    VITAMIN D-VITAMIN K PO Take 1 tablet by mouth daily      irbesartan (AVAPRO) 150 MG tablet Take 1 tablet by mouth daily      ESTRADIOL PO Take 1 capsule by mouth daily      b complex vitamins capsule Take 1 capsule by mouth daily      famotidine (PEPCID) 20 MG tablet Take 1 tablet by mouth 2 times daily (Patient taking differently: Take 1 tablet by

## 2023-12-05 NOTE — DISCHARGE INSTRUCTIONS
Discharge instructions: Cystoscopy/stent removal  You may experience pain and/or burning with urination and see blood in the urine after your procedure. This should resolve over the next few days. Ok to discharge home in good condition  No heavy lifting, >10 lbs for today  Patient should avoid strenuous activity for today  Patient should walk moderately at home   [facility] to shower   Patient may resume diet as tolerated  Please call attending physician or hospital  with questions  Call or go to ED if fever (> 101F), intractable nausea vomiting or pain, inability to urinate  Please take prescriptions as directed if prescribed      Patient should follow up with Dr. Cyndy Vicente, in 6 weeks, call to confirm appointment   No alcoholic beverages, no driving or operating machinery, no making important decisions for 24 hours. Children should maintain quiet play ( games, movies, books ) for 24 hours. You may have a normal diet but should eat lightly day of surgery. Drink plenty of fluids.   Urinate within 8 hours after surgery, if unable to urinate call your doctor

## 2023-12-06 ENCOUNTER — OFFICE VISIT (OUTPATIENT)
Dept: OBGYN CLINIC | Age: 58
End: 2023-12-06
Payer: COMMERCIAL

## 2023-12-06 ENCOUNTER — HOSPITAL ENCOUNTER (OUTPATIENT)
Age: 58
Setting detail: SPECIMEN
Discharge: HOME | End: 2023-12-06

## 2023-12-06 ENCOUNTER — HOSPITAL ENCOUNTER (OUTPATIENT)
Age: 58
Setting detail: OUTPATIENT SURGERY
Discharge: HOME | End: 2023-12-06
Attending: UROLOGY | Admitting: UROLOGY
Payer: COMMERCIAL

## 2023-12-06 VITALS
RESPIRATION RATE: 18 BRPM | OXYGEN SATURATION: 95 % | HEIGHT: 68 IN | TEMPERATURE: 97.5 F | WEIGHT: 265 LBS | SYSTOLIC BLOOD PRESSURE: 146 MMHG | DIASTOLIC BLOOD PRESSURE: 82 MMHG | BODY MASS INDEX: 40.16 KG/M2 | HEART RATE: 65 BPM

## 2023-12-06 VITALS
DIASTOLIC BLOOD PRESSURE: 83 MMHG | WEIGHT: 264 LBS | BODY MASS INDEX: 40.01 KG/M2 | SYSTOLIC BLOOD PRESSURE: 135 MMHG | HEIGHT: 68 IN | HEART RATE: 61 BPM

## 2023-12-06 DIAGNOSIS — Z96.651 S/P TOTAL KNEE ARTHROPLASTY, RIGHT: Primary | ICD-10-CM

## 2023-12-06 DIAGNOSIS — Z12.31 ENCOUNTER FOR SCREENING MAMMOGRAM FOR BREAST CANCER: ICD-10-CM

## 2023-12-06 DIAGNOSIS — G89.18 ACUTE POST-OPERATIVE PAIN: Primary | ICD-10-CM

## 2023-12-06 DIAGNOSIS — Z01.419 ENCOUNTER FOR GYNECOLOGICAL EXAMINATION: Primary | ICD-10-CM

## 2023-12-06 PROCEDURE — 6370000000 HC RX 637 (ALT 250 FOR IP): Performed by: UROLOGY

## 2023-12-06 PROCEDURE — 3600000012 HC SURGERY LEVEL 2 ADDTL 15MIN: Performed by: UROLOGY

## 2023-12-06 PROCEDURE — 3600000002 HC SURGERY LEVEL 2 BASE: Performed by: UROLOGY

## 2023-12-06 PROCEDURE — 3079F DIAST BP 80-89 MM HG: CPT | Performed by: OBSTETRICS & GYNECOLOGY

## 2023-12-06 PROCEDURE — 7100000011 HC PHASE II RECOVERY - ADDTL 15 MIN: Performed by: UROLOGY

## 2023-12-06 PROCEDURE — 2709999900 HC NON-CHARGEABLE SUPPLY: Performed by: UROLOGY

## 2023-12-06 PROCEDURE — 7100000010 HC PHASE II RECOVERY - FIRST 15 MIN: Performed by: UROLOGY

## 2023-12-06 PROCEDURE — 2580000003 HC RX 258: Performed by: UROLOGY

## 2023-12-06 PROCEDURE — 3075F SYST BP GE 130 - 139MM HG: CPT | Performed by: OBSTETRICS & GYNECOLOGY

## 2023-12-06 PROCEDURE — 99396 PREV VISIT EST AGE 40-64: CPT | Performed by: OBSTETRICS & GYNECOLOGY

## 2023-12-06 RX ORDER — MAGNESIUM HYDROXIDE 1200 MG/15ML
LIQUID ORAL PRN
Status: DISCONTINUED | OUTPATIENT
Start: 2023-12-06 | End: 2023-12-06 | Stop reason: ALTCHOICE

## 2023-12-06 RX ORDER — LIDOCAINE HYDROCHLORIDE 20 MG/ML
JELLY TOPICAL PRN
Status: DISCONTINUED | OUTPATIENT
Start: 2023-12-06 | End: 2023-12-06 | Stop reason: ALTCHOICE

## 2023-12-06 RX ORDER — HYDROCODONE BITARTRATE AND ACETAMINOPHEN 5; 325 MG/1; MG/1
1 TABLET ORAL EVERY 6 HOURS PRN
Qty: 12 TABLET | Refills: 0 | Status: SHIPPED | OUTPATIENT
Start: 2023-12-06 | End: 2023-12-09

## 2023-12-06 RX ORDER — PHENAZOPYRIDINE HYDROCHLORIDE 200 MG/1
200 TABLET, FILM COATED ORAL 3 TIMES DAILY PRN
Qty: 6 TABLET | Refills: 0 | Status: SHIPPED | OUTPATIENT
Start: 2023-12-06 | End: 2023-12-08

## 2023-12-06 RX ORDER — MAGNESIUM HYDROXIDE 1200 MG/15ML
LIQUID ORAL CONTINUOUS PRN
Status: COMPLETED | OUTPATIENT
Start: 2023-12-06 | End: 2023-12-06

## 2023-12-06 RX ORDER — AMOXICILLIN 500 MG/1
CAPSULE ORAL
Qty: 4 CAPSULE | Refills: 0 | Status: SHIPPED | OUTPATIENT
Start: 2023-12-06

## 2023-12-06 ASSESSMENT — ENCOUNTER SYMPTOMS
ABDOMINAL PAIN: 0
SHORTNESS OF BREATH: 0
COUGH: 0
BACK PAIN: 0

## 2023-12-06 ASSESSMENT — PAIN - FUNCTIONAL ASSESSMENT: PAIN_FUNCTIONAL_ASSESSMENT: NONE - DENIES PAIN

## 2023-12-06 ASSESSMENT — PAIN SCALES - GENERAL: PAINLEVEL_OUTOF10: 4

## 2023-12-06 ASSESSMENT — PAIN DESCRIPTION - DESCRIPTORS: DESCRIPTORS: BURNING

## 2023-12-06 ASSESSMENT — PAIN DESCRIPTION - LOCATION: LOCATION: ABDOMEN

## 2023-12-06 NOTE — OP NOTE
Operative Note      Patient: Rubi Anderson  YOB: 1965  MRN: [id number]    Date of Procedure: 12/6/2023    Pre-Op Diagnosis Codes:     * Right kidney stone [N20.0]    Post-Op Diagnosis: Same       Procedure(s):  CYSTOSCOPY STENT REMOVAL    Surgeon(s):  Tristian Nava MD    Assistant:   Resident: Vernon Castillo MD PGY-5  Olivier Beard MD PGY-2    Anesthesia: Local    Estimated Blood Loss (mL): Minimal    Complications: None    Specimens:   * No specimens in log *    Implants:  Implant Name Type Inv.  Item Serial No.  Lot No. LRB No. Used Action   STENT URET 2 POLARIS W/O GW [id number] Stent:Urological STENT URET 6FR L26CM PERCFLX HYDR+ DBL PGTL THRD 2  [employer]: INTERVENTIONAL CARDIO-PMM [id number] Right 1 Explanted   STENT URET 2 POLARIS W/O GW [id number] Stent:Urological STENT URET 6FR L26CM PERCFLX HYDR+ DBL PGTL THRD 2  [employer]: INTERVENTIONAL CARDIO-PMM [id number] Right 1 Explanted   STENT URET 2 POLARIS W/O GW [id number] Stent:Urological STENT URET 6FR L26CM PERCFLX HYDR+ DBL PGTL THRD 2  [employer]: INTERVENTIONAL CARDIO-PMM [id number] Right 1 Explanted   STENT URET 2 POLARIS W/O GW [id number] Stent:Urological STENT URET 6FR L26CM PERCFLX HYDR+ DBL PGTL THRD 2 [id number] [employer]: INTERVENTIONAL CARDIO-PMM [id number] Right 1 Explanted   STENT URET 2 POLARIS W/O GW [id number] Stent:Urological STENT URET 6FR L26CM PERCFLX HYDR+ DBL PGTL THRD 2  [employer]: INTERVENTIONAL CARDIO-PMM [id number] Right 1 Explanted   STENT URET 2 POLARIS W/O GW [id number] Stent:Urological STENT URET 6FR L26CM PERCFLX HYDR+ DBL PGTL THRD 2  [employer]: INTERVENTIONAL CARDIO-PMM [id number] Right 1 Explanted   STENT URET SFT UNIV AQ COAT 5FR 28CM Stent:Urological SET URET STENT 5FR L28CM AQ SFT MFIL TETH GRAD MRK W/ PGTL  [employer] [facility]-PMM [id number] Right 1 Explanted   STENT URET SFT UNIV AQ COAT 5FR 28CM Stent:Urological SET URET STENT 5FR

## 2023-12-06 NOTE — H&P
graduate degree    Highest education level: Not on file   Occupational History    Occupation: trauma cordinator RN     Employer: [employer]   Tobacco Use    Smoking status: Never    Smokeless tobacco: Never   Vaping Use    Vaping Use: Never used   Substance and Sexual Activity    Alcohol use: Yes     Alcohol/week: 0.0 standard drinks of alcohol     Comment: occasionally    Drug use: Never    Sexual activity: Not Currently   Other Topics Concern    Not on file   Social History Narrative    Not on file     Social Determinants of Health     Financial Resource Strain: Low Risk  (5/2/2023)    Overall Financial Resource Strain (CARDIA)     Difficulty of Paying Living Expenses: Not hard at all   Food Insecurity: Not on file (5/2/2023)   Transportation Needs: Unknown (5/2/2023)    PRAPARE - Transportation     Lack of Transportation (Medical): Not on file     Lack of Transportation (Non-Medical):  No   Physical Activity: Unknown (3/29/2023)    Exercise Vital Sign     Days of Exercise per Week: 0 days     Minutes of Exercise per Session: Not on file   Stress: Not on file   Social Connections: Not on file   Intimate Partner Violence: Not At Risk (3/29/2023)    Humiliation, Afraid, Rape, and Kick questionnaire     Fear of Current or Ex-Partner: No     Emotionally Abused: No     Physically Abused: No     Sexually Abused: No   Housing Stability: Unknown (5/2/2023)    Housing Stability Vital Sign     Unable to Pay for Housing in the Last Year: Not on file     Number of [address] in the Last Year: Not on file     Unstable Housing in the Last Year: No       Family History:    Family History   Problem Relation Age of Onset    High Blood Pressure Mother     Dementia Mother     High Blood Pressure Father     Diabetes Father     Cancer Father         tongue    Hypertension Father     Heart Disease Father         stents    Other Father         Mylofibrosis    Hypertension Sister     Heart Disease Maternal Grandmother     Heart kidney, acquired    Obesity (BMI 30-39. 9)    Acute intractable tension-type headache    Glucose intolerance (impaired glucose tolerance)    Class 3 severe obesity due to excess calories with serious comorbidity and body mass index (BMI) of 40.0 to 44.9 in [facility])    Dyspnea on exertion    Arthralgia    Other fatigue    Oxygen desaturation    Mild persistent asthma without complication    LVH (left ventricular hypertrophy)    Chronic left shoulder pain    Hydronephrosis with renal and ureteral calculous obstruction    S/p Hscope, D&C 6/30/22    Postmenopausal bleeding    Endometrial polyp    Right kidney stone    Left breast abscess    S/P total knee arthroplasty, right    Severe obesity (BMI 35.0-39. 9) with comorbidity ([address])    Hydronephrosis of right kidney    Right renal mass    Right renal stone       Plan:   Ashwini Naik MD  7:18 AM 12/6/2023

## 2023-12-08 LAB
CYTOLOGY REPORT: NORMAL
HPV I/H RISK 4 DNA CVX QL NAA+PROBE: NOT DETECTED
HPV SAMPLE: NORMAL
HPV, INTERPRETATION: NORMAL
HPV16 DNA CVX QL NAA+PROBE: NOT DETECTED
HPV18 DNA CVX QL NAA+PROBE: NOT DETECTED
SPECIMEN DESCRIPTION: NORMAL

## 2023-12-26 ENCOUNTER — OFFICE VISIT (OUTPATIENT)
Dept: FAMILY MEDICINE CLINIC | Age: 58
End: 2023-12-26
Payer: COMMERCIAL

## 2023-12-26 VITALS
OXYGEN SATURATION: 95 % | HEART RATE: 77 BPM | WEIGHT: 264 LBS | SYSTOLIC BLOOD PRESSURE: 126 MMHG | TEMPERATURE: 98.2 F | DIASTOLIC BLOOD PRESSURE: 82 MMHG | BODY MASS INDEX: 40.14 KG/M2 | RESPIRATION RATE: 18 BRPM

## 2023-12-26 DIAGNOSIS — R06.02 SOB (SHORTNESS OF BREATH): ICD-10-CM

## 2023-12-26 DIAGNOSIS — R53.83 OTHER FATIGUE: ICD-10-CM

## 2023-12-26 DIAGNOSIS — U07.1 COVID-19: Primary | ICD-10-CM

## 2023-12-26 LAB
Lab: ABNORMAL
QC PASS/FAIL: ABNORMAL
SARS-COV-2 RDRP RESP QL NAA+PROBE: POSITIVE

## 2023-12-26 PROCEDURE — 87635 SARS-COV-2 COVID-19 AMP PRB: CPT | Performed by: NURSE PRACTITIONER

## 2023-12-26 PROCEDURE — 3074F SYST BP LT 130 MM HG: CPT | Performed by: NURSE PRACTITIONER

## 2023-12-26 PROCEDURE — 3079F DIAST BP 80-89 MM HG: CPT | Performed by: NURSE PRACTITIONER

## 2023-12-26 PROCEDURE — 99213 OFFICE O/P EST LOW 20 MIN: CPT | Performed by: NURSE PRACTITIONER

## 2023-12-26 RX ORDER — BENZONATATE 200 MG/1
200 CAPSULE ORAL 3 TIMES DAILY PRN
Qty: 21 CAPSULE | Refills: 0 | Status: SHIPPED | OUTPATIENT
Start: 2023-12-26 | End: 2024-01-02

## 2023-12-26 RX ORDER — CARVEDILOL 12.5 MG/1
12.5 TABLET ORAL 2 TIMES DAILY
Qty: 60 TABLET | Refills: 11 | Status: SHIPPED | OUTPATIENT
Start: 2023-12-26

## 2023-12-27 NOTE — PROGRESS NOTES
symptomatic control. Be sure to take any prescribed medications as directed. You may use motrin (ibuprofen) or Tylenol (acetaminophen) as needed for fever and pain. If your symptoms worsen or you develop concerning symptoms such as shortness of breath or chest pain, please return for reevaluation or present to the ER if necessary. Start Paxlovid. Tessalon Perles every 8 hours as needed  CXR if no improvement in 48-72 hours. Discussed when to go to the ER for further evaluation. Pt verbalizes understanding. Patient given educational materials, see patient instructions. Discussed use, benefit, and side effects of prescribed medications. All patient questions answered. Pt verbalized understanding. Instructed to continue current medications, diet and exercise. Patient agreed with treatment plan. Follow up as directed.      Electronically signed by MIGDALIA Cook CNP on 12/27/2023 at 1:29 PM

## 2023-12-29 ENCOUNTER — HOSPITAL ENCOUNTER (OUTPATIENT)
Age: 58
Discharge: HOME | End: 2023-12-29
Payer: COMMERCIAL

## 2023-12-29 ENCOUNTER — HOSPITAL ENCOUNTER (OUTPATIENT)
Dept: GENERAL RADIOLOGY | Age: 58
End: 2023-12-29
Payer: COMMERCIAL

## 2023-12-29 DIAGNOSIS — U07.1 COVID-19: ICD-10-CM

## 2023-12-29 DIAGNOSIS — R06.02 SOB (SHORTNESS OF BREATH): ICD-10-CM

## 2023-12-29 PROCEDURE — 71046 X-RAY EXAM CHEST 2 VIEWS: CPT

## 2024-01-11 RX ORDER — METHYLPREDNISOLONE 4 MG/1
TABLET ORAL
Qty: 1 KIT | Refills: 0 | Status: SHIPPED | OUTPATIENT
Start: 2024-01-11 | End: 2024-01-17

## 2024-02-22 ENCOUNTER — OFFICE VISIT (OUTPATIENT)
Dept: PRIMARY CARE CLINIC | Age: 59
End: 2024-02-22
Payer: COMMERCIAL

## 2024-02-22 VITALS
SYSTOLIC BLOOD PRESSURE: 134 MMHG | HEIGHT: 68 IN | DIASTOLIC BLOOD PRESSURE: 74 MMHG | OXYGEN SATURATION: 97 % | BODY MASS INDEX: 43.89 KG/M2 | HEART RATE: 65 BPM | WEIGHT: 289.6 LBS

## 2024-02-22 DIAGNOSIS — J45.20 MILD INTERMITTENT ASTHMA WITHOUT COMPLICATION: ICD-10-CM

## 2024-02-22 DIAGNOSIS — I10 ESSENTIAL HYPERTENSION: ICD-10-CM

## 2024-02-22 DIAGNOSIS — Z13.1 SCREENING FOR DIABETES MELLITUS: ICD-10-CM

## 2024-02-22 DIAGNOSIS — Z13.0 SCREENING, ANEMIA, DEFICIENCY, IRON: ICD-10-CM

## 2024-02-22 DIAGNOSIS — R40.0 HAS DAYTIME DROWSINESS: ICD-10-CM

## 2024-02-22 DIAGNOSIS — R60.0 FLUID RETENTION IN LEGS: ICD-10-CM

## 2024-02-22 DIAGNOSIS — R05.3 PERSISTENT COUGH FOR 3 WEEKS OR LONGER: ICD-10-CM

## 2024-02-22 DIAGNOSIS — Z72.820 POOR SLEEP: Primary | ICD-10-CM

## 2024-02-22 PROCEDURE — 3078F DIAST BP <80 MM HG: CPT | Performed by: NURSE PRACTITIONER

## 2024-02-22 PROCEDURE — 99214 OFFICE O/P EST MOD 30 MIN: CPT | Performed by: NURSE PRACTITIONER

## 2024-02-22 PROCEDURE — 3075F SYST BP GE 130 - 139MM HG: CPT | Performed by: NURSE PRACTITIONER

## 2024-02-22 RX ORDER — TRAZODONE HYDROCHLORIDE 50 MG/1
50 TABLET ORAL NIGHTLY
Qty: 30 TABLET | Refills: 5 | Status: SHIPPED | OUTPATIENT
Start: 2024-02-22

## 2024-02-22 RX ORDER — ALBUTEROL SULFATE 90 UG/1
2 AEROSOL, METERED RESPIRATORY (INHALATION) EVERY 6 HOURS PRN
Qty: 1 EACH | Refills: 3 | Status: SHIPPED | OUTPATIENT
Start: 2024-02-22

## 2024-02-22 RX ORDER — OMEGA-3/DHA/EPA/FISH OIL 910-1400MG
CAPSULE ORAL
COMMUNITY

## 2024-02-22 SDOH — ECONOMIC STABILITY: INCOME INSECURITY: HOW HARD IS IT FOR YOU TO PAY FOR THE VERY BASICS LIKE FOOD, HOUSING, MEDICAL CARE, AND HEATING?: NOT HARD AT ALL

## 2024-02-22 SDOH — ECONOMIC STABILITY: FOOD INSECURITY: WITHIN THE PAST 12 MONTHS, YOU WORRIED THAT YOUR FOOD WOULD RUN OUT BEFORE YOU GOT MONEY TO BUY MORE.: NEVER TRUE

## 2024-02-22 SDOH — ECONOMIC STABILITY: FOOD INSECURITY: WITHIN THE PAST 12 MONTHS, THE FOOD YOU BOUGHT JUST DIDN'T LAST AND YOU DIDN'T HAVE MONEY TO GET MORE.: NEVER TRUE

## 2024-02-22 ASSESSMENT — ENCOUNTER SYMPTOMS
ABDOMINAL PAIN: 0
CONSTIPATION: 0
SINUS PRESSURE: 0
SORE THROAT: 0
VOMITING: 0
NAUSEA: 0
COUGH: 0
SHORTNESS OF BREATH: 0
TROUBLE SWALLOWING: 0
DIARRHEA: 0
WHEEZING: 0
BLOOD IN STOOL: 0

## 2024-02-22 ASSESSMENT — PATIENT HEALTH QUESTIONNAIRE - PHQ9
1. LITTLE INTEREST OR PLEASURE IN DOING THINGS: 0
SUM OF ALL RESPONSES TO PHQ QUESTIONS 1-9: 0
SUM OF ALL RESPONSES TO PHQ QUESTIONS 1-9: 0
SUM OF ALL RESPONSES TO PHQ9 QUESTIONS 1 & 2: 0
SUM OF ALL RESPONSES TO PHQ QUESTIONS 1-9: 0
SUM OF ALL RESPONSES TO PHQ QUESTIONS 1-9: 0

## 2024-02-22 NOTE — PROGRESS NOTES
Hemigard Retention Suture: 2-0 Nylon iron  CBC with Auto Differential      6. Screening for diabetes mellitus  Hemoglobin A1C      7. Mild intermittent asthma without complication  albuterol sulfate HFA (PROAIR HFA) 108 (90 Base) MCG/ACT inhaler    Edy Restrepo MD, Pulmonology, [place]      8. Essential hypertension                  Plan:      Return if symptoms worsen or fail to improve.  Poor sleep, daytime drowsiness-lengthy discussion, she is willing to try trazodone.  Discussed if cannot achieve adequate rest during the night the daytime drowsiness may resolve.  She is also agreeable with further evaluation with sleep specialist, referral placed.  She did have negative sleep study approximately 2 to 3 years ago.  She is also encouraged to revisit hormone replacement therapy with her GYN prior to pursuing anything through a compounding pharmacy or alternative med clinic  Will check labs including TSH  Persistent cough, asthma-albuterol 3-4 times daily, referral to pulmonology as cough has been persistent since having had COVID 2 months ago.  Discussed may need PFT  Fluid retention in legs, hypertension-with some discussion and review of recent cardiology note and current medications, was found that diuretic was removed from her irbesartan.  She was previously on a combination irbesartan/hydrochlorothiazide.  She has upcoming appointment with cardio and states will discuss this with them.  We will check labs to review kidney function and rule out other potential factors  Orders Placed This Encounter   Procedures    CBC with Auto Differential     Standing Status:   Future     Standing Expiration Date:   2/22/2025    Comprehensive Metabolic Panel     Standing Status:   Future     Standing Expiration Date:   2/21/2025    TSH     Standing Status:   Future     Standing Expiration Date:   2/22/2025    Hemoglobin A1C     Standing Status:   Future     Standing Expiration Date:   2/21/2025    Edy Restrepo MD, Pulmonology, [place]

## 2024-02-26 ENCOUNTER — TELEPHONE (OUTPATIENT)
Dept: PRIMARY CARE CLINIC | Age: 59
End: 2024-02-26

## 2024-02-26 NOTE — TELEPHONE ENCOUNTER
LVM for patient to call the office back    Received FMLA forms for patient. Need to know:    1) What is the FMLA for/in regards to  2) Is this for intermittent or continuous leave   A) if continuous first date of leave, and when she plans to return      Last Visit Date: 2/22/2024   Next Visit Date: Visit date not found

## 2024-02-26 NOTE — TELEPHONE ENCOUNTER
FMLA is in regards to sleep issue patient is having.    Would like to do 12 week continuous leave, until all of her testing and specialist appointments are completed.    Would like start date to be today 02/26/24    Also believes that this is hormone related states that she will also be seeing her OBGYN        Last Visit Date: 2/22/2024   Next Visit Date: Visit date not found

## 2024-02-27 ENCOUNTER — HOSPITAL ENCOUNTER (OUTPATIENT)
Dept: GENERAL RADIOLOGY | Age: 59
Discharge: HOME | End: 2024-02-29
Payer: COMMERCIAL

## 2024-02-27 ENCOUNTER — HOSPITAL ENCOUNTER (OUTPATIENT)
Age: 59
Setting detail: SPECIMEN
Discharge: HOME | End: 2024-02-27

## 2024-02-27 ENCOUNTER — HOSPITAL ENCOUNTER (OUTPATIENT)
Age: 59
Discharge: HOME | End: 2024-02-29
Payer: COMMERCIAL

## 2024-02-27 DIAGNOSIS — R60.0 FLUID RETENTION IN LEGS: ICD-10-CM

## 2024-02-27 DIAGNOSIS — Z72.820 POOR SLEEP: ICD-10-CM

## 2024-02-27 DIAGNOSIS — R40.0 HAS DAYTIME DROWSINESS: ICD-10-CM

## 2024-02-27 DIAGNOSIS — N20.0 KIDNEY STONES: ICD-10-CM

## 2024-02-27 DIAGNOSIS — Z13.0 SCREENING, ANEMIA, DEFICIENCY, IRON: ICD-10-CM

## 2024-02-27 DIAGNOSIS — Z13.1 SCREENING FOR DIABETES MELLITUS: ICD-10-CM

## 2024-02-27 LAB
ALBUMIN SERPL-MCNC: 4.3 G/DL (ref 3.5–5.2)
ALBUMIN/GLOB SERPL: 1 {RATIO} (ref 1–2.5)
ALP SERPL-CCNC: 132 U/L (ref 35–104)
ALT SERPL-CCNC: 17 U/L (ref 10–35)
ANION GAP SERPL CALCULATED.3IONS-SCNC: 10 MMOL/L (ref 9–16)
AST SERPL-CCNC: 26 U/L (ref 10–35)
BASOPHILS # BLD: 0.04 K/UL (ref 0–0.2)
BASOPHILS NFR BLD: 0 % (ref 0–2)
BILIRUB SERPL-MCNC: 0.3 MG/DL (ref 0–1.2)
BUN SERPL-MCNC: 15 MG/DL (ref 8–23)
CALCIUM SERPL-MCNC: 9.7 MG/DL (ref 8.6–10.4)
CHLORIDE SERPL-SCNC: 102 MMOL/L (ref 98–107)
CO2 SERPL-SCNC: 27 MMOL/L (ref 20–31)
CREAT SERPL-MCNC: 0.9 MG/DL (ref 0.5–0.9)
EOSINOPHIL # BLD: 0.37 K/UL (ref 0–0.44)
EOSINOPHILS RELATIVE PERCENT: 4 % (ref 1–4)
ERYTHROCYTE [DISTWIDTH] IN BLOOD BY AUTOMATED COUNT: 15.7 % (ref 11.8–14.4)
GFR SERPL CREATININE-BSD FRML MDRD: >60 ML/MIN/1.73M2
GLUCOSE SERPL-MCNC: 71 MG/DL (ref 74–99)
HCT VFR BLD AUTO: 40.6 % (ref 36.3–47.1)
HGB BLD-MCNC: 12.4 G/DL (ref 11.9–15.1)
IMM GRANULOCYTES # BLD AUTO: 0.06 K/UL (ref 0–0.3)
IMM GRANULOCYTES NFR BLD: 1 %
LYMPHOCYTES NFR BLD: 2.64 K/UL (ref 1.1–3.7)
LYMPHOCYTES RELATIVE PERCENT: 26 % (ref 24–43)
MCH RBC QN AUTO: 25.5 PG (ref 25.2–33.5)
MCHC RBC AUTO-ENTMCNC: 30.5 G/DL (ref 28.4–34.8)
MCV RBC AUTO: 83.4 FL (ref 82.6–102.9)
MONOCYTES NFR BLD: 0.94 K/UL (ref 0.1–1.2)
MONOCYTES NFR BLD: 9 % (ref 3–12)
NEUTROPHILS NFR BLD: 60 % (ref 36–65)
NEUTS SEG NFR BLD: 6.25 K/UL (ref 1.5–8.1)
NRBC BLD-RTO: 0 PER 100 WBC
PLATELET # BLD AUTO: 295 K/UL (ref 138–453)
PMV BLD AUTO: 10.3 FL (ref 8.1–13.5)
POTASSIUM SERPL-SCNC: 4.1 MMOL/L (ref 3.7–5.3)
PROT SERPL-MCNC: 7.5 G/DL (ref 6.6–8.7)
RBC # BLD AUTO: 4.87 M/UL (ref 3.95–5.11)
RBC # BLD: ABNORMAL 10*6/UL
SODIUM SERPL-SCNC: 139 MMOL/L (ref 136–145)
TSH SERPL DL<=0.05 MIU/L-ACNC: 1.43 UIU/ML (ref 0.27–4.2)
WBC OTHER # BLD: 10.3 K/UL (ref 3.5–11.3)

## 2024-02-27 PROCEDURE — 74018 RADEX ABDOMEN 1 VIEW: CPT

## 2024-02-28 LAB
EST. AVERAGE GLUCOSE BLD GHB EST-MCNC: 123 MG/DL
HBA1C MFR BLD: 5.9 % (ref 4–6)

## 2024-03-15 ENCOUNTER — HOSPITAL ENCOUNTER (OUTPATIENT)
Age: 59
Setting detail: SPECIMEN
Discharge: HOME | End: 2024-03-15

## 2024-03-15 DIAGNOSIS — I51.7 LVH (LEFT VENTRICULAR HYPERTROPHY): ICD-10-CM

## 2024-03-15 DIAGNOSIS — I10 ESSENTIAL HYPERTENSION: ICD-10-CM

## 2024-03-15 LAB
ANION GAP SERPL CALCULATED.3IONS-SCNC: 13 MMOL/L (ref 9–16)
BNP SERPL-MCNC: 81 PG/ML (ref 0–300)
BUN SERPL-MCNC: 20 MG/DL (ref 8–23)
CALCIUM SERPL-MCNC: 10 MG/DL (ref 8.6–10.4)
CHLORIDE SERPL-SCNC: 99 MMOL/L (ref 98–107)
CO2 SERPL-SCNC: 26 MMOL/L (ref 20–31)
CREAT SERPL-MCNC: 1 MG/DL (ref 0.5–0.9)
GFR SERPL CREATININE-BSD FRML MDRD: >60 ML/MIN/1.73M2
GLUCOSE SERPL-MCNC: 146 MG/DL (ref 74–99)
POTASSIUM SERPL-SCNC: 4.2 MMOL/L (ref 3.7–5.3)
SODIUM SERPL-SCNC: 138 MMOL/L (ref 136–145)

## 2024-03-21 ENCOUNTER — HOSPITAL ENCOUNTER (OUTPATIENT)
Age: 59
Setting detail: SPECIMEN
Discharge: HOME | End: 2024-03-21

## 2024-03-21 LAB
25(OH)D3 SERPL-MCNC: 51.6 NG/ML (ref 30–100)
T3FREE SERPL-MCNC: 2.9 PG/ML (ref 2–4.4)
T4 FREE SERPL-MCNC: 1.1 NG/DL (ref 0.93–1.7)
TSH SERPL DL<=0.05 MIU/L-ACNC: 1.66 UIU/ML (ref 0.27–4.2)

## 2024-03-23 LAB — THYROGLOBULIN AB: <12 IU/ML (ref 0–40)

## 2024-03-25 ENCOUNTER — HOSPITAL ENCOUNTER (OUTPATIENT)
Dept: MAMMOGRAPHY | Age: 59
Discharge: HOME | End: 2024-03-27
Attending: OBSTETRICS & GYNECOLOGY
Payer: COMMERCIAL

## 2024-03-25 VITALS — WEIGHT: 280 LBS | BODY MASS INDEX: 42.44 KG/M2 | HEIGHT: 68 IN

## 2024-03-25 DIAGNOSIS — Z12.31 ENCOUNTER FOR SCREENING MAMMOGRAM FOR BREAST CANCER: ICD-10-CM

## 2024-03-25 LAB — T3 REVERSE: 19 NG/DL (ref 9–27)

## 2024-03-25 PROCEDURE — 77063 BREAST TOMOSYNTHESIS BI: CPT

## 2024-03-26 DIAGNOSIS — R92.8 ABNORMAL MAMMOGRAM OF LEFT BREAST: Primary | ICD-10-CM

## 2024-04-04 ENCOUNTER — HOSPITAL ENCOUNTER (OUTPATIENT)
Dept: PULMONOLOGY | Age: 59
Discharge: HOME | End: 2024-04-04
Payer: COMMERCIAL

## 2024-04-04 DIAGNOSIS — R06.02 SOB (SHORTNESS OF BREATH): ICD-10-CM

## 2024-04-04 LAB
DLCO %PRED: NORMAL
DLCO PRED: NORMAL
DLCO/VA %PRED: NORMAL
DLCO/VA PRED: NORMAL
DLCO/VA: NORMAL
DLCO: NORMAL
EXPIRATORY TIME-POST: NORMAL
EXPIRATORY TIME: NORMAL
FEF 25-75 %CHNG: NORMAL
FEF 25-75 POST %PRED: NORMAL
FEF 25-75% %PRED-PRE: NORMAL
FEF 25-75% PRED: NORMAL
FEF 25-75-POST: NORMAL
FEF 25-75-PRE: NORMAL
FEV1 %PRED-POST: NORMAL
FEV1 %PRED-PRE: NORMAL
FEV1 PRED: NORMAL
FEV1-POST: NORMAL
FEV1-PRE: NORMAL
FEV1/FVC %PRED-POST: NORMAL
FEV1/FVC %PRED-PRE: NORMAL
FEV1/FVC PRED: NORMAL
FEV1/FVC-POST: NORMAL
FEV1/FVC-PRE: NORMAL
FVC %PRED-POST: NORMAL
FVC %PRED-PRE: NORMAL
FVC PRED: NORMAL
FVC-POST: NORMAL
FVC-PRE: NORMAL
GAW %PRED: NORMAL
GAW PRED: NORMAL
GAW: NORMAL
IC PRE %PRED: NORMAL
IC PRED: NORMAL
IC: NORMAL
MEP: NORMAL
MIP: NORMAL
MVV %PRED-PRE: NORMAL
MVV PRED: NORMAL
MVV-PRE: NORMAL
PEF %PRED-POST: NORMAL
PEF %PRED-PRE: NORMAL
PEF PRED: NORMAL
PEF%CHNG: NORMAL
PEF-POST: NORMAL
PEF-PRE: NORMAL
RAW %PRED: NORMAL
RAW PRED: NORMAL
RAW: NORMAL
RV PRE %PRED: NORMAL
RV PRED: NORMAL
RV: NORMAL
SVC %PRED: NORMAL
SVC PRED: NORMAL
SVC: NORMAL
TLC PRE %PRED: NORMAL
TLC PRED: NORMAL
TLC: NORMAL
VA %PRED: NORMAL
VA PRED: NORMAL
VA: NORMAL
VTG %PRED: NORMAL
VTG PRED: NORMAL
VTG: NORMAL

## 2024-04-04 PROCEDURE — 6370000000 HC RX 637 (ALT 250 FOR IP): Performed by: INTERNAL MEDICINE

## 2024-04-04 PROCEDURE — 94726 PLETHYSMOGRAPHY LUNG VOLUMES: CPT

## 2024-04-04 PROCEDURE — 94729 DIFFUSING CAPACITY: CPT

## 2024-04-04 PROCEDURE — 94060 EVALUATION OF WHEEZING: CPT

## 2024-04-04 RX ORDER — ALBUTEROL SULFATE 90 UG/1
2 AEROSOL, METERED RESPIRATORY (INHALATION) ONCE
Status: COMPLETED | OUTPATIENT
Start: 2024-04-04 | End: 2024-04-04

## 2024-04-04 RX ADMIN — ALBUTEROL SULFATE 2 PUFF: 90 AEROSOL, METERED RESPIRATORY (INHALATION) at 15:32

## 2024-04-04 NOTE — PROCEDURES
Impression:    Mild restrictive lung disease with normal diffusion and no significant response to bronchodilators.  Reduced mid flows may indicate small airways disease.  Clinical correlation is recommended.    Meng Shah MD  Pulmonary critical care and sleep medicine

## 2024-04-09 ENCOUNTER — HOSPITAL ENCOUNTER (OUTPATIENT)
Dept: WOMENS IMAGING | Age: 59
Discharge: HOME | End: 2024-04-11
Payer: COMMERCIAL

## 2024-04-09 DIAGNOSIS — R92.8 ABNORMAL MAMMOGRAM OF LEFT BREAST: ICD-10-CM

## 2024-04-09 DIAGNOSIS — R92.8 ABNORMAL MAMMOGRAM: ICD-10-CM

## 2024-04-09 DIAGNOSIS — R92.8 ABNORMAL MAMMOGRAM OF LEFT BREAST: Primary | ICD-10-CM

## 2024-04-09 PROCEDURE — 76642 ULTRASOUND BREAST LIMITED: CPT

## 2024-04-15 ENCOUNTER — HOSPITAL ENCOUNTER (OUTPATIENT)
Dept: CT IMAGING | Age: 59
Discharge: HOME | End: 2024-04-17
Attending: PHYSICIAN ASSISTANT
Payer: COMMERCIAL

## 2024-04-15 ENCOUNTER — HOSPITAL ENCOUNTER (OUTPATIENT)
Age: 59
Discharge: HOME | End: 2024-04-17
Attending: PHYSICIAN ASSISTANT
Payer: COMMERCIAL

## 2024-04-15 ENCOUNTER — OFFICE VISIT (OUTPATIENT)
Age: 59
End: 2024-04-15
Payer: COMMERCIAL

## 2024-04-15 ENCOUNTER — HOSPITAL ENCOUNTER (OUTPATIENT)
Age: 59
Setting detail: SPECIMEN
Discharge: HOME | End: 2024-04-15

## 2024-04-15 VITALS
BODY MASS INDEX: 42.89 KG/M2 | SYSTOLIC BLOOD PRESSURE: 138 MMHG | HEART RATE: 67 BPM | TEMPERATURE: 96.5 F | HEIGHT: 68 IN | OXYGEN SATURATION: 95 % | DIASTOLIC BLOOD PRESSURE: 80 MMHG | WEIGHT: 283 LBS

## 2024-04-15 DIAGNOSIS — N20.0 KIDNEY STONES: ICD-10-CM

## 2024-04-15 DIAGNOSIS — R92.8 ABNORMAL MAMMOGRAM: ICD-10-CM

## 2024-04-15 DIAGNOSIS — G25.0 BENIGN ESSENTIAL TREMOR: ICD-10-CM

## 2024-04-15 DIAGNOSIS — E66.9 OBESITY (BMI 30-39.9): ICD-10-CM

## 2024-04-15 DIAGNOSIS — I10 ESSENTIAL HYPERTENSION: ICD-10-CM

## 2024-04-15 DIAGNOSIS — R06.02 SHORTNESS OF BREATH: ICD-10-CM

## 2024-04-15 DIAGNOSIS — M79.89 LEG SWELLING: ICD-10-CM

## 2024-04-15 DIAGNOSIS — R06.02 SHORTNESS OF BREATH: Primary | ICD-10-CM

## 2024-04-15 DIAGNOSIS — K21.9 GASTROESOPHAGEAL REFLUX DISEASE WITHOUT ESOPHAGITIS: ICD-10-CM

## 2024-04-15 DIAGNOSIS — Z90.5 SOLITARY KIDNEY, ACQUIRED: ICD-10-CM

## 2024-04-15 PROBLEM — G44.201 ACUTE INTRACTABLE TENSION-TYPE HEADACHE: Status: RESOLVED | Noted: 2017-11-21 | Resolved: 2024-04-15

## 2024-04-15 LAB
ALBUMIN SERPL-MCNC: 3.9 G/DL (ref 3.5–5.2)
ALBUMIN/GLOB SERPL: 1 {RATIO} (ref 1–2.5)
ALP SERPL-CCNC: 105 U/L (ref 35–104)
ALT SERPL-CCNC: 17 U/L (ref 10–35)
ANION GAP SERPL CALCULATED.3IONS-SCNC: 10 MMOL/L (ref 9–16)
AST SERPL-CCNC: 25 U/L (ref 10–35)
BILIRUB SERPL-MCNC: 0.3 MG/DL (ref 0–1.2)
BUN SERPL-MCNC: 27 MG/DL (ref 8–23)
CALCIUM SERPL-MCNC: 9.2 MG/DL (ref 8.6–10.4)
CHLORIDE SERPL-SCNC: 100 MMOL/L (ref 98–107)
CO2 SERPL-SCNC: 28 MMOL/L (ref 20–31)
CREAT BLD-MCNC: 1.2 MG/DL (ref 0.6–1.4)
CREAT SERPL-MCNC: 1 MG/DL (ref 0.5–0.9)
D DIMER PPP FEU-MCNC: 0.66 UG/ML FEU (ref 0–0.57)
ECHO BSA: 2.48 M2
GFR SERPL CREATININE-BSD FRML MDRD: 68 ML/MIN/1.73M2
GLUCOSE SERPL-MCNC: 121 MG/DL (ref 74–99)
POTASSIUM SERPL-SCNC: 4.5 MMOL/L (ref 3.7–5.3)
PROT SERPL-MCNC: 6.8 G/DL (ref 6.6–8.7)
SODIUM SERPL-SCNC: 138 MMOL/L (ref 136–145)

## 2024-04-15 PROCEDURE — 3075F SYST BP GE 130 - 139MM HG: CPT | Performed by: PHYSICIAN ASSISTANT

## 2024-04-15 PROCEDURE — 93970 EXTREMITY STUDY: CPT

## 2024-04-15 PROCEDURE — 93970 EXTREMITY STUDY: CPT | Performed by: STUDENT IN AN ORGANIZED HEALTH CARE EDUCATION/TRAINING PROGRAM

## 2024-04-15 PROCEDURE — 82565 ASSAY OF CREATININE: CPT

## 2024-04-15 PROCEDURE — 71260 CT THORAX DX C+: CPT

## 2024-04-15 PROCEDURE — 2580000003 HC RX 258: Performed by: PHYSICIAN ASSISTANT

## 2024-04-15 PROCEDURE — 6360000004 HC RX CONTRAST MEDICATION: Performed by: PHYSICIAN ASSISTANT

## 2024-04-15 PROCEDURE — 3079F DIAST BP 80-89 MM HG: CPT | Performed by: PHYSICIAN ASSISTANT

## 2024-04-15 PROCEDURE — 99205 OFFICE O/P NEW HI 60 MIN: CPT | Performed by: PHYSICIAN ASSISTANT

## 2024-04-15 RX ORDER — 0.9 % SODIUM CHLORIDE 0.9 %
80 INTRAVENOUS SOLUTION INTRAVENOUS ONCE
Status: COMPLETED | OUTPATIENT
Start: 2024-04-15 | End: 2024-04-15

## 2024-04-15 RX ORDER — SODIUM CHLORIDE 0.9 % (FLUSH) 0.9 %
10 SYRINGE (ML) INJECTION PRN
Status: DISCONTINUED | OUTPATIENT
Start: 2024-04-15 | End: 2024-04-18 | Stop reason: HOSPADM

## 2024-04-15 RX ADMIN — SODIUM CHLORIDE, PRESERVATIVE FREE 10 ML: 5 INJECTION INTRAVENOUS at 13:31

## 2024-04-15 RX ADMIN — IOPAMIDOL 100 ML: 755 INJECTION, SOLUTION INTRAVENOUS at 13:30

## 2024-04-15 RX ADMIN — SODIUM CHLORIDE 80 ML: 9 INJECTION, SOLUTION INTRAVENOUS at 13:30

## 2024-04-15 ASSESSMENT — ENCOUNTER SYMPTOMS
WHEEZING: 0
BACK PAIN: 0
BLOOD IN STOOL: 0
EYE REDNESS: 0
ABDOMINAL PAIN: 0
RHINORRHEA: 0
VOMITING: 0
CONSTIPATION: 0
NAUSEA: 0
SINUS PAIN: 0
SHORTNESS OF BREATH: 1
DIARRHEA: 0
SINUS PRESSURE: 0
SORE THROAT: 0
EYE PAIN: 0
COUGH: 1

## 2024-04-15 NOTE — PROGRESS NOTES
at all   Food Insecurity: No Food Insecurity (2/22/2024)    Hunger Vital Sign     Worried About Running Out of Food in the Last Year: Never true     Ran Out of Food in the Last Year: Never true   Transportation Needs: Unknown (2/22/2024)    PRAPARE - Transportation     Lack of Transportation (Non-Medical): No   Physical Activity: Unknown (3/29/2023)    Exercise Vital Sign     Days of Exercise per Week: 0 days   Intimate Partner Violence: Not At Risk (3/29/2023)    Humiliation, Afraid, Rape, and Kick questionnaire     Fear of Current or Ex-Partner: No     Emotionally Abused: No     Physically Abused: No     Sexually Abused: No   Housing Stability: Unknown (2/22/2024)    Housing Stability Vital Sign     Unstable Housing in the Last Year: No        Family History   Problem Relation Age of Onset    High Blood Pressure Mother     Dementia Mother     High Blood Pressure Father     Diabetes Father     Cancer Father         tongue    Hypertension Father     Heart Disease Father         stents    Other Father         Mylofibrosis    Hypertension Sister     Heart Disease Maternal Grandmother     Heart Attack Maternal Grandmother 80    Prostate Cancer Maternal Grandfather     Emphysema Paternal Grandmother     Heart Disease Paternal Grandfather     Stroke Paternal Grandfather 65        PHYSICAL EXAM     /80 (Site: Left Upper Arm, Position: Sitting, Cuff Size: Large Adult)   Pulse 67   Temp (!) 96.5 °F (35.8 °C) (Temporal)   Ht 1.727 m (5' 8\")   Wt 128.4 kg (283 lb)   SpO2 95%   BMI 43.03 kg/m²    Physical Exam  Vitals reviewed.   Constitutional:       General: She is not in acute distress.     Appearance: Normal appearance.   HENT:      Head: Normocephalic and atraumatic.      Right Ear: Tympanic membrane and external ear normal.      Left Ear: Tympanic membrane and external ear normal.      Nose: Nose normal.      Mouth/Throat:      Mouth: Mucous membranes are moist.      Pharynx: No oropharyngeal exudate or

## 2024-04-23 DIAGNOSIS — K21.9 GASTROESOPHAGEAL REFLUX DISEASE WITHOUT ESOPHAGITIS: ICD-10-CM

## 2024-04-23 RX ORDER — FAMOTIDINE 20 MG/1
20 TABLET, FILM COATED ORAL 2 TIMES DAILY
Qty: 60 TABLET | Refills: 5 | Status: SHIPPED | OUTPATIENT
Start: 2024-04-23

## 2024-04-23 NOTE — TELEPHONE ENCOUNTER
Luci Castañeda is calling to request a refill on the following medication(s):    Last Visit Date (If Applicable):  4/15/2024    Next Visit Date:    05/08/2024    Medication Request:  Requested Prescriptions     Pending Prescriptions Disp Refills    famotidine (PEPCID) 20 MG tablet 60 tablet 5     Sig: Take 1 tablet by mouth 2 times daily

## 2024-04-26 ENCOUNTER — HOSPITAL ENCOUNTER (OUTPATIENT)
Dept: WOMENS IMAGING | Age: 59
End: 2024-04-26
Payer: COMMERCIAL

## 2024-04-26 VITALS — DIASTOLIC BLOOD PRESSURE: 81 MMHG | HEART RATE: 58 BPM | SYSTOLIC BLOOD PRESSURE: 133 MMHG

## 2024-04-26 DIAGNOSIS — R92.8 ABNORMAL MAMMOGRAM OF LEFT BREAST: ICD-10-CM

## 2024-04-26 PROCEDURE — 88360 TUMOR IMMUNOHISTOCHEM/MANUAL: CPT

## 2024-04-26 PROCEDURE — 88342 IMHCHEM/IMCYTCHM 1ST ANTB: CPT

## 2024-04-26 PROCEDURE — 77065 DX MAMMO INCL CAD UNI: CPT

## 2024-04-26 PROCEDURE — 88305 TISSUE EXAM BY PATHOLOGIST: CPT

## 2024-04-26 PROCEDURE — 19083 BX BREAST 1ST LESION US IMAG: CPT

## 2024-04-26 PROCEDURE — 88341 IMHCHEM/IMCYTCHM EA ADD ANTB: CPT

## 2024-04-26 PROCEDURE — A4648 IMPLANTABLE TISSUE MARKER: HCPCS

## 2024-04-26 PROCEDURE — 19084 BX BREAST ADD LESION US IMAG: CPT

## 2024-04-30 ENCOUNTER — TELEPHONE (OUTPATIENT)
Dept: WOMENS IMAGING | Age: 59
End: 2024-04-30

## 2024-04-30 NOTE — TELEPHONE ENCOUNTER
Patient was contacted on 4/29/24 at 3pm.  Left message letting her know if she needed any assistance to please call.  Reminded her that she is to obtain the results of her biopsy from the office of Loretta Chanel.  / Electronically signed by MARCELLO Greenfield CBIN on 4/30/2024 at 7:01 AM

## 2024-05-01 ENCOUNTER — HOSPITAL ENCOUNTER (OUTPATIENT)
Age: 59
Setting detail: SPECIMEN
Discharge: HOME | End: 2024-05-01

## 2024-05-01 ENCOUNTER — OFFICE VISIT (OUTPATIENT)
Dept: PULMONOLOGY | Age: 59
End: 2024-05-01
Payer: COMMERCIAL

## 2024-05-01 VITALS
WEIGHT: 280.4 LBS | HEIGHT: 68 IN | HEART RATE: 66 BPM | RESPIRATION RATE: 14 BRPM | OXYGEN SATURATION: 96 % | DIASTOLIC BLOOD PRESSURE: 67 MMHG | BODY MASS INDEX: 42.5 KG/M2 | TEMPERATURE: 97.3 F | SYSTOLIC BLOOD PRESSURE: 123 MMHG

## 2024-05-01 DIAGNOSIS — G47.33 OSA (OBSTRUCTIVE SLEEP APNEA): ICD-10-CM

## 2024-05-01 DIAGNOSIS — J45.51 SEVERE PERSISTENT ASTHMA WITH ACUTE EXACERBATION: Primary | ICD-10-CM

## 2024-05-01 DIAGNOSIS — R05.3 PERSISTENT COUGH: ICD-10-CM

## 2024-05-01 PROCEDURE — 99204 OFFICE O/P NEW MOD 45 MIN: CPT | Performed by: INTERNAL MEDICINE

## 2024-05-01 PROCEDURE — 3078F DIAST BP <80 MM HG: CPT | Performed by: INTERNAL MEDICINE

## 2024-05-01 PROCEDURE — 3074F SYST BP LT 130 MM HG: CPT | Performed by: INTERNAL MEDICINE

## 2024-05-01 RX ORDER — BUDESONIDE AND FORMOTEROL FUMARATE DIHYDRATE 160; 4.5 UG/1; UG/1
2 AEROSOL RESPIRATORY (INHALATION) 2 TIMES DAILY
Qty: 6 EACH | Refills: 0 | Status: SHIPPED | OUTPATIENT
Start: 2024-05-01 | End: 2024-07-30

## 2024-05-01 RX ORDER — FUROSEMIDE 40 MG/1
40 TABLET ORAL DAILY
Qty: 30 TABLET | Refills: 0 | Status: SHIPPED | OUTPATIENT
Start: 2024-05-01 | End: 2024-05-08 | Stop reason: SDUPTHER

## 2024-05-01 ASSESSMENT — SLEEP AND FATIGUE QUESTIONNAIRES
ESS TOTAL SCORE: 8
HOW LIKELY ARE YOU TO NOD OFF OR FALL ASLEEP WHILE WATCHING TV: SLIGHT CHANCE OF DOZING
HOW LIKELY ARE YOU TO NOD OFF OR FALL ASLEEP IN A CAR, WHILE STOPPED FOR A FEW MINUTES IN TRAFFIC: WOULD NEVER DOZE
HOW LIKELY ARE YOU TO NOD OFF OR FALL ASLEEP WHILE SITTING INACTIVE IN A PUBLIC PLACE: WOULD NEVER DOZE
HOW LIKELY ARE YOU TO NOD OFF OR FALL ASLEEP WHILE SITTING QUIETLY AFTER LUNCH WITHOUT ALCOHOL: MODERATE CHANCE OF DOZING
HOW LIKELY ARE YOU TO NOD OFF OR FALL ASLEEP WHILE SITTING AND TALKING TO SOMEONE: WOULD NEVER DOZE
HOW LIKELY ARE YOU TO NOD OFF OR FALL ASLEEP WHILE SITTING AND READING: MODERATE CHANCE OF DOZING
HOW LIKELY ARE YOU TO NOD OFF OR FALL ASLEEP WHILE LYING DOWN TO REST IN THE AFTERNOON WHEN CIRCUMSTANCES PERMIT: HIGH CHANCE OF DOZING
HOW LIKELY ARE YOU TO NOD OFF OR FALL ASLEEP WHEN YOU ARE A PASSENGER IN A CAR FOR AN HOUR WITHOUT A BREAK: WOULD NEVER DOZE

## 2024-05-01 NOTE — PATIENT INSTRUCTIONS
@[facility]        YOU ARE BEING REFERRED TO:    [facility] (a department of [facility])    [address] [place] [address] [address]  [place] [address]    Main Phone Number: [phone]    Phone number for scheduling sleep study: [phone]      Please contact the above numbers to schedule your sleep study.     Once you have completed the FIRST NIGHT you may be asked to return for a SECOND NIGHT if necessary.   After the SECOND NIGHT the sleep center will order a CPAP/BiPAP machine for you.     An order for the machine will be sent to a durable medical equipment company ([employer]).   The sleep center will provide you with the [employer] companies information.     Once you have your machine please contact our office to schedule a follow up appointment.   Your follow up will be scheduled for a date 30-90 days after you have received your machine.   At that follow up visit we will need to have a compliance download from your DME company.   Please contact your [employer] company to have the compliance download faxed to our office at   [phone] for your follow up appointment.       IF YOU HAVE ANY QUESTIONS ABOUT YOUR SLEEP STUDY   PLEASE CONTACT THE SLEEP CENTER.

## 2024-05-02 LAB
EBV EA-D IGG SER-ACNC: 135 U/ML
EBV INTERPRETATION: ABNORMAL
EBV NA IGG SER IA-ACNC: 155 U/ML
EBV VCA IGG SER-ACNC: 1023 U/ML
EBV VCA IGM SER-ACNC: 99 U/ML

## 2024-05-03 LAB — SURGICAL PATHOLOGY REPORT: NORMAL

## 2024-05-06 ENCOUNTER — TELEPHONE (OUTPATIENT)
Dept: ONCOLOGY | Age: 59
End: 2024-05-06

## 2024-05-06 DIAGNOSIS — C50.912 INVASIVE DUCTAL CARCINOMA OF BREAST, LEFT (HCC): Primary | ICD-10-CM

## 2024-05-06 NOTE — TELEPHONE ENCOUNTER
Note was routed to Dr Chanel about pt's positive breast biopsy along with [facility] information.

## 2024-05-06 NOTE — TELEPHONE ENCOUNTER
Name: Luci Castañeda  : 1965  MRN: [id number]    Oncology Navigation- Initial Note: (first attempt)     Intake-  Contact Type: Telephone    Diagnosis: Breast-malignant    Home Disposition: Lives alone, good support     Patient needs and barriers to care: Financial Concerns/ Disability     Referral Source: Health Professional Dr Chanel     Interventions-     Smoking hx: Never      Continuum of Care: Diagnosis/Active Treatment    Notes: Writer spoke with pt, introduced self and navigator role. Pt was referred to navigation by Dr Chanle. Discussed breast clinic and pt was given appt details.     Barriers of care were reviewed with pt. Currently pt reports no barriers to care. Pt did have questions bout FMLA/STD which were addressed. Discussed that if future barriers arise, navigator can help with resources to overcome these.     Pt was given navigator's contact information and encouraged to contact writer as needed.     Pt was added to [facility] to requested tumor board discussion.     OB/GYN history:  First menstruation: age 13  Menopause: Postmenopausal, late 40's   Number of Pregnancies: 0  Miscarriages:n/a  Abortions:n/a  Age at first live birth:n/a   Years on HRT: 6 months  Years on BC: ~2 months   Personal history of cancer: basel cell carcinoma x 2   Family history of breast cancer: n/a  Family history of other cancers: father with large cell lymphoma diagnosed at age 59. Sister and Mother with skin cancer (not melanoma), Maternal Aunt with Myeloma.         Electronically signed by Veronica Bennett RN on 2024 at 9:45 AM

## 2024-05-07 ENCOUNTER — HOSPITAL ENCOUNTER (OUTPATIENT)
Dept: WOMENS IMAGING | Age: 59
Discharge: HOME | End: 2024-05-09
Attending: OBSTETRICS & GYNECOLOGY
Payer: COMMERCIAL

## 2024-05-07 DIAGNOSIS — R92.8 ABNORMAL MAMMOGRAM OF LEFT BREAST: ICD-10-CM

## 2024-05-07 PROCEDURE — 19084 BX BREAST ADD LESION US IMAG: CPT

## 2024-05-08 ENCOUNTER — OFFICE VISIT (OUTPATIENT)
Age: 59
End: 2024-05-08
Payer: COMMERCIAL

## 2024-05-08 VITALS
WEIGHT: 277 LBS | BODY MASS INDEX: 41.98 KG/M2 | HEART RATE: 78 BPM | SYSTOLIC BLOOD PRESSURE: 130 MMHG | DIASTOLIC BLOOD PRESSURE: 86 MMHG | TEMPERATURE: 97.1 F | HEIGHT: 68 IN | OXYGEN SATURATION: 94 %

## 2024-05-08 DIAGNOSIS — G89.29 CHRONIC LEFT-SIDED LOW BACK PAIN WITHOUT SCIATICA: ICD-10-CM

## 2024-05-08 DIAGNOSIS — Z96.651 HISTORY OF RIGHT KNEE JOINT REPLACEMENT: ICD-10-CM

## 2024-05-08 DIAGNOSIS — M79.89 LEG SWELLING: ICD-10-CM

## 2024-05-08 DIAGNOSIS — M17.12 PRIMARY OSTEOARTHRITIS OF LEFT KNEE: ICD-10-CM

## 2024-05-08 DIAGNOSIS — N20.0 KIDNEY STONES: ICD-10-CM

## 2024-05-08 DIAGNOSIS — J43.9 MIXED RESTRICTIVE AND OBSTRUCTIVE LUNG DISEASE (HCC): ICD-10-CM

## 2024-05-08 DIAGNOSIS — G47.33 OSA (OBSTRUCTIVE SLEEP APNEA): ICD-10-CM

## 2024-05-08 DIAGNOSIS — K76.0 FATTY LIVER: ICD-10-CM

## 2024-05-08 DIAGNOSIS — Z72.820 POOR SLEEP: ICD-10-CM

## 2024-05-08 DIAGNOSIS — I25.10 CORONARY ARTERY DISEASE INVOLVING NATIVE CORONARY ARTERY OF NATIVE HEART WITHOUT ANGINA PECTORIS: ICD-10-CM

## 2024-05-08 DIAGNOSIS — Z90.5 SOLITARY KIDNEY, ACQUIRED: ICD-10-CM

## 2024-05-08 DIAGNOSIS — Z86.16 HISTORY OF COVID-19: ICD-10-CM

## 2024-05-08 DIAGNOSIS — Z85.3 HISTORY OF LEFT BREAST CANCER: Primary | ICD-10-CM

## 2024-05-08 DIAGNOSIS — F51.01 PRIMARY INSOMNIA: ICD-10-CM

## 2024-05-08 DIAGNOSIS — E66.01 MORBID OBESITY (HCC): ICD-10-CM

## 2024-05-08 DIAGNOSIS — J98.4 MIXED RESTRICTIVE AND OBSTRUCTIVE LUNG DISEASE (HCC): ICD-10-CM

## 2024-05-08 DIAGNOSIS — R06.09 DYSPNEA ON EXERTION: ICD-10-CM

## 2024-05-08 DIAGNOSIS — M54.50 CHRONIC LEFT-SIDED LOW BACK PAIN WITHOUT SCIATICA: ICD-10-CM

## 2024-05-08 DIAGNOSIS — I10 ESSENTIAL HYPERTENSION: ICD-10-CM

## 2024-05-08 DIAGNOSIS — K21.9 GASTROESOPHAGEAL REFLUX DISEASE WITHOUT ESOPHAGITIS: ICD-10-CM

## 2024-05-08 DIAGNOSIS — G25.0 BENIGN ESSENTIAL TREMOR: ICD-10-CM

## 2024-05-08 PROBLEM — J44.9 MIXED RESTRICTIVE AND OBSTRUCTIVE LUNG DISEASE: Status: ACTIVE | Noted: 2024-05-08

## 2024-05-08 PROCEDURE — 3079F DIAST BP 80-89 MM HG: CPT | Performed by: INTERNAL MEDICINE

## 2024-05-08 PROCEDURE — 3075F SYST BP GE 130 - 139MM HG: CPT | Performed by: INTERNAL MEDICINE

## 2024-05-08 PROCEDURE — 99214 OFFICE O/P EST MOD 30 MIN: CPT | Performed by: INTERNAL MEDICINE

## 2024-05-08 RX ORDER — FUROSEMIDE 40 MG/1
40 TABLET ORAL DAILY
Qty: 90 TABLET | Refills: 1 | Status: SHIPPED | OUTPATIENT
Start: 2024-05-08 | End: 2024-11-04

## 2024-05-08 RX ORDER — TRAZODONE HYDROCHLORIDE 50 MG/1
50 TABLET ORAL NIGHTLY
Qty: 90 TABLET | Refills: 1 | Status: SHIPPED | OUTPATIENT
Start: 2024-05-08

## 2024-05-08 ASSESSMENT — ENCOUNTER SYMPTOMS
SINUS PAIN: 0
BLOOD IN STOOL: 0
NAUSEA: 0
EYE REDNESS: 0
BACK PAIN: 0
DIARRHEA: 0
SINUS PRESSURE: 0
EYE PAIN: 0
WHEEZING: 0
SHORTNESS OF BREATH: 0
RHINORRHEA: 0
VOMITING: 0
CONSTIPATION: 0
SORE THROAT: 0
COUGH: 0
ABDOMINAL PAIN: 0

## 2024-05-08 NOTE — PROGRESS NOTES
[facility]     Date of Visit:  2024  Patient Name: Luci Castañeda   Patient :  1965     CHIEF COMPLAINT:     Luci Castañeda is a 59 y.o. female who presents today for an general visit to be evaluated for the following condition(s):  Chief Complaint   Patient presents with    Follow-up     New Patient    Discuss Labs       HISTORY OF PRESENT ILLNESS      D-dimer slightly elevated.  Venous Doppler of legs and CTA chest negative for DVT or PE.    She found out on Monday that her breast biopsy was positive for left  breast cancer.  She is scheduled for appointments with Lisa Osborn and the oncology team on Friday. Good spirit.      Was on HRT  to Dec, stopped due to endometrial lining thickening.  Sleeping better with trazodone 50 mg qHS.     Sleep test scheduled. Had PFT.     REVIEW OF SYSTEMS     Review of Systems   Constitutional:  Negative for chills, fever and unexpected weight change.   HENT:  Negative for congestion, ear pain, hearing loss, rhinorrhea, sinus pressure, sinus pain, sneezing and sore throat.    Eyes:  Negative for pain, redness and visual disturbance.   Respiratory:  Negative for cough, shortness of breath and wheezing.    Cardiovascular:  Negative for chest pain, palpitations and leg swelling.   Gastrointestinal:  Negative for abdominal pain, blood in stool, constipation, diarrhea, nausea and vomiting.   Endocrine: Negative for cold intolerance and heat intolerance.   Genitourinary:  Negative for difficulty urinating, dysuria, frequency, hematuria and urgency.   Musculoskeletal:  Negative for arthralgias, back pain, gait problem, joint swelling, myalgias and neck pain.   Skin:  Negative for rash and wound.   Allergic/Immunologic: Negative for immunocompromised state.   Neurological:  Negative for dizziness, tremors, seizures, syncope, speech difficulty, weakness, light-headedness, numbness and headaches.   Psychiatric/Behavioral:  Negative for confusion, hallucinations

## 2024-05-10 ENCOUNTER — CLINICAL DOCUMENTATION (OUTPATIENT)
Dept: OCCUPATIONAL THERAPY | Age: 59
End: 2024-05-10

## 2024-05-10 ENCOUNTER — INITIAL CONSULT (OUTPATIENT)
Dept: ONCOLOGY | Age: 59
End: 2024-05-10

## 2024-05-10 ENCOUNTER — HOSPITAL ENCOUNTER (OUTPATIENT)
Dept: RADIATION ONCOLOGY | Age: 59
Discharge: HOME | End: 2024-05-10

## 2024-05-10 ENCOUNTER — INITIAL CONSULT (OUTPATIENT)
Dept: ONCOLOGY | Age: 59
End: 2024-05-10
Payer: COMMERCIAL

## 2024-05-10 ENCOUNTER — TELEPHONE (OUTPATIENT)
Age: 59
End: 2024-05-10

## 2024-05-10 ENCOUNTER — TELEPHONE (OUTPATIENT)
Dept: ONCOLOGY | Age: 59
End: 2024-05-10

## 2024-05-10 VITALS
HEART RATE: 63 BPM | OXYGEN SATURATION: 96 % | DIASTOLIC BLOOD PRESSURE: 87 MMHG | SYSTOLIC BLOOD PRESSURE: 147 MMHG | HEIGHT: 68 IN | RESPIRATION RATE: 18 BRPM | BODY MASS INDEX: 41.07 KG/M2 | TEMPERATURE: 98 F | WEIGHT: 271 LBS

## 2024-05-10 DIAGNOSIS — C50.412 MALIGNANT NEOPLASM OF UPPER-OUTER QUADRANT OF LEFT BREAST IN FEMALE, ESTROGEN RECEPTOR NEGATIVE (HCC): Primary | ICD-10-CM

## 2024-05-10 DIAGNOSIS — Z17.1 MALIGNANT NEOPLASM OF UPPER-OUTER QUADRANT OF LEFT BREAST IN FEMALE, ESTROGEN RECEPTOR NEGATIVE (HCC): Primary | ICD-10-CM

## 2024-05-10 DIAGNOSIS — C50.912 INVASIVE DUCTAL CARCINOMA OF BREAST, LEFT (HCC): Primary | ICD-10-CM

## 2024-05-10 DIAGNOSIS — C50.919 TRIPLE NEGATIVE BREAST CANCER (HCC): Primary | ICD-10-CM

## 2024-05-10 PROCEDURE — 93702 BIS XTRACELL FLUID ANALYSIS: CPT | Performed by: INTERNAL MEDICINE

## 2024-05-10 PROCEDURE — 3079F DIAST BP 80-89 MM HG: CPT | Performed by: INTERNAL MEDICINE

## 2024-05-10 PROCEDURE — 99205 OFFICE O/P NEW HI 60 MIN: CPT | Performed by: INTERNAL MEDICINE

## 2024-05-10 PROCEDURE — 3077F SYST BP >= 140 MM HG: CPT | Performed by: INTERNAL MEDICINE

## 2024-05-10 NOTE — CONSULTS
created with the assistance of a speech recognition program.  While intending to generate a document that actually reflects the content of the visit, the document can still have some errors including those of syntax and sound a like substitutions which may escape proof reading.  It such instances, actual meaning can be extrapolated by contextual diversion.

## 2024-05-10 NOTE — PATIENT INSTRUCTIONS
Port placement  Echo  PET/CT scan  Start neoadjuvant chemo and keytruda after above  RV at start of chemo

## 2024-05-10 NOTE — PROGRESS NOTES
followed by dose dense AC.  In addition using Keytruda on day 1 every 3 weeks for 1 year.  Explained the benefits and side effects related to the treatment and she understands. I explained the benefits and possible side effects related to chemotherapy, which may include but not limited to nausea, vomiting, hair loss, mouth sores, allergy, neuropathy, fever infection sepsis, anemia and thrombocytopenia.  We will have an echo and Mediport before starting treatment.  Will monitor toxicity and response to treatment.   Patient's questions were answered to the best of her satisfaction and she verbalized full understanding and agreement.    I spent a total of 60 minutes on the date of the service which included preparing to see the patient, face-to-face patient care, completing clinical documentation, obtaining and/or reviewing separately obtained history, performing a medically appropriate examination, counseling and educating the patient/family/caregiver, ordering medications, tests, or procedures, communicating with other HCPs (not separately reported), independently interpreting results (not separately reported), communicating results to the patient/family/caregiver and care coordination (not separately reported).                            Jess Walker MD                          [facility]                            This note is created with the assistance of a speech recognition program.  While intending to generate a document that actually reflects the content of the visit, the document can still have some errors including those of syntax and sound a like substitutions which may escape proof reading.  It such instances, actual meaning can be extrapolated by contextual diversion.

## 2024-05-10 NOTE — PROGRESS NOTES
tablet Take 1 tablet by mouth daily 90 tablet 3    carvedilol (COREG) 25 MG tablet Take 1 tablet by mouth 2 times daily 60 tablet 3    nystatin (MYCOSTATIN) 424454 UNIT/GM cream       meloxicam (MOBIC) 15 MG tablet Take 1 tablet by mouth daily 90 tablet 3    pantoprazole (PROTONIX) 20 MG tablet TAKE ONE TABLET BY MOUTH EVERY MORNING BEFORE BREAKFAST 240 tablet 1    VITAMIN D-VITAMIN K PO Take 1 tablet by mouth daily      b complex vitamins capsule Take 1 capsule by mouth daily      Melatonin 5 MG CAPS Take 1 capsule by mouth nightly as needed      benzoyl peroxide 5 % external liquid Wash affected areas once daily 227 g 3    clindamycin (CLEOCIN T) 1 % lotion Apply to affected areas daily 60 mL 3    [DISCONTINUED] solifenacin (VESICARE) 10 MG tablet Take 1 tablet by mouth as needed       No current facility-administered medications on file prior to visit.       Past Medical History:   Diagnosis Date    Abnormal uterine bleeding (AUB) 11/2023    Arthritis     Asthma     Benign familial tremor     CAD (coronary artery disease) 12/2021    mild per cardiac cath 12/2021; no stents. Dr. Randolph [facility] last visit 10/2022    Cancer ([facility])     skin    CKD (chronic kidney disease) stage 1, GFR 90 ml/min or greater     Class 2 severe obesity with serious comorbidity and body mass index (BMI) of 39.0 to 39.9 in adult ([facility]) 05/01/2019    COVID-19 vaccine administered     Cystinuria ([facility])     Dyspnea on exertion 08/06/2019    Flank pain     RIGHT    GERD (gastroesophageal reflux disease)     Hypertension     Hyperuricemia     Hypothyroidism     no meds    Kidney stones     Leg swelling     by the end of the day    Midline low back pain with right-sided sciatica 06/02/2016    Nephrostomy status ([facility])     neph tube removed    Shoulder impingement 12/2019    left    Snores     no sleep apnea per sleep study    Solitary kidney, acquired 11/20/2017    Under care of service provider 12/05/2023    pcp-JEFRY Mercado, FRANCISCO, PCP-david-armaan

## 2024-05-10 NOTE — PROGRESS NOTES
Patient:  GILA STREETER  MRN:  [id number]  PCP:  ARRON CROOKS PA-C  Oncologist:  LUPE NIEVES MD     Date of test:  May 10, 2024  Tested Body Part:  Left Arm  Testing Stage:  Baseline     SOZO Lymphedema Assessment:  BASELINE: 4.9

## 2024-05-10 NOTE — TELEPHONE ENCOUNTER
Patient was in to drop off paperwork for FMLA.  She wanted to let us know that she was diagnosed with Breast Cancer today. She is Triple A negative.

## 2024-05-10 NOTE — TELEPHONE ENCOUNTER
Patient seen in consultation with Dr Collier, Dr Osborn, Dr Wan , and Cassi Gentile, [facility]     Treatment plan recommendations are:   MRI  Port and Echo-   Dandy-ad chemo  Lump vs mast  XRT if lump   :     Writer met with patient and her friend.  Introduced myself as the breast cancer patient navigator. Explained to patient that I would be available to help navigate the disease process, treatment, and follow up.       Patient given [facility] breast cancer folder with community resources, breast cancer information, and contact information along with Baskets of Care bag.      Baseline Sozo scan has been completed.     Patient meets criteria for Genetic testing.  Labs drawn.  Cassi to call in 2-3 weeks with results.     Reviewed breast clinic recommendations with patient.  Pt given copy of recommendations.     Patient to follow up at start of chemo/immuneotherapy.     Oncology rehab discussed.  Referral placed for Oncology Rehab and Lymphedema Clinic.      Psychosocial and Functional assessment completed and forwarded to Spiritual Care and .      Encouraged patient to call with questions, concerns, needs.       Will continue to follow.

## 2024-05-10 NOTE — FLOWSHEET NOTE
[facility] OUTPATIENT REHABILITATION  BREAST CANCER CLINIC SCREEN'    [] [facility]      Dayanna Reid, PT  Josie Nails, PT        Upfront  [place]  [phone]  F: [phone] Lymphedema    [facility]   [][facility]  [][facility].    [facility], OT  Jovita Bhat, OT    Upfront  [place]  [phone]  F: [phone]    OR  [x]Ada Peck, OT  [facility]    P: [phone]  F: [phone]   [] [facility]      Carrie Roy, PT  Chreri Calhoun, PT        Upfront  Jose Ramon Bansal  P: [phone]  F: [phone] [x] [facility]      Ronn Gannon, PT      Upfront  Araseli Ott      P: [phone]  F: [phone] [] [place], [place]      Violette Galvin, PT      Upfront  [facility]   P: [phone]  F: [phone]            Date:  5/10/2024  Patient: Luci Castañeda  : 1965  MRN: [id number] Gender: female     Medical Oncologist: Mirna  Radiation Oncologist: Soco  Surgeon: Irena    [x]  Preferred location for PT   [x]  [place]   []  [facility]   []  [facility]   []  [place]    [x]  Preferred location for OT/Lymphedema   []  [facility]   []  [facility]   [x]  [facility]    [x]  Scheduling for PT   [x]  ASAP, prehab needs   []  3 weeks post-op  []  Awaiting further testing, will follow up  []  Other:    [x]  Scheduling for OT/Lymphedema for preventative education   [x]  ASAP, scheduled prior to sx if schedule allows   []  Push consult out closer to sx -  pt having chemo, awaiting further testing, etc. (referral still placed and will be held/monitored in our Workqueue)   []  Other:    PMHx: [] Unremarkable [] Diabetes [x] HTN  [] Pacemaker   [] MI/Heart Problems [] Cancer [] Arthritis [x] Other:Cardiac cath, R TKA, needs L TKA, one kidney              [] Refer to full medical chart  In EPIC

## 2024-05-10 NOTE — PROGRESS NOTES
[facility] Genetic Counseling Program   Hereditary Cancer Risk Assessment     Name: Luci Castañeda   YOB: 1965   Date of Consultation: 5/10/24     Ms. Castañeda was seen in the [facility] Comprehensive Breast Cancer Program and as part of her appointment was offered a genetic evaluation.      PERSONAL HISTORY   Ms. Castañeda is a 59 y.o.  female who was recently diagnosed with breast cancer. Specifically, an invasive ductal carcinoma of the left breast. The tumor is estrogen receptor negative, progesterone receptor negative, and Vev2Gmh negative (triple negative).     She reports:     Menarche at age: 13y  First child at age: NA, Nulliparous   Menopause at age: 47y  OCP: few months   HRT: 6 months     FAMILY HISTORY  Ms. Castañeda has no biological children. She has two full sisters, both have had non-melanoma skin cancers.     She reports that her father had DLBCL at age 59 at base of tongue. Her father had no sister and three brothers.There are no known cancers among them. One of their daughters (Ms. Castañeda's paternal first cousin) may have had breast cancer.     She reports that her mother had a non-melanoma skin cancer. Her mother had one sister and one brother. The sister (Ms. Castañeda's maternal aunt) had myeloma. Her maternal grandfather had prostate cancer with mets to the bone.     Ms. Castañeda reports unknown ancestry and denies any known Ashkenazi Roman Catholic heritage.     RISK ASSESSMENT   We discussed that approximately 5-10% of cancers are due to a hereditary gene mutation which causes an increased risk for certain cancers. Hereditary cancers are typically diagnosed at younger ages (under age 50y) and occur in multiple generations of a family. Multiple individuals with the same type of cancer (example: breast or colorectal) or uncommon cancers (example: ovarian, pancreatic, male breast cancer) are also features of hereditary cancers.     In summary, Ms. Castañeda meets the National

## 2024-05-12 PROBLEM — C50.412 MALIGNANT NEOPLASM OF UPPER-OUTER QUADRANT OF LEFT BREAST IN FEMALE, ESTROGEN RECEPTOR NEGATIVE (HCC): Status: ACTIVE | Noted: 2024-05-12

## 2024-05-12 PROBLEM — J45.51 SEVERE PERSISTENT ASTHMA WITH ACUTE EXACERBATION: Status: ACTIVE | Noted: 2024-05-12

## 2024-05-12 PROBLEM — Z17.1 MALIGNANT NEOPLASM OF UPPER-OUTER QUADRANT OF LEFT BREAST IN FEMALE, ESTROGEN RECEPTOR NEGATIVE (HCC): Status: ACTIVE | Noted: 2024-05-12

## 2024-05-12 RX ORDER — EPINEPHRINE 1 MG/ML
0.3 INJECTION, SOLUTION, CONCENTRATE INTRAVENOUS PRN
OUTPATIENT
Start: 2024-05-20

## 2024-05-12 RX ORDER — ACETAMINOPHEN 325 MG/1
650 TABLET ORAL
OUTPATIENT
Start: 2024-05-27

## 2024-05-12 RX ORDER — ALBUTEROL SULFATE 90 UG/1
4 AEROSOL, METERED RESPIRATORY (INHALATION) PRN
OUTPATIENT
Start: 2024-05-20

## 2024-05-12 RX ORDER — SODIUM CHLORIDE 9 MG/ML
INJECTION, SOLUTION INTRAVENOUS CONTINUOUS
OUTPATIENT
Start: 2024-05-27

## 2024-05-12 RX ORDER — SODIUM CHLORIDE 9 MG/ML
5-250 INJECTION, SOLUTION INTRAVENOUS PRN
OUTPATIENT
Start: 2024-05-20

## 2024-05-12 RX ORDER — SODIUM CHLORIDE 0.9 % (FLUSH) 0.9 %
5-40 SYRINGE (ML) INJECTION PRN
OUTPATIENT
Start: 2024-05-20

## 2024-05-12 RX ORDER — MEPERIDINE HYDROCHLORIDE 50 MG/ML
12.5 INJECTION INTRAMUSCULAR; INTRAVENOUS; SUBCUTANEOUS PRN
OUTPATIENT
Start: 2024-05-20

## 2024-05-12 RX ORDER — MEPERIDINE HYDROCHLORIDE 50 MG/ML
12.5 INJECTION INTRAMUSCULAR; INTRAVENOUS; SUBCUTANEOUS PRN
OUTPATIENT
Start: 2024-05-13

## 2024-05-12 RX ORDER — SODIUM CHLORIDE 9 MG/ML
5-250 INJECTION, SOLUTION INTRAVENOUS PRN
OUTPATIENT
Start: 2024-05-13

## 2024-05-12 RX ORDER — DIPHENHYDRAMINE HYDROCHLORIDE 50 MG/ML
50 INJECTION INTRAMUSCULAR; INTRAVENOUS ONCE
OUTPATIENT
Start: 2024-05-13 | End: 2024-05-13

## 2024-05-12 RX ORDER — EPINEPHRINE 1 MG/ML
0.3 INJECTION, SOLUTION, CONCENTRATE INTRAVENOUS PRN
OUTPATIENT
Start: 2024-05-13

## 2024-05-12 RX ORDER — ACETAMINOPHEN 325 MG/1
650 TABLET ORAL
OUTPATIENT
Start: 2024-05-13

## 2024-05-12 RX ORDER — ONDANSETRON 2 MG/ML
8 INJECTION INTRAMUSCULAR; INTRAVENOUS
OUTPATIENT
Start: 2024-05-20

## 2024-05-12 RX ORDER — DIPHENHYDRAMINE HYDROCHLORIDE 50 MG/ML
50 INJECTION INTRAMUSCULAR; INTRAVENOUS ONCE
OUTPATIENT
Start: 2024-05-27 | End: 2024-05-27

## 2024-05-12 RX ORDER — ALBUTEROL SULFATE 90 UG/1
4 AEROSOL, METERED RESPIRATORY (INHALATION) PRN
OUTPATIENT
Start: 2024-05-13

## 2024-05-12 RX ORDER — HEPARIN SODIUM (PORCINE) LOCK FLUSH IV SOLN 100 UNIT/ML 100 UNIT/ML
500 SOLUTION INTRAVENOUS PRN
OUTPATIENT
Start: 2024-05-20

## 2024-05-12 RX ORDER — DIPHENHYDRAMINE HYDROCHLORIDE 50 MG/ML
50 INJECTION INTRAMUSCULAR; INTRAVENOUS ONCE
OUTPATIENT
Start: 2024-05-20 | End: 2024-05-20

## 2024-05-12 RX ORDER — MEPERIDINE HYDROCHLORIDE 50 MG/ML
12.5 INJECTION INTRAMUSCULAR; INTRAVENOUS; SUBCUTANEOUS PRN
OUTPATIENT
Start: 2024-05-27

## 2024-05-12 RX ORDER — SODIUM CHLORIDE 0.9 % (FLUSH) 0.9 %
5-40 SYRINGE (ML) INJECTION PRN
OUTPATIENT
Start: 2024-05-13

## 2024-05-12 RX ORDER — DIPHENHYDRAMINE HYDROCHLORIDE 50 MG/ML
50 INJECTION INTRAMUSCULAR; INTRAVENOUS
OUTPATIENT
Start: 2024-05-20

## 2024-05-12 RX ORDER — HEPARIN SODIUM (PORCINE) LOCK FLUSH IV SOLN 100 UNIT/ML 100 UNIT/ML
500 SOLUTION INTRAVENOUS PRN
OUTPATIENT
Start: 2024-05-27

## 2024-05-12 RX ORDER — SODIUM CHLORIDE 9 MG/ML
5-250 INJECTION, SOLUTION INTRAVENOUS PRN
OUTPATIENT
Start: 2024-05-27

## 2024-05-12 RX ORDER — PALONOSETRON 0.05 MG/ML
0.25 INJECTION, SOLUTION INTRAVENOUS ONCE
OUTPATIENT
Start: 2024-05-13 | End: 2024-05-13

## 2024-05-12 RX ORDER — FAMOTIDINE 10 MG/ML
20 INJECTION, SOLUTION INTRAVENOUS ONCE
OUTPATIENT
Start: 2024-05-27 | End: 2024-05-27

## 2024-05-12 RX ORDER — SODIUM CHLORIDE 9 MG/ML
INJECTION, SOLUTION INTRAVENOUS CONTINUOUS
OUTPATIENT
Start: 2024-05-20

## 2024-05-12 RX ORDER — FAMOTIDINE 10 MG/ML
20 INJECTION, SOLUTION INTRAVENOUS ONCE
OUTPATIENT
Start: 2024-05-13 | End: 2024-05-13

## 2024-05-12 RX ORDER — FAMOTIDINE 10 MG/ML
20 INJECTION, SOLUTION INTRAVENOUS
OUTPATIENT
Start: 2024-05-13

## 2024-05-12 RX ORDER — ONDANSETRON 2 MG/ML
8 INJECTION INTRAMUSCULAR; INTRAVENOUS
OUTPATIENT
Start: 2024-05-13

## 2024-05-12 RX ORDER — DIPHENHYDRAMINE HYDROCHLORIDE 50 MG/ML
50 INJECTION INTRAMUSCULAR; INTRAVENOUS
OUTPATIENT
Start: 2024-05-13

## 2024-05-12 RX ORDER — FAMOTIDINE 10 MG/ML
20 INJECTION, SOLUTION INTRAVENOUS ONCE
OUTPATIENT
Start: 2024-05-20 | End: 2024-05-20

## 2024-05-12 RX ORDER — PROCHLORPERAZINE EDISYLATE 5 MG/ML
5 INJECTION INTRAMUSCULAR; INTRAVENOUS
OUTPATIENT
Start: 2024-05-13

## 2024-05-12 RX ORDER — ACETAMINOPHEN 325 MG/1
650 TABLET ORAL
OUTPATIENT
Start: 2024-05-20

## 2024-05-12 RX ORDER — HEPARIN SODIUM (PORCINE) LOCK FLUSH IV SOLN 100 UNIT/ML 100 UNIT/ML
500 SOLUTION INTRAVENOUS PRN
OUTPATIENT
Start: 2024-05-13

## 2024-05-12 RX ORDER — EPINEPHRINE 1 MG/ML
0.3 INJECTION, SOLUTION, CONCENTRATE INTRAVENOUS PRN
OUTPATIENT
Start: 2024-05-27

## 2024-05-12 RX ORDER — FAMOTIDINE 10 MG/ML
20 INJECTION, SOLUTION INTRAVENOUS
OUTPATIENT
Start: 2024-05-27

## 2024-05-12 RX ORDER — FAMOTIDINE 10 MG/ML
20 INJECTION, SOLUTION INTRAVENOUS
OUTPATIENT
Start: 2024-05-20

## 2024-05-12 RX ORDER — DIPHENHYDRAMINE HYDROCHLORIDE 50 MG/ML
50 INJECTION INTRAMUSCULAR; INTRAVENOUS
OUTPATIENT
Start: 2024-05-27

## 2024-05-12 RX ORDER — ONDANSETRON 2 MG/ML
8 INJECTION INTRAMUSCULAR; INTRAVENOUS
OUTPATIENT
Start: 2024-05-27

## 2024-05-12 RX ORDER — SODIUM CHLORIDE 9 MG/ML
INJECTION, SOLUTION INTRAVENOUS CONTINUOUS
OUTPATIENT
Start: 2024-05-13

## 2024-05-12 RX ORDER — ALBUTEROL SULFATE 90 UG/1
4 AEROSOL, METERED RESPIRATORY (INHALATION) PRN
OUTPATIENT
Start: 2024-05-27

## 2024-05-12 RX ORDER — SODIUM CHLORIDE 0.9 % (FLUSH) 0.9 %
5-40 SYRINGE (ML) INJECTION PRN
OUTPATIENT
Start: 2024-05-27

## 2024-05-14 ENCOUNTER — HOSPITAL ENCOUNTER (OUTPATIENT)
Age: 59
Discharge: HOME | End: 2024-05-16
Attending: INTERNAL MEDICINE
Payer: COMMERCIAL

## 2024-05-14 ENCOUNTER — TELEPHONE (OUTPATIENT)
Dept: INFUSION THERAPY | Age: 59
End: 2024-05-14

## 2024-05-14 VITALS
HEIGHT: 68 IN | BODY MASS INDEX: 41.98 KG/M2 | WEIGHT: 277 LBS | SYSTOLIC BLOOD PRESSURE: 118 MMHG | DIASTOLIC BLOOD PRESSURE: 71 MMHG

## 2024-05-14 DIAGNOSIS — Z17.1 MALIGNANT NEOPLASM OF UPPER-OUTER QUADRANT OF LEFT BREAST IN FEMALE, ESTROGEN RECEPTOR NEGATIVE (HCC): ICD-10-CM

## 2024-05-14 DIAGNOSIS — C50.412 MALIGNANT NEOPLASM OF UPPER-OUTER QUADRANT OF LEFT BREAST IN FEMALE, ESTROGEN RECEPTOR NEGATIVE (HCC): ICD-10-CM

## 2024-05-14 LAB
ECHO AO ASC DIAM: 3.6 CM
ECHO AO ASCENDING AORTA INDEX: 1.53 CM/M2
ECHO AO ROOT DIAM: 3.6 CM
ECHO AO ROOT INDEX: 1.53 CM/M2
ECHO AV AREA PEAK VELOCITY: 2.8 CM2
ECHO AV AREA VTI: 2.7 CM2
ECHO AV AREA/BSA PEAK VELOCITY: 1.2 CM2/M2
ECHO AV AREA/BSA VTI: 1.1 CM2/M2
ECHO AV MEAN GRADIENT: 4 MMHG
ECHO AV MEAN VELOCITY: 1 M/S
ECHO AV PEAK GRADIENT: 9 MMHG
ECHO AV PEAK VELOCITY: 1.5 M/S
ECHO AV VELOCITY RATIO: 0.93
ECHO AV VTI: 32.6 CM
ECHO BSA: 2.46 M2
ECHO LA AREA 2C: 23 CM2
ECHO LA AREA 4C: 22.4 CM2
ECHO LA DIAMETER INDEX: 1.62 CM/M2
ECHO LA DIAMETER: 3.8 CM
ECHO LA MAJOR AXIS: 6.4 CM
ECHO LA MINOR AXIS: 5.7 CM
ECHO LA TO AORTIC ROOT RATIO: 1.06
ECHO LA VOL BP: 75 ML (ref 22–52)
ECHO LA VOL MOD A2C: 77 ML (ref 22–52)
ECHO LA VOL MOD A4C: 65 ML (ref 22–52)
ECHO LA VOL/BSA BIPLANE: 32 ML/M2 (ref 16–34)
ECHO LA VOLUME INDEX MOD A2C: 33 ML/M2 (ref 16–34)
ECHO LA VOLUME INDEX MOD A4C: 28 ML/M2 (ref 16–34)
ECHO LV E' LATERAL VELOCITY: 7 CM/S
ECHO LV E' SEPTAL VELOCITY: 5 CM/S
ECHO LV EDV A2C: 80 ML
ECHO LV EDV A4C: 103 ML
ECHO LV EDV INDEX A4C: 44 ML/M2
ECHO LV EDV NDEX A2C: 34 ML/M2
ECHO LV EJECTION FRACTION A2C: 61 %
ECHO LV EJECTION FRACTION A4C: 60 %
ECHO LV EJECTION FRACTION BIPLANE: 59 % (ref 55–100)
ECHO LV ESV A2C: 31 ML
ECHO LV ESV A4C: 41 ML
ECHO LV ESV INDEX A2C: 13 ML/M2
ECHO LV ESV INDEX A4C: 17 ML/M2
ECHO LV FRACTIONAL SHORTENING: 38 % (ref 28–44)
ECHO LV GLOBAL LONGITUDINAL STRAIN (GLS): -16.5 %
ECHO LV INTERNAL DIMENSION DIASTOLE INDEX: 1.91 CM/M2
ECHO LV INTERNAL DIMENSION DIASTOLIC: 4.5 CM (ref 3.9–5.3)
ECHO LV INTERNAL DIMENSION SYSTOLIC INDEX: 1.19 CM/M2
ECHO LV INTERNAL DIMENSION SYSTOLIC: 2.8 CM
ECHO LV IVSD: 1.6 CM (ref 0.6–0.9)
ECHO LV MASS 2D: 261.9 G (ref 67–162)
ECHO LV MASS INDEX 2D: 111.5 G/M2 (ref 43–95)
ECHO LV POSTERIOR WALL DIASTOLIC: 1.3 CM (ref 0.6–0.9)
ECHO LV RELATIVE WALL THICKNESS RATIO: 0.58
ECHO LVOT AREA: 3.1 CM2
ECHO LVOT AV VTI INDEX: 0.85
ECHO LVOT DIAM: 2 CM
ECHO LVOT MEAN GRADIENT: 4 MMHG
ECHO LVOT PEAK GRADIENT: 7 MMHG
ECHO LVOT PEAK VELOCITY: 1.4 M/S
ECHO LVOT STROKE VOLUME INDEX: 37 ML/M2
ECHO LVOT SV: 87 ML
ECHO LVOT VTI: 27.7 CM
ECHO MV A VELOCITY: 0.72 M/S
ECHO MV AREA VTI: 3.7 CM2
ECHO MV E DECELERATION TIME (DT): 321 MS
ECHO MV E VELOCITY: 0.59 M/S
ECHO MV E/A RATIO: 0.82
ECHO MV E/E' LATERAL: 8.43
ECHO MV E/E' RATIO (AVERAGED): 10.11
ECHO MV E/E' SEPTAL: 11.8
ECHO MV LVOT VTI INDEX: 0.84
ECHO MV MAX VELOCITY: 0.8 M/S
ECHO MV MEAN GRADIENT: 1 MMHG
ECHO MV MEAN VELOCITY: 0.4 M/S
ECHO MV PEAK GRADIENT: 2 MMHG
ECHO MV VTI: 23.4 CM
ECHO PV MAX VELOCITY: 0.8 M/S
ECHO PV PEAK GRADIENT: 2 MMHG
ECHO RV BASAL DIMENSION: 3.4 CM
ECHO RV FREE WALL PEAK S': 9 CM/S
ECHO RV TAPSE: 1 CM (ref 1.7–?)

## 2024-05-14 PROCEDURE — 93306 TTE W/DOPPLER COMPLETE: CPT

## 2024-05-14 PROCEDURE — 93306 TTE W/DOPPLER COMPLETE: CPT | Performed by: INTERNAL MEDICINE

## 2024-05-14 NOTE — TELEPHONE ENCOUNTER
FMLA completed and faxed to [employer] at [phone] with confirmation.

## 2024-05-15 ENCOUNTER — TELEPHONE (OUTPATIENT)
Dept: PULMONOLOGY | Age: 59
End: 2024-05-15

## 2024-05-15 ENCOUNTER — TELEPHONE (OUTPATIENT)
Dept: ONCOLOGY | Age: 59
End: 2024-05-15

## 2024-05-15 ENCOUNTER — HOSPITAL ENCOUNTER (OUTPATIENT)
Dept: NUCLEAR MEDICINE | Age: 59
Discharge: HOME | End: 2024-05-17
Attending: INTERNAL MEDICINE
Payer: COMMERCIAL

## 2024-05-15 DIAGNOSIS — C50.412 MALIGNANT NEOPLASM OF UPPER-OUTER QUADRANT OF LEFT BREAST IN FEMALE, ESTROGEN RECEPTOR NEGATIVE (HCC): ICD-10-CM

## 2024-05-15 DIAGNOSIS — Z17.1 MALIGNANT NEOPLASM OF UPPER-OUTER QUADRANT OF LEFT BREAST IN FEMALE, ESTROGEN RECEPTOR NEGATIVE (HCC): ICD-10-CM

## 2024-05-15 LAB — GLUCOSE BLD-MCNC: 115 MG/DL (ref 65–105)

## 2024-05-15 PROCEDURE — 3430000000 HC RX DIAGNOSTIC RADIOPHARMACEUTICAL: Performed by: INTERNAL MEDICINE

## 2024-05-15 PROCEDURE — 82947 ASSAY GLUCOSE BLOOD QUANT: CPT

## 2024-05-15 PROCEDURE — A9609 HC RX DIAGNOSTIC RADIOPHARMACEUTICAL: HCPCS | Performed by: INTERNAL MEDICINE

## 2024-05-15 PROCEDURE — 2580000003 HC RX 258: Performed by: INTERNAL MEDICINE

## 2024-05-15 PROCEDURE — 78815 PET IMAGE W/CT SKULL-THIGH: CPT

## 2024-05-15 RX ORDER — SODIUM CHLORIDE 0.9 % (FLUSH) 0.9 %
10 SYRINGE (ML) INJECTION ONCE
Status: COMPLETED | OUTPATIENT
Start: 2024-05-15 | End: 2024-05-15

## 2024-05-15 RX ORDER — FLUDEOXYGLUCOSE F 18 200 MCI/ML
10 INJECTION, SOLUTION INTRAVENOUS
Status: COMPLETED | OUTPATIENT
Start: 2024-05-15 | End: 2024-05-15

## 2024-05-15 RX ADMIN — SODIUM CHLORIDE, PRESERVATIVE FREE 10 ML: 5 INJECTION INTRAVENOUS at 08:51

## 2024-05-15 RX ADMIN — FLUDEOXYGLUCOSE F 18 14.19 MILLICURIE: 200 INJECTION, SOLUTION INTRAVENOUS at 08:51

## 2024-05-15 NOTE — TELEPHONE ENCOUNTER
Called pt after receiving her biopsychosocial. Pt verbalized that she does not have any needs/concerns at this time. Pt will reach out to SW if this changes.

## 2024-05-15 NOTE — TELEPHONE ENCOUNTER
Patient called and was interested in Pulmonary Rehabilitation and was interested in receiving more information about the program. Explained the program and she stated that she was interested in the program and was scheduled to see Dr. Sanchez  on April 15th and she would discuss with him at this time. Spoke with patient after visit and she is feeling better and is no longer interested in Pulmonary Rehab. She stated that she is taking medication and exercising on her own.

## 2024-05-17 ENCOUNTER — HOSPITAL ENCOUNTER (OUTPATIENT)
Dept: INTERVENTIONAL RADIOLOGY/VASCULAR | Age: 59
End: 2024-05-17
Payer: COMMERCIAL

## 2024-05-17 VITALS
TEMPERATURE: 96.8 F | WEIGHT: 277 LBS | BODY MASS INDEX: 41.98 KG/M2 | HEART RATE: 63 BPM | HEIGHT: 68 IN | DIASTOLIC BLOOD PRESSURE: 67 MMHG | RESPIRATION RATE: 20 BRPM | SYSTOLIC BLOOD PRESSURE: 126 MMHG | OXYGEN SATURATION: 95 %

## 2024-05-17 DIAGNOSIS — C50.912 INVASIVE DUCTAL CARCINOMA OF BREAST, LEFT (HCC): ICD-10-CM

## 2024-05-17 LAB
INR PPP: 0.9
PARTIAL THROMBOPLASTIN TIME: <20 SEC (ref 23–36.5)
PLATELET # BLD AUTO: 210 K/UL (ref 138–453)
PROTHROMBIN TIME: 11.6 SEC (ref 11.7–14.9)

## 2024-05-17 PROCEDURE — C1894 INTRO/SHEATH, NON-LASER: HCPCS

## 2024-05-17 PROCEDURE — 6360000002 HC RX W HCPCS: Performed by: RADIOLOGY

## 2024-05-17 PROCEDURE — 6360000002 HC RX W HCPCS

## 2024-05-17 PROCEDURE — 77001 FLUOROGUIDE FOR VEIN DEVICE: CPT

## 2024-05-17 PROCEDURE — 7100000040 HC SPAR PHASE II RECOVERY - FIRST 15 MIN

## 2024-05-17 PROCEDURE — 2580000003 HC RX 258: Performed by: PHYSICIAN ASSISTANT

## 2024-05-17 PROCEDURE — 99153 MOD SED SAME PHYS/QHP EA: CPT

## 2024-05-17 PROCEDURE — 76937 US GUIDE VASCULAR ACCESS: CPT

## 2024-05-17 PROCEDURE — 85610 PROTHROMBIN TIME: CPT

## 2024-05-17 PROCEDURE — 85730 THROMBOPLASTIN TIME PARTIAL: CPT

## 2024-05-17 PROCEDURE — 6360000002 HC RX W HCPCS: Performed by: PHYSICIAN ASSISTANT

## 2024-05-17 PROCEDURE — 99152 MOD SED SAME PHYS/QHP 5/>YRS: CPT

## 2024-05-17 PROCEDURE — 36561 INSERT TUNNELED CV CATH: CPT

## 2024-05-17 PROCEDURE — 85049 AUTOMATED PLATELET COUNT: CPT

## 2024-05-17 RX ORDER — SODIUM CHLORIDE 9 MG/ML
INJECTION, SOLUTION INTRAVENOUS CONTINUOUS
Status: DISCONTINUED | OUTPATIENT
Start: 2024-05-17 | End: 2024-05-20 | Stop reason: HOSPADM

## 2024-05-17 RX ORDER — HEPARIN SODIUM (PORCINE) LOCK FLUSH IV SOLN 100 UNIT/ML 100 UNIT/ML
SOLUTION INTRAVENOUS PRN
Status: COMPLETED | OUTPATIENT
Start: 2024-05-17 | End: 2024-05-17

## 2024-05-17 RX ORDER — FENTANYL CITRATE 50 UG/ML
INJECTION, SOLUTION INTRAMUSCULAR; INTRAVENOUS PRN
Status: COMPLETED | OUTPATIENT
Start: 2024-05-17 | End: 2024-05-17

## 2024-05-17 RX ORDER — MIDAZOLAM HYDROCHLORIDE 2 MG/2ML
INJECTION, SOLUTION INTRAMUSCULAR; INTRAVENOUS PRN
Status: COMPLETED | OUTPATIENT
Start: 2024-05-17 | End: 2024-05-17

## 2024-05-17 RX ADMIN — MIDAZOLAM HYDROCHLORIDE 1 MG: 1 INJECTION, SOLUTION INTRAMUSCULAR; INTRAVENOUS at 12:24

## 2024-05-17 RX ADMIN — CEFAZOLIN 1000 MG: 1 INJECTION, POWDER, FOR SOLUTION INTRAMUSCULAR; INTRAVENOUS at 11:51

## 2024-05-17 RX ADMIN — FENTANYL CITRATE 50 MCG: 50 INJECTION, SOLUTION INTRAMUSCULAR; INTRAVENOUS at 12:24

## 2024-05-17 RX ADMIN — SODIUM CHLORIDE: 9 INJECTION, SOLUTION INTRAVENOUS at 11:52

## 2024-05-17 RX ADMIN — HEPARIN SODIUM (PORCINE) LOCK FLUSH IV SOLN 100 UNIT/ML 5 ML: 100 SOLUTION at 12:53

## 2024-05-17 ASSESSMENT — PAIN - FUNCTIONAL ASSESSMENT
PAIN_FUNCTIONAL_ASSESSMENT: NONE - DENIES PAIN
PAIN_FUNCTIONAL_ASSESSMENT: NONE - DENIES PAIN

## 2024-05-17 NOTE — BRIEF OP NOTE
Brief Postoperative Note for Port Placement    Luci Castañeda  YOB: 1965  [id number]    Pre-operative Diagnosis: Breast CA      Post-operative Diagnosis: Same    Procedure: 8 Fr Port Placement    Medication Given: fentanyl and versed    Anesthesia: 1 %Lidocaine, 1% Lidocaine w/ Epi    Surgeons/Assistants: MD Spencer    Estimated Blood Loss: Minimal    Complications: none    Specimen Removal: None    8 Fr Port placement into the right IJ. Port flushed with heparin.  Incision site closed with Vicryl sutures and tissue adhesive. Vital signs were reviewed and were stable after the procedure. Patient tolerated the procedure well.    Electronically signed by MANFRED Barber PA-C on 5/17/2024 at 1:01 PM

## 2024-05-17 NOTE — PRE SEDATION
Sedation Pre-Procedure Note    Patient Name: Luci Castañeda   YOB: 1965  Room/Bed: Room/bed info not found  Medical Record Number: [id number]  Date: 5/17/2024   Time: 12:08 PM       Indication:  Port    Consent: I have discussed with the patient and/or the patient representative the indication, alternatives, and the possible risks and/or complications of the planned procedure and the anesthesia methods. The patient and/or patient representative appear to understand and agree to proceed.    Vital Signs:   Vitals:    05/17/24 1036   BP: 114/66   Pulse: 72   Resp: 20   Temp: 96.8 °F (36 °C)   SpO2: 95%       Past Medical History:   has a past medical history of Abnormal uterine bleeding (AUB), Arthritis, Asthma, Benign familial tremor, CAD (coronary artery disease), Cancer ([facility]), CKD (chronic kidney disease) stage 1, GFR 90 ml/min or greater, Class 2 severe obesity with serious comorbidity and body mass index (BMI) of 39.0 to 39.9 in adult ([facility]), COVID-19 vaccine administered, Cystinuria ([facility]), Dyspnea on exertion, Flank pain, GERD (gastroesophageal reflux disease), History of Bell's palsy, Hypertension, Hyperuricemia, Hypothyroidism, Kidney stones, Leg swelling, Malignant neoplasm of upper-outer quadrant of left breast in female, estrogen receptor negative ([facility]), Midline low back pain with right-sided sciatica, Nephrostomy status ([facility]), Shoulder impingement, Snores, Solitary kidney, acquired, Under care of service provider, Under care of service provider, Under care of service provider, Under care of service provider, Urinary tract obstruction by kidney stone, and Wears glasses.    Past Surgical History:   has a past surgical history that includes total nephrectomy (Left, 1988); Knee arthroscopy (Right, 1998, 2003); Cystoscopy (Right, 11/18/2017); cysto/uretero/pyeloscopy, calculus tx (Right, 11/21/2017); pr cysto w/insert ureteral stent (Right, 07/04/2018); pr cysto w/ureteroscopy w/rmvl/manj stones (N/A,

## 2024-05-17 NOTE — H&P
History and Physical    Pt Name: Luci Castañeda  MRN: [id number]  YOB: 1965  Date of evaluation: 5/17/2024  Primary Care Physician: Nabil Ly MD    SUBJECTIVE:   History of Chief Complaint:    Luci Castañeda is a 59 y.o. female who is scheduled today for IR PORT PLACEMENT . She reports this will be for chemotherapy for triple negative breast cancer- left. She reports being s/p left breast, lymph node biopsy on 4/26/24 after abnormal mammogram. She reports history of skin cancer, right arm.   Allergies  is allergic to mixed ragweed and cat hair extract.  Medications  Prior to Admission medications    Medication Sig Start Date End Date Taking? Authorizing Provider   traZODone (DESYREL) 50 MG tablet Take 1 tablet by mouth nightly 5/8/24   Nabil Ly MD   furosemide (LASIX) 40 MG tablet Take 1 tablet by mouth daily 5/8/24 11/4/24  Nabil Ly MD   budesonide-formoterol (SYMBICORT) 160-4.5 MCG/ACT AERO Inhale 2 puffs into the lungs 2 times daily 5/1/24 7/30/24  Amadeo Sanchez MD   famotidine (PEPCID) 20 MG tablet Take 1 tablet by mouth 2 times daily 4/23/24   Amena Sy, PA-PATRICA   potassium & sodium citrate-citric acid-sucrose (POLYCITRA) 550-500-334 MG/5ML SYRP Take 15 mLs by mouth 4 times daily    Provider, MD Guido   amLODIPine (NORVASC) 5 MG tablet Take 1 tablet by mouth daily 3/26/24   Sanjuana Randolph MD   hydroCHLOROthiazide (HYDRODIURIL) 25 MG tablet Take 1 tablet by mouth every morning 3/5/24   Sanjuana Randolph MD   Omega-3 1400 MG CAPS Take by mouth    Guido Tenorio MD   albuterol sulfate HFA (PROAIR HFA) 108 (90 Base) MCG/ACT inhaler Inhale 2 puffs into the lungs every 6 hours as needed for Wheezing 2/22/24   Nazia Mercado, APRN - CNP   irbesartan (AVAPRO) 150 MG tablet Take 1 tablet by mouth daily 2/7/24   Sanjuana Randolph MD   carvedilol (COREG) 25 MG tablet Take 1 tablet by mouth 2 times daily 2/7/24   Sanjuana Randolph MD   nystatin (MYCOSTATIN) 045675  PGM      PGF       family history includes Cancer in her father; Dementia in her mother; Diabetes in her father; Emphysema in her paternal grandmother; Heart Attack (age of onset: 80) in her maternal grandmother; Heart Disease in her father, maternal grandmother, and paternal grandfather; High Blood Pressure in her father and mother; Hypertension in her father and sister; Other in her father; Prostate Cancer in her maternal grandfather; Stroke (age of onset: 65) in her paternal grandfather.  Review of Systems:  CONSTITUTIONAL:   negative for fevers, chills, fatigue and malaise    EYES:   negative for double vision, blurred vision and photophobia    HEENT:   negative for tinnitus, epistaxis and sore throat     RESPIRATORY:   negative for cough, shortness of breath, wheezing  +asthma, states controlled since starting symbicort, states has not required use of albuterol since   CARDIOVASCULAR:   negative for chest pain, palpitations, syncope, edema     GASTROINTESTINAL:   negative for nausea, vomiting     GENITOURINARY:   negative for incontinence     MUSCULOSKELETAL:   negative for neck or back pain     NEUROLOGICAL:   Negative for weakness and tingling  negative for headaches and dizziness     PSYCHIATRIC:   negative for anxiety       OBJECTIVE:   VITALS:  height is 1.727 m (5' 8\") and weight is 125.6 kg (277 lb). Her temporal temperature is 96.8 °F (36 °C). Her blood pressure is 114/66 and her pulse is 72. Her respiration is 20 and oxygen saturation is 95%.   CONSTITUTIONAL:alert & oriented x 3, no acute distress. Calm and pleasant. Very friendly.   SKIN:  Warm and dry, no rashes on exposed areas of skin   HEAD:  Normocephalic, atraumatic   EYES: PERRL.  EOMs intact.   EARS:  Equal bilaterally, no edema or thickening, skin is intact without lumps or lesions. No discharge. Hearing grossly WNL.    NOSE:  Nares patent.  No rhinorrhea   MOUTH/THROAT:  Mucous membranes moist, tongue is pink, uvula midline,

## 2024-05-17 NOTE — POST SEDATION
Sedation Post Procedure Note    Patient Name: Luci Castañeda   YOB: 1965  Room/Bed: Room/bed info not found  Medical Record Number: [id number]  Date: 5/17/2024   Time: 1:01 PM         Physicians/Assistants: MANFRED Barber    Procedure Performed:  Port    Post-Sedation Vital Signs:  Vitals:    05/17/24 1245   BP: 133/78   Pulse: 60   Resp: 21   Temp:    SpO2: 98%      Vital signs were reviewed and were stable after the procedure (see flow sheet for vitals)            Post-Sedation Exam: Pt remains stable           Complications: none    Electronically signed by MANFRED Barber on 5/17/2024 at 1:01 PM

## 2024-05-20 ENCOUNTER — TELEPHONE (OUTPATIENT)
Dept: ONCOLOGY | Age: 59
End: 2024-05-20

## 2024-05-20 ENCOUNTER — HOSPITAL ENCOUNTER (OUTPATIENT)
Dept: PHYSICAL THERAPY | Facility: CLINIC | Age: 59
Setting detail: THERAPIES SERIES
Discharge: HOME | End: 2024-05-20
Payer: COMMERCIAL

## 2024-05-20 PROCEDURE — 97161 PT EVAL LOW COMPLEX 20 MIN: CPT

## 2024-05-20 PROCEDURE — 97110 THERAPEUTIC EXERCISES: CPT

## 2024-05-20 PROCEDURE — 97140 MANUAL THERAPY 1/> REGIONS: CPT

## 2024-05-20 NOTE — TELEPHONE ENCOUNTER
Patient called and needs paperwork singed and faxed today she is due to return to work tomorrow. Patient would also like a copy of the paperwork and to be notified when it is faxed.

## 2024-05-20 NOTE — TELEPHONE ENCOUNTER
Name: Luci Castañeda  : 1965  MRN: [id number]    Oncology Navigation Follow-Up Note    Notes: Noted pts chemo is still pending insurance approval. Writer sent email requesting update and asking for expediting.       Electronically signed by Veronica Bennett RN on 2024 at 10:27 AM

## 2024-05-20 NOTE — CONSULTS
symptoms, precautions regarding lymphedema- MET     LTG: (to be met in 18 treatments)  1. Optimize bilat shoulder functional ROM to improve QOL  2.  Improve tissue mobility of chest wall and axilla to allow for more normal motion and function  3. Continue activity to decrease risk factors associated with increased time being sedentary while undergoing chemotherapy, radiation, surgery.  4. Manage/mitigate side effects/late effects of Cancer treatment and prevent debilitation   5. Decr BFI score 2+ for activity tolerance, decr UEFS score by 15% for overall improved function                   Patient goals: Improvement in moving arm w/ decr pain    Rehab Potential:  [x] Good  [] Fair  [] Poor   Suggested Professional Referral:  [] No  [x] Yes:lymphedema  Barriers to Goal Achievement:  [x] No  [] Yes:  Domestic Concerns:  [x] No  [] Yes:    Pt. Education:  [x] Plans/Goals, Risks/Benefits discussed  [x] Home exercise program  Method of Education: [x] Verbal  [x] Demo  [x] Written- HEP see soft chart  Comprehension of Education:  [x] Verbalizes understanding.  [] Demonstrates understanding.  [] Needs Review.  [] Demonstrates/verbalizes understanding of HEP/Ed previously given.      Treatment Plan:  [x] Therapeutic Exercise   16994  [] Iontophoresis: 4 mg/mL Dexamethasone Sodium Phosphate  mAmin  80906   [x] Therapeutic Activity  17896 [] Vasopneumatic cold with compression  64771    [] Gait Training   05122 [] Ultrasound   98931   [] Neuromuscular Re-education  36453 [x] Electrical Stimulation Unattended  70120   [x] Manual Therapy  32578 [] Electrical Stimulation Attended  15759   [x] Instruction in HEP  [x] Dry Needling   [] Aquatic Therapy   34066 [x] Cold/hotpack    [] Massage   33325      [] Lumbar/Cervical Traction  45599     Frequency: 1-2 x/week for 18 visits    Today’s Treatment:  Modalities:   Precautions:  Manual: Utilized partial PORi breast protocol to LEFT upper quarter for gentle manual lymphatic

## 2024-05-22 ENCOUNTER — HOSPITAL ENCOUNTER (OUTPATIENT)
Dept: SLEEP CENTER | Age: 59
Discharge: HOME | End: 2024-05-24
Attending: INTERNAL MEDICINE
Payer: COMMERCIAL

## 2024-05-22 ENCOUNTER — TELEPHONE (OUTPATIENT)
Dept: ONCOLOGY | Age: 59
End: 2024-05-22

## 2024-05-22 ENCOUNTER — OFFICE VISIT (OUTPATIENT)
Age: 59
End: 2024-05-22
Payer: COMMERCIAL

## 2024-05-22 ENCOUNTER — TELEPHONE (OUTPATIENT)
Dept: INFUSION THERAPY | Age: 59
End: 2024-05-22

## 2024-05-22 VITALS
WEIGHT: 277 LBS | HEIGHT: 68 IN | HEART RATE: 65 BPM | OXYGEN SATURATION: 95 % | BODY MASS INDEX: 41.98 KG/M2 | TEMPERATURE: 97 F | SYSTOLIC BLOOD PRESSURE: 120 MMHG | DIASTOLIC BLOOD PRESSURE: 76 MMHG

## 2024-05-22 VITALS — HEIGHT: 68 IN | BODY MASS INDEX: 42.01 KG/M2 | WEIGHT: 277.2 LBS

## 2024-05-22 DIAGNOSIS — C50.412 MALIGNANT NEOPLASM OF UPPER-OUTER QUADRANT OF LEFT BREAST IN FEMALE, ESTROGEN RECEPTOR NEGATIVE (HCC): Primary | ICD-10-CM

## 2024-05-22 DIAGNOSIS — I10 ESSENTIAL HYPERTENSION: ICD-10-CM

## 2024-05-22 DIAGNOSIS — J43.9 MIXED RESTRICTIVE AND OBSTRUCTIVE LUNG DISEASE (HCC): ICD-10-CM

## 2024-05-22 DIAGNOSIS — F51.01 PRIMARY INSOMNIA: ICD-10-CM

## 2024-05-22 DIAGNOSIS — G47.33 OSA (OBSTRUCTIVE SLEEP APNEA): ICD-10-CM

## 2024-05-22 DIAGNOSIS — G25.0 BENIGN ESSENTIAL TREMOR: ICD-10-CM

## 2024-05-22 DIAGNOSIS — Z72.820 POOR SLEEP: ICD-10-CM

## 2024-05-22 DIAGNOSIS — I25.10 CORONARY ARTERY DISEASE INVOLVING NATIVE CORONARY ARTERY OF NATIVE HEART WITHOUT ANGINA PECTORIS: ICD-10-CM

## 2024-05-22 DIAGNOSIS — J98.4 MIXED RESTRICTIVE AND OBSTRUCTIVE LUNG DISEASE (HCC): ICD-10-CM

## 2024-05-22 DIAGNOSIS — Z17.1 MALIGNANT NEOPLASM OF UPPER-OUTER QUADRANT OF LEFT BREAST IN FEMALE, ESTROGEN RECEPTOR NEGATIVE (HCC): Primary | ICD-10-CM

## 2024-05-22 DIAGNOSIS — K21.9 GASTROESOPHAGEAL REFLUX DISEASE WITHOUT ESOPHAGITIS: ICD-10-CM

## 2024-05-22 DIAGNOSIS — N20.0 KIDNEY STONES: ICD-10-CM

## 2024-05-22 DIAGNOSIS — M79.89 LEG SWELLING: ICD-10-CM

## 2024-05-22 PROCEDURE — 95810 POLYSOM 6/> YRS 4/> PARAM: CPT

## 2024-05-22 PROCEDURE — 99214 OFFICE O/P EST MOD 30 MIN: CPT | Performed by: PHYSICIAN ASSISTANT

## 2024-05-22 PROCEDURE — 3078F DIAST BP <80 MM HG: CPT | Performed by: PHYSICIAN ASSISTANT

## 2024-05-22 PROCEDURE — 3074F SYST BP LT 130 MM HG: CPT | Performed by: PHYSICIAN ASSISTANT

## 2024-05-22 ASSESSMENT — ENCOUNTER SYMPTOMS
CONSTIPATION: 0
SORE THROAT: 0
BACK PAIN: 0
COUGH: 0
EYE REDNESS: 0
BLOOD IN STOOL: 0
SINUS PAIN: 0
RHINORRHEA: 0
NAUSEA: 0
SINUS PRESSURE: 0
SHORTNESS OF BREATH: 0
VOMITING: 0
WHEEZING: 0
DIARRHEA: 0
EYE PAIN: 0
ABDOMINAL PAIN: 0

## 2024-05-22 NOTE — TELEPHONE ENCOUNTER
Patient had appointment with Amena to go over all the necessary paperwork.FMLA filled out and faxed to [employer].

## 2024-05-22 NOTE — PROGRESS NOTES
10-year ASCVD risk score (Gerald VIEYRA, et al., 2019) is: 5.7%    Values used to calculate the score:      Age: 59 years      Sex: Female      Is Non- : No      Diabetic: No      Tobacco smoker: No      Systolic Blood Pressure: 120 mmHg      Is BP treated: Yes      HDL Cholesterol: 40 mg/dL      Total Cholesterol: 223 mg/dL     Results for orders placed or performed during the hospital encounter of 05/17/24   Platelet count   Result Value Ref Range    Platelets 210 138 - 453 k/uL   Protime-INR   Result Value Ref Range    Protime 11.6 (L) 11.7 - 14.9 sec    INR 0.9    APTT   Result Value Ref Range    APTT <20.0 (L) 23.0 - 36.5 sec      ASSESSMENT/PLAN     1. Malignant neoplasm of upper-outer quadrant of left breast in female, estrogen receptor negative (HCC)  Patient recently saw oncology and the plan is to do chemo for 5 months and then surgery and then possible radiation depending on what type of surgery is done.  She has a port placed now.  Her breast cancer is triple negative.  She will also be on Keytruda.  2. Primary insomnia  Patient is sleeping much better with trazodone 50 mg nightly.  The fatigue was the main reason for her FMLA initially.  Also she was having shortness of breath which has resolved.  She is able to now go back to work on 5/24/2024 without any restrictions.  3. Poor sleep  4. Mixed restrictive and obstructive lung disease (HCC)  Patient recently saw pulmonologist and had PFTs done.  She is now on Symbicort and breathing much better and rarely uses her albuterol.  She does have a sleep study scheduled for tonight through the pulmonologist and will be awaiting those results.  She did have a recent CT of the chest and bilateral leg Dopplers done that were negative.  5. Essential hypertension  Blood pressure is stable and well-controlled on amlodipine 5 mg daily Coreg 25 mg twice a day and hydrochlorothiazide 25 mg daily irbesartan 150 mg daily and Lasix 40 mg daily  6.

## 2024-05-22 NOTE — TELEPHONE ENCOUNTER
Name: Luci Castañeda  : 1965  MRN: [id number]    Oncology Navigation Follow-Up Note    Contact Type:  Medical Oncology    Notes: Pt called with general questions which were addressed to the best of writer's knowledge. Informed pt chemo is approved and she should be hearing from the office for scheduling. We discussed MRI and pt voiced that she had a traumatic experience with her last MRI. She will need anti-anxiety medication in order to try and complete breast MRI. Writer updated Dr Osborn of pt's request.     Encouraged pt to call navigator as needed with any questions, concerns or barriers. Confirmed pt has navigator's contact information.     Electronically signed by Veronica Bennett RN on 2024 at 3:44 PM

## 2024-05-22 NOTE — TELEPHONE ENCOUNTER
Name: Luci Castañeda  : 1965  MRN: [id number]    Oncology Navigation Follow-Up Note    Notes: Noted insurance approved pt's chemo orders. Writer updated P[facility] office and requested pt be scheduled for chemo teach and chemo start date.       Electronically signed by Veronica Bennett RN on 2024 at 9:29 AM

## 2024-05-23 ENCOUNTER — TELEPHONE (OUTPATIENT)
Dept: ONCOLOGY | Age: 59
End: 2024-05-23

## 2024-05-23 RX ORDER — LIDOCAINE AND PRILOCAINE 25; 25 MG/G; MG/G
CREAM TOPICAL
Qty: 30 G | Refills: 2 | Status: SHIPPED | OUTPATIENT
Start: 2024-05-23

## 2024-05-23 NOTE — TELEPHONE ENCOUNTER
[facility] Genetic Counseling Program   Hereditary Cancer Risk Assessment     Name: Luci Castañeda  YOB: 1965  Date of Results Disclosure: 05/23/24      HISTORY   Ms. Castañeda was seen for genetic counseling due to her personal history of triple negative breast cancer.  At that time, Ms. Castañeda chose to pursue genetic testing via the CancerNext Expanded + RNA hereditary cancer gene panel. These results were discussed with Ms. Castañeda via telephone. A summary of Ms. Castañeda's results and recommendations are below.     RESULTS  [facility] Education Everytime CancerNext-Expanded Panel + RNAinsight: NEGATIVE - NO CLINICALLY SIGNIFICANT MUTATIONS DETECTED   This panel included the analysis of 71 genes associated with hereditary cancer including: AIP, ALK, APC, AMANDA, BAP1, BARD1, BMPR1A, BRCA1, BRCA2, BRIP1, CDC73, CDH1, CDK4, CDKN1B, CDKN2A, CHEK2, CTNNA1, DICER1, EGFR, EGLN1, EPCAM, FH, FLCN, GREM1, HOXB13, KIF1B, KIT1, LZTR1, MAX, MEN1, MET, MITF, MLH1, MSH2, MSH3, MSH6, MUTYH, NF1, NF2, NTHL1, PALB2, PDGFRA, PHOX2B, PMS2, POLD1, POLE, POT1, CRILV4L, PTCH1, PTEN, RAD51C, RAD51D, RB1, RET, SDHA, SDHAF2, SDHB, SDHC, ,SDHD, SMAD4, SMARCA4, SMARCB1, SMARCE1, STK11, SUFU, UMPH061, TP53, TSC1, TSC2, VHL. In addition, no clinically relevant aberrant RNA transcripts were detected in select genes. Please refer to genetic test report for technical details.    We discussed that Ms. Castañeda's negative test result greatly reduces the likelihood that her personal history of cancer is due to a hereditary mutation.  It is possible that her personal history of cancer may be due to a gene for which testing was not performed or which has yet to be discovered.     RECOMMENDATIONS  1) The outcome of Ms. Castañeda's genetic test results do not affect her current cancer treatment. Ms. Castañeda should continue cancer treatment and surveillance as directed by her physicians.    2) Ms. Castañeda should continue all other cancer screening guidelines as

## 2024-05-28 ENCOUNTER — HOSPITAL ENCOUNTER (OUTPATIENT)
Dept: PHYSICAL THERAPY | Facility: CLINIC | Age: 59
Setting detail: THERAPIES SERIES
Discharge: HOME | End: 2024-05-28
Payer: COMMERCIAL

## 2024-05-28 PROCEDURE — 97110 THERAPEUTIC EXERCISES: CPT

## 2024-05-28 PROCEDURE — 97140 MANUAL THERAPY 1/> REGIONS: CPT

## 2024-05-28 NOTE — FLOWSHEET NOTE
[] [facility]  Outpatient Rehabilitation &  Therapy  [address]  [phone]  [phone] [] [facility]  Outpatient Rehabilitation &  Therapy  [address]  P: [phone]  F: [phone] [x] [facility]  Outpatient Rehabilitation &  Therapy  [address]  P: [phone]  F: [phone] [] [facility]  Outpatient Rehabilitation &  Therapy  [address]  [phone]  [phone] [] [facility]  Outpatient Rehabilitation &  Therapy  [address]   P: [phone]  F: [phone]  [] [facility]  Outpatient Rehabilitation &  Therapy  [address]  P: [phone]  F: [phone] [] [facility]  Outpatient Rehabilitation &  Therapy  [address]  P: [phone]  F: [phone] [] [facility]  Outpatient Rehabilitation &  Therapy  [address]  P: [phone]  F: [phone] [] [facility]  Outpatient Rehabilitation &  Therapy  [address]  P: [phone]  F: [phone]  [] [facility] Outpatient Rehabilitation &  Therapy  [address]  P: [phone]  F: [phone]     Physical Therapy Daily Treatment Note    Date:  2024  Patient Name:  Luci Castañeda    :  1965  MRN: [id number]  Physician: Jess Bradley              Insurance: R AFTER 30TH VISIT   Medical Diagnosis: C50.912 (ICD-10-CM) - Invasive ductal carcinoma of breast, left (HCC)               Rehab Codes: 25.612, R07.89, R29.3, 25.512, R53.0  Onset date: 24               Next Dr's appt.: ongoing  Visit# / total visits:      Cancels/No Shows: 0    Subjective:  Pt reports she RTW last Fri, has been sadia well and not falling asleep. To begin chemo this Fri, notes tomorrow will be her last day of work. States her L shoulder is

## 2024-05-30 ENCOUNTER — TELEPHONE (OUTPATIENT)
Dept: INFUSION THERAPY | Age: 59
End: 2024-05-30

## 2024-05-30 ENCOUNTER — OFFICE VISIT (OUTPATIENT)
Dept: ONCOLOGY | Age: 59
End: 2024-05-30
Payer: COMMERCIAL

## 2024-05-30 DIAGNOSIS — C50.412 MALIGNANT NEOPLASM OF UPPER-OUTER QUADRANT OF LEFT BREAST IN FEMALE, ESTROGEN RECEPTOR NEGATIVE (HCC): Primary | ICD-10-CM

## 2024-05-30 DIAGNOSIS — Z17.1 MALIGNANT NEOPLASM OF UPPER-OUTER QUADRANT OF LEFT BREAST IN FEMALE, ESTROGEN RECEPTOR NEGATIVE (HCC): Primary | ICD-10-CM

## 2024-05-30 LAB — STATUS: NORMAL

## 2024-05-30 PROCEDURE — 99213 OFFICE O/P EST LOW 20 MIN: CPT | Performed by: INTERNAL MEDICINE

## 2024-05-30 RX ORDER — ONDANSETRON 8 MG/1
8 TABLET, ORALLY DISINTEGRATING ORAL EVERY 8 HOURS PRN
Qty: 90 TABLET | Refills: 3 | Status: SHIPPED | OUTPATIENT
Start: 2024-05-30 | End: 2024-05-31 | Stop reason: SDUPTHER

## 2024-05-30 RX ORDER — LIDOCAINE AND PRILOCAINE 25; 25 MG/G; MG/G
CREAM TOPICAL
Qty: 1 EACH | Refills: 2 | Status: SHIPPED | OUTPATIENT
Start: 2024-05-30

## 2024-05-30 RX ORDER — PROCHLORPERAZINE MALEATE 10 MG
10 TABLET ORAL EVERY 6 HOURS PRN
Qty: 120 TABLET | Refills: 3 | Status: SHIPPED | OUTPATIENT
Start: 2024-05-30 | End: 2024-05-31 | Stop reason: SDUPTHER

## 2024-05-30 NOTE — TELEPHONE ENCOUNTER
Chemotherapy orders received:    Ht=68 in Xq=531 lb BSA=2.45  Chemotherapy doses verified:     Keytruda 200 mg flat dose  Taxol 80 mg/m2 = 196 mg  Carbo target AUC 5 TBD day of treatment  Merlin 60 mg/m2 = 147 mg  Cytoxan 600 mg/m2 = 1470 mg  Monique Mancilla RN

## 2024-05-30 NOTE — PROGRESS NOTES
Luci was here for chemotherapy teaching. Spent 60 minutes with her.  Teaching sheets from [facility] and verbal information given on chemotherapy agents, action, administration and side effects.    Chemotherapy medications discussed: keytruda/taxol/carbo/linda/cytoxan    Pregnancy warnings reviewed: N/A    Chemotherapy consent form signed by patient.    Provided new patient binder, Chemotherapy and You booklet, Eating Hints booklet      Port placement: 5/17/24  Pt has prescription for EMLA cream and was given instructions on use.    Reviewed take home medication: zofran/compazine    Questions answered and support given.    [facility] rehab referral assessment form, distress tool form and PG-SGA form completed by patient.    Needs that were identified during teaching visit: No needs identified at this time.    Discussed community resources such as [employer], [facility], [facility], [employer], [employer], etc.    Pt will return on 5/31/24 for C1D1 and f/u with Dr. Bradley on 5/31/24.

## 2024-05-31 ENCOUNTER — TELEPHONE (OUTPATIENT)
Dept: INFUSION THERAPY | Age: 59
End: 2024-05-31

## 2024-05-31 ENCOUNTER — CLINICAL DOCUMENTATION (OUTPATIENT)
Dept: ONCOLOGY | Age: 59
End: 2024-05-31

## 2024-05-31 ENCOUNTER — HOSPITAL ENCOUNTER (OUTPATIENT)
Dept: INFUSION THERAPY | Age: 59
Discharge: HOME | End: 2024-05-31
Payer: COMMERCIAL

## 2024-05-31 ENCOUNTER — OFFICE VISIT (OUTPATIENT)
Dept: ONCOLOGY | Age: 59
End: 2024-05-31
Payer: COMMERCIAL

## 2024-05-31 ENCOUNTER — TELEPHONE (OUTPATIENT)
Dept: ONCOLOGY | Age: 59
End: 2024-05-31

## 2024-05-31 ENCOUNTER — APPOINTMENT (OUTPATIENT)
Age: 59
End: 2024-05-31
Attending: INTERNAL MEDICINE
Payer: COMMERCIAL

## 2024-05-31 ENCOUNTER — HOSPITAL ENCOUNTER (OUTPATIENT)
Dept: MRI IMAGING | Age: 59
Discharge: HOME | End: 2024-05-31
Attending: SURGERY
Payer: COMMERCIAL

## 2024-05-31 VITALS
DIASTOLIC BLOOD PRESSURE: 82 MMHG | OXYGEN SATURATION: 93 % | SYSTOLIC BLOOD PRESSURE: 118 MMHG | HEART RATE: 68 BPM | WEIGHT: 284.9 LBS | BODY MASS INDEX: 43.32 KG/M2 | TEMPERATURE: 96.8 F | RESPIRATION RATE: 18 BRPM

## 2024-05-31 DIAGNOSIS — C50.412 MALIGNANT NEOPLASM OF UPPER-OUTER QUADRANT OF LEFT BREAST IN FEMALE, ESTROGEN RECEPTOR NEGATIVE (HCC): Primary | ICD-10-CM

## 2024-05-31 DIAGNOSIS — C50.912 INVASIVE DUCTAL CARCINOMA OF BREAST, LEFT (HCC): ICD-10-CM

## 2024-05-31 DIAGNOSIS — Z17.1 MALIGNANT NEOPLASM OF UPPER-OUTER QUADRANT OF LEFT BREAST IN FEMALE, ESTROGEN RECEPTOR NEGATIVE (HCC): Primary | ICD-10-CM

## 2024-05-31 LAB
ALBUMIN SERPL-MCNC: 4.3 G/DL (ref 3.5–5.2)
ALBUMIN/GLOB SERPL: 1.3 {RATIO} (ref 1–2.5)
ALP SERPL-CCNC: 125 U/L (ref 35–104)
ALT SERPL-CCNC: 18 U/L (ref 5–33)
AMYLASE SERPL-CCNC: 75 U/L (ref 28–100)
ANION GAP SERPL CALCULATED.3IONS-SCNC: 12 MMOL/L (ref 9–17)
AST SERPL-CCNC: 19 U/L
BASOPHILS # BLD: 0 K/UL (ref 0–0.2)
BASOPHILS NFR BLD: 0 % (ref 0–2)
BILIRUB SERPL-MCNC: 0.3 MG/DL (ref 0.3–1.2)
BUN SERPL-MCNC: 19 MG/DL (ref 8–23)
CALCIUM SERPL-MCNC: 9.7 MG/DL (ref 8.6–10.4)
CHLORIDE SERPL-SCNC: 101 MMOL/L (ref 98–107)
CO2 SERPL-SCNC: 26 MMOL/L (ref 20–31)
CORTIS SERPL-MCNC: 10.5 UG/DL (ref 2.5–19.5)
CORTISOL COLLECTION INFO: NORMAL
CREAT SERPL-MCNC: 0.9 MG/DL (ref 0.5–0.9)
CREAT SERPL-MCNC: 0.9 MG/DL (ref 0.5–0.9)
EOSINOPHIL # BLD: 0.3 K/UL (ref 0–0.4)
EOSINOPHILS RELATIVE PERCENT: 3 % (ref 1–4)
ERYTHROCYTE [DISTWIDTH] IN BLOOD BY AUTOMATED COUNT: 16.1 % (ref 12.5–15.4)
GFR, ESTIMATED: 73 ML/MIN/1.73M2
GFR, ESTIMATED: 73 ML/MIN/1.73M2
GLUCOSE SERPL-MCNC: 134 MG/DL (ref 70–99)
HCT VFR BLD AUTO: 36.5 % (ref 36–46)
HGB BLD-MCNC: 12 G/DL (ref 12–16)
LIPASE SERPL-CCNC: 79 U/L (ref 13–60)
LYMPHOCYTES NFR BLD: 1.7 K/UL (ref 1–4.8)
LYMPHOCYTES RELATIVE PERCENT: 20 % (ref 24–44)
MCH RBC QN AUTO: 25.4 PG (ref 26–34)
MCHC RBC AUTO-ENTMCNC: 32.9 G/DL (ref 31–37)
MCV RBC AUTO: 77.1 FL (ref 80–100)
MONOCYTES NFR BLD: 0.6 K/UL (ref 0.1–1.2)
MONOCYTES NFR BLD: 7 % (ref 2–11)
NEUTROPHILS NFR BLD: 70 % (ref 36–66)
NEUTS SEG NFR BLD: 6.1 K/UL (ref 1.8–7.7)
PLATELET # BLD AUTO: 255 K/UL (ref 140–450)
PMV BLD AUTO: 7.1 FL (ref 6–12)
POTASSIUM SERPL-SCNC: 4.4 MMOL/L (ref 3.7–5.3)
PROT SERPL-MCNC: 7.5 G/DL (ref 6.4–8.3)
RBC # BLD AUTO: 4.73 M/UL (ref 4–5.2)
SODIUM SERPL-SCNC: 139 MMOL/L (ref 135–144)
TSH SERPL DL<=0.05 MIU/L-ACNC: 1.44 UIU/ML (ref 0.3–5)
WBC OTHER # BLD: 8.8 K/UL (ref 3.5–11)

## 2024-05-31 PROCEDURE — 96413 CHEMO IV INFUSION 1 HR: CPT

## 2024-05-31 PROCEDURE — 6360000004 HC RX CONTRAST MEDICATION: Performed by: SURGERY

## 2024-05-31 PROCEDURE — 99214 OFFICE O/P EST MOD 30 MIN: CPT | Performed by: INTERNAL MEDICINE

## 2024-05-31 PROCEDURE — 85025 COMPLETE CBC W/AUTO DIFF WBC: CPT

## 2024-05-31 PROCEDURE — 99211 OFF/OP EST MAY X REQ PHY/QHP: CPT | Performed by: INTERNAL MEDICINE

## 2024-05-31 PROCEDURE — 2580000003 HC RX 258: Performed by: SURGERY

## 2024-05-31 PROCEDURE — 82565 ASSAY OF CREATININE: CPT

## 2024-05-31 PROCEDURE — 36415 COLL VENOUS BLD VENIPUNCTURE: CPT

## 2024-05-31 PROCEDURE — 96417 CHEMO IV INFUS EACH ADDL SEQ: CPT

## 2024-05-31 PROCEDURE — A9579 GAD-BASE MR CONTRAST NOS,1ML: HCPCS | Performed by: SURGERY

## 2024-05-31 PROCEDURE — 6360000002 HC RX W HCPCS: Performed by: INTERNAL MEDICINE

## 2024-05-31 PROCEDURE — C8908 MRI W/O FOL W/CONT, BREAST,: HCPCS

## 2024-05-31 PROCEDURE — 96367 TX/PROPH/DG ADDL SEQ IV INF: CPT

## 2024-05-31 PROCEDURE — 84443 ASSAY THYROID STIM HORMONE: CPT

## 2024-05-31 PROCEDURE — 80053 COMPREHEN METABOLIC PANEL: CPT

## 2024-05-31 PROCEDURE — 96375 TX/PRO/DX INJ NEW DRUG ADDON: CPT

## 2024-05-31 PROCEDURE — 82150 ASSAY OF AMYLASE: CPT

## 2024-05-31 PROCEDURE — 82533 TOTAL CORTISOL: CPT

## 2024-05-31 PROCEDURE — 2580000003 HC RX 258: Performed by: INTERNAL MEDICINE

## 2024-05-31 PROCEDURE — 2500000003 HC RX 250 WO HCPCS: Performed by: INTERNAL MEDICINE

## 2024-05-31 PROCEDURE — 3074F SYST BP LT 130 MM HG: CPT | Performed by: INTERNAL MEDICINE

## 2024-05-31 PROCEDURE — 3079F DIAST BP 80-89 MM HG: CPT | Performed by: INTERNAL MEDICINE

## 2024-05-31 PROCEDURE — 6370000000 HC RX 637 (ALT 250 FOR IP): Performed by: INTERNAL MEDICINE

## 2024-05-31 PROCEDURE — 83690 ASSAY OF LIPASE: CPT

## 2024-05-31 RX ORDER — ACETAMINOPHEN 325 MG/1
650 TABLET ORAL ONCE
Status: COMPLETED | OUTPATIENT
Start: 2024-05-31 | End: 2024-05-31

## 2024-05-31 RX ORDER — SODIUM CHLORIDE 0.9 % (FLUSH) 0.9 %
5-40 SYRINGE (ML) INJECTION PRN
Status: DISCONTINUED | OUTPATIENT
Start: 2024-05-31 | End: 2024-06-01 | Stop reason: HOSPADM

## 2024-05-31 RX ORDER — ONDANSETRON 8 MG/1
8 TABLET, ORALLY DISINTEGRATING ORAL EVERY 8 HOURS PRN
Qty: 90 TABLET | Refills: 3 | Status: SHIPPED | OUTPATIENT
Start: 2024-05-31

## 2024-05-31 RX ORDER — CEPHALEXIN 500 MG/1
500 CAPSULE ORAL 4 TIMES DAILY
Qty: 28 CAPSULE | Refills: 0 | Status: SHIPPED | OUTPATIENT
Start: 2024-05-31

## 2024-05-31 RX ORDER — SODIUM CHLORIDE 9 MG/ML
5-250 INJECTION, SOLUTION INTRAVENOUS PRN
Status: DISCONTINUED | OUTPATIENT
Start: 2024-05-31 | End: 2024-06-01 | Stop reason: HOSPADM

## 2024-05-31 RX ORDER — DIPHENHYDRAMINE HYDROCHLORIDE 50 MG/ML
50 INJECTION INTRAMUSCULAR; INTRAVENOUS ONCE
Status: COMPLETED | OUTPATIENT
Start: 2024-05-31 | End: 2024-05-31

## 2024-05-31 RX ORDER — ALPRAZOLAM 0.5 MG/1
TABLET ORAL
COMMUNITY
Start: 2024-05-22

## 2024-05-31 RX ORDER — PALONOSETRON 0.05 MG/ML
0.25 INJECTION, SOLUTION INTRAVENOUS ONCE
Status: COMPLETED | OUTPATIENT
Start: 2024-05-31 | End: 2024-05-31

## 2024-05-31 RX ORDER — SODIUM CHLORIDE 0.9 % (FLUSH) 0.9 %
10 SYRINGE (ML) INJECTION ONCE
Status: COMPLETED | OUTPATIENT
Start: 2024-05-31 | End: 2024-05-31

## 2024-05-31 RX ORDER — FAMOTIDINE 10 MG/ML
20 INJECTION, SOLUTION INTRAVENOUS ONCE
Status: COMPLETED | OUTPATIENT
Start: 2024-05-31 | End: 2024-05-31

## 2024-05-31 RX ORDER — 0.9 % SODIUM CHLORIDE 0.9 %
100 INTRAVENOUS SOLUTION INTRAVENOUS ONCE
Status: COMPLETED | OUTPATIENT
Start: 2024-05-31 | End: 2024-05-31

## 2024-05-31 RX ORDER — DEXAMETHASONE SODIUM PHOSPHATE 10 MG/ML
10 INJECTION INTRAMUSCULAR; INTRAVENOUS ONCE
Status: COMPLETED | OUTPATIENT
Start: 2024-05-31 | End: 2024-05-31

## 2024-05-31 RX ORDER — PROCHLORPERAZINE MALEATE 10 MG
10 TABLET ORAL EVERY 6 HOURS PRN
Qty: 120 TABLET | Refills: 3 | Status: SHIPPED | OUTPATIENT
Start: 2024-05-31

## 2024-05-31 RX ORDER — HEPARIN 100 UNIT/ML
500 SYRINGE INTRAVENOUS PRN
Status: DISCONTINUED | OUTPATIENT
Start: 2024-05-31 | End: 2024-06-01 | Stop reason: HOSPADM

## 2024-05-31 RX ADMIN — SODIUM CHLORIDE, PRESERVATIVE FREE 10 ML: 5 INJECTION INTRAVENOUS at 11:09

## 2024-05-31 RX ADMIN — PALONOSETRON 0.25 MG: 0.05 INJECTION, SOLUTION INTRAVENOUS at 11:26

## 2024-05-31 RX ADMIN — ACETAMINOPHEN 650 MG: 325 TABLET ORAL at 12:29

## 2024-05-31 RX ADMIN — SODIUM CHLORIDE 150 MG: 9 INJECTION, SOLUTION INTRAVENOUS at 11:46

## 2024-05-31 RX ADMIN — SODIUM CHLORIDE, PRESERVATIVE FREE 10 ML: 5 INJECTION INTRAVENOUS at 11:08

## 2024-05-31 RX ADMIN — SODIUM CHLORIDE, PRESERVATIVE FREE 10 ML: 5 INJECTION INTRAVENOUS at 08:36

## 2024-05-31 RX ADMIN — SODIUM CHLORIDE 100 ML: 9 INJECTION, SOLUTION INTRAVENOUS at 08:36

## 2024-05-31 RX ADMIN — SODIUM CHLORIDE 200 MG: 9 INJECTION, SOLUTION INTRAVENOUS at 12:16

## 2024-05-31 RX ADMIN — GADOTERIDOL 20 ML: 279.3 INJECTION, SOLUTION INTRAVENOUS at 08:36

## 2024-05-31 RX ADMIN — FAMOTIDINE 20 MG: 10 INJECTION, SOLUTION INTRAVENOUS at 11:26

## 2024-05-31 RX ADMIN — DEXAMETHASONE SODIUM PHOSPHATE 10 MG: 10 INJECTION INTRAMUSCULAR; INTRAVENOUS at 11:26

## 2024-05-31 RX ADMIN — SODIUM CHLORIDE, PRESERVATIVE FREE 10 ML: 5 INJECTION INTRAVENOUS at 14:42

## 2024-05-31 RX ADMIN — SODIUM CHLORIDE 200 ML/HR: 9 INJECTION, SOLUTION INTRAVENOUS at 11:25

## 2024-05-31 RX ADMIN — HEPARIN 500 UNITS: 100 SYRINGE at 14:42

## 2024-05-31 RX ADMIN — CARBOPLATIN 590 MG: 10 INJECTION INTRAVENOUS at 14:04

## 2024-05-31 RX ADMIN — DIPHENHYDRAMINE HYDROCHLORIDE 50 MG: 50 INJECTION INTRAMUSCULAR; INTRAVENOUS at 11:26

## 2024-05-31 RX ADMIN — PACLITAXEL 168 MG: 6 INJECTION, SOLUTION, CONCENTRATE INTRAVENOUS at 12:45

## 2024-05-31 ASSESSMENT — PAIN SCALES - GENERAL: PAINLEVEL_OUTOF10: 6

## 2024-05-31 NOTE — TELEPHONE ENCOUNTER
Name: Luci Castañeda  : 1965  MRN: [id number]    Oncology Navigation Follow-Up Note    Contact Type:  Medical Oncology    Notes: Met with pt after MO f/u. Pt reports she is doing ok and denied needs from navigator at this time. Encouraged pt to call navigator as needed with any questions, concerns or barriers. Confirmed pt has navigator's contact information.         Electronically signed by Veronica Bennett RN on 2024 at 11:11 AM

## 2024-05-31 NOTE — TELEPHONE ENCOUNTER
Instructions   from Dr. Jess Bradley MD    Proceed with treatment today  RV one week    RV 6/7/24 at 1:30 with TX at 2 pm

## 2024-05-31 NOTE — PROGRESS NOTES
[facility] Nutrition Screen:    PG-SGA screening form reviewed. Score = 0.   RD referral/nutrition evaluation indicated for scores > 4.   Please consult oncology dietitian with any future nutrition concerns/needs.

## 2024-05-31 NOTE — PROGRESS NOTES
Patient arrived for C1D1 keytruda, taxol, carbo.Labs within treatable parameters. Seen by el Sanabria to proceed with treatment. Patient tolerated w/o incident and left in stable condition. Returns 6/7 for dr and tx.

## 2024-05-31 NOTE — TELEPHONE ENCOUNTER
Chemotherapy orders received:     Ht=68 in Yx=843 lb BSA=2.45  Chemotherapy doses verified:      Keytruda 200 mg flat dose  Taxol 80 mg/m2 = 196 mg  Carbo target AUC 5 TBD day of treatment  Merlin 60 mg/m2 = 147 mg  Cytoxan 600 mg/m2 = 1470 mg     Melina Brown RN

## 2024-06-03 ENCOUNTER — APPOINTMENT (OUTPATIENT)
Dept: PHYSICAL THERAPY | Facility: CLINIC | Age: 59
End: 2024-06-03
Payer: COMMERCIAL

## 2024-06-03 ENCOUNTER — TELEPHONE (OUTPATIENT)
Dept: ONCOLOGY | Age: 59
End: 2024-06-03

## 2024-06-03 NOTE — TELEPHONE ENCOUNTER
Name: Luci Castañeda  : 1965  MRN: [id number]    Oncology Navigation Follow-Up Note    Contact Type:  Telephone    Notes: Writer received call from pt's sister, Janell. No PHI information was discussed. Janell had general questions about chemotherapy, no specific to pt's case. Also discussed ways to help prevent nausea nad dehydration.     Janell lives in [place] and plans to come in [facility] to help pt on her \" sick days\" so she wanted to prepare for these.         Electronically signed by Veronica Bennett RN on 6/3/2024 at 3:29 PM

## 2024-06-04 ENCOUNTER — TELEPHONE (OUTPATIENT)
Dept: INFUSION THERAPY | Age: 59
End: 2024-06-04

## 2024-06-06 NOTE — PROGRESS NOTES
Chief Complaint   Patient presents with    Results     PET     DIAGNOSIS:        Clinical stage T2 N0 M0 left breast upper outer quadrant invasive ductal carcinoma, grade 3, ER negative, [place] negative, HER2/carol not amplified.  History of left nephrectomy in 1988   CURRENT THERAPY:         Neoadjuvant treatment with Keytruda/Taxol/carboplatin/Adriamycin/Cytoxan.  Treatment started 5/31/2024  BRIEF CASE HISTORY:       Ms. Luci Castañeda is a very pleasant 59 y.o. female with history of multiple comorbidities as listed.  She had history of left nephrectomy in 1988.  She had cardiac cath in 2021 for left ventricular hypertrophy.  Had recent problems with restrictive and obstructive pulmonary disease.  She continues follow-up with pulmonary. She was doing fine with routine mammogram being normal until this year where she had suspicious abnormalities in the mammogram done on 3/25/2024.  Diagnostic mammogram and left breast ultrasound April 9, 2024 showed suspicious 4.5 cm mass in the left breast 1 o'clock position.  Mildly thickened left axillary lymph nodes.  A biopsy was performed on 4/26/2024 and unfortunately that was positive for invasive ductal carcinoma.  Triple negative.  Biopsy from lymph node was negative for malignancy.  The patient is seen today for further management of her breast cancer. The patient did very well after her biopsy without any complication. The patient had no symptom preceding her diagnosis of cancer. No chest pain, shortness of breath, cough, sputum or hemoptysis, no back pain or monique aches, no weight loss or decreased appetite, no fever or night sweating. No headaches, and no other complaints.  No smoking social alcohol      INTERIM HISTORY:   Patient seen for follow-up triple negative breast cancer.  Staging workup showed no evidence of metastasis.  Echo was done which was fine.  Patient is clinically stable.  No new complaints at the present time.    GYNECOLOGICAL

## 2024-06-07 ENCOUNTER — HOSPITAL ENCOUNTER (OUTPATIENT)
Dept: INFUSION THERAPY | Age: 59
Discharge: HOME | End: 2024-06-07
Payer: COMMERCIAL

## 2024-06-07 ENCOUNTER — TELEPHONE (OUTPATIENT)
Dept: ONCOLOGY | Age: 59
End: 2024-06-07

## 2024-06-07 ENCOUNTER — OFFICE VISIT (OUTPATIENT)
Dept: ONCOLOGY | Age: 59
End: 2024-06-07
Payer: COMMERCIAL

## 2024-06-07 VITALS
BODY MASS INDEX: 42.33 KG/M2 | DIASTOLIC BLOOD PRESSURE: 69 MMHG | SYSTOLIC BLOOD PRESSURE: 101 MMHG | HEART RATE: 72 BPM | RESPIRATION RATE: 18 BRPM | TEMPERATURE: 96.9 F | OXYGEN SATURATION: 95 % | WEIGHT: 278.4 LBS

## 2024-06-07 DIAGNOSIS — C50.412 MALIGNANT NEOPLASM OF UPPER-OUTER QUADRANT OF LEFT BREAST IN FEMALE, ESTROGEN RECEPTOR NEGATIVE (HCC): Primary | ICD-10-CM

## 2024-06-07 DIAGNOSIS — Z17.1 MALIGNANT NEOPLASM OF UPPER-OUTER QUADRANT OF LEFT BREAST IN FEMALE, ESTROGEN RECEPTOR NEGATIVE (HCC): Primary | ICD-10-CM

## 2024-06-07 LAB
ALBUMIN SERPL-MCNC: 4.1 G/DL (ref 3.5–5.2)
ALBUMIN/GLOB SERPL: 1.2 {RATIO} (ref 1–2.5)
ALP SERPL-CCNC: 118 U/L (ref 35–104)
ALT SERPL-CCNC: 25 U/L (ref 5–33)
AMYLASE SERPL-CCNC: 83 U/L (ref 28–100)
ANION GAP SERPL CALCULATED.3IONS-SCNC: 9 MMOL/L (ref 9–17)
AST SERPL-CCNC: 21 U/L
BASOPHILS # BLD: 0.1 K/UL (ref 0–0.2)
BASOPHILS NFR BLD: 1 % (ref 0–2)
BILIRUB SERPL-MCNC: 0.5 MG/DL (ref 0.3–1.2)
BUN SERPL-MCNC: 32 MG/DL (ref 8–23)
CALCIUM SERPL-MCNC: 9.5 MG/DL (ref 8.6–10.4)
CHLORIDE SERPL-SCNC: 100 MMOL/L (ref 98–107)
CO2 SERPL-SCNC: 28 MMOL/L (ref 20–31)
CORTIS SERPL-MCNC: 9 UG/DL (ref 2.5–19.5)
CORTISOL COLLECTION INFO: NORMAL
CREAT SERPL-MCNC: 1.2 MG/DL (ref 0.5–0.9)
EOSINOPHIL # BLD: 0.1 K/UL (ref 0–0.4)
EOSINOPHILS RELATIVE PERCENT: 1 % (ref 1–4)
ERYTHROCYTE [DISTWIDTH] IN BLOOD BY AUTOMATED COUNT: 16.1 % (ref 12.5–15.4)
GFR, ESTIMATED: 52 ML/MIN/1.73M2
GLUCOSE SERPL-MCNC: 128 MG/DL (ref 70–99)
HCT VFR BLD AUTO: 35.3 % (ref 36–46)
HGB BLD-MCNC: 11.5 G/DL (ref 12–16)
LIPASE SERPL-CCNC: 96 U/L (ref 13–60)
LYMPHOCYTES NFR BLD: 2 K/UL (ref 1–4.8)
LYMPHOCYTES RELATIVE PERCENT: 32 % (ref 24–44)
MCH RBC QN AUTO: 25.1 PG (ref 26–34)
MCHC RBC AUTO-ENTMCNC: 32.5 G/DL (ref 31–37)
MCV RBC AUTO: 77.3 FL (ref 80–100)
MONOCYTES NFR BLD: 0.3 K/UL (ref 0.1–1.2)
MONOCYTES NFR BLD: 6 % (ref 2–11)
NEUTROPHILS NFR BLD: 60 % (ref 36–66)
NEUTS SEG NFR BLD: 3.7 K/UL (ref 1.8–7.7)
PLATELET # BLD AUTO: 238 K/UL (ref 140–450)
PMV BLD AUTO: 7.1 FL (ref 6–12)
POTASSIUM SERPL-SCNC: 4.7 MMOL/L (ref 3.7–5.3)
PROT SERPL-MCNC: 7.5 G/DL (ref 6.4–8.3)
RBC # BLD AUTO: 4.57 M/UL (ref 4–5.2)
SODIUM SERPL-SCNC: 137 MMOL/L (ref 135–144)
TSH SERPL DL<=0.05 MIU/L-ACNC: 1.32 UIU/ML (ref 0.3–5)
WBC OTHER # BLD: 6.2 K/UL (ref 3.5–11)

## 2024-06-07 PROCEDURE — 3078F DIAST BP <80 MM HG: CPT | Performed by: INTERNAL MEDICINE

## 2024-06-07 PROCEDURE — 83690 ASSAY OF LIPASE: CPT

## 2024-06-07 PROCEDURE — 2500000003 HC RX 250 WO HCPCS: Performed by: INTERNAL MEDICINE

## 2024-06-07 PROCEDURE — 6360000002 HC RX W HCPCS: Performed by: INTERNAL MEDICINE

## 2024-06-07 PROCEDURE — 85025 COMPLETE CBC W/AUTO DIFF WBC: CPT

## 2024-06-07 PROCEDURE — 36591 DRAW BLOOD OFF VENOUS DEVICE: CPT

## 2024-06-07 PROCEDURE — 3074F SYST BP LT 130 MM HG: CPT | Performed by: INTERNAL MEDICINE

## 2024-06-07 PROCEDURE — 2580000003 HC RX 258: Performed by: INTERNAL MEDICINE

## 2024-06-07 PROCEDURE — 82150 ASSAY OF AMYLASE: CPT

## 2024-06-07 PROCEDURE — 96375 TX/PRO/DX INJ NEW DRUG ADDON: CPT

## 2024-06-07 PROCEDURE — 99211 OFF/OP EST MAY X REQ PHY/QHP: CPT | Performed by: INTERNAL MEDICINE

## 2024-06-07 PROCEDURE — 80053 COMPREHEN METABOLIC PANEL: CPT

## 2024-06-07 PROCEDURE — 99214 OFFICE O/P EST MOD 30 MIN: CPT | Performed by: INTERNAL MEDICINE

## 2024-06-07 PROCEDURE — 84443 ASSAY THYROID STIM HORMONE: CPT

## 2024-06-07 PROCEDURE — 96413 CHEMO IV INFUSION 1 HR: CPT

## 2024-06-07 PROCEDURE — 82533 TOTAL CORTISOL: CPT

## 2024-06-07 RX ORDER — SODIUM CHLORIDE 0.9 % (FLUSH) 0.9 %
5-40 SYRINGE (ML) INJECTION PRN
OUTPATIENT
Start: 2024-06-28

## 2024-06-07 RX ORDER — SODIUM CHLORIDE 9 MG/ML
5-250 INJECTION, SOLUTION INTRAVENOUS PRN
OUTPATIENT
Start: 2024-07-05

## 2024-06-07 RX ORDER — ALBUTEROL SULFATE 90 UG/1
4 AEROSOL, METERED RESPIRATORY (INHALATION) PRN
OUTPATIENT
Start: 2024-06-28

## 2024-06-07 RX ORDER — FAMOTIDINE 10 MG/ML
20 INJECTION, SOLUTION INTRAVENOUS
OUTPATIENT
Start: 2024-06-21

## 2024-06-07 RX ORDER — ONDANSETRON 4 MG/1
4 TABLET, FILM COATED ORAL 3 TIMES DAILY PRN
Qty: 100 TABLET | Refills: 1 | Status: SHIPPED | OUTPATIENT
Start: 2024-06-07

## 2024-06-07 RX ORDER — DIPHENHYDRAMINE HYDROCHLORIDE 50 MG/ML
50 INJECTION INTRAMUSCULAR; INTRAVENOUS
OUTPATIENT
Start: 2024-07-05

## 2024-06-07 RX ORDER — FAMOTIDINE 10 MG/ML
20 INJECTION, SOLUTION INTRAVENOUS ONCE
OUTPATIENT
Start: 2024-06-21 | End: 2024-06-21

## 2024-06-07 RX ORDER — MEPERIDINE HYDROCHLORIDE 50 MG/ML
12.5 INJECTION INTRAMUSCULAR; INTRAVENOUS; SUBCUTANEOUS PRN
OUTPATIENT
Start: 2024-06-28

## 2024-06-07 RX ORDER — SODIUM CHLORIDE 9 MG/ML
5-250 INJECTION, SOLUTION INTRAVENOUS PRN
OUTPATIENT
Start: 2024-06-21

## 2024-06-07 RX ORDER — SODIUM CHLORIDE 9 MG/ML
5-250 INJECTION, SOLUTION INTRAVENOUS PRN
OUTPATIENT
Start: 2024-06-28

## 2024-06-07 RX ORDER — HEPARIN SODIUM (PORCINE) LOCK FLUSH IV SOLN 100 UNIT/ML 100 UNIT/ML
500 SOLUTION INTRAVENOUS PRN
OUTPATIENT
Start: 2024-07-05

## 2024-06-07 RX ORDER — PALONOSETRON 0.05 MG/ML
0.25 INJECTION, SOLUTION INTRAVENOUS ONCE
OUTPATIENT
Start: 2024-06-21 | End: 2024-06-21

## 2024-06-07 RX ORDER — HEPARIN 100 UNIT/ML
500 SYRINGE INTRAVENOUS PRN
Status: DISCONTINUED | OUTPATIENT
Start: 2024-06-07 | End: 2024-06-08 | Stop reason: HOSPADM

## 2024-06-07 RX ORDER — MEPERIDINE HYDROCHLORIDE 50 MG/ML
12.5 INJECTION INTRAMUSCULAR; INTRAVENOUS; SUBCUTANEOUS PRN
OUTPATIENT
Start: 2024-06-21

## 2024-06-07 RX ORDER — DIPHENHYDRAMINE HYDROCHLORIDE 50 MG/ML
50 INJECTION INTRAMUSCULAR; INTRAVENOUS
OUTPATIENT
Start: 2024-06-21

## 2024-06-07 RX ORDER — ALBUTEROL SULFATE 90 UG/1
4 AEROSOL, METERED RESPIRATORY (INHALATION) PRN
OUTPATIENT
Start: 2024-06-21

## 2024-06-07 RX ORDER — HEPARIN SODIUM (PORCINE) LOCK FLUSH IV SOLN 100 UNIT/ML 100 UNIT/ML
500 SOLUTION INTRAVENOUS PRN
OUTPATIENT
Start: 2024-06-28

## 2024-06-07 RX ORDER — ONDANSETRON 2 MG/ML
8 INJECTION INTRAMUSCULAR; INTRAVENOUS
OUTPATIENT
Start: 2024-06-28

## 2024-06-07 RX ORDER — EPINEPHRINE 1 MG/ML
0.3 INJECTION, SOLUTION, CONCENTRATE INTRAVENOUS PRN
OUTPATIENT
Start: 2024-07-05

## 2024-06-07 RX ORDER — SODIUM CHLORIDE 9 MG/ML
INJECTION, SOLUTION INTRAVENOUS CONTINUOUS
OUTPATIENT
Start: 2024-07-05

## 2024-06-07 RX ORDER — ALBUTEROL SULFATE 90 UG/1
4 AEROSOL, METERED RESPIRATORY (INHALATION) PRN
OUTPATIENT
Start: 2024-07-05

## 2024-06-07 RX ORDER — PROCHLORPERAZINE EDISYLATE 5 MG/ML
5 INJECTION INTRAMUSCULAR; INTRAVENOUS
OUTPATIENT
Start: 2024-06-21

## 2024-06-07 RX ORDER — FAMOTIDINE 10 MG/ML
20 INJECTION, SOLUTION INTRAVENOUS ONCE
OUTPATIENT
Start: 2024-06-28 | End: 2024-06-28

## 2024-06-07 RX ORDER — FAMOTIDINE 10 MG/ML
20 INJECTION, SOLUTION INTRAVENOUS ONCE
OUTPATIENT
Start: 2024-07-05 | End: 2024-07-05

## 2024-06-07 RX ORDER — DIPHENHYDRAMINE HYDROCHLORIDE 50 MG/ML
50 INJECTION INTRAMUSCULAR; INTRAVENOUS ONCE
OUTPATIENT
Start: 2024-06-28 | End: 2024-06-28

## 2024-06-07 RX ORDER — ONDANSETRON 2 MG/ML
8 INJECTION INTRAMUSCULAR; INTRAVENOUS
OUTPATIENT
Start: 2024-06-21

## 2024-06-07 RX ORDER — SODIUM CHLORIDE 0.9 % (FLUSH) 0.9 %
5-40 SYRINGE (ML) INJECTION PRN
Status: DISCONTINUED | OUTPATIENT
Start: 2024-06-07 | End: 2024-06-08 | Stop reason: HOSPADM

## 2024-06-07 RX ORDER — EPINEPHRINE 1 MG/ML
0.3 INJECTION, SOLUTION, CONCENTRATE INTRAVENOUS PRN
OUTPATIENT
Start: 2024-06-28

## 2024-06-07 RX ORDER — ACETAMINOPHEN 325 MG/1
650 TABLET ORAL
OUTPATIENT
Start: 2024-07-05

## 2024-06-07 RX ORDER — SODIUM CHLORIDE 9 MG/ML
5-250 INJECTION, SOLUTION INTRAVENOUS PRN
Status: DISCONTINUED | OUTPATIENT
Start: 2024-06-07 | End: 2024-06-08 | Stop reason: HOSPADM

## 2024-06-07 RX ORDER — DIPHENHYDRAMINE HYDROCHLORIDE 50 MG/ML
50 INJECTION INTRAMUSCULAR; INTRAVENOUS ONCE
Status: COMPLETED | OUTPATIENT
Start: 2024-06-07 | End: 2024-06-07

## 2024-06-07 RX ORDER — SODIUM CHLORIDE 0.9 % (FLUSH) 0.9 %
5-40 SYRINGE (ML) INJECTION PRN
OUTPATIENT
Start: 2024-07-05

## 2024-06-07 RX ORDER — DEXAMETHASONE SODIUM PHOSPHATE 10 MG/ML
10 INJECTION INTRAMUSCULAR; INTRAVENOUS ONCE
Status: COMPLETED | OUTPATIENT
Start: 2024-06-07 | End: 2024-06-07

## 2024-06-07 RX ORDER — SODIUM CHLORIDE 0.9 % (FLUSH) 0.9 %
5-40 SYRINGE (ML) INJECTION PRN
OUTPATIENT
Start: 2024-06-21

## 2024-06-07 RX ORDER — ACETAMINOPHEN 325 MG/1
650 TABLET ORAL
OUTPATIENT
Start: 2024-06-21

## 2024-06-07 RX ORDER — SODIUM CHLORIDE 9 MG/ML
INJECTION, SOLUTION INTRAVENOUS CONTINUOUS
OUTPATIENT
Start: 2024-06-21

## 2024-06-07 RX ORDER — EPINEPHRINE 1 MG/ML
0.3 INJECTION, SOLUTION, CONCENTRATE INTRAVENOUS PRN
OUTPATIENT
Start: 2024-06-21

## 2024-06-07 RX ORDER — SODIUM CHLORIDE 9 MG/ML
INJECTION, SOLUTION INTRAVENOUS CONTINUOUS
OUTPATIENT
Start: 2024-06-28

## 2024-06-07 RX ORDER — FAMOTIDINE 10 MG/ML
20 INJECTION, SOLUTION INTRAVENOUS ONCE
Status: COMPLETED | OUTPATIENT
Start: 2024-06-07 | End: 2024-06-07

## 2024-06-07 RX ORDER — FAMOTIDINE 10 MG/ML
20 INJECTION, SOLUTION INTRAVENOUS
OUTPATIENT
Start: 2024-07-05

## 2024-06-07 RX ORDER — DIPHENHYDRAMINE HYDROCHLORIDE 50 MG/ML
50 INJECTION INTRAMUSCULAR; INTRAVENOUS ONCE
OUTPATIENT
Start: 2024-07-05 | End: 2024-07-05

## 2024-06-07 RX ORDER — DIPHENHYDRAMINE HYDROCHLORIDE 50 MG/ML
50 INJECTION INTRAMUSCULAR; INTRAVENOUS ONCE
OUTPATIENT
Start: 2024-06-21 | End: 2024-06-21

## 2024-06-07 RX ORDER — ONDANSETRON 2 MG/ML
8 INJECTION INTRAMUSCULAR; INTRAVENOUS
OUTPATIENT
Start: 2024-07-05

## 2024-06-07 RX ORDER — DIPHENHYDRAMINE HYDROCHLORIDE 50 MG/ML
50 INJECTION INTRAMUSCULAR; INTRAVENOUS
OUTPATIENT
Start: 2024-06-28

## 2024-06-07 RX ORDER — ONDANSETRON 2 MG/ML
8 INJECTION INTRAMUSCULAR; INTRAVENOUS ONCE
Status: COMPLETED | OUTPATIENT
Start: 2024-06-07 | End: 2024-06-07

## 2024-06-07 RX ORDER — HYDROCODONE BITARTRATE AND ACETAMINOPHEN 5; 325 MG/1; MG/1
1 TABLET ORAL EVERY 6 HOURS PRN
Qty: 100 TABLET | Refills: 0 | Status: SHIPPED | OUTPATIENT
Start: 2024-06-07 | End: 2024-07-07

## 2024-06-07 RX ORDER — FAMOTIDINE 10 MG/ML
20 INJECTION, SOLUTION INTRAVENOUS
OUTPATIENT
Start: 2024-06-28

## 2024-06-07 RX ORDER — MEPERIDINE HYDROCHLORIDE 50 MG/ML
12.5 INJECTION INTRAMUSCULAR; INTRAVENOUS; SUBCUTANEOUS PRN
OUTPATIENT
Start: 2024-07-05

## 2024-06-07 RX ORDER — HEPARIN SODIUM (PORCINE) LOCK FLUSH IV SOLN 100 UNIT/ML 100 UNIT/ML
500 SOLUTION INTRAVENOUS PRN
OUTPATIENT
Start: 2024-06-21

## 2024-06-07 RX ORDER — ACETAMINOPHEN 325 MG/1
650 TABLET ORAL
OUTPATIENT
Start: 2024-06-28

## 2024-06-07 RX ADMIN — SODIUM CHLORIDE 200 ML/HR: 9 INJECTION, SOLUTION INTRAVENOUS at 14:55

## 2024-06-07 RX ADMIN — HEPARIN 500 UNITS: 100 SYRINGE at 16:19

## 2024-06-07 RX ADMIN — FAMOTIDINE 20 MG: 10 INJECTION, SOLUTION INTRAVENOUS at 14:56

## 2024-06-07 RX ADMIN — PACLITAXEL 168 MG: 6 INJECTION, SOLUTION, CONCENTRATE INTRAVENOUS at 15:16

## 2024-06-07 RX ADMIN — ONDANSETRON 8 MG: 2 INJECTION INTRAMUSCULAR; INTRAVENOUS at 14:55

## 2024-06-07 RX ADMIN — SODIUM CHLORIDE, PRESERVATIVE FREE 10 ML: 5 INJECTION INTRAVENOUS at 13:55

## 2024-06-07 RX ADMIN — SODIUM CHLORIDE, PRESERVATIVE FREE 10 ML: 5 INJECTION INTRAVENOUS at 16:19

## 2024-06-07 RX ADMIN — DEXAMETHASONE SODIUM PHOSPHATE 10 MG: 10 INJECTION INTRAMUSCULAR; INTRAVENOUS at 14:59

## 2024-06-07 RX ADMIN — DIPHENHYDRAMINE HYDROCHLORIDE 50 MG: 50 INJECTION INTRAMUSCULAR; INTRAVENOUS at 14:59

## 2024-06-07 NOTE — PROGRESS NOTES
Pt here for C1D8 Taxol. Pt seen by Dr Walker prior to tx and ok to treat. Labs drawn from port and results reviewed. Pt was treated without incident and d/c'd in stable condition. Pt will return on 6-14-24 for C1D15.

## 2024-06-07 NOTE — PROGRESS NOTES
Chief Complaint   Patient presents with    Results     PET     DIAGNOSIS:        Clinical stage T2 N0 M0 left breast upper outer quadrant invasive ductal carcinoma, grade 3, ER negative, MA negative, HER2/carol not amplified.  History of left nephrectomy in 1988   CURRENT THERAPY:         Neoadjuvant treatment with Keytruda/Taxol/carboplatin/Adriamycin/Cytoxan.  Treatment started 5/31/2024  BRIEF CASE HISTORY:       Ms. Luci Castañeda is a very pleasant 59 y.o. female with history of multiple comorbidities as listed.  She had history of left nephrectomy in 1988.  She had cardiac cath in 2021 for left ventricular hypertrophy.  Had recent problems with restrictive and obstructive pulmonary disease.  She continues follow-up with pulmonary. She was doing fine with routine mammogram being normal until this year where she had suspicious abnormalities in the mammogram done on 3/25/2024.  Diagnostic mammogram and left breast ultrasound April 9, 2024 showed suspicious 4.5 cm mass in the left breast 1 o'clock position.  Mildly thickened left axillary lymph nodes.  A biopsy was performed on 4/26/2024 and unfortunately that was positive for invasive ductal carcinoma.  Triple negative.  Biopsy from lymph node was negative for malignancy.  The patient is seen today for further management of her breast cancer. The patient did very well after her biopsy without any complication. The patient had no symptom preceding her diagnosis of cancer. No chest pain, shortness of breath, cough, sputum or hemoptysis, no back pain or monique aches, no weight loss or decreased appetite, no fever or night sweating. No headaches, and no other complaints.  No smoking social alcohol      INTERIM HISTORY:   Patient seen for follow-up triple negative breast cancer.  Staging workup showed no evidence of metastasis.  Echo was done which was fine.  Patient is clinically stable. Started chemotherapy. She had headaches and nausea. No other side

## 2024-06-07 NOTE — TELEPHONE ENCOUNTER
Instructions   from MD Zelda Woodruff and Norco  Proceed with treatment as scheduled  RV 2 weeks      Tx as scheduled  RV 6/21/24 12:00 with tx to follow

## 2024-06-10 ENCOUNTER — HOSPITAL ENCOUNTER (OUTPATIENT)
Dept: PHYSICAL THERAPY | Facility: CLINIC | Age: 59
Setting detail: THERAPIES SERIES
Discharge: HOME | End: 2024-06-10
Payer: COMMERCIAL

## 2024-06-10 ENCOUNTER — TELEPHONE (OUTPATIENT)
Dept: ONCOLOGY | Age: 59
End: 2024-06-10

## 2024-06-10 PROCEDURE — 97140 MANUAL THERAPY 1/> REGIONS: CPT

## 2024-06-10 PROCEDURE — 97110 THERAPEUTIC EXERCISES: CPT

## 2024-06-10 NOTE — FLOWSHEET NOTE
[] [facility]  Outpatient Rehabilitation &  Therapy  [address]  [phone]  [phone] [] [facility]  Outpatient Rehabilitation &  Therapy  [address]  P: [phone]  F: [phone] [x] [facility]  Outpatient Rehabilitation &  Therapy  [address]  P: [phone]  F: [phone] [] [facility]  Outpatient Rehabilitation &  Therapy  [address]  [phone]  [phone] [] [facility]  Outpatient Rehabilitation &  Therapy  [address]   P: [phone]  F: [phone]  [] [facility]  Outpatient Rehabilitation &  Therapy  [address]  P: [phone]  F: [phone] [] [facility]  Outpatient Rehabilitation &  Therapy  [address]  P: [phone]  F: [phone] [] [facility]  Outpatient Rehabilitation &  Therapy  [address]  P: [phone]  F: [phone] [] [facility]  Outpatient Rehabilitation &  Therapy  [address]  P: [phone]  F: [phone]  [] [facility] Outpatient Rehabilitation &  Therapy  [address]  P: [phone]  F: [phone]     Physical Therapy Daily Treatment Note    Date:  6/10/2024  Patient Name:  Luci Castañeda    :  1965  MRN: [id number]  Physician: Jess Bradley              Insurance: R AFTER 30TH VISIT   Medical Diagnosis: C50.912 (ICD-10-CM) - Invasive ductal carcinoma of breast, left (HCC)               Rehab Codes: 25.612, R07.89, R29.3, 25.512, R53.0  Onset date: 24               Next Dr's appt.: ongoing  Visit# / total visits: 3/18     Cancels/No Shows: 0    Subjective:  Pt reports she has had 2 chemo treatments since last visit, got really sick and still nauseous. Hasn't done much w/ HEP or been able to be very active. Notes

## 2024-06-10 NOTE — TELEPHONE ENCOUNTER
Name: Luci Castañeda  : 1965  MRN: [id number]    Oncology Navigation Follow-Up Note    Contact Type:  Telephone    Notes: Pt called writer and voiced that she had 9-10/10 pain in her knee during her infusion that lasted until that evening. She is unsure what medication caused the pain. She reports that it was so severe she felt like \"crawling out of her skin\" and described it as deep, like it was in her bones. Encouraged pt to call triage nurse line to report medication side effects. Pt confirmed she had phone number.       Electronically signed by Veronica Bennett RN on 6/10/2024 at 2:17 PM

## 2024-06-11 LAB — LDLC SERPL DIRECT ASSAY-MCNC: 47 MG/DL

## 2024-06-12 LAB
CHOLEST SERPL-MCNC: 309 MG/DL (ref 0–199)
CHOLESTEROL/HDL RATIO: 11.9
GLUCOSE SERPL-MCNC: 97 MG/DL (ref 74–99)
HDLC SERPL-MCNC: 26 MG/DL
LDLC SERPL CALC-MCNC: ABNORMAL MG/DL (ref 0–100)
PATIENT FASTING?: YES
TRIGL SERPL-MCNC: 1957 MG/DL

## 2024-06-14 ENCOUNTER — HOSPITAL ENCOUNTER (OUTPATIENT)
Dept: INFUSION THERAPY | Age: 59
Discharge: HOME | End: 2024-06-14
Payer: COMMERCIAL

## 2024-06-14 VITALS
HEART RATE: 69 BPM | SYSTOLIC BLOOD PRESSURE: 156 MMHG | WEIGHT: 280 LBS | DIASTOLIC BLOOD PRESSURE: 96 MMHG | RESPIRATION RATE: 18 BRPM | BODY MASS INDEX: 42.57 KG/M2 | TEMPERATURE: 97.5 F

## 2024-06-14 DIAGNOSIS — C50.412 MALIGNANT NEOPLASM OF UPPER-OUTER QUADRANT OF LEFT BREAST IN FEMALE, ESTROGEN RECEPTOR NEGATIVE (HCC): Primary | ICD-10-CM

## 2024-06-14 DIAGNOSIS — Z17.1 MALIGNANT NEOPLASM OF UPPER-OUTER QUADRANT OF LEFT BREAST IN FEMALE, ESTROGEN RECEPTOR NEGATIVE (HCC): Primary | ICD-10-CM

## 2024-06-14 LAB
ALBUMIN SERPL-MCNC: 4 G/DL (ref 3.5–5.2)
ALBUMIN/GLOB SERPL: 1.2 {RATIO} (ref 1–2.5)
ALP SERPL-CCNC: 118 U/L (ref 35–104)
ALT SERPL-CCNC: 20 U/L (ref 5–33)
ANION GAP SERPL CALCULATED.3IONS-SCNC: 13 MMOL/L (ref 9–17)
AST SERPL-CCNC: 20 U/L
BASOPHILS # BLD: 0 K/UL (ref 0–0.2)
BASOPHILS NFR BLD: 1 % (ref 0–2)
BILIRUB SERPL-MCNC: 0.2 MG/DL (ref 0.3–1.2)
BUN SERPL-MCNC: 23 MG/DL (ref 8–23)
CALCIUM SERPL-MCNC: 9.4 MG/DL (ref 8.6–10.4)
CHLORIDE SERPL-SCNC: 103 MMOL/L (ref 98–107)
CO2 SERPL-SCNC: 25 MMOL/L (ref 20–31)
CREAT SERPL-MCNC: 1 MG/DL (ref 0.5–0.9)
EOSINOPHIL # BLD: 0 K/UL (ref 0–0.4)
EOSINOPHILS RELATIVE PERCENT: 1 % (ref 1–4)
ERYTHROCYTE [DISTWIDTH] IN BLOOD BY AUTOMATED COUNT: 16.3 % (ref 12.5–15.4)
GFR, ESTIMATED: 64 ML/MIN/1.73M2
GLUCOSE SERPL-MCNC: 127 MG/DL (ref 70–99)
HCT VFR BLD AUTO: 34.8 % (ref 36–46)
HGB BLD-MCNC: 11.4 G/DL (ref 12–16)
LYMPHOCYTES NFR BLD: 2 K/UL (ref 1–4.8)
LYMPHOCYTES RELATIVE PERCENT: 46 % (ref 24–44)
MCH RBC QN AUTO: 25.5 PG (ref 26–34)
MCHC RBC AUTO-ENTMCNC: 32.7 G/DL (ref 31–37)
MCV RBC AUTO: 78 FL (ref 80–100)
MONOCYTES NFR BLD: 0.3 K/UL (ref 0.1–1.2)
MONOCYTES NFR BLD: 7 % (ref 2–11)
NEUTROPHILS NFR BLD: 45 % (ref 36–66)
NEUTS SEG NFR BLD: 1.9 K/UL (ref 1.8–7.7)
PLATELET # BLD AUTO: 155 K/UL (ref 140–450)
PMV BLD AUTO: 6.9 FL (ref 6–12)
POTASSIUM SERPL-SCNC: 4.2 MMOL/L (ref 3.7–5.3)
PROT SERPL-MCNC: 7.4 G/DL (ref 6.4–8.3)
RBC # BLD AUTO: 4.47 M/UL (ref 4–5.2)
SODIUM SERPL-SCNC: 141 MMOL/L (ref 135–144)
WBC OTHER # BLD: 4.2 K/UL (ref 3.5–11)

## 2024-06-14 PROCEDURE — 80053 COMPREHEN METABOLIC PANEL: CPT

## 2024-06-14 PROCEDURE — 96375 TX/PRO/DX INJ NEW DRUG ADDON: CPT

## 2024-06-14 PROCEDURE — 85025 COMPLETE CBC W/AUTO DIFF WBC: CPT

## 2024-06-14 PROCEDURE — 36591 DRAW BLOOD OFF VENOUS DEVICE: CPT

## 2024-06-14 PROCEDURE — 6360000002 HC RX W HCPCS: Performed by: INTERNAL MEDICINE

## 2024-06-14 PROCEDURE — 96413 CHEMO IV INFUSION 1 HR: CPT

## 2024-06-14 PROCEDURE — 2500000003 HC RX 250 WO HCPCS: Performed by: INTERNAL MEDICINE

## 2024-06-14 PROCEDURE — 2580000003 HC RX 258: Performed by: INTERNAL MEDICINE

## 2024-06-14 RX ORDER — HEPARIN 100 UNIT/ML
500 SYRINGE INTRAVENOUS PRN
Status: DISCONTINUED | OUTPATIENT
Start: 2024-06-14 | End: 2024-06-15 | Stop reason: HOSPADM

## 2024-06-14 RX ORDER — SODIUM CHLORIDE 9 MG/ML
5-250 INJECTION, SOLUTION INTRAVENOUS PRN
Status: DISCONTINUED | OUTPATIENT
Start: 2024-06-14 | End: 2024-06-15 | Stop reason: HOSPADM

## 2024-06-14 RX ORDER — DEXAMETHASONE SODIUM PHOSPHATE 10 MG/ML
10 INJECTION INTRAMUSCULAR; INTRAVENOUS ONCE
Status: COMPLETED | OUTPATIENT
Start: 2024-06-14 | End: 2024-06-14

## 2024-06-14 RX ORDER — FAMOTIDINE 10 MG/ML
20 INJECTION, SOLUTION INTRAVENOUS ONCE
Status: COMPLETED | OUTPATIENT
Start: 2024-06-14 | End: 2024-06-14

## 2024-06-14 RX ORDER — DIPHENHYDRAMINE HYDROCHLORIDE 50 MG/ML
50 INJECTION INTRAMUSCULAR; INTRAVENOUS ONCE
Status: COMPLETED | OUTPATIENT
Start: 2024-06-14 | End: 2024-06-14

## 2024-06-14 RX ORDER — SODIUM CHLORIDE 0.9 % (FLUSH) 0.9 %
5-40 SYRINGE (ML) INJECTION PRN
Status: DISCONTINUED | OUTPATIENT
Start: 2024-06-14 | End: 2024-06-15 | Stop reason: HOSPADM

## 2024-06-14 RX ADMIN — SODIUM CHLORIDE 50 ML/HR: 9 INJECTION, SOLUTION INTRAVENOUS at 14:49

## 2024-06-14 RX ADMIN — FAMOTIDINE 20 MG: 10 INJECTION, SOLUTION INTRAVENOUS at 14:50

## 2024-06-14 RX ADMIN — PACLITAXEL 168 MG: 6 INJECTION, SOLUTION, CONCENTRATE INTRAVENOUS at 15:18

## 2024-06-14 RX ADMIN — DIPHENHYDRAMINE HYDROCHLORIDE 50 MG: 50 INJECTION INTRAMUSCULAR; INTRAVENOUS at 15:01

## 2024-06-14 RX ADMIN — SODIUM CHLORIDE, PRESERVATIVE FREE 10 ML: 5 INJECTION INTRAVENOUS at 16:18

## 2024-06-14 RX ADMIN — DEXAMETHASONE SODIUM PHOSPHATE 10 MG: 10 INJECTION INTRAMUSCULAR; INTRAVENOUS at 15:07

## 2024-06-14 RX ADMIN — HEPARIN 500 UNITS: 100 SYRINGE at 16:18

## 2024-06-14 NOTE — PROGRESS NOTES
Pt. Arrived C1D15  Labs reviewed et tx parameters  Pt. C/o intermittant nausea et hair loss et no other complaints  Tx given et c/o deep joint pain lt knee. Not as severs as last tx  Tx given et disch comfortable  Return 6/21/24 C2D1

## 2024-06-21 ENCOUNTER — OFFICE VISIT (OUTPATIENT)
Dept: ONCOLOGY | Age: 59
End: 2024-06-21
Payer: COMMERCIAL

## 2024-06-21 ENCOUNTER — TELEPHONE (OUTPATIENT)
Dept: ONCOLOGY | Age: 59
End: 2024-06-21

## 2024-06-21 ENCOUNTER — HOSPITAL ENCOUNTER (OUTPATIENT)
Dept: INFUSION THERAPY | Age: 59
Discharge: HOME | End: 2024-06-21
Payer: COMMERCIAL

## 2024-06-21 VITALS
SYSTOLIC BLOOD PRESSURE: 105 MMHG | HEART RATE: 73 BPM | RESPIRATION RATE: 18 BRPM | OXYGEN SATURATION: 96 % | BODY MASS INDEX: 42.48 KG/M2 | DIASTOLIC BLOOD PRESSURE: 70 MMHG | WEIGHT: 279.4 LBS | TEMPERATURE: 96.8 F

## 2024-06-21 DIAGNOSIS — Z17.1 MALIGNANT NEOPLASM OF UPPER-OUTER QUADRANT OF LEFT BREAST IN FEMALE, ESTROGEN RECEPTOR NEGATIVE (HCC): Primary | ICD-10-CM

## 2024-06-21 DIAGNOSIS — C50.412 MALIGNANT NEOPLASM OF UPPER-OUTER QUADRANT OF LEFT BREAST IN FEMALE, ESTROGEN RECEPTOR NEGATIVE (HCC): Primary | ICD-10-CM

## 2024-06-21 LAB
ALBUMIN SERPL-MCNC: 4.1 G/DL (ref 3.5–5.2)
ALBUMIN/GLOB SERPL: 1.4 {RATIO} (ref 1–2.5)
ALP SERPL-CCNC: 113 U/L (ref 35–104)
ALT SERPL-CCNC: 18 U/L (ref 5–33)
AMYLASE SERPL-CCNC: 60 U/L (ref 28–100)
ANION GAP SERPL CALCULATED.3IONS-SCNC: 11 MMOL/L (ref 9–17)
AST SERPL-CCNC: 15 U/L
BASOPHILS # BLD: 0 K/UL (ref 0–0.2)
BASOPHILS NFR BLD: 1 % (ref 0–2)
BILIRUB SERPL-MCNC: 0.3 MG/DL (ref 0.3–1.2)
BUN SERPL-MCNC: 21 MG/DL (ref 8–23)
CALCIUM SERPL-MCNC: 10 MG/DL (ref 8.6–10.4)
CHLORIDE SERPL-SCNC: 103 MMOL/L (ref 98–107)
CO2 SERPL-SCNC: 26 MMOL/L (ref 20–31)
CORTIS SERPL-MCNC: 10.4 UG/DL (ref 2.5–19.5)
CORTISOL COLLECTION INFO: NORMAL
CREAT SERPL-MCNC: 0.9 MG/DL (ref 0.5–0.9)
EOSINOPHIL # BLD: 0 K/UL (ref 0–0.4)
EOSINOPHILS RELATIVE PERCENT: 0 % (ref 1–4)
ERYTHROCYTE [DISTWIDTH] IN BLOOD BY AUTOMATED COUNT: 16.5 % (ref 12.5–15.4)
GFR, ESTIMATED: 73 ML/MIN/1.73M2
GLUCOSE SERPL-MCNC: 140 MG/DL (ref 70–99)
HCT VFR BLD AUTO: 34.4 % (ref 36–46)
HGB BLD-MCNC: 11.3 G/DL (ref 12–16)
LIPASE SERPL-CCNC: 61 U/L (ref 13–60)
LYMPHOCYTES NFR BLD: 1.6 K/UL (ref 1–4.8)
LYMPHOCYTES RELATIVE PERCENT: 37 % (ref 24–44)
MCH RBC QN AUTO: 25.8 PG (ref 26–34)
MCHC RBC AUTO-ENTMCNC: 32.7 G/DL (ref 31–37)
MCV RBC AUTO: 78.9 FL (ref 80–100)
MONOCYTES NFR BLD: 0.2 K/UL (ref 0.1–1.2)
MONOCYTES NFR BLD: 5 % (ref 2–11)
NEUTROPHILS NFR BLD: 57 % (ref 36–66)
NEUTS SEG NFR BLD: 2.5 K/UL (ref 1.8–7.7)
PLATELET # BLD AUTO: 146 K/UL (ref 140–450)
PMV BLD AUTO: 6.7 FL (ref 6–12)
POTASSIUM SERPL-SCNC: 4.3 MMOL/L (ref 3.7–5.3)
PROT SERPL-MCNC: 7.1 G/DL (ref 6.4–8.3)
RBC # BLD AUTO: 4.36 M/UL (ref 4–5.2)
SODIUM SERPL-SCNC: 140 MMOL/L (ref 135–144)
TSH SERPL DL<=0.05 MIU/L-ACNC: 1.09 UIU/ML (ref 0.3–5)
WBC OTHER # BLD: 4.3 K/UL (ref 3.5–11)

## 2024-06-21 PROCEDURE — 85025 COMPLETE CBC W/AUTO DIFF WBC: CPT

## 2024-06-21 PROCEDURE — 83690 ASSAY OF LIPASE: CPT

## 2024-06-21 PROCEDURE — 84443 ASSAY THYROID STIM HORMONE: CPT

## 2024-06-21 PROCEDURE — 96367 TX/PROPH/DG ADDL SEQ IV INF: CPT

## 2024-06-21 PROCEDURE — 99214 OFFICE O/P EST MOD 30 MIN: CPT | Performed by: INTERNAL MEDICINE

## 2024-06-21 PROCEDURE — 96417 CHEMO IV INFUS EACH ADDL SEQ: CPT

## 2024-06-21 PROCEDURE — 82533 TOTAL CORTISOL: CPT

## 2024-06-21 PROCEDURE — 3078F DIAST BP <80 MM HG: CPT | Performed by: INTERNAL MEDICINE

## 2024-06-21 PROCEDURE — 2580000003 HC RX 258: Performed by: INTERNAL MEDICINE

## 2024-06-21 PROCEDURE — 96413 CHEMO IV INFUSION 1 HR: CPT

## 2024-06-21 PROCEDURE — 82150 ASSAY OF AMYLASE: CPT

## 2024-06-21 PROCEDURE — 3074F SYST BP LT 130 MM HG: CPT | Performed by: INTERNAL MEDICINE

## 2024-06-21 PROCEDURE — 96375 TX/PRO/DX INJ NEW DRUG ADDON: CPT

## 2024-06-21 PROCEDURE — 80053 COMPREHEN METABOLIC PANEL: CPT

## 2024-06-21 PROCEDURE — 2500000003 HC RX 250 WO HCPCS: Performed by: INTERNAL MEDICINE

## 2024-06-21 PROCEDURE — 99211 OFF/OP EST MAY X REQ PHY/QHP: CPT | Performed by: INTERNAL MEDICINE

## 2024-06-21 PROCEDURE — 6360000002 HC RX W HCPCS: Performed by: INTERNAL MEDICINE

## 2024-06-21 PROCEDURE — 36591 DRAW BLOOD OFF VENOUS DEVICE: CPT

## 2024-06-21 RX ORDER — MEPERIDINE HYDROCHLORIDE 50 MG/ML
12.5 INJECTION INTRAMUSCULAR; INTRAVENOUS; SUBCUTANEOUS PRN
OUTPATIENT
Start: 2024-07-19

## 2024-06-21 RX ORDER — SODIUM CHLORIDE 9 MG/ML
5-250 INJECTION, SOLUTION INTRAVENOUS PRN
OUTPATIENT
Start: 2024-07-26

## 2024-06-21 RX ORDER — SODIUM CHLORIDE 9 MG/ML
INJECTION, SOLUTION INTRAVENOUS CONTINUOUS
OUTPATIENT
Start: 2024-07-19

## 2024-06-21 RX ORDER — DIPHENHYDRAMINE HYDROCHLORIDE 50 MG/ML
50 INJECTION INTRAMUSCULAR; INTRAVENOUS ONCE
OUTPATIENT
Start: 2024-07-19 | End: 2024-07-19

## 2024-06-21 RX ORDER — SODIUM CHLORIDE 0.9 % (FLUSH) 0.9 %
5-40 SYRINGE (ML) INJECTION PRN
OUTPATIENT
Start: 2024-07-26

## 2024-06-21 RX ORDER — SODIUM CHLORIDE 0.9 % (FLUSH) 0.9 %
5-40 SYRINGE (ML) INJECTION PRN
Status: DISCONTINUED | OUTPATIENT
Start: 2024-06-21 | End: 2024-06-22 | Stop reason: HOSPADM

## 2024-06-21 RX ORDER — SODIUM CHLORIDE 9 MG/ML
INJECTION, SOLUTION INTRAVENOUS CONTINUOUS
OUTPATIENT
Start: 2024-07-12

## 2024-06-21 RX ORDER — ACETAMINOPHEN 325 MG/1
650 TABLET ORAL
OUTPATIENT
Start: 2024-07-12

## 2024-06-21 RX ORDER — FAMOTIDINE 10 MG/ML
20 INJECTION, SOLUTION INTRAVENOUS ONCE
OUTPATIENT
Start: 2024-07-26 | End: 2024-07-26

## 2024-06-21 RX ORDER — PROCHLORPERAZINE EDISYLATE 5 MG/ML
5 INJECTION INTRAMUSCULAR; INTRAVENOUS
OUTPATIENT
Start: 2024-07-12

## 2024-06-21 RX ORDER — DIPHENHYDRAMINE HYDROCHLORIDE 50 MG/ML
50 INJECTION INTRAMUSCULAR; INTRAVENOUS
OUTPATIENT
Start: 2024-07-19

## 2024-06-21 RX ORDER — SODIUM CHLORIDE 9 MG/ML
5-250 INJECTION, SOLUTION INTRAVENOUS PRN
OUTPATIENT
Start: 2024-07-12

## 2024-06-21 RX ORDER — EPINEPHRINE 1 MG/ML
0.3 INJECTION, SOLUTION, CONCENTRATE INTRAVENOUS PRN
OUTPATIENT
Start: 2024-07-26

## 2024-06-21 RX ORDER — DIPHENHYDRAMINE HYDROCHLORIDE 50 MG/ML
25 INJECTION INTRAMUSCULAR; INTRAVENOUS ONCE
Status: CANCELLED | OUTPATIENT
Start: 2024-06-21 | End: 2024-06-21

## 2024-06-21 RX ORDER — ONDANSETRON 2 MG/ML
8 INJECTION INTRAMUSCULAR; INTRAVENOUS
OUTPATIENT
Start: 2024-07-12

## 2024-06-21 RX ORDER — PALONOSETRON 0.05 MG/ML
0.25 INJECTION, SOLUTION INTRAVENOUS ONCE
OUTPATIENT
Start: 2024-07-12 | End: 2024-07-12

## 2024-06-21 RX ORDER — ALBUTEROL SULFATE 90 UG/1
4 AEROSOL, METERED RESPIRATORY (INHALATION) PRN
OUTPATIENT
Start: 2024-07-12

## 2024-06-21 RX ORDER — SODIUM CHLORIDE 9 MG/ML
5-250 INJECTION, SOLUTION INTRAVENOUS PRN
OUTPATIENT
Start: 2024-07-19

## 2024-06-21 RX ORDER — HEPARIN SODIUM (PORCINE) LOCK FLUSH IV SOLN 100 UNIT/ML 100 UNIT/ML
500 SOLUTION INTRAVENOUS PRN
OUTPATIENT
Start: 2024-07-26

## 2024-06-21 RX ORDER — ACETAMINOPHEN 325 MG/1
650 TABLET ORAL
OUTPATIENT
Start: 2024-07-26

## 2024-06-21 RX ORDER — DEXAMETHASONE SODIUM PHOSPHATE 10 MG/ML
10 INJECTION INTRAMUSCULAR; INTRAVENOUS ONCE
Status: COMPLETED | OUTPATIENT
Start: 2024-06-21 | End: 2024-06-21

## 2024-06-21 RX ORDER — ACETAMINOPHEN 325 MG/1
650 TABLET ORAL
OUTPATIENT
Start: 2024-07-19

## 2024-06-21 RX ORDER — DIPHENHYDRAMINE HYDROCHLORIDE 50 MG/ML
50 INJECTION INTRAMUSCULAR; INTRAVENOUS
OUTPATIENT
Start: 2024-07-26

## 2024-06-21 RX ORDER — ALBUTEROL SULFATE 90 UG/1
4 AEROSOL, METERED RESPIRATORY (INHALATION) PRN
OUTPATIENT
Start: 2024-07-26

## 2024-06-21 RX ORDER — DIPHENHYDRAMINE HYDROCHLORIDE 50 MG/ML
25 INJECTION INTRAMUSCULAR; INTRAVENOUS ONCE
Status: COMPLETED | OUTPATIENT
Start: 2024-06-21 | End: 2024-06-21

## 2024-06-21 RX ORDER — SODIUM CHLORIDE 0.9 % (FLUSH) 0.9 %
5-40 SYRINGE (ML) INJECTION PRN
OUTPATIENT
Start: 2024-07-12

## 2024-06-21 RX ORDER — ONDANSETRON 2 MG/ML
8 INJECTION INTRAMUSCULAR; INTRAVENOUS
OUTPATIENT
Start: 2024-07-19

## 2024-06-21 RX ORDER — HEPARIN SODIUM (PORCINE) LOCK FLUSH IV SOLN 100 UNIT/ML 100 UNIT/ML
500 SOLUTION INTRAVENOUS PRN
OUTPATIENT
Start: 2024-07-12

## 2024-06-21 RX ORDER — MEPERIDINE HYDROCHLORIDE 50 MG/ML
12.5 INJECTION INTRAMUSCULAR; INTRAVENOUS; SUBCUTANEOUS PRN
OUTPATIENT
Start: 2024-07-26

## 2024-06-21 RX ORDER — ALBUTEROL SULFATE 90 UG/1
4 AEROSOL, METERED RESPIRATORY (INHALATION) PRN
OUTPATIENT
Start: 2024-07-19

## 2024-06-21 RX ORDER — ONDANSETRON 2 MG/ML
8 INJECTION INTRAMUSCULAR; INTRAVENOUS
OUTPATIENT
Start: 2024-07-26

## 2024-06-21 RX ORDER — FAMOTIDINE 10 MG/ML
20 INJECTION, SOLUTION INTRAVENOUS ONCE
OUTPATIENT
Start: 2024-07-12 | End: 2024-07-12

## 2024-06-21 RX ORDER — FAMOTIDINE 10 MG/ML
20 INJECTION, SOLUTION INTRAVENOUS ONCE
Status: COMPLETED | OUTPATIENT
Start: 2024-06-21 | End: 2024-06-21

## 2024-06-21 RX ORDER — DIPHENHYDRAMINE HYDROCHLORIDE 50 MG/ML
50 INJECTION INTRAMUSCULAR; INTRAVENOUS ONCE
OUTPATIENT
Start: 2024-07-12 | End: 2024-07-12

## 2024-06-21 RX ORDER — DIPHENHYDRAMINE HYDROCHLORIDE 50 MG/ML
50 INJECTION INTRAMUSCULAR; INTRAVENOUS
OUTPATIENT
Start: 2024-07-12

## 2024-06-21 RX ORDER — EPINEPHRINE 1 MG/ML
0.3 INJECTION, SOLUTION, CONCENTRATE INTRAVENOUS PRN
OUTPATIENT
Start: 2024-07-19

## 2024-06-21 RX ORDER — HEPARIN 100 UNIT/ML
500 SYRINGE INTRAVENOUS PRN
Status: DISCONTINUED | OUTPATIENT
Start: 2024-06-21 | End: 2024-06-22 | Stop reason: HOSPADM

## 2024-06-21 RX ORDER — PALONOSETRON 0.05 MG/ML
0.25 INJECTION, SOLUTION INTRAVENOUS ONCE
Status: COMPLETED | OUTPATIENT
Start: 2024-06-21 | End: 2024-06-21

## 2024-06-21 RX ORDER — FAMOTIDINE 10 MG/ML
20 INJECTION, SOLUTION INTRAVENOUS
OUTPATIENT
Start: 2024-07-12

## 2024-06-21 RX ORDER — EPINEPHRINE 1 MG/ML
0.3 INJECTION, SOLUTION, CONCENTRATE INTRAVENOUS PRN
OUTPATIENT
Start: 2024-07-12

## 2024-06-21 RX ORDER — HEPARIN SODIUM (PORCINE) LOCK FLUSH IV SOLN 100 UNIT/ML 100 UNIT/ML
500 SOLUTION INTRAVENOUS PRN
OUTPATIENT
Start: 2024-07-19

## 2024-06-21 RX ORDER — SODIUM CHLORIDE 9 MG/ML
5-250 INJECTION, SOLUTION INTRAVENOUS PRN
Status: DISCONTINUED | OUTPATIENT
Start: 2024-06-21 | End: 2024-06-22 | Stop reason: HOSPADM

## 2024-06-21 RX ORDER — MEPERIDINE HYDROCHLORIDE 50 MG/ML
12.5 INJECTION INTRAMUSCULAR; INTRAVENOUS; SUBCUTANEOUS PRN
OUTPATIENT
Start: 2024-07-12

## 2024-06-21 RX ORDER — FAMOTIDINE 10 MG/ML
20 INJECTION, SOLUTION INTRAVENOUS
OUTPATIENT
Start: 2024-07-26

## 2024-06-21 RX ORDER — SODIUM CHLORIDE 9 MG/ML
INJECTION, SOLUTION INTRAVENOUS CONTINUOUS
OUTPATIENT
Start: 2024-07-26

## 2024-06-21 RX ORDER — SODIUM CHLORIDE 0.9 % (FLUSH) 0.9 %
5-40 SYRINGE (ML) INJECTION PRN
OUTPATIENT
Start: 2024-07-19

## 2024-06-21 RX ORDER — FAMOTIDINE 10 MG/ML
20 INJECTION, SOLUTION INTRAVENOUS
OUTPATIENT
Start: 2024-07-19

## 2024-06-21 RX ORDER — DIPHENHYDRAMINE HYDROCHLORIDE 50 MG/ML
50 INJECTION INTRAMUSCULAR; INTRAVENOUS ONCE
OUTPATIENT
Start: 2024-07-26 | End: 2024-07-26

## 2024-06-21 RX ORDER — FAMOTIDINE 10 MG/ML
20 INJECTION, SOLUTION INTRAVENOUS ONCE
OUTPATIENT
Start: 2024-07-19 | End: 2024-07-19

## 2024-06-21 RX ADMIN — SODIUM CHLORIDE 150 MG: 9 INJECTION, SOLUTION INTRAVENOUS at 13:39

## 2024-06-21 RX ADMIN — HEPARIN 500 UNITS: 100 SYRINGE at 16:35

## 2024-06-21 RX ADMIN — PACLITAXEL 168 MG: 6 INJECTION, SOLUTION INTRAVENOUS at 14:56

## 2024-06-21 RX ADMIN — SODIUM CHLORIDE 200 ML/HR: 9 INJECTION, SOLUTION INTRAVENOUS at 13:27

## 2024-06-21 RX ADMIN — DEXAMETHASONE SODIUM PHOSPHATE 10 MG: 10 INJECTION INTRAMUSCULAR; INTRAVENOUS at 14:17

## 2024-06-21 RX ADMIN — SODIUM CHLORIDE, PRESERVATIVE FREE 10 ML: 5 INJECTION INTRAVENOUS at 12:05

## 2024-06-21 RX ADMIN — FAMOTIDINE 20 MG: 10 INJECTION, SOLUTION INTRAVENOUS at 13:28

## 2024-06-21 RX ADMIN — SODIUM CHLORIDE, PRESERVATIVE FREE 10 ML: 5 INJECTION INTRAVENOUS at 16:35

## 2024-06-21 RX ADMIN — PALONOSETRON 0.25 MG: 0.05 INJECTION, SOLUTION INTRAVENOUS at 13:27

## 2024-06-21 RX ADMIN — SODIUM CHLORIDE 200 MG: 9 INJECTION, SOLUTION INTRAVENOUS at 14:27

## 2024-06-21 RX ADMIN — DIPHENHYDRAMINE HYDROCHLORIDE 25 MG: 50 INJECTION INTRAMUSCULAR; INTRAVENOUS at 14:08

## 2024-06-21 RX ADMIN — CARBOPLATIN 590 MG: 10 INJECTION INTRAVENOUS at 16:01

## 2024-06-21 NOTE — TELEPHONE ENCOUNTER
Instructions   from Dr. Jess Bradley MD    Proceed with treatment as scheduled  RV 3 weeks    Tx as planned.  RV with tx to follow scheduled for 7/12/24 at 8:45 AM.

## 2024-06-21 NOTE — PROGRESS NOTES
Pt here for C2D1 Keytruda, Taxol, Carbo. Pt seen by Dr Walekr prior to tx, he lowered Benadryl to 25mg d/t the possibility of it causing her extreme bone pain during txs. Labs drawn from port and results reviewed. Pt states that bone pain still occurred but it was much more tolerable this time compared to previous txs. Pt was treated without incident and d/c'd in stable condition. Pt will return on 6-28-24 for C2D8.

## 2024-06-22 NOTE — PROGRESS NOTES
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  History of left nephrectomy in 1988   CURRENT THERAPY:         Neoadjuvant treatment with Keytruda/Taxol/carboplatin/Adriamycin/Cytoxan.  Treatment started 5/31/2024  BRIEF CASE HISTORY:       Ms. Luci Castañeda is a very pleasant 59 y.o. female with history of multiple comorbidities as listed.  She had history of left nephrectomy in 1988.  She had cardiac cath in 2021 for left ventricular hypertrophy.  Had recent problems with restrictive and obstructive pulmonary disease.  She continues follow-up with pulmonary. She was doing fine with routine mammogram being normal until this year where she had suspicious abnormalities in the mammogram done on 3/25/2024.  Diagnostic mammogram and left breast ultrasound April 9, 2024 showed suspicious 4.5 cm mass in the left breast 1 o'clock position.  Mildly thickened left axillary lymph nodes.  A biopsy was performed on 4/26/2024 and unfortunately that was positive for invasive ductal carcinoma.  Triple negative.  Biopsy from lymph node was negative for malignancy.  The patient is seen today for further management of her breast cancer. The patient did very well after her biopsy without any complication. The patient had no symptom preceding her diagnosis of cancer. No chest pain, shortness of breath, cough, sputum or hemoptysis, no back pain or monique aches, no weight loss or decreased appetite, no fever or night sweating. No headaches, and no other complaints.  No smoking social alcohol      INTERIM HISTORY:   Patient seen for follow-up triple negative breast cancer.  Staging workup showed no evidence of metastasis.    Patient is clinically stable. Tolerated chemo well so far with few side effects. She had headaches and nausea. No

## 2024-06-23 ENCOUNTER — APPOINTMENT (OUTPATIENT)
Dept: GENERAL RADIOLOGY | Age: 59
End: 2024-06-23
Payer: COMMERCIAL

## 2024-06-23 ENCOUNTER — NURSE TRIAGE (OUTPATIENT)
Dept: OTHER | Facility: CLINIC | Age: 59
End: 2024-06-23

## 2024-06-23 ENCOUNTER — HOSPITAL ENCOUNTER (EMERGENCY)
Age: 59
Discharge: HOME | End: 2024-06-23
Attending: EMERGENCY MEDICINE
Payer: COMMERCIAL

## 2024-06-23 VITALS
HEART RATE: 66 BPM | SYSTOLIC BLOOD PRESSURE: 142 MMHG | TEMPERATURE: 98.9 F | WEIGHT: 279 LBS | RESPIRATION RATE: 16 BRPM | OXYGEN SATURATION: 96 % | BODY MASS INDEX: 42.28 KG/M2 | DIASTOLIC BLOOD PRESSURE: 73 MMHG | HEIGHT: 68 IN

## 2024-06-23 DIAGNOSIS — S92.511A CLOSED DISPLACED FRACTURE OF PROXIMAL PHALANX OF LESSER TOE OF RIGHT FOOT, INITIAL ENCOUNTER: Primary | ICD-10-CM

## 2024-06-23 PROCEDURE — 73630 X-RAY EXAM OF FOOT: CPT

## 2024-06-23 PROCEDURE — 2500000003 HC RX 250 WO HCPCS: Performed by: PHYSICIAN ASSISTANT

## 2024-06-23 PROCEDURE — 99283 EMERGENCY DEPT VISIT LOW MDM: CPT

## 2024-06-23 RX ORDER — IBUPROFEN 800 MG/1
800 TABLET ORAL EVERY 8 HOURS PRN
Qty: 30 TABLET | Refills: 0 | Status: SHIPPED | OUTPATIENT
Start: 2024-06-23

## 2024-06-23 RX ORDER — LIDOCAINE HYDROCHLORIDE 10 MG/ML
20 INJECTION, SOLUTION INFILTRATION; PERINEURAL ONCE
Status: COMPLETED | OUTPATIENT
Start: 2024-06-23 | End: 2024-06-23

## 2024-06-23 RX ADMIN — LIDOCAINE HYDROCHLORIDE 10 ML: 10 INJECTION, SOLUTION EPIDURAL; INFILTRATION; INTRACAUDAL; PERINEURAL at 17:26

## 2024-06-23 ASSESSMENT — PAIN SCALES - GENERAL: PAINLEVEL_OUTOF10: 6

## 2024-06-23 ASSESSMENT — PAIN - FUNCTIONAL ASSESSMENT: PAIN_FUNCTIONAL_ASSESSMENT: 0-10

## 2024-06-23 NOTE — CONSULTS
Consultation Note  Foot and Ankle Surgery    Subjective     Chief Complaint: Right fourth digit pain    HPI:  Luci Castañeda is a 59 y.o. female seen at [facility] for Right fourth digit pain. The patient states they had a coffee table earlier today and had immediate pain to their right lower extremity and noticed malalignment of the fourth digit.  The patient went to to urgent cares which were unable to provide radiology services.  The patient states they had difficulty ambulating immediately afterwards due to the pain. The patient states the pain is sharp and throbbing in nature.  No bruising, mild swelling.  Patient retained sensation to lower extremity.  Patient states she is unable to put on shoes due to the deformity.  The patient denies any head injury or LOC. The patient denies any pain to the contralateral extremity. The patient is not a diabetic.  Patient is in the middle of chemotherapy currently.  Patient does have stage I chronic kidney disease.  Patient also has a history of a right total knee arthroplasty without complication during healing.  The patient denies tobacco use. The patient denies any other pedal complaints at this time.     PCP is Nabil Ly MD    ROS:   Review of Systems   Constitutional: Negative.    HENT: Negative.     Eyes: Negative.    Respiratory: Negative.     Cardiovascular:  Positive for leg swelling.   Gastrointestinal: Negative.    Endocrine: Negative.    Genitourinary: Negative.    Musculoskeletal:  Positive for arthralgias, gait problem and joint swelling.   Skin: Negative.    Allergic/Immunologic: Negative.    Hematological: Negative.    Psychiatric/Behavioral: Negative.         Past Medical History   has a past medical history of Abnormal uterine bleeding (AUB), Arthritis, Asthma, Benign familial tremor, CAD (coronary artery disease), Cancer (HCC), CKD (chronic kidney disease) stage 1, GFR 90 ml/min or greater, Class 2 severe obesity with serious

## 2024-06-23 NOTE — DISCHARGE INSTRUCTIONS
Podiatry:  -Please make an attended follow-up visit with Dr. Ivy outpatient 1 week after discharge  -Please keep your lower extremity wounds dry clean and covered at all times until follow-up visit  -Weightbearing status: You may bear weight to the heel on your right foot in a fracture shoe  -If the buddy splinting falls off you may use your own dressing supplies/tape to re-splint  -Use the Ace compression bandage as needed.  Rest, ice, elevate as much as possible  -Please take all prescribed medications as instructed  -Please restart all home medications as prescribed  -Please return to the hospital if any of the following local signs of infection arise: Increased/streaking redness, pain, swelling, drainage or malodor  -Please return to the hospital for any the following systemic signs of infection arise: Nausea, vomiting, fever, chills, diarrhea, shortness of breath or chest pain     Please take a baby aspirin daily, discussed with your oncologist

## 2024-06-24 ENCOUNTER — CARE COORDINATION (OUTPATIENT)
Dept: OTHER | Facility: CLINIC | Age: 59
End: 2024-06-24

## 2024-06-24 ENCOUNTER — TELEPHONE (OUTPATIENT)
Dept: INFUSION THERAPY | Age: 59
End: 2024-06-24

## 2024-06-24 ENCOUNTER — TELEPHONE (OUTPATIENT)
Dept: ONCOLOGY | Age: 59
End: 2024-06-24

## 2024-06-24 NOTE — TELEPHONE ENCOUNTER
Pt called in stating she is experiencing a raised red rash on her scalp. She states he noticed this last night. Writer advised for pt to take either benadryl or zyrtec OTC and to use hydrocortisone cream if possible. If rash worsens or does not improve, pt advised to call back. Pt verbalized understanding.

## 2024-06-24 NOTE — TELEPHONE ENCOUNTER
Name: Luci Castañeda  : 1965  MRN: [id number]    Oncology Navigation Follow-Up Note    Contact Type:  Telephone    Notes: Writer received call from pt who reports she has a red raised rash on her scalp. She is wondering if this is from her Chemo. Writer encouraged pt to contact triage office about chemo side effect concerns, number was provided.       Electronically signed by Veronica Bennett RN on 2024 at 1:25 PM

## 2024-06-24 NOTE — CARE COORDINATION
Ambulatory Care Coordination Note     2024 3:28 PM     Patient Current Location:  [place]     This patient was received as a referral from [facility] .    ACM contacted the patient by telephone. Verified name and  with patient as identifiers. Provided introduction to self, and explanation of the ACM role.   Patient declined care management services at this time.          ACM: Shama Dave RN     Challenges to be reviewed by the provider   Additional needs identified to be addressed with provider No  none               Method of communication with provider: none.    Care Summary Note: patient was in ED on 24 for closed displaced fracture of proximal phalanx of lesser toe of right foot. Patient states toe was reduced by podiatrist in ED. States toe is buddy taped and she is in a cam boot. Patient is taking tylenol as needed for pain. She will follow up with Dr. Robison with podiatry on 24 at 1:10 PM.   Patient states she has breast cancer. She is doing chemo and just had her 4th session on Friday. Patient states she contacted oncology office today because she has a rash on her scalp. States they think it is related to chemo. The office advised her to take benadryl orally. Patient states she just took benadryl. States she gets pre medicated with benadryl 50 mg IV for chemo and the first couple of sessions it caused severe bone pain in left knee. Pain would last until about 9 PM the day she would have chemo. States they cut down the benadryl dose this past chemo and pain was not as bad and she was actually able to sleep a bit during chemo. Patient states she has good communication with her doctors. Patient declines ACM program at this time.    Offered patient enrollment in the Remote Patient Monitoring (RPM) program for in-home monitoring: Patient is not eligible for RPM program because: insurance coverage.     Assessments Completed:   General Assessment    Do you have any symptoms that

## 2024-06-25 RX ORDER — DOXYCYCLINE HYCLATE 100 MG
100 TABLET ORAL 2 TIMES DAILY
Qty: 20 TABLET | Refills: 0 | Status: SHIPPED | OUTPATIENT
Start: 2024-06-25 | End: 2024-07-05

## 2024-06-25 NOTE — ED PROVIDER NOTES
Emergency Department     Faculty Attestation    I performed a history and physical examination of the patient and discussed management with the mid level provider. I reviewed the mid level provider's note and agree with the documented findings and plan of care. Any areas of disagreement are noted on the chart. I was personally present for the key portions of any procedures. I have documented in the chart those procedures where I was not present during the key portions. I have reviewed the emergency nurses triage note. I agree with the chief complaint, past medical history, past surgical history, allergies, medications, social and family history as documented unless otherwise noted below. Documentation of the HPI, Physical Exam and Medical Decision Making performed by medical students or scribes is based on my personal performance of the HPI, PE and MDM. For Physician Assistant/ Nurse Practitioner cases/documentation I have personally evaluated this patient and have completed at least one if not all key elements of the E/M (history, physical exam, and MDM). Additional findings are as noted.      Primary Care Physician:  Nabil Ly MD    CHIEF COMPLAINT       Chief Complaint   Patient presents with    Toe Injury     Hit on a leg of a coffee table        RECENT VITALS:   Temp: 98.9 °F (37.2 °C),  Pulse: 66, Respirations: 16, BP: (!) 142/73    LABS:  Labs Reviewed - No data to display      PERTINENT ATTENDING PHYSICIAN COMMENTS:    59 female here with left fourth toe pain after she kicked a wall accidentally.  There is been no bleeding, no open injury.  There was initially obvious angulation of the toe.  X-rays showing fracture of the fourth proximal phalanx.  Seen here and splinted by podiatry.  Placed in a walking boot.  I recommended aspirin for DVT prophylaxis.          Frederick Hector MD  06/25/24 8059    
(MYCOSTATIN) [id number] UNIT/GM cream       meloxicam (MOBIC) 15 MG tablet Take 1 tablet by mouth daily  Qty: 90 tablet, Refills: 3    Associated Diagnoses: Carpal tunnel syndrome of right wrist; Trigger thumb, right thumb; Plantar fasciitis, right      pantoprazole (PROTONIX) 20 MG tablet TAKE ONE TABLET BY MOUTH EVERY MORNING BEFORE BREAKFAST  Qty: 240 tablet, Refills: 1      Melatonin 5 MG CAPS Take 1 capsule by mouth nightly as needed      benzoyl peroxide 5 % external liquid Wash affected areas once daily  Qty: 227 g, Refills: 3    Associated Diagnoses: Acne vulgaris; Hidradenitis suppurativa      clindamycin (CLEOCIN T) 1 % lotion Apply to affected areas daily  Qty: 60 mL, Refills: 3    Associated Diagnoses: Acne vulgaris; Hidradenitis suppurativa       !! - Potential duplicate medications found. Please discuss with provider.        ALLERGIES     is allergic to mixed ragweed and cat hair extract.  FAMILY HISTORY     She indicated that her mother is alive. She indicated that her father is . She indicated that both of her sisters are alive. She indicated that her maternal grandmother is . She indicated that her maternal grandfather is . She indicated that her paternal grandmother is . She indicated that her paternal grandfather is .     SOCIAL HISTORY       Social History     Tobacco Use    Smoking status: Never    Smokeless tobacco: Never   Vaping Use    Vaping Use: Never used   Substance Use Topics    Alcohol use: Yes     Alcohol/week: 0.0 standard drinks of alcohol     Comment: occasionally    Drug use: Never     PHYSICAL EXAM     INITIAL VITALS: BP (!) 142/73   Pulse 66   Temp 98.9 °F (37.2 °C) (Oral)   Resp 16   Ht 1.727 m (5' 8\")   Wt 126.6 kg (279 lb)   SpO2 96%   BMI 42.42 kg/m²    Physical Exam  Constitutional:       Appearance: She is well-developed. She is not diaphoretic.   HENT:      Head: Normocephalic and atraumatic.      Right Ear: External ear normal.

## 2024-06-25 NOTE — TELEPHONE ENCOUNTER
Pt called back stating the rash/bumps on her scalp are not any better. Writer spoke with Dr. Walker and he states it may be an infected hair follicle. He states ok to send doxycycline 100mg. RX sent and pt notified.

## 2024-06-28 ENCOUNTER — HOSPITAL ENCOUNTER (OUTPATIENT)
Dept: INFUSION THERAPY | Age: 59
Discharge: HOME | End: 2024-06-28
Payer: COMMERCIAL

## 2024-06-28 VITALS
WEIGHT: 281.7 LBS | RESPIRATION RATE: 16 BRPM | HEART RATE: 76 BPM | DIASTOLIC BLOOD PRESSURE: 73 MMHG | BODY MASS INDEX: 42.83 KG/M2 | TEMPERATURE: 97.9 F | SYSTOLIC BLOOD PRESSURE: 108 MMHG

## 2024-06-28 DIAGNOSIS — Z17.1 MALIGNANT NEOPLASM OF UPPER-OUTER QUADRANT OF LEFT BREAST IN FEMALE, ESTROGEN RECEPTOR NEGATIVE (HCC): Primary | ICD-10-CM

## 2024-06-28 DIAGNOSIS — C50.412 MALIGNANT NEOPLASM OF UPPER-OUTER QUADRANT OF LEFT BREAST IN FEMALE, ESTROGEN RECEPTOR NEGATIVE (HCC): Primary | ICD-10-CM

## 2024-06-28 LAB
ALBUMIN SERPL-MCNC: 4 G/DL (ref 3.5–5.2)
ALBUMIN/GLOB SERPL: 1.3 {RATIO} (ref 1–2.5)
ALP SERPL-CCNC: 113 U/L (ref 35–104)
ALT SERPL-CCNC: 19 U/L (ref 5–33)
ANION GAP SERPL CALCULATED.3IONS-SCNC: 11 MMOL/L (ref 9–17)
AST SERPL-CCNC: 19 U/L
BASOPHILS # BLD: 0 K/UL (ref 0–0.2)
BASOPHILS NFR BLD: 0 % (ref 0–2)
BILIRUB SERPL-MCNC: 0.4 MG/DL (ref 0.3–1.2)
BUN SERPL-MCNC: 22 MG/DL (ref 8–23)
CALCIUM SERPL-MCNC: 9.2 MG/DL (ref 8.6–10.4)
CHLORIDE SERPL-SCNC: 99 MMOL/L (ref 98–107)
CO2 SERPL-SCNC: 27 MMOL/L (ref 20–31)
CREAT SERPL-MCNC: 1 MG/DL (ref 0.5–0.9)
EOSINOPHIL # BLD: 0 K/UL (ref 0–0.4)
EOSINOPHILS RELATIVE PERCENT: 0 % (ref 1–4)
ERYTHROCYTE [DISTWIDTH] IN BLOOD BY AUTOMATED COUNT: 17 % (ref 12.5–15.4)
GFR, ESTIMATED: 64 ML/MIN/1.73M2
GLUCOSE SERPL-MCNC: 135 MG/DL (ref 70–99)
HCT VFR BLD AUTO: 32.8 % (ref 36–46)
HGB BLD-MCNC: 10.7 G/DL (ref 12–16)
LYMPHOCYTES NFR BLD: 1.3 K/UL (ref 1–4.8)
LYMPHOCYTES RELATIVE PERCENT: 53 % (ref 24–44)
MCH RBC QN AUTO: 25.9 PG (ref 26–34)
MCHC RBC AUTO-ENTMCNC: 32.6 G/DL (ref 31–37)
MCV RBC AUTO: 79.5 FL (ref 80–100)
MONOCYTES NFR BLD: 0.1 K/UL (ref 0.1–1.2)
MONOCYTES NFR BLD: 5 % (ref 2–11)
NEUTROPHILS NFR BLD: 42 % (ref 36–66)
NEUTS SEG NFR BLD: 1 K/UL (ref 1.8–7.7)
PLATELET # BLD AUTO: 168 K/UL (ref 140–450)
PMV BLD AUTO: 6.7 FL (ref 6–12)
POTASSIUM SERPL-SCNC: 4.5 MMOL/L (ref 3.7–5.3)
PROT SERPL-MCNC: 7.1 G/DL (ref 6.4–8.3)
RBC # BLD AUTO: 4.13 M/UL (ref 4–5.2)
SODIUM SERPL-SCNC: 137 MMOL/L (ref 135–144)
WBC OTHER # BLD: 2.5 K/UL (ref 3.5–11)

## 2024-06-28 PROCEDURE — 2500000003 HC RX 250 WO HCPCS: Performed by: INTERNAL MEDICINE

## 2024-06-28 PROCEDURE — 85025 COMPLETE CBC W/AUTO DIFF WBC: CPT

## 2024-06-28 PROCEDURE — 6360000002 HC RX W HCPCS: Performed by: INTERNAL MEDICINE

## 2024-06-28 PROCEDURE — 36591 DRAW BLOOD OFF VENOUS DEVICE: CPT

## 2024-06-28 PROCEDURE — 96413 CHEMO IV INFUSION 1 HR: CPT

## 2024-06-28 PROCEDURE — 80053 COMPREHEN METABOLIC PANEL: CPT

## 2024-06-28 PROCEDURE — 96375 TX/PRO/DX INJ NEW DRUG ADDON: CPT

## 2024-06-28 PROCEDURE — 2580000003 HC RX 258: Performed by: INTERNAL MEDICINE

## 2024-06-28 RX ORDER — FAMOTIDINE 10 MG/ML
20 INJECTION, SOLUTION INTRAVENOUS ONCE
Status: COMPLETED | OUTPATIENT
Start: 2024-06-28 | End: 2024-06-28

## 2024-06-28 RX ORDER — HEPARIN 100 UNIT/ML
500 SYRINGE INTRAVENOUS PRN
Status: DISCONTINUED | OUTPATIENT
Start: 2024-06-28 | End: 2024-06-29 | Stop reason: HOSPADM

## 2024-06-28 RX ORDER — SODIUM CHLORIDE 0.9 % (FLUSH) 0.9 %
5-40 SYRINGE (ML) INJECTION PRN
Status: DISCONTINUED | OUTPATIENT
Start: 2024-06-28 | End: 2024-06-29 | Stop reason: HOSPADM

## 2024-06-28 RX ORDER — DEXAMETHASONE SODIUM PHOSPHATE 10 MG/ML
10 INJECTION INTRAMUSCULAR; INTRAVENOUS ONCE
Status: COMPLETED | OUTPATIENT
Start: 2024-06-28 | End: 2024-06-28

## 2024-06-28 RX ORDER — DIPHENHYDRAMINE HYDROCHLORIDE 50 MG/ML
50 INJECTION INTRAMUSCULAR; INTRAVENOUS ONCE
Status: COMPLETED | OUTPATIENT
Start: 2024-06-28 | End: 2024-06-28

## 2024-06-28 RX ORDER — SODIUM CHLORIDE 9 MG/ML
5-250 INJECTION, SOLUTION INTRAVENOUS PRN
Status: DISCONTINUED | OUTPATIENT
Start: 2024-06-28 | End: 2024-06-29 | Stop reason: HOSPADM

## 2024-06-28 RX ADMIN — DIPHENHYDRAMINE HYDROCHLORIDE 25 MG: 50 INJECTION INTRAMUSCULAR; INTRAVENOUS at 13:55

## 2024-06-28 RX ADMIN — DEXAMETHASONE SODIUM PHOSPHATE 10 MG: 10 INJECTION INTRAMUSCULAR; INTRAVENOUS at 13:58

## 2024-06-28 RX ADMIN — Medication 500 UNITS: at 15:44

## 2024-06-28 RX ADMIN — SODIUM CHLORIDE, PRESERVATIVE FREE 10 ML: 5 INJECTION INTRAVENOUS at 13:12

## 2024-06-28 RX ADMIN — SODIUM CHLORIDE 150 ML/HR: 9 INJECTION, SOLUTION INTRAVENOUS at 13:55

## 2024-06-28 RX ADMIN — FAMOTIDINE 20 MG: 10 INJECTION, SOLUTION INTRAVENOUS at 13:59

## 2024-06-28 RX ADMIN — PACLITAXEL 168 MG: 6 INJECTION, SOLUTION INTRAVENOUS at 14:33

## 2024-06-28 RX ADMIN — SODIUM CHLORIDE, PRESERVATIVE FREE 10 ML: 5 INJECTION INTRAVENOUS at 13:13

## 2024-06-28 RX ADMIN — SODIUM CHLORIDE, PRESERVATIVE FREE 10 ML: 5 INJECTION INTRAVENOUS at 15:44

## 2024-06-28 NOTE — PROGRESS NOTES
Spiritual Health Outpatient Oncology/Hematology Progress Note    Situation: Writer encountered Patient and Friend in the treatment cubicle of the infusion clinic.    Assessment: Patient smiled and greeted writer and introduced her friend. Pt shared how she was doing, noting the number of treatments she has had and the side effects. Pt accessed her sense of humor during the conversation. Pt expressed gratitude for the support and prayers from her family, friends, coworkers, and Nondenominational community. Pt spoke of the work she has done for years and acknowledged that she prefers to be the one giving care. Pt affirmed the role her varun plays in her life and spoke of the various connections and community that nurture her varun. Pt has oriented with [facility] and has plans to continue receiving services.     Intervention: Writer introduced herself and her services. Writer inquired about Pt's coping and needs. Writer explored Pt's sources of support and strength. Writer offered supportive presence and active listening. Writer affirmed Pt's strengths. Writer offered words of support and encouragement.     Outcome: Patient accessed her humor as she shared about her experience. Pt named and gave thanks for her sources of support and strength.  Pt thanked writer for the visit.    Plan: Spiritual Health Services are available for Patient by phone and/or in person.        06/28/24 1515   Encounter Summary   Encounter Overview/Reason Initial Encounter;Spiritual/Emotional Needs   Service Provided For Patient;Friend   Referral/Consult From [facility]   Support System Family members;Friends/neighbors;Synagogue/varun community   Last Encounter  06/28/24   Complexity of Encounter Moderate   Begin Time 1445   End Time  1500   Total Time Calculated 15 min   Spiritual/Emotional needs   Type Spiritual Support   Assessment/Intervention/Outcome   Assessment Coping;Hopeful   Intervention Active listening;Discussed belief  system/Rastafarian practices/varun;Discussed relationship with God;Discussed illness injury and it’s impact;Explored/Affirmed feelings, thoughts, concerns;Explored Coping Skills/Resources;Sustaining Presence/Ministry of presence   Outcome Encouraged;Coping;Engaged in conversation;Expressed feelings, needs, and concerns;Expressed Gratitude;Receptive   Plan and Referrals   Plan/Referrals Continue Support (comment)     MAMADOU Barrios  [facility] Spiritual Health   [phone]

## 2024-06-28 NOTE — PROGRESS NOTES
Patient arrived for C2D8 taxol. Pt states acne-like rash on scalp has no relief since starting doxycycline on Monday. Labs reviewed, ANC 1.0. Discussed with Dr Bradley, received order to proceed with treatment today and have pt continue doxycycline as rash may be related to hair loss. Only 25 mg of benadryl given per pt request. Treatment completed without issue. Pt stable at discharge. Scheduled to return 7/5/24 for C2D15 taxol.

## 2024-07-05 ENCOUNTER — HOSPITAL ENCOUNTER (OUTPATIENT)
Dept: INFUSION THERAPY | Age: 59
Discharge: HOME | End: 2024-07-05
Payer: COMMERCIAL

## 2024-07-05 VITALS
DIASTOLIC BLOOD PRESSURE: 71 MMHG | WEIGHT: 283 LBS | SYSTOLIC BLOOD PRESSURE: 110 MMHG | HEART RATE: 79 BPM | BODY MASS INDEX: 43.03 KG/M2 | RESPIRATION RATE: 18 BRPM

## 2024-07-05 DIAGNOSIS — C50.412 MALIGNANT NEOPLASM OF UPPER-OUTER QUADRANT OF LEFT BREAST IN FEMALE, ESTROGEN RECEPTOR NEGATIVE (HCC): Primary | ICD-10-CM

## 2024-07-05 DIAGNOSIS — Z17.1 MALIGNANT NEOPLASM OF UPPER-OUTER QUADRANT OF LEFT BREAST IN FEMALE, ESTROGEN RECEPTOR NEGATIVE (HCC): Primary | ICD-10-CM

## 2024-07-05 LAB
ALBUMIN SERPL-MCNC: 3.8 G/DL (ref 3.5–5.2)
ALBUMIN/GLOB SERPL: 1.3 {RATIO} (ref 1–2.5)
ALP SERPL-CCNC: 118 U/L (ref 35–104)
ALT SERPL-CCNC: 16 U/L (ref 5–33)
ANION GAP SERPL CALCULATED.3IONS-SCNC: 10 MMOL/L (ref 9–17)
AST SERPL-CCNC: 19 U/L
BASOPHILS # BLD: 0 K/UL (ref 0–0.2)
BASOPHILS NFR BLD: 0 % (ref 0–2)
BILIRUB SERPL-MCNC: 0.4 MG/DL (ref 0.3–1.2)
BUN SERPL-MCNC: 21 MG/DL (ref 8–23)
CALCIUM SERPL-MCNC: 9 MG/DL (ref 8.6–10.4)
CHLORIDE SERPL-SCNC: 101 MMOL/L (ref 98–107)
CO2 SERPL-SCNC: 26 MMOL/L (ref 20–31)
CREAT SERPL-MCNC: 1 MG/DL (ref 0.5–0.9)
EOSINOPHIL # BLD: 0 K/UL (ref 0–0.4)
EOSINOPHILS RELATIVE PERCENT: 0 % (ref 1–4)
ERYTHROCYTE [DISTWIDTH] IN BLOOD BY AUTOMATED COUNT: 17.5 % (ref 12.5–15.4)
GFR, ESTIMATED: 64 ML/MIN/1.73M2
GLUCOSE SERPL-MCNC: 159 MG/DL (ref 70–99)
HCT VFR BLD AUTO: 30.4 % (ref 36–46)
HGB BLD-MCNC: 10 G/DL (ref 12–16)
LYMPHOCYTES NFR BLD: 1.74 K/UL (ref 1–4.8)
LYMPHOCYTES RELATIVE PERCENT: 58 % (ref 24–44)
MCH RBC QN AUTO: 26.4 PG (ref 26–34)
MCHC RBC AUTO-ENTMCNC: 33 G/DL (ref 31–37)
MCV RBC AUTO: 79.8 FL (ref 80–100)
MONOCYTES NFR BLD: 0.24 K/UL (ref 0.1–0.8)
MONOCYTES NFR BLD: 8 % (ref 1–7)
MORPHOLOGY: NORMAL
NEUTROPHILS NFR BLD: 34 % (ref 36–66)
NEUTS SEG NFR BLD: 1.02 K/UL (ref 1.8–7.7)
PLATELET # BLD AUTO: 166 K/UL (ref 140–450)
PMV BLD AUTO: 6.9 FL (ref 6–12)
POTASSIUM SERPL-SCNC: 4.2 MMOL/L (ref 3.7–5.3)
PROT SERPL-MCNC: 6.7 G/DL (ref 6.4–8.3)
RBC # BLD AUTO: 3.81 M/UL (ref 4–5.2)
SODIUM SERPL-SCNC: 137 MMOL/L (ref 135–144)
WBC OTHER # BLD: 3 K/UL (ref 3.5–11)

## 2024-07-05 PROCEDURE — 80053 COMPREHEN METABOLIC PANEL: CPT

## 2024-07-05 PROCEDURE — 2580000003 HC RX 258: Performed by: INTERNAL MEDICINE

## 2024-07-05 PROCEDURE — 96375 TX/PRO/DX INJ NEW DRUG ADDON: CPT

## 2024-07-05 PROCEDURE — 96413 CHEMO IV INFUSION 1 HR: CPT

## 2024-07-05 PROCEDURE — 6360000002 HC RX W HCPCS: Performed by: INTERNAL MEDICINE

## 2024-07-05 PROCEDURE — 36591 DRAW BLOOD OFF VENOUS DEVICE: CPT

## 2024-07-05 PROCEDURE — 2500000003 HC RX 250 WO HCPCS: Performed by: INTERNAL MEDICINE

## 2024-07-05 PROCEDURE — 36415 COLL VENOUS BLD VENIPUNCTURE: CPT

## 2024-07-05 PROCEDURE — 85025 COMPLETE CBC W/AUTO DIFF WBC: CPT

## 2024-07-05 RX ORDER — HEPARIN 100 UNIT/ML
500 SYRINGE INTRAVENOUS PRN
Status: DISCONTINUED | OUTPATIENT
Start: 2024-07-05 | End: 2024-07-06 | Stop reason: HOSPADM

## 2024-07-05 RX ORDER — SODIUM CHLORIDE 0.9 % (FLUSH) 0.9 %
5-40 SYRINGE (ML) INJECTION PRN
Status: DISCONTINUED | OUTPATIENT
Start: 2024-07-05 | End: 2024-07-06 | Stop reason: HOSPADM

## 2024-07-05 RX ORDER — DEXAMETHASONE SODIUM PHOSPHATE 10 MG/ML
10 INJECTION INTRAMUSCULAR; INTRAVENOUS ONCE
Status: COMPLETED | OUTPATIENT
Start: 2024-07-05 | End: 2024-07-05

## 2024-07-05 RX ORDER — SODIUM CHLORIDE 9 MG/ML
5-250 INJECTION, SOLUTION INTRAVENOUS PRN
Status: DISCONTINUED | OUTPATIENT
Start: 2024-07-05 | End: 2024-07-06 | Stop reason: HOSPADM

## 2024-07-05 RX ORDER — FAMOTIDINE 10 MG/ML
20 INJECTION, SOLUTION INTRAVENOUS ONCE
Status: COMPLETED | OUTPATIENT
Start: 2024-07-05 | End: 2024-07-05

## 2024-07-05 RX ORDER — DIPHENHYDRAMINE HYDROCHLORIDE 50 MG/ML
50 INJECTION INTRAMUSCULAR; INTRAVENOUS ONCE
Status: COMPLETED | OUTPATIENT
Start: 2024-07-05 | End: 2024-07-05

## 2024-07-05 RX ADMIN — Medication 500 UNITS: at 15:52

## 2024-07-05 RX ADMIN — SODIUM CHLORIDE 150 ML/HR: 9 INJECTION, SOLUTION INTRAVENOUS at 14:15

## 2024-07-05 RX ADMIN — DEXAMETHASONE SODIUM PHOSPHATE 10 MG: 10 INJECTION INTRAMUSCULAR; INTRAVENOUS at 14:22

## 2024-07-05 RX ADMIN — FAMOTIDINE 20 MG: 10 INJECTION, SOLUTION INTRAVENOUS at 14:15

## 2024-07-05 RX ADMIN — PACLITAXEL 168 MG: 6 INJECTION, SOLUTION INTRAVENOUS at 14:50

## 2024-07-05 RX ADMIN — SODIUM CHLORIDE, PRESERVATIVE FREE 10 ML: 5 INJECTION INTRAVENOUS at 15:52

## 2024-07-05 RX ADMIN — DIPHENHYDRAMINE HYDROCHLORIDE 25 MG: 50 INJECTION INTRAMUSCULAR; INTRAVENOUS at 14:18

## 2024-07-05 NOTE — PROGRESS NOTES
Pt here for C2D15 Taxol.  Pt overall does not feel well, poor appetite/nausea.  Labs reviewed and are adequate for treatment.  Infusion completed without incident.  Pt to return 7/12/24 for C3D1 and MD visit.  Pt discharged.

## 2024-07-11 ENCOUNTER — HOSPITAL ENCOUNTER (OUTPATIENT)
Dept: PHYSICAL THERAPY | Facility: CLINIC | Age: 59
Setting detail: THERAPIES SERIES
Discharge: HOME | End: 2024-07-11
Payer: COMMERCIAL

## 2024-07-11 PROCEDURE — 97110 THERAPEUTIC EXERCISES: CPT

## 2024-07-11 PROCEDURE — 97140 MANUAL THERAPY 1/> REGIONS: CPT

## 2024-07-11 NOTE — FLOWSHEET NOTE
fatigued and nausea. Has been trying to be active at home but challenging 2° fatigue. Utilizing [employer] [place] as able. Decr benadryl and less knee pain w/ treatment. Doing ok w/ HEP.  States her L shoulder has been feeling good up until yesterday, not sure why its sore.    Pain:  [x] Yes  [] No Location:  L shoulder, chest  Pain Rating: (0-10 scale) 3/10 tight  Pain altered Tx:  [x] No  [] Yes  Action:  Comments: sleep study? 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 Port R chest, planning lumpectomy/mastectomy. Neoadjuvant chemoimmunotherapy -Taxol carboplatin followed by dose dense AC 5/31/24-   Keytruda on day 1 every 3 weeks for 1 year 5/31/24- 5/31/25. Radiation planned after chemo/surg  R TKA, needs L TKA, L shoulder RTC repair, intention tremors  Manual: Utilized partial PORi breast protocol to LEFT upper quarter for gentle manual lymphatic drainage, myofascial release and joint mobility to facilitate better function, ROM, tissue mobility and dynamics of lymph system.   Exercises:  Exercise Reps/ Time Weight/ Level Comments             Diaphragmatic breathing 5 cycles       Elbow winging  1m  Hands on forehead   Supine cane flexion  10x               Bye bye's 3x ea   Flex, abd   Scapular retraction 10x       Post shld rolls 10x       Bicep curl  10x   AROM   /squeeze 10x  rev W/ ball or towel at home   Toe scrunches/splay 10x rev              Wall flexion slides         Doorway pec stretch (advanced)                 Seated: HT raises, marches, LAQ 10x ea                     Other: L shoulder AROM supine: flex 125° \"pain front of shoulder\" before Rx      Treatment Charges: Mins Units   []  Modalities     [x]  Ther Exercise 20 1   [x]  Manual Therapy 25 2   []  Ther Activities     []  Neuro Re-ed     []  Vasocompression     [] Gait     []  Other     Total Billable time 45        Assessment: [x] Progressing toward goals. Completed partial PORi breast protocol

## 2024-07-12 ENCOUNTER — OFFICE VISIT (OUTPATIENT)
Dept: ONCOLOGY | Age: 59
End: 2024-07-12
Payer: COMMERCIAL

## 2024-07-12 ENCOUNTER — TELEPHONE (OUTPATIENT)
Dept: ONCOLOGY | Age: 59
End: 2024-07-12

## 2024-07-12 ENCOUNTER — HOSPITAL ENCOUNTER (OUTPATIENT)
Dept: INFUSION THERAPY | Age: 59
Discharge: HOME | End: 2024-07-12
Payer: COMMERCIAL

## 2024-07-12 VITALS
RESPIRATION RATE: 18 BRPM | WEIGHT: 282.4 LBS | DIASTOLIC BLOOD PRESSURE: 65 MMHG | TEMPERATURE: 96.8 F | BODY MASS INDEX: 42.94 KG/M2 | SYSTOLIC BLOOD PRESSURE: 94 MMHG | OXYGEN SATURATION: 95 % | HEART RATE: 81 BPM

## 2024-07-12 DIAGNOSIS — D69.6 THROMBOCYTOPENIA (HCC): ICD-10-CM

## 2024-07-12 DIAGNOSIS — C50.412 MALIGNANT NEOPLASM OF UPPER-OUTER QUADRANT OF LEFT BREAST IN FEMALE, ESTROGEN RECEPTOR NEGATIVE (HCC): Primary | ICD-10-CM

## 2024-07-12 DIAGNOSIS — Z17.1 MALIGNANT NEOPLASM OF UPPER-OUTER QUADRANT OF LEFT BREAST IN FEMALE, ESTROGEN RECEPTOR NEGATIVE (HCC): Primary | ICD-10-CM

## 2024-07-12 LAB
ALBUMIN SERPL-MCNC: 4 G/DL (ref 3.5–5.2)
ALBUMIN/GLOB SERPL: 1.5 {RATIO} (ref 1–2.5)
ALP SERPL-CCNC: 107 U/L (ref 35–104)
ALT SERPL-CCNC: 19 U/L (ref 5–33)
ANION GAP SERPL CALCULATED.3IONS-SCNC: 11 MMOL/L (ref 9–17)
AST SERPL-CCNC: 18 U/L
BASOPHILS # BLD: 0 K/UL (ref 0–0.2)
BASOPHILS NFR BLD: 1 % (ref 0–2)
BILIRUB SERPL-MCNC: 0.4 MG/DL (ref 0.3–1.2)
BUN SERPL-MCNC: 19 MG/DL (ref 8–23)
CALCIUM SERPL-MCNC: 9.5 MG/DL (ref 8.6–10.4)
CHLORIDE SERPL-SCNC: 102 MMOL/L (ref 98–107)
CO2 SERPL-SCNC: 25 MMOL/L (ref 20–31)
CREAT SERPL-MCNC: 0.9 MG/DL (ref 0.5–0.9)
EOSINOPHIL # BLD: 0 K/UL (ref 0–0.4)
EOSINOPHILS RELATIVE PERCENT: 0 % (ref 1–4)
ERYTHROCYTE [DISTWIDTH] IN BLOOD BY AUTOMATED COUNT: 17.7 % (ref 12.5–15.4)
GFR, ESTIMATED: 73 ML/MIN/1.73M2
GLUCOSE SERPL-MCNC: 162 MG/DL (ref 70–99)
HCT VFR BLD AUTO: 30.5 % (ref 36–46)
HGB BLD-MCNC: 10 G/DL (ref 12–16)
LYMPHOCYTES NFR BLD: 1.6 K/UL (ref 1–4.8)
LYMPHOCYTES RELATIVE PERCENT: 46 % (ref 24–44)
MCH RBC QN AUTO: 26.3 PG (ref 26–34)
MCHC RBC AUTO-ENTMCNC: 32.9 G/DL (ref 31–37)
MCV RBC AUTO: 80.1 FL (ref 80–100)
MONOCYTES NFR BLD: 0.3 K/UL (ref 0.1–1.2)
MONOCYTES NFR BLD: 8 % (ref 2–11)
NEUTROPHILS NFR BLD: 45 % (ref 36–66)
NEUTS SEG NFR BLD: 1.6 K/UL (ref 1.8–7.7)
PLATELET # BLD AUTO: 91 K/UL (ref 140–450)
PMV BLD AUTO: 6.7 FL (ref 6–12)
POTASSIUM SERPL-SCNC: 4.1 MMOL/L (ref 3.7–5.3)
PROT SERPL-MCNC: 6.7 G/DL (ref 6.4–8.3)
RBC # BLD AUTO: 3.81 M/UL (ref 4–5.2)
SODIUM SERPL-SCNC: 138 MMOL/L (ref 135–144)
WBC OTHER # BLD: 3.5 K/UL (ref 3.5–11)

## 2024-07-12 PROCEDURE — 99214 OFFICE O/P EST MOD 30 MIN: CPT | Performed by: INTERNAL MEDICINE

## 2024-07-12 PROCEDURE — 2580000003 HC RX 258: Performed by: INTERNAL MEDICINE

## 2024-07-12 PROCEDURE — 3074F SYST BP LT 130 MM HG: CPT | Performed by: INTERNAL MEDICINE

## 2024-07-12 PROCEDURE — 85025 COMPLETE CBC W/AUTO DIFF WBC: CPT

## 2024-07-12 PROCEDURE — 96374 THER/PROPH/DIAG INJ IV PUSH: CPT

## 2024-07-12 PROCEDURE — 80053 COMPREHEN METABOLIC PANEL: CPT

## 2024-07-12 PROCEDURE — 96360 HYDRATION IV INFUSION INIT: CPT

## 2024-07-12 PROCEDURE — 6360000002 HC RX W HCPCS: Performed by: INTERNAL MEDICINE

## 2024-07-12 PROCEDURE — 96375 TX/PRO/DX INJ NEW DRUG ADDON: CPT

## 2024-07-12 PROCEDURE — 3078F DIAST BP <80 MM HG: CPT | Performed by: INTERNAL MEDICINE

## 2024-07-12 PROCEDURE — 99211 OFF/OP EST MAY X REQ PHY/QHP: CPT | Performed by: INTERNAL MEDICINE

## 2024-07-12 PROCEDURE — 36591 DRAW BLOOD OFF VENOUS DEVICE: CPT

## 2024-07-12 PROCEDURE — 96361 HYDRATE IV INFUSION ADD-ON: CPT

## 2024-07-12 RX ORDER — MEPERIDINE HYDROCHLORIDE 50 MG/ML
12.5 INJECTION INTRAMUSCULAR; INTRAVENOUS; SUBCUTANEOUS PRN
OUTPATIENT
Start: 2024-08-23

## 2024-07-12 RX ORDER — ALBUTEROL SULFATE 90 UG/1
4 AEROSOL, METERED RESPIRATORY (INHALATION) PRN
OUTPATIENT
Start: 2024-08-09

## 2024-07-12 RX ORDER — SODIUM CHLORIDE 9 MG/ML
5-250 INJECTION, SOLUTION INTRAVENOUS PRN
OUTPATIENT
Start: 2024-08-16

## 2024-07-12 RX ORDER — EPINEPHRINE 1 MG/ML
0.3 INJECTION, SOLUTION, CONCENTRATE INTRAVENOUS PRN
OUTPATIENT
Start: 2024-08-23

## 2024-07-12 RX ORDER — ONDANSETRON 2 MG/ML
8 INJECTION INTRAMUSCULAR; INTRAVENOUS
OUTPATIENT
Start: 2024-08-09

## 2024-07-12 RX ORDER — FAMOTIDINE 10 MG/ML
20 INJECTION, SOLUTION INTRAVENOUS
OUTPATIENT
Start: 2024-08-23

## 2024-07-12 RX ORDER — SODIUM CHLORIDE 9 MG/ML
INJECTION, SOLUTION INTRAVENOUS CONTINUOUS
OUTPATIENT
Start: 2024-08-16

## 2024-07-12 RX ORDER — FAMOTIDINE 10 MG/ML
20 INJECTION, SOLUTION INTRAVENOUS
OUTPATIENT
Start: 2024-08-09

## 2024-07-12 RX ORDER — ACETAMINOPHEN 325 MG/1
650 TABLET ORAL
OUTPATIENT
Start: 2024-08-23

## 2024-07-12 RX ORDER — SODIUM CHLORIDE 0.9 % (FLUSH) 0.9 %
5-40 SYRINGE (ML) INJECTION PRN
OUTPATIENT
Start: 2024-08-16

## 2024-07-12 RX ORDER — ACETAMINOPHEN 325 MG/1
650 TABLET ORAL
OUTPATIENT
Start: 2024-08-09

## 2024-07-12 RX ORDER — ONDANSETRON 2 MG/ML
8 INJECTION INTRAMUSCULAR; INTRAVENOUS
OUTPATIENT
Start: 2024-08-23

## 2024-07-12 RX ORDER — EPINEPHRINE 1 MG/ML
0.3 INJECTION, SOLUTION, CONCENTRATE INTRAVENOUS PRN
OUTPATIENT
Start: 2024-08-09

## 2024-07-12 RX ORDER — ACETAMINOPHEN 325 MG/1
650 TABLET ORAL
OUTPATIENT
Start: 2024-08-16

## 2024-07-12 RX ORDER — FAMOTIDINE 10 MG/ML
20 INJECTION, SOLUTION INTRAVENOUS ONCE
OUTPATIENT
Start: 2024-08-09 | End: 2024-08-02

## 2024-07-12 RX ORDER — FAMOTIDINE 10 MG/ML
20 INJECTION, SOLUTION INTRAVENOUS
OUTPATIENT
Start: 2024-08-16

## 2024-07-12 RX ORDER — SODIUM CHLORIDE 0.9 % (FLUSH) 0.9 %
5-40 SYRINGE (ML) INJECTION PRN
OUTPATIENT
Start: 2024-08-09

## 2024-07-12 RX ORDER — EPINEPHRINE 1 MG/ML
0.3 INJECTION, SOLUTION, CONCENTRATE INTRAVENOUS PRN
OUTPATIENT
Start: 2024-08-16

## 2024-07-12 RX ORDER — ALBUTEROL SULFATE 90 UG/1
4 AEROSOL, METERED RESPIRATORY (INHALATION) PRN
OUTPATIENT
Start: 2024-08-16

## 2024-07-12 RX ORDER — DIPHENHYDRAMINE HYDROCHLORIDE 50 MG/ML
50 INJECTION INTRAMUSCULAR; INTRAVENOUS
OUTPATIENT
Start: 2024-08-23

## 2024-07-12 RX ORDER — FAMOTIDINE 10 MG/ML
20 INJECTION, SOLUTION INTRAVENOUS ONCE
OUTPATIENT
Start: 2024-08-23 | End: 2024-08-16

## 2024-07-12 RX ORDER — FAMOTIDINE 10 MG/ML
20 INJECTION, SOLUTION INTRAVENOUS ONCE
OUTPATIENT
Start: 2024-08-16 | End: 2024-08-09

## 2024-07-12 RX ORDER — HEPARIN SODIUM (PORCINE) LOCK FLUSH IV SOLN 100 UNIT/ML 100 UNIT/ML
500 SOLUTION INTRAVENOUS PRN
OUTPATIENT
Start: 2024-08-16

## 2024-07-12 RX ORDER — HEPARIN SODIUM (PORCINE) LOCK FLUSH IV SOLN 100 UNIT/ML 100 UNIT/ML
500 SOLUTION INTRAVENOUS PRN
OUTPATIENT
Start: 2024-08-23

## 2024-07-12 RX ORDER — HEPARIN 100 UNIT/ML
500 SYRINGE INTRAVENOUS PRN
Status: DISCONTINUED | OUTPATIENT
Start: 2024-07-12 | End: 2024-07-13 | Stop reason: HOSPADM

## 2024-07-12 RX ORDER — SODIUM CHLORIDE 0.9 % (FLUSH) 0.9 %
5-40 SYRINGE (ML) INJECTION PRN
OUTPATIENT
Start: 2024-08-23

## 2024-07-12 RX ORDER — SODIUM CHLORIDE 9 MG/ML
5-250 INJECTION, SOLUTION INTRAVENOUS PRN
OUTPATIENT
Start: 2024-08-23

## 2024-07-12 RX ORDER — DIPHENHYDRAMINE HYDROCHLORIDE 50 MG/ML
50 INJECTION INTRAMUSCULAR; INTRAVENOUS ONCE
OUTPATIENT
Start: 2024-08-09 | End: 2024-08-02

## 2024-07-12 RX ORDER — SODIUM CHLORIDE 0.9 % (FLUSH) 0.9 %
5-40 SYRINGE (ML) INJECTION PRN
Status: DISCONTINUED | OUTPATIENT
Start: 2024-07-12 | End: 2024-07-13 | Stop reason: HOSPADM

## 2024-07-12 RX ORDER — SODIUM CHLORIDE 9 MG/ML
5-250 INJECTION, SOLUTION INTRAVENOUS PRN
Status: DISCONTINUED | OUTPATIENT
Start: 2024-07-12 | End: 2024-07-13 | Stop reason: HOSPADM

## 2024-07-12 RX ORDER — SODIUM CHLORIDE 9 MG/ML
5-250 INJECTION, SOLUTION INTRAVENOUS PRN
OUTPATIENT
Start: 2024-08-09

## 2024-07-12 RX ORDER — DIPHENHYDRAMINE HYDROCHLORIDE 50 MG/ML
50 INJECTION INTRAMUSCULAR; INTRAVENOUS ONCE
OUTPATIENT
Start: 2024-08-23 | End: 2024-08-16

## 2024-07-12 RX ORDER — PALONOSETRON 0.05 MG/ML
0.25 INJECTION, SOLUTION INTRAVENOUS ONCE
OUTPATIENT
Start: 2024-08-09 | End: 2024-08-02

## 2024-07-12 RX ORDER — SODIUM CHLORIDE 9 MG/ML
INJECTION, SOLUTION INTRAVENOUS CONTINUOUS
OUTPATIENT
Start: 2024-08-09

## 2024-07-12 RX ORDER — SODIUM CHLORIDE 9 MG/ML
INJECTION, SOLUTION INTRAVENOUS CONTINUOUS
OUTPATIENT
Start: 2024-08-23

## 2024-07-12 RX ORDER — PROCHLORPERAZINE EDISYLATE 5 MG/ML
5 INJECTION INTRAMUSCULAR; INTRAVENOUS
OUTPATIENT
Start: 2024-08-09

## 2024-07-12 RX ORDER — MEPERIDINE HYDROCHLORIDE 50 MG/ML
12.5 INJECTION INTRAMUSCULAR; INTRAVENOUS; SUBCUTANEOUS PRN
OUTPATIENT
Start: 2024-08-09

## 2024-07-12 RX ORDER — MEPERIDINE HYDROCHLORIDE 50 MG/ML
12.5 INJECTION INTRAMUSCULAR; INTRAVENOUS; SUBCUTANEOUS PRN
OUTPATIENT
Start: 2024-08-16

## 2024-07-12 RX ORDER — DIPHENHYDRAMINE HYDROCHLORIDE 50 MG/ML
50 INJECTION INTRAMUSCULAR; INTRAVENOUS
OUTPATIENT
Start: 2024-08-09

## 2024-07-12 RX ORDER — ONDANSETRON 2 MG/ML
8 INJECTION INTRAMUSCULAR; INTRAVENOUS ONCE
Status: COMPLETED | OUTPATIENT
Start: 2024-07-12 | End: 2024-07-12

## 2024-07-12 RX ORDER — 0.9 % SODIUM CHLORIDE 0.9 %
1000 INTRAVENOUS SOLUTION INTRAVENOUS ONCE
Status: COMPLETED | OUTPATIENT
Start: 2024-07-12 | End: 2024-07-12

## 2024-07-12 RX ORDER — ONDANSETRON 2 MG/ML
8 INJECTION INTRAMUSCULAR; INTRAVENOUS
OUTPATIENT
Start: 2024-08-16

## 2024-07-12 RX ORDER — HEPARIN SODIUM (PORCINE) LOCK FLUSH IV SOLN 100 UNIT/ML 100 UNIT/ML
500 SOLUTION INTRAVENOUS PRN
OUTPATIENT
Start: 2024-08-09

## 2024-07-12 RX ORDER — DIPHENHYDRAMINE HYDROCHLORIDE 50 MG/ML
50 INJECTION INTRAMUSCULAR; INTRAVENOUS ONCE
OUTPATIENT
Start: 2024-08-16 | End: 2024-08-09

## 2024-07-12 RX ORDER — DIPHENHYDRAMINE HYDROCHLORIDE 50 MG/ML
50 INJECTION INTRAMUSCULAR; INTRAVENOUS
OUTPATIENT
Start: 2024-08-16

## 2024-07-12 RX ORDER — ALBUTEROL SULFATE 90 UG/1
4 AEROSOL, METERED RESPIRATORY (INHALATION) PRN
OUTPATIENT
Start: 2024-08-23

## 2024-07-12 RX ADMIN — ONDANSETRON 8 MG: 2 INJECTION INTRAMUSCULAR; INTRAVENOUS at 09:39

## 2024-07-12 RX ADMIN — SODIUM CHLORIDE, PRESERVATIVE FREE 10 ML: 5 INJECTION INTRAVENOUS at 08:16

## 2024-07-12 RX ADMIN — HEPARIN 500 UNITS: 100 SYRINGE at 11:40

## 2024-07-12 RX ADMIN — SODIUM CHLORIDE 1000 ML: 9 INJECTION, SOLUTION INTRAVENOUS at 09:36

## 2024-07-12 RX ADMIN — SODIUM CHLORIDE, PRESERVATIVE FREE 10 ML: 5 INJECTION INTRAVENOUS at 11:40

## 2024-07-12 NOTE — TELEPHONE ENCOUNTER
Name: Luci Castañeda  : 1965  MRN: [id number]    Oncology Navigation Follow-Up Note    Contact Type:  Medical Oncology    Notes: Met with pt and her sister during her infusion. Treatment held today due to counts. Pt had general questions on timeline for treatment. Writer provided pt a print out with the schedule and what medications she will be receiving. We discussed surgery appt  after her chemo  and needing to meet with Dr Madsen at that time as well.   Pt denied other concerns at this time. Encouraged pt to call navigator as needed with any questions, concerns or barriers. Confirmed pt has navigator's contact information.           Electronically signed by Veronica Bennett RN on 2024 at 2:28 PM

## 2024-07-12 NOTE — TELEPHONE ENCOUNTER
Instructions   from Dr. Jess Bradley MD    Hold chemo today  Give 0.9 NS IVF 1 liter today. Give zofran 8 mg IV today  Chemo next week  RV 4 weeks      Chemo held today but received IVF and zofran. Infusion nurses notified.   Tx schedule adjusted by 1 week  RV 8/9/24 at 8:30 am with tx to follow

## 2024-07-12 NOTE — PROGRESS NOTES
Pt here for C.3D.1. keytruda, taxol, carbo   Arrives ambulatory.  C/O nausea.   Labs drawn from port, results reviewed.  Pt was seen by Dr. Bradley, order rec'd to hold treatment one week and to give IV zofran and 1 liter of hydration.   Completed without incident.  Pt d/c'd in stable condition.  Returns 7/19/2024 for C3D1 keytruda, taxol, carbo.

## 2024-07-12 NOTE — PATIENT INSTRUCTIONS
Hold chemo today  Give 0.9 NS IVF 1 liter today. Give zofran 8 mg IV today  Chemo next week  RV 4 weeks

## 2024-07-19 ENCOUNTER — HOSPITAL ENCOUNTER (OUTPATIENT)
Dept: INFUSION THERAPY | Age: 59
Discharge: HOME | End: 2024-07-19
Payer: COMMERCIAL

## 2024-07-19 VITALS
HEART RATE: 90 BPM | WEIGHT: 284 LBS | BODY MASS INDEX: 43.18 KG/M2 | DIASTOLIC BLOOD PRESSURE: 63 MMHG | RESPIRATION RATE: 18 BRPM | SYSTOLIC BLOOD PRESSURE: 96 MMHG | TEMPERATURE: 98.2 F

## 2024-07-19 DIAGNOSIS — Z17.1 MALIGNANT NEOPLASM OF UPPER-OUTER QUADRANT OF LEFT BREAST IN FEMALE, ESTROGEN RECEPTOR NEGATIVE (HCC): ICD-10-CM

## 2024-07-19 DIAGNOSIS — C50.912 INVASIVE DUCTAL CARCINOMA OF BREAST, LEFT (HCC): Primary | ICD-10-CM

## 2024-07-19 DIAGNOSIS — C50.412 MALIGNANT NEOPLASM OF UPPER-OUTER QUADRANT OF LEFT BREAST IN FEMALE, ESTROGEN RECEPTOR NEGATIVE (HCC): ICD-10-CM

## 2024-07-19 LAB
ALBUMIN SERPL-MCNC: 3.8 G/DL (ref 3.5–5.2)
ALBUMIN/GLOB SERPL: 1.3 {RATIO} (ref 1–2.5)
ALP SERPL-CCNC: 118 U/L (ref 35–104)
ALT SERPL-CCNC: 22 U/L (ref 5–33)
AMYLASE SERPL-CCNC: 43 U/L (ref 28–100)
ANION GAP SERPL CALCULATED.3IONS-SCNC: 10 MMOL/L (ref 9–17)
AST SERPL-CCNC: 28 U/L
BASOPHILS # BLD: 0 K/UL (ref 0–0.2)
BASOPHILS NFR BLD: 0 % (ref 0–2)
BILIRUB SERPL-MCNC: 0.3 MG/DL (ref 0.3–1.2)
BUN SERPL-MCNC: 18 MG/DL (ref 8–23)
CALCIUM SERPL-MCNC: 8.9 MG/DL (ref 8.6–10.4)
CHLORIDE SERPL-SCNC: 100 MMOL/L (ref 98–107)
CO2 SERPL-SCNC: 25 MMOL/L (ref 20–31)
CORTIS SERPL-MCNC: 15.4 UG/DL (ref 2.5–19.5)
CORTISOL COLLECTION INFO: NORMAL
CREAT SERPL-MCNC: 1.3 MG/DL (ref 0.5–0.9)
EOSINOPHIL # BLD: 0 K/UL (ref 0–0.4)
EOSINOPHILS RELATIVE PERCENT: 0 % (ref 1–4)
ERYTHROCYTE [DISTWIDTH] IN BLOOD BY AUTOMATED COUNT: 23 % (ref 12.5–15.4)
GFR, ESTIMATED: 47 ML/MIN/1.73M2
GLUCOSE SERPL-MCNC: 168 MG/DL (ref 70–99)
HCT VFR BLD AUTO: 29.7 % (ref 36–46)
HGB BLD-MCNC: 9.8 G/DL (ref 12–16)
LIPASE SERPL-CCNC: 41 U/L (ref 13–60)
LYMPHOCYTES NFR BLD: 0.88 K/UL (ref 1–4.8)
LYMPHOCYTES RELATIVE PERCENT: 34 % (ref 24–44)
MCH RBC QN AUTO: 27.4 PG (ref 26–34)
MCHC RBC AUTO-ENTMCNC: 33.1 G/DL (ref 31–37)
MCV RBC AUTO: 82.9 FL (ref 80–100)
MONOCYTES NFR BLD: 0.57 K/UL (ref 0.1–0.8)
MONOCYTES NFR BLD: 22 % (ref 1–7)
MORPHOLOGY: ABNORMAL
NEUTROPHILS NFR BLD: 44 % (ref 36–66)
NEUTS SEG NFR BLD: 1.15 K/UL (ref 1.8–7.7)
PLATELET # BLD AUTO: 189 K/UL (ref 140–450)
PMV BLD AUTO: 7.2 FL (ref 6–12)
POTASSIUM SERPL-SCNC: 4.3 MMOL/L (ref 3.7–5.3)
PROT SERPL-MCNC: 6.7 G/DL (ref 6.4–8.3)
RBC # BLD AUTO: 3.58 M/UL (ref 4–5.2)
SODIUM SERPL-SCNC: 135 MMOL/L (ref 135–144)
TSH SERPL DL<=0.05 MIU/L-ACNC: 1.05 UIU/ML (ref 0.3–5)
WBC OTHER # BLD: 2.6 K/UL (ref 3.5–11)

## 2024-07-19 PROCEDURE — 96413 CHEMO IV INFUSION 1 HR: CPT

## 2024-07-19 PROCEDURE — 2500000003 HC RX 250 WO HCPCS: Performed by: INTERNAL MEDICINE

## 2024-07-19 PROCEDURE — 80053 COMPREHEN METABOLIC PANEL: CPT

## 2024-07-19 PROCEDURE — 85025 COMPLETE CBC W/AUTO DIFF WBC: CPT

## 2024-07-19 PROCEDURE — 82150 ASSAY OF AMYLASE: CPT

## 2024-07-19 PROCEDURE — 96375 TX/PRO/DX INJ NEW DRUG ADDON: CPT

## 2024-07-19 PROCEDURE — 6360000002 HC RX W HCPCS: Performed by: INTERNAL MEDICINE

## 2024-07-19 PROCEDURE — 82533 TOTAL CORTISOL: CPT

## 2024-07-19 PROCEDURE — 83690 ASSAY OF LIPASE: CPT

## 2024-07-19 PROCEDURE — 2580000003 HC RX 258: Performed by: INTERNAL MEDICINE

## 2024-07-19 PROCEDURE — 84443 ASSAY THYROID STIM HORMONE: CPT

## 2024-07-19 PROCEDURE — 36591 DRAW BLOOD OFF VENOUS DEVICE: CPT

## 2024-07-19 PROCEDURE — 96417 CHEMO IV INFUS EACH ADDL SEQ: CPT

## 2024-07-19 PROCEDURE — 96367 TX/PROPH/DG ADDL SEQ IV INF: CPT

## 2024-07-19 RX ORDER — FAMOTIDINE 10 MG/ML
20 INJECTION, SOLUTION INTRAVENOUS
OUTPATIENT
Start: 2024-07-19

## 2024-07-19 RX ORDER — ALBUTEROL SULFATE 90 UG/1
4 AEROSOL, METERED RESPIRATORY (INHALATION) PRN
OUTPATIENT
Start: 2024-07-19

## 2024-07-19 RX ORDER — FAMOTIDINE 10 MG/ML
20 INJECTION, SOLUTION INTRAVENOUS ONCE
Status: COMPLETED | OUTPATIENT
Start: 2024-07-19 | End: 2024-07-19

## 2024-07-19 RX ORDER — DIPHENHYDRAMINE HYDROCHLORIDE 50 MG/ML
50 INJECTION INTRAMUSCULAR; INTRAVENOUS
OUTPATIENT
Start: 2024-07-19

## 2024-07-19 RX ORDER — ONDANSETRON 2 MG/ML
8 INJECTION INTRAMUSCULAR; INTRAVENOUS
OUTPATIENT
Start: 2024-07-19

## 2024-07-19 RX ORDER — EPINEPHRINE 1 MG/ML
0.3 INJECTION, SOLUTION, CONCENTRATE INTRAVENOUS PRN
OUTPATIENT
Start: 2024-07-19

## 2024-07-19 RX ORDER — PALONOSETRON 0.05 MG/ML
0.25 INJECTION, SOLUTION INTRAVENOUS ONCE
Status: COMPLETED | OUTPATIENT
Start: 2024-07-19 | End: 2024-07-19

## 2024-07-19 RX ORDER — SODIUM CHLORIDE 9 MG/ML
25 INJECTION, SOLUTION INTRAVENOUS PRN
OUTPATIENT
Start: 2024-07-19

## 2024-07-19 RX ORDER — DEXAMETHASONE SODIUM PHOSPHATE 10 MG/ML
10 INJECTION INTRAMUSCULAR; INTRAVENOUS ONCE
Status: COMPLETED | OUTPATIENT
Start: 2024-07-19 | End: 2024-07-19

## 2024-07-19 RX ORDER — SODIUM CHLORIDE 9 MG/ML
INJECTION, SOLUTION INTRAVENOUS CONTINUOUS
OUTPATIENT
Start: 2024-07-19

## 2024-07-19 RX ORDER — DIPHENHYDRAMINE HYDROCHLORIDE 50 MG/ML
25 INJECTION INTRAMUSCULAR; INTRAVENOUS ONCE
Status: COMPLETED | OUTPATIENT
Start: 2024-07-19 | End: 2024-07-19

## 2024-07-19 RX ORDER — HEPARIN 100 UNIT/ML
500 SYRINGE INTRAVENOUS PRN
OUTPATIENT
Start: 2024-07-19

## 2024-07-19 RX ORDER — DIPHENHYDRAMINE HYDROCHLORIDE 50 MG/ML
50 INJECTION INTRAMUSCULAR; INTRAVENOUS ONCE
Status: DISCONTINUED | OUTPATIENT
Start: 2024-07-19 | End: 2024-07-19

## 2024-07-19 RX ORDER — SODIUM CHLORIDE 9 MG/ML
INJECTION, SOLUTION INTRAVENOUS CONTINUOUS
Status: DISCONTINUED | OUTPATIENT
Start: 2024-07-19 | End: 2024-07-20 | Stop reason: HOSPADM

## 2024-07-19 RX ORDER — ACETAMINOPHEN 325 MG/1
650 TABLET ORAL
OUTPATIENT
Start: 2024-07-19

## 2024-07-19 RX ORDER — SODIUM CHLORIDE 0.9 % (FLUSH) 0.9 %
5-40 SYRINGE (ML) INJECTION PRN
OUTPATIENT
Start: 2024-07-19

## 2024-07-19 RX ORDER — SODIUM CHLORIDE 0.9 % (FLUSH) 0.9 %
5-40 SYRINGE (ML) INJECTION PRN
Status: DISCONTINUED | OUTPATIENT
Start: 2024-07-19 | End: 2024-07-20 | Stop reason: HOSPADM

## 2024-07-19 RX ORDER — HEPARIN 100 UNIT/ML
500 SYRINGE INTRAVENOUS PRN
Status: DISCONTINUED | OUTPATIENT
Start: 2024-07-19 | End: 2024-07-20 | Stop reason: HOSPADM

## 2024-07-19 RX ADMIN — PACLITAXEL 168 MG: 6 INJECTION, SOLUTION INTRAVENOUS at 12:43

## 2024-07-19 RX ADMIN — SODIUM CHLORIDE, PRESERVATIVE FREE 10 ML: 5 INJECTION INTRAVENOUS at 10:11

## 2024-07-19 RX ADMIN — CARBOPLATIN 450 MG: 10 INJECTION INTRAVENOUS at 14:34

## 2024-07-19 RX ADMIN — SODIUM CHLORIDE 200 MG: 9 INJECTION, SOLUTION INTRAVENOUS at 12:12

## 2024-07-19 RX ADMIN — SODIUM CHLORIDE: 9 INJECTION, SOLUTION INTRAVENOUS at 11:07

## 2024-07-19 RX ADMIN — SODIUM CHLORIDE, PRESERVATIVE FREE 10 ML: 5 INJECTION INTRAVENOUS at 10:10

## 2024-07-19 RX ADMIN — FAMOTIDINE 20 MG: 10 INJECTION, SOLUTION INTRAVENOUS at 11:07

## 2024-07-19 RX ADMIN — PALONOSETRON 0.25 MG: 0.05 INJECTION, SOLUTION INTRAVENOUS at 11:07

## 2024-07-19 RX ADMIN — DEXAMETHASONE SODIUM PHOSPHATE 10 MG: 10 INJECTION INTRAMUSCULAR; INTRAVENOUS at 11:07

## 2024-07-19 RX ADMIN — SODIUM CHLORIDE 150 MG: 9 INJECTION, SOLUTION INTRAVENOUS at 11:26

## 2024-07-19 RX ADMIN — DIPHENHYDRAMINE HYDROCHLORIDE 25 MG: 50 INJECTION INTRAMUSCULAR; INTRAVENOUS at 11:08

## 2024-07-19 RX ADMIN — HEPARIN 500 UNITS: 100 SYRINGE at 15:20

## 2024-07-19 RX ADMIN — SODIUM CHLORIDE, PRESERVATIVE FREE 10 ML: 5 INJECTION INTRAVENOUS at 15:20

## 2024-07-19 NOTE — PROGRESS NOTES
Patient arrived for C3D1 keytruda, taxol, carbo. Labs drawn and within treatable parameters. 25 mg benadryl given, not 50.Patient tolerated w/o incident and left in stable condition. Returns 7/26

## 2024-07-26 ENCOUNTER — HOSPITAL ENCOUNTER (OUTPATIENT)
Dept: INFUSION THERAPY | Age: 59
Discharge: HOME | End: 2024-07-26
Payer: COMMERCIAL

## 2024-07-26 VITALS
BODY MASS INDEX: 42.73 KG/M2 | WEIGHT: 281 LBS | TEMPERATURE: 98.4 F | RESPIRATION RATE: 18 BRPM | SYSTOLIC BLOOD PRESSURE: 101 MMHG | HEART RATE: 99 BPM | DIASTOLIC BLOOD PRESSURE: 63 MMHG

## 2024-07-26 DIAGNOSIS — C50.412 MALIGNANT NEOPLASM OF UPPER-OUTER QUADRANT OF LEFT BREAST IN FEMALE, ESTROGEN RECEPTOR NEGATIVE (HCC): Primary | ICD-10-CM

## 2024-07-26 DIAGNOSIS — Z17.1 MALIGNANT NEOPLASM OF UPPER-OUTER QUADRANT OF LEFT BREAST IN FEMALE, ESTROGEN RECEPTOR NEGATIVE (HCC): Primary | ICD-10-CM

## 2024-07-26 LAB
ALBUMIN SERPL-MCNC: 4.1 G/DL (ref 3.5–5.2)
ALBUMIN/GLOB SERPL: 1.3 {RATIO} (ref 1–2.5)
ALP SERPL-CCNC: 119 U/L (ref 35–104)
ALT SERPL-CCNC: 23 U/L (ref 5–33)
ANION GAP SERPL CALCULATED.3IONS-SCNC: 14 MMOL/L (ref 9–17)
AST SERPL-CCNC: 25 U/L
BASOPHILS # BLD: 0 K/UL (ref 0–0.2)
BASOPHILS NFR BLD: 0 % (ref 0–2)
BILIRUB SERPL-MCNC: 0.4 MG/DL (ref 0.3–1.2)
BUN SERPL-MCNC: 21 MG/DL (ref 8–23)
CALCIUM SERPL-MCNC: 9.5 MG/DL (ref 8.6–10.4)
CHLORIDE SERPL-SCNC: 96 MMOL/L (ref 98–107)
CO2 SERPL-SCNC: 27 MMOL/L (ref 20–31)
CREAT SERPL-MCNC: 1.3 MG/DL (ref 0.5–0.9)
EOSINOPHIL # BLD: 0 K/UL (ref 0–0.4)
EOSINOPHILS RELATIVE PERCENT: 0 % (ref 1–4)
ERYTHROCYTE [DISTWIDTH] IN BLOOD BY AUTOMATED COUNT: 24.4 % (ref 12.5–15.4)
GFR, ESTIMATED: 47 ML/MIN/1.73M2
GLUCOSE SERPL-MCNC: 129 MG/DL (ref 70–99)
HCT VFR BLD AUTO: 30.9 % (ref 36–46)
HGB BLD-MCNC: 10.3 G/DL (ref 12–16)
LYMPHOCYTES NFR BLD: 2.18 K/UL (ref 1–4.8)
LYMPHOCYTES RELATIVE PERCENT: 39 % (ref 24–44)
MCH RBC QN AUTO: 27.8 PG (ref 26–34)
MCHC RBC AUTO-ENTMCNC: 33.3 G/DL (ref 31–37)
MCV RBC AUTO: 83.4 FL (ref 80–100)
MONOCYTES NFR BLD: 0.39 K/UL (ref 0.1–0.8)
MONOCYTES NFR BLD: 7 % (ref 1–7)
MORPHOLOGY: ABNORMAL
NEUTROPHILS NFR BLD: 54 % (ref 36–66)
NEUTS SEG NFR BLD: 3.03 K/UL (ref 1.8–7.7)
PLATELET # BLD AUTO: 266 K/UL (ref 140–450)
PMV BLD AUTO: 6.9 FL (ref 6–12)
POTASSIUM SERPL-SCNC: 4.3 MMOL/L (ref 3.7–5.3)
PROT SERPL-MCNC: 7.2 G/DL (ref 6.4–8.3)
RBC # BLD AUTO: 3.7 M/UL (ref 4–5.2)
SODIUM SERPL-SCNC: 137 MMOL/L (ref 135–144)
WBC OTHER # BLD: 5.6 K/UL (ref 3.5–11)

## 2024-07-26 PROCEDURE — 2500000003 HC RX 250 WO HCPCS: Performed by: INTERNAL MEDICINE

## 2024-07-26 PROCEDURE — 2580000003 HC RX 258: Performed by: INTERNAL MEDICINE

## 2024-07-26 PROCEDURE — 6360000002 HC RX W HCPCS: Performed by: INTERNAL MEDICINE

## 2024-07-26 PROCEDURE — 36591 DRAW BLOOD OFF VENOUS DEVICE: CPT

## 2024-07-26 PROCEDURE — 96413 CHEMO IV INFUSION 1 HR: CPT

## 2024-07-26 PROCEDURE — 96375 TX/PRO/DX INJ NEW DRUG ADDON: CPT

## 2024-07-26 PROCEDURE — 85025 COMPLETE CBC W/AUTO DIFF WBC: CPT

## 2024-07-26 PROCEDURE — 80053 COMPREHEN METABOLIC PANEL: CPT

## 2024-07-26 RX ORDER — HEPARIN 100 UNIT/ML
500 SYRINGE INTRAVENOUS PRN
Status: DISCONTINUED | OUTPATIENT
Start: 2024-07-26 | End: 2024-07-27 | Stop reason: HOSPADM

## 2024-07-26 RX ORDER — SODIUM CHLORIDE 0.9 % (FLUSH) 0.9 %
5-40 SYRINGE (ML) INJECTION PRN
Status: DISCONTINUED | OUTPATIENT
Start: 2024-07-26 | End: 2024-07-27 | Stop reason: HOSPADM

## 2024-07-26 RX ORDER — DIPHENHYDRAMINE HYDROCHLORIDE 50 MG/ML
50 INJECTION INTRAMUSCULAR; INTRAVENOUS ONCE
Status: COMPLETED | OUTPATIENT
Start: 2024-07-26 | End: 2024-07-26

## 2024-07-26 RX ORDER — FAMOTIDINE 10 MG/ML
20 INJECTION, SOLUTION INTRAVENOUS ONCE
Status: COMPLETED | OUTPATIENT
Start: 2024-07-26 | End: 2024-07-26

## 2024-07-26 RX ORDER — DEXAMETHASONE SODIUM PHOSPHATE 10 MG/ML
10 INJECTION INTRAMUSCULAR; INTRAVENOUS ONCE
Status: COMPLETED | OUTPATIENT
Start: 2024-07-26 | End: 2024-07-26

## 2024-07-26 RX ORDER — SODIUM CHLORIDE 9 MG/ML
5-250 INJECTION, SOLUTION INTRAVENOUS PRN
Status: DISCONTINUED | OUTPATIENT
Start: 2024-07-26 | End: 2024-07-27 | Stop reason: HOSPADM

## 2024-07-26 RX ADMIN — SODIUM CHLORIDE, PRESERVATIVE FREE 10 ML: 5 INJECTION INTRAVENOUS at 13:10

## 2024-07-26 RX ADMIN — SODIUM CHLORIDE 200 ML/HR: 9 INJECTION, SOLUTION INTRAVENOUS at 14:09

## 2024-07-26 RX ADMIN — HEPARIN 500 UNITS: 100 SYRINGE at 15:54

## 2024-07-26 RX ADMIN — FAMOTIDINE 20 MG: 10 INJECTION, SOLUTION INTRAVENOUS at 14:10

## 2024-07-26 RX ADMIN — PACLITAXEL 168 MG: 6 INJECTION, SOLUTION, CONCENTRATE INTRAVENOUS at 14:51

## 2024-07-26 RX ADMIN — DIPHENHYDRAMINE HYDROCHLORIDE 25 MG: 50 INJECTION INTRAMUSCULAR; INTRAVENOUS at 14:10

## 2024-07-26 RX ADMIN — SODIUM CHLORIDE, PRESERVATIVE FREE 10 ML: 5 INJECTION INTRAVENOUS at 13:11

## 2024-07-26 RX ADMIN — DEXAMETHASONE SODIUM PHOSPHATE 10 MG: 10 INJECTION INTRAMUSCULAR; INTRAVENOUS at 14:10

## 2024-07-26 RX ADMIN — SODIUM CHLORIDE, PRESERVATIVE FREE 10 ML: 5 INJECTION INTRAVENOUS at 15:54

## 2024-07-26 NOTE — PROGRESS NOTES
Patient arrived for C3D8 taxol. Labs within treatable parameters.25mg benadryl given not 50. Patient tolerated w/o incident and left in stable condition Returns  8/2

## 2024-07-26 NOTE — PROGRESS NOTES
Spiritual Health Outpatient Oncology/Hematology Progress Note    Situation: Writer encountered Patient and Friend in the treatment cubicle of the infusion clinic.    Assessment: Patient smiled and shared how she was doing. Pt expressed gratitude for coming through seven treatments. Pt affirmed that she has a strong support system. Friend agreed. Pt noted that she will be finished with treatment by the end of the year.     Intervention: Writer greeted Pt and Friend. Writer asked how Pt was doing. Writer offered words of support and encouragement.     Outcome:  Pt and Friend thanked writer for the visit.    Plan: Spiritual Health Services are available for Patient by phone and/or in person.      07/26/24 1655   Encounter Summary   Encounter Overview/Reason Spiritual/Emotional Needs   Service Provided For Patient;Friend   Referral/Consult From [facility]   Support System Family members;Friends/neighbors   Last Encounter  06/28/24   Complexity of Encounter Low   Begin Time 1530   End Time  1535   Total Time Calculated 5 min   Spiritual/Emotional needs   Type Spiritual Support   Assessment/Intervention/Outcome   Assessment Calm;Coping   Intervention Sustaining Presence/Ministry of presence;Explored/Affirmed feelings, thoughts, concerns;Active listening;Discussed illness injury and it’s impact;Explored Coping Skills/Resources   Outcome Engaged in conversation;Expressed Gratitude;Coping   Plan and Referrals   Plan/Referrals Continue Support (comment)     MAMADOU Barrios  [facility] Spiritual Health   [phone]

## 2024-07-29 NOTE — TELEPHONE ENCOUNTER
Pt called stating she is feeling sob on exertion, and is fatigued. She states she has pulmonology f/u in mid-August, but asking if there is anything else she should be doing. Writer reviewed chart and pt was seen by Dr. Randolph on 7/18, and they discussed the sob. He notes that his exam was unremarkable and asked pt to f/u with Pulmonology regarding this.Writer states she does have inhalers at home, but has not used them. Writer advised pt to use inhalers to see if this helps with the sob, otherwise, pt is to call office back or go to ER for assessment. Pt verbalized understanding.

## 2024-08-02 ENCOUNTER — HOSPITAL ENCOUNTER (OUTPATIENT)
Dept: INFUSION THERAPY | Age: 59
Discharge: HOME | End: 2024-08-02
Payer: COMMERCIAL

## 2024-08-02 VITALS
WEIGHT: 283.5 LBS | DIASTOLIC BLOOD PRESSURE: 75 MMHG | RESPIRATION RATE: 16 BRPM | HEART RATE: 73 BPM | BODY MASS INDEX: 43.11 KG/M2 | TEMPERATURE: 97 F | SYSTOLIC BLOOD PRESSURE: 112 MMHG

## 2024-08-02 DIAGNOSIS — Z17.1 MALIGNANT NEOPLASM OF UPPER-OUTER QUADRANT OF LEFT BREAST IN FEMALE, ESTROGEN RECEPTOR NEGATIVE (HCC): Primary | ICD-10-CM

## 2024-08-02 DIAGNOSIS — C50.412 MALIGNANT NEOPLASM OF UPPER-OUTER QUADRANT OF LEFT BREAST IN FEMALE, ESTROGEN RECEPTOR NEGATIVE (HCC): Primary | ICD-10-CM

## 2024-08-02 LAB
ALBUMIN SERPL-MCNC: 3.9 G/DL (ref 3.5–5.2)
ALBUMIN/GLOB SERPL: 1.3 {RATIO} (ref 1–2.5)
ALP SERPL-CCNC: 114 U/L (ref 35–104)
ALT SERPL-CCNC: 22 U/L (ref 5–33)
ANION GAP SERPL CALCULATED.3IONS-SCNC: 9 MMOL/L (ref 9–17)
AST SERPL-CCNC: 21 U/L
BASOPHILS # BLD: 0 K/UL (ref 0–0.2)
BASOPHILS NFR BLD: 0 % (ref 0–2)
BILIRUB SERPL-MCNC: 0.4 MG/DL (ref 0.3–1.2)
BUN SERPL-MCNC: 19 MG/DL (ref 8–23)
CALCIUM SERPL-MCNC: 9.4 MG/DL (ref 8.6–10.4)
CHLORIDE SERPL-SCNC: 102 MMOL/L (ref 98–107)
CO2 SERPL-SCNC: 27 MMOL/L (ref 20–31)
CREAT SERPL-MCNC: 1 MG/DL (ref 0.5–0.9)
EOSINOPHIL # BLD: 0 K/UL (ref 0–0.4)
EOSINOPHILS RELATIVE PERCENT: 0 % (ref 1–4)
ERYTHROCYTE [DISTWIDTH] IN BLOOD BY AUTOMATED COUNT: 25.4 % (ref 12.5–15.4)
GFR, ESTIMATED: 64 ML/MIN/1.73M2
GLUCOSE SERPL-MCNC: 126 MG/DL (ref 70–99)
HCT VFR BLD AUTO: 27.8 % (ref 36–46)
HGB BLD-MCNC: 9.5 G/DL (ref 12–16)
LYMPHOCYTES NFR BLD: 1.98 K/UL (ref 1–4.8)
LYMPHOCYTES RELATIVE PERCENT: 43 % (ref 24–44)
MCH RBC QN AUTO: 28.6 PG (ref 26–34)
MCHC RBC AUTO-ENTMCNC: 34 G/DL (ref 31–37)
MCV RBC AUTO: 84.1 FL (ref 80–100)
MONOCYTES NFR BLD: 0.41 K/UL (ref 0.1–0.8)
MONOCYTES NFR BLD: 9 % (ref 1–7)
MORPHOLOGY: ABNORMAL
MORPHOLOGY: ABNORMAL
NEUTROPHILS NFR BLD: 48 % (ref 36–66)
NEUTS SEG NFR BLD: 2.21 K/UL (ref 1.8–7.7)
NUCLEATED RED BLOOD CELLS: 2 PER 100 WBC
PLATELET # BLD AUTO: 93 K/UL (ref 140–450)
PMV BLD AUTO: 7.3 FL (ref 6–12)
POTASSIUM SERPL-SCNC: 4.3 MMOL/L (ref 3.7–5.3)
PROT SERPL-MCNC: 6.9 G/DL (ref 6.4–8.3)
RBC # BLD AUTO: 3.3 M/UL (ref 4–5.2)
SODIUM SERPL-SCNC: 138 MMOL/L (ref 135–144)
WBC OTHER # BLD: 4.6 K/UL (ref 3.5–11)

## 2024-08-02 PROCEDURE — 36591 DRAW BLOOD OFF VENOUS DEVICE: CPT

## 2024-08-02 PROCEDURE — 80053 COMPREHEN METABOLIC PANEL: CPT

## 2024-08-02 PROCEDURE — 96413 CHEMO IV INFUSION 1 HR: CPT

## 2024-08-02 PROCEDURE — 96375 TX/PRO/DX INJ NEW DRUG ADDON: CPT

## 2024-08-02 PROCEDURE — 2500000003 HC RX 250 WO HCPCS: Performed by: INTERNAL MEDICINE

## 2024-08-02 PROCEDURE — 6360000002 HC RX W HCPCS: Performed by: INTERNAL MEDICINE

## 2024-08-02 PROCEDURE — 85025 COMPLETE CBC W/AUTO DIFF WBC: CPT

## 2024-08-02 PROCEDURE — 2580000003 HC RX 258: Performed by: INTERNAL MEDICINE

## 2024-08-02 RX ORDER — SODIUM CHLORIDE 9 MG/ML
5-250 INJECTION, SOLUTION INTRAVENOUS PRN
Status: DISCONTINUED | OUTPATIENT
Start: 2024-08-02 | End: 2024-08-03 | Stop reason: HOSPADM

## 2024-08-02 RX ORDER — SODIUM CHLORIDE 0.9 % (FLUSH) 0.9 %
5-40 SYRINGE (ML) INJECTION PRN
Status: DISCONTINUED | OUTPATIENT
Start: 2024-08-02 | End: 2024-08-03 | Stop reason: HOSPADM

## 2024-08-02 RX ORDER — HEPARIN 100 UNIT/ML
500 SYRINGE INTRAVENOUS PRN
Status: DISCONTINUED | OUTPATIENT
Start: 2024-08-02 | End: 2024-08-03 | Stop reason: HOSPADM

## 2024-08-02 RX ORDER — ONDANSETRON 2 MG/ML
8 INJECTION INTRAMUSCULAR; INTRAVENOUS
Status: COMPLETED | OUTPATIENT
Start: 2024-08-02 | End: 2024-08-02

## 2024-08-02 RX ORDER — DIPHENHYDRAMINE HYDROCHLORIDE 50 MG/ML
50 INJECTION INTRAMUSCULAR; INTRAVENOUS ONCE
Status: COMPLETED | OUTPATIENT
Start: 2024-08-02 | End: 2024-08-02

## 2024-08-02 RX ORDER — DEXAMETHASONE SODIUM PHOSPHATE 10 MG/ML
10 INJECTION INTRAMUSCULAR; INTRAVENOUS ONCE
Status: COMPLETED | OUTPATIENT
Start: 2024-08-02 | End: 2024-08-02

## 2024-08-02 RX ORDER — FAMOTIDINE 10 MG/ML
20 INJECTION, SOLUTION INTRAVENOUS ONCE
Status: COMPLETED | OUTPATIENT
Start: 2024-08-02 | End: 2024-08-02

## 2024-08-02 RX ADMIN — PACLITAXEL 168 MG: 6 INJECTION, SOLUTION, CONCENTRATE INTRAVENOUS at 14:48

## 2024-08-02 RX ADMIN — DIPHENHYDRAMINE HYDROCHLORIDE 25 MG: 50 INJECTION INTRAMUSCULAR; INTRAVENOUS at 14:07

## 2024-08-02 RX ADMIN — SODIUM CHLORIDE, PRESERVATIVE FREE 10 ML: 5 INJECTION INTRAVENOUS at 15:53

## 2024-08-02 RX ADMIN — SODIUM CHLORIDE 120 ML/HR: 9 INJECTION, SOLUTION INTRAVENOUS at 14:07

## 2024-08-02 RX ADMIN — HEPARIN 500 UNITS: 100 SYRINGE at 15:53

## 2024-08-02 RX ADMIN — DEXAMETHASONE SODIUM PHOSPHATE 10 MG: 10 INJECTION INTRAMUSCULAR; INTRAVENOUS at 14:08

## 2024-08-02 RX ADMIN — FAMOTIDINE 20 MG: 10 INJECTION, SOLUTION INTRAVENOUS at 14:08

## 2024-08-02 RX ADMIN — ONDANSETRON 8 MG: 2 INJECTION INTRAMUSCULAR; INTRAVENOUS at 14:25

## 2024-08-02 NOTE — PROGRESS NOTES
Patient arrives ambulatory for taxol C3D15  Patient denies complaints or concerns  Labs drawn via med port and reviewed, within normal limits for tx  Pt states 50mg Benadryl causes severe leg pain, requests to continue 25mg dose.  Patient tolerated tx without incident and discharged in stable condition  Next appointment 8/9 MD visit followed by C4D1

## 2024-08-02 NOTE — PROGRESS NOTES
Spiritual Health Outpatient Oncology/Hematology Progress Note    Situation: Writer encountered Patient and Friend in the treatment cubicle of the infusion clinic.    Assessment: Patient smiled and greeted writer. Pt reported that she was doing better this week. Pt expressed gratitude for her pool and the opportunity for both outdoor time and exercise. Pt shared about her close relationship with her neighbors, noting how they help each other. Pt shared that her mother and sister live one house away. Pt expressed gratitude for the opportunity to \"share Edwin\" with others. Pt appeared to be coping well.     Intervention: Writer inquired about Pt's coping and needs. Writer explored Pt's sources of support and strength. Writer offered supportive presence and active listening. Writer affirmed Pt's strengths. Writer offered words of support and encouragement. Writer celebrated Pt's good news.    Outcome: Pt and Friend thanked writer for the visit.    Plan: Spiritual Health Services are available for Patient by phone and/or in person.      08/02/24 1526   Encounter Summary   Encounter Overview/Reason Spiritual/Emotional Needs   Service Provided For Patient;Friend   Referral/Consult From [facility]   Support System Family members;Friends/neighbors;Other (Comment)  (Coworkers)   Last Encounter  07/26/24   Complexity of Encounter Low   Begin Time 1400   End Time  1410   Total Time Calculated 10 min   Spiritual/Emotional needs   Type Spiritual Support   Assessment/Intervention/Outcome   Assessment Coping;Calm   Intervention Sustaining Presence/Ministry of presence;Active listening;Explored/Affirmed feelings, thoughts, concerns;Explored Coping Skills/Resources;Discussed illness injury and it’s impact;Discussed belief system/Synagogue practices/varun;Discussed relationship with God   Outcome Expressed Gratitude;Expressed feelings, needs, and concerns;Engaged in conversation;Coping   Plan and Referrals   Plan/Referrals Continue

## 2024-08-08 ENCOUNTER — HOSPITAL ENCOUNTER (OUTPATIENT)
Dept: PHYSICAL THERAPY | Facility: CLINIC | Age: 59
Setting detail: THERAPIES SERIES
Discharge: HOME | End: 2024-08-08
Payer: COMMERCIAL

## 2024-08-08 PROCEDURE — 97140 MANUAL THERAPY 1/> REGIONS: CPT

## 2024-08-08 PROCEDURE — 97110 THERAPEUTIC EXERCISES: CPT

## 2024-08-08 NOTE — FLOWSHEET NOTE
Completed partial PORi breast protocol to L upper quarter w/ good sadia. Mod tightness L pec, bicep and subscapular muscles. Improving AROM and decr pain w/ motion noted. Rev and completed therex per log, rev prev ex for LB and knees 2° incr stiffness. Encouraged activity as sadia through treatment (walking, classes through [employer] etc) as well as rest prn. Discussed energy conservation and breaking up tasks w/ rest and not pushing to exhaustion w/ incr fatigue. Emphasized HEP daily as sadia well as postural awareness. Will cont w/ manual and ex progression as sadia around treatment.     [] No change.     [] Other:  [x] Patient would benefit from physical therapy in order to promote optimal healing and to reduce muscular/fascial restriction, improve body posture to decrease strain to surrounding tissue, restore shoulder/scapular flexibility and strength for improvement in function and quality of life       STG: (to be met in 12 treatments)        1. ? Pain: Stabilize shoulder and chest pain and ensure optimal management of pain during all ADLs ( home, occupation, community and recreation)        2. ? ROM: Optimize AROM of L shoulder to be able to position arm for radiation        3. ? Strength:  Bilat shoulders grossly 5/5, incr  by 2+ lbs and demo good scapular/postural control and stability        4. ? Function: Able to sleep, complete ADL's, lift/carry and reach OH w/o difficulty        5.   Independent with Home Exercise Programs as demonstrated by performance with correct form without cues.        6.   Education on signs and symptoms, precautions regarding lymphedema- MET     LTG: (to be met in 18 treatments)  1. Optimize bilat shoulder functional ROM to improve QOL  2.  Improve tissue mobility of chest wall and axilla to allow for more normal motion and function  3. Continue activity to decrease risk factors associated with increased time being sedentary while undergoing chemotherapy, radiation,

## 2024-08-09 ENCOUNTER — OFFICE VISIT (OUTPATIENT)
Dept: ONCOLOGY | Age: 59
End: 2024-08-09
Payer: COMMERCIAL

## 2024-08-09 ENCOUNTER — TELEPHONE (OUTPATIENT)
Dept: ONCOLOGY | Age: 59
End: 2024-08-09

## 2024-08-09 ENCOUNTER — HOSPITAL ENCOUNTER (OUTPATIENT)
Dept: INFUSION THERAPY | Age: 59
Discharge: HOME | End: 2024-08-09
Payer: COMMERCIAL

## 2024-08-09 VITALS
SYSTOLIC BLOOD PRESSURE: 88 MMHG | WEIGHT: 280.7 LBS | TEMPERATURE: 96.8 F | HEART RATE: 84 BPM | RESPIRATION RATE: 18 BRPM | OXYGEN SATURATION: 92 % | DIASTOLIC BLOOD PRESSURE: 63 MMHG | BODY MASS INDEX: 42.68 KG/M2

## 2024-08-09 DIAGNOSIS — Z17.1 MALIGNANT NEOPLASM OF UPPER-OUTER QUADRANT OF LEFT BREAST IN FEMALE, ESTROGEN RECEPTOR NEGATIVE (HCC): Primary | ICD-10-CM

## 2024-08-09 DIAGNOSIS — C50.912 INVASIVE DUCTAL CARCINOMA OF BREAST, LEFT (HCC): ICD-10-CM

## 2024-08-09 DIAGNOSIS — D69.6 THROMBOCYTOPENIA (HCC): ICD-10-CM

## 2024-08-09 DIAGNOSIS — C50.412 MALIGNANT NEOPLASM OF UPPER-OUTER QUADRANT OF LEFT BREAST IN FEMALE, ESTROGEN RECEPTOR NEGATIVE (HCC): Primary | ICD-10-CM

## 2024-08-09 LAB
ALBUMIN SERPL-MCNC: 3.9 G/DL (ref 3.5–5.2)
ALBUMIN/GLOB SERPL: 1.3 {RATIO} (ref 1–2.5)
ALP SERPL-CCNC: 105 U/L (ref 35–104)
ALT SERPL-CCNC: 19 U/L (ref 5–33)
AMYLASE SERPL-CCNC: 90 U/L (ref 28–100)
ANION GAP SERPL CALCULATED.3IONS-SCNC: 12 MMOL/L (ref 9–17)
AST SERPL-CCNC: 18 U/L
BASOPHILS # BLD: 0.04 K/UL (ref 0–0.2)
BASOPHILS NFR BLD: 1 % (ref 0–2)
BILIRUB SERPL-MCNC: 0.4 MG/DL (ref 0.3–1.2)
BUN SERPL-MCNC: 24 MG/DL (ref 8–23)
CALCIUM SERPL-MCNC: 9.5 MG/DL (ref 8.6–10.4)
CHLORIDE SERPL-SCNC: 99 MMOL/L (ref 98–107)
CO2 SERPL-SCNC: 26 MMOL/L (ref 20–31)
CORTIS SERPL-MCNC: 18.2 UG/DL (ref 2.5–19.5)
CORTISOL COLLECTION INFO: NORMAL
CREAT SERPL-MCNC: 1.3 MG/DL (ref 0.5–0.9)
EOSINOPHIL # BLD: 0 K/UL (ref 0–0.4)
EOSINOPHILS RELATIVE PERCENT: 0 % (ref 1–4)
ERYTHROCYTE [DISTWIDTH] IN BLOOD BY AUTOMATED COUNT: 27.9 % (ref 12.5–15.4)
GFR, ESTIMATED: 47 ML/MIN/1.73M2
GLUCOSE SERPL-MCNC: 165 MG/DL (ref 70–99)
HCT VFR BLD AUTO: 27.8 % (ref 36–46)
HGB BLD-MCNC: 9.3 G/DL (ref 12–16)
LIPASE SERPL-CCNC: 160 U/L (ref 13–60)
LYMPHOCYTES NFR BLD: 1.89 K/UL (ref 1–4.8)
LYMPHOCYTES RELATIVE PERCENT: 46 % (ref 24–44)
MCH RBC QN AUTO: 29.3 PG (ref 26–34)
MCHC RBC AUTO-ENTMCNC: 33.3 G/DL (ref 31–37)
MCV RBC AUTO: 88 FL (ref 80–100)
MONOCYTES NFR BLD: 0.25 K/UL (ref 0.1–1.2)
MONOCYTES NFR BLD: 6 % (ref 2–11)
MORPHOLOGY: ABNORMAL
NEUTROPHILS NFR BLD: 47 % (ref 36–66)
NEUTS SEG NFR BLD: 1.92 K/UL (ref 1.8–7.7)
PLATELET # BLD AUTO: 82 K/UL (ref 140–450)
PMV BLD AUTO: 7.7 FL (ref 6–12)
POTASSIUM SERPL-SCNC: 4 MMOL/L (ref 3.7–5.3)
PROT SERPL-MCNC: 6.8 G/DL (ref 6.4–8.3)
RBC # BLD AUTO: 3.16 M/UL (ref 4–5.2)
SODIUM SERPL-SCNC: 137 MMOL/L (ref 135–144)
TSH SERPL DL<=0.05 MIU/L-ACNC: 1.52 UIU/ML (ref 0.3–5)
WBC OTHER # BLD: 4.1 K/UL (ref 3.5–11)

## 2024-08-09 PROCEDURE — 36591 DRAW BLOOD OFF VENOUS DEVICE: CPT

## 2024-08-09 PROCEDURE — 96361 HYDRATE IV INFUSION ADD-ON: CPT

## 2024-08-09 PROCEDURE — 6360000002 HC RX W HCPCS: Performed by: INTERNAL MEDICINE

## 2024-08-09 PROCEDURE — 80053 COMPREHEN METABOLIC PANEL: CPT

## 2024-08-09 PROCEDURE — 83690 ASSAY OF LIPASE: CPT

## 2024-08-09 PROCEDURE — 82150 ASSAY OF AMYLASE: CPT

## 2024-08-09 PROCEDURE — 3078F DIAST BP <80 MM HG: CPT | Performed by: INTERNAL MEDICINE

## 2024-08-09 PROCEDURE — 99214 OFFICE O/P EST MOD 30 MIN: CPT | Performed by: INTERNAL MEDICINE

## 2024-08-09 PROCEDURE — 99211 OFF/OP EST MAY X REQ PHY/QHP: CPT | Performed by: INTERNAL MEDICINE

## 2024-08-09 PROCEDURE — 3074F SYST BP LT 130 MM HG: CPT | Performed by: INTERNAL MEDICINE

## 2024-08-09 PROCEDURE — 2580000003 HC RX 258: Performed by: INTERNAL MEDICINE

## 2024-08-09 PROCEDURE — 82533 TOTAL CORTISOL: CPT

## 2024-08-09 PROCEDURE — 85025 COMPLETE CBC W/AUTO DIFF WBC: CPT

## 2024-08-09 PROCEDURE — 84443 ASSAY THYROID STIM HORMONE: CPT

## 2024-08-09 PROCEDURE — 96360 HYDRATION IV INFUSION INIT: CPT

## 2024-08-09 RX ORDER — ONDANSETRON 2 MG/ML
8 INJECTION INTRAMUSCULAR; INTRAVENOUS
OUTPATIENT
Start: 2024-10-04

## 2024-08-09 RX ORDER — ACETAMINOPHEN 325 MG/1
650 TABLET ORAL
OUTPATIENT
Start: 2024-11-08

## 2024-08-09 RX ORDER — ONDANSETRON 2 MG/ML
8 INJECTION INTRAMUSCULAR; INTRAVENOUS
OUTPATIENT
Start: 2024-09-27

## 2024-08-09 RX ORDER — ACETAMINOPHEN 325 MG/1
650 TABLET ORAL
Start: 2024-09-27

## 2024-08-09 RX ORDER — SODIUM CHLORIDE 0.9 % (FLUSH) 0.9 %
5-40 SYRINGE (ML) INJECTION PRN
OUTPATIENT
Start: 2024-09-20

## 2024-08-09 RX ORDER — SODIUM CHLORIDE 9 MG/ML
5-250 INJECTION, SOLUTION INTRAVENOUS PRN
OUTPATIENT
Start: 2024-09-20

## 2024-08-09 RX ORDER — FAMOTIDINE 10 MG/ML
20 INJECTION, SOLUTION INTRAVENOUS
OUTPATIENT
Start: 2024-10-04

## 2024-08-09 RX ORDER — DIPHENHYDRAMINE HYDROCHLORIDE 50 MG/ML
50 INJECTION INTRAMUSCULAR; INTRAVENOUS
OUTPATIENT
Start: 2024-09-20

## 2024-08-09 RX ORDER — HEPARIN SODIUM (PORCINE) LOCK FLUSH IV SOLN 100 UNIT/ML 100 UNIT/ML
500 SOLUTION INTRAVENOUS PRN
OUTPATIENT
Start: 2024-11-08

## 2024-08-09 RX ORDER — 0.9 % SODIUM CHLORIDE 0.9 %
1000 INTRAVENOUS SOLUTION INTRAVENOUS ONCE
Status: COMPLETED | OUTPATIENT
Start: 2024-08-09 | End: 2024-08-09

## 2024-08-09 RX ORDER — DOXORUBICIN HYDROCHLORIDE 2 MG/ML
60 INJECTION, SOLUTION INTRAVENOUS ONCE
OUTPATIENT
Start: 2024-10-04 | End: 2024-09-27

## 2024-08-09 RX ORDER — FAMOTIDINE 10 MG/ML
20 INJECTION, SOLUTION INTRAVENOUS
OUTPATIENT
Start: 2024-09-27

## 2024-08-09 RX ORDER — EPINEPHRINE 1 MG/ML
0.3 INJECTION, SOLUTION, CONCENTRATE INTRAVENOUS PRN
Status: CANCELLED | OUTPATIENT
Start: 2024-08-09

## 2024-08-09 RX ORDER — FAMOTIDINE 10 MG/ML
20 INJECTION, SOLUTION INTRAVENOUS
OUTPATIENT
Start: 2024-09-20

## 2024-08-09 RX ORDER — SODIUM CHLORIDE 9 MG/ML
5-250 INJECTION, SOLUTION INTRAVENOUS PRN
OUTPATIENT
Start: 2024-10-18

## 2024-08-09 RX ORDER — FAMOTIDINE 10 MG/ML
20 INJECTION, SOLUTION INTRAVENOUS
OUTPATIENT
Start: 2024-11-08

## 2024-08-09 RX ORDER — FAMOTIDINE 10 MG/ML
20 INJECTION, SOLUTION INTRAVENOUS
OUTPATIENT
Start: 2024-09-06

## 2024-08-09 RX ORDER — SODIUM CHLORIDE 9 MG/ML
5-250 INJECTION, SOLUTION INTRAVENOUS PRN
OUTPATIENT
Start: 2024-08-09

## 2024-08-09 RX ORDER — DIPHENHYDRAMINE HYDROCHLORIDE 50 MG/ML
50 INJECTION INTRAMUSCULAR; INTRAVENOUS
OUTPATIENT
Start: 2024-09-27

## 2024-08-09 RX ORDER — HEPARIN 100 UNIT/ML
500 SYRINGE INTRAVENOUS PRN
Status: CANCELLED | OUTPATIENT
Start: 2024-08-09

## 2024-08-09 RX ORDER — ONDANSETRON 2 MG/ML
8 INJECTION INTRAMUSCULAR; INTRAVENOUS
Status: CANCELLED | OUTPATIENT
Start: 2024-08-09

## 2024-08-09 RX ORDER — ALBUTEROL SULFATE 90 UG/1
4 AEROSOL, METERED RESPIRATORY (INHALATION) PRN
OUTPATIENT
Start: 2024-10-18

## 2024-08-09 RX ORDER — DIPHENHYDRAMINE HYDROCHLORIDE 50 MG/ML
50 INJECTION INTRAMUSCULAR; INTRAVENOUS
OUTPATIENT
Start: 2024-11-08

## 2024-08-09 RX ORDER — SODIUM CHLORIDE 9 MG/ML
INJECTION, SOLUTION INTRAVENOUS CONTINUOUS
OUTPATIENT
Start: 2024-10-18

## 2024-08-09 RX ORDER — ALBUTEROL SULFATE 90 UG/1
4 AEROSOL, METERED RESPIRATORY (INHALATION) PRN
OUTPATIENT
Start: 2024-10-04

## 2024-08-09 RX ORDER — HEPARIN SODIUM (PORCINE) LOCK FLUSH IV SOLN 100 UNIT/ML 100 UNIT/ML
500 SOLUTION INTRAVENOUS PRN
OUTPATIENT
Start: 2024-09-27

## 2024-08-09 RX ORDER — DIPHENHYDRAMINE HYDROCHLORIDE 50 MG/ML
50 INJECTION INTRAMUSCULAR; INTRAVENOUS
OUTPATIENT
Start: 2024-10-18

## 2024-08-09 RX ORDER — SODIUM CHLORIDE 9 MG/ML
INJECTION, SOLUTION INTRAVENOUS CONTINUOUS
Status: CANCELLED | OUTPATIENT
Start: 2024-08-09

## 2024-08-09 RX ORDER — SODIUM CHLORIDE 9 MG/ML
INJECTION, SOLUTION INTRAVENOUS CONTINUOUS
OUTPATIENT
Start: 2024-08-09

## 2024-08-09 RX ORDER — SODIUM CHLORIDE 9 MG/ML
5-250 INJECTION, SOLUTION INTRAVENOUS PRN
OUTPATIENT
Start: 2024-09-06

## 2024-08-09 RX ORDER — ACETAMINOPHEN 325 MG/1
650 TABLET ORAL
Start: 2024-11-08

## 2024-08-09 RX ORDER — ACETAMINOPHEN 325 MG/1
650 TABLET ORAL
OUTPATIENT
Start: 2024-10-18

## 2024-08-09 RX ORDER — PALONOSETRON 0.05 MG/ML
0.25 INJECTION, SOLUTION INTRAVENOUS ONCE
OUTPATIENT
Start: 2024-10-04 | End: 2024-09-27

## 2024-08-09 RX ORDER — EPINEPHRINE 1 MG/ML
0.3 INJECTION, SOLUTION, CONCENTRATE INTRAVENOUS PRN
OUTPATIENT
Start: 2024-11-08

## 2024-08-09 RX ORDER — FAMOTIDINE 10 MG/ML
20 INJECTION, SOLUTION INTRAVENOUS
OUTPATIENT
Start: 2024-10-18

## 2024-08-09 RX ORDER — ALBUTEROL SULFATE 90 UG/1
4 AEROSOL, METERED RESPIRATORY (INHALATION) PRN
OUTPATIENT
Start: 2024-09-27

## 2024-08-09 RX ORDER — ALBUTEROL SULFATE 90 UG/1
4 AEROSOL, METERED RESPIRATORY (INHALATION) PRN
Status: CANCELLED | OUTPATIENT
Start: 2024-08-09

## 2024-08-09 RX ORDER — HEPARIN 100 UNIT/ML
500 SYRINGE INTRAVENOUS PRN
OUTPATIENT
Start: 2024-08-09

## 2024-08-09 RX ORDER — EPINEPHRINE 1 MG/ML
0.3 INJECTION, SOLUTION, CONCENTRATE INTRAVENOUS PRN
OUTPATIENT
Start: 2024-09-20

## 2024-08-09 RX ORDER — ACETAMINOPHEN 325 MG/1
650 TABLET ORAL
Status: CANCELLED | OUTPATIENT
Start: 2024-08-09

## 2024-08-09 RX ORDER — SODIUM CHLORIDE 0.9 % (FLUSH) 0.9 %
5-40 SYRINGE (ML) INJECTION PRN
Status: CANCELLED | OUTPATIENT
Start: 2024-08-09

## 2024-08-09 RX ORDER — SODIUM CHLORIDE 9 MG/ML
5-250 INJECTION, SOLUTION INTRAVENOUS PRN
OUTPATIENT
Start: 2024-11-08

## 2024-08-09 RX ORDER — ALBUTEROL SULFATE 90 UG/1
4 AEROSOL, METERED RESPIRATORY (INHALATION) PRN
OUTPATIENT
Start: 2024-11-08

## 2024-08-09 RX ORDER — SODIUM CHLORIDE 9 MG/ML
INJECTION, SOLUTION INTRAVENOUS CONTINUOUS
OUTPATIENT
Start: 2024-09-20

## 2024-08-09 RX ORDER — ONDANSETRON 2 MG/ML
8 INJECTION INTRAMUSCULAR; INTRAVENOUS
OUTPATIENT
Start: 2024-09-20

## 2024-08-09 RX ORDER — ACETAMINOPHEN 325 MG/1
650 TABLET ORAL
Start: 2024-10-18

## 2024-08-09 RX ORDER — SODIUM CHLORIDE 9 MG/ML
INJECTION, SOLUTION INTRAVENOUS CONTINUOUS
OUTPATIENT
Start: 2024-11-08

## 2024-08-09 RX ORDER — PROCHLORPERAZINE EDISYLATE 5 MG/ML
5 INJECTION INTRAMUSCULAR; INTRAVENOUS
OUTPATIENT
Start: 2024-09-20

## 2024-08-09 RX ORDER — SODIUM CHLORIDE 0.9 % (FLUSH) 0.9 %
5-40 SYRINGE (ML) INJECTION PRN
OUTPATIENT
Start: 2024-11-08

## 2024-08-09 RX ORDER — ALBUTEROL SULFATE 90 UG/1
4 AEROSOL, METERED RESPIRATORY (INHALATION) PRN
OUTPATIENT
Start: 2024-08-09

## 2024-08-09 RX ORDER — SODIUM CHLORIDE 0.9 % (FLUSH) 0.9 %
5-40 SYRINGE (ML) INJECTION PRN
Status: DISCONTINUED | OUTPATIENT
Start: 2024-08-09 | End: 2024-08-10 | Stop reason: HOSPADM

## 2024-08-09 RX ORDER — SODIUM CHLORIDE 9 MG/ML
INJECTION, SOLUTION INTRAVENOUS CONTINUOUS
OUTPATIENT
Start: 2024-09-06

## 2024-08-09 RX ORDER — SODIUM CHLORIDE 9 MG/ML
5-250 INJECTION, SOLUTION INTRAVENOUS PRN
OUTPATIENT
Start: 2024-09-27

## 2024-08-09 RX ORDER — HEPARIN SODIUM (PORCINE) LOCK FLUSH IV SOLN 100 UNIT/ML 100 UNIT/ML
500 SOLUTION INTRAVENOUS PRN
OUTPATIENT
Start: 2024-09-06

## 2024-08-09 RX ORDER — ONDANSETRON 2 MG/ML
8 INJECTION INTRAMUSCULAR; INTRAVENOUS
OUTPATIENT
Start: 2024-11-08

## 2024-08-09 RX ORDER — FAMOTIDINE 10 MG/ML
20 INJECTION, SOLUTION INTRAVENOUS
OUTPATIENT
Start: 2024-08-09

## 2024-08-09 RX ORDER — ONDANSETRON 2 MG/ML
8 INJECTION INTRAMUSCULAR; INTRAVENOUS
OUTPATIENT
Start: 2024-10-18

## 2024-08-09 RX ORDER — EPINEPHRINE 1 MG/ML
0.3 INJECTION, SOLUTION, CONCENTRATE INTRAVENOUS PRN
OUTPATIENT
Start: 2024-10-04

## 2024-08-09 RX ORDER — ACETAMINOPHEN 325 MG/1
650 TABLET ORAL
OUTPATIENT
Start: 2024-10-04

## 2024-08-09 RX ORDER — DIPHENHYDRAMINE HYDROCHLORIDE 50 MG/ML
50 INJECTION INTRAMUSCULAR; INTRAVENOUS
OUTPATIENT
Start: 2024-09-06

## 2024-08-09 RX ORDER — PALONOSETRON 0.05 MG/ML
0.25 INJECTION, SOLUTION INTRAVENOUS ONCE
OUTPATIENT
Start: 2024-09-06 | End: 2024-08-30

## 2024-08-09 RX ORDER — HEPARIN SODIUM (PORCINE) LOCK FLUSH IV SOLN 100 UNIT/ML 100 UNIT/ML
500 SOLUTION INTRAVENOUS PRN
OUTPATIENT
Start: 2024-09-20

## 2024-08-09 RX ORDER — SODIUM CHLORIDE 9 MG/ML
INJECTION, SOLUTION INTRAVENOUS CONTINUOUS
OUTPATIENT
Start: 2024-10-04

## 2024-08-09 RX ORDER — SODIUM CHLORIDE 9 MG/ML
INJECTION, SOLUTION INTRAVENOUS CONTINUOUS
OUTPATIENT
Start: 2024-09-27

## 2024-08-09 RX ORDER — MEPERIDINE HYDROCHLORIDE 50 MG/ML
12.5 INJECTION INTRAMUSCULAR; INTRAVENOUS; SUBCUTANEOUS PRN
OUTPATIENT
Start: 2024-11-08

## 2024-08-09 RX ORDER — EPINEPHRINE 1 MG/ML
0.3 INJECTION, SOLUTION, CONCENTRATE INTRAVENOUS PRN
OUTPATIENT
Start: 2024-09-06

## 2024-08-09 RX ORDER — FAMOTIDINE 10 MG/ML
20 INJECTION, SOLUTION INTRAVENOUS
Status: CANCELLED | OUTPATIENT
Start: 2024-08-09

## 2024-08-09 RX ORDER — PROCHLORPERAZINE EDISYLATE 5 MG/ML
5 INJECTION INTRAMUSCULAR; INTRAVENOUS
OUTPATIENT
Start: 2024-10-18

## 2024-08-09 RX ORDER — MEPERIDINE HYDROCHLORIDE 50 MG/ML
12.5 INJECTION INTRAMUSCULAR; INTRAVENOUS; SUBCUTANEOUS PRN
OUTPATIENT
Start: 2024-09-27

## 2024-08-09 RX ORDER — SODIUM CHLORIDE 9 MG/ML
5-250 INJECTION, SOLUTION INTRAVENOUS PRN
OUTPATIENT
Start: 2024-10-04

## 2024-08-09 RX ORDER — DOXORUBICIN HYDROCHLORIDE 2 MG/ML
60 INJECTION, SOLUTION INTRAVENOUS ONCE
OUTPATIENT
Start: 2024-10-18 | End: 2024-10-11

## 2024-08-09 RX ORDER — PROCHLORPERAZINE EDISYLATE 5 MG/ML
5 INJECTION INTRAMUSCULAR; INTRAVENOUS
OUTPATIENT
Start: 2024-09-06

## 2024-08-09 RX ORDER — DIPHENHYDRAMINE HYDROCHLORIDE 50 MG/ML
50 INJECTION INTRAMUSCULAR; INTRAVENOUS
OUTPATIENT
Start: 2024-08-09

## 2024-08-09 RX ORDER — ACETAMINOPHEN 325 MG/1
650 TABLET ORAL
OUTPATIENT
Start: 2024-09-20

## 2024-08-09 RX ORDER — EPINEPHRINE 1 MG/ML
0.3 INJECTION, SOLUTION, CONCENTRATE INTRAVENOUS PRN
OUTPATIENT
Start: 2024-10-18

## 2024-08-09 RX ORDER — ALBUTEROL SULFATE 90 UG/1
4 AEROSOL, METERED RESPIRATORY (INHALATION) PRN
OUTPATIENT
Start: 2024-09-20

## 2024-08-09 RX ORDER — DOXORUBICIN HYDROCHLORIDE 2 MG/ML
60 INJECTION, SOLUTION INTRAVENOUS ONCE
OUTPATIENT
Start: 2024-09-20 | End: 2024-09-13

## 2024-08-09 RX ORDER — SODIUM CHLORIDE 0.9 % (FLUSH) 0.9 %
5-40 SYRINGE (ML) INJECTION PRN
OUTPATIENT
Start: 2024-09-27

## 2024-08-09 RX ORDER — MEPERIDINE HYDROCHLORIDE 50 MG/ML
12.5 INJECTION INTRAMUSCULAR; INTRAVENOUS; SUBCUTANEOUS PRN
OUTPATIENT
Start: 2024-10-18

## 2024-08-09 RX ORDER — SODIUM CHLORIDE 0.9 % (FLUSH) 0.9 %
5-40 SYRINGE (ML) INJECTION PRN
OUTPATIENT
Start: 2024-10-18

## 2024-08-09 RX ORDER — PROCHLORPERAZINE EDISYLATE 5 MG/ML
5 INJECTION INTRAMUSCULAR; INTRAVENOUS
OUTPATIENT
Start: 2024-10-04

## 2024-08-09 RX ORDER — ONDANSETRON 2 MG/ML
8 INJECTION INTRAMUSCULAR; INTRAVENOUS
OUTPATIENT
Start: 2024-08-09

## 2024-08-09 RX ORDER — DOXORUBICIN HYDROCHLORIDE 2 MG/ML
60 INJECTION, SOLUTION INTRAVENOUS ONCE
OUTPATIENT
Start: 2024-09-06 | End: 2024-08-30

## 2024-08-09 RX ORDER — MEPERIDINE HYDROCHLORIDE 50 MG/ML
12.5 INJECTION INTRAMUSCULAR; INTRAVENOUS; SUBCUTANEOUS PRN
OUTPATIENT
Start: 2024-09-06

## 2024-08-09 RX ORDER — HEPARIN 100 UNIT/ML
500 SYRINGE INTRAVENOUS PRN
Status: DISCONTINUED | OUTPATIENT
Start: 2024-08-09 | End: 2024-08-10 | Stop reason: HOSPADM

## 2024-08-09 RX ORDER — ACETAMINOPHEN 325 MG/1
650 TABLET ORAL
OUTPATIENT
Start: 2024-09-06

## 2024-08-09 RX ORDER — PALONOSETRON 0.05 MG/ML
0.25 INJECTION, SOLUTION INTRAVENOUS ONCE
OUTPATIENT
Start: 2024-10-18 | End: 2024-10-11

## 2024-08-09 RX ORDER — MEPERIDINE HYDROCHLORIDE 50 MG/ML
12.5 INJECTION INTRAMUSCULAR; INTRAVENOUS; SUBCUTANEOUS PRN
OUTPATIENT
Start: 2024-10-04

## 2024-08-09 RX ORDER — SODIUM CHLORIDE 0.9 % (FLUSH) 0.9 %
5-40 SYRINGE (ML) INJECTION PRN
OUTPATIENT
Start: 2024-08-09

## 2024-08-09 RX ORDER — SODIUM CHLORIDE 0.9 % (FLUSH) 0.9 %
5-40 SYRINGE (ML) INJECTION PRN
OUTPATIENT
Start: 2024-10-04

## 2024-08-09 RX ORDER — ONDANSETRON 2 MG/ML
8 INJECTION INTRAMUSCULAR; INTRAVENOUS
OUTPATIENT
Start: 2024-09-06

## 2024-08-09 RX ORDER — DIPHENHYDRAMINE HYDROCHLORIDE 50 MG/ML
50 INJECTION INTRAMUSCULAR; INTRAVENOUS
Status: CANCELLED | OUTPATIENT
Start: 2024-08-09

## 2024-08-09 RX ORDER — EPINEPHRINE 1 MG/ML
0.3 INJECTION, SOLUTION, CONCENTRATE INTRAVENOUS PRN
OUTPATIENT
Start: 2024-08-09

## 2024-08-09 RX ORDER — ALBUTEROL SULFATE 90 UG/1
4 AEROSOL, METERED RESPIRATORY (INHALATION) PRN
OUTPATIENT
Start: 2024-09-06

## 2024-08-09 RX ORDER — ACETAMINOPHEN 325 MG/1
650 TABLET ORAL
OUTPATIENT
Start: 2024-08-09

## 2024-08-09 RX ORDER — EPINEPHRINE 1 MG/ML
0.3 INJECTION, SOLUTION, CONCENTRATE INTRAVENOUS PRN
OUTPATIENT
Start: 2024-09-27

## 2024-08-09 RX ORDER — HEPARIN SODIUM (PORCINE) LOCK FLUSH IV SOLN 100 UNIT/ML 100 UNIT/ML
500 SOLUTION INTRAVENOUS PRN
OUTPATIENT
Start: 2024-10-18

## 2024-08-09 RX ORDER — ACETAMINOPHEN 325 MG/1
650 TABLET ORAL
OUTPATIENT
Start: 2024-09-27

## 2024-08-09 RX ORDER — SODIUM CHLORIDE 0.9 % (FLUSH) 0.9 %
5-40 SYRINGE (ML) INJECTION PRN
OUTPATIENT
Start: 2024-09-06

## 2024-08-09 RX ORDER — SODIUM CHLORIDE 9 MG/ML
5-250 INJECTION, SOLUTION INTRAVENOUS PRN
Status: CANCELLED | OUTPATIENT
Start: 2024-08-09

## 2024-08-09 RX ORDER — HEPARIN SODIUM (PORCINE) LOCK FLUSH IV SOLN 100 UNIT/ML 100 UNIT/ML
500 SOLUTION INTRAVENOUS PRN
OUTPATIENT
Start: 2024-10-04

## 2024-08-09 RX ORDER — 0.9 % SODIUM CHLORIDE 0.9 %
1000 INTRAVENOUS SOLUTION INTRAVENOUS ONCE
Status: CANCELLED | OUTPATIENT
Start: 2024-08-09 | End: 2024-08-09

## 2024-08-09 RX ORDER — PALONOSETRON 0.05 MG/ML
0.25 INJECTION, SOLUTION INTRAVENOUS ONCE
OUTPATIENT
Start: 2024-09-20 | End: 2024-09-13

## 2024-08-09 RX ORDER — DIPHENHYDRAMINE HYDROCHLORIDE 50 MG/ML
50 INJECTION INTRAMUSCULAR; INTRAVENOUS
OUTPATIENT
Start: 2024-10-04

## 2024-08-09 RX ORDER — MEPERIDINE HYDROCHLORIDE 50 MG/ML
12.5 INJECTION INTRAMUSCULAR; INTRAVENOUS; SUBCUTANEOUS PRN
OUTPATIENT
Start: 2024-09-20

## 2024-08-09 RX ADMIN — SODIUM CHLORIDE, PRESERVATIVE FREE 10 ML: 5 INJECTION INTRAVENOUS at 11:39

## 2024-08-09 RX ADMIN — SODIUM CHLORIDE 1000 ML: 9 INJECTION, SOLUTION INTRAVENOUS at 09:52

## 2024-08-09 RX ADMIN — HEPARIN 500 UNITS: 100 SYRINGE at 11:39

## 2024-08-09 RX ADMIN — SODIUM CHLORIDE, PRESERVATIVE FREE 10 ML: 5 INJECTION INTRAVENOUS at 08:40

## 2024-08-09 NOTE — PROGRESS NOTES
(N/A, 06/30/2022); Percutaneous nephrostomy (Right, 11/01/2022); Cystoscopy (Right, 11/01/2022); Kidney stone removal (Right, 11/01/2022); IR GUIDED NEPHROSTOMY CATH PLACEMENT RIGHT (11/01/2022); Total knee arthroplasty (Right, 05/22/2023); IR GUIDED NEPHROSTOMY CATH PLACEMENT RIGHT (08/10/2023); IR GUIDED NEPHROSTOMY CATH PLACEMENT RIGHT (08/21/2023); Kidney stone surgery (Right, 08/21/2023); Kidney stone removal (Right, 08/21/2023); Knee Arthroplasty (Right, 05/22/2023); Cystoscopy (Right, 11/28/2023); Cystoscopy w/ ureteral stent removal (12/06/2023); Bladder surgery (Right, 12/06/2023); Tonsillectomy; US BREAST BIOPSY W LOC DEVICE 1ST LESION LEFT (Left, 4/26/2024); US BIOPSY LYMPH NODE (4/26/2024); US BREAST BIOPSY W LOC DEVICE EACH ADDL LESION LEFT (Left, 4/26/2024); and IR PORT PLACEMENT > 5 YEARS (5/17/2024).     CURRENT MEDICATIONS:  has a current medication list which includes the following prescription(s): ibuprofen, hydrochlorothiazide, ondansetron, ondansetron, prochlorperazine, lidocaine-prilocaine, lidocaine-prilocaine, carvedilol, trazodone, furosemide, famotidine, amlodipine, albuterol sulfate hfa, irbesartan, nystatin, meloxicam, pantoprazole, melatonin, benzoyl peroxide, clindamycin, and [DISCONTINUED] solifenacin, and the following Facility-Administered Medications: sodium chloride flush and heparin (pf).    ALLERGIES:  is allergic to mixed ragweed and cat hair extract.    FAMILY HISTORY: Negative for any hematological or oncological conditions.     SOCIAL HISTORY:  reports that she has never smoked. She has never used smokeless tobacco. She reports current alcohol use. She reports that she does not use drugs.    REVIEW OF SYSTEMS:     General: No weakness or fatigue. No unanticipated weight loss or decreased appetite. No fever or chills.   Eyes: No blurred vision, eye pain or double vision.   Ears: No hearing problems or drainage. No tinnitus.   Throat: No sore throat, problems with swallowing or

## 2024-08-09 NOTE — TELEPHONE ENCOUNTER
EMMA HERE FOR FOLLOW UP & TX   Hold chemo today  IVF 1 liter 0.9 NS today  Chemo next week  RV 4 weeks   UPSTAIRS NOTIFIED ON TX BEING HELD TODAY & IVF ADDED   MD VISIT: 9/6/24 @ 8:15AM TX @ 9AM   AVS PRINTED AND GIVEN ON EXIT

## 2024-08-09 NOTE — PROGRESS NOTES
Pt. Arrived C4D1  Labs reviewed et seen  by  et tx held  Hydration given  No complaints et disch stable  Return 8/16/24 C4D1

## 2024-08-16 ENCOUNTER — TELEPHONE (OUTPATIENT)
Dept: INFUSION THERAPY | Age: 59
End: 2024-08-16

## 2024-08-16 ENCOUNTER — HOSPITAL ENCOUNTER (OUTPATIENT)
Dept: INFUSION THERAPY | Age: 59
Discharge: HOME | End: 2024-08-16
Payer: COMMERCIAL

## 2024-08-16 VITALS
RESPIRATION RATE: 22 BRPM | BODY MASS INDEX: 43.27 KG/M2 | HEART RATE: 72 BPM | WEIGHT: 284.6 LBS | DIASTOLIC BLOOD PRESSURE: 64 MMHG | TEMPERATURE: 98.2 F | SYSTOLIC BLOOD PRESSURE: 102 MMHG

## 2024-08-16 DIAGNOSIS — Z17.1 MALIGNANT NEOPLASM OF UPPER-OUTER QUADRANT OF LEFT BREAST IN FEMALE, ESTROGEN RECEPTOR NEGATIVE (HCC): ICD-10-CM

## 2024-08-16 DIAGNOSIS — C50.912 INVASIVE DUCTAL CARCINOMA OF BREAST, LEFT (HCC): Primary | ICD-10-CM

## 2024-08-16 DIAGNOSIS — C50.412 MALIGNANT NEOPLASM OF UPPER-OUTER QUADRANT OF LEFT BREAST IN FEMALE, ESTROGEN RECEPTOR NEGATIVE (HCC): ICD-10-CM

## 2024-08-16 LAB
ALBUMIN SERPL-MCNC: 3.9 G/DL (ref 3.5–5.2)
ALBUMIN/GLOB SERPL: 1.3 {RATIO} (ref 1–2.5)
ALP SERPL-CCNC: 103 U/L (ref 35–104)
ALT SERPL-CCNC: 20 U/L (ref 5–33)
AMYLASE SERPL-CCNC: 54 U/L (ref 28–100)
ANION GAP SERPL CALCULATED.3IONS-SCNC: 11 MMOL/L (ref 9–17)
AST SERPL-CCNC: 22 U/L
BASOPHILS # BLD: 0.04 K/UL (ref 0–0.2)
BASOPHILS NFR BLD: 1 % (ref 0–2)
BILIRUB SERPL-MCNC: 0.4 MG/DL (ref 0.3–1.2)
BUN SERPL-MCNC: 18 MG/DL (ref 8–23)
CALCIUM SERPL-MCNC: 9.1 MG/DL (ref 8.6–10.4)
CHLORIDE SERPL-SCNC: 100 MMOL/L (ref 98–107)
CO2 SERPL-SCNC: 26 MMOL/L (ref 20–31)
CORTIS SERPL-MCNC: 7.8 UG/DL (ref 2.5–19.5)
CORTISOL COLLECTION INFO: NORMAL
CREAT SERPL-MCNC: 1.2 MG/DL (ref 0.5–0.9)
EOSINOPHIL # BLD: 0.08 K/UL (ref 0–0.4)
EOSINOPHILS RELATIVE PERCENT: 2 % (ref 1–4)
ERYTHROCYTE [DISTWIDTH] IN BLOOD BY AUTOMATED COUNT: 28.5 % (ref 12.5–15.4)
GFR, ESTIMATED: 52 ML/MIN/1.73M2
GLUCOSE SERPL-MCNC: 152 MG/DL (ref 70–99)
HCT VFR BLD AUTO: 27.1 % (ref 36–46)
HGB BLD-MCNC: 9 G/DL (ref 12–16)
LIPASE SERPL-CCNC: 56 U/L (ref 13–60)
LYMPHOCYTES NFR BLD: 1.35 K/UL (ref 1–4.8)
LYMPHOCYTES RELATIVE PERCENT: 33 % (ref 24–44)
MCH RBC QN AUTO: 30.1 PG (ref 26–34)
MCHC RBC AUTO-ENTMCNC: 33.4 G/DL (ref 31–37)
MCV RBC AUTO: 90.3 FL (ref 80–100)
MONOCYTES NFR BLD: 0.21 K/UL (ref 0.1–0.8)
MONOCYTES NFR BLD: 5 % (ref 1–7)
MORPHOLOGY: ABNORMAL
MORPHOLOGY: ABNORMAL
NEUTROPHILS NFR BLD: 59 % (ref 36–66)
NEUTS SEG NFR BLD: 2.42 K/UL (ref 1.8–7.7)
PLATELET # BLD AUTO: 151 K/UL (ref 140–450)
PMV BLD AUTO: 7.3 FL (ref 6–12)
POTASSIUM SERPL-SCNC: 4.2 MMOL/L (ref 3.7–5.3)
PROT SERPL-MCNC: 6.8 G/DL (ref 6.4–8.3)
RBC # BLD AUTO: 3 M/UL (ref 4–5.2)
SODIUM SERPL-SCNC: 137 MMOL/L (ref 135–144)
TSH SERPL DL<=0.05 MIU/L-ACNC: 1.59 UIU/ML (ref 0.3–5)
WBC OTHER # BLD: 4.1 K/UL (ref 3.5–11)

## 2024-08-16 PROCEDURE — 80053 COMPREHEN METABOLIC PANEL: CPT

## 2024-08-16 PROCEDURE — 82533 TOTAL CORTISOL: CPT

## 2024-08-16 PROCEDURE — 96367 TX/PROPH/DG ADDL SEQ IV INF: CPT

## 2024-08-16 PROCEDURE — 2580000003 HC RX 258: Performed by: INTERNAL MEDICINE

## 2024-08-16 PROCEDURE — 96375 TX/PRO/DX INJ NEW DRUG ADDON: CPT

## 2024-08-16 PROCEDURE — 84443 ASSAY THYROID STIM HORMONE: CPT

## 2024-08-16 PROCEDURE — 85025 COMPLETE CBC W/AUTO DIFF WBC: CPT

## 2024-08-16 PROCEDURE — 82150 ASSAY OF AMYLASE: CPT

## 2024-08-16 PROCEDURE — 36591 DRAW BLOOD OFF VENOUS DEVICE: CPT

## 2024-08-16 PROCEDURE — 96413 CHEMO IV INFUSION 1 HR: CPT

## 2024-08-16 PROCEDURE — 2500000003 HC RX 250 WO HCPCS: Performed by: INTERNAL MEDICINE

## 2024-08-16 PROCEDURE — 6360000002 HC RX W HCPCS: Performed by: INTERNAL MEDICINE

## 2024-08-16 PROCEDURE — 83690 ASSAY OF LIPASE: CPT

## 2024-08-16 PROCEDURE — 96417 CHEMO IV INFUS EACH ADDL SEQ: CPT

## 2024-08-16 RX ORDER — ACETAMINOPHEN 325 MG/1
650 TABLET ORAL
OUTPATIENT
Start: 2024-08-16

## 2024-08-16 RX ORDER — SODIUM CHLORIDE 9 MG/ML
25 INJECTION, SOLUTION INTRAVENOUS PRN
OUTPATIENT
Start: 2024-08-16

## 2024-08-16 RX ORDER — EPINEPHRINE 1 MG/ML
0.3 INJECTION, SOLUTION, CONCENTRATE INTRAVENOUS PRN
OUTPATIENT
Start: 2024-08-16

## 2024-08-16 RX ORDER — DEXAMETHASONE SODIUM PHOSPHATE 10 MG/ML
10 INJECTION INTRAMUSCULAR; INTRAVENOUS ONCE
Status: COMPLETED | OUTPATIENT
Start: 2024-08-16 | End: 2024-08-16

## 2024-08-16 RX ORDER — DIPHENHYDRAMINE HYDROCHLORIDE 50 MG/ML
50 INJECTION INTRAMUSCULAR; INTRAVENOUS ONCE
Status: COMPLETED | OUTPATIENT
Start: 2024-08-16 | End: 2024-08-16

## 2024-08-16 RX ORDER — HEPARIN 100 UNIT/ML
500 SYRINGE INTRAVENOUS PRN
Status: DISCONTINUED | OUTPATIENT
Start: 2024-08-16 | End: 2024-08-17 | Stop reason: HOSPADM

## 2024-08-16 RX ORDER — ONDANSETRON 2 MG/ML
8 INJECTION INTRAMUSCULAR; INTRAVENOUS
OUTPATIENT
Start: 2024-08-16

## 2024-08-16 RX ORDER — SODIUM CHLORIDE 0.9 % (FLUSH) 0.9 %
5-40 SYRINGE (ML) INJECTION PRN
OUTPATIENT
Start: 2024-08-16

## 2024-08-16 RX ORDER — SODIUM CHLORIDE 0.9 % (FLUSH) 0.9 %
5-40 SYRINGE (ML) INJECTION PRN
Status: DISCONTINUED | OUTPATIENT
Start: 2024-08-16 | End: 2024-08-17 | Stop reason: HOSPADM

## 2024-08-16 RX ORDER — DIPHENHYDRAMINE HYDROCHLORIDE 50 MG/ML
50 INJECTION INTRAMUSCULAR; INTRAVENOUS
OUTPATIENT
Start: 2024-08-16

## 2024-08-16 RX ORDER — SODIUM CHLORIDE 9 MG/ML
5-250 INJECTION, SOLUTION INTRAVENOUS PRN
Status: DISCONTINUED | OUTPATIENT
Start: 2024-08-16 | End: 2024-08-17 | Stop reason: HOSPADM

## 2024-08-16 RX ORDER — FAMOTIDINE 10 MG/ML
20 INJECTION, SOLUTION INTRAVENOUS ONCE
Status: COMPLETED | OUTPATIENT
Start: 2024-08-16 | End: 2024-08-16

## 2024-08-16 RX ORDER — ALBUTEROL SULFATE 90 UG/1
4 AEROSOL, METERED RESPIRATORY (INHALATION) PRN
OUTPATIENT
Start: 2024-08-16

## 2024-08-16 RX ORDER — HEPARIN 100 UNIT/ML
500 SYRINGE INTRAVENOUS PRN
OUTPATIENT
Start: 2024-08-16

## 2024-08-16 RX ORDER — FAMOTIDINE 10 MG/ML
20 INJECTION, SOLUTION INTRAVENOUS
OUTPATIENT
Start: 2024-08-16

## 2024-08-16 RX ORDER — SODIUM CHLORIDE 9 MG/ML
INJECTION, SOLUTION INTRAVENOUS CONTINUOUS
OUTPATIENT
Start: 2024-08-16

## 2024-08-16 RX ORDER — PALONOSETRON 0.05 MG/ML
0.25 INJECTION, SOLUTION INTRAVENOUS ONCE
Status: COMPLETED | OUTPATIENT
Start: 2024-08-16 | End: 2024-08-16

## 2024-08-16 RX ADMIN — SODIUM CHLORIDE, PRESERVATIVE FREE 10 ML: 5 INJECTION INTRAVENOUS at 11:12

## 2024-08-16 RX ADMIN — DEXAMETHASONE SODIUM PHOSPHATE 10 MG: 10 INJECTION INTRAMUSCULAR; INTRAVENOUS at 12:02

## 2024-08-16 RX ADMIN — SODIUM CHLORIDE 200 ML/HR: 9 INJECTION, SOLUTION INTRAVENOUS at 11:56

## 2024-08-16 RX ADMIN — PACLITAXEL 168 MG: 6 INJECTION, SOLUTION, CONCENTRATE INTRAVENOUS at 13:12

## 2024-08-16 RX ADMIN — HEPARIN 500 UNITS: 100 SYRINGE at 15:08

## 2024-08-16 RX ADMIN — FAMOTIDINE 20 MG: 10 INJECTION, SOLUTION INTRAVENOUS at 11:58

## 2024-08-16 RX ADMIN — PALONOSETRON 0.25 MG: 0.05 INJECTION, SOLUTION INTRAVENOUS at 11:56

## 2024-08-16 RX ADMIN — SODIUM CHLORIDE 150 MG: 9 INJECTION, SOLUTION INTRAVENOUS at 12:12

## 2024-08-16 RX ADMIN — DIPHENHYDRAMINE HYDROCHLORIDE 50 MG: 50 INJECTION INTRAMUSCULAR; INTRAVENOUS at 11:59

## 2024-08-16 RX ADMIN — SODIUM CHLORIDE 200 MG: 9 INJECTION, SOLUTION INTRAVENOUS at 12:39

## 2024-08-16 RX ADMIN — CARBOPLATIN 470 MG: 10 INJECTION INTRAVENOUS at 14:11

## 2024-08-16 RX ADMIN — SODIUM CHLORIDE, PRESERVATIVE FREE 10 ML: 5 INJECTION INTRAVENOUS at 15:08

## 2024-08-16 NOTE — PROGRESS NOTES
Pt here for C4D1 Keytruda, Taxol, Carbo. Denies any new complaints. Labs drawn from port and results reviewed. Pt was treated without incident and d/c'd in stable condition. Pt will return on 8-23-24 for C4D8.

## 2024-08-23 ENCOUNTER — HOSPITAL ENCOUNTER (OUTPATIENT)
Dept: INFUSION THERAPY | Age: 59
Discharge: HOME | End: 2024-08-23
Payer: COMMERCIAL

## 2024-08-23 VITALS
TEMPERATURE: 97.7 F | BODY MASS INDEX: 42.95 KG/M2 | RESPIRATION RATE: 20 BRPM | HEART RATE: 98 BPM | SYSTOLIC BLOOD PRESSURE: 95 MMHG | DIASTOLIC BLOOD PRESSURE: 63 MMHG | WEIGHT: 282.5 LBS

## 2024-08-23 DIAGNOSIS — Z17.1 MALIGNANT NEOPLASM OF UPPER-OUTER QUADRANT OF LEFT BREAST IN FEMALE, ESTROGEN RECEPTOR NEGATIVE (HCC): Primary | ICD-10-CM

## 2024-08-23 DIAGNOSIS — C50.412 MALIGNANT NEOPLASM OF UPPER-OUTER QUADRANT OF LEFT BREAST IN FEMALE, ESTROGEN RECEPTOR NEGATIVE (HCC): Primary | ICD-10-CM

## 2024-08-23 LAB
ALBUMIN SERPL-MCNC: 4.1 G/DL (ref 3.5–5.2)
ALBUMIN/GLOB SERPL: 1.4 {RATIO} (ref 1–2.5)
ALP SERPL-CCNC: 111 U/L (ref 35–104)
ALT SERPL-CCNC: 17 U/L (ref 5–33)
ANION GAP SERPL CALCULATED.3IONS-SCNC: 11 MMOL/L (ref 9–17)
AST SERPL-CCNC: 21 U/L
BASOPHILS # BLD: 0 K/UL (ref 0–0.2)
BASOPHILS NFR BLD: 0 % (ref 0–2)
BILIRUB SERPL-MCNC: 0.2 MG/DL (ref 0.3–1.2)
BUN SERPL-MCNC: 19 MG/DL (ref 8–23)
CALCIUM SERPL-MCNC: 9.2 MG/DL (ref 8.6–10.4)
CHLORIDE SERPL-SCNC: 98 MMOL/L (ref 98–107)
CO2 SERPL-SCNC: 26 MMOL/L (ref 20–31)
CREAT SERPL-MCNC: 0.9 MG/DL (ref 0.5–0.9)
EOSINOPHIL # BLD: 0 K/UL (ref 0–0.4)
EOSINOPHILS RELATIVE PERCENT: 0 % (ref 1–4)
ERYTHROCYTE [DISTWIDTH] IN BLOOD BY AUTOMATED COUNT: 27.7 % (ref 12.5–15.4)
GFR, ESTIMATED: 73 ML/MIN/1.73M2
GLUCOSE SERPL-MCNC: 159 MG/DL (ref 70–99)
HCT VFR BLD AUTO: 26.5 % (ref 36–46)
HGB BLD-MCNC: 9.1 G/DL (ref 12–16)
LYMPHOCYTES NFR BLD: 1.68 K/UL (ref 1–4.8)
LYMPHOCYTES RELATIVE PERCENT: 43 % (ref 24–44)
MCH RBC QN AUTO: 31.2 PG (ref 26–34)
MCHC RBC AUTO-ENTMCNC: 34.5 G/DL (ref 31–37)
MCV RBC AUTO: 90.6 FL (ref 80–100)
MONOCYTES NFR BLD: 0.23 K/UL (ref 0.1–0.8)
MONOCYTES NFR BLD: 6 % (ref 1–7)
MORPHOLOGY: ABNORMAL
MORPHOLOGY: ABNORMAL
NEUTROPHILS NFR BLD: 51 % (ref 36–66)
NEUTS SEG NFR BLD: 1.99 K/UL (ref 1.8–7.7)
PLATELET # BLD AUTO: 267 K/UL (ref 140–450)
PMV BLD AUTO: 7.2 FL (ref 6–12)
POTASSIUM SERPL-SCNC: 4.2 MMOL/L (ref 3.7–5.3)
PROT SERPL-MCNC: 7 G/DL (ref 6.4–8.3)
RBC # BLD AUTO: 2.93 M/UL (ref 4–5.2)
SODIUM SERPL-SCNC: 135 MMOL/L (ref 135–144)
WBC OTHER # BLD: 3.9 K/UL (ref 3.5–11)

## 2024-08-23 PROCEDURE — 96375 TX/PRO/DX INJ NEW DRUG ADDON: CPT

## 2024-08-23 PROCEDURE — 2580000003 HC RX 258: Performed by: INTERNAL MEDICINE

## 2024-08-23 PROCEDURE — 85025 COMPLETE CBC W/AUTO DIFF WBC: CPT

## 2024-08-23 PROCEDURE — 80053 COMPREHEN METABOLIC PANEL: CPT

## 2024-08-23 PROCEDURE — 6360000002 HC RX W HCPCS: Performed by: INTERNAL MEDICINE

## 2024-08-23 PROCEDURE — 36591 DRAW BLOOD OFF VENOUS DEVICE: CPT

## 2024-08-23 PROCEDURE — 2500000003 HC RX 250 WO HCPCS: Performed by: INTERNAL MEDICINE

## 2024-08-23 PROCEDURE — 36415 COLL VENOUS BLD VENIPUNCTURE: CPT

## 2024-08-23 PROCEDURE — 96413 CHEMO IV INFUSION 1 HR: CPT

## 2024-08-23 RX ORDER — FAMOTIDINE 10 MG/ML
20 INJECTION, SOLUTION INTRAVENOUS ONCE
Status: COMPLETED | OUTPATIENT
Start: 2024-08-23 | End: 2024-08-23

## 2024-08-23 RX ORDER — SODIUM CHLORIDE 9 MG/ML
5-250 INJECTION, SOLUTION INTRAVENOUS PRN
Status: DISCONTINUED | OUTPATIENT
Start: 2024-08-23 | End: 2024-08-24 | Stop reason: HOSPADM

## 2024-08-23 RX ORDER — HEPARIN 100 UNIT/ML
500 SYRINGE INTRAVENOUS PRN
Status: DISCONTINUED | OUTPATIENT
Start: 2024-08-23 | End: 2024-08-24 | Stop reason: HOSPADM

## 2024-08-23 RX ORDER — ONDANSETRON 2 MG/ML
8 INJECTION INTRAMUSCULAR; INTRAVENOUS
Status: COMPLETED | OUTPATIENT
Start: 2024-08-23 | End: 2024-08-23

## 2024-08-23 RX ORDER — SODIUM CHLORIDE 0.9 % (FLUSH) 0.9 %
5-40 SYRINGE (ML) INJECTION PRN
Status: DISCONTINUED | OUTPATIENT
Start: 2024-08-23 | End: 2024-08-24 | Stop reason: HOSPADM

## 2024-08-23 RX ORDER — DEXAMETHASONE SODIUM PHOSPHATE 10 MG/ML
10 INJECTION INTRAMUSCULAR; INTRAVENOUS ONCE
Status: COMPLETED | OUTPATIENT
Start: 2024-08-23 | End: 2024-08-23

## 2024-08-23 RX ORDER — DIPHENHYDRAMINE HYDROCHLORIDE 50 MG/ML
50 INJECTION INTRAMUSCULAR; INTRAVENOUS ONCE
Status: COMPLETED | OUTPATIENT
Start: 2024-08-23 | End: 2024-08-23

## 2024-08-23 RX ADMIN — PACLITAXEL 168 MG: 6 INJECTION, SOLUTION, CONCENTRATE INTRAVENOUS at 09:38

## 2024-08-23 RX ADMIN — SODIUM CHLORIDE, PRESERVATIVE FREE 10 ML: 5 INJECTION INTRAVENOUS at 08:07

## 2024-08-23 RX ADMIN — DIPHENHYDRAMINE HYDROCHLORIDE 50 MG: 50 INJECTION INTRAMUSCULAR; INTRAVENOUS at 09:06

## 2024-08-23 RX ADMIN — HEPARIN 500 UNITS: 100 SYRINGE at 10:45

## 2024-08-23 RX ADMIN — ONDANSETRON 8 MG: 2 INJECTION INTRAMUSCULAR; INTRAVENOUS at 09:05

## 2024-08-23 RX ADMIN — FAMOTIDINE 20 MG: 10 INJECTION, SOLUTION INTRAVENOUS at 09:01

## 2024-08-23 RX ADMIN — SODIUM CHLORIDE 200 ML/HR: 9 INJECTION, SOLUTION INTRAVENOUS at 09:00

## 2024-08-23 RX ADMIN — DEXAMETHASONE SODIUM PHOSPHATE 10 MG: 10 INJECTION INTRAMUSCULAR; INTRAVENOUS at 09:10

## 2024-08-23 RX ADMIN — SODIUM CHLORIDE, PRESERVATIVE FREE 10 ML: 5 INJECTION INTRAVENOUS at 10:45

## 2024-08-23 ASSESSMENT — PAIN DESCRIPTION - ORIENTATION: ORIENTATION: LEFT

## 2024-08-23 ASSESSMENT — PAIN SCALES - GENERAL: PAINLEVEL_OUTOF10: 5

## 2024-08-23 ASSESSMENT — PAIN DESCRIPTION - LOCATION: LOCATION: KNEE

## 2024-08-23 NOTE — PROGRESS NOTES
Pt here for C4D8 Taxol. Pt having nausea this morning, Zofran administered. Labs drawn from port and results reviewed. Pt was treated without incident and d/c'd in stable condition. Pt will return on 8-30-24 for C4D15.

## 2024-08-26 ENCOUNTER — OFFICE VISIT (OUTPATIENT)
Age: 59
End: 2024-08-26
Payer: COMMERCIAL

## 2024-08-26 ENCOUNTER — HOSPITAL ENCOUNTER (OUTPATIENT)
Dept: CT IMAGING | Age: 59
Discharge: HOME | End: 2024-08-28
Attending: INTERNAL MEDICINE
Payer: COMMERCIAL

## 2024-08-26 VITALS
TEMPERATURE: 96.8 F | OXYGEN SATURATION: 98 % | HEART RATE: 83 BPM | DIASTOLIC BLOOD PRESSURE: 70 MMHG | SYSTOLIC BLOOD PRESSURE: 110 MMHG | BODY MASS INDEX: 42.59 KG/M2 | HEIGHT: 68 IN | WEIGHT: 281 LBS

## 2024-08-26 DIAGNOSIS — E53.8 VITAMIN B12 DEFICIENCY: ICD-10-CM

## 2024-08-26 DIAGNOSIS — M54.50 CHRONIC LEFT-SIDED LOW BACK PAIN WITHOUT SCIATICA: ICD-10-CM

## 2024-08-26 DIAGNOSIS — I10 ESSENTIAL HYPERTENSION: ICD-10-CM

## 2024-08-26 DIAGNOSIS — C50.412 MALIGNANT NEOPLASM OF UPPER-OUTER QUADRANT OF LEFT BREAST IN FEMALE, ESTROGEN RECEPTOR NEGATIVE (HCC): Primary | ICD-10-CM

## 2024-08-26 DIAGNOSIS — Z90.5 SOLITARY KIDNEY, ACQUIRED: ICD-10-CM

## 2024-08-26 DIAGNOSIS — J43.9 MIXED RESTRICTIVE AND OBSTRUCTIVE LUNG DISEASE (HCC): ICD-10-CM

## 2024-08-26 DIAGNOSIS — G89.29 CHRONIC LEFT-SIDED LOW BACK PAIN WITHOUT SCIATICA: ICD-10-CM

## 2024-08-26 DIAGNOSIS — K76.0 FATTY LIVER: ICD-10-CM

## 2024-08-26 DIAGNOSIS — M79.89 LEG SWELLING: ICD-10-CM

## 2024-08-26 DIAGNOSIS — Z86.16 HISTORY OF COVID-19: ICD-10-CM

## 2024-08-26 DIAGNOSIS — E66.01 MORBID OBESITY (HCC): ICD-10-CM

## 2024-08-26 DIAGNOSIS — F51.01 PRIMARY INSOMNIA: ICD-10-CM

## 2024-08-26 DIAGNOSIS — J98.4 MIXED RESTRICTIVE AND OBSTRUCTIVE LUNG DISEASE (HCC): ICD-10-CM

## 2024-08-26 DIAGNOSIS — N20.0 KIDNEY STONES: ICD-10-CM

## 2024-08-26 DIAGNOSIS — G25.0 BENIGN ESSENTIAL TREMOR: ICD-10-CM

## 2024-08-26 DIAGNOSIS — K21.9 GASTROESOPHAGEAL REFLUX DISEASE WITHOUT ESOPHAGITIS: ICD-10-CM

## 2024-08-26 DIAGNOSIS — Z17.1 MALIGNANT NEOPLASM OF UPPER-OUTER QUADRANT OF LEFT BREAST IN FEMALE, ESTROGEN RECEPTOR NEGATIVE (HCC): Primary | ICD-10-CM

## 2024-08-26 DIAGNOSIS — I25.10 CORONARY ARTERY DISEASE INVOLVING NATIVE CORONARY ARTERY OF NATIVE HEART WITHOUT ANGINA PECTORIS: ICD-10-CM

## 2024-08-26 DIAGNOSIS — Z96.651 HISTORY OF RIGHT KNEE JOINT REPLACEMENT: ICD-10-CM

## 2024-08-26 DIAGNOSIS — M17.12 PRIMARY OSTEOARTHRITIS OF LEFT KNEE: ICD-10-CM

## 2024-08-26 PROCEDURE — 3078F DIAST BP <80 MM HG: CPT | Performed by: INTERNAL MEDICINE

## 2024-08-26 PROCEDURE — 76775 US EXAM ABDO BACK WALL LIM: CPT | Performed by: INTERNAL MEDICINE

## 2024-08-26 PROCEDURE — 99214 OFFICE O/P EST MOD 30 MIN: CPT | Performed by: INTERNAL MEDICINE

## 2024-08-26 PROCEDURE — 3074F SYST BP LT 130 MM HG: CPT | Performed by: INTERNAL MEDICINE

## 2024-08-26 PROCEDURE — 74176 CT ABD & PELVIS W/O CONTRAST: CPT

## 2024-08-26 RX ORDER — HYDROCODONE BITARTRATE AND ACETAMINOPHEN 5; 325 MG/1; MG/1
1 TABLET ORAL EVERY 6 HOURS PRN
Status: ON HOLD | COMMUNITY

## 2024-08-26 RX ORDER — BUDESONIDE AND FORMOTEROL FUMARATE DIHYDRATE 160; 4.5 UG/1; UG/1
AEROSOL RESPIRATORY (INHALATION) 2 TIMES DAILY
Status: ON HOLD | COMMUNITY
Start: 2024-05-03 | End: 2024-08-27 | Stop reason: SDUPTHER

## 2024-08-26 ASSESSMENT — ENCOUNTER SYMPTOMS
SINUS PAIN: 0
SORE THROAT: 0
VOMITING: 0
SHORTNESS OF BREATH: 0
NAUSEA: 0
DIARRHEA: 0
BACK PAIN: 1
ABDOMINAL PAIN: 0
SINUS PRESSURE: 0
COUGH: 0
EYE PAIN: 0
WHEEZING: 0
EYE REDNESS: 0
BLOOD IN STOOL: 0
CONSTIPATION: 1
RHINORRHEA: 0

## 2024-08-26 NOTE — PROGRESS NOTES
[facility]     Date of Visit:  2024  Patient Name: Luci Castañeda   Patient :  1965     CHIEF COMPLAINT:     Luci Castañeda is a 59 y.o. female who presents today for an general visit to be evaluated for the following condition(s):  Chief Complaint   Patient presents with   • 3 Month Follow-Up   • Breast Cancer     Started chemo Tx May 31 2024, very Friday  and will go thru Nov.       HISTORY OF PRESENT ILLNESS      Patient returned to work late May for 2-3 days, but has not worked since cancer diagnosis.  Started chemotherapy May 31, 2024. Reports fatigue, metallic taste, and hadley-oral neuropathy since chemo. Plan is for chemo to end 2024 with lumpectomy and radiation.     Sleep study 2024: ESTHER diagnosis, will follow up for CPAP. Managed by Dr. Sanchez.    XR R foot 2024: Fracture of 4th proximal phalanx. Used fracture shoe, recovered.  2024: TSH, Cortisol, Amylase and Lipase wnl. Denies epigastric pain.  2024 CBC: Normocytic anemia, Hb 9.1    Only complaint today is occasional R flank pain that patient believes is a kidney stone. XR abdomen 2024 with no evidence of kidney stone.       REVIEW OF SYSTEMS     Review of Systems   Constitutional:  Positive for fatigue. Negative for chills, fever and unexpected weight change.   HENT:  Negative for congestion, ear pain, hearing loss, rhinorrhea, sinus pressure, sinus pain, sneezing and sore throat.    Eyes:  Negative for pain, redness and visual disturbance.   Respiratory:  Negative for cough, shortness of breath and wheezing.    Cardiovascular:  Negative for chest pain, palpitations and leg swelling.   Gastrointestinal:  Positive for constipation. Negative for abdominal pain, blood in stool, diarrhea, nausea and vomiting.   Endocrine: Negative for cold intolerance and heat intolerance.   Genitourinary:  Positive for flank pain. Negative for difficulty urinating, dysuria, frequency, hematuria and urgency.  needed for Wheezing 1 each 3   • irbesartan (AVAPRO) 150 MG tablet Take 1 tablet by mouth daily 90 tablet 3   • nystatin (MYCOSTATIN) 355628 UNIT/GM cream      • meloxicam (MOBIC) 15 MG tablet Take 1 tablet by mouth daily 90 tablet 3   • pantoprazole (PROTONIX) 20 MG tablet TAKE ONE TABLET BY MOUTH EVERY MORNING BEFORE BREAKFAST 240 tablet 1   • Melatonin 5 MG CAPS Take 1 capsule by mouth nightly as needed     • benzoyl peroxide 5 % external liquid Wash affected areas once daily (Patient taking differently: as needed Wash affected areas once daily) 227 g 3   • clindamycin (CLEOCIN T) 1 % lotion Apply to affected areas daily (Patient taking differently: as needed Apply to affected areas daily) 60 mL 3     No current facility-administered medications for this visit.        Allergies   Allergen Reactions   • Mixed Ragweed Shortness Of Breath   • Cat Hair Extract Hives     Shortness of breath       Patient Active Problem List   Diagnosis   • Kidney stones   • Essential hypertension   • Acquired hypothyroidism   • CKD (chronic kidney disease) stage 1, GFR 90 ml/min or greater   • Hyperuricemia   • Primary osteoarthritis of left knee   • Gastroesophageal reflux disease without esophagitis   • ESTHER (obstructive sleep apnea)   • Mixed hyperlipidemia   • Solitary kidney, acquired   • Obesity (BMI 30-39.9)   • Glucose intolerance (impaired glucose tolerance)   • Class 3 severe obesity due to excess calories with serious comorbidity and body mass index (BMI) of 40.0 to 44.9 in adult (HCC)   • Dyspnea on exertion   • Arthralgia   • Other fatigue   • Oxygen desaturation   • Mild persistent asthma without complication   • LVH (left ventricular hypertrophy)   • Chronic left shoulder pain   • Hydronephrosis with renal and ureteral calculous obstruction   • S/p Hscope, D&C 6/30/22   • Postmenopausal bleeding   • Endometrial polyp   • Right kidney stone   • Left breast abscess   • History of right knee joint replacement   • Morbid

## 2024-08-27 ENCOUNTER — PATIENT MESSAGE (OUTPATIENT)
Dept: PULMONOLOGY | Age: 59
End: 2024-08-27

## 2024-08-27 ENCOUNTER — HOSPITAL ENCOUNTER (EMERGENCY)
Age: 59
Discharge: HOME | End: 2024-08-27
Attending: EMERGENCY MEDICINE
Payer: COMMERCIAL

## 2024-08-27 ENCOUNTER — NURSE TRIAGE (OUTPATIENT)
Dept: OTHER | Facility: CLINIC | Age: 59
End: 2024-08-27

## 2024-08-27 ENCOUNTER — APPOINTMENT (OUTPATIENT)
Dept: GENERAL RADIOLOGY | Age: 59
End: 2024-08-27
Payer: COMMERCIAL

## 2024-08-27 ENCOUNTER — ANESTHESIA EVENT (OUTPATIENT)
Dept: OPERATING ROOM | Age: 59
End: 2024-08-27
Payer: COMMERCIAL

## 2024-08-27 ENCOUNTER — ANESTHESIA (OUTPATIENT)
Dept: OPERATING ROOM | Age: 59
End: 2024-08-27
Payer: COMMERCIAL

## 2024-08-27 VITALS
TEMPERATURE: 97.2 F | DIASTOLIC BLOOD PRESSURE: 79 MMHG | HEIGHT: 68 IN | RESPIRATION RATE: 18 BRPM | BODY MASS INDEX: 42.59 KG/M2 | SYSTOLIC BLOOD PRESSURE: 143 MMHG | OXYGEN SATURATION: 97 % | HEART RATE: 82 BPM | WEIGHT: 281 LBS

## 2024-08-27 DIAGNOSIS — G89.18 POST-OP PAIN: ICD-10-CM

## 2024-08-27 DIAGNOSIS — N20.0 NEPHROLITHIASIS: Primary | ICD-10-CM

## 2024-08-27 DIAGNOSIS — R10.9 RIGHT FLANK PAIN: ICD-10-CM

## 2024-08-27 DIAGNOSIS — N20.1 URETERAL STONE: ICD-10-CM

## 2024-08-27 LAB
ALBUMIN SERPL-MCNC: 4.4 G/DL (ref 3.5–5.2)
ALBUMIN/GLOB SERPL: 1 {RATIO} (ref 1–2.5)
ALP SERPL-CCNC: 122 U/L (ref 35–104)
ALT SERPL-CCNC: 22 U/L (ref 10–35)
ANION GAP SERPL CALCULATED.3IONS-SCNC: 14 MMOL/L (ref 9–16)
AST SERPL-CCNC: 37 U/L (ref 10–35)
BACTERIA URNS QL MICRO: NORMAL
BASOPHILS # BLD: 0 K/UL (ref 0–0.2)
BASOPHILS NFR BLD: 0 % (ref 0–2)
BILIRUB SERPL-MCNC: 0.8 MG/DL (ref 0–1.2)
BILIRUB UR QL STRIP: NEGATIVE
BUN SERPL-MCNC: 21 MG/DL (ref 8–23)
CALCIUM SERPL-MCNC: 9.8 MG/DL (ref 8.6–10.4)
CASTS #/AREA URNS LPF: NORMAL /LPF (ref 0–8)
CHLORIDE SERPL-SCNC: 100 MMOL/L (ref 98–107)
CLARITY UR: CLEAR
CO2 SERPL-SCNC: 23 MMOL/L (ref 20–31)
COLOR UR: YELLOW
CREAT SERPL-MCNC: 1.2 MG/DL (ref 0.5–0.9)
EOSINOPHIL # BLD: 0 K/UL (ref 0–0.44)
EOSINOPHILS RELATIVE PERCENT: 0 % (ref 1–4)
EPI CELLS #/AREA URNS HPF: NORMAL /HPF (ref 0–5)
ERYTHROCYTE [DISTWIDTH] IN BLOOD BY AUTOMATED COUNT: 24.4 % (ref 11.8–14.4)
GFR, ESTIMATED: 52 ML/MIN/1.73M2
GLUCOSE SERPL-MCNC: 135 MG/DL (ref 74–99)
GLUCOSE UR STRIP-MCNC: NEGATIVE MG/DL
HCT VFR BLD AUTO: 28.5 % (ref 36.3–47.1)
HGB BLD-MCNC: 9.6 G/DL (ref 11.9–15.1)
HGB UR QL STRIP.AUTO: NEGATIVE
IMM GRANULOCYTES # BLD AUTO: 0.04 K/UL (ref 0–0.3)
IMM GRANULOCYTES NFR BLD: 1 %
KETONES UR STRIP-MCNC: NEGATIVE MG/DL
LEUKOCYTE ESTERASE UR QL STRIP: ABNORMAL
LYMPHOCYTES NFR BLD: 1.08 K/UL (ref 1.1–3.7)
LYMPHOCYTES RELATIVE PERCENT: 30 % (ref 24–43)
MCH RBC QN AUTO: 30.8 PG (ref 25.2–33.5)
MCHC RBC AUTO-ENTMCNC: 33.7 G/DL (ref 28.4–34.8)
MCV RBC AUTO: 91.3 FL (ref 82.6–102.9)
MONOCYTES NFR BLD: 0.36 K/UL (ref 0.1–1.2)
MONOCYTES NFR BLD: 10 % (ref 3–12)
MORPHOLOGY: ABNORMAL
MORPHOLOGY: ABNORMAL
NEUTROPHILS NFR BLD: 59 % (ref 36–65)
NEUTS SEG NFR BLD: 2.12 K/UL (ref 1.5–8.1)
NITRITE UR QL STRIP: NEGATIVE
NRBC BLD-RTO: 1.1 PER 100 WBC
PH UR STRIP: 7 [PH] (ref 5–8)
PLATELET # BLD AUTO: ABNORMAL K/UL (ref 138–453)
PLATELET, FLUORESCENCE: ABNORMAL K/UL (ref 138–453)
POTASSIUM SERPL-SCNC: 4.6 MMOL/L (ref 3.7–5.3)
PROT SERPL-MCNC: 7.4 G/DL (ref 6.6–8.7)
PROT UR STRIP-MCNC: ABNORMAL MG/DL
RBC # BLD AUTO: 3.12 M/UL (ref 3.95–5.11)
RBC #/AREA URNS HPF: NORMAL /HPF (ref 0–4)
SODIUM SERPL-SCNC: 137 MMOL/L (ref 136–145)
SP GR UR STRIP: 1.02 (ref 1–1.03)
UROBILINOGEN UR STRIP-ACNC: NORMAL EU/DL (ref 0–1)
WBC #/AREA URNS HPF: NORMAL /HPF (ref 0–5)
WBC OTHER # BLD: 3.6 K/UL (ref 3.5–11.3)

## 2024-08-27 PROCEDURE — 3600000004 HC SURGERY LEVEL 4 BASE: Performed by: UROLOGY

## 2024-08-27 PROCEDURE — 2580000003 HC RX 258: Performed by: UROLOGY

## 2024-08-27 PROCEDURE — 2500000003 HC RX 250 WO HCPCS: Performed by: NURSE ANESTHETIST, CERTIFIED REGISTERED

## 2024-08-27 PROCEDURE — 2580000003 HC RX 258

## 2024-08-27 PROCEDURE — 2580000003 HC RX 258: Performed by: NURSE ANESTHETIST, CERTIFIED REGISTERED

## 2024-08-27 PROCEDURE — C1747 HC ENDOSCOPE, SINGLE, URINARY TRACT: HCPCS | Performed by: UROLOGY

## 2024-08-27 PROCEDURE — 3700000001 HC ADD 15 MINUTES (ANESTHESIA): Performed by: UROLOGY

## 2024-08-27 PROCEDURE — 96375 TX/PRO/DX INJ NEW DRUG ADDON: CPT

## 2024-08-27 PROCEDURE — 85025 COMPLETE CBC W/AUTO DIFF WBC: CPT

## 2024-08-27 PROCEDURE — 6360000002 HC RX W HCPCS

## 2024-08-27 PROCEDURE — C2617 STENT, NON-COR, TEM W/O DEL: HCPCS | Performed by: UROLOGY

## 2024-08-27 PROCEDURE — 80053 COMPREHEN METABOLIC PANEL: CPT

## 2024-08-27 PROCEDURE — 6360000002 HC RX W HCPCS: Performed by: ANESTHESIOLOGY

## 2024-08-27 PROCEDURE — 82365 CALCULUS SPECTROSCOPY: CPT

## 2024-08-27 PROCEDURE — 96374 THER/PROPH/DIAG INJ IV PUSH: CPT

## 2024-08-27 PROCEDURE — 81001 URINALYSIS AUTO W/SCOPE: CPT

## 2024-08-27 PROCEDURE — 6370000000 HC RX 637 (ALT 250 FOR IP): Performed by: ANESTHESIOLOGY

## 2024-08-27 PROCEDURE — 99285 EMERGENCY DEPT VISIT HI MDM: CPT

## 2024-08-27 PROCEDURE — 2720000010 HC SURG SUPPLY STERILE: Performed by: UROLOGY

## 2024-08-27 PROCEDURE — 7100000001 HC PACU RECOVERY - ADDTL 15 MIN: Performed by: UROLOGY

## 2024-08-27 PROCEDURE — C1769 GUIDE WIRE: HCPCS | Performed by: UROLOGY

## 2024-08-27 PROCEDURE — 7100000010 HC PHASE II RECOVERY - FIRST 15 MIN: Performed by: UROLOGY

## 2024-08-27 PROCEDURE — 3600000014 HC SURGERY LEVEL 4 ADDTL 15MIN: Performed by: UROLOGY

## 2024-08-27 PROCEDURE — 7100000000 HC PACU RECOVERY - FIRST 15 MIN: Performed by: UROLOGY

## 2024-08-27 PROCEDURE — 3700000000 HC ANESTHESIA ATTENDED CARE: Performed by: UROLOGY

## 2024-08-27 PROCEDURE — 2709999900 HC NON-CHARGEABLE SUPPLY: Performed by: UROLOGY

## 2024-08-27 PROCEDURE — 87086 URINE CULTURE/COLONY COUNT: CPT

## 2024-08-27 PROCEDURE — 6360000002 HC RX W HCPCS: Performed by: NURSE ANESTHETIST, CERTIFIED REGISTERED

## 2024-08-27 PROCEDURE — 85055 RETICULATED PLATELET ASSAY: CPT

## 2024-08-27 DEVICE — URETERAL STENT
Type: IMPLANTABLE DEVICE | Site: URETER | Status: FUNCTIONAL
Brand: POLARIS™ ULTRA

## 2024-08-27 RX ORDER — ONDANSETRON 2 MG/ML
INJECTION INTRAMUSCULAR; INTRAVENOUS PRN
Status: DISCONTINUED | OUTPATIENT
Start: 2024-08-27 | End: 2024-08-27 | Stop reason: SDUPTHER

## 2024-08-27 RX ORDER — FENTANYL CITRATE 50 UG/ML
INJECTION, SOLUTION INTRAMUSCULAR; INTRAVENOUS PRN
Status: DISCONTINUED | OUTPATIENT
Start: 2024-08-27 | End: 2024-08-27 | Stop reason: SDUPTHER

## 2024-08-27 RX ORDER — PROPOFOL 10 MG/ML
INJECTION, EMULSION INTRAVENOUS PRN
Status: DISCONTINUED | OUTPATIENT
Start: 2024-08-27 | End: 2024-08-27 | Stop reason: SDUPTHER

## 2024-08-27 RX ORDER — DEXAMETHASONE SODIUM PHOSPHATE 10 MG/ML
INJECTION, SOLUTION INTRAMUSCULAR; INTRAVENOUS PRN
Status: DISCONTINUED | OUTPATIENT
Start: 2024-08-27 | End: 2024-08-27 | Stop reason: SDUPTHER

## 2024-08-27 RX ORDER — ONDANSETRON 4 MG/1
4 TABLET, ORALLY DISINTEGRATING ORAL 3 TIMES DAILY PRN
Qty: 3 TABLET | Refills: 0 | Status: ON HOLD | OUTPATIENT
Start: 2024-08-27

## 2024-08-27 RX ORDER — TAMSULOSIN HYDROCHLORIDE 0.4 MG/1
0.4 CAPSULE ORAL DAILY
Qty: 7 CAPSULE | Refills: 3 | Status: ON HOLD | OUTPATIENT
Start: 2024-08-27

## 2024-08-27 RX ORDER — METOCLOPRAMIDE HYDROCHLORIDE 5 MG/ML
10 INJECTION INTRAMUSCULAR; INTRAVENOUS ONCE
Status: COMPLETED | OUTPATIENT
Start: 2024-08-27 | End: 2024-08-27

## 2024-08-27 RX ORDER — MAGNESIUM HYDROXIDE 1200 MG/15ML
LIQUID ORAL CONTINUOUS PRN
Status: COMPLETED | OUTPATIENT
Start: 2024-08-27 | End: 2024-08-27

## 2024-08-27 RX ORDER — OXYBUTYNIN CHLORIDE 10 MG/1
10 TABLET, EXTENDED RELEASE ORAL DAILY
Qty: 7 TABLET | Refills: 0 | Status: ON HOLD | OUTPATIENT
Start: 2024-08-27 | End: 2024-09-03

## 2024-08-27 RX ORDER — 0.9 % SODIUM CHLORIDE 0.9 %
1000 INTRAVENOUS SOLUTION INTRAVENOUS ONCE
Status: COMPLETED | OUTPATIENT
Start: 2024-08-27 | End: 2024-08-27

## 2024-08-27 RX ORDER — FENTANYL CITRATE 50 UG/ML
INJECTION, SOLUTION INTRAMUSCULAR; INTRAVENOUS
Status: COMPLETED
Start: 2024-08-27 | End: 2024-08-27

## 2024-08-27 RX ORDER — ONDANSETRON 2 MG/ML
4 INJECTION INTRAMUSCULAR; INTRAVENOUS ONCE
Status: COMPLETED | OUTPATIENT
Start: 2024-08-27 | End: 2024-08-27

## 2024-08-27 RX ORDER — BUDESONIDE AND FORMOTEROL FUMARATE DIHYDRATE 160; 4.5 UG/1; UG/1
2 AEROSOL RESPIRATORY (INHALATION) 2 TIMES DAILY
Qty: 1 EACH | Refills: 2 | Status: ON HOLD | OUTPATIENT
Start: 2024-08-27

## 2024-08-27 RX ORDER — FENTANYL CITRATE 50 UG/ML
50 INJECTION, SOLUTION INTRAMUSCULAR; INTRAVENOUS ONCE
Status: DISCONTINUED | OUTPATIENT
Start: 2024-08-27 | End: 2024-08-30 | Stop reason: HOSPADM

## 2024-08-27 RX ORDER — PROMETHAZINE HYDROCHLORIDE 25 MG/1
25 TABLET ORAL ONCE
Status: CANCELLED | OUTPATIENT
Start: 2024-08-27 | End: 2024-08-27

## 2024-08-27 RX ORDER — FENTANYL CITRATE 50 UG/ML
100 INJECTION, SOLUTION INTRAMUSCULAR; INTRAVENOUS ONCE
Status: COMPLETED | OUTPATIENT
Start: 2024-08-27 | End: 2024-08-27

## 2024-08-27 RX ORDER — CEFADROXIL 500 MG/1
500 CAPSULE ORAL 2 TIMES DAILY
Qty: 6 CAPSULE | Refills: 0 | Status: SHIPPED | OUTPATIENT
Start: 2024-08-27 | End: 2024-08-30

## 2024-08-27 RX ORDER — SCOLOPAMINE TRANSDERMAL SYSTEM 1 MG/1
1 PATCH, EXTENDED RELEASE TRANSDERMAL
Status: DISCONTINUED | OUTPATIENT
Start: 2024-08-27 | End: 2024-08-30 | Stop reason: HOSPADM

## 2024-08-27 RX ORDER — HYDROCODONE BITARTRATE AND ACETAMINOPHEN 5; 325 MG/1; MG/1
1 TABLET ORAL EVERY 4 HOURS PRN
Qty: 5 TABLET | Refills: 0 | Status: SHIPPED | OUTPATIENT
Start: 2024-08-27 | End: 2024-08-30

## 2024-08-27 RX ORDER — CEFAZOLIN SODIUM 1 G/3ML
INJECTION, POWDER, FOR SOLUTION INTRAMUSCULAR; INTRAVENOUS PRN
Status: DISCONTINUED | OUTPATIENT
Start: 2024-08-27 | End: 2024-08-27 | Stop reason: SDUPTHER

## 2024-08-27 RX ORDER — LIDOCAINE HYDROCHLORIDE 10 MG/ML
INJECTION, SOLUTION EPIDURAL; INFILTRATION; INTRACAUDAL; PERINEURAL PRN
Status: DISCONTINUED | OUTPATIENT
Start: 2024-08-27 | End: 2024-08-27 | Stop reason: SDUPTHER

## 2024-08-27 RX ORDER — MIDAZOLAM HYDROCHLORIDE 1 MG/ML
INJECTION INTRAMUSCULAR; INTRAVENOUS PRN
Status: DISCONTINUED | OUTPATIENT
Start: 2024-08-27 | End: 2024-08-27 | Stop reason: SDUPTHER

## 2024-08-27 RX ORDER — SODIUM CHLORIDE 9 MG/ML
INJECTION, SOLUTION INTRAVENOUS CONTINUOUS PRN
Status: DISCONTINUED | OUTPATIENT
Start: 2024-08-27 | End: 2024-08-27 | Stop reason: SDUPTHER

## 2024-08-27 RX ADMIN — ONDANSETRON 4 MG: 2 INJECTION INTRAMUSCULAR; INTRAVENOUS at 15:17

## 2024-08-27 RX ADMIN — FENTANYL CITRATE 50 MCG: 50 INJECTION, SOLUTION INTRAMUSCULAR; INTRAVENOUS at 16:28

## 2024-08-27 RX ADMIN — ONDANSETRON 4 MG: 2 INJECTION INTRAMUSCULAR; INTRAVENOUS at 16:15

## 2024-08-27 RX ADMIN — SODIUM CHLORIDE 1000 ML: 9 INJECTION, SOLUTION INTRAVENOUS at 12:52

## 2024-08-27 RX ADMIN — FENTANYL CITRATE 100 MCG: 50 INJECTION, SOLUTION INTRAMUSCULAR; INTRAVENOUS at 12:53

## 2024-08-27 RX ADMIN — METOCLOPRAMIDE 10 MG: 5 INJECTION, SOLUTION INTRAMUSCULAR; INTRAVENOUS at 13:47

## 2024-08-27 RX ADMIN — MIDAZOLAM 2 MG: 1 INJECTION INTRAMUSCULAR; INTRAVENOUS at 16:16

## 2024-08-27 RX ADMIN — SODIUM CHLORIDE: 9 INJECTION, SOLUTION INTRAVENOUS at 16:15

## 2024-08-27 RX ADMIN — FENTANYL CITRATE 100 MCG: 50 INJECTION, SOLUTION INTRAMUSCULAR; INTRAVENOUS at 15:42

## 2024-08-27 RX ADMIN — LIDOCAINE HYDROCHLORIDE 50 MG: 10 INJECTION, SOLUTION EPIDURAL; INFILTRATION; INTRACAUDAL; PERINEURAL at 16:23

## 2024-08-27 RX ADMIN — CEFAZOLIN 3 G: 1 INJECTION, POWDER, FOR SOLUTION INTRAMUSCULAR; INTRAVENOUS at 16:26

## 2024-08-27 RX ADMIN — DEXAMETHASONE SODIUM PHOSPHATE 10 MG: 10 INJECTION, SOLUTION INTRAMUSCULAR; INTRAVENOUS at 16:33

## 2024-08-27 RX ADMIN — FENTANYL CITRATE 50 MCG: 50 INJECTION, SOLUTION INTRAMUSCULAR; INTRAVENOUS at 16:45

## 2024-08-27 RX ADMIN — PROPOFOL 200 MG: 10 INJECTION, EMULSION INTRAVENOUS at 16:23

## 2024-08-27 ASSESSMENT — ENCOUNTER SYMPTOMS
SHORTNESS OF BREATH: 0
GASTROINTESTINAL NEGATIVE: 1
EYES NEGATIVE: 1
COUGH: 0
SHORTNESS OF BREATH: 1
CHEST TIGHTNESS: 0

## 2024-08-27 ASSESSMENT — PAIN DESCRIPTION - LOCATION
LOCATION: ABDOMEN
LOCATION: FLANK
LOCATION: PERINEUM
LOCATION: GROIN;BACK;FLANK
LOCATION: ABDOMEN;BACK
LOCATION: PERINEUM

## 2024-08-27 ASSESSMENT — LIFESTYLE VARIABLES
SMOKING_STATUS: 0
HOW OFTEN DO YOU HAVE A DRINK CONTAINING ALCOHOL: NEVER
HOW MANY STANDARD DRINKS CONTAINING ALCOHOL DO YOU HAVE ON A TYPICAL DAY: PATIENT DOES NOT DRINK

## 2024-08-27 ASSESSMENT — PAIN DESCRIPTION - ORIENTATION
ORIENTATION: RIGHT
ORIENTATION: RIGHT

## 2024-08-27 ASSESSMENT — PAIN - FUNCTIONAL ASSESSMENT
PAIN_FUNCTIONAL_ASSESSMENT: 0-10

## 2024-08-27 ASSESSMENT — PAIN SCALES - GENERAL
PAINLEVEL_OUTOF10: 10
PAINLEVEL_OUTOF10: 2
PAINLEVEL_OUTOF10: 3
PAINLEVEL_OUTOF10: 7
PAINLEVEL_OUTOF10: 2
PAINLEVEL_OUTOF10: 10

## 2024-08-27 ASSESSMENT — PAIN DESCRIPTION - DESCRIPTORS
DESCRIPTORS: SHARP
DESCRIPTORS: ACHING;CRAMPING
DESCRIPTORS: SHARP
DESCRIPTORS: ACHING

## 2024-08-27 ASSESSMENT — PAIN DESCRIPTION - PAIN TYPE: TYPE: SURGICAL PAIN

## 2024-08-27 NOTE — DISCHARGE INSTRUCTIONS
Ureteroscopy Discharge Instructions:    Take prescriptions as directed, ensuring you take entire course of antibiotic.  No driving while taking narcotic pain medication. Wean off narcotics as soon as able.   OK to shower after discharge.  May resume regular diet.  No heavy lifting >10lbs day of procedure, avoid strenuous activity, may walk.  You may have a string taped to your pelvis, genitalia, or inner thigh. Please take care while showering/wiping that you do not tug on the string and dislodge your indwelling stent early.    OK to remove stent by pulling the string straight out in 5 days. Please call the office if you have difficulty removing your stent  You may see blood in the urine after the procedure and entire time stent is in place. This should resolve over the next couple days.  Please stay hydrated.  You may experience flank pain, and/or frequency/urgency of urination while the stent is in place.  Please use Flomax (Tamsulosin) to help with these symptoms.  Please call attending physician or hospital  with questions.  Please call or present to ED for fever >101 F, intractable nausea and vomiting, or uncontrolled pain.  Follow up with Dr. Escalona or partner in 6 weeks with renal ultrasound prior to appointment. Call office to confirm appointment.     Pt should pull stent in the morning of 9/1/2024.  There may be some pain associated with the stent removal, which is usually self limiting.  We suggest using the pain medication prescribed for you and a nonsteroidal anti-inflammatory such as Ibuprofen, if you are able to take this medication, to control symptoms. Take Ibuprofen as directed for 24 hrs after stent pull.  Please stay hydrated.  Please call with questions.    No alcoholic beverages, no driving or operating machinery, no making important decisions for 24 hours.   You may have a normal diet but should eat lightly day of surgery.  Drink plenty of fluids.  Urinate within 8 hours after  surgery, if unable to urinate call your doctor

## 2024-08-27 NOTE — ED PROVIDER NOTES
[facility]     Emergency Department     Faculty Note/ Attestation      Pt Name: Luci Castañeda                                       MRN: [id number]  Birthdate 1965  Date of evaluation: 8/27/2024  Note Started: 12:44 PM EDT    Patients PCP:    Nabil Ly MD    Attestation  I performed a history and physical examination of the patient and discussed management with the resident. I reviewed the resident’s note and agree with the documented findings and plan of care. Any areas of disagreement are noted on the chart. I was personally present for the key portions of any procedures. I have documented in the chart those procedures where I was not present during the key portions. I have reviewed the emergency nurses triage note. I agree with the chief complaint, past medical history, past surgical history, allergies, medications, social and family history as documented unless otherwise noted below.    For Physician Assistant/ Nurse Practitioner cases/documentation I have personally evaluated this patient and have completed at least one if not all key elements of the E/M (history, physical exam, and MDM). Additional findings are as noted.    Initial Screens:        Otis Coma Scale  Eye Opening: Spontaneous  Best Verbal Response: Oriented  Best Motor Response: Obeys commands  Otis Coma Scale Score: 15    Vitals:    Vitals:    08/27/24 1214   BP: (!) 177/101   Pulse: 88   Resp: 25   Temp: 97.2 °F (36.2 °C)   SpO2: 95%   Weight: 127.5 kg (281 lb)   Height: 1.727 m (5' 8\")       CHIEF COMPLAINT       Chief Complaint   Patient presents with   • Flank Pain     Right, concern for kidney stones       The pt is a 58 YO F with R flank pain and only solitary right kidney.  She had CT yesterday at PCP and pt has 2 calculii.  The pt has some moderate right hydro but pain has increased since yesterday.  The pt home medications including opiates are not improving.  The pt noting no drop in urine.       CT from yesterday  IMPRESSION:  1. Two tandem calculi within the mid to distal right ureter measuring 6 mm  and 4 mm respectively with mild to moderate right hydronephrosis and  hydroureter.  2. Additional nonobstructive right renal calculi.  3. Status post left nephrectomy.         EMERGENCY DEPARTMENT COURSE:     -------------------------  BP: (!) 177/101, Temp: 97.2 °F (36.2 °C), Pulse: 88, Respirations: 25  Physical Exam  Constitutional:       Appearance: She is well-developed. She is not diaphoretic.   HENT:      Head: Normocephalic and atraumatic.      Right Ear: External ear normal.      Left Ear: External ear normal.   Eyes:      General: No scleral icterus.        Right eye: No discharge.         Left eye: No discharge.   Neck:      Trachea: No tracheal deviation.   Pulmonary:      Effort: Pulmonary effort is normal. No respiratory distress.      Breath sounds: No stridor.   Abdominal:      Comments: Pt has right sided CVA tenderness   Musculoskeletal:         General: Normal range of motion.      Cervical back: Normal range of motion.   Skin:     General: Skin is warm and dry.   Neurological:      Mental Status: She is alert and oriented to person, place, and time.      Coordination: Coordination normal.   Psychiatric:         Behavior: Behavior normal.           Comments  Medical Decision Making  Amount and/or Complexity of Data Reviewed  Labs: ordered.    Risk  Prescription drug management.    Patient presents with a complaint of flank pain     Based on history and physical patient is unlikely to suffer from abscess or hematoma patient scans and imaging consistent with kidney stone pain consistent with kidney stone with obstruction urinalysis will be obtained to rule out obstructing pyelonephritis and kidney stone patient having pain worse on the right side will obtain biliary labs to ensure there is absolutely no signs of biliary obstruction or pancreatitis    Given the patient has failed outpatient

## 2024-08-27 NOTE — ED NOTES
Pt came into the ed via triage due to having abdominal pain, right back pain, right flank pain , urinary frequency   Pt stated she has a Kidney stone and had a Positive CT scan yesterday   Pt stated the pain started yesterday   Pt is Aox4 and ambulatory   RR are equal and regular   Pt stated she has nausea and no vomiting   Pt stated she only has one kidney and had the left kidney removed due to kidney stones

## 2024-08-27 NOTE — TELEPHONE ENCOUNTER
Location of patient: [place]    Subjective: Caller states \"need to go to ED\"     Current Symptoms: Has a kidney stone.  CT scan yestserday and showed a stone.  Only has 1 kidney.  Messaged her PCP today and was told to go to ED.  No triage since PCP recommended ED.  Patient in pain while speaking on phone.    Pain Severity: 9/10    Recommended disposition: Go to ED Now per PCP    Care advice provided, patient verbalizes understanding; denies any other questions or concerns; instructed to call back for any new or worsening symptoms.    Patient/caller agrees to proceed to [facility] Emergency Department    Attention Provider:  Thank you for allowing me to participate in the care of your patient.  The patient was connected to triage in response to symptoms provided.   Please do not respond through this encounter as the response is not directed to a shared pool.    Reason for Disposition   Caller has already spoken with the PCP and has no further questions.    Protocols used: No Contact or Duplicate Contact Call-ADULT-

## 2024-08-27 NOTE — TELEPHONE ENCOUNTER
LAST VISIT: 5/1/24 as a new patient  NEXT VISIT: 9/16/24 (patient cancelled last appointment)    Per last dictation patient is on this medication. Please sign for refill if ok. Thank you.

## 2024-08-27 NOTE — H&P
Lisa Amaya, Dominik, Felipe, Phuc, Isidoro, Iqra, & Mook   Urology Consultation      Patient:  Luci Castañeda  MRN: [id number]  YOB: 1965    REASON FOR CONSULT:  right flank pain, hx stones    HISTORY OF PRESENT ILLNESS:   The patient is a 59 y.o. female who presents with right flank pain, worse over the last couple of days. Her PCP sent her for CT which revealed two distal ureteral stones 6mm and 4mm with moderate hydronephrosis. Patient is known to urology service/ Dr. Amaya for recurrent kidney stones in solitary right kidney s/p left radical nephrectomy. She has had PCNL in the past, most recently about one year ago. No hematuria, dysuria, or fevers that she is aware of. She is nauseated.   Of note, she started chemotherapy for breast cancer in May going through November 9th. Next treatment is this Friday.      Patient's old records, notes and chart reviewed and summarized above.    Past Medical History:    Past Medical History:   Diagnosis Date    Abnormal uterine bleeding (AUB) 11/2023    Arthritis     Asthma     Benign familial tremor     CAD (coronary artery disease) 12/2021    mild per cardiac cath 12/2021; no stents. Dr. Randolph [facility] last visit 10/2022    Cancer (HCC)     skin    CKD (chronic kidney disease) stage 1, GFR 90 ml/min or greater     Class 2 severe obesity with serious comorbidity and body mass index (BMI) of 39.0 to 39.9 in adult ([facility]) 05/01/2019    COVID-19 vaccine administered     Cystinuria ([facility])     Dyspnea on exertion 08/06/2019    Flank pain     RIGHT    GERD (gastroesophageal reflux disease)     History of Bell's palsy 2022    states right sided, slight residual    Hypertension     Hyperuricemia     Hypothyroidism     no meds    Kidney stones     Leg swelling     by the end of the day    Malignant neoplasm of upper-outer quadrant of left breast in female, estrogen receptor negative (HCC) 05/12/2024    Midline low back pain with right-sided sciatica 06/02/2016     08/23/2019    HOLMIUM LASER, NEPHROLITHOTOMY PERCUTANEOUS, LITHOCLAST, C-ARM performed by Venkat Wang MD at [facility] OR    PERCUTANEOUS NEPHROSTOMY Right 11/01/2022    RIGHT PERCUTANEOUS NEPHROLITHOTOMY, NEPHROSTOMY PLACEMENT    MN CYSTO W/INSERT URETERAL STENT Right 07/04/2018    CYSTOSCOPY URETERAL STENT INSERTION, RIGHT performed by Pedro Friend MD at [facility] OR    MN CYSTO W/URETEROSCOPY W/RMVL/MANJ STONES N/A 07/10/2018    CYSTOSCOPY, RIGHT URETEROSCOPY, HOLMIUM LASER LITHO WITH STONE BASKETING, RIGHT STENT EXCHANGE performed by Frederick Chaudhry Jr., MD at [facility] OR    MN CYSTO W/URETEROSCOPY W/RMVL/MANJ STONES Right 10/10/2018    HOLMIUM LASER STANDBY, CYSTO, RIGHT URETEROSCOPY, RIGHT STENT PLACEMENT performed by Bismark Amaya MD at [facility] OR    ROTATOR CUFF REPAIR Left     TONSILLECTOMY      TOTAL KNEE ARTHROPLASTY Right 05/22/2023    RIGHT KNEE TOTAL ARTHROPLASTY performed by Nabil Potter DO at [facility] OR    TOTAL NEPHRECTOMY Left 1988    US BREAST BIOPSY NEEDLE ADDITIONAL LEFT Left 4/26/2024    US BREAST BIOPSY NEEDLE ADDITIONAL LEFT 4/26/2024 [facility]    US BREAST BIOPSY W LOC DEVICE 1ST LESION LEFT Left 4/26/2024    US BREAST BIOPSY W LOC DEVICE 1ST LESION LEFT 4/26/2024 [facility]    US LYMPH NODE BIOPSY  4/26/2024    US LYMPH NODE BIOPSY 4/26/2024 [facility]       Medications:      Current Facility-Administered Medications:     sodium chloride 0.9 % bolus 1,000 mL, 1,000 mL, IntraVENous, Once, Alex Gomez DO, Last Rate: 495.9 mL/hr at 08/27/24 1252, 1,000 mL at 08/27/24 1252    metoclopramide (REGLAN) injection 10 mg, 10 mg, IntraVENous, Once, Alex Gomez DO    Current Outpatient Medications:     budesonide-formoterol (SYMBICORT) 160-4.5 MCG/ACT AERO, 2 times daily, Disp: , Rfl:     HYDROcodone-acetaminophen (NORCO) 5-325 MG per tablet, Take 1 tablet by mouth every 6 hours as needed for Pain (Used for chemotherapy, cancer pain. Per Dr. Bradley). Max Daily  Vital Sign     Unable to Pay for Housing in the Last Year: Not on file     Number of Places Lived in the Last Year: Not on file     Unstable Housing in the Last Year: No       Family History:    Family History   Problem Relation Age of Onset    High Blood Pressure Mother     Dementia Mother     High Blood Pressure Father     Diabetes Father     Cancer Father         tongue    Hypertension Father     Heart Disease Father         stents    Other Father         Mylofibrosis    Hypertension Sister     Heart Disease Maternal Grandmother     Heart Attack Maternal Grandmother 80    Prostate Cancer Maternal Grandfather     Emphysema Paternal Grandmother     Heart Disease Paternal Grandfather     Stroke Paternal Grandfather 65       REVIEW OF SYSTEMS:  A comprehensive 14 point review of systems was obtained.  Constitutional: No fatigue  Eyes: No blurry vision  Ears, nose, mouth, throat, face: No ringing in the ears; no facial droop  Respiratory: No cough or cold  Cardiovascular: No palpitations  Gastrointestinal: No diarrhea or constipation. + nausea  Genitourinary: See HPI  Integument/Skin: No rashes  Hematologic/Lymphatic: No easy bruising  Musculoskeletal: No muscle pains  Neurologic: No weakness in the extremities  Psychiatric: No depression or suicidal thoughts  Endocrine: No heat or cold intolerances  Allergic/Immunologic: No current seasonal allergies; no skin hives    Physical Exam:      This a 59 y.o. female   Vitals:    08/27/24 1214   BP: (!) 177/101   Pulse: 88   Resp: 25   Temp: 97.2 °F (36.2 °C)   SpO2: 95%       Constitutional: Patient in no acute distress  Neuro: Alert and oriented to person place and time   Psych: Mood and affect normal  Head: Atraumatic and normocephalic  Neck: Trachea midline  Lungs: Respiratory effort normal  Cardiovascular:  Regular rhythm  Abdomen: Soft, non-tender, non-distended. CVA tenderness +right  Bladder: Non-tender and not distended  Ext: 2+ DP pulses bilaterally  Skin: No

## 2024-08-27 NOTE — ED PROVIDER NOTES
[facility] OR  Emergency Department Encounter  Emergency Medicine Resident     Pt Name:Luci Castañeda  MRN: [id number]  Birthdate 1965  Date of evaluation: 8/27/24  PCP:  Nabil Ly MD  Note Started: 12:39 PM EDT      CHIEF COMPLAINT       Chief Complaint   Patient presents with    Flank Pain     Right, concern for kidney stones       HISTORY OF PRESENT ILLNESS  (Location/Symptom, Timing/Onset, Context/Setting, Quality, Duration, Modifying Factors, Severity.)      Luci Castañeda is a 59 y.o. female with history of nephrolithiasis, hypertension, breast cancer, left nephrectomy, CAD, asthma, who presents with right flank pain.  States that she was at her primary care yesterday and had received a CT scan that showed multiple kidney stones within the ureter with mild to moderate hydronephrosis and hydroureter.  States that her pain level was a 3 out of 10 yesterday however when waking up this morning her pain has drastically increased.  She has Norco and Zofran at home for symptomatic treatment regarding her current breast cancer treatment but states that it has not helped in her pain or nausea.  She denies hematuria, vomiting, or dysuria.     PAST MEDICAL / SURGICAL / SOCIAL / FAMILY HISTORY      has a past medical history of Abnormal uterine bleeding (AUB), Arthritis, Asthma, Benign familial tremor, CAD (coronary artery disease), Cancer (HCC), CKD (chronic kidney disease) stage 1, GFR 90 ml/min or greater, Class 2 severe obesity with serious comorbidity and body mass index (BMI) of 39.0 to 39.9 in adult (HCC), COVID-19 vaccine administered, Cystinuria ([facility]), Dyspnea on exertion, Flank pain, GERD (gastroesophageal reflux disease), History of Bell's palsy, Hypertension, Hyperuricemia, Hypothyroidism, Kidney stones, Leg swelling, Malignant neoplasm of upper-outer quadrant of left breast in female, estrogen receptor negative (HCC), Midline low back pain with right-sided sciatica, Nephrostomy status ([facility]),  Daily Amount: 6 tablets  Qty: 5 tablet, Refills: 0    Comments: Reduce doses taken as pain becomes manageable  Associated Diagnoses: Post-op pain      ondansetron (ZOFRAN-ODT) 4 MG disintegrating tablet Take 1 tablet by mouth 3 times daily as needed for Nausea or Vomiting  Qty: 3 tablet, Refills: 0       !! - Potential duplicate medications found. Please discuss with provider.          Alex Gomez, DO  Emergency Medicine Resident    (Please note that portions of this note were completed with a voice recognition program.  Efforts were made to edit the dictations but occasionally words are mis-transcribed.)

## 2024-08-27 NOTE — OP NOTE
FACILITY:  [facility]  Luci Castañeda  1965  [id number]    DATE: 08/27/24   SURGEON:  Dr. Mor Escalona MD  ASSISTANT: Dr. Santos Swanson MD   PREOPERATIVE DIAGNOSIS: Right sided kidney stone  POSTOPERATIVE DIAGNOSIS: Right sided kidney stone  PROCEDURES PERFORMED:  1. Cystoscopy.  2. Right sided ureteroscopy with holmium laser lithotripsy  3. Right sided stent exchange.  DRAINS: A Right sided 6 [place] by 26 cm double J ureteral stent (with string)  SPECIMEN: Stone for analysis  ANESTHESIA: General  ESTIMATED BLOOD LOSS: None.   COMPLICATIONS: None.     INDICATIONS FOR PROCEDURE:  Luci Castañeda is a 59 y.o. female presents for Right sided calculus.     After the risks, benefits, alternatives, of the procedure were discussed with the patient, informed consent was obtained.  The patient elected to proceed.     DETAILS OF THE PROCEDURE:  The patient was brought back from the preoperative holding area to the operating suite, and was transferred to the operating table where the patient lay in supine position. EPC's were in place, connected to the machine and the machine was turned on before induction.  General endotracheal anesthesia was induced, and patient was prepped and draped in standard surgical fashion after being placed in dorsolithotomy position. A proper timeout was performed, preoperative antibiotics were given.     We began by inserting a cystoscope with a 22 [place]guillan sheath and 30 degree lens into the patient's urethral meatus and advancing into the bladder without complication.  A pan cystoscopy was preformed and the bladder appeared unremarkable.  We then focused our attention on the Right ureteral orifice, which we cannulated with our glidewire, advanced up to renal pelvis.  We then used a dual lumen catheter to place a second wire.  Once in good position, we advanced our flexible ureteroscope over the working wire and into the ureter under direct visualization.  We identified the

## 2024-08-27 NOTE — ANESTHESIA PRE PROCEDURE
TRACT, ANTIGRADE URETEROSCOPY, STONE BASKETTING,  INSERTION OF NEPHROSTOMY TUBE performed by Mor Escalona MD at [facility] OR    KIDNEY STONE SURGERY Right 08/21/2023    HOLMIUM, PERCUTANEOUS NEPHROLITHOTOMY, DIALATION OF TRACT, ANTIGRADE URETEROSCOPY, STONE BASKETTING,  INSERTION OF NEPHROSTOMY TUBE    KNEE ARTHROPLASTY Right 05/22/2023    KNEE ARTHROSCOPY Right 1998, 2003    X2    NEPHROLITHOTOMY Right 08/23/2019    HOLMIUM LASER, NEPHROLITHOTOMY PERCUTANEOUS, LITHOCLAST, C-ARM performed by Venkat Wang MD at [facility] OR    PERCUTANEOUS NEPHROSTOMY Right 11/01/2022    RIGHT PERCUTANEOUS NEPHROLITHOTOMY, NEPHROSTOMY PLACEMENT    MS CYSTO W/INSERT URETERAL STENT Right 07/04/2018    CYSTOSCOPY URETERAL STENT INSERTION, RIGHT performed by Pedro Friend MD at [facility] OR    MS CYSTO W/URETEROSCOPY W/RMVL/MANJ STONES N/A 07/10/2018    CYSTOSCOPY, RIGHT URETEROSCOPY, HOLMIUM LASER LITHO WITH STONE BASKETING, RIGHT STENT EXCHANGE performed by Frederick Chaudhry Jr., MD at [facility] OR    MS CYSTO W/URETEROSCOPY W/RMVL/MANJ STONES Right 10/10/2018    HOLMIUM LASER STANDBY, CYSTO, RIGHT URETEROSCOPY, RIGHT STENT PLACEMENT performed by Bismark Amaya MD at [facility] OR    ROTATOR CUFF REPAIR Left     TONSILLECTOMY      TOTAL KNEE ARTHROPLASTY Right 05/22/2023    RIGHT KNEE TOTAL ARTHROPLASTY performed by Nabil Potter DO at [facility] OR    TOTAL NEPHRECTOMY Left 1988    US BREAST BIOPSY NEEDLE ADDITIONAL LEFT Left 4/26/2024    US BREAST BIOPSY NEEDLE ADDITIONAL LEFT 4/26/2024 [facility]    US BREAST BIOPSY W LOC DEVICE 1ST LESION LEFT Left 4/26/2024    US BREAST BIOPSY W LOC DEVICE 1ST LESION LEFT 4/26/2024 [facility]    US LYMPH NODE BIOPSY  4/26/2024    US LYMPH NODE BIOPSY 4/26/2024 [facility]       Social History:    Social History     Tobacco Use    Smoking status: Never    Smokeless tobacco: Never   Substance Use Topics    Alcohol use: Yes     Alcohol/week: 0.0 standard drinks of alcohol     Comment:  occasionally                                Counseling given: Not Answered      Vital Signs (Current):   Vitals:    08/27/24 1353 08/27/24 1420 08/27/24 1432 08/27/24 1449   BP:    135/72   Pulse:       Resp:    15   Temp:       TempSrc:    Temporal   SpO2: 94% 96% 96% 96%   Weight:       Height:                                                  BP Readings from Last 3 Encounters:   08/27/24 135/72   08/26/24 110/70   08/23/24 95/63       NPO Status: Time of last liquid consumption: 2000MN                        Time of last solid consumption: 2000                        Date of last liquid consumption: 08/26/24                        Date of last solid food consumption: 08/26/24    BMI:   Wt Readings from Last 3 Encounters:   08/27/24 127.5 kg (281 lb)   08/26/24 127.5 kg (281 lb)   08/23/24 128.1 kg (282 lb 8 oz)     Body mass index is 42.73 kg/m².    CBC:   Lab Results   Component Value Date/Time    WBC 3.6 08/27/2024 12:47 PM    RBC 3.12 08/27/2024 12:47 PM    RBC 4.66 03/22/2012 11:41 AM    HGB 9.6 08/27/2024 12:47 PM    HCT 28.5 08/27/2024 12:47 PM    MCV 91.3 08/27/2024 12:47 PM    RDW 24.4 08/27/2024 12:47 PM    PLT See Reflexed IPF Result 08/27/2024 12:47 PM     03/22/2012 11:41 AM       CMP:   Lab Results   Component Value Date/Time     08/27/2024 12:47 PM    K 4.6 08/27/2024 12:47 PM     08/27/2024 12:47 PM    CO2 23 08/27/2024 12:47 PM    BUN 21 08/27/2024 12:47 PM    CREATININE 1.2 08/27/2024 12:47 PM    GFRAA >60 09/08/2022 11:58 AM    LABGLOM 52 08/27/2024 12:47 PM    LABGLOM 68 04/15/2024 01:27 PM    GLUCOSE 135 08/27/2024 12:47 PM    GLUCOSE 88 03/22/2012 11:41 AM    CALCIUM 9.8 08/27/2024 12:47 PM    BILITOT 0.8 08/27/2024 12:47 PM    ALKPHOS 122 08/27/2024 12:47 PM    AST 37 08/27/2024 12:47 PM    ALT 22 08/27/2024 12:47 PM       POC Tests:   No results for input(s): \"POCGLU\", \"POCNA\", \"POCK\", \"POCCL\", \"POCBUN\", \"POCHEMO\", \"POCHCT\" in the last 72 hours.      Coags:   Lab Results

## 2024-08-27 NOTE — ANESTHESIA POSTPROCEDURE EVALUATION
Department of Anesthesiology  Postprocedure Note    Patient: Luci Castañeda  MRN: [id number]  YOB: 1965  Date of evaluation: 8/27/2024    Procedure Summary       Date: 08/27/24 Room / Location: [address]    Anesthesia Start: 1611 Anesthesia Stop: 1705    Procedure: **2**CYSTOSCOPY, URETEROSCOPY, HOLMIUM LASER LITHOTRIPSY, WITH URETERAL STENT INSERTION (Right: Ureter) Diagnosis:       Ureteral stone      (Ureteral stone [N20.1])    Surgeons: Mor Escalona MD Responsible Provider: Agustín Badillo MD    Anesthesia Type: general ASA Status: 3            Anesthesia Type: No value filed.    Imani Phase I: Imani Score: 8    Imani Phase II: Imani Score: 10  POST-OP ANESTHESIA NOTE       BP (!) 143/79   Pulse 82   Temp 97.2 °F (36.2 °C)   Resp 18   Ht 1.727 m (5' 8\")   Wt 127.5 kg (281 lb)   SpO2 97%   BMI 42.73 kg/m²    Pain Assessment: None - Denies Pain  Pain Level: 2       Anesthesia Post Evaluation    Patient location during evaluation: PACU  Patient participation: complete - patient participated  Level of consciousness: awake  Pain score: 2  Airway patency: patent  Nausea & Vomiting: no vomiting and no nausea  Cardiovascular status: hemodynamically stable  Respiratory status: acceptable  Hydration status: stable  Pain management: adequate        No notable events documented.

## 2024-08-28 LAB
MICROORGANISM SPEC CULT: NORMAL
SERVICE CMNT-IMP: NORMAL
SPECIMEN DESCRIPTION: NORMAL

## 2024-08-29 ENCOUNTER — HOSPITAL ENCOUNTER (OUTPATIENT)
Dept: PHYSICAL THERAPY | Facility: CLINIC | Age: 59
Setting detail: THERAPIES SERIES
Discharge: HOME | End: 2024-08-29
Payer: COMMERCIAL

## 2024-08-29 PROCEDURE — 97110 THERAPEUTIC EXERCISES: CPT

## 2024-08-29 PROCEDURE — 97140 MANUAL THERAPY 1/> REGIONS: CPT

## 2024-08-29 NOTE — FLOWSHEET NOTE
[] [facility]  Outpatient Rehabilitation &  Therapy  [address]  [phone]  [phone] [] [facility]  Outpatient Rehabilitation &  Therapy  [address]  P: [phone]  F: [phone] [x] [facility]  Outpatient Rehabilitation &  Therapy  [address]  P: [phone]  F: [phone] [] [facility]  Outpatient Rehabilitation &  Therapy  [address]  [phone]  [phone] [] [facility]  Outpatient Rehabilitation &  Therapy  [address]   P: [phone]  F: [phone]  [] [facility]  Outpatient Rehabilitation &  Therapy  [address]  P: [phone]  F: [phone] [] [facility]  Outpatient Rehabilitation &  Therapy  [address]  P: [phone]  F: [phone] [] [facility]  Outpatient Rehabilitation &  Therapy  [address]  P: [phone]  F: [phone] [] [facility]  Outpatient Rehabilitation &  Therapy  [address]  P: [phone]  F: [phone]  [] [facility] Outpatient Rehabilitation &  Therapy  [address]  P: [phone]  F: [phone]     Physical Therapy Daily Treatment Note    Date:  2024  Patient Name:  Luci Castañeda    :  1965  MRN: [id number]  Physician: Jess Bradley              Insurance: R AFTER 30TH VISIT   Medical Diagnosis: C50.912 (ICD-10-CM) - Invasive ductal carcinoma of breast, left (HCC)               Rehab Codes: 25.612, R07.89, R29.3, 25.512, R53.0  Onset date: 24               Next 's appt.: ongoing  Visit# / total visits:      Cancels/No Shows: 0    Subjective:  Pt states she had 4 kidney stones removed and a stint placed this week, sore. To start new chemo Rx next week, has last one of current treatment tomorrow. Cough has

## 2024-08-30 ENCOUNTER — HOSPITAL ENCOUNTER (OUTPATIENT)
Dept: INFUSION THERAPY | Age: 59
Discharge: HOME | DRG: 660 | End: 2024-08-30
Payer: COMMERCIAL

## 2024-08-30 VITALS
WEIGHT: 285 LBS | SYSTOLIC BLOOD PRESSURE: 103 MMHG | BODY MASS INDEX: 43.33 KG/M2 | RESPIRATION RATE: 18 BRPM | HEART RATE: 54 BPM | TEMPERATURE: 97.9 F | DIASTOLIC BLOOD PRESSURE: 68 MMHG

## 2024-08-30 DIAGNOSIS — C50.412 MALIGNANT NEOPLASM OF UPPER-OUTER QUADRANT OF LEFT BREAST IN FEMALE, ESTROGEN RECEPTOR NEGATIVE (HCC): ICD-10-CM

## 2024-08-30 DIAGNOSIS — Z17.1 MALIGNANT NEOPLASM OF UPPER-OUTER QUADRANT OF LEFT BREAST IN FEMALE, ESTROGEN RECEPTOR NEGATIVE (HCC): ICD-10-CM

## 2024-08-30 DIAGNOSIS — C50.912 INVASIVE DUCTAL CARCINOMA OF BREAST, LEFT (HCC): Primary | ICD-10-CM

## 2024-08-30 DIAGNOSIS — E53.8 VITAMIN B12 DEFICIENCY: ICD-10-CM

## 2024-08-30 LAB
ALBUMIN SERPL-MCNC: 3.8 G/DL (ref 3.5–5.2)
ALBUMIN/GLOB SERPL: 1.4 {RATIO} (ref 1–2.5)
ALP SERPL-CCNC: 107 U/L (ref 35–104)
ALT SERPL-CCNC: 16 U/L (ref 5–33)
ANION GAP SERPL CALCULATED.3IONS-SCNC: 9 MMOL/L (ref 9–17)
AST SERPL-CCNC: 20 U/L
BASOPHILS # BLD: 0 K/UL (ref 0–0.2)
BASOPHILS NFR BLD: 0 % (ref 0–2)
BILIRUB SERPL-MCNC: 0.3 MG/DL (ref 0.3–1.2)
BUN SERPL-MCNC: 28 MG/DL (ref 8–23)
CALCIUM SERPL-MCNC: 9.1 MG/DL (ref 8.6–10.4)
CHLORIDE SERPL-SCNC: 103 MMOL/L (ref 98–107)
CO2 SERPL-SCNC: 27 MMOL/L (ref 20–31)
CREAT SERPL-MCNC: 1.1 MG/DL (ref 0.5–0.9)
EOSINOPHIL # BLD: 0 K/UL (ref 0–0.4)
EOSINOPHILS RELATIVE PERCENT: 0 % (ref 1–4)
ERYTHROCYTE [DISTWIDTH] IN BLOOD BY AUTOMATED COUNT: 28.7 % (ref 12.5–15.4)
GFR, ESTIMATED: 57 ML/MIN/1.73M2
GLUCOSE SERPL-MCNC: 136 MG/DL (ref 70–99)
HCT VFR BLD AUTO: 24.4 % (ref 36–46)
HGB BLD-MCNC: 8.2 G/DL (ref 12–16)
LYMPHOCYTES NFR BLD: 1.72 K/UL (ref 1–4.8)
LYMPHOCYTES RELATIVE PERCENT: 44 % (ref 24–44)
MCH RBC QN AUTO: 31.9 PG (ref 26–34)
MCHC RBC AUTO-ENTMCNC: 33.5 G/DL (ref 31–37)
MCV RBC AUTO: 95.2 FL (ref 80–100)
METAMYELOCYTES ABSOLUTE COUNT: 0.08 K/UL
METAMYELOCYTES: 2 %
MONOCYTES NFR BLD: 0.16 K/UL (ref 0.1–0.8)
MONOCYTES NFR BLD: 4 % (ref 1–7)
MORPHOLOGY: ABNORMAL
MORPHOLOGY: ABNORMAL
NEUTROPHILS NFR BLD: 50 % (ref 36–66)
NEUTS SEG NFR BLD: 1.94 K/UL (ref 1.8–7.7)
NUCLEATED RED BLOOD CELLS: 1 PER 100 WBC
PLATELET # BLD AUTO: 98 K/UL (ref 140–450)
PMV BLD AUTO: 7.1 FL (ref 6–12)
POTASSIUM SERPL-SCNC: 3.9 MMOL/L (ref 3.7–5.3)
PROT SERPL-MCNC: 6.5 G/DL (ref 6.4–8.3)
RBC # BLD AUTO: 2.56 M/UL (ref 4–5.2)
SODIUM SERPL-SCNC: 139 MMOL/L (ref 135–144)
WBC OTHER # BLD: 3.9 K/UL (ref 3.5–11)

## 2024-08-30 PROCEDURE — 2580000003 HC RX 258: Performed by: INTERNAL MEDICINE

## 2024-08-30 PROCEDURE — 82607 VITAMIN B-12: CPT

## 2024-08-30 PROCEDURE — 80053 COMPREHEN METABOLIC PANEL: CPT

## 2024-08-30 PROCEDURE — 2500000003 HC RX 250 WO HCPCS: Performed by: INTERNAL MEDICINE

## 2024-08-30 PROCEDURE — 85025 COMPLETE CBC W/AUTO DIFF WBC: CPT

## 2024-08-30 PROCEDURE — 96413 CHEMO IV INFUSION 1 HR: CPT

## 2024-08-30 PROCEDURE — 96375 TX/PRO/DX INJ NEW DRUG ADDON: CPT

## 2024-08-30 PROCEDURE — 6360000002 HC RX W HCPCS: Performed by: INTERNAL MEDICINE

## 2024-08-30 PROCEDURE — 36591 DRAW BLOOD OFF VENOUS DEVICE: CPT

## 2024-08-30 RX ORDER — SODIUM CHLORIDE 9 MG/ML
25 INJECTION, SOLUTION INTRAVENOUS PRN
OUTPATIENT
Start: 2024-08-30

## 2024-08-30 RX ORDER — FAMOTIDINE 10 MG/ML
20 INJECTION, SOLUTION INTRAVENOUS
OUTPATIENT
Start: 2024-08-30

## 2024-08-30 RX ORDER — ONDANSETRON 2 MG/ML
8 INJECTION INTRAMUSCULAR; INTRAVENOUS
OUTPATIENT
Start: 2024-08-30

## 2024-08-30 RX ORDER — ALBUTEROL SULFATE 90 UG/1
4 AEROSOL, METERED RESPIRATORY (INHALATION) PRN
OUTPATIENT
Start: 2024-08-30

## 2024-08-30 RX ORDER — SODIUM CHLORIDE 0.9 % (FLUSH) 0.9 %
5-40 SYRINGE (ML) INJECTION PRN
Status: DISCONTINUED | OUTPATIENT
Start: 2024-08-30 | End: 2024-08-31 | Stop reason: HOSPADM

## 2024-08-30 RX ORDER — SODIUM CHLORIDE 9 MG/ML
INJECTION, SOLUTION INTRAVENOUS CONTINUOUS
OUTPATIENT
Start: 2024-08-30

## 2024-08-30 RX ORDER — DEXAMETHASONE SODIUM PHOSPHATE 10 MG/ML
10 INJECTION INTRAMUSCULAR; INTRAVENOUS ONCE
Status: COMPLETED | OUTPATIENT
Start: 2024-08-30 | End: 2024-08-30

## 2024-08-30 RX ORDER — SODIUM CHLORIDE 0.9 % (FLUSH) 0.9 %
5-40 SYRINGE (ML) INJECTION PRN
OUTPATIENT
Start: 2024-08-30

## 2024-08-30 RX ORDER — FAMOTIDINE 10 MG/ML
20 INJECTION, SOLUTION INTRAVENOUS ONCE
Status: COMPLETED | OUTPATIENT
Start: 2024-08-30 | End: 2024-08-30

## 2024-08-30 RX ORDER — SODIUM CHLORIDE 9 MG/ML
5-250 INJECTION, SOLUTION INTRAVENOUS PRN
Status: DISCONTINUED | OUTPATIENT
Start: 2024-08-30 | End: 2024-08-31 | Stop reason: HOSPADM

## 2024-08-30 RX ORDER — ACETAMINOPHEN 325 MG/1
650 TABLET ORAL
OUTPATIENT
Start: 2024-08-30

## 2024-08-30 RX ORDER — DIPHENHYDRAMINE HYDROCHLORIDE 50 MG/ML
50 INJECTION INTRAMUSCULAR; INTRAVENOUS ONCE
Status: COMPLETED | OUTPATIENT
Start: 2024-08-30 | End: 2024-08-30

## 2024-08-30 RX ORDER — HEPARIN 100 UNIT/ML
500 SYRINGE INTRAVENOUS PRN
OUTPATIENT
Start: 2024-08-30

## 2024-08-30 RX ORDER — EPINEPHRINE 1 MG/ML
0.3 INJECTION, SOLUTION, CONCENTRATE INTRAVENOUS PRN
OUTPATIENT
Start: 2024-08-30

## 2024-08-30 RX ORDER — DIPHENHYDRAMINE HYDROCHLORIDE 50 MG/ML
50 INJECTION INTRAMUSCULAR; INTRAVENOUS
OUTPATIENT
Start: 2024-08-30

## 2024-08-30 RX ORDER — HEPARIN 100 UNIT/ML
500 SYRINGE INTRAVENOUS PRN
Status: DISCONTINUED | OUTPATIENT
Start: 2024-08-30 | End: 2024-08-31 | Stop reason: HOSPADM

## 2024-08-30 RX ADMIN — SODIUM CHLORIDE 100 ML/HR: 9 INJECTION, SOLUTION INTRAVENOUS at 11:39

## 2024-08-30 RX ADMIN — PACLITAXEL 168 MG: 6 INJECTION, SOLUTION, CONCENTRATE INTRAVENOUS at 12:44

## 2024-08-30 RX ADMIN — SODIUM CHLORIDE, PRESERVATIVE FREE 10 ML: 5 INJECTION INTRAVENOUS at 09:55

## 2024-08-30 RX ADMIN — HEPARIN 500 UNITS: 100 SYRINGE at 14:02

## 2024-08-30 RX ADMIN — DEXAMETHASONE SODIUM PHOSPHATE 10 MG: 10 INJECTION INTRAMUSCULAR; INTRAVENOUS at 11:39

## 2024-08-30 RX ADMIN — DIPHENHYDRAMINE HYDROCHLORIDE 50 MG: 50 INJECTION INTRAMUSCULAR; INTRAVENOUS at 11:39

## 2024-08-30 RX ADMIN — FAMOTIDINE 20 MG: 10 INJECTION, SOLUTION INTRAVENOUS at 11:40

## 2024-08-30 RX ADMIN — SODIUM CHLORIDE, PRESERVATIVE FREE 10 ML: 5 INJECTION INTRAVENOUS at 14:02

## 2024-08-30 ASSESSMENT — PAIN SCALES - GENERAL: PAINLEVEL_OUTOF10: 2

## 2024-08-30 ASSESSMENT — PAIN DESCRIPTION - LOCATION: LOCATION: OTHER (COMMENT)

## 2024-08-30 NOTE — PROGRESS NOTES
Pt here for C.4D.15. taxol   Arrives ambulatory.  Denies any new complaints.  Labs drawn from port, results reviewed.  Tx complete without incident.  Pt d/c'd in stable condition.  Returns 9/6/2024 for office visit and C5D1 keytruda, adriamycin, cytoxan, neulasta obi.

## 2024-08-30 NOTE — TELEPHONE ENCOUNTER
Location of patient: [place]    Subjective: Caller states \"I think I am passing a kidney stone. I called yesterday and got permission to go to the ER. Afterwards I passed it and now I think I am in the same position today. I have pain radiating to my right angel, nausea, a lot of pain. \"     Current Symptoms: right flank pain, radiating to groin. History of 1 kidney. Onset: midnight, worse past hour      Associated Symptoms: nausea. Pain Severity: 9/10; throbbing; constant    Temperature: \"I think warm but no fever\"     What has been tried: Tylenol at 0600    LMP: NA Pregnant: NA    Recommended disposition: Go to ED Now Have another adult drive her. Care advice provided, patient verbalizes understanding; denies any other questions or concerns; instructed to call back for any new or worsening symptoms. Patient/caller agrees to proceed to nearest Emergency Department    Attention Provider: Thank you for allowing me to participate in the care of your patient. The patient was connected to triage in response to symptoms provided. Please do not respond through this encounter as the response is not directed to a shared pool.       Reason for Disposition   [1] SEVERE pain (e.g., excruciating, scale 8-10) AND [2] present > 1 hour    Protocols used: Flank Pain-ADULT-AH Yes

## 2024-08-30 NOTE — PROGRESS NOTES
[facility] Outpatient Oncology/Hematology Progress Note    Situation: Writer encountered Patient and Friend in the treatment cubicle of the infusion clinic.    Assessment: Patient introduced her friend. Patient shared about her experience in the Emergency Department and the need for surgery this week. Pt acknowledged the impact on her energy. Pt expressed gratitude for her support system, and how she could call on her friend with short notice and he was available. Pt talked about her work history and how she will celebrate her 30th anniversary with [employer] this Monday. Pt expressed gratitude for one job in particular that she had for 20 years. Pt and Friend joked and laughed together as they shared how long they have been friends.    Intervention: Writer inquired about Pt's coping and needs. Writer offered words of support and encouragement. Writer affirmed Pt's strengths. Writer wished Pt well.    Outcome: Patient and Friend thanked writer for the visit.    Plan: Spiritual [facility] Services are available for Patient by phone and/or in person.      08/30/24 1234   Encounter Summary   Encounter Overview/Reason Spiritual/Emotional Needs   Service Provided For Patient;Friend   Referral/Consult From [facility]   Support System Family members;Friends/neighbors;Other (Comment)  (Coworkers)   Last Encounter  08/02/24   Complexity of Encounter Moderate   Begin Time 1030   End Time  1045   Total Time Calculated 15 min   Spiritual/Emotional needs   Type Spiritual Support   Assessment/Intervention/Outcome   Assessment Coping;Calm   Intervention Sustaining Presence/Ministry of presence;Active listening;Explored/Affirmed feelings, thoughts, concerns;Explored Coping Skills/Resources;Discussed illness injury and it’s impact   Outcome Expressed Gratitude;Expressed feelings, needs, and concerns;Engaged in conversation;Coping   Plan and Referrals   Plan/Referrals Continue Support (comment)     Atiya Matos [facility] Spiritual

## 2024-08-31 LAB
STONE COMPOSITION: NORMAL
STONE DESCRIPTION: NORMAL
STONE MASS: NORMAL MG
VIT B12 SERPL-MCNC: 709 PG/ML (ref 232–1245)

## 2024-09-01 ENCOUNTER — HOSPITAL ENCOUNTER (INPATIENT)
Age: 59
LOS: 1 days | Discharge: HOME | DRG: 660 | End: 2024-09-02
Attending: EMERGENCY MEDICINE | Admitting: INTERNAL MEDICINE
Payer: COMMERCIAL

## 2024-09-01 ENCOUNTER — APPOINTMENT (OUTPATIENT)
Dept: GENERAL RADIOLOGY | Age: 59
DRG: 660 | End: 2024-09-01
Payer: COMMERCIAL

## 2024-09-01 ENCOUNTER — APPOINTMENT (OUTPATIENT)
Dept: CT IMAGING | Age: 59
DRG: 660 | End: 2024-09-01
Payer: COMMERCIAL

## 2024-09-01 ENCOUNTER — ANESTHESIA (OUTPATIENT)
Dept: OPERATING ROOM | Age: 59
End: 2024-09-01
Payer: COMMERCIAL

## 2024-09-01 ENCOUNTER — NURSE TRIAGE (OUTPATIENT)
Dept: OTHER | Facility: CLINIC | Age: 59
End: 2024-09-01

## 2024-09-01 ENCOUNTER — ANESTHESIA EVENT (OUTPATIENT)
Dept: OPERATING ROOM | Age: 59
End: 2024-09-01
Payer: COMMERCIAL

## 2024-09-01 DIAGNOSIS — N13.9 OBSTRUCTIVE UROPATHY: Primary | ICD-10-CM

## 2024-09-01 DIAGNOSIS — N39.0 COMPLICATED UTI (URINARY TRACT INFECTION): ICD-10-CM

## 2024-09-01 DIAGNOSIS — N20.0 RIGHT RENAL STONE: ICD-10-CM

## 2024-09-01 DIAGNOSIS — N13.30 HYDRONEPHROSIS OF RIGHT KIDNEY: ICD-10-CM

## 2024-09-01 PROBLEM — Z90.5 H/O LEFT RADICAL NEPHRECTOMY: Status: ACTIVE | Noted: 2024-09-01

## 2024-09-01 LAB
ALBUMIN SERPL-MCNC: 4 G/DL (ref 3.5–5.2)
ALBUMIN/GLOB SERPL: 1 {RATIO} (ref 1–2.5)
ALP SERPL-CCNC: 105 U/L (ref 35–104)
ALT SERPL-CCNC: 14 U/L (ref 10–35)
ANION GAP SERPL CALCULATED.3IONS-SCNC: 10 MMOL/L (ref 9–16)
ANION GAP SERPL CALCULATED.3IONS-SCNC: 13 MMOL/L (ref 9–16)
AST SERPL-CCNC: 21 U/L (ref 10–35)
BACTERIA URNS QL MICRO: ABNORMAL
BASOPHILS # BLD: 0 K/UL (ref 0–0.2)
BASOPHILS NFR BLD: 0 % (ref 0–2)
BILIRUB SERPL-MCNC: 0.5 MG/DL (ref 0–1.2)
BILIRUB UR QL STRIP: NEGATIVE
BUN SERPL-MCNC: 32 MG/DL (ref 8–23)
BUN SERPL-MCNC: 39 MG/DL (ref 8–23)
CALCIUM SERPL-MCNC: 8.2 MG/DL (ref 8.6–10.4)
CALCIUM SERPL-MCNC: 9.2 MG/DL (ref 8.6–10.4)
CASTS #/AREA URNS LPF: ABNORMAL /LPF (ref 0–8)
CHLORIDE SERPL-SCNC: 102 MMOL/L (ref 98–107)
CHLORIDE SERPL-SCNC: 104 MMOL/L (ref 98–107)
CLARITY UR: ABNORMAL
CO2 SERPL-SCNC: 22 MMOL/L (ref 20–31)
CO2 SERPL-SCNC: 23 MMOL/L (ref 20–31)
COLOR UR: YELLOW
CREAT SERPL-MCNC: 1.3 MG/DL (ref 0.5–0.9)
CREAT SERPL-MCNC: 2.1 MG/DL (ref 0.5–0.9)
EOSINOPHIL # BLD: 0 K/UL (ref 0–0.44)
EOSINOPHILS RELATIVE PERCENT: 0 % (ref 1–4)
EPI CELLS #/AREA URNS HPF: ABNORMAL /HPF (ref 0–5)
ERYTHROCYTE [DISTWIDTH] IN BLOOD BY AUTOMATED COUNT: 24.6 % (ref 11.8–14.4)
GFR, ESTIMATED: 26 ML/MIN/1.73M2
GFR, ESTIMATED: 46 ML/MIN/1.73M2
GLUCOSE BLD-MCNC: 96 MG/DL (ref 65–105)
GLUCOSE SERPL-MCNC: 106 MG/DL (ref 74–99)
GLUCOSE SERPL-MCNC: 139 MG/DL (ref 74–99)
GLUCOSE UR STRIP-MCNC: NEGATIVE MG/DL
HCT VFR BLD AUTO: 26.5 % (ref 36.3–47.1)
HGB BLD-MCNC: 8.6 G/DL (ref 11.9–15.1)
HGB UR QL STRIP.AUTO: ABNORMAL
IMM GRANULOCYTES # BLD AUTO: 0.06 K/UL (ref 0–0.3)
IMM GRANULOCYTES NFR BLD: 1 %
KETONES UR STRIP-MCNC: NEGATIVE MG/DL
LACTIC ACID, WHOLE BLOOD: 1.3 MMOL/L (ref 0.7–2.1)
LEUKOCYTE ESTERASE UR QL STRIP: ABNORMAL
LYMPHOCYTES NFR BLD: 1.54 K/UL (ref 1.1–3.7)
LYMPHOCYTES RELATIVE PERCENT: 27 % (ref 24–43)
MCH RBC QN AUTO: 31.5 PG (ref 25.2–33.5)
MCHC RBC AUTO-ENTMCNC: 32.5 G/DL (ref 28.4–34.8)
MCV RBC AUTO: 97.1 FL (ref 82.6–102.9)
MONOCYTES NFR BLD: 0.51 K/UL (ref 0.1–1.2)
MONOCYTES NFR BLD: 9 % (ref 3–12)
MORPHOLOGY: ABNORMAL
NEUTROPHILS NFR BLD: 63 % (ref 36–65)
NEUTS SEG NFR BLD: 3.59 K/UL (ref 1.5–8.1)
NITRITE UR QL STRIP: NEGATIVE
NRBC BLD-RTO: 0.7 PER 100 WBC
PH UR STRIP: 6.5 [PH] (ref 5–8)
PLATELET # BLD AUTO: 102 K/UL (ref 138–453)
PMV BLD AUTO: 10 FL (ref 8.1–13.5)
POTASSIUM SERPL-SCNC: 4.2 MMOL/L (ref 3.7–5.3)
POTASSIUM SERPL-SCNC: 4.6 MMOL/L (ref 3.7–5.3)
PROT SERPL-MCNC: 6.7 G/DL (ref 6.6–8.7)
PROT UR STRIP-MCNC: ABNORMAL MG/DL
RBC # BLD AUTO: 2.73 M/UL (ref 3.95–5.11)
RBC #/AREA URNS HPF: ABNORMAL /HPF (ref 0–4)
SODIUM SERPL-SCNC: 136 MMOL/L (ref 136–145)
SODIUM SERPL-SCNC: 138 MMOL/L (ref 136–145)
SP GR UR STRIP: 1.02 (ref 1–1.03)
TROPONIN I SERPL HS-MCNC: 8 NG/L (ref 0–14)
UROBILINOGEN UR STRIP-ACNC: NORMAL EU/DL (ref 0–1)
WBC #/AREA URNS HPF: ABNORMAL /HPF (ref 0–5)
WBC OTHER # BLD: 5.7 K/UL (ref 3.5–11.3)

## 2024-09-01 PROCEDURE — 1200000000 HC SEMI PRIVATE

## 2024-09-01 PROCEDURE — 2500000003 HC RX 250 WO HCPCS: Performed by: NURSE ANESTHETIST, CERTIFIED REGISTERED

## 2024-09-01 PROCEDURE — 82947 ASSAY GLUCOSE BLOOD QUANT: CPT

## 2024-09-01 PROCEDURE — 3600000002 HC SURGERY LEVEL 2 BASE: Performed by: UROLOGY

## 2024-09-01 PROCEDURE — 83605 ASSAY OF LACTIC ACID: CPT

## 2024-09-01 PROCEDURE — 99285 EMERGENCY DEPT VISIT HI MDM: CPT

## 2024-09-01 PROCEDURE — 7100000000 HC PACU RECOVERY - FIRST 15 MIN: Performed by: UROLOGY

## 2024-09-01 PROCEDURE — 2709999900 HC NON-CHARGEABLE SUPPLY: Performed by: UROLOGY

## 2024-09-01 PROCEDURE — 3700000001 HC ADD 15 MINUTES (ANESTHESIA): Performed by: UROLOGY

## 2024-09-01 PROCEDURE — 3600000012 HC SURGERY LEVEL 2 ADDTL 15MIN: Performed by: UROLOGY

## 2024-09-01 PROCEDURE — 87040 BLOOD CULTURE FOR BACTERIA: CPT

## 2024-09-01 PROCEDURE — 84484 ASSAY OF TROPONIN QUANT: CPT

## 2024-09-01 PROCEDURE — C2617 STENT, NON-COR, TEM W/O DEL: HCPCS | Performed by: UROLOGY

## 2024-09-01 PROCEDURE — 6360000002 HC RX W HCPCS: Performed by: NURSE ANESTHETIST, CERTIFIED REGISTERED

## 2024-09-01 PROCEDURE — 6360000002 HC RX W HCPCS

## 2024-09-01 PROCEDURE — C2628 CATHETER, OCCLUSION: HCPCS | Performed by: UROLOGY

## 2024-09-01 PROCEDURE — 80048 BASIC METABOLIC PNL TOTAL CA: CPT

## 2024-09-01 PROCEDURE — 80053 COMPREHEN METABOLIC PANEL: CPT

## 2024-09-01 PROCEDURE — 36415 COLL VENOUS BLD VENIPUNCTURE: CPT

## 2024-09-01 PROCEDURE — 7100000001 HC PACU RECOVERY - ADDTL 15 MIN: Performed by: UROLOGY

## 2024-09-01 PROCEDURE — 3700000000 HC ANESTHESIA ATTENDED CARE: Performed by: UROLOGY

## 2024-09-01 PROCEDURE — 6360000002 HC RX W HCPCS: Performed by: STUDENT IN AN ORGANIZED HEALTH CARE EDUCATION/TRAINING PROGRAM

## 2024-09-01 PROCEDURE — 85025 COMPLETE CBC W/AUTO DIFF WBC: CPT

## 2024-09-01 PROCEDURE — 6360000002 HC RX W HCPCS: Performed by: ANESTHESIOLOGY

## 2024-09-01 PROCEDURE — 87086 URINE CULTURE/COLONY COUNT: CPT

## 2024-09-01 PROCEDURE — C1769 GUIDE WIRE: HCPCS | Performed by: UROLOGY

## 2024-09-01 PROCEDURE — 93005 ELECTROCARDIOGRAM TRACING: CPT | Performed by: STUDENT IN AN ORGANIZED HEALTH CARE EDUCATION/TRAINING PROGRAM

## 2024-09-01 PROCEDURE — 0T768DZ DILATION OF RIGHT URETER WITH INTRALUMINAL DEVICE, VIA NATURAL OR ARTIFICIAL OPENING ENDOSCOPIC: ICD-10-PCS | Performed by: UROLOGY

## 2024-09-01 PROCEDURE — 74176 CT ABD & PELVIS W/O CONTRAST: CPT

## 2024-09-01 PROCEDURE — 96376 TX/PRO/DX INJ SAME DRUG ADON: CPT

## 2024-09-01 PROCEDURE — 2580000003 HC RX 258

## 2024-09-01 PROCEDURE — 6370000000 HC RX 637 (ALT 250 FOR IP)

## 2024-09-01 PROCEDURE — 96375 TX/PRO/DX INJ NEW DRUG ADDON: CPT

## 2024-09-01 PROCEDURE — 81001 URINALYSIS AUTO W/SCOPE: CPT

## 2024-09-01 PROCEDURE — 96365 THER/PROPH/DIAG IV INF INIT: CPT

## 2024-09-01 PROCEDURE — 2580000003 HC RX 258: Performed by: UROLOGY

## 2024-09-01 PROCEDURE — 2580000003 HC RX 258: Performed by: STUDENT IN AN ORGANIZED HEALTH CARE EDUCATION/TRAINING PROGRAM

## 2024-09-01 PROCEDURE — C1747 HC ENDOSCOPE, SINGLE, URINARY TRACT: HCPCS | Performed by: UROLOGY

## 2024-09-01 PROCEDURE — 2580000003 HC RX 258: Performed by: NURSE ANESTHETIST, CERTIFIED REGISTERED

## 2024-09-01 DEVICE — URETERAL STENT
Type: IMPLANTABLE DEVICE | Site: URETER | Status: FUNCTIONAL
Brand: PERCUFLEX™ PLUS

## 2024-09-01 RX ORDER — 0.9 % SODIUM CHLORIDE 0.9 %
1000 INTRAVENOUS SOLUTION INTRAVENOUS ONCE
Status: COMPLETED | OUTPATIENT
Start: 2024-09-01 | End: 2024-09-01

## 2024-09-01 RX ORDER — POTASSIUM CHLORIDE 7.45 MG/ML
10 INJECTION INTRAVENOUS PRN
Status: DISCONTINUED | OUTPATIENT
Start: 2024-09-01 | End: 2024-09-02 | Stop reason: HOSPADM

## 2024-09-01 RX ORDER — POLYETHYLENE GLYCOL 3350 17 G/17G
17 POWDER, FOR SOLUTION ORAL DAILY PRN
Status: DISCONTINUED | OUTPATIENT
Start: 2024-09-01 | End: 2024-09-02 | Stop reason: HOSPADM

## 2024-09-01 RX ORDER — INSULIN LISPRO 100 [IU]/ML
0-4 INJECTION, SOLUTION INTRAVENOUS; SUBCUTANEOUS NIGHTLY
Status: DISCONTINUED | OUTPATIENT
Start: 2024-09-01 | End: 2024-09-02 | Stop reason: HOSPADM

## 2024-09-01 RX ORDER — MORPHINE SULFATE 4 MG/ML
4 INJECTION INTRAVENOUS ONCE
Status: COMPLETED | OUTPATIENT
Start: 2024-09-01 | End: 2024-09-01

## 2024-09-01 RX ORDER — FENTANYL CITRATE 50 UG/ML
INJECTION, SOLUTION INTRAMUSCULAR; INTRAVENOUS PRN
Status: DISCONTINUED | OUTPATIENT
Start: 2024-09-01 | End: 2024-09-01 | Stop reason: SDUPTHER

## 2024-09-01 RX ORDER — FENTANYL CITRATE 50 UG/ML
50 INJECTION, SOLUTION INTRAMUSCULAR; INTRAVENOUS EVERY 5 MIN PRN
Status: DISCONTINUED | OUTPATIENT
Start: 2024-09-01 | End: 2024-09-01 | Stop reason: HOSPADM

## 2024-09-01 RX ORDER — INSULIN LISPRO 100 [IU]/ML
0-4 INJECTION, SOLUTION INTRAVENOUS; SUBCUTANEOUS
Status: DISCONTINUED | OUTPATIENT
Start: 2024-09-01 | End: 2024-09-02 | Stop reason: HOSPADM

## 2024-09-01 RX ORDER — SODIUM CHLORIDE 0.9 % (FLUSH) 0.9 %
5-40 SYRINGE (ML) INJECTION PRN
Status: DISCONTINUED | OUTPATIENT
Start: 2024-09-01 | End: 2024-09-02 | Stop reason: HOSPADM

## 2024-09-01 RX ORDER — MAGNESIUM HYDROXIDE 1200 MG/15ML
LIQUID ORAL CONTINUOUS PRN
Status: COMPLETED | OUTPATIENT
Start: 2024-09-01 | End: 2024-09-01

## 2024-09-01 RX ORDER — SODIUM CHLORIDE 9 MG/ML
INJECTION, SOLUTION INTRAVENOUS PRN
Status: DISCONTINUED | OUTPATIENT
Start: 2024-09-01 | End: 2024-09-02 | Stop reason: HOSPADM

## 2024-09-01 RX ORDER — POTASSIUM CHLORIDE 1500 MG/1
40 TABLET, EXTENDED RELEASE ORAL PRN
Status: DISCONTINUED | OUTPATIENT
Start: 2024-09-01 | End: 2024-09-02 | Stop reason: HOSPADM

## 2024-09-01 RX ORDER — SODIUM CHLORIDE 9 MG/ML
INJECTION, SOLUTION INTRAVENOUS PRN
Status: DISCONTINUED | OUTPATIENT
Start: 2024-09-01 | End: 2024-09-01 | Stop reason: HOSPADM

## 2024-09-01 RX ORDER — KETOROLAC TROMETHAMINE 15 MG/ML
15 INJECTION, SOLUTION INTRAMUSCULAR; INTRAVENOUS ONCE
Status: COMPLETED | OUTPATIENT
Start: 2024-09-01 | End: 2024-09-01

## 2024-09-01 RX ORDER — ACETAMINOPHEN 325 MG/1
650 TABLET ORAL EVERY 6 HOURS PRN
Status: DISCONTINUED | OUTPATIENT
Start: 2024-09-01 | End: 2024-09-02 | Stop reason: HOSPADM

## 2024-09-01 RX ORDER — ONDANSETRON 2 MG/ML
INJECTION INTRAMUSCULAR; INTRAVENOUS PRN
Status: DISCONTINUED | OUTPATIENT
Start: 2024-09-01 | End: 2024-09-01 | Stop reason: SDUPTHER

## 2024-09-01 RX ORDER — TAMSULOSIN HYDROCHLORIDE 0.4 MG/1
0.4 CAPSULE ORAL DAILY
Status: DISCONTINUED | OUTPATIENT
Start: 2024-09-01 | End: 2024-09-02 | Stop reason: HOSPADM

## 2024-09-01 RX ORDER — ONDANSETRON 2 MG/ML
4 INJECTION INTRAMUSCULAR; INTRAVENOUS ONCE
Status: COMPLETED | OUTPATIENT
Start: 2024-09-01 | End: 2024-09-01

## 2024-09-01 RX ORDER — MEPERIDINE HYDROCHLORIDE 50 MG/ML
12.5 INJECTION INTRAMUSCULAR; INTRAVENOUS; SUBCUTANEOUS EVERY 5 MIN PRN
Status: DISCONTINUED | OUTPATIENT
Start: 2024-09-01 | End: 2024-09-01 | Stop reason: HOSPADM

## 2024-09-01 RX ORDER — MIDAZOLAM HYDROCHLORIDE 1 MG/ML
INJECTION INTRAMUSCULAR; INTRAVENOUS PRN
Status: DISCONTINUED | OUTPATIENT
Start: 2024-09-01 | End: 2024-09-01 | Stop reason: SDUPTHER

## 2024-09-01 RX ORDER — PANTOPRAZOLE SODIUM 40 MG/1
40 TABLET, DELAYED RELEASE ORAL
Status: DISCONTINUED | OUTPATIENT
Start: 2024-09-02 | End: 2024-09-02 | Stop reason: HOSPADM

## 2024-09-01 RX ORDER — SODIUM CHLORIDE 9 MG/ML
INJECTION, SOLUTION INTRAVENOUS CONTINUOUS PRN
Status: DISCONTINUED | OUTPATIENT
Start: 2024-09-01 | End: 2024-09-01 | Stop reason: SDUPTHER

## 2024-09-01 RX ORDER — ONDANSETRON 2 MG/ML
4 INJECTION INTRAMUSCULAR; INTRAVENOUS EVERY 6 HOURS PRN
Status: DISCONTINUED | OUTPATIENT
Start: 2024-09-01 | End: 2024-09-02 | Stop reason: HOSPADM

## 2024-09-01 RX ORDER — ONDANSETRON 4 MG/1
4 TABLET, ORALLY DISINTEGRATING ORAL EVERY 8 HOURS PRN
Status: DISCONTINUED | OUTPATIENT
Start: 2024-09-01 | End: 2024-09-02 | Stop reason: HOSPADM

## 2024-09-01 RX ORDER — FENTANYL CITRATE 50 UG/ML
25 INJECTION, SOLUTION INTRAMUSCULAR; INTRAVENOUS EVERY 5 MIN PRN
Status: DISCONTINUED | OUTPATIENT
Start: 2024-09-01 | End: 2024-09-01 | Stop reason: HOSPADM

## 2024-09-01 RX ORDER — BUDESONIDE AND FORMOTEROL FUMARATE DIHYDRATE 160; 4.5 UG/1; UG/1
2 AEROSOL RESPIRATORY (INHALATION) 2 TIMES DAILY
Status: DISCONTINUED | OUTPATIENT
Start: 2024-09-01 | End: 2024-09-02 | Stop reason: HOSPADM

## 2024-09-01 RX ORDER — ALBUTEROL SULFATE 90 UG/1
2 AEROSOL, METERED RESPIRATORY (INHALATION) EVERY 6 HOURS PRN
Status: DISCONTINUED | OUTPATIENT
Start: 2024-09-01 | End: 2024-09-02 | Stop reason: HOSPADM

## 2024-09-01 RX ORDER — NALOXONE HYDROCHLORIDE 0.4 MG/ML
INJECTION, SOLUTION INTRAMUSCULAR; INTRAVENOUS; SUBCUTANEOUS PRN
Status: DISCONTINUED | OUTPATIENT
Start: 2024-09-01 | End: 2024-09-01 | Stop reason: HOSPADM

## 2024-09-01 RX ORDER — LIDOCAINE HYDROCHLORIDE 10 MG/ML
INJECTION, SOLUTION EPIDURAL; INFILTRATION; INTRACAUDAL; PERINEURAL PRN
Status: DISCONTINUED | OUTPATIENT
Start: 2024-09-01 | End: 2024-09-01 | Stop reason: SDUPTHER

## 2024-09-01 RX ORDER — SODIUM CHLORIDE 0.9 % (FLUSH) 0.9 %
5-40 SYRINGE (ML) INJECTION EVERY 12 HOURS SCHEDULED
Status: DISCONTINUED | OUTPATIENT
Start: 2024-09-01 | End: 2024-09-01 | Stop reason: HOSPADM

## 2024-09-01 RX ORDER — AMLODIPINE BESYLATE 5 MG/1
5 TABLET ORAL DAILY
Status: DISCONTINUED | OUTPATIENT
Start: 2024-09-02 | End: 2024-09-02 | Stop reason: HOSPADM

## 2024-09-01 RX ORDER — DEXTROSE MONOHYDRATE 100 MG/ML
INJECTION, SOLUTION INTRAVENOUS CONTINUOUS PRN
Status: DISCONTINUED | OUTPATIENT
Start: 2024-09-01 | End: 2024-09-02 | Stop reason: HOSPADM

## 2024-09-01 RX ORDER — SODIUM CHLORIDE 0.9 % (FLUSH) 0.9 %
5-40 SYRINGE (ML) INJECTION EVERY 12 HOURS SCHEDULED
Status: DISCONTINUED | OUTPATIENT
Start: 2024-09-01 | End: 2024-09-02 | Stop reason: HOSPADM

## 2024-09-01 RX ORDER — MAGNESIUM SULFATE IN WATER 40 MG/ML
2000 INJECTION, SOLUTION INTRAVENOUS PRN
Status: DISCONTINUED | OUTPATIENT
Start: 2024-09-01 | End: 2024-09-02 | Stop reason: HOSPADM

## 2024-09-01 RX ORDER — ACETAMINOPHEN 650 MG/1
650 SUPPOSITORY RECTAL EVERY 6 HOURS PRN
Status: DISCONTINUED | OUTPATIENT
Start: 2024-09-01 | End: 2024-09-02 | Stop reason: HOSPADM

## 2024-09-01 RX ORDER — GLUCAGON 1 MG/ML
1 KIT INJECTION PRN
Status: DISCONTINUED | OUTPATIENT
Start: 2024-09-01 | End: 2024-09-02 | Stop reason: HOSPADM

## 2024-09-01 RX ORDER — TRAZODONE HYDROCHLORIDE 50 MG/1
50 TABLET, FILM COATED ORAL NIGHTLY
Status: DISCONTINUED | OUTPATIENT
Start: 2024-09-01 | End: 2024-09-02 | Stop reason: HOSPADM

## 2024-09-01 RX ORDER — PROPOFOL 10 MG/ML
INJECTION, EMULSION INTRAVENOUS PRN
Status: DISCONTINUED | OUTPATIENT
Start: 2024-09-01 | End: 2024-09-01 | Stop reason: SDUPTHER

## 2024-09-01 RX ORDER — SODIUM CHLORIDE 0.9 % (FLUSH) 0.9 %
5-40 SYRINGE (ML) INJECTION PRN
Status: DISCONTINUED | OUTPATIENT
Start: 2024-09-01 | End: 2024-09-01 | Stop reason: HOSPADM

## 2024-09-01 RX ORDER — ONDANSETRON 2 MG/ML
4 INJECTION INTRAMUSCULAR; INTRAVENOUS
Status: COMPLETED | OUTPATIENT
Start: 2024-09-01 | End: 2024-09-01

## 2024-09-01 RX ADMIN — HYDROMORPHONE HYDROCHLORIDE 0.5 MG: 1 INJECTION, SOLUTION INTRAMUSCULAR; INTRAVENOUS; SUBCUTANEOUS at 20:51

## 2024-09-01 RX ADMIN — ONDANSETRON 4 MG: 2 INJECTION INTRAMUSCULAR; INTRAVENOUS at 18:50

## 2024-09-01 RX ADMIN — LIDOCAINE HYDROCHLORIDE 50 MG: 10 INJECTION, SOLUTION EPIDURAL; INFILTRATION; INTRACAUDAL; PERINEURAL at 18:00

## 2024-09-01 RX ADMIN — ONDANSETRON 4 MG: 2 INJECTION INTRAMUSCULAR; INTRAVENOUS at 12:46

## 2024-09-01 RX ADMIN — TAMSULOSIN HYDROCHLORIDE 0.4 MG: 0.4 CAPSULE ORAL at 20:49

## 2024-09-01 RX ADMIN — SODIUM CHLORIDE, PRESERVATIVE FREE 10 ML: 5 INJECTION INTRAVENOUS at 20:53

## 2024-09-01 RX ADMIN — FENTANYL CITRATE 25 MCG: 50 INJECTION, SOLUTION INTRAMUSCULAR; INTRAVENOUS at 18:53

## 2024-09-01 RX ADMIN — SODIUM CHLORIDE 1000 ML: 9 INJECTION, SOLUTION INTRAVENOUS at 08:05

## 2024-09-01 RX ADMIN — FENTANYL CITRATE 50 MCG: 50 INJECTION, SOLUTION INTRAMUSCULAR; INTRAVENOUS at 18:00

## 2024-09-01 RX ADMIN — PROPOFOL 200 MG: 10 INJECTION, EMULSION INTRAVENOUS at 18:00

## 2024-09-01 RX ADMIN — HYDROMORPHONE HYDROCHLORIDE 0.5 MG: 1 INJECTION, SOLUTION INTRAMUSCULAR; INTRAVENOUS; SUBCUTANEOUS at 12:48

## 2024-09-01 RX ADMIN — MIDAZOLAM 2 MG: 1 INJECTION INTRAMUSCULAR; INTRAVENOUS at 18:00

## 2024-09-01 RX ADMIN — CEFTRIAXONE SODIUM 1000 MG: 1 INJECTION, POWDER, FOR SOLUTION INTRAMUSCULAR; INTRAVENOUS at 12:00

## 2024-09-01 RX ADMIN — ONDANSETRON 4 MG: 2 INJECTION INTRAMUSCULAR; INTRAVENOUS at 08:05

## 2024-09-01 RX ADMIN — MORPHINE SULFATE 4 MG: 4 INJECTION INTRAVENOUS at 10:55

## 2024-09-01 RX ADMIN — KETOROLAC TROMETHAMINE 15 MG: 15 INJECTION, SOLUTION INTRAMUSCULAR; INTRAVENOUS at 08:06

## 2024-09-01 RX ADMIN — SODIUM CHLORIDE: 9 INJECTION, SOLUTION INTRAVENOUS at 18:00

## 2024-09-01 RX ADMIN — PHENYLEPHRINE HYDROCHLORIDE 100 MCG: 10 INJECTION INTRAVENOUS at 18:13

## 2024-09-01 RX ADMIN — ONDANSETRON 4 MG: 2 INJECTION INTRAMUSCULAR; INTRAVENOUS at 18:28

## 2024-09-01 RX ADMIN — MORPHINE SULFATE 4 MG: 4 INJECTION INTRAVENOUS at 08:27

## 2024-09-01 RX ADMIN — TRAZODONE HYDROCHLORIDE 50 MG: 50 TABLET ORAL at 20:49

## 2024-09-01 ASSESSMENT — PAIN DESCRIPTION - PAIN TYPE
TYPE: ACUTE PAIN;SURGICAL PAIN
TYPE: SURGICAL PAIN;ACUTE PAIN

## 2024-09-01 ASSESSMENT — PAIN - FUNCTIONAL ASSESSMENT
PAIN_FUNCTIONAL_ASSESSMENT: 0-10
PAIN_FUNCTIONAL_ASSESSMENT: ACTIVITIES ARE NOT PREVENTED

## 2024-09-01 ASSESSMENT — PAIN DESCRIPTION - FREQUENCY
FREQUENCY: INTERMITTENT

## 2024-09-01 ASSESSMENT — PAIN DESCRIPTION - ONSET
ONSET: ON-GOING

## 2024-09-01 ASSESSMENT — PAIN DESCRIPTION - ORIENTATION
ORIENTATION: RIGHT

## 2024-09-01 ASSESSMENT — PAIN SCALES - GENERAL
PAINLEVEL_OUTOF10: 9
PAINLEVEL_OUTOF10: 3
PAINLEVEL_OUTOF10: 7
PAINLEVEL_OUTOF10: 10
PAINLEVEL_OUTOF10: 7
PAINLEVEL_OUTOF10: 5
PAINLEVEL_OUTOF10: 10
PAINLEVEL_OUTOF10: 6
PAINLEVEL_OUTOF10: 10
PAINLEVEL_OUTOF10: 2

## 2024-09-01 ASSESSMENT — LIFESTYLE VARIABLES
HOW MANY STANDARD DRINKS CONTAINING ALCOHOL DO YOU HAVE ON A TYPICAL DAY: PATIENT DOES NOT DRINK
HOW OFTEN DO YOU HAVE A DRINK CONTAINING ALCOHOL: NEVER

## 2024-09-01 ASSESSMENT — PAIN DESCRIPTION - DESCRIPTORS
DESCRIPTORS: ACHING;TENDER;SORE
DESCRIPTORS: ACHING;TENDER;SORE
DESCRIPTORS: BURNING
DESCRIPTORS: ACHING;DISCOMFORT;SORE

## 2024-09-01 ASSESSMENT — PAIN DESCRIPTION - LOCATION
LOCATION: ABDOMEN;PELVIS;FLANK
LOCATION: BACK
LOCATION: FLANK
LOCATION: ABDOMEN
LOCATION: BACK;FLANK
LOCATION: FLANK

## 2024-09-01 NOTE — OP NOTE
Operative Note      Patient: Luci Castañeda  YOB: 1965  MRN: [id number]    Date of Procedure: 9/1/2024    Pre-Op Diagnosis Codes:      * Ureteral stone [N20.1]     * Hydronephrosis, unspecified hydronephrosis type [N13.30]     * End stage renal disease (HCC) [N18.6]    Post-Op Diagnosis: Same       Procedure(s):  CYSTOSCOPY URETEROSCOPY HOLMIUM  LASER STANDBY, STENT INSERTION    Surgeon(s):  Bismark Amaya MD    Assistant:   Resident: Santos Swanson MD    Anesthesia: Monitor Anesthesia Care    Estimated Blood Loss (mL): Minimal    Complications: None    Specimens:   * No specimens in log *    Implants:  Implant Name Type Inv. Item Serial No.  Lot No. LRB No. Used Action   STENT URET 4.8FR L28CM PERCFLX + HYDR+ FIRM DUROMETER DBL - [id number]  STENT URET 4.8FR L28CM PERCFLX + HYDR+ FIRM DUROMETER DBL  [facility] UROLOGY-WD [id number] Right 1 Implanted         Drains:   Nephrostomy Tube Right Flank 24 fr (Active)       Findings:  Infection Present At Time Of Surgery (PATOS) (choose all levels that have infection present):  No infection present  Other Findings: No stone burden found in the kidney or ureter.  Successful placement of 4.8 Slovenian by 28 cm stent    Detailed Description of Procedure:   Patient was taken back from the preop holding area to the OR.  Patient transferred the OR table in supine position.  Anesthesia was induced and antibiotics were previously given.  Patient was then placed in dorsolithotomy position and prepped and draped in usual fashion.  We began by assembling a 22 Slovenian rigid cystoscope with 30 degree lens.  Pan cystoscopy revealed some cloudy urine most likely from stone fragmentation.  No other masses, lesions, or stones were identified.    Attention was then turned to the right ureteral orifice.  A Glidewire was cannulate up into the renal pelvis under direct fluoroscopy.  We then remove the scope.  Alongside the working wire, we assembled a semirigid ureteroscope

## 2024-09-01 NOTE — ED PROVIDER NOTES
[facility] ED     Emergency Department     Faculty Attestation        I performed a history and physical examination of the patient and discussed management with the resident. I reviewed the resident’s note and agree with the documented findings and plan of care. Any areas of disagreement are noted on the chart. I was personally present for the key portions of any procedures. I have documented in the chart those procedures where I was not present during the key portions. I have reviewed the emergency nurses triage note. I agree with the chief complaint, past medical history, past surgical history, allergies, medications, social and family history as documented unless otherwise noted below.  For Physician Assistant/ Nurse Practitioner cases/documentation I have personally evaluated this patient and have completed at least one if not all key elements of the E/M (history, physical exam, and MDM). Additional findings are as noted.      Vital Signs: BP: (!) 158/90  Pulse: 62  Respirations: 22  Temp: 96.9 °F (36.1 °C) SpO2: 96 %  PCP:  Nabil Ly MD  Note Started: 9/1/24, 8:45 AM EDT    Pertinent Comments:     Patient is a 59-year-old female with recent diagnosis of right ureterolithiasis with 2 tandem kidney stones in the mid to distal ureter on the right.   Continued pain and is here for further treatment.   Will obtain basic laboratories as well as urinalysis and repeat CT.   Reconsult urology as well    Critical Care  None      (Please note that portions of this note were completed with a voice recognition program. Efforts were made to edit the dictations but occasionally words are mis-transcribed. Whenever words are used in this note in any gender, they shall be construed as though they were used in the gender appropriate to the circumstances; and whenever words are used in this note in the singular or plural form, they shall be construed as though 
     Mood and Affect: Mood normal. Mood is not anxious or depressed.         Behavior: Behavior normal.         DDX/DIAGNOSTIC RESULTS / EMERGENCY DEPARTMENT COURSE / MDM     Medical Decision Making  59 y.o. female with PMH including left-sided breast cancer and recent right kidney stone s/p cystoscopy with right ureteroscopic/laser lithotripsy and right-sided stent exchange who presents emergency department with worsened right flank pain radiating to her inguinal region.  See HPI and physical exam for further detail.  Patient arrives in obvious discomfort due to the right sided flank pain.  She has unremarkable vitals aside from hypertension which can be attributed to the pain.  Plan for abdominal labs with UA and CT abdomen pelvis without to evaluate for further renal stone/infection given the recent stent.  Anticipate urology consult.  Will attempt to control pain and give IV fluids.  Will continue to monitor closely    Amount and/or Complexity of Data Reviewed  Labs: ordered. Decision-making details documented in ED Course.  Radiology: ordered. Decision-making details documented in ED Course.  ECG/medicine tests: ordered.    Risk  Prescription drug management.  Decision regarding hospitalization.        EKG  N/a    All EKG's are interpreted by the Emergency Department Physician who either signs or Co-signs this chart in the absence of a cardiologist.    EMERGENCY DEPARTMENT COURSE:    ED Course as of 09/01/24 1438   Sun Sep 01, 2024   0841 Discussed case with urology.  They reviewed CT imaging and stated that they may potentially need to do a repeat ureteroscopic on the patient.  They will come evaluate for further management [GC]   0841 CMP unremarkable aside from a creatinine of 1.3 which appears to be slightly up from her baseline.  Otherwise unremarkable [GC]   0842 CBC with differential without leukocytosis.  Hemoglobin 8.6 which is her baseline [GC]   0913 CT ABDOMEN PELVIS WO CONTRAST Additional Contrast?

## 2024-09-01 NOTE — H&P
nephrectomy in 1988   CURRENT THERAPY:               INVESTIGATIONS     Diagnostic Labs:  CBC:   Recent Labs     08/30/24  1000 09/01/24  0803   WBC 3.9 5.7   RBC 2.56* 2.73*   HGB 8.2* 8.6*   HCT 24.4* 26.5*   MCV 95.2 97.1   RDW 28.7* 24.6*   PLT 98* 102*     BMP:   Recent Labs     08/30/24  1000 09/01/24  0803    138   K 3.9 4.2    102   CO2 27 23   BUN 28* 32*   CREATININE 1.1* 1.3*     BNP: No results for input(s): \"BNP\" in the last 72 hours.  PT/INR: No results for input(s): \"PROTIME\", \"INR\" in the last 72 hours.  APTT: No results for input(s): \"APTT\" in the last 72 hours.  CARDIAC ENZYMES: No results for input(s): \"CKMB\", \"CKMBINDEX\", \"TROPONINI\" in the last 72 hours.    Invalid input(s): \"CKTOTAL;3\"  FASTING LIPID PANEL:  Lab Results   Component Value Date    CHOL 309 (H) 06/11/2024    HDL 26 (L) 06/11/2024    TRIG 1,957 (H) 06/11/2024     LIVER PROFILE:   Recent Labs     08/30/24 1000 09/01/24  0803   AST 20 21   ALT 16 14   BILITOT 0.3 0.5   ALKPHOS 107* 105*      MICROBIOLOGY:   Lab Results   Component Value Date/Time    CULTURE NO GROWTH <24 HRS 09/01/2024 02:24 PM       Imaging:   CT ABDOMEN PELVIS WO CONTRAST Additional Contrast? None    Result Date: 9/1/2024  1. Severe right hydronephrosis and hydroureter secondary to an obstructing calculus in the right S3 ureter of approximately 7.4 x 12 mm. 2. Status post left nephrectomy.     CT ABDOMEN PELVIS WO CONTRAST Additional Contrast? None    Result Date: 8/26/2024  1. Two tandem calculi within the mid to distal right ureter measuring 6 mm and 4 mm respectively with mild to moderate right hydronephrosis and hydroureter. 2. Additional nonobstructive right renal calculi. 3. Status post left nephrectomy.       ASSESSMENT & PLAN     ASSESSMENT / PLAN:     IMPRESSION  This is a 59 y.o. female with a PMHx significant for recurrent kidney stones who presented with worsening abdominal pain after removing her ureteric stent on 8/31 and found to have

## 2024-09-01 NOTE — ED TRIAGE NOTES
Pt presented to ED 02 via triage, accompanied by friend.  Pt presents with C/O Right flank and groin pain, nausea, vomiting.  Pt states She had a nephrostomy tube placed on 8/27, pt removed the tube 8/31 as directed. Pt states around 11pm she had a sudden onset of right flank and groin pain. Pt states she took pain medication around 2am and nausea medication around 10pm. Pt states she has had 2 episodes of nausea.  Pt denies any SOB, or chest pain, pt denies any blood in emesis.  Pt is currently being treated for breast cancer.  Pt has a hx of kidney stones. Pt had her left kidney removed in the past. .  Pt is Alert and oriented. Pt is resting comfortably on stretcher with call light in reach.  No acute distress noted. Respirations are even and unlabored.  White board updated. Will continue to follow plan of care.

## 2024-09-01 NOTE — ED NOTES
Internal medicine team at bedside  
Ortho at bedside. Pt updated on plan of care, no distress noted, non labored RR, aox4.   Surgical consent being obtained at bedside.   
Pt ambulated to bathroom with a steady gait.   Pt informed writer that she had passed \"multiple stones\" in her urine.   
Pt returned to Room from Ct, via stretcher  
Pt transported to CT via stretcher  
Pt transported to room 424 via wheelchair by Francisco Javier Loyola  
Urine obtained, labeled and sent to lab.     
Urology at bedside  
Writer ambulated with the pt to the bathroom. Steady gait noted.  
Age of Onset    High Blood Pressure Mother     Dementia Mother     High Blood Pressure Father     Diabetes Father     Cancer Father         tongue    Hypertension Father     Heart Disease Father         stents    Other Father         Mylofibrosis    Hypertension Sister     Heart Disease Maternal Grandmother     Heart Attack Maternal Grandmother 80    Prostate Cancer Maternal Grandfather     Emphysema Paternal Grandmother     Heart Disease Paternal Grandfather     Stroke Paternal Grandfather 65       ALLERGIES     Mixed ragweed and Cat hair extract    CURRENT MEDICATIONS       Previous Medications    ALBUTEROL SULFATE HFA (PROAIR HFA) 108 (90 BASE) MCG/ACT INHALER    Inhale 2 puffs into the lungs every 6 hours as needed for Wheezing    AMLODIPINE (NORVASC) 5 MG TABLET    Take 1 tablet by mouth daily    BENZOYL PEROXIDE 5 % EXTERNAL LIQUID    Wash affected areas once daily    BUDESONIDE-FORMOTEROL (SYMBICORT) 160-4.5 MCG/ACT AERO    Inhale 2 puffs into the lungs 2 times daily    CARVEDILOL (COREG) 25 MG TABLET    Take 1 tablet by mouth 2 times daily    CLINDAMYCIN (CLEOCIN T) 1 % LOTION    Apply to affected areas daily    FAMOTIDINE (PEPCID) 20 MG TABLET    Take 1 tablet by mouth 2 times daily    FUROSEMIDE (LASIX) 40 MG TABLET    Take 1 tablet by mouth daily    HYDROCHLOROTHIAZIDE (HYDRODIURIL) 25 MG TABLET    Take 1 tablet by mouth every morning    HYDROCODONE-ACETAMINOPHEN (NORCO) 5-325 MG PER TABLET    Take 1 tablet by mouth every 6 hours as needed for Pain (Used for chemotherapy, cancer pain. Per Dr. Bradley).    IRBESARTAN (AVAPRO) 150 MG TABLET    Take 1 tablet by mouth daily    LIDOCAINE-PRILOCAINE (EMLA) 2.5-2.5 % CREAM    Apply topically to port site 45-60 minutes prior to needle poke as needed.    MELATONIN 5 MG CAPS    Take 1 capsule by mouth nightly as needed    MELOXICAM (MOBIC) 15 MG TABLET    Take 1 tablet by mouth daily    NYSTATIN (MYCOSTATIN) 784099 UNIT/GM CREAM        ONDANSETRON (ZOFRAN) 4 MG

## 2024-09-01 NOTE — CONSULTS
Multiplanar reformatted images are provided for review. Automated exposure control, iterative reconstruction, and/or weight based adjustment of the mA/kV was utilized to reduce the radiation dose to as low as reasonably achievable. COMPARISON: None. HISTORY: ORDERING SYSTEM PROVIDED HISTORY: right flank pain with hx of stones TECHNOLOGIST PROVIDED HISTORY: right flank pain with hx of stones Decision Support Exception - unselect if not a suspected or confirmed emergency medical condition->Emergency Medical Condition (MA) Reason for Exam: abdominal pain, right flank pain FINDINGS: Lower Chest: Mild basilar atelectasis is noted. Organs: Liver, spleen pancreas, gallbladder, and adrenals are unremarkable. The patient is status post left nephrectomy.  No abnormal mass in the left renal fossa.  Right kidney is enlarged with perinephric edema and thickening of Gerota's fascia.  Severe right hydronephrosis and hydroureter are noted secondary to an obstructing calculus in the right S3 ureter of approximately 7.4 by 12 mm. GI/Bowel: No free air, bowel obstruction or bowel wall thickening is noted. No acute appendicitis. Pelvis: No free pelvic fluid.  Bladder is decompressed limiting assessment. No pelvic or inguinal adenopathy. Peritoneum/Retroperitoneum: No aortic aneurysm.  No retroperitoneal or mesenteric adenopathy is demonstrated. Bones/Soft Tissues: Arthritic changes are present in the hips and spine.  No acute soft tissue abnormality.  Subcentimeter axillary lymph nodes are present.     1. Severe right hydronephrosis and hydroureter secondary to an obstructing calculus in the right S3 ureter of approximately 7.4 x 12 mm. 2. Status post left nephrectomy.       Assessment and Plan   Impression:  59-year-old female with solitary right kidney and multiple obstructing stones, underwent ureteroscopy with holmium laser lithotripsy on 8/27, ureteral stent placed on strings, she removed her strings last night and experienced

## 2024-09-02 VITALS
HEIGHT: 68 IN | SYSTOLIC BLOOD PRESSURE: 123 MMHG | HEART RATE: 96 BPM | OXYGEN SATURATION: 95 % | DIASTOLIC BLOOD PRESSURE: 59 MMHG | BODY MASS INDEX: 43.19 KG/M2 | TEMPERATURE: 98.1 F | WEIGHT: 285 LBS | RESPIRATION RATE: 16 BRPM

## 2024-09-02 LAB
ANION GAP SERPL CALCULATED.3IONS-SCNC: 10 MMOL/L (ref 9–16)
BASOPHILS # BLD: 0 K/UL (ref 0–0.2)
BASOPHILS NFR BLD: 0 % (ref 0–2)
BUN SERPL-MCNC: 39 MG/DL (ref 8–23)
CALCIUM SERPL-MCNC: 7.9 MG/DL (ref 8.6–10.4)
CHLORIDE SERPL-SCNC: 104 MMOL/L (ref 98–107)
CO2 SERPL-SCNC: 22 MMOL/L (ref 20–31)
CREAT SERPL-MCNC: 1.9 MG/DL (ref 0.5–0.9)
EOSINOPHIL # BLD: 0 K/UL (ref 0–0.44)
EOSINOPHILS RELATIVE PERCENT: 0 % (ref 1–4)
ERYTHROCYTE [DISTWIDTH] IN BLOOD BY AUTOMATED COUNT: 25.2 % (ref 11.8–14.4)
GFR, ESTIMATED: 29 ML/MIN/1.73M2
GLUCOSE BLD-MCNC: 124 MG/DL (ref 65–105)
GLUCOSE SERPL-MCNC: 114 MG/DL (ref 74–99)
HCT VFR BLD AUTO: 24 % (ref 36.3–47.1)
HGB BLD-MCNC: 7.3 G/DL (ref 11.9–15.1)
IMM GRANULOCYTES # BLD AUTO: 0 K/UL (ref 0–0.3)
IMM GRANULOCYTES NFR BLD: 0 %
LYMPHOCYTES NFR BLD: 1.31 K/UL (ref 1.1–3.7)
LYMPHOCYTES RELATIVE PERCENT: 29 % (ref 24–43)
MCH RBC QN AUTO: 31.3 PG (ref 25.2–33.5)
MCHC RBC AUTO-ENTMCNC: 30.4 G/DL (ref 28.4–34.8)
MCV RBC AUTO: 103 FL (ref 82.6–102.9)
MICROORGANISM SPEC CULT: NO GROWTH
MONOCYTES NFR BLD: 0.36 K/UL (ref 0.1–1.2)
MONOCYTES NFR BLD: 8 % (ref 3–12)
MORPHOLOGY: ABNORMAL
MORPHOLOGY: ABNORMAL
NEUTROPHILS NFR BLD: 63 % (ref 36–65)
NEUTS SEG NFR BLD: 2.83 K/UL (ref 1.5–8.1)
NRBC BLD-RTO: 0.7 PER 100 WBC
PLATELET # BLD AUTO: 85 K/UL (ref 138–453)
PMV BLD AUTO: 10.4 FL (ref 8.1–13.5)
POTASSIUM SERPL-SCNC: 4.9 MMOL/L (ref 3.7–5.3)
RBC # BLD AUTO: 2.33 M/UL (ref 3.95–5.11)
SERVICE CMNT-IMP: NORMAL
SODIUM SERPL-SCNC: 136 MMOL/L (ref 136–145)
SPECIMEN DESCRIPTION: NORMAL
WBC OTHER # BLD: 4.5 K/UL (ref 3.5–11.3)

## 2024-09-02 PROCEDURE — 99222 1ST HOSP IP/OBS MODERATE 55: CPT | Performed by: INTERNAL MEDICINE

## 2024-09-02 PROCEDURE — 94640 AIRWAY INHALATION TREATMENT: CPT

## 2024-09-02 PROCEDURE — 82947 ASSAY GLUCOSE BLOOD QUANT: CPT

## 2024-09-02 PROCEDURE — 6370000000 HC RX 637 (ALT 250 FOR IP)

## 2024-09-02 PROCEDURE — 2580000003 HC RX 258

## 2024-09-02 PROCEDURE — 85025 COMPLETE CBC W/AUTO DIFF WBC: CPT

## 2024-09-02 PROCEDURE — 80048 BASIC METABOLIC PNL TOTAL CA: CPT

## 2024-09-02 PROCEDURE — 36415 COLL VENOUS BLD VENIPUNCTURE: CPT

## 2024-09-02 RX ORDER — TAMSULOSIN HYDROCHLORIDE 0.4 MG/1
0.4 CAPSULE ORAL DAILY
Qty: 30 CAPSULE | Refills: 0 | Status: SHIPPED | OUTPATIENT
Start: 2024-09-02

## 2024-09-02 RX ORDER — SODIUM CHLORIDE 9 MG/ML
INJECTION, SOLUTION INTRAVENOUS CONTINUOUS
Status: DISCONTINUED | OUTPATIENT
Start: 2024-09-02 | End: 2024-09-02

## 2024-09-02 RX ORDER — TAMSULOSIN HYDROCHLORIDE 0.4 MG/1
0.4 CAPSULE ORAL DAILY
Qty: 30 CAPSULE | Refills: 0 | Status: SHIPPED | OUTPATIENT
Start: 2024-09-02 | End: 2024-09-02

## 2024-09-02 RX ADMIN — BUDESONIDE AND FORMOTEROL FUMARATE DIHYDRATE 2 PUFF: 160; 4.5 AEROSOL RESPIRATORY (INHALATION) at 09:55

## 2024-09-02 RX ADMIN — PANTOPRAZOLE SODIUM 40 MG: 40 TABLET, DELAYED RELEASE ORAL at 06:10

## 2024-09-02 RX ADMIN — TAMSULOSIN HYDROCHLORIDE 0.4 MG: 0.4 CAPSULE ORAL at 08:11

## 2024-09-02 RX ADMIN — AMLODIPINE BESYLATE 5 MG: 5 TABLET ORAL at 08:11

## 2024-09-02 RX ADMIN — SODIUM CHLORIDE, PRESERVATIVE FREE 10 ML: 5 INJECTION INTRAVENOUS at 08:12

## 2024-09-02 ASSESSMENT — ENCOUNTER SYMPTOMS
EYE PAIN: 0
COLOR CHANGE: 0
ABDOMINAL DISTENTION: 0
ABDOMINAL PAIN: 0
NAUSEA: 0
FACIAL SWELLING: 0
COUGH: 0
RHINORRHEA: 0
EYE DISCHARGE: 0
VOMITING: 0
SHORTNESS OF BREATH: 0
STRIDOR: 0

## 2024-09-02 NOTE — DISCHARGE INSTRUCTIONS
You were admitted to the hospital due to worsening pain due to your ureteral stones.  You passed stones spontaneously while going for the procedure, they performed ureteroscopy which did not show any stones. Ureteral stent was placed    Please continue taking Flomax and Levsin point urology recommendations  Please follow-up with urology in 2 weeks with Dr palma  Your serum creatinine was elevated, please keep your oral fluid intake high  Due to elevated serum creatinine, hold your hydrochlorothiazide for 2 to 3 days  Please get your BMP lab work checked in 1 week to monitor the serum creatinine  Please follow-up with your primary care provider in 1 week  Please follow-up with your hematology oncology this week  If your signs symptoms get worse, you develop chest pain, palpitation, shortness of breath etc. please visit the emergency department

## 2024-09-02 NOTE — PROGRESS NOTES
Mook Amaya, Iqra, Phuc Wang, Isidoro Lehman Jr  Urology Progress Note     Chief Complaint: Right Steinstrasse, status post ureteroscopy with stent placement 9/1    Subjective:  No acute events overnight, afebrile, vital signs stable  Pain level: Resolved  Denies fever, chills, nausea, vomiting, chest pain, shortness of breath  Voiding well per urethra    Cr 1.9 (2.1)    Patient Vitals for the past 24 hrs:   BP Temp Temp src Pulse Resp SpO2 Height Weight   09/02/24 0416 (!) 143/76 99.2 °F (37.3 °C) Axillary -- -- 95 % -- --   09/01/24 2333 133/77 98.6 °F (37 °C) Oral 83 19 97 % -- --   09/01/24 2121 -- -- -- -- 16 -- -- --   09/01/24 2051 -- -- -- -- 18 -- -- --   09/01/24 1924 128/75 97.8 °F (36.6 °C) Axillary 69 14 92 % -- --   09/01/24 1900 125/76 97.9 °F (36.6 °C) -- 72 18 94 % -- --   09/01/24 1853 -- -- -- 67 18 94 % -- --   09/01/24 1845 132/77 97.8 °F (36.6 °C) -- 69 18 93 % -- --   09/01/24 1834 133/78 98.4 °F (36.9 °C) Temporal 74 18 100 % -- --   09/01/24 1601 (!) 153/84 98 °F (36.7 °C) Oral 70 17 97 % -- --   09/01/24 1400 (!) 108/58 -- Oral 66 18 92 % 1.727 m (5' 8\") 129.3 kg (285 lb)   09/01/24 1226 -- -- -- -- -- 99 % -- --   09/01/24 1156 -- -- -- -- -- 97 % -- --   09/01/24 1126 -- -- -- -- -- 97 % -- --   09/01/24 1026 -- -- -- -- -- 100 % -- --   09/01/24 0956 -- -- -- -- -- 99 % -- --   09/01/24 0926 -- -- -- -- -- 99 % -- --       Intake/Output Summary (Last 24 hours) at 9/2/2024 0909  Last data filed at 9/1/2024 2353  Gross per 24 hour   Intake 100 ml   Output 500 ml   Net -400 ml       Recent Labs     08/30/24  1000 09/01/24  0803 09/02/24  0245   WBC 3.9 5.7 4.5   HGB 8.2* 8.6* 7.3*   HCT 24.4* 26.5* 24.0*   MCV 95.2 97.1 103.0*   PLT 98* 102* 85*     Recent Labs     09/01/24  0803 09/01/24  2149 09/02/24  0245    136 136   K 4.2 4.6 4.9    104 104   CO2 23 22 22   BUN 32* 39* 39*   CREATININE 1.3* 2.1* 1.9*       Recent Labs     09/01/24  0816   COLORU Yellow   PHUR 
Patient refusing telemetry and blood sugar checks. States she feels fine and doesn't need them. Education provided.   
Physical Therapy        Physical Therapy Cancel Note      DATE: 2024    NAME: Luci Castañeda  MRN: [id number]   : 1965      Patient not seen this date for Physical Therapy due to:    Patient independent with functional mobility. Will defer PT evaluation at this time. Please reorder PT if future needs arise. Spoke with pt. Pt denies acute PT concerns, agreeable to deferral.      Electronically signed by Merlyn Arguello PT on 2024 at 10:20 AM      
Malignant neoplasm of upper-outer quadrant of left breast in female, estrogen receptor negative (HCC)    H/O left radical nephrectomy  Resolved Problems:    * No resolved hospital problems. *       Obstructive uropathy   Acute on chronic kidney disease - creatinine 1.1 > 1.3 > 2.1 > 1.9   Recurrent right kidney stones   Lithotripsy and ureteric stent placement on 8/27 - patient removed stent on 8/31  CT Abdo 9/1/24 - 6mm and 4 mm stones in right mid-ureter with mild to moderate hydroureter and hydronephrosis   Urology inserted ureteric stent 9/1/24 - symptoms resolved   Slow IV fluids for the LILIAN and repeat BMP late afternoon      Recurrent kidney stones:  Cystinuria documented and as per patient - mostly cystine and occasional calcium stones  Patient was trialed on tiopronin previously but could not tolerate it  Using OTC potassium citrate and fizzy tabs - however, compliance not good - stressed the importance of compliance considering the severity and extensive history of recurrent kidney stones    Already on hydrochlorothiazide which may help with calcium stones      Lt nephrectomy:  In 1988 due to recurrent kidney stones      Left Breast Cancer:  Diagnosed in April 2024  Triple negative   Neoadjuvant treatment with Keytruda/Taxol/carboplatin/Adriamycin/Cytoxan. Treatment started 5/31/2024 - every Friday - missed one dose due to low platelets  Monitor CBC and Hb     Acute on Chronic Anemia:  Patient on asha-adjuvant chemo for breast ca and baseline Hb low - further drop on this admission due to hematuria - the low level today is likely due to the resolution of hemoconcentration   Continue monitoring Hb  May need transfusion if Hb <7      Coronary artery disease:  On coreg 25 mg x BD,   Cath - 12/8/21 - mild CAD - for medical management      Diastolic heart failure:   Echo 5/14/24 - EF 55 to 60 %, moderate to severe concentric hypertrophy. Normal wall motion. RV - mildly reduced systolic function.   Cath lab -

## 2024-09-02 NOTE — CARE COORDINATION
Issues/Barriers to RETURNING to current housing: none  Potential Assistance needed at discharge: N/A            Potential DME:    Patient expects to discharge to: House  Plan for transportation at discharge: Self    Financial    Payor: [employer] / Plan: [employer] [facility] EMPLOYEES / Product Type: *No Product type* /     Does insurance require precert for SNF: Yes    Potential assistance Purchasing Medications:    Meds-to-Beds request:        [facility] - [place], [place] - [address], [address] - P [phone] - F [phone]  [address], [address]  [facility] [address]  Phone: [phone] Fax: [phone]    [facility] [id number] - [facility] [address] - P [phone] - F [phone]  [address] at the [facility] [address]  Phone: [phone] Fax: [phone]      Notes:    Factors facilitating achievement of predicted outcomes: Friend support, Motivated, Cooperative, Pleasant, and Sense of humor    Barriers to discharge: none    Additional Case Management Notes: patient sttaes no needs currently.  Plan is to return home independently    The Plan for Transition of Care is related to the following treatment goals of Obstructive uropathy [N13.9]  Hydronephrosis of right kidney [N13.30]  Complicated UTI (urinary tract infection) [N39.0]  Right renal stone [N20.0]    IF APPLICABLE: The Patient and/or patient representative Luci and her family were provided with a choice of provider and agrees with the discharge plan. Freedom of choice list with basic dialogue that supports the patient's individualized plan of care/goals and shares the quality data associated with the providers was provided to: Patient   Patient Representative Name:       The Patient and/or Patient Representative Agree with the Discharge Plan? Yes    Jovita Lu RN  Case Management Department  Ph: [phone] Fax:

## 2024-09-02 NOTE — PLAN OF CARE
Problem: Discharge Planning  Goal: Discharge to home or other facility with appropriate resources  Outcome: Completed     Problem: Pain  Goal: Verbalizes/displays adequate comfort level or baseline comfort level  9/2/2024 1155 by Jazmine Painting RN  Outcome: Completed  9/2/2024 0627 by Omar Woody RN  Outcome: Progressing     Problem: Safety - Adult  Goal: Free from fall injury  9/2/2024 1155 by Jazmine Painting RN  Outcome: Completed  9/2/2024 0627 by Omar Woody RN  Outcome: Progressing     Problem: ABCDS Injury Assessment  Goal: Absence of physical injury  9/2/2024 1155 by Jazmine Painting RN  Outcome: Completed  9/2/2024 0627 by Omar Woody RN  Outcome: Progressing

## 2024-09-03 ENCOUNTER — CARE COORDINATION (OUTPATIENT)
Dept: OTHER | Facility: CLINIC | Age: 59
End: 2024-09-03

## 2024-09-03 LAB
EKG ATRIAL RATE: 68 BPM
EKG P AXIS: 42 DEGREES
EKG P-R INTERVAL: 142 MS
EKG Q-T INTERVAL: 414 MS
EKG QRS DURATION: 96 MS
EKG QTC CALCULATION (BAZETT): 440 MS
EKG R AXIS: 2 DEGREES
EKG T AXIS: 22 DEGREES
EKG VENTRICULAR RATE: 68 BPM

## 2024-09-03 NOTE — CARE COORDINATION
Care Transitions Note    Initial Call - Call within 2 business days of discharge: Yes    Patient Current Location:  Home: [address] [place], [place] [address]    Care Transition Nurse contacted the patient by telephone to perform post hospital discharge assessment, verified name and  as identifiers. Provided introduction to self, and explanation of the Care Transition Nurse role.     Patient: Luci Castañeda    Patient : 1965   MRN: [id number]    Reason for Admission: Obstructive uropathy  Discharge Date: 24  RURS: Readmission Risk Score: 21.9      Last Discharge Facility       Date Complaint Diagnosis Description Type Department Provider    24 Abdominal Pain; Emesis; Nausea; Flank Pain Obstructive uropathy ... ED to Hosp-Admission (Discharged) (ADMITTED) Yamilet Guzman MD; Rafal Covarrubias...            Was this an external facility discharge? No    Additional needs identified to be addressed with provider   No needs identified             Method of communication with provider: none.    Patients top risk factors for readmission: medical condition-chronic kidney infections, recent breast cancer dx.    Interventions to address risk factors:   Review of patient management of conditions/medications:      Care Summary Note:     Spoke to patient. She reports being very fatigued, as she did not get much sleep while IP. Patient self-removed stent, and began having extreme pain, bringing her to the ER. She reports pain is minimal now, has not needed pain medication. She is still experiencing hematuria. Denies fevers, chills, SOB, CP. Patient has had a BM since discharge. She is staying well hydrated. Mild nausea per patient, has Zofran PRN. Patient called her Urologist this morning, needs appt in 2 weeks, is awaiting for a call back. She will get labs drawn during chemo infusion this Friday, needs to have CBC re-checked as her H&H were low- this has been ordered.     Patient reports she has a good

## 2024-09-06 ENCOUNTER — HOSPITAL ENCOUNTER (OUTPATIENT)
Dept: INFUSION THERAPY | Age: 59
Discharge: HOME | End: 2024-09-06
Payer: COMMERCIAL

## 2024-09-06 ENCOUNTER — TELEPHONE (OUTPATIENT)
Dept: ONCOLOGY | Age: 59
End: 2024-09-06

## 2024-09-06 ENCOUNTER — TELEPHONE (OUTPATIENT)
Dept: UROLOGY | Age: 59
End: 2024-09-06

## 2024-09-06 ENCOUNTER — OFFICE VISIT (OUTPATIENT)
Dept: ONCOLOGY | Age: 59
End: 2024-09-06
Payer: COMMERCIAL

## 2024-09-06 VITALS
RESPIRATION RATE: 16 BRPM | TEMPERATURE: 97.9 F | DIASTOLIC BLOOD PRESSURE: 66 MMHG | SYSTOLIC BLOOD PRESSURE: 107 MMHG | HEART RATE: 65 BPM

## 2024-09-06 VITALS
DIASTOLIC BLOOD PRESSURE: 45 MMHG | TEMPERATURE: 96.8 F | OXYGEN SATURATION: 94 % | HEART RATE: 76 BPM | SYSTOLIC BLOOD PRESSURE: 68 MMHG

## 2024-09-06 DIAGNOSIS — Z17.1 MALIGNANT NEOPLASM OF UPPER-OUTER QUADRANT OF LEFT BREAST IN FEMALE, ESTROGEN RECEPTOR NEGATIVE (HCC): Primary | ICD-10-CM

## 2024-09-06 DIAGNOSIS — C50.412 MALIGNANT NEOPLASM OF UPPER-OUTER QUADRANT OF LEFT BREAST IN FEMALE, ESTROGEN RECEPTOR NEGATIVE (HCC): Primary | ICD-10-CM

## 2024-09-06 LAB
ALBUMIN SERPL-MCNC: 3.7 G/DL (ref 3.5–5.2)
ALBUMIN/GLOB SERPL: 1.2 {RATIO} (ref 1–2.5)
ALP SERPL-CCNC: 119 U/L (ref 35–104)
ALT SERPL-CCNC: 18 U/L (ref 5–33)
AMYLASE SERPL-CCNC: 46 U/L (ref 28–100)
ANION GAP SERPL CALCULATED.3IONS-SCNC: 13 MMOL/L (ref 9–17)
AST SERPL-CCNC: 21 U/L
BASOPHILS # BLD: 0 K/UL (ref 0–0.2)
BASOPHILS NFR BLD: 0 % (ref 0–2)
BILIRUB SERPL-MCNC: 0.4 MG/DL (ref 0.3–1.2)
BUN SERPL-MCNC: 14 MG/DL (ref 8–23)
CALCIUM SERPL-MCNC: 10.2 MG/DL (ref 8.6–10.4)
CHLORIDE SERPL-SCNC: 99 MMOL/L (ref 98–107)
CO2 SERPL-SCNC: 24 MMOL/L (ref 20–31)
CORTIS SERPL-MCNC: 20.7 UG/DL (ref 2.5–19.5)
CORTISOL COLLECTION INFO: ABNORMAL
CREAT SERPL-MCNC: 1.2 MG/DL (ref 0.5–0.9)
EOSINOPHIL # BLD: 0 K/UL (ref 0–0.4)
EOSINOPHILS RELATIVE PERCENT: 0 % (ref 1–4)
ERYTHROCYTE [DISTWIDTH] IN BLOOD BY AUTOMATED COUNT: 26.6 % (ref 12.5–15.4)
GFR, ESTIMATED: 52 ML/MIN/1.73M2
GLUCOSE SERPL-MCNC: 147 MG/DL (ref 70–99)
HCT VFR BLD AUTO: 22.8 % (ref 36–46)
HGB BLD-MCNC: 7.6 G/DL (ref 12–16)
LIPASE SERPL-CCNC: 51 U/L (ref 13–60)
LYMPHOCYTES NFR BLD: 1.22 K/UL (ref 1–4.8)
LYMPHOCYTES RELATIVE PERCENT: 37 % (ref 24–44)
MCH RBC QN AUTO: 33 PG (ref 26–34)
MCHC RBC AUTO-ENTMCNC: 33.4 G/DL (ref 31–37)
MCV RBC AUTO: 98.7 FL (ref 80–100)
MICROORGANISM SPEC CULT: NORMAL
MONOCYTES NFR BLD: 0.43 K/UL (ref 0.1–1.2)
MONOCYTES NFR BLD: 13 % (ref 2–11)
MORPHOLOGY: ABNORMAL
MORPHOLOGY: ABNORMAL
NEUTROPHILS NFR BLD: 50 % (ref 36–66)
NEUTS SEG NFR BLD: 1.65 K/UL (ref 1.8–7.7)
PLATELET # BLD AUTO: 70 K/UL (ref 140–450)
PMV BLD AUTO: 7.5 FL (ref 6–12)
POTASSIUM SERPL-SCNC: 4 MMOL/L (ref 3.7–5.3)
PROT SERPL-MCNC: 6.8 G/DL (ref 6.4–8.3)
RBC # BLD AUTO: 2.31 M/UL (ref 4–5.2)
SERVICE CMNT-IMP: NORMAL
SODIUM SERPL-SCNC: 136 MMOL/L (ref 135–144)
SPECIMEN DESCRIPTION: NORMAL
TSH SERPL DL<=0.05 MIU/L-ACNC: 1.74 UIU/ML (ref 0.3–5)
WBC OTHER # BLD: 3.3 K/UL (ref 3.5–11)

## 2024-09-06 PROCEDURE — 86850 RBC ANTIBODY SCREEN: CPT

## 2024-09-06 PROCEDURE — 36591 DRAW BLOOD OFF VENOUS DEVICE: CPT

## 2024-09-06 PROCEDURE — 36430 TRANSFUSION BLD/BLD COMPNT: CPT

## 2024-09-06 PROCEDURE — 86901 BLOOD TYPING SEROLOGIC RH(D): CPT

## 2024-09-06 PROCEDURE — 83690 ASSAY OF LIPASE: CPT

## 2024-09-06 PROCEDURE — 82150 ASSAY OF AMYLASE: CPT

## 2024-09-06 PROCEDURE — 80053 COMPREHEN METABOLIC PANEL: CPT

## 2024-09-06 PROCEDURE — 84443 ASSAY THYROID STIM HORMONE: CPT

## 2024-09-06 PROCEDURE — 3074F SYST BP LT 130 MM HG: CPT | Performed by: INTERNAL MEDICINE

## 2024-09-06 PROCEDURE — 3078F DIAST BP <80 MM HG: CPT | Performed by: INTERNAL MEDICINE

## 2024-09-06 PROCEDURE — 2580000003 HC RX 258: Performed by: INTERNAL MEDICINE

## 2024-09-06 PROCEDURE — 85025 COMPLETE CBC W/AUTO DIFF WBC: CPT

## 2024-09-06 PROCEDURE — 86900 BLOOD TYPING SEROLOGIC ABO: CPT

## 2024-09-06 PROCEDURE — 86923 COMPATIBILITY TEST ELECTRIC: CPT

## 2024-09-06 PROCEDURE — 82533 TOTAL CORTISOL: CPT

## 2024-09-06 PROCEDURE — 99214 OFFICE O/P EST MOD 30 MIN: CPT | Performed by: INTERNAL MEDICINE

## 2024-09-06 PROCEDURE — P9016 RBC LEUKOCYTES REDUCED: HCPCS

## 2024-09-06 PROCEDURE — 6360000002 HC RX W HCPCS: Performed by: INTERNAL MEDICINE

## 2024-09-06 RX ORDER — ONDANSETRON 2 MG/ML
8 INJECTION INTRAMUSCULAR; INTRAVENOUS
OUTPATIENT
Start: 2024-11-29

## 2024-09-06 RX ORDER — HEPARIN SODIUM (PORCINE) LOCK FLUSH IV SOLN 100 UNIT/ML 100 UNIT/ML
500 SOLUTION INTRAVENOUS PRN
OUTPATIENT
Start: 2024-11-29

## 2024-09-06 RX ORDER — ACETAMINOPHEN 325 MG/1
650 TABLET ORAL
OUTPATIENT
Start: 2024-11-29

## 2024-09-06 RX ORDER — SODIUM CHLORIDE 9 MG/ML
5-250 INJECTION, SOLUTION INTRAVENOUS PRN
OUTPATIENT
Start: 2024-11-29

## 2024-09-06 RX ORDER — ACETAMINOPHEN 325 MG/1
650 TABLET ORAL
Start: 2024-11-29

## 2024-09-06 RX ORDER — HEPARIN 100 UNIT/ML
500 SYRINGE INTRAVENOUS PRN
Status: DISCONTINUED | OUTPATIENT
Start: 2024-09-06 | End: 2024-09-07 | Stop reason: HOSPADM

## 2024-09-06 RX ORDER — OXYBUTYNIN CHLORIDE 10 MG/1
10 TABLET, EXTENDED RELEASE ORAL DAILY
Qty: 30 TABLET | Refills: 1 | Status: SHIPPED | OUTPATIENT
Start: 2024-09-06 | End: 2024-09-07 | Stop reason: SDUPTHER

## 2024-09-06 RX ORDER — ALBUTEROL SULFATE 90 UG/1
4 AEROSOL, METERED RESPIRATORY (INHALATION) PRN
OUTPATIENT
Start: 2024-11-29

## 2024-09-06 RX ORDER — FAMOTIDINE 10 MG/ML
20 INJECTION, SOLUTION INTRAVENOUS
OUTPATIENT
Start: 2024-11-29

## 2024-09-06 RX ORDER — DIPHENHYDRAMINE HYDROCHLORIDE 50 MG/ML
50 INJECTION INTRAMUSCULAR; INTRAVENOUS
OUTPATIENT
Start: 2024-11-29

## 2024-09-06 RX ORDER — SODIUM CHLORIDE 9 MG/ML
INJECTION, SOLUTION INTRAVENOUS PRN
Status: DISCONTINUED | OUTPATIENT
Start: 2024-09-06 | End: 2024-09-07 | Stop reason: HOSPADM

## 2024-09-06 RX ORDER — EPINEPHRINE 1 MG/ML
0.3 INJECTION, SOLUTION, CONCENTRATE INTRAVENOUS PRN
OUTPATIENT
Start: 2024-11-29

## 2024-09-06 RX ORDER — SODIUM CHLORIDE 0.9 % (FLUSH) 0.9 %
5-40 SYRINGE (ML) INJECTION PRN
OUTPATIENT
Start: 2024-11-29

## 2024-09-06 RX ORDER — SODIUM CHLORIDE 0.9 % (FLUSH) 0.9 %
5-40 SYRINGE (ML) INJECTION PRN
Status: DISCONTINUED | OUTPATIENT
Start: 2024-09-06 | End: 2024-09-07 | Stop reason: HOSPADM

## 2024-09-06 RX ORDER — VALACYCLOVIR HYDROCHLORIDE 1 G/1
1000 TABLET, FILM COATED ORAL 3 TIMES DAILY
Qty: 21 TABLET | Refills: 0 | Status: SHIPPED | OUTPATIENT
Start: 2024-09-06 | End: 2024-09-13

## 2024-09-06 RX ORDER — MEPERIDINE HYDROCHLORIDE 50 MG/ML
12.5 INJECTION INTRAMUSCULAR; INTRAVENOUS; SUBCUTANEOUS PRN
OUTPATIENT
Start: 2024-11-29

## 2024-09-06 RX ORDER — SODIUM CHLORIDE 9 MG/ML
INJECTION, SOLUTION INTRAVENOUS CONTINUOUS
OUTPATIENT
Start: 2024-11-29

## 2024-09-06 RX ADMIN — SODIUM CHLORIDE, PRESERVATIVE FREE 10 ML: 5 INJECTION INTRAVENOUS at 08:15

## 2024-09-06 RX ADMIN — SODIUM CHLORIDE, PRESERVATIVE FREE 10 ML: 5 INJECTION INTRAVENOUS at 13:04

## 2024-09-06 RX ADMIN — HEPARIN 500 UNITS: 100 SYRINGE at 13:04

## 2024-09-06 RX ADMIN — SODIUM CHLORIDE, PRESERVATIVE FREE 10 ML: 5 INJECTION INTRAVENOUS at 08:16

## 2024-09-06 NOTE — PROGRESS NOTES
Patient arrives via wheelchair for Merlin / Cytoxan C5D1  Pt seen by Dr Ramirez , (see note) Orders to hold Chemo and transfuse 1 unit blood  Labs drawn via med port and reviewed  Blood consent obtained  Pt also reported that She has been having right hip pain and is concerned that it might be shingles.  Dr Ramirez notified and an order of Valtrez 1gm TID x 7 days was sent in to pharmacy.    Pt tolerated transfusion without signs and symptoms of reaction and was discharged in stable condition  Next appointment 9/13 C5D1

## 2024-09-06 NOTE — PROGRESS NOTES
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  History of left nephrectomy in 1988   CURRENT THERAPY:         Neoadjuvant treatment with Keytruda/Taxol/carboplatin/Adriamycin/Cytoxan.  Treatment started 5/31/2024  BRIEF CASE HISTORY:       Ms. Luci Castañeda is a very pleasant 59 y.o. female with history of multiple comorbidities as listed.  She had history of left nephrectomy in 1988.  She had cardiac cath in 2021 for left ventricular hypertrophy.  Had recent problems with restrictive and obstructive pulmonary disease.  She continues follow-up with pulmonary. She was doing fine with routine mammogram being normal until this year where she had suspicious abnormalities in the mammogram done on 3/25/2024.  Diagnostic mammogram and left breast ultrasound April 9, 2024 showed suspicious 4.5 cm mass in the left breast 1 o'clock position.  Mildly thickened left axillary lymph nodes.  A biopsy was performed on 4/26/2024 and unfortunately that was positive for invasive ductal carcinoma.  Triple negative.  Biopsy from lymph node was negative for malignancy.  The patient is seen today for further management of her breast cancer. The patient did very well after her biopsy without any complication. The patient had no symptom preceding her diagnosis of cancer. No chest pain, shortness of breath, cough, sputum or hemoptysis, no back pain or monique aches, no weight loss or decreased appetite, no fever or night sweating. No headaches, and no other complaints.  No smoking social alcohol      INTERIM HISTORY:   Patient seen for follow-up triple negative breast cancer.  Patient is

## 2024-09-06 NOTE — TELEPHONE ENCOUNTER
Instructions   from Dr. Jess Bradley MD    Hold chemo today  BLOOD TRANSFUSION ONE UNIT TODAY  Chemo next week  RV about 3 weeks       Tx held today, Blood transfusion today- infusion nurse aware  Chemo 9/13/24  RV 9/27/24 at 12:45 pm with tx to follow

## 2024-09-07 LAB
ABO/RH: NORMAL
ANTIBODY SCREEN: NEGATIVE
ARM BAND NUMBER: NORMAL
BLOOD BANK BLOOD PRODUCT EXPIRATION DATE: NORMAL
BLOOD BANK DISPENSE STATUS: NORMAL
BLOOD BANK ISBT PRODUCT BLOOD TYPE: 5100
BLOOD BANK PRODUCT CODE: NORMAL
BLOOD BANK SAMPLE EXPIRATION: NORMAL
BLOOD BANK UNIT TYPE AND RH: NORMAL
BPU ID: NORMAL
COMPONENT: NORMAL
CROSSMATCH RESULT: NORMAL
TRANSFUSION STATUS: NORMAL
UNIT DIVISION: 0
UNIT ISSUE DATE/TIME: NORMAL

## 2024-09-07 RX ORDER — OXYBUTYNIN CHLORIDE 10 MG/1
10 TABLET, EXTENDED RELEASE ORAL DAILY
Qty: 30 TABLET | Refills: 1 | Status: SHIPPED | OUTPATIENT
Start: 2024-09-07

## 2024-09-07 NOTE — DISCHARGE SUMMARY
up in 2 week(s)      Bismark Palma MD  [address]  [address]  [facility] [address]  [phone]    Follow up in 2 week(s)        Patient Instructions: Please continue taking Flomax and Levsin point urology recommendations  Please follow-up with urology in 2 weeks with Dr palma  Your serum creatinine was elevated, please keep your oral fluid intake high  Due to elevated serum creatinine, hold your hydrochlorothiazide for 2 to 3 days  Please get your BMP lab work checked in 1 week to monitor the serum creatinine  Please follow-up with your primary care provider in 1 week  Please follow-up with your hematology oncology this week  If your signs symptoms get worse, you develop chest pain, palpitation, shortness of breath etc. please visit the emergency department    Follow up labs: BMP in a week time with PCP   Follow up imaging: none     Note that over 30 minutes was spent in preparing discharge papers, discussing discharge with patient, medication review, etc.      Moose Hoff MD,  Internal Medicine Resident, PGY-1  [facility]; [place], [place]  9/7/2024, 3:33 PM

## 2024-09-10 ENCOUNTER — CARE COORDINATION (OUTPATIENT)
Dept: OTHER | Facility: CLINIC | Age: 59
End: 2024-09-10

## 2024-09-13 ENCOUNTER — HOSPITAL ENCOUNTER (OUTPATIENT)
Dept: INFUSION THERAPY | Age: 59
Discharge: HOME | End: 2024-09-13
Payer: COMMERCIAL

## 2024-09-13 VITALS
TEMPERATURE: 98 F | OXYGEN SATURATION: 95 % | RESPIRATION RATE: 16 BRPM | HEART RATE: 69 BPM | DIASTOLIC BLOOD PRESSURE: 73 MMHG | WEIGHT: 278 LBS | SYSTOLIC BLOOD PRESSURE: 109 MMHG | BODY MASS INDEX: 42.27 KG/M2

## 2024-09-13 DIAGNOSIS — Z17.1 MALIGNANT NEOPLASM OF UPPER-OUTER QUADRANT OF LEFT BREAST IN FEMALE, ESTROGEN RECEPTOR NEGATIVE (HCC): Primary | ICD-10-CM

## 2024-09-13 DIAGNOSIS — C50.412 MALIGNANT NEOPLASM OF UPPER-OUTER QUADRANT OF LEFT BREAST IN FEMALE, ESTROGEN RECEPTOR NEGATIVE (HCC): Primary | ICD-10-CM

## 2024-09-13 LAB
ALBUMIN SERPL-MCNC: 4.1 G/DL (ref 3.5–5.2)
ALBUMIN/GLOB SERPL: 1.4 {RATIO} (ref 1–2.5)
ALP SERPL-CCNC: 128 U/L (ref 35–104)
ALT SERPL-CCNC: 21 U/L (ref 5–33)
AMYLASE SERPL-CCNC: 43 U/L (ref 28–100)
ANION GAP SERPL CALCULATED.3IONS-SCNC: 12 MMOL/L (ref 9–17)
AST SERPL-CCNC: 25 U/L
BASOPHILS # BLD: 0 K/UL (ref 0–0.2)
BASOPHILS NFR BLD: 0 % (ref 0–2)
BILIRUB SERPL-MCNC: 0.6 MG/DL (ref 0.3–1.2)
BUN SERPL-MCNC: 20 MG/DL (ref 8–23)
CALCIUM SERPL-MCNC: 9.5 MG/DL (ref 8.6–10.4)
CHLORIDE SERPL-SCNC: 101 MMOL/L (ref 98–107)
CO2 SERPL-SCNC: 25 MMOL/L (ref 20–31)
CORTIS SERPL-MCNC: 12.2 UG/DL (ref 2.5–19.5)
CORTISOL COLLECTION INFO: NORMAL
CREAT SERPL-MCNC: 1.3 MG/DL (ref 0.5–0.9)
EOSINOPHIL # BLD: 0 K/UL (ref 0–0.4)
EOSINOPHILS RELATIVE PERCENT: 0 % (ref 1–4)
ERYTHROCYTE [DISTWIDTH] IN BLOOD BY AUTOMATED COUNT: 27.3 % (ref 12.5–15.4)
GFR, ESTIMATED: 47 ML/MIN/1.73M2
GLUCOSE SERPL-MCNC: 117 MG/DL (ref 70–99)
HCT VFR BLD AUTO: 25.6 % (ref 36–46)
HGB BLD-MCNC: 8.9 G/DL (ref 12–16)
LIPASE SERPL-CCNC: 40 U/L (ref 13–60)
LYMPHOCYTES NFR BLD: 1.1 K/UL (ref 1–4.8)
LYMPHOCYTES RELATIVE PERCENT: 24 % (ref 24–44)
MCH RBC QN AUTO: 33.6 PG (ref 26–34)
MCHC RBC AUTO-ENTMCNC: 34.6 G/DL (ref 31–37)
MCV RBC AUTO: 97 FL (ref 80–100)
MONOCYTES NFR BLD: 0.14 K/UL (ref 0.1–0.8)
MONOCYTES NFR BLD: 3 % (ref 1–7)
MORPHOLOGY: ABNORMAL
NEUTROPHILS NFR BLD: 73 % (ref 36–66)
NEUTS SEG NFR BLD: 3.36 K/UL (ref 1.8–7.7)
PLATELET # BLD AUTO: 98 K/UL (ref 140–450)
PMV BLD AUTO: 7.7 FL (ref 6–12)
POTASSIUM SERPL-SCNC: 4 MMOL/L (ref 3.7–5.3)
PROT SERPL-MCNC: 7 G/DL (ref 6.4–8.3)
RBC # BLD AUTO: 2.64 M/UL (ref 4–5.2)
SODIUM SERPL-SCNC: 138 MMOL/L (ref 135–144)
TSH SERPL DL<=0.05 MIU/L-ACNC: 1.58 UIU/ML (ref 0.3–5)
WBC OTHER # BLD: 4.6 K/UL (ref 3.5–11)

## 2024-09-13 PROCEDURE — 36591 DRAW BLOOD OFF VENOUS DEVICE: CPT

## 2024-09-13 PROCEDURE — 82533 TOTAL CORTISOL: CPT

## 2024-09-13 PROCEDURE — 2580000003 HC RX 258: Performed by: INTERNAL MEDICINE

## 2024-09-13 PROCEDURE — 80053 COMPREHEN METABOLIC PANEL: CPT

## 2024-09-13 PROCEDURE — 96417 CHEMO IV INFUS EACH ADDL SEQ: CPT

## 2024-09-13 PROCEDURE — 6360000002 HC RX W HCPCS: Performed by: INTERNAL MEDICINE

## 2024-09-13 PROCEDURE — 96413 CHEMO IV INFUSION 1 HR: CPT

## 2024-09-13 PROCEDURE — 84443 ASSAY THYROID STIM HORMONE: CPT

## 2024-09-13 PROCEDURE — 85025 COMPLETE CBC W/AUTO DIFF WBC: CPT

## 2024-09-13 PROCEDURE — 83690 ASSAY OF LIPASE: CPT

## 2024-09-13 PROCEDURE — 96367 TX/PROPH/DG ADDL SEQ IV INF: CPT

## 2024-09-13 PROCEDURE — 82150 ASSAY OF AMYLASE: CPT

## 2024-09-13 PROCEDURE — 96375 TX/PRO/DX INJ NEW DRUG ADDON: CPT

## 2024-09-13 PROCEDURE — 96411 CHEMO IV PUSH ADDL DRUG: CPT

## 2024-09-13 PROCEDURE — 96377 APPLICATON ON-BODY INJECTOR: CPT

## 2024-09-13 RX ORDER — DOXORUBICIN HYDROCHLORIDE 2 MG/ML
60 INJECTION, SOLUTION INTRAVENOUS ONCE
Status: COMPLETED | OUTPATIENT
Start: 2024-09-13 | End: 2024-09-13

## 2024-09-13 RX ORDER — PALONOSETRON 0.05 MG/ML
0.25 INJECTION, SOLUTION INTRAVENOUS ONCE
Status: COMPLETED | OUTPATIENT
Start: 2024-09-13 | End: 2024-09-13

## 2024-09-13 RX ORDER — SODIUM CHLORIDE 0.9 % (FLUSH) 0.9 %
5-40 SYRINGE (ML) INJECTION PRN
Status: DISCONTINUED | OUTPATIENT
Start: 2024-09-13 | End: 2024-09-14 | Stop reason: HOSPADM

## 2024-09-13 RX ORDER — DEXAMETHASONE SODIUM PHOSPHATE 10 MG/ML
10 INJECTION INTRAMUSCULAR; INTRAVENOUS ONCE
Status: COMPLETED | OUTPATIENT
Start: 2024-09-13 | End: 2024-09-13

## 2024-09-13 RX ORDER — HEPARIN 100 UNIT/ML
500 SYRINGE INTRAVENOUS PRN
Status: DISCONTINUED | OUTPATIENT
Start: 2024-09-13 | End: 2024-09-14 | Stop reason: HOSPADM

## 2024-09-13 RX ORDER — SODIUM CHLORIDE 9 MG/ML
5-250 INJECTION, SOLUTION INTRAVENOUS PRN
Status: DISCONTINUED | OUTPATIENT
Start: 2024-09-13 | End: 2024-09-14 | Stop reason: HOSPADM

## 2024-09-13 RX ADMIN — HEPARIN 500 UNITS: 100 SYRINGE at 17:12

## 2024-09-13 RX ADMIN — SODIUM CHLORIDE 150 MG: 9 INJECTION, SOLUTION INTRAVENOUS at 15:58

## 2024-09-13 RX ADMIN — PALONOSETRON 0.25 MG: 0.05 INJECTION, SOLUTION INTRAVENOUS at 15:46

## 2024-09-13 RX ADMIN — SODIUM CHLORIDE, PRESERVATIVE FREE 10 ML: 5 INJECTION INTRAVENOUS at 17:12

## 2024-09-13 RX ADMIN — CYCLOPHOSPHAMIDE 1280 MG: 200 INJECTION, SOLUTION INTRAVENOUS at 16:35

## 2024-09-13 RX ADMIN — SODIUM CHLORIDE 200 MG: 9 INJECTION, SOLUTION INTRAVENOUS at 15:17

## 2024-09-13 RX ADMIN — DEXAMETHASONE SODIUM PHOSPHATE 10 MG: 10 INJECTION INTRAMUSCULAR; INTRAVENOUS at 15:46

## 2024-09-13 RX ADMIN — SODIUM CHLORIDE 100 ML/HR: 9 INJECTION, SOLUTION INTRAVENOUS at 14:40

## 2024-09-13 RX ADMIN — SODIUM CHLORIDE, PRESERVATIVE FREE 10 ML: 5 INJECTION INTRAVENOUS at 13:05

## 2024-09-13 RX ADMIN — PEGFILGRASTIM 6 MG: KIT SUBCUTANEOUS at 17:05

## 2024-09-13 RX ADMIN — DOXORUBICIN HYDROCHLORIDE 128 MG: 2 INJECTION, SOLUTION INTRAVENOUS at 16:25

## 2024-09-13 ASSESSMENT — PAIN SCALES - GENERAL: PAINLEVEL_OUTOF10: 3

## 2024-09-16 ENCOUNTER — OFFICE VISIT (OUTPATIENT)
Dept: PULMONOLOGY | Age: 59
End: 2024-09-16
Payer: COMMERCIAL

## 2024-09-16 VITALS
DIASTOLIC BLOOD PRESSURE: 64 MMHG | OXYGEN SATURATION: 94 % | HEIGHT: 68 IN | SYSTOLIC BLOOD PRESSURE: 97 MMHG | HEART RATE: 80 BPM | WEIGHT: 278 LBS | RESPIRATION RATE: 16 BRPM | BODY MASS INDEX: 42.13 KG/M2

## 2024-09-16 DIAGNOSIS — R05.3 PERSISTENT COUGH: ICD-10-CM

## 2024-09-16 DIAGNOSIS — G47.33 OSA (OBSTRUCTIVE SLEEP APNEA): Primary | ICD-10-CM

## 2024-09-16 DIAGNOSIS — C50.412 MALIGNANT NEOPLASM OF UPPER-OUTER QUADRANT OF LEFT BREAST IN FEMALE, ESTROGEN RECEPTOR NEGATIVE (HCC): Primary | ICD-10-CM

## 2024-09-16 DIAGNOSIS — J45.51 SEVERE PERSISTENT ASTHMA WITH ACUTE EXACERBATION: ICD-10-CM

## 2024-09-16 DIAGNOSIS — Z17.1 MALIGNANT NEOPLASM OF UPPER-OUTER QUADRANT OF LEFT BREAST IN FEMALE, ESTROGEN RECEPTOR NEGATIVE (HCC): Primary | ICD-10-CM

## 2024-09-16 PROCEDURE — 3074F SYST BP LT 130 MM HG: CPT | Performed by: INTERNAL MEDICINE

## 2024-09-16 PROCEDURE — 3078F DIAST BP <80 MM HG: CPT | Performed by: INTERNAL MEDICINE

## 2024-09-16 PROCEDURE — 99214 OFFICE O/P EST MOD 30 MIN: CPT | Performed by: INTERNAL MEDICINE

## 2024-09-16 RX ORDER — TIOTROPIUM BROMIDE INHALATION SPRAY 1.56 UG/1
2 SPRAY, METERED RESPIRATORY (INHALATION) DAILY
Qty: 1 EACH | Refills: 2 | Status: SHIPPED | OUTPATIENT
Start: 2024-09-16 | End: 2024-12-15

## 2024-09-16 ASSESSMENT — SLEEP AND FATIGUE QUESTIONNAIRES
HOW LIKELY ARE YOU TO NOD OFF OR FALL ASLEEP WHILE LYING DOWN TO REST IN THE AFTERNOON WHEN CIRCUMSTANCES PERMIT: HIGH CHANCE OF DOZING
HOW LIKELY ARE YOU TO NOD OFF OR FALL ASLEEP WHEN YOU ARE A PASSENGER IN A CAR FOR AN HOUR WITHOUT A BREAK: SLIGHT CHANCE OF DOZING
HOW LIKELY ARE YOU TO NOD OFF OR FALL ASLEEP WHILE WATCHING TV: SLIGHT CHANCE OF DOZING
HOW LIKELY ARE YOU TO NOD OFF OR FALL ASLEEP WHILE SITTING INACTIVE IN A PUBLIC PLACE: WOULD NEVER DOZE
HOW LIKELY ARE YOU TO NOD OFF OR FALL ASLEEP WHILE SITTING AND TALKING TO SOMEONE: WOULD NEVER DOZE
ESS TOTAL SCORE: 6
HOW LIKELY ARE YOU TO NOD OFF OR FALL ASLEEP IN A CAR, WHILE STOPPED FOR A FEW MINUTES IN TRAFFIC: WOULD NEVER DOZE
HOW LIKELY ARE YOU TO NOD OFF OR FALL ASLEEP WHILE SITTING QUIETLY AFTER LUNCH WITHOUT ALCOHOL: WOULD NEVER DOZE
HOW LIKELY ARE YOU TO NOD OFF OR FALL ASLEEP WHILE SITTING AND READING: SLIGHT CHANCE OF DOZING

## 2024-09-16 NOTE — PROGRESS NOTES
PULMONARY MEDICINE OUTPATIENT NOTE                                                                       PATIENT:  Luci Castañeda  YOB: 1965  MRN: [id number]     REFERRED BY: Nabil Ly MD   CHIEF COMPLIANT: Asthma (Follow up )       HISTORY     Luci Castañeda is a 59 y.o. year old female here for follow-up    INITIAL VISIT: 05/01/2024  TODAY's VISIT: 9/16/2024    CURRENT PULMONARY PROBLEM LIST:  1. ESTHER (obstructive sleep apnea)    2. Severe persistent asthma with acute exacerbation    3. Persistent cough      HISTORY OF PRESENT ILLNESS:   Patient was previously undergoing hormone therapy December 2023. She was taken off of it and has struggled sleeping since. Symptoms began 5 months ago, and have gradually worsening since that time. She was only sleeping 2-4 hours a night and frequently waking to urinate. She notes she was started on Trazadone, which has made her waking up during the night less frequent. She admits to falling asleep at work without warning.  Home sleep study done in October 2021 was reported to show AHI of 2.    CT (04/15/2024) showed negative for any parenchymal lung disease but reported partially visualized left breast mass, previously demonstrated on recent mammographic and ultrasound evaluation. Fatty liver with lobulated liver contour, which may indicate underlying chronic liver disease.    Patient presents today for follow-up for asthma and had a sleep study done recently (06/06/2024). Patient has been using inhalers for several years seasonally. Sleep study results show significant sleep apnea. Patient reports shortness of breath, low blood pressure, and low hemoglobin levels due to chemotherapy. Patient states she is no longer using Symbicort as she has been advised to not use any steroids during chemotherapy. She reports that she has not used Spiriva before.    CURRENT SYMPTOMS: include/are suggestive of:    Sleep apnea []      Snoring  []     Witnessed apneas  []

## 2024-09-16 NOTE — PATIENT INSTRUCTIONS
[facility] will fax DME order to [facility] once OV notes are complete. Please f/u with them at [phone].    10/8/24mm: DME order was faxed to [facility]. Pt informed via [email or web address].

## 2024-09-17 ENCOUNTER — HOSPITAL ENCOUNTER (OUTPATIENT)
Dept: INFUSION THERAPY | Age: 59
Discharge: HOME | End: 2024-09-17
Payer: COMMERCIAL

## 2024-09-17 ENCOUNTER — TELEPHONE (OUTPATIENT)
Dept: INFUSION THERAPY | Age: 59
End: 2024-09-17

## 2024-09-17 VITALS — DIASTOLIC BLOOD PRESSURE: 74 MMHG | HEART RATE: 72 BPM | SYSTOLIC BLOOD PRESSURE: 121 MMHG

## 2024-09-17 DIAGNOSIS — C50.912 INVASIVE DUCTAL CARCINOMA OF BREAST, LEFT (HCC): Primary | ICD-10-CM

## 2024-09-17 PROCEDURE — 2580000003 HC RX 258: Performed by: INTERNAL MEDICINE

## 2024-09-17 PROCEDURE — 96374 THER/PROPH/DIAG INJ IV PUSH: CPT

## 2024-09-17 PROCEDURE — 96360 HYDRATION IV INFUSION INIT: CPT

## 2024-09-17 PROCEDURE — 96361 HYDRATE IV INFUSION ADD-ON: CPT

## 2024-09-17 PROCEDURE — 96375 TX/PRO/DX INJ NEW DRUG ADDON: CPT

## 2024-09-17 PROCEDURE — 6360000002 HC RX W HCPCS: Performed by: INTERNAL MEDICINE

## 2024-09-17 RX ORDER — ALBUTEROL SULFATE 90 UG/1
4 INHALANT RESPIRATORY (INHALATION) PRN
OUTPATIENT
Start: 2024-09-17

## 2024-09-17 RX ORDER — EPINEPHRINE 1 MG/ML
0.3 INJECTION, SOLUTION, CONCENTRATE INTRAVENOUS PRN
OUTPATIENT
Start: 2024-09-17

## 2024-09-17 RX ORDER — HEPARIN 100 UNIT/ML
500 SYRINGE INTRAVENOUS PRN
Status: DISCONTINUED | OUTPATIENT
Start: 2024-09-17 | End: 2024-09-18 | Stop reason: HOSPADM

## 2024-09-17 RX ORDER — 0.9 % SODIUM CHLORIDE 0.9 %
1000 INTRAVENOUS SOLUTION INTRAVENOUS ONCE
Status: COMPLETED | OUTPATIENT
Start: 2024-09-17 | End: 2024-09-17

## 2024-09-17 RX ORDER — FAMOTIDINE 10 MG/ML
20 INJECTION, SOLUTION INTRAVENOUS
OUTPATIENT
Start: 2024-09-17

## 2024-09-17 RX ORDER — ACETAMINOPHEN 325 MG/1
650 TABLET ORAL
OUTPATIENT
Start: 2024-09-17

## 2024-09-17 RX ORDER — SODIUM CHLORIDE 9 MG/ML
INJECTION, SOLUTION INTRAVENOUS CONTINUOUS
OUTPATIENT
Start: 2024-09-17

## 2024-09-17 RX ORDER — ONDANSETRON 2 MG/ML
8 INJECTION INTRAMUSCULAR; INTRAVENOUS
OUTPATIENT
Start: 2024-09-17

## 2024-09-17 RX ORDER — DIPHENHYDRAMINE HYDROCHLORIDE 50 MG/ML
50 INJECTION INTRAMUSCULAR; INTRAVENOUS
OUTPATIENT
Start: 2024-09-17

## 2024-09-17 RX ORDER — PROCHLORPERAZINE EDISYLATE 5 MG/ML
10 INJECTION INTRAMUSCULAR; INTRAVENOUS ONCE
Status: COMPLETED | OUTPATIENT
Start: 2024-09-17 | End: 2024-09-17

## 2024-09-17 RX ORDER — HEPARIN 100 UNIT/ML
500 SYRINGE INTRAVENOUS PRN
OUTPATIENT
Start: 2024-09-17

## 2024-09-17 RX ORDER — SODIUM CHLORIDE 0.9 % (FLUSH) 0.9 %
5-40 SYRINGE (ML) INJECTION PRN
OUTPATIENT
Start: 2024-09-17

## 2024-09-17 RX ORDER — SODIUM CHLORIDE 9 MG/ML
25 INJECTION, SOLUTION INTRAVENOUS PRN
OUTPATIENT
Start: 2024-09-17

## 2024-09-17 RX ORDER — SODIUM CHLORIDE 0.9 % (FLUSH) 0.9 %
5-40 SYRINGE (ML) INJECTION PRN
Status: DISCONTINUED | OUTPATIENT
Start: 2024-09-17 | End: 2024-09-18 | Stop reason: HOSPADM

## 2024-09-17 RX ADMIN — PROCHLORPERAZINE EDISYLATE 10 MG: 5 INJECTION INTRAMUSCULAR; INTRAVENOUS at 11:21

## 2024-09-17 RX ADMIN — HEPARIN 500 UNITS: 100 SYRINGE at 12:34

## 2024-09-17 RX ADMIN — SODIUM CHLORIDE 1000 ML: 9 INJECTION, SOLUTION INTRAVENOUS at 11:15

## 2024-09-17 RX ADMIN — SODIUM CHLORIDE, PRESERVATIVE FREE 10 ML: 5 INJECTION INTRAVENOUS at 12:34

## 2024-09-18 ENCOUNTER — CARE COORDINATION (OUTPATIENT)
Dept: OTHER | Facility: CLINIC | Age: 59
End: 2024-09-18

## 2024-09-19 ENCOUNTER — TELEPHONE (OUTPATIENT)
Dept: ONCOLOGY | Age: 59
End: 2024-09-19

## 2024-09-19 ENCOUNTER — TELEPHONE (OUTPATIENT)
Dept: SURGERY | Age: 59
End: 2024-09-19

## 2024-09-19 DIAGNOSIS — C50.412 MALIGNANT NEOPLASM OF UPPER-OUTER QUADRANT OF LEFT BREAST IN FEMALE, ESTROGEN RECEPTOR NEGATIVE (HCC): Primary | ICD-10-CM

## 2024-09-19 DIAGNOSIS — Z17.1 MALIGNANT NEOPLASM OF UPPER-OUTER QUADRANT OF LEFT BREAST IN FEMALE, ESTROGEN RECEPTOR NEGATIVE (HCC): Primary | ICD-10-CM

## 2024-09-23 ENCOUNTER — CARE COORDINATION (OUTPATIENT)
Dept: OTHER | Facility: CLINIC | Age: 59
End: 2024-09-23

## 2024-09-23 DIAGNOSIS — Z72.820 POOR SLEEP: ICD-10-CM

## 2024-09-24 ENCOUNTER — OFFICE VISIT (OUTPATIENT)
Dept: SURGERY | Age: 59
End: 2024-09-24
Payer: COMMERCIAL

## 2024-09-24 VITALS
SYSTOLIC BLOOD PRESSURE: 95 MMHG | WEIGHT: 278 LBS | BODY MASS INDEX: 42.13 KG/M2 | HEART RATE: 72 BPM | HEIGHT: 68 IN | DIASTOLIC BLOOD PRESSURE: 60 MMHG

## 2024-09-24 DIAGNOSIS — C50.912 INVASIVE DUCTAL CARCINOMA OF BREAST, LEFT (HCC): ICD-10-CM

## 2024-09-24 DIAGNOSIS — Z85.3 HISTORY OF LEFT BREAST CANCER: Primary | ICD-10-CM

## 2024-09-24 PROCEDURE — 99203 OFFICE O/P NEW LOW 30 MIN: CPT | Performed by: PLASTIC SURGERY

## 2024-09-24 PROCEDURE — 3078F DIAST BP <80 MM HG: CPT | Performed by: PLASTIC SURGERY

## 2024-09-24 PROCEDURE — 3074F SYST BP LT 130 MM HG: CPT | Performed by: PLASTIC SURGERY

## 2024-09-24 RX ORDER — TRAZODONE HYDROCHLORIDE 50 MG/1
50 TABLET, FILM COATED ORAL NIGHTLY
Qty: 90 TABLET | Refills: 1 | Status: SHIPPED | OUTPATIENT
Start: 2024-09-24

## 2024-09-25 RX ORDER — TRAZODONE HYDROCHLORIDE 50 MG/1
50 TABLET, FILM COATED ORAL NIGHTLY
Qty: 90 TABLET | Refills: 0 | OUTPATIENT
Start: 2024-09-25

## 2024-09-26 ENCOUNTER — HOSPITAL ENCOUNTER (OUTPATIENT)
Dept: PHYSICAL THERAPY | Facility: CLINIC | Age: 59
Setting detail: THERAPIES SERIES
Discharge: HOME | End: 2024-09-26
Payer: COMMERCIAL

## 2024-09-26 PROCEDURE — 97110 THERAPEUTIC EXERCISES: CPT

## 2024-09-26 PROCEDURE — 97140 MANUAL THERAPY 1/> REGIONS: CPT

## 2024-09-27 ENCOUNTER — TELEPHONE (OUTPATIENT)
Dept: ONCOLOGY | Age: 59
End: 2024-09-27

## 2024-09-27 ENCOUNTER — HOSPITAL ENCOUNTER (OUTPATIENT)
Dept: GENERAL RADIOLOGY | Age: 59
Discharge: HOME | End: 2024-09-29
Payer: COMMERCIAL

## 2024-09-27 ENCOUNTER — CARE COORDINATION (OUTPATIENT)
Dept: OTHER | Facility: CLINIC | Age: 59
End: 2024-09-27

## 2024-09-27 ENCOUNTER — HOSPITAL ENCOUNTER (OUTPATIENT)
Age: 59
Discharge: HOME | End: 2024-09-29
Payer: COMMERCIAL

## 2024-09-27 ENCOUNTER — HOSPITAL ENCOUNTER (OUTPATIENT)
Dept: INFUSION THERAPY | Age: 59
Discharge: HOME | End: 2024-09-27
Payer: COMMERCIAL

## 2024-09-27 ENCOUNTER — OFFICE VISIT (OUTPATIENT)
Dept: ONCOLOGY | Age: 59
End: 2024-09-27
Payer: COMMERCIAL

## 2024-09-27 VITALS
TEMPERATURE: 96.8 F | BODY MASS INDEX: 40.45 KG/M2 | RESPIRATION RATE: 18 BRPM | SYSTOLIC BLOOD PRESSURE: 88 MMHG | OXYGEN SATURATION: 96 % | DIASTOLIC BLOOD PRESSURE: 58 MMHG | WEIGHT: 266 LBS | HEART RATE: 80 BPM

## 2024-09-27 VITALS
SYSTOLIC BLOOD PRESSURE: 119 MMHG | HEART RATE: 69 BPM | DIASTOLIC BLOOD PRESSURE: 76 MMHG | RESPIRATION RATE: 16 BRPM | TEMPERATURE: 97.6 F

## 2024-09-27 DIAGNOSIS — Z17.1 MALIGNANT NEOPLASM OF UPPER-OUTER QUADRANT OF LEFT BREAST IN FEMALE, ESTROGEN RECEPTOR NEGATIVE (HCC): ICD-10-CM

## 2024-09-27 DIAGNOSIS — R05.2 SUBACUTE COUGH: ICD-10-CM

## 2024-09-27 DIAGNOSIS — Z17.1 MALIGNANT NEOPLASM OF UPPER-OUTER QUADRANT OF LEFT BREAST IN FEMALE, ESTROGEN RECEPTOR NEGATIVE (HCC): Primary | ICD-10-CM

## 2024-09-27 DIAGNOSIS — C50.412 MALIGNANT NEOPLASM OF UPPER-OUTER QUADRANT OF LEFT BREAST IN FEMALE, ESTROGEN RECEPTOR NEGATIVE (HCC): Primary | ICD-10-CM

## 2024-09-27 DIAGNOSIS — C50.412 MALIGNANT NEOPLASM OF UPPER-OUTER QUADRANT OF LEFT BREAST IN FEMALE, ESTROGEN RECEPTOR NEGATIVE (HCC): ICD-10-CM

## 2024-09-27 LAB
ALBUMIN SERPL-MCNC: 3.9 G/DL (ref 3.5–5.2)
ALBUMIN/GLOB SERPL: 1.3 {RATIO} (ref 1–2.5)
ALP SERPL-CCNC: 115 U/L (ref 35–104)
ALT SERPL-CCNC: 23 U/L (ref 5–33)
AMYLASE SERPL-CCNC: 41 U/L (ref 28–100)
ANION GAP SERPL CALCULATED.3IONS-SCNC: 12 MMOL/L (ref 9–17)
AST SERPL-CCNC: 29 U/L
BASOPHILS # BLD: 0 K/UL (ref 0–0.2)
BASOPHILS NFR BLD: 0 % (ref 0–2)
BILIRUB SERPL-MCNC: 0.2 MG/DL (ref 0.3–1.2)
BUN SERPL-MCNC: 17 MG/DL (ref 8–23)
CALCIUM SERPL-MCNC: 9.8 MG/DL (ref 8.6–10.4)
CHLORIDE SERPL-SCNC: 97 MMOL/L (ref 98–107)
CO2 SERPL-SCNC: 28 MMOL/L (ref 20–31)
CORTIS SERPL-MCNC: 14.7 UG/DL (ref 2.5–19.5)
CORTISOL COLLECTION INFO: NORMAL
CREAT SERPL-MCNC: 1.4 MG/DL (ref 0.5–0.9)
EOSINOPHIL # BLD: 0 K/UL (ref 0–0.4)
EOSINOPHILS RELATIVE PERCENT: 0 % (ref 1–4)
ERYTHROCYTE [DISTWIDTH] IN BLOOD BY AUTOMATED COUNT: 22.1 % (ref 12.5–15.4)
GFR, ESTIMATED: 43 ML/MIN/1.73M2
GLUCOSE SERPL-MCNC: 119 MG/DL (ref 70–99)
HCT VFR BLD AUTO: 22.3 % (ref 36–46)
HGB BLD-MCNC: 7.4 G/DL (ref 12–16)
LIPASE SERPL-CCNC: 39 U/L (ref 13–60)
LYMPHOCYTES NFR BLD: 1.09 K/UL (ref 1–4.8)
LYMPHOCYTES RELATIVE PERCENT: 13 % (ref 24–44)
MCH RBC QN AUTO: 34.2 PG (ref 26–34)
MCHC RBC AUTO-ENTMCNC: 33.4 G/DL (ref 31–37)
MCV RBC AUTO: 102.3 FL (ref 80–100)
METAMYELOCYTES ABSOLUTE COUNT: 0.59 K/UL
METAMYELOCYTES: 7 %
MONOCYTES NFR BLD: 1.26 K/UL (ref 0.1–0.8)
MONOCYTES NFR BLD: 15 % (ref 1–7)
MORPHOLOGY: ABNORMAL
MYELOCYTES ABSOLUTE COUNT: 0.17 K/UL
MYELOCYTES: 2 %
NEUTROPHILS NFR BLD: 63 % (ref 36–66)
NEUTS SEG NFR BLD: 5.29 K/UL (ref 1.8–7.7)
NUCLEATED RED BLOOD CELLS: 3 PER 100 WBC
PLATELET # BLD AUTO: 116 K/UL (ref 140–450)
PMV BLD AUTO: 9 FL (ref 6–12)
POTASSIUM SERPL-SCNC: 3.9 MMOL/L (ref 3.7–5.3)
PROT SERPL-MCNC: 6.9 G/DL (ref 6.4–8.3)
RBC # BLD AUTO: 2.18 M/UL (ref 4–5.2)
SODIUM SERPL-SCNC: 137 MMOL/L (ref 135–144)
TSH SERPL DL<=0.05 MIU/L-ACNC: 1.21 UIU/ML (ref 0.3–5)
WBC OTHER # BLD: 8.4 K/UL (ref 3.5–11)

## 2024-09-27 PROCEDURE — 86923 COMPATIBILITY TEST ELECTRIC: CPT

## 2024-09-27 PROCEDURE — 86900 BLOOD TYPING SEROLOGIC ABO: CPT

## 2024-09-27 PROCEDURE — 80053 COMPREHEN METABOLIC PANEL: CPT

## 2024-09-27 PROCEDURE — 71046 X-RAY EXAM CHEST 2 VIEWS: CPT

## 2024-09-27 PROCEDURE — 86850 RBC ANTIBODY SCREEN: CPT

## 2024-09-27 PROCEDURE — 86901 BLOOD TYPING SEROLOGIC RH(D): CPT

## 2024-09-27 PROCEDURE — 85025 COMPLETE CBC W/AUTO DIFF WBC: CPT

## 2024-09-27 PROCEDURE — 3078F DIAST BP <80 MM HG: CPT | Performed by: INTERNAL MEDICINE

## 2024-09-27 PROCEDURE — 3074F SYST BP LT 130 MM HG: CPT | Performed by: INTERNAL MEDICINE

## 2024-09-27 PROCEDURE — 36430 TRANSFUSION BLD/BLD COMPNT: CPT

## 2024-09-27 PROCEDURE — 84443 ASSAY THYROID STIM HORMONE: CPT

## 2024-09-27 PROCEDURE — 2580000003 HC RX 258: Performed by: INTERNAL MEDICINE

## 2024-09-27 PROCEDURE — 82533 TOTAL CORTISOL: CPT

## 2024-09-27 PROCEDURE — P9040 RBC LEUKOREDUCED IRRADIATED: HCPCS

## 2024-09-27 PROCEDURE — 83690 ASSAY OF LIPASE: CPT

## 2024-09-27 PROCEDURE — 82150 ASSAY OF AMYLASE: CPT

## 2024-09-27 PROCEDURE — 99214 OFFICE O/P EST MOD 30 MIN: CPT | Performed by: INTERNAL MEDICINE

## 2024-09-27 RX ORDER — SODIUM CHLORIDE 9 MG/ML
INJECTION, SOLUTION INTRAVENOUS PRN
Status: COMPLETED | OUTPATIENT
Start: 2024-09-27 | End: 2024-09-27

## 2024-09-27 RX ORDER — SODIUM CHLORIDE 0.9 % (FLUSH) 0.9 %
5-40 SYRINGE (ML) INJECTION PRN
Status: DISCONTINUED | OUTPATIENT
Start: 2024-09-27 | End: 2024-09-28 | Stop reason: HOSPADM

## 2024-09-27 RX ORDER — HEPARIN 100 UNIT/ML
500 SYRINGE INTRAVENOUS PRN
Status: DISCONTINUED | OUTPATIENT
Start: 2024-09-27 | End: 2024-09-28 | Stop reason: HOSPADM

## 2024-09-27 RX ORDER — LEVOFLOXACIN 500 MG/1
500 TABLET, FILM COATED ORAL DAILY
Qty: 7 TABLET | Refills: 0 | Status: SHIPPED | OUTPATIENT
Start: 2024-09-27 | End: 2024-10-04

## 2024-09-27 RX ADMIN — SODIUM CHLORIDE, PRESERVATIVE FREE 10 ML: 5 INJECTION INTRAVENOUS at 13:01

## 2024-09-27 RX ADMIN — SODIUM CHLORIDE: 9 INJECTION, SOLUTION INTRAVENOUS at 15:23

## 2024-09-27 RX ADMIN — SODIUM CHLORIDE, PRESERVATIVE FREE 10 ML: 5 INJECTION INTRAVENOUS at 14:17

## 2024-09-27 NOTE — PROGRESS NOTES
Pt. Arrived C5D15   Tx held  1 unit RBC given without incident  Disch stable  Return 10/4/24 keytruda, cytoxan neulasta

## 2024-09-30 DIAGNOSIS — G89.29 CHRONIC LEFT-SIDED LOW BACK PAIN WITHOUT SCIATICA: Primary | ICD-10-CM

## 2024-09-30 DIAGNOSIS — M54.50 CHRONIC LEFT-SIDED LOW BACK PAIN WITHOUT SCIATICA: Primary | ICD-10-CM

## 2024-09-30 NOTE — TELEPHONE ENCOUNTER
Luci Castañeda is calling to request a refill on the following medication(s):    Last Visit Date (If Applicable):  8/26/2024    Next Visit Date:    11/26/2024    Medication Request:  Requested Prescriptions     Pending Prescriptions Disp Refills    meloxicam (MOBIC) 15 MG tablet 30 tablet 0     Sig: Take 1 tablet by mouth daily as needed for Pain

## 2024-10-02 RX ORDER — MELOXICAM 15 MG/1
15 TABLET ORAL DAILY PRN
Qty: 90 TABLET | Refills: 1 | Status: SHIPPED | OUTPATIENT
Start: 2024-10-02

## 2024-10-04 ENCOUNTER — HOSPITAL ENCOUNTER (OUTPATIENT)
Dept: INFUSION THERAPY | Age: 59
Discharge: HOME | End: 2024-10-04
Payer: COMMERCIAL

## 2024-10-04 ENCOUNTER — CARE COORDINATION (OUTPATIENT)
Dept: OTHER | Facility: CLINIC | Age: 59
End: 2024-10-04

## 2024-10-04 VITALS
WEIGHT: 268 LBS | RESPIRATION RATE: 18 BRPM | HEART RATE: 76 BPM | TEMPERATURE: 97.1 F | DIASTOLIC BLOOD PRESSURE: 64 MMHG | BODY MASS INDEX: 40.75 KG/M2 | SYSTOLIC BLOOD PRESSURE: 99 MMHG

## 2024-10-04 DIAGNOSIS — C50.912 INVASIVE DUCTAL CARCINOMA OF BREAST, LEFT (HCC): Primary | ICD-10-CM

## 2024-10-04 DIAGNOSIS — C50.412 MALIGNANT NEOPLASM OF UPPER-OUTER QUADRANT OF LEFT BREAST IN FEMALE, ESTROGEN RECEPTOR NEGATIVE (HCC): ICD-10-CM

## 2024-10-04 DIAGNOSIS — Z17.1 MALIGNANT NEOPLASM OF UPPER-OUTER QUADRANT OF LEFT BREAST IN FEMALE, ESTROGEN RECEPTOR NEGATIVE (HCC): ICD-10-CM

## 2024-10-04 LAB
ALBUMIN SERPL-MCNC: 3.9 G/DL (ref 3.5–5.2)
ALBUMIN/GLOB SERPL: 1.4 {RATIO} (ref 1–2.5)
ALP SERPL-CCNC: 110 U/L (ref 35–104)
ALT SERPL-CCNC: 16 U/L (ref 5–33)
AMYLASE SERPL-CCNC: 41 U/L (ref 28–100)
ANION GAP SERPL CALCULATED.3IONS-SCNC: 13 MMOL/L (ref 9–17)
AST SERPL-CCNC: 25 U/L
BASOPHILS # BLD: 0 K/UL (ref 0–0.2)
BASOPHILS NFR BLD: 0 % (ref 0–2)
BILIRUB SERPL-MCNC: 0.3 MG/DL (ref 0.3–1.2)
BUN SERPL-MCNC: 13 MG/DL (ref 8–23)
CALCIUM SERPL-MCNC: 8.7 MG/DL (ref 8.6–10.4)
CHLORIDE SERPL-SCNC: 101 MMOL/L (ref 98–107)
CO2 SERPL-SCNC: 27 MMOL/L (ref 20–31)
CORTIS SERPL-MCNC: 14.1 UG/DL (ref 2.5–19.5)
CORTISOL COLLECTION INFO: NORMAL
CREAT SERPL-MCNC: 1.7 MG/DL (ref 0.5–0.9)
EOSINOPHIL # BLD: 0 K/UL (ref 0–0.4)
EOSINOPHILS RELATIVE PERCENT: 0 % (ref 1–4)
ERYTHROCYTE [DISTWIDTH] IN BLOOD BY AUTOMATED COUNT: 24.6 % (ref 12.5–15.4)
GFR, ESTIMATED: 34 ML/MIN/1.73M2
GLUCOSE SERPL-MCNC: 115 MG/DL (ref 70–99)
HCT VFR BLD AUTO: 26.9 % (ref 36–46)
HGB BLD-MCNC: 9.1 G/DL (ref 12–16)
LIPASE SERPL-CCNC: 42 U/L (ref 13–60)
LYMPHOCYTES NFR BLD: 0.73 K/UL (ref 1–4.8)
LYMPHOCYTES RELATIVE PERCENT: 12 % (ref 24–44)
MCH RBC QN AUTO: 33.5 PG (ref 26–34)
MCHC RBC AUTO-ENTMCNC: 33.9 G/DL (ref 31–37)
MCV RBC AUTO: 98.8 FL (ref 80–100)
MONOCYTES NFR BLD: 0.37 K/UL (ref 0.1–0.8)
MONOCYTES NFR BLD: 6 % (ref 1–7)
MORPHOLOGY: ABNORMAL
NEUTROPHILS NFR BLD: 82 % (ref 36–66)
NEUTS SEG NFR BLD: 5 K/UL (ref 1.8–7.7)
PLATELET # BLD AUTO: 228 K/UL (ref 140–450)
PMV BLD AUTO: 7.1 FL (ref 6–12)
POTASSIUM SERPL-SCNC: 4.1 MMOL/L (ref 3.7–5.3)
PROT SERPL-MCNC: 6.6 G/DL (ref 6.4–8.3)
RBC # BLD AUTO: 2.73 M/UL (ref 4–5.2)
SODIUM SERPL-SCNC: 141 MMOL/L (ref 135–144)
TSH SERPL DL<=0.05 MIU/L-ACNC: 1.04 UIU/ML (ref 0.3–5)
WBC OTHER # BLD: 6.1 K/UL (ref 3.5–11)

## 2024-10-04 PROCEDURE — 96360 HYDRATION IV INFUSION INIT: CPT

## 2024-10-04 PROCEDURE — 2580000003 HC RX 258: Performed by: INTERNAL MEDICINE

## 2024-10-04 PROCEDURE — 84443 ASSAY THYROID STIM HORMONE: CPT

## 2024-10-04 PROCEDURE — 6360000002 HC RX W HCPCS: Performed by: INTERNAL MEDICINE

## 2024-10-04 PROCEDURE — 82150 ASSAY OF AMYLASE: CPT

## 2024-10-04 PROCEDURE — 85025 COMPLETE CBC W/AUTO DIFF WBC: CPT

## 2024-10-04 PROCEDURE — 80053 COMPREHEN METABOLIC PANEL: CPT

## 2024-10-04 PROCEDURE — 82533 TOTAL CORTISOL: CPT

## 2024-10-04 PROCEDURE — 83690 ASSAY OF LIPASE: CPT

## 2024-10-04 RX ORDER — SODIUM CHLORIDE 9 MG/ML
25 INJECTION, SOLUTION INTRAVENOUS PRN
OUTPATIENT
Start: 2024-10-04

## 2024-10-04 RX ORDER — HEPARIN 100 UNIT/ML
500 SYRINGE INTRAVENOUS PRN
OUTPATIENT
Start: 2024-10-04

## 2024-10-04 RX ORDER — SODIUM CHLORIDE 0.9 % (FLUSH) 0.9 %
5-40 SYRINGE (ML) INJECTION PRN
OUTPATIENT
Start: 2024-10-04

## 2024-10-04 RX ORDER — SODIUM CHLORIDE 0.9 % (FLUSH) 0.9 %
5-40 SYRINGE (ML) INJECTION PRN
Status: DISCONTINUED | OUTPATIENT
Start: 2024-10-04 | End: 2024-10-05 | Stop reason: HOSPADM

## 2024-10-04 RX ORDER — ALBUTEROL SULFATE 90 UG/1
4 INHALANT RESPIRATORY (INHALATION) PRN
OUTPATIENT
Start: 2024-10-04

## 2024-10-04 RX ORDER — SODIUM CHLORIDE 9 MG/ML
INJECTION, SOLUTION INTRAVENOUS CONTINUOUS
OUTPATIENT
Start: 2024-10-04

## 2024-10-04 RX ORDER — ONDANSETRON 2 MG/ML
8 INJECTION INTRAMUSCULAR; INTRAVENOUS
OUTPATIENT
Start: 2024-10-04

## 2024-10-04 RX ORDER — EPINEPHRINE 1 MG/ML
0.3 INJECTION, SOLUTION, CONCENTRATE INTRAVENOUS PRN
OUTPATIENT
Start: 2024-10-04

## 2024-10-04 RX ORDER — DIPHENHYDRAMINE HYDROCHLORIDE 50 MG/ML
50 INJECTION INTRAMUSCULAR; INTRAVENOUS
OUTPATIENT
Start: 2024-10-04

## 2024-10-04 RX ORDER — FAMOTIDINE 10 MG/ML
20 INJECTION, SOLUTION INTRAVENOUS
OUTPATIENT
Start: 2024-10-04

## 2024-10-04 RX ORDER — ACETAMINOPHEN 325 MG/1
650 TABLET ORAL
OUTPATIENT
Start: 2024-10-04

## 2024-10-04 RX ORDER — 0.9 % SODIUM CHLORIDE 0.9 %
1000 INTRAVENOUS SOLUTION INTRAVENOUS ONCE
Status: COMPLETED | OUTPATIENT
Start: 2024-10-04 | End: 2024-10-04

## 2024-10-04 RX ORDER — HEPARIN 100 UNIT/ML
500 SYRINGE INTRAVENOUS PRN
Status: DISCONTINUED | OUTPATIENT
Start: 2024-10-04 | End: 2024-10-05 | Stop reason: HOSPADM

## 2024-10-04 RX ADMIN — HEPARIN 500 UNITS: 100 SYRINGE at 15:53

## 2024-10-04 RX ADMIN — SODIUM CHLORIDE, PRESERVATIVE FREE 10 ML: 5 INJECTION INTRAVENOUS at 15:53

## 2024-10-04 RX ADMIN — SODIUM CHLORIDE 1000 ML: 9 INJECTION, SOLUTION INTRAVENOUS at 14:45

## 2024-10-04 NOTE — PROGRESS NOTES
Pt here for C5D22 Adriamycin, Keytruda, Cytoxan, neulasta.  Labs reviewed, creatinine 1.7.  Per Dr. Bradley, hold treatment x1 week, 1L NS.  Pt to return 10/11/24 for MD visit and treatment.  Pt discharged.

## 2024-10-04 NOTE — CARE COORDINATION
Ambulatory Care Coordination Note    ACM attempted to reach patient for follow up call regarding s/s review, mouth sores, PO intake, protein shakes, support system, med refills, chemo today. HIPAA compliant message left requesting a return phone call at patient convenience.     Deborah Sood LPN- Care Coordinator  [facility]  [address], [place]  [address]  Phone: [phone]  [email or web address]

## 2024-10-04 NOTE — PROGRESS NOTES
Spiritual Health Outpatient Oncology/Hematology Progress Note: [facility]    Situation: Writer encountered Patient and Friend in the treatment cubicle of the infusion clinic.    Assessment: Patient shared how she has been coping. Pt noted some setbacks that have postponed her treatments. Pt voiced her beliefs about the spiritual significance of these setbacks and asserted her determination to continue to trust God. Pt affirmed that her varun has grown stronger during her treatment. Pt shared how spiritual music has uplifted her and played a song that especially moves her. Pt acknowledged the loss of her hair and was tearful as she spoke of the impact. Pt expressed gratitude to be able to donate her hair to be used for wigs for children going through cancer. Pt's friend affirmed Pt and her strengths. Pt expressed gratitude for her friends and their support.    Intervention: Writer introduced herself and her services. Writer inquired about Pt's coping and needs. Writer explored Pt's sources of support and strength. Writer offered supportive presence and active listening. Writer affirmed Pt's strengths. Writer offered empathy and words of support and encouragement.     Outcome: Pt thanked writer for the support.    Plan: Spiritual Health Services are available for Patient by phone and/or in person.      10/04/24 1751   Encounter Summary   Encounter Overview/Reason Spiritual/Emotional Needs   Service Provided For Patient;Friend   Referral/Consult From [facility]   Support System Family members;Other (Comment);Yazdanism/varun community;Friends/neighbors  (Coworkers)   Last Encounter  09/30/24   Complexity of Encounter Moderate   Begin Time 1445   End Time  1000   Total Time Calculated 1155 min   Spiritual/Emotional needs   Type Spiritual Support   Assessment/Intervention/Outcome   Assessment Coping;Calm;Impaired resilience   Intervention Sustaining Presence/Ministry of presence;Active

## 2024-10-08 ENCOUNTER — HOSPITAL ENCOUNTER (OUTPATIENT)
Dept: INFUSION THERAPY | Age: 59
Discharge: HOME | End: 2024-10-08
Payer: COMMERCIAL

## 2024-10-08 DIAGNOSIS — C50.912 INVASIVE DUCTAL CARCINOMA OF BREAST, LEFT (HCC): Primary | ICD-10-CM

## 2024-10-08 PROCEDURE — 2580000003 HC RX 258: Performed by: INTERNAL MEDICINE

## 2024-10-08 PROCEDURE — 96361 HYDRATE IV INFUSION ADD-ON: CPT

## 2024-10-08 PROCEDURE — 6360000002 HC RX W HCPCS: Performed by: INTERNAL MEDICINE

## 2024-10-08 PROCEDURE — 96360 HYDRATION IV INFUSION INIT: CPT

## 2024-10-08 RX ORDER — ONDANSETRON 2 MG/ML
8 INJECTION INTRAMUSCULAR; INTRAVENOUS
OUTPATIENT
Start: 2024-10-08

## 2024-10-08 RX ORDER — SODIUM CHLORIDE 0.9 % (FLUSH) 0.9 %
5-40 SYRINGE (ML) INJECTION PRN
Status: DISCONTINUED | OUTPATIENT
Start: 2024-10-08 | End: 2024-10-09 | Stop reason: HOSPADM

## 2024-10-08 RX ORDER — 0.9 % SODIUM CHLORIDE 0.9 %
1000 INTRAVENOUS SOLUTION INTRAVENOUS ONCE
Start: 2024-10-15

## 2024-10-08 RX ORDER — SODIUM CHLORIDE 9 MG/ML
INJECTION, SOLUTION INTRAVENOUS CONTINUOUS
OUTPATIENT
Start: 2024-10-08

## 2024-10-08 RX ORDER — DIPHENHYDRAMINE HYDROCHLORIDE 50 MG/ML
50 INJECTION INTRAMUSCULAR; INTRAVENOUS
OUTPATIENT
Start: 2024-10-08

## 2024-10-08 RX ORDER — 0.9 % SODIUM CHLORIDE 0.9 %
1000 INTRAVENOUS SOLUTION INTRAVENOUS ONCE
Status: COMPLETED | OUTPATIENT
Start: 2024-10-08 | End: 2024-10-08

## 2024-10-08 RX ORDER — SODIUM CHLORIDE 9 MG/ML
5-250 INJECTION, SOLUTION INTRAVENOUS PRN
OUTPATIENT
Start: 2024-10-08

## 2024-10-08 RX ORDER — HEPARIN 100 UNIT/ML
500 SYRINGE INTRAVENOUS PRN
Status: DISCONTINUED | OUTPATIENT
Start: 2024-10-08 | End: 2024-10-09 | Stop reason: HOSPADM

## 2024-10-08 RX ORDER — SODIUM CHLORIDE 0.9 % (FLUSH) 0.9 %
5-40 SYRINGE (ML) INJECTION PRN
OUTPATIENT
Start: 2024-10-08

## 2024-10-08 RX ORDER — HEPARIN 100 UNIT/ML
500 SYRINGE INTRAVENOUS PRN
OUTPATIENT
Start: 2024-10-08

## 2024-10-08 RX ORDER — SODIUM CHLORIDE 9 MG/ML
5-250 INJECTION, SOLUTION INTRAVENOUS PRN
Status: DISCONTINUED | OUTPATIENT
Start: 2024-10-08 | End: 2024-10-09 | Stop reason: HOSPADM

## 2024-10-08 RX ORDER — EPINEPHRINE 1 MG/ML
0.3 INJECTION, SOLUTION, CONCENTRATE INTRAVENOUS PRN
OUTPATIENT
Start: 2024-10-08

## 2024-10-08 RX ORDER — ALBUTEROL SULFATE 90 UG/1
4 INHALANT RESPIRATORY (INHALATION) PRN
OUTPATIENT
Start: 2024-10-08

## 2024-10-08 RX ORDER — FAMOTIDINE 10 MG/ML
20 INJECTION, SOLUTION INTRAVENOUS
OUTPATIENT
Start: 2024-10-08

## 2024-10-08 RX ORDER — ACETAMINOPHEN 325 MG/1
650 TABLET ORAL
OUTPATIENT
Start: 2024-10-08

## 2024-10-08 RX ADMIN — HEPARIN 500 UNITS: 100 SYRINGE at 14:22

## 2024-10-08 RX ADMIN — SODIUM CHLORIDE, PRESERVATIVE FREE 10 ML: 5 INJECTION INTRAVENOUS at 13:00

## 2024-10-08 RX ADMIN — SODIUM CHLORIDE 1000 ML: 9 INJECTION, SOLUTION INTRAVENOUS at 13:04

## 2024-10-08 RX ADMIN — SODIUM CHLORIDE, PRESERVATIVE FREE 10 ML: 5 INJECTION INTRAVENOUS at 14:22

## 2024-10-08 NOTE — PROGRESS NOTES
Pt. Arrived for hydration  Denies any complaints  Hydration completed  Return 10/11/24 appt et C5D19

## 2024-10-09 ENCOUNTER — OFFICE VISIT (OUTPATIENT)
Dept: PULMONOLOGY | Age: 59
End: 2024-10-09
Payer: COMMERCIAL

## 2024-10-09 VITALS
WEIGHT: 268 LBS | TEMPERATURE: 97.6 F | RESPIRATION RATE: 14 BRPM | HEIGHT: 68 IN | SYSTOLIC BLOOD PRESSURE: 131 MMHG | OXYGEN SATURATION: 92 % | HEART RATE: 84 BPM | BODY MASS INDEX: 40.62 KG/M2 | DIASTOLIC BLOOD PRESSURE: 64 MMHG

## 2024-10-09 DIAGNOSIS — J45.51 SEVERE PERSISTENT ASTHMA WITH ACUTE EXACERBATION: ICD-10-CM

## 2024-10-09 DIAGNOSIS — R05.3 PERSISTENT COUGH: ICD-10-CM

## 2024-10-09 DIAGNOSIS — G47.33 OSA (OBSTRUCTIVE SLEEP APNEA): Primary | ICD-10-CM

## 2024-10-09 PROCEDURE — 3078F DIAST BP <80 MM HG: CPT | Performed by: INTERNAL MEDICINE

## 2024-10-09 PROCEDURE — 3075F SYST BP GE 130 - 139MM HG: CPT | Performed by: INTERNAL MEDICINE

## 2024-10-09 PROCEDURE — 99214 OFFICE O/P EST MOD 30 MIN: CPT | Performed by: INTERNAL MEDICINE

## 2024-10-09 RX ORDER — METHYLPREDNISOLONE 4 MG/1
TABLET ORAL
Qty: 1 KIT | Refills: 0 | Status: SHIPPED | OUTPATIENT
Start: 2024-10-09 | End: 2024-10-15

## 2024-10-09 NOTE — PROGRESS NOTES
[facility] here to transport patient back to [facility] to inform that patient was on her way back spoke with [id number] Shasta Bonner RN  07/18/21 2835 PULMONARY MEDICINE OUTPATIENT NOTE                                                                       PATIENT:  Luci Castañeda  YOB: 1965  MRN: [id number]     REFERRED BY: Nabil Ly MD   CHIEF COMPLIANT: No chief complaint on file.       HISTORY     Luci Castañeda is a 59 y.o. year old female here for follow-up    INITIAL VISIT: 05/01/2024  LAST VISIT: 9/16/2024  TODAY's VISIT: 10/9/2024    CURRENT PULMONARY PROBLEM LIST:  1. ESTHER (obstructive sleep apnea)    2. Severe persistent asthma with acute exacerbation    3. Persistent cough      HISTORY OF PRESENT ILLNESS:   Patient was previously undergoing hormone therapy December 2023. She was taken off of it and has struggled sleeping since. Symptoms began 5 months ago, and have gradually been worsening since that time. She noted that she was started on Trazadone, which had made her waking up during the night less frequent. Home sleep study done in October 2021 showed AHI of 2.    CT (04/15/2024) showed negative for any parenchymal lung disease but reported partially visualized left breast mass, previously demonstrated on recent mammographic and ultrasound evaluation. Fatty liver with lobulated liver contour, which may indicate underlying chronic liver disease.    Patient had a sleep study done (06/06/2024), which showed significant sleep apnea. Patient has been using inhalers for several years seasonally. Patient previously reported shortness of breath, low blood pressure, and low hemoglobin levels due to chemotherapy.     Today, patient presents for follow-up for concerns regarding sickness and persistent cough. Since the last visit (09/16/2024), patient has been continuing to follow with oncologist Dr. Bradley, who prescribed her Levaquin for what he thought may have been pneumonia. She used Levaquin for one week before stopping use. However, the patient reports persistent harsh, dry cough with phlegm even after using the medication and

## 2024-10-11 ENCOUNTER — CARE COORDINATION (OUTPATIENT)
Dept: OTHER | Facility: CLINIC | Age: 59
End: 2024-10-11

## 2024-10-11 ENCOUNTER — HOSPITAL ENCOUNTER (OUTPATIENT)
Dept: INFUSION THERAPY | Age: 59
Discharge: HOME | End: 2024-10-11
Payer: COMMERCIAL

## 2024-10-11 ENCOUNTER — OFFICE VISIT (OUTPATIENT)
Dept: ONCOLOGY | Age: 59
End: 2024-10-11
Payer: COMMERCIAL

## 2024-10-11 ENCOUNTER — TELEPHONE (OUTPATIENT)
Dept: ONCOLOGY | Age: 59
End: 2024-10-11

## 2024-10-11 VITALS
TEMPERATURE: 96.8 F | WEIGHT: 272.3 LBS | OXYGEN SATURATION: 92 % | DIASTOLIC BLOOD PRESSURE: 71 MMHG | RESPIRATION RATE: 18 BRPM | BODY MASS INDEX: 41.4 KG/M2 | HEART RATE: 76 BPM | SYSTOLIC BLOOD PRESSURE: 125 MMHG

## 2024-10-11 DIAGNOSIS — C50.412 MALIGNANT NEOPLASM OF UPPER-OUTER QUADRANT OF LEFT BREAST IN FEMALE, ESTROGEN RECEPTOR NEGATIVE (HCC): Primary | ICD-10-CM

## 2024-10-11 DIAGNOSIS — Z17.1 MALIGNANT NEOPLASM OF UPPER-OUTER QUADRANT OF LEFT BREAST IN FEMALE, ESTROGEN RECEPTOR NEGATIVE (HCC): Primary | ICD-10-CM

## 2024-10-11 LAB
ALBUMIN SERPL-MCNC: 3.7 G/DL (ref 3.5–5.2)
ALBUMIN/GLOB SERPL: 1.2 {RATIO} (ref 1–2.5)
ALP SERPL-CCNC: 113 U/L (ref 35–104)
ALT SERPL-CCNC: 12 U/L (ref 5–33)
AMYLASE SERPL-CCNC: 38 U/L (ref 28–100)
ANION GAP SERPL CALCULATED.3IONS-SCNC: 11 MMOL/L (ref 9–17)
AST SERPL-CCNC: 18 U/L
BASOPHILS # BLD: 0 K/UL (ref 0–0.2)
BASOPHILS NFR BLD: 0 % (ref 0–2)
BILIRUB SERPL-MCNC: 0.3 MG/DL (ref 0.3–1.2)
BUN SERPL-MCNC: 14 MG/DL (ref 8–23)
CALCIUM SERPL-MCNC: 9.5 MG/DL (ref 8.6–10.4)
CHLORIDE SERPL-SCNC: 105 MMOL/L (ref 98–107)
CO2 SERPL-SCNC: 25 MMOL/L (ref 20–31)
CORTIS SERPL-MCNC: 2.2 UG/DL (ref 2.5–19.5)
CORTISOL COLLECTION INFO: ABNORMAL
CREAT SERPL-MCNC: 1 MG/DL (ref 0.5–0.9)
EOSINOPHIL # BLD: 0 K/UL (ref 0–0.4)
EOSINOPHILS RELATIVE PERCENT: 0 % (ref 1–4)
ERYTHROCYTE [DISTWIDTH] IN BLOOD BY AUTOMATED COUNT: 22.6 % (ref 12.5–15.4)
GFR, ESTIMATED: 64 ML/MIN/1.73M2
GLUCOSE SERPL-MCNC: 195 MG/DL (ref 70–99)
HCT VFR BLD AUTO: 29.7 % (ref 36–46)
HGB BLD-MCNC: 10.1 G/DL (ref 12–16)
LIPASE SERPL-CCNC: 28 U/L (ref 13–60)
LYMPHOCYTES NFR BLD: 0.7 K/UL (ref 1–4.8)
LYMPHOCYTES RELATIVE PERCENT: 7 % (ref 24–44)
MCH RBC QN AUTO: 33.6 PG (ref 26–34)
MCHC RBC AUTO-ENTMCNC: 34 G/DL (ref 31–37)
MCV RBC AUTO: 98.8 FL (ref 80–100)
MONOCYTES NFR BLD: 0.6 K/UL (ref 0.1–1.2)
MONOCYTES NFR BLD: 6 % (ref 2–11)
MORPHOLOGY: ABNORMAL
MORPHOLOGY: ABNORMAL
NEUTROPHILS NFR BLD: 87 % (ref 36–66)
NEUTS SEG NFR BLD: 8.7 K/UL (ref 1.8–7.7)
PLATELET # BLD AUTO: 229 K/UL (ref 140–450)
PMV BLD AUTO: 6.7 FL (ref 6–12)
POTASSIUM SERPL-SCNC: 4.1 MMOL/L (ref 3.7–5.3)
PROT SERPL-MCNC: 6.7 G/DL (ref 6.4–8.3)
RBC # BLD AUTO: 3 M/UL (ref 4–5.2)
SODIUM SERPL-SCNC: 141 MMOL/L (ref 135–144)
TSH SERPL DL<=0.05 MIU/L-ACNC: 0.83 UIU/ML (ref 0.3–5)
WBC OTHER # BLD: 10 K/UL (ref 3.5–11)

## 2024-10-11 PROCEDURE — 85025 COMPLETE CBC W/AUTO DIFF WBC: CPT

## 2024-10-11 PROCEDURE — 6360000002 HC RX W HCPCS: Performed by: INTERNAL MEDICINE

## 2024-10-11 PROCEDURE — 3074F SYST BP LT 130 MM HG: CPT | Performed by: INTERNAL MEDICINE

## 2024-10-11 PROCEDURE — 96413 CHEMO IV INFUSION 1 HR: CPT

## 2024-10-11 PROCEDURE — 82150 ASSAY OF AMYLASE: CPT

## 2024-10-11 PROCEDURE — 96411 CHEMO IV PUSH ADDL DRUG: CPT

## 2024-10-11 PROCEDURE — 96375 TX/PRO/DX INJ NEW DRUG ADDON: CPT

## 2024-10-11 PROCEDURE — 82533 TOTAL CORTISOL: CPT

## 2024-10-11 PROCEDURE — 99214 OFFICE O/P EST MOD 30 MIN: CPT | Performed by: INTERNAL MEDICINE

## 2024-10-11 PROCEDURE — 96377 APPLICATON ON-BODY INJECTOR: CPT

## 2024-10-11 PROCEDURE — 83690 ASSAY OF LIPASE: CPT

## 2024-10-11 PROCEDURE — 84443 ASSAY THYROID STIM HORMONE: CPT

## 2024-10-11 PROCEDURE — 96367 TX/PROPH/DG ADDL SEQ IV INF: CPT

## 2024-10-11 PROCEDURE — 99211 OFF/OP EST MAY X REQ PHY/QHP: CPT | Performed by: INTERNAL MEDICINE

## 2024-10-11 PROCEDURE — 80053 COMPREHEN METABOLIC PANEL: CPT

## 2024-10-11 PROCEDURE — 2580000003 HC RX 258: Performed by: INTERNAL MEDICINE

## 2024-10-11 PROCEDURE — 3078F DIAST BP <80 MM HG: CPT | Performed by: INTERNAL MEDICINE

## 2024-10-11 PROCEDURE — 96417 CHEMO IV INFUS EACH ADDL SEQ: CPT

## 2024-10-11 RX ORDER — SODIUM CHLORIDE 0.9 % (FLUSH) 0.9 %
5-40 SYRINGE (ML) INJECTION PRN
Status: DISCONTINUED | OUTPATIENT
Start: 2024-10-11 | End: 2024-10-12 | Stop reason: HOSPADM

## 2024-10-11 RX ORDER — SODIUM CHLORIDE 9 MG/ML
INJECTION, SOLUTION INTRAVENOUS CONTINUOUS
Status: CANCELLED | OUTPATIENT
Start: 2024-10-11

## 2024-10-11 RX ORDER — ONDANSETRON 2 MG/ML
8 INJECTION INTRAMUSCULAR; INTRAVENOUS
Status: CANCELLED | OUTPATIENT
Start: 2024-10-11

## 2024-10-11 RX ORDER — PALONOSETRON 0.05 MG/ML
0.25 INJECTION, SOLUTION INTRAVENOUS ONCE
Status: COMPLETED | OUTPATIENT
Start: 2024-10-11 | End: 2024-10-11

## 2024-10-11 RX ORDER — PROCHLORPERAZINE EDISYLATE 5 MG/ML
5 INJECTION INTRAMUSCULAR; INTRAVENOUS
Status: CANCELLED | OUTPATIENT
Start: 2024-10-11

## 2024-10-11 RX ORDER — ALBUTEROL SULFATE 90 UG/1
4 INHALANT RESPIRATORY (INHALATION) PRN
Status: CANCELLED | OUTPATIENT
Start: 2024-10-11

## 2024-10-11 RX ORDER — SODIUM CHLORIDE 9 MG/ML
5-250 INJECTION, SOLUTION INTRAVENOUS PRN
Status: CANCELLED | OUTPATIENT
Start: 2024-10-11

## 2024-10-11 RX ORDER — MEPERIDINE HYDROCHLORIDE 50 MG/ML
12.5 INJECTION INTRAMUSCULAR; INTRAVENOUS; SUBCUTANEOUS PRN
Status: CANCELLED | OUTPATIENT
Start: 2024-10-11

## 2024-10-11 RX ORDER — SODIUM CHLORIDE 9 MG/ML
5-250 INJECTION, SOLUTION INTRAVENOUS PRN
Status: DISCONTINUED | OUTPATIENT
Start: 2024-10-11 | End: 2024-10-12 | Stop reason: HOSPADM

## 2024-10-11 RX ORDER — FAMOTIDINE 10 MG/ML
20 INJECTION, SOLUTION INTRAVENOUS
Status: CANCELLED | OUTPATIENT
Start: 2024-10-11

## 2024-10-11 RX ORDER — DEXAMETHASONE SODIUM PHOSPHATE 10 MG/ML
10 INJECTION INTRAMUSCULAR; INTRAVENOUS ONCE
Status: COMPLETED | OUTPATIENT
Start: 2024-10-11 | End: 2024-10-11

## 2024-10-11 RX ORDER — DOXORUBICIN HYDROCHLORIDE 2 MG/ML
60 INJECTION, SOLUTION INTRAVENOUS ONCE
Status: COMPLETED | OUTPATIENT
Start: 2024-10-11 | End: 2024-10-11

## 2024-10-11 RX ORDER — HEPARIN 100 UNIT/ML
500 SYRINGE INTRAVENOUS PRN
Status: DISCONTINUED | OUTPATIENT
Start: 2024-10-11 | End: 2024-10-12 | Stop reason: HOSPADM

## 2024-10-11 RX ORDER — DIPHENHYDRAMINE HYDROCHLORIDE 50 MG/ML
50 INJECTION INTRAMUSCULAR; INTRAVENOUS
Status: CANCELLED | OUTPATIENT
Start: 2024-10-11

## 2024-10-11 RX ORDER — EPINEPHRINE 1 MG/ML
0.3 INJECTION, SOLUTION, CONCENTRATE INTRAVENOUS PRN
Status: CANCELLED | OUTPATIENT
Start: 2024-10-11

## 2024-10-11 RX ORDER — ACETAMINOPHEN 325 MG/1
650 TABLET ORAL
Status: CANCELLED | OUTPATIENT
Start: 2024-10-11

## 2024-10-11 RX ADMIN — PALONOSETRON 0.25 MG: 0.05 INJECTION, SOLUTION INTRAVENOUS at 10:01

## 2024-10-11 RX ADMIN — CYCLOPHOSPHAMIDE 1280 MG: 1 INJECTION INTRAVENOUS at 11:51

## 2024-10-11 RX ADMIN — SODIUM CHLORIDE, PRESERVATIVE FREE 10 ML: 5 INJECTION INTRAVENOUS at 12:52

## 2024-10-11 RX ADMIN — SODIUM CHLORIDE 75 ML/HR: 9 INJECTION, SOLUTION INTRAVENOUS at 10:00

## 2024-10-11 RX ADMIN — SODIUM CHLORIDE 150 MG: 9 INJECTION, SOLUTION INTRAVENOUS at 10:17

## 2024-10-11 RX ADMIN — SODIUM CHLORIDE 200 MG: 9 INJECTION, SOLUTION INTRAVENOUS at 10:53

## 2024-10-11 RX ADMIN — DEXAMETHASONE SODIUM PHOSPHATE 10 MG: 10 INJECTION INTRAMUSCULAR; INTRAVENOUS at 10:01

## 2024-10-11 RX ADMIN — PEGFILGRASTIM 6 MG: KIT SUBCUTANEOUS at 12:34

## 2024-10-11 RX ADMIN — DOXORUBICIN HYDROCHLORIDE 128 MG: 2 INJECTION, SOLUTION INTRAVENOUS at 11:42

## 2024-10-11 RX ADMIN — HEPARIN 500 UNITS: 100 SYRINGE at 12:52

## 2024-10-11 NOTE — PROGRESS NOTES
Cycle 5 updated to reflect changes from doses being held, keeping keytruda @21 day intervals and AC at 14 day intervals per MD and nurse request      Clarence Cuevas,   PharmD  10/11/2024 9:49 AM

## 2024-10-11 NOTE — TELEPHONE ENCOUNTER
Instructions   from Dr. Jess Bradley MD    Proceed with chemo today  RV 2-3 weeks with treatment.       RV 10/25/24 at 10 am with tx to follow

## 2024-10-11 NOTE — PROGRESS NOTES
Spiritual Health Outpatient Oncology/Hematology Progress Note    Situation: Writer encountered Patient and Friend in the treatment cubicle of the infusion clinic.    Assessment: Patient was joined by her Friend. Pt smiled and reported that she was doing \"much better.\" Pt spoke of her upcoming treatment plan and voiced hopes about her future recovery.Pt would like to return to [facility] to address some side effects.  Pt expressed gratitude for being able to receive support and for the friends who have graciously offered to help her. Pt acknowledged and thanked God for supplying her need. Pt shared about her Mother and Sister who are dependent and how she and her other sister are navigating her care. Pt acknowledged the importance of taking care of herself so she is able to care for others. Pt and Friend reminisced about their younger days and how their varun community and fellow positively shaped them.     Intervention: Writer inquired about Pt's coping and needs. Writer explored Pt's sources of support and strength. Writer offered supportive presence and active listening. Writer facilitated story telling. Writer affirmed Pt's strengths. Writer offered words of support and encouragement.     Outcome: Pt thanked writer for the visit.    Plan: Spiritual Health Services are available for Patient by phone and/or in person.        10/11/24 1127   Encounter Summary   Encounter Overview/Reason Spiritual/Emotional Needs   Service Provided For Patient;Friend   Referral/Consult From [facility]   Support System Family members;Friends/neighbors;[employer]ian/varun community;Other (Comment)  (Coworkers)   Last Encounter  10/04/24   Complexity of Encounter Moderate   Begin Time 1010   End Time  1030   Total Time Calculated 20 min   Spiritual/Emotional needs   Type Spiritual Support   Assessment/Intervention/Outcome   Assessment Coping;Calm   Intervention Sustaining Presence/Ministry of presence;Active

## 2024-10-11 NOTE — PROGRESS NOTES
Pt here for C.5D.15. keytruda, linda, cytoxan  Arrives ambulatory.  Denies any new complaints.  Labs drawn from port, results reviewed.  Pt was seen by Dr. Bradley, order rec'd to proceed with tx.  Tx complete without incident.  Pt d/c'd in stable condition.  Returns 10/25/2024 for office visit and C5D29.

## 2024-10-16 ENCOUNTER — TELEPHONE (OUTPATIENT)
Dept: ONCOLOGY | Age: 59
End: 2024-10-16

## 2024-10-16 NOTE — TELEPHONE ENCOUNTER
Late Entry     Name: Luci Castañeda  : 1965  MRN: [id number]    Oncology Navigation Follow-Up Note    Contact Type:  Telephone    Notes: Pt called writer with general questions which were addressed to the best of writer's knowledge. Pt aware of upcoming appts and no barriers were voiced for these.       Electronically signed by Veronica Bennett RN on 10/16/2024 at 1:59 PM

## 2024-10-17 NOTE — PROGRESS NOTES
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  History of left nephrectomy in 1988   CURRENT THERAPY:         Neoadjuvant treatment with Keytruda/Taxol/carboplatin/Adriamycin/Cytoxan.  Treatment started 5/31/2024  BRIEF CASE HISTORY:       Ms. Luci Castañeda is a very pleasant 59 y.o. female with history of multiple comorbidities as listed.  She had history of left nephrectomy in 1988.  She had cardiac cath in 2021 for left ventricular hypertrophy.  Had recent problems with restrictive and obstructive pulmonary disease.  She continues follow-up with pulmonary. She was doing fine with routine mammogram being normal until this year where she had suspicious abnormalities in the mammogram done on 3/25/2024.  Diagnostic mammogram and left breast ultrasound April 9, 2024 showed suspicious 4.5 cm mass in the left breast 1 o'clock position.  Mildly thickened left axillary lymph nodes.  A biopsy was performed on 4/26/2024 and unfortunately that was positive for invasive ductal carcinoma.  Triple negative.  Biopsy from lymph node was negative for malignancy.  The patient is seen today for further management of her breast cancer. The patient did very well after her biopsy without any complication. The patient had no symptom preceding her diagnosis of cancer. No chest pain, shortness of breath, cough, sputum or hemoptysis, no back pain or monique aches, no weight loss or decreased appetite, no fever or night sweating. No headaches, and no other complaints.  No smoking social alcohol      INTERIM HISTORY:   Patient seen for follow-up triple negative breast cancer.  Patient is having cough and sputum. No fever. No hemoptysis.   Tolerated chemo well so far with few side effects. She had headaches and nausea. No fever or

## 2024-10-18 ENCOUNTER — TELEPHONE (OUTPATIENT)
Dept: INFUSION THERAPY | Age: 59
End: 2024-10-18

## 2024-10-18 NOTE — TELEPHONE ENCOUNTER
Patient is calling in with concerns of bleeding in her mouth with brushing teeth and when she feels an \"ick in her mouth\" she reports having tan to red blood. Writer let her know to monitor bleeding if she feels like its persistent or worsening we can get her in for a CBC check. Also to use soft tooth brush.

## 2024-10-21 RX ORDER — FLUCONAZOLE 100 MG/1
100 TABLET ORAL DAILY
Qty: 7 TABLET | Refills: 0 | Status: SHIPPED | OUTPATIENT
Start: 2024-10-21 | End: 2024-10-28

## 2024-10-21 NOTE — TELEPHONE ENCOUNTER
Pt called stating she started having a yellow/white film on her tongue and a sore throat. She states this started Fri night. She states she called on-call MD who told her to call the office today and get an RX for diflucan. RX for diflucan sent.

## 2024-10-22 ENCOUNTER — HOSPITAL ENCOUNTER (OUTPATIENT)
Age: 59
Discharge: HOME | End: 2024-10-22
Payer: COMMERCIAL

## 2024-10-22 DIAGNOSIS — C50.412 MALIGNANT NEOPLASM OF UPPER-OUTER QUADRANT OF LEFT BREAST IN FEMALE, ESTROGEN RECEPTOR NEGATIVE (HCC): ICD-10-CM

## 2024-10-22 DIAGNOSIS — Z17.1 MALIGNANT NEOPLASM OF UPPER-OUTER QUADRANT OF LEFT BREAST IN FEMALE, ESTROGEN RECEPTOR NEGATIVE (HCC): ICD-10-CM

## 2024-10-22 LAB
ALBUMIN SERPL-MCNC: 3.9 G/DL (ref 3.5–5.2)
ALBUMIN/GLOB SERPL: 1.3 {RATIO} (ref 1–2.5)
ALP SERPL-CCNC: 105 U/L (ref 35–104)
ALT SERPL-CCNC: 19 U/L (ref 5–33)
ANION GAP SERPL CALCULATED.3IONS-SCNC: 13 MMOL/L (ref 9–17)
AST SERPL-CCNC: 24 U/L
BASOPHILS # BLD: 0 K/UL (ref 0–0.2)
BASOPHILS NFR BLD: 0 % (ref 0–2)
BILIRUB SERPL-MCNC: 0.4 MG/DL (ref 0.3–1.2)
BUN SERPL-MCNC: 12 MG/DL (ref 8–23)
CALCIUM SERPL-MCNC: 9.6 MG/DL (ref 8.6–10.4)
CHLORIDE SERPL-SCNC: 100 MMOL/L (ref 98–107)
CO2 SERPL-SCNC: 26 MMOL/L (ref 20–31)
CREAT SERPL-MCNC: 1.6 MG/DL (ref 0.5–0.9)
EOSINOPHIL # BLD: 0.1 K/UL (ref 0–0.4)
EOSINOPHILS RELATIVE PERCENT: 2 % (ref 1–4)
ERYTHROCYTE [DISTWIDTH] IN BLOOD BY AUTOMATED COUNT: 21 % (ref 12.5–15.4)
GFR, ESTIMATED: 36 ML/MIN/1.73M2
GLUCOSE SERPL-MCNC: 120 MG/DL (ref 70–99)
HCT VFR BLD AUTO: 27.7 % (ref 36–46)
HGB BLD-MCNC: 9.3 G/DL (ref 12–16)
LYMPHOCYTES NFR BLD: 0.55 K/UL (ref 1–4.8)
LYMPHOCYTES RELATIVE PERCENT: 11 % (ref 24–44)
MCH RBC QN AUTO: 33.3 PG (ref 26–34)
MCHC RBC AUTO-ENTMCNC: 33.7 G/DL (ref 31–37)
MCV RBC AUTO: 98.7 FL (ref 80–100)
MONOCYTES NFR BLD: 0.75 K/UL (ref 0.1–0.8)
MONOCYTES NFR BLD: 15 % (ref 1–7)
MORPHOLOGY: ABNORMAL
MORPHOLOGY: ABNORMAL
NEUTROPHILS NFR BLD: 72 % (ref 36–66)
NEUTS SEG NFR BLD: 3.6 K/UL (ref 1.8–7.7)
PLATELET # BLD AUTO: 57 K/UL (ref 140–450)
PMV BLD AUTO: 8.5 FL (ref 6–12)
POTASSIUM SERPL-SCNC: 4.1 MMOL/L (ref 3.7–5.3)
PROT SERPL-MCNC: 6.8 G/DL (ref 6.4–8.3)
RBC # BLD AUTO: 2.8 M/UL (ref 4–5.2)
SODIUM SERPL-SCNC: 139 MMOL/L (ref 135–144)
WBC OTHER # BLD: 5 K/UL (ref 3.5–11)

## 2024-10-22 PROCEDURE — 36415 COLL VENOUS BLD VENIPUNCTURE: CPT

## 2024-10-22 PROCEDURE — 80053 COMPREHEN METABOLIC PANEL: CPT

## 2024-10-22 PROCEDURE — 85025 COMPLETE CBC W/AUTO DIFF WBC: CPT

## 2024-10-23 ENCOUNTER — TELEPHONE (OUTPATIENT)
Dept: INFUSION THERAPY | Age: 59
End: 2024-10-23

## 2024-10-23 NOTE — TELEPHONE ENCOUNTER
Pt called stating she had her labs drawn yesterday, and was concerned about her plts and creatinine. She states she does not want her tx held Fri, and wants Dr. Walker to review labs now and see if anything needs to be done to prevent tx being held. Writer sent Dr. Aaron Blanco and will call pt back with recommendations once he responds.

## 2024-10-25 ENCOUNTER — TELEPHONE (OUTPATIENT)
Dept: ONCOLOGY | Age: 59
End: 2024-10-25

## 2024-10-25 ENCOUNTER — HOSPITAL ENCOUNTER (OUTPATIENT)
Dept: INFUSION THERAPY | Age: 59
Discharge: HOME | End: 2024-10-25
Payer: COMMERCIAL

## 2024-10-25 ENCOUNTER — OFFICE VISIT (OUTPATIENT)
Dept: ONCOLOGY | Age: 59
End: 2024-10-25
Payer: COMMERCIAL

## 2024-10-25 VITALS
BODY MASS INDEX: 39.47 KG/M2 | RESPIRATION RATE: 16 BRPM | HEART RATE: 97 BPM | OXYGEN SATURATION: 96 % | SYSTOLIC BLOOD PRESSURE: 123 MMHG | DIASTOLIC BLOOD PRESSURE: 75 MMHG | WEIGHT: 259.6 LBS | TEMPERATURE: 96.6 F

## 2024-10-25 DIAGNOSIS — C50.412 MALIGNANT NEOPLASM OF UPPER-OUTER QUADRANT OF LEFT BREAST IN FEMALE, ESTROGEN RECEPTOR NEGATIVE (HCC): Primary | ICD-10-CM

## 2024-10-25 DIAGNOSIS — Z17.1 MALIGNANT NEOPLASM OF UPPER-OUTER QUADRANT OF LEFT BREAST IN FEMALE, ESTROGEN RECEPTOR NEGATIVE (HCC): Primary | ICD-10-CM

## 2024-10-25 LAB
ALBUMIN SERPL-MCNC: 3.9 G/DL (ref 3.5–5.2)
ALBUMIN/GLOB SERPL: 1.4 {RATIO} (ref 1–2.5)
ALP SERPL-CCNC: 106 U/L (ref 35–104)
ALT SERPL-CCNC: 30 U/L (ref 5–33)
AMYLASE SERPL-CCNC: 40 U/L (ref 28–100)
ANION GAP SERPL CALCULATED.3IONS-SCNC: 12 MMOL/L (ref 9–17)
AST SERPL-CCNC: 32 U/L
BASOPHILS # BLD: 0 K/UL (ref 0–0.2)
BASOPHILS NFR BLD: 0 % (ref 0–2)
BILIRUB SERPL-MCNC: 0.3 MG/DL (ref 0.3–1.2)
BUN SERPL-MCNC: 9 MG/DL (ref 8–23)
CALCIUM SERPL-MCNC: 9.5 MG/DL (ref 8.6–10.4)
CHLORIDE SERPL-SCNC: 104 MMOL/L (ref 98–107)
CO2 SERPL-SCNC: 25 MMOL/L (ref 20–31)
CORTIS SERPL-MCNC: 15.9 UG/DL (ref 2.5–19.5)
CORTISOL COLLECTION INFO: NORMAL
CREAT SERPL-MCNC: 1.1 MG/DL (ref 0.5–0.9)
EOSINOPHIL # BLD: 0 K/UL (ref 0–0.4)
EOSINOPHILS RELATIVE PERCENT: 0 % (ref 1–4)
ERYTHROCYTE [DISTWIDTH] IN BLOOD BY AUTOMATED COUNT: 21.1 % (ref 12.5–15.4)
GFR, ESTIMATED: 57 ML/MIN/1.73M2
GLUCOSE SERPL-MCNC: 133 MG/DL (ref 70–99)
HCT VFR BLD AUTO: 28.2 % (ref 36–46)
HGB BLD-MCNC: 9.4 G/DL (ref 12–16)
LIPASE SERPL-CCNC: 36 U/L (ref 13–60)
LYMPHOCYTES NFR BLD: 0.62 K/UL (ref 1–4.8)
LYMPHOCYTES RELATIVE PERCENT: 12 % (ref 24–44)
MCH RBC QN AUTO: 32.7 PG (ref 26–34)
MCHC RBC AUTO-ENTMCNC: 33.2 G/DL (ref 31–37)
MCV RBC AUTO: 98.6 FL (ref 80–100)
MONOCYTES NFR BLD: 0.78 K/UL (ref 0.1–1.2)
MONOCYTES NFR BLD: 15 % (ref 2–11)
MORPHOLOGY: ABNORMAL
MORPHOLOGY: ABNORMAL
NEUTROPHILS NFR BLD: 73 % (ref 36–66)
NEUTS SEG NFR BLD: 3.8 K/UL (ref 1.8–7.7)
PLATELET # BLD AUTO: 109 K/UL (ref 140–450)
PMV BLD AUTO: 8.3 FL (ref 6–12)
POTASSIUM SERPL-SCNC: 4 MMOL/L (ref 3.7–5.3)
PROT SERPL-MCNC: 6.7 G/DL (ref 6.4–8.3)
RBC # BLD AUTO: 2.86 M/UL (ref 4–5.2)
SODIUM SERPL-SCNC: 141 MMOL/L (ref 135–144)
TSH SERPL DL<=0.05 MIU/L-ACNC: 0.83 UIU/ML (ref 0.3–5)
WBC OTHER # BLD: 5.2 K/UL (ref 3.5–11)

## 2024-10-25 PROCEDURE — 82533 TOTAL CORTISOL: CPT

## 2024-10-25 PROCEDURE — 6360000002 HC RX W HCPCS: Performed by: INTERNAL MEDICINE

## 2024-10-25 PROCEDURE — 2580000003 HC RX 258: Performed by: INTERNAL MEDICINE

## 2024-10-25 PROCEDURE — 96365 THER/PROPH/DIAG IV INF INIT: CPT

## 2024-10-25 PROCEDURE — 96377 APPLICATON ON-BODY INJECTOR: CPT

## 2024-10-25 PROCEDURE — 3074F SYST BP LT 130 MM HG: CPT | Performed by: INTERNAL MEDICINE

## 2024-10-25 PROCEDURE — 82150 ASSAY OF AMYLASE: CPT

## 2024-10-25 PROCEDURE — 84443 ASSAY THYROID STIM HORMONE: CPT

## 2024-10-25 PROCEDURE — 96361 HYDRATE IV INFUSION ADD-ON: CPT

## 2024-10-25 PROCEDURE — 83690 ASSAY OF LIPASE: CPT

## 2024-10-25 PROCEDURE — 96413 CHEMO IV INFUSION 1 HR: CPT

## 2024-10-25 PROCEDURE — 3078F DIAST BP <80 MM HG: CPT | Performed by: INTERNAL MEDICINE

## 2024-10-25 PROCEDURE — 99214 OFFICE O/P EST MOD 30 MIN: CPT | Performed by: INTERNAL MEDICINE

## 2024-10-25 PROCEDURE — 96415 CHEMO IV INFUSION ADDL HR: CPT

## 2024-10-25 PROCEDURE — 96411 CHEMO IV PUSH ADDL DRUG: CPT

## 2024-10-25 PROCEDURE — 85025 COMPLETE CBC W/AUTO DIFF WBC: CPT

## 2024-10-25 PROCEDURE — 80053 COMPREHEN METABOLIC PANEL: CPT

## 2024-10-25 PROCEDURE — 96375 TX/PRO/DX INJ NEW DRUG ADDON: CPT

## 2024-10-25 PROCEDURE — 96367 TX/PROPH/DG ADDL SEQ IV INF: CPT

## 2024-10-25 PROCEDURE — 96366 THER/PROPH/DIAG IV INF ADDON: CPT

## 2024-10-25 PROCEDURE — 96409 CHEMO IV PUSH SNGL DRUG: CPT

## 2024-10-25 RX ORDER — FAMOTIDINE 10 MG/ML
20 INJECTION, SOLUTION INTRAVENOUS
OUTPATIENT
Start: 2024-11-02

## 2024-10-25 RX ORDER — HEPARIN SODIUM (PORCINE) LOCK FLUSH IV SOLN 100 UNIT/ML 100 UNIT/ML
500 SOLUTION INTRAVENOUS PRN
Status: CANCELLED | OUTPATIENT
Start: 2024-11-01

## 2024-10-25 RX ORDER — DIPHENHYDRAMINE HYDROCHLORIDE 50 MG/ML
50 INJECTION INTRAMUSCULAR; INTRAVENOUS
OUTPATIENT
Start: 2024-11-02

## 2024-10-25 RX ORDER — AZITHROMYCIN 250 MG/1
TABLET, FILM COATED ORAL
Qty: 6 TABLET | Refills: 0 | Status: SHIPPED | OUTPATIENT
Start: 2024-10-25 | End: 2024-11-04

## 2024-10-25 RX ORDER — SODIUM CHLORIDE 9 MG/ML
5-250 INJECTION, SOLUTION INTRAVENOUS PRN
OUTPATIENT
Start: 2024-11-02

## 2024-10-25 RX ORDER — SODIUM CHLORIDE 0.9 % (FLUSH) 0.9 %
5-40 SYRINGE (ML) INJECTION PRN
Status: DISCONTINUED | OUTPATIENT
Start: 2024-10-25 | End: 2024-10-26 | Stop reason: HOSPADM

## 2024-10-25 RX ORDER — ACETAMINOPHEN 325 MG/1
650 TABLET ORAL
OUTPATIENT
Start: 2024-11-02

## 2024-10-25 RX ORDER — PALONOSETRON 0.05 MG/ML
0.25 INJECTION, SOLUTION INTRAVENOUS ONCE
Status: COMPLETED | OUTPATIENT
Start: 2024-10-25 | End: 2024-10-25

## 2024-10-25 RX ORDER — EPINEPHRINE 1 MG/ML
0.3 INJECTION, SOLUTION, CONCENTRATE INTRAVENOUS PRN
OUTPATIENT
Start: 2024-11-02

## 2024-10-25 RX ORDER — DOXORUBICIN HYDROCHLORIDE 2 MG/ML
60 INJECTION, SOLUTION INTRAVENOUS ONCE
OUTPATIENT
Start: 2024-11-08 | End: 2024-11-08

## 2024-10-25 RX ORDER — DOXORUBICIN HYDROCHLORIDE 2 MG/ML
60 INJECTION, SOLUTION INTRAVENOUS ONCE
Status: COMPLETED | OUTPATIENT
Start: 2024-10-25 | End: 2024-10-25

## 2024-10-25 RX ORDER — ONDANSETRON 2 MG/ML
8 INJECTION INTRAMUSCULAR; INTRAVENOUS
OUTPATIENT
Start: 2024-11-02

## 2024-10-25 RX ORDER — ACETAMINOPHEN 325 MG/1
650 TABLET ORAL
Start: 2024-11-02

## 2024-10-25 RX ORDER — DEXAMETHASONE SODIUM PHOSPHATE 10 MG/ML
10 INJECTION INTRAMUSCULAR; INTRAVENOUS ONCE
Status: COMPLETED | OUTPATIENT
Start: 2024-10-25 | End: 2024-10-25

## 2024-10-25 RX ORDER — ALBUTEROL SULFATE 90 UG/1
4 INHALANT RESPIRATORY (INHALATION) PRN
OUTPATIENT
Start: 2024-11-02

## 2024-10-25 RX ORDER — MEPERIDINE HYDROCHLORIDE 50 MG/ML
12.5 INJECTION INTRAMUSCULAR; INTRAVENOUS; SUBCUTANEOUS PRN
OUTPATIENT
Start: 2024-11-02

## 2024-10-25 RX ORDER — HEPARIN 100 UNIT/ML
500 SYRINGE INTRAVENOUS PRN
Status: DISCONTINUED | OUTPATIENT
Start: 2024-10-25 | End: 2024-10-26 | Stop reason: HOSPADM

## 2024-10-25 RX ORDER — SODIUM CHLORIDE 9 MG/ML
INJECTION, SOLUTION INTRAVENOUS CONTINUOUS
OUTPATIENT
Start: 2024-11-02

## 2024-10-25 RX ORDER — SODIUM CHLORIDE 9 MG/ML
5-250 INJECTION, SOLUTION INTRAVENOUS PRN
Status: DISCONTINUED | OUTPATIENT
Start: 2024-10-25 | End: 2024-10-26 | Stop reason: HOSPADM

## 2024-10-25 RX ORDER — SODIUM CHLORIDE 0.9 % (FLUSH) 0.9 %
5-40 SYRINGE (ML) INJECTION PRN
Status: CANCELLED | OUTPATIENT
Start: 2024-11-01

## 2024-10-25 RX ADMIN — DOXORUBICIN HYDROCHLORIDE 126 MG: 2 INJECTION, SOLUTION INTRAVENOUS at 12:38

## 2024-10-25 RX ADMIN — CYCLOPHOSPHAMIDE 1260 MG: 1 INJECTION INTRAVENOUS at 12:48

## 2024-10-25 RX ADMIN — PALONOSETRON HYDROCHLORIDE 0.25 MG: 0.25 INJECTION INTRAVENOUS at 11:42

## 2024-10-25 RX ADMIN — SODIUM CHLORIDE 150 ML/HR: 9 INJECTION, SOLUTION INTRAVENOUS at 11:42

## 2024-10-25 RX ADMIN — PEGFILGRASTIM 6 MG: KIT SUBCUTANEOUS at 13:19

## 2024-10-25 RX ADMIN — HEPARIN 500 UNITS: 100 SYRINGE at 13:26

## 2024-10-25 RX ADMIN — SODIUM CHLORIDE, PRESERVATIVE FREE 10 ML: 5 INJECTION INTRAVENOUS at 13:26

## 2024-10-25 RX ADMIN — SODIUM CHLORIDE 150 MG: 9 INJECTION, SOLUTION INTRAVENOUS at 11:59

## 2024-10-25 RX ADMIN — DEXAMETHASONE SODIUM PHOSPHATE 10 MG: 10 INJECTION INTRAMUSCULAR; INTRAVENOUS at 11:42

## 2024-10-25 NOTE — TELEPHONE ENCOUNTER
Name: Luci Castañeda  : 1965  MRN: [id number]    Oncology Navigation Follow-Up Note    Contact Type:  Telephone    Notes: PORT accessed with Power Loc 20G x 0.75in. needle without diff. 10mls blood discarded, 10mls blood drawn for labs and PORT flushed with 10mls NS without diff. Labs sent.      Electronically signed by Nano Arceo RN on 10/25/2024 at 11:00 AM

## 2024-10-28 ENCOUNTER — CARE COORDINATION (OUTPATIENT)
Dept: OTHER | Facility: CLINIC | Age: 59
End: 2024-10-28

## 2024-10-28 NOTE — CARE COORDINATION
Ambulatory Care Coordination Note     10/28/2024 10:47 AM     Patient Current Location:  Home: [address] [place], [place] [id number]     ACM contacted the patient by telephone. Verified name and  with patient as identifiers.         ACM: Deborah Sood LPN     Challenges to be reviewed by the provider   Additional needs identified to be addressed with provider No  none               Method of communication with provider: none.    Has the patient been seen in the ED since your last call? no    Care Summary Note: Spoke to patient. She reports feeling a bit under the weather due to possible PNA and URI s/s. She is finishing up Azithromycin and staying well hydrated. She is also getting up frequently and ambulating. Patient is feeling post-chemo fatigue, usually feels this way about 10-14 days post-tx. She is very excited for her last tx date of !     Patient is scheduled for bilateral breast oncoplastic reduction with L breast localizer guided lumpectomy with L axillary sentinel node bx, on . Dr. Gordillo will be performing the surgery at [facility]. He is only available Q4th Monday, however, patient is on a list if surgery slots open up prior to the . She has family that will be in [place], so trying to move the surgery up would help her post-care.     Advised patient to let me know if there is anything I can assist with in order to help move surgery up, if possible.     ACM to f/u with patient end of week to review URI s/s.       Assessments Completed:   No changes since last call    Medications Reviewed:   Completed during a previous call     Advance Care Planning:   Reviewed during previous call      Care Planning:    Goals Addressed                   This Visit's Progress     Conditions and Symptoms   On track     I will notify my provider of any barriers to my plan of care.  I will follow my Zone Management tool to seek urgent or emergent care.  I will notify my provider of any symptoms that

## 2024-10-30 ENCOUNTER — HOSPITAL ENCOUNTER (OUTPATIENT)
Dept: INFUSION THERAPY | Age: 59
Discharge: HOME | End: 2024-10-30
Payer: COMMERCIAL

## 2024-10-30 ENCOUNTER — HOSPITAL ENCOUNTER (OUTPATIENT)
Dept: MRI IMAGING | Age: 59
Discharge: HOME | End: 2024-11-01
Attending: SURGERY
Payer: COMMERCIAL

## 2024-10-30 DIAGNOSIS — Z17.1 MALIGNANT NEOPLASM OF UPPER-OUTER QUADRANT OF LEFT BREAST IN FEMALE, ESTROGEN RECEPTOR NEGATIVE (HCC): ICD-10-CM

## 2024-10-30 DIAGNOSIS — C50.912 INVASIVE DUCTAL CARCINOMA OF BREAST, LEFT (HCC): Primary | ICD-10-CM

## 2024-10-30 DIAGNOSIS — C50.412 MALIGNANT NEOPLASM OF UPPER-OUTER QUADRANT OF LEFT BREAST IN FEMALE, ESTROGEN RECEPTOR NEGATIVE (HCC): ICD-10-CM

## 2024-10-30 PROCEDURE — 6360000002 HC RX W HCPCS: Performed by: INTERNAL MEDICINE

## 2024-10-30 PROCEDURE — 2580000003 HC RX 258: Performed by: INTERNAL MEDICINE

## 2024-10-30 PROCEDURE — C8908 MRI W/O FOL W/CONT, BREAST,: HCPCS

## 2024-10-30 PROCEDURE — A9579 GAD-BASE MR CONTRAST NOS,1ML: HCPCS | Performed by: SURGERY

## 2024-10-30 PROCEDURE — 6360000004 HC RX CONTRAST MEDICATION: Performed by: SURGERY

## 2024-10-30 PROCEDURE — 2580000003 HC RX 258: Performed by: SURGERY

## 2024-10-30 RX ORDER — 0.9 % SODIUM CHLORIDE 0.9 %
40 INTRAVENOUS SOLUTION INTRAVENOUS ONCE
Status: COMPLETED | OUTPATIENT
Start: 2024-10-30 | End: 2024-10-30

## 2024-10-30 RX ORDER — EPINEPHRINE 1 MG/ML
0.3 INJECTION, SOLUTION, CONCENTRATE INTRAVENOUS PRN
Status: CANCELLED | OUTPATIENT
Start: 2024-10-30

## 2024-10-30 RX ORDER — ALBUTEROL SULFATE 90 UG/1
4 INHALANT RESPIRATORY (INHALATION) PRN
Status: CANCELLED | OUTPATIENT
Start: 2024-10-30

## 2024-10-30 RX ORDER — HEPARIN 100 UNIT/ML
500 SYRINGE INTRAVENOUS PRN
Status: CANCELLED | OUTPATIENT
Start: 2024-10-30

## 2024-10-30 RX ORDER — ACETAMINOPHEN 325 MG/1
650 TABLET ORAL
Status: CANCELLED | OUTPATIENT
Start: 2024-10-30

## 2024-10-30 RX ORDER — DIPHENHYDRAMINE HYDROCHLORIDE 50 MG/ML
50 INJECTION INTRAMUSCULAR; INTRAVENOUS
Status: CANCELLED | OUTPATIENT
Start: 2024-10-30

## 2024-10-30 RX ORDER — HEPARIN 100 UNIT/ML
500 SYRINGE INTRAVENOUS PRN
Status: DISCONTINUED | OUTPATIENT
Start: 2024-10-30 | End: 2024-10-31 | Stop reason: HOSPADM

## 2024-10-30 RX ORDER — SODIUM CHLORIDE 0.9 % (FLUSH) 0.9 %
5-40 SYRINGE (ML) INJECTION PRN
Status: CANCELLED | OUTPATIENT
Start: 2024-10-30

## 2024-10-30 RX ORDER — SODIUM CHLORIDE 0.9 % (FLUSH) 0.9 %
10 SYRINGE (ML) INJECTION ONCE
Status: COMPLETED | OUTPATIENT
Start: 2024-10-30 | End: 2024-10-30

## 2024-10-30 RX ORDER — SODIUM CHLORIDE 0.9 % (FLUSH) 0.9 %
5-40 SYRINGE (ML) INJECTION PRN
Status: DISCONTINUED | OUTPATIENT
Start: 2024-10-30 | End: 2024-10-31 | Stop reason: HOSPADM

## 2024-10-30 RX ORDER — SODIUM CHLORIDE 9 MG/ML
25 INJECTION, SOLUTION INTRAVENOUS PRN
Status: CANCELLED | OUTPATIENT
Start: 2024-10-30

## 2024-10-30 RX ORDER — FAMOTIDINE 10 MG/ML
20 INJECTION, SOLUTION INTRAVENOUS
Status: CANCELLED | OUTPATIENT
Start: 2024-10-30

## 2024-10-30 RX ORDER — SODIUM CHLORIDE 9 MG/ML
INJECTION, SOLUTION INTRAVENOUS CONTINUOUS
Status: CANCELLED | OUTPATIENT
Start: 2024-10-30

## 2024-10-30 RX ORDER — ONDANSETRON 2 MG/ML
8 INJECTION INTRAMUSCULAR; INTRAVENOUS
Status: CANCELLED | OUTPATIENT
Start: 2024-10-30

## 2024-10-30 RX ADMIN — HEPARIN 500 UNITS: 100 SYRINGE at 10:53

## 2024-10-30 RX ADMIN — SODIUM CHLORIDE, PRESERVATIVE FREE 10 ML: 5 INJECTION INTRAVENOUS at 10:53

## 2024-10-30 RX ADMIN — SODIUM CHLORIDE 40 ML: 9 INJECTION, SOLUTION INTRAVENOUS at 09:58

## 2024-10-30 RX ADMIN — GADOTERIDOL 20 ML: 279.3 INJECTION, SOLUTION INTRAVENOUS at 09:58

## 2024-10-30 RX ADMIN — SODIUM CHLORIDE, PRESERVATIVE FREE 10 ML: 5 INJECTION INTRAVENOUS at 09:58

## 2024-10-30 RX ADMIN — SODIUM CHLORIDE, PRESERVATIVE FREE 10 ML: 5 INJECTION INTRAVENOUS at 09:29

## 2024-10-30 NOTE — PROGRESS NOTES
Spiritual Health Outpatient Oncology/Hematology Progress Note: [facility]    Situation: Writer encountered Patient and Friend as they were arriving to the infusion clinic.     Assessment: Patient was in a wheel chair, and her friend was assisting her. Pt shared that she has not been feeling well. Pt stated that she is \"tired of not feeling well.\" Pt asked for prayers for her MRI today, sharing her current situation.     Intervention: Writer offered empathy and care. Writer assured Pt of her prayers. Writer offered words of support and encouragement.     Outcome: Patient thanked writer.    Plan: Spiritual Health Services are available for Patient by phone and/or in person.      10/30/24 1245   Encounter Summary   Encounter Overview/Reason Spiritual/Emotional Needs   Service Provided For Patient;Family   Referral/Consult From [facility]   Support System Family members;Friends/neighbors;Other (Comment)  (Coworkers)   Last Encounter  10/04/24   Complexity of Encounter Low   Begin Time 0920   End Time  0925   Total Time Calculated 5 min   Spiritual/Emotional needs   Type Spiritual Support   Assessment/Intervention/Outcome   Assessment Coping;Impaired resilience   Intervention Sustaining Presence/Ministry of presence;Active listening;Explored/Affirmed feelings, thoughts, concerns;Explored Coping Skills/Resources;Discussed belief system/Baptism practices/varun;Discussed illness injury and it’s impact   Outcome Expressed feelings, needs, and concerns;Engaged in conversation   Plan and Referrals   Plan/Referrals Continue Support (comment)     MAMADOU Barrios  [facility]   [phone]

## 2024-10-31 NOTE — PROGRESS NOTES
Chief Complaint   Patient presents with    Follow-up     DIAGNOSIS:        Clinical stage T2 N0 M0 left breast upper outer quadrant invasive ductal carcinoma, grade 3, ER negative, ND negative, HER2/carol not amplified.  History of left nephrectomy in 1988   CURRENT THERAPY:         Neoadjuvant treatment with Keytruda/Taxol/carboplatin/Adriamycin/Cytoxan.  Treatment started 5/31/2024  BRIEF CASE HISTORY:       Ms. Luci Castañeda is a very pleasant 59 y.o. female with history of multiple comorbidities as listed.  She had history of left nephrectomy in 1988.  She had cardiac cath in 2021 for left ventricular hypertrophy.  Had recent problems with restrictive and obstructive pulmonary disease.  She continues follow-up with pulmonary. She was doing fine with routine mammogram being normal until this year where she had suspicious abnormalities in the mammogram done on 3/25/2024.  Diagnostic mammogram and left breast ultrasound April 9, 2024 showed suspicious 4.5 cm mass in the left breast 1 o'clock position.  Mildly thickened left axillary lymph nodes.  A biopsy was performed on 4/26/2024 and unfortunately that was positive for invasive ductal carcinoma.  Triple negative.  Biopsy from lymph node was negative for malignancy.  The patient is seen today for further management of her breast cancer. The patient did very well after her biopsy without any complication. The patient had no symptom preceding her diagnosis of cancer. No chest pain, shortness of breath, cough, sputum or hemoptysis, no back pain or monique aches, no weight loss or decreased appetite, no fever or night sweating. No headaches, and no other complaints.  No smoking social alcohol      INTERIM HISTORY:   Patient seen for follow-up triple negative breast cancer.  Patient is having cough and sputum. No fever. No hemoptysis.   Tolerated chemo well so far with few side effects. She had headaches and nausea. No fever or infection.   Patient has

## 2024-11-01 ENCOUNTER — HOSPITAL ENCOUNTER (OUTPATIENT)
Dept: INFUSION THERAPY | Age: 59
Discharge: HOME | End: 2024-11-01
Payer: COMMERCIAL

## 2024-11-01 VITALS
HEART RATE: 88 BPM | DIASTOLIC BLOOD PRESSURE: 68 MMHG | TEMPERATURE: 98.7 F | SYSTOLIC BLOOD PRESSURE: 97 MMHG | WEIGHT: 253 LBS | RESPIRATION RATE: 16 BRPM | BODY MASS INDEX: 39.63 KG/M2

## 2024-11-01 DIAGNOSIS — C50.912 INVASIVE DUCTAL CARCINOMA OF BREAST, LEFT (HCC): ICD-10-CM

## 2024-11-01 DIAGNOSIS — C50.412 MALIGNANT NEOPLASM OF UPPER-OUTER QUADRANT OF LEFT BREAST IN FEMALE, ESTROGEN RECEPTOR NEGATIVE (HCC): Primary | ICD-10-CM

## 2024-11-01 DIAGNOSIS — Z17.1 MALIGNANT NEOPLASM OF UPPER-OUTER QUADRANT OF LEFT BREAST IN FEMALE, ESTROGEN RECEPTOR NEGATIVE (HCC): Primary | ICD-10-CM

## 2024-11-01 DIAGNOSIS — C50.912 INVASIVE DUCTAL CARCINOMA OF BREAST, LEFT (HCC): Primary | ICD-10-CM

## 2024-11-01 LAB
ALBUMIN SERPL-MCNC: 3.8 G/DL (ref 3.5–5.2)
ALBUMIN/GLOB SERPL: 1.3 {RATIO} (ref 1–2.5)
ALP SERPL-CCNC: 99 U/L (ref 35–104)
ALT SERPL-CCNC: 10 U/L (ref 5–33)
AMYLASE SERPL-CCNC: 29 U/L (ref 28–100)
ANION GAP SERPL CALCULATED.3IONS-SCNC: 13 MMOL/L (ref 9–17)
AST SERPL-CCNC: 11 U/L
BILIRUB SERPL-MCNC: 0.9 MG/DL (ref 0.3–1.2)
BUN SERPL-MCNC: 16 MG/DL (ref 8–23)
CALCIUM SERPL-MCNC: 9.2 MG/DL (ref 8.6–10.4)
CHLORIDE SERPL-SCNC: 99 MMOL/L (ref 98–107)
CO2 SERPL-SCNC: 24 MMOL/L (ref 20–31)
CREAT SERPL-MCNC: 0.9 MG/DL (ref 0.5–0.9)
ERYTHROCYTE [DISTWIDTH] IN BLOOD BY AUTOMATED COUNT: 19.1 % (ref 12.5–15.4)
GFR, ESTIMATED: 73 ML/MIN/1.73M2
GLUCOSE SERPL-MCNC: 134 MG/DL (ref 70–99)
HCT VFR BLD AUTO: 25 % (ref 36–46)
HGB BLD-MCNC: 8.4 G/DL (ref 12–16)
LIPASE SERPL-CCNC: 22 U/L (ref 13–60)
MCH RBC QN AUTO: 32.4 PG (ref 26–34)
MCHC RBC AUTO-ENTMCNC: 33.4 G/DL (ref 31–37)
MCV RBC AUTO: 97 FL (ref 80–100)
PLATELET # BLD AUTO: 11 K/UL (ref 140–450)
PMV BLD AUTO: 8.6 FL (ref 6–12)
POTASSIUM SERPL-SCNC: 4.5 MMOL/L (ref 3.7–5.3)
PROT SERPL-MCNC: 6.8 G/DL (ref 6.4–8.3)
RBC # BLD AUTO: 2.58 M/UL (ref 4–5.2)
SODIUM SERPL-SCNC: 136 MMOL/L (ref 135–144)
TSH SERPL DL<=0.05 MIU/L-ACNC: 0.33 UIU/ML (ref 0.3–5)
WBC OTHER # BLD: 0.3 K/UL (ref 3.5–11)

## 2024-11-01 PROCEDURE — 2580000003 HC RX 258: Performed by: INTERNAL MEDICINE

## 2024-11-01 PROCEDURE — 36430 TRANSFUSION BLD/BLD COMPNT: CPT

## 2024-11-01 PROCEDURE — 83690 ASSAY OF LIPASE: CPT

## 2024-11-01 PROCEDURE — 82150 ASSAY OF AMYLASE: CPT

## 2024-11-01 PROCEDURE — P9073 PLATELETS PHERESIS PATH REDU: HCPCS

## 2024-11-01 PROCEDURE — 85025 COMPLETE CBC W/AUTO DIFF WBC: CPT

## 2024-11-01 PROCEDURE — 84443 ASSAY THYROID STIM HORMONE: CPT

## 2024-11-01 PROCEDURE — 82533 TOTAL CORTISOL: CPT

## 2024-11-01 PROCEDURE — 80053 COMPREHEN METABOLIC PANEL: CPT

## 2024-11-01 PROCEDURE — 85027 COMPLETE CBC AUTOMATED: CPT

## 2024-11-01 RX ORDER — HEPARIN 100 UNIT/ML
500 SYRINGE INTRAVENOUS PRN
Status: DISCONTINUED | OUTPATIENT
Start: 2024-11-01 | End: 2024-11-02 | Stop reason: HOSPADM

## 2024-11-01 RX ORDER — NYSTATIN 100000 [USP'U]/ML
500000 SUSPENSION ORAL 4 TIMES DAILY
Qty: 200 ML | Refills: 0 | Status: SHIPPED | OUTPATIENT
Start: 2024-11-01 | End: 2024-11-11

## 2024-11-01 RX ORDER — SODIUM CHLORIDE 0.9 % (FLUSH) 0.9 %
5-40 SYRINGE (ML) INJECTION PRN
Status: DISCONTINUED | OUTPATIENT
Start: 2024-11-01 | End: 2024-11-02 | Stop reason: HOSPADM

## 2024-11-01 RX ORDER — HEPARIN 100 UNIT/ML
500 SYRINGE INTRAVENOUS PRN
OUTPATIENT
Start: 2024-11-02

## 2024-11-01 RX ORDER — SODIUM CHLORIDE 0.9 % (FLUSH) 0.9 %
5-40 SYRINGE (ML) INJECTION PRN
OUTPATIENT
Start: 2024-11-02

## 2024-11-01 RX ORDER — SODIUM CHLORIDE 9 MG/ML
INJECTION, SOLUTION INTRAVENOUS PRN
Status: DISCONTINUED | OUTPATIENT
Start: 2024-11-01 | End: 2024-11-02 | Stop reason: HOSPADM

## 2024-11-01 RX ADMIN — SODIUM CHLORIDE, PRESERVATIVE FREE 10 ML: 5 INJECTION INTRAVENOUS at 14:20

## 2024-11-01 NOTE — PROGRESS NOTES
11/01/24 1655   Encounter Summary   Encounter Overview/Reason Spiritual/Emotional Needs   Service Provided For Patient;Friend   Referral/Consult From [facility]   Support System Family members;Other (Comment)  (coworkers)   Last Encounter  10/11/24   Complexity of Encounter Moderate   Begin Time 1515   End Time  1530   Total Time Calculated 15 min   Spiritual/Emotional needs   Type Spiritual Support   Assessment/Intervention/Outcome   Assessment Powerlessness;Impaired resilience   Intervention Sustaining Presence/Ministry of presence;Active listening;Explored/Affirmed feelings, thoughts, concerns;Explored Coping Skills/Resources;Discussed illness injury and it’s impact;Prayer (assurance of)/[place]   Outcome Expressed feelings, needs, and concerns;Engaged in conversation   Plan and Referrals   Plan/Referrals Continue Support (comment)     MAMADOU Barrios  [facility] Spiritual Health   [phone]

## 2024-11-01 NOTE — PROGRESS NOTES
Patient arrived for C5D36 keytruda. Pt states she is feeling weak, short of breath, dizzy and nauseas. Labs reviewed, WBC 0.3, plts 11. Per Dr Bradley, hold keytruda today and give with AC next week, give 1 pack plts today. Transfusion completed without issue. Pt stable at discharge. Scheduled to return 11/8/24 for MD visit and C5D43 keytruda/linda/cytoxan/neualsta.

## 2024-11-02 LAB
BLOOD BANK BLOOD PRODUCT EXPIRATION DATE: NORMAL
BLOOD BANK DISPENSE STATUS: NORMAL
BLOOD BANK ISBT PRODUCT BLOOD TYPE: 6200
BLOOD BANK PRODUCT CODE: NORMAL
BLOOD BANK UNIT TYPE AND RH: NORMAL
BPU ID: NORMAL
COMPONENT: NORMAL
CORTIS SERPL-MCNC: 19.8 UG/DL (ref 2.5–19.5)
CORTISOL COLLECTION INFO: ABNORMAL
TRANSFUSION STATUS: NORMAL
UNIT DIVISION: 0
UNIT ISSUE DATE/TIME: NORMAL

## 2024-11-05 ENCOUNTER — TELEPHONE (OUTPATIENT)
Dept: ONCOLOGY | Age: 59
End: 2024-11-05

## 2024-11-05 NOTE — TELEPHONE ENCOUNTER
Name: Luci Castañeda  : 1965  MRN: [id number]    Oncology Navigation Follow-Up Note    Contact Type:  Telephone    Notes: Spoke with Dr Gordillo's office and confirmed pt is scheduled on  for oncoplastic breast reduction/lumpectomy.   Pt will need post op f/u with Dr Collier. Writer will update [facility]CC schedulers.       Electronically signed by Veronica Bennett RN on 2024 at 11:05 AM

## 2024-11-06 ENCOUNTER — NURSE TRIAGE (OUTPATIENT)
Dept: OTHER | Facility: CLINIC | Age: 59
End: 2024-11-06

## 2024-11-06 ENCOUNTER — HOSPITAL ENCOUNTER (OUTPATIENT)
Dept: INFUSION THERAPY | Age: 59
Discharge: HOME | End: 2024-11-06
Payer: COMMERCIAL

## 2024-11-06 ENCOUNTER — HOSPITAL ENCOUNTER (EMERGENCY)
Age: 59
Discharge: HOME | End: 2024-11-06
Attending: EMERGENCY MEDICINE
Payer: COMMERCIAL

## 2024-11-06 ENCOUNTER — TELEPHONE (OUTPATIENT)
Dept: INFUSION THERAPY | Age: 59
End: 2024-11-06

## 2024-11-06 VITALS
BODY MASS INDEX: 37.33 KG/M2 | DIASTOLIC BLOOD PRESSURE: 67 MMHG | TEMPERATURE: 98.2 F | HEART RATE: 85 BPM | OXYGEN SATURATION: 95 % | WEIGHT: 246.3 LBS | HEIGHT: 68 IN | SYSTOLIC BLOOD PRESSURE: 93 MMHG | RESPIRATION RATE: 16 BRPM

## 2024-11-06 VITALS
HEART RATE: 83 BPM | TEMPERATURE: 98 F | SYSTOLIC BLOOD PRESSURE: 101 MMHG | DIASTOLIC BLOOD PRESSURE: 67 MMHG | RESPIRATION RATE: 16 BRPM

## 2024-11-06 DIAGNOSIS — C50.912 INVASIVE DUCTAL CARCINOMA OF BREAST, LEFT (HCC): Primary | ICD-10-CM

## 2024-11-06 DIAGNOSIS — D61.818 PANCYTOPENIA (HCC): ICD-10-CM

## 2024-11-06 DIAGNOSIS — Z17.1 MALIGNANT NEOPLASM OF UPPER-OUTER QUADRANT OF LEFT BREAST IN FEMALE, ESTROGEN RECEPTOR NEGATIVE (HCC): ICD-10-CM

## 2024-11-06 DIAGNOSIS — D64.9 ANEMIA, UNSPECIFIED TYPE: Primary | ICD-10-CM

## 2024-11-06 DIAGNOSIS — C50.412 MALIGNANT NEOPLASM OF UPPER-OUTER QUADRANT OF LEFT BREAST IN FEMALE, ESTROGEN RECEPTOR NEGATIVE (HCC): ICD-10-CM

## 2024-11-06 LAB
BASOPHILS # BLD: 0.01 K/UL (ref 0–0.2)
BASOPHILS NFR BLD: 1 % (ref 0–2)
EOSINOPHIL # BLD: 0 K/UL (ref 0–0.4)
EOSINOPHILS RELATIVE PERCENT: 0 % (ref 1–4)
ERYTHROCYTE [DISTWIDTH] IN BLOOD BY AUTOMATED COUNT: 17.9 % (ref 12.5–15.4)
HCT VFR BLD AUTO: 21.2 % (ref 36–46)
HGB BLD-MCNC: 7.2 G/DL (ref 12–16)
LYMPHOCYTES NFR BLD: 0.38 K/UL (ref 1–4.8)
LYMPHOCYTES RELATIVE PERCENT: 38 % (ref 24–44)
MCH RBC QN AUTO: 32.1 PG (ref 26–34)
MCHC RBC AUTO-ENTMCNC: 33.7 G/DL (ref 31–37)
MCV RBC AUTO: 95.4 FL (ref 80–100)
MONOCYTES NFR BLD: 0.13 K/UL (ref 0.1–0.8)
MONOCYTES NFR BLD: 13 % (ref 1–7)
MORPHOLOGY: ABNORMAL
MORPHOLOGY: ABNORMAL
NEUTROPHILS NFR BLD: 48 % (ref 36–66)
NEUTS SEG NFR BLD: 0.48 K/UL (ref 1.8–7.7)
PLATELET # BLD AUTO: 7 K/UL (ref 140–450)
PMV BLD AUTO: 9.5 FL (ref 6–12)
RBC # BLD AUTO: 2.23 M/UL (ref 4–5.2)
WBC OTHER # BLD: 1 K/UL (ref 3.5–11)

## 2024-11-06 PROCEDURE — 36415 COLL VENOUS BLD VENIPUNCTURE: CPT

## 2024-11-06 PROCEDURE — 86900 BLOOD TYPING SEROLOGIC ABO: CPT

## 2024-11-06 PROCEDURE — 86923 COMPATIBILITY TEST ELECTRIC: CPT

## 2024-11-06 PROCEDURE — 86850 RBC ANTIBODY SCREEN: CPT

## 2024-11-06 PROCEDURE — 2580000003 HC RX 258: Performed by: INTERNAL MEDICINE

## 2024-11-06 PROCEDURE — 96361 HYDRATE IV INFUSION ADD-ON: CPT

## 2024-11-06 PROCEDURE — 86901 BLOOD TYPING SEROLOGIC RH(D): CPT

## 2024-11-06 PROCEDURE — 96360 HYDRATION IV INFUSION INIT: CPT

## 2024-11-06 PROCEDURE — 99282 EMERGENCY DEPT VISIT SF MDM: CPT

## 2024-11-06 PROCEDURE — 85025 COMPLETE CBC W/AUTO DIFF WBC: CPT

## 2024-11-06 RX ORDER — DIPHENHYDRAMINE HYDROCHLORIDE 50 MG/ML
50 INJECTION INTRAMUSCULAR; INTRAVENOUS
Status: CANCELLED | OUTPATIENT
Start: 2024-11-13

## 2024-11-06 RX ORDER — 0.9 % SODIUM CHLORIDE 0.9 %
1000 INTRAVENOUS SOLUTION INTRAVENOUS ONCE
Status: COMPLETED | OUTPATIENT
Start: 2024-11-06 | End: 2024-11-06

## 2024-11-06 RX ORDER — HEPARIN 100 UNIT/ML
500 SYRINGE INTRAVENOUS PRN
Status: CANCELLED | OUTPATIENT
Start: 2024-11-13

## 2024-11-06 RX ORDER — FAMOTIDINE 10 MG/ML
20 INJECTION, SOLUTION INTRAVENOUS
Status: CANCELLED | OUTPATIENT
Start: 2024-11-06

## 2024-11-06 RX ORDER — ACETAMINOPHEN 325 MG/1
650 TABLET ORAL
Status: CANCELLED | OUTPATIENT
Start: 2024-11-06

## 2024-11-06 RX ORDER — SODIUM CHLORIDE 9 MG/ML
25 INJECTION, SOLUTION INTRAVENOUS PRN
Status: CANCELLED | OUTPATIENT
Start: 2024-11-06

## 2024-11-06 RX ORDER — SODIUM CHLORIDE 0.9 % (FLUSH) 0.9 %
5-40 SYRINGE (ML) INJECTION PRN
Status: CANCELLED | OUTPATIENT
Start: 2024-11-13

## 2024-11-06 RX ORDER — HEPARIN 100 UNIT/ML
500 SYRINGE INTRAVENOUS PRN
Status: CANCELLED | OUTPATIENT
Start: 2024-11-06

## 2024-11-06 RX ORDER — SODIUM CHLORIDE 9 MG/ML
INJECTION, SOLUTION INTRAVENOUS CONTINUOUS
Status: CANCELLED | OUTPATIENT
Start: 2024-11-13

## 2024-11-06 RX ORDER — FAMOTIDINE 10 MG/ML
20 INJECTION, SOLUTION INTRAVENOUS
Status: CANCELLED | OUTPATIENT
Start: 2024-11-13

## 2024-11-06 RX ORDER — ACETAMINOPHEN 325 MG/1
650 TABLET ORAL
Status: CANCELLED | OUTPATIENT
Start: 2024-11-13

## 2024-11-06 RX ORDER — SODIUM CHLORIDE 0.9 % (FLUSH) 0.9 %
5-40 SYRINGE (ML) INJECTION PRN
Status: CANCELLED | OUTPATIENT
Start: 2024-11-06

## 2024-11-06 RX ORDER — ONDANSETRON 2 MG/ML
8 INJECTION INTRAMUSCULAR; INTRAVENOUS
Status: CANCELLED | OUTPATIENT
Start: 2024-11-06

## 2024-11-06 RX ORDER — 0.9 % SODIUM CHLORIDE 0.9 %
1000 INTRAVENOUS SOLUTION INTRAVENOUS ONCE
Status: CANCELLED
Start: 2024-11-13 | End: 2024-11-13

## 2024-11-06 RX ORDER — EPINEPHRINE 1 MG/ML
0.3 INJECTION, SOLUTION, CONCENTRATE INTRAVENOUS PRN
Status: CANCELLED | OUTPATIENT
Start: 2024-11-06

## 2024-11-06 RX ORDER — ONDANSETRON 2 MG/ML
8 INJECTION INTRAMUSCULAR; INTRAVENOUS
Status: CANCELLED | OUTPATIENT
Start: 2024-11-13

## 2024-11-06 RX ORDER — ALBUTEROL SULFATE 90 UG/1
4 INHALANT RESPIRATORY (INHALATION) PRN
Status: CANCELLED | OUTPATIENT
Start: 2024-11-06

## 2024-11-06 RX ORDER — DIPHENHYDRAMINE HYDROCHLORIDE 50 MG/ML
50 INJECTION INTRAMUSCULAR; INTRAVENOUS
Status: CANCELLED | OUTPATIENT
Start: 2024-11-06

## 2024-11-06 RX ORDER — SODIUM CHLORIDE 9 MG/ML
INJECTION, SOLUTION INTRAVENOUS CONTINUOUS
Status: CANCELLED | OUTPATIENT
Start: 2024-11-06

## 2024-11-06 RX ORDER — SODIUM CHLORIDE 0.9 % (FLUSH) 0.9 %
5-40 SYRINGE (ML) INJECTION PRN
Status: DISCONTINUED | OUTPATIENT
Start: 2024-11-06 | End: 2024-11-07 | Stop reason: HOSPADM

## 2024-11-06 RX ORDER — SODIUM CHLORIDE 9 MG/ML
5-250 INJECTION, SOLUTION INTRAVENOUS PRN
Status: CANCELLED | OUTPATIENT
Start: 2024-11-13

## 2024-11-06 RX ORDER — ALBUTEROL SULFATE 90 UG/1
4 INHALANT RESPIRATORY (INHALATION) PRN
Status: CANCELLED | OUTPATIENT
Start: 2024-11-13

## 2024-11-06 RX ORDER — HEPARIN 100 UNIT/ML
500 SYRINGE INTRAVENOUS PRN
Status: DISCONTINUED | OUTPATIENT
Start: 2024-11-06 | End: 2024-11-07 | Stop reason: HOSPADM

## 2024-11-06 RX ORDER — EPINEPHRINE 1 MG/ML
0.3 INJECTION, SOLUTION, CONCENTRATE INTRAVENOUS PRN
Status: CANCELLED | OUTPATIENT
Start: 2024-11-13

## 2024-11-06 RX ADMIN — SODIUM CHLORIDE 1000 ML: 9 INJECTION, SOLUTION INTRAVENOUS at 15:18

## 2024-11-06 RX ADMIN — SODIUM CHLORIDE, PRESERVATIVE FREE 10 ML: 5 INJECTION INTRAVENOUS at 15:16

## 2024-11-06 ASSESSMENT — PAIN - FUNCTIONAL ASSESSMENT: PAIN_FUNCTIONAL_ASSESSMENT: 0-10

## 2024-11-06 ASSESSMENT — PAIN SCALES - GENERAL: PAINLEVEL_OUTOF10: 0

## 2024-11-06 NOTE — DISCHARGE INSTRUCTIONS
Go to the infusion center tomorrow as discussed    Return immediately if any worsening symptoms or any other concerns    Please understand that early in the process of an illness or injury, an emergency department workup can be falsely reassuring.      Tell us how we did visit: [email or web address]   and let us know about your experience

## 2024-11-06 NOTE — TELEPHONE ENCOUNTER
Patient called stating that she hasn't been able to eat for 5-6 days and isn't able to drink.  She is asking for some hydration.  She was put on the schedule for 3 today.  Infusion updated.  Magic mouthwash sent to pharmacy.

## 2024-11-06 NOTE — TELEPHONE ENCOUNTER
Pt was already admitted to the ER at time of call.  Pt says she was recommended by her doctor to go to the ER due recemive emergency treatment.  Writer informed pt that the determination of her copay will be based solely on whether your insurance company identifies a true medical emergency when reviewing her claim.

## 2024-11-06 NOTE — PROGRESS NOTES
Patient arrived for hydration. Pt complains of dizziness, shortness of breath and inability to eat for 6 days. Labs drawn, hgb 7.2, plts 7 and wbc 1.0. Dr Bradley notified, instructed to send pt to ER. Pt declining ER and wanting to come back in am for transfusions. Educated pt on risks for bleeding and infection due to counts. Pt states she will go to ER but if wait is too long will come in the morning. Pt discharged to ER with friend.

## 2024-11-06 NOTE — ED PROVIDER NOTES
[facility] Emergency Department  [address]  [facility] [place] [address]  Phone: [phone]  Fax: [phone]  EMERGENCY DEPARTMENT ENCOUNTER      Pt Name: Luci Castañeda  MRN: [id number]  Birthdate 1965  Date of evaluation: 11/6/2024    CHIEF COMPLAINT       Chief Complaint   Patient presents with    Abnormal Lab     Hgb of 7.2 today, was sent down from infusion center, admits to lightheaded and dizziness last chemo treatment is scheduled for friday       HISTORY OF PRESENT ILLNESS    Luci Castañeda is a 59 y.o. female who presents to the emergency department sent from the infusion center where she receives IV fluids.  She has a history of breast cancer.  She had outpatient labs done which showed a low hemoglobin of 7.2 down from 8.4.  She also has thrombocytopenia.  Patient denies any abdominal pain no vomiting no bloody stools or black stools.  No other relevant symptoms.  Nurse at the infusion center did arrange for an infusion to be done tomorrow if needed.    REVIEW OF SYSTEMS       Constitutional: No fevers or chills   HENT: No sore throat, rhinorrhea, or earache   Eyes: No blurry vision or double vision no drainage   Cardiovascular: No chest pain or tachycardia   Respiratory: No wheezing or shortness of breath no cough   Gastrointestinal: No nausea, vomiting, diarrhea, constipation, or abdominal pain   : No hematuria or dysuria   Musculoskeletal: No swelling or pain   Skin: No rash   Neurological: No focal neurologic complaints, paresthesias, weakness, or headache     PAST MEDICAL HISTORY    has a past medical history of Abnormal uterine bleeding (AUB), Arthritis, Asthma, Benign familial tremor, CAD (coronary artery disease), Cancer (HCC), CKD (chronic kidney disease) stage 1, GFR 90 ml/min or greater, Class 2 severe obesity with serious comorbidity and body mass index (BMI) of 39.0 to 39.9 in adult, COVID-19 vaccine administered, Cystinuria (HCC), Dyspnea on exertion, Flank  mouth daily, Disp-30 capsule, R-0Normal      ondansetron (ZOFRAN-ODT) 4 MG disintegrating tablet Take 1 tablet by mouth 3 times daily as needed for Nausea or Vomiting, Disp-3 tablet, R-0Normal      HYDROcodone-acetaminophen (NORCO) 5-325 MG per tablet Take 1 tablet by mouth every 6 hours as needed for Pain (Used for chemotherapy, cancer pain. Per Dr. Bradley).Historical Med      hydroCHLOROthiazide (HYDRODIURIL) 25 MG tablet Take 1 tablet by mouth every morning, Disp-90 tablet, R-3Normal      ondansetron (ZOFRAN) 4 MG tablet Take 1 tablet by mouth 3 times daily as needed for Nausea or Vomiting, Disp-100 tablet, R-1Normal      prochlorperazine (COMPAZINE) 10 MG tablet Take 1 tablet by mouth every 6 hours as needed (For nausea and vomiting), Disp-120 tablet, R-3Normal      lidocaine-prilocaine (EMLA) 2.5-2.5 % cream Apply topically to port site 45-60 minutes prior to needle poke as needed., Disp-1 each, R-2, Normal      furosemide (LASIX) 40 MG tablet Take 1 tablet by mouth daily, Disp-90 tablet, R-1Normal      famotidine (PEPCID) 20 MG tablet Take 1 tablet by mouth 2 times daily, Disp-60 tablet, R-5Normal      albuterol sulfate HFA (PROAIR HFA) 108 (90 Base) MCG/ACT inhaler Inhale 2 puffs into the lungs every 6 hours as needed for Wheezing, Disp-1 each, R-3Normal      irbesartan (AVAPRO) 150 MG tablet Take 1 tablet by mouth daily, Disp-90 tablet, R-3Normal      nystatin (MYCOSTATIN) 476829 UNIT/GM cream Historical Med      pantoprazole (PROTONIX) 20 MG tablet TAKE ONE TABLET BY MOUTH EVERY MORNING BEFORE BREAKFAST, Disp-240 tablet, R-1Normal      Melatonin 5 MG CAPS Take 1 capsule by mouth nightly as neededHistorical Med      benzoyl peroxide 5 % external liquid Wash affected areas once daily, Disp-227 g, R-3, Normal      clindamycin (CLEOCIN T) 1 % lotion Apply to affected areas daily, Disp-60 mL, R-3, Normal       !! - Potential duplicate medications found. Please discuss with provider.          ALLERGIES     is

## 2024-11-07 ENCOUNTER — HOSPITAL ENCOUNTER (OUTPATIENT)
Dept: INFUSION THERAPY | Age: 59
Discharge: HOME | End: 2024-11-07
Payer: COMMERCIAL

## 2024-11-07 VITALS
DIASTOLIC BLOOD PRESSURE: 67 MMHG | SYSTOLIC BLOOD PRESSURE: 105 MMHG | RESPIRATION RATE: 16 BRPM | TEMPERATURE: 97.9 F | HEART RATE: 76 BPM

## 2024-11-07 DIAGNOSIS — C50.912 INVASIVE DUCTAL CARCINOMA OF BREAST, LEFT (HCC): Primary | ICD-10-CM

## 2024-11-07 PROCEDURE — P9073 PLATELETS PHERESIS PATH REDU: HCPCS

## 2024-11-07 PROCEDURE — 36430 TRANSFUSION BLD/BLD COMPNT: CPT

## 2024-11-07 PROCEDURE — P9016 RBC LEUKOCYTES REDUCED: HCPCS

## 2024-11-07 PROCEDURE — 6360000002 HC RX W HCPCS: Performed by: INTERNAL MEDICINE

## 2024-11-07 PROCEDURE — 2580000003 HC RX 258: Performed by: INTERNAL MEDICINE

## 2024-11-07 RX ORDER — EPINEPHRINE 1 MG/ML
0.3 INJECTION, SOLUTION, CONCENTRATE INTRAVENOUS PRN
OUTPATIENT
Start: 2024-11-07

## 2024-11-07 RX ORDER — SODIUM CHLORIDE 9 MG/ML
25 INJECTION, SOLUTION INTRAVENOUS PRN
OUTPATIENT
Start: 2024-11-07

## 2024-11-07 RX ORDER — DIPHENHYDRAMINE HYDROCHLORIDE 50 MG/ML
50 INJECTION INTRAMUSCULAR; INTRAVENOUS
OUTPATIENT
Start: 2024-11-07

## 2024-11-07 RX ORDER — ALBUTEROL SULFATE 90 UG/1
4 INHALANT RESPIRATORY (INHALATION) PRN
OUTPATIENT
Start: 2024-11-07

## 2024-11-07 RX ORDER — SODIUM CHLORIDE 9 MG/ML
INJECTION, SOLUTION INTRAVENOUS PRN
Status: DISCONTINUED | OUTPATIENT
Start: 2024-11-07 | End: 2024-11-08 | Stop reason: HOSPADM

## 2024-11-07 RX ORDER — FAMOTIDINE 10 MG/ML
20 INJECTION, SOLUTION INTRAVENOUS
OUTPATIENT
Start: 2024-11-07

## 2024-11-07 RX ORDER — HEPARIN 100 UNIT/ML
500 SYRINGE INTRAVENOUS PRN
Status: DISCONTINUED | OUTPATIENT
Start: 2024-11-07 | End: 2024-11-08 | Stop reason: HOSPADM

## 2024-11-07 RX ORDER — SODIUM CHLORIDE 0.9 % (FLUSH) 0.9 %
5-40 SYRINGE (ML) INJECTION PRN
OUTPATIENT
Start: 2024-11-07

## 2024-11-07 RX ORDER — HEPARIN 100 UNIT/ML
500 SYRINGE INTRAVENOUS PRN
OUTPATIENT
Start: 2024-11-07

## 2024-11-07 RX ORDER — SODIUM CHLORIDE 9 MG/ML
INJECTION, SOLUTION INTRAVENOUS CONTINUOUS
OUTPATIENT
Start: 2024-11-07

## 2024-11-07 RX ORDER — ONDANSETRON 2 MG/ML
8 INJECTION INTRAMUSCULAR; INTRAVENOUS
OUTPATIENT
Start: 2024-11-07

## 2024-11-07 RX ORDER — SODIUM CHLORIDE 0.9 % (FLUSH) 0.9 %
5-40 SYRINGE (ML) INJECTION PRN
Status: DISCONTINUED | OUTPATIENT
Start: 2024-11-07 | End: 2024-11-08 | Stop reason: HOSPADM

## 2024-11-07 RX ORDER — ACETAMINOPHEN 325 MG/1
650 TABLET ORAL
OUTPATIENT
Start: 2024-11-07

## 2024-11-07 RX ADMIN — SODIUM CHLORIDE, PRESERVATIVE FREE 10 ML: 5 INJECTION INTRAVENOUS at 08:07

## 2024-11-07 RX ADMIN — HEPARIN 500 UNITS: 100 SYRINGE at 12:40

## 2024-11-07 RX ADMIN — SODIUM CHLORIDE, PRESERVATIVE FREE 10 ML: 5 INJECTION INTRAVENOUS at 12:40

## 2024-11-07 NOTE — PROGRESS NOTES
Pt arrives ambulatory for 2 units of platelets and 1 unit PRBC  Pt denies complaints or any active signs of bleeding  Med port intact and flushes easily with brisk blood return  Transfusions complete without any signs of reaction  Pt discharged in stable condition  Next appt 11/8 MD visit with possible treatment

## 2024-11-08 ENCOUNTER — HOSPITAL ENCOUNTER (OUTPATIENT)
Dept: INFUSION THERAPY | Age: 59
Discharge: HOME | End: 2024-11-08
Payer: COMMERCIAL

## 2024-11-08 ENCOUNTER — TELEPHONE (OUTPATIENT)
Dept: ONCOLOGY | Age: 59
End: 2024-11-08

## 2024-11-08 ENCOUNTER — OFFICE VISIT (OUTPATIENT)
Dept: ONCOLOGY | Age: 59
End: 2024-11-08
Payer: COMMERCIAL

## 2024-11-08 VITALS
WEIGHT: 249.56 LBS | SYSTOLIC BLOOD PRESSURE: 120 MMHG | DIASTOLIC BLOOD PRESSURE: 70 MMHG | BODY MASS INDEX: 37.95 KG/M2 | OXYGEN SATURATION: 94 % | HEART RATE: 86 BPM | RESPIRATION RATE: 19 BRPM | TEMPERATURE: 96.6 F

## 2024-11-08 DIAGNOSIS — C50.912 INVASIVE DUCTAL CARCINOMA OF BREAST, LEFT (HCC): ICD-10-CM

## 2024-11-08 DIAGNOSIS — Z17.1 MALIGNANT NEOPLASM OF UPPER-OUTER QUADRANT OF LEFT BREAST IN FEMALE, ESTROGEN RECEPTOR NEGATIVE (HCC): Primary | ICD-10-CM

## 2024-11-08 DIAGNOSIS — C50.412 MALIGNANT NEOPLASM OF UPPER-OUTER QUADRANT OF LEFT BREAST IN FEMALE, ESTROGEN RECEPTOR NEGATIVE (HCC): Primary | ICD-10-CM

## 2024-11-08 DIAGNOSIS — C50.412 MALIGNANT NEOPLASM OF UPPER-OUTER QUADRANT OF LEFT BREAST IN FEMALE, ESTROGEN RECEPTOR NEGATIVE (HCC): ICD-10-CM

## 2024-11-08 DIAGNOSIS — Z17.1 MALIGNANT NEOPLASM OF UPPER-OUTER QUADRANT OF LEFT BREAST IN FEMALE, ESTROGEN RECEPTOR NEGATIVE (HCC): ICD-10-CM

## 2024-11-08 LAB
ABO/RH: NORMAL
ALBUMIN SERPL-MCNC: 3.9 G/DL (ref 3.5–5.2)
ALBUMIN/GLOB SERPL: 1.4 {RATIO} (ref 1–2.5)
ALP SERPL-CCNC: 88 U/L (ref 35–104)
ALT SERPL-CCNC: 8 U/L (ref 5–33)
AMYLASE SERPL-CCNC: 34 U/L (ref 28–100)
ANION GAP SERPL CALCULATED.3IONS-SCNC: 16 MMOL/L (ref 9–17)
ANTIBODY SCREEN: NEGATIVE
ARM BAND NUMBER: NORMAL
AST SERPL-CCNC: 11 U/L
BASOPHILS # BLD: 0 K/UL (ref 0–0.2)
BASOPHILS NFR BLD: 0 % (ref 0–2)
BILIRUB SERPL-MCNC: 0.5 MG/DL (ref 0.3–1.2)
BLOOD BANK BLOOD PRODUCT EXPIRATION DATE: NORMAL
BLOOD BANK DISPENSE STATUS: NORMAL
BLOOD BANK ISBT PRODUCT BLOOD TYPE: 6200
BLOOD BANK ISBT PRODUCT BLOOD TYPE: 7300
BLOOD BANK ISBT PRODUCT BLOOD TYPE: 9500
BLOOD BANK PRODUCT CODE: NORMAL
BLOOD BANK SAMPLE EXPIRATION: NORMAL
BLOOD BANK UNIT TYPE AND RH: NORMAL
BPU ID: NORMAL
BUN SERPL-MCNC: 12 MG/DL (ref 8–23)
CALCIUM SERPL-MCNC: 9.5 MG/DL (ref 8.6–10.4)
CHLORIDE SERPL-SCNC: 100 MMOL/L (ref 98–107)
CO2 SERPL-SCNC: 23 MMOL/L (ref 20–31)
COMPONENT: NORMAL
CORTIS SERPL-MCNC: 17.2 UG/DL (ref 2.5–19.5)
CORTISOL COLLECTION INFO: NORMAL
CREAT SERPL-MCNC: 0.9 MG/DL (ref 0.5–0.9)
CROSSMATCH RESULT: NORMAL
EOSINOPHIL # BLD: 0 K/UL (ref 0–0.4)
EOSINOPHILS RELATIVE PERCENT: 0 % (ref 1–4)
ERYTHROCYTE [DISTWIDTH] IN BLOOD BY AUTOMATED COUNT: 18.3 % (ref 12.5–15.4)
GFR, ESTIMATED: 73 ML/MIN/1.73M2
GLUCOSE SERPL-MCNC: 93 MG/DL (ref 70–99)
HCT VFR BLD AUTO: 22.9 % (ref 36–46)
HGB BLD-MCNC: 7.7 G/DL (ref 12–16)
LIPASE SERPL-CCNC: 39 U/L (ref 13–60)
LYMPHOCYTES NFR BLD: 0.41 K/UL (ref 1–4.8)
LYMPHOCYTES RELATIVE PERCENT: 18 % (ref 24–44)
MCH RBC QN AUTO: 31.5 PG (ref 26–34)
MCHC RBC AUTO-ENTMCNC: 33.6 G/DL (ref 31–37)
MCV RBC AUTO: 93.8 FL (ref 80–100)
MONOCYTES NFR BLD: 0.21 K/UL (ref 0.1–0.8)
MONOCYTES NFR BLD: 9 % (ref 1–7)
MORPHOLOGY: NORMAL
NEUTROPHILS NFR BLD: 73 % (ref 36–66)
NEUTS SEG NFR BLD: 1.68 K/UL (ref 1.8–7.7)
NUCLEATED RED BLOOD CELLS: 1 PER 100 WBC
PLATELET # BLD AUTO: 25 K/UL (ref 140–450)
PMV BLD AUTO: 7 FL (ref 6–12)
POTASSIUM SERPL-SCNC: 4 MMOL/L (ref 3.7–5.3)
PROT SERPL-MCNC: 6.7 G/DL (ref 6.4–8.3)
RBC # BLD AUTO: 2.44 M/UL (ref 4–5.2)
SODIUM SERPL-SCNC: 139 MMOL/L (ref 135–144)
TRANSFUSION STATUS: NORMAL
TSH SERPL DL<=0.05 MIU/L-ACNC: 0.85 UIU/ML (ref 0.3–5)
UNIT DIVISION: 0
UNIT ISSUE DATE/TIME: NORMAL
WBC OTHER # BLD: 2.3 K/UL (ref 3.5–11)

## 2024-11-08 PROCEDURE — 80053 COMPREHEN METABOLIC PANEL: CPT

## 2024-11-08 PROCEDURE — 83690 ASSAY OF LIPASE: CPT

## 2024-11-08 PROCEDURE — 3078F DIAST BP <80 MM HG: CPT | Performed by: INTERNAL MEDICINE

## 2024-11-08 PROCEDURE — 3074F SYST BP LT 130 MM HG: CPT | Performed by: INTERNAL MEDICINE

## 2024-11-08 PROCEDURE — 85025 COMPLETE CBC W/AUTO DIFF WBC: CPT

## 2024-11-08 PROCEDURE — 99211 OFF/OP EST MAY X REQ PHY/QHP: CPT | Performed by: INTERNAL MEDICINE

## 2024-11-08 PROCEDURE — 82150 ASSAY OF AMYLASE: CPT

## 2024-11-08 PROCEDURE — 99214 OFFICE O/P EST MOD 30 MIN: CPT | Performed by: INTERNAL MEDICINE

## 2024-11-08 PROCEDURE — 84443 ASSAY THYROID STIM HORMONE: CPT

## 2024-11-08 PROCEDURE — 82533 TOTAL CORTISOL: CPT

## 2024-11-08 PROCEDURE — 36415 COLL VENOUS BLD VENIPUNCTURE: CPT

## 2024-11-08 NOTE — TELEPHONE ENCOUNTER
Instructions   from Dr. Jess Bradley MD    Hold chemo today  Chemo and Keytruda next week after checking labs  RV 4 weeks with treatment.       Chemo held today, Infusion nurses  Chemo and Keytruda next week   RV 12/6/24 at 12:30 with tx

## 2024-11-12 ENCOUNTER — TELEPHONE (OUTPATIENT)
Dept: PULMONOLOGY | Age: 59
End: 2024-11-12

## 2024-11-12 NOTE — TELEPHONE ENCOUNTER
Pt was never set up through [facility]. She would like to switch to [facility]. Writer spoke with a woman at [facility] who stated pts insurance, [employer], is in network. OV notes, DME order, sleep study, and face sheet where faxed to [facility]. Pt was provided with their contact info.

## 2024-11-13 ENCOUNTER — HOSPITAL ENCOUNTER (OUTPATIENT)
Age: 59
Discharge: HOME | End: 2024-11-13
Payer: COMMERCIAL

## 2024-11-13 DIAGNOSIS — C50.912 INVASIVE DUCTAL CARCINOMA OF BREAST, LEFT (HCC): ICD-10-CM

## 2024-11-13 DIAGNOSIS — C50.412 MALIGNANT NEOPLASM OF UPPER-OUTER QUADRANT OF LEFT BREAST IN FEMALE, ESTROGEN RECEPTOR NEGATIVE (HCC): Primary | ICD-10-CM

## 2024-11-13 DIAGNOSIS — C50.412 MALIGNANT NEOPLASM OF UPPER-OUTER QUADRANT OF LEFT BREAST IN FEMALE, ESTROGEN RECEPTOR NEGATIVE (HCC): ICD-10-CM

## 2024-11-13 DIAGNOSIS — Z17.1 MALIGNANT NEOPLASM OF UPPER-OUTER QUADRANT OF LEFT BREAST IN FEMALE, ESTROGEN RECEPTOR NEGATIVE (HCC): Primary | ICD-10-CM

## 2024-11-13 DIAGNOSIS — Z17.1 MALIGNANT NEOPLASM OF UPPER-OUTER QUADRANT OF LEFT BREAST IN FEMALE, ESTROGEN RECEPTOR NEGATIVE (HCC): ICD-10-CM

## 2024-11-13 LAB
ALBUMIN SERPL-MCNC: 3.9 G/DL (ref 3.5–5.2)
ALBUMIN/GLOB SERPL: 1.3 {RATIO} (ref 1–2.5)
ALP SERPL-CCNC: 95 U/L (ref 35–104)
ALT SERPL-CCNC: 12 U/L (ref 5–33)
AMYLASE SERPL-CCNC: 35 U/L (ref 28–100)
ANION GAP SERPL CALCULATED.3IONS-SCNC: 12 MMOL/L (ref 9–17)
AST SERPL-CCNC: 19 U/L
BASOPHILS # BLD: 0 K/UL (ref 0–0.2)
BASOPHILS NFR BLD: 0 % (ref 0–2)
BILIRUB SERPL-MCNC: 0.4 MG/DL (ref 0.3–1.2)
BUN SERPL-MCNC: 11 MG/DL (ref 8–23)
CALCIUM SERPL-MCNC: 9.8 MG/DL (ref 8.6–10.4)
CHLORIDE SERPL-SCNC: 102 MMOL/L (ref 98–107)
CO2 SERPL-SCNC: 25 MMOL/L (ref 20–31)
CORTIS SERPL-MCNC: 16.5 UG/DL (ref 2.5–19.5)
CORTISOL COLLECTION INFO: NORMAL
CREAT SERPL-MCNC: 1.2 MG/DL (ref 0.5–0.9)
EOSINOPHIL # BLD: 0 K/UL (ref 0–0.4)
EOSINOPHILS RELATIVE PERCENT: 0 % (ref 1–4)
ERYTHROCYTE [DISTWIDTH] IN BLOOD BY AUTOMATED COUNT: 18.7 % (ref 12.5–15.4)
GFR, ESTIMATED: 52 ML/MIN/1.73M2
GLUCOSE SERPL-MCNC: 117 MG/DL (ref 70–99)
HCT VFR BLD AUTO: 24.4 % (ref 36–46)
HGB BLD-MCNC: 8.2 G/DL (ref 12–16)
LIPASE SERPL-CCNC: 34 U/L (ref 13–60)
LYMPHOCYTES NFR BLD: 0.39 K/UL (ref 1–4.8)
LYMPHOCYTES RELATIVE PERCENT: 11 % (ref 24–44)
MCH RBC QN AUTO: 31.6 PG (ref 26–34)
MCHC RBC AUTO-ENTMCNC: 33.4 G/DL (ref 31–37)
MCV RBC AUTO: 94.5 FL (ref 80–100)
MONOCYTES NFR BLD: 0.49 K/UL (ref 0.1–1.2)
MONOCYTES NFR BLD: 14 % (ref 2–11)
MORPHOLOGY: ABNORMAL
NEUTROPHILS NFR BLD: 75 % (ref 36–66)
NEUTS SEG NFR BLD: 2.62 K/UL (ref 1.8–7.7)
PLATELET # BLD AUTO: 36 K/UL (ref 140–450)
PMV BLD AUTO: 8.4 FL (ref 6–12)
POTASSIUM SERPL-SCNC: 4.1 MMOL/L (ref 3.7–5.3)
PROT SERPL-MCNC: 6.9 G/DL (ref 6.4–8.3)
RBC # BLD AUTO: 2.58 M/UL (ref 4–5.2)
SODIUM SERPL-SCNC: 139 MMOL/L (ref 135–144)
WBC OTHER # BLD: 3.5 K/UL (ref 3.5–11)

## 2024-11-13 PROCEDURE — 80053 COMPREHEN METABOLIC PANEL: CPT

## 2024-11-13 PROCEDURE — 82150 ASSAY OF AMYLASE: CPT

## 2024-11-13 PROCEDURE — 36415 COLL VENOUS BLD VENIPUNCTURE: CPT

## 2024-11-13 PROCEDURE — 83690 ASSAY OF LIPASE: CPT

## 2024-11-13 PROCEDURE — 85025 COMPLETE CBC W/AUTO DIFF WBC: CPT

## 2024-11-13 PROCEDURE — 82533 TOTAL CORTISOL: CPT

## 2024-11-15 ENCOUNTER — HOSPITAL ENCOUNTER (OUTPATIENT)
Dept: INFUSION THERAPY | Age: 59
Discharge: HOME | End: 2024-11-15
Payer: COMMERCIAL

## 2024-11-15 VITALS — DIASTOLIC BLOOD PRESSURE: 70 MMHG | RESPIRATION RATE: 18 BRPM | SYSTOLIC BLOOD PRESSURE: 101 MMHG | HEART RATE: 83 BPM

## 2024-11-15 DIAGNOSIS — C50.912 INVASIVE DUCTAL CARCINOMA OF BREAST, LEFT (HCC): Primary | ICD-10-CM

## 2024-11-15 PROCEDURE — 6360000002 HC RX W HCPCS: Performed by: INTERNAL MEDICINE

## 2024-11-15 PROCEDURE — 96360 HYDRATION IV INFUSION INIT: CPT

## 2024-11-15 PROCEDURE — 96375 TX/PRO/DX INJ NEW DRUG ADDON: CPT

## 2024-11-15 PROCEDURE — 96374 THER/PROPH/DIAG INJ IV PUSH: CPT

## 2024-11-15 PROCEDURE — 2580000003 HC RX 258: Performed by: INTERNAL MEDICINE

## 2024-11-15 PROCEDURE — 96361 HYDRATE IV INFUSION ADD-ON: CPT

## 2024-11-15 RX ORDER — FAMOTIDINE 10 MG/ML
20 INJECTION, SOLUTION INTRAVENOUS
Status: CANCELLED | OUTPATIENT
Start: 2024-11-20

## 2024-11-15 RX ORDER — SODIUM CHLORIDE 0.9 % (FLUSH) 0.9 %
5-40 SYRINGE (ML) INJECTION PRN
Status: DISCONTINUED | OUTPATIENT
Start: 2024-11-15 | End: 2024-11-16 | Stop reason: HOSPADM

## 2024-11-15 RX ORDER — SODIUM CHLORIDE 9 MG/ML
5-250 INJECTION, SOLUTION INTRAVENOUS PRN
Status: CANCELLED | OUTPATIENT
Start: 2024-11-20

## 2024-11-15 RX ORDER — SODIUM CHLORIDE 0.9 % (FLUSH) 0.9 %
5-40 SYRINGE (ML) INJECTION PRN
Status: CANCELLED | OUTPATIENT
Start: 2024-11-20

## 2024-11-15 RX ORDER — HEPARIN 100 UNIT/ML
500 SYRINGE INTRAVENOUS PRN
Status: CANCELLED | OUTPATIENT
Start: 2024-11-20

## 2024-11-15 RX ORDER — HEPARIN 100 UNIT/ML
500 SYRINGE INTRAVENOUS PRN
Status: DISCONTINUED | OUTPATIENT
Start: 2024-11-15 | End: 2024-11-16 | Stop reason: HOSPADM

## 2024-11-15 RX ORDER — 0.9 % SODIUM CHLORIDE 0.9 %
1000 INTRAVENOUS SOLUTION INTRAVENOUS ONCE
Status: COMPLETED | OUTPATIENT
Start: 2024-11-15 | End: 2024-11-15

## 2024-11-15 RX ORDER — PROCHLORPERAZINE EDISYLATE 5 MG/ML
10 INJECTION INTRAMUSCULAR; INTRAVENOUS ONCE
Status: COMPLETED | OUTPATIENT
Start: 2024-11-15 | End: 2024-11-15

## 2024-11-15 RX ORDER — SODIUM CHLORIDE 9 MG/ML
INJECTION, SOLUTION INTRAVENOUS CONTINUOUS
Status: CANCELLED | OUTPATIENT
Start: 2024-11-20

## 2024-11-15 RX ORDER — EPINEPHRINE 1 MG/ML
0.3 INJECTION, SOLUTION, CONCENTRATE INTRAVENOUS PRN
Status: CANCELLED | OUTPATIENT
Start: 2024-11-20

## 2024-11-15 RX ORDER — DIPHENHYDRAMINE HYDROCHLORIDE 50 MG/ML
50 INJECTION INTRAMUSCULAR; INTRAVENOUS
Status: CANCELLED | OUTPATIENT
Start: 2024-11-20

## 2024-11-15 RX ORDER — 0.9 % SODIUM CHLORIDE 0.9 %
1000 INTRAVENOUS SOLUTION INTRAVENOUS ONCE
Status: CANCELLED
Start: 2024-11-20 | End: 2024-11-20

## 2024-11-15 RX ORDER — ACETAMINOPHEN 325 MG/1
650 TABLET ORAL
Status: CANCELLED | OUTPATIENT
Start: 2024-11-20

## 2024-11-15 RX ORDER — HYDROCORTISONE SODIUM SUCCINATE 100 MG/2ML
100 INJECTION INTRAMUSCULAR; INTRAVENOUS
Status: CANCELLED | OUTPATIENT
Start: 2024-11-20

## 2024-11-15 RX ORDER — ALBUTEROL SULFATE 90 UG/1
4 INHALANT RESPIRATORY (INHALATION) PRN
Status: CANCELLED | OUTPATIENT
Start: 2024-11-20

## 2024-11-15 RX ORDER — ONDANSETRON 2 MG/ML
8 INJECTION INTRAMUSCULAR; INTRAVENOUS
Status: CANCELLED | OUTPATIENT
Start: 2024-11-20

## 2024-11-15 RX ADMIN — SODIUM CHLORIDE, PRESERVATIVE FREE 10 ML: 5 INJECTION INTRAVENOUS at 14:45

## 2024-11-15 RX ADMIN — HEPARIN 500 UNITS: 100 SYRINGE at 14:45

## 2024-11-15 RX ADMIN — PROCHLORPERAZINE EDISYLATE 10 MG: 5 INJECTION INTRAMUSCULAR; INTRAVENOUS at 13:25

## 2024-11-15 RX ADMIN — SODIUM CHLORIDE 1000 ML: 9 INJECTION, SOLUTION INTRAVENOUS at 13:19

## 2024-11-15 NOTE — PROGRESS NOTES
Chief Complaint   Patient presents with    Follow-up     She is here for a 2 week f/u  Mri results.   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  History of left nephrectomy in 1988   CURRENT THERAPY:         Neoadjuvant treatment with Keytruda/Taxol/carboplatin/Adriamycin/Cytoxan.  Treatment started 5/31/2024  BRIEF CASE HISTORY:       Ms. Luci Castañeda is a very pleasant 59 y.o. female with history of multiple comorbidities as listed.  She had history of left nephrectomy in 1988.  She had cardiac cath in 2021 for left ventricular hypertrophy.  Had recent problems with restrictive and obstructive pulmonary disease.  She continues follow-up with pulmonary. She was doing fine with routine mammogram being normal until this year where she had suspicious abnormalities in the mammogram done on 3/25/2024.  Diagnostic mammogram and left breast ultrasound April 9, 2024 showed suspicious 4.5 cm mass in the left breast 1 o'clock position.  Mildly thickened left axillary lymph nodes.  A biopsy was performed on 4/26/2024 and unfortunately that was positive for invasive ductal carcinoma.  Triple negative.  Biopsy from lymph node was negative for malignancy.  The patient is seen today for further management of her breast cancer. The patient did very well after her biopsy without any complication. The patient had no symptom preceding her diagnosis of cancer. No chest pain, shortness of breath, cough, sputum or hemoptysis, no back pain or monique aches, no weight loss or decreased appetite, no fever or night sweating. No headaches, and no other complaints.  No smoking social alcohol      INTERIM HISTORY:   Patient seen for follow-up triple negative breast cancer.  Patient is having cough and sputum. No fever. No hemoptysis.   Tolerated

## 2024-11-15 NOTE — PROGRESS NOTES
Pt here for C5D43 Keytruda, Cytoxan, Adriamycin.  Labs were drawn 11/13/24. Plts 36.   Per Dr. Bradley, hold treatment x1 week.  Pt nauseated and having decreased oral intake.  Hydration infused as ordered.  Compazine given.  Pt to return 11/22/24 for possible treatment.   Pt discharged.

## 2024-11-19 ENCOUNTER — HOSPITAL ENCOUNTER (OUTPATIENT)
Dept: PHYSICAL THERAPY | Facility: CLINIC | Age: 59
Setting detail: THERAPIES SERIES
Discharge: HOME | End: 2024-11-19

## 2024-11-19 NOTE — FLOWSHEET NOTE
[] [facility]  Outpatient Rehabilitation &  Therapy  [address]  [phone]  [phone] [] [place]  Outpatient Rehabilitation &  Therapy  [address]  P: [phone]  F: [phone] [] [facility]  Outpatient Rehabilitation &  Therapy  [address]  P: [phone]  F: [phone] [] [facility]  Outpatient Rehabilitation &  Therapy  [address]  [phone]  [phone] [] [facility]  Outpatient Rehabilitation &  Therapy  [address]   P: [phone]  F: [phone]  [] [facility]  Outpatient Rehabilitation &  Therapy  [address]  P: [phone]  F: [phone] [] [facility]  Outpatient Rehabilitation &  Therapy  [address]  P: [phone]  F: [phone] [] [facility]  Outpatient Rehabilitation &  Therapy  [address]  P: [phone]  F: [phone] [] [facility]  Outpatient Rehabilitation &  Therapy  [address]  P: [phone]  F: [phone]  [] [facility] Outpatient Rehabilitation &  Therapy  [address]  P: [phone]  F: [phone]     Therapy Cancel/No Show note    Date: 2024  Patient: Luci Castañeda  : 1965  MRN: [id number]    Cancels/No Shows to date:     For today's appointment patient:    [x]  Cancelled    [] Rescheduled appointment    [] No-show     Reason given by patient:    [x]  Patient ill    []  Conflicting appointment    [] No transportation      [] Conflict with work    [] No reason given    [] Weather related    [] COVID-19    [] Other:      Comments:        [] Next appointment was confirmed    Electronically signed by: Ronn Gannon, PT

## 2024-11-20 ENCOUNTER — TELEPHONE (OUTPATIENT)
Dept: INFUSION THERAPY | Age: 59
End: 2024-11-20

## 2024-11-20 ENCOUNTER — HOSPITAL ENCOUNTER (OUTPATIENT)
Dept: INFUSION THERAPY | Age: 59
Discharge: HOME | End: 2024-11-20
Payer: COMMERCIAL

## 2024-11-20 VITALS
TEMPERATURE: 98.7 F | RESPIRATION RATE: 18 BRPM | DIASTOLIC BLOOD PRESSURE: 71 MMHG | SYSTOLIC BLOOD PRESSURE: 104 MMHG | HEART RATE: 96 BPM

## 2024-11-20 DIAGNOSIS — C50.412 MALIGNANT NEOPLASM OF UPPER-OUTER QUADRANT OF LEFT BREAST IN FEMALE, ESTROGEN RECEPTOR NEGATIVE (HCC): ICD-10-CM

## 2024-11-20 DIAGNOSIS — C50.912 INVASIVE DUCTAL CARCINOMA OF BREAST, LEFT (HCC): Primary | ICD-10-CM

## 2024-11-20 DIAGNOSIS — Z17.1 MALIGNANT NEOPLASM OF UPPER-OUTER QUADRANT OF LEFT BREAST IN FEMALE, ESTROGEN RECEPTOR NEGATIVE (HCC): ICD-10-CM

## 2024-11-20 LAB
ALBUMIN SERPL-MCNC: 3.5 G/DL (ref 3.5–5.2)
ALBUMIN/GLOB SERPL: 1.2 {RATIO} (ref 1–2.5)
ALP SERPL-CCNC: 105 U/L (ref 35–104)
ALT SERPL-CCNC: 9 U/L (ref 5–33)
ANION GAP SERPL CALCULATED.3IONS-SCNC: 12 MMOL/L (ref 9–17)
AST SERPL-CCNC: 14 U/L
BASOPHILS # BLD: 0 K/UL (ref 0–0.2)
BASOPHILS NFR BLD: 0 % (ref 0–2)
BILIRUB SERPL-MCNC: 0.6 MG/DL (ref 0.3–1.2)
BUN SERPL-MCNC: 12 MG/DL (ref 8–23)
CALCIUM SERPL-MCNC: 8.6 MG/DL (ref 8.6–10.4)
CHLORIDE SERPL-SCNC: 102 MMOL/L (ref 98–107)
CO2 SERPL-SCNC: 26 MMOL/L (ref 20–31)
CREAT SERPL-MCNC: 1.1 MG/DL (ref 0.5–0.9)
EOSINOPHIL # BLD: 0 K/UL (ref 0–0.4)
EOSINOPHILS RELATIVE PERCENT: 0 % (ref 1–4)
ERYTHROCYTE [DISTWIDTH] IN BLOOD BY AUTOMATED COUNT: 18.9 % (ref 12.5–15.4)
GFR, ESTIMATED: 57 ML/MIN/1.73M2
GLUCOSE SERPL-MCNC: 127 MG/DL (ref 70–99)
HCT VFR BLD AUTO: 24 % (ref 36–46)
HGB BLD-MCNC: 7.9 G/DL (ref 12–16)
LYMPHOCYTES NFR BLD: 0.5 K/UL (ref 1–4.8)
LYMPHOCYTES RELATIVE PERCENT: 11 % (ref 24–44)
MCH RBC QN AUTO: 31.3 PG (ref 26–34)
MCHC RBC AUTO-ENTMCNC: 33 G/DL (ref 31–37)
MCV RBC AUTO: 95 FL (ref 80–100)
MONOCYTES NFR BLD: 0.7 K/UL (ref 0.1–1.2)
MONOCYTES NFR BLD: 15 % (ref 2–11)
NEUTROPHILS NFR BLD: 74 % (ref 36–66)
NEUTS SEG NFR BLD: 3.3 K/UL (ref 1.8–7.7)
PLATELET # BLD AUTO: 79 K/UL (ref 140–450)
PMV BLD AUTO: 9 FL (ref 6–12)
POTASSIUM SERPL-SCNC: 3.6 MMOL/L (ref 3.7–5.3)
PROT SERPL-MCNC: 6.5 G/DL (ref 6.4–8.3)
RBC # BLD AUTO: 2.52 M/UL (ref 4–5.2)
SODIUM SERPL-SCNC: 140 MMOL/L (ref 135–144)
WBC OTHER # BLD: 4.5 K/UL (ref 3.5–11)

## 2024-11-20 PROCEDURE — 96361 HYDRATE IV INFUSION ADD-ON: CPT

## 2024-11-20 PROCEDURE — 85025 COMPLETE CBC W/AUTO DIFF WBC: CPT

## 2024-11-20 PROCEDURE — 96374 THER/PROPH/DIAG INJ IV PUSH: CPT

## 2024-11-20 PROCEDURE — 6360000002 HC RX W HCPCS: Performed by: INTERNAL MEDICINE

## 2024-11-20 PROCEDURE — 2580000003 HC RX 258: Performed by: INTERNAL MEDICINE

## 2024-11-20 PROCEDURE — 80053 COMPREHEN METABOLIC PANEL: CPT

## 2024-11-20 RX ORDER — ONDANSETRON 2 MG/ML
8 INJECTION INTRAMUSCULAR; INTRAVENOUS
OUTPATIENT
Start: 2024-11-27

## 2024-11-20 RX ORDER — SODIUM CHLORIDE 9 MG/ML
INJECTION, SOLUTION INTRAVENOUS CONTINUOUS
OUTPATIENT
Start: 2024-11-27

## 2024-11-20 RX ORDER — ACETAMINOPHEN 325 MG/1
650 TABLET ORAL
OUTPATIENT
Start: 2024-11-27

## 2024-11-20 RX ORDER — FAMOTIDINE 10 MG/ML
20 INJECTION, SOLUTION INTRAVENOUS
OUTPATIENT
Start: 2024-11-27

## 2024-11-20 RX ORDER — SODIUM CHLORIDE 0.9 % (FLUSH) 0.9 %
5-40 SYRINGE (ML) INJECTION PRN
OUTPATIENT
Start: 2024-11-27

## 2024-11-20 RX ORDER — SODIUM CHLORIDE 9 MG/ML
5-250 INJECTION, SOLUTION INTRAVENOUS PRN
OUTPATIENT
Start: 2024-11-27

## 2024-11-20 RX ORDER — EPINEPHRINE 1 MG/ML
0.3 INJECTION, SOLUTION, CONCENTRATE INTRAVENOUS PRN
OUTPATIENT
Start: 2024-11-27

## 2024-11-20 RX ORDER — 0.9 % SODIUM CHLORIDE 0.9 %
1000 INTRAVENOUS SOLUTION INTRAVENOUS ONCE
Status: CANCELLED
Start: 2024-11-27 | End: 2024-11-27

## 2024-11-20 RX ORDER — SODIUM CHLORIDE 0.9 % (FLUSH) 0.9 %
5-40 SYRINGE (ML) INJECTION PRN
Status: DISCONTINUED | OUTPATIENT
Start: 2024-11-20 | End: 2024-11-21 | Stop reason: HOSPADM

## 2024-11-20 RX ORDER — HEPARIN 100 UNIT/ML
500 SYRINGE INTRAVENOUS PRN
Status: DISCONTINUED | OUTPATIENT
Start: 2024-11-20 | End: 2024-11-21 | Stop reason: HOSPADM

## 2024-11-20 RX ORDER — 0.9 % SODIUM CHLORIDE 0.9 %
1000 INTRAVENOUS SOLUTION INTRAVENOUS ONCE
Status: COMPLETED | OUTPATIENT
Start: 2024-11-20 | End: 2024-11-20

## 2024-11-20 RX ORDER — PROCHLORPERAZINE EDISYLATE 5 MG/ML
10 INJECTION INTRAMUSCULAR; INTRAVENOUS ONCE
Status: COMPLETED | OUTPATIENT
Start: 2024-11-20 | End: 2024-11-20

## 2024-11-20 RX ORDER — ALBUTEROL SULFATE 90 UG/1
4 INHALANT RESPIRATORY (INHALATION) PRN
OUTPATIENT
Start: 2024-11-27

## 2024-11-20 RX ORDER — HYDROCORTISONE SODIUM SUCCINATE 100 MG/2ML
100 INJECTION INTRAMUSCULAR; INTRAVENOUS
OUTPATIENT
Start: 2024-11-27

## 2024-11-20 RX ORDER — HEPARIN 100 UNIT/ML
500 SYRINGE INTRAVENOUS PRN
OUTPATIENT
Start: 2024-11-27

## 2024-11-20 RX ORDER — DIPHENHYDRAMINE HYDROCHLORIDE 50 MG/ML
50 INJECTION INTRAMUSCULAR; INTRAVENOUS
OUTPATIENT
Start: 2024-11-27

## 2024-11-20 RX ADMIN — HEPARIN 500 UNITS: 100 SYRINGE at 12:55

## 2024-11-20 RX ADMIN — SODIUM CHLORIDE, PRESERVATIVE FREE 10 ML: 5 INJECTION INTRAVENOUS at 12:54

## 2024-11-20 RX ADMIN — PROCHLORPERAZINE EDISYLATE 10 MG: 5 INJECTION INTRAMUSCULAR; INTRAVENOUS at 11:26

## 2024-11-20 RX ADMIN — SODIUM CHLORIDE, PRESERVATIVE FREE 10 ML: 5 INJECTION INTRAVENOUS at 11:09

## 2024-11-20 RX ADMIN — SODIUM CHLORIDE 1000 ML: 9 INJECTION, SOLUTION INTRAVENOUS at 11:13

## 2024-11-20 NOTE — TELEPHONE ENCOUNTER
Sadia from lab call Ghislaine has a HGB  of 7.9, notified infusion center where patient currently.

## 2024-11-20 NOTE — PROGRESS NOTES
Patient arrived for hydration. Pt complains of nausea, fatigue and dizziness. Compazine given. Labs drawn and reviewed. Pt stable at discharge. Scheduled to return 11/22/24 for C5D43 keytruda/linda/cytoxan.

## 2024-11-22 ENCOUNTER — HOSPITAL ENCOUNTER (OUTPATIENT)
Dept: INFUSION THERAPY | Age: 59
Discharge: HOME | End: 2024-11-22
Payer: COMMERCIAL

## 2024-11-22 VITALS
SYSTOLIC BLOOD PRESSURE: 125 MMHG | DIASTOLIC BLOOD PRESSURE: 78 MMHG | BODY MASS INDEX: 36.97 KG/M2 | WEIGHT: 243.13 LBS | TEMPERATURE: 97.8 F | RESPIRATION RATE: 18 BRPM | HEART RATE: 107 BPM

## 2024-11-22 DIAGNOSIS — Z17.1 MALIGNANT NEOPLASM OF UPPER-OUTER QUADRANT OF LEFT BREAST IN FEMALE, ESTROGEN RECEPTOR NEGATIVE (HCC): ICD-10-CM

## 2024-11-22 DIAGNOSIS — C50.412 MALIGNANT NEOPLASM OF UPPER-OUTER QUADRANT OF LEFT BREAST IN FEMALE, ESTROGEN RECEPTOR NEGATIVE (HCC): ICD-10-CM

## 2024-11-22 DIAGNOSIS — C50.912 INVASIVE DUCTAL CARCINOMA OF BREAST, LEFT (HCC): Primary | ICD-10-CM

## 2024-11-22 LAB
ALBUMIN SERPL-MCNC: 3.4 G/DL (ref 3.5–5.2)
ALBUMIN/GLOB SERPL: 1.1 {RATIO} (ref 1–2.5)
ALP SERPL-CCNC: 102 U/L (ref 35–104)
ALT SERPL-CCNC: 8 U/L (ref 5–33)
ANION GAP SERPL CALCULATED.3IONS-SCNC: 13 MMOL/L (ref 9–17)
AST SERPL-CCNC: 14 U/L
BASOPHILS # BLD: 0 K/UL (ref 0–0.2)
BASOPHILS NFR BLD: 0 % (ref 0–2)
BILIRUB SERPL-MCNC: 0.5 MG/DL (ref 0.3–1.2)
BUN SERPL-MCNC: 10 MG/DL (ref 8–23)
CALCIUM SERPL-MCNC: 8.9 MG/DL (ref 8.6–10.4)
CHLORIDE SERPL-SCNC: 99 MMOL/L (ref 98–107)
CO2 SERPL-SCNC: 24 MMOL/L (ref 20–31)
CREAT SERPL-MCNC: 1 MG/DL (ref 0.5–0.9)
EOSINOPHIL # BLD: 0 K/UL (ref 0–0.4)
EOSINOPHILS RELATIVE PERCENT: 0 % (ref 1–4)
ERYTHROCYTE [DISTWIDTH] IN BLOOD BY AUTOMATED COUNT: 18.6 % (ref 12.5–15.4)
GFR, ESTIMATED: 64 ML/MIN/1.73M2
GLUCOSE SERPL-MCNC: 116 MG/DL (ref 70–99)
HCT VFR BLD AUTO: 23.8 % (ref 36–46)
HGB BLD-MCNC: 7.8 G/DL (ref 12–16)
LYMPHOCYTES NFR BLD: 0.5 K/UL (ref 1–4.8)
LYMPHOCYTES RELATIVE PERCENT: 16 % (ref 24–44)
MCH RBC QN AUTO: 31 PG (ref 26–34)
MCHC RBC AUTO-ENTMCNC: 32.8 G/DL (ref 31–37)
MCV RBC AUTO: 94.3 FL (ref 80–100)
MONOCYTES NFR BLD: 0.6 K/UL (ref 0.1–1.2)
MONOCYTES NFR BLD: 17 % (ref 2–11)
NEUTROPHILS NFR BLD: 67 % (ref 36–66)
NEUTS SEG NFR BLD: 2.3 K/UL (ref 1.8–7.7)
PLATELET # BLD AUTO: 93 K/UL (ref 140–450)
PMV BLD AUTO: 7.9 FL (ref 6–12)
POTASSIUM SERPL-SCNC: 3.7 MMOL/L (ref 3.7–5.3)
PROT SERPL-MCNC: 6.5 G/DL (ref 6.4–8.3)
RBC # BLD AUTO: 2.52 M/UL (ref 4–5.2)
SODIUM SERPL-SCNC: 136 MMOL/L (ref 135–144)
WBC OTHER # BLD: 3.4 K/UL (ref 3.5–11)

## 2024-11-22 PROCEDURE — 96413 CHEMO IV INFUSION 1 HR: CPT

## 2024-11-22 PROCEDURE — 96411 CHEMO IV PUSH ADDL DRUG: CPT

## 2024-11-22 PROCEDURE — 96375 TX/PRO/DX INJ NEW DRUG ADDON: CPT

## 2024-11-22 PROCEDURE — 6360000002 HC RX W HCPCS: Performed by: INTERNAL MEDICINE

## 2024-11-22 PROCEDURE — 96366 THER/PROPH/DIAG IV INF ADDON: CPT

## 2024-11-22 PROCEDURE — 85025 COMPLETE CBC W/AUTO DIFF WBC: CPT

## 2024-11-22 PROCEDURE — 80053 COMPREHEN METABOLIC PANEL: CPT

## 2024-11-22 PROCEDURE — 96415 CHEMO IV INFUSION ADDL HR: CPT

## 2024-11-22 PROCEDURE — 2580000003 HC RX 258: Performed by: INTERNAL MEDICINE

## 2024-11-22 RX ORDER — ONDANSETRON 2 MG/ML
8 INJECTION INTRAMUSCULAR; INTRAVENOUS
OUTPATIENT
Start: 2024-11-27

## 2024-11-22 RX ORDER — HYDROCORTISONE SODIUM SUCCINATE 100 MG/2ML
100 INJECTION INTRAMUSCULAR; INTRAVENOUS
OUTPATIENT
Start: 2024-11-27

## 2024-11-22 RX ORDER — SODIUM CHLORIDE 0.9 % (FLUSH) 0.9 %
5-40 SYRINGE (ML) INJECTION PRN
OUTPATIENT
Start: 2024-11-27

## 2024-11-22 RX ORDER — ALBUTEROL SULFATE 90 UG/1
4 INHALANT RESPIRATORY (INHALATION) PRN
OUTPATIENT
Start: 2024-11-27

## 2024-11-22 RX ORDER — DIPHENHYDRAMINE HYDROCHLORIDE 50 MG/ML
50 INJECTION INTRAMUSCULAR; INTRAVENOUS
OUTPATIENT
Start: 2024-11-22

## 2024-11-22 RX ORDER — DIPHENHYDRAMINE HYDROCHLORIDE 50 MG/ML
50 INJECTION INTRAMUSCULAR; INTRAVENOUS
OUTPATIENT
Start: 2024-11-27

## 2024-11-22 RX ORDER — HEPARIN 100 UNIT/ML
500 SYRINGE INTRAVENOUS PRN
OUTPATIENT
Start: 2024-11-22

## 2024-11-22 RX ORDER — ONDANSETRON 2 MG/ML
8 INJECTION INTRAMUSCULAR; INTRAVENOUS
OUTPATIENT
Start: 2024-11-22

## 2024-11-22 RX ORDER — ACETAMINOPHEN 325 MG/1
650 TABLET ORAL
OUTPATIENT
Start: 2024-11-22

## 2024-11-22 RX ORDER — SODIUM CHLORIDE 9 MG/ML
INJECTION, SOLUTION INTRAVENOUS CONTINUOUS
OUTPATIENT
Start: 2024-11-22

## 2024-11-22 RX ORDER — SODIUM CHLORIDE 9 MG/ML
5-250 INJECTION, SOLUTION INTRAVENOUS PRN
OUTPATIENT
Start: 2024-11-27

## 2024-11-22 RX ORDER — PALONOSETRON 0.05 MG/ML
0.25 INJECTION, SOLUTION INTRAVENOUS ONCE
Status: COMPLETED | OUTPATIENT
Start: 2024-11-22 | End: 2024-11-22

## 2024-11-22 RX ORDER — SODIUM CHLORIDE 0.9 % (FLUSH) 0.9 %
5-40 SYRINGE (ML) INJECTION PRN
OUTPATIENT
Start: 2024-11-22

## 2024-11-22 RX ORDER — SODIUM CHLORIDE 9 MG/ML
INJECTION, SOLUTION INTRAVENOUS CONTINUOUS
OUTPATIENT
Start: 2024-11-27

## 2024-11-22 RX ORDER — ALBUTEROL SULFATE 90 UG/1
4 INHALANT RESPIRATORY (INHALATION) PRN
OUTPATIENT
Start: 2024-11-22

## 2024-11-22 RX ORDER — FAMOTIDINE 10 MG/ML
20 INJECTION, SOLUTION INTRAVENOUS
OUTPATIENT
Start: 2024-11-27

## 2024-11-22 RX ORDER — 0.9 % SODIUM CHLORIDE 0.9 %
1000 INTRAVENOUS SOLUTION INTRAVENOUS ONCE
Start: 2024-11-27 | End: 2024-11-27

## 2024-11-22 RX ORDER — EPINEPHRINE 1 MG/ML
0.3 INJECTION, SOLUTION, CONCENTRATE INTRAVENOUS PRN
OUTPATIENT
Start: 2024-11-22

## 2024-11-22 RX ORDER — HEPARIN 100 UNIT/ML
500 SYRINGE INTRAVENOUS PRN
Status: DISCONTINUED | OUTPATIENT
Start: 2024-11-22 | End: 2024-11-23 | Stop reason: HOSPADM

## 2024-11-22 RX ORDER — EPINEPHRINE 1 MG/ML
0.3 INJECTION, SOLUTION, CONCENTRATE INTRAVENOUS PRN
OUTPATIENT
Start: 2024-11-27

## 2024-11-22 RX ORDER — 0.9 % SODIUM CHLORIDE 0.9 %
1000 INTRAVENOUS SOLUTION INTRAVENOUS ONCE
Status: COMPLETED | OUTPATIENT
Start: 2024-11-22 | End: 2024-11-22

## 2024-11-22 RX ORDER — SODIUM CHLORIDE 9 MG/ML
5-250 INJECTION, SOLUTION INTRAVENOUS PRN
Status: DISCONTINUED | OUTPATIENT
Start: 2024-11-22 | End: 2024-11-23 | Stop reason: HOSPADM

## 2024-11-22 RX ORDER — PROCHLORPERAZINE EDISYLATE 5 MG/ML
10 INJECTION INTRAMUSCULAR; INTRAVENOUS ONCE
Status: COMPLETED | OUTPATIENT
Start: 2024-11-22 | End: 2024-11-22

## 2024-11-22 RX ORDER — HEPARIN 100 UNIT/ML
500 SYRINGE INTRAVENOUS PRN
OUTPATIENT
Start: 2024-11-27

## 2024-11-22 RX ORDER — SODIUM CHLORIDE 9 MG/ML
25 INJECTION, SOLUTION INTRAVENOUS PRN
OUTPATIENT
Start: 2024-11-22

## 2024-11-22 RX ORDER — SODIUM CHLORIDE 0.9 % (FLUSH) 0.9 %
5-40 SYRINGE (ML) INJECTION PRN
Status: DISCONTINUED | OUTPATIENT
Start: 2024-11-22 | End: 2024-11-23 | Stop reason: HOSPADM

## 2024-11-22 RX ORDER — FAMOTIDINE 10 MG/ML
20 INJECTION, SOLUTION INTRAVENOUS
OUTPATIENT
Start: 2024-11-22

## 2024-11-22 RX ORDER — ACETAMINOPHEN 325 MG/1
650 TABLET ORAL
OUTPATIENT
Start: 2024-11-27

## 2024-11-22 RX ORDER — DEXAMETHASONE SODIUM PHOSPHATE 10 MG/ML
10 INJECTION INTRAMUSCULAR; INTRAVENOUS ONCE
Status: COMPLETED | OUTPATIENT
Start: 2024-11-22 | End: 2024-11-22

## 2024-11-22 RX ORDER — HYDROCORTISONE SODIUM SUCCINATE 100 MG/2ML
100 INJECTION INTRAMUSCULAR; INTRAVENOUS
OUTPATIENT
Start: 2024-11-22

## 2024-11-22 RX ORDER — DOXORUBICIN HYDROCHLORIDE 2 MG/ML
60 INJECTION, SOLUTION INTRAVENOUS ONCE
Status: COMPLETED | OUTPATIENT
Start: 2024-11-22 | End: 2024-11-22

## 2024-11-22 RX ADMIN — SODIUM CHLORIDE, PRESERVATIVE FREE 10 ML: 5 INJECTION INTRAVENOUS at 16:58

## 2024-11-22 RX ADMIN — SODIUM CHLORIDE 1000 ML: 9 INJECTION, SOLUTION INTRAVENOUS at 13:23

## 2024-11-22 RX ADMIN — SODIUM CHLORIDE 1260 MG: 9 INJECTION, SOLUTION INTRAVENOUS at 16:19

## 2024-11-22 RX ADMIN — PALONOSETRON HYDROCHLORIDE 0.25 MG: 0.25 INJECTION INTRAVENOUS at 15:28

## 2024-11-22 RX ADMIN — PROCHLORPERAZINE EDISYLATE 10 MG: 5 INJECTION INTRAMUSCULAR; INTRAVENOUS at 13:23

## 2024-11-22 RX ADMIN — DEXAMETHASONE SODIUM PHOSPHATE 10 MG: 10 INJECTION INTRAMUSCULAR; INTRAVENOUS at 15:30

## 2024-11-22 RX ADMIN — SODIUM CHLORIDE 150 MG: 9 INJECTION, SOLUTION INTRAVENOUS at 15:00

## 2024-11-22 RX ADMIN — SODIUM CHLORIDE 250 ML/HR: 9 INJECTION, SOLUTION INTRAVENOUS at 14:59

## 2024-11-22 RX ADMIN — SODIUM CHLORIDE 200 MG: 9 INJECTION, SOLUTION INTRAVENOUS at 15:41

## 2024-11-22 RX ADMIN — DOXORUBICIN HYDROCHLORIDE 126 MG: 2 INJECTION, SOLUTION INTRAVENOUS at 16:09

## 2024-11-22 RX ADMIN — Medication 500 UNITS: at 16:58

## 2024-11-22 ASSESSMENT — PAIN DESCRIPTION - LOCATION: LOCATION: LEG

## 2024-11-22 ASSESSMENT — PAIN DESCRIPTION - ORIENTATION: ORIENTATION: LEFT

## 2024-11-22 ASSESSMENT — PAIN SCALES - GENERAL: PAINLEVEL_OUTOF10: 7

## 2024-11-22 NOTE — PROGRESS NOTES
Pt here for C5D43. Keytruda and AC. Pt continues to have fatigue and nausea. Hydration and Compazine given while awaiting lab results. Labs drawn from port and results reviewed. Discussed labs with Dr Walker and ok to proceed with treatment today but instead of giving Neulasta OBI, have pt return Monday for Neulasta injection d/t last OBI not working and pt delayed multiple weeks. Pt updated on plan of care and agreeable. Blood return noted before, during and after Merlin. Pt was treated without incident and d/c'd in stable condition. Pt will return on 11-25-24 for Neulasta.

## 2024-11-25 ENCOUNTER — HOSPITAL ENCOUNTER (OUTPATIENT)
Dept: INFUSION THERAPY | Age: 59
Discharge: HOME | End: 2024-11-25
Payer: COMMERCIAL

## 2024-11-25 DIAGNOSIS — Z17.1 MALIGNANT NEOPLASM OF UPPER-OUTER QUADRANT OF LEFT BREAST IN FEMALE, ESTROGEN RECEPTOR NEGATIVE (HCC): Primary | ICD-10-CM

## 2024-11-25 DIAGNOSIS — C50.412 MALIGNANT NEOPLASM OF UPPER-OUTER QUADRANT OF LEFT BREAST IN FEMALE, ESTROGEN RECEPTOR NEGATIVE (HCC): Primary | ICD-10-CM

## 2024-11-25 PROCEDURE — 6360000002 HC RX W HCPCS: Performed by: INTERNAL MEDICINE

## 2024-11-25 PROCEDURE — 96372 THER/PROPH/DIAG INJ SC/IM: CPT

## 2024-11-25 RX ORDER — NYSTATIN 100000 [USP'U]/ML
500000 SUSPENSION ORAL 4 TIMES DAILY
Qty: 200 ML | Refills: 0 | Status: SHIPPED | OUTPATIENT
Start: 2024-11-25 | End: 2024-12-05

## 2024-11-25 RX ADMIN — PEGFILGRASTIM 6 MG: 6 INJECTION SUBCUTANEOUS at 14:34

## 2024-11-25 NOTE — PROGRESS NOTES
Pt here for Neulasta injection.   Injection given without incident.  Pt states she has developed thrush, Nystatin Rx sent to her pharmacy.  Pt discharged in stable condition.   Returns 12-6-24 for MD urssell and Keytruda.

## 2024-12-02 ENCOUNTER — TELEPHONE (OUTPATIENT)
Dept: INFUSION THERAPY | Age: 59
End: 2024-12-02

## 2024-12-02 NOTE — TELEPHONE ENCOUNTER
Patient called asking if she can get on the schedule for hydration today.  She was advised we have a full schedule and no staff.  RN spoke to infusion and they said if they have a cancellation or treatment held they would try to get her in.  Patient was advised this via VM and advised to try to drink fluids throughout the day or try liquid IV.

## 2024-12-03 ENCOUNTER — HOSPITAL ENCOUNTER (OUTPATIENT)
Dept: INFUSION THERAPY | Age: 59
Setting detail: INFUSION SERIES
Discharge: HOME | End: 2024-12-03
Payer: COMMERCIAL

## 2024-12-03 VITALS
HEART RATE: 80 BPM | RESPIRATION RATE: 17 BRPM | SYSTOLIC BLOOD PRESSURE: 103 MMHG | DIASTOLIC BLOOD PRESSURE: 76 MMHG | TEMPERATURE: 97.1 F | OXYGEN SATURATION: 99 %

## 2024-12-03 DIAGNOSIS — Z17.1 MALIGNANT NEOPLASM OF UPPER-OUTER QUADRANT OF LEFT BREAST IN FEMALE, ESTROGEN RECEPTOR NEGATIVE (HCC): ICD-10-CM

## 2024-12-03 DIAGNOSIS — C50.412 MALIGNANT NEOPLASM OF UPPER-OUTER QUADRANT OF LEFT BREAST IN FEMALE, ESTROGEN RECEPTOR NEGATIVE (HCC): ICD-10-CM

## 2024-12-03 DIAGNOSIS — C50.912 INVASIVE DUCTAL CARCINOMA OF BREAST, LEFT (HCC): Primary | ICD-10-CM

## 2024-12-03 LAB
ABSOLUTE BANDS: 0.06 K/UL (ref 0–1)
ALBUMIN SERPL-MCNC: 3.4 G/DL (ref 3.5–5.2)
ALP SERPL-CCNC: 97 U/L (ref 35–104)
ALT SERPL-CCNC: 6 U/L (ref 10–35)
ANION GAP SERPL CALCULATED.3IONS-SCNC: 12 MMOL/L (ref 9–16)
AST SERPL-CCNC: 13 U/L (ref 10–35)
BANDS: 8 % (ref 0–10)
BASOPHILS # BLD: 0 K/UL (ref 0–0.2)
BASOPHILS NFR BLD: 0 % (ref 0–2)
BILIRUB SERPL-MCNC: 0.4 MG/DL (ref 0–1.2)
BUN SERPL-MCNC: 14 MG/DL (ref 8–23)
CALCIUM SERPL-MCNC: 8.7 MG/DL (ref 8.6–10.4)
CHLORIDE SERPL-SCNC: 102 MMOL/L (ref 98–107)
CO2 SERPL-SCNC: 25 MMOL/L (ref 20–31)
CORTIS SERPL-MCNC: 29.3 UG/DL (ref 2.5–19.5)
CREAT SERPL-MCNC: 1 MG/DL (ref 0.7–1.2)
EOSINOPHIL # BLD: 0 K/UL (ref 0–0.4)
EOSINOPHILS RELATIVE PERCENT: 0 % (ref 0–4)
ERYTHROCYTE [DISTWIDTH] IN BLOOD BY AUTOMATED COUNT: 17.6 % (ref 11.5–14.9)
GFR, ESTIMATED: 64 ML/MIN/1.73M2
GLUCOSE SERPL-MCNC: 121 MG/DL (ref 74–99)
HCT VFR BLD AUTO: 18.6 % (ref 36–46)
HGB BLD-MCNC: 6.2 G/DL (ref 12–16)
LYMPHOCYTES NFR BLD: 0.23 K/UL (ref 1–4.8)
LYMPHOCYTES RELATIVE PERCENT: 33 % (ref 24–44)
MCH RBC QN AUTO: 31.2 PG (ref 26–34)
MCHC RBC AUTO-ENTMCNC: 33.5 G/DL (ref 31–37)
MCV RBC AUTO: 93.1 FL (ref 80–100)
METAMYELOCYTES ABSOLUTE COUNT: 0.01 K/UL
METAMYELOCYTES: 1 %
MONOCYTES NFR BLD: 0.14 K/UL (ref 0.1–1.3)
MONOCYTES NFR BLD: 20 % (ref 1–7)
MORPHOLOGY: ABNORMAL
MYELOCYTES ABSOLUTE COUNT: 0.01 K/UL
MYELOCYTES: 1 %
NEUTROPHILS NFR BLD: 37 % (ref 36–66)
NEUTS SEG NFR BLD: 0.24 K/UL (ref 1.3–9.1)
NUCLEATED RED BLOOD CELLS: 1 PER 100 WBC
PLATELET # BLD AUTO: 8 K/UL (ref 150–450)
PMV BLD AUTO: 10.4 FL (ref 6–12)
POTASSIUM SERPL-SCNC: 3.7 MMOL/L (ref 3.7–5.3)
PROT SERPL-MCNC: 6 G/DL (ref 6.6–8.7)
RBC # BLD AUTO: 2 M/UL (ref 4–5.2)
RETICS # AUTO: 0.01 M/UL (ref 0.02–0.1)
RETICS/RBC NFR AUTO: 0.3 % (ref 0.5–2)
SODIUM SERPL-SCNC: 139 MMOL/L (ref 136–145)
TSH SERPL DL<=0.05 MIU/L-ACNC: 0.9 UIU/ML (ref 0.27–4.2)
WBC OTHER # BLD: 0.7 K/UL (ref 3.5–11)

## 2024-12-03 PROCEDURE — 86850 RBC ANTIBODY SCREEN: CPT

## 2024-12-03 PROCEDURE — 36591 DRAW BLOOD OFF VENOUS DEVICE: CPT

## 2024-12-03 PROCEDURE — 96360 HYDRATION IV INFUSION INIT: CPT

## 2024-12-03 PROCEDURE — 96361 HYDRATE IV INFUSION ADD-ON: CPT

## 2024-12-03 PROCEDURE — 82533 TOTAL CORTISOL: CPT

## 2024-12-03 PROCEDURE — 36430 TRANSFUSION BLD/BLD COMPNT: CPT

## 2024-12-03 PROCEDURE — 84443 ASSAY THYROID STIM HORMONE: CPT

## 2024-12-03 PROCEDURE — P9016 RBC LEUKOCYTES REDUCED: HCPCS

## 2024-12-03 PROCEDURE — P9073 PLATELETS PHERESIS PATH REDU: HCPCS

## 2024-12-03 PROCEDURE — 86900 BLOOD TYPING SEROLOGIC ABO: CPT

## 2024-12-03 PROCEDURE — 86920 COMPATIBILITY TEST SPIN: CPT

## 2024-12-03 PROCEDURE — 2580000003 HC RX 258: Performed by: INTERNAL MEDICINE

## 2024-12-03 PROCEDURE — 6360000002 HC RX W HCPCS: Performed by: INTERNAL MEDICINE

## 2024-12-03 PROCEDURE — 36415 COLL VENOUS BLD VENIPUNCTURE: CPT

## 2024-12-03 PROCEDURE — 86901 BLOOD TYPING SEROLOGIC RH(D): CPT

## 2024-12-03 PROCEDURE — 80053 COMPREHEN METABOLIC PANEL: CPT

## 2024-12-03 PROCEDURE — 85045 AUTOMATED RETICULOCYTE COUNT: CPT

## 2024-12-03 PROCEDURE — 85025 COMPLETE CBC W/AUTO DIFF WBC: CPT

## 2024-12-03 RX ORDER — HEPARIN 100 UNIT/ML
500 SYRINGE INTRAVENOUS PRN
Status: DISCONTINUED | OUTPATIENT
Start: 2024-12-03 | End: 2024-12-04 | Stop reason: HOSPADM

## 2024-12-03 RX ORDER — ACETAMINOPHEN 325 MG/1
650 TABLET ORAL
OUTPATIENT
Start: 2024-12-04

## 2024-12-03 RX ORDER — 0.9 % SODIUM CHLORIDE 0.9 %
1000 INTRAVENOUS SOLUTION INTRAVENOUS ONCE
Start: 2024-12-04 | End: 2024-12-04

## 2024-12-03 RX ORDER — DIPHENHYDRAMINE HYDROCHLORIDE 50 MG/ML
50 INJECTION INTRAMUSCULAR; INTRAVENOUS
OUTPATIENT
Start: 2024-12-04

## 2024-12-03 RX ORDER — EPINEPHRINE 1 MG/ML
0.3 INJECTION, SOLUTION, CONCENTRATE INTRAVENOUS PRN
OUTPATIENT
Start: 2024-12-04

## 2024-12-03 RX ORDER — 0.9 % SODIUM CHLORIDE 0.9 %
1000 INTRAVENOUS SOLUTION INTRAVENOUS ONCE
Status: COMPLETED | OUTPATIENT
Start: 2024-12-03 | End: 2024-12-03

## 2024-12-03 RX ORDER — HYDROCORTISONE SODIUM SUCCINATE 100 MG/2ML
100 INJECTION INTRAMUSCULAR; INTRAVENOUS
OUTPATIENT
Start: 2024-12-04

## 2024-12-03 RX ORDER — ONDANSETRON 2 MG/ML
8 INJECTION INTRAMUSCULAR; INTRAVENOUS
OUTPATIENT
Start: 2024-12-04

## 2024-12-03 RX ORDER — HEPARIN 100 UNIT/ML
500 SYRINGE INTRAVENOUS PRN
OUTPATIENT
Start: 2024-12-04

## 2024-12-03 RX ORDER — SODIUM CHLORIDE 9 MG/ML
5-250 INJECTION, SOLUTION INTRAVENOUS PRN
OUTPATIENT
Start: 2024-12-04

## 2024-12-03 RX ORDER — SODIUM CHLORIDE 9 MG/ML
INJECTION, SOLUTION INTRAVENOUS CONTINUOUS
OUTPATIENT
Start: 2024-12-04

## 2024-12-03 RX ORDER — ALBUTEROL SULFATE 90 UG/1
4 INHALANT RESPIRATORY (INHALATION) PRN
OUTPATIENT
Start: 2024-12-04

## 2024-12-03 RX ORDER — SODIUM CHLORIDE 0.9 % (FLUSH) 0.9 %
5-40 SYRINGE (ML) INJECTION PRN
OUTPATIENT
Start: 2024-12-04

## 2024-12-03 RX ADMIN — SODIUM CHLORIDE 1000 ML: 9 INJECTION, SOLUTION INTRAVENOUS at 10:44

## 2024-12-03 RX ADMIN — HEPARIN 500 UNITS: 100 SYRINGE at 15:29

## 2024-12-03 NOTE — TELEPHONE ENCOUNTER
Pt's sister, Janell, called requesting hydration for pt. Writer called [facility] and spoke to Trista BURGOS; she states they can take the pt at 10 today. Writer offered this appt to Janell and she was agreeable. Trista notified and will use order in Epic.

## 2024-12-03 NOTE — PROGRESS NOTES
Pt arrived for IV hydration.  Vitals obtained and R chest port accessed without complications.Labs drawn for upcoming MD appt.  Hgb-6.2 and platelets-8. Per telephone order Dr Bradley one unit PRBCs and one unit of platelets to be given as well as hydration. Infusion started at 1044 and ended at 1228.  Blood and platelets to follow per documentation. Pt tolerated well.  No s/s adverse reaction noted.  Port heparin locked and deaccessed.  Pt discharged home, ambulatory per self.

## 2024-12-04 LAB
ABO/RH: NORMAL
ANTIBODY SCREEN: NEGATIVE
ARM BAND NUMBER: NORMAL
BLOOD BANK BLOOD PRODUCT EXPIRATION DATE: NORMAL
BLOOD BANK BLOOD PRODUCT EXPIRATION DATE: NORMAL
BLOOD BANK DISPENSE STATUS: NORMAL
BLOOD BANK DISPENSE STATUS: NORMAL
BLOOD BANK ISBT PRODUCT BLOOD TYPE: 5100
BLOOD BANK ISBT PRODUCT BLOOD TYPE: 600
BLOOD BANK PRODUCT CODE: NORMAL
BLOOD BANK PRODUCT CODE: NORMAL
BLOOD BANK SAMPLE EXPIRATION: NORMAL
BLOOD BANK UNIT TYPE AND RH: NORMAL
BLOOD BANK UNIT TYPE AND RH: NORMAL
BPU ID: NORMAL
BPU ID: NORMAL
COMPONENT: NORMAL
COMPONENT: NORMAL
CROSSMATCH RESULT: NORMAL
PATH REV BLD -IMP: NORMAL
SURGICAL PATHOLOGY REPORT: NORMAL
TRANSFUSION STATUS: NORMAL
TRANSFUSION STATUS: NORMAL
UNIT DIVISION: 0
UNIT DIVISION: 0
UNIT ISSUE DATE/TIME: NORMAL
UNIT ISSUE DATE/TIME: NORMAL
UNIT TAG COMMENT: NORMAL

## 2024-12-05 ENCOUNTER — TELEPHONE (OUTPATIENT)
Dept: ONCOLOGY | Age: 59
End: 2024-12-05

## 2024-12-05 ENCOUNTER — CARE COORDINATION (OUTPATIENT)
Dept: OTHER | Facility: CLINIC | Age: 59
End: 2024-12-05

## 2024-12-05 NOTE — CARE COORDINATION
Ambulatory Care Coordination Note     2024 2:16 PM     Patient Current Location:  Home: [address] [place], [place] [id number]     ACM contacted the patient by telephone. Verified name and  with patient as identifiers.         ACM: Deborah Sood LPN     Challenges to be reviewed by the provider   Additional needs identified to be addressed with provider No  none               Method of communication with provider: none.    Utilization: Has the patient been seen in the ED since your last call? no    Care Summary Note: Spoke to patient. She reports continuing to feel lethargic, despite fluids and transfusion yesterday. Having a very difficult time with PO intake and some ADLs. Discussed home health care. She will discuss with Onc tomorrow and let me know which in-network company she would like to use. Once orders placed, ACM to get patient set up with HHC and possibly home PT as well. Patient denies need for refills at his time.     Assessments Completed:   No changes since last call    Medications Reviewed:   Completed during a previous call     Advance Care Planning:   Not reviewed during this call     Care Planning:    Goals Addressed    None          PCP/Specialist follow up:   Future Appointments         Provider Specialty Dept Phone    2024 12:30 PM Jess Bradley MD Oncology [phone]    2024 10:00 AM [facility] MED ONC CHAIR 07 Infusion Therapy [phone]    2024 1:30 PM Amadeo Sanchez MD Pulmonology [phone]    2024 2:00 PM [facility] ULTRASOUND RM 3 Radiology [phone]    2024 8:30 AM (Arrive by 8:15 AM) [facility] NM ROOM 1 Radiology [phone]            Follow Up:   Plan for next ACM outreach in approximately  early next week  to complete:  - C .   Patient  is agreeable to this plan.        Deborah Sood LPN- Care Coordinator  [facility]  [address] [place] [address]  [id number]  Phone: [phone]  [email or web address]

## 2024-12-06 ENCOUNTER — HOSPITAL ENCOUNTER (OUTPATIENT)
Dept: INFUSION THERAPY | Age: 59
End: 2024-12-06
Payer: COMMERCIAL

## 2024-12-06 ENCOUNTER — HOSPITAL ENCOUNTER (OUTPATIENT)
Dept: INFUSION THERAPY | Age: 59
Discharge: HOME | End: 2024-12-06
Payer: COMMERCIAL

## 2024-12-06 ENCOUNTER — TELEPHONE (OUTPATIENT)
Dept: ONCOLOGY | Age: 59
End: 2024-12-06

## 2024-12-06 ENCOUNTER — OFFICE VISIT (OUTPATIENT)
Dept: ONCOLOGY | Age: 59
End: 2024-12-06
Payer: COMMERCIAL

## 2024-12-06 ENCOUNTER — HOSPITAL ENCOUNTER (OUTPATIENT)
Age: 59
Discharge: HOME | End: 2024-12-06
Payer: COMMERCIAL

## 2024-12-06 VITALS
OXYGEN SATURATION: 98 % | DIASTOLIC BLOOD PRESSURE: 68 MMHG | HEART RATE: 103 BPM | TEMPERATURE: 97.8 F | RESPIRATION RATE: 18 BRPM | SYSTOLIC BLOOD PRESSURE: 94 MMHG

## 2024-12-06 VITALS
RESPIRATION RATE: 18 BRPM | SYSTOLIC BLOOD PRESSURE: 119 MMHG | OXYGEN SATURATION: 98 % | BODY MASS INDEX: 34.21 KG/M2 | WEIGHT: 225 LBS | DIASTOLIC BLOOD PRESSURE: 80 MMHG | HEART RATE: 112 BPM | TEMPERATURE: 97.6 F

## 2024-12-06 VITALS — TEMPERATURE: 97.5 F | OXYGEN SATURATION: 98 % | DIASTOLIC BLOOD PRESSURE: 70 MMHG | SYSTOLIC BLOOD PRESSURE: 106 MMHG

## 2024-12-06 DIAGNOSIS — Z17.1 MALIGNANT NEOPLASM OF UPPER-OUTER QUADRANT OF LEFT BREAST IN FEMALE, ESTROGEN RECEPTOR NEGATIVE (HCC): ICD-10-CM

## 2024-12-06 DIAGNOSIS — C50.412 MALIGNANT NEOPLASM OF UPPER-OUTER QUADRANT OF LEFT BREAST IN FEMALE, ESTROGEN RECEPTOR NEGATIVE (HCC): ICD-10-CM

## 2024-12-06 DIAGNOSIS — C50.912 INVASIVE DUCTAL CARCINOMA OF BREAST, LEFT (HCC): Primary | ICD-10-CM

## 2024-12-06 DIAGNOSIS — Z17.1 MALIGNANT NEOPLASM OF UPPER-OUTER QUADRANT OF LEFT BREAST IN FEMALE, ESTROGEN RECEPTOR NEGATIVE (HCC): Primary | ICD-10-CM

## 2024-12-06 DIAGNOSIS — C50.412 MALIGNANT NEOPLASM OF UPPER-OUTER QUADRANT OF LEFT BREAST IN FEMALE, ESTROGEN RECEPTOR NEGATIVE (HCC): Primary | ICD-10-CM

## 2024-12-06 DIAGNOSIS — C50.912 INVASIVE DUCTAL CARCINOMA OF BREAST, LEFT (HCC): ICD-10-CM

## 2024-12-06 LAB
ALBUMIN SERPL-MCNC: 3.5 G/DL (ref 3.5–5.2)
ALBUMIN/GLOB SERPL: 1.3 {RATIO} (ref 1–2.5)
ALP SERPL-CCNC: 99 U/L (ref 35–104)
ALT SERPL-CCNC: 5 U/L (ref 5–33)
AMYLASE SERPL-CCNC: 28 U/L (ref 28–100)
ANION GAP SERPL CALCULATED.3IONS-SCNC: 16 MMOL/L (ref 9–17)
AST SERPL-CCNC: 11 U/L
BASOPHILS # BLD: 0 K/UL (ref 0–0.2)
BASOPHILS NFR BLD: 0 % (ref 0–2)
BILIRUB SERPL-MCNC: 0.4 MG/DL (ref 0.3–1.2)
BUN SERPL-MCNC: 11 MG/DL (ref 8–23)
CALCIUM SERPL-MCNC: 8.8 MG/DL (ref 8.6–10.4)
CHLORIDE SERPL-SCNC: 99 MMOL/L (ref 98–107)
CO2 SERPL-SCNC: 23 MMOL/L (ref 20–31)
CORTIS SERPL-MCNC: 22 UG/DL (ref 2.5–19.5)
CORTISOL COLLECTION INFO: ABNORMAL
CREAT SERPL-MCNC: 0.9 MG/DL (ref 0.5–0.9)
EOSINOPHIL # BLD: 0 K/UL (ref 0–0.4)
EOSINOPHILS RELATIVE PERCENT: 0 % (ref 1–4)
ERYTHROCYTE [DISTWIDTH] IN BLOOD BY AUTOMATED COUNT: 19.8 % (ref 12.5–15.4)
GFR, ESTIMATED: 73 ML/MIN/1.73M2
GLUCOSE SERPL-MCNC: 105 MG/DL (ref 70–99)
HCT VFR BLD AUTO: 23.1 % (ref 36–46)
HGB BLD-MCNC: 7.7 G/DL (ref 12–16)
LIPASE SERPL-CCNC: 26 U/L (ref 13–60)
LYMPHOCYTES NFR BLD: 0.41 K/UL (ref 1–4.8)
LYMPHOCYTES RELATIVE PERCENT: 12 % (ref 24–44)
MCH RBC QN AUTO: 29.4 PG (ref 26–34)
MCHC RBC AUTO-ENTMCNC: 33.3 G/DL (ref 31–37)
MCV RBC AUTO: 88.3 FL (ref 80–100)
MONOCYTES NFR BLD: 0.41 K/UL (ref 0.1–0.8)
MONOCYTES NFR BLD: 12 % (ref 1–7)
MORPHOLOGY: ABNORMAL
NEUTROPHILS NFR BLD: 76 % (ref 36–66)
NEUTS SEG NFR BLD: 2.58 K/UL (ref 1.8–7.7)
PLATELET # BLD AUTO: 26 K/UL (ref 140–450)
PMV BLD AUTO: 9.1 FL (ref 6–12)
POTASSIUM SERPL-SCNC: 3.5 MMOL/L (ref 3.7–5.3)
PROT SERPL-MCNC: 6.2 G/DL (ref 6.4–8.3)
RBC # BLD AUTO: 2.61 M/UL (ref 4–5.2)
SODIUM SERPL-SCNC: 138 MMOL/L (ref 135–144)
WBC OTHER # BLD: 3.4 K/UL (ref 3.5–11)

## 2024-12-06 PROCEDURE — 80053 COMPREHEN METABOLIC PANEL: CPT

## 2024-12-06 PROCEDURE — 3074F SYST BP LT 130 MM HG: CPT | Performed by: INTERNAL MEDICINE

## 2024-12-06 PROCEDURE — 85025 COMPLETE CBC W/AUTO DIFF WBC: CPT

## 2024-12-06 PROCEDURE — 86923 COMPATIBILITY TEST ELECTRIC: CPT

## 2024-12-06 PROCEDURE — 86900 BLOOD TYPING SEROLOGIC ABO: CPT

## 2024-12-06 PROCEDURE — P9016 RBC LEUKOCYTES REDUCED: HCPCS

## 2024-12-06 PROCEDURE — 83690 ASSAY OF LIPASE: CPT

## 2024-12-06 PROCEDURE — 36430 TRANSFUSION BLD/BLD COMPNT: CPT

## 2024-12-06 PROCEDURE — 6360000002 HC RX W HCPCS: Performed by: INTERNAL MEDICINE

## 2024-12-06 PROCEDURE — 82150 ASSAY OF AMYLASE: CPT

## 2024-12-06 PROCEDURE — 82533 TOTAL CORTISOL: CPT

## 2024-12-06 PROCEDURE — 86901 BLOOD TYPING SEROLOGIC RH(D): CPT

## 2024-12-06 PROCEDURE — 86850 RBC ANTIBODY SCREEN: CPT

## 2024-12-06 PROCEDURE — 96375 TX/PRO/DX INJ NEW DRUG ADDON: CPT

## 2024-12-06 PROCEDURE — 3079F DIAST BP 80-89 MM HG: CPT | Performed by: INTERNAL MEDICINE

## 2024-12-06 PROCEDURE — 96374 THER/PROPH/DIAG INJ IV PUSH: CPT

## 2024-12-06 PROCEDURE — 99214 OFFICE O/P EST MOD 30 MIN: CPT | Performed by: INTERNAL MEDICINE

## 2024-12-06 RX ORDER — SODIUM CHLORIDE 9 MG/ML
INJECTION, SOLUTION INTRAVENOUS CONTINUOUS
OUTPATIENT
Start: 2024-12-06

## 2024-12-06 RX ORDER — SODIUM CHLORIDE 0.9 % (FLUSH) 0.9 %
5-40 SYRINGE (ML) INJECTION PRN
Status: DISCONTINUED | OUTPATIENT
Start: 2024-12-06 | End: 2024-12-07 | Stop reason: HOSPADM

## 2024-12-06 RX ORDER — DIPHENHYDRAMINE HYDROCHLORIDE 50 MG/ML
50 INJECTION INTRAMUSCULAR; INTRAVENOUS
OUTPATIENT
Start: 2024-12-06

## 2024-12-06 RX ORDER — ALBUTEROL SULFATE 90 UG/1
4 INHALANT RESPIRATORY (INHALATION) PRN
OUTPATIENT
Start: 2025-01-24

## 2024-12-06 RX ORDER — ACETAMINOPHEN 325 MG/1
650 TABLET ORAL
OUTPATIENT
Start: 2024-12-06

## 2024-12-06 RX ORDER — MEPERIDINE HYDROCHLORIDE 50 MG/ML
12.5 INJECTION INTRAMUSCULAR; INTRAVENOUS; SUBCUTANEOUS PRN
OUTPATIENT
Start: 2025-01-24

## 2024-12-06 RX ORDER — ONDANSETRON 2 MG/ML
8 INJECTION INTRAMUSCULAR; INTRAVENOUS
OUTPATIENT
Start: 2025-01-24

## 2024-12-06 RX ORDER — FAMOTIDINE 10 MG/ML
20 INJECTION, SOLUTION INTRAVENOUS
OUTPATIENT
Start: 2024-12-06

## 2024-12-06 RX ORDER — SODIUM CHLORIDE 9 MG/ML
25 INJECTION, SOLUTION INTRAVENOUS PRN
OUTPATIENT
Start: 2024-12-06

## 2024-12-06 RX ORDER — DIPHENHYDRAMINE HYDROCHLORIDE 50 MG/ML
50 INJECTION INTRAMUSCULAR; INTRAVENOUS
OUTPATIENT
Start: 2025-01-24

## 2024-12-06 RX ORDER — EPINEPHRINE 1 MG/ML
0.3 INJECTION, SOLUTION, CONCENTRATE INTRAVENOUS PRN
OUTPATIENT
Start: 2025-01-24

## 2024-12-06 RX ORDER — ALBUTEROL SULFATE 90 UG/1
4 INHALANT RESPIRATORY (INHALATION) PRN
OUTPATIENT
Start: 2024-12-06

## 2024-12-06 RX ORDER — SODIUM CHLORIDE 9 MG/ML
5-250 INJECTION, SOLUTION INTRAVENOUS PRN
OUTPATIENT
Start: 2025-01-24

## 2024-12-06 RX ORDER — EPINEPHRINE 1 MG/ML
0.3 INJECTION, SOLUTION, CONCENTRATE INTRAVENOUS PRN
OUTPATIENT
Start: 2024-12-06

## 2024-12-06 RX ORDER — HEPARIN 100 UNIT/ML
500 SYRINGE INTRAVENOUS PRN
Status: DISCONTINUED | OUTPATIENT
Start: 2024-12-06 | End: 2024-12-07 | Stop reason: HOSPADM

## 2024-12-06 RX ORDER — ONDANSETRON 2 MG/ML
8 INJECTION INTRAMUSCULAR; INTRAVENOUS
OUTPATIENT
Start: 2024-12-06

## 2024-12-06 RX ORDER — SODIUM CHLORIDE 0.9 % (FLUSH) 0.9 %
5-40 SYRINGE (ML) INJECTION PRN
OUTPATIENT
Start: 2024-12-06

## 2024-12-06 RX ORDER — SODIUM CHLORIDE 9 MG/ML
INJECTION, SOLUTION INTRAVENOUS PRN
Status: DISCONTINUED | OUTPATIENT
Start: 2024-12-06 | End: 2024-12-07 | Stop reason: HOSPADM

## 2024-12-06 RX ORDER — FAMOTIDINE 10 MG/ML
20 INJECTION, SOLUTION INTRAVENOUS
OUTPATIENT
Start: 2025-01-24

## 2024-12-06 RX ORDER — HYDROCORTISONE SODIUM SUCCINATE 100 MG/2ML
100 INJECTION INTRAMUSCULAR; INTRAVENOUS
OUTPATIENT
Start: 2025-01-24

## 2024-12-06 RX ORDER — ACETAMINOPHEN 325 MG/1
650 TABLET ORAL
Start: 2025-01-24

## 2024-12-06 RX ORDER — HEPARIN 100 UNIT/ML
500 SYRINGE INTRAVENOUS PRN
Status: CANCELLED | OUTPATIENT
Start: 2024-12-06

## 2024-12-06 RX ORDER — HEPARIN 100 UNIT/ML
500 SYRINGE INTRAVENOUS PRN
OUTPATIENT
Start: 2024-12-06

## 2024-12-06 RX ORDER — SODIUM CHLORIDE 0.9 % (FLUSH) 0.9 %
5-40 SYRINGE (ML) INJECTION PRN
Status: CANCELLED | OUTPATIENT
Start: 2024-12-06

## 2024-12-06 RX ORDER — HYDROCORTISONE SODIUM SUCCINATE 100 MG/2ML
100 INJECTION INTRAMUSCULAR; INTRAVENOUS
OUTPATIENT
Start: 2024-12-06

## 2024-12-06 RX ORDER — ONDANSETRON 2 MG/ML
8 INJECTION INTRAMUSCULAR; INTRAVENOUS ONCE
Status: COMPLETED | OUTPATIENT
Start: 2024-12-06 | End: 2024-12-06

## 2024-12-06 RX ORDER — HEPARIN SODIUM (PORCINE) LOCK FLUSH IV SOLN 100 UNIT/ML 100 UNIT/ML
500 SOLUTION INTRAVENOUS PRN
OUTPATIENT
Start: 2025-01-24

## 2024-12-06 RX ORDER — SODIUM CHLORIDE 0.9 % (FLUSH) 0.9 %
5-40 SYRINGE (ML) INJECTION PRN
OUTPATIENT
Start: 2025-01-24

## 2024-12-06 RX ORDER — ACETAMINOPHEN 325 MG/1
650 TABLET ORAL
OUTPATIENT
Start: 2025-01-24

## 2024-12-06 RX ORDER — SODIUM CHLORIDE 9 MG/ML
INJECTION, SOLUTION INTRAVENOUS CONTINUOUS
OUTPATIENT
Start: 2025-01-24

## 2024-12-06 RX ADMIN — ONDANSETRON 8 MG: 2 INJECTION INTRAMUSCULAR; INTRAVENOUS at 14:54

## 2024-12-06 RX ADMIN — Medication 500 UNITS: at 16:58

## 2024-12-06 NOTE — PROGRESS NOTES
Patient arrives to unit to receive 1 unit PRBC.  Blood draw done type and screen sent.  Patient tolerated well.  Port de-accessed port locked with heparin.

## 2024-12-06 NOTE — TELEPHONE ENCOUNTER
Instructions   from Dr. Jess Bradley MD    Transfuse one unit PRBCs today  CBC today  Keytruda next week  RV 4 weeks.   Home health nurse        Transfuse 1 unit PRBC today- spoke with Barbara BURGOS  CBC to be drawn today  Keytruda as scheduled for 12/13/25  RV 1/3/25 at 11:30 am  Patient to call back with home health company she wants to use.

## 2024-12-09 ENCOUNTER — TELEPHONE (OUTPATIENT)
Dept: INFUSION THERAPY | Age: 59
End: 2024-12-09

## 2024-12-09 ENCOUNTER — NURSE TRIAGE (OUTPATIENT)
Dept: OTHER | Facility: CLINIC | Age: 59
End: 2024-12-09

## 2024-12-09 ENCOUNTER — HOSPITAL ENCOUNTER (INPATIENT)
Age: 59
LOS: 9 days | Discharge: HOME | DRG: 163 | End: 2024-12-18
Attending: EMERGENCY MEDICINE
Payer: COMMERCIAL

## 2024-12-09 ENCOUNTER — APPOINTMENT (OUTPATIENT)
Dept: VASCULAR LAB | Age: 59
DRG: 163 | End: 2024-12-09
Payer: COMMERCIAL

## 2024-12-09 ENCOUNTER — ANESTHESIA EVENT (OUTPATIENT)
Dept: OPERATING ROOM | Age: 59
End: 2024-12-09
Payer: COMMERCIAL

## 2024-12-09 ENCOUNTER — HOSPITAL ENCOUNTER (EMERGENCY)
Age: 59
Discharge: ANOTHER HOSPITAL | DRG: 163 | End: 2024-12-09
Attending: EMERGENCY MEDICINE
Payer: COMMERCIAL

## 2024-12-09 ENCOUNTER — ANESTHESIA (OUTPATIENT)
Dept: OPERATING ROOM | Age: 59
End: 2024-12-09
Payer: COMMERCIAL

## 2024-12-09 ENCOUNTER — OFFICE VISIT (OUTPATIENT)
Dept: OPERATING ROOM | Age: 59
End: 2024-12-09
Attending: SURGERY

## 2024-12-09 ENCOUNTER — APPOINTMENT (OUTPATIENT)
Dept: CT IMAGING | Age: 59
DRG: 163 | End: 2024-12-09
Payer: COMMERCIAL

## 2024-12-09 VITALS
RESPIRATION RATE: 27 BRPM | HEART RATE: 97 BPM | BODY MASS INDEX: 34.1 KG/M2 | TEMPERATURE: 98.5 F | WEIGHT: 225 LBS | SYSTOLIC BLOOD PRESSURE: 99 MMHG | OXYGEN SATURATION: 97 % | DIASTOLIC BLOOD PRESSURE: 66 MMHG | HEIGHT: 68 IN

## 2024-12-09 DIAGNOSIS — I26.92 ACUTE SADDLE PULMONARY EMBOLISM, UNSPECIFIED WHETHER ACUTE COR PULMONALE PRESENT (HCC): Primary | ICD-10-CM

## 2024-12-09 DIAGNOSIS — I26.02 ACUTE COR PULMONALE DUE TO SADDLE EMBOLUS OF PULMONARY ARTERY (HCC): ICD-10-CM

## 2024-12-09 DIAGNOSIS — I26.02 ACUTE SADDLE PULMONARY EMBOLISM WITH ACUTE COR PULMONALE (HCC): Primary | ICD-10-CM

## 2024-12-09 LAB
ALBUMIN SERPL-MCNC: 3.4 G/DL (ref 3.5–5.2)
ALBUMIN/GLOB SERPL: 1.1 {RATIO} (ref 1–2.5)
ALP SERPL-CCNC: 109 U/L (ref 35–104)
ALT SERPL-CCNC: <5 U/L (ref 5–33)
ANION GAP SERPL CALCULATED.3IONS-SCNC: 16 MMOL/L (ref 9–17)
AST SERPL-CCNC: 10 U/L
BASOPHILS # BLD: 0 K/UL (ref 0–0.2)
BASOPHILS NFR BLD: 0 % (ref 0–2)
BILIRUB DIRECT SERPL-MCNC: 0.2 MG/DL
BILIRUB INDIRECT SERPL-MCNC: 0.5 MG/DL (ref 0–1)
BILIRUB SERPL-MCNC: 0.7 MG/DL (ref 0.3–1.2)
BUN SERPL-MCNC: 8 MG/DL (ref 8–23)
CALCIUM SERPL-MCNC: 8.5 MG/DL (ref 8.6–10.4)
CHLORIDE SERPL-SCNC: 99 MMOL/L (ref 98–107)
CO2 SERPL-SCNC: 23 MMOL/L (ref 20–31)
CREAT SERPL-MCNC: 0.9 MG/DL (ref 0.5–0.9)
ECHO BSA: 2.21 M2
ECHO BSA: 2.21 M2
EOSINOPHIL # BLD: 0 K/UL (ref 0–0.4)
EOSINOPHILS RELATIVE PERCENT: 0 % (ref 1–4)
ERYTHROCYTE [DISTWIDTH] IN BLOOD BY AUTOMATED COUNT: 18.8 % (ref 12.5–15.4)
GFR, ESTIMATED: 73 ML/MIN/1.73M2
GLUCOSE SERPL-MCNC: 119 MG/DL (ref 70–99)
HCT VFR BLD AUTO: 26.3 % (ref 36–46)
HGB BLD-MCNC: 8.6 G/DL (ref 12–16)
INR PPP: 1.3
LACTATE BLDV-SCNC: 1 MMOL/L (ref 0.5–2.2)
LIPASE SERPL-CCNC: 30 U/L (ref 13–60)
LYMPHOCYTES NFR BLD: 0.34 K/UL (ref 1–4.8)
LYMPHOCYTES RELATIVE PERCENT: 8 % (ref 24–44)
MAGNESIUM SERPL-MCNC: 0.9 MG/DL (ref 1.6–2.6)
MCH RBC QN AUTO: 29.2 PG (ref 26–34)
MCHC RBC AUTO-ENTMCNC: 32.9 G/DL (ref 31–37)
MCV RBC AUTO: 88.8 FL (ref 80–100)
MONOCYTES NFR BLD: 0.46 K/UL (ref 0.1–0.8)
MONOCYTES NFR BLD: 11 % (ref 1–7)
MORPHOLOGY: NORMAL
NEUTROPHILS NFR BLD: 81 % (ref 36–66)
NEUTS SEG NFR BLD: 3.4 K/UL (ref 1.8–7.7)
PARTIAL THROMBOPLASTIN TIME: 30.7 SEC (ref 21.3–31.3)
PLATELET # BLD AUTO: 30 K/UL (ref 140–450)
PMV BLD AUTO: 9.1 FL (ref 6–12)
POTASSIUM SERPL-SCNC: 3.5 MMOL/L (ref 3.7–5.3)
PROT SERPL-MCNC: 6.4 G/DL (ref 6.4–8.3)
PROTHROMBIN TIME: 13.3 SEC (ref 9.4–12.6)
RBC # BLD AUTO: 2.96 M/UL (ref 4–5.2)
SODIUM SERPL-SCNC: 138 MMOL/L (ref 135–144)
TROPONIN I SERPL HS-MCNC: 24 NG/L (ref 0–14)
TROPONIN I SERPL HS-MCNC: 24 NG/L (ref 0–14)
WBC OTHER # BLD: 4.2 K/UL (ref 3.5–11)

## 2024-12-09 PROCEDURE — 3700000000 HC ANESTHESIA ATTENDED CARE: Performed by: SURGERY

## 2024-12-09 PROCEDURE — 2580000003 HC RX 258: Performed by: EMERGENCY MEDICINE

## 2024-12-09 PROCEDURE — 85025 COMPLETE CBC W/AUTO DIFF WBC: CPT

## 2024-12-09 PROCEDURE — 3600000017 HC SURGERY HYBRID ADDL 15MIN: Performed by: SURGERY

## 2024-12-09 PROCEDURE — 2000000000 HC ICU R&B

## 2024-12-09 PROCEDURE — 80076 HEPATIC FUNCTION PANEL: CPT

## 2024-12-09 PROCEDURE — 36015 PLACE CATHETER IN ARTERY: CPT | Performed by: SURGERY

## 2024-12-09 PROCEDURE — 6360000002 HC RX W HCPCS: Performed by: SURGERY

## 2024-12-09 PROCEDURE — 96374 THER/PROPH/DIAG INJ IV PUSH: CPT

## 2024-12-09 PROCEDURE — A4217 STERILE WATER/SALINE, 500 ML: HCPCS | Performed by: SURGERY

## 2024-12-09 PROCEDURE — 99223 1ST HOSP IP/OBS HIGH 75: CPT | Performed by: SURGERY

## 2024-12-09 PROCEDURE — 86923 COMPATIBILITY TEST ELECTRIC: CPT

## 2024-12-09 PROCEDURE — 6360000002 HC RX W HCPCS

## 2024-12-09 PROCEDURE — 36415 COLL VENOUS BLD VENIPUNCTURE: CPT

## 2024-12-09 PROCEDURE — 86901 BLOOD TYPING SEROLOGIC RH(D): CPT

## 2024-12-09 PROCEDURE — 30233R1 TRANSFUSION OF NONAUTOLOGOUS PLATELETS INTO PERIPHERAL VEIN, PERCUTANEOUS APPROACH: ICD-10-PCS | Performed by: SURGERY

## 2024-12-09 PROCEDURE — 80048 BASIC METABOLIC PNL TOTAL CA: CPT

## 2024-12-09 PROCEDURE — 93971 EXTREMITY STUDY: CPT

## 2024-12-09 PROCEDURE — 2580000003 HC RX 258: Performed by: NURSE PRACTITIONER

## 2024-12-09 PROCEDURE — 83735 ASSAY OF MAGNESIUM: CPT

## 2024-12-09 PROCEDURE — 86850 RBC ANTIBODY SCREEN: CPT

## 2024-12-09 PROCEDURE — 84484 ASSAY OF TROPONIN QUANT: CPT

## 2024-12-09 PROCEDURE — 2580000003 HC RX 258

## 2024-12-09 PROCEDURE — 99285 EMERGENCY DEPT VISIT HI MDM: CPT

## 2024-12-09 PROCEDURE — 6360000002 HC RX W HCPCS: Performed by: STUDENT IN AN ORGANIZED HEALTH CARE EDUCATION/TRAINING PROGRAM

## 2024-12-09 PROCEDURE — 2580000003 HC RX 258: Performed by: NURSE ANESTHETIST, CERTIFIED REGISTERED

## 2024-12-09 PROCEDURE — 02CQ3ZZ EXTIRPATION OF MATTER FROM RIGHT PULMONARY ARTERY, PERCUTANEOUS APPROACH: ICD-10-PCS | Performed by: SURGERY

## 2024-12-09 PROCEDURE — 71260 CT THORAX DX C+: CPT

## 2024-12-09 PROCEDURE — 6360000004 HC RX CONTRAST MEDICATION: Performed by: SURGERY

## 2024-12-09 PROCEDURE — 93971 EXTREMITY STUDY: CPT | Performed by: SURGERY

## 2024-12-09 PROCEDURE — 83690 ASSAY OF LIPASE: CPT

## 2024-12-09 PROCEDURE — 6360000004 HC RX CONTRAST MEDICATION: Performed by: EMERGENCY MEDICINE

## 2024-12-09 PROCEDURE — 3700000001 HC ADD 15 MINUTES (ANESTHESIA): Performed by: SURGERY

## 2024-12-09 PROCEDURE — 3600000007 HC SURGERY HYBRID BASE: Performed by: SURGERY

## 2024-12-09 PROCEDURE — 86900 BLOOD TYPING SEROLOGIC ABO: CPT

## 2024-12-09 PROCEDURE — 37185 PRIM ART M-THRMBC SBSQ VSL: CPT | Performed by: SURGERY

## 2024-12-09 PROCEDURE — C1760 CLOSURE DEV, VASC: HCPCS | Performed by: SURGERY

## 2024-12-09 PROCEDURE — 2580000003 HC RX 258: Performed by: SURGERY

## 2024-12-09 PROCEDURE — C1769 GUIDE WIRE: HCPCS | Performed by: SURGERY

## 2024-12-09 PROCEDURE — 84550 ASSAY OF BLOOD/URIC ACID: CPT

## 2024-12-09 PROCEDURE — 87641 MR-STAPH DNA AMP PROBE: CPT

## 2024-12-09 PROCEDURE — 83605 ASSAY OF LACTIC ACID: CPT

## 2024-12-09 PROCEDURE — C1757 CATH, THROMBECTOMY/EMBOLECT: HCPCS | Performed by: SURGERY

## 2024-12-09 PROCEDURE — 85730 THROMBOPLASTIN TIME PARTIAL: CPT

## 2024-12-09 PROCEDURE — 6360000002 HC RX W HCPCS: Performed by: NURSE PRACTITIONER

## 2024-12-09 PROCEDURE — 2709999900 HC NON-CHARGEABLE SUPPLY: Performed by: SURGERY

## 2024-12-09 PROCEDURE — 93005 ELECTROCARDIOGRAM TRACING: CPT | Performed by: NURSE PRACTITIONER

## 2024-12-09 PROCEDURE — 6370000000 HC RX 637 (ALT 250 FOR IP)

## 2024-12-09 PROCEDURE — C1894 INTRO/SHEATH, NON-LASER: HCPCS | Performed by: SURGERY

## 2024-12-09 PROCEDURE — 02CR3ZZ EXTIRPATION OF MATTER FROM LEFT PULMONARY ARTERY, PERCUTANEOUS APPROACH: ICD-10-PCS | Performed by: SURGERY

## 2024-12-09 PROCEDURE — 85610 PROTHROMBIN TIME: CPT

## 2024-12-09 PROCEDURE — 37184 PRIM ART M-THRMBC 1ST VSL: CPT | Performed by: SURGERY

## 2024-12-09 PROCEDURE — 87040 BLOOD CULTURE FOR BACTERIA: CPT

## 2024-12-09 RX ORDER — SODIUM CHLORIDE 0.9 % (FLUSH) 0.9 %
10 SYRINGE (ML) INJECTION PRN
Status: DISCONTINUED | OUTPATIENT
Start: 2024-12-09 | End: 2024-12-09 | Stop reason: HOSPADM

## 2024-12-09 RX ORDER — MIDAZOLAM HYDROCHLORIDE 1 MG/ML
INJECTION, SOLUTION INTRAMUSCULAR; INTRAVENOUS
Status: DISCONTINUED | OUTPATIENT
Start: 2024-12-09 | End: 2024-12-09 | Stop reason: SDUPTHER

## 2024-12-09 RX ORDER — HEPARIN SODIUM 1000 [USP'U]/ML
4000 INJECTION, SOLUTION INTRAVENOUS; SUBCUTANEOUS PRN
Status: DISCONTINUED | OUTPATIENT
Start: 2024-12-09 | End: 2024-12-09 | Stop reason: HOSPADM

## 2024-12-09 RX ORDER — POTASSIUM CHLORIDE 7.45 MG/ML
10 INJECTION INTRAVENOUS PRN
Status: DISCONTINUED | OUTPATIENT
Start: 2024-12-09 | End: 2024-12-18 | Stop reason: HOSPADM

## 2024-12-09 RX ORDER — MAGNESIUM SULFATE IN WATER 40 MG/ML
2000 INJECTION, SOLUTION INTRAVENOUS ONCE
Status: COMPLETED | OUTPATIENT
Start: 2024-12-09 | End: 2024-12-09

## 2024-12-09 RX ORDER — MAGNESIUM SULFATE IN WATER 40 MG/ML
2000 INJECTION, SOLUTION INTRAVENOUS PRN
Status: DISCONTINUED | OUTPATIENT
Start: 2024-12-09 | End: 2024-12-18 | Stop reason: HOSPADM

## 2024-12-09 RX ORDER — ONDANSETRON 2 MG/ML
4 INJECTION INTRAMUSCULAR; INTRAVENOUS EVERY 6 HOURS PRN
Status: DISCONTINUED | OUTPATIENT
Start: 2024-12-09 | End: 2024-12-18 | Stop reason: HOSPADM

## 2024-12-09 RX ORDER — OXYCODONE HYDROCHLORIDE 5 MG/1
5 TABLET ORAL ONCE
Status: COMPLETED | OUTPATIENT
Start: 2024-12-09 | End: 2024-12-09

## 2024-12-09 RX ORDER — ACETAMINOPHEN 650 MG/1
650 SUPPOSITORY RECTAL EVERY 6 HOURS PRN
Status: DISCONTINUED | OUTPATIENT
Start: 2024-12-09 | End: 2024-12-18 | Stop reason: HOSPADM

## 2024-12-09 RX ORDER — ONDANSETRON 2 MG/ML
INJECTION INTRAMUSCULAR; INTRAVENOUS
Status: DISCONTINUED | OUTPATIENT
Start: 2024-12-09 | End: 2024-12-09 | Stop reason: SDUPTHER

## 2024-12-09 RX ORDER — ACETAMINOPHEN 325 MG/1
650 TABLET ORAL EVERY 6 HOURS PRN
Status: DISCONTINUED | OUTPATIENT
Start: 2024-12-09 | End: 2024-12-18 | Stop reason: HOSPADM

## 2024-12-09 RX ORDER — ONDANSETRON 4 MG/1
4 TABLET, ORALLY DISINTEGRATING ORAL EVERY 8 HOURS PRN
Status: DISCONTINUED | OUTPATIENT
Start: 2024-12-09 | End: 2024-12-18 | Stop reason: HOSPADM

## 2024-12-09 RX ORDER — PROCHLORPERAZINE EDISYLATE 5 MG/ML
10 INJECTION INTRAMUSCULAR; INTRAVENOUS ONCE
Status: COMPLETED | OUTPATIENT
Start: 2024-12-09 | End: 2024-12-09

## 2024-12-09 RX ORDER — POLYETHYLENE GLYCOL 3350 17 G/17G
17 POWDER, FOR SOLUTION ORAL DAILY PRN
Status: DISCONTINUED | OUTPATIENT
Start: 2024-12-09 | End: 2024-12-18 | Stop reason: HOSPADM

## 2024-12-09 RX ORDER — SODIUM CHLORIDE 9 MG/ML
INJECTION, SOLUTION INTRAVENOUS PRN
Status: DISCONTINUED | OUTPATIENT
Start: 2024-12-09 | End: 2024-12-09

## 2024-12-09 RX ORDER — LIDOCAINE HYDROCHLORIDE 10 MG/ML
INJECTION, SOLUTION EPIDURAL; INFILTRATION; INTRACAUDAL; PERINEURAL PRN
Status: DISCONTINUED | OUTPATIENT
Start: 2024-12-09 | End: 2024-12-09 | Stop reason: ALTCHOICE

## 2024-12-09 RX ORDER — HEPARIN SODIUM 10000 [USP'U]/100ML
5-30 INJECTION, SOLUTION INTRAVENOUS CONTINUOUS
Status: DISCONTINUED | OUTPATIENT
Start: 2024-12-09 | End: 2024-12-09 | Stop reason: HOSPADM

## 2024-12-09 RX ORDER — 0.9 % SODIUM CHLORIDE 0.9 %
1000 INTRAVENOUS SOLUTION INTRAVENOUS ONCE
Status: COMPLETED | OUTPATIENT
Start: 2024-12-09 | End: 2024-12-10

## 2024-12-09 RX ORDER — IODIXANOL 320 MG/ML
INJECTION, SOLUTION INTRAVASCULAR
Status: DISPENSED
Start: 2024-12-09 | End: 2024-12-10

## 2024-12-09 RX ORDER — SODIUM CHLORIDE 0.9 % (FLUSH) 0.9 %
5-40 SYRINGE (ML) INJECTION PRN
Status: DISCONTINUED | OUTPATIENT
Start: 2024-12-09 | End: 2024-12-18 | Stop reason: HOSPADM

## 2024-12-09 RX ORDER — METOCLOPRAMIDE HYDROCHLORIDE 5 MG/ML
10 INJECTION INTRAMUSCULAR; INTRAVENOUS EVERY 6 HOURS PRN
Status: DISCONTINUED | OUTPATIENT
Start: 2024-12-09 | End: 2024-12-18 | Stop reason: HOSPADM

## 2024-12-09 RX ORDER — HEPARIN SODIUM 1000 [USP'U]/ML
4000 INJECTION, SOLUTION INTRAVENOUS; SUBCUTANEOUS ONCE
Status: COMPLETED | OUTPATIENT
Start: 2024-12-09 | End: 2024-12-09

## 2024-12-09 RX ORDER — IODIXANOL 320 MG/ML
INJECTION, SOLUTION INTRAVASCULAR PRN
Status: DISCONTINUED | OUTPATIENT
Start: 2024-12-09 | End: 2024-12-09 | Stop reason: ALTCHOICE

## 2024-12-09 RX ORDER — MORPHINE SULFATE 4 MG/ML
4 INJECTION, SOLUTION INTRAMUSCULAR; INTRAVENOUS ONCE
Status: COMPLETED | OUTPATIENT
Start: 2024-12-09 | End: 2024-12-09

## 2024-12-09 RX ORDER — SODIUM CHLORIDE 9 MG/ML
INJECTION, SOLUTION INTRAVENOUS
Status: DISCONTINUED | OUTPATIENT
Start: 2024-12-09 | End: 2024-12-09 | Stop reason: SDUPTHER

## 2024-12-09 RX ORDER — 0.9 % SODIUM CHLORIDE 0.9 %
80 INTRAVENOUS SOLUTION INTRAVENOUS ONCE
Status: DISCONTINUED | OUTPATIENT
Start: 2024-12-09 | End: 2024-12-09 | Stop reason: HOSPADM

## 2024-12-09 RX ORDER — HEPARIN SODIUM 1000 [USP'U]/ML
2000 INJECTION, SOLUTION INTRAVENOUS; SUBCUTANEOUS PRN
Status: DISCONTINUED | OUTPATIENT
Start: 2024-12-09 | End: 2024-12-09 | Stop reason: HOSPADM

## 2024-12-09 RX ORDER — FENTANYL CITRATE 50 UG/ML
INJECTION, SOLUTION INTRAMUSCULAR; INTRAVENOUS
Status: DISCONTINUED | OUTPATIENT
Start: 2024-12-09 | End: 2024-12-09 | Stop reason: SDUPTHER

## 2024-12-09 RX ORDER — POTASSIUM CHLORIDE 29.8 MG/ML
20 INJECTION INTRAVENOUS PRN
Status: DISCONTINUED | OUTPATIENT
Start: 2024-12-09 | End: 2024-12-18 | Stop reason: HOSPADM

## 2024-12-09 RX ORDER — LIDOCAINE HYDROCHLORIDE 10 MG/ML
INJECTION, SOLUTION INFILTRATION; PERINEURAL
Status: DISPENSED
Start: 2024-12-09 | End: 2024-12-10

## 2024-12-09 RX ORDER — 0.9 % SODIUM CHLORIDE 0.9 %
1000 INTRAVENOUS SOLUTION INTRAVENOUS ONCE
Status: COMPLETED | OUTPATIENT
Start: 2024-12-09 | End: 2024-12-09

## 2024-12-09 RX ORDER — MORPHINE SULFATE 4 MG/ML
4 INJECTION, SOLUTION INTRAMUSCULAR; INTRAVENOUS
Status: COMPLETED | OUTPATIENT
Start: 2024-12-09 | End: 2024-12-09

## 2024-12-09 RX ORDER — HEPARIN SODIUM 10000 [USP'U]/100ML
5-30 INJECTION, SOLUTION INTRAVENOUS CONTINUOUS
Status: DISCONTINUED | OUTPATIENT
Start: 2024-12-09 | End: 2024-12-14

## 2024-12-09 RX ORDER — ONDANSETRON 2 MG/ML
4 INJECTION INTRAMUSCULAR; INTRAVENOUS ONCE
Status: COMPLETED | OUTPATIENT
Start: 2024-12-09 | End: 2024-12-09

## 2024-12-09 RX ORDER — SODIUM CHLORIDE 0.9 % (FLUSH) 0.9 %
5-40 SYRINGE (ML) INJECTION EVERY 12 HOURS SCHEDULED
Status: DISCONTINUED | OUTPATIENT
Start: 2024-12-09 | End: 2024-12-18 | Stop reason: HOSPADM

## 2024-12-09 RX ORDER — MORPHINE SULFATE 4 MG/ML
4 INJECTION INTRAVENOUS ONCE
Status: COMPLETED | OUTPATIENT
Start: 2024-12-09 | End: 2024-12-09

## 2024-12-09 RX ORDER — IOPAMIDOL 755 MG/ML
75 INJECTION, SOLUTION INTRAVASCULAR
Status: COMPLETED | OUTPATIENT
Start: 2024-12-09 | End: 2024-12-09

## 2024-12-09 RX ORDER — SODIUM CHLORIDE 9 MG/ML
INJECTION, SOLUTION INTRAVENOUS PRN
Status: DISCONTINUED | OUTPATIENT
Start: 2024-12-09 | End: 2024-12-18 | Stop reason: HOSPADM

## 2024-12-09 RX ADMIN — ONDANSETRON 4 MG: 2 INJECTION INTRAMUSCULAR; INTRAVENOUS at 12:04

## 2024-12-09 RX ADMIN — OXYCODONE 5 MG: 5 TABLET ORAL at 19:39

## 2024-12-09 RX ADMIN — FENTANYL CITRATE 25 MCG: 50 INJECTION, SOLUTION INTRAMUSCULAR; INTRAVENOUS at 20:12

## 2024-12-09 RX ADMIN — HEPARIN SODIUM 9 UNITS/KG/HR: 10000 INJECTION, SOLUTION INTRAVENOUS at 15:53

## 2024-12-09 RX ADMIN — MORPHINE SULFATE 4 MG: 4 INJECTION, SOLUTION INTRAMUSCULAR; INTRAVENOUS at 14:19

## 2024-12-09 RX ADMIN — MIDAZOLAM 1 MG: 1 INJECTION INTRAMUSCULAR; INTRAVENOUS at 20:08

## 2024-12-09 RX ADMIN — MORPHINE SULFATE 4 MG: 4 INJECTION INTRAVENOUS at 16:26

## 2024-12-09 RX ADMIN — ONDANSETRON 4 MG: 2 INJECTION INTRAMUSCULAR; INTRAVENOUS at 20:54

## 2024-12-09 RX ADMIN — Medication 80 ML: at 12:26

## 2024-12-09 RX ADMIN — SODIUM CHLORIDE: 9 INJECTION, SOLUTION INTRAVENOUS at 19:56

## 2024-12-09 RX ADMIN — HEPARIN SODIUM 9 UNITS/KG/HR: 10000 INJECTION, SOLUTION INTRAVENOUS at 14:28

## 2024-12-09 RX ADMIN — IOPAMIDOL 75 ML: 755 INJECTION, SOLUTION INTRAVENOUS at 12:25

## 2024-12-09 RX ADMIN — MAGNESIUM SULFATE HEPTAHYDRATE 2000 MG: 40 INJECTION, SOLUTION INTRAVENOUS at 12:40

## 2024-12-09 RX ADMIN — SODIUM CHLORIDE, PRESERVATIVE FREE 40 MG: 5 INJECTION INTRAVENOUS at 19:10

## 2024-12-09 RX ADMIN — MORPHINE SULFATE 4 MG: 4 INJECTION, SOLUTION INTRAMUSCULAR; INTRAVENOUS at 12:05

## 2024-12-09 RX ADMIN — PROCHLORPERAZINE EDISYLATE 10 MG: 5 INJECTION INTRAMUSCULAR; INTRAVENOUS at 14:13

## 2024-12-09 RX ADMIN — SODIUM CHLORIDE 1000 ML: 9 INJECTION, SOLUTION INTRAVENOUS at 23:08

## 2024-12-09 RX ADMIN — SODIUM CHLORIDE 1000 ML: 9 INJECTION, SOLUTION INTRAVENOUS at 12:04

## 2024-12-09 RX ADMIN — MIDAZOLAM 1 MG: 1 INJECTION INTRAMUSCULAR; INTRAVENOUS at 20:00

## 2024-12-09 RX ADMIN — SODIUM CHLORIDE, PRESERVATIVE FREE 10 ML: 5 INJECTION INTRAVENOUS at 12:25

## 2024-12-09 RX ADMIN — HEPARIN SODIUM 4000 UNITS: 1000 INJECTION INTRAVENOUS; SUBCUTANEOUS at 14:25

## 2024-12-09 ASSESSMENT — PAIN SCALES - GENERAL
PAINLEVEL_OUTOF10: 9
PAINLEVEL_OUTOF10: 9
PAINLEVEL_OUTOF10: 3
PAINLEVEL_OUTOF10: 8
PAINLEVEL_OUTOF10: 7
PAINLEVEL_OUTOF10: 6

## 2024-12-09 ASSESSMENT — ENCOUNTER SYMPTOMS
SHORTNESS OF BREATH: 1
DYSPNEA ACTIVITY LEVEL: AFTER AMBULATING 2 FLIGHTS OF STAIRS

## 2024-12-09 ASSESSMENT — PAIN DESCRIPTION - LOCATION
LOCATION: TOE (COMMENT WHICH ONE)
LOCATION: TOE (COMMENT WHICH ONE)

## 2024-12-09 ASSESSMENT — PAIN DESCRIPTION - ORIENTATION: ORIENTATION: RIGHT

## 2024-12-09 ASSESSMENT — PAIN - FUNCTIONAL ASSESSMENT
PAIN_FUNCTIONAL_ASSESSMENT: 0-10
PAIN_FUNCTIONAL_ASSESSMENT: 0-10

## 2024-12-09 ASSESSMENT — COPD QUESTIONNAIRES: CAT_SEVERITY: NO INTERVAL CHANGE

## 2024-12-09 ASSESSMENT — PAIN DESCRIPTION - DESCRIPTORS: DESCRIPTORS: ACHING

## 2024-12-09 NOTE — ED PROVIDER NOTES
[facility] EMERGENCY DEPARTMENT  EMERGENCY DEPARTMENT ENCOUNTER      Pt Name: Luci Castañeda  MRN: [id number]  Birthdate 1965  Date of evaluation: 12/9/2024  Provider: MIGDALIA Griggs CNP  3:48 PM    CHIEF COMPLAINT       Chief Complaint   Patient presents with    Foot Pain     Brought by ems from home with c/o right foot pain and swelling x2 days. Reports pain started out of the blue. Denies known injury or wounds. Denies diabetes. Reports foot is now red, hot and swollen. Pt unable to bear weight d/t pain. Denies fever, but reports chills and feels unwell. Reports breast cancer and last chemo treatment 11/21.          HISTORY OF PRESENT ILLNESS    Luci Castañeda is a 59 y.o. female who presents to the emergency department for evaluation of pain in the right foot and right calf.  Symptoms been ongoing for a couple of days.  Patient states that she has a history of breast cancer, she is undergoing chemo.  Her last treatment was November 21.  She is due to start radiation after her lumpectomy next month.  She states that she really just is not bouncing back from this chemo.  She tells me that she has been fatigued, nauseous.  She is not really been able to eat or drink, her urine is dark.  She had a blood transfusion last Tuesday and again on Friday for hemoglobin of 6 and platelets of 7.  She is not able to get up on her own this morning and that is why she called 911.  She has so much weakness that is really atypical for her.  She reports chills but no fever.  She reports significant nausea and significant pain.  Localizing to the right foot and spreading up into the right calf.  There is some redness to the first metatarsal.  No history of gout    HPI    Nursing Notes were reviewed.    REVIEW OF SYSTEMS       Review of Systems   All other systems reviewed and are negative.      Except as noted above the remainder of the review of systems was reviewed and negative.       PAST MEDICAL HISTORY  documented in ED Course.  Radiology: ordered.  ECG/medicine tests: ordered.    Risk  Prescription drug management.      Patient presents for evaluation of complaint listed above.  I am little bit concerned about a DVT, PE, other possibilities include septic arthritis or gout.  She is tachycardic and hypoxic.  I am extremely suspicious for pulmonary embolism.  Will perform a CT to rule out PE.  She may also be anemic given the fact that she required blood transfusions on Friday and her hemoglobin at that time was 6.2.  She denies any blood in the stool.  We will perform a Doppler of the right lower extremity, administer pain medicine.  Another consideration could be gout.  She is currently not febrile, lower index of suspicion for septic arthritis    CT confirms a saddle pulmonary embolus.  I discussed the case with vascular surgeon, Dr. Rueda who recommends low intensity heparin, transfer to [facility], he will perform intervention this evening.  I discussed this with the patient who is agreeable to transfer to [facility].    We did transfer to the emergency department, spoke with Dr. Candelaria who is agreeable.    In the department, the patient remained stable, tachycardia improved with oxygen, she is never hypotensive.    Continues to deny chest pain.    Venous duplex was negative for DVT, low index of suspicion for arterial occlusion, she has peripheral pulses in the foot and the foot is warm and not cold.      REASSESSMENT     ED Course as of 12/10/24 1548   Mon Dec 09, 2024   1212 Magnesium(!!): 0.9 [MR]   1212 Hemoglobin Quant(!): 8.6  Improved [MR]   1212 Platelet Count(!): 30  Improved [MR]   1215 Troponin, High Sensitivity(!): 24 [MR]   1308 Spoke with radiology [MR]   1317 Attempted to call vascular surgeon on call Dr. Rueda, no answer [MR]   1328 Second attempt to reach vascular surgeon on-call Dr. Rueda, no answer   [MR]   6976 I spoke with Dr. Plata who would like the patient transferred to [facility] for  portions of this note were completed with a voice recognition program.  Efforts were made to edit the dictations but occasionally words are mis-transcribed.)    MIGDALIA Griggs CNP (electronically signed)             Cyndy Castillo APRN - CNP  12/10/24 1541

## 2024-12-09 NOTE — ED PROVIDER NOTES
[facility] EMERGENCY DEPARTMENT     Emergency Department     Faculty Attestation        I performed a history and physical examination of the patient and discussed management with the resident. I reviewed the resident’s note and agree with the documented findings and plan of care. Any areas of disagreement are noted on the chart. I was personally present for the key portions of any procedures. I have documented in the chart those procedures where I was not present during the key portions. I have reviewed the emergency nurses triage note. I agree with the chief complaint, past medical history, past surgical history, allergies, medications, social and family history as documented unless otherwise noted below.  For Physician Assistant/ Nurse Practitioner cases/documentation I have personally evaluated this patient and have completed at least one if not all key elements of the E/M (history, physical exam, and MDM). Additional findings are as noted.      Vital Signs: BP: 109/74  Pulse: 91  Respirations: 18  Temp: 98.7 °F (37.1 °C) SpO2: 98 %  PCP:  Nabil Ly MD  Note Started: 12/9/24, 4:03 PM EST    Pertinent Comments:     Patient is a 59-year-old female transfer from [place] emergency room with saddle PE with some right heart strain vital signs stable at this time on oxygen.   Vascular surgeon here with ER to ER transfer to expedite care.   Currently on partial dose heparinization given thrombocytopenia secondary to cancer.    Stat page to vascular placed.          CRITICAL CARE: There was a high probability of clinically significant/life threatening deterioration in this patient's condition which required my urgent intervention.  Total critical care time was 15 minutes.  This excludes any time for separately reportable procedures.       (Please note that portions of this note were completed with a voice recognition program. Efforts were made to edit the

## 2024-12-09 NOTE — ED PROVIDER NOTES
[facility] Emergency Department  [address]  [place] [address]  Phone: [phone]  Fax: [phone]      Attending Physician Attestation    I performed a history and physical examination of the patient and discussed management with the mid level provideer. I reviewed the mid level provider's note and agree with the documented findings and plan of care. Any areas of disagreement are noted on the chart. I was personally present for the key portions of any procedures. I have documented in the chart those procedures where I was not present during the key portions. I have reviewed the emergency nurses triage note. I agree with the chief complaint, past medical history, past surgical history, allergies, medications, social and family history as documented unless otherwise noted below. Documentation of the HPI, Physical Exam and Medical Decision Making performed by mid level providers is based on my personal performance of the HPI, PE and MDM. For Physician Assistant/ Nurse Practitioner cases/documentation I have personally evaluated this patient and have completed at least one if not all key elements of the E/M (history, physical exam, and MDM). Additional findings are as noted.      CHIEF COMPLAINT       Chief Complaint   Patient presents with    Foot Pain     Brought by ems from home with c/o right foot pain and swelling x2 days. Reports pain started out of the blue. Denies known injury or wounds. Denies diabetes. Reports foot is now red, hot and swollen. Pt unable to bear weight d/t pain. Denies fever, but reports chills and feels unwell. Reports breast cancer and last chemo treatment 11/21.          PAST MEDICAL HISTORY    has a past medical history of Abnormal uterine bleeding (AUB), Arthritis, Asthma, Benign familial tremor, CAD (coronary artery disease), Cancer (HCC), CKD (chronic kidney disease) stage 1, GFR 90 ml/min or greater, Class 2 severe obesity with serious comorbidity and body  drugs.      DIAGNOSTIC RESULTS     EKG: All EKG's are interpreted by the Emergency Department Physician who either signs or Co-signs this chart in the absence of a cardiologist.  Interpreted by Frederick Arreaga DO     Rhythm: sinus tachycardia  Rate: 109  Axis: normal  Ectopy: none  Conduction: sinus tachycardia  ST Segments: no obvious elevation or depression  T Waves: No acute findings    Clinical Impression: Nonspecific ECG.  Sinus tachycardic rhythm.  No acute infarction/ischemia noted.      RADIOLOGY:   Non-plain film images such as CT, Ultrasound and MRI are read by the radiologist. Plain radiographic images are visualized and the radiologist interpretations are reviewed as follows:     CT CHEST PULMONARY EMBOLISM W CONTRAST   Final Result   Partially occlusive saddle embolus with additional pulmonary emboli within   the left lower lobe and right lower lobe segmental pulmonary arteries.  The   main pulmonary artery is dilated and the right ventricle is prominent   consistent with right heart strain.      These findings were discussed with the ordering provider Nurse Cyndy Castillo at 1:09 p.m. on 9 December 2024         Vascular duplex lower extremity venous right             CT CHEST PULMONARY EMBOLISM W CONTRAST    Result Date: 12/9/2024  EXAMINATION: CTA OF THE CHEST 12/9/2024 12:13 pm TECHNIQUE: CTA of the chest was performed after the administration of intravenous contrast.  Multiplanar reformatted images are provided for review.  MIP images are provided for review. Automated exposure control, iterative reconstruction, and/or weight based adjustment of the mA/kV was utilized to reduce the radiation dose to as low as reasonably achievable. COMPARISON: None. HISTORY: ORDERING SYSTEM PROVIDED HISTORY: rule out PE TECHNOLOGIST PROVIDED HISTORY: rule out PE Additional Contrast?->1 Reason for Exam: short of breath FINDINGS: Pulmonary Arteries: Pulmonary arteries are adequately opacified for evaluation.   There is a partially occlusive saddle embolus.  A partially occlusive embolus extends into the segmental left lower lobe pulmonary artery.  Partially occlusive emboli are seen extending into the right lower lobe pulmonary arteries.  The main pulmonary artery is dilated measuring 3.6 cm in diameter.  The right ventricle is also prominent. Mediastinum: No evidence of mediastinal lymphadenopathy.  The heart and pericardium demonstrate no acute abnormality.  There is no acute abnormality of the thoracic aorta. Lungs/pleura: The lungs are without acute process.  No focal consolidation or pulmonary edema.  No evidence of pleural effusion or pneumothorax. Upper Abdomen: Limited images of the upper abdomen are unremarkable. Soft Tissues/Bones: No acute bone or soft tissue abnormality.     Partially occlusive saddle embolus with additional pulmonary emboli within the left lower lobe and right lower lobe segmental pulmonary arteries.  The main pulmonary artery is dilated and the right ventricle is prominent consistent with right heart strain. These findings were discussed with the ordering provider Nurse Cyndy Castillo at 1:09 p.m. on 9 December 2024         LABS:  Results for orders placed or performed during the hospital encounter of 12/09/24   BMP   Result Value Ref Range    Sodium 138 135 - 144 mmol/L    Potassium 3.5 (L) 3.7 - 5.3 mmol/L    Chloride 99 98 - 107 mmol/L    CO2 23 20 - 31 mmol/L    Anion Gap 16 9 - 17 mmol/L    Glucose 119 (H) 70 - 99 mg/dL    BUN 8 8 - 23 mg/dL    Creatinine 0.9 0.5 - 0.9 mg/dL    Est, Glom Filt Rate 73 >60 mL/min/1.73m2    Calcium 8.5 (L) 8.6 - 10.4 mg/dL   CBC with Auto Differential   Result Value Ref Range    WBC 4.2 3.5 - 11.0 k/uL    RBC 2.96 (L) 4.0 - 5.2 m/uL    Hemoglobin 8.6 (L) 12.0 - 16.0 g/dL    Hematocrit 26.3 (L) 36 - 46 %    MCV 88.8 80 - 100 fL    MCH 29.2 26 - 34 pg    MCHC 32.9 31 - 37 g/dL    RDW 18.8 (H) 12.5 - 15.4 %    Platelets 30 (L) 140 - 450 k/uL    MPV 9.1 6.0 -  tympanic

## 2024-12-09 NOTE — ED PROVIDER NOTES
[facility]  Emergency Department Encounter  Emergency Medicine Resident     Pt Name:Luci Castañeda  MRN: [id number]  Birthdate 1965  Date of evaluation: 12/9/24  PCP:  Nabil Ly MD  Note Started: 3:58 PM EST    CHIEF COMPLAINT       Chief Complaint   Patient presents with    Toe Injury    Pulmonary Embolism     [place] tx- saddle     HISTORY OF PRESENT ILLNESS  (Location/Symptom, Timing/Onset, Context/Setting, Quality, Duration, Modifying Factors, Severity.)      Luci Castañeda is a 59 y.o. female with history of breast cancer, CAD, CKD, obesity was transferred from outside hospital due to saddle embolus.  She states she initially went to the hospital because she had extreme pain and redness at the base of her right big toe making it so she could not put pressure on it.  She got a CT scan chest to rule out PE given her history and was noted to have a saddle embolus.  She was transferred here to have vascular intervention later this evening.    PAST MEDICAL / SURGICAL / SOCIAL / FAMILY HISTORY      has a past medical history of Abnormal uterine bleeding (AUB), Arthritis, Asthma, Benign familial tremor, CAD (coronary artery disease), Cancer (HCC), CKD (chronic kidney disease) stage 1, GFR 90 ml/min or greater, Class 2 severe obesity with serious comorbidity and body mass index (BMI) of 39.0 to 39.9 in adult, COVID-19 vaccine administered, Cystinuria (HCC), Dyspnea on exertion, Flank pain, GERD (gastroesophageal reflux disease), History of Bell's palsy, Hypertension, Hyperuricemia, Hypothyroidism, Kidney stones, Leg swelling, Malignant neoplasm of upper-outer quadrant of left breast in female, estrogen receptor negative (HCC), Midline low back pain with right-sided sciatica, Nephrostomy status (HCC), Shoulder impingement, Snores, Solitary kidney, acquired, Under care of service provider, Under care of service provider, Under care of service provider, Under care of service provider, Urinary tract  hospitalization.      EMERGENCY DEPARTMENT COURSE:    ED Course as of 12/09/24 1820   Mon Dec 09, 2024   1704 Spoke to vascular resident.   Recommended MICU admit for saddle PE. Plan for OR tonight.   Will consult critical care.  [OT]      ED Course User Index  [OT] Christen Cordova MD       CONSULTS:  IP CONSULT TO VASCULAR SURGERY  IP CONSULT TO CRITICAL CARE    CRITICAL CARE:  There was significant risk of life threatening deterioration of patient's condition requiring my direct management. Critical care time 0 minutes, excluding any documented procedures.    FINAL IMPRESSION      1. Acute saddle pulmonary embolism, unspecified whether acute cor pulmonale present (HCC)          DISPOSITION / PLAN     DISPOSITION Admitted 12/09/2024 05:44:17 PM             PATIENT REFERRED TO:  No follow-up provider specified.    DISCHARGE MEDICATIONS:  Current Discharge Medication List          Christen Cordova MD  Emergency Medicine Resident    (Please note that portions of this note were completed with a voice recognition program.  Efforts were made to edit the dictations but occasionally words are mis-transcribed.)

## 2024-12-09 NOTE — H&P
Magnesium:  Recent Labs     12/09/24  1130   MG 0.9*     S. Phosphorus:No results for input(s): \"PHOS\" in the last 72 hours.  S. Glucose:No results for input(s): \"POCGLU\" in the last 72 hours.  Glycosylated hemoglobin A1C: No results for input(s): \"LABA1C\" in the last 72 hours.  INR:   Recent Labs     12/09/24  1130   INR 1.3     Hepatic functions:   Recent Labs     12/09/24  1130   ALKPHOS 109*   ALT <5*   AST 10   BILITOT 0.7   BILIDIR 0.2     Pancreatic functions:  Recent Labs     12/09/24  1130   LACTA 1.0     S. Lactic Acid:   Recent Labs     12/09/24  1130   LACTA 1.0     Cardiac enzymes:No results for input(s): \"CKTOTAL\", \"CKMB\", \"CKMBINDEX\", \"TROPONINI\" in the last 72 hours.  BNP:No results for input(s): \"BNP\" in the last 72 hours.  Lipid profile: No results for input(s): \"CHOL\", \"TRIG\", \"HDL\", \"LDL\" in the last 72 hours.    Invalid input(s): \"LDLCALC\"  Blood Gases: No results found for: \"PH\", \"PCO2\", \"PO2\", \"HCO3\", \"O2SAT\"  Thyroid functions:   Lab Results   Component Value Date/Time    TSH 0.90 12/03/2024 10:43 AM        Urinalysis:   No results for input(s): \"COLORU\", \"PHUR\", \"LABCAST\", \"WBCUA\", \"RBCUA\", \"MUCUS\", \"TRICHOMONAS\", \"YEAST\", \"BACTERIA\", \"CLARITYU\", \"SPECGRAV\", \"LEUKOCYTESUR\", \"UROBILINOGEN\", \"BILIRUBINUR\", \"BLOODU\" in the last 72 hours.    Invalid input(s): \"NITRATE\", \"GLUCOSEUKETONESUAMORPHOUS\"    RADIOLOGY:  No orders to display       CARDIOLOGY:  Recent Cardiac Catheterization summary:   No results found for this or any previous visit.      Electrocardiogram:   EKG with sinus tachycardia    Last Echocardiogram findings: Echo 5/10/2024 showing EF of 55 to 60%  No results found for this or any previous visit.    No results found for this or any previous visit.      Assessment and Plan     Patient Active Problem List   Diagnosis    Kidney stones    Essential hypertension    Acquired hypothyroidism    CKD (chronic kidney disease) stage 1, GFR 90 ml/min or greater    Hyperuricemia    Primary  doppler without DVT   GI/Nutrition  glycolax  Ulcer Prophylaxis: PPI  Diet:Diet NPO  Renal/Fluid/Electrolyte  I/O: No intake/output data recorded.  UOP: monitor UOP  Na/K 138/3.5  BUN/Cr 8/0.9  Monitor electrolytes, replace PRN   Hypomagnesemia, replaced at [facility]  ID  WBC 4.2  Tmax: 98.7  Antimicrobials:  not indicated   Hematology:  Thrombocytopenia, continue to monitor  H+H 8.6  Platelets 30  INR 1.3  Endocrine:     Glucose controlled   DVT Prophylaxis  Heparin  Lines  PIV  Discharge Needs:  PT, OT, ST, and SW      CODE STATUS: Full Code    DISPOSITION:  [x] To remain ICU: following thrombectomy  [] OK for out of ICU from Critical Care standpoint    Madeline Dominguez DO  Emergency Medicine Resident  Critical Care Service  12/09/246:01 PM

## 2024-12-09 NOTE — TELEPHONE ENCOUNTER
Right foot pain, swollen on top and bottom and swelling on the medial side that radiates around ankle and mid calf pain . She reports no trama to foot, she noticed it on Saturday gotten worse, she can barley walk and skin is painful. She did have blood transfusion on Friday.     Writer did offer patient to see  today at 1530, she took the appt.

## 2024-12-09 NOTE — CONSULTS
Division of Vascular Surgery        New Consult      Physician Requesting Consult:  Dr. Cordova    Reason for Consult:   \"Transfer from [facility] for saddle embolus has arrived.\"    Chief Complaint:     \"I went to the hospital because my leg was swollen\"    History of Present Illness:      Luci Castañeda is a 59 y.o. woman who presents with right lower extremity swelling, skin color changes.  Patient presented to [facility] earlier today with concerns of right lower extremity swelling, she was noted to be dyspneic with shortness of breath and was put on nasal cannula.  On CT patient was found to have saddle pulmonary embolism with right heart strain.  Decision was made to transfer patient to [facility] for vascular surgery evaluation and likely intervention.  Patient was on 4 L nasal cannula on arrival, heart rate 90s, troponin was noted at 24.  Patient completed last chemotherapy treatment for breast cancer a few weeks ago, and is scheduled with Dr. Yeung for lumpectomy and bilateral breast reduction surgery in December.  No known previous history of VTE.    Medical History:     Past Medical History:   Diagnosis Date    Abnormal uterine bleeding (AUB) 11/2023    Arthritis     Asthma     Benign familial tremor     CAD (coronary artery disease) 12/2021    mild per cardiac cath 12/2021; no stents. Dr. Randolph TCC last visit 10/2022    Cancer (HCC)     skin    CKD (chronic kidney disease) stage 1, GFR 90 ml/min or greater     Class 2 severe obesity with serious comorbidity and body mass index (BMI) of 39.0 to 39.9 in adult 05/01/2019    COVID-19 vaccine administered     Cystinuria (HCC)     Dyspnea on exertion 08/06/2019    Flank pain     RIGHT    GERD (gastroesophageal reflux disease)     History of Bell's palsy 2022    states right sided, slight residual    Hypertension     Hyperuricemia     Hypothyroidism     no meds    Kidney stones     Leg swelling     by the end of the day    Malignant neoplasm

## 2024-12-09 NOTE — CONSENT
Informed Consent for Blood Component Transfusion Note    I have discussed with the patient the rationale for blood component transfusion; its benefits in treating or preventing fatigue, organ damage, or death; and its risk which includes mild transfusion reactions, rare risk of blood borne infection, or more serious but rare reactions. I have discussed the alternatives to transfusion, including the risk and consequences of not receiving transfusion. The patient had an opportunity to ask questions and had agreed to proceed with transfusion of blood components.    Electronically signed by Mark Rueda MD on 12/9/24 at 6:55 PM EST

## 2024-12-09 NOTE — TELEPHONE ENCOUNTER
Location of patient: [place]    Subjective: Caller states \"foot pain\"     Current Symptoms: Got permission to go to ED by the ambulance.  Has excruciating foot that is radiating  to mid calf.  Unable to get out of chair.  Is her right foot.  It is also swollen and warm  Foot was discolored this morning like a light bruise.  Has breast cancer.    Onset: 2 days ago; gradual, worsening    Associated Symptoms: NA    Pain Severity: 10/10    Temperature: denies     What has been tried: tylenol, zofran    LMP: NA Pregnant: NA    Recommended disposition: Go to ED/UCC Now (Or to Office with PCP Approval)    Care advice provided, patient verbalizes understanding; denies any other questions or concerns; instructed to call back for any new or worsening symptoms.    Patient/caller agrees to proceed to [facility]    Attention Provider:  Thank you for allowing me to participate in the care of your patient.  The patient was connected to triage in response to symptoms provided.   Please do not respond through this encounter as the response is not directed to a shared pool.    Reason for Disposition   Patient sounds very sick or weak to the triager    Protocols used: Foot Pain-ADULT-[place]

## 2024-12-09 NOTE — ED NOTES
Pt SPO2 is 90-92% on RA. Pt denies wearing O2 at home and unsure if she has has lung issues. Pt denies feeling SOB. Pt put on 2L O2 via NC at this time; spo2 jesenia to 96%.

## 2024-12-09 NOTE — ED NOTES
ED to inpatient nurses report      Chief Complaint:  Chief Complaint   Patient presents with    Toe Injury    Pulmonary Embolism     Pburg tx- saddle     Present to ED from: pburg tx     MOA:     LOC: alert and orientated to name, place, date  Mobility: Requires assistance * 1  Oxygen Baseline: none     Current needs required:4l    Pending ED orders: heparin to link to pump once verified  Present condition: resting in bed aox4, very pleasant     Why did the patient come to the ED? Luci Garry, 1965  ACute saddle PE with R heart strain  Was hypoxic and tachycardic, now on 4L  Stable vitals  Right leg pain, pending dopplers  Mohit aware,  Patient with active BCA and on chemotherapy, low platelets  So only on low intensity heparin at this time per vascular  Plan for EKOs and Thrombectomy today  Consult vascular  What is the plan? Admit   Any procedures or intervention occur? Pburg workup, restarting heparin  Any safety concerns??    Mental Status:       Psych Assessment:   Psychosocial  Psychosocial (WDL): Within Defined Limits  Vital signs   Vitals:    12/09/24 1557   BP: 109/74   Pulse: 91   Resp: 18   Temp: 98.7 °F (37.1 °C)   TempSrc: Oral   SpO2: 98%   Weight: 102.1 kg (225 lb)   Height: 1.727 m (5' 8\")        Vitals:  Patient Vitals for the past 24 hrs:   BP Temp Temp src Pulse Resp SpO2 Height Weight   12/09/24 1557 109/74 98.7 °F (37.1 °C) Oral 91 18 98 % 1.727 m (5' 8\") 102.1 kg (225 lb)      Visit Vitals  /74   Pulse 91   Temp 98.7 °F (37.1 °C) (Oral)   Resp 18   Ht 1.727 m (5' 8\")   Wt 102.1 kg (225 lb)   SpO2 98%   BMI 34.21 kg/m²        LDAs:   Peripheral IV 12/09/24 Left Forearm (Active)       Peripheral IV 12/09/24 Right Antecubital (Active)       Ambulatory Status:  Presents to emergency department  because of falls (Syncope, seizure, or loss of consciousness): No, Age > 70: No, Altered Mental Status, Intoxication with alcohol or substance confusion (Disorientation, impaired judgment,

## 2024-12-09 NOTE — CONSULTS
Consult Note            Date:12/9/2024        Patient Name:Luci Castañeda     YOB: 1965     Age:59 y.o.    Consults    Chief Complaint     Chief Complaint   Patient presents with    Toe Injury    Pulmonary Embolism     Pburg tx- saddle          History Obtained From   patient    History of Present Illness   The patient is a 59-year-old female who presented to the [facility] emergency department with shortness of breath and fatigue.  She was studied with a CTA of the chest revealing a saddle pulmonary embolism.  She has just finished chemotherapy for breast cancer which is neoadjuvant.  She is due for lumpectomy and radiation later on this month.  This is her first episode to my knowledge of thromboembolic disease.  She has had kidney stone problems and is now left with a solitary kidney.  Her creatinine is 0.9.  Her chemotherapy has rendered her somewhat anemic with a hematocrit of 26.3 and a platelet count of 30.    Past Medical History     Past Medical History:   Diagnosis Date    Abnormal uterine bleeding (AUB) 11/2023    Arthritis     Asthma     Benign familial tremor     CAD (coronary artery disease) 12/2021    mild per cardiac cath 12/2021; no stents. Dr. Randolph TCC last visit 10/2022    Cancer (HCC)     skin    CKD (chronic kidney disease) stage 1, GFR 90 ml/min or greater     Class 2 severe obesity with serious comorbidity and body mass index (BMI) of 39.0 to 39.9 in adult 05/01/2019    COVID-19 vaccine administered     Cystinuria (HCC)     Dyspnea on exertion 08/06/2019    Flank pain     RIGHT    GERD (gastroesophageal reflux disease)     History of Bell's palsy 2022    states right sided, slight residual    Hypertension     Hyperuricemia     Hypothyroidism     no meds    Kidney stones     Leg swelling     by the end of the day    Malignant neoplasm of upper-outer quadrant of left breast in female, estrogen receptor negative (HCC) 05/12/2024    Midline low back pain with right-sided sciatica

## 2024-12-09 NOTE — ED NOTES
Luci Castañeda, 1965  ACute saddle PE with R heart strain  Was hypoxic and tachycardic, now on 4L  Stable vitals  Right leg pain, pending dopplers  Mohit aware,  Patient with active BCA and on chemotherapy, low platelets  So only on low intensity heparin at this time per vascular  Plan for EKOs and Thrombectomy today  Consult vascular

## 2024-12-10 ENCOUNTER — APPOINTMENT (OUTPATIENT)
Dept: GENERAL RADIOLOGY | Age: 59
DRG: 163 | End: 2024-12-10
Payer: COMMERCIAL

## 2024-12-10 ENCOUNTER — TELEPHONE (OUTPATIENT)
Dept: ONCOLOGY | Age: 59
End: 2024-12-10

## 2024-12-10 PROBLEM — I26.92 ACUTE SADDLE PULMONARY EMBOLISM (HCC): Status: ACTIVE | Noted: 2024-12-09

## 2024-12-10 LAB
ANION GAP SERPL CALCULATED.3IONS-SCNC: 12 MMOL/L (ref 9–16)
ANTI-XA UNFRAC HEPARIN: 0.17 IU/L
ANTI-XA UNFRAC HEPARIN: 0.35 IU/L
ANTI-XA UNFRAC HEPARIN: 0.56 IU/L
BASOPHILS # BLD: 0 K/UL (ref 0–0.2)
BASOPHILS # BLD: 0 K/UL (ref 0–0.2)
BASOPHILS NFR BLD: 0 % (ref 0–2)
BASOPHILS NFR BLD: 0 % (ref 0–2)
BUN SERPL-MCNC: 10 MG/DL (ref 8–23)
CALCIUM SERPL-MCNC: 8 MG/DL (ref 8.6–10.4)
CHLORIDE SERPL-SCNC: 105 MMOL/L (ref 98–107)
CO2 SERPL-SCNC: 21 MMOL/L (ref 20–31)
CREAT SERPL-MCNC: 1 MG/DL (ref 0.6–0.9)
CRP SERPL HS-MCNC: 212 MG/L (ref 0–5)
EKG ATRIAL RATE: 109 BPM
EKG P AXIS: 38 DEGREES
EKG P-R INTERVAL: 146 MS
EKG Q-T INTERVAL: 322 MS
EKG QRS DURATION: 86 MS
EKG QTC CALCULATION (BAZETT): 433 MS
EKG R AXIS: 2 DEGREES
EKG T AXIS: 22 DEGREES
EKG VENTRICULAR RATE: 109 BPM
EOSINOPHIL # BLD: 0 K/UL (ref 0–0.4)
EOSINOPHIL # BLD: 0 K/UL (ref 0–0.44)
EOSINOPHILS RELATIVE PERCENT: 0 % (ref 1–4)
EOSINOPHILS RELATIVE PERCENT: 0 % (ref 1–4)
ERYTHROCYTE [DISTWIDTH] IN BLOOD BY AUTOMATED COUNT: 17.7 % (ref 11.8–14.4)
ERYTHROCYTE [DISTWIDTH] IN BLOOD BY AUTOMATED COUNT: 18.6 % (ref 11.8–14.4)
ERYTHROCYTE [SEDIMENTATION RATE] IN BLOOD BY PHOTOMETRIC METHOD: 28 MM/HR (ref 0–30)
GFR, ESTIMATED: 64 ML/MIN/1.73M2
GLUCOSE SERPL-MCNC: 113 MG/DL (ref 74–99)
HCT VFR BLD AUTO: 21.5 % (ref 36.3–47.1)
HCT VFR BLD AUTO: 26.1 % (ref 36.3–47.1)
HGB BLD-MCNC: 6.4 G/DL (ref 11.9–15.1)
HGB BLD-MCNC: 8.6 G/DL (ref 11.9–15.1)
IMM GRANULOCYTES # BLD AUTO: 0.04 K/UL (ref 0–0.3)
IMM GRANULOCYTES # BLD AUTO: 0.06 K/UL (ref 0–0.3)
IMM GRANULOCYTES NFR BLD: 1 %
IMM GRANULOCYTES NFR BLD: 1 %
LYMPHOCYTES NFR BLD: 0.47 K/UL (ref 1.1–3.7)
LYMPHOCYTES NFR BLD: 0.99 K/UL (ref 1–4.8)
LYMPHOCYTES RELATIVE PERCENT: 13 % (ref 24–43)
LYMPHOCYTES RELATIVE PERCENT: 17 % (ref 24–44)
MCH RBC QN AUTO: 29 PG (ref 25.2–33.5)
MCH RBC QN AUTO: 29.7 PG (ref 25.2–33.5)
MCHC RBC AUTO-ENTMCNC: 29.8 G/DL (ref 28.4–34.8)
MCHC RBC AUTO-ENTMCNC: 33 G/DL (ref 28.4–34.8)
MCV RBC AUTO: 90 FL (ref 82.6–102.9)
MCV RBC AUTO: 97.3 FL (ref 82.6–102.9)
MONOCYTES NFR BLD: 0.64 K/UL (ref 0.1–0.8)
MONOCYTES NFR BLD: 0.86 K/UL (ref 0.1–1.2)
MONOCYTES NFR BLD: 11 % (ref 1–7)
MONOCYTES NFR BLD: 24 % (ref 3–12)
MORPHOLOGY: ABNORMAL
MORPHOLOGY: ABNORMAL
NEUTROPHILS NFR BLD: 62 % (ref 36–65)
NEUTROPHILS NFR BLD: 71 % (ref 36–66)
NEUTS SEG NFR BLD: 2.23 K/UL (ref 1.5–8.1)
NEUTS SEG NFR BLD: 4.11 K/UL (ref 1.8–7.7)
NRBC BLD-RTO: 0 PER 100 WBC
NRBC BLD-RTO: 0.9 PER 100 WBC
PLATELET # BLD AUTO: ABNORMAL K/UL (ref 138–453)
PLATELET # BLD AUTO: ABNORMAL K/UL (ref 138–453)
PLATELET, FLUORESCENCE: 26 K/UL (ref 138–453)
PLATELET, FLUORESCENCE: 33 K/UL (ref 138–453)
PLATELETS.RETICULATED NFR BLD AUTO: 6.6 % (ref 1.1–10.3)
PLATELETS.RETICULATED NFR BLD AUTO: 7.8 % (ref 1.1–10.3)
POTASSIUM SERPL-SCNC: 3.7 MMOL/L (ref 3.7–5.3)
RBC # BLD AUTO: 2.21 M/UL (ref 3.95–5.11)
RBC # BLD AUTO: 2.9 M/UL (ref 3.95–5.11)
SODIUM SERPL-SCNC: 138 MMOL/L (ref 136–145)
URATE SERPL-MCNC: 8.7 MG/DL (ref 2.4–5.7)
WBC OTHER # BLD: 3.6 K/UL (ref 3.5–11.3)
WBC OTHER # BLD: 5.8 K/UL (ref 3.5–11.3)

## 2024-12-10 PROCEDURE — 73620 X-RAY EXAM OF FOOT: CPT

## 2024-12-10 PROCEDURE — 6370000000 HC RX 637 (ALT 250 FOR IP)

## 2024-12-10 PROCEDURE — 2700000000 HC OXYGEN THERAPY PER DAY

## 2024-12-10 PROCEDURE — 85652 RBC SED RATE AUTOMATED: CPT

## 2024-12-10 PROCEDURE — 6360000002 HC RX W HCPCS

## 2024-12-10 PROCEDURE — P9016 RBC LEUKOCYTES REDUCED: HCPCS

## 2024-12-10 PROCEDURE — 2060000000 HC ICU INTERMEDIATE R&B

## 2024-12-10 PROCEDURE — 99223 1ST HOSP IP/OBS HIGH 75: CPT | Performed by: INTERNAL MEDICINE

## 2024-12-10 PROCEDURE — 85055 RETICULATED PLATELET ASSAY: CPT

## 2024-12-10 PROCEDURE — 86140 C-REACTIVE PROTEIN: CPT

## 2024-12-10 PROCEDURE — 2580000003 HC RX 258: Performed by: SURGERY

## 2024-12-10 PROCEDURE — 85025 COMPLETE CBC W/AUTO DIFF WBC: CPT

## 2024-12-10 PROCEDURE — 84550 ASSAY OF BLOOD/URIC ACID: CPT

## 2024-12-10 PROCEDURE — 99291 CRITICAL CARE FIRST HOUR: CPT | Performed by: INTERNAL MEDICINE

## 2024-12-10 PROCEDURE — 6360000002 HC RX W HCPCS: Performed by: SURGERY

## 2024-12-10 PROCEDURE — 36430 TRANSFUSION BLD/BLD COMPNT: CPT

## 2024-12-10 PROCEDURE — 36415 COLL VENOUS BLD VENIPUNCTURE: CPT

## 2024-12-10 PROCEDURE — 93010 ELECTROCARDIOGRAM REPORT: CPT | Performed by: INTERNAL MEDICINE

## 2024-12-10 PROCEDURE — 94761 N-INVAS EAR/PLS OXIMETRY MLT: CPT

## 2024-12-10 PROCEDURE — 80048 BASIC METABOLIC PNL TOTAL CA: CPT

## 2024-12-10 PROCEDURE — 85520 HEPARIN ASSAY: CPT

## 2024-12-10 RX ORDER — OXYCODONE HYDROCHLORIDE 5 MG/1
5 TABLET ORAL EVERY 6 HOURS PRN
Status: DISCONTINUED | OUTPATIENT
Start: 2024-12-10 | End: 2024-12-18 | Stop reason: HOSPADM

## 2024-12-10 RX ORDER — HEPARIN SODIUM 1000 [USP'U]/ML
80 INJECTION, SOLUTION INTRAVENOUS; SUBCUTANEOUS PRN
Status: DISCONTINUED | OUTPATIENT
Start: 2024-12-10 | End: 2024-12-18

## 2024-12-10 RX ORDER — FENTANYL CITRATE 50 UG/ML
50 INJECTION, SOLUTION INTRAMUSCULAR; INTRAVENOUS
Status: DISCONTINUED | OUTPATIENT
Start: 2024-12-10 | End: 2024-12-18 | Stop reason: HOSPADM

## 2024-12-10 RX ORDER — MORPHINE SULFATE 4 MG/ML
4 INJECTION, SOLUTION INTRAMUSCULAR; INTRAVENOUS ONCE
Status: COMPLETED | OUTPATIENT
Start: 2024-12-10 | End: 2024-12-10

## 2024-12-10 RX ORDER — MIDODRINE HYDROCHLORIDE 5 MG/1
5 TABLET ORAL ONCE
Status: COMPLETED | OUTPATIENT
Start: 2024-12-10 | End: 2024-12-10

## 2024-12-10 RX ORDER — HEPARIN SODIUM 1000 [USP'U]/ML
40 INJECTION, SOLUTION INTRAVENOUS; SUBCUTANEOUS PRN
Status: DISCONTINUED | OUTPATIENT
Start: 2024-12-10 | End: 2024-12-18

## 2024-12-10 RX ORDER — SODIUM CHLORIDE 9 MG/ML
INJECTION, SOLUTION INTRAVENOUS PRN
Status: DISCONTINUED | OUTPATIENT
Start: 2024-12-10 | End: 2024-12-18 | Stop reason: HOSPADM

## 2024-12-10 RX ORDER — COLCHICINE 0.6 MG/1
1.2 TABLET ORAL ONCE
Status: COMPLETED | OUTPATIENT
Start: 2024-12-10 | End: 2024-12-10

## 2024-12-10 RX ORDER — COLCHICINE 0.6 MG/1
0.6 TABLET ORAL DAILY PRN
Status: DISCONTINUED | OUTPATIENT
Start: 2024-12-11 | End: 2024-12-15

## 2024-12-10 RX ORDER — PANTOPRAZOLE SODIUM 40 MG/1
40 TABLET, DELAYED RELEASE ORAL
Status: DISCONTINUED | OUTPATIENT
Start: 2024-12-11 | End: 2024-12-18 | Stop reason: HOSPADM

## 2024-12-10 RX ADMIN — HEPARIN SODIUM 4100 UNITS: 1000 INJECTION INTRAVENOUS; SUBCUTANEOUS at 02:30

## 2024-12-10 RX ADMIN — FENTANYL CITRATE 50 MCG: 50 INJECTION INTRAMUSCULAR; INTRAVENOUS at 15:38

## 2024-12-10 RX ADMIN — SODIUM CHLORIDE, PRESERVATIVE FREE 10 ML: 5 INJECTION INTRAVENOUS at 19:49

## 2024-12-10 RX ADMIN — FENTANYL CITRATE 50 MCG: 50 INJECTION INTRAMUSCULAR; INTRAVENOUS at 11:39

## 2024-12-10 RX ADMIN — OXYCODONE 5 MG: 5 TABLET ORAL at 07:37

## 2024-12-10 RX ADMIN — FENTANYL CITRATE 50 MCG: 50 INJECTION INTRAMUSCULAR; INTRAVENOUS at 16:40

## 2024-12-10 RX ADMIN — MIDODRINE HYDROCHLORIDE 5 MG: 5 TABLET ORAL at 02:48

## 2024-12-10 RX ADMIN — COLCHICINE 1.2 MG: 0.6 TABLET, FILM COATED ORAL at 19:49

## 2024-12-10 RX ADMIN — SODIUM CHLORIDE, PRESERVATIVE FREE 40 MG: 5 INJECTION INTRAVENOUS at 07:48

## 2024-12-10 RX ADMIN — MORPHINE SULFATE 4 MG: 4 INJECTION INTRAVENOUS at 00:40

## 2024-12-10 RX ADMIN — HEPARIN SODIUM 11 UNITS/KG/HR: 10000 INJECTION, SOLUTION INTRAVENOUS at 14:47

## 2024-12-10 RX ADMIN — OXYCODONE 5 MG: 5 TABLET ORAL at 23:15

## 2024-12-10 RX ADMIN — OXYCODONE 5 MG: 5 TABLET ORAL at 16:03

## 2024-12-10 RX ADMIN — ONDANSETRON 4 MG: 2 INJECTION INTRAMUSCULAR; INTRAVENOUS at 16:45

## 2024-12-10 RX ADMIN — SODIUM CHLORIDE, PRESERVATIVE FREE 10 ML: 5 INJECTION INTRAVENOUS at 07:48

## 2024-12-10 ASSESSMENT — PAIN DESCRIPTION - LOCATION
LOCATION: FOOT
LOCATION: TOE (COMMENT WHICH ONE)
LOCATION: FOOT
LOCATION: TOE (COMMENT WHICH ONE)

## 2024-12-10 ASSESSMENT — PAIN SCALES - GENERAL
PAINLEVEL_OUTOF10: 6
PAINLEVEL_OUTOF10: 10
PAINLEVEL_OUTOF10: 4
PAINLEVEL_OUTOF10: 10
PAINLEVEL_OUTOF10: 9
PAINLEVEL_OUTOF10: 7
PAINLEVEL_OUTOF10: 2
PAINLEVEL_OUTOF10: 10
PAINLEVEL_OUTOF10: 9
PAINLEVEL_OUTOF10: 1
PAINLEVEL_OUTOF10: 6
PAINLEVEL_OUTOF10: 8
PAINLEVEL_OUTOF10: 9

## 2024-12-10 ASSESSMENT — PAIN DESCRIPTION - ORIENTATION
ORIENTATION: RIGHT

## 2024-12-10 ASSESSMENT — PAIN DESCRIPTION - DESCRIPTORS
DESCRIPTORS: ACHING

## 2024-12-10 NOTE — TRANSITION OF CARE
Critical care team - Resident sign-out to medicine service      Date and time: 12/10/2024 6:16 PM  Patient's name:  Luci Castañeda  Medical Record Number: [id number]  Patient's account/billing number: [id number]  Patient's YOB: 1965  Age: 59 y.o.  Date of Admission: 12/9/2024  3:49 PM  Length of stay during current admission: 1    Primary Care Physician: Nabil Ly MD    Code Status: Full Code    Mode of physician to physician communication:        [x] Via telephone   [] In person     Date and time of sign-out: 12/10/2024 6:16 PM        Accepting Medicine team: Intermed    Accepting team's attending: Dr. Rain    Patient's current ICU Bed:  3011     Patient's assigned bed on floor:  2011        [] Med-Surg Monitored [x] Step-down       [] Psychiatry ICU       [] Psych floor     Reason for ICU admission:     Luci Castañeda is a 59 y.o. female history of breast cancer, currently undergoing chemotherapy, hypertension, hypothyroidism, CKD, coronary artery disease presented as a transfer from [facility] due to concerns for saddle pulmonary embolism.  Patient presented to [facility] this afternoon due to concerns for several days of right leg swelling and color change.  Noted to be more dyspneic with conversation and exertion.  Patient had a DVT study of her lower extremity did not show any acute DVT however did have a CT PE showing a partially occlusive saddle embolus with pulmonary emboli in the left lower lobe and right lower lobe as well.  CT scan was consistent with right heart strain.  Patient was requiring 4 L nasal cannula she does not wear any oxygen at home. Patient started on heparin at outlying facility.      Patient's workup otherwise notable for troponins of 24, 24.  EKG showing sinus tachycardia.  Magnesium critically low at 0.9, this was replaced with 2 g mag at [facility].  Patient's potassium also low at 3.5.     During my evaluation in the ED, patient  chemotherapy-induced.  6.4 status post PRBC transfusion  Acute hypoxic respiratory failure likely secondary to PE    Recommended Follow-up:     Continue O2 support.  Monitor CBC.  Continue heparin infusion.  DOAC transition when appropriate.  Follow-up podiatry recommendations.  Gout versus septic arthritis.  PT OT.  Discharge planning.        Above mentioned assessment and plan was discussed by me with the admitting medicine resident. The medicine team assigned to the patient by medicine admitting resident will be following up the patient from now onwards on the floor.

## 2024-12-10 NOTE — CONSULTS
Consult Note  Foot and Ankle Surgery    Subjective     Chief Complaint: Right MTP joint swelling, gout vs septic joint    HPI:  Luci Castañeda is a 59 y.o. female seen at [facility] for right foot pain at the first MPJ.  Patient says she has had pain to the first MPJ for 3 days.  She describes the pain as severe and says even the slightest touch is painful.  Patient was admitted for saddle pulmonary embolism.  Patient underwent a mechanical thrombectomy on 12/9/2024. Patient has a past medical history of hyperuricemia, kidney stones, malignant neoplasm of left breast, CAD, asthma, GERD, hypertension.    PCP is Nabil Ly MD    ROS:   Review of Systems   Constitutional: Negative.    HENT: Negative.     Eyes: Negative.    Respiratory: Negative.     Cardiovascular: Negative.    Gastrointestinal: Negative.    Endocrine: Negative.    Genitourinary: Negative.    Musculoskeletal:  Positive for arthralgias, gait problem and myalgias.   Skin:  Positive for color change. Negative for wound.   Allergic/Immunologic: Negative.    Hematological: Negative.    Psychiatric/Behavioral: Negative.         Past Medical History   has a past medical history of Abnormal uterine bleeding (AUB), Arthritis, Asthma, Benign familial tremor, CAD (coronary artery disease), Cancer (HCC), CKD (chronic kidney disease) stage 1, GFR 90 ml/min or greater, Class 2 severe obesity with serious comorbidity and body mass index (BMI) of 39.0 to 39.9 in adult, COVID-19 vaccine administered, Cystinuria (HCC), Dyspnea on exertion, Flank pain, GERD (gastroesophageal reflux disease), History of Bell's palsy, Hypertension, Hyperuricemia, Hypothyroidism, Kidney stones, Leg swelling, Malignant neoplasm of upper-outer quadrant of left breast in female, estrogen receptor negative (HCC), Midline low back pain with right-sided sciatica, Nephrostomy status (HCC), Shoulder impingement, Snores, Solitary kidney, acquired, Under care of service provider, Under care of  hours as needed (For nausea and vomiting) 5/31/24   Jess Bradley MD   lidocaine-prilocaine (EMLA) 2.5-2.5 % cream Apply topically to port site 45-60 minutes prior to needle poke as needed. 5/30/24   Jess Bradley MD   famotidine (PEPCID) 20 MG tablet Take 1 tablet by mouth 2 times daily 4/23/24   Amena Sy, TANISHA   albuterol sulfate HFA (PROAIR HFA) 108 (90 Base) MCG/ACT inhaler Inhale 2 puffs into the lungs every 6 hours as needed for Wheezing 2/22/24   Nazia Mercado APRN - CNP   irbesartan (AVAPRO) 150 MG tablet Take 1 tablet by mouth daily 2/7/24   Sanjuana Randolph MD   nystatin (MYCOSTATIN) 863172 UNIT/GM cream  12/16/23   ProviderGuido MD   pantoprazole (PROTONIX) 20 MG tablet TAKE ONE TABLET BY MOUTH EVERY MORNING BEFORE BREAKFAST 11/6/23   Nazia Mercado APRN - CNP   Melatonin 5 MG CAPS Take 1 capsule by mouth nightly as needed    Provider, MD Guido   benzoyl peroxide 5 % external liquid Wash affected areas once daily  Patient not taking: Reported on 10/9/2024 4/7/21   Jacqueline Stuart MD   clindamycin (CLEOCIN T) 1 % lotion Apply to affected areas daily  Patient not taking: Reported on 9/27/2024 4/7/21   Jacqueline Stuart MD   solifenacin (VESICARE) 10 MG tablet Take 1 tablet by mouth as needed  12/23/14  Provider, MD Guido    Scheduled Meds:   [START ON 12/11/2024] pantoprazole  40 mg Oral QAM AC    sodium chloride flush  5-40 mL IntraVENous 2 times per day     Continuous Infusions:   sodium chloride      heparin (PORCINE) Infusion 11 Units/kg/hr (12/10/24 1805)    sodium chloride       PRN Meds:.sodium chloride, heparin (porcine), heparin (porcine), oxyCODONE, fentanNYL, [START ON 12/11/2024] colchicine, sodium chloride flush, sodium chloride, potassium chloride **OR** potassium chloride, magnesium sulfate, polyethylene glycol, acetaminophen **OR** acetaminophen, ondansetron **OR** ondansetron, metoclopramide    Allergies  is allergic to mixed ragweed and

## 2024-12-10 NOTE — PLAN OF CARE
Problem: Discharge Planning  Goal: Discharge to home or other facility with appropriate resources  Outcome: Progressing     Problem: Pain  Goal: Verbalizes/displays adequate comfort level or baseline comfort level  Outcome: Progressing     Problem: Safety - Adult  Goal: Free from fall injury  Outcome: Progressing     Problem: ABCDS Injury Assessment  Goal: Absence of physical injury  Outcome: Progressing     Problem: Respiratory - Adult  Goal: Achieves optimal ventilation and oxygenation  Outcome: Progressing     Problem: Cardiovascular - Adult  Goal: Maintains optimal cardiac output and hemodynamic stability  Outcome: Progressing     Problem: Skin/Tissue Integrity - Adult  Goal: Skin integrity remains intact  Outcome: Progressing     Problem: Musculoskeletal - Adult  Goal: Return mobility to safest level of function  Outcome: Progressing     Problem: Gastrointestinal - Adult  Goal: Minimal or absence of nausea and vomiting  Outcome: Progressing     Problem: Infection - Adult  Goal: Absence of infection at discharge  Outcome: Progressing     Problem: Metabolic/Fluid and Electrolytes - Adult  Goal: Electrolytes maintained within normal limits  Outcome: Progressing     Problem: Hematologic - Adult  Goal: Maintains hematologic stability  Outcome: Progressing

## 2024-12-10 NOTE — CARE COORDINATION
Transitional planning. [facility] called, they can accept pt for HC, informed pt.

## 2024-12-10 NOTE — PROGRESS NOTES
Critical Care -Progress Note    Patient's name:  Luci Castañeda  Medical Record Number: [id number]  Patient's account/billing number: [id number]  Patient's YOB: 1965  Age: 59 y.o.  Date of Admission: 12/9/2024  3:49 PM  Date of History and Physical Examination: 12/10/2024      Primary Care Physician: Nabil Ly MD  Attending Physician: Dr Amadeo Sanchez    Code Status: Full Code    Chief complaint:   Chief Complaint   Patient presents with    Toe Injury    Pulmonary Embolism     Pburg tx- saddle       Today's Evaluation   Patient hemodynamically stable.  Saturating well on 4 L O2 through nasal cannula.  Patient complains of pain in her right MTP joint with swelling.  Patient denies any chest pain, shortness of breath, nausea, vomiting, palpitations.  Patient denies any signs of bleeding anywhere.  Patient's hemoglobin was less than 7 today and received 1 PRBC transfusion.  Patient also noted to have chronic thrombocytopenia likely from chemotherapy.    HISTORY OF PRESENT ILLNESS:      Luci Castañeda is a 59 y.o. female history of breast cancer, currently undergoing chemotherapy, hypertension, hypothyroidism, CKD, coronary artery disease presented as a transfer from [facility] due to concerns for saddle pulmonary embolism.  Patient presented to [facility] this afternoon due to concerns for several days of right leg swelling and color change.  Noted to be more dyspneic with conversation and exertion.  Patient had a DVT study of her lower extremity did not show any acute DVT however did have a CT PE showing a partially occlusive saddle embolus with pulmonary emboli in the left lower lobe and right lower lobe as well.  CT scan was consistent with right heart strain.  Patient was requiring 4 L nasal cannula she does not wear any oxygen at home. Patient started on heparin at outlying facility.      Patient's workup otherwise notable for troponins of 24, 24.  EKG showing sinus  650 mg, Q6H PRN   Or  acetaminophen, 650 mg, Q6H PRN  ondansetron, 4 mg, Q8H PRN   Or  ondansetron, 4 mg, Q6H PRN  metoclopramide, 10 mg, Q6H PRN          ABGs:   Lab Results   Component Value Date/Time    AIN0YVX 26 08/25/2019 06:33 PM    FIO2 1.0 08/25/2019 06:33 PM       DATA:  Complete Blood Count:   Recent Labs     12/09/24  1130 12/10/24  0111 12/10/24  0646   WBC 4.2 3.6 5.8   RBC 2.96* 2.21* 2.90*   HGB 8.6* 6.4* 8.6*   HCT 26.3* 21.5* 26.1*   MCV 88.8 97.3 90.0   MCH 29.2 29.0 29.7   MCHC 32.9 29.8 33.0   RDW 18.8* 18.6* 17.7*   PLT 30* See Reflexed IPF Result See Reflexed IPF Result   MPV 9.1  --   --         Last 3 Blood Glucose:   Recent Labs     12/09/24  1130 12/10/24  0646   GLUCOSE 119* 113*        PT/INR:    Lab Results   Component Value Date/Time    PROTIME 13.3 12/09/2024 11:30 AM    INR 1.3 12/09/2024 11:30 AM     PTT:    Lab Results   Component Value Date/Time    APTT 30.7 12/09/2024 02:15 PM       Comprehensive Metabolic Profile:   Recent Labs     12/09/24  1130 12/10/24  0646    138   K 3.5* 3.7   CL 99 105   CO2 23 21   BUN 8 10   CREATININE 0.9 1.0*   GLUCOSE 119* 113*   CALCIUM 8.5* 8.0*   BILITOT 0.7  --    ALKPHOS 109*  --    AST 10  --    ALT <5*  --       Magnesium:   Lab Results   Component Value Date/Time    MG 0.9 12/09/2024 11:30 AM    MG 2.0 12/05/2022 11:31 AM    MG 2.0 08/02/2021 11:33 AM     Phosphorus:   Lab Results   Component Value Date/Time    PHOS 3.7 11/30/2012 10:37 AM    PHOS 3.5 06/28/2012 09:43 AM     Ionized Calcium: No results found for: \"CAION\"     Urinalysis:   Lab Results   Component Value Date/Time    NITRU NEGATIVE 09/01/2024 08:16 AM    COLORU Yellow 09/01/2024 08:16 AM    PHUR 6.5 09/01/2024 08:16 AM    PHUR 6.5 08/10/2023 05:06 PM    WBCUA 50  09/01/2024 08:16 AM    RBCUA TOO NUMEROUS TO COUNT 09/01/2024 08:16 AM    MUCUS NOT REPORTED 09/18/2020 04:49 PM    TRICHOMONAS NOT REPORTED 09/18/2020 04:49 PM    YEAST NOT REPORTED 09/18/2020 04:49 PM

## 2024-12-10 NOTE — ANESTHESIA PRE PROCEDURE
• ESTHER (obstructive sleep apnea) G47.33   • Mixed hyperlipidemia E78.2   • Solitary kidney, acquired Z90.5   • Obesity (BMI 30-39.9) E66.9   • Glucose intolerance (impaired glucose tolerance) R73.02   • Class 3 severe obesity due to excess calories with serious comorbidity and body mass index (BMI) of 40.0 to 44.9 in adult E66.813, E66.01, Z68.41   • Dyspnea on exertion R06.09   • Arthralgia M25.50   • Other fatigue R53.83   • Oxygen desaturation R09.02   • Mild persistent asthma without complication J45.30   • LVH (left ventricular hypertrophy) I51.7   • Chronic left shoulder pain M25.512, G89.29   • Hydronephrosis with renal and ureteral calculous obstruction N13.2   • S/p Hscope, D&C 6/30/22 Z98.890   • Postmenopausal bleeding N95.0   • Endometrial polyp N84.0   • Right kidney stone N20.0   • Left breast abscess N61.1   • History of right knee joint replacement Z96.651   • Morbid obesity E66.01   • Hydronephrosis of right kidney N13.30   • Right renal mass N28.89   • Recurrent kidney stones N20.0   • Benign essential tremor G25.0   • Invasive ductal carcinoma of breast, left (HCC) C50.912   • 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, J98.4   • Coronary artery disease involving native coronary artery of native heart without angina pectoris I25.10   • Primary insomnia F51.01   • History of COVID-19 Z86.16   • Leg swelling M79.89   • History of left breast cancer Z85.3   • Poor sleep Z72.820   • Fatty liver K76.0   • Severe persistent asthma with acute exacerbation J45.51   • Malignant neoplasm of upper-outer quadrant of left breast in female, estrogen receptor negative (HCC) C50.412, Z17.1   • Chronic left-sided low back pain without sciatica M54.50, G89.29   • Obstructive uropathy N13.9   • H/O left radical nephrectomy Z90.5   • Acute cor pulmonale due to saddle embolus of pulmonary artery (HCC) I26.02       Past Medical History:        Diagnosis Date   • Abnormal uterine bleeding (AUB) 11/2023

## 2024-12-10 NOTE — ANESTHESIA POSTPROCEDURE EVALUATION
Department of Anesthesiology  Postprocedure Note    Patient: Luci Castañeda  MRN: [id number]  YOB: 1965  Date of evaluation: 12/9/2024    Procedure Summary       Date: 12/09/24 Room / Location: [place] [address] / [facility]    Anesthesia Start: 1953 Anesthesia Stop: 2122    Procedure: BILATERAL PULMONARY THROMBECTOMY MECHANICAL PERCUTANEOUS / PENUMBRA (Groin) Diagnosis:       Acute pulmonary embolism, unspecified pulmonary embolism type, unspecified whether acute cor pulmonale present (HCC)      (Acute pulmonary embolism, unspecified pulmonary embolism type, unspecified whether acute cor pulmonale present (HCC) [I26.99])    Surgeons: Mark Rueda MD Responsible Provider: Bull López MD    Anesthesia Type: MAC ASA Status: 3 - Emergent            Anesthesia Type: No value filed.    Imani Phase I:      Imani Phase II:      Anesthesia Post Evaluation    Patient location during evaluation: ICU  Patient participation: complete - patient participated  Level of consciousness: awake and alert  Pain score: 0  Airway patency: patent  Nausea & Vomiting: no vomiting and no nausea  Cardiovascular status: hemodynamically stable  Respiratory status: acceptable  Hydration status: stable  Pain management: adequate    No notable events documented.

## 2024-12-10 NOTE — PROGRESS NOTES
Division of Vascular Surgery             Progress Note      Name: Luci Castañeda  MRN: [id number]         Overnight Events:     Transfusion with 1 unit of blood      Subjective:     Patient is seen and examined this morning.  Her hemoglobin was 6.8, 1 unit of blood was transfused with recheck of hemoglobin 8.6 from 6.4.  Patient denies of shortness of breath, chest pain, she is currently on 4 L of oxygen, and heparin drip.    Physical Exam:     Vitals:  BP (!) 98/51   Pulse 82   Temp 98.4 °F (36.9 °C) (Oral)   Resp 21   Ht 1.727 m (5' 8\")   Wt 102.1 kg (225 lb 1.4 oz)   SpO2 99%   BMI 34.22 kg/m²     General appearance - alert, well appearing and in no acute distress  Mental status - oriented to person, place and time with normal affect  Head - normocephalic and atraumatic  Neck - supple, no carotid bruits, thyroid not palpable, no JVD  Chest -on nasal cannula, clear to auscultation, normal effort  Heart - normal rate, regular rhythm, no murmurs  Abdomen - soft, non-tender, non-distended  Skin - no gross lesions, rashes, or induration noted  Vascular Exam -bilateral PT and DP are dopplerable, no groin hematoma on bilateral groin.    Data:  CBC:   Recent Labs     12/09/24  1130 12/10/24  0111 12/10/24  0646   WBC 4.2 3.6 5.8   HGB 8.6* 6.4* 8.6*   PLT 30* See Reflexed IPF Result See Reflexed IPF Result     Chemistry:   Recent Labs     12/09/24  1130 12/09/24  1415 12/10/24  0646     --  138   K 3.5*  --  3.7   CL 99  --  105   CO2 23  --  21   GLUCOSE 119*  --  113*   BUN 8  --  10   CREATININE 0.9  --  1.0*   MG 0.9*  --   --    ANIONGAP 16  --  12   LABGLOM 73  --  64   CALCIUM 8.5*  --  8.0*   TROPHS 24* 24*  --      Hepatic:   Recent Labs     12/09/24  1130   AST 10   ALT <5*   ALKPHOS 109*   BILITOT 0.7   BILIDIR 0.2     Coagulation:   Recent Labs     12/09/24  1130 12/09/24  1415   APTT  --  30.7   INR 1.3  --          Radiology Review:    XR FOOT RIGHT (2 VIEWS)    Result Date: 12/10/2024  No  acute osseous abnormality.     CT CHEST PULMONARY EMBOLISM W CONTRAST    Result Date: 12/9/2024  Partially occlusive saddle embolus with additional pulmonary emboli within the left lower lobe and right lower lobe segmental pulmonary arteries.  The main pulmonary artery is dilated and the right ventricle is prominent consistent with right heart strain. These findings were discussed with the ordering provider Nurse Cyndy Castillo at 1:09 p.m. on 9 December 2024              Assessment:     59-year-old female with saddle pulmonary embolism with right heart strain on CT, currently undergoing treatment for breast cancer       Plan:     Has had mechanical percutaneous thrombectomy for pulmonary embolism  Okay to transition to oral anticoagulation with Eliquis 5 mg twice daily per vascular standpoint, patient will need minimal 1 year of anticoagulation treatment  Patient scheduled for lumpectomy with bilateral breast reduction surgery with Dr. Yeung, vascular team spoke with him and discussed patient's current diagnosis and planned treatment.  He agrees will likely change procedure to strictly lumpectomy and discuss alternate timing for breast reduction.  Recommend to work with PT OT, titrate oxygen down as tolerated  Medical management per [facility] [place]  Electronically signed by Roderick Frey DO  on 12/10/2024 at 1:03 PM

## 2024-12-10 NOTE — TELEPHONE ENCOUNTER
Name: Luci Castañeda  : 1965  MRN: [id number]    Oncology Navigation Follow-Up Note    Contact Type:  Telephone    Notes: Writer received call form pt's sister updating navigator that she was taken to ER last night and found to have a saddle PE. SHe underwent thrombectomy and is currently in ICU. Writer sent perfect serve message to Dr Collier and Dr Wilkes  updating them on above.       Electronically signed by Veronica Bennett RN on 12/10/2024 at 9:24 AM

## 2024-12-10 NOTE — CARE COORDINATION
Case Management Assessment  Initial Evaluation    Date/Time of Evaluation: 12/10/2024 1:19 PM  Assessment Completed by: ROBSON MORENO RN    If patient is discharged prior to next notation, then this note serves as note for discharge by case management.    Patient Name: Luci Castañeda                   YOB: 1965  Diagnosis: Acute cor pulmonale due to saddle embolus of pulmonary artery (HCC) [I26.02]  Acute saddle pulmonary embolism, unspecified whether acute cor pulmonale present (HCC) [I26.92]                   Date / Time: 12/9/2024  3:49 PM    Patient Admission Status: Inpatient   Readmission Risk (Low < 19, Mod (19-27), High > 27): Readmission Risk Score: 22.8    Current PCP: Nabil Ly MD  PCP verified by CM? (P) Yes (Nabil Ly MD)    Chart Reviewed: Yes      History Provided by: (P) Patient  Patient Orientation: (P) Alert and Oriented    Patient Cognition:      Hospitalization in the last 30 days (Readmission):  No    If yes, Readmission Assessment in CM Navigator will be completed.    Advance Directives:      Code Status: Full Code   Patient's Primary Decision Maker is: (P) Legal Next of Kin    Primary Decision Maker: Janell Griggs - Brother/Sister - [phone]    Discharge Planning:    Patient lives with: (P) Alone Type of Home: (P) House  Primary Care Giver: (P) Self  Patient Support Systems include: (P) Family Members, Friends/Neighbors   Current Financial resources: (P) Other (Comment) ([facility])  Current community resources: (P) None  Current services prior to admission: (P) None            Current DME:              Type of Home Care services:  (P) PT, OT, Nursing Services, Skilled Therapy    ADLS  Prior functional level: (P) Independent in ADLs/IADLs  Current functional level: (P) Independent in ADLs/IADLs    PT AM-PAC:   /24  OT AM-PAC:   /24    Family can provide assistance at DC: (P) Other (comment) (would like home care)  Would you like Case Management to discuss the  Luci and her family were provided with a choice of provider and agrees with the discharge plan. Freedom of choice list with basic dialogue that supports the patient's individualized plan of care/goals and shares the quality data associated with the providers was provided to: (P) Patient   Patient Representative Name:       The Patient and/or Patient Representative Agree with the Discharge Plan? (P) Yes (Home w/ HC)    ROBSON MORENO RN  Case Management Department  Ph: [phone] Fax: [phone]

## 2024-12-10 NOTE — OP NOTE
Operative Note      Patient: Luci Castañeda  YOB: 1965  MRN: [id number]    Date of Procedure: 12/9/2024    Pre-Op Diagnosis Codes:      * Saddle embolism of pulmonary artery (HCC) [I26.92]    Post-Op Diagnosis: Same       Procedure:  1.  Right common femoral venous access under ultrasound guidance.  2.  Percutaneous mechanical thrombectomy of the right upper and lower pulmonary arteries with penumbra device with extirpation of clot.  3.  Percutaneous mechanical thrombectomy of the left upper and lower pulmonary arteries with penumbra device with extirpation of clot.    Surgeon(s):  Mark Rueda MD    Assistant:   * No surgical staff found *    Anesthesia: Monitor Anesthesia Care    Estimated Blood Loss (mL): 200     Complications: None    Specimens:   * No specimens in log *    Implants:  * No implants in log *      Drains:   Nephrostomy Tube Right Flank 24 fr (Active)       Findings:  Infection Present At Time Of Surgery (PATOS) (choose all levels that have infection present):  No infection present  Other Findings: Subacute to old thrombus was retrieved from the pulmonary artery.  I think that this DVT was older when it embolized.  Less than the expected amount of thrombus was retrieved from the artery.  The patient tolerated all of this well.  The heart rate was in the 80s at the conclusion of the procedure with saturations at 100% on facemask.    Detailed Description of Procedure:   Patient brought to the operating room and placed in the supine position with the arms tucked at the sides.  Patient was given very light sedation at which time the groins were prepped and draped in sterile fashion.  Timeout was held before lidocaine without epinephrine was infiltrated into the skin subcutaneous tissue overlying the right common femoral vein.  The right common femoral vein was then accessed with an 18-gauge needle through which modified Seldinger technique was used to establish an 8 Citizen of [place] sheath.

## 2024-12-11 ENCOUNTER — TELEPHONE (OUTPATIENT)
Dept: ONCOLOGY | Age: 59
End: 2024-12-11

## 2024-12-11 ENCOUNTER — APPOINTMENT (OUTPATIENT)
Age: 59
DRG: 163 | End: 2024-12-11
Payer: COMMERCIAL

## 2024-12-11 PROBLEM — M13.10 MONOARTICULAR ARTHRITIS: Status: ACTIVE | Noted: 2024-12-11

## 2024-12-11 LAB
ABO/RH: NORMAL
ANION GAP SERPL CALCULATED.3IONS-SCNC: 13 MMOL/L (ref 9–16)
ANTI-XA UNFRAC HEPARIN: 0.24 IU/L
ANTI-XA UNFRAC HEPARIN: 0.42 IU/L
ANTIBODY SCREEN: NEGATIVE
ARM BAND NUMBER: NORMAL
BASOPHILS # BLD: 0 K/UL (ref 0–0.2)
BASOPHILS # BLD: 0 K/UL (ref 0–0.2)
BASOPHILS NFR BLD: 0 % (ref 0–2)
BASOPHILS NFR BLD: 0 % (ref 0–2)
BLOOD BANK BLOOD PRODUCT EXPIRATION DATE: NORMAL
BLOOD BANK DISPENSE STATUS: NORMAL
BLOOD BANK DISPENSE STATUS: NORMAL
BLOOD BANK ISBT PRODUCT BLOOD TYPE: 7300
BLOOD BANK PRODUCT CODE: NORMAL
BLOOD BANK SAMPLE EXPIRATION: NORMAL
BLOOD BANK UNIT TYPE AND RH: NORMAL
BPU ID: NORMAL
BPU ID: NORMAL
BUN SERPL-MCNC: 11 MG/DL (ref 8–23)
CALCIUM SERPL-MCNC: 7.8 MG/DL (ref 8.6–10.4)
CHLORIDE SERPL-SCNC: 105 MMOL/L (ref 98–107)
CO2 SERPL-SCNC: 22 MMOL/L (ref 20–31)
COMPONENT: NORMAL
COMPONENT: NORMAL
CREAT SERPL-MCNC: 0.8 MG/DL (ref 0.6–0.9)
CROSSMATCH RESULT: NORMAL
CROSSMATCH RESULT: NORMAL
CRP SERPL HS-MCNC: 220 MG/L (ref 0–5)
ECHO AO ASC DIAM: 3.3 CM
ECHO AO ASCENDING AORTA INDEX: 1.51 CM/M2
ECHO AO ROOT DIAM: 3.2 CM
ECHO AO ROOT INDEX: 1.46 CM/M2
ECHO AV AREA PEAK VELOCITY: 2.5 CM2
ECHO AV AREA VTI: 2.7 CM2
ECHO AV AREA/BSA PEAK VELOCITY: 1.1 CM2/M2
ECHO AV AREA/BSA VTI: 1.2 CM2/M2
ECHO AV MEAN GRADIENT: 4 MMHG
ECHO AV MEAN VELOCITY: 1 M/S
ECHO AV PEAK GRADIENT: 9 MMHG
ECHO AV PEAK VELOCITY: 1.5 M/S
ECHO AV VELOCITY RATIO: 0.8
ECHO AV VTI: 26.2 CM
ECHO BSA: 2.21 M2
ECHO LA AREA 4C: 11.8 CM2
ECHO LA DIAMETER INDEX: 0.91 CM/M2
ECHO LA DIAMETER: 2 CM
ECHO LA MAJOR AXIS: 4 CM
ECHO LA TO AORTIC ROOT RATIO: 0.63
ECHO LA VOL MOD A4C: 29 ML (ref 22–52)
ECHO LA VOLUME INDEX MOD A4C: 13 ML/M2 (ref 16–34)
ECHO LV E' LATERAL VELOCITY: 6.09 CM/S
ECHO LV E' SEPTAL VELOCITY: 6.42 CM/S
ECHO LV EDV A2C: 53 ML
ECHO LV EDV A4C: 59 ML
ECHO LV EDV INDEX A4C: 27 ML/M2
ECHO LV EDV NDEX A2C: 24 ML/M2
ECHO LV EJECTION FRACTION A2C: 53 %
ECHO LV EJECTION FRACTION A4C: 55 %
ECHO LV EJECTION FRACTION BIPLANE: 55 % (ref 55–100)
ECHO LV ESV A2C: 25 ML
ECHO LV ESV A4C: 27 ML
ECHO LV ESV INDEX A2C: 11 ML/M2
ECHO LV ESV INDEX A4C: 12 ML/M2
ECHO LV FRACTIONAL SHORTENING: 43 % (ref 28–44)
ECHO LV GLOBAL LONGITUDINAL STRAIN (GLS): -15.4 %
ECHO LV INTERNAL DIMENSION DIASTOLE INDEX: 2.42 CM/M2
ECHO LV INTERNAL DIMENSION DIASTOLIC: 5.3 CM (ref 3.9–5.3)
ECHO LV INTERNAL DIMENSION SYSTOLIC INDEX: 1.37 CM/M2
ECHO LV INTERNAL DIMENSION SYSTOLIC: 3 CM
ECHO LV IVSD: 1.2 CM (ref 0.6–0.9)
ECHO LV MASS 2D: 256.6 G (ref 67–162)
ECHO LV MASS INDEX 2D: 117.2 G/M2 (ref 43–95)
ECHO LV POSTERIOR WALL DIASTOLIC: 1.2 CM (ref 0.6–0.9)
ECHO LV RELATIVE WALL THICKNESS RATIO: 0.45
ECHO LVOT AREA: 3.1 CM2
ECHO LVOT AV VTI INDEX: 0.84
ECHO LVOT DIAM: 2 CM
ECHO LVOT MEAN GRADIENT: 2 MMHG
ECHO LVOT PEAK GRADIENT: 6 MMHG
ECHO LVOT PEAK VELOCITY: 1.2 M/S
ECHO LVOT STROKE VOLUME INDEX: 31.7 ML/M2
ECHO LVOT SV: 69.4 ML
ECHO LVOT VTI: 22.1 CM
ECHO MV A VELOCITY: 0.76 M/S
ECHO MV E DECELERATION TIME (DT): 196 MS
ECHO MV E VELOCITY: 0.73 M/S
ECHO MV E/A RATIO: 0.96
ECHO MV E/E' LATERAL: 11.99
ECHO MV E/E' RATIO (AVERAGED): 11.68
ECHO MV E/E' SEPTAL: 11.37
ECHO RV FREE WALL PEAK S': 13.9 CM/S
ECHO RV TAPSE: 2.5 CM (ref 1.7–?)
ECHO TV REGURGITANT MAX VELOCITY: 2.17 M/S
ECHO TV REGURGITANT PEAK GRADIENT: 19 MMHG
EOSINOPHIL # BLD: 0 K/UL (ref 0–0.4)
EOSINOPHIL # BLD: 0 K/UL (ref 0–0.4)
EOSINOPHILS RELATIVE PERCENT: 0 % (ref 1–4)
EOSINOPHILS RELATIVE PERCENT: 0 % (ref 1–4)
ERYTHROCYTE [DISTWIDTH] IN BLOOD BY AUTOMATED COUNT: 18 % (ref 11.8–14.4)
ERYTHROCYTE [DISTWIDTH] IN BLOOD BY AUTOMATED COUNT: 18.2 % (ref 11.8–14.4)
ERYTHROCYTE [SEDIMENTATION RATE] IN BLOOD BY PHOTOMETRIC METHOD: 44 MM/HR (ref 0–30)
GFR, ESTIMATED: 84 ML/MIN/1.73M2
GLUCOSE SERPL-MCNC: 98 MG/DL (ref 74–99)
HCT VFR BLD AUTO: 24.6 % (ref 36.3–47.1)
HCT VFR BLD AUTO: 24.8 % (ref 36.3–47.1)
HGB BLD-MCNC: 7.6 G/DL (ref 11.9–15.1)
HGB BLD-MCNC: 7.7 G/DL (ref 11.9–15.1)
IMM GRANULOCYTES # BLD AUTO: 0 K/UL (ref 0–0.3)
IMM GRANULOCYTES # BLD AUTO: 0.04 K/UL (ref 0–0.3)
IMM GRANULOCYTES NFR BLD: 0 %
IMM GRANULOCYTES NFR BLD: 1 %
LYMPHOCYTES NFR BLD: 0.43 K/UL (ref 1–4.8)
LYMPHOCYTES NFR BLD: 0.62 K/UL (ref 1–4.8)
LYMPHOCYTES RELATIVE PERCENT: 11 % (ref 24–44)
LYMPHOCYTES RELATIVE PERCENT: 14 % (ref 24–44)
MCH RBC QN AUTO: 29.2 PG (ref 25.2–33.5)
MCH RBC QN AUTO: 29.5 PG (ref 25.2–33.5)
MCHC RBC AUTO-ENTMCNC: 30.6 G/DL (ref 28.4–34.8)
MCHC RBC AUTO-ENTMCNC: 31.3 G/DL (ref 28.4–34.8)
MCV RBC AUTO: 94.3 FL (ref 82.6–102.9)
MCV RBC AUTO: 95.4 FL (ref 82.6–102.9)
MONOCYTES NFR BLD: 0.47 K/UL (ref 0.1–0.8)
MONOCYTES NFR BLD: 0.66 K/UL (ref 0.1–0.8)
MONOCYTES NFR BLD: 12 % (ref 1–7)
MONOCYTES NFR BLD: 15 % (ref 1–7)
MORPHOLOGY: ABNORMAL
MRSA, DNA, NASAL: NEGATIVE
NEUTROPHILS NFR BLD: 71 % (ref 36–66)
NEUTROPHILS NFR BLD: 76 % (ref 36–66)
NEUTS SEG NFR BLD: 2.96 K/UL (ref 1.8–7.7)
NEUTS SEG NFR BLD: 3.12 K/UL (ref 1.8–7.7)
NRBC BLD-RTO: 0 PER 100 WBC
NRBC BLD-RTO: 0 PER 100 WBC
NUCLEATED RED BLOOD CELLS: 1 PER 100 WBC
PLATELET # BLD AUTO: ABNORMAL K/UL (ref 138–453)
PLATELET # BLD AUTO: ABNORMAL K/UL (ref 138–453)
PLATELET, FLUORESCENCE: 31 K/UL (ref 138–453)
PLATELET, FLUORESCENCE: 35 K/UL (ref 138–453)
PLATELETS.RETICULATED NFR BLD AUTO: 7.3 % (ref 1.1–10.3)
PLATELETS.RETICULATED NFR BLD AUTO: 7.4 % (ref 1.1–10.3)
POTASSIUM SERPL-SCNC: 3.8 MMOL/L (ref 3.7–5.3)
RBC # BLD AUTO: 2.6 M/UL (ref 3.95–5.11)
RBC # BLD AUTO: 2.61 M/UL (ref 3.95–5.11)
SODIUM SERPL-SCNC: 140 MMOL/L (ref 136–145)
SPECIMEN DESCRIPTION: NORMAL
TRANSFUSION STATUS: NORMAL
TRANSFUSION STATUS: NORMAL
UNIT DIVISION: 0
UNIT DIVISION: 0
UNIT ISSUE DATE/TIME: NORMAL
WBC OTHER # BLD: 3.9 K/UL (ref 3.5–11.3)
WBC OTHER # BLD: 4.4 K/UL (ref 3.5–11.3)

## 2024-12-11 PROCEDURE — 99232 SBSQ HOSP IP/OBS MODERATE 35: CPT | Performed by: STUDENT IN AN ORGANIZED HEALTH CARE EDUCATION/TRAINING PROGRAM

## 2024-12-11 PROCEDURE — 2060000000 HC ICU INTERMEDIATE R&B

## 2024-12-11 PROCEDURE — 6370000000 HC RX 637 (ALT 250 FOR IP)

## 2024-12-11 PROCEDURE — 99232 SBSQ HOSP IP/OBS MODERATE 35: CPT | Performed by: INTERNAL MEDICINE

## 2024-12-11 PROCEDURE — 6360000002 HC RX W HCPCS: Performed by: SURGERY

## 2024-12-11 PROCEDURE — 6360000002 HC RX W HCPCS

## 2024-12-11 PROCEDURE — 85652 RBC SED RATE AUTOMATED: CPT

## 2024-12-11 PROCEDURE — 36415 COLL VENOUS BLD VENIPUNCTURE: CPT

## 2024-12-11 PROCEDURE — 89060 EXAM SYNOVIAL FLUID CRYSTALS: CPT

## 2024-12-11 PROCEDURE — 99222 1ST HOSP IP/OBS MODERATE 55: CPT | Performed by: INTERNAL MEDICINE

## 2024-12-11 PROCEDURE — 80048 BASIC METABOLIC PNL TOTAL CA: CPT

## 2024-12-11 PROCEDURE — 85520 HEPARIN ASSAY: CPT

## 2024-12-11 PROCEDURE — 2580000003 HC RX 258: Performed by: SURGERY

## 2024-12-11 PROCEDURE — 0S9M3ZZ DRAINAGE OF RIGHT METATARSAL-PHALANGEAL JOINT, PERCUTANEOUS APPROACH: ICD-10-PCS | Performed by: STUDENT IN AN ORGANIZED HEALTH CARE EDUCATION/TRAINING PROGRAM

## 2024-12-11 PROCEDURE — 87205 SMEAR GRAM STAIN: CPT

## 2024-12-11 PROCEDURE — 93306 TTE W/DOPPLER COMPLETE: CPT

## 2024-12-11 PROCEDURE — 86140 C-REACTIVE PROTEIN: CPT

## 2024-12-11 PROCEDURE — 85055 RETICULATED PLATELET ASSAY: CPT

## 2024-12-11 PROCEDURE — 87075 CULTR BACTERIA EXCEPT BLOOD: CPT

## 2024-12-11 PROCEDURE — 93306 TTE W/DOPPLER COMPLETE: CPT | Performed by: INTERNAL MEDICINE

## 2024-12-11 PROCEDURE — 99233 SBSQ HOSP IP/OBS HIGH 50: CPT | Performed by: INTERNAL MEDICINE

## 2024-12-11 PROCEDURE — 93356 MYOCRD STRAIN IMG SPCKL TRCK: CPT | Performed by: INTERNAL MEDICINE

## 2024-12-11 PROCEDURE — 87070 CULTURE OTHR SPECIMN AEROBIC: CPT

## 2024-12-11 PROCEDURE — 85025 COMPLETE CBC W/AUTO DIFF WBC: CPT

## 2024-12-11 RX ORDER — LIDOCAINE HYDROCHLORIDE 10 MG/ML
20 INJECTION, SOLUTION INFILTRATION; PERINEURAL ONCE
Status: COMPLETED | OUTPATIENT
Start: 2024-12-11 | End: 2024-12-11

## 2024-12-11 RX ORDER — LIDOCAINE HYDROCHLORIDE 10 MG/ML
20 INJECTION, SOLUTION EPIDURAL; INFILTRATION; INTRACAUDAL; PERINEURAL ONCE
Status: DISCONTINUED | OUTPATIENT
Start: 2024-12-11 | End: 2024-12-11

## 2024-12-11 RX ORDER — ACETAMINOPHEN 650 MG
TABLET, EXTENDED RELEASE ORAL PRN
Status: DISCONTINUED | OUTPATIENT
Start: 2024-12-11 | End: 2024-12-18 | Stop reason: HOSPADM

## 2024-12-11 RX ADMIN — HEPARIN SODIUM 11 UNITS/KG/HR: 10000 INJECTION, SOLUTION INTRAVENOUS at 13:09

## 2024-12-11 RX ADMIN — METOCLOPRAMIDE 10 MG: 5 INJECTION, SOLUTION INTRAMUSCULAR; INTRAVENOUS at 12:54

## 2024-12-11 RX ADMIN — SODIUM CHLORIDE, PRESERVATIVE FREE 10 ML: 5 INJECTION INTRAVENOUS at 21:28

## 2024-12-11 RX ADMIN — FENTANYL CITRATE 50 MCG: 50 INJECTION INTRAMUSCULAR; INTRAVENOUS at 00:13

## 2024-12-11 RX ADMIN — METOCLOPRAMIDE 10 MG: 5 INJECTION, SOLUTION INTRAMUSCULAR; INTRAVENOUS at 18:37

## 2024-12-11 RX ADMIN — OXYCODONE 5 MG: 5 TABLET ORAL at 16:15

## 2024-12-11 RX ADMIN — COLCHICINE 0.6 MG: 0.6 TABLET, FILM COATED ORAL at 21:27

## 2024-12-11 RX ADMIN — LIDOCAINE HYDROCHLORIDE 20 ML: 10 INJECTION, SOLUTION INFILTRATION; PERINEURAL at 14:30

## 2024-12-11 RX ADMIN — FENTANYL CITRATE 50 MCG: 50 INJECTION INTRAMUSCULAR; INTRAVENOUS at 21:33

## 2024-12-11 RX ADMIN — HEPARIN SODIUM 4100 UNITS: 1000 INJECTION INTRAVENOUS; SUBCUTANEOUS at 13:06

## 2024-12-11 RX ADMIN — ONDANSETRON 4 MG: 2 INJECTION INTRAMUSCULAR; INTRAVENOUS at 21:34

## 2024-12-11 RX ADMIN — FENTANYL CITRATE 50 MCG: 50 INJECTION INTRAMUSCULAR; INTRAVENOUS at 02:12

## 2024-12-11 RX ADMIN — FENTANYL CITRATE 50 MCG: 50 INJECTION INTRAMUSCULAR; INTRAVENOUS at 12:55

## 2024-12-11 ASSESSMENT — PAIN DESCRIPTION - ORIENTATION
ORIENTATION: RIGHT

## 2024-12-11 ASSESSMENT — PAIN DESCRIPTION - DESCRIPTORS
DESCRIPTORS: ACHING
DESCRIPTORS: STABBING
DESCRIPTORS: ACHING;THROBBING

## 2024-12-11 ASSESSMENT — PAIN SCALES - GENERAL
PAINLEVEL_OUTOF10: 0
PAINLEVEL_OUTOF10: 9
PAINLEVEL_OUTOF10: 0
PAINLEVEL_OUTOF10: 4
PAINLEVEL_OUTOF10: 7
PAINLEVEL_OUTOF10: 7
PAINLEVEL_OUTOF10: 0
PAINLEVEL_OUTOF10: 5
PAINLEVEL_OUTOF10: 0
PAINLEVEL_OUTOF10: 7
PAINLEVEL_OUTOF10: 5

## 2024-12-11 ASSESSMENT — PAIN DESCRIPTION - LOCATION
LOCATION: FOOT

## 2024-12-11 NOTE — PROGRESS NOTES
Progress Note  Podiatric Medicine and Surgery     Subjective     Chief Complaint: Right MTP joint swelling, gout vs septic joint     Interval History:   - Patient seen and examined at bedside  - No acute events overnight  - Afebrile, vital signs stable   - Denies constitutional symptoms at this time  - Patient continues to complain of significant pain to right first MPJ.  She says she felt colchicine did not help    Labs:  WBC:  Lab Results   Component Value Date    WBC 3.9 12/11/2024       CRP:  Lab Results   Component Value Date    .0 (H) 12/11/2024       ESR:  Lab Results   Component Value Date    SEDRATE 44 (H) 12/11/2024       HPI:  Luci Castañeda is a 59 y.o. female seen at [facility] for right foot pain at the first MPJ.  Patient says she has had pain to the first MPJ for 3 days.  She describes the pain as severe and says even the slightest touch is painful.  Patient was admitted for saddle pulmonary embolism.  Patient underwent a mechanical thrombectomy on 12/9/2024. Patient has a past medical history of hyperuricemia, kidney stones, malignant neoplasm of left breast, CAD, asthma, GERD, hypertension.     PCP is Nabil Ly MD    ROS: Denies N/V/F/C/SOB/CP.  Otherwise negative except at stated in the HPI.     Past Medical History   has a past medical history of Abnormal uterine bleeding (AUB), Arthritis, Asthma, Benign familial tremor, CAD (coronary artery disease), Cancer (HCC), CKD (chronic kidney disease) stage 1, GFR 90 ml/min or greater, Class 2 severe obesity with serious comorbidity and body mass index (BMI) of 39.0 to 39.9 in adult, COVID-19 vaccine administered, Cystinuria (HCC), Dyspnea on exertion, Flank pain, GERD (gastroesophageal reflux disease), History of Bell's palsy, Hypertension, Hyperuricemia, Hypothyroidism, Kidney stones, Leg swelling, Malignant neoplasm of upper-outer quadrant of left breast in female, estrogen receptor negative (HCC), Midline low back pain with right-sided  procedure.  Right foot foot was confirmed. Timeout performed with patient. 10 cc of 1% lidocaine plain was used for a local block.  ChloraPrep was used to prep the joint in a semisterile fashion.  A 22-gauge needle was used to aspirate the first MPJ.  Roughly 0.5 cc of hematoma was aspirated.  No purulent drainage noted or tophi. Dressing applied. Hemostasis spontaneous with direct pressure. Patient tolerated procedure well.  Joint aspirate sent for culture, crystals, and cell count.      Yissel Starkey DPM   Podiatric Medicine & Surgery   12/11/2024 at 1:20 PM

## 2024-12-11 NOTE — PLAN OF CARE
Problem: Discharge Planning  Goal: Discharge to home or other facility with appropriate resources  Outcome: Progressing     Problem: Pain  Goal: Verbalizes/displays adequate comfort level or baseline comfort level  Outcome: Progressing     Problem: Safety - Adult  Goal: Free from fall injury  Outcome: Progressing     Problem: ABCDS Injury Assessment  Goal: Absence of physical injury  Outcome: Progressing     Problem: Respiratory - Adult  Goal: Achieves optimal ventilation and oxygenation  Outcome: Progressing     Problem: Cardiovascular - Adult  Goal: Maintains optimal cardiac output and hemodynamic stability  Outcome: Progressing  Goal: Absence of cardiac dysrhythmias or at baseline  Outcome: Progressing     Problem: Skin/Tissue Integrity - Adult  Goal: Skin integrity remains intact  Outcome: Progressing  Goal: Incisions, wounds, or drain sites healing without S/S of infection  Outcome: Progressing  Goal: Oral mucous membranes remain intact  Outcome: Progressing     Problem: Musculoskeletal - Adult  Goal: Return mobility to safest level of function  Outcome: Progressing  Goal: Maintain proper alignment of affected body part  Outcome: Progressing  Goal: Return ADL status to a safe level of function  Outcome: Progressing     Problem: Gastrointestinal - Adult  Goal: Minimal or absence of nausea and vomiting  Outcome: Progressing  Goal: Maintains or returns to baseline bowel function  Outcome: Progressing  Goal: Maintains adequate nutritional intake  Outcome: Progressing  Goal: Establish and maintain optimal ostomy function  Outcome: Progressing     Problem: Infection - Adult  Goal: Absence of infection at discharge  Outcome: Progressing  Goal: Absence of infection during hospitalization  Outcome: Progressing  Goal: Absence of fever/infection during anticipated neutropenic period  Outcome: Progressing     Problem: Metabolic/Fluid and Electrolytes - Adult  Goal: Electrolytes maintained within normal limits  Outcome:

## 2024-12-11 NOTE — PROGRESS NOTES
Division of Vascular Surgery             Progress Note      Name: Luci Castañeda  MRN: [id number]         Overnight Events:     No acute overnight events      Subjective:     Patient is seen and examined this morning.  Afebrile vital signs stable, normal heart rate.  Resting comfortably remains on 3 L nasal cannula.  Case management working on certification for home health care.    Physical Exam:     Vitals:  BP (!) 142/80   Pulse 93   Temp 98.3 °F (36.8 °C) (Oral)   Resp 24   Ht 1.727 m (5' 8\")   Wt 107 kg (235 lb 14.3 oz)   SpO2 97%   BMI 35.87 kg/m²     General appearance - alert, well appearing and in no acute distress  Mental status - oriented to person, place and time with normal affect  Head - normocephalic and atraumatic  Neck - supple, no carotid bruits, thyroid not palpable, no JVD  Chest -on nasal cannula, clear to auscultation, normal effort  Heart - normal rate, regular rhythm, no murmurs  Abdomen - soft, non-tender, non-distended  Skin - no gross lesions, rashes, or induration noted  Vascular Exam -bilateral PT and DP are dopplerable, no groin hematoma on bilateral groin.    Data:  CBC:   Recent Labs     12/10/24  0646 12/10/24  1345 12/11/24  0832   WBC 5.8 4.4 3.9   HGB 8.6* 7.7* 7.6*   PLT See Reflexed IPF Result See Reflexed IPF Result See Reflexed IPF Result     Chemistry:   Recent Labs     12/09/24  1130 12/09/24  1415 12/10/24  0646 12/11/24  0832     --  138 140   K 3.5*  --  3.7 3.8   CL 99  --  105 105   CO2 23  --  21 22   GLUCOSE 119*  --  113* 98   BUN 8  --  10 11   CREATININE 0.9  --  1.0* 0.8   MG 0.9*  --   --   --    ANIONGAP 16  --  12 13   LABGLOM 73  --  64 84   CALCIUM 8.5*  --  8.0* 7.8*   TROPHS 24* 24*  --   --      Hepatic:   Recent Labs     12/09/24  1130   AST 10   ALT <5*   ALKPHOS 109*   BILITOT 0.7   BILIDIR 0.2     Coagulation:   Recent Labs     12/09/24  1130 12/09/24  1415   APTT  --  30.7   INR 1.3  --          Radiology Review:    XR FOOT RIGHT (2  Class II - visualization of the soft palate, fauces, and uvula

## 2024-12-11 NOTE — PLAN OF CARE
Problem: Discharge Planning  Goal: Discharge to home or other facility with appropriate resources  12/11/2024 1119 by Linsey Lazo RN  Outcome: Progressing  Flowsheets (Taken 12/11/2024 0800)  Discharge to home or other facility with appropriate resources: Identify barriers to discharge with patient and caregiver  12/11/2024 0239 by Cyndy Santos RN  Outcome: Progressing     Problem: Pain  Goal: Verbalizes/displays adequate comfort level or baseline comfort level  12/11/2024 1119 by Linsey Lazo RN  Outcome: Progressing  12/11/2024 0239 by Cyndy Santos RN  Outcome: Progressing     Problem: Safety - Adult  Goal: Free from fall injury  12/11/2024 1119 by Linsey Lazo RN  Outcome: Progressing  12/11/2024 0239 by Cyndy Santos RN  Outcome: Progressing     Problem: ABCDS Injury Assessment  Goal: Absence of physical injury  12/11/2024 1119 by Linsey Lazo RN  Outcome: Progressing  12/11/2024 0239 by Cyndy Santos RN  Outcome: Progressing     Problem: Respiratory - Adult  Goal: Achieves optimal ventilation and oxygenation  12/11/2024 1119 by Linsey Lazo RN  Outcome: Progressing  Flowsheets (Taken 12/11/2024 0800)  Achieves optimal ventilation and oxygenation: Assess for changes in respiratory status  12/11/2024 0239 by Cyndy Santos RN  Outcome: Progressing     Problem: Cardiovascular - Adult  Goal: Maintains optimal cardiac output and hemodynamic stability  12/11/2024 1119 by Linsey Lazo RN  Outcome: Progressing  Flowsheets (Taken 12/11/2024 0800)  Maintains optimal cardiac output and hemodynamic stability: Monitor blood pressure and heart rate  12/11/2024 0239 by Cyndy Santos RN  Outcome: Progressing  Goal: Absence of cardiac dysrhythmias or at baseline  12/11/2024 1119 by Linsey Lazo RN  Outcome: Progressing  Flowsheets (Taken 12/11/2024 0800)  Absence of cardiac dysrhythmias or at baseline: Monitor cardiac rate and rhythm  12/11/2024 0239 by Cyndy Santos RN  Outcome:  Function: Administer medication as ordered  12/11/2024 0239 by Cyndy Santos RN  Outcome: Progressing     Problem: Gastrointestinal - Adult  Goal: Minimal or absence of nausea and vomiting  12/11/2024 1119 by Linsey Lazo RN  Outcome: Progressing  Flowsheets (Taken 12/11/2024 0800)  Minimal or absence of nausea and vomiting: Administer IV fluids as ordered to ensure adequate hydration  12/11/2024 0239 by Cyndy Santos RN  Outcome: Progressing  Goal: Maintains or returns to baseline bowel function  12/11/2024 1119 by Linsey Lazo RN  Outcome: Progressing  Flowsheets (Taken 12/11/2024 0800)  Maintains or returns to baseline bowel function: Assess bowel function  12/11/2024 0239 by Cyndy Santos RN  Outcome: Progressing  Goal: Maintains adequate nutritional intake  12/11/2024 1119 by Linsey Lazo RN  Outcome: Progressing  Flowsheets (Taken 12/11/2024 0800)  Maintains adequate nutritional intake: Monitor percentage of each meal consumed  12/11/2024 0239 by Cyndy Santos RN  Outcome: Progressing  Goal: Establish and maintain optimal ostomy function  12/11/2024 1119 by Linsey Lazo RN  Outcome: Progressing  Flowsheets (Taken 12/11/2024 0800)  Establish and maintain optimal ostomy function: Nutrition consult  12/11/2024 0239 by Cyndy Santos RN  Outcome: Progressing     Problem: Infection - Adult  Goal: Absence of infection at discharge  12/11/2024 1119 by Linsey Lazo RN  Outcome: Progressing  Flowsheets (Taken 12/11/2024 0800)  Absence of infection at discharge: Assess and monitor for signs and symptoms of infection  12/11/2024 0239 by Cyndy Santos RN  Outcome: Progressing  Goal: Absence of infection during hospitalization  12/11/2024 1119 by Linsey Lazo RN  Outcome: Progressing  Flowsheets (Taken 12/11/2024 0800)  Absence of infection during hospitalization: Assess and monitor for signs and symptoms of infection  12/11/2024 0239 by Cyndy Santos RN  Outcome: Progressing  Goal: Absence of

## 2024-12-11 NOTE — TELEPHONE ENCOUNTER
Late entry 12/10/25    Name: Luci Castañeda  : 1965  MRN: [id number]    Oncology Navigation Follow-Up Note    Contact Type:  Telephone    Notes: Writer spoke with pt who called to update navigator on current admission. Pt reports she is feeling better but still is in the ICU. She inquired about appts on . Writer instructed pt to call navigator when she is discharged and we can address rescheduling if needed at that time.     Electronically signed by Veronica Bennett RN on 2024 at 11:49 AM

## 2024-12-11 NOTE — PROGRESS NOTES
Physician Progress Note      PATIENT:               GILA STREETER  CSN #:                  [id number]  :                       1965  ADMIT DATE:       2024 3:49 PM  DISCH DATE:  RESPONDING  PROVIDER #:        Frederick Wilson DO          QUERY TEXT:    Pt admitted with Acute pulmonary embolism per review HGB on admission was 8.6   down to 6.4 on 12/10 and ordered PRBC. Vascular consult notes on  that   chemo has rendered her somewhat anemia. on  patient had thrombectomy and   op note states EBL of 200. . If possible, please document in progress notes   and discharge summary further specificity regarding the acuity and type of   anemia:    The medical record reflects the following:  Risk Factors: age of 60, history of breast cancer s/p chemo, thrombocytopenia  Clinical Indicators: Pt admitted with Acute pulmonary embolism per review HGB   on admission was 8.6 down to 6.4 on 12/10 and ordered PRBC. Vascular consult   notes on  that chemo has rendered her somewhat anemia. on  patient had   thrombectomy and op note states EBL of 200.  Treatment: HGB monitoring, PRBC transfusion    Thank You Jaylen BURGOS BSN CCDS  Options provided:  -- Anemia due to combination of acute blood loss and antineoplastic   chemotherapy  -- Anemia due to acute blood loss  -- Anemia due to chronic blood loss  -- Anemia due to acute on chronic blood loss  -- Anemia due to postoperative blood loss  -- Anemia due to antineoplastic chemotherapy  -- Other - I will add my own diagnosis  -- Disagree - Not applicable / Not valid  -- Disagree - Clinically unable to determine / Unknown  -- Refer to Clinical Documentation Reviewer    PROVIDER RESPONSE TEXT:    This patient has anemia due to combination of acute blood loss and   antineoplastic chemotherapy    Query created by: Nabila Benson on 12/10/2024 7:53 AM      Electronically signed by:  Frederick Wilson DO 2024 11:40 AM

## 2024-12-11 NOTE — PROGRESS NOTES
[facility]  Office: [phone]  Michael Lewis DO, Bart Lewis DO, Ángel Harry DO, Geremias Tsai DO, Cathy García MD, Kya Arellano MD, Jonathon Gilmore MD, Kalli Chou MD,  Alvin Frey MD, Opal Wang MD, Jennifer Cooney MD,  Geronimo Sams DO, Thelma Quijano MD, Shreyas Duarte MD, Franklyn Lewis DO, Regi Ríos MD,  Marky Russell DO, Yamilet Monroe MD, Qian Rain MD, Suzy Faith MD, Artur Irby MD,  Colton Clark MD, Jaky Reynoso MD, Amarilys Robledo MD, Francia Cantor MD, Krzysztof Garcia MD, Jhonatan Shipley MD, Frederick Wilson DO, Jabier Plummer MD, Shirley Waterhouse, CNP,  Millie Moe, CNP, Frederick Street, CNP,  Josie Guthrie, CAT, Alicia Sheldon, CNP, Diamond Kwong, CNP, Mai Copeland, CNP, Radha Fowler, CNP, Angeles Enriquez PA-C, DERIK MuirC, Junie Kamara, CNP, Mitzy Sanz, CNP,  Laura Gonzalez, CNP, Grace Walter, CNP,  Yeny Gandhi, CNP, Ada Nelson, CNP         [facility]   IN-PATIENT SERVICE   [facility]    Progress Note    12/11/2024    11:41 AM    Name:   Luci Castañeda  MRN:     [id number]     Acct:      [id number]   Room:   2011/2011-01  IP Day:  2  Admit Date:  12/9/2024  3:49 PM    PCP:   Nabil Ly MD  Code Status:  Full Code    Subjective:     C/C:   Chief Complaint   Patient presents with    Toe Injury    Pulmonary Embolism     Pburg tx- saddle       She notes that she feels okay this morning.  Denies any shortness of breath, chest pain, abdominal complaints.  Continues to endorse pain in her right foot along with swelling of the foot.    Brief History:     59-year-old female with past medical history of breast cancer currently undergoing chemotherapy, hypertension, hypothyroidism, CKD presented to [facility] as a transfer from [facility] due to saddle pulmonary embolism.  She was transferred to [facility]U and had mechanical thrombectomy on 12/9.  She was monitored in

## 2024-12-11 NOTE — PROGRESS NOTES
Occupational Therapy    [facility]  Occupational Therapy Not Seen Note    DATE: 2024    NAME: Luci Castañeda  MRN: [id number]   : 1965      Patient not seen this date for Occupational Therapy due to:    Patient is not appropriate for active participation in OT evaluation/treatment at this time d/t N/V in a.m, in p.m patient reports she just had arthocentesis , will f/u next day     Next Scheduled Treatment:     Electronically signed by Chula Beverly OT on 2024 at 2:49 PM

## 2024-12-11 NOTE — CARE COORDINATION
Met with patient to discuss transitional planning. [facility] Referral sent

## 2024-12-11 NOTE — PROGRESS NOTES
Spiritual Health History and Assessment/Progress Note  [facility]    (P) Spiritual/Emotional Needs,  ,  ,      Name: Luci Castañeda MRN: [id number]    Age: 59 y.o.     Sex: female   Language: English   Buddhist: Latter-day   Acute saddle pulmonary embolism (HCC)     Date: 12/11/2024            Total Time Calculated: (P) 15 min              Spiritual Assessment began in [facility] CAR 2- STEPDOWN        Referral/Consult From: (P) Other (comment) ([place] leader)   Encounter Overview/Reason: (P) Spiritual/Emotional Needs  Service Provided For: (P) Patient and family together    Karly, Belief, Meaning:   Patient is connected with a karly tradition or spiritual practice  Family/Friends Other:        Importance and Influence:  Patient has no beliefs influential to healthcare decision-making identified during this visit  Family/Friends Other:      Community:  Patient feels well-supported. Support system includes: Other: Family  Family/Friends Other:      Assessment and Plan of Care:     Patient Interventions include: Facilitated expression of thoughts and feelings, Affirmed coping skills/support systems, and Other: Prayer  Family/Friends Interventions include: Other:      Patient Plan of Care: Spiritual Care available upon further referral  Family/Friends Plan of Care: Spiritual Care available upon further referral    Electronically signed by MAMADOU Mata on 12/11/2024 at 2:21 PM

## 2024-12-11 NOTE — CONSULTS
[facility] - Initial Consult Note  Today's Date and Time: 12/11/2024, 4:53 PM    Impression :   Saddle pulmonary embolus, partially occlusive with Rt heart strain  Pulmonary emboli in RLL and LLL segmental arteries  S/P percutaneous mechanical thromboembolectomy on 12-9-24  Lt breast Carcinoma. On Keytruda. Planned lumpectomy.  Monoarticular arthritis:  Rt hallux pain and swelling with concern for potential septic joint   S/P Rt hallux  MPJ aspirate 12-11-24  Suspect gouty arthritis    Recommendations:   Monitor off antibiotics   Colchicine  Await culture data and crystal analysis    Medical Decision Making/Summary/Discussion:12/11/2024     Daily Elements of Decision Making provided by Consulting Physician:    Note: I have independently performed the steps listed below as part of the medical decision making and evaluation.    Review of current Problems:  Today I am seeing and examining the patient for the following problems:  Saddle pulmonary embolus, partially occlusive with Rt heart strain  Pulmonary emboli in RLL and LLL segmental arteries  S/P percutaneous mechanical thromboembolectomy on 12-9-24  Lt breast Carcinoma. On Keytruda. Planned lumpectomy.  Hyperuricemia   Monoarticular arthritis:  Rt hallux pain and swelling with concern for potential septic joint   S/P Rt hallux  MPJ aspirate 12-11-24  Suspect gouty arthritis  Evaluation of Patient:  Patient with multiple problems   Evaluated for monoarticular arthritis  Please see daily details in Interval Changes Section  Changes in physical exam:  Dyspnea  Monoarticular arthritis  Please see daily details in Physical Exam section and in Interval Changes Section  Changes in ROS:  Improved  Please see daily details in Review of Symptoms section and in Interval Changes Section  Discussion with Referring Physician or Service  Dr. Wilson  Laboratory and Radiologic Studies with personal review of radiologic

## 2024-12-11 NOTE — PROGRESS NOTES
Report given to nurse Cyndy RN on Car 2. All questions answered. Patient being transferred with belongs and medications.

## 2024-12-11 NOTE — CONSULTS
Today's Date: 12/10/2024  Patient Name: Luci Castañeda  Date of admission: 12/9/2024  3:49 PM  Patient's age: 59 y.o., 1965  Admission Dx: Acute cor pulmonale due to saddle embolus of pulmonary artery (HCC) [I26.02]  Acute saddle pulmonary embolism, unspecified whether acute cor pulmonale present (HCC) [I26.92]    Reason for Consult: management recommendations  Requesting Physician: Db Nagy MD    CHIEF COMPLAINT: Acute pulmonary embolism    History Obtained From:  electronic medical record    HISTORY OF PRESENT ILLNESS:      The patient is a 59 y.o.   female who is admitted to the hospital transferred from [facility].  She presented with leg swelling and workup for DVT and CT scan showed saddle pulmonary embolism with right heart strain.  Patient was admitted to the hospital and then underwent mechanical thrombectomy  Patient is known to us with history of breast cancer.  She is on neoadjuvant chemotherapy/immunotherapy with pembrolizumab.  Patient has previous nephrectomy 1988  Past Medical History:   has a past medical history of Abnormal uterine bleeding (AUB), Arthritis, Asthma, Benign familial tremor, CAD (coronary artery disease), Cancer (HCC), CKD (chronic kidney disease) stage 1, GFR 90 ml/min or greater, Class 2 severe obesity with serious comorbidity and body mass index (BMI) of 39.0 to 39.9 in adult, COVID-19 vaccine administered, Cystinuria (HCC), Dyspnea on exertion, Flank pain, GERD (gastroesophageal reflux disease), History of Bell's palsy, Hypertension, Hyperuricemia, Hypothyroidism, Kidney stones, Leg swelling, Malignant neoplasm of upper-outer quadrant of left breast in female, estrogen receptor negative (HCC), Midline low back pain with right-sided sciatica, Nephrostomy status (HCC), Shoulder impingement, Snores, Solitary kidney, acquired, Under care of service provider, Under care of service provider, Under care of service provider, Under care of service provider,

## 2024-12-12 ENCOUNTER — TELEPHONE (OUTPATIENT)
Dept: ONCOLOGY | Age: 59
End: 2024-12-12

## 2024-12-12 ENCOUNTER — CARE COORDINATION (OUTPATIENT)
Dept: OTHER | Facility: CLINIC | Age: 59
End: 2024-12-12

## 2024-12-12 ENCOUNTER — APPOINTMENT (OUTPATIENT)
Dept: MRI IMAGING | Age: 59
DRG: 163 | End: 2024-12-12
Payer: COMMERCIAL

## 2024-12-12 LAB
ANION GAP SERPL CALCULATED.3IONS-SCNC: 12 MMOL/L (ref 9–16)
ANTI-XA UNFRAC HEPARIN: 0.32 IU/L
BASOPHILS # BLD: 0 K/UL (ref 0–0.2)
BASOPHILS NFR BLD: 0 % (ref 0–2)
BUN SERPL-MCNC: 10 MG/DL (ref 8–23)
CALCIUM SERPL-MCNC: 7.6 MG/DL (ref 8.6–10.4)
CHLORIDE SERPL-SCNC: 102 MMOL/L (ref 98–107)
CO2 SERPL-SCNC: 22 MMOL/L (ref 20–31)
CREAT SERPL-MCNC: 0.7 MG/DL (ref 0.6–0.9)
CRP SERPL HS-MCNC: 173 MG/L (ref 0–5)
CRYSTALS FLD MICRO: NEGATIVE
EOSINOPHIL # BLD: 0 K/UL (ref 0–0.44)
EOSINOPHILS RELATIVE PERCENT: 0 % (ref 1–4)
ERYTHROCYTE [DISTWIDTH] IN BLOOD BY AUTOMATED COUNT: 18.1 % (ref 11.8–14.4)
ERYTHROCYTE [SEDIMENTATION RATE] IN BLOOD BY PHOTOMETRIC METHOD: 42 MM/HR (ref 0–30)
GFR, ESTIMATED: >90 ML/MIN/1.73M2
GLUCOSE SERPL-MCNC: 99 MG/DL (ref 74–99)
HCT VFR BLD AUTO: 23.1 % (ref 36.3–47.1)
HCT VFR BLD AUTO: 24.5 % (ref 36.3–47.1)
HGB BLD-MCNC: 7.1 G/DL (ref 11.9–15.1)
HGB BLD-MCNC: 7.7 G/DL (ref 11.9–15.1)
IMM GRANULOCYTES # BLD AUTO: 0.04 K/UL (ref 0–0.3)
IMM GRANULOCYTES NFR BLD: 1 %
LYMPHOCYTES NFR BLD: 0.25 K/UL (ref 1.1–3.7)
LYMPHOCYTES RELATIVE PERCENT: 7 % (ref 24–43)
MCH RBC QN AUTO: 29.2 PG (ref 25.2–33.5)
MCHC RBC AUTO-ENTMCNC: 30.7 G/DL (ref 28.4–34.8)
MCV RBC AUTO: 95.1 FL (ref 82.6–102.9)
MONOCYTES NFR BLD: 0.7 K/UL (ref 0.1–1.2)
MONOCYTES NFR BLD: 20 % (ref 3–12)
MORPHOLOGY: ABNORMAL
NEUTROPHILS NFR BLD: 72 % (ref 36–65)
NEUTS SEG NFR BLD: 2.51 K/UL (ref 1.5–8.1)
NRBC BLD-RTO: 0 PER 100 WBC
PATH INTERP FLD-IMP: NORMAL
PLATELET # BLD AUTO: ABNORMAL K/UL (ref 138–453)
PLATELET, FLUORESCENCE: 40 K/UL (ref 138–453)
PLATELETS.RETICULATED NFR BLD AUTO: 6.7 % (ref 1.1–10.3)
POTASSIUM SERPL-SCNC: 3.7 MMOL/L (ref 3.7–5.3)
RBC # BLD AUTO: 2.43 M/UL (ref 3.95–5.11)
SODIUM SERPL-SCNC: 136 MMOL/L (ref 136–145)
SPECIMEN TYPE: NORMAL
WBC OTHER # BLD: 3.5 K/UL (ref 3.5–11.3)

## 2024-12-12 PROCEDURE — 87205 SMEAR GRAM STAIN: CPT

## 2024-12-12 PROCEDURE — 80048 BASIC METABOLIC PNL TOTAL CA: CPT

## 2024-12-12 PROCEDURE — 6360000002 HC RX W HCPCS

## 2024-12-12 PROCEDURE — 6370000000 HC RX 637 (ALT 250 FOR IP)

## 2024-12-12 PROCEDURE — 2580000003 HC RX 258: Performed by: SURGERY

## 2024-12-12 PROCEDURE — 85018 HEMOGLOBIN: CPT

## 2024-12-12 PROCEDURE — 73718 MRI LOWER EXTREMITY W/O DYE: CPT

## 2024-12-12 PROCEDURE — 89051 BODY FLUID CELL COUNT: CPT

## 2024-12-12 PROCEDURE — 85652 RBC SED RATE AUTOMATED: CPT

## 2024-12-12 PROCEDURE — 6370000000 HC RX 637 (ALT 250 FOR IP): Performed by: STUDENT IN AN ORGANIZED HEALTH CARE EDUCATION/TRAINING PROGRAM

## 2024-12-12 PROCEDURE — 87075 CULTR BACTERIA EXCEPT BLOOD: CPT

## 2024-12-12 PROCEDURE — 85520 HEPARIN ASSAY: CPT

## 2024-12-12 PROCEDURE — 6360000002 HC RX W HCPCS: Performed by: SURGERY

## 2024-12-12 PROCEDURE — 2060000000 HC ICU INTERMEDIATE R&B

## 2024-12-12 PROCEDURE — 99232 SBSQ HOSP IP/OBS MODERATE 35: CPT | Performed by: STUDENT IN AN ORGANIZED HEALTH CARE EDUCATION/TRAINING PROGRAM

## 2024-12-12 PROCEDURE — 99232 SBSQ HOSP IP/OBS MODERATE 35: CPT | Performed by: INTERNAL MEDICINE

## 2024-12-12 PROCEDURE — 85055 RETICULATED PLATELET ASSAY: CPT

## 2024-12-12 PROCEDURE — 87070 CULTURE OTHR SPECIMN AEROBIC: CPT

## 2024-12-12 PROCEDURE — 85014 HEMATOCRIT: CPT

## 2024-12-12 PROCEDURE — 36415 COLL VENOUS BLD VENIPUNCTURE: CPT

## 2024-12-12 PROCEDURE — 85025 COMPLETE CBC W/AUTO DIFF WBC: CPT

## 2024-12-12 PROCEDURE — 86140 C-REACTIVE PROTEIN: CPT

## 2024-12-12 PROCEDURE — 89060 EXAM SYNOVIAL FLUID CRYSTALS: CPT

## 2024-12-12 RX ORDER — PROMETHAZINE HYDROCHLORIDE 25 MG/1
25 TABLET ORAL ONCE
Status: COMPLETED | OUTPATIENT
Start: 2024-12-12 | End: 2024-12-12

## 2024-12-12 RX ADMIN — FENTANYL CITRATE 50 MCG: 50 INJECTION INTRAMUSCULAR; INTRAVENOUS at 20:16

## 2024-12-12 RX ADMIN — COLCHICINE 0.6 MG: 0.6 TABLET, FILM COATED ORAL at 08:55

## 2024-12-12 RX ADMIN — SODIUM CHLORIDE, PRESERVATIVE FREE 10 ML: 5 INJECTION INTRAVENOUS at 21:16

## 2024-12-12 RX ADMIN — SODIUM CHLORIDE, PRESERVATIVE FREE 10 ML: 5 INJECTION INTRAVENOUS at 08:57

## 2024-12-12 RX ADMIN — OXYCODONE 5 MG: 5 TABLET ORAL at 04:07

## 2024-12-12 RX ADMIN — PROMETHAZINE HYDROCHLORIDE 25 MG: 25 TABLET ORAL at 14:40

## 2024-12-12 RX ADMIN — ONDANSETRON 4 MG: 2 INJECTION INTRAMUSCULAR; INTRAVENOUS at 11:07

## 2024-12-12 RX ADMIN — OXYCODONE 5 MG: 5 TABLET ORAL at 16:57

## 2024-12-12 RX ADMIN — FENTANYL CITRATE 50 MCG: 50 INJECTION INTRAMUSCULAR; INTRAVENOUS at 14:35

## 2024-12-12 RX ADMIN — PANTOPRAZOLE SODIUM 40 MG: 40 TABLET, DELAYED RELEASE ORAL at 08:55

## 2024-12-12 RX ADMIN — HEPARIN SODIUM 13 UNITS/KG/HR: 10000 INJECTION, SOLUTION INTRAVENOUS at 07:24

## 2024-12-12 RX ADMIN — FENTANYL CITRATE 50 MCG: 50 INJECTION INTRAMUSCULAR; INTRAVENOUS at 17:58

## 2024-12-12 ASSESSMENT — PAIN DESCRIPTION - DESCRIPTORS
DESCRIPTORS: ACHING;THROBBING
DESCRIPTORS: THROBBING;BURNING
DESCRIPTORS: DISCOMFORT
DESCRIPTORS: BURNING;PRESSURE
DESCRIPTORS: DISCOMFORT;ACHING;TENDER
DESCRIPTORS: ACHING

## 2024-12-12 ASSESSMENT — PAIN DESCRIPTION - ORIENTATION
ORIENTATION: RIGHT

## 2024-12-12 ASSESSMENT — PAIN SCALES - GENERAL
PAINLEVEL_OUTOF10: 5
PAINLEVEL_OUTOF10: 5
PAINLEVEL_OUTOF10: 0
PAINLEVEL_OUTOF10: 2
PAINLEVEL_OUTOF10: 10
PAINLEVEL_OUTOF10: 4
PAINLEVEL_OUTOF10: 6
PAINLEVEL_OUTOF10: 10
PAINLEVEL_OUTOF10: 2
PAINLEVEL_OUTOF10: 7
PAINLEVEL_OUTOF10: 7
PAINLEVEL_OUTOF10: 8
PAINLEVEL_OUTOF10: 8
PAINLEVEL_OUTOF10: 9

## 2024-12-12 ASSESSMENT — PAIN DESCRIPTION - LOCATION
LOCATION: FOOT
LOCATION: TOE (COMMENT WHICH ONE);OTHER (COMMENT)
LOCATION: FOOT
LOCATION: TOE (COMMENT WHICH ONE)
LOCATION: FOOT
LOCATION: FOOT

## 2024-12-12 NOTE — PROGRESS NOTES
Physical Therapy        Physical Therapy Cancel Note      DATE: 2024    NAME: Luci Castañeda  MRN: [id number]   : 1965      Patient not seen this date for Physical Therapy due to:    Surgery/Procedure: podiatry at bedside. PT will check back as time allows.      Electronically signed by Cyndy Ann PT on 2024 at 3:01 PM

## 2024-12-12 NOTE — PROGRESS NOTES
[facility] - Progress Note    Today's Date and Time: 12/12/2024, 10:59 AM    Impression :   Saddle pulmonary embolus, partially occlusive with Rt heart strain  Pulmonary emboli in RLL and LLL segmental arteries  S/P percutaneous mechanical thromboembolectomy on 12-9-24  Lt breast Carcinoma. On Keytruda. Planned lumpectomy.  Monoarticular arthritis:  Rt hallux pain and swelling with concern for potential septic joint   S/P Rt hallux  MPJ aspirate 12-11-24  Suspect gouty arthritis    Recommendations:   Monitor off antibiotics   Colchicine  Await culture data and crystal analysis    Medical Decision Making/Summary/Discussion:12/12/2024     Daily Elements of Decision Making provided by Consulting Physician:    Note: I have independently performed the steps listed below as part of the medical decision making and evaluation.    Review of current Problems:  Today I am seeing and examining the patient for the following problems:  Saddle pulmonary embolus, partially occlusive with Rt heart strain  Pulmonary emboli in RLL and LLL segmental arteries  S/P percutaneous mechanical thromboembolectomy on 12-9-24  Lt breast Carcinoma. On Keytruda. Planned lumpectomy.  Hyperuricemia   Monoarticular arthritis:  Rt hallux pain and swelling with concern for potential septic joint   S/P Rt hallux  MPJ aspirate 12-11-24  Suspect gouty arthritis  Evaluation of Patient:  Patient with multiple problems   Evaluated for monoarticular arthritis  Please see daily details in Interval Changes Section  Changes in physical exam:  Dyspnea  Monoarticular arthritis  Please see daily details in Physical Exam section and in Interval Changes Section  Changes in ROS:  Improved  Please see daily details in Review of Symptoms section and in Interval Changes Section  Discussion with Referring Physician or Service  Dr. Wilson  Laboratory and Radiologic Studies with personal review of radiologic

## 2024-12-12 NOTE — PROGRESS NOTES
Physician Progress Note      PATIENT:               GILA STREETER  [facility] #:                  [id number]  :                       1965  ADMIT DATE:       2024 3:49 PM  DISCH DATE:  RESPONDING  PROVIDER #:        RASHID COLES          QUERY TEXT:    Patient admitted with Acute PE . Noted documentation of acute respiratory   failure in critical care progress notes on 12/10. In order to support the   diagnosis of acute respiratory failure, please include additional clinical   indicators in your documentation.  Or please document if the diagnosis of   acute respiratory failure has been ruled out after further study.    The medical record reflects the following:  Risk Factors: Acute pe  Clinical Indicators: per review arrived on 4 liters of oxygen down to 2 liters   dyspnea on exertion respirations unlabored  Treatment: oxygen, mechanical thrombectomy, icu monitoring    Thank You Jaylen BURGOS BSN CCDS  Options provided:  -- Acute Respiratory Failure as evidenced by, Please document evidence.  -- Acute Respiratory Failure ruled out after study  -- Other - I will add my own diagnosis  -- Disagree - Not applicable / Not valid  -- Disagree - Clinically unable to determine / Unknown  -- Refer to Clinical Documentation Reviewer    PROVIDER RESPONSE TEXT:    This patient is in acute respiratory failure as evidenced by need for oxygen   support and imaging showing pulmonary embolism    Query created by: Nabila Benson on 2024 12:07 PM      Electronically signed by:  RASHID COLES 2024 6:27   AM

## 2024-12-12 NOTE — PROGRESS NOTES
Occupational Therapy    [facility]  Occupational Therapy Not Seen Note    DATE: 2024    NAME: Luci Castañeda  MRN: [id number]   : 1965      Patient not seen this date for Occupational Therapy due to:      Other: Per RN, pt undergoing procedure at bedside with podiatry      Next Scheduled Treatment: Will check back 2024 as able    Electronically signed by Dina Spicer OT on 2024 at 3:01 PM

## 2024-12-12 NOTE — PROGRESS NOTES
NEUTROPHILS      NO BACTERIA SEEN     Culture NO GROWTH 20 HOURS    Culture, Anaerobic and Aerobic [[id number]] Collected: 12/11/24 1638    Order Status: Completed Specimen: Foot, Right Updated: 12/12/24 1417     Specimen Description .FOOT SWAB     Special Requests Site: Foot, Right     Direct Exam NO NEUTROPHILS SEEN      NO BACTERIA SEEN     Culture NO GROWTH 17 HOURS    Culture, Body Fluid (with Gram Stain) [[id number]] Collected: 12/11/24 1430    Order Status: Canceled Specimen: Body Fluid from Synovial Fluid     MRSA DNA Probe, Nasal [[id number]] Collected: 12/09/24 1913    Order Status: Completed Specimen: Nasal swab Updated: 12/11/24 0907     Specimen Description .NASAL SWAB     MRSA, DNA, Nasal NEGATIVE     Comment: NEGATIVE:  MRSA DNA not detected by nucleic acid amplification.                                                    Results should be used as an adjunct to nosocomial control efforts to identify patients   needing enhanced precautions.    The test is not intended to identify patients with staphylococcal infections.  Results   should not be used to guide or monitor treatment for MRSA infections.         Blood Culture 1 [[id number]] Collected: 12/09/24 1150    Order Status: Completed Specimen: Blood Updated: 12/12/24 1439     Specimen Description .BLOOD     Special Requests 20CC LEFT FOREARM     Culture NO GROWTH 3 DAYS    Culture, Blood 2 [[id number]] Collected: 12/09/24 1130    Order Status: Completed Specimen: Blood Updated: 12/12/24 1409     Specimen Description .BLOOD     Special Requests RIGHT ANTECUBITAL 10ML     Culture NO GROWTH 3 DAYS             Assessment     Luci Castañeda is a 59 y.o. female with   Gout vs septic right 1st MPJ, right lower extremity  Hx of hyperuricemia  Hx of kidney stones  Acute saddle PE with heart strain  S/p mechanical thrombectomy (DOS: 12/9/2024)  Malignant neoplasm of left breast  CAD  Asthma  GERD  Hypertension    Principal Problem:    Acute saddle pulmonary  was aspirated and expressed.  Dressing applied. Hemostasis spontaneous with direct pressure. Patient tolerated procedure well.  Joint aspirate sent for culture, crystals, and cell count.      Yissel Starkey DPM   Podiatric Medicine & Surgery   12/12/2024 at 2:41 PM    Given concern for infectious process bedside arthrotomy performed for better specimen collection.  Findings most consistent with severe inflammatory arthropathy of the first metatarsal phalangeal joint.  Sample obtained and sent for joint arthrocentesis evaluation.  We will follow.    I performed a history and physical examination of the patient and discussed management with the resident. I reviewed the resident’s note and agree with the documented findings and plan of care. Any areas of disagreement are noted on the chart. I was personally present for the key portions of any procedures. I have documented in the chart those procedures where I was not present during the key portions. I have reviewed the Podiatry Resident progress note. I agree with the chief complaint, past medical history, past surgical history, allergies, medications, social and family history as documented unless otherwise noted below. Documentation of the HPI, Physical Exam and Medical Decision Making performed by medical students or scribes is based on my personal performance of the HPI, PE and MDM. I have personally evaluated this patient and have completed at least one if not all key elements of the E/M (history, physical exam, and MDM). Additional findings are as noted.     Fran Lawrence DPM on 12/18/2024 at 9:28 AM  [facility]  Associate, [employer]    This note was generated with the assistance of a speech recognition program. While intending to generate a timely document that accurately reflects the content of the visit, no guarantee can be provided that every grammatical or spelling mistake has been or

## 2024-12-12 NOTE — PROGRESS NOTES
Today's Date: 12/11/2024  Patient Name: Luci Castañeda  Date of admission: 12/9/2024  3:49 PM  Patient's age: 59 y.o., 1965  Admission Dx: Acute cor pulmonale due to saddle embolus of pulmonary artery (HCC) [I26.02]  Acute saddle pulmonary embolism, unspecified whether acute cor pulmonale present (HCC) [I26.92]    Reason for Consult: management recommendations  Requesting Physician: Db Nagy MD    CHIEF COMPLAINT: Acute pulmonary embolism    INTERIM HISTORY   Pt is seen and examined  Resp wise, seems to be imprving   Underwent joint aspiration of the big toe, awaiting results ?septic         HISTORY OF PRESENT ILLNESS:      The patient is a 59 y.o.   female who is admitted to the hospital transferred from [facility].  She presented with leg swelling and workup for DVT and CT scan showed saddle pulmonary embolism with right heart strain.  Patient was admitted to the hospital and then underwent mechanical thrombectomy  Patient is known to us with history of breast cancer.  She is on neoadjuvant chemotherapy/immunotherapy with pembrolizumab.  Patient has previous nephrectomy 1988  Past Medical History:   has a past medical history of Abnormal uterine bleeding (AUB), Arthritis, Asthma, Benign familial tremor, CAD (coronary artery disease), Cancer (HCC), CKD (chronic kidney disease) stage 1, GFR 90 ml/min or greater, Class 2 severe obesity with serious comorbidity and body mass index (BMI) of 39.0 to 39.9 in adult, COVID-19 vaccine administered, Cystinuria (HCC), Dyspnea on exertion, Flank pain, GERD (gastroesophageal reflux disease), History of Bell's palsy, Hypertension, Hyperuricemia, Hypothyroidism, Kidney stones, Leg swelling, Malignant neoplasm of upper-outer quadrant of left breast in female, estrogen receptor negative (HCC), Midline low back pain with right-sided sciatica, Nephrostomy status (HCC), Shoulder impingement, Snores, Solitary kidney, acquired, Under care of service  08/27/2024); Ureteroscopy (N/A, 09/01/2024); Cystoscopy (N/A, 9/1/2024); and vascular surgery (N/A, 12/9/2024).     Medications:    Reviewed in Epic     Allergies:  Mixed ragweed and Cat hair extract    Social History:   reports that she has never smoked. She has never used smokeless tobacco. She reports that she does not currently use alcohol. She reports that she does not use drugs.     Family History: family history includes Cancer in her father; Dementia in her mother; Diabetes in her father; Emphysema in her paternal grandmother; Heart Attack (age of onset: 80) in her maternal grandmother; Heart Disease in her father, maternal grandmother, and paternal grandfather; High Blood Pressure in her father and mother; Hypertension in her father and sister; Other in her father; Prostate Cancer in her maternal grandfather; Stroke (age of onset: 65) in her paternal grandfather.    REVIEW OF SYSTEMS:    Constitutional: No fever or chills. No night sweats, no weight loss   Eyes: No eye discharge, double vision, or eye pain   HEENT: negative for sore mouth, sore throat, hoarseness and voice change   Respiratory: Mild shortness of breath cough but no hemoptysis  Cardiovascular: negative for chest pain, dyspnea, palpitations, orthopnea, PND   Gastrointestinal: negative for nausea, vomiting, diarrhea, constipation, abdominal pain, Dysphagia, hematemesis and hematochezia   Genitourinary: negative for frequency, dysuria, nocturia, urinary incontinence, and hematuria   Integument: negative for rash, skin lesions, bruises.   Hematologic/Lymphatic: negative for easy bruising, bleeding, lymphadenopathy, or petechiae   Endocrine: negative for heat or cold intolerance,weight changes, change in bowel habits and hair loss   Musculoskeletal: negative for myalgias, arthralgias, pain, joint swelling,and bone pain   Neurological: negative for headaches, dizziness, seizures, weakness, numbness    PHYSICAL EXAM:      BP (!) 142/80   Pulse 93

## 2024-12-12 NOTE — CARE COORDINATION
Chart review and call to patient made. ACM needing to assist with home health needs upon d/c. Will continue to out reach and follow.      Deborah Sood LPN- Care Coordinator  [facility]  [address], [place]  [address]  Phone: [phone]  [email or web address]

## 2024-12-12 NOTE — PLAN OF CARE
Problem: Discharge Planning  Goal: Discharge to home or other facility with appropriate resources  Outcome: Progressing     Problem: Pain  Goal: Verbalizes/displays adequate comfort level or baseline comfort level  Outcome: Progressing     Problem: Safety - Adult  Goal: Free from fall injury  Outcome: Progressing     Problem: ABCDS Injury Assessment  Goal: Absence of physical injury  Outcome: Progressing     Problem: Respiratory - Adult  Goal: Achieves optimal ventilation and oxygenation  Outcome: Progressing     Problem: Cardiovascular - Adult  Goal: Maintains optimal cardiac output and hemodynamic stability  Outcome: Progressing  Goal: Absence of cardiac dysrhythmias or at baseline  Outcome: Progressing     Problem: Skin/Tissue Integrity - Adult  Goal: Skin integrity remains intact  Outcome: Progressing  Goal: Incisions, wounds, or drain sites healing without S/S of infection  Outcome: Progressing  Goal: Oral mucous membranes remain intact  Outcome: Progressing     Problem: Musculoskeletal - Adult  Goal: Return mobility to safest level of function  Outcome: Progressing  Goal: Maintain proper alignment of affected body part  Outcome: Progressing  Goal: Return ADL status to a safe level of function  Outcome: Progressing     Problem: Gastrointestinal - Adult  Goal: Minimal or absence of nausea and vomiting  Outcome: Progressing  Goal: Maintains or returns to baseline bowel function  Outcome: Progressing  Goal: Maintains adequate nutritional intake  Outcome: Progressing  Goal: Establish and maintain optimal ostomy function  Outcome: Progressing     Problem: Infection - Adult  Goal: Absence of infection at discharge  Outcome: Progressing  Goal: Absence of infection during hospitalization  Outcome: Progressing  Goal: Absence of fever/infection during anticipated neutropenic period  Outcome: Progressing     Problem: Metabolic/Fluid and Electrolytes - Adult  Goal: Electrolytes maintained within normal limits  Outcome:  Progressing  Goal: Hemodynamic stability and optimal renal function maintained  Outcome: Progressing  Goal: Glucose maintained within prescribed range  Outcome: Progressing     Problem: Hematologic - Adult  Goal: Maintains hematologic stability  Outcome: Progressing     Problem: Skin/Tissue Integrity  Goal: Absence of new skin breakdown  Description: 1.  Monitor for areas of redness and/or skin breakdown  2.  Assess vascular access sites hourly  3.  Every 4-6 hours minimum:  Change oxygen saturation probe site  4.  Every 4-6 hours:  If on nasal continuous positive airway pressure, respiratory therapy assess nares and determine need for appliance change or resting period.  Outcome: Progressing

## 2024-12-12 NOTE — PROGRESS NOTES
Pneumococcal, PPSV23, PNEUMOVAX 23, (age 2y+), SC/IM, 0.5mL 10/25/2018    TDaP, ADACEL (age 10y-64y), BOOSTRIX (age 10y+), IM, 0.5mL 05/23/2007    Tetanus 10/15/2013    Tetanus Toxoid, absorbed 10/15/2013        Pneumococcal Vaccine     [] Up to date    [] Indicated   [] Refused  [] Contraindicated       Influenza Vaccine   [] Up to date    [] Indicated   [] Refused  [] Contraindicated       PAST MEDICAL HISTORY:         Diagnosis Date    Abnormal uterine bleeding (AUB) 11/2023    Arthritis     Asthma     Benign familial tremor     CAD (coronary artery disease) 12/2021    mild per cardiac cath 12/2021; no stents. Dr. Randolph [facility] last visit 10/2022    Cancer (HCC)     skin    CKD (chronic kidney disease) stage 1, GFR 90 ml/min or greater     Class 2 severe obesity with serious comorbidity and body mass index (BMI) of 39.0 to 39.9 in adult 05/01/2019    COVID-19 vaccine administered     Cystinuria ([facility])     Dyspnea on exertion 08/06/2019    Flank pain     RIGHT    GERD (gastroesophageal reflux disease)     History of Bell's palsy 2022    states right sided, slight residual    Hypertension     Hyperuricemia     Hypothyroidism     no meds    Kidney stones     Leg swelling     by the end of the day    Malignant neoplasm of upper-outer quadrant of left breast in female, estrogen receptor negative (HCC) 05/12/2024    Midline low back pain with right-sided sciatica 06/02/2016    Nephrostomy status ([facility])     neph tube removed    Shoulder impingement 12/2019    left    Snores     no sleep apnea per sleep study    Solitary kidney, acquired 11/20/2017    Under care of service provider 12/05/2023    pcpJAMEL Mercado, FRANCISCO, PCP-[place]-last visit nov 2023    Under care of service provider 12/05/2023    ctfslnjsbo-svdye-jq vincent-last visit nov 2023    Under care of service provider 12/05/2023    urology-[place]-last visit nov 2023    Under care of service provider     pulmonology- [place]- last visit 5/1/24- states  asthma since controlled with symbicort and that she has upcoming sleep study in June    Urinary tract obstruction by kidney stone 08/01/2019    Wears glasses        Family History:       Problem Relation Age of Onset    High Blood Pressure Mother     Dementia Mother     High Blood Pressure Father     Diabetes Father     Cancer Father         tongue    Hypertension Father     Heart Disease Father         stents    Other Father         Mylofibrosis    Hypertension Sister     Heart Disease Maternal Grandmother     Heart Attack Maternal Grandmother 80    Prostate Cancer Maternal Grandfather     Emphysema Paternal Grandmother     Heart Disease Paternal Grandfather     Stroke Paternal Grandfather 65       SURGICAL HISTORY:   Past Surgical History:   Procedure Laterality Date    BLADDER SURGERY Right 12/06/2023    CYSTOSCOPY STENT REMOVAL performed by Bismark Amaya MD at [facility] OR    CARDIAC CATHETERIZATION  12/08/2021    CYSTO/URETERO/PYELOSCOPY, CALCULUS TX Right 11/21/2017    HOLMIUM LASER - CYSTOSCOPY, RIGHT URETEROSCOPY, RIGHT STENT EXCHANGE,STONE BASKETING performed by Bismark Amaya MD at [facility] OR    CYSTO/URETERO/PYELOSCOPY, CALCULUS TX Right 09/13/2019    HOLMIUM - CYSTO, URETEROSCOPY, LITHOTRIPSY, STENT PLACEMENT performed by Venkat Wang MD at [facility] OR    CYSTO/URETERO/PYELOSCOPY, CALCULUS TX Right 12/12/2019    HOLMIUM- CYSTO, URETEROSCOPY, LASER LITHO, STENT PLACEMENT AND RIGHT URETERAL BASKET STONE EXTRACTION performed by Bismark Amaya MD at [facility] OR    CYSTOSCOPY Right 11/18/2017    CYSTOSCOPY URETERAL STENT INSERTION,RIGHT performed by Mor Escalona MD at [facility] OR    CYSTOSCOPY Right 08/02/2019    CYSTOSCOPY RT URETERAL STENT INSERTION performed by Venkat Wang MD at [facility] OR    CYSTOSCOPY Right 08/23/2019    CYSTOSCOPY, RIGHT STENT REMOVAL, INSERTION OCCLUSIVE BALLOON, PT GOING TO INTERVENTIONAL performed by Venkat Wang MD at [facility] OR    CYSTOSCOPY Right 09/19/2020    CYSTOSCOPY, URETEROSCOPY, HOLMIUM LASER

## 2024-12-12 NOTE — CARE COORDINATION
[facility] not able to accept d/t insurance. Patient only has coverage at [facility]. Patient updated. She is not sure what her plan will be at discharge. She does not want referral to [facility] at this time

## 2024-12-12 NOTE — PROGRESS NOTES
to the medical floor on 12/10.  Podiatry was consulted for right foot pain and swelling, started patient on colchicine for gout, arthrocentesis 12/11.    Medications:     Allergies:    Allergies   Allergen Reactions    Mixed Ragweed Shortness Of Breath    Cat Hair Extract Hives     Shortness of breath       Current Meds:   Scheduled Meds:    pantoprazole  40 mg Oral QAM AC    sodium chloride flush  5-40 mL IntraVENous 2 times per day     Continuous Infusions:    sodium chloride      heparin (PORCINE) Infusion 13 Units/kg/hr (12/12/24 0724)    sodium chloride       PRN Meds: povidone-iodine, sodium chloride, heparin (porcine), heparin (porcine), oxyCODONE, fentanNYL, colchicine, sodium chloride flush, sodium chloride, potassium chloride **OR** potassium chloride, magnesium sulfate, polyethylene glycol, acetaminophen **OR** acetaminophen, ondansetron **OR** ondansetron, metoclopramide    Data:     Past Medical History:   has a past medical history of Abnormal uterine bleeding (AUB), Arthritis, Asthma, Benign familial tremor, CAD (coronary artery disease), Cancer (HCC), CKD (chronic kidney disease) stage 1, GFR 90 ml/min or greater, Class 2 severe obesity with serious comorbidity and body mass index (BMI) of 39.0 to 39.9 in adult, COVID-19 vaccine administered, Cystinuria (HCC), Dyspnea on exertion, Flank pain, GERD (gastroesophageal reflux disease), History of Bell's palsy, Hypertension, Hyperuricemia, Hypothyroidism, Kidney stones, Leg swelling, Malignant neoplasm of upper-outer quadrant of left breast in female, estrogen receptor negative (HCC), Midline low back pain with right-sided sciatica, Nephrostomy status ([facility]), Shoulder impingement, Snores, Solitary kidney, acquired, Under care of service provider, Under care of service provider, Under care of service provider, Under care of service provider, Urinary tract obstruction by kidney stone, and Wears glasses.    Social History:   reports that she has never smoked.  12/09/24  1130 12/09/24  1415 12/10/24  0646 12/11/24  0832 12/12/24  0756     --  138 140 136   K 3.5*  --  3.7 3.8 3.7   CL 99  --  105 105 102   CO2 23  --  21 22 22   GLUCOSE 119*  --  113* 98 99   BUN 8  --  10 11 10   CREATININE 0.9  --  1.0* 0.8 0.7   MG 0.9*  --   --   --   --    ANIONGAP 16  --  12 13 12   LABGLOM 73  --  64 84 >90   CALCIUM 8.5*  --  8.0* 7.8* 7.6*   TROPHS 24* 24*  --   --   --      Recent Labs     12/09/24  1130 12/10/24  0646   AST 10  --    ALT <5*  --    ALKPHOS 109*  --    BILITOT 0.7  --    BILIDIR 0.2  --    LIPASE 30  --    URICACID  --  8.7*     ABG:  Lab Results   Component Value Date/Time    POCPH 7.367 08/25/2019 06:33 PM    POCPCO2 43.3 08/25/2019 06:33 PM    POCPO2 23.8 08/25/2019 06:33 PM    POCHCO3 24.8 08/25/2019 06:33 PM    NBEA 1 08/25/2019 06:33 PM    PBEA NOT REPORTED 08/25/2019 06:33 PM    WGV4TEO 26 08/25/2019 06:33 PM    FVKD8NXR 40 08/25/2019 06:33 PM    FIO2 1.0 08/25/2019 06:33 PM     Lab Results   Component Value Date/Time    SPECIAL Site: Foot, Right 12/11/2024 04:38 PM     Lab Results   Component Value Date/Time    CULTURE PENDING 12/11/2024 04:40 PM       Radiology:  XR FOOT RIGHT (2 VIEWS)    Result Date: 12/10/2024  No acute osseous abnormality.     CT CHEST PULMONARY EMBOLISM W CONTRAST    Result Date: 12/9/2024  Partially occlusive saddle embolus with additional pulmonary emboli within the left lower lobe and right lower lobe segmental pulmonary arteries.  The main pulmonary artery is dilated and the right ventricle is prominent consistent with right heart strain. These findings were discussed with the ordering provider Nurse Cyndy Castillo at 1:09 p.m. on 9 December 2024       Physical Examination:        General: Alert and oriented, no acute distress  Neurologic: Awake, alert, and oriented X3, no focal weakness  Lungs: Clear to auscultation, no wheezing, non-labored respiration  Heart: Normal rate, regular rhythm, no murmur, gallop or

## 2024-12-12 NOTE — TELEPHONE ENCOUNTER
Name: Luci Castañeda  : 1965  MRN: [id number]    Oncology Navigation Follow-Up Note    Contact Type:  Telephone    Notes: Spoke with pt's sister who reports pt remains admitted to step down with no plans of discharge today or tomorrow. Pt is being wroked up for new severe pain in her foot.     Writer will update [facility] staff and request appt for tomorrow be pushed out.       Electronically signed by Veronica Bennett RN on 2024 at 1:50 PM

## 2024-12-13 PROBLEM — M10.9 GOUT: Status: ACTIVE | Noted: 2024-12-13

## 2024-12-13 LAB
ANION GAP SERPL CALCULATED.3IONS-SCNC: 9 MMOL/L (ref 9–16)
ANTI-XA UNFRAC HEPARIN: 0.26 IU/L
ANTI-XA UNFRAC HEPARIN: 0.65 IU/L
APPEARANCE FLD: NORMAL
BASOPHILS # BLD: 0 K/UL (ref 0–0.2)
BASOPHILS NFR BLD: 0 % (ref 0–2)
BODY FLD TYPE: NORMAL
BUN SERPL-MCNC: 10 MG/DL (ref 8–23)
CALCIUM SERPL-MCNC: 7.9 MG/DL (ref 8.6–10.4)
CHLORIDE SERPL-SCNC: 103 MMOL/L (ref 98–107)
CLOT CHECK: NORMAL
CO2 SERPL-SCNC: 25 MMOL/L (ref 20–31)
COLOR FLD: NORMAL
CREAT SERPL-MCNC: 0.8 MG/DL (ref 0.6–0.9)
CRYSTALS FLD MICRO: POSITIVE
EOSINOPHIL # BLD: 0 K/UL (ref 0–0.4)
EOSINOPHILS RELATIVE PERCENT: 0 % (ref 1–4)
ERYTHROCYTE [DISTWIDTH] IN BLOOD BY AUTOMATED COUNT: 17.9 % (ref 11.8–14.4)
GFR, ESTIMATED: 84 ML/MIN/1.73M2
GLUCOSE SERPL-MCNC: 90 MG/DL (ref 74–99)
HCT VFR BLD AUTO: 23.6 % (ref 36.3–47.1)
HGB BLD-MCNC: 7.1 G/DL (ref 11.9–15.1)
IMM GRANULOCYTES # BLD AUTO: 0.02 K/UL (ref 0–0.3)
IMM GRANULOCYTES NFR BLD: 1 %
LYMPHOCYTES NFR BLD: 0.17 K/UL (ref 1–4.8)
LYMPHOCYTES RELATIVE PERCENT: 7 % (ref 24–44)
MAGNESIUM SERPL-MCNC: 1.2 MG/DL (ref 1.6–2.4)
MAGNESIUM SERPL-MCNC: 2.4 MG/DL (ref 1.6–2.4)
MCH RBC QN AUTO: 29 PG (ref 25.2–33.5)
MCHC RBC AUTO-ENTMCNC: 30.1 G/DL (ref 28.4–34.8)
MCV RBC AUTO: 96.3 FL (ref 82.6–102.9)
MONOCYTES NFR BLD: 0.38 K/UL (ref 0.1–0.8)
MONOCYTES NFR BLD: 16 % (ref 1–7)
MONOCYTES NFR FLD: 3 %
MORPHOLOGY: ABNORMAL
NEUTROPHILS NFR BLD: 76 % (ref 36–66)
NEUTROPHILS NFR FLD: 97 %
NEUTS SEG NFR BLD: 1.83 K/UL (ref 1.8–7.7)
NRBC BLD-RTO: 0.8 PER 100 WBC
NUC CELL # FLD: 6035 CELLS/UL
PATH INTERP FLD-IMP: ABNORMAL
PLATELET # BLD AUTO: ABNORMAL K/UL (ref 138–453)
PLATELET, FLUORESCENCE: 47 K/UL (ref 138–453)
PLATELETS.RETICULATED NFR BLD AUTO: 5 % (ref 1.1–10.3)
POTASSIUM SERPL-SCNC: 3.5 MMOL/L (ref 3.7–5.3)
POTASSIUM SERPL-SCNC: 3.9 MMOL/L (ref 3.7–5.3)
RBC # BLD AUTO: 2.45 M/UL (ref 3.95–5.11)
RBC # FLD: NORMAL CELLS/UL
SODIUM SERPL-SCNC: 137 MMOL/L (ref 136–145)
URATE SERPL-MCNC: 7.8 MG/DL (ref 2.4–5.7)
WBC OTHER # BLD: 2.4 K/UL (ref 3.5–11.3)

## 2024-12-13 PROCEDURE — 36415 COLL VENOUS BLD VENIPUNCTURE: CPT

## 2024-12-13 PROCEDURE — 97530 THERAPEUTIC ACTIVITIES: CPT

## 2024-12-13 PROCEDURE — 6360000002 HC RX W HCPCS

## 2024-12-13 PROCEDURE — 2580000003 HC RX 258: Performed by: SURGERY

## 2024-12-13 PROCEDURE — 99232 SBSQ HOSP IP/OBS MODERATE 35: CPT | Performed by: INTERNAL MEDICINE

## 2024-12-13 PROCEDURE — 84550 ASSAY OF BLOOD/URIC ACID: CPT

## 2024-12-13 PROCEDURE — 80048 BASIC METABOLIC PNL TOTAL CA: CPT

## 2024-12-13 PROCEDURE — 97162 PT EVAL MOD COMPLEX 30 MIN: CPT

## 2024-12-13 PROCEDURE — 85520 HEPARIN ASSAY: CPT

## 2024-12-13 PROCEDURE — 2060000000 HC ICU INTERMEDIATE R&B

## 2024-12-13 PROCEDURE — 99232 SBSQ HOSP IP/OBS MODERATE 35: CPT | Performed by: STUDENT IN AN ORGANIZED HEALTH CARE EDUCATION/TRAINING PROGRAM

## 2024-12-13 PROCEDURE — 97166 OT EVAL MOD COMPLEX 45 MIN: CPT

## 2024-12-13 PROCEDURE — 85055 RETICULATED PLATELET ASSAY: CPT

## 2024-12-13 PROCEDURE — 99222 1ST HOSP IP/OBS MODERATE 55: CPT | Performed by: STUDENT IN AN ORGANIZED HEALTH CARE EDUCATION/TRAINING PROGRAM

## 2024-12-13 PROCEDURE — 83735 ASSAY OF MAGNESIUM: CPT

## 2024-12-13 PROCEDURE — 6370000000 HC RX 637 (ALT 250 FOR IP)

## 2024-12-13 PROCEDURE — 97535 SELF CARE MNGMENT TRAINING: CPT

## 2024-12-13 PROCEDURE — 85025 COMPLETE CBC W/AUTO DIFF WBC: CPT

## 2024-12-13 PROCEDURE — 6360000002 HC RX W HCPCS: Performed by: SURGERY

## 2024-12-13 PROCEDURE — 84132 ASSAY OF SERUM POTASSIUM: CPT

## 2024-12-13 RX ADMIN — ONDANSETRON 4 MG: 2 INJECTION INTRAMUSCULAR; INTRAVENOUS at 10:39

## 2024-12-13 RX ADMIN — POTASSIUM CHLORIDE 10 MEQ: 7.45 INJECTION INTRAVENOUS at 14:10

## 2024-12-13 RX ADMIN — HEPARIN SODIUM 15 UNITS/KG/HR: 10000 INJECTION, SOLUTION INTRAVENOUS at 12:11

## 2024-12-13 RX ADMIN — MAGNESIUM SULFATE HEPTAHYDRATE 2000 MG: 40 INJECTION, SOLUTION INTRAVENOUS at 12:18

## 2024-12-13 RX ADMIN — PANTOPRAZOLE SODIUM 40 MG: 40 TABLET, DELAYED RELEASE ORAL at 07:42

## 2024-12-13 RX ADMIN — SODIUM CHLORIDE, PRESERVATIVE FREE 10 ML: 5 INJECTION INTRAVENOUS at 19:34

## 2024-12-13 RX ADMIN — ONDANSETRON 4 MG: 2 INJECTION INTRAMUSCULAR; INTRAVENOUS at 19:34

## 2024-12-13 RX ADMIN — SODIUM CHLORIDE, PRESERVATIVE FREE 10 ML: 5 INJECTION INTRAVENOUS at 07:42

## 2024-12-13 RX ADMIN — POTASSIUM CHLORIDE 10 MEQ: 7.45 INJECTION INTRAVENOUS at 13:00

## 2024-12-13 RX ADMIN — POTASSIUM CHLORIDE 10 MEQ: 7.45 INJECTION INTRAVENOUS at 10:46

## 2024-12-13 RX ADMIN — MAGNESIUM SULFATE HEPTAHYDRATE 2000 MG: 40 INJECTION, SOLUTION INTRAVENOUS at 14:12

## 2024-12-13 RX ADMIN — OXYCODONE 5 MG: 5 TABLET ORAL at 04:17

## 2024-12-13 RX ADMIN — HEPARIN SODIUM 4100 UNITS: 1000 INJECTION INTRAVENOUS; SUBCUTANEOUS at 12:11

## 2024-12-13 RX ADMIN — POTASSIUM CHLORIDE 10 MEQ: 7.45 INJECTION INTRAVENOUS at 15:12

## 2024-12-13 RX ADMIN — OXYCODONE 5 MG: 5 TABLET ORAL at 12:07

## 2024-12-13 ASSESSMENT — PAIN DESCRIPTION - LOCATION
LOCATION: TOE (COMMENT WHICH ONE)
LOCATION: FOOT
LOCATION: FOOT

## 2024-12-13 ASSESSMENT — PAIN SCALES - GENERAL
PAINLEVEL_OUTOF10: 3
PAINLEVEL_OUTOF10: 2
PAINLEVEL_OUTOF10: 3
PAINLEVEL_OUTOF10: 8
PAINLEVEL_OUTOF10: 7
PAINLEVEL_OUTOF10: 6
PAINLEVEL_OUTOF10: 4

## 2024-12-13 ASSESSMENT — PAIN - FUNCTIONAL ASSESSMENT: PAIN_FUNCTIONAL_ASSESSMENT: ACTIVITIES ARE NOT PREVENTED

## 2024-12-13 ASSESSMENT — PAIN DESCRIPTION - ORIENTATION
ORIENTATION: RIGHT

## 2024-12-13 ASSESSMENT — PAIN DESCRIPTION - DESCRIPTORS
DESCRIPTORS: ACHING;DISCOMFORT
DESCRIPTORS: ACHING;DISCOMFORT;SORE;TENDER

## 2024-12-13 ASSESSMENT — PAIN DESCRIPTION - PAIN TYPE: TYPE: ACUTE PAIN

## 2024-12-13 ASSESSMENT — PAIN DESCRIPTION - FREQUENCY: FREQUENCY: CONTINUOUS

## 2024-12-13 NOTE — PROGRESS NOTES
Progress Note  Podiatric Medicine and Surgery     Subjective     Chief Complaint: Right MTP joint swelling, gout vs septic joint     Interval History:   - Patient seen and examined at bedside  - No acute events overnight  - Afebrile, vital signs stable   - Reports nausea and chills but denies all other constitutional symptoms   - Patient says pain to right first MPJ has decreased some today    Labs:  WBC:  Lab Results   Component Value Date    WBC 2.4 (L) 12/13/2024       CRP:  Lab Results   Component Value Date    .0 (H) 12/12/2024       ESR:  Lab Results   Component Value Date    SEDRATE 42 (H) 12/12/2024       HPI:  Luci Castañeda is a 59 y.o. female seen at [facility] for right foot pain at the first MPJ.  Patient says she has had pain to the first MPJ for 7 days.  She describes the pain as severe and says even the slightest touch is painful.  Patient was admitted for saddle pulmonary embolism.  Patient underwent a mechanical thrombectomy on 12/9/2024. Patient has a past medical history of hyperuricemia, kidney stones, malignant neoplasm of left breast, CAD, asthma, GERD, hypertension. Pt also reports that her father suffered from gout.     PCP is Nabil Ly MD    ROS: Denies N/V/F/C/SOB/CP.  Otherwise negative except at stated in the HPI.     Past Medical History   has a past medical history of Abnormal uterine bleeding (AUB), Arthritis, Asthma, Benign familial tremor, CAD (coronary artery disease), Cancer (HCC), CKD (chronic kidney disease) stage 1, GFR 90 ml/min or greater, Class 2 severe obesity with serious comorbidity and body mass index (BMI) of 39.0 to 39.9 in adult, COVID-19 vaccine administered, Cystinuria (HCC), Dyspnea on exertion, Flank pain, GERD (gastroesophageal reflux disease), History of Bell's palsy, Hypertension, Hyperuricemia, Hypothyroidism, Kidney stones, Leg swelling, Malignant neoplasm of upper-outer quadrant of left breast in female, estrogen receptor negative (HCC), Midline  Jess Bradley MD   lidocaine-prilocaine (EMLA) 2.5-2.5 % cream Apply topically to port site 45-60 minutes prior to needle poke as needed. 5/30/24  Yes Jess Bradley MD   famotidine (PEPCID) 20 MG tablet Take 1 tablet by mouth 2 times daily 4/23/24  Yes Amena Sy PA-C   albuterol sulfate HFA (PROAIR HFA) 108 (90 Base) MCG/ACT inhaler Inhale 2 puffs into the lungs every 6 hours as needed for Wheezing 2/22/24  Yes Nazia Mercado APRN - CNP   irbesartan (AVAPRO) 150 MG tablet Take 1 tablet by mouth daily 2/7/24  Yes Sanjuana Randolph MD   nystatin (MYCOSTATIN) 134757 UNIT/GM cream  12/16/23  Yes Guido Tenorio MD   pantoprazole (PROTONIX) 20 MG tablet TAKE ONE TABLET BY MOUTH EVERY MORNING BEFORE BREAKFAST 11/6/23  Yes Nazia Mercado APRN - CNP   benzoyl peroxide 5 % external liquid Wash affected areas once daily 4/7/21  Yes Jacqueline Stuart MD   clindamycin (CLEOCIN T) 1 % lotion Apply to affected areas daily 4/7/21  Yes Jacqueline Stuart MD   nystatin (MYCOSTATIN) 575837 UNIT/ML suspension Take 5 mLs by mouth 4 times daily for 10 days Retain in mouth as long as possible 11/25/24 12/5/24  Jess Bradley MD   Melatonin 5 MG CAPS Take 1 capsule by mouth nightly as needed  Patient not taking: Reported on 12/13/2024    ProviderGuido MD   solifenacin (VESICARE) 10 MG tablet Take 1 tablet by mouth as needed  12/23/14  ProviderGuido MD    Scheduled Meds:   lidocaine  20 mL IntraDERmal Once    pantoprazole  40 mg Oral QAM AC    sodium chloride flush  5-40 mL IntraVENous 2 times per day     Continuous Infusions:   sodium chloride      heparin (PORCINE) Infusion 13 Units/kg/hr (12/12/24 1256)    sodium chloride       PRN Meds:.povidone-iodine, sodium chloride, heparin (porcine), heparin (porcine), oxyCODONE, fentanNYL, colchicine, sodium chloride flush, sodium chloride, potassium chloride **OR** potassium chloride, magnesium sulfate, polyethylene glycol, acetaminophen

## 2024-12-13 NOTE — PROGRESS NOTES
Luci MARCELLO Garry  12/12/2024    Chief Complaint   Patient presents with    Toe Injury    Pulmonary Embolism     [place] tx- saddle      12/12/2024   Subjective   On heparin drip  Room air  No dyspnea or distress at rest          Review of Systems -  General ROS: negative for - chills, fatigue, fever or weight loss  ENT ROS: negative for - headaches, oral lesions or sore throat  Cardiovascular ROS: no chest pain , orthopnea or pnd   Gastrointestinal ROS: no abdominal pain, change in bowel habits, or black or bloody stools  Skin - no rash   Neuro - no blurry vision , no loc . No focal weakness   msk - rt hallux pain , I&d today       LUNG CANCER SCREENING     CRITERIA MET    []     CT ORDERED  []      CRITERIA NOT MET   []      REFUSED                    []        REASON CRITERIA NOT MET     SMOKING LESS THAN 30 PY  []      AGE LESS THAN 50 or GREATER 80 YEARS  []      QUIT SMOKING 15 YEARS OR GREATER   []      RECENT CT WITH IN 11 MONTHS    []      LIFE EXPECTANCY < 5 YEARS   []      SIGNS  AND SYMPTOMS OF LUNG CANCER   []           Immunization   Immunization History   Administered Date(s) Administered    COVID-19, MODERNA BLUE border, Primary or Immunocompromised, (age 12y+), IM, 100 mcg/0.5mL 01/11/2021, 02/08/2021, 10/26/2021    COVID-19, PFIZER PURPLE top, DILUTE for use, (age 12 y+), 30mcg/0.3mL 11/17/2021    COVID-19, [place] Vaccine, Vaccine Unspecified 10/26/2021    Influenza Vaccine, unspecified formulation 12/03/2016, 10/01/2017    Influenza Virus Vaccine 09/11/2009, 10/15/2013, 12/03/2016, 10/28/2018, 11/05/2019, 11/12/2021, 10/13/2022, 10/10/2023    Influenza Whole 10/15/2013    Influenza, AFLURIA (age 3 y+), FLUZONE, (age 6 mo+), Quadv MDV, 0.5mL 11/12/2021, 10/05/2023    Pneumococcal, PPSV23, PNEUMOVAX 23, (age 2y+), SC/IM, 0.5mL 10/25/2018    TDaP, ADACEL (age 10y-64y), BOOSTRIX (age 10y+), IM, 0.5mL 05/23/2007    Tetanus 10/15/2013    Tetanus Toxoid, absorbed 10/15/2013        Pneumococcal Vaccine     [] Up

## 2024-12-13 NOTE — PROGRESS NOTES
Today's Date: 12/12/2024  Patient Name: Luci Castañeda  Date of admission: 12/9/2024  3:49 PM  Patient's age: 59 y.o., 1965  Admission Dx: Acute cor pulmonale due to saddle embolus of pulmonary artery (HCC) [I26.02]  Acute saddle pulmonary embolism, unspecified whether acute cor pulmonale present (HCC) [I26.92]    Reason for Consult: management recommendations  Requesting Physician: Db Nagy MD    CHIEF COMPLAINT: Acute pulmonary embolism    INTERIM HISTORY   Pt is seen and examined  Resp wise, seems to be imprving   Underwent joint aspiration of the big toe, awaiting results ?septic versus Gouty arthritis          HISTORY OF PRESENT ILLNESS:      The patient is a 59 y.o.   female who is admitted to the hospital transferred from [facility].  She presented with leg swelling and workup for DVT and CT scan showed saddle pulmonary embolism with right heart strain.  Patient was admitted to the hospital and then underwent mechanical thrombectomy  Patient is known to us with history of breast cancer.  She is on neoadjuvant chemotherapy/immunotherapy with pembrolizumab.  Patient has previous nephrectomy 1988  Past Medical History:   has a past medical history of Abnormal uterine bleeding (AUB), Arthritis, Asthma, Benign familial tremor, CAD (coronary artery disease), Cancer (HCC), CKD (chronic kidney disease) stage 1, GFR 90 ml/min or greater, Class 2 severe obesity with serious comorbidity and body mass index (BMI) of 39.0 to 39.9 in adult, COVID-19 vaccine administered, Cystinuria (HCC), Dyspnea on exertion, Flank pain, GERD (gastroesophageal reflux disease), History of Bell's palsy, Hypertension, Hyperuricemia, Hypothyroidism, Kidney stones, Leg swelling, Malignant neoplasm of upper-outer quadrant of left breast in female, estrogen receptor negative (HCC), Midline low back pain with right-sided sciatica, Nephrostomy status (HCC), Shoulder impingement, Snores, Solitary kidney, acquired, Under  thrombocytopenia  Status post thrombectomy  Joint effusion       RECOMMENDATIONS:  Status post thrombectomy  Anemia thrombocytopenia very concerning.  The patient has not been on chemotherapy for at least 3 months.  The patient presented in November with cytopenias and required transfusion.  Await joint fluid testing  Brooks test negative . Smear does not show schistocytes   We will hold chemo and immunotherapy  Plan anticoagulation, on heprain until all procedures are completed, then transition to oral anticoagulation, (DOAC)      Discussed with patient and Nurse.      Thank you for asking us to see this patient.    Geronimo Walters MD  Hematologist/Medical Oncologist  Cell: [phone]

## 2024-12-13 NOTE — PLAN OF CARE
Problem: Discharge Planning  Goal: Discharge to home or other facility with appropriate resources  12/13/2024 0138 by Aye Vargas RN  Outcome: Progressing  12/12/2024 1703 by Tiera Theodore RN  Outcome: Progressing     Problem: Pain  Goal: Verbalizes/displays adequate comfort level or baseline comfort level  12/13/2024 0138 by Aye Vargas RN  Outcome: Progressing  12/12/2024 1703 by Tiera Theodore RN  Outcome: Progressing     Problem: Safety - Adult  Goal: Free from fall injury  12/13/2024 0138 by Aye Vargas RN  Outcome: Progressing  12/12/2024 1703 by Tiera Theodore RN  Outcome: Progressing     Problem: ABCDS Injury Assessment  Goal: Absence of physical injury  12/13/2024 0138 by Aye Vargas RN  Outcome: Progressing  12/12/2024 1703 by Tiera Theodore RN  Outcome: Progressing     Problem: Respiratory - Adult  Goal: Achieves optimal ventilation and oxygenation  12/13/2024 0138 by Aye Vargas RN  Outcome: Progressing  12/12/2024 1703 by Tiera Theodore RN  Outcome: Progressing     Problem: Cardiovascular - Adult  Goal: Maintains optimal cardiac output and hemodynamic stability  12/13/2024 0138 by Aye Vargas RN  Outcome: Progressing  12/12/2024 1703 by Tiera Theodore RN  Outcome: Progressing  Goal: Absence of cardiac dysrhythmias or at baseline  12/13/2024 0138 by Aye Vargas RN  Outcome: Progressing  12/12/2024 1703 by Tiera Theodore RN  Outcome: Progressing     Problem: Skin/Tissue Integrity - Adult  Goal: Skin integrity remains intact  12/13/2024 0138 by Aye Vargas RN  Outcome: Progressing  12/12/2024 1703 by Tiera Theodore RN  Outcome: Progressing  Goal: Incisions, wounds, or drain sites healing without S/S of infection  12/13/2024 0138 by Aye Vargas RN  Outcome: Progressing  12/12/2024 1703 by Tiera Theodore RN  Outcome: Progressing  Goal: Oral mucous membranes remain intact  12/13/2024 0138 by Aye Vargas RN  Outcome:

## 2024-12-13 NOTE — CARE COORDINATION
Spoke with patient and she is agreeable to go to [facility], referral sent. Consult order placed for PM & R to see patient for admission to Rehab facility.

## 2024-12-13 NOTE — PROGRESS NOTES
Physical Therapy  Facility/Department: [facility] CAR 2- STEPDOWN  Physical Therapy Initial Assessment    Name: Luci Castañeda  : 1965  MRN: [id number]  Date of Service: 2024  Chief Complaint   Patient presents with    Toe Injury    Pulmonary Embolism     Pburg tx- saddle       Discharge Recommendations:    Further therapy recommended at discharge.The patient should be able to tolerate at least 3 hours of therapy per day over 5 days or 15 hours over 7 days.   This patient may benefit from a Physical Medicine and Rehab consult.    PT Equipment Recommendations  Equipment Needed: Yes  Mobility Devices: Walker  Walker: Rolling      Patient Diagnosis(es): The primary encounter diagnosis was Acute saddle pulmonary embolism, unspecified whether acute cor pulmonale present (HCC). A diagnosis of Acute cor pulmonale due to saddle embolus of pulmonary artery (HCC) was also pertinent to this visit.  Past Medical History:  has a past medical history of Abnormal uterine bleeding (AUB), Arthritis, Asthma, Benign familial tremor, CAD (coronary artery disease), Cancer (HCC), CKD (chronic kidney disease) stage 1, GFR 90 ml/min or greater, Class 2 severe obesity with serious comorbidity and body mass index (BMI) of 39.0 to 39.9 in adult, COVID-19 vaccine administered, Cystinuria (HCC), Dyspnea on exertion, Flank pain, GERD (gastroesophageal reflux disease), History of Bell's palsy, Hypertension, Hyperuricemia, Hypothyroidism, Kidney stones, Leg swelling, Malignant neoplasm of upper-outer quadrant of left breast in female, estrogen receptor negative (HCC), Midline low back pain with right-sided sciatica, Nephrostomy status (HCC), Shoulder impingement, Snores, Solitary kidney, acquired, Under care of service provider, Under care of service provider, Under care of service provider, Under care of service provider, Urinary tract obstruction by kidney stone, and Wears glasses.  Past Surgical History:  has a past surgical history that

## 2024-12-13 NOTE — PLAN OF CARE
Problem: Discharge Planning  Goal: Discharge to home or other facility with appropriate resources  12/13/2024 1058 by Paulina Roy RN  Outcome: Progressing  12/13/2024 0138 by Aye Vargas RN  Outcome: Progressing     Problem: Pain  Goal: Verbalizes/displays adequate comfort level or baseline comfort level  12/13/2024 1058 by Paulina Roy RN  Outcome: Progressing  12/13/2024 0138 by Aye Vargas RN  Outcome: Progressing     Problem: Safety - Adult  Goal: Free from fall injury  12/13/2024 1058 by Paulina Roy RN  Outcome: Progressing  12/13/2024 0138 by Aye Vargas RN  Outcome: Progressing     Problem: ABCDS Injury Assessment  Goal: Absence of physical injury  12/13/2024 1058 by Paulina Roy RN  Outcome: Progressing  12/13/2024 0138 by Aye Vargas RN  Outcome: Progressing     Problem: Respiratory - Adult  Goal: Achieves optimal ventilation and oxygenation  12/13/2024 1058 by Paulina Roy RN  Outcome: Progressing  12/13/2024 0138 by Aye Vargas RN  Outcome: Progressing     Problem: Cardiovascular - Adult  Goal: Maintains optimal cardiac output and hemodynamic stability  12/13/2024 1058 by Paulina Roy RN  Outcome: Progressing  12/13/2024 0138 by Aye Vargas RN  Outcome: Progressing  Goal: Absence of cardiac dysrhythmias or at baseline  12/13/2024 1058 by Paulina Roy RN  Outcome: Progressing  12/13/2024 0138 by Aye Vargas RN  Outcome: Progressing     Problem: Skin/Tissue Integrity - Adult  Goal: Skin integrity remains intact  12/13/2024 1058 by Paulina Roy RN  Outcome: Progressing  12/13/2024 0138 by Aye Vargas RN  Outcome: Progressing  Goal: Incisions, wounds, or drain sites healing without S/S of infection  12/13/2024 1058 by Paulina Roy RN  Outcome: Progressing  12/13/2024 0138 by Aye Vargas RN  Outcome: Progressing  Goal: Oral mucous membranes remain intact  12/13/2024 1058 by Paulina Roy RN  Outcome: Progressing  12/13/2024 0138 by Aye Vargas

## 2024-12-13 NOTE — PROGRESS NOTES
[facility]  Office: [phone]  Michael Lewis DO, Bart Lewis DO, Ángel Harry DO, Geremias Tsai DO, Cathy García MD, Kya Arellano MD, Jonathon Gilmore MD, Kalli Chou MD,  Alvin Frey MD, Opal Wang MD, Jennifer Cooney MD,  Geronimo Sams DO, Thelma Quijano MD, Shreyas Duarte MD, Franklyn Lewis DO, Regi Ríos MD,  Marky Russell DO, Yamilet Monroe MD, Qian Rain MD, Suzy Faith MD, Artur Irby MD,  Colton Clark MD, Jaky Reynoso MD, Amarilys Robledo MD, Francia Cantor MD, Krzysztof Garcia MD, Jhonatan Shipley MD, Frederick Wilson DO, Jabier Plummer MD, Shirley Waterhouse, CNP,  Millie Moe, CNP, Frederick Street, CNP,  Josie Guthrie, CAT, Alicia Sheldon, CNP, Diamond Kwong, CNP, Mai Copeland, CNP, Radha Fowler, CNP, Angeles Enriquez PA-C, DERIK MuirC, Junie Kamara, CNP, Mitzy Sanz, CNP,  Laura Gonzalez, CNP, Grace Walter, CNP,  Yeny Gandhi, CNP, Ada Nelson, CNP         [facility]   IN-PATIENT SERVICE   [facility]    Progress Note    12/13/2024    11:46 AM    Name:   Luci Castañeda  MRN:     [id number]     Acct:      [id number]   Room:   2011/2011-01  IP Day:  4  Admit Date:  12/9/2024  3:49 PM    PCP:   Nabil Ly MD  Code Status:  Full Code    Subjective:     C/C:   Chief Complaint   Patient presents with    Toe Injury    Pulmonary Embolism     Pburg tx- saddle       She denies shortness of breath, chest pain, abd complaints.  She had bedside arthrotomy of right first MPJ yesterday and is endorsing some pain in her foot.    Brief History:     59-year-old female with past medical history of breast cancer currently undergoing chemotherapy, hypertension, hypothyroidism, CKD presented to [facility] as a transfer from [facility] due to saddle pulmonary embolism.  She was transferred to [facility] MICU and had mechanical thrombectomy on 12/9.  She was monitored in the MICU after the procedure was  103   CO2 22 22 25   GLUCOSE 98 99 90   BUN 11 10 10   CREATININE 0.8 0.7 0.8   MG  --   --  1.2*   ANIONGAP 13 12 9   LABGLOM 84 >90 84   CALCIUM 7.8* 7.6* 7.9*     No results for input(s): \"LABALBU\", \"LABA1C\", \"U4TAKJT\", \"FT4\", \"TSH\", \"AST\", \"ALT\", \"LDH\", \"GGT\", \"ALKPHOS\", \"BILITOT\", \"BILIDIR\", \"AMMONIA\", \"AMYLASE\", \"LIPASE\", \"LACTATE\", \"CHOL\", \"HDL\", \"CHOLHDLRATIO\", \"TRIG\", \"VLDL\", \"TSC48QR\", \"PHENYTOIN\", \"PHENYF\", \"URICACID\", \"POCGLU\" in the last 72 hours.    Invalid input(s): \"PROT\", \"K7BKPNE\", \"LABGGT\", \"LDLCHOLESTEROL\"    ABG:  Lab Results   Component Value Date/Time    POCPH 7.367 08/25/2019 06:33 PM    POCPCO2 43.3 08/25/2019 06:33 PM    POCPO2 23.8 08/25/2019 06:33 PM    POCHCO3 24.8 08/25/2019 06:33 PM    NBEA 1 08/25/2019 06:33 PM    PBEA NOT REPORTED 08/25/2019 06:33 PM    PDW5QPZ 26 08/25/2019 06:33 PM    LGVY3GTJ 40 08/25/2019 06:33 PM    FIO2 1.0 08/25/2019 06:33 PM     Lab Results   Component Value Date/Time    SPECIAL Site: Foot, Right 12/11/2024 04:38 PM     Lab Results   Component Value Date/Time    CULTURE NO GROWTH 15 HOURS 12/12/2024 04:37 PM       Radiology:  XR FOOT RIGHT (2 VIEWS)    Result Date: 12/10/2024  No acute osseous abnormality.     CT CHEST PULMONARY EMBOLISM W CONTRAST    Result Date: 12/9/2024  Partially occlusive saddle embolus with additional pulmonary emboli within the left lower lobe and right lower lobe segmental pulmonary arteries.  The main pulmonary artery is dilated and the right ventricle is prominent consistent with right heart strain. These findings were discussed with the ordering provider Nurse Cyndy Castillo at 1:09 p.m. on 9 December 2024       Physical Examination:        General: Alert and oriented, no acute distress  Neurologic: Awake, alert, and oriented X3, no focal weakness  Lungs: Clear to auscultation, no wheezing, non-labored respiration  Heart: Normal rate, regular rhythm, no murmur, gallop or edema  Abdomen: Soft, non-tender, non-distended, normal

## 2024-12-13 NOTE — CONSULTS
Moderate degenerative changes the 1st MTP joint with mild hallux valgus and moderate-sized bunion formation.  There is a large 1st MTP joint effusion, nonspecific and sterility indeterminate. Suspected early erosive changes of the 1st metatarsal head.  No convincing evidence of intermetatarsal bursitis.  No significant other MTP joint identified.  No discrete other erosive changes are appreciated. SOFT TISSUES: Diffuse soft tissue edema.  No discrete soft tissue gas.  Mild skin thickening of the dorsal and medial forefoot. TENDONS: A complete retracted tendon tear is not identified.  Diffuse intramuscular edema and fatty infiltration, likely neuropathic.     Large 1st MTP joint effusion with suspected early erosive changes of the 1st metatarsal head.  Findings may be secondary to an inflammatory arthropathy. Infection cannot be excluded.  Consider arthrocentesis. Mild bone marrow edema within the distal 1st metatarsal and 1st proximal phalanx adjacent to the MTP joint. Findings favor reactive edema over early osteomyelitis. Diffuse soft tissue edema and skin thickening of the dorsal and medial forefoot.  Recommend correlation for cellulitis.     Echo (TTE) complete (PRN contrast/bubble/strain/3D)    Result Date: 12/11/2024    Left Ventricle: Normal left ventricular systolic function. EF by 2D Simpsons Biplane is 55%. Left ventricle size is normal. Increased wall thickness. Findings consistent with mild concentric hypertrophy. Normal wall motion. Global longitudinal strain is -15.4%. Normal diastolic function.   Aortic Valve: Trileaflet valve.   Tricuspid Valve: Mild regurgitation.   Pericardium: Trivial pericardial effusion present.   Image quality is adequate.     XR FOOT RIGHT (2 VIEWS)    Result Date: 12/10/2024  EXAMINATION: TWO XRAY VIEWS OF THE RIGHT FOOT 12/10/2024 10:44 am COMPARISON: 06/23/2024 HISTORY: ORDERING SYSTEM PROVIDED HISTORY: Right MTP joint swelling TECHNOLOGIST PROVIDED HISTORY: Right MTP joint      Partially occlusive saddle embolus with additional pulmonary emboli within the left lower lobe and right lower lobe segmental pulmonary arteries.  The main pulmonary artery is dilated and the right ventricle is prominent consistent with right heart strain. These findings were discussed with the ordering provider Nurse Cyndy Castillo at 1:09 p.m. on 9 December 2024       Impression:    Debility  Saddle PE with bilateral PE s/p mechanical thrombectomy of the bilateral upper and lower pulmonary arteries on 12/9  Right 1st MTP joint gout  Breast cancer s/p chemotherapy  Pancytopenia  Hypokalemia  History of Bell's palsy  CAD  HTN  Asthma  CKD  History of nephrolithiasis  Hypothyroidism  Benign familial tremor  Obesity    Recommendations:    Diagnosis:  Debility secondary to saddle PE with bilateral PE s/p mechanical thrombectomy of the bilateral upper and lower pulmonary arteries, right 1st MTP joint gout, breast cancer s/p chemotherapy  Therapy: Has PT/OT needs  Medical Necessity: As above  Support: Lives alone  Rehab Recommendation: The patient will benefit from acute inpatient rehabilitation once medically stable per primary and consulting services. Anticipate she will be able to tolerate 3 hours of therapy per day in rehabilitation. The patient requires multidisciplinary rehabilitation treatment including medical management by a PM&R physician, 24 hour rehabilitation nursing, physical therapy, occupational therapy, rehabilitation social work, and nutrition services. Patient and family also require education in post-hospital precautions and home exercise routine, adaptive techniques and deficit compensation strategies, strengthening and conditioning, equipment prescription and instructions in use.    - She remains on a heparin drip at this time    DVT Prophylaxis: On heparin drip      It was my pleasure to evaluate Luci Castañeda today.  Please call with questions.    Rabia uDtton MD

## 2024-12-13 NOTE — PROGRESS NOTES
Occupational Therapy Initial Evaluation  Facility/Department: [facility] CAR 2- STEPDOWN   Patient Name: Luci Castañeda        MRN: [id number]    : 1965    Date of Service: 2024    Discharge Recommendations  Discharge Recommendations: Patient would benefit from continued therapy after discharge.Further therapy recommended at discharge.The patient should be able to tolerate at least 3 hours of therapy per day over 5 days or 15 hours over 7 days.   This patient may benefit from a Physical Medicine and Rehab consult.   OT Equipment Recommendations  Equipment Needed: Yes  Mobility Devices: ADL Assistive Devices  ADL Assistive Devices: Sock-Aid Soft, Grab Bars - shower, Grab Bars - toilet    Chief Complaint   Patient presents with    Toe Injury    Pulmonary Embolism     Pburg tx- saddle     Past Medical History:  has a past medical history of Abnormal uterine bleeding (AUB), Arthritis, Asthma, Benign familial tremor, CAD (coronary artery disease), Cancer (HCC), CKD (chronic kidney disease) stage 1, GFR 90 ml/min or greater, Class 2 severe obesity with serious comorbidity and body mass index (BMI) of 39.0 to 39.9 in adult, COVID-19 vaccine administered, Cystinuria (HCC), Dyspnea on exertion, Flank pain, GERD (gastroesophageal reflux disease), History of Bell's palsy, Hypertension, Hyperuricemia, Hypothyroidism, Kidney stones, Leg swelling, Malignant neoplasm of upper-outer quadrant of left breast in female, estrogen receptor negative (HCC), Midline low back pain with right-sided sciatica, Nephrostomy status (HCC), Shoulder impingement, Snores, Solitary kidney, acquired, Under care of service provider, Under care of service provider, Under care of service provider, Under care of service provider, Urinary tract obstruction by kidney stone, and Wears glasses.  Past Surgical History:  has a past surgical history that includes total nephrectomy (Left, ); Knee arthroscopy (Right, , ); Cystoscopy (Right,  includes washing, rinsing, drying)?: A Lot  How much help is needed for toileting (which includes using toilet, bedpan, or urinal)?: A Little  How much help is needed for putting on and taking off regular upper body clothing?: A Little  How much help is needed for taking care of personal grooming?: None  How much help for eating meals?: None  AM-[facility] Inpatient Daily Activity Raw Score: 18  AM-PAC Inpatient ADL T-Scale Score : 38.66  ADL Inpatient CMS 0-100% Score: 46.65  ADL Inpatient CMS G-Code Modifier : CK    Minutes  OT Individual Minutes  Time In: 1106  Time Out: 1153  Minutes: 47  Time Code Minutes   Timed Code Treatment Minutes: 23 Minutes    Electronically signed by Trista Akbar OT on 12/13/24 at 3:11 PM EST

## 2024-12-13 NOTE — PROGRESS NOTES
[facility] - Progress Note    Today's Date and Time: 12/13/2024, 2:31 PM    Impression :   Saddle pulmonary embolus, partially occlusive with Rt heart strain  Pulmonary emboli in RLL and LLL segmental arteries  S/P percutaneous mechanical thromboembolectomy on 12-9-24  Lt breast Carcinoma. On Keytruda. Planned lumpectomy.  Monoarticular arthritis:  Rt hallux pain and swelling with concern for potential septic joint   S/P Rt hallux  MPJ aspirate 12-11-24 and 12-12-24  Gouty arthritis confirmed     Recommendations:   Monitor off antibiotics   Colchicine  Culture data : No growth   Crystal analysis: Uric acid crystals present    Medical Decision Making/Summary/Discussion:12/13/2024     Daily Elements of Decision Making provided by Consulting Physician:    Note: I have independently performed the steps listed below as part of the medical decision making and evaluation.    Review of current Problems:  Today I am seeing and examining the patient for the following problems:  Saddle pulmonary embolus, partially occlusive with Rt heart strain  Pulmonary emboli in RLL and LLL segmental arteries  S/P percutaneous mechanical thromboembolectomy on 12-9-24  Lt breast Carcinoma. On Keytruda. Planned lumpectomy.  Hyperuricemia   Monoarticular arthritis:  Rt hallux pain and swelling with concern for potential septic joint   S/P Rt hallux  MPJ aspirate 12-11-24  Suspect gouty arthritis  Evaluation of Patient:  Patient with multiple problems   Evaluated for monoarticular arthritis  Please see daily details in Interval Changes Section  Changes in physical exam:  Dyspnea  Monoarticular arthritis  Please see daily details in Physical Exam section and in Interval Changes Section  Changes in ROS:  Improved  Please see daily details in Review of Symptoms section and in Interval Changes Section  Discussion with Referring Physician or Service  Dr. Wilson  Laboratory and Radiologic Studies with personal  Specimen Description .FOOT RIGHT     Direct Exam RARE NEUTROPHILS      NO BACTERIA SEEN     Culture NO GROWTH 2 DAYS    Culture, Anaerobic and Aerobic [[id number]] Collected: 12/11/24 1638    Order Status: Completed Specimen: Foot, Right Updated: 12/13/24 0804     Specimen Description .FOOT SWAB     Special Requests Site: Foot, Right     Direct Exam NO NEUTROPHILS SEEN      NO BACTERIA SEEN     Culture NO GROWTH 2 DAYS    Culture, Body Fluid (with Gram Stain) [[id number]] Collected: 12/11/24 1430    Order Status: Canceled Specimen: Body Fluid from Synovial Fluid     MRSA DNA Probe, Nasal [[id number]] Collected: 12/09/24 1913    Order Status: Completed Specimen: Nasal swab Updated: 12/11/24 0907     Specimen Description .NASAL SWAB     MRSA, DNA, Nasal NEGATIVE     Comment: NEGATIVE:  MRSA DNA not detected by nucleic acid amplification.                                                    Results should be used as an adjunct to nosocomial control efforts to identify patients   needing enhanced precautions.    The test is not intended to identify patients with staphylococcal infections.  Results   should not be used to guide or monitor treatment for MRSA infections.         Blood Culture 1 [[id number]] Collected: 12/09/24 1150    Order Status: Completed Specimen: Blood Updated: 12/12/24 1439     Specimen Description .BLOOD     Special Requests 20CC LEFT FOREARM     Culture NO GROWTH 3 DAYS    Culture, Blood 2 [[id number]] Collected: 12/09/24 1130    Order Status: Completed Specimen: Blood Updated: 12/13/24 1409     Specimen Description .BLOOD     Special Requests RIGHT ANTECUBITAL 10ML     Culture NO GROWTH 4 DAYS              Medical Decision Making-Other:     Note:    Thank you for allowing us to participate in the care of this patient. Please call with questions.    Leonid Frazier MD  Office: [phone]

## 2024-12-13 NOTE — CARE COORDINATION
Acute Inpatient Rehabilitation referral received via Fax    \"Inpatient Consult to PM&R - Physiatry [[id number]]\" Consult Order Status: Verified as ordered, patient confirmed on consult list    PM&R physiatrist Dr. Rabia Dutton on service for PM&R consult.    Updated Crystal CAR 2 CM: Via Telephone Conversation at this time at phone/extension: [phone]

## 2024-12-13 NOTE — PROGRESS NOTES
Luci ARMENDARIZ Garry  12/13/2024    Chief Complaint   Patient presents with    Toe Injury    Pulmonary Embolism     Pburg tx- saddle      12/13/2024   Subjective   On heparin drip  Room air  No dyspnea or distress at rest  No acute issues overnight        Review of Systems -  General ROS: negative for - chills, fatigue, fever or weight loss  ENT ROS: negative for - headaches, oral lesions or sore throat  Cardiovascular ROS: no chest pain , orthopnea or pnd   Gastrointestinal ROS: no abdominal pain, change in bowel habits, or black or bloody stools  Skin - no rash   Neuro - no blurry vision , no loc . No focal weakness   msk - rt hallux pain , I&d today       LUNG CANCER SCREENING     CRITERIA MET    []     CT ORDERED  []      CRITERIA NOT MET   []      REFUSED                    []        REASON CRITERIA NOT MET     SMOKING LESS THAN 30 PY  []      AGE LESS THAN 50 or GREATER 80 YEARS  []      QUIT SMOKING 15 YEARS OR GREATER   []      RECENT CT WITH IN 11 MONTHS    []      LIFE EXPECTANCY < 5 YEARS   []      SIGNS  AND SYMPTOMS OF LUNG CANCER   []           Immunization   Immunization History   Administered Date(s) Administered    COVID-19, MODERNA BLUE border, Primary or Immunocompromised, (age 12y+), IM, 100 mcg/0.5mL 01/11/2021, 02/08/2021, 10/26/2021    COVID-19, PFIZER PURPLE top, DILUTE for use, (age 12 y+), 30mcg/0.3mL 11/17/2021    COVID-19, [place] Vaccine, Vaccine Unspecified 10/26/2021    Influenza Vaccine, unspecified formulation 12/03/2016, 10/01/2017    Influenza Virus Vaccine 09/11/2009, 10/15/2013, 12/03/2016, 10/28/2018, 11/05/2019, 11/12/2021, 10/13/2022, 10/10/2023    Influenza Whole 10/15/2013    Influenza, AFLURIA (age 3 y+), FLUZONE, (age 6 mo+), Quadv MDV, 0.5mL 11/12/2021, 10/05/2023    Pneumococcal, PPSV23, PNEUMOVAX 23, (age 2y+), SC/IM, 0.5mL 10/25/2018    TDaP, ADACEL (age 10y-64y), BOOSTRIX (age 10y+), IM, 0.5mL 05/23/2007    Tetanus 10/15/2013    Tetanus Toxoid, absorbed 10/15/2013     Count, Fluid 97 %    Monocyte Count, Fluid 3 %   Crystals, Body Fluid   Result Value Ref Range    Crystals, Fluid POSITIVE (A) NEGATIVE    Pathologist Review ELECTRONICALLY SIGNED. ANDRES DICKINSON M.D.    CBC with Auto Differential   Result Value Ref Range    WBC 2.4 (L) 3.5 - 11.3 k/uL    RBC 2.45 (L) 3.95 - 5.11 m/uL    Hemoglobin 7.1 (L) 11.9 - 15.1 g/dL    Hematocrit 23.6 (L) 36.3 - 47.1 %    MCV 96.3 82.6 - 102.9 fL    MCH 29.0 25.2 - 33.5 pg    MCHC 30.1 28.4 - 34.8 g/dL    RDW 17.9 (H) 11.8 - 14.4 %    Platelets See Reflexed IPF Result 138 - 453 k/uL    Platelet, Fluorescence 47 (L) 138 - 453 k/uL    Platelet, Immature Fraction 5.0 1.1 - 10.3 %    NRBC Automated 0.8 (H) 0.0 per 100 WBC    Immature Granulocytes % 1 (H) 0 %    Neutrophils % 76 (H) 36 - 66 %    Lymphocytes % 7 (L) 24 - 44 %    Monocytes % 16 (H) 1 - 7 %    Eosinophils % 0 (L) 1 - 4 %    Basophils % 0 0 - 2 %    Immature Granulocytes Absolute 0.02 0.00 - 0.30 k/uL    Neutrophils Absolute 1.83 1.8 - 7.7 k/uL    Lymphocytes Absolute 0.17 (L) 1.0 - 4.8 k/uL    Monocytes Absolute 0.38 0.1 - 0.8 k/uL    Eosinophils Absolute 0.00 0.0 - 0.4 k/uL    Basophils Absolute 0.00 0.0 - 0.2 k/uL    Morphology ANISOCYTOSIS PRESENT    Basic Metabolic Panel w/ Reflex to MG   Result Value Ref Range    Sodium 137 136 - 145 mmol/L    Potassium 3.5 (L) 3.7 - 5.3 mmol/L    Chloride 103 98 - 107 mmol/L    CO2 25 20 - 31 mmol/L    Anion Gap 9 9 - 16 mmol/L    Glucose 90 74 - 99 mg/dL    BUN 10 8 - 23 mg/dL    Creatinine 0.8 0.6 - 0.9 mg/dL    Est, Glom Filt Rate 84 >60 mL/min/1.73m2    Calcium 7.9 (L) 8.6 - 10.4 mg/dL   Anti-Xa, Unfractionated Heparin   Result Value Ref Range    Anti-XA Unfrac Heparin 0.26 IU/L   Magnesium   Result Value Ref Range    Magnesium 1.2 (L) 1.6 - 2.4 mg/dL   Uric Acid   Result Value Ref Range    Uric Acid 7.8 (H) 2.4 - 5.7 mg/dL   TYPE AND SCREEN   Result Value Ref Range    Blood Bank Sample Expiration 12/12/2024,2359     Arm Band Number [id number]

## 2024-12-13 NOTE — PROGRESS NOTES
Today's Date: 12/13/2024  Patient Name: Luci Castañeda  Date of admission: 12/9/2024  3:49 PM  Patient's age: 59 y.o., 1965  Admission Dx: Acute cor pulmonale due to saddle embolus of pulmonary artery (HCC) [I26.02]  Acute saddle pulmonary embolism, unspecified whether acute cor pulmonale present (HCC) [I26.92]    Reason for Consult: management recommendations  Requesting Physician: Db Nagy MD    CHIEF COMPLAINT: Acute pulmonary embolism    INTERIM HISTORY   Pt is seen and examined  Condition is improving.   Gouty arthritis is responding to treatment       HISTORY OF PRESENT ILLNESS:      The patient is a 59 y.o.   female who is admitted to the hospital transferred from [facility].  She presented with leg swelling and workup for DVT and CT scan showed saddle pulmonary embolism with right heart strain.  Patient was admitted to the hospital and then underwent mechanical thrombectomy  Patient is known to us with history of breast cancer.  She is on neoadjuvant chemotherapy/immunotherapy with pembrolizumab.  Patient has previous nephrectomy 1988  Past Medical History:   has a past medical history of Abnormal uterine bleeding (AUB), Arthritis, Asthma, Benign familial tremor, CAD (coronary artery disease), Cancer (HCC), CKD (chronic kidney disease) stage 1, GFR 90 ml/min or greater, Class 2 severe obesity with serious comorbidity and body mass index (BMI) of 39.0 to 39.9 in adult, COVID-19 vaccine administered, Cystinuria (HCC), Dyspnea on exertion, Flank pain, GERD (gastroesophageal reflux disease), History of Bell's palsy, Hypertension, Hyperuricemia, Hypothyroidism, Kidney stones, Leg swelling, Malignant neoplasm of upper-outer quadrant of left breast in female, estrogen receptor negative (HCC), Midline low back pain with right-sided sciatica, Nephrostomy status (HCC), Shoulder impingement, Snores, Solitary kidney, acquired, Under care of service provider, Under care of service provider,  Cystoscopy (N/A, 9/1/2024); and vascular surgery (N/A, 12/9/2024).     Medications:    Reviewed in Epic     Allergies:  Mixed ragweed and Cat hair extract    Social History:   reports that she has never smoked. She has never used smokeless tobacco. She reports that she does not currently use alcohol. She reports that she does not use drugs.     Family History: family history includes Cancer in her father; Dementia in her mother; Diabetes in her father; Emphysema in her paternal grandmother; Heart Attack (age of onset: 80) in her maternal grandmother; Heart Disease in her father, maternal grandmother, and paternal grandfather; High Blood Pressure in her father and mother; Hypertension in her father and sister; Other in her father; Prostate Cancer in her maternal grandfather; Stroke (age of onset: 65) in her paternal grandfather.    REVIEW OF SYSTEMS:    Constitutional: No fever or chills. No night sweats, no weight loss   Eyes: No eye discharge, double vision, or eye pain   HEENT: negative for sore mouth, sore throat, hoarseness and voice change   Respiratory: Mild shortness of breath cough but no hemoptysis  Cardiovascular: negative for chest pain, dyspnea, palpitations, orthopnea, PND   Gastrointestinal: negative for nausea, vomiting, diarrhea, constipation, abdominal pain, Dysphagia, hematemesis and hematochezia   Genitourinary: negative for frequency, dysuria, nocturia, urinary incontinence, and hematuria   Integument: negative for rash, skin lesions, bruises.   Hematologic/Lymphatic: negative for easy bruising, bleeding, lymphadenopathy, or petechiae   Endocrine: negative for heat or cold intolerance,weight changes, change in bowel habits and hair loss   Musculoskeletal: negative for myalgias, arthralgias, pain, joint swelling,and bone pain   Neurological: negative for headaches, dizziness, seizures, weakness, numbness    PHYSICAL EXAM:      /62   Pulse 76   Temp 98.2 °F (36.8 °C) (Oral)   Resp 19   Ht

## 2024-12-14 PROBLEM — R53.81 DEBILITY: Status: ACTIVE | Noted: 2024-12-14

## 2024-12-14 LAB
ANION GAP SERPL CALCULATED.3IONS-SCNC: 10 MMOL/L (ref 9–16)
ANTI-XA UNFRAC HEPARIN: 0.38 IU/L
BASOPHILS # BLD: 0 K/UL (ref 0–0.2)
BASOPHILS NFR BLD: 0 % (ref 0–2)
BUN SERPL-MCNC: 10 MG/DL (ref 8–23)
CALCIUM SERPL-MCNC: 7.9 MG/DL (ref 8.6–10.4)
CHLORIDE SERPL-SCNC: 101 MMOL/L (ref 98–107)
CO2 SERPL-SCNC: 24 MMOL/L (ref 20–31)
CREAT SERPL-MCNC: 0.8 MG/DL (ref 0.6–0.9)
CRP SERPL HS-MCNC: 112 MG/L (ref 0–5)
EOSINOPHIL # BLD: 0 K/UL (ref 0–0.4)
EOSINOPHILS RELATIVE PERCENT: 0 % (ref 1–4)
ERYTHROCYTE [DISTWIDTH] IN BLOOD BY AUTOMATED COUNT: 18.6 % (ref 11.8–14.4)
ERYTHROCYTE [SEDIMENTATION RATE] IN BLOOD BY PHOTOMETRIC METHOD: 41 MM/HR (ref 0–30)
GFR, ESTIMATED: 84 ML/MIN/1.73M2
GLUCOSE SERPL-MCNC: 98 MG/DL (ref 74–99)
HCT VFR BLD AUTO: 22.4 % (ref 36.3–47.1)
HCT VFR BLD AUTO: 28.4 % (ref 36.3–47.1)
HGB BLD-MCNC: 6.9 G/DL (ref 11.9–15.1)
HGB BLD-MCNC: 9.1 G/DL (ref 11.9–15.1)
IMM GRANULOCYTES # BLD AUTO: 0 K/UL (ref 0–0.3)
IMM GRANULOCYTES NFR BLD: 0 %
LYMPHOCYTES NFR BLD: 0.43 K/UL (ref 1–4.8)
LYMPHOCYTES RELATIVE PERCENT: 17 % (ref 24–44)
MCH RBC QN AUTO: 29.4 PG (ref 25.2–33.5)
MCHC RBC AUTO-ENTMCNC: 30.8 G/DL (ref 28.4–34.8)
MCV RBC AUTO: 95.3 FL (ref 82.6–102.9)
MICROORGANISM SPEC CULT: NORMAL
MICROORGANISM SPEC CULT: NORMAL
MONOCYTES NFR BLD: 0.5 K/UL (ref 0.1–0.8)
MONOCYTES NFR BLD: 20 % (ref 1–7)
MORPHOLOGY: ABNORMAL
NEUTROPHILS NFR BLD: 63 % (ref 36–66)
NEUTS SEG NFR BLD: 1.57 K/UL (ref 1.8–7.7)
NRBC BLD-RTO: 0.8 PER 100 WBC
NUCLEATED RED BLOOD CELLS: 1 PER 100 WBC
PLATELET # BLD AUTO: ABNORMAL K/UL (ref 138–453)
PLATELET, FLUORESCENCE: 48 K/UL (ref 138–453)
PLATELETS.RETICULATED NFR BLD AUTO: 6.2 % (ref 1.1–10.3)
POTASSIUM SERPL-SCNC: 3.7 MMOL/L (ref 3.7–5.3)
RBC # BLD AUTO: 2.35 M/UL (ref 3.95–5.11)
SERVICE CMNT-IMP: NORMAL
SERVICE CMNT-IMP: NORMAL
SODIUM SERPL-SCNC: 135 MMOL/L (ref 136–145)
SPECIMEN DESCRIPTION: NORMAL
SPECIMEN DESCRIPTION: NORMAL
URATE SERPL-MCNC: 7.6 MG/DL (ref 2.4–5.7)
WBC OTHER # BLD: 2.5 K/UL (ref 3.5–11.3)

## 2024-12-14 PROCEDURE — 6370000000 HC RX 637 (ALT 250 FOR IP)

## 2024-12-14 PROCEDURE — 2580000003 HC RX 258: Performed by: STUDENT IN AN ORGANIZED HEALTH CARE EDUCATION/TRAINING PROGRAM

## 2024-12-14 PROCEDURE — 99232 SBSQ HOSP IP/OBS MODERATE 35: CPT | Performed by: INTERNAL MEDICINE

## 2024-12-14 PROCEDURE — 86900 BLOOD TYPING SEROLOGIC ABO: CPT

## 2024-12-14 PROCEDURE — 97116 GAIT TRAINING THERAPY: CPT

## 2024-12-14 PROCEDURE — 80048 BASIC METABOLIC PNL TOTAL CA: CPT

## 2024-12-14 PROCEDURE — 99232 SBSQ HOSP IP/OBS MODERATE 35: CPT | Performed by: STUDENT IN AN ORGANIZED HEALTH CARE EDUCATION/TRAINING PROGRAM

## 2024-12-14 PROCEDURE — 6370000000 HC RX 637 (ALT 250 FOR IP): Performed by: SURGERY

## 2024-12-14 PROCEDURE — 85055 RETICULATED PLATELET ASSAY: CPT

## 2024-12-14 PROCEDURE — 2580000003 HC RX 258: Performed by: SURGERY

## 2024-12-14 PROCEDURE — 85014 HEMATOCRIT: CPT

## 2024-12-14 PROCEDURE — 2060000000 HC ICU INTERMEDIATE R&B

## 2024-12-14 PROCEDURE — 6370000000 HC RX 637 (ALT 250 FOR IP): Performed by: STUDENT IN AN ORGANIZED HEALTH CARE EDUCATION/TRAINING PROGRAM

## 2024-12-14 PROCEDURE — 85025 COMPLETE CBC W/AUTO DIFF WBC: CPT

## 2024-12-14 PROCEDURE — 6360000002 HC RX W HCPCS: Performed by: SURGERY

## 2024-12-14 PROCEDURE — P9016 RBC LEUKOCYTES REDUCED: HCPCS

## 2024-12-14 PROCEDURE — 86850 RBC ANTIBODY SCREEN: CPT

## 2024-12-14 PROCEDURE — 97110 THERAPEUTIC EXERCISES: CPT

## 2024-12-14 PROCEDURE — 85018 HEMOGLOBIN: CPT

## 2024-12-14 PROCEDURE — 36415 COLL VENOUS BLD VENIPUNCTURE: CPT

## 2024-12-14 PROCEDURE — 86140 C-REACTIVE PROTEIN: CPT

## 2024-12-14 PROCEDURE — 36430 TRANSFUSION BLD/BLD COMPNT: CPT

## 2024-12-14 PROCEDURE — 86923 COMPATIBILITY TEST ELECTRIC: CPT

## 2024-12-14 PROCEDURE — 85520 HEPARIN ASSAY: CPT

## 2024-12-14 PROCEDURE — 84550 ASSAY OF BLOOD/URIC ACID: CPT

## 2024-12-14 PROCEDURE — 85652 RBC SED RATE AUTOMATED: CPT

## 2024-12-14 PROCEDURE — 86901 BLOOD TYPING SEROLOGIC RH(D): CPT

## 2024-12-14 PROCEDURE — 6360000002 HC RX W HCPCS: Performed by: STUDENT IN AN ORGANIZED HEALTH CARE EDUCATION/TRAINING PROGRAM

## 2024-12-14 RX ORDER — PROMETHAZINE HYDROCHLORIDE 25 MG/ML
12.5 INJECTION, SOLUTION INTRAMUSCULAR; INTRAVENOUS EVERY 4 HOURS PRN
Status: DISCONTINUED | OUTPATIENT
Start: 2024-12-14 | End: 2024-12-18 | Stop reason: HOSPADM

## 2024-12-14 RX ORDER — SODIUM CHLORIDE 9 MG/ML
INJECTION, SOLUTION INTRAVENOUS PRN
Status: DISCONTINUED | OUTPATIENT
Start: 2024-12-14 | End: 2024-12-18 | Stop reason: HOSPADM

## 2024-12-14 RX ADMIN — APIXABAN 10 MG: 5 TABLET, FILM COATED ORAL at 10:10

## 2024-12-14 RX ADMIN — METHYLPREDNISOLONE SODIUM SUCCINATE 20 MG: 40 INJECTION INTRAMUSCULAR; INTRAVENOUS at 12:14

## 2024-12-14 RX ADMIN — HEPARIN SODIUM 15 UNITS/KG/HR: 10000 INJECTION, SOLUTION INTRAVENOUS at 00:42

## 2024-12-14 RX ADMIN — COLCHICINE 0.6 MG: 0.6 TABLET, FILM COATED ORAL at 08:12

## 2024-12-14 RX ADMIN — WATER 20 MG: 1 INJECTION INTRAMUSCULAR; INTRAVENOUS; SUBCUTANEOUS at 21:00

## 2024-12-14 RX ADMIN — SODIUM CHLORIDE, PRESERVATIVE FREE 10 ML: 5 INJECTION INTRAVENOUS at 08:12

## 2024-12-14 RX ADMIN — SODIUM CHLORIDE, PRESERVATIVE FREE 10 ML: 5 INJECTION INTRAVENOUS at 21:01

## 2024-12-14 RX ADMIN — APIXABAN 10 MG: 5 TABLET, FILM COATED ORAL at 21:00

## 2024-12-14 RX ADMIN — PANTOPRAZOLE SODIUM 40 MG: 40 TABLET, DELAYED RELEASE ORAL at 08:12

## 2024-12-14 RX ADMIN — ONDANSETRON 4 MG: 4 TABLET, ORALLY DISINTEGRATING ORAL at 11:40

## 2024-12-14 RX ADMIN — METOCLOPRAMIDE 10 MG: 5 INJECTION, SOLUTION INTRAMUSCULAR; INTRAVENOUS at 13:41

## 2024-12-14 ASSESSMENT — PAIN SCALES - GENERAL
PAINLEVEL_OUTOF10: 3
PAINLEVEL_OUTOF10: 3
PAINLEVEL_OUTOF10: 0
PAINLEVEL_OUTOF10: 3
PAINLEVEL_OUTOF10: 0

## 2024-12-14 ASSESSMENT — PAIN DESCRIPTION - ORIENTATION: ORIENTATION: RIGHT

## 2024-12-14 ASSESSMENT — PAIN DESCRIPTION - LOCATION: LOCATION: FOOT

## 2024-12-14 NOTE — PLAN OF CARE
Problem: Discharge Planning  Goal: Discharge to home or other facility with appropriate resources  12/13/2024 2334 by Liset Donovan RN  Outcome: Progressing  12/13/2024 1058 by Paulina Roy RN  Outcome: Progressing     Problem: Pain  Goal: Verbalizes/displays adequate comfort level or baseline comfort level  12/13/2024 2334 by Liset Donovan RN  Outcome: Progressing  12/13/2024 1058 by Paulina Roy RN  Outcome: Progressing     Problem: Safety - Adult  Goal: Free from fall injury  12/13/2024 2334 by Liset Donovan RN  Outcome: Progressing  12/13/2024 1058 by Paulina Roy RN  Outcome: Progressing     Problem: ABCDS Injury Assessment  Goal: Absence of physical injury  12/13/2024 2334 by Liset Donovan RN  Outcome: Progressing  12/13/2024 1058 by Paulina Roy RN  Outcome: Progressing     Problem: Respiratory - Adult  Goal: Achieves optimal ventilation and oxygenation  12/13/2024 2334 by Liset Donovan RN  Outcome: Progressing  12/13/2024 1058 by Paulina Roy RN  Outcome: Progressing     Problem: Cardiovascular - Adult  Goal: Maintains optimal cardiac output and hemodynamic stability  12/13/2024 2334 by Liset Donovan RN  Outcome: Progressing  12/13/2024 1058 by Paulina Roy RN  Outcome: Progressing  Goal: Absence of cardiac dysrhythmias or at baseline  12/13/2024 2334 by Liset Donovan RN  Outcome: Progressing  12/13/2024 1058 by Paulina Roy RN  Outcome: Progressing     Problem: Skin/Tissue Integrity - Adult  Goal: Skin integrity remains intact  12/13/2024 2334 by Liset Donovan RN  Outcome: Progressing  12/13/2024 1058 by Paulina Roy RN  Outcome: Progressing  Goal: Incisions, wounds, or drain sites healing without S/S of infection  12/13/2024 2334 by Liset Donovan RN  Outcome: Progressing  12/13/2024 1058 by Paulina Roy RN  Outcome: Progressing  Goal: Oral mucous membranes remain intact  12/13/2024 2334 by Liset Donovan RN  Outcome: Progressing  12/13/2024 1058 by Kirill

## 2024-12-14 NOTE — PROGRESS NOTES
service provider, Under care of service provider, Under care of service provider, Urinary tract obstruction by kidney stone, and Wears glasses.    Social History:   reports that she has never smoked. She has never used smokeless tobacco. She reports that she does not currently use alcohol. She reports that she does not use drugs.     Family History:   Family History   Problem Relation Age of Onset    High Blood Pressure Mother     Dementia Mother     High Blood Pressure Father     Diabetes Father     Cancer Father         tongue    Hypertension Father     Heart Disease Father         stents    Other Father         Mylofibrosis    Hypertension Sister     Heart Disease Maternal Grandmother     Heart Attack Maternal Grandmother 80    Prostate Cancer Maternal Grandfather     Emphysema Paternal Grandmother     Heart Disease Paternal Grandfather     Stroke Paternal Grandfather 65       Vitals:  /86   Pulse 74   Temp 98.8 °F (37.1 °C)   Resp 18   Ht 1.727 m (5' 8\")   Wt 111.5 kg (245 lb 13 oz)   SpO2 96%   BMI 37.38 kg/m²   Temp (24hrs), Av.5 °F (36.9 °C), Min:98.1 °F (36.7 °C), Max:98.8 °F (37.1 °C)    No results for input(s): \"POCGLU\" in the last 72 hours.    I/O (24Hr):    Intake/Output Summary (Last 24 hours) at 2024 0923  Last data filed at 2024 0800  Gross per 24 hour   Intake 776.5 ml   Output 425 ml   Net 351.5 ml       Labs:  Hematology:  Recent Labs     24  0756 24  1523 24  0733 24  0127   WBC 3.5  --  2.4* 2.5*   RBC 2.43*  --  2.45* 2.35*   HGB 7.1* 7.7* 7.1* 6.9*   HCT 23.1* 24.5* 23.6* 22.4*   MCV 95.1  --  96.3 95.3   MCH 29.2  --  29.0 29.4   MCHC 30.7  --  30.1 30.8   RDW 18.1*  --  17.9* 18.6*   PLT See Reflexed IPF Result  --  See Reflexed IPF Result See Reflexed IPF Result   SEDRATE 42*  --   --  41*   .0*  --   --  112.0*     Chemistry:  Recent Labs     24  0756 24  0733 24  1811 24  0127    137  --  135*   K 3.7  12/10/2024 Yes    Monoarticular arthritis 12/11/2024 Yes    Gout 12/13/2024 Yes    Debility 12/14/2024 Yes       Plan:        Saddle pulmonary embolism with right heart strain status post mechanical thrombectomy 12/9  -Discontinued heparin drip and started Eliquis 12/14  -PT OT following    Right foot pain and swelling, likely gout  -Podiatry consulted, started colchicine  -Arthrocentesis 12/11, culture no growth so far  -Arthrotomy of right first MPJ performed 12/12  -Started IV Solu-Medrol 12/14 for 3 days per podiatry recommendations    Thrombocytopenia  Normocytic anemia  -Likely related to chemotherapy  -Heme-onc following, Brooks negative  -Status post 1 unit PRBC 12/14    Left breast invasive ductal carcinoma  -Follows with oncology outpatient    Acute hypoxic respiratory failure, resolved  -Secondary to PE    Discharge planning: PM&R consulted for acute inpatient rehab evaluation    Frederick Wilson DO  12/14/2024  9:23 AM

## 2024-12-14 NOTE — PROGRESS NOTES
Physical Therapy  Facility/Department: [facility] CAR 2- STEPDOWN  Physical Therapy Initial Assessment    Name: Luci Castañeda  : 1965  MRN: [id number]  Date of Service: 2024  Chief Complaint   Patient presents with    Toe Injury    Pulmonary Embolism     Pburg tx- saddle       Discharge Recommendations:  Patient would benefit from continued therapy after discharge   PT Equipment Recommendations  Equipment Needed: Yes  Mobility Devices: Walker  Walker: Rolling      Patient Diagnosis(es): The primary encounter diagnosis was Acute saddle pulmonary embolism, unspecified whether acute cor pulmonale present (HCC). A diagnosis of Acute cor pulmonale due to saddle embolus of pulmonary artery (HCC) was also pertinent to this visit.  Past Medical History:  has a past medical history of Abnormal uterine bleeding (AUB), Arthritis, Asthma, Benign familial tremor, CAD (coronary artery disease), Cancer (HCC), CKD (chronic kidney disease) stage 1, GFR 90 ml/min or greater, Class 2 severe obesity with serious comorbidity and body mass index (BMI) of 39.0 to 39.9 in adult, COVID-19 vaccine administered, Cystinuria (HCC), Dyspnea on exertion, Flank pain, GERD (gastroesophageal reflux disease), History of Bell's palsy, Hypertension, Hyperuricemia, Hypothyroidism, Kidney stones, Leg swelling, Malignant neoplasm of upper-outer quadrant of left breast in female, estrogen receptor negative (HCC), Midline low back pain with right-sided sciatica, Nephrostomy status (HCC), Shoulder impingement, Snores, Solitary kidney, acquired, Under care of service provider, Under care of service provider, Under care of service provider, Under care of service provider, Urinary tract obstruction by kidney stone, and Wears glasses.  Past Surgical History:  has a past surgical history that includes total nephrectomy (Left, ); Knee arthroscopy (Right, , ); Cystoscopy (Right, 2017); cysto/uretero/pyeloscopy, calculus tx (Right,  Mobility    AM-PAC Basic Mobility - Inpatient   How much help is needed turning from your back to your side while in a flat bed without using bedrails?: None  How much help is needed moving from lying on your back to sitting on the side of a flat bed without using bedrails?: None  How much help is needed moving to and from a bed to a chair?: A Little  How much help is needed standing up from a chair using your arms?: None  How much help is needed walking in hospital room?: A Little  How much help is needed climbing 3-5 steps with a railing?: A Little  AM-[facility] Inpatient Mobility Raw Score : 21  AM-PAC Inpatient T-Scale Score : 50.25  Mobility Inpatient CMS 0-100% Score: 28.97  Mobility Inpatient CMS G-Code Modifier : CJ     Goals  Short Term Goals  Time Frame for Short Term Goals: 14  Short Term Goal 1: Pt to perform independent bed mobility from flat surface  Short Term Goal 2: Pt to demonstrate functional transfers independently  Short Term Goal 3: Ambulate 300ft w/ RW independently  Short Term Goal 4: Ascend/descend 9 stairs with one rail to simulate home environment independently  Patient Goals   Patient Goals : To get stronger       Education  Patient Education  Education Given To: Patient  Education Provided: Role of Therapy;Plan of Care;Transfer Training  Education Method: Verbal;Demonstration  Education Outcome: Verbalized understanding;Demonstrated understanding      Therapy Time   Individual Concurrent Group Co-treatment   Time In 1450         Time Out 1515         Minutes 25         Timed Code Treatment Minutes: 25 Minutes       Swetha Martinez PT

## 2024-12-14 NOTE — PROGRESS NOTES
[facility]  Progress Note    Luci Castañeda  MRN: [id number]  Date: 12/14/2024  LOS: 5     Reason for F/U :   Concern for septic arthritis    Impression :   Saddle pulmonary embolus, partially occlusive with Rt heart strain  Pulmonary emboli in RLL and LLL segmental arteries  S/P percutaneous mechanical thromboembolectomy on 12-9-24  Lt breast Carcinoma. On Keytruda. Planned lumpectomy.  Anemia/thrombocytopenia  Monoarticular arthritis: Right MTP joint effusion  concern for potential septic joint   S/P Rt hallux  MPJ aspirate 12-11-24 and 12-12-24  Gouty arthritis confirmed     Recommendations:   The patient does not have an acute infectious process and antimicrobial therapy has been discontinued  Continue local wound care for the right foot  Infectious disease was the patient is stable and we will sign off    Infection Control Recommendations:   Universal precautions    Discharge Planning:   Patient will need Midline Catheter Insertion/ PICC line Insertion: No  Patient will need: Home IV , Infusion Center,  SNF,  LTAC: Undetermined  Patient willneed outpatient wound care: No    Medical Decision making / Summary of Stay:   Luci Castañeda is a 59 y.o.-year-old  female who was initially admitted on 12/9/2024. Patient seen at the request of Dr. Wilson.     INITIAL HISTORY:     Patient with a past medical Hx that includes:  Lt breast cancer of upper outer quadrant, estrogen receptor negative. Patient on Keytruda. Awaiting lumpectomy  Essential HTN  Hypothyroidism  CKD stage 1  Acquired single kidney   CAD  Essential HTN  Hyperuricemia      Patient presented to [facility] with a complaint of swelling of her Rt leg and foot with associated skin discoloration. Symptoms present for a few days with associated SOB and GRANADOS.      She had a scan of her leg which did not show a DVT but her CT scan showed a partially occlusive saddle embolus with smaller LLL and RLL segmental emboli  GROWTH 3 DAYS   Culture, Body Fluid (with Gram Stain) [[id number]] Collected: 12/11/24 1430   Order Status: Canceled Specimen: Body Fluid from Synovial Fluid    MRSA DNA Probe, Nasal [[id number]] Collected: 12/09/24 1913   Order Status: Completed Specimen: Nasal swab Updated: 12/11/24 0907    Specimen Description .NASAL SWAB    MRSA, DNA, Nasal NEGATIVE    Comment: NEGATIVE:  MRSA DNA not detected by nucleic acid amplification.                                                   Results should be used as an adjunct to nosocomial control efforts to identify patients  needing enhanced precautions.    The test is not intended to identify patients with staphylococcal infections.  Results  should not be used to guide or monitor treatment for MRSA infections.      Blood Culture 1 [[id number]] Collected: 12/09/24 1150   Order Status: Completed Specimen: Blood Updated: 12/14/24 1446    Specimen Description .BLOOD    Special Requests 20CC LEFT FOREARM    Culture NO GROWTH 5 DAYS   Culture, Blood 2 [[id number]] Collected: 12/09/24 1130   Order Status: Completed Specimen: Blood Updated: 12/14/24 1417    Specimen Description .BLOOD    Special Requests RIGHT ANTECUBITAL 10ML    Culture NO GROWTH 5 DAYS   Culture, Blood 2 [[id number]] Collected: 09/01/24 1424   Order Status: Completed Specimen: Blood Updated: 09/06/24 1616    Specimen Description .BLOOD    Special Requests rt ac 2ml    Culture NO GROWTH 5 DAYS   Culture, Blood 1 [[id number]] Collected: 09/01/24 1400   Order Status: Canceled Specimen: Blood    Culture, Urine [[id number]] Collected: 09/01/24 0816   Order Status: Completed Specimen: Urine, clean catch Updated: 09/02/24 0727    Specimen Description .CLEAN CATCH URINE    Special Requests Site: Urine    Culture NO GROWTH   Culture, Urine [[id number]] Collected: 08/27/24 2307   Order Status: Completed Specimen: Urine, clean catch Updated: 08/28/24 2030    Specimen Description .CLEAN CATCH URINE    Special Requests Site:

## 2024-12-14 NOTE — PROGRESS NOTES
Progress Note  Podiatric Medicine and Surgery     Subjective     Chief Complaint: Right MTP joint swelling, gout vs septic joint     Interval History:   - Patient seen and examined at bedside  - No acute events overnight  - Afebrile, vital signs stable   - Reports general malaise but denies all other constitutional symptoms   - Patient says pain to right first MPJ has decreased some today, but is     Labs:  WBC:  Lab Results   Component Value Date    WBC 2.5 (L) 12/14/2024       CRP:  Lab Results   Component Value Date    .0 (H) 12/14/2024       ESR:  Lab Results   Component Value Date    SEDRATE 41 (H) 12/14/2024       HPI:  Luci Castañeda is a 59 y.o. female seen at [facility] for right foot pain at the first MPJ.  Patient says she has had pain to the first MPJ for 7 days.  She describes the pain as severe and says even the slightest touch is painful.  Patient was admitted for saddle pulmonary embolism.  Patient underwent a mechanical thrombectomy on 12/9/2024. Patient has a past medical history of hyperuricemia, kidney stones, malignant neoplasm of left breast, CAD, asthma, GERD, hypertension. Pt also reports that her father suffered from gout.     PCP is Nabil Ly MD    ROS: Denies N/V/F/C/SOB/CP.  Otherwise negative except at stated in the HPI.     Past Medical History   has a past medical history of Abnormal uterine bleeding (AUB), Arthritis, Asthma, Benign familial tremor, CAD (coronary artery disease), Cancer (HCC), CKD (chronic kidney disease) stage 1, GFR 90 ml/min or greater, Class 2 severe obesity with serious comorbidity and body mass index (BMI) of 39.0 to 39.9 in adult, COVID-19 vaccine administered, Cystinuria (HCC), Dyspnea on exertion, Flank pain, GERD (gastroesophageal reflux disease), History of Bell's palsy, Hypertension, Hyperuricemia, Hypothyroidism, Kidney stones, Leg swelling, Malignant neoplasm of upper-outer quadrant of left breast in female, estrogen receptor  adjacent to the MTP joint. Findings favor reactive edema over early   osteomyelitis.      Diffuse soft tissue edema and skin thickening of the dorsal and medial   forefoot.  Recommend correlation for cellulitis.            XR FOOT RIGHT (2 VIEWS)   Final Result   No acute osseous abnormality.             Cultures:   Results       Procedure Component Value Units Date/Time    Culture, Body Fluid (with Gram Stain) [[id number]] Collected: 12/12/24 1656    Order Status: Canceled Specimen: Body Fluid from Aspirate     Culture, Anaerobic and Aerobic [[id number]] Collected: 12/12/24 1637    Order Status: Completed Specimen: Foot Updated: 12/13/24 1134     Specimen Description .FOOT ASPIRATE     Direct Exam FEW NEUTROPHILS      NO ORGANISMS SEEN     Culture NO GROWTH 15 HOURS    Culture, Anaerobic and Aerobic [[id number]] Collected: 12/12/24 1636    Order Status: Completed Specimen: Foot Updated: 12/13/24 1132     Specimen Description .FOOT SWABS     Direct Exam FEW NEUTROPHILS      NO ORGANISMS SEEN     Culture NO GROWTH 16 HOURS    Culture, Anaerobic and Aerobic [[id number]] Collected: 12/11/24 1640    Order Status: Completed Specimen: Foot Updated: 12/13/24 0946     Specimen Description .FOOT RIGHT     Direct Exam RARE NEUTROPHILS      NO BACTERIA SEEN     Culture NO GROWTH 2 DAYS    Culture, Anaerobic and Aerobic [[id number]] Collected: 12/11/24 1638    Order Status: Completed Specimen: Foot, Right Updated: 12/13/24 0804     Specimen Description .FOOT SWAB     Special Requests Site: Foot, Right     Direct Exam NO NEUTROPHILS SEEN      NO BACTERIA SEEN     Culture NO GROWTH 2 DAYS    Culture, Body Fluid (with Gram Stain) [[id number]] Collected: 12/11/24 1430    Order Status: Canceled Specimen: Body Fluid from Synovial Fluid     MRSA DNA Probe, Nasal [[id number]] Collected: 12/09/24 1913    Order Status: Completed Specimen: Nasal swab Updated: 12/11/24 0907     Specimen Description .NASAL SWAB     MRSA, DNA, Nasal NEGATIVE

## 2024-12-14 NOTE — PLAN OF CARE
Problem: Discharge Planning  Goal: Discharge to home or other facility with appropriate resources  12/14/2024 0829 by Paulina Roy RN  Outcome: Progressing  12/13/2024 2334 by Liset Donovan RN  Outcome: Progressing     Problem: Pain  Goal: Verbalizes/displays adequate comfort level or baseline comfort level  12/14/2024 0829 by Paulina Roy RN  Outcome: Progressing  12/13/2024 2334 by Liset Donovan RN  Outcome: Progressing     Problem: Safety - Adult  Goal: Free from fall injury  12/14/2024 0829 by Paulina Roy RN  Outcome: Progressing  12/13/2024 2334 by Liset Donovan RN  Outcome: Progressing     Problem: ABCDS Injury Assessment  Goal: Absence of physical injury  12/14/2024 0829 by Paulina Roy RN  Outcome: Progressing  12/13/2024 2334 by Liset Donovan RN  Outcome: Progressing     Problem: Respiratory - Adult  Goal: Achieves optimal ventilation and oxygenation  12/14/2024 0829 by Paulina Roy RN  Outcome: Progressing  12/13/2024 2334 by Liset Donovan RN  Outcome: Progressing     Problem: Cardiovascular - Adult  Goal: Maintains optimal cardiac output and hemodynamic stability  12/14/2024 0829 by Paulina Roy RN  Outcome: Progressing  12/13/2024 2334 by Liset Donovan RN  Outcome: Progressing  Goal: Absence of cardiac dysrhythmias or at baseline  12/14/2024 0829 by Paulina Roy RN  Outcome: Progressing  12/13/2024 2334 by Liset Donovan RN  Outcome: Progressing     Problem: Skin/Tissue Integrity - Adult  Goal: Skin integrity remains intact  12/14/2024 0829 by Paulina Roy RN  Outcome: Progressing  12/13/2024 2334 by Liset Donovan RN  Outcome: Progressing  Goal: Incisions, wounds, or drain sites healing without S/S of infection  12/14/2024 0829 by Paulina Roy RN  Outcome: Progressing  12/13/2024 2334 by Liset Donovan RN  Outcome: Progressing  Goal: Oral mucous membranes remain intact  12/14/2024 0829 by Paulina Roy RN  Outcome: Progressing  12/13/2024 2334 by Bishop

## 2024-12-14 NOTE — PROGRESS NOTES
m/s    AV Peak Gradient 9 mmHg    AV Area by VTI 2.7 cm2    AV Area by Peak Velocity 2.5 cm2    Aortic Root 3.2 cm    Ascending Aorta 3.3 cm    MV E Wave Deceleration Time 196.0 ms    MV A Velocity 0.76 m/s    MV E Velocity 0.73 m/s    RV Free Wall Peak S' 13.9 cm/s    TAPSE 2.5 1.7 cm    TR Max Velocity 2.17 m/s    TR Peak Gradient 19 mmHg    Body Surface Area 2.21 m2    Fractional Shortening 2D 43 28 - 44 %    LV ESV Index A4C 12 mL/m2    LV EDV Index A4C 27 mL/m2    LV ESV Index A2C 11 mL/m2    LV EDV Index A2C 24 mL/m2    LVIDd Index 2.42 cm/m2    LVIDs Index 1.37 cm/m2    LV RWT Ratio 0.45     LV Mass 2D 256.6 (A) 67 - 162 g    LV Mass 2D Index 117.2 43 - 95 g/m2    MV E/A 0.96     E/E' Ratio (Averaged) 11.68     E/E' Lateral 11.99     E/E' Septal 11.37     LVOT Stroke Volume Index 31.7 mL/m2    LA Volume Index MOD A4C 13 16 - 34 ml/m2    Ao Root Index 1.46 cm/m2    Ascending Aorta Index 1.51 cm/m2    AV Velocity Ratio 0.80     LVOT:AV VTI Index 0.84     REGINA/BSA VTI 1.2 cm2/m2    REGINA/BSA Peak Velocity 1.1 cm2/m2    LA Diameter 2.0 cm    LA Size Index 0.91 cm/m2    LA/AO Root Ratio 0.63    Cath hemo interface   Result Value Ref Range    Body Surface Area 2.21 m2       Assessment  Saddle pulmonary embolism post mechanical thrombectomy 12/9/2024  Left breast carcinoma  Monoarticular arthritis    Plan:  Continue Eliquis.  Patient will need a long-term anticoagulation due to breast cancer.  Encourage incentive spirometry and Acapella  Pulmonary team will continue to follow.  Discussed with patient to monitor urine color and to inform the provider if urine becomes bloody.  Patient states it is unchanged for last few weeks and she feels it secondary to dehydration.  No objection to discharge from pulmonary standpoint.    Electronically signed by Norman Neri MD on 12/14/2024 at 9:32 AM     Please note that this chart was generated using voice recognition Dragon dictation software.  Although every effort was made  to ensure the accuracy of this automated transcription, some errors in transcription may have occurred.

## 2024-12-15 LAB
ABO/RH: NORMAL
ANION GAP SERPL CALCULATED.3IONS-SCNC: 15 MMOL/L (ref 9–16)
ANTIBODY SCREEN: NEGATIVE
ARM BAND NUMBER: NORMAL
BASOPHILS # BLD: 0 K/UL (ref 0–0.2)
BASOPHILS NFR BLD: 0 % (ref 0–2)
BLOOD BANK BLOOD PRODUCT EXPIRATION DATE: NORMAL
BLOOD BANK DISPENSE STATUS: NORMAL
BLOOD BANK ISBT PRODUCT BLOOD TYPE: 7300
BLOOD BANK PRODUCT CODE: NORMAL
BLOOD BANK SAMPLE EXPIRATION: NORMAL
BLOOD BANK UNIT TYPE AND RH: NORMAL
BPU ID: NORMAL
BUN SERPL-MCNC: 10 MG/DL (ref 8–23)
CALCIUM SERPL-MCNC: 8.4 MG/DL (ref 8.6–10.4)
CHLORIDE SERPL-SCNC: 101 MMOL/L (ref 98–107)
CO2 SERPL-SCNC: 20 MMOL/L (ref 20–31)
COMPONENT: NORMAL
CREAT SERPL-MCNC: 0.8 MG/DL (ref 0.6–0.9)
CROSSMATCH RESULT: NORMAL
CRP SERPL HS-MCNC: 85.8 MG/L (ref 0–5)
EOSINOPHIL # BLD: 0 K/UL (ref 0–0.4)
EOSINOPHILS RELATIVE PERCENT: 0 % (ref 1–4)
ERYTHROCYTE [DISTWIDTH] IN BLOOD BY AUTOMATED COUNT: 17.8 % (ref 11.8–14.4)
ERYTHROCYTE [SEDIMENTATION RATE] IN BLOOD BY PHOTOMETRIC METHOD: 60 MM/HR (ref 0–30)
GFR, ESTIMATED: 84 ML/MIN/1.73M2
GLUCOSE SERPL-MCNC: 127 MG/DL (ref 74–99)
HCT VFR BLD AUTO: 28.3 % (ref 36.3–47.1)
HGB BLD-MCNC: 8.9 G/DL (ref 11.9–15.1)
IMM GRANULOCYTES # BLD AUTO: 0.11 K/UL (ref 0–0.3)
IMM GRANULOCYTES NFR BLD: 3 %
IRON SATN MFR SERPL: 90 % (ref 20–55)
IRON SERPL-MCNC: 199 UG/DL (ref 37–145)
LYMPHOCYTES NFR BLD: 0.3 K/UL (ref 1–4.8)
LYMPHOCYTES RELATIVE PERCENT: 8 % (ref 24–44)
MCH RBC QN AUTO: 29.4 PG (ref 25.2–33.5)
MCHC RBC AUTO-ENTMCNC: 31.4 G/DL (ref 28.4–34.8)
MCV RBC AUTO: 93.4 FL (ref 82.6–102.9)
MONOCYTES NFR BLD: 0.33 K/UL (ref 0.1–0.8)
MONOCYTES NFR BLD: 9 % (ref 1–7)
MORPHOLOGY: ABNORMAL
NEUTROPHILS NFR BLD: 80 % (ref 36–66)
NEUTS SEG NFR BLD: 2.96 K/UL (ref 1.8–7.7)
NRBC BLD-RTO: 0.5 PER 100 WBC
PLATELET # BLD AUTO: 68 K/UL (ref 138–453)
PMV BLD AUTO: 12 FL (ref 8.1–13.5)
POTASSIUM SERPL-SCNC: 4 MMOL/L (ref 3.7–5.3)
RBC # BLD AUTO: 3.03 M/UL (ref 3.95–5.11)
SODIUM SERPL-SCNC: 136 MMOL/L (ref 136–145)
TIBC SERPL-MCNC: 222 UG/DL (ref 250–450)
TRANSFUSION STATUS: NORMAL
UNIT DIVISION: 0
UNIT ISSUE DATE/TIME: NORMAL
UNSATURATED IRON BINDING CAPACITY: 23 UG/DL (ref 112–347)
URATE SERPL-MCNC: 7.8 MG/DL (ref 2.4–5.7)
WBC OTHER # BLD: 3.7 K/UL (ref 3.5–11.3)

## 2024-12-15 PROCEDURE — 84550 ASSAY OF BLOOD/URIC ACID: CPT

## 2024-12-15 PROCEDURE — 6360000002 HC RX W HCPCS: Performed by: NURSE PRACTITIONER

## 2024-12-15 PROCEDURE — 2580000003 HC RX 258: Performed by: STUDENT IN AN ORGANIZED HEALTH CARE EDUCATION/TRAINING PROGRAM

## 2024-12-15 PROCEDURE — 97110 THERAPEUTIC EXERCISES: CPT

## 2024-12-15 PROCEDURE — 6360000002 HC RX W HCPCS: Performed by: STUDENT IN AN ORGANIZED HEALTH CARE EDUCATION/TRAINING PROGRAM

## 2024-12-15 PROCEDURE — 6370000000 HC RX 637 (ALT 250 FOR IP): Performed by: STUDENT IN AN ORGANIZED HEALTH CARE EDUCATION/TRAINING PROGRAM

## 2024-12-15 PROCEDURE — 99232 SBSQ HOSP IP/OBS MODERATE 35: CPT | Performed by: STUDENT IN AN ORGANIZED HEALTH CARE EDUCATION/TRAINING PROGRAM

## 2024-12-15 PROCEDURE — 80048 BASIC METABOLIC PNL TOTAL CA: CPT

## 2024-12-15 PROCEDURE — 2060000000 HC ICU INTERMEDIATE R&B

## 2024-12-15 PROCEDURE — 6370000000 HC RX 637 (ALT 250 FOR IP)

## 2024-12-15 PROCEDURE — 99232 SBSQ HOSP IP/OBS MODERATE 35: CPT | Performed by: INTERNAL MEDICINE

## 2024-12-15 PROCEDURE — 97116 GAIT TRAINING THERAPY: CPT

## 2024-12-15 PROCEDURE — 2580000003 HC RX 258: Performed by: SURGERY

## 2024-12-15 PROCEDURE — 86140 C-REACTIVE PROTEIN: CPT

## 2024-12-15 PROCEDURE — 94760 N-INVAS EAR/PLS OXIMETRY 1: CPT

## 2024-12-15 PROCEDURE — 97535 SELF CARE MNGMENT TRAINING: CPT

## 2024-12-15 PROCEDURE — 6360000002 HC RX W HCPCS: Performed by: SURGERY

## 2024-12-15 PROCEDURE — 6360000002 HC RX W HCPCS: Performed by: INTERNAL MEDICINE

## 2024-12-15 PROCEDURE — 36415 COLL VENOUS BLD VENIPUNCTURE: CPT

## 2024-12-15 PROCEDURE — 2580000003 HC RX 258: Performed by: INTERNAL MEDICINE

## 2024-12-15 PROCEDURE — 83550 IRON BINDING TEST: CPT

## 2024-12-15 PROCEDURE — 85025 COMPLETE CBC W/AUTO DIFF WBC: CPT

## 2024-12-15 PROCEDURE — 85652 RBC SED RATE AUTOMATED: CPT

## 2024-12-15 PROCEDURE — 83540 ASSAY OF IRON: CPT

## 2024-12-15 RX ORDER — DIPHENHYDRAMINE HYDROCHLORIDE 50 MG/ML
25 INJECTION INTRAMUSCULAR; INTRAVENOUS ONCE
Status: COMPLETED | OUTPATIENT
Start: 2024-12-15 | End: 2024-12-15

## 2024-12-15 RX ORDER — COLCHICINE 0.6 MG/1
0.6 TABLET ORAL DAILY
Status: DISCONTINUED | OUTPATIENT
Start: 2024-12-16 | End: 2024-12-18 | Stop reason: HOSPADM

## 2024-12-15 RX ORDER — SENNA AND DOCUSATE SODIUM 50; 8.6 MG/1; MG/1
2 TABLET, FILM COATED ORAL DAILY
Status: DISCONTINUED | OUTPATIENT
Start: 2024-12-15 | End: 2024-12-18 | Stop reason: HOSPADM

## 2024-12-15 RX ADMIN — PANTOPRAZOLE SODIUM 40 MG: 40 TABLET, DELAYED RELEASE ORAL at 10:14

## 2024-12-15 RX ADMIN — ONDANSETRON 4 MG: 2 INJECTION INTRAMUSCULAR; INTRAVENOUS at 11:41

## 2024-12-15 RX ADMIN — APIXABAN 10 MG: 5 TABLET, FILM COATED ORAL at 10:12

## 2024-12-15 RX ADMIN — WATER 20 MG: 1 INJECTION INTRAMUSCULAR; INTRAVENOUS; SUBCUTANEOUS at 20:46

## 2024-12-15 RX ADMIN — APIXABAN 10 MG: 5 TABLET, FILM COATED ORAL at 20:45

## 2024-12-15 RX ADMIN — SENNOSIDES AND DOCUSATE SODIUM 2 TABLET: 50; 8.6 TABLET ORAL at 14:34

## 2024-12-15 RX ADMIN — WATER 20 MG: 1 INJECTION INTRAMUSCULAR; INTRAVENOUS; SUBCUTANEOUS at 10:11

## 2024-12-15 RX ADMIN — DIPHENHYDRAMINE HYDROCHLORIDE 25 MG: 50 INJECTION INTRAMUSCULAR; INTRAVENOUS at 23:18

## 2024-12-15 RX ADMIN — SODIUM CHLORIDE, PRESERVATIVE FREE 10 ML: 5 INJECTION INTRAVENOUS at 10:12

## 2024-12-15 RX ADMIN — SODIUM CHLORIDE, PRESERVATIVE FREE 10 ML: 5 INJECTION INTRAVENOUS at 20:47

## 2024-12-15 RX ADMIN — SODIUM CHLORIDE 250 MG: 9 INJECTION, SOLUTION INTRAVENOUS at 18:55

## 2024-12-15 ASSESSMENT — PAIN SCALES - GENERAL
PAINLEVEL_OUTOF10: 0
PAINLEVEL_OUTOF10: 0

## 2024-12-15 NOTE — PLAN OF CARE
Problem: Discharge Planning  Goal: Discharge to home or other facility with appropriate resources  Outcome: Progressing  Flowsheets (Taken 12/14/2024 2000)  Discharge to home or other facility with appropriate resources:   Identify barriers to discharge with patient and caregiver   Arrange for needed discharge resources and transportation as appropriate   Identify discharge learning needs (meds, wound care, etc)     Problem: Pain  Goal: Verbalizes/displays adequate comfort level or baseline comfort level  Outcome: Progressing     Problem: Safety - Adult  Goal: Free from fall injury  Outcome: Progressing     Problem: ABCDS Injury Assessment  Goal: Absence of physical injury  Outcome: Progressing  Flowsheets (Taken 12/14/2024 2000)  Absence of Physical Injury: Implement safety measures based on patient assessment     Problem: Respiratory - Adult  Goal: Achieves optimal ventilation and oxygenation  Outcome: Progressing  Flowsheets (Taken 12/14/2024 2000)  Achieves optimal ventilation and oxygenation:   Assess for changes in respiratory status   Assess for changes in mentation and behavior   Position to facilitate oxygenation and minimize respiratory effort   Oxygen supplementation based on oxygen saturation or arterial blood gases     Problem: Cardiovascular - Adult  Goal: Maintains optimal cardiac output and hemodynamic stability  Outcome: Progressing  Flowsheets (Taken 12/14/2024 2000)  Maintains optimal cardiac output and hemodynamic stability:   Monitor blood pressure and heart rate   Monitor urine output and notify Licensed Independent Practitioner for values outside of normal range  Goal: Absence of cardiac dysrhythmias or at baseline  Outcome: Progressing  Flowsheets (Taken 12/14/2024 2000)  Absence of cardiac dysrhythmias or at baseline:   Monitor cardiac rate and rhythm   Assess for signs of decreased cardiac output     Problem: Skin/Tissue Integrity - Adult  Goal: Skin integrity remains intact  Outcome:

## 2024-12-15 NOTE — PROGRESS NOTES
Today's Date: 12/15/2024  Patient Name: Luci Castañeda  Date of admission: 12/9/2024  3:49 PM  Patient's age: 59 y.o., 1965  Admission Dx: Acute cor pulmonale due to saddle embolus of pulmonary artery (HCC) [I26.02]  Acute saddle pulmonary embolism, unspecified whether acute cor pulmonale present (HCC) [I26.92]    Reason for Consult: management recommendations  Requesting Physician: Frederick Wilson DO    CHIEF COMPLAINT: Acute pulmonary embolism    INTERIM HISTORY   Pt is seen and examined  Condition is improving.   Gouty arthritis is responding to treatment   Tolerating anticoagulation.  No active bleeding.  No new events.  Labs reviewed.  Started recovering.  White blood cells 3.7.  Hemoglobin 8.9.  Platelets 68.      HISTORY OF PRESENT ILLNESS:      The patient is a 59 y.o.   female who is admitted to the hospital transferred from [facility].  She presented with leg swelling and workup for DVT and CT scan showed saddle pulmonary embolism with right heart strain.  Patient was admitted to the hospital and then underwent mechanical thrombectomy  Patient is known to us with history of breast cancer.  She is on neoadjuvant chemotherapy/immunotherapy with pembrolizumab.  Patient has previous nephrectomy 1988  Past Medical History:   has a past medical history of Abnormal uterine bleeding (AUB), Arthritis, Asthma, Benign familial tremor, CAD (coronary artery disease), Cancer (HCC), CKD (chronic kidney disease) stage 1, GFR 90 ml/min or greater, Class 2 severe obesity with serious comorbidity and body mass index (BMI) of 39.0 to 39.9 in adult, COVID-19 vaccine administered, Cystinuria (HCC), Dyspnea on exertion, Flank pain, GERD (gastroesophageal reflux disease), History of Bell's palsy, Hypertension, Hyperuricemia, Hypothyroidism, Kidney stones, Leg swelling, Malignant neoplasm of upper-outer quadrant of left breast in female, estrogen receptor negative (HCC), Midline low back pain with right-sided

## 2024-12-15 NOTE — PROGRESS NOTES
Physical Therapy  Facility/Department: [facility] CAR 2- STEPDOWN  Physical Therapy Daily Treatment Note    Name: Luci Castañeda  : 1965  MRN: [id number]  Date of Service: 12/15/2024    Discharge Recommendations:  Patient would benefit from continued therapy after discharge   PT Equipment Recommendations  Equipment Needed: Yes  Mobility Devices: Walker  Walker: Rolling      Patient Diagnosis(es): The primary encounter diagnosis was Acute saddle pulmonary embolism, unspecified whether acute cor pulmonale present (HCC). A diagnosis of Acute cor pulmonale due to saddle embolus of pulmonary artery (HCC) was also pertinent to this visit.  Past Medical History:  has a past medical history of Abnormal uterine bleeding (AUB), Arthritis, Asthma, Benign familial tremor, CAD (coronary artery disease), Cancer (HCC), CKD (chronic kidney disease) stage 1, GFR 90 ml/min or greater, Class 2 severe obesity with serious comorbidity and body mass index (BMI) of 39.0 to 39.9 in adult, COVID-19 vaccine administered, Cystinuria (HCC), Dyspnea on exertion, Flank pain, GERD (gastroesophageal reflux disease), History of Bell's palsy, Hypertension, Hyperuricemia, Hypothyroidism, Kidney stones, Leg swelling, Malignant neoplasm of upper-outer quadrant of left breast in female, estrogen receptor negative (HCC), Midline low back pain with right-sided sciatica, Nephrostomy status (HCC), Shoulder impingement, Snores, Solitary kidney, acquired, Under care of service provider, Under care of service provider, Under care of service provider, Under care of service provider, Urinary tract obstruction by kidney stone, and Wears glasses.  Past Surgical History:  has a past surgical history that includes total nephrectomy (Left, ); Knee arthroscopy (Right, , ); Cystoscopy (Right, 2017); cysto/uretero/pyeloscopy, calculus tx (Right, 2017); pr cysto w/insert ureteral stent (Right, 2018); pr cysto w/ureteroscopy w/rmvl/manj

## 2024-12-15 NOTE — PROGRESS NOTES
Luci Castañeda  12/15/2024    Chief Complaint   Patient presents with    Toe Injury    Pulmonary Embolism     Pburg tx- saddle      12/15/2024   Subjective   On eliquis  Room air  No dyspnea or distress at rest  No acute issues overnight  Denied any epistaxis, hemoptysis, hematemesis melena or hematochezia  No hematuria  Fluid balance is +0.7 L      HPI  59-year-old female with history of breast cancer-currently on chemotherapy, hypertension, hypothyroidism, CKD presented to [facility] as a transfer from [place] due to saddle PE.  Initially was admitted to MICU on 12/9 and underwent mechanical thrombectomy.  Was transferred to floor on 12/10.  Was found to have gout and was started on colchicine  Arthrocentesis on 12/11  Arthrotomy of right first MPJ performed on 12/12    Review of Systems -  General ROS: negative for - chills, fatigue, fever or weight loss  ENT ROS: negative for - headaches, oral lesions or sore throat  Cardiovascular ROS: no chest pain , orthopnea or pnd   Gastrointestinal ROS: no abdominal pain, change in bowel habits, or black or bloody stools  Skin - no rash   Neuro - no blurry vision , no loc . No focal weakness   msk - rt hallux pain , I&d today          Immunization   Immunization History   Administered Date(s) Administered    COVID-19, MODERNA BLUE border, Primary or Immunocompromised, (age 12y+), IM, 100 mcg/0.5mL 01/11/2021, 02/08/2021, 10/26/2021    COVID-19, PFIZER PURPLE top, DILUTE for use, (age 12 y+), 30mcg/0.3mL 11/17/2021    COVID-19, [place] Vaccine, Vaccine Unspecified 10/26/2021    Influenza Vaccine, unspecified formulation 12/03/2016, 10/01/2017    Influenza Virus Vaccine 09/11/2009, 10/15/2013, 12/03/2016, 10/28/2018, 11/05/2019, 11/12/2021, 10/13/2022, 10/10/2023    Influenza Whole 10/15/2013    Influenza, AFLURIA (age 3 y+), FLUZONE, (age 6 mo+), Quadv MDV, 0.5mL 11/12/2021, 10/05/2023    Pneumococcal, PPSV23, PNEUMOVAX 23, (age 2y+), SC/IM, 0.5mL 10/25/2018    TDaP,      Unit Divison 00     Dispense Status Blood Bank TRANSFUSED     Unit Issue Date/Time [id number]     Product Code Blood Bank [id number]     Blood Bank Unit Type and Rh B POS     Blood Bank ISBT Product Blood Type 7300     Blood Bank Blood Product Expiration Date 202412192359     Transfusion Status OK TO TRANSFUSE     Crossmatch Result COMPATIBLE    TYPE AND SCREEN   Result Value Ref Range    Blood Bank Sample Expiration 12/17/2024,2359     Arm Band Number [id number]     ABO/Rh B POSITIVE     Antibody Screen NEGATIVE     Unit Number [id number]     Component Leukocyte Reduced Red Cell     Unit Divison 00     Dispense Status Blood Bank TRANSFUSED     Unit Issue Date/Time 556613472233     Product Code Blood Bank [id number]     Blood Bank Unit Type and Rh B POS     Blood Bank ISBT Product Blood Type 7300     Blood Bank Blood Product Expiration Date 202412192359     Transfusion Status OK TO TRANSFUSE     Crossmatch Result COMPATIBLE    Echo (TTE) complete (PRN contrast/bubble/strain/3D)   Result Value Ref Range    LV EDV A2C 53 mL    LV EDV A4C 59 mL    LV ESV A2C 25 mL    LV ESV A4C 27 mL    IVSd 1.2 0.6 - 0.9 cm    LVIDd 5.3 3.9 - 5.3 cm    LVIDs 3.0 cm    LVOT Diameter 2.0 cm    LVOT Mean Gradient 2 mmHg    LVOT VTI 22.1 cm    LVOT Peak Velocity 1.2 m/s    LVOT Peak Gradient 6 mmHg    LVPWd 1.2 0.6 - 0.9 cm    LV E' Lateral Velocity 6.09 cm/s    LV E' Septal Velocity 6.42 cm/s    Global Longitudinal Strain -15.4 %    LV Ejection Fraction A2C 53 %    LV Ejection Fraction A4C 55 %    EF BP 55 55 - 100 %    LVOT Area 3.1 cm2    LVOT SV 69.4 ml    LA Major Canones 4.0 cm    LA Area 4C 11.8 cm2    LA Volume MOD A4C 29 22 - 52 mL    AV Mean Velocity 1.0 m/s    AV Mean Gradient 4 mmHg    AV VTI 26.2 cm    AV Peak Velocity 1.5 m/s    AV Peak Gradient 9 mmHg    AV Area by VTI 2.7 cm2    AV Area by Peak Velocity 2.5 cm2    Aortic Root 3.2 cm    Ascending Aorta 3.3 cm    MV E Wave Deceleration Time 196.0 ms    MV A Velocity 0.76

## 2024-12-15 NOTE — PROGRESS NOTES
Today's Date: 12/14/2024  Patient Name: Luci Castañeda  Date of admission: 12/9/2024  3:49 PM  Patient's age: 59 y.o., 1965  Admission Dx: Acute cor pulmonale due to saddle embolus of pulmonary artery (HCC) [I26.02]  Acute saddle pulmonary embolism, unspecified whether acute cor pulmonale present (HCC) [I26.92]    Reason for Consult: management recommendations  Requesting Physician: Frederick Wilson DO    CHIEF COMPLAINT: Acute pulmonary embolism    INTERIM HISTORY   Pt is seen and examined  Condition is improving.   Gouty arthritis is responding to treatment   Tolerating anticoagulation.  No active bleeding.  No new events.      HISTORY OF PRESENT ILLNESS:      The patient is a 59 y.o.   female who is admitted to the hospital transferred from [facility].  She presented with leg swelling and workup for DVT and CT scan showed saddle pulmonary embolism with right heart strain.  Patient was admitted to the hospital and then underwent mechanical thrombectomy  Patient is known to us with history of breast cancer.  She is on neoadjuvant chemotherapy/immunotherapy with pembrolizumab.  Patient has previous nephrectomy 1988  Past Medical History:   has a past medical history of Abnormal uterine bleeding (AUB), Arthritis, Asthma, Benign familial tremor, CAD (coronary artery disease), Cancer (HCC), CKD (chronic kidney disease) stage 1, GFR 90 ml/min or greater, Class 2 severe obesity with serious comorbidity and body mass index (BMI) of 39.0 to 39.9 in adult, COVID-19 vaccine administered, Cystinuria (HCC), Dyspnea on exertion, Flank pain, GERD (gastroesophageal reflux disease), History of Bell's palsy, Hypertension, Hyperuricemia, Hypothyroidism, Kidney stones, Leg swelling, Malignant neoplasm of upper-outer quadrant of left breast in female, estrogen receptor negative (HCC), Midline low back pain with right-sided sciatica, Nephrostomy status (HCC), Shoulder impingement, Snores, Solitary kidney, acquired,  DVT/PE  Locally advanced  breast mass cancer on neoadjuvant chemotherapy  Anemia and thrombocytopenia  Status post thrombectomy  Joint effusion       RECOMMENDATIONS:  Status post thrombectomy  Anemia thrombocytopenia persistent since her last chemotherapy about 4 weeks ago.  Monitor closely.  Will transfuse as needed..  Gouty arthritis, continue current therapy   Brooks test negative . Smear does not show schistocytes   Patient already completed the planned chemotherapy treatment.  We will hold immunotherapy until full recovery.  Plan anticoagulation, on heprain until all procedures are completed, then transition to oral anticoagulation, (DOAC)                            Jess Walker MD                          [facility]                            This note is created with the assistance of a speech recognition program.  While intending to generate a document that actually reflects the content of the visit, the document can still have some errors including those of syntax and sound a like substitutions which may escape proof reading.  It such instances, actual meaning can be extrapolated by contextual diversion.

## 2024-12-15 NOTE — PROGRESS NOTES
2310  Noticed H&H was not rechecked after patient's most recent blood transfusion. Messaged RACHANA Gandhi on [employer], she placed order for H&H draw now.  Care ongoing.

## 2024-12-15 NOTE — PROGRESS NOTES
[facility]  Office: [phone]  Michael Lewis DO, Bart Lewis DO, Ángel Harry DO, Geremais Tsai DO, Cathy García MD, Kya Arellano MD, Jonathon Gilmore MD, Kalli Chou MD,  Alvin Frey MD, Opal Wang MD, Jennifer Cooney MD,  Geronimo Sams DO, Thelma Quijano MD, Shreyas Duarte MD, Franklyn Lewis DO, Regi Ríos MD,  Marky Russell DO, Yamilet Monroe MD, Qian Rain MD, Suzy Faith MD, Artur Irby MD,  Colton Clark MD, Jaky Reynoso MD, Amarilys Robledo MD, Francia Cantor MD, Krzysztof Garcia MD, Jhonatan Shipley MD, Frederick Wilson DO, Jabier Plummer MD, Shirley Waterhouse, CNP,  Millie Moe, CNP, Frederick Street, CNP,  Josie Guthrie, CAT, Alicia Sheldon, CNP, Diamond Kwong, CNP, Mai Copeland, CNP, Radha Fowler, CNP, Angeles Enriquez PA-C, Mervat Rowan PA-C, Junie Kamara, CNP, Mitzy Sanz, CNP,  Laura Gonzalez, CNP, Grace Walter, CNP,  Yeny Gandhi, CNP, Ada Nelson, CNP         [facility]   IN-PATIENT SERVICE   [facility]    Progress Note    12/15/2024    9:33 AM    Name:   Luci Castañeda  MRN:     [id number]     Acct:      [id number]   Room:   2011/2011-01  IP Day:  6  Admit Date:  12/9/2024  3:49 PM    PCP:   Nabil Ly MD  Code Status:  Full Code    Subjective:     C/C:   Chief Complaint   Patient presents with    Toe Injury    Pulmonary Embolism     Pburg tx- saddle       Seen on follow up this morning. Denies any shortness of breath, chest pain. States the pain in her R foot has improved. She endorsed brief swelling in her L foot without any pain overnight that resolved spontaneously.    Brief History:     59-year-old female with past medical history of breast cancer currently undergoing chemotherapy, hypertension, hypothyroidism, CKD presented to [place] as a transfer from [facility] due to saddle pulmonary embolism.  She was transferred to [place] MICU and had mechanical thrombectomy on

## 2024-12-15 NOTE — PLAN OF CARE
Problem: Discharge Planning  Goal: Discharge to home or other facility with appropriate resources  12/15/2024 0725 by Naida Pennington RN  Outcome: Progressing  12/15/2024 0707 by Marky Michel RN  Outcome: Progressing  Flowsheets (Taken 12/14/2024 2000)  Discharge to home or other facility with appropriate resources:   Identify barriers to discharge with patient and caregiver   Arrange for needed discharge resources and transportation as appropriate   Identify discharge learning needs (meds, wound care, etc)     Problem: Pain  Goal: Verbalizes/displays adequate comfort level or baseline comfort level  12/15/2024 0725 by Naida Pennington RN  Outcome: Progressing  12/15/2024 0707 by Marky Michel RN  Outcome: Progressing     Problem: Safety - Adult  Goal: Free from fall injury  12/15/2024 0725 by Naida Pennington RN  Outcome: Progressing  12/15/2024 0707 by Marky Michel RN  Outcome: Progressing     Problem: ABCDS Injury Assessment  Goal: Absence of physical injury  12/15/2024 0725 by Naida Pennington RN  Outcome: Progressing  12/15/2024 0707 by Marky Michel RN  Outcome: Progressing  Flowsheets (Taken 12/14/2024 2000)  Absence of Physical Injury: Implement safety measures based on patient assessment     Problem: Respiratory - Adult  Goal: Achieves optimal ventilation and oxygenation  12/15/2024 0725 by Naida Pennington RN  Outcome: Progressing  12/15/2024 0707 by Marky Michel RN  Outcome: Progressing  Flowsheets (Taken 12/14/2024 2000)  Achieves optimal ventilation and oxygenation:   Assess for changes in respiratory status   Assess for changes in mentation and behavior   Position to facilitate oxygenation and minimize respiratory effort   Oxygen supplementation based on oxygen saturation or arterial blood gases     Problem: Cardiovascular - Adult  Goal: Maintains optimal cardiac output and hemodynamic stability  12/15/2024 0725 by Naida Pennington RN  Outcome:  Naida Pennington RN  Outcome: Progressing  12/15/2024 0707 by Marky Michel RN  Outcome: Progressing  Flowsheets (Taken 12/14/2024 2000)  Return Mobility to Safest Level of Function: Assess patient stability and activity tolerance for standing, transferring and ambulating with or without assistive devices  Goal: Maintain proper alignment of affected body part  12/15/2024 0725 by Naida Pennington RN  Outcome: Progressing  12/15/2024 0707 by Marky Michel RN  Outcome: Progressing  Flowsheets (Taken 12/14/2024 2000)  Maintain proper alignment of affected body part: Support and protect limb and body alignment per provider's orders  Goal: Return ADL status to a safe level of function  12/15/2024 0725 by Naida Pennington RN  Outcome: Progressing  12/15/2024 0707 by Marky Michel RN  Outcome: Progressing  Flowsheets (Taken 12/14/2024 2000)  Return ADL Status to a Safe Level of Function: Administer medication as ordered     Problem: Gastrointestinal - Adult  Goal: Minimal or absence of nausea and vomiting  12/15/2024 0725 by Naida Pennington RN  Outcome: Progressing  12/15/2024 0707 by Marky Michel RN  Outcome: Progressing  Flowsheets (Taken 12/14/2024 2000)  Minimal or absence of nausea and vomiting:   Administer IV fluids as ordered to ensure adequate hydration   Maintain NPO status until nausea and vomiting are resolved   Nasogastric tube to low intermittent suction as ordered  Goal: Maintains or returns to baseline bowel function  12/15/2024 0725 by Naida Pennington RN  Outcome: Progressing  12/15/2024 0707 by Marky Michel RN  Outcome: Progressing  Flowsheets (Taken 12/14/2024 2000)  Maintains or returns to baseline bowel function:   Assess bowel function   Encourage oral fluids to ensure adequate hydration  Goal: Maintains adequate nutritional intake  12/15/2024 0707 by Marky Michel RN  Outcome: Progressing  Flowsheets (Taken 12/14/2024 2000)  Maintains adequate

## 2024-12-15 NOTE — PROGRESS NOTES
Occupational Therapy  Occupational Therapy Daily Treatment Note  Facility/Department: [facility] CAR 2- STEPDOWN   Patient Name: Luci Castañeda        MRN: [id number]    : 1965    Date of Service: 12/15/2024    Discharge Recommendations  Discharge Recommendations: Patient would benefit from continued therapy after discharge. Further therapy recommended at discharge.The patient should be able to tolerate at least 3 hours of therapy per day over 5 days or 15 hours over 7 days.   This patient may benefit from a Physical Medicine and Rehab consult.       Pt pleasant and cooperative, noting lives alone at baseline and has limited assistance. Pt limited per decreased endurance and generalized weakness, limiting functional transfers and ADL tasks       Chief Complaint   Patient presents with    Toe Injury    Pulmonary Embolism     Pburg tx- saddle     Past Medical History:  has a past medical history of Abnormal uterine bleeding (AUB), Arthritis, Asthma, Benign familial tremor, CAD (coronary artery disease), Cancer (HCC), CKD (chronic kidney disease) stage 1, GFR 90 ml/min or greater, Class 2 severe obesity with serious comorbidity and body mass index (BMI) of 39.0 to 39.9 in adult, COVID-19 vaccine administered, Cystinuria (HCC), Dyspnea on exertion, Flank pain, GERD (gastroesophageal reflux disease), History of Bell's palsy, Hypertension, Hyperuricemia, Hypothyroidism, Kidney stones, Leg swelling, Malignant neoplasm of upper-outer quadrant of left breast in female, estrogen receptor negative (HCC), Midline low back pain with right-sided sciatica, Nephrostomy status (HCC), Shoulder impingement, Snores, Solitary kidney, acquired, Under care of service provider, Under care of service provider, Under care of service provider, Under care of service provider, Urinary tract obstruction by kidney stone, and Wears glasses.  Past Surgical History:  has a past surgical history that includes total nephrectomy (Left, ); Knee  loss of   LE Dressing: Increased time to complete;Stand by assistance;Setup  LE Dressing Skilled Clinical Factors: SBA to doff/don L sock and R sx shoe with increased time pt utilizing modified technique of placing BLE individually on EOB for support to reach feet  Toileting: Contact guard assistance;Setup  Toileting Skilled Clinical Factors: CGA for toilet transfer with RW. SBA for personal hygiene seated on toilet  Additional Comments: Throughout session pt limited per decreased endurance and fatigue. Further ADLs not attempted d/t pt fatigue    Balance  Balance  Sitting: Without support (Pt tolerated approx 10 min static/dynamic sitting EOB and on toilet SUP for safety)  Standing: With support (Pt tolerated approx 2-3 min static/dynamic standing with RW during ADLs with increased SOB and compensation strategies noted with increased fatigue. Pt utilizing 0-1 hand support at sink and RW with 1-2 hand support)    Transfers/Mobility  Bed mobility  Supine to Sit: Supervision  Sit to Supine: Supervision  Scooting: Supervision  Bed Mobility Comments: HOB elevated, utilizing bed rails    Transfers  Sit to stand: Contact guard assistance  Stand to sit: Contact guard assistance  Transfer Comments: utilizing RW, impulsive requiring v/c for safety    Toilet Transfers  Toilet - Technique: Ambulating  Equipment Used: Standard toilet  Toilet Transfer: Contact guard assistance  Toilet Transfers Comments: utilizing RW    Functional Mobility: Contact guard assistance;Setup  Functional Mobility Skilled Clinical Factors: EOB>toilet>sink>EOB utilizing RW demo 0 LOB. Pt impuslive requiring min v/c for safety demo questionable carry over of WB precautions. Increased fatigue noted during activity with BUE tremors noted after activity          Patient Education  Patient Education  Education Given To: Patient  Education Provided: Role of Therapy;Transfer Training;Precautions  Education Provided Comments: safety precautions; WB

## 2024-12-16 ENCOUNTER — TELEPHONE (OUTPATIENT)
Dept: ONCOLOGY | Age: 59
End: 2024-12-16

## 2024-12-16 LAB
ANION GAP SERPL CALCULATED.3IONS-SCNC: 10 MMOL/L (ref 9–16)
BASOPHILS # BLD: 0 K/UL (ref 0–0.2)
BASOPHILS NFR BLD: 0 % (ref 0–2)
BUN SERPL-MCNC: 12 MG/DL (ref 8–23)
CALCIUM SERPL-MCNC: 9 MG/DL (ref 8.6–10.4)
CHLORIDE SERPL-SCNC: 100 MMOL/L (ref 98–107)
CO2 SERPL-SCNC: 26 MMOL/L (ref 20–31)
CREAT SERPL-MCNC: 0.9 MG/DL (ref 0.6–0.9)
CRP SERPL HS-MCNC: 48.2 MG/L (ref 0–5)
EOSINOPHIL # BLD: 0 K/UL (ref 0–0.44)
EOSINOPHILS RELATIVE PERCENT: 0 % (ref 1–4)
ERYTHROCYTE [DISTWIDTH] IN BLOOD BY AUTOMATED COUNT: 17.9 % (ref 11.8–14.4)
ERYTHROCYTE [SEDIMENTATION RATE] IN BLOOD BY PHOTOMETRIC METHOD: 63 MM/HR (ref 0–30)
GFR, ESTIMATED: 73 ML/MIN/1.73M2
GLUCOSE SERPL-MCNC: 123 MG/DL (ref 74–99)
HCT VFR BLD AUTO: 28.9 % (ref 36.3–47.1)
HGB BLD-MCNC: 9.1 G/DL (ref 11.9–15.1)
IMM GRANULOCYTES # BLD AUTO: 0.25 K/UL (ref 0–0.3)
IMM GRANULOCYTES NFR BLD: 5 %
LYMPHOCYTES NFR BLD: 0.35 K/UL (ref 1.1–3.7)
LYMPHOCYTES RELATIVE PERCENT: 7 % (ref 24–43)
MCH RBC QN AUTO: 29.1 PG (ref 25.2–33.5)
MCHC RBC AUTO-ENTMCNC: 31.5 G/DL (ref 28.4–34.8)
MCV RBC AUTO: 92.3 FL (ref 82.6–102.9)
MICROORGANISM SPEC CULT: NORMAL
MICROORGANISM/AGENT SPEC: NORMAL
MICROORGANISM/AGENT SPEC: NORMAL
MONOCYTES NFR BLD: 0.5 K/UL (ref 0.1–1.2)
MONOCYTES NFR BLD: 10 % (ref 3–12)
MORPHOLOGY: ABNORMAL
NEUTROPHILS NFR BLD: 78 % (ref 36–65)
NEUTS SEG NFR BLD: 3.9 K/UL (ref 1.5–8.1)
NRBC BLD-RTO: 0.8 PER 100 WBC
PLATELET # BLD AUTO: 78 K/UL (ref 138–453)
PMV BLD AUTO: 12.1 FL (ref 8.1–13.5)
POTASSIUM SERPL-SCNC: 4.1 MMOL/L (ref 3.7–5.3)
RBC # BLD AUTO: 3.13 M/UL (ref 3.95–5.11)
SERVICE CMNT-IMP: NORMAL
SODIUM SERPL-SCNC: 136 MMOL/L (ref 136–145)
SPECIMEN DESCRIPTION: NORMAL
WBC OTHER # BLD: 5 K/UL (ref 3.5–11.3)

## 2024-12-16 PROCEDURE — 85652 RBC SED RATE AUTOMATED: CPT

## 2024-12-16 PROCEDURE — 2580000003 HC RX 258: Performed by: INTERNAL MEDICINE

## 2024-12-16 PROCEDURE — 97116 GAIT TRAINING THERAPY: CPT

## 2024-12-16 PROCEDURE — 6360000002 HC RX W HCPCS: Performed by: INTERNAL MEDICINE

## 2024-12-16 PROCEDURE — 85025 COMPLETE CBC W/AUTO DIFF WBC: CPT

## 2024-12-16 PROCEDURE — 97530 THERAPEUTIC ACTIVITIES: CPT

## 2024-12-16 PROCEDURE — 99232 SBSQ HOSP IP/OBS MODERATE 35: CPT | Performed by: INTERNAL MEDICINE

## 2024-12-16 PROCEDURE — 36415 COLL VENOUS BLD VENIPUNCTURE: CPT

## 2024-12-16 PROCEDURE — 2580000003 HC RX 258: Performed by: STUDENT IN AN ORGANIZED HEALTH CARE EDUCATION/TRAINING PROGRAM

## 2024-12-16 PROCEDURE — 99233 SBSQ HOSP IP/OBS HIGH 50: CPT | Performed by: INTERNAL MEDICINE

## 2024-12-16 PROCEDURE — 6370000000 HC RX 637 (ALT 250 FOR IP): Performed by: NURSE PRACTITIONER

## 2024-12-16 PROCEDURE — 86140 C-REACTIVE PROTEIN: CPT

## 2024-12-16 PROCEDURE — 6360000002 HC RX W HCPCS: Performed by: STUDENT IN AN ORGANIZED HEALTH CARE EDUCATION/TRAINING PROGRAM

## 2024-12-16 PROCEDURE — 6370000000 HC RX 637 (ALT 250 FOR IP): Performed by: STUDENT IN AN ORGANIZED HEALTH CARE EDUCATION/TRAINING PROGRAM

## 2024-12-16 PROCEDURE — 99232 SBSQ HOSP IP/OBS MODERATE 35: CPT | Performed by: STUDENT IN AN ORGANIZED HEALTH CARE EDUCATION/TRAINING PROGRAM

## 2024-12-16 PROCEDURE — 97110 THERAPEUTIC EXERCISES: CPT

## 2024-12-16 PROCEDURE — 80048 BASIC METABOLIC PNL TOTAL CA: CPT

## 2024-12-16 PROCEDURE — 6370000000 HC RX 637 (ALT 250 FOR IP)

## 2024-12-16 PROCEDURE — 2060000000 HC ICU INTERMEDIATE R&B

## 2024-12-16 PROCEDURE — 2580000003 HC RX 258: Performed by: SURGERY

## 2024-12-16 PROCEDURE — 97535 SELF CARE MNGMENT TRAINING: CPT

## 2024-12-16 RX ADMIN — APIXABAN 10 MG: 5 TABLET, FILM COATED ORAL at 08:10

## 2024-12-16 RX ADMIN — WATER 20 MG: 1 INJECTION INTRAMUSCULAR; INTRAVENOUS; SUBCUTANEOUS at 08:12

## 2024-12-16 RX ADMIN — SODIUM CHLORIDE 25 ML: 9 INJECTION, SOLUTION INTRAVENOUS at 10:51

## 2024-12-16 RX ADMIN — PANTOPRAZOLE SODIUM 40 MG: 40 TABLET, DELAYED RELEASE ORAL at 08:10

## 2024-12-16 RX ADMIN — SODIUM CHLORIDE, PRESERVATIVE FREE 10 ML: 5 INJECTION INTRAVENOUS at 21:23

## 2024-12-16 RX ADMIN — SODIUM CHLORIDE 250 MG: 9 INJECTION, SOLUTION INTRAVENOUS at 10:52

## 2024-12-16 RX ADMIN — MAGNESIUM HYDROXIDE 30 ML: 400 SUSPENSION ORAL at 21:22

## 2024-12-16 RX ADMIN — WATER 20 MG: 1 INJECTION INTRAMUSCULAR; INTRAVENOUS; SUBCUTANEOUS at 21:22

## 2024-12-16 RX ADMIN — SODIUM CHLORIDE, PRESERVATIVE FREE 10 ML: 5 INJECTION INTRAVENOUS at 08:21

## 2024-12-16 RX ADMIN — SENNOSIDES AND DOCUSATE SODIUM 2 TABLET: 50; 8.6 TABLET ORAL at 08:17

## 2024-12-16 RX ADMIN — COLCHICINE 0.6 MG: 0.6 TABLET, FILM COATED ORAL at 08:17

## 2024-12-16 RX ADMIN — Medication 3 MG: at 23:37

## 2024-12-16 RX ADMIN — APIXABAN 10 MG: 5 TABLET, FILM COATED ORAL at 21:22

## 2024-12-16 ASSESSMENT — PAIN SCALES - GENERAL
PAINLEVEL_OUTOF10: 0
PAINLEVEL_OUTOF10: 2

## 2024-12-16 NOTE — PLAN OF CARE
breakdown  Goal: Incisions, wounds, or drain sites healing without S/S of infection  Outcome: Progressing  Flowsheets (Taken 12/15/2024 2000)  Incisions, Wounds, or Drain Sites Healing Without Sign and Symptoms of Infection:   ADMISSION and DAILY: Assess and document risk factors for pressure ulcer development   TWICE DAILY: Assess and document skin integrity   TWICE DAILY: Assess and document dressing/incision, wound bed, drain sites and surrounding tissue  Goal: Oral mucous membranes remain intact  Outcome: Progressing     Problem: Musculoskeletal - Adult  Goal: Return mobility to safest level of function  Outcome: Progressing  Flowsheets (Taken 12/15/2024 2000)  Return Mobility to Safest Level of Function: Assess patient stability and activity tolerance for standing, transferring and ambulating with or without assistive devices  Goal: Maintain proper alignment of affected body part  Outcome: Progressing  Flowsheets (Taken 12/15/2024 2000)  Maintain proper alignment of affected body part: Support and protect limb and body alignment per provider's orders  Goal: Return ADL status to a safe level of function  Outcome: Progressing  Flowsheets (Taken 12/15/2024 2000)  Return ADL Status to a Safe Level of Function:   Administer medication as ordered   Assess activities of daily living deficits and provide assistive devices as needed     Problem: Gastrointestinal - Adult  Goal: Minimal or absence of nausea and vomiting  Outcome: Progressing  Flowsheets (Taken 12/15/2024 2000)  Minimal or absence of nausea and vomiting: Administer IV fluids as ordered to ensure adequate hydration  Goal: Maintains or returns to baseline bowel function  Outcome: Progressing  Flowsheets (Taken 12/15/2024 2000)  Maintains or returns to baseline bowel function: Assess bowel function  Goal: Maintains adequate nutritional intake  Outcome: Progressing  Flowsheets (Taken 12/15/2024 2000)  Maintains adequate nutritional intake: Monitor percentage

## 2024-12-16 NOTE — DISCHARGE INSTR - COC
Equipment (for information only, NOT a DME order):  walker  Other Treatments: skilled nursing    Patient's personal belongings (please select all that are sent with patient):  Glasses    RN SIGNATURE:  Electronically signed by Maria R Castellon RN on 12/18/24 at 2:30 PM EST    CASE MANAGEMENT/SOCIAL WORK SECTION    Inpatient Status Date: ***    Readmission Risk Assessment Score:  [facility] RISK OF UNPLANNED READMISSION 2.0             21 Total Score        Discharging to Facility/ Agency   Name: Catr[facility]    / signature: Electronically signed by Katie Hercules RN on 12/18/24 at 1:21 PM EST    PHYSICIAN SECTION    Prognosis: Fair    Condition at Discharge: Stable    Rehab Potential (if transferring to Rehab): Good    Recommended Labs or Other Treatments After Discharge: Continue to work with PT and OT for strength and mobility.  Status post thrombectomy  Eliquis  Pain management:   Podiatry eval & f/u as scheduled   Oncology eval & f/u as scheduled   Planned breast surgery will need rescheduling - Dr Duy Blake hemoglobin/platelets  Observe for further rectal bleeding  Patient will need colonoscopy to be arranged  PT/OT  Blood Pressure - Monitor and control   (BP has been stable off antihypertensives)  Follow-up with PCP in one week, Nabil Ly MD    Physician Certification: I certify the above information and transfer of Luci Castañeda  is necessary for the continuing treatment of the diagnosis listed and that she requires home care for less 30 days.     Update Admission H&P:   Principal Problem:    Acute saddle pulmonary embolism (HCC)  Active Problems:    Essential hypertension    Hydronephrosis of right kidney    Invasive ductal carcinoma of breast, left (HCC)    History of left breast cancer    Malignant neoplasm of upper-outer quadrant of left breast in female, estrogen receptor negative (HCC)    Monoarticular arthritis    Gouty arthritis of right great toe    Debility    Anemia,  normocytic normochromic    Thrombocytopenia (HCC)    Rectal bleeding  Resolved Problems:    * No resolved hospital problems. *       PHYSICIAN SIGNATURE:  Electronically signed by Frederick Wilson DO on 12/16/24 at 1:54 PM EST  Electronically signed by Bart Lewis DO on 12/18/2024 at 2:19 PM

## 2024-12-16 NOTE — PROGRESS NOTES
[facility]  Office: [phone]  Michael Lewis DO, Bart Lewis DO, Ángel Harry DO, Geremias Tsai DO, Cathy García MD, Kya Arellano MD, Jonathon Gilmore MD, Kalli Chou MD,  Alvin Frey MD, Opal Wnag MD, Jennifer Cooney MD,  Geronimo Sams DO, Thelma Quijano MD, Shreyas Duarte MD, Franklyn Lewis DO, Regi Ríos MD,  Marky Russell DO, Yamilet Monroe MD, Qian Rani MD, Suzy Faith MD, Artur Irby MD,  Colton Clark MD, Jaky Reynoso MD, Amarilys Robledo MD, Francia Cantor MD, Krzysztof Garcia MD, Jhonatan Shipley MD, Frederick Wilson DO, Jabier Plummer MD, Shirley Waterhouse, CNP,  Millie oMe, CNP, Frederick Street, CNP,  Josie Guthrie, CAT, Alicia Sheldon, CNP, Diamond Kwong, CNP, Mai Copeland, CNP, Radha Fowler, CNP, Angeles Enriquez PA-C, Mervat Rowan PA-C, Junie Kamara, CNP, Mitzy Sanz, CNP,  Laura Gonzalez, CNP, Grace Walter, CNP,  Yeny Gandhi, CNP, Ada Nelson, CNP         [facility]   IN-PATIENT SERVICE   [facility]    Progress Note    12/16/2024    9:14 AM    Name:   Luci Castañeda  MRN:     [id number]     Acct:      [id number]   Room:   2011/2011-01  IP Day:  7  Admit Date:  12/9/2024  3:49 PM    PCP:   Nabil Ly MD  Code Status:  Full Code    Subjective:     C/C:   Chief Complaint   Patient presents with    Toe Injury    Pulmonary Embolism     Pburg tx- saddle       Seen on follow up this morning. Denies any shortness of breath, chest pain. She denies much pain in her R foot. Planning for dc to [facility] when accepted.    Brief History:     59-year-old female with past medical history of breast cancer currently undergoing chemotherapy, hypertension, hypothyroidism, CKD presented to [facility] as a transfer from [facility] due to saddle pulmonary embolism.  She was transferred to [facility] MICU and had mechanical thrombectomy on 12/9.  She was monitored in the MICU after the procedure was

## 2024-12-16 NOTE — PROGRESS NOTES
Occupational Therapy  Occupational Therapy Daily Treatment Note  Facility/Department: [facility] CAR 2- STEPDOWN   Patient Name: Luci Castañeda        MRN: [id number]    : 1965    Date of Service: 2024    Chief Complaint   Patient presents with    Toe Injury    Pulmonary Embolism     Pburg tx- saddle     Past Medical History:  has a past medical history of Abnormal uterine bleeding (AUB), Arthritis, Asthma, Benign familial tremor, CAD (coronary artery disease), Cancer (HCC), CKD (chronic kidney disease) stage 1, GFR 90 ml/min or greater, Class 2 severe obesity with serious comorbidity and body mass index (BMI) of 39.0 to 39.9 in adult, COVID-19 vaccine administered, Cystinuria (HCC), Dyspnea on exertion, Flank pain, GERD (gastroesophageal reflux disease), History of Bell's palsy, Hypertension, Hyperuricemia, Hypothyroidism, Kidney stones, Leg swelling, Malignant neoplasm of upper-outer quadrant of left breast in female, estrogen receptor negative (HCC), Midline low back pain with right-sided sciatica, Nephrostomy status (HCC), Shoulder impingement, Snores, Solitary kidney, acquired, Under care of service provider, Under care of service provider, Under care of service provider, Under care of service provider, Urinary tract obstruction by kidney stone, and Wears glasses.  Past Surgical History:  has a past surgical history that includes total nephrectomy (Left, ); Knee arthroscopy (Right, , ); Cystoscopy (Right, 2017); cysto/uretero/pyeloscopy, calculus tx (Right, 2017); pr cysto w/insert ureteral stent (Right, 2018); pr cysto w/ureteroscopy w/rmvl/manj stones (N/A, 07/10/2018); pr cysto w/ureteroscopy w/rmvl/manj stones (Right, 10/10/2018); Cystoscopy (Right, 2019); Rotator cuff repair (Left); Intraocular lens insertion (Bilateral, ); Cystoscopy (Right, 2019); NEPHROLITHOTOMY (Right, 2019); cysto/uretero/pyeloscopy, calculus tx (Right, 2019);  L shoe. Min A to don sx shoe R foot d/t B hand tremors.)  Additional Comments: Pt seated in chiar upon therapist arrival. Pt states feeling more fatigued this day than previous days. Pt able to don L shoe w/o assist requiring assistance with sx shoe d/t B hand tremors. Pt completed sit/stand transfer from chair using RW for static standing. Functional mobility to bathroom using RW. Pt completed simple grooming standing at sink. Pt fatigued quickly needing to return to seated in chair. Pt exited bathroom using RW to return to chair for seated rest break. Sit/stand transfer from chair using RW for functional mobility household distance w/chair follow d/t fatigue. Pt retired to seated in chair at end of session working with PT. Emotional support and active listening provided to pt with good return.    Balance  Balance  Sitting: Without support (Supervision seated edge fo chair unsupported.)  Standing: With support (CGA static standing at chair using RW, functional mobility to/from bathroom using RW, simple grooming standing at sink, functional mobility household distance. Total time 7 minutes w/ seated rest break x1.)    Transfers/Mobility    Transfers  Sit to stand: Contact guard assistance  Stand to sit: Contact guard assistance  Transfer Comments: Verbal cues for hand placement with fair return. Transfers completed x2.    Functional Mobility: Setup;Increased time to complete;Contact guard assistance (Functional mobility to/from bathroom; household distance using RW.)  Functional Mobility Skilled Clinical Factors: Gait belt; RW.     Patient Education  Patient Education  Education Given To: Patient  Education Provided Comments: OT POC, transfer/walker safety, importance of participation in therapy, EC/WS with good return.    Goals  Short Term Goals  Time Frame for Short Term Goals: Patient will, by discharge  Short Term Goal 1: demo LB ADLs at SBA using AE PRN  Short Term Goal 2: demo toileting tasks at Mod I using

## 2024-12-16 NOTE — CARE COORDINATION
Spoke to Monserrat from [facility]. They are able to accept. She will submit for precert. Patient updated.

## 2024-12-16 NOTE — PROGRESS NOTES
(A) NEGATIVE    Pathologist Review ELECTRONICALLY SIGNED. ANDRES DICKINSON M.D.    CBC with Auto Differential   Result Value Ref Range    WBC 2.4 (L) 3.5 - 11.3 k/uL    RBC 2.45 (L) 3.95 - 5.11 m/uL    Hemoglobin 7.1 (L) 11.9 - 15.1 g/dL    Hematocrit 23.6 (L) 36.3 - 47.1 %    MCV 96.3 82.6 - 102.9 fL    MCH 29.0 25.2 - 33.5 pg    MCHC 30.1 28.4 - 34.8 g/dL    RDW 17.9 (H) 11.8 - 14.4 %    Platelets See Reflexed IPF Result 138 - 453 k/uL    Platelet, Fluorescence 47 (L) 138 - 453 k/uL    Platelet, Immature Fraction 5.0 1.1 - 10.3 %    NRBC Automated 0.8 (H) 0.0 per 100 WBC    Immature Granulocytes % 1 (H) 0 %    Neutrophils % 76 (H) 36 - 66 %    Lymphocytes % 7 (L) 24 - 44 %    Monocytes % 16 (H) 1 - 7 %    Eosinophils % 0 (L) 1 - 4 %    Basophils % 0 0 - 2 %    Immature Granulocytes Absolute 0.02 0.00 - 0.30 k/uL    Neutrophils Absolute 1.83 1.8 - 7.7 k/uL    Lymphocytes Absolute 0.17 (L) 1.0 - 4.8 k/uL    Monocytes Absolute 0.38 0.1 - 0.8 k/uL    Eosinophils Absolute 0.00 0.0 - 0.4 k/uL    Basophils Absolute 0.00 0.0 - 0.2 k/uL    Morphology ANISOCYTOSIS PRESENT    Basic Metabolic Panel w/ Reflex to MG   Result Value Ref Range    Sodium 137 136 - 145 mmol/L    Potassium 3.5 (L) 3.7 - 5.3 mmol/L    Chloride 103 98 - 107 mmol/L    CO2 25 20 - 31 mmol/L    Anion Gap 9 9 - 16 mmol/L    Glucose 90 74 - 99 mg/dL    BUN 10 8 - 23 mg/dL    Creatinine 0.8 0.6 - 0.9 mg/dL    Est, Glom Filt Rate 84 >60 mL/min/1.73m2    Calcium 7.9 (L) 8.6 - 10.4 mg/dL   Anti-Xa, Unfractionated Heparin   Result Value Ref Range    Anti-XA Unfrac Heparin 0.26 IU/L   Magnesium   Result Value Ref Range    Magnesium 1.2 (L) 1.6 - 2.4 mg/dL   Anti-Xa, Unfractionated Heparin   Result Value Ref Range    Anti-XA Unfrac Heparin 0.65 IU/L   Uric Acid   Result Value Ref Range    Uric Acid 7.8 (H) 2.4 - 5.7 mg/dL   Potassium   Result Value Ref Range    Potassium 3.9 3.7 - 5.3 mmol/L   Magnesium   Result Value Ref Range    Magnesium 2.4 1.6 - 2.4 mg/dL   CBC  Expiration 12/17/2024,2359     Arm Band Number [id number]     ABO/Rh B POSITIVE     Antibody Screen NEGATIVE     Unit Number [id number]     Component Leukocyte Reduced Red Cell     Unit Divison 00     Dispense Status Blood Bank TRANSFUSED     Unit Issue Date/Time [id number]     Product Code Blood Bank [id number]     Blood Bank Unit Type and Rh B POS     Blood Bank ISBT Product Blood Type 7300     Blood Bank Blood Product Expiration Date 140598817011     Transfusion Status OK TO TRANSFUSE     Crossmatch Result COMPATIBLE    Echo (TTE) complete (PRN contrast/bubble/strain/3D)   Result Value Ref Range    LV EDV A2C 53 mL    LV EDV A4C 59 mL    LV ESV A2C 25 mL    LV ESV A4C 27 mL    IVSd 1.2 0.6 - 0.9 cm    LVIDd 5.3 3.9 - 5.3 cm    LVIDs 3.0 cm    LVOT Diameter 2.0 cm    LVOT Mean Gradient 2 mmHg    LVOT VTI 22.1 cm    LVOT Peak Velocity 1.2 m/s    LVOT Peak Gradient 6 mmHg    LVPWd 1.2 0.6 - 0.9 cm    LV E' Lateral Velocity 6.09 cm/s    LV E' Septal Velocity 6.42 cm/s    Global Longitudinal Strain -15.4 %    LV Ejection Fraction A2C 53 %    LV Ejection Fraction A4C 55 %    EF BP 55 55 - 100 %    LVOT Area 3.1 cm2    LVOT SV 69.4 ml    LA [place] 4.0 cm    LA Area 4C 11.8 cm2    LA Volume MOD A4C 29 22 - 52 mL    AV Mean Velocity 1.0 m/s    AV Mean Gradient 4 mmHg    AV VTI 26.2 cm    AV Peak Velocity 1.5 m/s    AV Peak Gradient 9 mmHg    AV Area by VTI 2.7 cm2    AV Area by Peak Velocity 2.5 cm2    Aortic Root 3.2 cm    Ascending Aorta 3.3 cm    MV E Wave Deceleration Time 196.0 ms    MV A Velocity 0.76 m/s    MV E Velocity 0.73 m/s    RV Free Wall Peak S' 13.9 cm/s    TAPSE 2.5 1.7 cm    TR Max Velocity 2.17 m/s    TR Peak Gradient 19 mmHg    Body Surface Area 2.21 m2    Fractional Shortening 2D 43 28 - 44 %    LV ESV Index A4C 12 mL/m2    LV EDV Index A4C 27 mL/m2    LV ESV Index A2C 11 mL/m2    LV EDV Index A2C 24 mL/m2    LVIDd Index 2.42 cm/m2    LVIDs Index 1.37 cm/m2    LV RWT Ratio 0.45     LV Mass 2D

## 2024-12-16 NOTE — PROGRESS NOTES
Physical Therapy  Facility/Department: [facility] CAR 2- STEPDOWN  Physical Therapy Daily Treatment Note    Name: Luci Castañeda  : 1965  MRN: [id number]  Date of Service: 2024    Discharge Recommendations:  Patient would benefit from continued therapy after discharge   PT Equipment Recommendations  Equipment Needed: No      Patient Diagnosis(es): The primary encounter diagnosis was Acute saddle pulmonary embolism, unspecified whether acute cor pulmonale present (HCC). A diagnosis of Acute cor pulmonale due to saddle embolus of pulmonary artery (HCC) was also pertinent to this visit.  Past Medical History:  has a past medical history of Abnormal uterine bleeding (AUB), Arthritis, Asthma, Benign familial tremor, CAD (coronary artery disease), Cancer (HCC), CKD (chronic kidney disease) stage 1, GFR 90 ml/min or greater, Class 2 severe obesity with serious comorbidity and body mass index (BMI) of 39.0 to 39.9 in adult, COVID-19 vaccine administered, Cystinuria (HCC), Dyspnea on exertion, Flank pain, GERD (gastroesophageal reflux disease), History of Bell's palsy, Hypertension, Hyperuricemia, Hypothyroidism, Kidney stones, Leg swelling, Malignant neoplasm of upper-outer quadrant of left breast in female, estrogen receptor negative (HCC), Midline low back pain with right-sided sciatica, Nephrostomy status (HCC), Shoulder impingement, Snores, Solitary kidney, acquired, Under care of service provider, Under care of service provider, Under care of service provider, Under care of service provider, Urinary tract obstruction by kidney stone, and Wears glasses.  Past Surgical History:  has a past surgical history that includes total nephrectomy (Left, ); Knee arthroscopy (Right, , ); Cystoscopy (Right, 2017); cysto/uretero/pyeloscopy, calculus tx (Right, 2017); pr cysto w/insert ureteral stent (Right, 2018); pr cysto w/ureteroscopy w/rmvl/manj stones (N/A, 07/10/2018); pr cysto w/ureteroscopy

## 2024-12-16 NOTE — CARE COORDINATION
ACUTE INPATIENT REHABILITATION  Financial Disclosure Statement: Eligibility and Benefit Verification    Patient Name: Luci Castañeda MRN: [id number]     Disclosure Statement  [facility] Acute Inpatient Rehabilitation at [facility] provides 24 hour individualized service to patients with functional limitations due to but not limited to stroke, brain injury, spinal cord injury, major multiple trauma, hip fracture, amputation, and neurological disorders.  Acute Inpatient Rehabilitation provides rehabilitative nursing, physician coverage, as well as physical therapy, occupational therapy, speech therapy, recreational therapy and other services as deemed necessary by our Board Certified Physical Medicine and Rehabilitation Physicians Dr. Michael Kay, Dr. Sydnee Arthur, Dr. Rabia Dutton and Dr. Alex Cox.    [facility] Acute Inpatient Rehabilitation at [facility] is fully accredited by the Commission on Accreditation of Rehabilitation Facilities (CARF) and The Joint Commission (TJC).    At a minimum, you will receive 5 days of therapy services totaling at least 15 hours per week. Your treatment program will consist of physical therapy at least 7.5 hours per week; occupational therapy 7.5 hours per week; and speech therapy 1.5 hours per week, as applicable.    Your estimated length of stay is currently:  Approximately 2 Weeks  Please Note: Estimated length of stay is based on individual condition and Acute Inpatient Rehabilitation specific needs. Length of stay may vary based upon interdisciplinary team assessment, insurance approval, and patient progression.    Your insurance coverage has been verified as follows:   Date Verified: 12/16/2024   Method:  Online Portal    Primary Insurance: [facility]  Member/Subscriber ID: [id number]     Coverage: Per Benefit Period  Plan Effective Date: 1/1/2024 Status: active   Deductible: $1,000.00 (Amount Currently Met: $0.00)  Co-Pay: None   Co-Insurance:

## 2024-12-16 NOTE — TELEPHONE ENCOUNTER
Pt remains admitted. Plan per notes I to hold off on surgery and immunotherapy until she is fully recovered.

## 2024-12-16 NOTE — CARE COORDINATION
Spoke with Dr. Dutton, patient is appropriated for PM&R.  VMM left for CAR 2 CM at extension [phone]. Requested call back.     Spoke with Dede at [facility], wanting pre cert started.     345 pm Call from Katie at [facility], states patient may want to go home and not IPR. She will let adm. Coordinator know to start precert.

## 2024-12-17 PROBLEM — D69.6 THROMBOCYTOPENIA (HCC): Status: ACTIVE | Noted: 2024-12-17

## 2024-12-17 PROBLEM — D64.9 ANEMIA, NORMOCYTIC NORMOCHROMIC: Status: ACTIVE | Noted: 2024-12-17

## 2024-12-17 LAB
ANION GAP SERPL CALCULATED.3IONS-SCNC: 10 MMOL/L (ref 9–16)
BACTERIA URNS QL MICRO: ABNORMAL
BASOPHILS # BLD: 0 K/UL (ref 0–0.2)
BASOPHILS NFR BLD: 0 % (ref 0–2)
BILIRUB UR QL STRIP: NEGATIVE
BUN SERPL-MCNC: 16 MG/DL (ref 8–23)
CALCIUM SERPL-MCNC: 8.6 MG/DL (ref 8.6–10.4)
CASTS #/AREA URNS LPF: ABNORMAL /LPF (ref 0–8)
CHLORIDE SERPL-SCNC: 101 MMOL/L (ref 98–107)
CLARITY UR: ABNORMAL
CO2 SERPL-SCNC: 23 MMOL/L (ref 20–31)
COLOR UR: ABNORMAL
CREAT SERPL-MCNC: 1 MG/DL (ref 0.6–0.9)
CRP SERPL HS-MCNC: 28.4 MG/L (ref 0–5)
EOSINOPHIL # BLD: 0 K/UL (ref 0–0.44)
EOSINOPHILS RELATIVE PERCENT: 0 % (ref 1–4)
EPI CELLS #/AREA URNS HPF: ABNORMAL /HPF (ref 0–5)
ERYTHROCYTE [DISTWIDTH] IN BLOOD BY AUTOMATED COUNT: 18 % (ref 11.8–14.4)
ERYTHROCYTE [SEDIMENTATION RATE] IN BLOOD BY PHOTOMETRIC METHOD: 32 MM/HR (ref 0–30)
GFR, ESTIMATED: 64 ML/MIN/1.73M2
GLUCOSE SERPL-MCNC: 137 MG/DL (ref 74–99)
GLUCOSE UR STRIP-MCNC: ABNORMAL MG/DL
HCT VFR BLD AUTO: 30.3 % (ref 36.3–47.1)
HGB BLD-MCNC: 9.8 G/DL (ref 11.9–15.1)
HGB UR QL STRIP.AUTO: ABNORMAL
IMM GRANULOCYTES # BLD AUTO: 0.26 K/UL (ref 0–0.3)
IMM GRANULOCYTES NFR BLD: 5 %
KETONES UR STRIP-MCNC: NEGATIVE MG/DL
LEUKOCYTE ESTERASE UR QL STRIP: ABNORMAL
LYMPHOCYTES NFR BLD: 0.42 K/UL (ref 1.1–3.7)
LYMPHOCYTES RELATIVE PERCENT: 8 % (ref 24–43)
MCH RBC QN AUTO: 30.2 PG (ref 25.2–33.5)
MCHC RBC AUTO-ENTMCNC: 32.3 G/DL (ref 28.4–34.8)
MCV RBC AUTO: 93.2 FL (ref 82.6–102.9)
MICROORGANISM SPEC CULT: NORMAL
MICROORGANISM SPEC CULT: NORMAL
MICROORGANISM/AGENT SPEC: NORMAL
MONOCYTES NFR BLD: 0.62 K/UL (ref 0.1–1.2)
MONOCYTES NFR BLD: 12 % (ref 3–12)
MORPHOLOGY: ABNORMAL
NEUTROPHILS NFR BLD: 75 % (ref 36–65)
NEUTS SEG NFR BLD: 3.9 K/UL (ref 1.5–8.1)
NITRITE UR QL STRIP: NEGATIVE
NRBC BLD-RTO: 1.2 PER 100 WBC
PH UR STRIP: 7.5 [PH] (ref 5–8)
PLATELET # BLD AUTO: ABNORMAL K/UL (ref 138–453)
PLATELET, FLUORESCENCE: 78 K/UL (ref 138–453)
PLATELETS.RETICULATED NFR BLD AUTO: 6.5 % (ref 1.1–10.3)
POTASSIUM SERPL-SCNC: 4.7 MMOL/L (ref 3.7–5.3)
PROT UR STRIP-MCNC: ABNORMAL MG/DL
RBC # BLD AUTO: 3.25 M/UL (ref 3.95–5.11)
RBC #/AREA URNS HPF: ABNORMAL /HPF (ref 0–4)
SODIUM SERPL-SCNC: 134 MMOL/L (ref 136–145)
SP GR UR STRIP: 1.03 (ref 1–1.03)
SPECIMEN DESCRIPTION: NORMAL
SPECIMEN DESCRIPTION: NORMAL
UROBILINOGEN UR STRIP-ACNC: NORMAL EU/DL (ref 0–1)
WBC #/AREA URNS HPF: ABNORMAL /HPF (ref 0–5)
WBC OTHER # BLD: 5.2 K/UL (ref 3.5–11.3)

## 2024-12-17 PROCEDURE — 6370000000 HC RX 637 (ALT 250 FOR IP): Performed by: STUDENT IN AN ORGANIZED HEALTH CARE EDUCATION/TRAINING PROGRAM

## 2024-12-17 PROCEDURE — 85025 COMPLETE CBC W/AUTO DIFF WBC: CPT

## 2024-12-17 PROCEDURE — 2500000003 HC RX 250 WO HCPCS: Performed by: SURGERY

## 2024-12-17 PROCEDURE — 81001 URINALYSIS AUTO W/SCOPE: CPT

## 2024-12-17 PROCEDURE — 51798 US URINE CAPACITY MEASURE: CPT

## 2024-12-17 PROCEDURE — 2580000003 HC RX 258: Performed by: SURGERY

## 2024-12-17 PROCEDURE — 6360000002 HC RX W HCPCS: Performed by: STUDENT IN AN ORGANIZED HEALTH CARE EDUCATION/TRAINING PROGRAM

## 2024-12-17 PROCEDURE — 99232 SBSQ HOSP IP/OBS MODERATE 35: CPT | Performed by: INTERNAL MEDICINE

## 2024-12-17 PROCEDURE — 86140 C-REACTIVE PROTEIN: CPT

## 2024-12-17 PROCEDURE — 2060000000 HC ICU INTERMEDIATE R&B

## 2024-12-17 PROCEDURE — 85652 RBC SED RATE AUTOMATED: CPT

## 2024-12-17 PROCEDURE — 2580000003 HC RX 258: Performed by: STUDENT IN AN ORGANIZED HEALTH CARE EDUCATION/TRAINING PROGRAM

## 2024-12-17 PROCEDURE — 6360000002 HC RX W HCPCS: Performed by: SURGERY

## 2024-12-17 PROCEDURE — 36415 COLL VENOUS BLD VENIPUNCTURE: CPT

## 2024-12-17 PROCEDURE — 6370000000 HC RX 637 (ALT 250 FOR IP)

## 2024-12-17 PROCEDURE — 87086 URINE CULTURE/COLONY COUNT: CPT

## 2024-12-17 PROCEDURE — 6360000002 HC RX W HCPCS: Performed by: INTERNAL MEDICINE

## 2024-12-17 PROCEDURE — 80048 BASIC METABOLIC PNL TOTAL CA: CPT

## 2024-12-17 PROCEDURE — 2580000003 HC RX 258: Performed by: INTERNAL MEDICINE

## 2024-12-17 PROCEDURE — 85055 RETICULATED PLATELET ASSAY: CPT

## 2024-12-17 RX ADMIN — APIXABAN 10 MG: 5 TABLET, FILM COATED ORAL at 08:17

## 2024-12-17 RX ADMIN — METOCLOPRAMIDE 10 MG: 5 INJECTION, SOLUTION INTRAMUSCULAR; INTRAVENOUS at 15:16

## 2024-12-17 RX ADMIN — SODIUM CHLORIDE 250 MG: 9 INJECTION, SOLUTION INTRAVENOUS at 10:15

## 2024-12-17 RX ADMIN — PANTOPRAZOLE SODIUM 40 MG: 40 TABLET, DELAYED RELEASE ORAL at 05:55

## 2024-12-17 RX ADMIN — APIXABAN 10 MG: 5 TABLET, FILM COATED ORAL at 20:01

## 2024-12-17 RX ADMIN — SODIUM CHLORIDE, PRESERVATIVE FREE 10 ML: 5 INJECTION INTRAVENOUS at 20:01

## 2024-12-17 RX ADMIN — SODIUM CHLORIDE, PRESERVATIVE FREE 10 ML: 5 INJECTION INTRAVENOUS at 08:17

## 2024-12-17 RX ADMIN — WATER 20 MG: 1 INJECTION INTRAMUSCULAR; INTRAVENOUS; SUBCUTANEOUS at 08:17

## 2024-12-17 RX ADMIN — SENNOSIDES AND DOCUSATE SODIUM 2 TABLET: 50; 8.6 TABLET ORAL at 08:17

## 2024-12-17 RX ADMIN — COLCHICINE 0.6 MG: 0.6 TABLET, FILM COATED ORAL at 08:19

## 2024-12-17 ASSESSMENT — ENCOUNTER SYMPTOMS
SHORTNESS OF BREATH: 1
VOMITING: 0
COUGH: 0
ABDOMINAL PAIN: 0
NAUSEA: 0

## 2024-12-17 ASSESSMENT — PAIN SCALES - GENERAL: PAINLEVEL_OUTOF10: 0

## 2024-12-17 NOTE — PROGRESS NOTES
Today's Date: 12/16/2024  Patient Name: Luci Castañeda  Date of admission: 12/9/2024  3:49 PM  Patient's age: 59 y.o., 1965  Admission Dx: Acute cor pulmonale due to saddle embolus of pulmonary artery (HCC) [I26.02]  Acute saddle pulmonary embolism, unspecified whether acute cor pulmonale present (HCC) [I26.92]    Reason for Consult: management recommendations  Requesting Physician: No admitting provider for patient encounter.    CHIEF COMPLAINT: Acute pulmonary embolism    INTERIM HISTORY   Pt is seen and examined  Condition is improving.   Gouty arthritis is responding to treatment   Tolerating anticoagulation.  No active bleeding.  No new events.  Labs reviewed.  Started recovering.  White blood cells 5.0 hemoglobin 9.1 platelets 78      HISTORY OF PRESENT ILLNESS:      The patient is a 59 y.o.   female who is admitted to the hospital transferred from [facility].  She presented with leg swelling and workup for DVT and CT scan showed saddle pulmonary embolism with right heart strain.  Patient was admitted to the hospital and then underwent mechanical thrombectomy  Patient is known to us with history of breast cancer.  She is on neoadjuvant chemotherapy/immunotherapy with pembrolizumab.  Patient has previous nephrectomy 1988  Past Medical History:   has a past medical history of Abnormal uterine bleeding (AUB), Arthritis, Asthma, Benign familial tremor, CAD (coronary artery disease), Cancer (HCC), CKD (chronic kidney disease) stage 1, GFR 90 ml/min or greater, Class 2 severe obesity with serious comorbidity and body mass index (BMI) of 39.0 to 39.9 in adult, COVID-19 vaccine administered, Cystinuria (HCC), Dyspnea on exertion, Flank pain, GERD (gastroesophageal reflux disease), History of Bell's palsy, Hypertension, Hyperuricemia, Hypothyroidism, Kidney stones, Leg swelling, Malignant neoplasm of upper-outer quadrant of left breast in female, estrogen receptor negative (HCC), Midline low back pain    Neurological: negative for headaches, dizziness, seizures, weakness, numbness    PHYSICAL EXAM:      /80   Pulse 58   Temp 97.9 °F (36.6 °C) (Oral)   Resp 15   Ht 1.727 m (5' 8\")   Wt 111.5 kg (245 lb 13 oz)   SpO2 98%   BMI 37.38 kg/m²    Temp (24hrs), Av.7 °F (36.5 °C), Min:97.4 °F (36.3 °C), Max:97.9 °F (36.6 °C)    General appearance - well appearing, no in pain or distress   Mental status - alert and cooperative   Eyes - pupils equal and reactive, extraocular eye movements intact   Ears - bilateral TM's and external ear canals normal   Mouth - mucous membranes moist, pharynx normal without lesions   Neck - supple, no significant adenopathy   Lymphatics - no palpable lymphadenopathy, no hepatosplenomegaly   Chest - clear to auscultation, no wheezes, rales or rhonchi, symmetric air entry   Heart - normal rate, regular rhythm, normal S1, S2, no murmurs  Abdomen - soft, nontender, nondistended, no masses or organomegaly   Neurological - alert, oriented, normal speech, no focal findings or movement disorder noted   Musculoskeletal - no joint tenderness, deformity or swelling   Extremities -bilateral lower extremity swelling.  Skin - normal coloration and turgor, no rashes, no suspicious skin lesions noted ,    DATA:    Labs:   CBC:   Recent Labs     12/15/24  0628 24  0638   WBC 3.7 5.0   HGB 8.9* 9.1*   HCT 28.3* 28.9*   PLT 68* 78*   Platelets 33,000  BMP:   Recent Labs     12/15/24  0628 24  0638    136   K 4.0 4.1   CO2 20 26   BUN 10 12   CREATININE 0.8 0.9   LABGLOM 84 73   GLUCOSE 127* 123*     PT/INR:   No results for input(s): \"PROTIME\", \"INR\" in the last 72 hours.      IMAGING DATA:      Primary Problem  Acute saddle pulmonary embolism (HCC)    Active Hospital Problems    Diagnosis Date Noted    Debility [R53.81] 2024    Gout [M10.9] 2024    Monoarticular arthritis [M13.10] 2024    Acute saddle pulmonary embolism (HCC) [I26.92] 2024

## 2024-12-17 NOTE — PROGRESS NOTES
Platelets See Reflexed IPF Result 138 - 453 k/uL    Platelet, Fluorescence 78 (L) 138 - 453 k/uL    Platelet, Immature Fraction 6.5 1.1 - 10.3 %    NRBC Automated 1.2 (H) 0.0 per 100 WBC    Immature Granulocytes % 5 (H) 0 %    Neutrophils % 75 (H) 36 - 65 %    Lymphocytes % 8 (L) 24 - 43 %    Monocytes % 12 3 - 12 %    Eosinophils % 0 (L) 1 - 4 %    Basophils % 0 0 - 2 %    Immature Granulocytes Absolute 0.26 0.00 - 0.30 k/uL    Neutrophils Absolute 3.90 1.50 - 8.10 k/uL    Lymphocytes Absolute 0.42 (L) 1.10 - 3.70 k/uL    Monocytes Absolute 0.62 0.10 - 1.20 k/uL    Eosinophils Absolute 0.00 0.00 - 0.44 k/uL    Basophils Absolute 0.00 0.00 - 0.20 k/uL    Morphology ANISOCYTOSIS PRESENT    Sedimentation Rate   Result Value Ref Range    Sed Rate, Automated 32 (H) 0 - 30 mm/Hr   TYPE AND SCREEN   Result Value Ref Range    Blood Bank Sample Expiration 12/12/2024,2359     Arm Band Number [id number]     ABO/Rh B POSITIVE     Antibody Screen NEGATIVE     Unit Number [id number]     Component Leukocyte Reduced Red Cell     Unit Divison 00     Dispense Status Blood Bank REL FROM ALLOC     Transfusion Status OK TO TRANSFUSE     Crossmatch Result COMPATIBLE     Unit Number [id number]     Component Leukocyte Reduced Red Cell     Unit Divison 00     Dispense Status Blood Bank TRANSFUSED     Unit Issue Date/Time [id number]     Product Code Blood Bank [id number]     Blood Bank Unit Type and Rh B POS     Blood Bank ISBT Product Blood Type 7300     Blood Bank Blood Product Expiration Date 297268967762     Transfusion Status OK TO TRANSFUSE     Crossmatch Result COMPATIBLE    TYPE AND SCREEN   Result Value Ref Range    Blood Bank Sample Expiration 12/17/2024,2359     Arm Band Number [id number]     ABO/Rh B POSITIVE     Antibody Screen NEGATIVE     Unit Number [id number]     Component Leukocyte Reduced Red Cell     Unit Divison 00     Dispense Status Blood Bank TRANSFUSED     Unit Issue Date/Time 299997919907     Product Code  cm/m2    AV Velocity Ratio 0.80     LVOT:AV VTI Index 0.84     REGINA/BSA VTI 1.2 cm2/m2    REGINA/BSA Peak Velocity 1.1 cm2/m2    LA Diameter 2.0 cm    LA Size Index 0.91 cm/m2    LA/AO Root Ratio 0.63    Cath hemo interface   Result Value Ref Range    Body Surface Area 2.21 m2       Assessment  Saddle pulmonary embolism post mechanical thrombectomy 12/9/2024  Left breast carcinoma  Monoarticular arthritis  Anemia, better  Leukopenia, resolved    Plan:  Continue Eliquis.    Patient will need a long-term anticoagulation due to breast cancer.  Encourage incentive spirometry and Acapella  Pulmonary team will continue to follow.  Awaiting placement    Patient to follow-up in pulmonary clinic in 2 to 3 weeks    Electronically signed by Amadeo Sanchez MD on 12/17/2024 at 9:58 AM     Please note that this chart was generated using voice recognition Dragon dictation software.  Although every effort was made to ensure the accuracy of this automated transcription, some errors in transcription may have occurred.

## 2024-12-17 NOTE — PROGRESS NOTES
Comprehensive Nutrition Assessment    Type and Reason for Visit:  Initial, LOS    Nutrition Recommendations/Plan:   Continue current diet.  Encouraged pt to try bites of all items on meal trays to try to increase PO intakes.   Continue to offer oral supplements with meals.  Monitor labs, weights, and plan of care.     Malnutrition Assessment:  Malnutrition Status:  Severe malnutrition (12/17/24 1603)    Context:  Chronic Illness     Findings of the 6 clinical characteristics of malnutrition:  Energy Intake:  75% or less estimated energy requirements for 1 month or longer  Weight Loss:  Greater than 10% over 6 months     Body Fat Loss:  Mild body fat loss Orbital   Muscle Mass Loss:  Unable to assess    Fluid Accumulation:  Mild Extremities, Generalized   Strength:  Not Performed    Nutrition Assessment:    Pt seen/chart reviewed for length of stay.  Admitted for saddle pulmonary embolism.  S/p [facility] thrombectomy on 12/9.  PMH: breast cancer on chemotherapy, HTN, CKD.  Pt reports her home appetite was very poor but she has actually been eating more since being admitted.  Reports having significant taste alterations and many foods do not taste right.  Reports she has been trying bland foods and doing small portions.  For lunch pt ate 100% of pineapple, 100% of baked Lays chips, and bites of chicken.  Pt reports she has not been doing much protein at meals as it does not taste right.  States she has been having melon at meals.  Encouraged pt to try bites of different foods to see if she is able to tolerate them.  Discussed oral supplements - pt reports previously having one and then throwing up so she does not want to try them currently.  Will continue to offer oral supplements as needed at follow-up.  Pt reports has been eating maybe 1/3 of meals.  Pt concerned about intakes of vegetables and fruit intakes - encouraged pt to try more bites of fruits and vegetables at meals.  Pt states issues with metal silverware  as due to taste alterations and has been using plastic silverware.  Pt also reports issues with constipation during admission.  Pt reports her loss of appetite started when chemotherapy treatments began in September.  Pt reports  lb in May 2024.  Pt reports she has been on steroids intermittently which have caused increased appetite.  Per chart review, weight loss of 14% x 6 months noted.  Labs reviewed: Na 134 mmol/L.  Meds include; Protonix, Senokot; Milk of Magnesia PRN, Reglan PRN, Zofran PRN.    Nutrition Related Findings:    Labs/Meds reviewed.  +1 non-pitting generalized, +1 BLE edema.  LBM 12/14. Wound Type:  (Puncture wound/incision)       Current Nutrition Intake & Therapies:    Average Meal Intake: 1-25%, 26-50%  Average Supplements Intake: None Ordered  ADULT DIET; Regular; 4 carb choices (60 gm/meal)  Additional Calorie Sources:  None    Anthropometric Measures:  Height: 172.7 cm (5' 7.99\")  Ideal Body Weight (IBW): 140 lbs (64 kg)    Admission Body Weight: 102.1 kg (225 lb 1.4 oz)  Current Body Weight: 109.9 kg (242 lb 4.6 oz), 173.1 % IBW. Weight Source: Bed scale  Current BMI (kg/m2): 36.8  Usual Body Weight: 128.1 kg (282 lb 6 oz) (7/28/24 bed scale per chart review)     % Weight Change (Calculated): -14.2  Weight Adjustment For: No Adjustment                 BMI Categories: Obese Class 2 (BMI 35.0 -39.9)    Estimated Daily Nutrient Needs:  Energy Requirements Based On: Formula  Weight Used for Energy Requirements: Admission  Energy (kcal/day): 5212-6444 kcals/day  Weight Used for Protein Requirements: Ideal  Protein (g/day):  gm pro/day  Method Used for Fluid Requirements: Defer to provider  Fluid (ml/day): per MD    Nutrition Diagnosis:   Inadequate oral intake related to decreased appetite, altered taste perception, catabolic illness as evidenced by poor intake prior to admission, intake 0-25%, intake 26-50%    Nutrition Interventions:   Food and/or Nutrient Delivery: Continue

## 2024-12-17 NOTE — PROGRESS NOTES
[facility]  Office: [phone]  Michael Lewis DO, Bart Lewis DO, Ángel Harry DO, Geremias Tsai DO, Cathy García MD, Kya Arellano MD, Jonathon Gilmore MD, Kalli Chou MD,  Alvin Frey MD, Opal Wang MD, Jennifer Cooney MD,  Geronimo Sams DO, Thelma Quijano MD, Shreyas Duarte MD, Franklyn Lewis DO, Regi Ríos MD,  Marky Russell DO, Yamilet Monroe MD, Qian Rain MD, Suzy Faith MD, Artur Irby MD,  Colton Clark MD, Jaky Reynoso MD, Amarilys Robledo MD, Francia Cantor MD, Krzysztof Garcia MD, Jhonatan Shipley MD, Frederick Wilson DO, Jabier Plummer MD, Shirley Waterhouse, CNP,  Millie Moe, FRANCISCO, Frederick Street, FRANCISCO,  Josie Guthrie, CAT, Alicia Sheldon, CNP, Diamond Kwong, CNP, Mai Copeland, CNP, Radha Fowler, CNP, Angeles Enriquez PA-C, Mervat Rowan PA-C, Junie Kamara, FRANCISCO, Mitzy Sanz, CNP,  Laura Gonzalez, CNP, Grace Walter, CNP,  Yeny Gandhi, CNP, Ada Nelson, CNP         IN-PATIENT SERVICE  [facility]    Progress Note    12/17/2024    12:33 PM    Name:   Luci Castañeda  MRN:     [id number]     Acct:      [id number]   Room:   2011/2011-01  IP Day:  8  Admit Date:  12/9/2024  3:49 PM    PCP:   Nabil Ly MD  Code Status:  Full Code    Subjective:     C/C:   Chief Complaint   Patient presents with    Toe Injury    Pulmonary Embolism     [place] tx- saddle     Interval History Status:   Comfortable  Toe pain controlled  No chest pain  Less GRANADOS    Data Base Updates:  Afebrile  O2 sat satisfactory    Ytspro313 Low mmol/L Potassium4.7mmol/L   Zifeqthh364aoku/L QW900zolz/L     Anion Mow58bgys/L     Nbqxbaw736 High mg/dL     ZMF65jv/dL Creatinine1.0 High     WBC5.2k/uL RBC3.25 Low m/uL Hemoglobin9.8 Low   Platelet, Bliojfbllupl51 Low     Brief History:     As documented in the medical record:  \"59-year-old female with past medical history of breast cancer currently undergoing chemotherapy, hypertension, hypothyroidism, CKD  [I26.92] 12/09/2024    Malignant neoplasm of upper-outer quadrant of left breast in female, estrogen receptor negative (HCC) [C50.412, Z17.1] 05/12/2024    History of left breast cancer [Z85.3] 05/08/2024    Invasive ductal carcinoma of breast, left (HCC) [C50.912] 05/06/2024    Hydronephrosis of right kidney [N13.30] 08/10/2023    Essential hypertension [I10]          Plan:        Status post thrombectomy  Eliquis  Pain management:   Podiatry eval   Pulmonary eval  Oncology eval  Planned breast surgery may need rescheduling  Trend hemoglobin/platelets  PT/OT  Blood Pressure - Monitor and control   (BP has been stable off antihypertensives)  PT/OT  Awaiting acute rehab placement  Will discharge when arrangements complete and ok with other services.  Follow-up with PCP in one week, Nabil Ly MD  Notify PCP of discharge       IP CONSULT TO VASCULAR SURGERY  IP CONSULT TO CRITICAL CARE  IP CONSULT TO HEM/ONC  IP CONSULT TO PODIATRY  IP CONSULT TO INFECTIOUS DISEASES  IP CONSULT TO PHYSICAL MEDICINE REHAB    Bart Lewis DO  12/17/2024  12:33 PM

## 2024-12-17 NOTE — CARE COORDINATION
Per PM&R physiatrist Dr. Rabia Dutton, patient appropriate for Acute Inpatient Rehabilitation level of care.    Eligibility & Benefits Verified - See Note    Prior authorization request for admission initiated with primary insurance [employer] via: Online Portal: [employer]     Pending Case Reference/Authorization # [id number]     Clinical documentation submitted via Fax to [phone]    Updated Car 2 CM: Via Voicemail at this time at phone/extension: [phone].

## 2024-12-17 NOTE — PLAN OF CARE
Problem: Discharge Planning  Goal: Discharge to home or other facility with appropriate resources  12/16/2024 2220 by Michelle Schuler RN  Outcome: Progressing  12/16/2024 1646 by Maria R Castellon RN  Outcome: Progressing     Problem: Pain  Goal: Verbalizes/displays adequate comfort level or baseline comfort level  12/16/2024 2220 by Michelle Schuler RN  Outcome: Progressing  12/16/2024 1646 by Maria R Castellon RN  Outcome: Progressing     Problem: Safety - Adult  Goal: Free from fall injury  12/16/2024 2220 by Michelle Schuler RN  Outcome: Progressing  12/16/2024 1646 by Maria R Castellon RN  Outcome: Progressing     Problem: ABCDS Injury Assessment  Goal: Absence of physical injury  12/16/2024 2220 by Michelle Schuler RN  Outcome: Progressing  12/16/2024 1646 by Maria R Castellon RN  Outcome: Progressing     Problem: Respiratory - Adult  Goal: Achieves optimal ventilation and oxygenation  12/16/2024 2220 by Michelle Schuler RN  Outcome: Progressing  12/16/2024 1646 by Maria R Castellon RN  Outcome: Progressing     Problem: Cardiovascular - Adult  Goal: Maintains optimal cardiac output and hemodynamic stability  12/16/2024 2220 by Michelle Schuler RN  Outcome: Progressing  12/16/2024 1646 by Maria R Castellon RN  Outcome: Progressing  Goal: Absence of cardiac dysrhythmias or at baseline  12/16/2024 2220 by Michelle Schuler RN  Outcome: Progressing  12/16/2024 1646 by Maria R Castellon RN  Outcome: Progressing     Problem: Skin/Tissue Integrity - Adult  Goal: Skin integrity remains intact  12/16/2024 2220 by Michelle Schuler RN  Outcome: Progressing  12/16/2024 1646 by Maria R Castellon RN  Outcome: Progressing  Goal: Incisions, wounds, or drain sites healing without S/S of infection  12/16/2024 2220 by Michelle Schuler RN  Outcome: Progressing  12/16/2024 1646 by Maria R Castellon RN  Outcome: Progressing  Goal: Oral mucous membranes remain intact  12/16/2024 2220 by Michelle Schuler RN  Outcome: Progressing  12/16/2024 1646 by Maria R Castellon,

## 2024-12-17 NOTE — CARE COORDINATION
Discussed with patient if discharge plan is to go to [facility], patient still agrees, updated patient that the precert is still pending.

## 2024-12-18 VITALS
OXYGEN SATURATION: 95 % | HEART RATE: 82 BPM | HEIGHT: 68 IN | BODY MASS INDEX: 36.72 KG/M2 | TEMPERATURE: 98.2 F | WEIGHT: 242.29 LBS | DIASTOLIC BLOOD PRESSURE: 74 MMHG | SYSTOLIC BLOOD PRESSURE: 119 MMHG | RESPIRATION RATE: 18 BRPM

## 2024-12-18 PROBLEM — K62.5 RECTAL BLEEDING: Status: ACTIVE | Noted: 2024-12-18

## 2024-12-18 LAB
ANION GAP SERPL CALCULATED.3IONS-SCNC: 10 MMOL/L (ref 9–16)
BASOPHILS # BLD: 0 K/UL (ref 0–0.2)
BASOPHILS NFR BLD: 0 % (ref 0–2)
BUN SERPL-MCNC: 18 MG/DL (ref 8–23)
CALCIUM SERPL-MCNC: 8.2 MG/DL (ref 8.6–10.4)
CHLORIDE SERPL-SCNC: 101 MMOL/L (ref 98–107)
CO2 SERPL-SCNC: 25 MMOL/L (ref 20–31)
CREAT SERPL-MCNC: 1 MG/DL (ref 0.6–0.9)
CRP SERPL HS-MCNC: 17.5 MG/L (ref 0–5)
EOSINOPHIL # BLD: 0 K/UL (ref 0–0.4)
EOSINOPHILS RELATIVE PERCENT: 0 % (ref 1–4)
ERYTHROCYTE [DISTWIDTH] IN BLOOD BY AUTOMATED COUNT: 18.4 % (ref 11.8–14.4)
ERYTHROCYTE [SEDIMENTATION RATE] IN BLOOD BY PHOTOMETRIC METHOD: 15 MM/HR (ref 0–30)
GFR, ESTIMATED: 64 ML/MIN/1.73M2
GLUCOSE SERPL-MCNC: 87 MG/DL (ref 74–99)
HCT VFR BLD AUTO: 28 % (ref 36.3–47.1)
HGB BLD-MCNC: 8.6 G/DL (ref 11.9–15.1)
IMM GRANULOCYTES # BLD AUTO: 0.04 K/UL (ref 0–0.3)
IMM GRANULOCYTES NFR BLD: 1 %
LYMPHOCYTES NFR BLD: 0.79 K/UL (ref 1–4.8)
LYMPHOCYTES RELATIVE PERCENT: 18 % (ref 24–44)
MCH RBC QN AUTO: 29.7 PG (ref 25.2–33.5)
MCHC RBC AUTO-ENTMCNC: 30.7 G/DL (ref 28.4–34.8)
MCV RBC AUTO: 96.6 FL (ref 82.6–102.9)
MICROORGANISM SPEC CULT: NORMAL
MICROORGANISM SPEC CULT: NORMAL
MICROORGANISM/AGENT SPEC: NORMAL
MICROORGANISM/AGENT SPEC: NORMAL
MONOCYTES NFR BLD: 0.4 K/UL (ref 0.1–0.8)
MONOCYTES NFR BLD: 9 % (ref 1–7)
MORPHOLOGY: ABNORMAL
NEUTROPHILS NFR BLD: 72 % (ref 36–66)
NEUTS SEG NFR BLD: 3.17 K/UL (ref 1.8–7.7)
NRBC BLD-RTO: 2.5 PER 100 WBC
NUCLEATED RED BLOOD CELLS: 6 PER 100 WBC
PLATELET # BLD AUTO: 64 K/UL (ref 138–453)
PMV BLD AUTO: 12.4 FL (ref 8.1–13.5)
POTASSIUM SERPL-SCNC: 3.8 MMOL/L (ref 3.7–5.3)
RBC # BLD AUTO: 2.9 M/UL (ref 3.95–5.11)
SODIUM SERPL-SCNC: 136 MMOL/L (ref 136–145)
SPECIMEN DESCRIPTION: NORMAL
SPECIMEN DESCRIPTION: NORMAL
WBC OTHER # BLD: 4.4 K/UL (ref 3.5–11.3)

## 2024-12-18 PROCEDURE — 6370000000 HC RX 637 (ALT 250 FOR IP): Performed by: STUDENT IN AN ORGANIZED HEALTH CARE EDUCATION/TRAINING PROGRAM

## 2024-12-18 PROCEDURE — 6370000000 HC RX 637 (ALT 250 FOR IP): Performed by: NURSE PRACTITIONER

## 2024-12-18 PROCEDURE — 2500000003 HC RX 250 WO HCPCS: Performed by: SURGERY

## 2024-12-18 PROCEDURE — 97530 THERAPEUTIC ACTIVITIES: CPT

## 2024-12-18 PROCEDURE — 99232 SBSQ HOSP IP/OBS MODERATE 35: CPT | Performed by: INTERNAL MEDICINE

## 2024-12-18 PROCEDURE — 86140 C-REACTIVE PROTEIN: CPT

## 2024-12-18 PROCEDURE — 80048 BASIC METABOLIC PNL TOTAL CA: CPT

## 2024-12-18 PROCEDURE — 99239 HOSP IP/OBS DSCHRG MGMT >30: CPT | Performed by: INTERNAL MEDICINE

## 2024-12-18 PROCEDURE — 36415 COLL VENOUS BLD VENIPUNCTURE: CPT

## 2024-12-18 PROCEDURE — 97116 GAIT TRAINING THERAPY: CPT

## 2024-12-18 PROCEDURE — 85025 COMPLETE CBC W/AUTO DIFF WBC: CPT

## 2024-12-18 PROCEDURE — 6370000000 HC RX 637 (ALT 250 FOR IP)

## 2024-12-18 PROCEDURE — 85652 RBC SED RATE AUTOMATED: CPT

## 2024-12-18 PROCEDURE — 97535 SELF CARE MNGMENT TRAINING: CPT

## 2024-12-18 RX ORDER — COLCHICINE 0.6 MG/1
0.6 TABLET ORAL DAILY
Qty: 30 TABLET | Refills: 3 | Status: ON HOLD | OUTPATIENT
Start: 2024-12-19

## 2024-12-18 RX ORDER — SENNOSIDES A AND B 8.6 MG/1
1 TABLET, FILM COATED ORAL NIGHTLY PRN
Status: DISCONTINUED | OUTPATIENT
Start: 2024-12-18 | End: 2024-12-18 | Stop reason: HOSPADM

## 2024-12-18 RX ADMIN — PANTOPRAZOLE SODIUM 40 MG: 40 TABLET, DELAYED RELEASE ORAL at 08:40

## 2024-12-18 RX ADMIN — Medication 3 MG: at 00:27

## 2024-12-18 RX ADMIN — APIXABAN 10 MG: 5 TABLET, FILM COATED ORAL at 08:40

## 2024-12-18 RX ADMIN — SODIUM CHLORIDE, PRESERVATIVE FREE 10 ML: 5 INJECTION INTRAVENOUS at 08:41

## 2024-12-18 RX ADMIN — SENNOSIDES AND DOCUSATE SODIUM 2 TABLET: 50; 8.6 TABLET ORAL at 08:40

## 2024-12-18 RX ADMIN — COLCHICINE 0.6 MG: 0.6 TABLET, FILM COATED ORAL at 08:41

## 2024-12-18 ASSESSMENT — ENCOUNTER SYMPTOMS
COUGH: 0
ABDOMINAL PAIN: 0
VOMITING: 0
RECTAL PAIN: 1
ANAL BLEEDING: 1
NAUSEA: 0
SHORTNESS OF BREATH: 1

## 2024-12-18 NOTE — PLAN OF CARE
Problem: Discharge Planning  Goal: Discharge to home or other facility with appropriate resources  12/18/2024 0006 by Miguel Lopez RN  Outcome: Progressing  Flowsheets (Taken 12/15/2024 2000 by Marky Michel, RN)  Discharge to home or other facility with appropriate resources:   Identify barriers to discharge with patient and caregiver   Arrange for needed discharge resources and transportation as appropriate   Identify discharge learning needs (meds, wound care, etc)  12/17/2024 1322 by Maria R Castellon RN  Outcome: Progressing     Problem: Pain  Goal: Verbalizes/displays adequate comfort level or baseline comfort level  12/18/2024 0006 by Miguel Lopez RN  Outcome: Progressing  12/17/2024 1322 by Maria R Castellon RN  Outcome: Progressing     Problem: Safety - Adult  Goal: Free from fall injury  12/18/2024 0006 by Miguel Lopez RN  Outcome: Progressing  Flowsheets (Taken 12/18/2024 0006)  Free From Fall Injury: Instruct family/caregiver on patient safety  12/17/2024 1322 by Maria R Castellon RN  Outcome: Progressing     Problem: ABCDS Injury Assessment  Goal: Absence of physical injury  12/18/2024 0006 by Miguel Lopez RN  Outcome: Progressing  Flowsheets (Taken 12/15/2024 2000 by Marky Michel, RN)  Absence of Physical Injury: Implement safety measures based on patient assessment  12/17/2024 1322 by Maria R Castellon RN  Outcome: Progressing     Problem: Respiratory - Adult  Goal: Achieves optimal ventilation and oxygenation  12/18/2024 0006 by Miguel Lopez RN  Outcome: Progressing  12/17/2024 1322 by Maria R Castellon RN  Outcome: Progressing     Problem: Cardiovascular - Adult  Goal: Maintains optimal cardiac output and hemodynamic stability  12/18/2024 0006 by Miguel Lopez RN  Outcome: Progressing  Flowsheets (Taken 12/15/2024 2000 by Marky Michel, RN)  Maintains optimal cardiac output and hemodynamic stability:   Monitor blood pressure and heart rate   Monitor urine output and notify  1322 by Mehedi, Maria R, RN  Outcome: Progressing  Goal: Maintains or returns to baseline bowel function  12/18/2024 0006 by Miguel Lopez RN  Outcome: Progressing  12/17/2024 1322 by Maria R Castellon RN  Outcome: Progressing  Goal: Maintains adequate nutritional intake  12/18/2024 0006 by Miguel Lopez RN  Outcome: Progressing  12/17/2024 1322 by Maria R Castellon RN  Outcome: Progressing  Goal: Establish and maintain optimal ostomy function  12/18/2024 0006 by Miguel Lopez RN  Outcome: Progressing  12/17/2024 1322 by Maria R Castellon RN  Outcome: Progressing     Problem: Infection - Adult  Goal: Absence of infection at discharge  12/18/2024 0006 by Migeul Lopez RN  Outcome: Progressing  12/17/2024 1322 by Maria R Castellon RN  Outcome: Progressing  Goal: Absence of infection during hospitalization  12/18/2024 0006 by Miguel Lopez RN  Outcome: Progressing  12/17/2024 1322 by Maria R Castellon RN  Outcome: Progressing  Goal: Absence of fever/infection during anticipated neutropenic period  12/18/2024 0006 by Miguel Lopez RN  Outcome: Progressing  12/17/2024 1322 by Maria R Castellon RN  Outcome: Progressing     Problem: Metabolic/Fluid and Electrolytes - Adult  Goal: Electrolytes maintained within normal limits  12/18/2024 0006 by Miguel Lopez RN  Outcome: Progressing  12/17/2024 1322 by Maria R Castellon RN  Outcome: Progressing  Goal: Hemodynamic stability and optimal renal function maintained  12/18/2024 0006 by Miguel Lopez RN  Outcome: Progressing  12/17/2024 1322 by Maria R Castellon RN  Outcome: Progressing  Goal: Glucose maintained within prescribed range  12/18/2024 0006 by Miguel Lopez RN  Outcome: Progressing  12/17/2024 1322 by Maria R Castellon RN  Outcome: Progressing     Problem: Hematologic - Adult  Goal: Maintains hematologic stability  12/18/2024 0006 by Miguel Lopez RN  Outcome: Progressing  12/17/2024 1322 by Maria R Castellon RN  Outcome: Progressing     Problem: Skin/Tissue

## 2024-12-18 NOTE — PROGRESS NOTES
CLINICAL PHARMACY NOTE: MEDS TO BEDS    Total # of Prescriptions Filled: 2   The following medications were delivered to the patient:  Colchicine  eliquis    Additional Documentation:

## 2024-12-18 NOTE — ADT AUTH CERT
Utilization Reviews       12/17/24    Last updated by Georgette Goncalves RN on 12/17/2024 1556     Review Status Created By   In Primary Georgette Goncalves RN       Review Type Associated Date   Continued Stay 12/17/2024      Criteria Review   DATE: 12/17/24  TYPE OF BED: INTERMEDIATE        RELEVANT BASELINES: (lab values, vitals, o2 amount/delivery, etc.)  ROOM AIR     PERTINENT UPDATES:  IV FERRLECIT 250 MG QD   SOLUMEDROL 20 MG IV X 1     VITALS:  97.9 18 90 126/84 99% RA     ABNL/PERTINENT LABS/RADIOLOGY/DIAGNOSTIC STUDIES:       Latest Reference Range & Units 12/17/24 05:51 12/17/24 06:43   Sodium 136 - 145 mmol/L 134 (L)     Potassium 3.7 - 5.3 mmol/L 4.7     Chloride 98 - 107 mmol/L 101     CARBON DIOXIDE 20 - 31 mmol/L 23     BUN,BUNPL 8 - 23 mg/dL 16     Creatinine 0.6 - 0.9 mg/dL 1.0 (H)     Anion Gap 9 - 16 mmol/L 10     Est, Glom Filt Rate >60 mL/min/1.73m2 64     Glucose 74 - 99 mg/dL 137 (H)     Calcium 8.6 - 10.4 mg/dL 8.6     CRP 0.0 - 5.0 mg/L 28.4 (H)     WBC 3.5 - 11.3 k/uL   5.2   RBC 3.95 - 5.11 m/uL   3.25 (L)   Hemoglobin Quant 11.9 - 15.1 g/dL   9.8 (L)   Hematocrit 36.3 - 47.1 %   30.3 (L)   Platelet, Fluorescence 138 - 453 k/uL   78 (L)   Sed Rate, Automated 0 - 30 mm/Hr   32 (H)         PHYSICAL EXAM:     Cardiovascular:      Rate and Rhythm: Normal rate and regular rhythm.   Pulmonary:      Effort: Pulmonary effort is normal. No respiratory distress.      Breath sounds: No wheezing or rales.      Comments: Airflow reduced  No retractions  No accessory muscle use  No cyanosis   Chest:      Chest wall: No tenderness.   Abdominal:      General: There is no distension.      Palpations: Abdomen is soft.      Tenderness: There is no abdominal tenderness.   Genitourinary:     Comments: Nephrostomy site stable  Musculoskeletal:         General: No tenderness.      Cervical back: Neck supple.   Skin:     General: Skin is warm and dry.   Neurological:      Mental Status: She is alert and  onset saddle pulmonary embolism and the need to stay on anticoagulation and the risk of anesthesia surgery will be delayed.  I will communicate with the surgeon.  In the interim patient had delayed bone marrow suppression due to chemotherapy but recently started recovering.  Continue to monitor labs and will interfere as necessary.        MEDICATIONS:   colchicine  0.6 mg Oral Daily    ferric gluconate  250 mg IntraVENous Daily    apixaban  10 mg Oral BID    methylPREDNISolone  20 mg IntraVENous BID    pantoprazole  40 mg Oral QAM AC         ORDERS:   ADULT DIET; Regular; 4 carb choices (60 gm/meal)    Vital signs     Daily weights    Intake and output    Place intermittent pneumatic compression device    Up with assistance    Telemetry monitoring     Pulse oximetry, continuous    Nasal Cannula Oxygen         PT/OT/SLP/CM/RN ASSESSMENT OR NOTES: REFERRAL TO IP REHAB

## 2024-12-18 NOTE — PROGRESS NOTES
URETEROSCOPY, STONE BASKETTING,  INSERTION OF NEPHROSTOMY TUBE    KNEE ARTHROPLASTY Right 05/22/2023    KNEE ARTHROSCOPY Right 1998, 2003    X2    NEPHROLITHOTOMY Right 08/23/2019    HOLMIUM LASER, NEPHROLITHOTOMY PERCUTANEOUS, LITHOCLAST, C-ARM performed by Venkat Wang MD at [facility] OR    PERCUTANEOUS NEPHROSTOMY Right 11/01/2022    RIGHT PERCUTANEOUS NEPHROLITHOTOMY, NEPHROSTOMY PLACEMENT    AK CYSTO W/INSERT URETERAL STENT Right 07/04/2018    CYSTOSCOPY URETERAL STENT INSERTION, RIGHT performed by Pedro Friend MD at [facility] OR    AK CYSTO W/URETEROSCOPY W/RMVL/MANJ STONES N/A 07/10/2018    CYSTOSCOPY, RIGHT URETEROSCOPY, HOLMIUM LASER LITHO WITH STONE BASKETING, RIGHT STENT EXCHANGE performed by Frederick Chaudhry Jr., MD at [facility] OR    AK CYSTO W/URETEROSCOPY W/RMVL/MANJ STONES Right 10/10/2018    HOLMIUM LASER STANDBY, CYSTO, RIGHT URETEROSCOPY, RIGHT STENT PLACEMENT performed by Bismark Amaya MD at [facility] OR    ROTATOR CUFF REPAIR Left     TONSILLECTOMY      TOTAL KNEE ARTHROPLASTY Right 05/22/2023    RIGHT KNEE TOTAL ARTHROPLASTY performed by Nabil Potter DO at [facility] OR    TOTAL NEPHRECTOMY Left 1988    URETEROSCOPY N/A 09/01/2024    **E-6** CYSTOSCOPY URETEROSCOPY HOLMIUM LASER, STENT INSERTION    US BIOPSY LYMPH NODE  04/26/2024    US LYMPH NODE BIOPSY 4/26/2024 [facility] [place]    US BREAST BIOPSY W LOC DEVICE 1ST LESION LEFT Left 04/26/2024    US BREAST BIOPSY W LOC DEVICE 1ST LESION LEFT 4/26/2024 [facility] [place]    US BREAST BIOPSY W LOC DEVICE EACH ADDL LESION LEFT Left 04/26/2024    US BREAST BIOPSY NEEDLE ADDITIONAL LEFT 4/26/2024 [facility] [place]    VASCULAR SURGERY N/A 12/9/2024    BILATERAL PULMONARY THROMBECTOMY MECHANICAL PERCUTANEOUS / PENUMBRA performed by Mark Rueda MD at [facility] CVOR              Medications Prior to Admission: metoclopramide (REGLAN) 10 MG tablet, Take 1 tablet by mouth 4 times daily  amLODIPine (NORVASC) 5 MG tablet, TAKE ONE TABLET BY MOUTH ONCE A     Arm Band Number [id number]     ABO/Rh B POSITIVE     Antibody Screen NEGATIVE     Unit Number [id number]     Component Leukocyte Reduced Red Cell     Unit Divison 00     Dispense Status Blood Bank TRANSFUSED     Unit Issue Date/Time 202412140452     Product Code Blood Bank [id number]     Blood Bank Unit Type and Rh B POS     Blood Bank ISBT Product Blood Type [id number]     Blood Bank Blood Product Expiration Date 202412192359     Transfusion Status OK TO TRANSFUSE     Crossmatch Result COMPATIBLE    Echo (TTE) complete (PRN contrast/bubble/strain/3D)   Result Value Ref Range    LV EDV A2C 53 mL    LV EDV A4C 59 mL    LV ESV A2C 25 mL    LV ESV A4C 27 mL    IVSd 1.2 0.6 - 0.9 cm    LVIDd 5.3 3.9 - 5.3 cm    LVIDs 3.0 cm    LVOT Diameter 2.0 cm    LVOT Mean Gradient 2 mmHg    LVOT VTI 22.1 cm    LVOT Peak Velocity 1.2 m/s    LVOT Peak Gradient 6 mmHg    LVPWd 1.2 0.6 - 0.9 cm    LV E' Lateral Velocity 6.09 cm/s    LV E' Septal Velocity 6.42 cm/s    Global Longitudinal Strain -15.4 %    LV Ejection Fraction A2C 53 %    LV Ejection Fraction A4C 55 %    EF BP 55 55 - 100 %    LVOT Area 3.1 cm2    LVOT SV 69.4 ml    LA Major [place] 4.0 cm    LA Area 4C 11.8 cm2    LA Volume MOD A4C 29 22 - 52 mL    AV Mean Velocity 1.0 m/s    AV Mean Gradient 4 mmHg    AV VTI 26.2 cm    AV Peak Velocity 1.5 m/s    AV Peak Gradient 9 mmHg    AV Area by VTI 2.7 cm2    AV Area by Peak Velocity 2.5 cm2    Aortic Root 3.2 cm    Ascending Aorta 3.3 cm    MV E Wave Deceleration Time 196.0 ms    MV A Velocity 0.76 m/s    MV E Velocity 0.73 m/s    RV Free Wall Peak S' 13.9 cm/s    TAPSE 2.5 1.7 cm    TR Max Velocity 2.17 m/s    TR Peak Gradient 19 mmHg    Body Surface Area 2.21 m2    Fractional Shortening 2D 43 28 - 44 %    LV ESV Index A4C 12 mL/m2    LV EDV Index A4C 27 mL/m2    LV ESV Index A2C 11 mL/m2    LV EDV Index A2C 24 mL/m2    LVIDd Index 2.42 cm/m2    LVIDs Index 1.37 cm/m2    LV RWT Ratio 0.45     LV Mass 2D 256.6 (A) 67 - 162 g    LV

## 2024-12-18 NOTE — CARE COORDINATION
Spoke to Monserrat from [facility] ARU. Precert is pending. Patient updated. She will call insurance to try to expedite    1316 notified by patient that insurance denied ARU. She plans to go home with [facility]. Patient previously accepted. Notified Peytno from [facility] of discharge. Voicemail left for Monserrat from [facility] to update. PS sent to Dr Lewis to update

## 2024-12-18 NOTE — CASE COMMUNICATION
[facility] portal checked, auth remains pending. Update provided to Katie CAR 2 CM at [phone].  States patient is going to call the insurance company to try to expedite authorization.      120 pm VMM received from DZZOM at [facility] stating patient called insurance company and IPR was denied. Plan is home with [facility].  Checked [facility] portal, status is denied.

## 2024-12-18 NOTE — PLAN OF CARE
Face to face encounter today indicates the requirement of a DME order for Rolling walker for the diagnosis of ambulatory dysfunction, debility, weakness, cor pulmonale, pulmonary embolism indications and side effects of treatment had been addressed with pt. The patient is agreeable to the current plan of using the DME

## 2024-12-18 NOTE — PROGRESS NOTES
Occupational Therapy  Occupational Therapy Daily Treatment Note  Facility/Department: [facility] CAR 2- STEPDOWN   Patient Name: Luci Castañeda        MRN: [id number]    : 1965    Date of Service: 2024    Chief Complaint   Patient presents with    Toe Injury    Pulmonary Embolism     [place] tx- saddle     Past Medical History:  has a past medical history of Abnormal uterine bleeding (AUB), Arthritis, Asthma, Benign familial tremor, CAD (coronary artery disease), Cancer (HCC), CKD (chronic kidney disease) stage 1, GFR 90 ml/min or greater, Class 2 severe obesity with serious comorbidity and body mass index (BMI) of 39.0 to 39.9 in adult, COVID-19 vaccine administered, Cystinuria (HCC), Dyspnea on exertion, Flank pain, GERD (gastroesophageal reflux disease), History of Bell's palsy, Hypertension, Hyperuricemia, Hypothyroidism, Kidney stones, Leg swelling, Malignant neoplasm of upper-outer quadrant of left breast in female, estrogen receptor negative (HCC), Midline low back pain with right-sided sciatica, Nephrostomy status (HCC), Shoulder impingement, Snores, Solitary kidney, acquired, Under care of service provider, Under care of service provider, Under care of service provider, Under care of service provider, Urinary tract obstruction by kidney stone, and Wears glasses.  Past Surgical History:  has a past surgical history that includes total nephrectomy (Left, ); Knee arthroscopy (Right, , ); Cystoscopy (Right, 2017); cysto/uretero/pyeloscopy, calculus tx (Right, 2017); pr cysto w/insert ureteral stent (Right, 2018); pr cysto w/ureteroscopy w/rmvl/manj stones (N/A, 07/10/2018); pr cysto w/ureteroscopy w/rmvl/manj stones (Right, 10/10/2018); Cystoscopy (Right, 2019); Rotator cuff repair (Left); Intraocular lens insertion (Bilateral, ); Cystoscopy (Right, 2019); NEPHROLITHOTOMY (Right, 2019); cysto/uretero/pyeloscopy, calculus tx (Right, 2019);

## 2024-12-18 NOTE — PROGRESS NOTES
Today's Date: 12/18/2024  Patient Name: Luci Castañeda  Date of admission: 12/9/2024  3:49 PM  Patient's age: 59 y.o., 1965  Admission Dx: Acute cor pulmonale due to saddle embolus of pulmonary artery (HCC) [I26.02]  Acute saddle pulmonary embolism, unspecified whether acute cor pulmonale present (HCC) [I26.92]    Reason for Consult: management recommendations  Requesting Physician: No admitting provider for patient encounter.    CHIEF COMPLAINT: Acute pulmonary embolism    INTERIM HISTORY   Pt is seen and examined  Condition is improving.   Gouty arthritis is responding to treatment   Tolerating anticoagulation.  No active bleeding.  No new events.  Labs reviewed.  Continue to recover.      HISTORY OF PRESENT ILLNESS:      The patient is a 59 y.o.   female who is admitted to the hospital transferred from [facility].  She presented with leg swelling and workup for DVT and CT scan showed saddle pulmonary embolism with right heart strain.  Patient was admitted to the hospital and then underwent mechanical thrombectomy  Patient is known to us with history of breast cancer.  She is on neoadjuvant chemotherapy/immunotherapy with pembrolizumab.  Patient has previous nephrectomy 1988  Past Medical History:   has a past medical history of Abnormal uterine bleeding (AUB), Arthritis, Asthma, Benign familial tremor, CAD (coronary artery disease), Cancer (HCC), CKD (chronic kidney disease) stage 1, GFR 90 ml/min or greater, Class 2 severe obesity with serious comorbidity and body mass index (BMI) of 39.0 to 39.9 in adult, COVID-19 vaccine administered, Cystinuria (HCC), Dyspnea on exertion, Flank pain, GERD (gastroesophageal reflux disease), History of Bell's palsy, Hypertension, Hyperuricemia, Hypothyroidism, Kidney stones, Leg swelling, Malignant neoplasm of upper-outer quadrant of left breast in female, estrogen receptor negative (HCC), Midline low back pain with right-sided sciatica, Nephrostomy status

## 2024-12-18 NOTE — DISCHARGE SUMMARY
[facility]  Office: [phone]  Michael Lewis DO, Bart Lewis DO, Ángel Harry DO, Geremias Tsai DO, Cathy García MD, Kya Arellano MD, Jonathon Gilmore MD, Kalli Chou MD,  Alvin Frey MD, Opal Wang MD, Jennifer Cooney MD,  Geronimo Sams DO, Thelma Quijano MD, Shreyas Duarte MD, Franklyn Lewis DO, Regi Ríos MD,  Marky Russell DO, Yamilet Monroe MD, Qian Rain MD, Suzy Faith MD, Artur Irby MD,  Colton Clark MD, Jaky Reynoso MD, Amarilys Robledo MD, Francia Cantor MD, Krzysztof Garcia MD, Jhonatan Shipley MD, Frederick Wilson DO, Jabier Plummer MD, Shirley Waterhouse, CNP,  Millie Moe, CNP, Frederick Street, CNP,  Josie Guthrie, CAT, Alicia Sheldon, CNP, Diamond Kwong, CNP, Mai Copeland, CNP, Radha Fowler, CNP, Angeles Enriquez PA-C, Mervat Rowan PA-C, Junie Kamara, CNP, Mitzy Sanz, CNP,  Laura Gonzalez, CNP, Grace Walter, CNP,  Yeny Gandhi, CNP, Ada Nelson, CNP           IN-PATIENT SERVICE  [facility]    Discharge Summary    Patient ID: Luci Castañeda  :  1965   MRN: [id number]     ACCOUNT:  [id number]   Patient's PCP: Nabil Ly MD  Admit Date: 2024   Discharge Date: 2024    Discharge Physician: Bart Lewis DO     The patient was seen and examined on day of discharge and this discharge summary is in conjunction with any daily progress note from day of discharge.    Active Discharge Diagnoses:     Primary Problem  Acute cor pulmonale due to saddle embolus of pulmonary artery (HCC)      Hospital Problems  Active Hospital Problems    Diagnosis Date Noted    Rectal bleeding [K62.5] 2024    Anemia, normocytic normochromic [D64.9] 2024    Thrombocytopenia (HCC) [D69.6] 2024     Debility [R53.81] 12/14/2024    Gouty arthritis of right great toe [M10.9] 12/13/2024    Monoarticular arthritis [M13.10] 12/11/2024    Acute cor pulmonale due to saddle embolus of pulmonary artery (HCC) [I26.02] 12/09/2024    Malignant neoplasm of upper-outer quadrant of left breast in female, estrogen receptor negative (HCC) [C50.412, Z17.1] 05/12/2024    History of left breast cancer [Z85.3] 05/08/2024    Invasive ductal carcinoma of breast, left (HCC) [C50.912] 05/06/2024    Hydronephrosis of right kidney [N13.30] 08/10/2023    Essential hypertension [I10]          Hospital Course:     Brief History  As documented in the medical record:  \"59-year-old female with past medical history of breast cancer currently undergoing chemotherapy, hypertension, hypothyroidism, CKD presented to [facility] as a transfer from [facility] due to saddle pulmonary embolism.  She was transferred to [facility] MICU and had mechanical thrombectomy on 12/9.  She was monitored in the MICU after the procedure was able to be transferred to the medical floor on 12/10.  Podiatry was consulted for right foot pain and swelling, started patient on colchicine and steroids for gout, arthrocentesis 12/11, arthrotomy of right first MPJ performed 12/12.  She was switched from heparin drip to Eliquis. Planning to dc to acute inpatient rehab when accepted.\"     Acute rehab not improved  Patient will return home with home care     Discharge plan:     Status post thrombectomy  Eliquis  Pain management:   Podiatry eval & f/u as scheduled   Oncology eval & f/u as scheduled   Planned breast surgery will need rescheduling - Dr Gordillo  Trend hemoglobin/platelets  Observe for from rectal bleeding  Laxatives as needed for constipation  Patient will need colonoscopy to be arranged  PT/OT  Blood Pressure - Monitor and control   (BP has been stable off antihypertensives)  Follow-up in pulmonary clinic in 2 to 3 weeks   Acute rehab not

## 2024-12-18 NOTE — PROGRESS NOTES
12/14/2024    Gouty arthritis of right great toe [M10.9] 12/13/2024    Monoarticular arthritis [M13.10] 12/11/2024    Acute cor pulmonale due to saddle embolus of pulmonary artery (HCC) [I26.02] 12/09/2024    Malignant neoplasm of upper-outer quadrant of left breast in female, estrogen receptor negative (HCC) [C50.412, Z17.1] 05/12/2024    History of left breast cancer [Z85.3] 05/08/2024    Invasive ductal carcinoma of breast, left (HCC) [C50.912] 05/06/2024    Hydronephrosis of right kidney [N13.30] 08/10/2023    Essential hypertension [I10]          IMPRESSION:   Acute DVT/PE  Locally advanced  breast mass cancer on neoadjuvant chemotherapy  Anemia and thrombocytopenia  Status post thrombectomy  Joint effusion       RECOMMENDATIONS:  Status post thrombectomy  Anemia thrombocytopenia persistent since her last chemotherapy about 4 weeks ago.  Monitor closely.  Will transfuse as needed..  Gouty arthritis, continue current therapy   Brooks test negative . Smear does not show schistocytes   Patient already completed the planned chemotherapy treatment.  We will hold immunotherapy until full recovery.  Plan anticoagulation,  (DOAC).  Treatment will be given for about 1 year.  Patient was scheduled for breast surgery in couple of weeks.  Considering the new onset saddle pulmonary embolism and the need to stay on anticoagulation and the risk of anesthesia surgery will be delayed.  Discussed with patient's surgeon Dr. West.  He agreed on this plan.  In the interim patient had delayed bone marrow suppression due to chemotherapy but recently started recovering.  Labs are stable and improving.  Continue to monitor labs and will interfere as necessary.                            Jess Walker MD                          [facility]                            This note is created with the assistance of a speech recognition program.  While intending to generate a document that actually reflects the content of  the visit, the document can still have some errors including those of syntax and sound a like substitutions which may escape proof reading.  It such instances, actual meaning can be extrapolated by contextual diversion.       DISCHARGE

## 2024-12-18 NOTE — PROGRESS NOTES
15:49  Patient discharging to home with home care. Writer removed IV's and helped patient gather belongings. Patient wants to  new prescriptions herself at [facility] and does not want to wait for them to come to bedside. Patient left via wheelchair in personal clothes with aide and friend.

## 2024-12-18 NOTE — PROGRESS NOTES
stones (N/A, 07/10/2018); pr cysto w/ureteroscopy w/rmvl/manj stones (Right, 10/10/2018); Cystoscopy (Right, 08/02/2019); Rotator cuff repair (Left); Intraocular lens insertion (Bilateral, 2008); Cystoscopy (Right, 08/23/2019); NEPHROLITHOTOMY (Right, 08/23/2019); cysto/uretero/pyeloscopy, calculus tx (Right, 09/13/2019); cysto/uretero/pyeloscopy, calculus tx (Right, 12/12/2019); CYSTOSCOPY INSERTION / REMOVAL STENT / STONE (Right, 12/31/2019); Cystoscopy (Right, 09/19/2020); Cardiac catheterization (12/08/2021); Dilation and curettage of uterus (N/A, 06/30/2022); Percutaneous nephrostomy (Right, 11/01/2022); Cystoscopy (Right, 11/01/2022); Kidney stone removal (Right, 11/01/2022); IR GUIDED NEPHROSTOMY CATH PLACEMENT RIGHT (11/01/2022); Total knee arthroplasty (Right, 05/22/2023); IR GUIDED NEPHROSTOMY CATH PLACEMENT RIGHT (08/10/2023); IR GUIDED NEPHROSTOMY CATH PLACEMENT RIGHT (08/21/2023); Kidney stone surgery (Right, 08/21/2023); Kidney stone removal (Right, 08/21/2023); Knee Arthroplasty (Right, 05/22/2023); Cystoscopy (Right, 11/28/2023); Cystoscopy w/ ureteral stent removal (12/06/2023); Bladder surgery (Right, 12/06/2023); Tonsillectomy; US BREAST BIOPSY W LOC DEVICE 1ST LESION LEFT (Left, 04/26/2024); US BIOPSY LYMPH NODE (04/26/2024); US BREAST BIOPSY W LOC DEVICE EACH ADDL LESION LEFT (Left, 04/26/2024); IR PORT PLACEMENT > 5 YEARS (05/17/2024); Cystoscopy w/ laser lithotripsy (Right, 08/27/2024); Cystoscopy (Right, 08/27/2024); Ureteroscopy (N/A, 09/01/2024); Cystoscopy (N/A, 9/1/2024); and vascular surgery (N/A, 12/9/2024).    Assessment  Body Structures, Functions, Activity Limitations Requiring Skilled Therapeutic Intervention: Decreased functional mobility ;Decreased strength;Increased pain;Decreased posture;Decreased ROM;Decreased endurance;Decreased balance  Assessment: Transfers completed with and without use of RW, SBA. Ambulation completed with use of RW, 100ft, with slow murray, noted minor  WFL    Objective  Bed mobility  Bed Mobility Comments: Patient sitting EOB upon therapist arrival and retired to sitting EOB upon therapist exit. RN present.  Transfers  Sit to Stand: Stand by assistance  Stand to Sit: Stand by assistance  Comment: Transfers assessed at EOB with and without use of RW. SX shoe donned prior to transfers. Patient demo proper hand placement and sequencing with transfers.  Ambulation  Surface: Level tile  Device: Rolling Walker  Assistance: Stand by assistance  Quality of Gait: Slow murray, UE reliance on RW  Gait Deviations: Slow Murray;Decreased step length;Decreased step height  Distance: 100ft  Comments: Patient completed gait training with use of RW, SBA, with audiable minor SOB following. Cues for breathing techniques and pacing provided throughout. Patient with fair steadiness throughout.  More Ambulation?: No  Stairs/Curb  Stairs?: No  Balance  Posture: Good  Sitting - Static: Good  Sitting - Dynamic: Good  Standing - Static: Fair  Standing - Dynamic: Fair  Comments: Sitting balance assessed EOB. Standing balance assessed with and without use of RW. Patient with improved standing balance with use of UE support on RW.  Exercise Treatment: Sitting balance: Multi direction reaching, reaching to floor and overhead, multi direction weight shifting, to allow for dressing. Standing balance: Pull up undergarmanents and pants without UE support, SBA. Patient with minor SOB following, requiring rest breaks inbetween d/t deficits in endurance and fatigue. Transfers performed at EOB x5, with and without use of AD.    OutComes Score    AM-PAC - Mobility    AM-PAC Basic Mobility - Inpatient   How much help is needed turning from your back to your side while in a flat bed without using bedrails?: None  How much help is needed moving from lying on your back to sitting on the side of a flat bed without using bedrails?: None  How much help is needed moving to and from a bed to a chair?: A

## 2024-12-18 NOTE — CARE COORDINATION
Transition Planning    PS Dr Lewis to request DME walker with wheels.     1440 Call to Uday from [employer]. Able to deliver a walker to pt's home.     1505 Order for walker with wheels, face sheet and face to face faxed to [facility].

## 2024-12-18 NOTE — PROGRESS NOTES
upper-outer quadrant of left breast in female, estrogen receptor negative (HCC), Midline low back pain with right-sided sciatica, Nephrostomy status (HCC), Shoulder impingement, Snores, Solitary kidney, acquired, Under care of service provider, Under care of service provider, Under care of service provider, Under care of service provider, Urinary tract obstruction by kidney stone, and Wears glasses.    Social History:   reports that she has never smoked. She has never used smokeless tobacco. She reports that she does not currently use alcohol. She reports that she does not use drugs.     Family History:   Family History   Problem Relation Age of Onset    High Blood Pressure Mother     Dementia Mother     High Blood Pressure Father     Diabetes Father     Cancer Father         tongue    Hypertension Father     Heart Disease Father         stents    Other Father         Mylofibrosis    Hypertension Sister     Heart Disease Maternal Grandmother     Heart Attack Maternal Grandmother 80    Prostate Cancer Maternal Grandfather     Emphysema Paternal Grandmother     Heart Disease Paternal Grandfather     Stroke Paternal Grandfather 65       Review of Systems:     Review of Systems   Constitutional:  Positive for activity change (Decreased, but improving) and fatigue. Negative for chills, diaphoresis and fever.   Respiratory:  Positive for shortness of breath (Dyspnea on exertion). Negative for cough.    Cardiovascular:  Positive for leg swelling (Bilateral). Negative for chest pain and palpitations.   Gastrointestinal:  Positive for anal bleeding (Has observed blood on toilet tissue) and rectal pain (History of hemorrhoid). Negative for abdominal pain, nausea and vomiting.   Genitourinary:  Positive for dysuria and frequency. Negative for flank pain and hematuria.        History of kidney stones, nephrostomy tube placement   Musculoskeletal:  Positive for arthralgias (Less toe pain) and gait problem (Due to foot pain).  1051)     PRN Meds: senna, melatonin, magnesium hydroxide, sodium chloride, promethazine, povidone-iodine, sodium chloride, oxyCODONE, fentanNYL, sodium chloride flush, sodium chloride, potassium chloride **OR** potassium chloride, magnesium sulfate, polyethylene glycol, acetaminophen **OR** acetaminophen, ondansetron **OR** ondansetron, metoclopramide    Data:     I/O (24Hr):    Intake/Output Summary (Last 24 hours) at 12/18/2024 1423  Last data filed at 12/18/2024 1134  Gross per 24 hour   Intake --   Output 775 ml   Net -775 ml       Labs:  Hematology:  Recent Labs     12/16/24  0638 12/17/24  0551 12/17/24  0643 12/18/24  0740   WBC 5.0  --  5.2 4.4   RBC 3.13*  --  3.25* 2.90*   HGB 9.1*  --  9.8* 8.6*   HCT 28.9*  --  30.3* 28.0*   MCV 92.3  --  93.2 96.6   MCH 29.1  --  30.2 29.7   MCHC 31.5  --  32.3 30.7   RDW 17.9*  --  18.0* 18.4*   PLT 78*  --  See Reflexed IPF Result 64*   MPV 12.1  --   --  12.4   SEDRATE 63*  --  32* 15   CRP 48.2* 28.4*  --  17.5*     Chemistry:  Recent Labs     12/16/24  0638 12/17/24  0551 12/18/24  0740    134* 136   K 4.1 4.7 3.8    101 101   CO2 26 23 25   GLUCOSE 123* 137* 87   BUN 12 16 18   CREATININE 0.9 1.0* 1.0*   ANIONGAP 10 10 10   LABGLOM 73 64 64   CALCIUM 9.0 8.6 8.2*     No results for input(s): \"LABALBU\", \"LABA1C\", \"U4AQNXL\", \"FT4\", \"TSH\", \"AST\", \"ALT\", \"LDH\", \"GGT\", \"ALKPHOS\", \"BILITOT\", \"BILIDIR\", \"AMMONIA\", \"AMYLASE\", \"LIPASE\", \"LACTATE\", \"CHOL\", \"HDL\", \"CHOLHDLRATIO\", \"TRIG\", \"VLDL\", \"TXU95GU\", \"PHENYTOIN\", \"PHENYF\", \"URICACID\", \"POCGLU\" in the last 72 hours.    Invalid input(s): \"PROT\", \"O0KNPKD\", \"LABGGT\", \"LDLCHOLESTEROL\"    ABG:  Lab Results   Component Value Date/Time    POCPH 7.367 08/25/2019 06:33 PM    POCPCO2 43.3 08/25/2019 06:33 PM    POCPO2 23.8 08/25/2019 06:33 PM    POCHCO3 24.8 08/25/2019 06:33 PM    NBEA 1 08/25/2019 06:33 PM    PBEA NOT REPORTED 08/25/2019 06:33 PM    YFO7SXF 26 08/25/2019 06:33 PM    ZYHM0PPT 40 08/25/2019 06:33 PM

## 2024-12-19 ENCOUNTER — CARE COORDINATION (OUTPATIENT)
Dept: OTHER | Facility: CLINIC | Age: 59
End: 2024-12-19

## 2024-12-19 NOTE — CARE COORDINATION
Care Transitions Note    Initial Call - Call within 2 business days of discharge: Yes    Patient Current Location:  Home: [address] [place], [place] [id number]    Care Transition Nurse contacted the patient by telephone to perform post hospital discharge assessment, verified name and  as identifiers. Provided introduction to self, and explanation of the Care Transition Nurse role.     Patient: Luci Castañeda    Patient : 1965   MRN: [id number]    Reason for Admission: Acute Saddle Pulmonary Embolism  Discharge Date: 24  RURS: Readmission Risk Score: 22.6      Last Discharge Facility       Date Complaint Diagnosis Description Type Department Provider    24 Toe Injury; Pulmonary Embolism Acute saddle pulmonary embolism, unspecified whether acute cor pulmonale present (HCC) ... ED to Hosp-Admission (Discharged) (ADMITTED) [facility] Bart Lewis, DO; Orqvist, ...    24 Foot Pain Acute saddle pulmonary embolism with acute cor pulmonale (HCC) ED (TRANSFER) [facility] Frederick Arreaga, DO            Was this an external facility discharge? No    Additional needs identified to be addressed with provider   No needs identified             Method of communication with provider: none.    Patients top risk factors for readmission: breast cancer and lung disease.     Interventions to address risk factors:   Home Health: Patient will have Home care visiting her on  .   Care Summary Note: Patient reports feeling very tired and fatigued at this time. She is resting and is aware of that the fact that she needs to get up and move around. Encouraged the patient not to get up by herself, to wait for someone to be there with her. She states that her friend will be visiting later and she will help her get up. The patient lives by herself. Patient states that she was taken off all of her medications, with the exception of Colchicine and Eliquis. She also has Zofran prn if needed.       Care

## 2024-12-20 ENCOUNTER — ANESTHESIA (OUTPATIENT)
Dept: OPERATING ROOM | Age: 59
End: 2024-12-20
Payer: COMMERCIAL

## 2024-12-20 ENCOUNTER — HOSPITAL ENCOUNTER (OUTPATIENT)
Age: 59
Setting detail: OBSERVATION
Discharge: HOME | End: 2024-12-21
Attending: EMERGENCY MEDICINE | Admitting: STUDENT IN AN ORGANIZED HEALTH CARE EDUCATION/TRAINING PROGRAM
Payer: COMMERCIAL

## 2024-12-20 ENCOUNTER — ANESTHESIA EVENT (OUTPATIENT)
Dept: OPERATING ROOM | Age: 59
End: 2024-12-20
Payer: COMMERCIAL

## 2024-12-20 ENCOUNTER — TELEPHONE (OUTPATIENT)
Dept: ONCOLOGY | Age: 59
End: 2024-12-20

## 2024-12-20 ENCOUNTER — APPOINTMENT (OUTPATIENT)
Dept: GENERAL RADIOLOGY | Age: 59
End: 2024-12-20
Payer: COMMERCIAL

## 2024-12-20 ENCOUNTER — APPOINTMENT (OUTPATIENT)
Dept: CT IMAGING | Age: 59
End: 2024-12-20
Payer: COMMERCIAL

## 2024-12-20 ENCOUNTER — NURSE TRIAGE (OUTPATIENT)
Dept: OTHER | Facility: CLINIC | Age: 59
End: 2024-12-20

## 2024-12-20 DIAGNOSIS — N20.0 KIDNEY STONE: Primary | ICD-10-CM

## 2024-12-20 PROBLEM — Z86.711 HISTORY OF PULMONARY EMBOLISM: Status: ACTIVE | Noted: 2024-12-20

## 2024-12-20 LAB
ANION GAP SERPL CALCULATED.3IONS-SCNC: 15 MMOL/L (ref 9–17)
BASOPHILS # BLD: 0 K/UL (ref 0–0.2)
BASOPHILS NFR BLD: 1 % (ref 0–2)
BUN SERPL-MCNC: 20 MG/DL (ref 8–23)
CALCIUM SERPL-MCNC: 9.3 MG/DL (ref 8.6–10.4)
CHLORIDE SERPL-SCNC: 101 MMOL/L (ref 98–107)
CO2 SERPL-SCNC: 22 MMOL/L (ref 20–31)
CREAT SERPL-MCNC: 1.3 MG/DL (ref 0.5–0.9)
EOSINOPHIL # BLD: 0 K/UL (ref 0–0.4)
EOSINOPHILS RELATIVE PERCENT: 0 % (ref 1–4)
ERYTHROCYTE [DISTWIDTH] IN BLOOD BY AUTOMATED COUNT: 19.7 % (ref 12.5–15.4)
GFR, ESTIMATED: 47 ML/MIN/1.73M2
GLUCOSE SERPL-MCNC: 136 MG/DL (ref 70–99)
HCT VFR BLD AUTO: 31.9 % (ref 36–46)
HGB BLD-MCNC: 10.6 G/DL (ref 12–16)
LYMPHOCYTES NFR BLD: 0.6 K/UL (ref 1–4.8)
LYMPHOCYTES RELATIVE PERCENT: 10 % (ref 24–44)
MCH RBC QN AUTO: 30.2 PG (ref 26–34)
MCHC RBC AUTO-ENTMCNC: 33.2 G/DL (ref 31–37)
MCV RBC AUTO: 91 FL (ref 80–100)
MONOCYTES NFR BLD: 0.8 K/UL (ref 0.1–1.2)
MONOCYTES NFR BLD: 13 % (ref 2–11)
NEUTROPHILS NFR BLD: 76 % (ref 36–66)
NEUTS SEG NFR BLD: 4.6 K/UL (ref 1.8–7.7)
PLATELET # BLD AUTO: 107 K/UL (ref 140–450)
PMV BLD AUTO: 8.8 FL (ref 6–12)
POTASSIUM SERPL-SCNC: 3.8 MMOL/L (ref 3.7–5.3)
RBC # BLD AUTO: 3.5 M/UL (ref 4–5.2)
SODIUM SERPL-SCNC: 138 MMOL/L (ref 135–144)
WBC OTHER # BLD: 6.1 K/UL (ref 3.5–11)

## 2024-12-20 PROCEDURE — 96374 THER/PROPH/DIAG INJ IV PUSH: CPT

## 2024-12-20 PROCEDURE — 2500000003 HC RX 250 WO HCPCS: Performed by: NURSE PRACTITIONER

## 2024-12-20 PROCEDURE — 80048 BASIC METABOLIC PNL TOTAL CA: CPT

## 2024-12-20 PROCEDURE — 6370000000 HC RX 637 (ALT 250 FOR IP): Performed by: STUDENT IN AN ORGANIZED HEALTH CARE EDUCATION/TRAINING PROGRAM

## 2024-12-20 PROCEDURE — 7100000000 HC PACU RECOVERY - FIRST 15 MIN: Performed by: UROLOGY

## 2024-12-20 PROCEDURE — 2580000003 HC RX 258: Performed by: NURSE PRACTITIONER

## 2024-12-20 PROCEDURE — 96361 HYDRATE IV INFUSION ADD-ON: CPT

## 2024-12-20 PROCEDURE — 99223 1ST HOSP IP/OBS HIGH 75: CPT | Performed by: STUDENT IN AN ORGANIZED HEALTH CARE EDUCATION/TRAINING PROGRAM

## 2024-12-20 PROCEDURE — G0378 HOSPITAL OBSERVATION PER HR: HCPCS

## 2024-12-20 PROCEDURE — 99284 EMERGENCY DEPT VISIT MOD MDM: CPT

## 2024-12-20 PROCEDURE — 85025 COMPLETE CBC W/AUTO DIFF WBC: CPT

## 2024-12-20 PROCEDURE — 3600000003 HC SURGERY LEVEL 3 BASE: Performed by: UROLOGY

## 2024-12-20 PROCEDURE — C2617 STENT, NON-COR, TEM W/O DEL: HCPCS | Performed by: UROLOGY

## 2024-12-20 PROCEDURE — 3700000001 HC ADD 15 MINUTES (ANESTHESIA): Performed by: UROLOGY

## 2024-12-20 PROCEDURE — 6360000002 HC RX W HCPCS: Performed by: NURSE ANESTHETIST, CERTIFIED REGISTERED

## 2024-12-20 PROCEDURE — 2580000003 HC RX 258: Performed by: NURSE ANESTHETIST, CERTIFIED REGISTERED

## 2024-12-20 PROCEDURE — 96376 TX/PRO/DX INJ SAME DRUG ADON: CPT

## 2024-12-20 PROCEDURE — 7100000001 HC PACU RECOVERY - ADDTL 15 MIN: Performed by: UROLOGY

## 2024-12-20 PROCEDURE — 2500000003 HC RX 250 WO HCPCS: Performed by: STUDENT IN AN ORGANIZED HEALTH CARE EDUCATION/TRAINING PROGRAM

## 2024-12-20 PROCEDURE — 74176 CT ABD & PELVIS W/O CONTRAST: CPT

## 2024-12-20 PROCEDURE — 3700000000 HC ANESTHESIA ATTENDED CARE: Performed by: UROLOGY

## 2024-12-20 PROCEDURE — 2709999900 HC NON-CHARGEABLE SUPPLY: Performed by: UROLOGY

## 2024-12-20 PROCEDURE — 3600000013 HC SURGERY LEVEL 3 ADDTL 15MIN: Performed by: UROLOGY

## 2024-12-20 PROCEDURE — C1769 GUIDE WIRE: HCPCS | Performed by: UROLOGY

## 2024-12-20 PROCEDURE — 96375 TX/PRO/DX INJ NEW DRUG ADDON: CPT

## 2024-12-20 PROCEDURE — 6360000002 HC RX W HCPCS: Performed by: NURSE PRACTITIONER

## 2024-12-20 DEVICE — URETERAL STENT
Type: IMPLANTABLE DEVICE | Site: URETER | Status: FUNCTIONAL
Brand: POLARIS™ ULTRA

## 2024-12-20 RX ORDER — NYSTATIN 100000 [USP'U]/ML
500000 SUSPENSION ORAL 4 TIMES DAILY
Status: DISCONTINUED | OUTPATIENT
Start: 2024-12-20 | End: 2024-12-21 | Stop reason: HOSPADM

## 2024-12-20 RX ORDER — COLCHICINE 0.6 MG/1
0.6 TABLET ORAL DAILY
Status: DISCONTINUED | OUTPATIENT
Start: 2024-12-20 | End: 2024-12-21 | Stop reason: HOSPADM

## 2024-12-20 RX ORDER — MIDAZOLAM HYDROCHLORIDE 1 MG/ML
INJECTION, SOLUTION INTRAMUSCULAR; INTRAVENOUS
Status: DISCONTINUED | OUTPATIENT
Start: 2024-12-20 | End: 2024-12-20 | Stop reason: SDUPTHER

## 2024-12-20 RX ORDER — MIDAZOLAM HYDROCHLORIDE 2 MG/2ML
2 INJECTION, SOLUTION INTRAMUSCULAR; INTRAVENOUS
Status: DISCONTINUED | OUTPATIENT
Start: 2024-12-20 | End: 2024-12-20 | Stop reason: HOSPADM

## 2024-12-20 RX ORDER — POTASSIUM CHLORIDE 7.45 MG/ML
10 INJECTION INTRAVENOUS PRN
Status: DISCONTINUED | OUTPATIENT
Start: 2024-12-20 | End: 2024-12-21 | Stop reason: HOSPADM

## 2024-12-20 RX ORDER — LIDOCAINE HYDROCHLORIDE 10 MG/ML
INJECTION, SOLUTION EPIDURAL; INFILTRATION; INTRACAUDAL; PERINEURAL
Status: DISCONTINUED | OUTPATIENT
Start: 2024-12-20 | End: 2024-12-20 | Stop reason: SDUPTHER

## 2024-12-20 RX ORDER — 0.9 % SODIUM CHLORIDE 0.9 %
500 INTRAVENOUS SOLUTION INTRAVENOUS ONCE
Status: COMPLETED | OUTPATIENT
Start: 2024-12-20 | End: 2024-12-20

## 2024-12-20 RX ORDER — SODIUM CHLORIDE 9 MG/ML
INJECTION, SOLUTION INTRAVENOUS PRN
Status: DISCONTINUED | OUTPATIENT
Start: 2024-12-20 | End: 2024-12-20 | Stop reason: HOSPADM

## 2024-12-20 RX ORDER — PANTOPRAZOLE SODIUM 20 MG/1
20 TABLET, DELAYED RELEASE ORAL
Status: DISCONTINUED | OUTPATIENT
Start: 2024-12-21 | End: 2024-12-21 | Stop reason: HOSPADM

## 2024-12-20 RX ORDER — ONDANSETRON 2 MG/ML
4 INJECTION INTRAMUSCULAR; INTRAVENOUS EVERY 6 HOURS PRN
Status: DISCONTINUED | OUTPATIENT
Start: 2024-12-20 | End: 2024-12-21 | Stop reason: HOSPADM

## 2024-12-20 RX ORDER — ONDANSETRON 4 MG/1
4 TABLET, FILM COATED ORAL 3 TIMES DAILY PRN
Status: DISCONTINUED | OUTPATIENT
Start: 2024-12-20 | End: 2024-12-20 | Stop reason: RX

## 2024-12-20 RX ORDER — OXYCODONE AND ACETAMINOPHEN 5; 325 MG/1; MG/1
2 TABLET ORAL
Status: DISCONTINUED | OUTPATIENT
Start: 2024-12-20 | End: 2024-12-20 | Stop reason: HOSPADM

## 2024-12-20 RX ORDER — SODIUM CHLORIDE 9 MG/ML
INJECTION, SOLUTION INTRAVENOUS PRN
Status: CANCELLED | OUTPATIENT
Start: 2024-12-20

## 2024-12-20 RX ORDER — HYDRALAZINE HYDROCHLORIDE 20 MG/ML
10 INJECTION INTRAMUSCULAR; INTRAVENOUS
Status: DISCONTINUED | OUTPATIENT
Start: 2024-12-20 | End: 2024-12-20 | Stop reason: HOSPADM

## 2024-12-20 RX ORDER — NALOXONE HYDROCHLORIDE 0.4 MG/ML
INJECTION, SOLUTION INTRAMUSCULAR; INTRAVENOUS; SUBCUTANEOUS PRN
Status: DISCONTINUED | OUTPATIENT
Start: 2024-12-20 | End: 2024-12-20 | Stop reason: HOSPADM

## 2024-12-20 RX ORDER — OXYBUTYNIN CHLORIDE 5 MG/1
10 TABLET, EXTENDED RELEASE ORAL DAILY
Status: DISCONTINUED | OUTPATIENT
Start: 2024-12-20 | End: 2024-12-21 | Stop reason: HOSPADM

## 2024-12-20 RX ORDER — ONDANSETRON 2 MG/ML
INJECTION INTRAMUSCULAR; INTRAVENOUS
Status: DISCONTINUED | OUTPATIENT
Start: 2024-12-20 | End: 2024-12-20 | Stop reason: SDUPTHER

## 2024-12-20 RX ORDER — SODIUM CHLORIDE 0.9 % (FLUSH) 0.9 %
5-40 SYRINGE (ML) INJECTION EVERY 12 HOURS SCHEDULED
Status: DISCONTINUED | OUTPATIENT
Start: 2024-12-20 | End: 2024-12-20 | Stop reason: HOSPADM

## 2024-12-20 RX ORDER — SODIUM CHLORIDE 0.9 % (FLUSH) 0.9 %
5-40 SYRINGE (ML) INJECTION EVERY 12 HOURS SCHEDULED
Status: CANCELLED | OUTPATIENT
Start: 2024-12-20

## 2024-12-20 RX ORDER — ACETAMINOPHEN 325 MG/1
650 TABLET ORAL EVERY 6 HOURS PRN
Status: DISCONTINUED | OUTPATIENT
Start: 2024-12-20 | End: 2024-12-21 | Stop reason: HOSPADM

## 2024-12-20 RX ORDER — METOCLOPRAMIDE HYDROCHLORIDE 5 MG/ML
10 INJECTION INTRAMUSCULAR; INTRAVENOUS
Status: DISCONTINUED | OUTPATIENT
Start: 2024-12-20 | End: 2024-12-20 | Stop reason: HOSPADM

## 2024-12-20 RX ORDER — PROCHLORPERAZINE EDISYLATE 5 MG/ML
10 INJECTION INTRAMUSCULAR; INTRAVENOUS ONCE
Status: COMPLETED | OUTPATIENT
Start: 2024-12-20 | End: 2024-12-20

## 2024-12-20 RX ORDER — MORPHINE SULFATE 4 MG/ML
4 INJECTION, SOLUTION INTRAMUSCULAR; INTRAVENOUS ONCE
Status: COMPLETED | OUTPATIENT
Start: 2024-12-20 | End: 2024-12-20

## 2024-12-20 RX ORDER — IPRATROPIUM BROMIDE AND ALBUTEROL SULFATE 2.5; .5 MG/3ML; MG/3ML
1 SOLUTION RESPIRATORY (INHALATION)
Status: DISCONTINUED | OUTPATIENT
Start: 2024-12-20 | End: 2024-12-20 | Stop reason: HOSPADM

## 2024-12-20 RX ORDER — ONDANSETRON 2 MG/ML
4 INJECTION INTRAMUSCULAR; INTRAVENOUS
Status: DISCONTINUED | OUTPATIENT
Start: 2024-12-20 | End: 2024-12-20 | Stop reason: HOSPADM

## 2024-12-20 RX ORDER — SODIUM CHLORIDE 0.9 % (FLUSH) 0.9 %
5-40 SYRINGE (ML) INJECTION PRN
Status: DISCONTINUED | OUTPATIENT
Start: 2024-12-20 | End: 2024-12-20 | Stop reason: HOSPADM

## 2024-12-20 RX ORDER — SODIUM CHLORIDE 0.9 % (FLUSH) 0.9 %
5-40 SYRINGE (ML) INJECTION PRN
Status: CANCELLED | OUTPATIENT
Start: 2024-12-20

## 2024-12-20 RX ORDER — SODIUM CHLORIDE 9 MG/ML
INJECTION, SOLUTION INTRAVENOUS
Status: DISCONTINUED | OUTPATIENT
Start: 2024-12-20 | End: 2024-12-20 | Stop reason: SDUPTHER

## 2024-12-20 RX ORDER — MAGNESIUM SULFATE IN WATER 40 MG/ML
2000 INJECTION, SOLUTION INTRAVENOUS PRN
Status: DISCONTINUED | OUTPATIENT
Start: 2024-12-20 | End: 2024-12-21 | Stop reason: HOSPADM

## 2024-12-20 RX ORDER — LABETALOL HYDROCHLORIDE 5 MG/ML
10 INJECTION, SOLUTION INTRAVENOUS
Status: DISCONTINUED | OUTPATIENT
Start: 2024-12-20 | End: 2024-12-20 | Stop reason: HOSPADM

## 2024-12-20 RX ORDER — POTASSIUM CHLORIDE 1500 MG/1
40 TABLET, EXTENDED RELEASE ORAL PRN
Status: DISCONTINUED | OUTPATIENT
Start: 2024-12-20 | End: 2024-12-21 | Stop reason: HOSPADM

## 2024-12-20 RX ORDER — GLYCOPYRROLATE 0.2 MG/ML
0.4 INJECTION INTRAMUSCULAR; INTRAVENOUS ONCE
Status: DISCONTINUED | OUTPATIENT
Start: 2024-12-20 | End: 2024-12-20 | Stop reason: HOSPADM

## 2024-12-20 RX ORDER — MEPERIDINE HYDROCHLORIDE 50 MG/ML
12.5 INJECTION INTRAMUSCULAR; INTRAVENOUS; SUBCUTANEOUS EVERY 5 MIN PRN
Status: DISCONTINUED | OUTPATIENT
Start: 2024-12-20 | End: 2024-12-20 | Stop reason: HOSPADM

## 2024-12-20 RX ORDER — OXYCODONE AND ACETAMINOPHEN 5; 325 MG/1; MG/1
1 TABLET ORAL
Status: DISCONTINUED | OUTPATIENT
Start: 2024-12-20 | End: 2024-12-20 | Stop reason: HOSPADM

## 2024-12-20 RX ORDER — ONDANSETRON 2 MG/ML
4 INJECTION INTRAMUSCULAR; INTRAVENOUS ONCE
Status: COMPLETED | OUTPATIENT
Start: 2024-12-20 | End: 2024-12-20

## 2024-12-20 RX ORDER — POLYETHYLENE GLYCOL 3350 17 G/17G
17 POWDER, FOR SOLUTION ORAL DAILY PRN
Status: DISCONTINUED | OUTPATIENT
Start: 2024-12-20 | End: 2024-12-21 | Stop reason: HOSPADM

## 2024-12-20 RX ORDER — MORPHINE SULFATE 2 MG/ML
2 INJECTION, SOLUTION INTRAMUSCULAR; INTRAVENOUS EVERY 5 MIN PRN
Status: DISCONTINUED | OUTPATIENT
Start: 2024-12-20 | End: 2024-12-20 | Stop reason: HOSPADM

## 2024-12-20 RX ORDER — FENTANYL CITRATE 50 UG/ML
INJECTION, SOLUTION INTRAMUSCULAR; INTRAVENOUS
Status: DISCONTINUED | OUTPATIENT
Start: 2024-12-20 | End: 2024-12-20 | Stop reason: SDUPTHER

## 2024-12-20 RX ORDER — ALBUTEROL SULFATE 90 UG/1
2 INHALANT RESPIRATORY (INHALATION) EVERY 6 HOURS PRN
Status: DISCONTINUED | OUTPATIENT
Start: 2024-12-20 | End: 2024-12-21 | Stop reason: HOSPADM

## 2024-12-20 RX ORDER — SODIUM CHLORIDE 9 MG/ML
INJECTION, SOLUTION INTRAVENOUS PRN
Status: DISCONTINUED | OUTPATIENT
Start: 2024-12-20 | End: 2024-12-21 | Stop reason: HOSPADM

## 2024-12-20 RX ORDER — SODIUM CHLORIDE 0.9 % (FLUSH) 0.9 %
5-40 SYRINGE (ML) INJECTION EVERY 12 HOURS SCHEDULED
Status: DISCONTINUED | OUTPATIENT
Start: 2024-12-20 | End: 2024-12-21 | Stop reason: HOSPADM

## 2024-12-20 RX ORDER — PROPOFOL 10 MG/ML
INJECTION, EMULSION INTRAVENOUS
Status: DISCONTINUED | OUTPATIENT
Start: 2024-12-20 | End: 2024-12-20 | Stop reason: SDUPTHER

## 2024-12-20 RX ORDER — LIDOCAINE HYDROCHLORIDE 10 MG/ML
1 INJECTION, SOLUTION EPIDURAL; INFILTRATION; INTRACAUDAL; PERINEURAL
Status: CANCELLED | OUTPATIENT
Start: 2024-12-20 | End: 2024-12-21

## 2024-12-20 RX ORDER — SODIUM CHLORIDE 0.9 % (FLUSH) 0.9 %
5-40 SYRINGE (ML) INJECTION PRN
Status: DISCONTINUED | OUTPATIENT
Start: 2024-12-20 | End: 2024-12-21 | Stop reason: HOSPADM

## 2024-12-20 RX ORDER — SODIUM CHLORIDE, SODIUM LACTATE, POTASSIUM CHLORIDE, CALCIUM CHLORIDE 600; 310; 30; 20 MG/100ML; MG/100ML; MG/100ML; MG/100ML
INJECTION, SOLUTION INTRAVENOUS CONTINUOUS
Status: CANCELLED | OUTPATIENT
Start: 2024-12-20

## 2024-12-20 RX ORDER — ENOXAPARIN SODIUM 100 MG/ML
30 INJECTION SUBCUTANEOUS 2 TIMES DAILY
Status: DISCONTINUED | OUTPATIENT
Start: 2024-12-21 | End: 2024-12-20 | Stop reason: SDUPTHER

## 2024-12-20 RX ORDER — DIPHENHYDRAMINE HYDROCHLORIDE 50 MG/ML
12.5 INJECTION INTRAMUSCULAR; INTRAVENOUS
Status: DISCONTINUED | OUTPATIENT
Start: 2024-12-20 | End: 2024-12-20 | Stop reason: HOSPADM

## 2024-12-20 RX ORDER — ONDANSETRON 4 MG/1
4 TABLET, ORALLY DISINTEGRATING ORAL EVERY 8 HOURS PRN
Status: DISCONTINUED | OUTPATIENT
Start: 2024-12-20 | End: 2024-12-21 | Stop reason: HOSPADM

## 2024-12-20 RX ORDER — DEXAMETHASONE SODIUM PHOSPHATE 10 MG/ML
INJECTION, SOLUTION INTRAMUSCULAR; INTRAVENOUS
Status: DISCONTINUED | OUTPATIENT
Start: 2024-12-20 | End: 2024-12-20 | Stop reason: SDUPTHER

## 2024-12-20 RX ORDER — ACETAMINOPHEN 650 MG/1
650 SUPPOSITORY RECTAL EVERY 6 HOURS PRN
Status: DISCONTINUED | OUTPATIENT
Start: 2024-12-20 | End: 2024-12-21 | Stop reason: HOSPADM

## 2024-12-20 RX ADMIN — SODIUM CHLORIDE, PRESERVATIVE FREE 10 ML: 5 INJECTION INTRAVENOUS at 20:23

## 2024-12-20 RX ADMIN — FENTANYL CITRATE 50 MCG: 50 INJECTION, SOLUTION INTRAMUSCULAR; INTRAVENOUS at 16:08

## 2024-12-20 RX ADMIN — SODIUM CHLORIDE: 9 INJECTION, SOLUTION INTRAVENOUS at 16:04

## 2024-12-20 RX ADMIN — DEXAMETHASONE SODIUM PHOSPHATE 10 MG: 10 INJECTION INTRAMUSCULAR; INTRAVENOUS at 16:13

## 2024-12-20 RX ADMIN — PROPOFOL 30 MG: 10 INJECTION, EMULSION INTRAVENOUS at 16:10

## 2024-12-20 RX ADMIN — APIXABAN 10 MG: 5 TABLET, FILM COATED ORAL at 20:24

## 2024-12-20 RX ADMIN — ONDANSETRON 4 MG: 2 INJECTION INTRAMUSCULAR; INTRAVENOUS at 16:17

## 2024-12-20 RX ADMIN — OXYBUTYNIN CHLORIDE 10 MG: 5 TABLET, EXTENDED RELEASE ORAL at 18:34

## 2024-12-20 RX ADMIN — LIDOCAINE HYDROCHLORIDE 50 MG: 10 INJECTION, SOLUTION EPIDURAL; INFILTRATION; INTRACAUDAL; PERINEURAL at 16:10

## 2024-12-20 RX ADMIN — PROCHLORPERAZINE EDISYLATE 10 MG: 5 INJECTION INTRAMUSCULAR; INTRAVENOUS at 13:34

## 2024-12-20 RX ADMIN — WATER 1000 MG: 1 INJECTION INTRAMUSCULAR; INTRAVENOUS; SUBCUTANEOUS at 15:19

## 2024-12-20 RX ADMIN — MIDAZOLAM 2 MG: 1 INJECTION INTRAMUSCULAR; INTRAVENOUS at 16:04

## 2024-12-20 RX ADMIN — MORPHINE SULFATE 4 MG: 4 INJECTION, SOLUTION INTRAMUSCULAR; INTRAVENOUS at 13:34

## 2024-12-20 RX ADMIN — ONDANSETRON 4 MG: 2 INJECTION INTRAMUSCULAR; INTRAVENOUS at 12:11

## 2024-12-20 RX ADMIN — SODIUM CHLORIDE 500 ML: 9 INJECTION, SOLUTION INTRAVENOUS at 12:19

## 2024-12-20 RX ADMIN — MORPHINE SULFATE 4 MG: 4 INJECTION, SOLUTION INTRAMUSCULAR; INTRAVENOUS at 12:13

## 2024-12-20 RX ADMIN — PROPOFOL 150 MCG/KG/MIN: 10 INJECTION, EMULSION INTRAVENOUS at 16:11

## 2024-12-20 RX ADMIN — FENTANYL CITRATE 50 MCG: 50 INJECTION, SOLUTION INTRAMUSCULAR; INTRAVENOUS at 16:25

## 2024-12-20 ASSESSMENT — PAIN DESCRIPTION - DESCRIPTORS: DESCRIPTORS: BURNING

## 2024-12-20 ASSESSMENT — PAIN DESCRIPTION - ORIENTATION: ORIENTATION: RIGHT

## 2024-12-20 ASSESSMENT — PAIN SCALES - GENERAL
PAINLEVEL_OUTOF10: 8
PAINLEVEL_OUTOF10: 8

## 2024-12-20 ASSESSMENT — PAIN - FUNCTIONAL ASSESSMENT
PAIN_FUNCTIONAL_ASSESSMENT: 0-10

## 2024-12-20 ASSESSMENT — PAIN DESCRIPTION - PAIN TYPE: TYPE: ACUTE PAIN

## 2024-12-20 ASSESSMENT — PAIN DESCRIPTION - LOCATION
LOCATION: ABDOMEN;BACK
LOCATION: ABDOMEN

## 2024-12-20 NOTE — TELEPHONE ENCOUNTER
Name: Luci Castañeda  : 1965  MRN: [id number]    Oncology Navigation Follow-Up Note    Contact Type:  Telephone    Notes: Received call from pt who is having new severe pains in her RUQ and is dry heaving/vomiting. Pt rates pain an /10. Writer encouraged pt to present to ER for further evaluation.       Electronically signed by Veronica Bennett RN on 2024 at 10:45 AM

## 2024-12-20 NOTE — BRIEF OP NOTE
Brief Postoperative Note      Patient: Luci Castañeda  YOB: 1965  MRN: [id number]    Date of Procedure: 12/20/2024    Pre-Op Diagnosis Codes:      * Kidney stone [N20.0]    Post-Op Diagnosis: Same       Procedure(s):  CYSTOSCOPY URETERAL RIGHT STENT INSERTION (sTRINGS REMOVED)    Surgeon(s):  Venkat Wang MD    Assistant:  * No surgical staff found *    Anesthesia: Monitor Anesthesia Care    Estimated Blood Loss (mL): Minimal    Complications: None    Specimens:   * No specimens in log *    Implants:  Implant Name Type Inv. Item Serial No.  Lot No. LRB No. Used Action   STENT URET 6FR L26CM PERCFLX HYDR+ DBL PGTL THRD 2 - [id number]  STENT URET 6FR L26CM PERCFLX HYDR+ DBL PGTL THRD 2  [employer] [facility] UROLOGY- [id number] Right 1 Implanted         Drains:   Nephrostomy Tube Right Flank 24 fr (Active)       Findings:  Follow up for stone treatment with louie in 1-3 weeks right side distal stone large    Electronically signed by VENKAT WANG MD on 12/20/2024 at 4:33 PM

## 2024-12-20 NOTE — ANESTHESIA PRE PROCEDURE
STENT REMOVAL performed by Bismark Amaya MD at [facility] OR   • CYSTOSCOPY W/ LASER LITHOTRIPSY Right 08/27/2024    **2**CYSTOSCOPY, URETEROSCOPY, HOLMIUM LASER LITHOTRIPSY, WITH URETERAL STENT INSERTION (Right: Ureter)   • CYSTOSCOPY W/ URETERAL STENT REMOVAL  12/06/2023   • DILATION AND CURETTAGE OF UTERUS N/A 06/30/2022    DILATATION AND CURETTAGE, HYSTEROSCOPY performed by Loretta Chanel MD at [facility] OR   • INTRAOCULAR LENS PROSTHESIS INSERTION Bilateral 2008   • IR GUIDED NEPHROSTOMY CATH PLACEMENT RIGHT  11/01/2022    IR NEPHROSTOMY PERCUTANEOUS RIGHT 11/1/2022 Xavi Agosto MD [facility] SPECIAL PROCEDURES   • IR GUIDED NEPHROSTOMY CATH PLACEMENT RIGHT  08/10/2023    IR NEPHROSTOMY PERCUTANEOUS RIGHT 8/10/2023 Xavi Agosto MD [facility] SPECIAL PROCEDURES   • IR GUIDED NEPHROSTOMY CATH PLACEMENT RIGHT  08/21/2023    IR NEPHROSTOMY PERCUTANEOUS RIGHT 8/21/2023 [facility] SPECIAL PROCEDURES   • IR PORT PLACEMENT > 5 YEARS  05/17/2024    IR PORT PLACEMENT EQUAL OR GREATER THAN 5 YEARS 5/17/2024 Alex Palmer MD [facility] SPECIAL PROCEDURES   • KIDNEY STONE REMOVAL Right 11/01/2022    RIGHT PERCUTANEOUS NEPHROLITHOTOMY, NEPHROSTOMY PLACEMENT performed by Bismark Amaya MD at [facility] OR   • KIDNEY STONE REMOVAL Right 08/21/2023    HOLMIUM, PERCUTANEOUS NEPHROLITHOTOMY, DIALATION OF TRACT, ANTIGRADE URETEROSCOPY, STONE BASKETTING,  INSERTION OF NEPHROSTOMY TUBE performed by Mor Escalona MD at [facility] OR   • KIDNEY STONE SURGERY Right 08/21/2023    HOLMIUM, PERCUTANEOUS NEPHROLITHOTOMY, DIALATION OF TRACT, ANTIGRADE URETEROSCOPY, STONE BASKETTING,  INSERTION OF NEPHROSTOMY TUBE   • KNEE ARTHROPLASTY Right 05/22/2023   • KNEE ARTHROSCOPY Right 1998, 2003    X2   • NEPHROLITHOTOMY Right 08/23/2019    HOLMIUM LASER, NEPHROLITHOTOMY PERCUTANEOUS, LITHOCLAST, C-ARM performed by Venkat Wang MD at [facility] OR   • PERCUTANEOUS NEPHROSTOMY Right 11/01/2022    RIGHT PERCUTANEOUS NEPHROLITHOTOMY, NEPHROSTOMY PLACEMENT   • DE CYSTO W/INSERT

## 2024-12-20 NOTE — PROGRESS NOTES
Patient states that she does not use a Bipap/cpap at home and does not want to use one while in the hospital. She also stated that she does not use respiratory inhalers at home either, and does not wish to use them in the hospital. Patient is aware she can call if she changes her mind, and respiratory can provide those for her.

## 2024-12-20 NOTE — H&P
PERCUTANEOUS NEPHROLITHOTOMY, NEPHROSTOMY PLACEMENT performed by Bismark Amaya MD at [facility] OR    KIDNEY STONE REMOVAL Right 08/21/2023    HOLMIUM, PERCUTANEOUS NEPHROLITHOTOMY, DIALATION OF TRACT, ANTIGRADE URETEROSCOPY, STONE BASKETTING,  INSERTION OF NEPHROSTOMY TUBE performed by Mor Escalona MD at [facility] OR    KIDNEY STONE SURGERY Right 08/21/2023    HOLMIUM, PERCUTANEOUS NEPHROLITHOTOMY, DIALATION OF TRACT, ANTIGRADE URETEROSCOPY, STONE BASKETTING,  INSERTION OF NEPHROSTOMY TUBE    KNEE ARTHROPLASTY Right 05/22/2023    KNEE ARTHROSCOPY Right 1998, 2003    X2    NEPHROLITHOTOMY Right 08/23/2019    HOLMIUM LASER, NEPHROLITHOTOMY PERCUTANEOUS, LITHOCLAST, C-ARM performed by Venkat Wang MD at [facility] OR    PERCUTANEOUS NEPHROSTOMY Right 11/01/2022    RIGHT PERCUTANEOUS NEPHROLITHOTOMY, NEPHROSTOMY PLACEMENT    AL CYSTO W/INSERT URETERAL STENT Right 07/04/2018    CYSTOSCOPY URETERAL STENT INSERTION, RIGHT performed by Pedro Friend MD at [facility] OR    AL CYSTO W/URETEROSCOPY W/RMVL/MANJ STONES N/A 07/10/2018    CYSTOSCOPY, RIGHT URETEROSCOPY, HOLMIUM LASER LITHO WITH STONE BASKETING, RIGHT STENT EXCHANGE performed by Frederick Chaudhry Jr., MD at [facility] OR    AL CYSTO W/URETEROSCOPY W/RMVL/MANJ STONES Right 10/10/2018    HOLMIUM LASER STANDBY, CYSTO, RIGHT URETEROSCOPY, RIGHT STENT PLACEMENT performed by Bismark Amaya MD at [facility] OR    ROTATOR CUFF REPAIR Left     TONSILLECTOMY      TOTAL KNEE ARTHROPLASTY Right 05/22/2023    RIGHT KNEE TOTAL ARTHROPLASTY performed by Nabil Potter DO at [facility] OR    TOTAL NEPHRECTOMY Left 1988    URETEROSCOPY N/A 09/01/2024    **E-6** CYSTOSCOPY URETEROSCOPY HOLMIUM LASER, STENT INSERTION    US BIOPSY LYMPH NODE  04/26/2024    US LYMPH NODE BIOPSY 4/26/2024 [facility]    US BREAST BIOPSY W LOC DEVICE 1ST LESION LEFT Left 04/26/2024    US BREAST BIOPSY W LOC DEVICE 1ST LESION LEFT 4/26/2024 [facility]    US BREAST BIOPSY W LOC DEVICE EACH ADDL LESION LEFT Left        General Appearance:  alert, well appearing, and in no acute distress  Mental status: oriented to person, place, and time  Head:  normocephalic, atraumatic  Eye: no icterus, redness, pupils equal and reactive, extraocular eye movements intact, conjunctiva clear  Ear: normal external ear, no discharge, hearing intact  Nose:  no drainage noted  Mouth: mucous membranes moist  Neck: supple, no carotid bruits, thyroid not palpable  Lungs: Bilateral equal air entry, clear to ausculation, no wheezing, rales or rhonchi, normal effort  Cardiovascular: normal rate, regular rhythm, no murmur, gallop, rub  Abdomen: Soft, nontender, nondistended, normal bowel sounds, no hepatomegaly or splenomegaly  Neurologic: There are no new focal motor or sensory deficits, normal muscle tone and bulk, no abnormal sensation, normal speech.  Skin: No gross lesions, rashes, bruising or bleeding on exposed skin area  Extremities:  peripheral pulses palpable, no pedal edema or calf pain with palpation  Psych: normal affect     Investigations:      Laboratory Testing:  Recent Results (from the past 24 hour(s))   CBC with Auto Differential    Collection Time: 12/20/24 12:10 PM   Result Value Ref Range    WBC 6.1 3.5 - 11.0 k/uL    RBC 3.50 (L) 4.0 - 5.2 m/uL    Hemoglobin 10.6 (L) 12.0 - 16.0 g/dL    Hematocrit 31.9 (L) 36 - 46 %    MCV 91.0 80 - 100 fL    MCH 30.2 26 - 34 pg    MCHC 33.2 31 - 37 g/dL    RDW 19.7 (H) 12.5 - 15.4 %    Platelets 107 (L) 140 - 450 k/uL    MPV 8.8 6.0 - 12.0 fL    Neutrophils % 76 (H) 36 - 66 %    Lymphocytes % 10 (L) 24 - 44 %    Monocytes % 13 (H) 2 - 11 %    Eosinophils % 0 (L) 1 - 4 %    Basophils % 1 0 - 2 %    Neutrophils Absolute 4.60 1.8 - 7.7 k/uL    Lymphocytes Absolute 0.60 (L) 1.0 - 4.8 k/uL    Monocytes Absolute 0.80 0.1 - 1.2 k/uL    Eosinophils Absolute 0.00 0.0 - 0.4 k/uL    Basophils Absolute 0.00 0.0 - 0.2 k/uL   BMP    Collection Time: 12/20/24 12:10 PM   Result Value Ref Range    Sodium 138 135

## 2024-12-20 NOTE — ED PROVIDER NOTES
[facility] Emergency Department      Pt Name: Luci Castañeda  MRN: [id number]  Birthdate 1965  Date of evaluation: 12/20/2024    EMERGENCY DEPARTMENT ENCOUNTER      PERTINENT ATTENDING PHYSICIAN COMMENTS:      Faculty Attestation    I performed a history and physical examination of the patient and discussed management with the mid level provideer. I reviewed the mid level provider's note and agree with the documented findings and plan of care.Any areas of disagreement are noted on the chart. I was personally present for the key portions of any procedures. I have documented in the chart those procedures where I was not present during the key portions. I have reviewed the emergency nurses triage note. I agree with the chief complaint, past medical history, past surgical history, allergies, medications, social and family history as documented unless otherwise noted below. Documentation of the HPI, Physical Exam and Medical Decision Making performed by medical students or scribes is based on my personal performance of the HPI, PE and MDM. For Residents/Physician Assistant/ Nurse Practitioner cases/documentation I have personally evaluated this patient and have completed at least one if not all key elements of the E/M (history, physical exam, and MDM). Additional findings are as noted.    CHIEF COMPLAINT       Chief Complaint   Patient presents with    Abdominal Pain     Pt arrives with co RLQ abdominal pain radiating into right flank which began before 0830. Pt was given 100mg of fentanyl IM and 4mg of zofran ODT en route . Pt states she did have a uA done 2 days ago and was neg. Pt admits to extensive history of kidney stones, pt reports stabbing pain , pt states pain is diff then in the past.        HISTORY OF PRESENT ILLNESS    Luci Castañeda is a 59 y.o. female who presents to the emergency department brought by ambulance for onset of right lower quadrant abdominal pain that started this morning.   lives alone, unemployed, homeless,etc): None    History source(s) (patient,spouse,parent,family,friend,EMS,etc): Patient    Review of external sources (ECF,Hospital records,EMS report, radiology reports, etc): Hospital records    Tests considered but not ordered: See below    Independent interpretation of tests (eg.  X-ray, CAT scan, Doppler studies, EKG): See below    Discussion of x-ray results with radiology: See below    Consults: See below    Consideration for admission/observation (even if discharged): considered admission, final decision will be based on test results and patient status    Prescription considerations: See below    Critical Care note written: See below    Sepsis considered: Considered, not likely      DIAGNOSTIC RESULTS     EKG: All EKG's are interpreted by the Emergency Department Physician who either signs or Co-signs this chart in the absence of a cardiologist.        Not indicated unless otherwise documented above    LABS:  Results for orders placed or performed during the hospital encounter of 12/20/24   CBC with Auto Differential   Result Value Ref Range    WBC 6.1 3.5 - 11.0 k/uL    RBC 3.50 (L) 4.0 - 5.2 m/uL    Hemoglobin 10.6 (L) 12.0 - 16.0 g/dL    Hematocrit 31.9 (L) 36 - 46 %    MCV 91.0 80 - 100 fL    MCH 30.2 26 - 34 pg    MCHC 33.2 31 - 37 g/dL    RDW 19.7 (H) 12.5 - 15.4 %    Platelets 107 (L) 140 - 450 k/uL    MPV 8.8 6.0 - 12.0 fL    Neutrophils % 76 (H) 36 - 66 %    Lymphocytes % 10 (L) 24 - 44 %    Monocytes % 13 (H) 2 - 11 %    Eosinophils % 0 (L) 1 - 4 %    Basophils % 1 0 - 2 %    Neutrophils Absolute 4.60 1.8 - 7.7 k/uL    Lymphocytes Absolute 0.60 (L) 1.0 - 4.8 k/uL    Monocytes Absolute 0.80 0.1 - 1.2 k/uL    Eosinophils Absolute 0.00 0.0 - 0.4 k/uL    Basophils Absolute 0.00 0.0 - 0.2 k/uL   BMP   Result Value Ref Range    Sodium 138 135 - 144 mmol/L    Potassium 3.8 3.7 - 5.3 mmol/L    Chloride 101 98 - 107 mmol/L    CO2 22 20 - 31 mmol/L    Anion Gap 15 9 - 17

## 2024-12-20 NOTE — CONSULTS
Venkat Wang MD.  Urology Consultation    Patient:  Luci Castañeda  MRN: [id number]  YOB: 1965    CHIEF COMPLAINT:  right ureteral stone    HISTORY OF PRESENT ILLNESS:     The patient is a 59 y.o. female who presents withright ureteral stone    Solitary right kidney  Pt of dr palma      Patient's old records reviewed. Pertinent patient notes and patient chart reviewed and summarized above.    Past Medical History:    Past Medical History:   Diagnosis Date    Abnormal uterine bleeding (AUB) 11/2023    Arthritis     Asthma     Benign familial tremor     CAD (coronary artery disease) 12/2021    mild per cardiac cath 12/2021; no stents. Dr. Randolph TCC last visit 10/2022    Cancer (HCC)     skin    CKD (chronic kidney disease) stage 1, GFR 90 ml/min or greater     Class 2 severe obesity with serious comorbidity and body mass index (BMI) of 39.0 to 39.9 in adult 05/01/2019    COVID-19 vaccine administered     Cystinuria ([facility])     Dyspnea on exertion 08/06/2019    Flank pain     RIGHT    GERD (gastroesophageal reflux disease)     History of Bell's palsy 2022    states right sided, slight residual    Hypertension     Hyperuricemia     Hypothyroidism     no meds    Kidney stones     Leg swelling     by the end of the day    Malignant neoplasm of upper-outer quadrant of left breast in female, estrogen receptor negative (HCC) 05/12/2024    Midline low back pain with right-sided sciatica 06/02/2016    Nephrostomy status ([facility])     neph tube removed    Shoulder impingement 12/2019    left    Snores     no sleep apnea per sleep study    Solitary kidney, acquired 11/20/2017    Under care of service provider 12/05/2023    pcp-JEFRY Mercado CNP, PCP-david-last visit nov 2023    Under care of service provider 12/05/2023    rygarzkwyu-pflkd-oo vincent-last visit nov 2023    Under care of service provider 12/05/2023    urology-louie-[address]-last visit nov 2023    Under care of service provider      Maternal Grandmother 80    Prostate Cancer Maternal Grandfather     Emphysema Paternal Grandmother     Heart Disease Paternal Grandfather     Stroke Paternal Grandfather 65       REVIEW OF SYSTEMS:  Constitutional: negative  Eyes: negative  Respiratory: negative  Cardiovascular: negative  Gastrointestinal: negative  Genitourinary: see HPI  Musculoskeletal: negative  Skin: negative   Neurological: negative  Hematological/Lymphatic: negative  Psychological: negative        Physical Exam:      This a 59 y.o. female   Patient Vitals for the past 24 hrs:   BP Temp Pulse Resp SpO2 Height Weight   12/20/24 1538 (!) 153/98 -- 96 21 93 % -- --   12/20/24 1516 135/87 -- 90 20 96 % -- --   12/20/24 1434 133/88 -- 89 22 96 % -- --   12/20/24 1414 119/82 -- 89 18 94 % -- --   12/20/24 1401 121/81 -- 92 18 (!) 89 % -- --   12/20/24 1346 110/79 -- 87 15 91 % -- --   12/20/24 1331 124/87 -- 88 17 94 % -- --   12/20/24 1316 122/79 -- 85 21 93 % -- --   12/20/24 1205 122/80 -- -- -- 95 % -- --   12/20/24 1151 128/85 97.9 °F (36.6 °C) (!) 102 16 94 % 1.727 m (5' 8\") 106.6 kg (235 lb)     Constitutional: Patient in no acute distress.  Neuro: Alert and oriented to person, place and time.    LABS:   Recent Labs     12/18/24  0740 12/20/24  1210   WBC 4.4 6.1   HGB 8.6* 10.6*   HCT 28.0* 31.9*   MCV 96.6 91.0   PLT 64* 107*     Recent Labs     12/18/24  0740 12/20/24  1210    138   K 3.8 3.8    101   CO2 25 22   BUN 18 20   CREATININE 1.0* 1.3*       Additional Lab/Culture results: none    Urinalysis:   Recent Labs     12/17/24  2000   COLORU Dark Yellow*   PHUR 7.5   WBCUA 10 TO 20   RBCUA 5 TO 10   BACTERIA FEW*   LEUKOCYTESUR TRACE*   UROBILINOGEN Normal   BILIRUBINUR NEGATIVE        -----------------------------------------------------------------  Imaging Reviewed during this encounter:   SANDRA AKBAR MD independently reviewed the images and verified the radiology reports from:    CT ABDOMEN PELVIS WO CONTRAST

## 2024-12-20 NOTE — OP NOTE
Venkat Wang MD.  Urologic Surgery      [place], [place]. [facility]    DATE: 12/20/2024  Patient:  Luci Castañeda  MRN: [id number]  YOB: 1965    SURGEON: Venkat Wang MD.    ASSISTANT: none    PREOPERATIVE DIAGNOSIS:  right ureteral obstruction    POSTOPERATIVE DIAGNOSIS: right ureteral obstruction    PROCEDURE PERFORMED:  Cystoscopy, right ureteral stent placement    ANESTHESIA: Monitor Anesthesia Care    COMPLICATIONS: none    OR BLOOD LOSS:  Minimal    FLUIDS: Cystalloids per Anesthesia    SPECIMENS:  * No specimens in log *  none    DRAINS: 6 x 26 double j stent    INDICATIONS FOR PROCEDURE:  The patient is a 59 y.o. female who presents today with Kidney stone [N20.0] here for CYSTOSCOPY URETERAL RIGHT STENT INSERTION (sTRINGS REMOVED). After risks, benefits and alternatives of the procedure were discussed with the patient, the patient elected to proceed.     DETAILS OF PROCEDURE:  After informed consent was obtained in the preoperative area, the patient was taken back to the operating room and transferred to the operating table in supine position.   Anesthesia was induced and antibiotics were given.  The patient was placed in modified dorsal lithotomy position and sterilely prepped and draped in a standard fashion.  A timeout occurred.  Two patient identifiers were used.  We entered the urethra with a 22 Ivorian scope.     The Right ureteral orifice was then visualized.       . A guidewire was carefully advanced into the kidney and position was confirmed with xray.    Under direct visualization the stent was advanced over the wire until it was in proper location. The Glidewire was then removed. A curl could be seen in the Right renal pelvis under using fluoroscopic vision, and in the bladder under direct visualization.     there was purulent/infectious drainage emanating from the stent          The patients bladder was drained. All instrumentation was removed. The patient was then  awakened and discharged back to the PACU in good and stable condition.

## 2024-12-20 NOTE — TELEPHONE ENCOUNTER
Location of patient: [place]    Subjective: Caller states \"abdominal  pain\"     Current Symptoms: Have right lower quadrant pain radiating to back  Is dry heaving.    Onset:  couple hours ago    Associated Symptoms: NA    Pain Severity: 8/10; ; constant    Temperature: denies     What has been tried: nothing    LMP: NA Pregnant: NA    Recommended disposition: Go to ED Now    Care advice provided, patient verbalizes understanding; denies any other questions or concerns; instructed to call back for any new or worsening symptoms.    Patient/caller agrees to proceed to [facility]    Attention Provider:  Thank you for allowing me to participate in the care of your patient.  The patient was connected to triage in response to symptoms provided.   Please do not respond through this encounter as the response is not directed to a shared pool.    Reason for Disposition   SEVERE abdominal pain (e.g., excruciating)    Protocols used: Abdominal Pain - Female-ADULT-[place]

## 2024-12-20 NOTE — ED PROVIDER NOTES
1513 Spoke with Dr. Escalona who will take the patient to the OR for stenting [MR]      ED Course User Index  [MR] Cyndy Castillo APRN - CNP         CRITICAL CARE TIME       CONSULTS:  IP CONSULT TO HOSPITALIST    PROCEDURES:  Unless otherwise noted below, none     Procedures        FINAL IMPRESSION      1. Kidney stone          DISPOSITION/PLAN   DISPOSITION Decision To Admit 12/20/2024 02:48:12 PM   DISPOSITION CONDITION Stable           PATIENT REFERRED TO:  Nabil Ly MD  [address] [facility] [address] [phone]    Schedule an appointment as soon as possible for a visit in 1 week(s)      Bismark Amaya MD  [address] [facility] [address] [phone]    Schedule an appointment as soon as possible for a visit in 1 week(s)        DISCHARGE MEDICATIONS:  Discharge Medication List as of 12/21/2024 11:53 AM        START taking these medications    Details   tamsulosin (FLOMAX) 0.4 MG capsule Take 1 capsule by mouth daily, Disp-30 capsule, R-0Normal      phenazopyridine (PYRIDIUM) 100 MG tablet Take 1 tablet by mouth 3 times daily as needed for Pain, Disp-9 tablet, R-0Normal      levoFLOXacin (LEVAQUIN) 750 MG tablet Take 1 tablet by mouth daily for 3 days, Disp-3 tablet, R-0Normal           Controlled Substances Monitoring:          No data to display                (Please note that portions of this note were completed with a voice recognition program.  Efforts were made to edit the dictations but occasionally words are mis-transcribed.)    MIGDALIA Griggs CNP (electronically signed)             Cyndy Castillo APRN - CNP  12/21/24 4614

## 2024-12-20 NOTE — ANESTHESIA POSTPROCEDURE EVALUATION
Department of Anesthesiology  Postprocedure Note    Patient: Luci Castañeda  MRN: [id number]  YOB: 1965  Date of evaluation: 12/20/2024    Procedure Summary       Date: 12/20/24 Room / Location: [address]    Anesthesia Start: 1605 Anesthesia Stop: 1630    Procedure: CYSTOSCOPY URETERAL RIGHT STENT INSERTION (sTRINGS REMOVED) (Right) Diagnosis:       Kidney stone      (Kidney stone [N20.0])    Surgeons: Venkat Wang MD Responsible Provider: Fritz Tay MD    Anesthesia Type: MAC ASA Status: 4 - Emergent            Anesthesia Type: No value filed.    Imani Phase I: Imani Score: 8    Imani Phase II:      Anesthesia Post Evaluation    Patient location during evaluation: PACU  Patient participation: complete - patient participated  Level of consciousness: awake and alert  Airway patency: patent  Nausea & Vomiting: no nausea and no vomiting  Cardiovascular status: hemodynamically stable  Respiratory status: room air and spontaneous ventilation  Hydration status: euvolemic  Multimodal analgesia pain management approach  Pain management: adequate    No notable events documented.

## 2024-12-21 VITALS
OXYGEN SATURATION: 96 % | HEIGHT: 68 IN | BODY MASS INDEX: 35.61 KG/M2 | HEART RATE: 62 BPM | TEMPERATURE: 98.4 F | SYSTOLIC BLOOD PRESSURE: 125 MMHG | RESPIRATION RATE: 16 BRPM | WEIGHT: 235 LBS | DIASTOLIC BLOOD PRESSURE: 80 MMHG

## 2024-12-21 PROCEDURE — G0378 HOSPITAL OBSERVATION PER HR: HCPCS

## 2024-12-21 PROCEDURE — 6370000000 HC RX 637 (ALT 250 FOR IP): Performed by: STUDENT IN AN ORGANIZED HEALTH CARE EDUCATION/TRAINING PROGRAM

## 2024-12-21 PROCEDURE — 2500000003 HC RX 250 WO HCPCS: Performed by: STUDENT IN AN ORGANIZED HEALTH CARE EDUCATION/TRAINING PROGRAM

## 2024-12-21 PROCEDURE — 99239 HOSP IP/OBS DSCHRG MGMT >30: CPT | Performed by: STUDENT IN AN ORGANIZED HEALTH CARE EDUCATION/TRAINING PROGRAM

## 2024-12-21 RX ORDER — LEVOFLOXACIN 750 MG/1
750 TABLET, FILM COATED ORAL DAILY
Qty: 3 TABLET | Refills: 0 | Status: SHIPPED | OUTPATIENT
Start: 2024-12-21 | End: 2024-12-24

## 2024-12-21 RX ORDER — PHENAZOPYRIDINE HYDROCHLORIDE 100 MG/1
100 TABLET, FILM COATED ORAL 3 TIMES DAILY PRN
Qty: 9 TABLET | Refills: 0 | Status: SHIPPED | OUTPATIENT
Start: 2024-12-21 | End: 2024-12-24

## 2024-12-21 RX ORDER — TAMSULOSIN HYDROCHLORIDE 0.4 MG/1
0.4 CAPSULE ORAL DAILY
Qty: 30 CAPSULE | Refills: 0 | Status: ON HOLD | OUTPATIENT
Start: 2024-12-21

## 2024-12-21 RX ADMIN — SODIUM CHLORIDE, PRESERVATIVE FREE 10 ML: 5 INJECTION INTRAVENOUS at 09:19

## 2024-12-21 RX ADMIN — APIXABAN 5 MG: 5 TABLET, FILM COATED ORAL at 08:45

## 2024-12-21 RX ADMIN — COLCHICINE 0.6 MG: 0.6 TABLET, FILM COATED ORAL at 08:45

## 2024-12-21 RX ADMIN — OXYBUTYNIN CHLORIDE 10 MG: 5 TABLET, EXTENDED RELEASE ORAL at 08:45

## 2024-12-21 RX ADMIN — PANTOPRAZOLE SODIUM 20 MG: 20 TABLET, DELAYED RELEASE ORAL at 05:39

## 2024-12-21 NOTE — PLAN OF CARE
Problem: Pain  Goal: Verbalizes/displays adequate comfort level or baseline comfort level  12/21/2024 1147 by Ruthy Rees, RN  Outcome: Adequate for Discharge     Problem: Discharge Planning  Goal: Discharge to home or other facility with appropriate resources  12/21/2024 1147 by Ruthy Rees, RN  Outcome: Adequate for Discharge  Flowsheets (Taken 12/21/2024 0830)  Discharge to home or other facility with appropriate resources:   Identify barriers to discharge with patient and caregiver   Arrange for needed discharge resources and transportation as appropriate   Arrange for interpreters to assist at discharge as needed     Problem: ABCDS Injury Assessment  Goal: Absence of physical injury  12/21/2024 1147 by Ruthy Rees, RN  Outcome: Adequate for Discharge     Problem: Safety - Adult  Goal: Free from fall injury  Outcome: Adequate for Discharge

## 2024-12-21 NOTE — DISCHARGE SUMMARY
[facility]  Office: [phone]  Michael Lewis DO, Bart Lewis DO, Ángel Harry DO, Geremias Tsai DO, Cathy García MD, Kya Arellano MD, Jonathon Gilmore MD, Kalli Chou MD,  Alvin Frey MD, Opal Wang MD, Jennifer Cooney MD,  Geronimo Sams DO, Thelma Quijano MD, Shreyas Duarte MD, Franklyn Lewis DO, Regi Ríos MD,  Marky Russell DO, Yamilet Monroe MD, Qian Rain MD, Suzy Faith MD, Artur Irby MD,  Colton Clark MD, Jaky Reynoso MD, Amarilys Robledo MD, Francia Cantor MD, Krzysztof Garcia MD, Jhonatan Shipley MD, Frederick Wilson DO, Jabier Plummer MD, Shirley Waterhouse, CNP,  Millie Moe, CNP, Frederick Street, CNP,  Josie Guthrie, CAT, Alicia Sheldon, CNP, Diamond Kwong, CNP, Mai Copeland, CNP, Radha Fowler, CNP, Angeles Enriquez PA-C, Mervat Rowan PA-C, Junie Kamara, CNP, Mitzy Sanz, CNP,  Laura Gonzalez, CNP, Grace Walter, CNP,  Yeny Gandhi, CNP, Ada Nelson, CNP         [facility]   IN-PATIENT SERVICE   [facility]    Discharge Summary     Patient ID: Luci Castañeda  :  1965   MRN: [id number]     ACCOUNT:  [id number]   Patient's PCP: Nabil Ly MD  Admit Date: 2024   Discharge Date: 2024     Length of Stay: 0  Code Status:  Full Code  Admitting Physician: Shreyas Duarte MD  Discharge Physician: Jhonatan Shipley MD     Active Discharge Diagnoses:     Hospital Problem Lists:  Principal Problem:    Nephrolithiasis  Active Problems:    Essential hypertension    CKD (chronic kidney disease) stage 1, GFR 90 ml/min or greater    Gastroesophageal reflux disease without esophagitis    ESTHER (obstructive sleep apnea)    Solitary kidney, acquired    Malignant neoplasm of upper-outer quadrant of left breast in female, estrogen receptor negative (HCC)    Anemia, normocytic normochromic    Thrombocytopenia (HCC)    History of pulmonary embolism  Resolved Problems:    * No resolved hospital problems.  22 12/20/2024 12:10 PM    ANIONGAP 15 12/20/2024 12:10 PM    BUN 20 12/20/2024 12:10 PM    CREATININE 1.3 12/20/2024 12:10 PM    CALCIUM 9.3 12/20/2024 12:10 PM    LABGLOM 47 12/20/2024 12:10 PM    LABGLOM 68 04/15/2024 01:27 PM    GFRAA >60 09/08/2022 11:58 AM    GFR      09/08/2022 11:58 AM        Radiology:  CT ABDOMEN PELVIS WO CONTRAST Additional Contrast? None    Result Date: 12/20/2024  1. Large conglomerate of obstructing distal right ureteral calculi measuring up to 1.8 cm and 6 mm distal right ureter and right UVJ resulting in severe right-sided hydronephrosis and hydroureter. 2. Prior left nephrectomy. 3. Small hiatal hernia. 4. Normal appendix. 5. Mild gallbladder distension. 6. Mild colonic diverticulosis. 7. Small fat containing periumbilical hernia.       Consultations:    Consults:     Final Specialist Recommendations/Findings:   IP CONSULT TO HOSPITALIST      The patient was seen and examined on day of discharge and this discharge summary is in conjunction with any daily progress note from day of discharge.    Discharge plan:     Disposition: Home    Physician Follow Up:     Nabil Ly MD  [address] [facility] [address] [phone]    Schedule an appointment as soon as possible for a visit in 1 week(s)      Bismark Amaya MD  [address] [address]  [phone]    Schedule an appointment as soon as possible for a visit in 1 week(s)         Requiring Further Evaluation/Follow Up POST HOSPITALIZATION/Incidental Findings:     Diet: regular diet    Activity: As tolerated    Instructions to Patient:   - Take all medications as prescribed  - Follow up with PCP in 1-2 weeks   - In case of any worsening condition please visit Emergency Room.      Discharge Medications:      Medication List        START taking these medications      levoFLOXacin 750 MG tablet  Commonly known as: Levaquin  Take 1 tablet by mouth daily for 3 days     phenazopyridine 100 MG tablet  Commonly known as:

## 2024-12-21 NOTE — PLAN OF CARE
Problem: Pain  Goal: Verbalizes/displays adequate comfort level or baseline comfort level  Outcome: Progressing     Problem: Discharge Planning  Goal: Discharge to home or other facility with appropriate resources  Outcome: Progressing  Flowsheets  Taken 12/20/2024 2000 by Remberto Roldan LPN  Discharge to home or other facility with appropriate resources: Arrange for needed discharge resources and transportation as appropriate    Problem: ABCDS Injury Assessment  Goal: Absence of physical injury  Outcome: Progressing

## 2024-12-23 ENCOUNTER — CARE COORDINATION (OUTPATIENT)
Dept: OTHER | Facility: CLINIC | Age: 59
End: 2024-12-23

## 2024-12-23 NOTE — CARE COORDINATION
Care Transitions Note    Initial Call - Call within 2 business days of discharge: Yes    Patient Current Location:  Home: [address] [place], [place] [id number]    Care Transition Nurse contacted the patient by telephone to perform post hospital discharge assessment, verified name and  as identifiers. Provided introduction to self, and explanation of the Care Transition Nurse role.     Patient: Luci Castañeda    Patient : 1965   MRN: [id number]    Reason for Admission: Kidney Stone  Discharge Date: 24  RURS: Readmission Risk Score: 22.6      Last Discharge Facility       Date Complaint Diagnosis Description Type Department Provider    24 Abdominal Pain Kidney stone ED to Hosp-Admission (Discharged) (ADMIT) [facility] [place] Jhonatan Shipley MD; [place] ...            Was this an external facility discharge? No    Additional needs identified to be addressed with provider   Needs POC from [facility]             Method of communication with provider: phone.    Patients top risk factors for readmission:  lives alone    Interventions to address risk factors:   Review of patient management of conditions/medications:    Home Health: [facility] [place] [facility]     Care Summary Note: Spoke to patient, states she was readmitted on Friday into Saturday with a kidney stone, currently has stent. Very uncomfortable. Had a saddle PE as well a little over a week ago. Is generally feeling weak, PO intake continues to be difficult d/t nausea. She is waiting for a return call from Urology for f/u appt to remove stent. She was given Flomax, Nitro, and Levaquin. She was unable to  the Flomax as it was not ready at the pharmacy, she plans to have someone pick this up for her as she is not currently diving.    Patient needs extensive assistance within the home. Lives alone. Is established with [facility] [place] [facility], however, has not yet had a nurse come out d/t being re-admitted. Acm spoke to [facility] [place] [facility], discussed POC. Acm discussed

## 2024-12-26 ENCOUNTER — HOSPITAL ENCOUNTER (OUTPATIENT)
Age: 59
Setting detail: SPECIMEN
Discharge: HOME | End: 2024-12-26

## 2024-12-26 ENCOUNTER — TELEPHONE (OUTPATIENT)
Dept: ONCOLOGY | Age: 59
End: 2024-12-26

## 2024-12-26 DIAGNOSIS — Z17.1 MALIGNANT NEOPLASM OF UPPER-OUTER QUADRANT OF LEFT BREAST IN FEMALE, ESTROGEN RECEPTOR NEGATIVE (HCC): ICD-10-CM

## 2024-12-26 DIAGNOSIS — C50.412 MALIGNANT NEOPLASM OF UPPER-OUTER QUADRANT OF LEFT BREAST IN FEMALE, ESTROGEN RECEPTOR NEGATIVE (HCC): ICD-10-CM

## 2024-12-26 LAB
ALBUMIN SERPL-MCNC: 3.6 G/DL (ref 3.5–5.2)
ALBUMIN/GLOB SERPL: 1.3 {RATIO} (ref 1–2.5)
ALP SERPL-CCNC: 95 U/L (ref 35–104)
ALT SERPL-CCNC: 11 U/L (ref 10–35)
ANION GAP SERPL CALCULATED.3IONS-SCNC: 13 MMOL/L (ref 9–16)
AST SERPL-CCNC: 26 U/L (ref 10–35)
BASOPHILS # BLD: 0 K/UL (ref 0–0.2)
BASOPHILS NFR BLD: 0 % (ref 0–2)
BILIRUB SERPL-MCNC: 0.5 MG/DL (ref 0–1.2)
BUN SERPL-MCNC: 12 MG/DL (ref 8–23)
CALCIUM SERPL-MCNC: 9.7 MG/DL (ref 8.6–10.4)
CHLORIDE SERPL-SCNC: 104 MMOL/L (ref 98–107)
CO2 SERPL-SCNC: 24 MMOL/L (ref 20–31)
CREAT SERPL-MCNC: 1.1 MG/DL (ref 0.6–0.9)
EOSINOPHIL # BLD: 0 K/UL (ref 0–0.44)
EOSINOPHILS RELATIVE PERCENT: 0 % (ref 1–4)
ERYTHROCYTE [DISTWIDTH] IN BLOOD BY AUTOMATED COUNT: 20.4 % (ref 11.8–14.4)
GFR, ESTIMATED: 57 ML/MIN/1.73M2
GLUCOSE SERPL-MCNC: 104 MG/DL (ref 74–99)
HCT VFR BLD AUTO: 31.9 % (ref 36.3–47.1)
HGB BLD-MCNC: 10 G/DL (ref 11.9–15.1)
IMM GRANULOCYTES # BLD AUTO: 0.12 K/UL (ref 0–0.3)
IMM GRANULOCYTES NFR BLD: 2 %
LYMPHOCYTES NFR BLD: 0.75 K/UL (ref 1.1–3.7)
LYMPHOCYTES RELATIVE PERCENT: 13 % (ref 24–43)
MCH RBC QN AUTO: 30.5 PG (ref 25.2–33.5)
MCHC RBC AUTO-ENTMCNC: 31.3 G/DL (ref 28.4–34.8)
MCV RBC AUTO: 97.3 FL (ref 82.6–102.9)
MONOCYTES NFR BLD: 0.81 K/UL (ref 0.1–1.2)
MONOCYTES NFR BLD: 14 % (ref 3–12)
MORPHOLOGY: ABNORMAL
NEUTROPHILS NFR BLD: 71 % (ref 36–65)
NEUTS SEG NFR BLD: 4.12 K/UL (ref 1.5–8.1)
NRBC BLD-RTO: 0 PER 100 WBC
PLATELET # BLD AUTO: 146 K/UL (ref 138–453)
PMV BLD AUTO: 10.6 FL (ref 8.1–13.5)
POTASSIUM SERPL-SCNC: 4.1 MMOL/L (ref 3.7–5.3)
PROT SERPL-MCNC: 6.4 G/DL (ref 6.6–8.7)
RBC # BLD AUTO: 3.28 M/UL (ref 3.95–5.11)
SODIUM SERPL-SCNC: 141 MMOL/L (ref 136–145)
WBC OTHER # BLD: 5.8 K/UL (ref 3.5–11.3)

## 2024-12-26 NOTE — TELEPHONE ENCOUNTER
Name: Luci Castañeda  : 1965  MRN: [id number]    Oncology Navigation Follow-Up Note    Contact Type:  Telephone    Notes: Spoke with pt who is recovering from a kidney stone. Plan is for lithotripsy procedure on . Pt has [place] appt on 1/3. No barriers for this appt were voiced. We discussed need for medical clearance from [place] prior to having surgery scheduled.    Pt is feeling overwhelmed due to treatment and recent hospitalizations. Support was provided. Writer will also ask angel Valdez to reach out to pt for further support.       Electronically signed by Veronica Bennett RN on 2024 at 10:32 AM

## 2024-12-27 ENCOUNTER — NURSE TRIAGE (OUTPATIENT)
Dept: OTHER | Facility: CLINIC | Age: 59
End: 2024-12-27

## 2024-12-27 ENCOUNTER — HOSPITAL ENCOUNTER (INPATIENT)
Age: 59
LOS: 3 days | Discharge: HOME | End: 2024-12-30
Attending: EMERGENCY MEDICINE | Admitting: HOSPITALIST
Payer: COMMERCIAL

## 2024-12-27 ENCOUNTER — APPOINTMENT (OUTPATIENT)
Dept: CT IMAGING | Age: 59
End: 2024-12-27
Payer: COMMERCIAL

## 2024-12-27 DIAGNOSIS — E83.42 HYPOMAGNESEMIA: ICD-10-CM

## 2024-12-27 DIAGNOSIS — R06.09 EXERTIONAL DYSPNEA: ICD-10-CM

## 2024-12-27 DIAGNOSIS — R53.1 GENERAL WEAKNESS: Primary | ICD-10-CM

## 2024-12-27 DIAGNOSIS — E86.0 DEHYDRATION: ICD-10-CM

## 2024-12-27 PROBLEM — R11.2 NAUSEA AND/OR VOMITING: Status: ACTIVE | Noted: 2024-12-27

## 2024-12-27 PROBLEM — N39.0 COMPLICATED UTI (URINARY TRACT INFECTION): Status: ACTIVE | Noted: 2024-12-27

## 2024-12-27 LAB
ALBUMIN SERPL-MCNC: 3.4 G/DL (ref 3.5–5.2)
ALBUMIN/GLOB SERPL: 1.1 {RATIO} (ref 1–2.5)
ALP SERPL-CCNC: 100 U/L (ref 35–104)
ALT SERPL-CCNC: 9 U/L (ref 5–33)
ANION GAP SERPL CALCULATED.3IONS-SCNC: 12 MMOL/L (ref 9–17)
AST SERPL-CCNC: 19 U/L
BACTERIA URNS QL MICRO: ABNORMAL
BASOPHILS # BLD: 0.06 K/UL (ref 0–0.2)
BASOPHILS NFR BLD: 1 % (ref 0–2)
BILIRUB DIRECT SERPL-MCNC: 0.1 MG/DL
BILIRUB INDIRECT SERPL-MCNC: 0.4 MG/DL (ref 0–1)
BILIRUB SERPL-MCNC: 0.5 MG/DL (ref 0.3–1.2)
BILIRUB UR QL STRIP: NEGATIVE
BNP SERPL-MCNC: 324 PG/ML
BUN SERPL-MCNC: 14 MG/DL (ref 8–23)
CALCIUM SERPL-MCNC: 9.2 MG/DL (ref 8.6–10.4)
CHLORIDE SERPL-SCNC: 103 MMOL/L (ref 98–107)
CLARITY UR: ABNORMAL
CO2 SERPL-SCNC: 23 MMOL/L (ref 20–31)
COLOR UR: ABNORMAL
CREAT SERPL-MCNC: 1.1 MG/DL (ref 0.5–0.9)
EOSINOPHIL # BLD: 0 K/UL (ref 0–0.4)
EOSINOPHILS RELATIVE PERCENT: 0 % (ref 1–4)
EPI CELLS #/AREA URNS HPF: ABNORMAL /HPF (ref 0–5)
ERYTHROCYTE [DISTWIDTH] IN BLOOD BY AUTOMATED COUNT: 21.7 % (ref 12.5–15.4)
GFR, ESTIMATED: 57 ML/MIN/1.73M2
GLUCOSE SERPL-MCNC: 101 MG/DL (ref 70–99)
GLUCOSE UR STRIP-MCNC: NEGATIVE MG/DL
HCT VFR BLD AUTO: 30 % (ref 36–46)
HGB BLD-MCNC: 10 G/DL (ref 12–16)
HGB UR QL STRIP.AUTO: ABNORMAL
KETONES UR STRIP-MCNC: ABNORMAL MG/DL
LACTATE BLDV-SCNC: 1.1 MMOL/L (ref 0.5–2.2)
LEUKOCYTE ESTERASE UR QL STRIP: ABNORMAL
LYMPHOCYTES NFR BLD: 0.55 K/UL (ref 1–4.8)
LYMPHOCYTES RELATIVE PERCENT: 10 % (ref 24–44)
MAGNESIUM SERPL-MCNC: 0.9 MG/DL (ref 1.6–2.6)
MAGNESIUM SERPL-MCNC: 1.3 MG/DL (ref 1.6–2.6)
MCH RBC QN AUTO: 31 PG (ref 26–34)
MCHC RBC AUTO-ENTMCNC: 33.3 G/DL (ref 31–37)
MCV RBC AUTO: 93.2 FL (ref 80–100)
MICROORGANISM SPEC CULT: NORMAL
MONOCYTES NFR BLD: 0.83 K/UL (ref 0.1–1.2)
MONOCYTES NFR BLD: 15 % (ref 2–11)
MORPHOLOGY: ABNORMAL
NEUTROPHILS NFR BLD: 74 % (ref 36–66)
NEUTS SEG NFR BLD: 4.06 K/UL (ref 1.8–7.7)
NITRITE UR QL STRIP: POSITIVE
PH UR STRIP: 6.5 [PH] (ref 5–8)
PLATELET # BLD AUTO: 129 K/UL (ref 140–450)
PMV BLD AUTO: 8 FL (ref 6–12)
POTASSIUM SERPL-SCNC: 3.4 MMOL/L (ref 3.7–5.3)
POTASSIUM SERPL-SCNC: 3.6 MMOL/L (ref 3.7–5.3)
PROT SERPL-MCNC: 6.4 G/DL (ref 6.4–8.3)
PROT UR STRIP-MCNC: ABNORMAL MG/DL
RBC # BLD AUTO: 3.21 M/UL (ref 4–5.2)
RBC #/AREA URNS HPF: ABNORMAL /HPF (ref 0–2)
SERVICE CMNT-IMP: NORMAL
SODIUM SERPL-SCNC: 138 MMOL/L (ref 135–144)
SP GR UR STRIP: 1.01 (ref 1–1.03)
SPECIMEN DESCRIPTION: NORMAL
TROPONIN I SERPL HS-MCNC: 29 NG/L (ref 0–14)
UROBILINOGEN UR STRIP-ACNC: NORMAL EU/DL (ref 0–1)
WBC #/AREA URNS HPF: ABNORMAL /HPF (ref 0–5)
WBC OTHER # BLD: 5.5 K/UL (ref 3.5–11)

## 2024-12-27 PROCEDURE — 6370000000 HC RX 637 (ALT 250 FOR IP): Performed by: STUDENT IN AN ORGANIZED HEALTH CARE EDUCATION/TRAINING PROGRAM

## 2024-12-27 PROCEDURE — 87040 BLOOD CULTURE FOR BACTERIA: CPT

## 2024-12-27 PROCEDURE — 2500000003 HC RX 250 WO HCPCS

## 2024-12-27 PROCEDURE — 2580000003 HC RX 258: Performed by: STUDENT IN AN ORGANIZED HEALTH CARE EDUCATION/TRAINING PROGRAM

## 2024-12-27 PROCEDURE — 80048 BASIC METABOLIC PNL TOTAL CA: CPT

## 2024-12-27 PROCEDURE — 2500000003 HC RX 250 WO HCPCS: Performed by: STUDENT IN AN ORGANIZED HEALTH CARE EDUCATION/TRAINING PROGRAM

## 2024-12-27 PROCEDURE — 96361 HYDRATE IV INFUSION ADD-ON: CPT

## 2024-12-27 PROCEDURE — 99222 1ST HOSP IP/OBS MODERATE 55: CPT | Performed by: HOSPITALIST

## 2024-12-27 PROCEDURE — 99285 EMERGENCY DEPT VISIT HI MDM: CPT

## 2024-12-27 PROCEDURE — 96375 TX/PRO/DX INJ NEW DRUG ADDON: CPT

## 2024-12-27 PROCEDURE — 6360000002 HC RX W HCPCS: Performed by: STUDENT IN AN ORGANIZED HEALTH CARE EDUCATION/TRAINING PROGRAM

## 2024-12-27 PROCEDURE — 81001 URINALYSIS AUTO W/SCOPE: CPT

## 2024-12-27 PROCEDURE — 83880 ASSAY OF NATRIURETIC PEPTIDE: CPT

## 2024-12-27 PROCEDURE — 85025 COMPLETE CBC W/AUTO DIFF WBC: CPT

## 2024-12-27 PROCEDURE — 93005 ELECTROCARDIOGRAM TRACING: CPT | Performed by: EMERGENCY MEDICINE

## 2024-12-27 PROCEDURE — 96365 THER/PROPH/DIAG IV INF INIT: CPT

## 2024-12-27 PROCEDURE — 80076 HEPATIC FUNCTION PANEL: CPT

## 2024-12-27 PROCEDURE — 6370000000 HC RX 637 (ALT 250 FOR IP)

## 2024-12-27 PROCEDURE — 71260 CT THORAX DX C+: CPT

## 2024-12-27 PROCEDURE — 6360000004 HC RX CONTRAST MEDICATION

## 2024-12-27 PROCEDURE — 2580000003 HC RX 258

## 2024-12-27 PROCEDURE — 83605 ASSAY OF LACTIC ACID: CPT

## 2024-12-27 PROCEDURE — 84484 ASSAY OF TROPONIN QUANT: CPT

## 2024-12-27 PROCEDURE — 2500000003 HC RX 250 WO HCPCS: Performed by: HOSPITALIST

## 2024-12-27 PROCEDURE — 6360000002 HC RX W HCPCS: Performed by: HOSPITALIST

## 2024-12-27 PROCEDURE — 87086 URINE CULTURE/COLONY COUNT: CPT

## 2024-12-27 PROCEDURE — 36415 COLL VENOUS BLD VENIPUNCTURE: CPT

## 2024-12-27 PROCEDURE — 6370000000 HC RX 637 (ALT 250 FOR IP): Performed by: HOSPITALIST

## 2024-12-27 PROCEDURE — 6360000002 HC RX W HCPCS

## 2024-12-27 PROCEDURE — 84132 ASSAY OF SERUM POTASSIUM: CPT

## 2024-12-27 PROCEDURE — 83735 ASSAY OF MAGNESIUM: CPT

## 2024-12-27 PROCEDURE — 1200000000 HC SEMI PRIVATE

## 2024-12-27 RX ORDER — ONDANSETRON 2 MG/ML
4 INJECTION INTRAMUSCULAR; INTRAVENOUS EVERY 6 HOURS PRN
Status: DISCONTINUED | OUTPATIENT
Start: 2024-12-27 | End: 2024-12-30 | Stop reason: HOSPADM

## 2024-12-27 RX ORDER — MAGNESIUM SULFATE IN WATER 40 MG/ML
2000 INJECTION, SOLUTION INTRAVENOUS ONCE
Status: COMPLETED | OUTPATIENT
Start: 2024-12-27 | End: 2024-12-27

## 2024-12-27 RX ORDER — PROCHLORPERAZINE MALEATE 5 MG/1
TABLET ORAL EVERY 6 HOURS PRN
Status: ON HOLD | COMMUNITY
End: 2024-12-30

## 2024-12-27 RX ORDER — SODIUM CHLORIDE 0.9 % (FLUSH) 0.9 %
5-40 SYRINGE (ML) INJECTION EVERY 12 HOURS SCHEDULED
Status: DISCONTINUED | OUTPATIENT
Start: 2024-12-27 | End: 2024-12-30 | Stop reason: HOSPADM

## 2024-12-27 RX ORDER — HYDROCODONE BITARTRATE AND ACETAMINOPHEN 5; 325 MG/1; MG/1
1 TABLET ORAL EVERY 6 HOURS PRN
COMMUNITY

## 2024-12-27 RX ORDER — IOPAMIDOL 755 MG/ML
75 INJECTION, SOLUTION INTRAVASCULAR
Status: COMPLETED | OUTPATIENT
Start: 2024-12-27 | End: 2024-12-27

## 2024-12-27 RX ORDER — PANTOPRAZOLE SODIUM 20 MG/1
20 TABLET, DELAYED RELEASE ORAL
Status: DISCONTINUED | OUTPATIENT
Start: 2024-12-28 | End: 2024-12-30 | Stop reason: HOSPADM

## 2024-12-27 RX ORDER — ONDANSETRON 4 MG/1
4 TABLET, ORALLY DISINTEGRATING ORAL EVERY 8 HOURS PRN
Status: DISCONTINUED | OUTPATIENT
Start: 2024-12-27 | End: 2024-12-30 | Stop reason: HOSPADM

## 2024-12-27 RX ORDER — POTASSIUM CHLORIDE 1500 MG/1
40 TABLET, EXTENDED RELEASE ORAL PRN
Status: DISCONTINUED | OUTPATIENT
Start: 2024-12-27 | End: 2024-12-30 | Stop reason: HOSPADM

## 2024-12-27 RX ORDER — 0.9 % SODIUM CHLORIDE 0.9 %
80 INTRAVENOUS SOLUTION INTRAVENOUS ONCE
Status: DISCONTINUED | OUTPATIENT
Start: 2024-12-27 | End: 2024-12-30 | Stop reason: HOSPADM

## 2024-12-27 RX ORDER — PHENAZOPYRIDINE HYDROCHLORIDE 100 MG/1
TABLET, FILM COATED ORAL 3 TIMES DAILY PRN
Status: ON HOLD | COMMUNITY
End: 2024-12-30

## 2024-12-27 RX ORDER — COLCHICINE 0.6 MG/1
0.6 TABLET ORAL DAILY
Status: DISCONTINUED | OUTPATIENT
Start: 2024-12-27 | End: 2024-12-30 | Stop reason: HOSPADM

## 2024-12-27 RX ORDER — PHENAZOPYRIDINE HYDROCHLORIDE 100 MG/1
200 TABLET, FILM COATED ORAL
Status: DISCONTINUED | OUTPATIENT
Start: 2024-12-27 | End: 2024-12-30 | Stop reason: HOSPADM

## 2024-12-27 RX ORDER — SODIUM CHLORIDE 0.9 % (FLUSH) 0.9 %
5-40 SYRINGE (ML) INJECTION PRN
Status: DISCONTINUED | OUTPATIENT
Start: 2024-12-27 | End: 2024-12-30 | Stop reason: HOSPADM

## 2024-12-27 RX ORDER — SODIUM CHLORIDE 9 MG/ML
INJECTION, SOLUTION INTRAVENOUS PRN
Status: DISCONTINUED | OUTPATIENT
Start: 2024-12-27 | End: 2024-12-30 | Stop reason: HOSPADM

## 2024-12-27 RX ORDER — ONDANSETRON 2 MG/ML
4 INJECTION INTRAMUSCULAR; INTRAVENOUS ONCE
Status: COMPLETED | OUTPATIENT
Start: 2024-12-27 | End: 2024-12-27

## 2024-12-27 RX ORDER — SODIUM CHLORIDE, SODIUM LACTATE, POTASSIUM CHLORIDE, CALCIUM CHLORIDE 600; 310; 30; 20 MG/100ML; MG/100ML; MG/100ML; MG/100ML
INJECTION, SOLUTION INTRAVENOUS CONTINUOUS
Status: ACTIVE | OUTPATIENT
Start: 2024-12-27 | End: 2024-12-28

## 2024-12-27 RX ORDER — ONDANSETRON 4 MG/1
4 TABLET, FILM COATED ORAL 3 TIMES DAILY PRN
Status: DISCONTINUED | OUTPATIENT
Start: 2024-12-27 | End: 2024-12-27 | Stop reason: SDUPTHER

## 2024-12-27 RX ORDER — ONDANSETRON 2 MG/ML
4 INJECTION INTRAMUSCULAR; INTRAVENOUS ONCE
Status: DISCONTINUED | OUTPATIENT
Start: 2024-12-27 | End: 2024-12-30 | Stop reason: HOSPADM

## 2024-12-27 RX ORDER — POTASSIUM CHLORIDE 7.45 MG/ML
10 INJECTION INTRAVENOUS PRN
Status: DISCONTINUED | OUTPATIENT
Start: 2024-12-27 | End: 2024-12-30 | Stop reason: HOSPADM

## 2024-12-27 RX ORDER — SODIUM CHLORIDE 0.9 % (FLUSH) 0.9 %
10 SYRINGE (ML) INJECTION ONCE
Status: COMPLETED | OUTPATIENT
Start: 2024-12-27 | End: 2024-12-27

## 2024-12-27 RX ORDER — TAMSULOSIN HYDROCHLORIDE 0.4 MG/1
0.4 CAPSULE ORAL DAILY
Status: DISCONTINUED | OUTPATIENT
Start: 2024-12-27 | End: 2024-12-30 | Stop reason: HOSPADM

## 2024-12-27 RX ORDER — ACETAMINOPHEN 325 MG/1
650 TABLET ORAL EVERY 6 HOURS PRN
Status: DISCONTINUED | OUTPATIENT
Start: 2024-12-27 | End: 2024-12-30 | Stop reason: HOSPADM

## 2024-12-27 RX ORDER — ALBUTEROL SULFATE 90 UG/1
2 INHALANT RESPIRATORY (INHALATION) EVERY 6 HOURS PRN
Status: DISCONTINUED | OUTPATIENT
Start: 2024-12-27 | End: 2024-12-30 | Stop reason: HOSPADM

## 2024-12-27 RX ORDER — ACETAMINOPHEN 650 MG/1
650 SUPPOSITORY RECTAL EVERY 6 HOURS PRN
Status: DISCONTINUED | OUTPATIENT
Start: 2024-12-27 | End: 2024-12-30 | Stop reason: HOSPADM

## 2024-12-27 RX ORDER — MAGNESIUM SULFATE IN WATER 40 MG/ML
2000 INJECTION, SOLUTION INTRAVENOUS PRN
Status: DISCONTINUED | OUTPATIENT
Start: 2024-12-27 | End: 2024-12-30 | Stop reason: HOSPADM

## 2024-12-27 RX ORDER — SODIUM CHLORIDE, SODIUM LACTATE, POTASSIUM CHLORIDE, AND CALCIUM CHLORIDE .6; .31; .03; .02 G/100ML; G/100ML; G/100ML; G/100ML
1000 INJECTION, SOLUTION INTRAVENOUS ONCE
Status: COMPLETED | OUTPATIENT
Start: 2024-12-27 | End: 2024-12-27

## 2024-12-27 RX ORDER — POTASSIUM CHLORIDE 7.45 MG/ML
10 INJECTION INTRAVENOUS
Status: DISPENSED | OUTPATIENT
Start: 2024-12-27 | End: 2024-12-27

## 2024-12-27 RX ORDER — SCOLOPAMINE TRANSDERMAL SYSTEM 1 MG/1
1 PATCH, EXTENDED RELEASE TRANSDERMAL
Status: DISCONTINUED | OUTPATIENT
Start: 2024-12-27 | End: 2024-12-30 | Stop reason: HOSPADM

## 2024-12-27 RX ADMIN — SODIUM CHLORIDE, PRESERVATIVE FREE 10 ML: 5 INJECTION INTRAVENOUS at 13:51

## 2024-12-27 RX ADMIN — IOPAMIDOL 75 ML: 755 INJECTION, SOLUTION INTRAVENOUS at 13:50

## 2024-12-27 RX ADMIN — PHENAZOPYRIDINE HYDROCHLORIDE 200 MG: 100 TABLET ORAL at 18:37

## 2024-12-27 RX ADMIN — SODIUM CHLORIDE, PRESERVATIVE FREE 10 ML: 5 INJECTION INTRAVENOUS at 21:37

## 2024-12-27 RX ADMIN — ONDANSETRON 4 MG: 2 INJECTION INTRAMUSCULAR; INTRAVENOUS at 21:29

## 2024-12-27 RX ADMIN — SODIUM CHLORIDE, POTASSIUM CHLORIDE, SODIUM LACTATE AND CALCIUM CHLORIDE 1000 ML: 600; 310; 30; 20 INJECTION, SOLUTION INTRAVENOUS at 14:00

## 2024-12-27 RX ADMIN — SODIUM CHLORIDE, POTASSIUM CHLORIDE, SODIUM LACTATE AND CALCIUM CHLORIDE: 600; 310; 30; 20 INJECTION, SOLUTION INTRAVENOUS at 18:41

## 2024-12-27 RX ADMIN — ONDANSETRON 4 MG: 2 INJECTION INTRAMUSCULAR; INTRAVENOUS at 13:58

## 2024-12-27 RX ADMIN — APIXABAN 5 MG: 5 TABLET, FILM COATED ORAL at 21:19

## 2024-12-27 RX ADMIN — Medication 80 ML: at 13:51

## 2024-12-27 RX ADMIN — WATER 1000 MG: 1 INJECTION INTRAMUSCULAR; INTRAVENOUS; SUBCUTANEOUS at 21:29

## 2024-12-27 RX ADMIN — MAGNESIUM SULFATE HEPTAHYDRATE 2000 MG: 40 INJECTION, SOLUTION INTRAVENOUS at 21:44

## 2024-12-27 RX ADMIN — SODIUM CHLORIDE: 9 INJECTION, SOLUTION INTRAVENOUS at 21:42

## 2024-12-27 RX ADMIN — POTASSIUM CHLORIDE 40 MEQ: 1500 TABLET, EXTENDED RELEASE ORAL at 21:19

## 2024-12-27 RX ADMIN — MAGNESIUM SULFATE HEPTAHYDRATE 2000 MG: 40 INJECTION, SOLUTION INTRAVENOUS at 14:39

## 2024-12-27 ASSESSMENT — PAIN SCALES - GENERAL: PAINLEVEL_OUTOF10: 8

## 2024-12-27 ASSESSMENT — PAIN - FUNCTIONAL ASSESSMENT: PAIN_FUNCTIONAL_ASSESSMENT: 0-10

## 2024-12-27 NOTE — TELEPHONE ENCOUNTER
Location of patient: [place]    Subjective: Caller states SOB bilat calf cramping and h/o PE 2 weeks ago.with surgery   Taking Eliquis   Calling for permission to go to ER Now.  Current Symptoms: SOB at rest mild, with any exertion, is 8/10,see above     Onset: SOB yest, getting worse today, cramping started early this am both calfs intermittent     Associated Symptoms: n/a    Pain Severity: mild chest pain feeling     Temperature:     What has been tried:     LMP: NA Pregnant:     Recommended disposition: Call  Now    Care advice provided, patient verbalizes understanding; denies any other questions or concerns; instructed to call back for any new or worsening symptoms.    Patient/caller agrees to calling 911    Attention Provider:  Thank you for allowing me to participate in the care of your patient.  The patient was connected to triage in response to symptoms provided.   Please do not respond through this encounter as the response is not directed to a shared pool.    Reason for Disposition   Sounds like a life-threatening emergency to the triager    Protocols used: Breathing Difficulty-ADULT-[place]

## 2024-12-27 NOTE — H&P
Max:98.4 °F (36.9 °C)    No results for input(s): \"POCGLU\" in the last 72 hours.  No intake or output data in the 24 hours ending 12/27/24 1641    General Appearance:  alert, well appearing, and in no acute distress  Mental status: oriented to person, place, and time  Head:  normocephalic, atraumatic  Eye: no icterus, redness, pupils equal and reactive, extraocular eye movements intact, conjunctiva clear  Ear: normal external ear, no discharge, hearing intact  Nose:  no drainage noted  Mouth: mucous membranes moist  Neck: supple, no carotid bruits, thyroid not palpable  Lungs: Bilateral equal air entry, clear to ausculation, no wheezing, rales or rhonchi, normal effort  Cardiovascular: normal rate, regular rhythm, no murmur, gallop, rub  Abdomen: Soft, nontender, nondistended, normal bowel sounds, no hepatomegaly or splenomegaly  Neurologic: There are no new focal motor or sensory deficits, normal muscle tone and bulk, no abnormal sensation, normal speech, cranial nerves II through XII grossly intact  Skin: No gross lesions, rashes, bruising or bleeding on exposed skin area  Extremities:  peripheral pulses palpable, no pedal edema or calf pain with palpation  Psych: normal affect     Investigations:      Laboratory Testing:  Recent Results (from the past 24 hour(s))   EKG 12 Lead    Collection Time: 12/27/24 12:25 PM   Result Value Ref Range    Ventricular Rate 87 BPM    Atrial Rate 87 BPM    P-R Interval 152 ms    QRS Duration 84 ms    Q-T Interval 358 ms    QTc Calculation (Bazett) 430 ms    P Axis 40 degrees    R Axis -15 degrees    T Axis 25 degrees   Blood Culture 1    Collection Time: 12/27/24  1:25 PM    Specimen: Blood   Result Value Ref Range    Specimen Description .BLOOD     Special Requests 20CC LEFT FOREARM     Culture NO GROWTH <24 HRS    BMP    Collection Time: 12/27/24  1:25 PM   Result Value Ref Range    Sodium 138 135 - 144 mmol/L    Potassium 3.6 (L) 3.7 - 5.3 mmol/L    Chloride 103 98 - 107  as outlined, requirement for current medical therapies and most importantly because of direct risk to patient if care not provided in a hospital setting.  Expected length of stay > 48 hours. Patient is admitted in the Med/Surge    Medical Decision Making: Deyanira Lewis DO  12/27/2024  4:41 PM    Copy sent to Nabil Garcia MD

## 2024-12-27 NOTE — ED PROVIDER NOTES
Emergency Department     Faculty Attestation    I performed a history and physical examination of the patient and discussed management with the mid level provider. I reviewed the mid level provider's note and agree with the documented findings and plan of care. Any areas of disagreement are noted on the chart. I was personally present for the key portions of any procedures. I have documented in the chart those procedures where I was not present during the key portions. I have reviewed the emergency nurses triage note. I agree with the chief complaint, past medical history, past surgical history, allergies, medications, social and family history as documented unless otherwise noted below. Documentation of the HPI, Physical Exam and Medical Decision Making performed by medical students or scribes is based on my personal performance of the HPI, PE and MDM. For Physician Assistant/ Nurse Practitioner cases/documentation I have personally evaluated this patient and have completed at least one if not all key elements of the E/M (history, physical exam, and MDM). Additional findings are as noted.      Primary Care Physician:  Nabil Ly MD    CHIEF COMPLAINT       Chief Complaint   Patient presents with    Shortness of Breath     Shortness of breath started yesterday but getting worse. Recently had a PE in early December        RECENT VITALS:   Temp: 98.4 °F (36.9 °C),  Pulse: 92, Respirations: 20, BP: 138/89    LABS:  Labs Reviewed   CULTURE, BLOOD 1   CULTURE, BLOOD 2   CULTURE, URINE   BASIC METABOLIC PANEL   CBC WITH AUTO DIFFERENTIAL   LACTIC ACID   HEPATIC FUNCTION PANEL   MAGNESIUM   TROPONIN   BRAIN NATRIURETIC PEPTIDE   URINALYSIS WITH MICROSCOPIC       EKG shows normal sinus rhythm rate of 87 bpm SC interval is 152 ms QRS durations 84 ms QT corrected 430 ms axis is -15 there is no acute ST or T wave changes  PERTINENT ATTENDING PHYSICIAN COMMENTS:    Patient presents with increasing shortness of breath

## 2024-12-27 NOTE — PROGRESS NOTES
Spiritual Health History and Assessment/Progress Note  [facility]    (P) Spiritual/Emotional Needs,  ,  ,      Name: Luci Castañeda MRN: [id number]    Age: 59 y.o.     Sex: female   Language: English   Amish: Mormonism   Nausea and/or vomiting     Date: 12/27/2024            Total Time Calculated: (P) 10 min              Spiritual Assessment continued in [address]        Referral/Consult From: (P) Rounding   Encounter Overview/Reason: (P) Spiritual/Emotional Needs  Service Provided For: (P) Patient    Patient was in the Emergency Department. Pt shared how she was doing, noting symptoms and side effects that brought her to the hospital. Pt voiced hopes that she would get better, acknowledging that she is ready to feel well. Pt expressed gratitude for her karly and reflected on her beliefs that include trusting God and remembering all the ways God has helped her in the past. Pt expressed gratitude for all the people who are praying for her and are helping her. Pt was receptive to prayer.  Pt thanked writer for the support and care.    Karly, Belief, Meaning:   Patient is connected with a karly tradition or spiritual practice and has beliefs or practices that help with coping during difficult times  Family/Friends No family/friends present      Importance and Influence:  Patient has spiritual/personal beliefs that influence decisions regarding their health  Family/Friends No family/friends present    Community:  Patient is connected with a spiritual community and feels well-supported. Support system includes: Karly Community, Friends, and Extended family  Family/Friends No family/friends present    Assessment and Plan of Care:     Patient Interventions include: Facilitated expression of thoughts and feelings, Explored spiritual coping/struggle/distress, and Engaged in theological reflection  Family/Friends Interventions include: No family/friends present    Patient Plan of Care: Spiritual Care

## 2024-12-27 NOTE — ED PROVIDER NOTES
[facility] EMERGENCY DEPARTMENT  Emergency Department Encounter  Mid Level Provider     Pt Name: Luci Castañeda  MRN: [id number]  Birthdate 1965  Date of evaluation: 12/27/24  PCP:  Nabil Ly MD    CHIEF COMPLAINT       Chief Complaint   Patient presents with    Shortness of Breath     Shortness of breath started yesterday but getting worse. Recently had a PE in early December        HISTORY OF PRESENT ILLNESS  (Location/Symptom, Timing/Onset,Context/Setting, Quality, Duration, Modifying Factors, Severity.)      Luci Castañeda is a 59 y.o. female who presents with complaints of exertional dyspnea, fatigue, persistent nausea.  Patient reports history of saddle PE with thrombectomy on December 9, placement of renal stent secondary to large obstructive nephrolithiasis 12/20/2024, and recently completed chemotherapy 5 weeks ago for breast cancer.    Patient is currently on Eliquis for the pulmonary embolus.  There was some clot burden left over that was unable to be obtained via the thrombectomy, she denies any fevers, chills, increased urinary urgency, frequency hematuria or dysuria.  She reports that in the past 3 days she has had significant decrease in her oral intake secondary to the persistent nausea and is concerned she may be dehydrated she is also been having some bilateral lower extremity cramping.    PAST MEDICAL /SURGICAL / SOCIAL / FAMILY HISTORY      has a past medical history of Abnormal uterine bleeding (AUB), Acquired solitary kidney, Anemia, Asthma, Benign familial tremor, Breast cancer (HCC), CAD (coronary artery disease), CKD (chronic kidney disease) stage 1, GFR 90 ml/min or greater, Class 2 severe obesity with serious comorbidity and body mass index (BMI) of 39.0 to 39.9 in adult, COVID-19 vaccine administered, Cystinuria (HCC), Diverticulosis, Dyspnea on exertion, Fatigue, Fatty liver, Foot fracture, right, GERD (gastroesophageal reflux disease), Gout, Hiatal hernia, History  Last Year: No     Number of Times Moved in the Last Year: 0     Homeless in the Last Year: No       Family History   Problem Relation Age of Onset    High Blood Pressure Mother     Dementia Mother     High Blood Pressure Father     Diabetes Father     Cancer Father         tongue    Hypertension Father     Heart Disease Father         stents    Other Father         Mylofibrosis    Hypertension Sister     Heart Disease Maternal Grandmother     Heart Attack Maternal Grandmother 80    Prostate Cancer Maternal Grandfather     Emphysema Paternal Grandmother     Heart Disease Paternal Grandfather     Stroke Paternal Grandfather 65       :  Mixed ragweed and Cat hair extract    Home Medications:  Prior to Admission medications    Medication Sig Start Date End Date Taking? Authorizing Provider   tamsulosin (FLOMAX) 0.4 MG capsule Take 1 capsule by mouth daily 12/21/24   Jhonatan Shipley MD   apixaban (ELIQUIS) 5 MG TABS tablet Take 2 tablets by mouth 2 times daily for 3 days, THEN 1 tablet 2 times daily. 12/18/24 3/21/25  Bart Lewis DO   colchicine (COLCRYS) 0.6 MG tablet Take 1 tablet by mouth daily 12/19/24   Bart Lewis DO   nystatin (MYCOSTATIN) 306495 UNIT/ML suspension Take 5 mLs by mouth 4 times daily for 10 days Retain in mouth as long as possible 11/25/24 12/5/24  Jess Bradley MD   tiotropium (SPIRIVA RESPIMAT) 1.25 MCG/ACT AERS inhaler Inhale 2 puffs into the lungs daily 9/16/24 12/15/24  Amadeo Sanchez MD   oxyBUTYnin (DITROPAN XL) 10 MG extended release tablet Take 1 tablet by mouth daily 9/7/24   Ryland Mcdonnell MD   ondansetron (ZOFRAN) 4 MG tablet Take 1 tablet by mouth 3 times daily as needed for Nausea or Vomiting 6/7/24   Jess Bradley MD   albuterol sulfate HFA (PROAIR HFA) 108 (90 Base) MCG/ACT inhaler Inhale 2 puffs into the lungs every 6 hours as needed for Wheezing 2/22/24   Nazia Mercado, APRN - CNP   pantoprazole (PROTONIX) 20 MG tablet TAKE ONE TABLET BY

## 2024-12-28 LAB
ANION GAP SERPL CALCULATED.3IONS-SCNC: 9 MMOL/L (ref 9–17)
BASOPHILS # BLD: 0 K/UL (ref 0–0.2)
BASOPHILS NFR BLD: 0 % (ref 0–2)
BUN SERPL-MCNC: 12 MG/DL (ref 8–23)
CALCIUM SERPL-MCNC: 8.5 MG/DL (ref 8.6–10.4)
CHLORIDE SERPL-SCNC: 105 MMOL/L (ref 98–107)
CO2 SERPL-SCNC: 23 MMOL/L (ref 20–31)
CREAT SERPL-MCNC: 0.9 MG/DL (ref 0.5–0.9)
EOSINOPHIL # BLD: 0 K/UL (ref 0–0.4)
EOSINOPHILS RELATIVE PERCENT: 0 % (ref 1–4)
ERYTHROCYTE [DISTWIDTH] IN BLOOD BY AUTOMATED COUNT: 22.1 % (ref 12.5–15.4)
GFR, ESTIMATED: 73 ML/MIN/1.73M2
GLUCOSE SERPL-MCNC: 90 MG/DL (ref 70–99)
HCT VFR BLD AUTO: 25.3 % (ref 36–46)
HGB BLD-MCNC: 8.4 G/DL (ref 12–16)
LYMPHOCYTES NFR BLD: 0.44 K/UL (ref 1–4.8)
LYMPHOCYTES RELATIVE PERCENT: 10 % (ref 24–44)
MAGNESIUM SERPL-MCNC: 2.2 MG/DL (ref 1.6–2.6)
MCH RBC QN AUTO: 30.8 PG (ref 26–34)
MCHC RBC AUTO-ENTMCNC: 33 G/DL (ref 31–37)
MCV RBC AUTO: 93.2 FL (ref 80–100)
MICROORGANISM SPEC CULT: NORMAL
MONOCYTES NFR BLD: 0.57 K/UL (ref 0.1–0.8)
MONOCYTES NFR BLD: 13 % (ref 1–7)
MORPHOLOGY: ABNORMAL
NEUTROPHILS NFR BLD: 77 % (ref 36–66)
NEUTS SEG NFR BLD: 3.39 K/UL (ref 1.8–7.7)
PLATELET # BLD AUTO: 109 K/UL (ref 140–450)
PMV BLD AUTO: 7.8 FL (ref 6–12)
POTASSIUM SERPL-SCNC: 3.9 MMOL/L (ref 3.7–5.3)
RBC # BLD AUTO: 2.72 M/UL (ref 4–5.2)
SERVICE CMNT-IMP: NORMAL
SODIUM SERPL-SCNC: 137 MMOL/L (ref 135–144)
SPECIMEN DESCRIPTION: NORMAL
WBC OTHER # BLD: 4.4 K/UL (ref 3.5–11)

## 2024-12-28 PROCEDURE — 2500000003 HC RX 250 WO HCPCS: Performed by: STUDENT IN AN ORGANIZED HEALTH CARE EDUCATION/TRAINING PROGRAM

## 2024-12-28 PROCEDURE — 97166 OT EVAL MOD COMPLEX 45 MIN: CPT

## 2024-12-28 PROCEDURE — 2500000003 HC RX 250 WO HCPCS: Performed by: HOSPITALIST

## 2024-12-28 PROCEDURE — 2580000003 HC RX 258: Performed by: STUDENT IN AN ORGANIZED HEALTH CARE EDUCATION/TRAINING PROGRAM

## 2024-12-28 PROCEDURE — 83735 ASSAY OF MAGNESIUM: CPT

## 2024-12-28 PROCEDURE — 85025 COMPLETE CBC W/AUTO DIFF WBC: CPT

## 2024-12-28 PROCEDURE — 80048 BASIC METABOLIC PNL TOTAL CA: CPT

## 2024-12-28 PROCEDURE — 97162 PT EVAL MOD COMPLEX 30 MIN: CPT

## 2024-12-28 PROCEDURE — 6360000002 HC RX W HCPCS: Performed by: STUDENT IN AN ORGANIZED HEALTH CARE EDUCATION/TRAINING PROGRAM

## 2024-12-28 PROCEDURE — 6370000000 HC RX 637 (ALT 250 FOR IP): Performed by: STUDENT IN AN ORGANIZED HEALTH CARE EDUCATION/TRAINING PROGRAM

## 2024-12-28 PROCEDURE — 6360000002 HC RX W HCPCS: Performed by: HOSPITALIST

## 2024-12-28 PROCEDURE — 6370000000 HC RX 637 (ALT 250 FOR IP): Performed by: HOSPITALIST

## 2024-12-28 PROCEDURE — 97535 SELF CARE MNGMENT TRAINING: CPT

## 2024-12-28 PROCEDURE — 97116 GAIT TRAINING THERAPY: CPT

## 2024-12-28 PROCEDURE — 99232 SBSQ HOSP IP/OBS MODERATE 35: CPT | Performed by: HOSPITALIST

## 2024-12-28 PROCEDURE — 1200000000 HC SEMI PRIVATE

## 2024-12-28 PROCEDURE — 36415 COLL VENOUS BLD VENIPUNCTURE: CPT

## 2024-12-28 RX ORDER — PROCHLORPERAZINE EDISYLATE 5 MG/ML
10 INJECTION INTRAMUSCULAR; INTRAVENOUS EVERY 6 HOURS PRN
Status: DISCONTINUED | OUTPATIENT
Start: 2024-12-28 | End: 2024-12-30 | Stop reason: HOSPADM

## 2024-12-28 RX ADMIN — PHENAZOPYRIDINE HYDROCHLORIDE 200 MG: 100 TABLET ORAL at 12:11

## 2024-12-28 RX ADMIN — ONDANSETRON 4 MG: 2 INJECTION INTRAMUSCULAR; INTRAVENOUS at 16:52

## 2024-12-28 RX ADMIN — TAMSULOSIN HYDROCHLORIDE 0.4 MG: 0.4 CAPSULE ORAL at 07:36

## 2024-12-28 RX ADMIN — COLCHICINE 0.6 MG: 0.6 TABLET, FILM COATED ORAL at 07:35

## 2024-12-28 RX ADMIN — APIXABAN 5 MG: 5 TABLET, FILM COATED ORAL at 21:04

## 2024-12-28 RX ADMIN — WATER 1000 MG: 1 INJECTION INTRAMUSCULAR; INTRAVENOUS; SUBCUTANEOUS at 16:42

## 2024-12-28 RX ADMIN — PHENAZOPYRIDINE HYDROCHLORIDE 200 MG: 100 TABLET ORAL at 16:42

## 2024-12-28 RX ADMIN — SODIUM CHLORIDE, PRESERVATIVE FREE 10 ML: 5 INJECTION INTRAVENOUS at 19:40

## 2024-12-28 RX ADMIN — MAGNESIUM SULFATE HEPTAHYDRATE 2000 MG: 40 INJECTION, SOLUTION INTRAVENOUS at 01:36

## 2024-12-28 RX ADMIN — PANTOPRAZOLE SODIUM 20 MG: 20 TABLET, DELAYED RELEASE ORAL at 05:37

## 2024-12-28 RX ADMIN — PROCHLORPERAZINE EDISYLATE 10 MG: 5 INJECTION INTRAMUSCULAR; INTRAVENOUS at 19:26

## 2024-12-28 RX ADMIN — APIXABAN 5 MG: 5 TABLET, FILM COATED ORAL at 09:20

## 2024-12-28 RX ADMIN — SODIUM CHLORIDE, POTASSIUM CHLORIDE, SODIUM LACTATE AND CALCIUM CHLORIDE: 600; 310; 30; 20 INJECTION, SOLUTION INTRAVENOUS at 09:27

## 2024-12-28 ASSESSMENT — PAIN DESCRIPTION - ORIENTATION: ORIENTATION: RIGHT;LEFT

## 2024-12-28 ASSESSMENT — PAIN SCALES - GENERAL
PAINLEVEL_OUTOF10: 3
PAINLEVEL_OUTOF10: 0
PAINLEVEL_OUTOF10: 4
PAINLEVEL_OUTOF10: 2

## 2024-12-28 ASSESSMENT — PAIN DESCRIPTION - LOCATION: LOCATION: LEG

## 2024-12-28 NOTE — PROGRESS NOTES
Occupational Therapy  Occupational Therapy Initial Evaluation  Facility/Department: [address]   Patient Name: Luci Castañeda        MRN: [id number]    : 1965    Date of Service: 2024    Chief Complaint   Patient presents with    Shortness of Breath     Shortness of breath started yesterday but getting worse. Recently had a PE in early December      Past Medical History:  has a past medical history of Abnormal uterine bleeding (AUB), Acquired solitary kidney, Anemia, Asthma, Benign familial tremor, Breast cancer (HCC), CAD (coronary artery disease), CKD (chronic kidney disease) stage 1, GFR 90 ml/min or greater, Class 2 severe obesity with serious comorbidity and body mass index (BMI) of 39.0 to 39.9 in adult, COVID-19 vaccine administered, Cystinuria (HCC), Diverticulosis, Dyspnea on exertion, Fatigue, Fatty liver, Foot fracture, right, GERD (gastroesophageal reflux disease), Gout, Hiatal hernia, History of Bell's palsy, History of recent hospitalization, History of recent hospitalization, Hypertension, Hyperuricemia, Hypothyroidism, Kidney stones, Leg swelling, Midline low back pain with right-sided sciatica, ESTHER (obstructive sleep apnea), Osteoarthritis, PE (pulmonary thromboembolism) (HCC), Shoulder impingement, Skin cancer, Thrombocytopenia (HCC), Under care of service provider, Under care of service provider, Under care of service provider, Under care of team, Under care of team, Under care of team, Wears glasses, and Wellness examination.  Past Surgical History:  has a past surgical history that includes total nephrectomy (Left, ); Knee arthroscopy (Right, ); Cystoscopy (Right, 2017); cysto/uretero/pyeloscopy, calculus tx (Right, 2017); pr cysto w/insert ureteral stent (Right, 2018); pr cysto w/ureteroscopy w/rmvl/manj stones (N/A, 07/10/2018); pr cysto w/ureteroscopy w/rmvl/manj stones (Right, 10/10/2018); Cystoscopy (Right, 2019); Rotator cuff repair (Left);  bars around toilet  Bathroom Accessibility: Accessible  Home Equipment: Walker - Standard  Has the patient had two or more falls in the past year or any fall with injury in the past year?: No  Prior Level of Assist for ADLs: Independent  Prior Level of Assist for Homemaking: Independent  Homemaking Responsibilities: Yes (Light household tasks, hiring heavy cleaning tasks, family/ friends performing grocery shopping)  Prior Level of Assist for Ambulation: Independent household ambulator, with or without device;Independent community ambulator, with or without device  Prior Level of Assist for Transfers: Independent  Active : Yes (has not driven for the last ~4-6wks)  Mode of Transportation: Car;SUV  Occupation: On disability  Type of Occupation: RN- Trauma coordinator  Leisure & Hobbies: Swimming, time with family  Additional Comments: Above information pertains to pt's home setup of pt's mother's home where pt plans to stay at discharge for increased support and a more accessible home setup as pt lives alone typically in a two story house.    Vision/Hearing  Vision  Vision: Impaired  Vision Exceptions: Wears glasses for distance  Hearing  Hearing: Within functional limits    BUE Assessment  Gross Assessment  AROM: Within functional limits  Strength:  (grossly 4-/5)  Coordination:  (FMC/GMC- decreased speed/accuracy, BUE tremors)  Hand Dominance: Right     Objective  Orientation  Overall Orientation Status: Within Functional Limits  Cognition  Overall Cognitive Status: Exceptions  Safety Judgement: Decreased awareness of need for assistance;Decreased awareness of need for safety  Insights: Decreased awareness of deficits    Activities of Daily Living  Feeding: Increased time to complete;Stand by assistance  Grooming: Increased time to complete;Contact guard assistance  Grooming Skilled Clinical Factors: Pt demo 2x bouts of sink side grooming tasks, performing oral care at first bout and hand hygiene at second

## 2024-12-28 NOTE — PROGRESS NOTES
[facility]  Office: [phone]  Michael Lewis DO, Bart Lewis DO, Ángel Harry DO, Geremias Tsai DO, Cathy García MD, Kya Arellano MD, Jonathon Gilmore MD, Kalli Chou MD,  Alvin Frey MD, Opal Wang MD, Jennifer Cooney MD,  Geronimo Sams DO, Thelma Quijano MD, Shreyas Duarte MD, Franklyn Lewis DO, Regi Ríos MD,  Marky Russell DO, Yamilet Monroe MD, Qian Rain MD, Suzy Faith MD, Artur Irby MD,  Colton Clark MD, Jaky Reynoso MD, Amarilys Robledo MD, Francia Cantor MD, Krzysztof Garcia MD, Jhonatan Shipley MD, Frederick Wilson DO, Jabier Plummer MD, Geronimo Mckinley MD, Mohsin Reza, MD, Shirley Waterhouse, CNP,  Millie Moe CNP, [address], [place],  Josie Guthrie, CAT, Alicia Sheldon, CNP, Diamond Kwong, CNP, Mai Copeland, [place], Radha Fowler, CNP, Angeles Enriquez, PA-C, Mervat Rowan PA-C, Junie Kamara, CNP, Mitzy Sanz, CNP,  Laura Gonzalez, CNP, Isabel Vaz, CNP, Grace Walter, CNP,  Yeny Gandhi, [place], Ada Nelson, CNP         [facility]   IN-PATIENT SERVICE   [facility]    Progress Note    12/28/2024    10:47 AM    Name:   Luci Castañeda  MRN:     [id number]     Acct:      [id number]   Room:   310/310-01   Day:  1  Admit Date:  12/27/2024 12:18 PM    PCP:   Nabil Ly MD  Code Status:  Full Code    Subjective:     Patient seen in follow-up for nausea, vomiting, hypokalemia/hypomagnesemia.  Patient states \"I am feeling better\"    Patient is doing better overall but still quite fatigued.  The patient's hypokalemia has been corrected.  She is still undergoing magnesium replacement.  The patient is maintained on Rocephin for her acute cystitis.  Given the fact that she failed treatment and required readmission I would recommend following culture data prior to transition to oral medications.  Patient denies any questions or concerns at this point in time.    Medications:     Allergies:    Allergies   Allergen Reactions

## 2024-12-28 NOTE — PROGRESS NOTES
Exceptions  Safety Judgement: Decreased awareness of need for assistance, Decreased awareness of need for safety  Insights: Decreased awareness of deficits    Observation/Palpation  Observation: Noted paresh UE tremors which increase with fine motor activities    AROM RLE (degrees)  RLE AROM: WFL  AROM LLE (degrees)  LLE AROM : WFL          Strength RLE  Strength RLE: WFL  Comment: grossly 4+  Strength LLE  Strength LLE: WFL  Comment: grossly 4+    Mobility   Bed mobility  Supine to Sit: Modified independent (HOB raised ~40* and use of bed rail)  Sit to Supine:  (Pt retired up to chair at end of session)  Scooting: Independent  Bed Mobility Comments: Increased time/effort to perform    Transfers  Sit to Stand: Stand by assistance  Stand to Sit: Stand by assistance  Comment: toilet SBA with grab bar    Ambulation  Surface: Level tile  Device: No Device (pt occasionally pushing IV pole due to fatigue)  Assistance: Contact guard assistance  Quality of Gait: Slight unsteadiness due to increase fatigue  Gait Deviations: Decreased step height;Increased JOHN  Distance: 12ft and 20ft  Comments: Decrease endurance, increase SOB               Balance   Balance  Posture: Fair  Sitting - Static: Good, +  Sitting - Dynamic: Good  Standing - Static: Fair, +  Standing - Dynamic: Fair, -  Comments: One LOB at sink with CGA while performing independent hand hygiene and brushing teeth           Plan  Physical Therapy Plan  General Plan: 3-5 times per week  Current Treatment Recommendations: Strengthening, Balance training, Gait training, Stair training, Functional mobility training, Transfer training, Safety education & training, Patient/Caregiver education & training, Endurance training, Therapeutic activities, Home exercise program    Goals  Patient Goals   Patient Goals : to go home and be safe  Short Term Goals  Time Frame for Short Term Goals: 14 visits  Short Term Goal 1: supine to and from sit independent  Short Term Goal 2: sit

## 2024-12-28 NOTE — CARE COORDINATION
Case Management Assessment  Initial Evaluation    Date/Time of Evaluation: 12/28/2024 4:09 PM  Assessment Completed by: Ayesha Shah RN    If patient is discharged prior to next notation, then this note serves as note for discharge by case management.    Patient Name: Luci Castañeda                   YOB: 1965  Diagnosis: Dehydration [E86.0]  Hypomagnesemia [E83.42]  Nausea and/or vomiting [R11.2]  Exertional dyspnea [R06.09]  General weakness [R53.1]  Complicated UTI (urinary tract infection) [N39.0]                   Date / Time: 12/27/2024 12:18 PM    Patient Admission Status: Inpatient   Readmission Risk (Low < 19, Mod (19-27), High > 27): Readmission Risk Score: 30.3    Current PCP: Nabil Ly MD  PCP verified by CM? (P) Yes    Chart Reviewed: Yes      History Provided by: Patient  Patient Orientation: Alert and Oriented    Patient Cognition: Alert    Hospitalization in the last 30 days (Readmission):  No    If yes, Readmission Assessment in  Navigator will be completed.    Advance Directives:      Code Status: Full Code   Patient's Primary Decision Maker is: (P) Legal Next of Kin    Primary Decision Maker: Janell Griggs - Brother/Sister - [phone]    Discharge Planning:    Patient lives with: (P) Alone Type of Home: (P) House  Primary Care Giver: Self  Patient Support Systems include: Family Members, /, Friends/Neighbors, Home Care Staff   Current Financial resources: (P) Other (Comment) (commerical)  Current community resources: (P) ECF/Home Care  Current services prior to admission: (P) Home Care            Current DME:              Type of Home Care services:  (P) OT, PT    ADLS  Prior functional level: (P) Independent in ADLs/IADLs  Current functional level: (P) Independent in ADLs/IADLs    PT AM-PAC: 19 /24  OT AM-PAC: 18 /24    Family can provide assistance at DC: (P) No  Would you like Case Management to discuss the discharge plan with any other family

## 2024-12-28 NOTE — PLAN OF CARE
Problem: Discharge Planning  Goal: Discharge to home or other facility with appropriate resources  12/28/2024 1715 by Carl Chappell, RN  Outcome: Progressing  Flowsheets (Taken 12/28/2024 0739 by Amarilis Mcnulty, RN)  Discharge to home or other facility with appropriate resources: Identify barriers to discharge with patient and caregiver     Problem: Pain  Goal: Verbalizes/displays adequate comfort level or baseline comfort level  12/28/2024 1715 by Carl Chappell, RN  Outcome: Progressing     Problem: Safety - Adult  Goal: Free from fall injury  12/28/2024 1715 by Carl Chappell, RN  Outcome: Progressing

## 2024-12-28 NOTE — PLAN OF CARE
Problem: Discharge Planning  Goal: Discharge to home or other facility with appropriate resources  Outcome: Progressing  Flowsheets  Taken 12/27/2024 1704 by Ian Potter, RN  Discharge to home or other facility with appropriate resources: Identify barriers to discharge with patient and caregiver  Taken 12/27/2024 1645 by Ian Potter, RN  Discharge to home or other facility with appropriate resources: Identify barriers to discharge with patient and caregiver     Problem: Pain  Goal: Verbalizes/displays adequate comfort level or baseline comfort level  Outcome: Progressing     Problem: Safety - Adult  Goal: Free from fall injury  Outcome: Progressing  Flowsheets (Taken 12/27/2024 1648 by Ian Potter, RN)  Free From Fall Injury:   Instruct family/caregiver on patient safety   Based on caregiver fall risk screen, instruct family/caregiver to ask for assistance with transferring infant if caregiver noted to have fall risk factors     Problem: ABCDS Injury Assessment  Goal: Absence of physical injury  Outcome: Progressing

## 2024-12-28 NOTE — PROGRESS NOTES
Spiritual Health History and Assessment/Progress Note  [facility]    (P) Loneliness/Social Isolation,  ,  ,      Name: Luci Castañeda MRN: [id number]    Age: 59 y.o.     Sex: female   Language: English   Sabianist: Church   Nausea and/or vomiting     Date: 12/28/2024            Total Time Calculated: (P) 18 min              Spiritual Assessment began in [address]        Referral/Consult From: (P) Nurse   Encounter Overview/Reason: (P) Loneliness/Social Isolation  Service Provided For: (P) Patient    PT expressed complicated grief because of ongoing health concerns.  It's been one thing after another and she just wants to return to \"normal\".  She misses the simple things such as standing without assistance, driving, having an appetite without nausea,  having no pain etc.  PT ws very tearful when speaking.  She is overwhelmed and at times feels like she cannot continue the fight. PT lies alone but has friends that take her to appointments.  Her sister in [place] visits.  She speaks very highly of [place].   offered ministry of presence and listened attentively.   prayed with PT before leaving.    Karly, Belief, Meaning:   Patient identifies as spiritual, is connected with a karly tradition or spiritual practice, and has beliefs or practices that help with coping during difficult times  Family/Friends No family/friends present      Importance and Influence:  Patient has spiritual/personal beliefs that influence decisions regarding their health  Family/Friends No family/friends present    Community:  Patient is connected with a spiritual community and expresses feelings of isolation: feeling there is no one to turn to for help  Family/Friends expresses feelings of isolation: disconnected from family/friends    Assessment and Plan of Care:     Patient Interventions include: Facilitated expression of thoughts and feelings  Family/Friends Interventions include: No family/friends  present    Patient Plan of Care: Spiritual Care available upon further referral  Family/Friends Plan of Care: No family/friends present    Electronically signed by Tino Watts  Intern on 12/28/2024 at 12:31 PM

## 2024-12-29 LAB
ANION GAP SERPL CALCULATED.3IONS-SCNC: 9 MMOL/L (ref 9–17)
BASOPHILS # BLD: 0 K/UL (ref 0–0.2)
BASOPHILS NFR BLD: 0 % (ref 0–2)
BUN SERPL-MCNC: 11 MG/DL (ref 8–23)
CALCIUM SERPL-MCNC: 8.5 MG/DL (ref 8.6–10.4)
CHLORIDE SERPL-SCNC: 107 MMOL/L (ref 98–107)
CO2 SERPL-SCNC: 24 MMOL/L (ref 20–31)
CREAT SERPL-MCNC: 0.9 MG/DL (ref 0.5–0.9)
EKG ATRIAL RATE: 87 BPM
EKG P AXIS: 40 DEGREES
EKG P-R INTERVAL: 152 MS
EKG Q-T INTERVAL: 358 MS
EKG QRS DURATION: 84 MS
EKG QTC CALCULATION (BAZETT): 430 MS
EKG R AXIS: -15 DEGREES
EKG T AXIS: 25 DEGREES
EKG VENTRICULAR RATE: 87 BPM
EOSINOPHIL # BLD: 0 K/UL (ref 0–0.4)
EOSINOPHILS RELATIVE PERCENT: 0 % (ref 1–4)
ERYTHROCYTE [DISTWIDTH] IN BLOOD BY AUTOMATED COUNT: 21.2 % (ref 12.5–15.4)
GFR, ESTIMATED: 73 ML/MIN/1.73M2
GLUCOSE SERPL-MCNC: 83 MG/DL (ref 70–99)
HCT VFR BLD AUTO: 23.8 % (ref 36–46)
HGB BLD-MCNC: 7.8 G/DL (ref 12–16)
LYMPHOCYTES NFR BLD: 0.48 K/UL (ref 1–4.8)
LYMPHOCYTES RELATIVE PERCENT: 12 % (ref 24–44)
MCH RBC QN AUTO: 30.6 PG (ref 26–34)
MCHC RBC AUTO-ENTMCNC: 32.6 G/DL (ref 31–37)
MCV RBC AUTO: 93.8 FL (ref 80–100)
MONOCYTES NFR BLD: 0.44 K/UL (ref 0.1–0.8)
MONOCYTES NFR BLD: 11 % (ref 1–7)
MORPHOLOGY: ABNORMAL
NEUTROPHILS NFR BLD: 77 % (ref 36–66)
NEUTS SEG NFR BLD: 3.08 K/UL (ref 1.8–7.7)
PLATELET # BLD AUTO: 106 K/UL (ref 140–450)
PMV BLD AUTO: 8.1 FL (ref 6–12)
POTASSIUM SERPL-SCNC: 3.7 MMOL/L (ref 3.7–5.3)
RBC # BLD AUTO: 2.54 M/UL (ref 4–5.2)
SODIUM SERPL-SCNC: 140 MMOL/L (ref 135–144)
WBC OTHER # BLD: 4 K/UL (ref 3.5–11)

## 2024-12-29 PROCEDURE — 6370000000 HC RX 637 (ALT 250 FOR IP): Performed by: STUDENT IN AN ORGANIZED HEALTH CARE EDUCATION/TRAINING PROGRAM

## 2024-12-29 PROCEDURE — 85025 COMPLETE CBC W/AUTO DIFF WBC: CPT

## 2024-12-29 PROCEDURE — 36415 COLL VENOUS BLD VENIPUNCTURE: CPT

## 2024-12-29 PROCEDURE — 80048 BASIC METABOLIC PNL TOTAL CA: CPT

## 2024-12-29 PROCEDURE — 6360000002 HC RX W HCPCS: Performed by: HOSPITALIST

## 2024-12-29 PROCEDURE — 2580000003 HC RX 258: Performed by: HOSPITALIST

## 2024-12-29 PROCEDURE — 2500000003 HC RX 250 WO HCPCS: Performed by: STUDENT IN AN ORGANIZED HEALTH CARE EDUCATION/TRAINING PROGRAM

## 2024-12-29 PROCEDURE — 93010 ELECTROCARDIOGRAM REPORT: CPT | Performed by: INTERNAL MEDICINE

## 2024-12-29 PROCEDURE — 6370000000 HC RX 637 (ALT 250 FOR IP): Performed by: HOSPITALIST

## 2024-12-29 PROCEDURE — 99232 SBSQ HOSP IP/OBS MODERATE 35: CPT | Performed by: HOSPITALIST

## 2024-12-29 PROCEDURE — 1200000000 HC SEMI PRIVATE

## 2024-12-29 PROCEDURE — 2500000003 HC RX 250 WO HCPCS: Performed by: HOSPITALIST

## 2024-12-29 RX ORDER — SODIUM CHLORIDE 9 MG/ML
INJECTION, SOLUTION INTRAVENOUS CONTINUOUS
Status: DISCONTINUED | OUTPATIENT
Start: 2024-12-29 | End: 2024-12-30

## 2024-12-29 RX ORDER — METOCLOPRAMIDE HYDROCHLORIDE 5 MG/ML
5 INJECTION INTRAMUSCULAR; INTRAVENOUS EVERY 6 HOURS
Status: DISCONTINUED | OUTPATIENT
Start: 2024-12-29 | End: 2024-12-30 | Stop reason: HOSPADM

## 2024-12-29 RX ADMIN — APIXABAN 5 MG: 5 TABLET, FILM COATED ORAL at 09:30

## 2024-12-29 RX ADMIN — PHENAZOPYRIDINE HYDROCHLORIDE 200 MG: 100 TABLET ORAL at 14:20

## 2024-12-29 RX ADMIN — TAMSULOSIN HYDROCHLORIDE 0.4 MG: 0.4 CAPSULE ORAL at 09:30

## 2024-12-29 RX ADMIN — METOCLOPRAMIDE 5 MG: 5 INJECTION, SOLUTION INTRAMUSCULAR; INTRAVENOUS at 19:56

## 2024-12-29 RX ADMIN — METOCLOPRAMIDE 5 MG: 5 INJECTION, SOLUTION INTRAMUSCULAR; INTRAVENOUS at 18:50

## 2024-12-29 RX ADMIN — PANTOPRAZOLE SODIUM 20 MG: 20 TABLET, DELAYED RELEASE ORAL at 09:34

## 2024-12-29 RX ADMIN — PHENAZOPYRIDINE HYDROCHLORIDE 200 MG: 100 TABLET ORAL at 09:29

## 2024-12-29 RX ADMIN — SODIUM CHLORIDE: 9 INJECTION, SOLUTION INTRAVENOUS at 14:19

## 2024-12-29 RX ADMIN — WATER 1000 MG: 1 INJECTION INTRAMUSCULAR; INTRAVENOUS; SUBCUTANEOUS at 18:49

## 2024-12-29 RX ADMIN — SODIUM CHLORIDE, PRESERVATIVE FREE 10 ML: 5 INJECTION INTRAVENOUS at 09:35

## 2024-12-29 RX ADMIN — APIXABAN 5 MG: 5 TABLET, FILM COATED ORAL at 20:45

## 2024-12-29 RX ADMIN — SODIUM CHLORIDE, PRESERVATIVE FREE 10 ML: 5 INJECTION INTRAVENOUS at 20:00

## 2024-12-29 RX ADMIN — PHENAZOPYRIDINE HYDROCHLORIDE 200 MG: 100 TABLET ORAL at 18:42

## 2024-12-29 RX ADMIN — METOCLOPRAMIDE 5 MG: 5 INJECTION, SOLUTION INTRAMUSCULAR; INTRAVENOUS at 10:04

## 2024-12-29 RX ADMIN — COLCHICINE 0.6 MG: 0.6 TABLET, FILM COATED ORAL at 09:30

## 2024-12-29 ASSESSMENT — PAIN SCALES - GENERAL: PAINLEVEL_OUTOF10: 0

## 2024-12-29 NOTE — PLAN OF CARE
Problem: Discharge Planning  Goal: Discharge to home or other facility with appropriate resources  12/29/2024 0048 by Amarilis Mcnulty RN  Outcome: Progressing  Flowsheets (Taken 12/28/2024 1949)  Discharge to home or other facility with appropriate resources: Identify barriers to discharge with patient and caregiver  12/28/2024 1715 by Carl Chappell RN  Outcome: Progressing  Flowsheets (Taken 12/28/2024 0739 by Amarilis Mcnulty RN)  Discharge to home or other facility with appropriate resources: Identify barriers to discharge with patient and caregiver     Problem: Pain  Goal: Verbalizes/displays adequate comfort level or baseline comfort level  12/29/2024 0048 by Amarilis Mcnulty RN  Outcome: Progressing  12/28/2024 1715 by Carl Chappell RN  Outcome: Progressing     Problem: Safety - Adult  Goal: Free from fall injury  12/29/2024 0048 by Amarilis Mcnulty RN  Outcome: Progressing  12/28/2024 1715 by Carl Chappell RN  Outcome: Progressing     Problem: ABCDS Injury Assessment  Goal: Absence of physical injury  Outcome: Progressing

## 2024-12-29 NOTE — PROGRESS NOTES
[facility]  Office: [phone]  Michael Lewis DO, Bart Lewis DO, Ángel Harry DO, Geremias Tsai DO, Cathy García MD, Kya Arellano MD, Jonathon Gilmore MD, Kalli Chou MD,  Alvin Frey MD, Opal Wang MD, Jennifer Cooney MD,  Geronimo Sams DO, Thelma Quijano MD, Shreyas Duarte MD, Franklyn Lewis DO, Regi Ríos MD,  Marky Russell DO, Yamilet Monroe MD, iQan Rain MD, Suzy Faith MD, Artur Irby MD,  Colton Clark MD, Jaky Reynoso MD, Amarilys Robledo MD, Francia Cantor MD, Krzysztof Garcia MD, Jhonatan Shipley MD, Frederick Wilson DO, Jabier Plummer MD, Geronimo Mckinley MD, Mohsin Reza, MD, Shirley Waterhouse, CNP,  Millie Moe CNP, [address], [place],  Josie Guthrie, CAT, Alicia Sheldon, CNP, Diamond Kwong, CNP, Mai Copeland, CNP, Radha Fowler, CNP, Angeles Enriquez, PA-C, Mervat Rowan PA-C, Junie Kamara, CNP, Mitzy Sanz, CNP,  Laura Gonzalez, CNP, Isabel Vaz, CNP, Grace Walter, CNP,  Yeny Gandhi, [place], Ada Nelson, CNP         [facility]   IN-PATIENT SERVICE   [facility]    Progress Note    12/29/2024    10:20 AM    Name:   Luci Castañeda  MRN:     [id number]     Acct:      [id number]   Room:   310/310-01   Day:  2  Admit Date:  12/27/2024 12:18 PM    PCP:   Nabil Ly MD  Code Status:  Full Code    Subjective:     Patient seen in follow-up for nausea, vomiting with associated hypokalemia and hypomagnesemia.  Patient states \"the Compazine helped, but I am still nauseated\"    Patient continues to elicit nausea and states that she still is not able to tolerate significant input.  She tells me that she has been on  Reglan at home but does not take it.  I do suspect that the patient may have an element of gastroparesis.  I am going to start her on Reglan IV and monitor her response.  Regarding the acute cystitis, cultures are presently negative to date.  Will continue Rocephin for now and may empirically treat with Keflex  137  --  140   K 3.6* 3.4* 3.9  --  3.7     --  105  --  107   CO2 23  --  23  --  24   GLUCOSE 101*  --  90  --  83   BUN 14  --  12  --  11   CREATININE 1.1*  --  0.9  --  0.9   MG 0.9* 1.3*  --  2.2  --    ANIONGAP 12  --  9  --  9   LABGLOM 57*  --  73  --  73   CALCIUM 9.2  --  8.5*  --  8.5*   PROBNP 324*  --   --   --   --    TROPHS 29*  --   --   --   --      Recent Labs     12/26/24  1400 12/27/24  1325   AST 26 19   ALT 11 9   ALKPHOS 95 100   BILITOT 0.5 0.5   BILIDIR  --  0.1     ABG:  Lab Results   Component Value Date/Time    POCPH 7.367 08/25/2019 06:33 PM    POCPCO2 43.3 08/25/2019 06:33 PM    POCPO2 23.8 08/25/2019 06:33 PM    POCHCO3 24.8 08/25/2019 06:33 PM    NBEA 1 08/25/2019 06:33 PM    PBEA NOT REPORTED 08/25/2019 06:33 PM    GJL7KEH 26 08/25/2019 06:33 PM    CPPK6VJN 40 08/25/2019 06:33 PM    FIO2 1.0 08/25/2019 06:33 PM     Lab Results   Component Value Date/Time    SPECIAL RIGHT ARM 20CC 12/27/2024 06:24 PM     Lab Results   Component Value Date/Time    CULTURE NO GROWTH 1 DAY 12/27/2024 06:24 PM       Radiology:  CT CHEST PULMONARY EMBOLISM W CONTRAST    Result Date: 12/27/2024  Residual small filling defect consistent with PE in the distal right main pulmonary artery extending into adjacent segmental branches.  Overall the clot burden is significantly improved compared to prior examination.       Physical Examination:     General appearance:  alert, cooperative and no distress  Mental Status:  oriented to person, place and time and normal affect  Lungs:  clear to auscultation bilaterally, normal effort  Heart:  regular rate and rhythm, no murmur  Abdomen:  soft, nontender, nondistended, normal bowel sounds, no masses, hepatomegaly, splenomegaly  Extremities:  no edema, redness, tenderness in the calves  Skin:  no gross lesions, rashes, induration    Assessment:     Hospital Problems             Last Modified POA    * (Principal) Nausea and/or vomiting 12/27/2024 Yes    Complicated

## 2024-12-30 ENCOUNTER — TELEPHONE (OUTPATIENT)
Age: 59
End: 2024-12-30

## 2024-12-30 VITALS
WEIGHT: 225 LBS | SYSTOLIC BLOOD PRESSURE: 112 MMHG | OXYGEN SATURATION: 92 % | HEIGHT: 68 IN | RESPIRATION RATE: 16 BRPM | DIASTOLIC BLOOD PRESSURE: 68 MMHG | HEART RATE: 91 BPM | BODY MASS INDEX: 34.1 KG/M2 | TEMPERATURE: 98.2 F

## 2024-12-30 PROBLEM — N30.00 ACUTE CYSTITIS WITHOUT HEMATURIA: Status: ACTIVE | Noted: 2024-12-30

## 2024-12-30 LAB
ANION GAP SERPL CALCULATED.3IONS-SCNC: 7 MMOL/L (ref 9–17)
BASOPHILS # BLD: 0 K/UL (ref 0–0.2)
BASOPHILS NFR BLD: 1 % (ref 0–2)
BUN SERPL-MCNC: 11 MG/DL (ref 8–23)
CALCIUM SERPL-MCNC: 8.4 MG/DL (ref 8.6–10.4)
CHLORIDE SERPL-SCNC: 110 MMOL/L (ref 98–107)
CO2 SERPL-SCNC: 25 MMOL/L (ref 20–31)
CREAT SERPL-MCNC: 0.9 MG/DL (ref 0.5–0.9)
EOSINOPHIL # BLD: 0 K/UL (ref 0–0.4)
EOSINOPHILS RELATIVE PERCENT: 1 % (ref 1–4)
ERYTHROCYTE [DISTWIDTH] IN BLOOD BY AUTOMATED COUNT: 21.8 % (ref 12.5–15.4)
GFR, ESTIMATED: 73 ML/MIN/1.73M2
GLUCOSE SERPL-MCNC: 89 MG/DL (ref 70–99)
HCT VFR BLD AUTO: 22.6 % (ref 36–46)
HGB BLD-MCNC: 7.5 G/DL (ref 12–16)
LYMPHOCYTES NFR BLD: 0.5 K/UL (ref 1–4.8)
LYMPHOCYTES RELATIVE PERCENT: 13 % (ref 24–44)
MCH RBC QN AUTO: 31.2 PG (ref 26–34)
MCHC RBC AUTO-ENTMCNC: 33.1 G/DL (ref 31–37)
MCV RBC AUTO: 94.4 FL (ref 80–100)
MONOCYTES NFR BLD: 0.6 K/UL (ref 0.1–1.2)
MONOCYTES NFR BLD: 16 % (ref 2–11)
NEUTROPHILS NFR BLD: 69 % (ref 36–66)
NEUTS SEG NFR BLD: 2.6 K/UL (ref 1.8–7.7)
PLATELET # BLD AUTO: 103 K/UL (ref 140–450)
PMV BLD AUTO: 7.5 FL (ref 6–12)
POTASSIUM SERPL-SCNC: 3.7 MMOL/L (ref 3.7–5.3)
RBC # BLD AUTO: 2.39 M/UL (ref 4–5.2)
SODIUM SERPL-SCNC: 142 MMOL/L (ref 135–144)
WBC OTHER # BLD: 3.7 K/UL (ref 3.5–11)

## 2024-12-30 PROCEDURE — 6370000000 HC RX 637 (ALT 250 FOR IP): Performed by: HOSPITALIST

## 2024-12-30 PROCEDURE — 36415 COLL VENOUS BLD VENIPUNCTURE: CPT

## 2024-12-30 PROCEDURE — 6360000002 HC RX W HCPCS: Performed by: HOSPITALIST

## 2024-12-30 PROCEDURE — 85025 COMPLETE CBC W/AUTO DIFF WBC: CPT

## 2024-12-30 PROCEDURE — 2580000003 HC RX 258: Performed by: HOSPITALIST

## 2024-12-30 PROCEDURE — 80048 BASIC METABOLIC PNL TOTAL CA: CPT

## 2024-12-30 PROCEDURE — 2500000003 HC RX 250 WO HCPCS: Performed by: STUDENT IN AN ORGANIZED HEALTH CARE EDUCATION/TRAINING PROGRAM

## 2024-12-30 PROCEDURE — 99238 HOSP IP/OBS DSCHRG MGMT 30/<: CPT | Performed by: HOSPITALIST

## 2024-12-30 PROCEDURE — 6370000000 HC RX 637 (ALT 250 FOR IP): Performed by: STUDENT IN AN ORGANIZED HEALTH CARE EDUCATION/TRAINING PROGRAM

## 2024-12-30 RX ORDER — SCOLOPAMINE TRANSDERMAL SYSTEM 1 MG/1
1 PATCH, EXTENDED RELEASE TRANSDERMAL
Qty: 10 PATCH | Refills: 1 | Status: SHIPPED | OUTPATIENT
Start: 2024-12-30

## 2024-12-30 RX ORDER — CEPHALEXIN 500 MG/1
500 CAPSULE ORAL 3 TIMES DAILY
Qty: 21 CAPSULE | Refills: 0 | Status: SHIPPED | OUTPATIENT
Start: 2024-12-30 | End: 2025-01-06

## 2024-12-30 RX ORDER — OXYBUTYNIN CHLORIDE 10 MG/1
10 TABLET, EXTENDED RELEASE ORAL DAILY
Qty: 30 TABLET | Refills: 1 | Status: ON HOLD | OUTPATIENT
Start: 2024-12-30 | End: 2025-01-02

## 2024-12-30 RX ORDER — METOCLOPRAMIDE 5 MG/1
5 TABLET ORAL 4 TIMES DAILY
Qty: 120 TABLET | Refills: 3 | Status: SHIPPED | OUTPATIENT
Start: 2024-12-30

## 2024-12-30 RX ORDER — PHENAZOPYRIDINE HYDROCHLORIDE 100 MG/1
100 TABLET, FILM COATED ORAL 3 TIMES DAILY PRN
Qty: 9 TABLET | Refills: 0 | Status: ON HOLD | OUTPATIENT
Start: 2024-12-30 | End: 2025-01-02

## 2024-12-30 RX ORDER — PROCHLORPERAZINE MALEATE 10 MG
10 TABLET ORAL EVERY 6 HOURS PRN
Qty: 120 TABLET | Refills: 1 | Status: SHIPPED | OUTPATIENT
Start: 2024-12-30

## 2024-12-30 RX ADMIN — PHENAZOPYRIDINE HYDROCHLORIDE 200 MG: 100 TABLET ORAL at 09:01

## 2024-12-30 RX ADMIN — METOCLOPRAMIDE 5 MG: 5 INJECTION, SOLUTION INTRAMUSCULAR; INTRAVENOUS at 09:00

## 2024-12-30 RX ADMIN — SODIUM CHLORIDE, PRESERVATIVE FREE 10 ML: 5 INJECTION INTRAVENOUS at 09:01

## 2024-12-30 RX ADMIN — APIXABAN 5 MG: 5 TABLET, FILM COATED ORAL at 09:01

## 2024-12-30 RX ADMIN — SODIUM CHLORIDE: 9 INJECTION, SOLUTION INTRAVENOUS at 03:25

## 2024-12-30 RX ADMIN — COLCHICINE 0.6 MG: 0.6 TABLET, FILM COATED ORAL at 10:17

## 2024-12-30 RX ADMIN — PANTOPRAZOLE SODIUM 20 MG: 20 TABLET, DELAYED RELEASE ORAL at 09:01

## 2024-12-30 RX ADMIN — TAMSULOSIN HYDROCHLORIDE 0.4 MG: 0.4 CAPSULE ORAL at 09:00

## 2024-12-30 NOTE — PROGRESS NOTES
[facility]  Office: [phone]  Michael Lewis DO, Bart Lewis DO, Ángel Harry DO, Geremias Tsai DO, Cathy García MD, Kya Arellano MD, Jonathon Gilmore MD, Kalli Chou MD,  Alvin Frey MD, Opal Wang MD, Jennifer Cooney MD,  Geronimo Sams DO, Thelma Quijano MD, Shreyas Duarte MD, Franklyn Lewis DO, Regi Ríos MD,  Marky Russell DO, Yamilet Monroe MD, Qian Rain MD, Suzy Faith MD, Artur Irby MD,  Colton Clark MD, Jaky Reynoso MD, Amarilys Robledo MD, Francia Cantor MD, Krzysztof Garcia MD, Jhonatan Shipley MD, Frederick Wilson DO, Jabier Plummer MD, Geronimo Mckinley MD, Mohsin Reza, MD, Shirley Waterhouse, CNP,  Millie Moe CNP, [address], [place],  Josie Guthrie, CAT, Alicia Sheldon, CNP, Diamond Kwong, CNP, Mai Copeland, CNP, Radha Fowler, CNP, Angeles Enriquez, PA-C, Mervat Rowan PA-C, Junie Kamara, CNP, Mitzy Sanz, CNP,  Laura Gonzalez, CNP, Isabel Vaz, CNP, Grace Walter, CNP,  Yeny Gandhi, [place], Ada Nelson, CNP         [facility]   IN-PATIENT SERVICE   [facility]    Progress Note    12/30/2024    11:34 AM    Name:   Luci Castañeda  MRN:     [id number]     Acct:      [id number]   Room:   310/310-01   Day:  3  Admit Date:  12/27/2024 12:18 PM    PCP:   Nabil Ly MD  Code Status:  Full Code    Subjective:     Patient seen in follow-up for nausea/vomiting, acute cystitis without hematuria.  Patient states \"I am feeling better\"    Patient is doing better overall.  Her diet has improved with initiation of Reglan.  I did have a long discussion with the patient regarding the fact that she may need to increase her Reglan to 10 mg 4 times daily.  At the time being she will continue with 5 mg 4 times daily.  We will transition her Rocephin to Keflex.  She is doing well and at this point in time can be discharged home with outpatient follow-up.    The patient is noted to have chronic anemia.  Reticulocyte count earlier  25.3* 23.8* 22.6*   MCV 93.2 93.8 94.4   MCH 30.8 30.6 31.2   MCHC 33.0 32.6 33.1   RDW 22.1* 21.2* 21.8*   * 106* 103*   MPV 7.8 8.1 7.5     Chemistry:  Recent Labs     12/27/24  1325 12/27/24  1942 12/28/24  0635 12/28/24  1113 12/29/24  0645 12/30/24  0618     --  137  --  140 142   K 3.6* 3.4* 3.9  --  3.7 3.7     --  105  --  107 110*   CO2 23  --  23  --  24 25   GLUCOSE 101*  --  90  --  83 89   BUN 14  --  12  --  11 11   CREATININE 1.1*  --  0.9  --  0.9 0.9   MG 0.9* 1.3*  --  2.2  --   --    ANIONGAP 12  --  9  --  9 7*   LABGLOM 57*  --  73  --  73 73   CALCIUM 9.2  --  8.5*  --  8.5* 8.4*   PROBNP 324*  --   --   --   --   --    TROPHS 29*  --   --   --   --   --      Recent Labs     12/27/24  1325   AST 19   ALT 9   ALKPHOS 100   BILITOT 0.5   BILIDIR 0.1     ABG:  Lab Results   Component Value Date/Time    POCPH 7.367 08/25/2019 06:33 PM    POCPCO2 43.3 08/25/2019 06:33 PM    POCPO2 23.8 08/25/2019 06:33 PM    POCHCO3 24.8 08/25/2019 06:33 PM    NBEA 1 08/25/2019 06:33 PM    PBEA NOT REPORTED 08/25/2019 06:33 PM    SUI4JAL 26 08/25/2019 06:33 PM    QKRB6HRD 40 08/25/2019 06:33 PM    FIO2 1.0 08/25/2019 06:33 PM     Lab Results   Component Value Date/Time    SPECIAL RIGHT ARM 20CC 12/27/2024 06:24 PM     Lab Results   Component Value Date/Time    CULTURE NO GROWTH 2 DAYS 12/27/2024 06:24 PM       Radiology:  CT CHEST PULMONARY EMBOLISM W CONTRAST    Result Date: 12/27/2024  Residual small filling defect consistent with PE in the distal right main pulmonary artery extending into adjacent segmental branches.  Overall the clot burden is significantly improved compared to prior examination.       Physical Examination:     General appearance:  alert, cooperative and no distress  Mental Status:  oriented to person, place and time and normal affect  Lungs:  clear to auscultation bilaterally, normal effort  Heart:  regular rate and rhythm, no murmur  Abdomen:  soft, nontender, nondistended,

## 2024-12-30 NOTE — CARE COORDINATION
Discharge orders placed in Epic. CM notified Peyton with [facility] that patient will discharge home today. CM spoke with patient and she states that she will have transportation home and she verbalizes having no additional transitional needs at this time. RN to complete TAY[facility] for [facility].    Discharge Report    [facility]  Clinical Case Management Department  Written by: Trish Lloyd RN    Patient Name: Luci Castañeda  Attending Provider: Franklyn Lewis DO  Admit Date: 2024 12:18 PM  MRN: [id number]  Account: [id number]                     : 1965  Discharge Date: 24      Disposition: home w/ [facility] RN

## 2024-12-30 NOTE — PLAN OF CARE
Problem: Discharge Planning  Goal: Discharge to home or other facility with appropriate resources  12/30/2024 0216 by Amarilis Mcnulty RN  Outcome: Progressing  12/29/2024 2011 by Dariela Escobar LPN  Outcome: Progressing  Flowsheets  Taken 12/29/2024 1921 by Amarilis Mcnulty RN  Discharge to home or other facility with appropriate resources: Identify barriers to discharge with patient and caregiver  Taken 12/29/2024 0930 by Dariela Escobar LPN  Discharge to home or other facility with appropriate resources: Identify barriers to discharge with patient and caregiver     Problem: Pain  Goal: Verbalizes/displays adequate comfort level or baseline comfort level  12/30/2024 0216 by Amarilis Mcnulty RN  Outcome: Progressing  12/29/2024 2011 by Dariela Escobar LPN  Outcome: Progressing     Problem: Safety - Adult  Goal: Free from fall injury  12/30/2024 0216 by Amarilis Mcnulty RN  Outcome: Progressing  12/29/2024 2011 by Dariela Escobar LPN  Outcome: Progressing     Problem: ABCDS Injury Assessment  Goal: Absence of physical injury  12/30/2024 0216 by Amarilis Mcnulty RN  Outcome: Progressing  12/29/2024 2011 by Dariela Escobar LPN  Outcome: Progressing

## 2024-12-30 NOTE — DISCHARGE INSTR - COC
Continuity of Care Form    Patient Name: Luci Castañeda   :  1965  MRN:  [id number]    Admit date:  2024  Discharge date:  ***    Code Status Order: Full Code   Advance Directives:   Advance Care Flowsheet Documentation             Admitting Physician:  Franklyn Lewis DO  PCP: Nabil Ly MD    Discharging Nurse: ***  Discharging Hospital Unit/Room#: 310/310-01  Discharging Unit Phone Number: ***    Emergency Contact:   Extended Emergency Contact Information  Primary Emergency Contact: Janell Griggs  Home Phone: [phone]  Work Phone: [phone]  Mobile Phone: [phone]  Relation: Brother/Sister  Preferred language: English   needed? No  Secondary Emergency Contact: rick jha  Mobile Phone: [phone]  Relation: Friend  Preferred language: English   needed? No    Past Surgical History:  Past Surgical History:   Procedure Laterality Date    BLADDER SURGERY Right 2023    CYSTOSCOPY STENT REMOVAL performed by Bismark Amaya MD at [facility] OR    CARDIAC CATHETERIZATION  2021    CYSTO/URETERO/PYELOSCOPY, CALCULUS TX Right 2017    HOLMIUM LASER - CYSTOSCOPY, RIGHT URETEROSCOPY, RIGHT STENT EXCHANGE,STONE BASKETING performed by Bismark Amaya MD at [facility] OR    CYSTO/URETERO/PYELOSCOPY, CALCULUS TX Right 2019    HOLMIUM - CYSTO, URETEROSCOPY, LITHOTRIPSY, STENT PLACEMENT performed by Venkat Wang MD at [facility] OR    CYSTO/URETERO/PYELOSCOPY, CALCULUS TX Right 2019    HOLMIUM- CYSTO, URETEROSCOPY, LASER LITHO, STENT PLACEMENT AND RIGHT URETERAL BASKET STONE EXTRACTION performed by Bismark Amaya MD at [facility] OR    CYSTOSCOPY Right 2017    CYSTOSCOPY URETERAL STENT INSERTION,RIGHT performed by Mor Escalona MD at [facility] OR    CYSTOSCOPY Right 2019    CYSTOSCOPY RT URETERAL STENT INSERTION performed by Venkat Wang MD at [facility] OR    CYSTOSCOPY Right 2019    CYSTOSCOPY, RIGHT STENT REMOVAL, INSERTION OCCLUSIVE BALLOON, PT GOING TO

## 2024-12-30 NOTE — TELEPHONE ENCOUNTER
Melina  from [facility] called ans ask if Dr Ly will follow the patient for home care. Melina was told Dr Ly will follow.

## 2024-12-30 NOTE — PLAN OF CARE
Problem: Discharge Planning  Goal: Discharge to home or other facility with appropriate resources  Outcome: Progressing  Flowsheets (Taken 12/29/2024 6730)  Discharge to home or other facility with appropriate resources: Identify barriers to discharge with patient and caregiver     Problem: Pain  Goal: Verbalizes/displays adequate comfort level or baseline comfort level  Outcome: Progressing     Problem: Safety - Adult  Goal: Free from fall injury  Outcome: Progressing     Problem: ABCDS Injury Assessment  Goal: Absence of physical injury  Outcome: Progressing

## 2024-12-30 NOTE — DISCHARGE SUMMARY
[facility]  Office: [phone]  Michael Lewis DO, Bart Lewis DO, Ángel Harry DO, Geremias Tsai DO, Cathy García MD, Kya Arellano MD, Jonathon Gilmore MD, Kalli Chou MD,  Alvin Frey MD, Opal Wang MD, Jennifer Cooney MD,  Geronimo Sams DO, Thelma Quijano MD, Shreyas Duarte MD, Franklyn Lewis DO, Regi Ríos MD,  Marky Russell DO, Yamilet Monroe MD, Qian Rain MD, Suzy Faith MD, Artur Irby MD,  Colton Clark MD, Jaky Reynoso MD, Amarilys Robledo MD, Francia Cantor MD, Krzysztof Garcia MD, Jhonatan Shipley MD, Frederick Wilson DO, Jabier Plummer MD, Geronimo Mckinley MD, Mohsin Reza, MD, Shirley Waterhouse, CNP,  Millie Moe CNP, Frederick Street, CNP,  Josie Guthrie, CAT, Alicia Sheldon, CNP, Diamond Kwong, CNP, Mai Copeland, CNP, Radha Fowler, CNP, Angeles Enriquez, PA-C, Mervat Rowan PA-C, Junie Kamara, CNP, Mitzy Sanz, CNP,  Laura Gonzalez, CNP, Isabel Vaz, CNP, Grace Walter, CNP,  Yeny Gandhi, CNP, Ada Nelson, CNP         [facility]   IN-PATIENT SERVICE   [facility]    Discharge Summary     Patient ID: Luci Castañeda  :  1965   MRN: [id number]     ACCOUNT:  [id number]   Patient's PCP: Nabil Ly MD  Admit Date: 2024   Discharge Date: 2024  Length of Stay: 3  Code Status:  Full Code  Admitting Physician: Franklyn Lewis DO  Discharge Physician: Franklyn Lewis DO     Active Discharge Diagnoses:     Hospital Problem Lists:  Principal Problem:    Nausea and/or vomiting  Active Problems:    Complicated UTI (urinary tract infection)    Acute cystitis without hematuria  Resolved Problems:    * No resolved hospital problems. *      Admission Condition:  fair     Discharged Condition: good    Hospital Stay:     Hospital Course:  Lucimaria esther Castañeda is a 59 y.o. female who was admitted for the management of Nausea and/or vomiting , presented to ER with Shortness of Breath (Shortness of breath started

## 2024-12-30 NOTE — PLAN OF CARE
Problem: Discharge Planning  Goal: Discharge to home or other facility with appropriate resources  12/30/2024 1048 by Ian Potter RN  Outcome: Adequate for Discharge  Flowsheets (Taken 12/30/2024 0800)  Discharge to home or other facility with appropriate resources: Identify barriers to discharge with patient and caregiver  12/30/2024 0216 by Amarilis Mcnulty RN  Outcome: Progressing     Problem: Pain  Goal: Verbalizes/displays adequate comfort level or baseline comfort level  12/30/2024 1048 by Ian Potter RN  Outcome: Adequate for Discharge  12/30/2024 0216 by Amarilis Mcnulty RN  Outcome: Progressing     Problem: Safety - Adult  Goal: Free from fall injury  12/30/2024 1048 by Ian Potter RN  Outcome: Adequate for Discharge  12/30/2024 0216 by Amarilis Mcnulty RN  Outcome: Progressing     Problem: ABCDS Injury Assessment  Goal: Absence of physical injury  12/30/2024 1048 by Ian Potter RN  Outcome: Adequate for Discharge  12/30/2024 0216 by Amarilis Mcnulty RN  Outcome: Progressing

## 2024-12-30 NOTE — OR NURSING
Patient on surgery schedule at [facility] for 1/2/2024 with Dr. Chaudhry , office notified of patient admission to hospital

## 2024-12-31 ENCOUNTER — TELEPHONE (OUTPATIENT)
Age: 59
End: 2024-12-31

## 2024-12-31 ENCOUNTER — CARE COORDINATION (OUTPATIENT)
Dept: OTHER | Facility: CLINIC | Age: 59
End: 2024-12-31

## 2024-12-31 NOTE — CARE COORDINATION
Care Transitions Note    Initial Call - Call within 2 business days of discharge: Yes    Attempted to reach patient for transitions of care follow up. Unable to reach patient.    Outreach Attempts:   HIPAA compliant voicemail left for patient.     Patient: Luci Castañeda    Patient : 1965   MRN: [id number]    Reason for Admission: N/V,acute cystitis, SOB  Discharge Date: 24  RURS: Readmission Risk Score: 31.5    Last Discharge Facility       Date Complaint Diagnosis Description Type Department Provider    24 Shortness of Breath General weakness ... ED to Hosp-Admission (Discharged) (ADMITTED) [facility] [address] [place] Franklyn Lewis, DO; [place]...            Was this an external facility discharge? No    Follow Up Appointment:   Patient has hospital follow up appointment scheduled within 7 days of discharge.    Future Appointments         Provider Specialty Dept Phone    1/3/2025 11:30 AM Jess Bradley MD Oncology [phone]    1/3/2025 1:00 PM [facility] MED ONC CHAIR 09 Infusion Therapy [phone]    2025 1:45 PM Amadeo Sanchez MD Pulmonology [phone]    2025  1:00 PM [facility] MED ONC CHAIR 05 Infusion Therapy [phone]            Plan for follow-up on next business day.        Deborha Sood LPN- Care Coordinator  Associate Care Management  [address] [place] [address]  [address]  Phone: [phone]  [email or web address]

## 2024-12-31 NOTE — TELEPHONE ENCOUNTER
Luci was admitted to [facility] on 12/27/24 for Nausea/vomiting and discharged on 12/30/24.      A Care Navigator should contact her for care transition.  Please call Ghislaine to schedule a hospital follow up within 14 days of discharge.

## 2024-12-31 NOTE — TELEPHONE ENCOUNTER
Luci was admitted to [facility] on 12/27/24 for Nausea/vomiting and was discharged on 12/30/24.    Please contact Ghislaine to initiate care transition and to schedule hospital follow up within 14 days of discharge.

## 2025-01-01 LAB
MICROORGANISM SPEC CULT: NORMAL
MICROORGANISM SPEC CULT: NORMAL
SERVICE CMNT-IMP: NORMAL
SERVICE CMNT-IMP: NORMAL
SPECIMEN DESCRIPTION: NORMAL
SPECIMEN DESCRIPTION: NORMAL

## 2025-01-01 NOTE — PROGRESS NOTES
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  History of left nephrectomy in 1988   CURRENT THERAPY:         Neoadjuvant treatment with Keytruda/Taxol/carboplatin/Adriamycin/Cytoxan.  Treatment started 5/31/2024  BRIEF CASE HISTORY:       Ms. Luci Castañeda is a very pleasant 59 y.o. female with history of multiple comorbidities as listed.  She had history of left nephrectomy in 1988.  She had cardiac cath in 2021 for left ventricular hypertrophy.  Had recent problems with restrictive and obstructive pulmonary disease.  She continues follow-up with pulmonary. She was doing fine with routine mammogram being normal until this year where she had suspicious abnormalities in the mammogram done on 3/25/2024.  Diagnostic mammogram and left breast ultrasound April 9, 2024 showed suspicious 4.5 cm mass in the left breast 1 o'clock position.  Mildly thickened left axillary lymph nodes.  A biopsy was performed on 4/26/2024 and unfortunately that was positive for invasive ductal carcinoma.  Triple negative.  Biopsy from lymph node was negative for malignancy.  The patient is seen today for further management of her breast cancer. The patient did very well after her biopsy without any complication. The patient had no symptom preceding her diagnosis of cancer. No chest pain, shortness of breath, cough, sputum or hemoptysis, no back pain or monique aches, no weight loss or decreased appetite, no fever or night sweating. No headaches, and no other complaints.  No smoking social alcohol      INTERIM HISTORY:   Patient seen for follow-up triple negative breast cancer.  Patient is having cough and sputum. No fever. No hemoptysis.   Tolerated chemo well so far with few side

## 2025-01-02 ENCOUNTER — ANESTHESIA EVENT (OUTPATIENT)
Dept: OPERATING ROOM | Age: 60
End: 2025-01-02
Payer: COMMERCIAL

## 2025-01-02 ENCOUNTER — APPOINTMENT (OUTPATIENT)
Dept: GENERAL RADIOLOGY | Age: 60
End: 2025-01-02
Attending: UROLOGY
Payer: COMMERCIAL

## 2025-01-02 ENCOUNTER — HOSPITAL ENCOUNTER (OUTPATIENT)
Age: 60
Setting detail: OUTPATIENT SURGERY
Discharge: HOME | End: 2025-01-02
Attending: UROLOGY | Admitting: UROLOGY
Payer: COMMERCIAL

## 2025-01-02 ENCOUNTER — ANESTHESIA (OUTPATIENT)
Dept: OPERATING ROOM | Age: 60
End: 2025-01-02
Payer: COMMERCIAL

## 2025-01-02 VITALS
SYSTOLIC BLOOD PRESSURE: 125 MMHG | DIASTOLIC BLOOD PRESSURE: 83 MMHG | RESPIRATION RATE: 21 BRPM | BODY MASS INDEX: 34.1 KG/M2 | HEIGHT: 68 IN | OXYGEN SATURATION: 98 % | HEART RATE: 79 BPM | TEMPERATURE: 97.2 F | WEIGHT: 225 LBS

## 2025-01-02 DIAGNOSIS — N20.1 RIGHT URETERAL STONE: ICD-10-CM

## 2025-01-02 PROCEDURE — 3600000014 HC SURGERY LEVEL 4 ADDTL 15MIN: Performed by: UROLOGY

## 2025-01-02 PROCEDURE — 3600000004 HC SURGERY LEVEL 4 BASE: Performed by: UROLOGY

## 2025-01-02 PROCEDURE — 6360000002 HC RX W HCPCS: Performed by: ANESTHESIOLOGY

## 2025-01-02 PROCEDURE — 2720000010 HC SURG SUPPLY STERILE: Performed by: UROLOGY

## 2025-01-02 PROCEDURE — 3700000001 HC ADD 15 MINUTES (ANESTHESIA): Performed by: UROLOGY

## 2025-01-02 PROCEDURE — C1747 HC ENDOSCOPE, SINGLE, URINARY TRACT: HCPCS | Performed by: UROLOGY

## 2025-01-02 PROCEDURE — C1758 CATHETER, URETERAL: HCPCS | Performed by: UROLOGY

## 2025-01-02 PROCEDURE — 82365 CALCULUS SPECTROSCOPY: CPT

## 2025-01-02 PROCEDURE — 7100000010 HC PHASE II RECOVERY - FIRST 15 MIN: Performed by: UROLOGY

## 2025-01-02 PROCEDURE — 7100000011 HC PHASE II RECOVERY - ADDTL 15 MIN: Performed by: UROLOGY

## 2025-01-02 PROCEDURE — C2617 STENT, NON-COR, TEM W/O DEL: HCPCS | Performed by: UROLOGY

## 2025-01-02 PROCEDURE — 7100000000 HC PACU RECOVERY - FIRST 15 MIN: Performed by: UROLOGY

## 2025-01-02 PROCEDURE — 3700000000 HC ANESTHESIA ATTENDED CARE: Performed by: UROLOGY

## 2025-01-02 PROCEDURE — 7100000001 HC PACU RECOVERY - ADDTL 15 MIN: Performed by: UROLOGY

## 2025-01-02 PROCEDURE — C1769 GUIDE WIRE: HCPCS | Performed by: UROLOGY

## 2025-01-02 PROCEDURE — 2500000003 HC RX 250 WO HCPCS: Performed by: UROLOGY

## 2025-01-02 PROCEDURE — 2580000003 HC RX 258

## 2025-01-02 PROCEDURE — 6360000002 HC RX W HCPCS

## 2025-01-02 PROCEDURE — 2709999900 HC NON-CHARGEABLE SUPPLY: Performed by: UROLOGY

## 2025-01-02 DEVICE — URETERAL STENT
Type: IMPLANTABLE DEVICE | Site: URETER | Status: FUNCTIONAL
Brand: CONTOUR™

## 2025-01-02 RX ORDER — SUCCINYLCHOLINE/SOD CL,ISO/PF 100 MG/5ML
SYRINGE (ML) INTRAVENOUS
Status: DISCONTINUED | OUTPATIENT
Start: 2025-01-02 | End: 2025-01-02 | Stop reason: SDUPTHER

## 2025-01-02 RX ORDER — FENTANYL CITRATE 50 UG/ML
INJECTION, SOLUTION INTRAMUSCULAR; INTRAVENOUS
Status: DISCONTINUED | OUTPATIENT
Start: 2025-01-02 | End: 2025-01-02 | Stop reason: SDUPTHER

## 2025-01-02 RX ORDER — SODIUM CHLORIDE, SODIUM LACTATE, POTASSIUM CHLORIDE, CALCIUM CHLORIDE 600; 310; 30; 20 MG/100ML; MG/100ML; MG/100ML; MG/100ML
INJECTION, SOLUTION INTRAVENOUS
Status: DISCONTINUED | OUTPATIENT
Start: 2025-01-02 | End: 2025-01-02 | Stop reason: SDUPTHER

## 2025-01-02 RX ORDER — PROCHLORPERAZINE EDISYLATE 5 MG/ML
5 INJECTION INTRAMUSCULAR; INTRAVENOUS
Status: COMPLETED | OUTPATIENT
Start: 2025-01-02 | End: 2025-01-02

## 2025-01-02 RX ORDER — MIDAZOLAM HYDROCHLORIDE 2 MG/2ML
2 INJECTION, SOLUTION INTRAMUSCULAR; INTRAVENOUS ONCE AS NEEDED
Status: COMPLETED | OUTPATIENT
Start: 2025-01-02 | End: 2025-01-02

## 2025-01-02 RX ORDER — OXYBUTYNIN CHLORIDE 10 MG/1
10 TABLET, EXTENDED RELEASE ORAL DAILY
Qty: 30 TABLET | Refills: 1 | Status: SHIPPED | OUTPATIENT
Start: 2025-01-02

## 2025-01-02 RX ORDER — NALOXONE HYDROCHLORIDE 0.4 MG/ML
INJECTION, SOLUTION INTRAMUSCULAR; INTRAVENOUS; SUBCUTANEOUS PRN
Status: DISCONTINUED | OUTPATIENT
Start: 2025-01-02 | End: 2025-01-02 | Stop reason: HOSPADM

## 2025-01-02 RX ORDER — METOCLOPRAMIDE HYDROCHLORIDE 5 MG/ML
10 INJECTION INTRAMUSCULAR; INTRAVENOUS
Status: DISCONTINUED | OUTPATIENT
Start: 2025-01-02 | End: 2025-01-02 | Stop reason: HOSPADM

## 2025-01-02 RX ORDER — LIDOCAINE HYDROCHLORIDE 10 MG/ML
INJECTION, SOLUTION EPIDURAL; INFILTRATION; INTRACAUDAL; PERINEURAL
Status: DISCONTINUED | OUTPATIENT
Start: 2025-01-02 | End: 2025-01-02 | Stop reason: SDUPTHER

## 2025-01-02 RX ORDER — ONDANSETRON 2 MG/ML
4 INJECTION INTRAMUSCULAR; INTRAVENOUS EVERY 6 HOURS PRN
Status: DISCONTINUED | OUTPATIENT
Start: 2025-01-02 | End: 2025-01-02 | Stop reason: HOSPADM

## 2025-01-02 RX ORDER — TAMSULOSIN HYDROCHLORIDE 0.4 MG/1
0.4 CAPSULE ORAL DAILY
Qty: 30 CAPSULE | Refills: 0 | Status: SHIPPED | OUTPATIENT
Start: 2025-01-02

## 2025-01-02 RX ORDER — LABETALOL HYDROCHLORIDE 5 MG/ML
10 INJECTION, SOLUTION INTRAVENOUS
Status: DISCONTINUED | OUTPATIENT
Start: 2025-01-02 | End: 2025-01-02 | Stop reason: HOSPADM

## 2025-01-02 RX ORDER — PROPOFOL 10 MG/ML
INJECTION, EMULSION INTRAVENOUS
Status: DISCONTINUED | OUTPATIENT
Start: 2025-01-02 | End: 2025-01-02 | Stop reason: SDUPTHER

## 2025-01-02 RX ORDER — SODIUM CHLORIDE 9 MG/ML
INJECTION, SOLUTION INTRAVENOUS PRN
Status: DISCONTINUED | OUTPATIENT
Start: 2025-01-02 | End: 2025-01-02 | Stop reason: HOSPADM

## 2025-01-02 RX ORDER — SODIUM CHLORIDE 0.9 % (FLUSH) 0.9 %
5-40 SYRINGE (ML) INJECTION PRN
Status: DISCONTINUED | OUTPATIENT
Start: 2025-01-02 | End: 2025-01-02 | Stop reason: HOSPADM

## 2025-01-02 RX ORDER — MIDAZOLAM HYDROCHLORIDE 2 MG/2ML
2 INJECTION, SOLUTION INTRAMUSCULAR; INTRAVENOUS ONCE
Status: COMPLETED | OUTPATIENT
Start: 2025-01-02 | End: 2025-01-02

## 2025-01-02 RX ORDER — PHENAZOPYRIDINE HYDROCHLORIDE 100 MG/1
100 TABLET, FILM COATED ORAL 3 TIMES DAILY PRN
Qty: 21 TABLET | Refills: 0 | Status: SHIPPED | OUTPATIENT
Start: 2025-01-02 | End: 2025-01-09

## 2025-01-02 RX ORDER — CEFAZOLIN SODIUM 1 G/3ML
INJECTION, POWDER, FOR SOLUTION INTRAMUSCULAR; INTRAVENOUS
Status: DISCONTINUED | OUTPATIENT
Start: 2025-01-02 | End: 2025-01-02 | Stop reason: SDUPTHER

## 2025-01-02 RX ORDER — ONDANSETRON 2 MG/ML
4 INJECTION INTRAMUSCULAR; INTRAVENOUS
Status: DISCONTINUED | OUTPATIENT
Start: 2025-01-02 | End: 2025-01-02 | Stop reason: HOSPADM

## 2025-01-02 RX ORDER — MORPHINE SULFATE 2 MG/ML
2 INJECTION, SOLUTION INTRAMUSCULAR; INTRAVENOUS EVERY 5 MIN PRN
Status: DISCONTINUED | OUTPATIENT
Start: 2025-01-02 | End: 2025-01-02 | Stop reason: HOSPADM

## 2025-01-02 RX ORDER — MAGNESIUM HYDROXIDE 1200 MG/15ML
LIQUID ORAL CONTINUOUS PRN
Status: COMPLETED | OUTPATIENT
Start: 2025-01-02 | End: 2025-01-02

## 2025-01-02 RX ORDER — SODIUM CHLORIDE 0.9 % (FLUSH) 0.9 %
5-40 SYRINGE (ML) INJECTION EVERY 12 HOURS SCHEDULED
Status: DISCONTINUED | OUTPATIENT
Start: 2025-01-02 | End: 2025-01-02 | Stop reason: HOSPADM

## 2025-01-02 RX ORDER — DEXAMETHASONE SODIUM PHOSPHATE 10 MG/ML
INJECTION, SOLUTION INTRAMUSCULAR; INTRAVENOUS
Status: DISCONTINUED | OUTPATIENT
Start: 2025-01-02 | End: 2025-01-02 | Stop reason: SDUPTHER

## 2025-01-02 RX ADMIN — DEXAMETHASONE SODIUM PHOSPHATE 10 MG: 10 INJECTION INTRAMUSCULAR; INTRAVENOUS at 14:37

## 2025-01-02 RX ADMIN — FENTANYL CITRATE 50 MCG: 50 INJECTION, SOLUTION INTRAMUSCULAR; INTRAVENOUS at 14:26

## 2025-01-02 RX ADMIN — LIDOCAINE HYDROCHLORIDE 50 MG: 10 INJECTION, SOLUTION EPIDURAL; INFILTRATION; INTRACAUDAL; PERINEURAL at 14:28

## 2025-01-02 RX ADMIN — Medication 100 MG: at 14:28

## 2025-01-02 RX ADMIN — FENTANYL CITRATE 50 MCG: 50 INJECTION, SOLUTION INTRAMUSCULAR; INTRAVENOUS at 14:58

## 2025-01-02 RX ADMIN — MIDAZOLAM HYDROCHLORIDE 2 MG: 1 INJECTION, SOLUTION INTRAMUSCULAR; INTRAVENOUS at 14:06

## 2025-01-02 RX ADMIN — PHENYLEPHRINE HYDROCHLORIDE 150 MCG: 10 INJECTION INTRAVENOUS at 15:12

## 2025-01-02 RX ADMIN — HYDROMORPHONE HYDROCHLORIDE 0.5 MG: 1 INJECTION, SOLUTION INTRAMUSCULAR; INTRAVENOUS; SUBCUTANEOUS at 16:04

## 2025-01-02 RX ADMIN — PROPOFOL 200 MG: 10 INJECTION, EMULSION INTRAVENOUS at 14:28

## 2025-01-02 RX ADMIN — ONDANSETRON 4 MG: 2 INJECTION INTRAMUSCULAR; INTRAVENOUS at 14:06

## 2025-01-02 RX ADMIN — SODIUM CHLORIDE, POTASSIUM CHLORIDE, SODIUM LACTATE AND CALCIUM CHLORIDE: 600; 310; 30; 20 INJECTION, SOLUTION INTRAVENOUS at 14:21

## 2025-01-02 RX ADMIN — PROCHLORPERAZINE EDISYLATE 5 MG: 5 INJECTION INTRAMUSCULAR; INTRAVENOUS at 16:04

## 2025-01-02 RX ADMIN — MIDAZOLAM HYDROCHLORIDE 2 MG: 1 INJECTION, SOLUTION INTRAMUSCULAR; INTRAVENOUS at 14:12

## 2025-01-02 RX ADMIN — PROPOFOL 100 MG: 10 INJECTION, EMULSION INTRAVENOUS at 14:58

## 2025-01-02 RX ADMIN — CEFAZOLIN 2 G: 1 INJECTION, POWDER, FOR SOLUTION INTRAMUSCULAR; INTRAVENOUS at 14:45

## 2025-01-02 RX ADMIN — ONDANSETRON 4 MG: 2 INJECTION INTRAMUSCULAR; INTRAVENOUS at 14:12

## 2025-01-02 ASSESSMENT — PAIN SCALES - GENERAL
PAINLEVEL_OUTOF10: 3
PAINLEVEL_OUTOF10: 7

## 2025-01-02 ASSESSMENT — PAIN DESCRIPTION - DESCRIPTORS: DESCRIPTORS: BURNING;DISCOMFORT

## 2025-01-02 ASSESSMENT — ENCOUNTER SYMPTOMS
SHORTNESS OF BREATH: 1
DYSPNEA ACTIVITY LEVEL: AFTER AMBULATING 2 FLIGHTS OF STAIRS

## 2025-01-02 ASSESSMENT — PAIN DESCRIPTION - LOCATION: LOCATION: VAGINA

## 2025-01-02 ASSESSMENT — PAIN DESCRIPTION - ONSET: ONSET: GRADUAL

## 2025-01-02 ASSESSMENT — PAIN - FUNCTIONAL ASSESSMENT: PAIN_FUNCTIONAL_ASSESSMENT: 0-10

## 2025-01-02 ASSESSMENT — COPD QUESTIONNAIRES: CAT_SEVERITY: NO INTERVAL CHANGE

## 2025-01-02 ASSESSMENT — PAIN DESCRIPTION - PAIN TYPE: TYPE: SURGICAL PAIN

## 2025-01-02 NOTE — OP NOTE
Operative Note      Patient: Luci Castañeda  YOB: 1965  MRN: [id number]    Date of Procedure: 1/2/2025    Pre-Op Diagnosis Codes:      * Right ureteral stone [N20.1]    Post-Op Diagnosis: Same       Procedure(s):  CYSTOSCOPY, URETEROSCOPY, HOLMIUM LASER LITHOTRIPSY, RIGHT STENT EXCHANGE, STONE BASKETING    Surgeon(s):  Frederick Chaudhry Jr., MD Murtagh, Daniel S Jr., MD    Assistant:   Resident: Livier Vasquez MD    Anesthesia: General    Estimated Blood Loss (mL): Minimal    Complications: None    Specimens:   ID Type Source Tests Collected by Time Destination   1 : RIGHT URETERAL STONES Stone (Calculus) Ureter STONE ANALYSIS Frederick Chaudhry Jr., MD 1/2/2025 1513        Implants:  Implant Name Type Inv. Item Serial No.  Lot No. LRB No. Used Action   STENT URET 6FR L26CM PERCFLX HYDR+ TAPR TIP GRAD - [id number]  STENT URET 6FR L26CM PERCFLX HYDR+ TAPR TIP GRAD  [facility] [id number] Right 1 Implanted   STENT URET 6FR L26CM PERCFLX HYDR+ DBL PGTL THRD 2 - [id number]  STENT URET 6FR L26CM PERCFLX HYDR+ DBL PGTL THRD 2  [employer] [facility] [id number] Right 1 Explanted   STENT URET 6FR L26CM PERCFLX HYDR+ DBL PGTL THRD 2 - [id number]  STENT URET 6FR L26CM PERCFLX HYDR+ DBL PGTL THRD 2  [employer] [facility] [id number] Right 1 Explanted   STENT URET 4.8FR L28CM PERCFLX + HYDR+ FIRM DUROMETER DBL - [id number]  STENT URET 4.8FR L28CM PERCFLX + HYDR+ FIRM DUROMETER DBL  [employer] [facility] [id number] Right 1 Explanted         Drains:   Nephrostomy Tube Right Flank 24 fr (Active)       Findings:  Infection Present At Time Of Surgery (PATOS) (choose all levels that have infection present):  No infection present    Indications for procedure:  Patient is a 59-year-old female with an extensive history of nephrolithiasis requiring multiple lithotripsies in the past.  Patient recently was found to have multiple obstructing right-sided ureteral stones concerning for [address] and

## 2025-01-02 NOTE — PLAN OF CARE
Dr. Chaudhry and Dr. Alcantara notified of O2 sat of 87-88 % on RA and patient is tachycardic at 110. No new orders as patient is currently  being treated for PE.

## 2025-01-02 NOTE — H&P
HOLMIUM LASER LITHOTRIPSY, STENT PLACEMENT performed by Frederick Chaudhry Jr., MD at [facility] OR    CYSTOSCOPY Right 11/01/2022    CYSTOSCOPY INSERTION OCCLUSIVE BALLOON performed by Bismark Amaya MD at [facility] OR    CYSTOSCOPY Right 11/28/2023    CYSTOSCOPY RIGHT URETEROSCOPY HOLMIUM LASER LITHOTRIPSY RIGHT STENT PLACEMENT performed by Bismark Amaya MD at [facility] OR    CYSTOSCOPY Right 08/27/2024    **2**CYSTOSCOPY, URETEROSCOPY, HOLMIUM LASER LITHOTRIPSY, WITH URETERAL STENT INSERTION performed by Mor Escalona MD at [facility] OR    CYSTOSCOPY N/A 09/01/2024    CYSTOSCOPY URETEROSCOPY HOLMIUM  LASER STANDBY, STENT INSERTION performed by Bismark Amaya MD at [facility] OR    CYSTOSCOPY Right 12/20/2024    CYSTOSCOPY URETERAL RIGHT STENT INSERTION (sTRINGS REMOVED) performed by Venkat Wang MD at [facility] OR    CYSTOSCOPY INSERTION / REMOVAL STENT / STONE Right 12/31/2019    CYSTOSCOPY, STENT REMOVAL performed by Bismark Amaya MD at [facility] OR    DILATION AND CURETTAGE OF UTERUS N/A 06/30/2022    DILATATION AND CURETTAGE, HYSTEROSCOPY performed by Loretta Chanel MD at [facility] OR    INTRAOCULAR LENS PROSTHESIS INSERTION Bilateral 2008    IR GUIDED NEPHROSTOMY CATH PLACEMENT RIGHT  11/01/2022    IR NEPHROSTOMY PERCUTANEOUS RIGHT 11/1/2022 Xavi Agosto MD [facility] SPECIAL PROCEDURES    IR GUIDED NEPHROSTOMY CATH PLACEMENT RIGHT  08/10/2023    IR NEPHROSTOMY PERCUTANEOUS RIGHT 8/10/2023 Xavi Agosto MD [facility] SPECIAL PROCEDURES    IR GUIDED NEPHROSTOMY CATH PLACEMENT RIGHT  08/21/2023    IR NEPHROSTOMY PERCUTANEOUS RIGHT 8/21/2023 [facility] SPECIAL PROCEDURES    IR PORT PLACEMENT > 5 YEARS  05/17/2024    IR PORT PLACEMENT EQUAL OR GREATER THAN 5 YEARS 5/17/2024 Alex Palmer MD [facility] SPECIAL PROCEDURES    KIDNEY STONE REMOVAL Right 11/01/2022    RIGHT PERCUTANEOUS NEPHROLITHOTOMY, NEPHROSTOMY PLACEMENT performed by Bismark Amaya MD at [facility] OR    KIDNEY STONE REMOVAL Right 08/21/2023    HOLMIUM, PERCUTANEOUS NEPHROLITHOTOMY,  Arthralgia    Other fatigue    Oxygen desaturation    Mild persistent asthma without complication    LVH (left ventricular hypertrophy)    Chronic left shoulder pain    Hydronephrosis with renal and ureteral calculous obstruction    S/p Hscope, D&C 6/30/22    Postmenopausal bleeding    Endometrial polyp    Nephrolithiasis    Left breast abscess    History of right knee joint replacement    Morbid obesity    Hydronephrosis of right kidney    Right renal mass    Recurrent kidney stones    Benign essential tremor    Invasive ductal carcinoma of breast, left (HCC)    Mixed restrictive and obstructive lung disease (HCC)    Coronary artery disease involving native coronary artery of native heart without angina pectoris    Primary insomnia    History of COVID-19    Leg swelling    History of left breast cancer    Poor sleep    Fatty liver    Severe persistent asthma with acute exacerbation    Malignant neoplasm of upper-outer quadrant of left breast in female, estrogen receptor negative (HCC)    Chronic left-sided low back pain without sciatica    Obstructive uropathy    H/O left radical nephrectomy    Acute saddle pulmonary embolism (HCC)    Monoarticular arthritis    Gouty arthritis of right great toe    Debility    Anemia, normocytic normochromic    Thrombocytopenia (HCC)    Rectal bleeding    History of pulmonary embolism    Nausea and/or vomiting    Complicated UTI (urinary tract infection)    Acute cystitis without hematuria       Plan: URS HLL and stent    Risks benefits and alternative procedures are explained, informed consent is obtained, and the patient elects to proceed.

## 2025-01-02 NOTE — ANESTHESIA PRE PROCEDURE
21.8 12/30/2024 06:18 AM     12/30/2024 06:18 AM     03/22/2012 11:41 AM       CMP:   Lab Results   Component Value Date/Time     12/30/2024 06:18 AM    K 3.7 12/30/2024 06:18 AM     12/30/2024 06:18 AM    CO2 25 12/30/2024 06:18 AM    BUN 11 12/30/2024 06:18 AM    CREATININE 0.9 12/30/2024 06:18 AM    GFRAA >60 09/08/2022 11:58 AM    LABGLOM 73 12/30/2024 06:18 AM    LABGLOM 68 04/15/2024 01:27 PM    GLUCOSE 89 12/30/2024 06:18 AM    GLUCOSE 88 03/22/2012 11:41 AM    CALCIUM 8.4 12/30/2024 06:18 AM    BILITOT 0.5 12/27/2024 01:25 PM    ALKPHOS 100 12/27/2024 01:25 PM    AST 19 12/27/2024 01:25 PM    ALT 9 12/27/2024 01:25 PM       POC Tests: No results for input(s): \"POCGLU\", \"POCNA\", \"POCK\", \"POCCL\", \"POCBUN\", \"POCHEMO\", \"POCHCT\" in the last 72 hours.    Coags:   Lab Results   Component Value Date/Time    PROTIME 13.3 12/09/2024 11:30 AM    INR 1.3 12/09/2024 11:30 AM    APTT 30.7 12/09/2024 02:15 PM       HCG (If Applicable):   Lab Results   Component Value Date    PREGSERUM NEGATIVE 08/25/2011        ABGs: No results found for: \"PHART\", \"PO2ART\", \"EUA5VFB\", \"GSB5MHV\", \"BEART\", \"W7PCHWNG\"     Type & Screen (If Applicable):  Lab Results   Component Value Date    ABORH B POSITIVE 12/14/2024    LABANTI NEGATIVE 12/14/2024       Drug/Infectious Status (If Applicable):  No results found for: \"HIV\", \"HEPCAB\"    COVID-19 Screening (If Applicable):   Lab Results   Component Value Date/Time    COVID19 Positive 12/26/2023 05:07 PM    COVID19 Not Detected 09/18/2020 05:35 PM           Anesthesia Evaluation  Patient summary reviewed   no history of anesthetic complications:   Airway: Mallampati: III  TM distance: >3 FB   Neck ROM: full  Mouth opening: > = 3 FB   Dental:          Pulmonary:   (+)  COPD: no interval change,  shortness of breath: chronic,   sleep apnea: on CPAP,   decreased breath sounds    asthma: seasonal asthma,                            Cardiovascular:    (+) hypertension: no

## 2025-01-02 NOTE — DISCHARGE INSTRUCTIONS
Discharge instructions: Ureteroscopy lithotripsy and stent placement (with string):  Please pull stent on the morning of 1/7/2025.  There may be some pain associated with the stent removal, which is usually self limiting.  We suggest using the pain medication prescribed for you and a nonsteroidal anti-inflammatory such as Ibuprofen, if you are able to take this medication, to control symptoms.  Take Ibuprofen or Tylenol as directed for 24 hrs after stent pull.  Please stay hydrated.  Please call with questions.  You may see blood in the urine after the procedure.  This should resolve over the next couple days.  Please stay hydrated.  You may see intermittent blood in the urine while the stent is in place.  This is expected.  You may experience flank pain, and/or frequency/urgency of urination while the stent is in place.  Please use Levsin and Flomax (Tamsulosin) to help with these symptoms.    No heavy lifting, >10 lbs for today  Please avoid strenuous activity for today  You should walk moderately at home  You are ok to shower  Please resume regular diet as tolerated  Please take prescriptions as directed no driving while on narcotics  Please call attending physician or hospital  with questions  Call or Present to ED if fever (> 101F), intractable nausea vomiting or pain.  Pt should follow up with regular urologist in 4-6 weeks, call to confirm appointment.     No alcoholic beverages, no driving or operating machinery, no making important decisions for 24 hours.   You may have a normal diet but should eat lightly day of surgery.  Drink plenty of fluids.  Urinate within 8 hours after surgery, if unable to urinate call your doctor

## 2025-01-03 ENCOUNTER — HOSPITAL ENCOUNTER (OUTPATIENT)
Dept: INFUSION THERAPY | Age: 60
Discharge: HOME | End: 2025-01-03
Payer: COMMERCIAL

## 2025-01-03 ENCOUNTER — TELEPHONE (OUTPATIENT)
Dept: ONCOLOGY | Age: 60
End: 2025-01-03

## 2025-01-03 ENCOUNTER — CARE COORDINATION (OUTPATIENT)
Dept: OTHER | Facility: CLINIC | Age: 60
End: 2025-01-03

## 2025-01-03 ENCOUNTER — OFFICE VISIT (OUTPATIENT)
Dept: ONCOLOGY | Age: 60
End: 2025-01-03
Payer: COMMERCIAL

## 2025-01-03 VITALS
RESPIRATION RATE: 16 BRPM | HEART RATE: 98 BPM | BODY MASS INDEX: 34.67 KG/M2 | SYSTOLIC BLOOD PRESSURE: 112 MMHG | WEIGHT: 228 LBS | TEMPERATURE: 98.2 F | DIASTOLIC BLOOD PRESSURE: 75 MMHG

## 2025-01-03 DIAGNOSIS — Z17.1 MALIGNANT NEOPLASM OF UPPER-OUTER QUADRANT OF LEFT BREAST IN FEMALE, ESTROGEN RECEPTOR NEGATIVE (HCC): Primary | ICD-10-CM

## 2025-01-03 DIAGNOSIS — C50.412 MALIGNANT NEOPLASM OF UPPER-OUTER QUADRANT OF LEFT BREAST IN FEMALE, ESTROGEN RECEPTOR NEGATIVE (HCC): Primary | ICD-10-CM

## 2025-01-03 DIAGNOSIS — C50.912 INVASIVE DUCTAL CARCINOMA OF BREAST, LEFT (HCC): ICD-10-CM

## 2025-01-03 LAB
ALBUMIN SERPL-MCNC: 3.1 G/DL (ref 3.5–5.2)
ALBUMIN/GLOB SERPL: 1.1 {RATIO} (ref 1–2.5)
ALP SERPL-CCNC: 99 U/L (ref 35–104)
ALT SERPL-CCNC: 9 U/L (ref 5–33)
AMYLASE SERPL-CCNC: 30 U/L (ref 28–100)
ANION GAP SERPL CALCULATED.3IONS-SCNC: 12 MMOL/L (ref 9–17)
AST SERPL-CCNC: 24 U/L
BASOPHILS # BLD: 0 K/UL (ref 0–0.2)
BASOPHILS NFR BLD: 0 % (ref 0–2)
BILIRUB SERPL-MCNC: 0.3 MG/DL (ref 0.3–1.2)
BUN SERPL-MCNC: 12 MG/DL (ref 8–23)
CALCIUM SERPL-MCNC: 9 MG/DL (ref 8.6–10.4)
CHLORIDE SERPL-SCNC: 105 MMOL/L (ref 98–107)
CO2 SERPL-SCNC: 23 MMOL/L (ref 20–31)
CORTIS SERPL-MCNC: 1.2 UG/DL (ref 2.5–19.5)
CORTISOL COLLECTION INFO: ABNORMAL
CREAT SERPL-MCNC: 0.9 MG/DL (ref 0.5–0.9)
EOSINOPHIL # BLD: 0 K/UL (ref 0–0.4)
EOSINOPHILS RELATIVE PERCENT: 0 % (ref 1–4)
ERYTHROCYTE [DISTWIDTH] IN BLOOD BY AUTOMATED COUNT: 23 % (ref 12.5–15.4)
GFR, ESTIMATED: 73 ML/MIN/1.73M2
GLUCOSE SERPL-MCNC: 103 MG/DL (ref 70–99)
HCT VFR BLD AUTO: 26.6 % (ref 36–46)
HGB BLD-MCNC: 8.9 G/DL (ref 12–16)
LIPASE SERPL-CCNC: 35 U/L (ref 13–60)
LYMPHOCYTES NFR BLD: 0.48 K/UL (ref 1–4.8)
LYMPHOCYTES RELATIVE PERCENT: 8 % (ref 24–44)
MAGNESIUM SERPL-MCNC: 1 MG/DL (ref 1.6–2.6)
MCH RBC QN AUTO: 31.8 PG (ref 26–34)
MCHC RBC AUTO-ENTMCNC: 33.3 G/DL (ref 31–37)
MCV RBC AUTO: 95.5 FL (ref 80–100)
MONOCYTES NFR BLD: 0.6 K/UL (ref 0.1–1.2)
MONOCYTES NFR BLD: 10 % (ref 2–11)
MORPHOLOGY: ABNORMAL
NEUTROPHILS NFR BLD: 82 % (ref 36–66)
NEUTS SEG NFR BLD: 4.92 K/UL (ref 1.8–7.7)
PLATELET # BLD AUTO: 110 K/UL (ref 140–450)
PMV BLD AUTO: 8.1 FL (ref 6–12)
POTASSIUM SERPL-SCNC: 3.5 MMOL/L (ref 3.7–5.3)
PROT SERPL-MCNC: 5.8 G/DL (ref 6.4–8.3)
RBC # BLD AUTO: 2.79 M/UL (ref 4–5.2)
SODIUM SERPL-SCNC: 140 MMOL/L (ref 135–144)
WBC OTHER # BLD: 6 K/UL (ref 3.5–11)

## 2025-01-03 PROCEDURE — 85025 COMPLETE CBC W/AUTO DIFF WBC: CPT

## 2025-01-03 PROCEDURE — 96413 CHEMO IV INFUSION 1 HR: CPT

## 2025-01-03 PROCEDURE — 80053 COMPREHEN METABOLIC PANEL: CPT

## 2025-01-03 PROCEDURE — 2500000003 HC RX 250 WO HCPCS: Performed by: INTERNAL MEDICINE

## 2025-01-03 PROCEDURE — 83690 ASSAY OF LIPASE: CPT

## 2025-01-03 PROCEDURE — 82533 TOTAL CORTISOL: CPT

## 2025-01-03 PROCEDURE — 99214 OFFICE O/P EST MOD 30 MIN: CPT | Performed by: INTERNAL MEDICINE

## 2025-01-03 PROCEDURE — 3078F DIAST BP <80 MM HG: CPT | Performed by: INTERNAL MEDICINE

## 2025-01-03 PROCEDURE — 3074F SYST BP LT 130 MM HG: CPT | Performed by: INTERNAL MEDICINE

## 2025-01-03 PROCEDURE — 2580000003 HC RX 258: Performed by: INTERNAL MEDICINE

## 2025-01-03 PROCEDURE — 82150 ASSAY OF AMYLASE: CPT

## 2025-01-03 PROCEDURE — 83735 ASSAY OF MAGNESIUM: CPT

## 2025-01-03 PROCEDURE — 6360000002 HC RX W HCPCS: Performed by: INTERNAL MEDICINE

## 2025-01-03 RX ORDER — ACETAMINOPHEN 325 MG/1
650 TABLET ORAL
OUTPATIENT
Start: 2025-02-14

## 2025-01-03 RX ORDER — SODIUM CHLORIDE 9 MG/ML
INJECTION, SOLUTION INTRAVENOUS CONTINUOUS
OUTPATIENT
Start: 2025-02-14

## 2025-01-03 RX ORDER — HYDROCORTISONE SODIUM SUCCINATE 100 MG/2ML
100 INJECTION INTRAMUSCULAR; INTRAVENOUS
OUTPATIENT
Start: 2025-02-14

## 2025-01-03 RX ORDER — SODIUM CHLORIDE 9 MG/ML
5-250 INJECTION, SOLUTION INTRAVENOUS PRN
OUTPATIENT
Start: 2025-02-14

## 2025-01-03 RX ORDER — SODIUM CHLORIDE 0.9 % (FLUSH) 0.9 %
5-40 SYRINGE (ML) INJECTION PRN
OUTPATIENT
Start: 2025-02-14

## 2025-01-03 RX ORDER — HEPARIN SODIUM (PORCINE) LOCK FLUSH IV SOLN 100 UNIT/ML 100 UNIT/ML
500 SOLUTION INTRAVENOUS PRN
OUTPATIENT
Start: 2025-02-14

## 2025-01-03 RX ORDER — ONDANSETRON 2 MG/ML
8 INJECTION INTRAMUSCULAR; INTRAVENOUS
OUTPATIENT
Start: 2025-02-14

## 2025-01-03 RX ORDER — ACETAMINOPHEN 325 MG/1
650 TABLET ORAL
Start: 2025-02-14

## 2025-01-03 RX ORDER — SODIUM CHLORIDE 9 MG/ML
5-250 INJECTION, SOLUTION INTRAVENOUS PRN
Status: DISCONTINUED | OUTPATIENT
Start: 2025-01-03 | End: 2025-01-04 | Stop reason: HOSPADM

## 2025-01-03 RX ORDER — DIPHENHYDRAMINE HYDROCHLORIDE 50 MG/ML
50 INJECTION INTRAMUSCULAR; INTRAVENOUS
OUTPATIENT
Start: 2025-02-14

## 2025-01-03 RX ORDER — HEPARIN 100 UNIT/ML
500 SYRINGE INTRAVENOUS PRN
Status: DISCONTINUED | OUTPATIENT
Start: 2025-01-03 | End: 2025-01-04 | Stop reason: HOSPADM

## 2025-01-03 RX ORDER — FAMOTIDINE 10 MG/ML
20 INJECTION, SOLUTION INTRAVENOUS
OUTPATIENT
Start: 2025-02-14

## 2025-01-03 RX ORDER — MEPERIDINE HYDROCHLORIDE 50 MG/ML
12.5 INJECTION INTRAMUSCULAR; INTRAVENOUS; SUBCUTANEOUS PRN
OUTPATIENT
Start: 2025-02-14

## 2025-01-03 RX ORDER — ALBUTEROL SULFATE 90 UG/1
4 INHALANT RESPIRATORY (INHALATION) PRN
OUTPATIENT
Start: 2025-02-14

## 2025-01-03 RX ORDER — EPINEPHRINE 1 MG/ML
0.3 INJECTION, SOLUTION, CONCENTRATE INTRAVENOUS PRN
OUTPATIENT
Start: 2025-02-14

## 2025-01-03 RX ORDER — SODIUM CHLORIDE 0.9 % (FLUSH) 0.9 %
5-40 SYRINGE (ML) INJECTION PRN
Status: DISCONTINUED | OUTPATIENT
Start: 2025-01-03 | End: 2025-01-04 | Stop reason: HOSPADM

## 2025-01-03 RX ADMIN — SODIUM CHLORIDE, PRESERVATIVE FREE 10 ML: 5 INJECTION INTRAVENOUS at 14:52

## 2025-01-03 RX ADMIN — HEPARIN 500 UNITS: 100 SYRINGE at 14:52

## 2025-01-03 RX ADMIN — SODIUM CHLORIDE 200 MG: 9 INJECTION, SOLUTION INTRAVENOUS at 14:23

## 2025-01-03 RX ADMIN — HEPARIN 500 UNITS: 100 SYRINGE at 15:04

## 2025-01-03 NOTE — ANESTHESIA POSTPROCEDURE EVALUATION
Department of Anesthesiology  Postprocedure Note    Patient: Luci Castañeda  MRN: [id number]  YOB: 1965  Date of evaluation: 1/2/2025    Procedure Summary       Date: 01/02/25 Room / Location: [address]    Anesthesia Start: 1421 Anesthesia Stop: 1539    Procedure: CYSTOSCOPY, URETEROSCOPY, HOLMIUM LASER LITHOTRIPSY, RIGHT STENT EXCHANGE, STONE BASKETING (Right) Diagnosis:       Right ureteral stone      (Right ureteral stone [N20.1])    Surgeons: Frederick Chaudhry Jr., MD Responsible Provider: Evelina Soliman MD    Anesthesia Type: general ASA Status: 3            Anesthesia Type: No value filed.    Imani Phase I: Imani Score: 9    Imani Phase II: Imani Score: 10    Anesthesia Post Evaluation    Patient location during evaluation: PACU  Patient participation: complete - patient participated  Level of consciousness: awake  Airway patency: patent  Nausea & Vomiting: no nausea and no vomiting  Cardiovascular status: blood pressure returned to baseline  Respiratory status: acceptable  Hydration status: euvolemic  Pain management: adequate    No notable events documented.

## 2025-01-03 NOTE — TELEPHONE ENCOUNTER
Instructions   from Dr. Jess Bradley MD    Labs today including magnesium  Keytruda today  RV 3 weeks  Transfuse one unit PRBCs soon    Rv scheduled on 1/27/2025 @11:30; pt did not want a copy of their AVS

## 2025-01-03 NOTE — PROGRESS NOTES
Pt. Arrived C6D1 sunita  Seen by  et tx ok'd  Denies new complaints  Tx given without incident  Return 1/24/25 keytruda

## 2025-01-04 LAB
STONE COMPOSITION: NORMAL
STONE DESCRIPTION: NORMAL
STONE MASS: 2 MG

## 2025-01-06 ENCOUNTER — TELEPHONE (OUTPATIENT)
Dept: ONCOLOGY | Age: 60
End: 2025-01-06

## 2025-01-06 ENCOUNTER — HOSPITAL ENCOUNTER (OUTPATIENT)
Dept: INFUSION THERAPY | Age: 60
Discharge: HOME | End: 2025-01-06
Payer: COMMERCIAL

## 2025-01-06 VITALS
TEMPERATURE: 98 F | HEART RATE: 85 BPM | SYSTOLIC BLOOD PRESSURE: 123 MMHG | RESPIRATION RATE: 16 BRPM | DIASTOLIC BLOOD PRESSURE: 83 MMHG

## 2025-01-06 DIAGNOSIS — C50.912 INVASIVE DUCTAL CARCINOMA OF BREAST, LEFT (HCC): Primary | ICD-10-CM

## 2025-01-06 PROCEDURE — 96366 THER/PROPH/DIAG IV INF ADDON: CPT

## 2025-01-06 PROCEDURE — 2500000003 HC RX 250 WO HCPCS: Performed by: INTERNAL MEDICINE

## 2025-01-06 PROCEDURE — 6360000002 HC RX W HCPCS: Performed by: INTERNAL MEDICINE

## 2025-01-06 PROCEDURE — 96365 THER/PROPH/DIAG IV INF INIT: CPT

## 2025-01-06 PROCEDURE — 2580000003 HC RX 258: Performed by: INTERNAL MEDICINE

## 2025-01-06 RX ORDER — ONDANSETRON 2 MG/ML
8 INJECTION INTRAMUSCULAR; INTRAVENOUS
OUTPATIENT
Start: 2025-01-06

## 2025-01-06 RX ORDER — HEPARIN 100 UNIT/ML
500 SYRINGE INTRAVENOUS PRN
Status: DISCONTINUED | OUTPATIENT
Start: 2025-01-06 | End: 2025-01-07 | Stop reason: HOSPADM

## 2025-01-06 RX ORDER — HEPARIN 100 UNIT/ML
500 SYRINGE INTRAVENOUS PRN
OUTPATIENT
Start: 2025-01-06

## 2025-01-06 RX ORDER — ACETAMINOPHEN 325 MG/1
650 TABLET ORAL
OUTPATIENT
Start: 2025-01-06

## 2025-01-06 RX ORDER — SODIUM CHLORIDE 9 MG/ML
25 INJECTION, SOLUTION INTRAVENOUS PRN
Status: DISCONTINUED | OUTPATIENT
Start: 2025-01-06 | End: 2025-01-07 | Stop reason: HOSPADM

## 2025-01-06 RX ORDER — ALBUTEROL SULFATE 90 UG/1
4 INHALANT RESPIRATORY (INHALATION) PRN
OUTPATIENT
Start: 2025-01-06

## 2025-01-06 RX ORDER — EPINEPHRINE 1 MG/ML
0.3 INJECTION, SOLUTION, CONCENTRATE INTRAVENOUS PRN
OUTPATIENT
Start: 2025-01-06

## 2025-01-06 RX ORDER — MAGNESIUM SULFATE HEPTAHYDRATE 40 MG/ML
4000 INJECTION, SOLUTION INTRAVENOUS ONCE
Status: COMPLETED | OUTPATIENT
Start: 2025-01-06 | End: 2025-01-06

## 2025-01-06 RX ORDER — SODIUM CHLORIDE 9 MG/ML
25 INJECTION, SOLUTION INTRAVENOUS PRN
OUTPATIENT
Start: 2025-01-06

## 2025-01-06 RX ORDER — SODIUM CHLORIDE 9 MG/ML
INJECTION, SOLUTION INTRAVENOUS CONTINUOUS
OUTPATIENT
Start: 2025-01-06

## 2025-01-06 RX ORDER — DIPHENHYDRAMINE HYDROCHLORIDE 50 MG/ML
50 INJECTION INTRAMUSCULAR; INTRAVENOUS
OUTPATIENT
Start: 2025-01-06

## 2025-01-06 RX ORDER — HYDROCORTISONE SODIUM SUCCINATE 100 MG/2ML
100 INJECTION INTRAMUSCULAR; INTRAVENOUS
OUTPATIENT
Start: 2025-01-06

## 2025-01-06 RX ORDER — SODIUM CHLORIDE 0.9 % (FLUSH) 0.9 %
5-40 SYRINGE (ML) INJECTION PRN
Status: DISCONTINUED | OUTPATIENT
Start: 2025-01-06 | End: 2025-01-07 | Stop reason: HOSPADM

## 2025-01-06 RX ORDER — SODIUM CHLORIDE 0.9 % (FLUSH) 0.9 %
5-40 SYRINGE (ML) INJECTION PRN
OUTPATIENT
Start: 2025-01-06

## 2025-01-06 RX ORDER — FAMOTIDINE 10 MG/ML
20 INJECTION, SOLUTION INTRAVENOUS
OUTPATIENT
Start: 2025-01-06

## 2025-01-06 RX ADMIN — SODIUM CHLORIDE, PRESERVATIVE FREE 10 ML: 5 INJECTION INTRAVENOUS at 15:28

## 2025-01-06 RX ADMIN — HEPARIN 500 UNITS: 100 SYRINGE at 15:28

## 2025-01-06 RX ADMIN — SODIUM CHLORIDE 25 ML: 0.9 INJECTION, SOLUTION INTRAVENOUS at 11:17

## 2025-01-06 RX ADMIN — SODIUM CHLORIDE, PRESERVATIVE FREE 10 ML: 5 INJECTION INTRAVENOUS at 11:07

## 2025-01-06 RX ADMIN — MAGNESIUM SULFATE HEPTAHYDRATE 4000 MG: 40 INJECTION, SOLUTION INTRAVENOUS at 11:19

## 2025-01-06 NOTE — PROGRESS NOTES
Patient arrived for magnesium replacement.  Arrives via wheelchair.  Magnesium 1.0 replaced per protocol.  Report given to Swetha BURGOS.  Returns 1/24/2025 for C7D1.

## 2025-01-06 NOTE — TELEPHONE ENCOUNTER
Name: Luci Castañeda  : 1965  MRN: [id number]    Oncology Navigation Follow-Up Note    Contact Type:  Telephone    Notes: Writer reviewed chart and noted that instructions said to have PRBC soon. Nothing shows that this is scheduled. Last hgb was 8.9. Writer spoke with pt who reports the PRBC was mentioned prior to her lab results being reviewed so she said it was on hold. Pt going in for her mag infusion today. She reports feeling much better. Denied needs from navigator at this time.       Electronically signed by Veronica Bennett RN on 2025 at 10:42 AM

## 2025-01-09 RX ORDER — PANTOPRAZOLE SODIUM 20 MG/1
20 TABLET, DELAYED RELEASE ORAL
Qty: 90 TABLET | Refills: 3 | Status: SHIPPED | OUTPATIENT
Start: 2025-01-09

## 2025-01-10 ENCOUNTER — CARE COORDINATION (OUTPATIENT)
Dept: OTHER | Facility: CLINIC | Age: 60
End: 2025-01-10

## 2025-01-10 NOTE — CARE COORDINATION
Care Transitions Note    Initial Call - Call within 2 business days of discharge: Yes    Attempted to reach patient for transitions of care follow up. Unable to reach patient.    Outreach Attempts:   Multiple attempts to contact patient at phone numbers on file.     Patient: Luci Castañeda    Patient : 1965   MRN: [id number]    Reason for Admission: R Ureteral Stone  Discharge Date: 25  RURS: Readmission Risk Score: 31.5    Last Discharge Facility       Date Complaint Diagnosis Description Type Department Provider    25  Right ureteral stone Admission (Discharged) [facility] Frederick Chaudhry Jr., MD            Was this an external facility discharge? No    Follow Up Appointment:   Patient has hospital follow up appointment scheduled within 7 days of discharge.    Future Appointments         Provider Specialty Dept Phone    2025 11:00 AM Amena Sy PA-C Internal Medicine [phone]    2025  1:00 PM [facility]  MED ONC CHAIR 05 Infusion Therapy [phone]    2025 11:30 AM Jess Bradley MD Oncology [phone]    2025 1:00 PM Amadeo Sanchez MD Pulmonology [phone]            Deborah Sood LPN- Care Coordinator  Associate Care Management  [address] [place] [address]  [address]  Phone: [phone]  [email or web address]

## 2025-01-11 NOTE — PROGRESS NOTES
Chief Complaint   Patient presents with    Follow-up     4 week follow up    Nausea    Diarrhea    Fatigue    Anorexia     improving     DIAGNOSIS:        Clinical stage T2 N0 M0 left breast upper outer quadrant invasive ductal carcinoma, grade 3, ER negative, NC negative, HER2/carol not amplified.  History of left nephrectomy in 1988  Saddle PE s/p thrombectomy mid December 2024.  CURRENT THERAPY:         Neoadjuvant treatment with Keytruda/Taxol/carboplatin/Adriamycin/Cytoxan.  Treatment started 5/31/2024  S/p thrombectomy for saddle PE in December 2024.   DOAC for PE.   BRIEF CASE HISTORY:       Ms. Luci Castañeda is a very pleasant 59 y.o. female with history of multiple comorbidities as listed.  She had history of left nephrectomy in 1988.  She had cardiac cath in 2021 for left ventricular hypertrophy.  Had recent problems with restrictive and obstructive pulmonary disease.  She continues follow-up with pulmonary. She was doing fine with routine mammogram being normal until this year where she had suspicious abnormalities in the mammogram done on 3/25/2024.  Diagnostic mammogram and left breast ultrasound April 9, 2024 showed suspicious 4.5 cm mass in the left breast 1 o'clock position.  Mildly thickened left axillary lymph nodes.  A biopsy was performed on 4/26/2024 and unfortunately that was positive for invasive ductal carcinoma.  Triple negative.  Biopsy from lymph node was negative for malignancy.  The patient is seen today for further management of her breast cancer. The patient did very well after her biopsy without any complication. The patient had no symptom preceding her diagnosis of cancer. No chest pain, shortness of breath, cough, sputum or hemoptysis, no back pain or monique aches, no weight loss or decreased appetite, no fever or night sweating. No headaches, and no other complaints.  No smoking social alcohol      INTERIM HISTORY:   Patient seen for follow-up triple negative breast

## 2025-01-13 ENCOUNTER — OFFICE VISIT (OUTPATIENT)
Age: 60
End: 2025-01-13

## 2025-01-13 ENCOUNTER — HOSPITAL ENCOUNTER (OUTPATIENT)
Age: 60
Setting detail: SPECIMEN
Discharge: HOME | End: 2025-01-13

## 2025-01-13 VITALS
HEART RATE: 106 BPM | BODY MASS INDEX: 34.53 KG/M2 | DIASTOLIC BLOOD PRESSURE: 80 MMHG | OXYGEN SATURATION: 97 % | HEIGHT: 68 IN | TEMPERATURE: 97 F | WEIGHT: 227.8 LBS | SYSTOLIC BLOOD PRESSURE: 120 MMHG

## 2025-01-13 DIAGNOSIS — N20.0 RECURRENT KIDNEY STONES: ICD-10-CM

## 2025-01-13 DIAGNOSIS — I10 ESSENTIAL HYPERTENSION: ICD-10-CM

## 2025-01-13 DIAGNOSIS — E03.9 ACQUIRED HYPOTHYROIDISM: ICD-10-CM

## 2025-01-13 DIAGNOSIS — E83.42 HYPOMAGNESEMIA: ICD-10-CM

## 2025-01-13 DIAGNOSIS — C50.412 MALIGNANT NEOPLASM OF UPPER-OUTER QUADRANT OF LEFT BREAST IN FEMALE, ESTROGEN RECEPTOR NEGATIVE (HCC): ICD-10-CM

## 2025-01-13 DIAGNOSIS — F51.01 PRIMARY INSOMNIA: ICD-10-CM

## 2025-01-13 DIAGNOSIS — I26.02 ACUTE SADDLE PULMONARY EMBOLISM WITH ACUTE COR PULMONALE (HCC): ICD-10-CM

## 2025-01-13 DIAGNOSIS — M79.89 LEG SWELLING: ICD-10-CM

## 2025-01-13 DIAGNOSIS — D64.9 ANEMIA, NORMOCYTIC NORMOCHROMIC: ICD-10-CM

## 2025-01-13 DIAGNOSIS — D69.6 THROMBOCYTOPENIA (HCC): ICD-10-CM

## 2025-01-13 DIAGNOSIS — Z17.1 MALIGNANT NEOPLASM OF UPPER-OUTER QUADRANT OF LEFT BREAST IN FEMALE, ESTROGEN RECEPTOR NEGATIVE (HCC): ICD-10-CM

## 2025-01-13 DIAGNOSIS — M10.9 GOUTY ARTHRITIS OF RIGHT GREAT TOE: ICD-10-CM

## 2025-01-13 DIAGNOSIS — Z09 HOSPITAL DISCHARGE FOLLOW-UP: Primary | ICD-10-CM

## 2025-01-13 DIAGNOSIS — K21.9 GASTROESOPHAGEAL REFLUX DISEASE WITHOUT ESOPHAGITIS: ICD-10-CM

## 2025-01-13 PROBLEM — J45.51 SEVERE PERSISTENT ASTHMA WITH ACUTE EXACERBATION: Status: RESOLVED | Noted: 2024-05-12 | Resolved: 2025-01-13

## 2025-01-13 PROBLEM — N30.00 ACUTE CYSTITIS WITHOUT HEMATURIA: Status: RESOLVED | Noted: 2024-12-30 | Resolved: 2025-01-13

## 2025-01-13 LAB
ALBUMIN SERPL-MCNC: 3.6 G/DL (ref 3.5–5.2)
ALBUMIN/GLOB SERPL: 1.4 {RATIO} (ref 1–2.5)
ALP SERPL-CCNC: 95 U/L (ref 35–104)
ALT SERPL-CCNC: 16 U/L (ref 10–35)
ANION GAP SERPL CALCULATED.3IONS-SCNC: 12 MMOL/L (ref 9–16)
AST SERPL-CCNC: 37 U/L (ref 10–35)
BILIRUB SERPL-MCNC: 0.5 MG/DL (ref 0–1.2)
BNP SERPL-MCNC: 645 PG/ML (ref 0–125)
BUN SERPL-MCNC: 11 MG/DL (ref 8–23)
CALCIUM SERPL-MCNC: 9.7 MG/DL (ref 8.6–10.4)
CHLORIDE SERPL-SCNC: 104 MMOL/L (ref 98–107)
CO2 SERPL-SCNC: 26 MMOL/L (ref 20–31)
CREAT SERPL-MCNC: 0.7 MG/DL (ref 0.6–0.9)
GFR, ESTIMATED: >90 ML/MIN/1.73M2
GLUCOSE SERPL-MCNC: 99 MG/DL (ref 74–99)
MAGNESIUM SERPL-MCNC: 1 MG/DL (ref 1.6–2.4)
POTASSIUM SERPL-SCNC: 3.7 MMOL/L (ref 3.7–5.3)
PROT SERPL-MCNC: 6.1 G/DL (ref 6.6–8.7)
SODIUM SERPL-SCNC: 142 MMOL/L (ref 136–145)

## 2025-01-13 RX ORDER — TRAZODONE HYDROCHLORIDE 50 MG/1
50 TABLET, FILM COATED ORAL NIGHTLY
Qty: 90 TABLET | Refills: 1 | Status: SHIPPED | OUTPATIENT
Start: 2025-01-13

## 2025-01-13 ASSESSMENT — ENCOUNTER SYMPTOMS
BACK PAIN: 0
VOMITING: 1
COUGH: 0
RHINORRHEA: 0
EYE REDNESS: 0
DIARRHEA: 0
CONSTIPATION: 0
WHEEZING: 0
SINUS PRESSURE: 0
SINUS PAIN: 0
SORE THROAT: 0
SHORTNESS OF BREATH: 0
EYE PAIN: 0
BLOOD IN STOOL: 0
ABDOMINAL PAIN: 0
NAUSEA: 1

## 2025-01-13 ASSESSMENT — PATIENT HEALTH QUESTIONNAIRE - PHQ9
1. LITTLE INTEREST OR PLEASURE IN DOING THINGS: NOT AT ALL
2. FEELING DOWN, DEPRESSED OR HOPELESS: NOT AT ALL
SUM OF ALL RESPONSES TO PHQ QUESTIONS 1-9: 0
SUM OF ALL RESPONSES TO PHQ QUESTIONS 1-9: 0
SUM OF ALL RESPONSES TO PHQ9 QUESTIONS 1 & 2: 0
SUM OF ALL RESPONSES TO PHQ QUESTIONS 1-9: 0
SUM OF ALL RESPONSES TO PHQ QUESTIONS 1-9: 0

## 2025-01-13 NOTE — PROGRESS NOTES
by mouth 2 times daily for 3 days, THEN 1 tablet 2 times daily. (Patient taking differently: Pt. not instructed to stop pre-op for OR 1-2-25. Mgmt. Oncology Dr. Leon) 192 tablet 0    colchicine (COLCRYS) 0.6 MG tablet Take 1 tablet by mouth daily 30 tablet 3    nystatin (MYCOSTATIN) 432217 UNIT/ML suspension Take 5 mLs by mouth 4 times daily for 10 days Retain in mouth as long as possible (Patient taking differently: Take 5 mLs by mouth daily as needed (mouth sores) Retain in mouth as long as possible) 200 mL 0    ondansetron (ZOFRAN) 4 MG tablet Take 1 tablet by mouth 3 times daily as needed for Nausea or Vomiting 100 tablet 1    albuterol sulfate HFA (PROAIR HFA) 108 (90 Base) MCG/ACT inhaler Inhale 2 puffs into the lungs every 6 hours as needed for Wheezing 1 each 3        Medications patient taking as of now reconciled against medications ordered at time of hospital discharge: Yes  Review of Systems   Constitutional:  Positive for fatigue. Negative for chills, fever and unexpected weight change.   HENT:  Negative for congestion, ear pain, hearing loss, rhinorrhea, sinus pressure, sinus pain, sneezing and sore throat.    Eyes:  Negative for pain, redness and visual disturbance.   Respiratory:  Negative for cough, shortness of breath and wheezing.    Cardiovascular:  Negative for chest pain, palpitations and leg swelling.   Gastrointestinal:  Positive for nausea and vomiting. Negative for abdominal pain, blood in stool, constipation and diarrhea.   Endocrine: Negative for cold intolerance and heat intolerance.   Genitourinary:  Negative for difficulty urinating, dysuria, frequency, hematuria and urgency.   Musculoskeletal:  Negative for arthralgias, back pain, gait problem, joint swelling, myalgias and neck pain.   Skin:  Negative for rash and wound.   Allergic/Immunologic: Negative for immunocompromised state.   Neurological:  Positive for weakness. Negative for dizziness, tremors, seizures, syncope, speech

## 2025-01-14 DIAGNOSIS — E83.42 HYPOMAGNESEMIA: ICD-10-CM

## 2025-01-14 DIAGNOSIS — M79.89 LEG SWELLING: Primary | ICD-10-CM

## 2025-01-14 RX ORDER — FUROSEMIDE 20 MG/1
20 TABLET ORAL DAILY
Qty: 30 TABLET | Refills: 0 | Status: SHIPPED | OUTPATIENT
Start: 2025-01-14

## 2025-01-20 DIAGNOSIS — Z96.651 S/P TOTAL KNEE ARTHROPLASTY, RIGHT: Primary | ICD-10-CM

## 2025-01-20 RX ORDER — AMOXICILLIN 500 MG/1
2000 TABLET, FILM COATED ORAL PRN
Qty: 4 TABLET | Refills: 0 | Status: SHIPPED | OUTPATIENT
Start: 2025-01-20

## 2025-01-20 NOTE — TELEPHONE ENCOUNTER
RIGHT KNEE TOTAL ARTHROPLASTY DOS: 5/22/23    Pt has a dental cleaning on Wednesday 1/22/25. The dentist office is wondering if she needs antibiotics before the cleaning. Pt can be reached at [phone]. Please advise. Thank you.

## 2025-01-20 NOTE — TELEPHONE ENCOUNTER
DOS: 5/22/23    Patient requesting antibiotic for upcoming dental procedure on 1/22/25.    No medication allergies (only environmental)   [facility]  Certificate of Child Health Examination  Student's Name:   Armida Krueger Birth Date  2012  Sex  female  Race/Ethnicity   School/Grade Level/ID#     Address:   [address]  [place] [address]  Parent/Guardian             Telephone#                                            Home:                               Work:   IMMUNIZATIONS: To be completed by health care provider. The mo/da/yr for every dose administered is required. If a specific vaccine is medically contraindicated, a separate written statement must be attached by the health care responsible for completing the health examination explaining the medical reason for the contraindication.      Immunization History   Administered Date(s) Administered   • DTaP (INFANRIX)(DAPTACEL,INFANRIX) 04/19/2011   • DTaP/HIB/IPV 2010, 2010, 2010   • DTaP/IPV 03/06/2015   • HIB, Unspecified Formulation 01/17/2011   • Hep B, Unspecified Formulation 2010, 2010   • Hepatitis A - Adult 01/07/2011, 07/26/2011   • Influenza, Unspecified Formulation 2010, 2010, 10/28/2011, 10/10/2012, 01/16/2014, 10/23/2014, 01/19/2018   • MMR 04/19/2011   • Measles Mumps Rubella Varicella 03/06/2015   • Pneumococcal Conjugate 13 valent 2010, 2010, 01/07/2011   • Pneumococcal Conjugate 7 Valent 01/07/2011   • Rotavirus - monovalent 2010, 2010   • Varicella 04/19/2011      Immunizations given 2/7/2020: flu      Health care provider (MD, DO, APN, PA, school health professional, health official) verifying above immunization history must sign below. If adding dates to the above immunization history section, put your initials by date(s) and sign here.)  Signature                                                                 Title                                                Date  _____________________________________________________________________________________  Signature                                                                  Title                                                Date  _____________________________________________________________________________________   ALTERNATIVE PROOF OF IMMUNITY   1. Clinical diagnosis (measles, mumps, hepatitis B) is allowed when verified by physician and supported with lab confirmation.  Attach copy of lab result.    * MEASLES (Rubeola)  MO   DA  YR          **MUMPS  MO   DA  YR             HEPATITIS B  MO   DA   YR           VARICELLA  MO   DA  YR      2. History of varicella (chickenpox) disease is acceptable if verified by health care provider, school health professional or health official.  Person signing below verifies that the parent/guardian’s description of varicella disease history is indicative of past infection and is accepting such history as documentation of disease.  Date of Disease                                  Signature                                                           Title   3. Laboratory Evidence of Immunity (check one)      __ Measles*         __ Mumps**        __ Rubella        __Varicella    (Attach copy of lab result)         *All measles cases diagnosed on or after July 1, 2002, must be confirmed by laboratory evidence.      **All mumps cases diagnosed on or after July 1, 2013, must be confirmed by laboratory evidence.     Completion of Alternative 1 or 3 MUST be accompanied by Labs & Physician Signature: ___________________________  Physician Statements of Immunity MUST be submitted to [facility] for review.     Certificates of Zoroastrian Exemption to Immunizations or Physician Medical Statements of Medical Contraindication Are Reviewed   and Maintained by the School Authority     (11/2015)                                         (COMPLETE BOTH SIDES)                                  Approved [facility] 3/2016      Student's Name:  Armida Krueger Birth Date 2012 Sex female School Grade Level/ID#     Page 2 of 2   HEALTH HISTORY       TO BE COMPLETED AND SIGNED BY PARENT/GUARDIAN AND VERIFIED BY HEALTH CARE PROVIDER  ALLERGIES: (Food, drug, insect, other) No has No Known Allergies.   MEDICATION: (List all prescribed or taken on a regular basis.)   No   Diagnosis of asthma?  Child wakes during night coughing? Yes    No  Yes    No  Loss of function of one of paired  organs?(eye/ear/kidney/testicle) Yes    No    Birth defects? Yes    No  Hospitalizations? Yes    No    Developmental delay? Yes    No   When? What for? Yes    No    Blood disorders? Hemophilia,  Sickle Cell, Other? Explain. Yes    No  Surgery? (List all.)  When? What for? Yes    No    Diabetes? Yes    No  Serious injury or illness? Yes    No    Head injury/Concussion/Passed out? Yes    No  TB skin test positive (past/present)? * Yes    No *If yes, refer to local [facility] dept   Seizures? What are they like?  Yes    No  TB disease (past or present)? * Yes    No *If yes, refer to local [facility]   Heart problem/Shortness of breath?  Yes    No  Tobacco use (type, frequency)?  Yes    No    Heart murmur/High blood pressure? Yes    No  Alcohol/Drug use?  Yes    No    Dizziness or chest pain with exercise? Yes    No  Family history of sudden death  before age 50? (Cause?) Yes    No    Eye/Vision problems? ______           __Glasses          __Contacts  Last exam by eye doctor ______  Other concerns? (crossed eye, drooping lids, squinting, difficulty reading) Dental?   __ Braces     __ Bridge     __ Plate   __Other   Ear/Hearing problems? Yes    No  Information may be shared with appropriate personnel for health and educational purposes.   Bone/Joint problem/injury/scoliosis? Yes    No  Parent/Guardian  Signature                                               Date   PHYSICAL EXAMINATION REQUIREMENTS   Entire section below to be completed by MD/DO/APN/PA Head Circumference if <2-3 years old - /60 (BP Location: LUE - Left upper extremity, Patient Position: Sitting, Cuff Size: Regular)    Pulse 110   Temp 97.8 °F (36.6 °C) (Temporal)   Ht 4' 4.76\" (1.34 m)   Wt 36.1 kg (79 lb 9.6 oz)   BMI 20.11 kg/m²   BSA 1.15 m²    85 %ile (Z= 1.06) based on CDC (Girls, 2-20 Years) BMI-for-age based on BMI available as of 2/7/2020.   DIABETES SCREENING (NOT REQUIRED FOR ) BMI>85% age/sex No     And any two of the following: Family History: No  Ethnic Minority: No    Signs of Insulin Resistance (hypertension, dyslipidemia, polycystic ovarian syndrome, acanthosis nigricans): No  At Risk: No   LEAD RISK QUESTIONNAIRE Required for children age 6 months through 6 years enrolled in licensed or public school operated day care, , nursery school and/or . (Blood test required if resides in [place] or high risk zip [facility].)  Questionnaire Administered? Yes  Blood Test Indicated? No   Blood Test Date _____   Blood Test Result ______________   TB SKIN OR BLOOD TEST Recommended only for children in high-risk groups including children immunosuppressed due to HIV infection or other conditions, frequent travel to or born in high prevalence countries or those exposed to adults in high-risk categories. See CDC guidelines. http://www.cdc.gov/tb/publications/factsheets/testing/TB_testing.htm.  Test Needed: No     Test performed: No                          Skin Test:    Date Read              /      /             Result:   Positive__      Negative__        mm________                          Blood Test: Date Reported       /      /               Result:  Positive__      Negative__      Value________  LAB TESTS (recommended) Date/Result  Date/Results   Hemoglobin or Hematocrit NA Sickle Cell (when indicated) NA   Urinalysis NA Developmental Screening Tool NA        SYSTEM REVIEW Normal  Comments/Follow-up/Needs  Normal Comments/Follow-up/Needs   Skin  Yes  Endocrine Yes    Ears Yes                  Screening Result Gastrointestinal Yes    Eyes Yes                  Screening Result: Genito-Urinary Yes                                             LMP   Nose Yes  Neurologic Yes    Throat Yes  Musculoskeletal Yes    Mouth/Dental Yes  Spinal Exam Yes    Cardiovascular/HTN Yes  Nutritional status Yes    Respiratory Yes Diagnosis of Asthma: No   Mental Health Yes    Currently Prescribed Asthma Medication:        No  Quick-relief medication (e.g. Short Acting Beta Antagonist)        No   Controller medication (e.g. inhaled corticosteroid) Other     NEEDS/MODIFICATIONS required in the school setting: No restrictions DIETARY Needs/Restrictions: No restrictions   SPECIAL INSTRUCTIONS/DEVICES e.g. safety glasses, glass eye, chest protector for arrhythmia, pacemaker, prosthetic device, dental bridge, false teeth, athletic support/cup: No restrictions   MENTAL HEALTH/OTHER Is there anything else the school should know about this student?  If you would like to discuss this student’s health with school or school health personnel:  Not needed   EMERGENCY ACTION needed while at school due to child’s health condition (e.g. ,seizures, asthma, insect sting, food, peanut allergy, bleeding problem, diabetes, heart problem)?   No   On the basis of the examination on this day, I approve this child’s participation in                                (If No or Modified please attach explanation.)  PHYSICAL EDUCATION:  Yes                               INTERSCHOLASTIC SPORTS   Yes   Print Name  Bridger Powell MD         Signature                                                                       Date: 2/7/2020   Address:  [facility] [address] [facility] [address] [facility]  [address] [facility] [id number] [phone]         (Complete Both Sides)     Approved [facility] 3/2016

## 2025-01-23 DIAGNOSIS — C50.912 INVASIVE DUCTAL CARCINOMA OF BREAST, LEFT (HCC): Primary | ICD-10-CM

## 2025-01-24 ENCOUNTER — HOSPITAL ENCOUNTER (OUTPATIENT)
Dept: INFUSION THERAPY | Age: 60
Discharge: HOME | End: 2025-01-24
Payer: COMMERCIAL

## 2025-01-24 VITALS
WEIGHT: 229 LBS | TEMPERATURE: 98.2 F | DIASTOLIC BLOOD PRESSURE: 102 MMHG | SYSTOLIC BLOOD PRESSURE: 155 MMHG | BODY MASS INDEX: 34.82 KG/M2 | HEART RATE: 87 BPM | RESPIRATION RATE: 16 BRPM

## 2025-01-24 DIAGNOSIS — C50.912 INVASIVE DUCTAL CARCINOMA OF BREAST, LEFT (HCC): ICD-10-CM

## 2025-01-24 DIAGNOSIS — Z17.1 MALIGNANT NEOPLASM OF UPPER-OUTER QUADRANT OF LEFT BREAST IN FEMALE, ESTROGEN RECEPTOR NEGATIVE (HCC): Primary | ICD-10-CM

## 2025-01-24 DIAGNOSIS — C50.412 MALIGNANT NEOPLASM OF UPPER-OUTER QUADRANT OF LEFT BREAST IN FEMALE, ESTROGEN RECEPTOR NEGATIVE (HCC): Primary | ICD-10-CM

## 2025-01-24 LAB
ALBUMIN SERPL-MCNC: 3.3 G/DL (ref 3.5–5.2)
ALBUMIN/GLOB SERPL: 1.3 {RATIO} (ref 1–2.5)
ALP SERPL-CCNC: 83 U/L (ref 35–104)
ALT SERPL-CCNC: 12 U/L (ref 5–33)
AMYLASE SERPL-CCNC: 40 U/L (ref 28–100)
ANION GAP SERPL CALCULATED.3IONS-SCNC: 13 MMOL/L (ref 9–17)
AST SERPL-CCNC: 31 U/L
BASOPHILS # BLD: 0 K/UL (ref 0–0.2)
BASOPHILS NFR BLD: 1 % (ref 0–2)
BILIRUB SERPL-MCNC: 0.4 MG/DL (ref 0.3–1.2)
BUN SERPL-MCNC: 10 MG/DL (ref 8–23)
CALCIUM SERPL-MCNC: 8.9 MG/DL (ref 8.6–10.4)
CHLORIDE SERPL-SCNC: 103 MMOL/L (ref 98–107)
CO2 SERPL-SCNC: 23 MMOL/L (ref 20–31)
CORTIS SERPL-MCNC: 10.3 UG/DL (ref 2.5–19.5)
CORTISOL COLLECTION INFO: NORMAL
CREAT SERPL-MCNC: 0.8 MG/DL (ref 0.5–0.9)
EOSINOPHIL # BLD: 0 K/UL (ref 0–0.4)
EOSINOPHILS RELATIVE PERCENT: 1 % (ref 1–4)
ERYTHROCYTE [DISTWIDTH] IN BLOOD BY AUTOMATED COUNT: 19.8 % (ref 12.5–15.4)
GFR, ESTIMATED: 84 ML/MIN/1.73M2
GLUCOSE SERPL-MCNC: 101 MG/DL (ref 70–99)
HCT VFR BLD AUTO: 33 % (ref 36–46)
HGB BLD-MCNC: 10.9 G/DL (ref 12–16)
LIPASE SERPL-CCNC: 44 U/L (ref 13–60)
LYMPHOCYTES NFR BLD: 0.8 K/UL (ref 1–4.8)
LYMPHOCYTES RELATIVE PERCENT: 20 % (ref 24–44)
MAGNESIUM SERPL-MCNC: 1 MG/DL (ref 1.6–2.6)
MCH RBC QN AUTO: 33 PG (ref 26–34)
MCHC RBC AUTO-ENTMCNC: 33.1 G/DL (ref 31–37)
MCV RBC AUTO: 99.8 FL (ref 80–100)
MONOCYTES NFR BLD: 0.4 K/UL (ref 0.1–1.2)
MONOCYTES NFR BLD: 10 % (ref 2–11)
NEUTROPHILS NFR BLD: 68 % (ref 36–66)
NEUTS SEG NFR BLD: 2.8 K/UL (ref 1.8–7.7)
PLATELET # BLD AUTO: 102 K/UL (ref 140–450)
PMV BLD AUTO: 7.9 FL (ref 6–12)
POTASSIUM SERPL-SCNC: 3.4 MMOL/L (ref 3.7–5.3)
PROT SERPL-MCNC: 5.9 G/DL (ref 6.4–8.3)
RBC # BLD AUTO: 3.31 M/UL (ref 4–5.2)
SODIUM SERPL-SCNC: 139 MMOL/L (ref 135–144)
TSH SERPL DL<=0.05 MIU/L-ACNC: 1.57 UIU/ML (ref 0.3–5)
WBC OTHER # BLD: 4.1 K/UL (ref 3.5–11)

## 2025-01-24 PROCEDURE — 85025 COMPLETE CBC W/AUTO DIFF WBC: CPT

## 2025-01-24 PROCEDURE — 96365 THER/PROPH/DIAG IV INF INIT: CPT

## 2025-01-24 PROCEDURE — 83735 ASSAY OF MAGNESIUM: CPT

## 2025-01-24 PROCEDURE — 96367 TX/PROPH/DG ADDL SEQ IV INF: CPT

## 2025-01-24 PROCEDURE — 6360000002 HC RX W HCPCS: Performed by: INTERNAL MEDICINE

## 2025-01-24 PROCEDURE — 80053 COMPREHEN METABOLIC PANEL: CPT

## 2025-01-24 PROCEDURE — 96413 CHEMO IV INFUSION 1 HR: CPT

## 2025-01-24 PROCEDURE — 2580000003 HC RX 258: Performed by: INTERNAL MEDICINE

## 2025-01-24 PROCEDURE — 2500000003 HC RX 250 WO HCPCS: Performed by: INTERNAL MEDICINE

## 2025-01-24 PROCEDURE — 96366 THER/PROPH/DIAG IV INF ADDON: CPT

## 2025-01-24 PROCEDURE — 84443 ASSAY THYROID STIM HORMONE: CPT

## 2025-01-24 PROCEDURE — 82533 TOTAL CORTISOL: CPT

## 2025-01-24 PROCEDURE — 6370000000 HC RX 637 (ALT 250 FOR IP): Performed by: INTERNAL MEDICINE

## 2025-01-24 PROCEDURE — 83690 ASSAY OF LIPASE: CPT

## 2025-01-24 PROCEDURE — 82150 ASSAY OF AMYLASE: CPT

## 2025-01-24 RX ORDER — POTASSIUM CHLORIDE 1500 MG/1
20 TABLET, EXTENDED RELEASE ORAL ONCE
Status: COMPLETED | OUTPATIENT
Start: 2025-01-24 | End: 2025-01-24

## 2025-01-24 RX ORDER — MAGNESIUM SULFATE IN WATER 40 MG/ML
2000 INJECTION, SOLUTION INTRAVENOUS ONCE
Status: COMPLETED | OUTPATIENT
Start: 2025-01-24 | End: 2025-01-24

## 2025-01-24 RX ORDER — SODIUM CHLORIDE 0.9 % (FLUSH) 0.9 %
5-40 SYRINGE (ML) INJECTION PRN
Status: DISCONTINUED | OUTPATIENT
Start: 2025-01-24 | End: 2025-01-25 | Stop reason: HOSPADM

## 2025-01-24 RX ORDER — HEPARIN 100 UNIT/ML
500 SYRINGE INTRAVENOUS PRN
Status: DISCONTINUED | OUTPATIENT
Start: 2025-01-24 | End: 2025-01-25 | Stop reason: HOSPADM

## 2025-01-24 RX ADMIN — MAGNESIUM SULFATE HEPTAHYDRATE 2000 MG: 40 INJECTION, SOLUTION INTRAVENOUS at 14:52

## 2025-01-24 RX ADMIN — MAGNESIUM SULFATE HEPTAHYDRATE 2000 MG: 40 INJECTION, SOLUTION INTRAVENOUS at 13:39

## 2025-01-24 RX ADMIN — SODIUM CHLORIDE 200 MG: 9 INJECTION, SOLUTION INTRAVENOUS at 16:54

## 2025-01-24 RX ADMIN — SODIUM CHLORIDE, PRESERVATIVE FREE 10 ML: 5 INJECTION INTRAVENOUS at 17:21

## 2025-01-24 RX ADMIN — HEPARIN 500 UNITS: 100 SYRINGE at 17:21

## 2025-01-24 RX ADMIN — POTASSIUM CHLORIDE 20 MEQ: 1500 TABLET, EXTENDED RELEASE ORAL at 14:41

## 2025-01-24 RX ADMIN — SODIUM CHLORIDE, PRESERVATIVE FREE 10 ML: 5 INJECTION INTRAVENOUS at 13:03

## 2025-01-24 NOTE — PROGRESS NOTES
Patient arrived for C7D1 keytruda. Mg 1.0 and K 3.4, both replaced per protocol. Tolerated w/o incident and left in stable condition. Returns 2/14

## 2025-01-27 ENCOUNTER — OFFICE VISIT (OUTPATIENT)
Dept: ONCOLOGY | Age: 60
End: 2025-01-27
Payer: COMMERCIAL

## 2025-01-27 ENCOUNTER — TELEPHONE (OUTPATIENT)
Dept: ONCOLOGY | Age: 60
End: 2025-01-27

## 2025-01-27 VITALS
WEIGHT: 230 LBS | DIASTOLIC BLOOD PRESSURE: 98 MMHG | TEMPERATURE: 96.9 F | BODY MASS INDEX: 34.97 KG/M2 | SYSTOLIC BLOOD PRESSURE: 141 MMHG | OXYGEN SATURATION: 97 % | RESPIRATION RATE: 18 BRPM | HEART RATE: 85 BPM

## 2025-01-27 DIAGNOSIS — C50.412 MALIGNANT NEOPLASM OF UPPER-OUTER QUADRANT OF LEFT BREAST IN FEMALE, ESTROGEN RECEPTOR NEGATIVE (HCC): ICD-10-CM

## 2025-01-27 DIAGNOSIS — C50.412 MALIGNANT NEOPLASM OF UPPER-OUTER QUADRANT OF LEFT BREAST IN FEMALE, ESTROGEN RECEPTOR NEGATIVE (HCC): Primary | ICD-10-CM

## 2025-01-27 DIAGNOSIS — Z17.1 MALIGNANT NEOPLASM OF UPPER-OUTER QUADRANT OF LEFT BREAST IN FEMALE, ESTROGEN RECEPTOR NEGATIVE (HCC): Primary | ICD-10-CM

## 2025-01-27 DIAGNOSIS — Z17.1 MALIGNANT NEOPLASM OF UPPER-OUTER QUADRANT OF LEFT BREAST IN FEMALE, ESTROGEN RECEPTOR NEGATIVE (HCC): ICD-10-CM

## 2025-01-27 PROCEDURE — 99214 OFFICE O/P EST MOD 30 MIN: CPT | Performed by: INTERNAL MEDICINE

## 2025-01-27 PROCEDURE — 99211 OFF/OP EST MAY X REQ PHY/QHP: CPT | Performed by: INTERNAL MEDICINE

## 2025-01-27 PROCEDURE — 3077F SYST BP >= 140 MM HG: CPT | Performed by: INTERNAL MEDICINE

## 2025-01-27 PROCEDURE — 3080F DIAST BP >= 90 MM HG: CPT | Performed by: INTERNAL MEDICINE

## 2025-01-27 RX ORDER — FAMOTIDINE 10 MG/ML
20 INJECTION, SOLUTION INTRAVENOUS
OUTPATIENT
Start: 2025-03-07

## 2025-01-27 RX ORDER — SODIUM CHLORIDE 9 MG/ML
5-250 INJECTION, SOLUTION INTRAVENOUS PRN
OUTPATIENT
Start: 2025-03-07

## 2025-01-27 RX ORDER — ACETAMINOPHEN 325 MG/1
650 TABLET ORAL
Start: 2025-03-07

## 2025-01-27 RX ORDER — SODIUM CHLORIDE 9 MG/ML
INJECTION, SOLUTION INTRAVENOUS CONTINUOUS
OUTPATIENT
Start: 2025-03-07

## 2025-01-27 RX ORDER — ALLOPURINOL 300 MG/1
300 TABLET ORAL DAILY
Qty: 30 TABLET | Refills: 3 | Status: SHIPPED | OUTPATIENT
Start: 2025-01-27 | End: 2025-01-28 | Stop reason: ALTCHOICE

## 2025-01-27 RX ORDER — ENOXAPARIN SODIUM 100 MG/ML
100 INJECTION SUBCUTANEOUS 2 TIMES DAILY
Qty: 12 EACH | Refills: 0 | Status: SHIPPED | OUTPATIENT
Start: 2025-01-27

## 2025-01-27 RX ORDER — ONDANSETRON 2 MG/ML
8 INJECTION INTRAMUSCULAR; INTRAVENOUS
OUTPATIENT
Start: 2025-03-07

## 2025-01-27 RX ORDER — HEPARIN SODIUM (PORCINE) LOCK FLUSH IV SOLN 100 UNIT/ML 100 UNIT/ML
500 SOLUTION INTRAVENOUS PRN
OUTPATIENT
Start: 2025-03-07

## 2025-01-27 RX ORDER — DIPHENHYDRAMINE HYDROCHLORIDE 50 MG/ML
50 INJECTION INTRAMUSCULAR; INTRAVENOUS
OUTPATIENT
Start: 2025-03-07

## 2025-01-27 RX ORDER — MEPERIDINE HYDROCHLORIDE 50 MG/ML
12.5 INJECTION INTRAMUSCULAR; INTRAVENOUS; SUBCUTANEOUS PRN
OUTPATIENT
Start: 2025-03-07

## 2025-01-27 RX ORDER — MAGNESIUM GLYCINATE 100 MG
CAPSULE ORAL
COMMUNITY

## 2025-01-27 RX ORDER — SODIUM CHLORIDE 0.9 % (FLUSH) 0.9 %
5-40 SYRINGE (ML) INJECTION PRN
OUTPATIENT
Start: 2025-03-07

## 2025-01-27 RX ORDER — ACETAMINOPHEN 325 MG/1
650 TABLET ORAL
OUTPATIENT
Start: 2025-03-07

## 2025-01-27 RX ORDER — ALBUTEROL SULFATE 90 UG/1
4 INHALANT RESPIRATORY (INHALATION) PRN
OUTPATIENT
Start: 2025-03-07

## 2025-01-27 RX ORDER — EPINEPHRINE 1 MG/ML
0.3 INJECTION, SOLUTION, CONCENTRATE INTRAVENOUS PRN
OUTPATIENT
Start: 2025-03-07

## 2025-01-27 RX ORDER — HYDROCORTISONE SODIUM SUCCINATE 100 MG/2ML
100 INJECTION INTRAMUSCULAR; INTRAVENOUS
OUTPATIENT
Start: 2025-03-07

## 2025-01-27 NOTE — PROGRESS NOTES
Chief Complaint   Patient presents with    Follow-up     Review status of disease     Other     Recent fall 1/25/25 pt reports hitting head, pt was not evaluated after      DIAGNOSIS:        Clinical stage T2 N0 M0 left breast upper outer quadrant invasive ductal carcinoma, grade 3, ER negative, SD negative, HER2/carol not amplified.  History of left nephrectomy in 1988  Saddle PE s/p thrombectomy mid December 2024.  CURRENT THERAPY:         Neoadjuvant treatment with Keytruda/Taxol/carboplatin/Adriamycin/Cytoxan.  Treatment started 5/31/2024  S/p thrombectomy for saddle PE in December 2024.   DOAC for PE.   BRIEF CASE HISTORY:       Ms. Luci Castañeda is a very pleasant 59 y.o. female with history of multiple comorbidities as listed.  She had history of left nephrectomy in 1988.  She had cardiac cath in 2021 for left ventricular hypertrophy.  Had recent problems with restrictive and obstructive pulmonary disease.  She continues follow-up with pulmonary. She was doing fine with routine mammogram being normal until this year where she had suspicious abnormalities in the mammogram done on 3/25/2024.  Diagnostic mammogram and left breast ultrasound April 9, 2024 showed suspicious 4.5 cm mass in the left breast 1 o'clock position.  Mildly thickened left axillary lymph nodes.  A biopsy was performed on 4/26/2024 and unfortunately that was positive for invasive ductal carcinoma.  Triple negative.  Biopsy from lymph node was negative for malignancy.  The patient is seen today for further management of her breast cancer. The patient did very well after her biopsy without any complication. The patient had no symptom preceding her diagnosis of cancer. No chest pain, shortness of breath, cough, sputum or hemoptysis, no back pain or monique aches, no weight loss or decreased appetite, no fever or night sweating. No headaches, and no other complaints.  No smoking social alcohol      INTERIM HISTORY:   Patient seen for

## 2025-01-27 NOTE — TELEPHONE ENCOUNTER
Instructions   from Dr. Jess Bradley MD    Continue treatment per schedule  RV 2/14/25. MD visit and treatment  Allopurinol and lovenox prescriptions  Please add Uric acid level to next labs     RV scheduled 2/14 at 12:30  Tx to follow

## 2025-01-27 NOTE — PATIENT INSTRUCTIONS
Continue treatment per schedule  RV 2/14/25. MD visit and treatment  Allopurinol and lovenox prescriptions  Please add Uric acid level to next labs

## 2025-01-28 RX ORDER — COLCHICINE 0.6 MG/1
TABLET ORAL
Qty: 33 TABLET | Refills: 0 | Status: SHIPPED | OUTPATIENT
Start: 2025-01-28

## 2025-01-28 NOTE — TELEPHONE ENCOUNTER
Pt called stating she saw Dr. Walker yesterday, and she was prescribed allopurinol for her foot swelling. She states her foot turned into gout today, and is asking if he still wants her to take allopurinol, or if he wants to prescribe colchicine. Sent Perfect[facility] to Dr. Walker; he states to hold allopurinol and send RX for colchicine RX sent and pt notified. .

## 2025-01-29 ENCOUNTER — CARE COORDINATION (OUTPATIENT)
Dept: OTHER | Facility: CLINIC | Age: 60
End: 2025-01-29

## 2025-01-29 NOTE — CARE COORDINATION
Patient has graduated from the Disease Specific Care Management  program on 1/29/25.  Patient/family has the ability to self-manage at this time.  Care management goals have been completed. No further Ambulatory Care Manager follow up scheduled.     Goals Addressed                   This Visit's Progress     COMPLETED: Conditions and Symptoms        I will notify my provider of any barriers to my plan of care.  I will follow my Zone Management tool to seek urgent or emergent care.  I will notify my provider of any symptoms that indicate a worsening of my condition.    Barriers: Medical diagnoses  Plan for overcoming my barriers: I will utilize the resources provided to me by my ACM and notify my PCP or specialists or any new or worsening changes.   Confidence: 10/10  Anticipated Goal Completion Date: 1/1/25                Patient has Ambulatory Care Manager's contact information for any further questions, concerns, or needs.  Patients upcoming visits:    Future Appointments   Date Time Provider Department Center   2/5/2025  1:00 PM Amadeo Sanchez MD Resp [facility] [place]   2/14/2025 12:30 PM Jess Bradley MD [facility]CER [facility] [place]   2/14/2025  1:00 PM ST[facility] MED ONC CHAIR [facility] [place]   3/20/2025  9:30 AM Nabil Ly MD [facility] DEP        Deborah Sood LPN- Care Coordinator  Associate Care Management  [address] [place] [address]  [address]  Phone: [phone]  [email or web address]

## 2025-02-03 ENCOUNTER — TELEPHONE (OUTPATIENT)
Age: 60
End: 2025-02-03

## 2025-02-03 NOTE — TELEPHONE ENCOUNTER
Jorge Luis with  [facility]  called to let Dr Ly know the patients BP was 136/94 today and there were no symptoms.

## 2025-02-05 ENCOUNTER — OFFICE VISIT (OUTPATIENT)
Dept: PULMONOLOGY | Age: 60
End: 2025-02-05
Payer: COMMERCIAL

## 2025-02-05 VITALS
HEIGHT: 68 IN | WEIGHT: 218.4 LBS | OXYGEN SATURATION: 97 % | HEART RATE: 93 BPM | SYSTOLIC BLOOD PRESSURE: 121 MMHG | TEMPERATURE: 97.1 F | BODY MASS INDEX: 33.1 KG/M2 | DIASTOLIC BLOOD PRESSURE: 87 MMHG | RESPIRATION RATE: 16 BRPM

## 2025-02-05 DIAGNOSIS — J43.9 MIXED RESTRICTIVE AND OBSTRUCTIVE LUNG DISEASE (HCC): Primary | ICD-10-CM

## 2025-02-05 DIAGNOSIS — G47.33 OSA (OBSTRUCTIVE SLEEP APNEA): ICD-10-CM

## 2025-02-05 DIAGNOSIS — J98.4 MIXED RESTRICTIVE AND OBSTRUCTIVE LUNG DISEASE (HCC): Primary | ICD-10-CM

## 2025-02-05 DIAGNOSIS — I82.90 VTE (VENOUS THROMBOEMBOLISM): ICD-10-CM

## 2025-02-05 PROCEDURE — 3079F DIAST BP 80-89 MM HG: CPT | Performed by: INTERNAL MEDICINE

## 2025-02-05 PROCEDURE — 3074F SYST BP LT 130 MM HG: CPT | Performed by: INTERNAL MEDICINE

## 2025-02-05 PROCEDURE — 99214 OFFICE O/P EST MOD 30 MIN: CPT | Performed by: INTERNAL MEDICINE

## 2025-02-05 ASSESSMENT — SLEEP AND FATIGUE QUESTIONNAIRES
HOW LIKELY ARE YOU TO NOD OFF OR FALL ASLEEP WHILE SITTING QUIETLY AFTER LUNCH WITHOUT ALCOHOL: WOULD NEVER DOZE
HOW LIKELY ARE YOU TO NOD OFF OR FALL ASLEEP IN A CAR, WHILE STOPPED FOR A FEW MINUTES IN TRAFFIC: WOULD NEVER DOZE
HOW LIKELY ARE YOU TO NOD OFF OR FALL ASLEEP WHILE SITTING AND TALKING TO SOMEONE: WOULD NEVER DOZE
ESS TOTAL SCORE: 3
HOW LIKELY ARE YOU TO NOD OFF OR FALL ASLEEP WHILE LYING DOWN TO REST IN THE AFTERNOON WHEN CIRCUMSTANCES PERMIT: MODERATE CHANCE OF DOZING
HOW LIKELY ARE YOU TO NOD OFF OR FALL ASLEEP WHILE SITTING AND READING: WOULD NEVER DOZE
HOW LIKELY ARE YOU TO NOD OFF OR FALL ASLEEP WHILE SITTING INACTIVE IN A PUBLIC PLACE: WOULD NEVER DOZE
HOW LIKELY ARE YOU TO NOD OFF OR FALL ASLEEP WHEN YOU ARE A PASSENGER IN A CAR FOR AN HOUR WITHOUT A BREAK: WOULD NEVER DOZE
HOW LIKELY ARE YOU TO NOD OFF OR FALL ASLEEP WHILE WATCHING TV: SLIGHT CHANCE OF DOZING

## 2025-02-05 NOTE — PROGRESS NOTES
and/or weight based adjustment of the mA/kV was utilized to  reduce the radiation dose to as low as reasonably achievable.    COMPARISON:  CT chest performed 12/09/2024.    HISTORY:  ORDERING SYSTEM PROVIDED HISTORY: recent PE  TECHNOLOGIST PROVIDED HISTORY:  recent PE    Reason for Exam: Shortness of breath    FINDINGS:  Pulmonary Arteries: Pulmonary arteries are adequately opacified for  evaluation.  There may be residual small filling defect within the distal  right main pulmonary artery extending into adjacent segmental branches.  Overall the clot burden is significantly improved compared to prior  examination.  Main pulmonary artery is normal in caliber.    Mediastinum: No evidence of mediastinal lymphadenopathy.  The heart and  pericardium demonstrate no acute abnormality.  There is no acute abnormality  of the thoracic aorta.    Lungs/pleura: The lungs are without acute process.  No focal consolidation or  pulmonary edema.  No evidence of pleural effusion or pneumothorax.    Upper Abdomen: Limited images of the upper abdomen are unremarkable.    Soft Tissues/Bones: No acute bone or soft tissue abnormality.    Impression  Residual small filling defect consistent with PE in the distal right main  pulmonary artery extending into adjacent segmental branches.  Overall the  clot burden is significantly improved compared to prior examination.      CT CHEST PULMONARY EMBOLISM W CONTRAST 12/09/2024    Narrative  EXAMINATION:  CTA OF THE CHEST 12/9/2024 12:13 pm    TECHNIQUE:  CTA of the chest was performed after the administration of intravenous  contrast.  Multiplanar reformatted images are provided for review.  MIP  images are provided for review. Automated exposure control, iterative  reconstruction, and/or weight based adjustment of the mA/kV was utilized to  reduce the radiation dose to as low as reasonably achievable.    COMPARISON:  None.    HISTORY:  ORDERING SYSTEM PROVIDED HISTORY: rule out PE  TECHNOLOGIST

## 2025-02-05 NOTE — PATIENT INSTRUCTIONS
[facility] ([facility] [phone], to schedule any tests/ imaging that has been ordered for you.     If you require a follow up appointment with your provider, staff will call you, or send you a [employer] message, to schedule.

## 2025-02-06 PROBLEM — R09.02 OXYGEN DESATURATION: Status: RESOLVED | Noted: 2019-08-26 | Resolved: 2025-02-06

## 2025-02-06 PROBLEM — I26.92 ACUTE SADDLE PULMONARY EMBOLISM (HCC): Status: RESOLVED | Noted: 2024-12-09 | Resolved: 2025-02-06

## 2025-02-06 PROBLEM — E66.813 CLASS 3 SEVERE OBESITY DUE TO EXCESS CALORIES WITH SERIOUS COMORBIDITY AND BODY MASS INDEX (BMI) OF 40.0 TO 44.9 IN ADULT: Status: RESOLVED | Noted: 2019-05-01 | Resolved: 2025-02-06

## 2025-02-06 PROBLEM — I82.90 VTE (VENOUS THROMBOEMBOLISM): Status: ACTIVE | Noted: 2025-02-06

## 2025-02-06 PROBLEM — R06.09 DYSPNEA ON EXERTION: Status: RESOLVED | Noted: 2019-08-06 | Resolved: 2025-02-06

## 2025-02-06 PROBLEM — E66.01 CLASS 3 SEVERE OBESITY DUE TO EXCESS CALORIES WITH SERIOUS COMORBIDITY AND BODY MASS INDEX (BMI) OF 40.0 TO 44.9 IN ADULT: Status: RESOLVED | Noted: 2019-05-01 | Resolved: 2025-02-06

## 2025-02-07 ENCOUNTER — TELEPHONE (OUTPATIENT)
Dept: INFUSION THERAPY | Age: 60
End: 2025-02-07

## 2025-02-10 ENCOUNTER — HOSPITAL ENCOUNTER (OUTPATIENT)
Age: 60
Discharge: HOME | End: 2025-02-12
Payer: COMMERCIAL

## 2025-02-10 ENCOUNTER — HOSPITAL ENCOUNTER (OUTPATIENT)
Dept: PREADMISSION TESTING | Age: 60
Discharge: HOME | End: 2025-02-14

## 2025-02-10 VITALS
DIASTOLIC BLOOD PRESSURE: 95 MMHG | RESPIRATION RATE: 18 BRPM | HEIGHT: 68 IN | BODY MASS INDEX: 33.04 KG/M2 | TEMPERATURE: 97.1 F | WEIGHT: 218 LBS | OXYGEN SATURATION: 96 % | SYSTOLIC BLOOD PRESSURE: 126 MMHG | HEART RATE: 91 BPM

## 2025-02-10 DIAGNOSIS — N20.0 CALCULUS OF KIDNEY: ICD-10-CM

## 2025-02-10 PROCEDURE — 74018 RADEX ABDOMEN 1 VIEW: CPT

## 2025-02-10 NOTE — PRE-PROCEDURE INSTRUCTIONS
On the Day of Your Surgery, Monday, 2/24/25, Please Arrive At 8:45 AM     Enter [facility] through the Main Entrance, take the lobby elevators to the second floor and check in at the Surgery Registration desk.     Continue to take your home medications as you normally do up to and including the night before surgery with the exception of blood thinning medications.    Blood Thinning Medications:  Please stop prescription blood thinning medications such as Apixaban (Eliquis); Clopidogrel (Plavix); Dabigatran (Pradaxa); Prasugrel (Effient); Rivaroxaban (Xarelto); Ticagrelor (Brilinta); Warfarin (Coumadin) only as directed by your surgeon and/or the prescribing physician    Some common examples of other medications that can thin your blood are: Aspirin, Ibuprofen (Advil, Motrin), Naproxen (Aleve), Meloxicam (Mobic), Celecoxib (Celebrex), Fish Oil, many Herbal Supplements.  These medications should usually be stopped at least 7 days prior to surgery.    The patient was given instruction regarding eliquis. Last dose on 2/20/25 and start lovenox.     Tylenol is OK to take for pain the week prior to surgery.    Failure to stop certain medications may interfere with your scheduled surgery.    If you receive instructions from your surgeon regarding what medications to stop prior to surgery, please follow those specific instructions.    If You Have Diabetes:  Do not take any of your diabetic medications, (injectables or by mouth) the morning of surgery unless otherwise instructed by the doctor who manages your diabetes. If you are taking insulin, contact the doctor the manages your diabetes for instructions about any changes to your insulin dosages the day before surgery.      Please take the following medication(s) the day of surgery with small sips of water:              Pantoprazole.  Ok to take Norco if needed. Also bring rescue albuterol inhaler day of surgery    Please use your inhaler(s) if needed and bring your

## 2025-02-10 NOTE — H&P
Interval H&P Note    Pt Name: Luci Castañeda  MRN: [id number]  YOB: 1965  Date of evaluation: 2/10/2025      [x] I have reviewed in [facility] the oncology progress note by Dr. Bradley dated 01/27/2025 for an Interval History and Physical note (attached below).      [x] I have examined  Luci Castañeda, a 59 y.o. female.There are no changes to the patient who is scheduled for LEFT BREAST LOCALIZER (@10AM) GUIDED LUMPECTOMY, LEFT SENTINEL NODE (@10:30AM)  BIOPSY, AXILLARY LYMPH SENTINEL NODE BIOPSY by Davie Gordillo DO for . The patient denies new health changes, fever, chills, wheezing, cough, increased SOB, chest pain, open sores or wounds.    Patient received clearance for procedure from hematology/oncology (Dr. Bradley) with instructions to hold Eliquis and bridge with Lovenox (clearance note scanned in media tab).     Vital signs: BP (!) 126/95   Pulse 91   Temp 97.1 °F (36.2 °C) (Temporal)   Resp 18   Ht 1.727 m (5' 8\")   Wt 98.9 kg (218 lb)   SpO2 96%   BMI 33.15 kg/m²     Allergies:  Mixed ragweed and Cat dander    Medications:    Prior to Admission medications    Medication Sig Start Date End Date Taking? Authorizing Provider   apixaban (ELIQUIS) 5 MG TABS tablet Take 2 tablets by mouth 2 times daily for 3 days, THEN 1 tablet 2 times daily. 12/18/24 3/21/25 Yes Bart Lewis DO   colchicine (COLCRYS) 0.6 MG tablet Take 2 tabs po once daily for 3 days, then take 1 tab po once daily thereafter.  Patient taking differently: Take 1 tablet by mouth daily Take 2 tabs po once daily for 3 days, then take 1 tab po once daily thereafter. 1/28/25   Jess Bradley MD   Magnesium Glycinate 100 MG CAPS Take 1 capsule by mouth at bedtime    Provider, MD Guido   enoxaparin (LOVENOX) 100 MG/ML Inject 1 mL into the skin 2 times daily Starting 3 days before surgery 1/27/25   Jess Bradley MD   furosemide (LASIX) 20 MG tablet Take 1 tablet by mouth daily  Patient not taking:

## 2025-02-11 ENCOUNTER — ANESTHESIA EVENT (OUTPATIENT)
Dept: OPERATING ROOM | Age: 60
End: 2025-02-11
Payer: COMMERCIAL

## 2025-02-13 DIAGNOSIS — C50.412 MALIGNANT NEOPLASM OF UPPER-OUTER QUADRANT OF LEFT BREAST IN FEMALE, ESTROGEN RECEPTOR NEGATIVE (HCC): Primary | ICD-10-CM

## 2025-02-13 DIAGNOSIS — Z17.1 MALIGNANT NEOPLASM OF UPPER-OUTER QUADRANT OF LEFT BREAST IN FEMALE, ESTROGEN RECEPTOR NEGATIVE (HCC): Primary | ICD-10-CM

## 2025-02-14 ENCOUNTER — TELEPHONE (OUTPATIENT)
Dept: ONCOLOGY | Age: 60
End: 2025-02-14

## 2025-02-14 ENCOUNTER — OFFICE VISIT (OUTPATIENT)
Dept: ONCOLOGY | Age: 60
End: 2025-02-14
Payer: COMMERCIAL

## 2025-02-14 ENCOUNTER — HOSPITAL ENCOUNTER (OUTPATIENT)
Dept: INFUSION THERAPY | Age: 60
Discharge: HOME | End: 2025-02-14
Payer: COMMERCIAL

## 2025-02-14 ENCOUNTER — TELEPHONE (OUTPATIENT)
Dept: INFUSION THERAPY | Age: 60
End: 2025-02-14

## 2025-02-14 VITALS
WEIGHT: 220 LBS | HEART RATE: 94 BPM | HEIGHT: 68 IN | BODY MASS INDEX: 33.34 KG/M2 | RESPIRATION RATE: 14 BRPM | DIASTOLIC BLOOD PRESSURE: 82 MMHG | TEMPERATURE: 94.9 F | SYSTOLIC BLOOD PRESSURE: 129 MMHG

## 2025-02-14 DIAGNOSIS — C50.912 INVASIVE DUCTAL CARCINOMA OF BREAST, LEFT (HCC): Primary | ICD-10-CM

## 2025-02-14 DIAGNOSIS — Z17.1 MALIGNANT NEOPLASM OF UPPER-OUTER QUADRANT OF LEFT BREAST IN FEMALE, ESTROGEN RECEPTOR NEGATIVE (HCC): Primary | ICD-10-CM

## 2025-02-14 DIAGNOSIS — C50.412 MALIGNANT NEOPLASM OF UPPER-OUTER QUADRANT OF LEFT BREAST IN FEMALE, ESTROGEN RECEPTOR NEGATIVE (HCC): Primary | ICD-10-CM

## 2025-02-14 DIAGNOSIS — C50.912 INVASIVE DUCTAL CARCINOMA OF BREAST, LEFT (HCC): ICD-10-CM

## 2025-02-14 LAB
ALBUMIN SERPL-MCNC: 3.4 G/DL (ref 3.5–5.2)
ALBUMIN/GLOB SERPL: 1.2 {RATIO} (ref 1–2.5)
ALP SERPL-CCNC: 84 U/L (ref 35–104)
ALT SERPL-CCNC: 13 U/L (ref 5–33)
AMYLASE SERPL-CCNC: 42 U/L (ref 28–100)
ANION GAP SERPL CALCULATED.3IONS-SCNC: 12 MMOL/L (ref 9–17)
AST SERPL-CCNC: 29 U/L
BASOPHILS # BLD: 0 K/UL (ref 0–0.2)
BASOPHILS NFR BLD: 1 % (ref 0–2)
BILIRUB SERPL-MCNC: 0.4 MG/DL (ref 0.3–1.2)
BUN SERPL-MCNC: 12 MG/DL (ref 8–23)
CALCIUM SERPL-MCNC: 8.9 MG/DL (ref 8.6–10.4)
CHLORIDE SERPL-SCNC: 105 MMOL/L (ref 98–107)
CO2 SERPL-SCNC: 24 MMOL/L (ref 20–31)
CORTIS SERPL-MCNC: 12.1 UG/DL (ref 2.5–19.5)
CORTISOL COLLECTION INFO: NORMAL
CREAT SERPL-MCNC: 0.8 MG/DL (ref 0.5–0.9)
EOSINOPHIL # BLD: 0.2 K/UL (ref 0–0.4)
EOSINOPHILS RELATIVE PERCENT: 4 % (ref 1–4)
ERYTHROCYTE [DISTWIDTH] IN BLOOD BY AUTOMATED COUNT: 16.5 % (ref 12.5–15.4)
GFR, ESTIMATED: 84 ML/MIN/1.73M2
GLUCOSE SERPL-MCNC: 112 MG/DL (ref 70–99)
HCT VFR BLD AUTO: 34 % (ref 36–46)
HGB BLD-MCNC: 11.4 G/DL (ref 12–16)
LIPASE SERPL-CCNC: 46 U/L (ref 13–60)
LYMPHOCYTES NFR BLD: 0.7 K/UL (ref 1–4.8)
LYMPHOCYTES RELATIVE PERCENT: 18 % (ref 24–44)
MAGNESIUM SERPL-MCNC: 1 MG/DL (ref 1.6–2.6)
MCH RBC QN AUTO: 33.2 PG (ref 26–34)
MCHC RBC AUTO-ENTMCNC: 33.5 G/DL (ref 31–37)
MCV RBC AUTO: 99 FL (ref 80–100)
MONOCYTES NFR BLD: 0.4 K/UL (ref 0.1–1.2)
MONOCYTES NFR BLD: 10 % (ref 2–11)
NEUTROPHILS NFR BLD: 67 % (ref 36–66)
NEUTS SEG NFR BLD: 2.7 K/UL (ref 1.8–7.7)
PLATELET # BLD AUTO: 88 K/UL (ref 140–450)
PMV BLD AUTO: 8.6 FL (ref 6–12)
POTASSIUM SERPL-SCNC: 3.6 MMOL/L (ref 3.7–5.3)
PROT SERPL-MCNC: 6.3 G/DL (ref 6.4–8.3)
RBC # BLD AUTO: 3.44 M/UL (ref 4–5.2)
SODIUM SERPL-SCNC: 141 MMOL/L (ref 135–144)
TSH SERPL DL<=0.05 MIU/L-ACNC: 1.59 UIU/ML (ref 0.3–5)
URATE SERPL-MCNC: 7.9 MG/DL (ref 2.4–5.7)
WBC OTHER # BLD: 3.9 K/UL (ref 3.5–11)

## 2025-02-14 PROCEDURE — 6360000002 HC RX W HCPCS: Performed by: INTERNAL MEDICINE

## 2025-02-14 PROCEDURE — 82533 TOTAL CORTISOL: CPT

## 2025-02-14 PROCEDURE — 2500000003 HC RX 250 WO HCPCS: Performed by: INTERNAL MEDICINE

## 2025-02-14 PROCEDURE — 3079F DIAST BP 80-89 MM HG: CPT | Performed by: INTERNAL MEDICINE

## 2025-02-14 PROCEDURE — 2580000003 HC RX 258: Performed by: INTERNAL MEDICINE

## 2025-02-14 PROCEDURE — 84550 ASSAY OF BLOOD/URIC ACID: CPT

## 2025-02-14 PROCEDURE — 83735 ASSAY OF MAGNESIUM: CPT

## 2025-02-14 PROCEDURE — 99214 OFFICE O/P EST MOD 30 MIN: CPT | Performed by: INTERNAL MEDICINE

## 2025-02-14 PROCEDURE — 96365 THER/PROPH/DIAG IV INF INIT: CPT

## 2025-02-14 PROCEDURE — 85025 COMPLETE CBC W/AUTO DIFF WBC: CPT

## 2025-02-14 PROCEDURE — 99211 OFF/OP EST MAY X REQ PHY/QHP: CPT | Performed by: INTERNAL MEDICINE

## 2025-02-14 PROCEDURE — 84443 ASSAY THYROID STIM HORMONE: CPT

## 2025-02-14 PROCEDURE — 96366 THER/PROPH/DIAG IV INF ADDON: CPT

## 2025-02-14 PROCEDURE — 82150 ASSAY OF AMYLASE: CPT

## 2025-02-14 PROCEDURE — 96413 CHEMO IV INFUSION 1 HR: CPT

## 2025-02-14 PROCEDURE — 80053 COMPREHEN METABOLIC PANEL: CPT

## 2025-02-14 PROCEDURE — 83690 ASSAY OF LIPASE: CPT

## 2025-02-14 PROCEDURE — 3074F SYST BP LT 130 MM HG: CPT | Performed by: INTERNAL MEDICINE

## 2025-02-14 RX ORDER — SODIUM CHLORIDE 9 MG/ML
INJECTION, SOLUTION INTRAVENOUS CONTINUOUS
OUTPATIENT
Start: 2025-03-28

## 2025-02-14 RX ORDER — ALBUTEROL SULFATE 90 UG/1
4 INHALANT RESPIRATORY (INHALATION) PRN
OUTPATIENT
Start: 2025-03-28

## 2025-02-14 RX ORDER — HEPARIN SODIUM (PORCINE) LOCK FLUSH IV SOLN 100 UNIT/ML 100 UNIT/ML
500 SOLUTION INTRAVENOUS PRN
OUTPATIENT
Start: 2025-03-28

## 2025-02-14 RX ORDER — FAMOTIDINE 10 MG/ML
20 INJECTION, SOLUTION INTRAVENOUS
OUTPATIENT
Start: 2025-03-28

## 2025-02-14 RX ORDER — MAGNESIUM SULFATE IN WATER 40 MG/ML
2000 INJECTION, SOLUTION INTRAVENOUS ONCE
Status: COMPLETED | OUTPATIENT
Start: 2025-02-14 | End: 2025-02-14

## 2025-02-14 RX ORDER — HEPARIN 100 UNIT/ML
500 SYRINGE INTRAVENOUS PRN
Status: DISCONTINUED | OUTPATIENT
Start: 2025-02-14 | End: 2025-02-15 | Stop reason: HOSPADM

## 2025-02-14 RX ORDER — MAGNESIUM SULFATE HEPTAHYDRATE 40 MG/ML
4000 INJECTION, SOLUTION INTRAVENOUS ONCE
Status: DISCONTINUED | OUTPATIENT
Start: 2025-02-14 | End: 2025-02-14

## 2025-02-14 RX ORDER — HYDROCORTISONE SODIUM SUCCINATE 100 MG/2ML
100 INJECTION INTRAMUSCULAR; INTRAVENOUS
OUTPATIENT
Start: 2025-03-28

## 2025-02-14 RX ORDER — SODIUM CHLORIDE 0.9 % (FLUSH) 0.9 %
5-40 SYRINGE (ML) INJECTION PRN
OUTPATIENT
Start: 2025-03-28

## 2025-02-14 RX ORDER — SODIUM CHLORIDE 9 MG/ML
5-250 INJECTION, SOLUTION INTRAVENOUS PRN
OUTPATIENT
Start: 2025-03-28

## 2025-02-14 RX ORDER — SODIUM CHLORIDE 0.9 % (FLUSH) 0.9 %
5-40 SYRINGE (ML) INJECTION PRN
Status: DISCONTINUED | OUTPATIENT
Start: 2025-02-14 | End: 2025-02-15 | Stop reason: HOSPADM

## 2025-02-14 RX ORDER — DIPHENHYDRAMINE HYDROCHLORIDE 50 MG/ML
50 INJECTION INTRAMUSCULAR; INTRAVENOUS
OUTPATIENT
Start: 2025-03-28

## 2025-02-14 RX ORDER — ONDANSETRON 2 MG/ML
8 INJECTION INTRAMUSCULAR; INTRAVENOUS
OUTPATIENT
Start: 2025-03-28

## 2025-02-14 RX ORDER — ACETAMINOPHEN 325 MG/1
650 TABLET ORAL
Start: 2025-03-28

## 2025-02-14 RX ORDER — MEPERIDINE HYDROCHLORIDE 50 MG/ML
12.5 INJECTION INTRAMUSCULAR; INTRAVENOUS; SUBCUTANEOUS PRN
OUTPATIENT
Start: 2025-03-28

## 2025-02-14 RX ORDER — EPINEPHRINE 1 MG/ML
0.3 INJECTION, SOLUTION, CONCENTRATE INTRAVENOUS PRN
OUTPATIENT
Start: 2025-03-28

## 2025-02-14 RX ORDER — ACETAMINOPHEN 325 MG/1
650 TABLET ORAL
OUTPATIENT
Start: 2025-03-28

## 2025-02-14 RX ADMIN — SODIUM CHLORIDE, PRESERVATIVE FREE 10 ML: 5 INJECTION INTRAVENOUS at 16:00

## 2025-02-14 RX ADMIN — Medication 500 UNITS: at 16:00

## 2025-02-14 RX ADMIN — SODIUM CHLORIDE, PRESERVATIVE FREE 10 ML: 5 INJECTION INTRAVENOUS at 12:28

## 2025-02-14 RX ADMIN — SODIUM CHLORIDE 200 MG: 9 INJECTION, SOLUTION INTRAVENOUS at 15:27

## 2025-02-14 RX ADMIN — MAGNESIUM SULFATE HEPTAHYDRATE 2000 MG: 40 INJECTION, SOLUTION INTRAVENOUS at 13:42

## 2025-02-14 NOTE — TELEPHONE ENCOUNTER
Instructions   from Dr. Jess Bradley MD    Magnesium replacement 2 gm IV today  Proceed with treatment as planned  RV 3 weeks     RV is 03/07/25 @ [id number]   Tx to follow @ 1pm

## 2025-02-14 NOTE — PROGRESS NOTES
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  History of left nephrectomy in 1988  Saddle PE s/p thrombectomy mid December 2024.  CURRENT THERAPY:         Neoadjuvant treatment with Keytruda/Taxol/carboplatin/Adriamycin/Cytoxan.  Treatment started 5/31/2024  S/p thrombectomy for saddle PE in December 2024.   DOAC for PE.   Plan for lumpectomy 2/2/25  BRIEF CASE HISTORY:       Ms. Luci Castañeda is a very pleasant 59 y.o. female with history of multiple comorbidities as listed.  She had history of left nephrectomy in 1988.  She had cardiac cath in 2021 for left ventricular hypertrophy.  Had recent problems with restrictive and obstructive pulmonary disease.  She continues follow-up with pulmonary. She was doing fine with routine mammogram being normal until this year where she had suspicious abnormalities in the mammogram done on 3/25/2024.  Diagnostic mammogram and left breast ultrasound April 9, 2024 showed suspicious 4.5 cm mass in the left breast 1 o'clock position.  Mildly thickened left axillary lymph nodes.  A biopsy was performed on 4/26/2024 and unfortunately that was positive for invasive ductal carcinoma.  Triple negative.  Biopsy from lymph node was negative for malignancy.  The patient is seen today for further management of her breast cancer. The patient did very well after her biopsy without any complication. The patient had no symptom preceding her diagnosis of cancer. No chest pain, shortness of breath, cough, sputum or hemoptysis, no back pain or monique aches, no weight loss or decreased appetite, no fever or night sweating. No headaches, and no other complaints.  No smoking social alcohol      INTERIM HISTORY:   Patient seen for follow-up triple negative breast cancer.  Patient is having

## 2025-02-14 NOTE — TELEPHONE ENCOUNTER
Mastectomy products form was faxed to [phone] with conformation, form added to be scanned in.

## 2025-02-14 NOTE — PROGRESS NOTES
Patient here for C8D1.  Arrives ambulatory.  Denies any new complaints.  Labs drawn from port, results reviewed.  Magnesium at 1.0  Pt was seen by Dr. Bradley, ordered to proceed with treatment and to replace magnesium with 2g IV  Treatment complete without incident.  Patient discharged in stable condition.  Returns 3/7/25 for C9D1.

## 2025-02-24 ENCOUNTER — HOSPITAL ENCOUNTER (OUTPATIENT)
Dept: NUCLEAR MEDICINE | Age: 60
Discharge: HOME | End: 2025-02-26
Payer: COMMERCIAL

## 2025-02-24 ENCOUNTER — HOSPITAL ENCOUNTER (OUTPATIENT)
Dept: ULTRASOUND IMAGING | Age: 60
Discharge: HOME | End: 2025-02-26
Payer: COMMERCIAL

## 2025-02-24 ENCOUNTER — HOSPITAL ENCOUNTER (OUTPATIENT)
Age: 60
Setting detail: OBSERVATION
Discharge: HOME | End: 2025-02-25
Attending: SURGERY | Admitting: SURGERY
Payer: COMMERCIAL

## 2025-02-24 ENCOUNTER — ANESTHESIA (OUTPATIENT)
Dept: OPERATING ROOM | Age: 60
End: 2025-02-24
Payer: COMMERCIAL

## 2025-02-24 ENCOUNTER — APPOINTMENT (OUTPATIENT)
Dept: MAMMOGRAPHY | Age: 60
End: 2025-02-24
Attending: SURGERY
Payer: COMMERCIAL

## 2025-02-24 DIAGNOSIS — Z98.890 S/P BREAST LUMPECTOMY: Primary | ICD-10-CM

## 2025-02-24 DIAGNOSIS — C50.912 MALIGNANT NEOPLASM OF LEFT FEMALE BREAST, UNSPECIFIED ESTROGEN RECEPTOR STATUS, UNSPECIFIED SITE OF BREAST (HCC): ICD-10-CM

## 2025-02-24 LAB
ABO + RH BLD: NORMAL
ARM BAND NUMBER: NORMAL
BLOOD BANK SAMPLE EXPIRATION: NORMAL
BLOOD GROUP ANTIBODIES SERPL: NEGATIVE
ERYTHROCYTE [DISTWIDTH] IN BLOOD BY AUTOMATED COUNT: 13.7 % (ref 11.8–14.4)
HCT VFR BLD AUTO: 38.9 % (ref 36.3–47.1)
HGB BLD-MCNC: 13.1 G/DL (ref 11.9–15.1)
MCH RBC QN AUTO: 33.3 PG (ref 25.2–33.5)
MCHC RBC AUTO-ENTMCNC: 33.7 G/DL (ref 28.4–34.8)
MCV RBC AUTO: 99 FL (ref 82.6–102.9)
NRBC BLD-RTO: 0 PER 100 WBC
PLATELET # BLD AUTO: 87 K/UL (ref 138–453)
PMV BLD AUTO: 9.6 FL (ref 8.1–13.5)
RBC # BLD AUTO: 3.93 M/UL (ref 3.95–5.11)
WBC OTHER # BLD: 4.1 K/UL (ref 3.5–11.3)

## 2025-02-24 PROCEDURE — 2709999900 HC NON-CHARGEABLE SUPPLY: Performed by: SURGERY

## 2025-02-24 PROCEDURE — 3600000002 HC SURGERY LEVEL 2 BASE: Performed by: SURGERY

## 2025-02-24 PROCEDURE — 2500000003 HC RX 250 WO HCPCS

## 2025-02-24 PROCEDURE — 3600000012 HC SURGERY LEVEL 2 ADDTL 15MIN: Performed by: SURGERY

## 2025-02-24 PROCEDURE — 88307 TISSUE EXAM BY PATHOLOGIST: CPT

## 2025-02-24 PROCEDURE — 2720000010 HC SURG SUPPLY STERILE: Performed by: SURGERY

## 2025-02-24 PROCEDURE — 6360000002 HC RX W HCPCS: Performed by: SURGERY

## 2025-02-24 PROCEDURE — 86901 BLOOD TYPING SEROLOGIC RH(D): CPT

## 2025-02-24 PROCEDURE — 6360000002 HC RX W HCPCS: Performed by: NURSE ANESTHETIST, CERTIFIED REGISTERED

## 2025-02-24 PROCEDURE — 85027 COMPLETE CBC AUTOMATED: CPT

## 2025-02-24 PROCEDURE — 19285 PERQ DEV BREAST 1ST US IMAG: CPT

## 2025-02-24 PROCEDURE — 88342 IMHCHEM/IMCYTCHM 1ST ANTB: CPT

## 2025-02-24 PROCEDURE — 3700000001 HC ADD 15 MINUTES (ANESTHESIA): Performed by: SURGERY

## 2025-02-24 PROCEDURE — 6370000000 HC RX 637 (ALT 250 FOR IP)

## 2025-02-24 PROCEDURE — 88360 TUMOR IMMUNOHISTOCHEM/MANUAL: CPT

## 2025-02-24 PROCEDURE — 76098 X-RAY EXAM SURGICAL SPECIMEN: CPT

## 2025-02-24 PROCEDURE — 2580000003 HC RX 258: Performed by: ANESTHESIOLOGY

## 2025-02-24 PROCEDURE — 6370000000 HC RX 637 (ALT 250 FOR IP): Performed by: SURGERY

## 2025-02-24 PROCEDURE — 6360000002 HC RX W HCPCS: Performed by: ANESTHESIOLOGY

## 2025-02-24 PROCEDURE — 6370000000 HC RX 637 (ALT 250 FOR IP): Performed by: ANESTHESIOLOGY

## 2025-02-24 PROCEDURE — A4648 IMPLANTABLE TISSUE MARKER: HCPCS

## 2025-02-24 PROCEDURE — A9520 TC99 TILMANOCEPT DIAG 0.5MCI: HCPCS | Performed by: SURGERY

## 2025-02-24 PROCEDURE — 7100000000 HC PACU RECOVERY - FIRST 15 MIN: Performed by: SURGERY

## 2025-02-24 PROCEDURE — 86900 BLOOD TYPING SEROLOGIC ABO: CPT

## 2025-02-24 PROCEDURE — 7100000001 HC PACU RECOVERY - ADDTL 15 MIN: Performed by: SURGERY

## 2025-02-24 PROCEDURE — 78195 LYMPH SYSTEM IMAGING: CPT

## 2025-02-24 PROCEDURE — 6360000002 HC RX W HCPCS

## 2025-02-24 PROCEDURE — 86850 RBC ANTIBODY SCREEN: CPT

## 2025-02-24 PROCEDURE — 88341 IMHCHEM/IMCYTCHM EA ADD ANTB: CPT

## 2025-02-24 PROCEDURE — 3700000000 HC ANESTHESIA ATTENDED CARE: Performed by: SURGERY

## 2025-02-24 PROCEDURE — G0378 HOSPITAL OBSERVATION PER HR: HCPCS

## 2025-02-24 PROCEDURE — 3430000000 HC RX DIAGNOSTIC RADIOPHARMACEUTICAL: Performed by: SURGERY

## 2025-02-24 PROCEDURE — 2500000003 HC RX 250 WO HCPCS: Performed by: NURSE ANESTHETIST, CERTIFIED REGISTERED

## 2025-02-24 RX ORDER — SODIUM CHLORIDE 9 MG/ML
INJECTION, SOLUTION INTRAVENOUS CONTINUOUS
Status: DISCONTINUED | OUTPATIENT
Start: 2025-02-24 | End: 2025-02-24 | Stop reason: HOSPADM

## 2025-02-24 RX ORDER — COLCHICINE 0.6 MG/1
0.6 TABLET ORAL DAILY
Status: DISCONTINUED | OUTPATIENT
Start: 2025-02-24 | End: 2025-02-25 | Stop reason: HOSPADM

## 2025-02-24 RX ORDER — SODIUM CHLORIDE 0.9 % (FLUSH) 0.9 %
5-40 SYRINGE (ML) INJECTION EVERY 12 HOURS SCHEDULED
Status: DISCONTINUED | OUTPATIENT
Start: 2025-02-24 | End: 2025-02-25 | Stop reason: HOSPADM

## 2025-02-24 RX ORDER — LIDOCAINE 40 MG/G
CREAM TOPICAL ONCE
Status: COMPLETED | OUTPATIENT
Start: 2025-02-24 | End: 2025-02-24

## 2025-02-24 RX ORDER — PHENYLEPHRINE HCL IN 0.9% NACL 1 MG/10 ML
SYRINGE (ML) INTRAVENOUS
Status: DISCONTINUED | OUTPATIENT
Start: 2025-02-24 | End: 2025-02-24 | Stop reason: SDUPTHER

## 2025-02-24 RX ORDER — APREPITANT 40 MG/1
40 CAPSULE ORAL ONCE
Status: COMPLETED | OUTPATIENT
Start: 2025-02-24 | End: 2025-02-24

## 2025-02-24 RX ORDER — SODIUM CHLORIDE, SODIUM LACTATE, POTASSIUM CHLORIDE, CALCIUM CHLORIDE 600; 310; 30; 20 MG/100ML; MG/100ML; MG/100ML; MG/100ML
INJECTION, SOLUTION INTRAVENOUS CONTINUOUS
Status: DISCONTINUED | OUTPATIENT
Start: 2025-02-24 | End: 2025-02-24 | Stop reason: HOSPADM

## 2025-02-24 RX ORDER — MIDAZOLAM HYDROCHLORIDE 1 MG/ML
INJECTION, SOLUTION INTRAMUSCULAR; INTRAVENOUS
Status: DISCONTINUED | OUTPATIENT
Start: 2025-02-24 | End: 2025-02-24 | Stop reason: SDUPTHER

## 2025-02-24 RX ORDER — ONDANSETRON 2 MG/ML
4 INJECTION INTRAMUSCULAR; INTRAVENOUS EVERY 6 HOURS PRN
Status: DISCONTINUED | OUTPATIENT
Start: 2025-02-24 | End: 2025-02-25

## 2025-02-24 RX ORDER — OXYCODONE HYDROCHLORIDE 5 MG/1
5 TABLET ORAL
Status: DISCONTINUED | OUTPATIENT
Start: 2025-02-24 | End: 2025-02-24 | Stop reason: HOSPADM

## 2025-02-24 RX ORDER — SODIUM CHLORIDE 0.9 % (FLUSH) 0.9 %
5-40 SYRINGE (ML) INJECTION PRN
Status: DISCONTINUED | OUTPATIENT
Start: 2025-02-24 | End: 2025-02-24 | Stop reason: HOSPADM

## 2025-02-24 RX ORDER — OXYCODONE HYDROCHLORIDE 5 MG/1
5 TABLET ORAL EVERY 6 HOURS PRN
Status: DISCONTINUED | OUTPATIENT
Start: 2025-02-24 | End: 2025-02-25 | Stop reason: HOSPADM

## 2025-02-24 RX ORDER — MAGNESIUM SULFATE IN WATER 40 MG/ML
2000 INJECTION, SOLUTION INTRAVENOUS PRN
Status: DISCONTINUED | OUTPATIENT
Start: 2025-02-24 | End: 2025-02-25 | Stop reason: HOSPADM

## 2025-02-24 RX ORDER — SODIUM CHLORIDE 9 MG/ML
INJECTION, SOLUTION INTRAVENOUS PRN
Status: DISCONTINUED | OUTPATIENT
Start: 2025-02-24 | End: 2025-02-25 | Stop reason: HOSPADM

## 2025-02-24 RX ORDER — ACETAMINOPHEN 500 MG
1000 TABLET ORAL EVERY 8 HOURS SCHEDULED
Status: DISCONTINUED | OUTPATIENT
Start: 2025-02-24 | End: 2025-02-25 | Stop reason: HOSPADM

## 2025-02-24 RX ORDER — SODIUM CHLORIDE 0.9 % (FLUSH) 0.9 %
5-40 SYRINGE (ML) INJECTION PRN
Status: DISCONTINUED | OUTPATIENT
Start: 2025-02-24 | End: 2025-02-25 | Stop reason: HOSPADM

## 2025-02-24 RX ORDER — HALOPERIDOL 5 MG/ML
1 INJECTION INTRAMUSCULAR
Status: DISCONTINUED | OUTPATIENT
Start: 2025-02-24 | End: 2025-02-24 | Stop reason: HOSPADM

## 2025-02-24 RX ORDER — ONDANSETRON 4 MG/1
4 TABLET, ORALLY DISINTEGRATING ORAL EVERY 8 HOURS PRN
Status: DISCONTINUED | OUTPATIENT
Start: 2025-02-24 | End: 2025-02-25

## 2025-02-24 RX ORDER — PANTOPRAZOLE SODIUM 20 MG/1
20 TABLET, DELAYED RELEASE ORAL
Status: DISCONTINUED | OUTPATIENT
Start: 2025-02-25 | End: 2025-02-25 | Stop reason: HOSPADM

## 2025-02-24 RX ORDER — FENTANYL CITRATE 50 UG/ML
25 INJECTION, SOLUTION INTRAMUSCULAR; INTRAVENOUS EVERY 5 MIN PRN
Status: DISCONTINUED | OUTPATIENT
Start: 2025-02-24 | End: 2025-02-24 | Stop reason: HOSPADM

## 2025-02-24 RX ORDER — ROCURONIUM BROMIDE 10 MG/ML
INJECTION, SOLUTION INTRAVENOUS
Status: DISCONTINUED | OUTPATIENT
Start: 2025-02-24 | End: 2025-02-24 | Stop reason: SDUPTHER

## 2025-02-24 RX ORDER — SODIUM CHLORIDE 0.9 % (FLUSH) 0.9 %
5-40 SYRINGE (ML) INJECTION EVERY 12 HOURS SCHEDULED
Status: DISCONTINUED | OUTPATIENT
Start: 2025-02-24 | End: 2025-02-24 | Stop reason: HOSPADM

## 2025-02-24 RX ORDER — NALOXONE HYDROCHLORIDE 0.4 MG/ML
INJECTION, SOLUTION INTRAMUSCULAR; INTRAVENOUS; SUBCUTANEOUS PRN
Status: DISCONTINUED | OUTPATIENT
Start: 2025-02-24 | End: 2025-02-24 | Stop reason: HOSPADM

## 2025-02-24 RX ORDER — ALBUTEROL SULFATE 90 UG/1
2 INHALANT RESPIRATORY (INHALATION) EVERY 6 HOURS PRN
Status: DISCONTINUED | OUTPATIENT
Start: 2025-02-24 | End: 2025-02-25 | Stop reason: HOSPADM

## 2025-02-24 RX ORDER — METOCLOPRAMIDE HYDROCHLORIDE 5 MG/ML
10 INJECTION INTRAMUSCULAR; INTRAVENOUS
Status: DISCONTINUED | OUTPATIENT
Start: 2025-02-24 | End: 2025-02-24 | Stop reason: HOSPADM

## 2025-02-24 RX ORDER — METHOCARBAMOL 750 MG/1
750 TABLET, FILM COATED ORAL 3 TIMES DAILY
Status: DISCONTINUED | OUTPATIENT
Start: 2025-02-24 | End: 2025-02-25 | Stop reason: HOSPADM

## 2025-02-24 RX ORDER — PROCHLORPERAZINE MALEATE 10 MG
10 TABLET ORAL EVERY 6 HOURS PRN
Status: DISCONTINUED | OUTPATIENT
Start: 2025-02-24 | End: 2025-02-25

## 2025-02-24 RX ORDER — PROPOFOL 10 MG/ML
INJECTION, EMULSION INTRAVENOUS
Status: DISCONTINUED | OUTPATIENT
Start: 2025-02-24 | End: 2025-02-24 | Stop reason: SDUPTHER

## 2025-02-24 RX ORDER — SODIUM CHLORIDE 9 MG/ML
INJECTION, SOLUTION INTRAVENOUS PRN
Status: DISCONTINUED | OUTPATIENT
Start: 2025-02-24 | End: 2025-02-24 | Stop reason: HOSPADM

## 2025-02-24 RX ORDER — TRAZODONE HYDROCHLORIDE 50 MG/1
50 TABLET ORAL NIGHTLY PRN
Status: DISCONTINUED | OUTPATIENT
Start: 2025-02-24 | End: 2025-02-25 | Stop reason: HOSPADM

## 2025-02-24 RX ORDER — CEFAZOLIN SODIUM/WATER 2 G/20 ML
2000 SYRINGE (ML) INTRAVENOUS ONCE
Status: COMPLETED | OUTPATIENT
Start: 2025-02-24 | End: 2025-02-24

## 2025-02-24 RX ORDER — ACETAMINOPHEN 500 MG
1000 TABLET ORAL EVERY 8 HOURS SCHEDULED
Status: DISCONTINUED | OUTPATIENT
Start: 2025-02-24 | End: 2025-02-24

## 2025-02-24 RX ORDER — LIDOCAINE HYDROCHLORIDE 20 MG/ML
INJECTION, SOLUTION EPIDURAL; INFILTRATION; INTRACAUDAL; PERINEURAL
Status: DISCONTINUED | OUTPATIENT
Start: 2025-02-24 | End: 2025-02-24 | Stop reason: SDUPTHER

## 2025-02-24 RX ORDER — POTASSIUM CHLORIDE 7.45 MG/ML
10 INJECTION INTRAVENOUS PRN
Status: DISCONTINUED | OUTPATIENT
Start: 2025-02-24 | End: 2025-02-25 | Stop reason: HOSPADM

## 2025-02-24 RX ORDER — ENOXAPARIN SODIUM 100 MG/ML
40 INJECTION SUBCUTANEOUS DAILY
Status: DISCONTINUED | OUTPATIENT
Start: 2025-02-25 | End: 2025-02-25

## 2025-02-24 RX ORDER — FENTANYL CITRATE 50 UG/ML
INJECTION, SOLUTION INTRAMUSCULAR; INTRAVENOUS
Status: DISCONTINUED | OUTPATIENT
Start: 2025-02-24 | End: 2025-02-24 | Stop reason: SDUPTHER

## 2025-02-24 RX ORDER — LIDOCAINE HYDROCHLORIDE 10 MG/ML
1 INJECTION, SOLUTION EPIDURAL; INFILTRATION; INTRACAUDAL; PERINEURAL
Status: DISCONTINUED | OUTPATIENT
Start: 2025-02-24 | End: 2025-02-24 | Stop reason: HOSPADM

## 2025-02-24 RX ORDER — LIDOCAINE HYDROCHLORIDE AND EPINEPHRINE 10; 10 MG/ML; UG/ML
INJECTION, SOLUTION INFILTRATION; PERINEURAL PRN
Status: DISCONTINUED | OUTPATIENT
Start: 2025-02-24 | End: 2025-02-24 | Stop reason: ALTCHOICE

## 2025-02-24 RX ORDER — ONDANSETRON 2 MG/ML
INJECTION INTRAMUSCULAR; INTRAVENOUS
Status: DISCONTINUED | OUTPATIENT
Start: 2025-02-24 | End: 2025-02-24 | Stop reason: SDUPTHER

## 2025-02-24 RX ORDER — PROMETHAZINE HYDROCHLORIDE 12.5 MG/1
12.5 TABLET ORAL ONCE
Status: COMPLETED | OUTPATIENT
Start: 2025-02-24 | End: 2025-02-24

## 2025-02-24 RX ORDER — POTASSIUM CHLORIDE 1500 MG/1
40 TABLET, EXTENDED RELEASE ORAL PRN
Status: DISCONTINUED | OUTPATIENT
Start: 2025-02-24 | End: 2025-02-25 | Stop reason: HOSPADM

## 2025-02-24 RX ORDER — DEXAMETHASONE SODIUM PHOSPHATE 10 MG/ML
INJECTION, SOLUTION INTRAMUSCULAR; INTRAVENOUS
Status: DISCONTINUED | OUTPATIENT
Start: 2025-02-24 | End: 2025-02-24 | Stop reason: SDUPTHER

## 2025-02-24 RX ORDER — HYDROMORPHONE HYDROCHLORIDE 1 MG/ML
0.5 INJECTION, SOLUTION INTRAMUSCULAR; INTRAVENOUS; SUBCUTANEOUS EVERY 5 MIN PRN
Status: DISCONTINUED | OUTPATIENT
Start: 2025-02-24 | End: 2025-02-24 | Stop reason: HOSPADM

## 2025-02-24 RX ADMIN — ROCURONIUM BROMIDE 50 MG: 10 INJECTION, SOLUTION INTRAVENOUS at 12:59

## 2025-02-24 RX ADMIN — PROMETHAZINE HYDROCHLORIDE 12.5 MG: 12.5 TABLET ORAL at 09:47

## 2025-02-24 RX ADMIN — HYDROMORPHONE HYDROCHLORIDE 0.5 MG: 1 INJECTION, SOLUTION INTRAMUSCULAR; INTRAVENOUS; SUBCUTANEOUS at 16:02

## 2025-02-24 RX ADMIN — DEXAMETHASONE SODIUM PHOSPHATE 10 MG: 10 INJECTION, SOLUTION INTRAMUSCULAR; INTRAVENOUS at 13:05

## 2025-02-24 RX ADMIN — FENTANYL CITRATE 25 MCG: 50 INJECTION INTRAMUSCULAR; INTRAVENOUS at 13:15

## 2025-02-24 RX ADMIN — SODIUM CHLORIDE, PRESERVATIVE FREE 10 ML: 5 INJECTION INTRAVENOUS at 20:25

## 2025-02-24 RX ADMIN — ONDANSETRON 4 MG: 2 INJECTION, SOLUTION INTRAMUSCULAR; INTRAVENOUS at 14:25

## 2025-02-24 RX ADMIN — PROPOFOL 50 MCG/KG/MIN: 10 INJECTION, EMULSION INTRAVENOUS at 13:08

## 2025-02-24 RX ADMIN — MIDAZOLAM 2 MG: 1 INJECTION INTRAMUSCULAR; INTRAVENOUS at 12:56

## 2025-02-24 RX ADMIN — HYDROMORPHONE HYDROCHLORIDE 0.5 MG: 1 INJECTION, SOLUTION INTRAMUSCULAR; INTRAVENOUS; SUBCUTANEOUS at 16:11

## 2025-02-24 RX ADMIN — PROPOFOL 140 MG: 10 INJECTION, EMULSION INTRAVENOUS at 12:59

## 2025-02-24 RX ADMIN — LIDOCAINE HYDROCHLORIDE 100 MG: 20 INJECTION, SOLUTION EPIDURAL; INFILTRATION; INTRACAUDAL; PERINEURAL at 12:59

## 2025-02-24 RX ADMIN — METHOCARBAMOL 750 MG: 750 TABLET ORAL at 20:24

## 2025-02-24 RX ADMIN — Medication 200 MCG: at 13:48

## 2025-02-24 RX ADMIN — SODIUM CHLORIDE, POTASSIUM CHLORIDE, SODIUM LACTATE AND CALCIUM CHLORIDE: 600; 310; 30; 20 INJECTION, SOLUTION INTRAVENOUS at 09:46

## 2025-02-24 RX ADMIN — TILMANOCEPT 600 MICRO CURIE: KIT at 10:15

## 2025-02-24 RX ADMIN — ACETAMINOPHEN 1000 MG: 500 TABLET ORAL at 17:54

## 2025-02-24 RX ADMIN — LIDOCAINE 4%: 4 CREAM TOPICAL at 09:45

## 2025-02-24 RX ADMIN — Medication 2000 MG: at 13:01

## 2025-02-24 RX ADMIN — MAGNESIUM SULFATE HEPTAHYDRATE 4000 MG: 500 INJECTION, SOLUTION INTRAMUSCULAR; INTRAVENOUS at 11:25

## 2025-02-24 RX ADMIN — APREPITANT 40 MG: 40 CAPSULE ORAL at 09:47

## 2025-02-24 RX ADMIN — SODIUM CHLORIDE, POTASSIUM CHLORIDE, SODIUM LACTATE AND CALCIUM CHLORIDE: 600; 310; 30; 20 INJECTION, SOLUTION INTRAVENOUS at 14:30

## 2025-02-24 RX ADMIN — SUGAMMADEX 200 MG: 100 INJECTION, SOLUTION INTRAVENOUS at 14:50

## 2025-02-24 RX ADMIN — FENTANYL CITRATE 75 MCG: 50 INJECTION INTRAMUSCULAR; INTRAVENOUS at 14:12

## 2025-02-24 ASSESSMENT — PAIN DESCRIPTION - ORIENTATION
ORIENTATION: LEFT

## 2025-02-24 ASSESSMENT — PAIN DESCRIPTION - ONSET: ONSET: ON-GOING

## 2025-02-24 ASSESSMENT — PAIN DESCRIPTION - FREQUENCY: FREQUENCY: CONTINUOUS

## 2025-02-24 ASSESSMENT — PAIN DESCRIPTION - LOCATION
LOCATION: BREAST

## 2025-02-24 ASSESSMENT — PAIN SCALES - GENERAL
PAINLEVEL_OUTOF10: 7
PAINLEVEL_OUTOF10: 2
PAINLEVEL_OUTOF10: 7
PAINLEVEL_OUTOF10: 5
PAINLEVEL_OUTOF10: 4
PAINLEVEL_OUTOF10: 7
PAINLEVEL_OUTOF10: 4

## 2025-02-24 ASSESSMENT — PAIN DESCRIPTION - DESCRIPTORS
DESCRIPTORS: ACHING;DISCOMFORT
DESCRIPTORS: SHARP
DESCRIPTORS: SHARP
DESCRIPTORS: BURNING
DESCRIPTORS: ACHING;BURNING

## 2025-02-24 ASSESSMENT — ENCOUNTER SYMPTOMS
SHORTNESS OF BREATH: 1
DYSPNEA ACTIVITY LEVEL: AFTER AMBULATING 2 FLIGHTS OF STAIRS

## 2025-02-24 ASSESSMENT — COPD QUESTIONNAIRES: CAT_SEVERITY: NO INTERVAL CHANGE

## 2025-02-24 ASSESSMENT — PAIN - FUNCTIONAL ASSESSMENT
PAIN_FUNCTIONAL_ASSESSMENT: ACTIVITIES ARE NOT PREVENTED
PAIN_FUNCTIONAL_ASSESSMENT: FACE, LEGS, ACTIVITY, CRY, AND CONSOLABILITY (FLACC)
PAIN_FUNCTIONAL_ASSESSMENT: PREVENTS OR INTERFERES SOME ACTIVE ACTIVITIES AND ADLS
PAIN_FUNCTIONAL_ASSESSMENT: 0-10

## 2025-02-24 ASSESSMENT — PAIN DESCRIPTION - PAIN TYPE: TYPE: SURGICAL PAIN

## 2025-02-24 NOTE — ANESTHESIA PRE PROCEDURE
Department of Anesthesiology  Preprocedure Note       Name:  Luci Castañeda   Age:  59 y.o.  :  1965                                          MRN:  [id number]         Date:  2025      Surgeon: Surgeon(s):  Davie Gordilol DO    Procedure: Procedure(s):  LEFT BREAST LOCALIZER (@10AM) GUIDED LUMPECTOMY, LEFT SENTINEL NODE (@10:30AM)  BIOPSY  AXILLARY LYMPH SENTINEL NODE BIOPSY    Medications prior to admission:   Prior to Admission medications    Medication Sig Start Date End Date Taking? Authorizing Provider   colchicine (COLCRYS) 0.6 MG tablet Take 2 tabs po once daily for 3 days, then take 1 tab po once daily thereafter.  Patient taking differently: Take 1 tablet by mouth daily Take 2 tabs po once daily for 3 days, then take 1 tab po once daily thereafter. 25   Jess Bradley MD   Magnesium Glycinate 100 MG CAPS Take 1 capsule by mouth at bedtime    ProviderGuido MD   enoxaparin (LOVENOX) 100 MG/ML Inject 1 mL into the skin 2 times daily Starting 3 days before surgery 25   Jess Bradley MD   furosemide (LASIX) 20 MG tablet Take 1 tablet by mouth daily 25   Amena Sy PA-C   traZODone (DESYREL) 50 MG tablet Take 1 tablet by mouth nightly  Patient taking differently: Take 1 tablet by mouth nightly as needed for Sleep 25   Amena Sy PA-C   Pembrolizumab (KEYTRUDA IV) Infuse intravenously Every 3 weeks due 25    ProviderGuido MD   pantoprazole (PROTONIX) 20 MG tablet TAKE ONE TABLET BY MOUTH EVERY MORNING BEFORE BREAKFAST 25   Nabil Ly MD   scopolamine (TRANSDERM-SCOP) transdermal patch Place 1 patch onto the skin every 72 hours 24   Franklyn Lewis DO   prochlorperazine (COMPAZINE) 10 MG tablet Take 1 tablet by mouth every 6 hours as needed for Nausea 24   Franklyn Lewis DO   metoclopramide (REGLAN) 5 MG tablet Take 1 tablet by mouth 4 times daily  Patient taking differently: Take 1 tablet by mouth as

## 2025-02-24 NOTE — RT PROTOCOL NOTE
order mode.        4-6 - enter or revise RT Bronchodilator order(s) to two equivalent RT bronchodilator orders with one order with BID Frequency and one order with Frequency of every 4 hours PRN wheezing or increased work of breathing using Per Protocol order mode.        7-10 - enter or revise RT Bronchodilator order(s) to two equivalent RT bronchodilator orders with one order with TID Frequency and one order with Frequency of every 4 hours PRN wheezing or increased work of breathing using Per Protocol order mode.       11-13 - enter or revise RT Bronchodilator order(s) to one equivalent RT bronchodilator order with QID Frequency and an Albuterol order with Frequency of every 4 hours PRN wheezing or increased work of breathing using Per Protocol order mode.      Greater than 13 - enter or revise RT Bronchodilator order(s) to one equivalent RT bronchodilator order with every 4 hours Frequency and an Albuterol order with Frequency of every 2 hours PRN wheezing or increased work of breathing using Per Protocol order mode.     RT to enter RT Home Evaluation for COPD & MDI Assessment order using Per Protocol order mode.    Electronically signed by Nano Ryan RCP on 2/24/2025 at 5:00 PM

## 2025-02-24 NOTE — ANESTHESIA POSTPROCEDURE EVALUATION
Department of Anesthesiology  Postprocedure Note    Patient: Luci Castañeda  MRN: [id number]  YOB: 1965  Date of evaluation: 2/24/2025    Procedure Summary       Date: 02/24/25 Room / Location: [address]    Anesthesia Start: 1246 Anesthesia Stop: 1510    Procedures:       LEFT BREAST LOCALIZER (@10AM) GUIDED LUMPECTOMY, LEFT SENTINEL NODE (@10:30AM)  BIOPSY (Left: Breast)      AXILLARY LYMPH SENTINEL NODE BIOPSY (Left: Axilla) Diagnosis:       Malignant neoplasm of left female breast, unspecified estrogen receptor status, unspecified site of breast (HCC)      (Malignant neoplasm of left female breast, unspecified estrogen receptor status, unspecified site of breast (HCC) [C50.912])    Surgeons: Davie Gordillo DO Responsible Provider: Janet Mosley MD    Anesthesia Type: General ASA Status: 3            Anesthesia Type: General    Imani Phase I: Imani Score: 9    Imani Phase II:      Anesthesia Post Evaluation    Patient location during evaluation: PACU  Patient participation: complete - patient participated  Level of consciousness: awake  Airway patency: patent  Nausea & Vomiting: no nausea  Cardiovascular status: blood pressure returned to baseline  Respiratory status: acceptable  Hydration status: euvolemic  Comments: Multimodal analgesia pain management as indicated by procedure  Multimodal analgesia pain management approach  Pain management: adequate    No notable events documented.

## 2025-02-24 NOTE — PROGRESS NOTES
Pt admitted to room 2105 per bed in stable condition from PACU   Oriented to room and surroundings  Bed in lowest position, wheels locked, 2/4 side rails up  Call light in reach, room free of clutter, adequate lighting provided  Denies any further questions at this time  Instructed to call out with any questions/concerns/new onset of pain and/or n/v   White board updated  Continue to monitor with hourly rounding  STAY WITH ME protocol initiated   Bed alarm on/Fall Risk signs in place/Fall risk sticker to wrist band  Non-skid socks on/at bedside

## 2025-02-24 NOTE — H&P
Midline low back pain with right-sided sciatica, ESTHER (obstructive sleep apnea), Osteoarthritis, Pain, PE (pulmonary thromboembolism) (HCC), Shoulder impingement, Skin cancer, Thrombocytopenia, Under care of service provider, Under care of service provider, Under care of service provider, Under care of team, Under care of team, Under care of team, Wears glasses, and Wellness examination.    PAST SURGICAL HISTORY: has a past surgical history that includes total nephrectomy (Left, 1988); Knee arthroscopy (Right, 2003); Cystoscopy (Right, 11/18/2017); cysto/uretero/pyeloscopy, calculus tx (Right, 11/21/2017); pr cysto w/insert ureteral stent (Right, 07/04/2018); pr cysto w/ureteroscopy w/rmvl/manj stones (N/A, 07/10/2018); pr cysto w/ureteroscopy w/rmvl/manj stones (Right, 10/10/2018); Cystoscopy (Right, 08/02/2019); Rotator cuff repair (Left); Intraocular lens insertion (Bilateral, 2008); Cystoscopy (Right, 08/23/2019); NEPHROLITHOTOMY (Right, 08/23/2019); cysto/uretero/pyeloscopy, calculus tx (Right, 09/13/2019); cysto/uretero/pyeloscopy, calculus tx (Right, 12/12/2019); CYSTOSCOPY INSERTION / REMOVAL STENT / STONE (Right, 12/31/2019); Cystoscopy (Right, 09/19/2020); Cardiac catheterization (12/08/2021); Dilation and curettage of uterus (N/A, 06/30/2022); Cystoscopy (Right, 11/01/2022); Kidney stone removal (Right, 11/01/2022); IR GUIDED NEPHROSTOMY CATH PLACEMENT RIGHT (11/01/2022); Total knee arthroplasty (Right, 05/22/2023); IR GUIDED NEPHROSTOMY CATH PLACEMENT RIGHT (08/10/2023); IR GUIDED NEPHROSTOMY CATH PLACEMENT RIGHT (08/21/2023); Kidney stone removal (Right, 08/21/2023); Cystoscopy (Right, 11/28/2023); Bladder surgery (Right, 12/06/2023); Tonsillectomy; US BREAST BIOPSY W LOC DEVICE 1ST LESION LEFT (Left, 04/26/2024); US BIOPSY LYMPH NODE (04/26/2024); US BREAST BIOPSY W LOC DEVICE EACH ADDL LESION LEFT (Left, 04/26/2024); IR PORT PLACEMENT > 5 YEARS (05/17/2024); Cystoscopy (Right, 08/27/2024); Cystoscopy  and she verbalized full understanding and agreement.                            MD Abigail [facility]                            This note is created with the assistance of a speech recognition program.  While intending to generate a document that actually reflects the content of the visit, the document can still have some errors including those of syntax and sound a like substitutions which may escape proof reading.  It such instances, actual meaning can be extrapolated by contextual diversion.

## 2025-02-24 NOTE — PLAN OF CARE
Problem: Discharge Planning  Goal: Discharge to home or other facility with appropriate resources  Outcome: Progressing  Flowsheets (Taken 2/24/2025 1815)  Discharge to home or other facility with appropriate resources:   Identify barriers to discharge with patient and caregiver   Arrange for needed discharge resources and transportation as appropriate   Identify discharge learning needs (meds, wound care, etc)     Problem: Pain  Goal: Verbalizes/displays adequate comfort level or baseline comfort level  Outcome: Progressing  Flowsheets (Taken 2/24/2025 1815)  Verbalizes/displays adequate comfort level or baseline comfort level:   Encourage patient to monitor pain and request assistance   Assess pain using appropriate pain scale   Administer analgesics based on type and severity of pain and evaluate response     Problem: Respiratory - Adult  Goal: Achieves optimal ventilation and oxygenation  Outcome: Progressing  Flowsheets (Taken 2/24/2025 1815)  Achieves optimal ventilation and oxygenation:   Assess for changes in respiratory status   Assess for changes in mentation and behavior     Problem: ABCDS Injury Assessment  Goal: Absence of physical injury  Outcome: Progressing  Flowsheets (Taken 2/24/2025 1815)  Absence of Physical Injury: Implement safety measures based on patient assessment     Problem: Skin/Tissue Integrity - Adult  Goal: Skin integrity remains intact  Outcome: Progressing  Flowsheets (Taken 2/24/2025 1815)  Skin Integrity Remains Intact:   Monitor for areas of redness and/or skin breakdown   Assess vascular access sites hourly     Problem: Gastrointestinal - Adult  Goal: Minimal or absence of nausea and vomiting  Outcome: Progressing  Flowsheets (Taken 2/24/2025 1815)  Minimal or absence of nausea and vomiting:   Administer IV fluids as ordered to ensure adequate hydration   Administer ordered antiemetic medications as needed

## 2025-02-25 VITALS
WEIGHT: 218 LBS | SYSTOLIC BLOOD PRESSURE: 122 MMHG | DIASTOLIC BLOOD PRESSURE: 78 MMHG | BODY MASS INDEX: 33.04 KG/M2 | HEART RATE: 58 BPM | OXYGEN SATURATION: 99 % | HEIGHT: 68 IN | TEMPERATURE: 97.5 F | RESPIRATION RATE: 18 BRPM

## 2025-02-25 LAB
ANION GAP SERPL CALCULATED.3IONS-SCNC: 12 MMOL/L (ref 9–16)
BASOPHILS # BLD: <0.03 K/UL (ref 0–0.2)
BASOPHILS NFR BLD: 0 % (ref 0–2)
BUN SERPL-MCNC: 14 MG/DL (ref 8–23)
CALCIUM SERPL-MCNC: 9.1 MG/DL (ref 8.8–10.2)
CHLORIDE SERPL-SCNC: 105 MMOL/L (ref 98–107)
CO2 SERPL-SCNC: 23 MMOL/L (ref 20–31)
CREAT SERPL-MCNC: 1.2 MG/DL (ref 0.5–0.9)
EOSINOPHIL # BLD: <0.03 K/UL (ref 0–0.44)
EOSINOPHILS RELATIVE PERCENT: 0 % (ref 1–4)
ERYTHROCYTE [DISTWIDTH] IN BLOOD BY AUTOMATED COUNT: 13.9 % (ref 11.8–14.4)
GFR, ESTIMATED: 53 ML/MIN/1.73M2
GLUCOSE SERPL-MCNC: 113 MG/DL (ref 82–115)
HCT VFR BLD AUTO: 34.3 % (ref 36.3–47.1)
HGB BLD-MCNC: 11.3 G/DL (ref 11.9–15.1)
IMM GRANULOCYTES # BLD AUTO: 0.06 K/UL (ref 0–0.3)
IMM GRANULOCYTES NFR BLD: 1 %
LYMPHOCYTES NFR BLD: 0.77 K/UL (ref 1.1–3.7)
LYMPHOCYTES RELATIVE PERCENT: 8 % (ref 24–43)
MAGNESIUM SERPL-MCNC: 1.7 MG/DL (ref 1.6–2.4)
MCH RBC QN AUTO: 33.4 PG (ref 25.2–33.5)
MCHC RBC AUTO-ENTMCNC: 32.9 G/DL (ref 28.4–34.8)
MCV RBC AUTO: 101.5 FL (ref 82.6–102.9)
MONOCYTES NFR BLD: 0.79 K/UL (ref 0.1–1.2)
MONOCYTES NFR BLD: 9 % (ref 3–12)
NEUTROPHILS NFR BLD: 83 % (ref 36–65)
NEUTS SEG NFR BLD: 7.65 K/UL (ref 1.5–8.1)
NRBC BLD-RTO: 0 PER 100 WBC
PLATELET # BLD AUTO: 95 K/UL (ref 138–453)
PMV BLD AUTO: 10.4 FL (ref 8.1–13.5)
POTASSIUM SERPL-SCNC: 3.5 MMOL/L (ref 3.7–5.3)
RBC # BLD AUTO: 3.38 M/UL (ref 3.95–5.11)
SODIUM SERPL-SCNC: 140 MMOL/L (ref 136–145)
WBC OTHER # BLD: 9.3 K/UL (ref 3.5–11.3)

## 2025-02-25 PROCEDURE — 96372 THER/PROPH/DIAG INJ SC/IM: CPT

## 2025-02-25 PROCEDURE — G0378 HOSPITAL OBSERVATION PER HR: HCPCS

## 2025-02-25 PROCEDURE — 80048 BASIC METABOLIC PNL TOTAL CA: CPT

## 2025-02-25 PROCEDURE — 83735 ASSAY OF MAGNESIUM: CPT

## 2025-02-25 PROCEDURE — 36415 COLL VENOUS BLD VENIPUNCTURE: CPT

## 2025-02-25 PROCEDURE — 6370000000 HC RX 637 (ALT 250 FOR IP)

## 2025-02-25 PROCEDURE — 6360000002 HC RX W HCPCS

## 2025-02-25 PROCEDURE — 85025 COMPLETE CBC W/AUTO DIFF WBC: CPT

## 2025-02-25 PROCEDURE — 2500000003 HC RX 250 WO HCPCS

## 2025-02-25 RX ORDER — ONDANSETRON 2 MG/ML
4 INJECTION INTRAMUSCULAR; INTRAVENOUS EVERY 6 HOURS PRN
Status: DISCONTINUED | OUTPATIENT
Start: 2025-02-25 | End: 2025-02-25 | Stop reason: HOSPADM

## 2025-02-25 RX ORDER — ONDANSETRON 4 MG/1
4 TABLET, ORALLY DISINTEGRATING ORAL EVERY 8 HOURS PRN
Status: DISCONTINUED | OUTPATIENT
Start: 2025-02-25 | End: 2025-02-25 | Stop reason: HOSPADM

## 2025-02-25 RX ORDER — TAMSULOSIN HYDROCHLORIDE 0.4 MG/1
0.4 CAPSULE ORAL DAILY
COMMUNITY

## 2025-02-25 RX ORDER — ENOXAPARIN SODIUM 100 MG/ML
100 INJECTION SUBCUTANEOUS 2 TIMES DAILY
Status: DISCONTINUED | OUTPATIENT
Start: 2025-02-25 | End: 2025-02-25

## 2025-02-25 RX ORDER — IRBESARTAN 150 MG/1
150 TABLET ORAL NIGHTLY
COMMUNITY

## 2025-02-25 RX ORDER — FUROSEMIDE 40 MG/1
40 TABLET ORAL PRN
COMMUNITY

## 2025-02-25 RX ORDER — LIDOCAINE AND PRILOCAINE 25; 25 MG/G; MG/G
CREAM TOPICAL PRN
COMMUNITY

## 2025-02-25 RX ORDER — METHOCARBAMOL 750 MG/1
750 TABLET, FILM COATED ORAL 3 TIMES DAILY
Qty: 30 TABLET | Refills: 0 | Status: SHIPPED | OUTPATIENT
Start: 2025-02-25 | End: 2025-03-07

## 2025-02-25 RX ORDER — HYDROCODONE BITARTRATE AND ACETAMINOPHEN 5; 325 MG/1; MG/1
1 TABLET ORAL EVERY 6 HOURS PRN
Qty: 20 TABLET | Refills: 0 | Status: SHIPPED | OUTPATIENT
Start: 2025-02-25 | End: 2025-03-02

## 2025-02-25 RX ORDER — ENOXAPARIN SODIUM 100 MG/ML
100 INJECTION SUBCUTANEOUS 2 TIMES DAILY
Status: DISCONTINUED | OUTPATIENT
Start: 2025-02-25 | End: 2025-02-25 | Stop reason: HOSPADM

## 2025-02-25 RX ORDER — DOCUSATE SODIUM 100 MG/1
100 CAPSULE, LIQUID FILLED ORAL DAILY PRN
Qty: 30 CAPSULE | Refills: 0 | Status: SHIPPED | OUTPATIENT
Start: 2025-02-25 | End: 2025-03-27

## 2025-02-25 RX ORDER — PROCHLORPERAZINE MALEATE 10 MG
10 TABLET ORAL EVERY 6 HOURS PRN
Status: DISCONTINUED | OUTPATIENT
Start: 2025-02-25 | End: 2025-02-25 | Stop reason: HOSPADM

## 2025-02-25 RX ADMIN — PANTOPRAZOLE SODIUM 20 MG: 20 TABLET, DELAYED RELEASE ORAL at 06:03

## 2025-02-25 RX ADMIN — METHOCARBAMOL 750 MG: 750 TABLET ORAL at 11:40

## 2025-02-25 RX ADMIN — POTASSIUM CHLORIDE 40 MEQ: 1500 TABLET, EXTENDED RELEASE ORAL at 16:27

## 2025-02-25 RX ADMIN — ACETAMINOPHEN 1000 MG: 500 TABLET ORAL at 06:03

## 2025-02-25 RX ADMIN — ACETAMINOPHEN 1000 MG: 500 TABLET ORAL at 15:22

## 2025-02-25 RX ADMIN — ENOXAPARIN SODIUM 100 MG: 100 INJECTION SUBCUTANEOUS at 09:54

## 2025-02-25 RX ADMIN — SODIUM CHLORIDE, PRESERVATIVE FREE 10 ML: 5 INJECTION INTRAVENOUS at 09:54

## 2025-02-25 ASSESSMENT — PAIN DESCRIPTION - DESCRIPTORS
DESCRIPTORS: ACHING
DESCRIPTORS: ACHING
DESCRIPTORS: DISCOMFORT

## 2025-02-25 ASSESSMENT — PAIN - FUNCTIONAL ASSESSMENT
PAIN_FUNCTIONAL_ASSESSMENT: ACTIVITIES ARE NOT PREVENTED
PAIN_FUNCTIONAL_ASSESSMENT: PREVENTS OR INTERFERES SOME ACTIVE ACTIVITIES AND ADLS

## 2025-02-25 ASSESSMENT — PAIN DESCRIPTION - ONSET: ONSET: ON-GOING

## 2025-02-25 ASSESSMENT — PAIN SCALES - GENERAL
PAINLEVEL_OUTOF10: 3
PAINLEVEL_OUTOF10: 3
PAINLEVEL_OUTOF10: 2
PAINLEVEL_OUTOF10: 0

## 2025-02-25 ASSESSMENT — PAIN DESCRIPTION - ORIENTATION
ORIENTATION: LEFT

## 2025-02-25 ASSESSMENT — PAIN DESCRIPTION - LOCATION
LOCATION: BREAST

## 2025-02-25 ASSESSMENT — PAIN DESCRIPTION - PAIN TYPE: TYPE: SURGICAL PAIN

## 2025-02-25 ASSESSMENT — PAIN DESCRIPTION - FREQUENCY: FREQUENCY: INTERMITTENT

## 2025-02-25 NOTE — PROGRESS NOTES
General Surgery:  Daily Progress Note          POD # 1 s/p left breast lumpectomy with sentinel lymph node biopsy          PATIENT NAME: Luci Castañeda     TODAY'S DATE: 2/25/2025, 5:46 AM    SUBJECTIVE:     Pt seen and examined at bedside. No acute overnight events. Patient has no complaints this morning. Pain is well controlled on current regimen. She denies nausea, vomiting, chest pain, shortness of breath. Minimal drain output. Tolerating regular diet. She has been up and ambulating with assistance without issue.    OBJECTIVE:   VITALS:  /74   Pulse 60   Temp 97.7 °F (36.5 °C) (Oral)   Resp 16   Ht 1.727 m (5' 8\")   Wt 98.9 kg (218 lb)   SpO2 95%   BMI 33.15 kg/m²      INTAKE/OUTPUT:      Intake/Output Summary (Last 24 hours) at 2/25/2025 0546  Last data filed at 2/24/2025 1647  Gross per 24 hour   Intake 1380.99 ml   Output 75 ml   Net 1305.99 ml       PHYSICAL EXAM:  General Appearance: awake, alert, oriented, in no acute distress  HEENT:  Normocephalic, atraumatic, mucus membranes moist   Heart: Heart regular rate and rhythm  Lungs: normal respiratory effort, no wheezing or stridor,   Abdomen:Soft, nondistended, nontender.    Incision: Left breast incision, well approximated, skin glue and surgical dressings in place. No evidence of bleeding or hematoma. CHELSY drain in place with 20 mL of dark colored output present.    Extremities: No cyanosis, pitting edema, rashes noted.    Skin: Skin color, texture, turgor normal. No rashes or lesions.      Data:  CBC with Differential:    Lab Results   Component Value Date/Time    WBC 4.1 02/24/2025 09:15 AM    RBC 3.93 02/24/2025 09:15 AM    RBC 4.66 03/22/2012 11:41 AM    HGB 13.1 02/24/2025 09:15 AM    HCT 38.9 02/24/2025 09:15 AM    PLT 87 02/24/2025 09:15 AM     03/22/2012 11:41 AM    MCV 99.0 02/24/2025 09:15 AM    MCH 33.3 02/24/2025 09:15 AM    MCHC 33.7 02/24/2025 09:15 AM    RDW 13.7 02/24/2025 09:15 AM    NRBC 6 12/18/2024 07:40 AM

## 2025-02-25 NOTE — PROGRESS NOTES
The patient is discharged home at this time; no distress. 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 The patient's family member is present to drive the patient home.

## 2025-02-25 NOTE — CARE COORDINATION
Case Management Assessment  Initial Evaluation    Date/Time of Evaluation: 2/25/2025 11:00 AM  Assessment Completed by: Shari Chowdhury RN    If patient is discharged prior to next notation, then this note serves as note for discharge by case management.    Patient Name: Luci Castañeda                   YOB: 1965  Diagnosis: Malignant neoplasm of left female breast, unspecified estrogen receptor status, unspecified site of breast (HCC) [C50.912]  S/P breast lumpectomy [Z98.890]                   Date / Time: 2/24/2025  8:51 AM    Patient Admission Status: Observation   Readmission Risk (Low < 19, Mod (19-27), High > 27): Readmission Risk Score: 31.5    Current PCP: Nabil Ly MD  PCP verified by CM? Yes    Chart Reviewed: Yes      History Provided by: Patient  Patient Orientation: Alert and Oriented    Patient Cognition: Alert    Hospitalization in the last 30 days (Readmission):  No    If yes, Readmission Assessment in CM Navigator will be completed.    Advance Directives:      Code Status: Full Code   Patient's Primary Decision Maker is: Named in Scanned ACP Document    Primary Decision Maker: Janell Griggs - Brother/Sister - [phone]    Discharge Planning:    Patient lives with: Alone Type of Home: House  Primary Care Giver: Self  Patient Support Systems include: Family Members, Friends/Neighbors   Current Financial resources: Other (Comment)  Current community resources: ECF/Home Care ([facility])  Current services prior to admission: Durable Medical Equipment            Current DME: Walker            Type of Home Care services:  PT, Skilled Therapy    ADLS  Prior functional level: Independent in ADLs/IADLs, Mobility  Current functional level: Independent in ADLs/IADLs, Housework, Shopping, Mobility    PT AM-PAC:   /24  OT AM-PAC:   /24    Family can provide assistance at DC: Yes  Would you like Case Management to discuss the discharge plan with any other family members/significant others,

## 2025-02-25 NOTE — PLAN OF CARE
Problem: Discharge Planning  Goal: Discharge to home or other facility with appropriate resources  2/25/2025 1648 by Ela Veloz RN  Outcome: Adequate for Discharge  Flowsheets (Taken 2/25/2025 0740)  Discharge to home or other facility with appropriate resources:   Identify barriers to discharge with patient and caregiver   Arrange for needed discharge resources and transportation as appropriate   Identify discharge learning needs (meds, wound care, etc)   Refer to discharge planning if patient needs post-hospital services based on physician order or complex needs related to functional status, cognitive ability or social support system  2/25/2025 0413 by Katie Esquivel RN  Outcome: Progressing  Flowsheets (Taken 2/24/2025 2045)  Discharge to home or other facility with appropriate resources:   Identify barriers to discharge with patient and caregiver   Arrange for needed discharge resources and transportation as appropriate   Identify discharge learning needs (meds, wound care, etc)   Refer to discharge planning if patient needs post-hospital services based on physician order or complex needs related to functional status, cognitive ability or social support system     Problem: Pain  Goal: Verbalizes/displays adequate comfort level or baseline comfort level  2/25/2025 1648 by Ela Veloz RN  Outcome: Adequate for Discharge  2/25/2025 0413 by Katie Esquivel RN  Outcome: Progressing     Problem: Respiratory - Adult  Goal: Achieves optimal ventilation and oxygenation  2/25/2025 1648 by Ela Veloz RN  Outcome: Adequate for Discharge  Flowsheets (Taken 2/25/2025 0740)  Achieves optimal ventilation and oxygenation: Assess for changes in respiratory status  2/25/2025 0413 by Katie Esquivel RN  Outcome: Progressing  Flowsheets (Taken 2/24/2025 2045)  Achieves optimal ventilation and oxygenation:   Assess for changes in respiratory status   Assess for changes in mentation and behavior   Encourage  broncho-pulmonary hygiene including cough, deep breathe, incentive spirometry   Assess and instruct to report shortness of breath or any respiratory difficulty     Problem: ABCDS Injury Assessment  Goal: Absence of physical injury  2/25/2025 1648 by Ela Veloz RN  Outcome: Adequate for Discharge  2/25/2025 0413 by Katie Esquivel RN  Outcome: Progressing     Problem: Skin/Tissue Integrity - Adult  Goal: Skin integrity remains intact  2/25/2025 1648 by Ela Veloz RN  Outcome: Adequate for Discharge  Flowsheets (Taken 2/25/2025 0740)  Skin Integrity Remains Intact: Monitor for areas of redness and/or skin breakdown  2/25/2025 0413 by Katie Esquivel RN  Outcome: Progressing  Flowsheets (Taken 2/24/2025 2045)  Skin Integrity Remains Intact: Monitor for areas of redness and/or skin breakdown     Problem: Gastrointestinal - Adult  Goal: Minimal or absence of nausea and vomiting  2/25/2025 1648 by Ela Veloz RN  Outcome: Adequate for Discharge  Flowsheets (Taken 2/25/2025 0740)  Minimal or absence of nausea and vomiting: Administer ordered antiemetic medications as needed  2/25/2025 0413 by Katie Esquivel RN  Outcome: Progressing  Flowsheets (Taken 2/24/2025 2045)  Minimal or absence of nausea and vomiting:   Administer ordered antiemetic medications as needed   Provide nonpharmacologic comfort measures as appropriate   Advance diet as tolerated, if ordered

## 2025-02-25 NOTE — PLAN OF CARE
Problem: Discharge Planning  Goal: Discharge to home or other facility with appropriate resources  2/25/2025 0413 by Katie Esquivel RN  Outcome: Progressing  Flowsheets (Taken 2/24/2025 2045)  Discharge to home or other facility with appropriate resources:   Identify barriers to discharge with patient and caregiver   Arrange for needed discharge resources and transportation as appropriate   Identify discharge learning needs (meds, wound care, etc)   Refer to discharge planning if patient needs post-hospital services based on physician order or complex needs related to functional status, cognitive ability or social support system     Problem: Pain  Goal: Verbalizes/displays adequate comfort level or baseline comfort level  2/25/2025 0413 by Katie Esquivel RN  Outcome: Progressing     Problem: Respiratory - Adult  Goal: Achieves optimal ventilation and oxygenation  2/25/2025 0413 by Katie Esquivel RN  Outcome: Progressing  Flowsheets (Taken 2/24/2025 2045)  Achieves optimal ventilation and oxygenation:   Assess for changes in respiratory status   Assess for changes in mentation and behavior   Encourage broncho-pulmonary hygiene including cough, deep breathe, incentive spirometry   Assess and instruct to report shortness of breath or any respiratory difficulty     Problem: ABCDS Injury Assessment  Goal: Absence of physical injury  2/25/2025 0413 by Katie Esquivel RN  Outcome: Progressing     Problem: Skin/Tissue Integrity - Adult  Goal: Skin integrity remains intact  2/25/2025 0413 by Katie Esquivel RN  Outcome: Progressing  Flowsheets (Taken 2/24/2025 2045)  Skin Integrity Remains Intact: Monitor for areas of redness and/or skin breakdown     Problem: Gastrointestinal - Adult  Goal: Minimal or absence of nausea and vomiting  2/25/2025 0413 by Katie Esquivel RN  Outcome: Progressing  Flowsheets (Taken 2/24/2025 2045)  Minimal or absence of nausea and vomiting:   Administer ordered antiemetic  medications as needed   Provide nonpharmacologic comfort measures as appropriate   Advance diet as tolerated, if ordered

## 2025-02-25 NOTE — FLOWSHEET NOTE
02/24/25 1945   Mobility   Activity Ambulate in tarango   Level of Assistance Minimal assist, patient does 75% or more   Assistive Device Front wheel walker   Distance Ambulated (ft) 100 ft     Pt tolerated well

## 2025-02-25 NOTE — BRIEF OP NOTE
Brief Postoperative Note      Patient: Luci Castañeda  YOB: 1965  MRN: [id number]    Date of Procedure: 2/24/2025    Pre-Op Diagnosis Codes:      * Malignant neoplasm of left female breast, unspecified estrogen receptor status, unspecified site of breast (HCC) [C50.912]    Post-Op Diagnosis: Same       Procedure(s):  LEFT BREAST LOCALIZER (@10AM) GUIDED LUMPECTOMY, LEFT SENTINEL NODE (@10:30AM)  BIOPSY  AXILLARY LYMPH SENTINEL NODE BIOPSY    Surgeon(s):  Davie Gordillo DO    Assistant:  Resident: Manuel Omalley DO    Anesthesia: General    Estimated Blood Loss (mL): less than 100     Complications: None    Specimens:   ID Type Source Tests Collected by Time Destination   A : LEFT BREASET LUMPECTOMY. SHORT STITCH SUPERIOR MARGIN, LONG STITCH LATERAL MARGIN, DOUBLE STITCH DEEP MARGIN, SKIN ANTERIOR MARGIN Tissue Breast SURGICAL PATHOLOGY Davie Gordillo DO 2/24/2025 1342    B : LEFT AXILLARY SENTINEL NODE Tissue Lymph Node SURGICAL PATHOLOGY Davie Gordillo DO 2/24/2025 1349        Implants:  * No implants in log *      Drains:   Closed/Suction Drain Inferior;Lateral;Left Breast Bulb (Active)   Site Description Clean, dry & intact 02/25/25 0400   Dressing Status Clean, dry & intact 02/25/25 0400   Drainage Appearance Bloody;Brown 02/25/25 0400   Drain Status To bulb suction;Compressed 02/25/25 0400       [REMOVED] Nephrostomy Tube Right Flank 24 fr (Removed)       Findings:  Infection Present At Time Of Surgery (PATOS) (choose all levels that have infection present):  No infection present  Other Findings: 4 cm palpable mass at the 3 o'clock position of the left breast. Localization clips present in specimen. No palpable lymphadenopathy.   [place] Node Biopsy for Breast Cancer - Left  Operation performed with curative intent. Yes   Tracer(s) used to identify sentinel nodes in the upfront surgery (non-neoadjuvant) setting (select all that apply). Radioactive tracer   Tracer(s) used to

## 2025-02-25 NOTE — PROGRESS NOTES
Pharmacy Medication Review    The patient's list of current home medications has been reviewed.     PHYSICIANS AND NURSE PRACTITIONERS: please note there is no Transitions of Care/Med Rec Pharmacist available to address any discrepancies on inpatient orders. It is the responsibility of the attending provider to review the updates made to the home med list and adjust inpatient orders as appropriate.        Source(s) of information:patient, Surescripts refill report        Based on information provided by the above source(s), I have updated the patient's home med list as described below.     Removed   HYDROcodone-acetaminophen (NORCO) 5-325 MG per tablet  colchicine (COLCRYS) 0.6 MG tablet   Magnesium Glycinate 100 MG CAPS   furosemide (LASIX) 20 MG tablet      Added furosemide (LASIX) 40 MG tablet  lidocaine-prilocaine (EMLA) 2.5-2.5 % cream     Adjusted   apixaban (ELIQUIS) 5 MG TABS tablet  tamsulosin (FLOMAX) 0.4 MG capsule  irbesartan (AVAPRO) 150 MG tablet     Other notes:   None    Are any of the medications noted above considered a 'high alert' medication? YES      Is the patient on warfarin at home? No          Please feel free to call me with any questions about this encounter. Thank you.      Luann Velasco, pharmacy technician  [facility]  Phone:  [phone]      Electronically signed by Luann Velasco on 2/25/2025 at 12:36 PM   Note: co-signature by the pharmacist only acknowledges that I have performed a medication review and does not attest to an evaluation of this medication review.      Prior to Admission medications    Medication Sig   apixaban (ELIQUIS) 5 MG TABS tablet Take 1 tablet by mouth 2 times daily   furosemide (LASIX) 40 MG tablet Take 1 tablet by mouth as needed (for gout and was fluid overloaded)     lidocaine-prilocaine (EMLA) 2.5-2.5 % cream Apply topically as needed for Pain Apply topically as needed.     enoxaparin (LOVENOX) 100 MG/ML Inject 1 mL into the skin 2 times  daily Starting 3 days before surgery   traZODone (DESYREL) 50 MG tablet Take 1 tablet by mouth nightly as needed for Sleep     pantoprazole (PROTONIX) 20 MG tablet TAKE ONE TABLET BY MOUTH EVERY MORNING BEFORE BREAKFAST     scopolamine (TRANSDERM-SCOP) transdermal patch Place 1 patch onto the skin every 72 hours   prochlorperazine (COMPAZINE) 10 MG tablet Take 1 tablet by mouth every 6 hours as needed for Nausea     metoclopramide (REGLAN) 5 MG tablet Take 1 tablet by mouth as needed   colchicine (COLCRYS) 0.6 MG tablet Take 1 tablet by mouth daily   ondansetron (ZOFRAN) 4 MG tablet Take 1 tablet by mouth 3 times daily as needed for Nausea or Vomiting     albuterol sulfate HFA (PROAIR HFA) 108 (90 Base) MCG/ACT inhaler Inhale 2 puffs into the lungs every 6 hours as needed for Wheezing   irbesartan (AVAPRO) 150 MG tablet Take 1 tablet by mouth nightly  Patient not taking: Reported on 2/25/2025     tamsulosin (FLOMAX) 0.4 MG capsule Take 1 capsule by mouth daily  Patient not taking: Reported on 2/25/2025     Pembrolizumab (KEYTRUDA IV) Infuse intravenously Every 3 weeks due 3/7/2025

## 2025-02-25 NOTE — DISCHARGE INSTRUCTIONS
General Surgery Patient Discharge Instructions    Discharged To: Home    RESUME ACTIVITY:     WOUND CARE:   Skin glue used on your incisions, do not peel off, allow to fall off naturally. Continue to wear surgical bra or other supportive bra for 3-4 weeks following your surgery. Empty drain daily, please record output. This will be removed at your follow up appointment.     BATHING:  Ok to shower. Do not submerge surgical incisions in water (baths, pools, hot tubs, lakes) until cleared by surgeon at post operative follow up visit.    DRIVING: No driving while on pain medication, in pain, or until seen at follow up visit.    RETURN TO WORK:   Do not return to work until cleared at your follow up appointment.     WALKING:    Yes    LIFTING: Less than 10-15 pounds for 6  weeks     DIET:   Ok to resume regular diet.     MEDICATIONS: Take medications as prescribed by physician. If prescribed narcotic medications (Norco, Percocet, or Roxicodone) attempt to wean off medications as soon as possible.    FOLLOW UP: Call and make appointment to follow up with Dr. Gordillo for post op visit in 7-10 days.

## 2025-02-26 ENCOUNTER — TELEPHONE (OUTPATIENT)
Age: 60
End: 2025-02-26

## 2025-02-26 NOTE — TELEPHONE ENCOUNTER
Ghislaine had an observation stay at [facility] from 2/24/25 - 2/25/25.  Please call her to schedule a hospital follow up within 7 days.   No need for the office to call for care transition, this will be done by a care transitions nurse.

## 2025-02-27 ENCOUNTER — TELEPHONE (OUTPATIENT)
Age: 60
End: 2025-02-27

## 2025-02-27 NOTE — TELEPHONE ENCOUNTER
Care Transitions Initial Follow Up Call    Outreach made within 2 business days of discharge: Yes    Patient: Luci Castañeda Patient : 1965   MRN: [id number]  Reason for Admission: Breast lumpectomy  Discharge Date: 25       Spoke with:  Patient     Discharge department/facility: [facility]    TCM Interactive Patient Contact:  Was patient able to fill all prescriptions: Yes  Was patient instructed to bring all medications to the follow-up visit: Yes  Is patient taking all medications as directed in the discharge summary? Yes  Does patient understand their discharge instructions: Yes  Does patient have questions or concerns that need addressed prior to 7-14 day follow up office visit: yes - 2025    Additional needs identified to be addressed with provider  No needs identified             Scheduled appointment with PCP within 7-14 days    Follow Up  Future Appointments   Date Time Provider Department Center   3/3/2025  2:30 PM Amena Sy PA-C [facility] DEP   3/7/2025 12:30 PM Jess Bradley MD URG CANCER [facility]   3/7/2025  1:00 PM [facility]C CHAIR [facility] [place]   3/20/2025  9:30 AM Nabil Ly MD [facility] DEP   2025  3:15 PM Amadeo Sanchez MD [facility] [place]

## 2025-02-28 LAB — SURGICAL PATHOLOGY REPORT: NORMAL

## 2025-03-01 NOTE — DISCHARGE SUMMARY
Surgery Discharge Summary     Patient Identification  Luci Castañeda is a 59 y.o. female.  :  1965  Admit Date:  2025    Discharge date:   2025  4:55 PM                                   Disposition: home    Discharge Diagnoses:   Patient Active Problem List   Diagnosis    Essential hypertension    Acquired hypothyroidism    CKD (chronic kidney disease) stage 1, GFR 90 ml/min or greater    Hyperuricemia    Primary osteoarthritis of left knee    Gastroesophageal reflux disease without esophagitis    ESTHER (obstructive sleep apnea)    Mixed hyperlipidemia    Solitary kidney, acquired    Obesity (BMI 30-39.9)    Glucose intolerance (impaired glucose tolerance)    Arthralgia    Other fatigue    LVH (left ventricular hypertrophy)    Chronic left shoulder pain    Hydronephrosis with renal and ureteral calculous obstruction    S/p Hscope, D&C 22    Postmenopausal bleeding    Endometrial polyp    Nephrolithiasis    Left breast abscess    History of right knee joint replacement    Morbid obesity    Hydronephrosis of right kidney    Right renal mass    Recurrent kidney stones    Benign essential tremor    Invasive ductal carcinoma of breast, left (HCC)    Mixed restrictive and obstructive lung disease (HCC)    Coronary artery disease involving native coronary artery of native heart without angina pectoris    Primary insomnia    History of COVID-19    Leg swelling    History of left breast cancer    Poor sleep    Fatty liver    Malignant neoplasm of upper-outer quadrant of left breast in female, 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

## 2025-03-03 ENCOUNTER — OFFICE VISIT (OUTPATIENT)
Age: 60
End: 2025-03-03

## 2025-03-03 VITALS
OXYGEN SATURATION: 93 % | HEIGHT: 68 IN | HEART RATE: 96 BPM | BODY MASS INDEX: 32.4 KG/M2 | SYSTOLIC BLOOD PRESSURE: 130 MMHG | DIASTOLIC BLOOD PRESSURE: 88 MMHG | WEIGHT: 213.8 LBS | TEMPERATURE: 96.8 F

## 2025-03-03 DIAGNOSIS — J45.20 MILD INTERMITTENT ASTHMA WITHOUT COMPLICATION: ICD-10-CM

## 2025-03-03 DIAGNOSIS — Z09 HOSPITAL DISCHARGE FOLLOW-UP: Primary | ICD-10-CM

## 2025-03-03 DIAGNOSIS — Z17.1 MALIGNANT NEOPLASM OF UPPER-OUTER QUADRANT OF LEFT BREAST IN FEMALE, ESTROGEN RECEPTOR NEGATIVE (HCC): ICD-10-CM

## 2025-03-03 DIAGNOSIS — M10.9 GOUTY ARTHRITIS OF RIGHT GREAT TOE: ICD-10-CM

## 2025-03-03 DIAGNOSIS — C50.412 MALIGNANT NEOPLASM OF UPPER-OUTER QUADRANT OF LEFT BREAST IN FEMALE, ESTROGEN RECEPTOR NEGATIVE (HCC): ICD-10-CM

## 2025-03-03 DIAGNOSIS — I26.02 ACUTE SADDLE PULMONARY EMBOLISM WITH ACUTE COR PULMONALE (HCC): ICD-10-CM

## 2025-03-03 DIAGNOSIS — G25.0 BENIGN ESSENTIAL TREMOR: ICD-10-CM

## 2025-03-03 DIAGNOSIS — N20.0 RECURRENT KIDNEY STONES: ICD-10-CM

## 2025-03-03 DIAGNOSIS — I10 ESSENTIAL HYPERTENSION: ICD-10-CM

## 2025-03-03 RX ORDER — ALLOPURINOL 100 MG/1
100 TABLET ORAL DAILY
Qty: 90 TABLET | Refills: 1 | Status: SHIPPED | OUTPATIENT
Start: 2025-03-03

## 2025-03-03 RX ORDER — ALBUTEROL SULFATE 90 UG/1
2 INHALANT RESPIRATORY (INHALATION) EVERY 6 HOURS PRN
Qty: 1 EACH | Refills: 3 | Status: SHIPPED | OUTPATIENT
Start: 2025-03-03

## 2025-03-03 ASSESSMENT — ENCOUNTER SYMPTOMS
WHEEZING: 0
CONSTIPATION: 0
EYE REDNESS: 0
SINUS PAIN: 0
EYE PAIN: 0
SORE THROAT: 0
BACK PAIN: 0
BLOOD IN STOOL: 0
DIARRHEA: 0
ABDOMINAL PAIN: 0
SHORTNESS OF BREATH: 0
RHINORRHEA: 0
VOMITING: 0
COUGH: 0
NAUSEA: 0
SINUS PRESSURE: 0

## 2025-03-03 NOTE — PROGRESS NOTES
Post-Discharge Transitional Care  Follow Up      Luci Castañeda   YOB: 1965    Date of Office Visit:  3/3/2025  Date of Hospital Admission: 2/24/25  Date of Hospital Discharge: 2/25/25  Risk of hospital readmission (high >=14%. Medium >=10%) :Readmission Risk Score: 31.5      Care management risk score Rising risk (score 2-5) and Complex Care (Scores >=6): No Risk Score On File     Non face to face  following discharge, date last encounter closed (first attempt may have been earlier): *No documented post hospital discharge outreach found in the last 14 days    Call initiated 2 business days of discharge: *No response recorded in the last 14 days    ASSESSMENT/PLAN:   Hospital discharge follow-up  -     AK DISCHARGE MEDS RECONCILED W/ CURRENT OUTPATIENT MED LIST  Hospital report reviewed.  Medications reviewed with the patient.  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  So she does have the left breast lumpectomy and was discharged on 2/25/2025.  She is back on Eliquis.  No bleeding problems.  Incision looks good.  She does have an upcoming appointment with the breast surgeon Dr. Gordillo  Acute saddle pulmonary embolism with acute cor pulmonale (HCC)  Patient had a thrombectomy to remove the PE in December.  She is supposed to be on Eliquis for at least a year.  Ever since the thrombectomy she has not had any shortness of breath or coughing.  Gouty arthritis of right great toe  -     allopurinol (ZYLOPRIM) 100 MG tablet; Take 1 tablet by mouth daily, Disp-90 tablet, R-1Normal  Patient also had her first flareup of gout of the right big toe during her December and January

## 2025-03-06 NOTE — OP NOTE
___________________________________    [facility] [place]  Davie Gordillo DO FACS [facility]  Office: [phone]  Cell: [phone]  ___________________________________      Operative Note      Patient: Luci Castañeda  YOB: 1965  MRN: [id number]    Date of Procedure: 2/24/2025    Pre-Op Diagnosis Codes:      * Malignant neoplasm of left female breast, unspecified estrogen receptor status, unspecified site of breast (HCC) [C50.912]    Post-Op Diagnosis: Same       Procedure(s):  LEFT BREAST LOCALIZER (@10AM) GUIDED LUMPECTOMY, LEFT SENTINEL NODE (@10:30AM)  BIOPSY  AXILLARY LYMPH SENTINEL NODE BIOPSY    Surgeon(s):  Davie Gordillo DO    Assistant:   Resident: Manuel Omalley DO    Anesthesia: General    Estimated Blood Loss (mL): less than 50     Complications: None    Specimens:   ID Type Source Tests Collected by Time Destination   A : LEFT BREASET LUMPECTOMY. SHORT STITCH SUPERIOR MARGIN, LONG STITCH LATERAL MARGIN, DOUBLE STITCH DEEP MARGIN, SKIN ANTERIOR MARGIN Tissue Breast SURGICAL PATHOLOGY Davie Gordillo DO 2/24/2025 1342    B : LEFT AXILLARY SENTINEL NODE Tissue Lymph Node SURGICAL PATHOLOGY Davie Gordillo DO 2/24/2025 1349        Implants:  * No implants in log *      Drains:   [REMOVED] Closed/Suction Drain Inferior;Lateral;Left Breast Bulb (Removed)   Site Description Clean, dry & intact 02/25/25 0740   Dressing Status New dressing applied 02/25/25 1302   Drainage Appearance Bloody;Brown 02/25/25 1600   Drain Status To bulb suction;Compressed 02/25/25 0740   Output (ml) 3 ml 02/25/25 1600       [REMOVED] Nephrostomy Tube Right Flank 24 fr (Removed)       Findings:  Infection Present At Time Of Surgery (PATOS) (choose all levels that have infection present):  No infection present  Other Findings:   Stollings Node Biopsy for Breast Cancer - Left  Operation performed with curative intent. Yes   Tracer(s) used to identify sentinel nodes in the upfront

## 2025-03-07 ENCOUNTER — OFFICE VISIT (OUTPATIENT)
Dept: ONCOLOGY | Age: 60
End: 2025-03-07
Payer: COMMERCIAL

## 2025-03-07 ENCOUNTER — TELEPHONE (OUTPATIENT)
Dept: ONCOLOGY | Age: 60
End: 2025-03-07

## 2025-03-07 ENCOUNTER — HOSPITAL ENCOUNTER (OUTPATIENT)
Dept: INFUSION THERAPY | Age: 60
Discharge: HOME | End: 2025-03-07
Payer: COMMERCIAL

## 2025-03-07 VITALS
BODY MASS INDEX: 32.28 KG/M2 | SYSTOLIC BLOOD PRESSURE: 121 MMHG | HEIGHT: 68 IN | HEART RATE: 97 BPM | DIASTOLIC BLOOD PRESSURE: 88 MMHG | TEMPERATURE: 96.1 F | RESPIRATION RATE: 14 BRPM | WEIGHT: 213 LBS

## 2025-03-07 DIAGNOSIS — C50.912 INVASIVE DUCTAL CARCINOMA OF BREAST, LEFT: Primary | ICD-10-CM

## 2025-03-07 DIAGNOSIS — C50.412 MALIGNANT NEOPLASM OF UPPER-OUTER QUADRANT OF LEFT BREAST IN FEMALE, ESTROGEN RECEPTOR NEGATIVE: ICD-10-CM

## 2025-03-07 DIAGNOSIS — Z17.1 MALIGNANT NEOPLASM OF UPPER-OUTER QUADRANT OF LEFT BREAST IN FEMALE, ESTROGEN RECEPTOR NEGATIVE: ICD-10-CM

## 2025-03-07 DIAGNOSIS — Z17.1 MALIGNANT NEOPLASM OF UPPER-OUTER QUADRANT OF LEFT BREAST IN FEMALE, ESTROGEN RECEPTOR NEGATIVE (HCC): Primary | ICD-10-CM

## 2025-03-07 DIAGNOSIS — C50.412 MALIGNANT NEOPLASM OF UPPER-OUTER QUADRANT OF LEFT BREAST IN FEMALE, ESTROGEN RECEPTOR NEGATIVE (HCC): Primary | ICD-10-CM

## 2025-03-07 DIAGNOSIS — C50.912 INVASIVE DUCTAL CARCINOMA OF BREAST, LEFT (HCC): ICD-10-CM

## 2025-03-07 LAB
ALBUMIN SERPL-MCNC: 3.3 G/DL (ref 3.5–5.2)
ALBUMIN/GLOB SERPL: 1 {RATIO} (ref 1–2.5)
ALP SERPL-CCNC: 108 U/L (ref 35–104)
ALT SERPL-CCNC: 8 U/L (ref 5–33)
ANION GAP SERPL CALCULATED.3IONS-SCNC: 12 MMOL/L (ref 9–17)
AST SERPL-CCNC: 25 U/L
BASOPHILS # BLD: 0 K/UL (ref 0–0.2)
BASOPHILS NFR BLD: 1 % (ref 0–2)
BILIRUB SERPL-MCNC: 0.3 MG/DL (ref 0.3–1.2)
BUN SERPL-MCNC: 10 MG/DL (ref 8–23)
CALCIUM SERPL-MCNC: 9.1 MG/DL (ref 8.6–10.4)
CHLORIDE SERPL-SCNC: 101 MMOL/L (ref 98–107)
CO2 SERPL-SCNC: 25 MMOL/L (ref 20–31)
CREAT SERPL-MCNC: 0.9 MG/DL (ref 0.5–0.9)
EOSINOPHIL # BLD: 0.1 K/UL (ref 0–0.4)
EOSINOPHILS RELATIVE PERCENT: 2 % (ref 1–4)
ERYTHROCYTE [DISTWIDTH] IN BLOOD BY AUTOMATED COUNT: 14.6 % (ref 12.5–15.4)
GFR, ESTIMATED: 72 ML/MIN/1.73M2
GLUCOSE SERPL-MCNC: 106 MG/DL (ref 70–99)
HCT VFR BLD AUTO: 36 % (ref 36–46)
HGB BLD-MCNC: 12 G/DL (ref 12–16)
LYMPHOCYTES NFR BLD: 0.6 K/UL (ref 1–4.8)
LYMPHOCYTES RELATIVE PERCENT: 13 % (ref 24–44)
MAGNESIUM SERPL-MCNC: 1.2 MG/DL (ref 1.6–2.6)
MCH RBC QN AUTO: 32.7 PG (ref 26–34)
MCHC RBC AUTO-ENTMCNC: 33.3 G/DL (ref 31–37)
MCV RBC AUTO: 98.1 FL (ref 80–100)
MONOCYTES NFR BLD: 0.5 K/UL (ref 0.1–1.2)
MONOCYTES NFR BLD: 10 % (ref 2–11)
NEUTROPHILS NFR BLD: 74 % (ref 36–66)
NEUTS SEG NFR BLD: 3.5 K/UL (ref 1.8–7.7)
PLATELET # BLD AUTO: 122 K/UL (ref 140–450)
PMV BLD AUTO: 8.3 FL (ref 6–12)
POTASSIUM SERPL-SCNC: 3.7 MMOL/L (ref 3.7–5.3)
PROT SERPL-MCNC: 6.5 G/DL (ref 6.4–8.3)
RBC # BLD AUTO: 3.67 M/UL (ref 4–5.2)
SODIUM SERPL-SCNC: 138 MMOL/L (ref 135–144)
TSH SERPL DL<=0.05 MIU/L-ACNC: 1.4 UIU/ML (ref 0.3–5)
WBC OTHER # BLD: 4.8 K/UL (ref 3.5–11)

## 2025-03-07 PROCEDURE — 96413 CHEMO IV INFUSION 1 HR: CPT

## 2025-03-07 PROCEDURE — 96366 THER/PROPH/DIAG IV INF ADDON: CPT

## 2025-03-07 PROCEDURE — 3079F DIAST BP 80-89 MM HG: CPT | Performed by: INTERNAL MEDICINE

## 2025-03-07 PROCEDURE — 2500000003 HC RX 250 WO HCPCS: Performed by: INTERNAL MEDICINE

## 2025-03-07 PROCEDURE — 99211 OFF/OP EST MAY X REQ PHY/QHP: CPT | Performed by: INTERNAL MEDICINE

## 2025-03-07 PROCEDURE — 2580000003 HC RX 258: Performed by: INTERNAL MEDICINE

## 2025-03-07 PROCEDURE — 83735 ASSAY OF MAGNESIUM: CPT

## 2025-03-07 PROCEDURE — 96365 THER/PROPH/DIAG IV INF INIT: CPT

## 2025-03-07 PROCEDURE — 80053 COMPREHEN METABOLIC PANEL: CPT

## 2025-03-07 PROCEDURE — 6360000002 HC RX W HCPCS: Performed by: INTERNAL MEDICINE

## 2025-03-07 PROCEDURE — 85025 COMPLETE CBC W/AUTO DIFF WBC: CPT

## 2025-03-07 PROCEDURE — 84443 ASSAY THYROID STIM HORMONE: CPT

## 2025-03-07 PROCEDURE — 99215 OFFICE O/P EST HI 40 MIN: CPT | Performed by: INTERNAL MEDICINE

## 2025-03-07 PROCEDURE — 3074F SYST BP LT 130 MM HG: CPT | Performed by: INTERNAL MEDICINE

## 2025-03-07 RX ORDER — CARVEDILOL 25 MG/1
25 TABLET ORAL 2 TIMES DAILY
COMMUNITY
End: 2025-03-21

## 2025-03-07 RX ORDER — SODIUM CHLORIDE 9 MG/ML
INJECTION, SOLUTION INTRAVENOUS CONTINUOUS
OUTPATIENT
Start: 2025-04-18

## 2025-03-07 RX ORDER — LIDOCAINE AND PRILOCAINE 25; 25 MG/G; MG/G
CREAM TOPICAL
Qty: 30 G | Refills: 1 | Status: SHIPPED | OUTPATIENT
Start: 2025-03-07

## 2025-03-07 RX ORDER — DIPHENHYDRAMINE HYDROCHLORIDE 50 MG/ML
50 INJECTION, SOLUTION INTRAMUSCULAR; INTRAVENOUS
OUTPATIENT
Start: 2025-04-18

## 2025-03-07 RX ORDER — ONDANSETRON 2 MG/ML
8 INJECTION INTRAMUSCULAR; INTRAVENOUS
OUTPATIENT
Start: 2025-04-18

## 2025-03-07 RX ORDER — MEPERIDINE HYDROCHLORIDE 50 MG/ML
12.5 INJECTION INTRAMUSCULAR; INTRAVENOUS; SUBCUTANEOUS PRN
OUTPATIENT
Start: 2025-04-18

## 2025-03-07 RX ORDER — MAGNESIUM SULFATE 1 G/100ML
1000 INJECTION INTRAVENOUS ONCE
Status: COMPLETED | OUTPATIENT
Start: 2025-03-07 | End: 2025-03-07

## 2025-03-07 RX ORDER — TIOTROPIUM BROMIDE 18 UG/1
18 CAPSULE ORAL; RESPIRATORY (INHALATION) DAILY
COMMUNITY

## 2025-03-07 RX ORDER — SODIUM CHLORIDE 9 MG/ML
5-250 INJECTION, SOLUTION INTRAVENOUS PRN
Status: DISCONTINUED | OUTPATIENT
Start: 2025-03-07 | End: 2025-03-08 | Stop reason: HOSPADM

## 2025-03-07 RX ORDER — ONDANSETRON 4 MG/1
4 TABLET, FILM COATED ORAL 3 TIMES DAILY PRN
Qty: 100 TABLET | Refills: 1 | Status: SHIPPED | OUTPATIENT
Start: 2025-03-07

## 2025-03-07 RX ORDER — SODIUM CHLORIDE 9 MG/ML
5-250 INJECTION, SOLUTION INTRAVENOUS PRN
OUTPATIENT
Start: 2025-04-18

## 2025-03-07 RX ORDER — ACETAMINOPHEN 325 MG/1
650 TABLET ORAL
Start: 2025-04-18

## 2025-03-07 RX ORDER — SODIUM CHLORIDE 0.9 % (FLUSH) 0.9 %
5-40 SYRINGE (ML) INJECTION PRN
Status: DISCONTINUED | OUTPATIENT
Start: 2025-03-07 | End: 2025-03-08 | Stop reason: HOSPADM

## 2025-03-07 RX ORDER — ALBUTEROL SULFATE 90 UG/1
4 INHALANT RESPIRATORY (INHALATION) PRN
OUTPATIENT
Start: 2025-04-18

## 2025-03-07 RX ORDER — HEPARIN 100 UNIT/ML
500 SYRINGE INTRAVENOUS PRN
Status: DISCONTINUED | OUTPATIENT
Start: 2025-03-07 | End: 2025-03-08 | Stop reason: HOSPADM

## 2025-03-07 RX ORDER — HEPARIN SODIUM (PORCINE) LOCK FLUSH IV SOLN 100 UNIT/ML 100 UNIT/ML
500 SOLUTION INTRAVENOUS PRN
OUTPATIENT
Start: 2025-04-18

## 2025-03-07 RX ORDER — ACETAMINOPHEN 325 MG/1
650 TABLET ORAL
OUTPATIENT
Start: 2025-04-18

## 2025-03-07 RX ORDER — FAMOTIDINE 20 MG/1
1 TABLET, FILM COATED ORAL NIGHTLY PRN
COMMUNITY

## 2025-03-07 RX ORDER — MAGNESIUM SULFATE IN WATER 40 MG/ML
2000 INJECTION, SOLUTION INTRAVENOUS ONCE
Status: COMPLETED | OUTPATIENT
Start: 2025-03-07 | End: 2025-03-07

## 2025-03-07 RX ORDER — HYDROCORTISONE SODIUM SUCCINATE 100 MG/2ML
100 INJECTION INTRAMUSCULAR; INTRAVENOUS
OUTPATIENT
Start: 2025-04-18

## 2025-03-07 RX ORDER — SODIUM CHLORIDE 0.9 % (FLUSH) 0.9 %
5-40 SYRINGE (ML) INJECTION PRN
OUTPATIENT
Start: 2025-04-18

## 2025-03-07 RX ORDER — COLCHICINE 0.6 MG/1
0.6 TABLET ORAL DAILY
Qty: 30 TABLET | Refills: 3 | Status: SHIPPED | OUTPATIENT
Start: 2025-03-07

## 2025-03-07 RX ORDER — EPINEPHRINE 1 MG/ML
0.3 INJECTION, SOLUTION, CONCENTRATE INTRAVENOUS PRN
OUTPATIENT
Start: 2025-04-18

## 2025-03-07 RX ORDER — FAMOTIDINE 10 MG/ML
20 INJECTION, SOLUTION INTRAVENOUS
OUTPATIENT
Start: 2025-04-18

## 2025-03-07 RX ADMIN — MAGNESIUM SULFATE HEPTAHYDRATE 1000 MG: 1 INJECTION, SOLUTION INTRAVENOUS at 16:04

## 2025-03-07 RX ADMIN — MAGNESIUM SULFATE HEPTAHYDRATE 2000 MG: 40 INJECTION, SOLUTION INTRAVENOUS at 14:06

## 2025-03-07 RX ADMIN — SODIUM CHLORIDE 150 ML/HR: 0.9 INJECTION, SOLUTION INTRAVENOUS at 14:03

## 2025-03-07 RX ADMIN — SODIUM CHLORIDE, PRESERVATIVE FREE 10 ML: 5 INJECTION INTRAVENOUS at 17:41

## 2025-03-07 RX ADMIN — SODIUM CHLORIDE 200 MG: 9 INJECTION, SOLUTION INTRAVENOUS at 17:10

## 2025-03-07 RX ADMIN — HEPARIN 500 UNITS: 100 SYRINGE at 17:41

## 2025-03-07 NOTE — TELEPHONE ENCOUNTER
Instructions   from Dr. Jess Bradley MD    Proceed with treatment as planned after checking labs   RV 3 weeks   Refer back to radiation oncology    Today's medication changes   CHANGE how you take:  lidocaine-prilocaine (EMLA)   Changed by: Dr. Jess Bradley MD  Accurate as of March 7, 2025  1:22 PM.  Review your updated medication list below.    Appointment scheduled 3/28/2025 at 12pm.

## 2025-03-07 NOTE — PROGRESS NOTES
Pt here for C.10D.1. keytruda  Arrives ambulatory.  Denies any new complaints.  Labs drawn from port, results reviewed.  Magnesium replaced per protocol.   Pt was seen by Dr. Bradley, order rec'd to proceed with tx.  Tx complete without incident.  Pt d/c'd in stable condition.  Returns 3/28/2025 for office visit and C10D1.

## 2025-03-07 NOTE — PATIENT INSTRUCTIONS
Proceed with treatment as planned after checking labs   RV 3 weeks   Refer back to radiation oncology

## 2025-03-07 NOTE — PROGRESS NOTES
Spiritual Health Outpatient Oncology/Hematology Progress Note: [facility]    Situation: Writer and truman Jiménez visited with Patient and Friend, Claudette, in the treatment cubicle of the infusion clinic.     Assessment: Patient shared how she was doing. Pt reported that she was \"cancer free.\" Pt expressed gratitude for coming through her difficult treatment and surgery. Pt credited God with helping her through it all. Pt expressed gratitude for the support of her friend, her Rastafari, and others. Friend affirmed Pt's strengths and acknowledged other challenges Pt has been facing in the midst of going through cancer, including caring for her sister and mother. Pt acknowledged ongoing side effects and voiced hopes that these would improve. Pt would like to return to work in some capacity. Pt was smiling and appeared energized.     Intervention: Writer and  Tino introduced themselves to Pt's friend and greeted Pt. Writer and  inquired about her coping and needs. Writer and  celebrated Pt's good news. Writer and  offered supportive presence and active listening. Writer and  affirmed Pt's strengths. Writer and  offered words of support and encouragement.     Outcome: Patient and Friend thanked writer and  for the visit.    Plan: Spiritual Health Services are available for Patient by phone and/or in person.      03/07/25 1655   Encounter Summary   Encounter Overview/Reason Spiritual/Emotional Needs   Service Provided For Patient;Friend   Referral/Consult From [facility]   Support System Family members;Jainism/varun community;Friends/neighbors   Last Encounter  11/01/24   Complexity of Encounter Moderate   Begin Time 1345   End Time  1410   Total Time Calculated 25 min   Spiritual/Emotional needs   Type Spiritual Support   Assessment/Intervention/Outcome   Assessment Calm;Coping   Intervention Sustaining Presence/Ministry of presence;Active  conversation;Expressed feelings, needs, and concerns;Expressed Gratitude   Plan and Referrals   Plan/Referrals Continue Support (comment)     MAMADOU Barrios  [facility] Spiritual Health   [phone]

## 2025-03-10 ENCOUNTER — TELEPHONE (OUTPATIENT)
Dept: RADIATION ONCOLOGY | Age: 60
End: 2025-03-10

## 2025-03-13 ENCOUNTER — TELEPHONE (OUTPATIENT)
Dept: ONCOLOGY | Age: 60
End: 2025-03-13

## 2025-03-13 NOTE — TELEPHONE ENCOUNTER
Name: Luci Castañeda  : 1965  MRN: [id number]    Oncology Navigation Follow-Up Note    Contact Type:  Telephone    Notes: Writer spoke with pt for ONN f/u. She had general questions which were addressed to the best of writer's knowledge. She reports she is having mild shortness of breath, general body aches, and mouth/lip numbness. She reported this to Dr Collier back in February but symptoms haven't improved. Writer will discuss her concerns with Dr Collier.      She is scheduled to f/u with RO next week.       Electronically signed by Veronica Bennett RN on 3/13/2025 at 1:10 PM

## 2025-03-14 ENCOUNTER — TELEPHONE (OUTPATIENT)
Dept: ONCOLOGY | Age: 60
End: 2025-03-14

## 2025-03-14 DIAGNOSIS — Z17.1 MALIGNANT NEOPLASM OF UPPER-OUTER QUADRANT OF LEFT BREAST IN FEMALE, ESTROGEN RECEPTOR NEGATIVE (HCC): Primary | ICD-10-CM

## 2025-03-14 DIAGNOSIS — C50.412 MALIGNANT NEOPLASM OF UPPER-OUTER QUADRANT OF LEFT BREAST IN FEMALE, ESTROGEN RECEPTOR NEGATIVE (HCC): Primary | ICD-10-CM

## 2025-03-14 NOTE — TELEPHONE ENCOUNTER
Name: Luci Castañeda  : 1965  MRN: [id number]    Oncology Navigation Follow-Up Note    Contact Type:  Telephone    Notes: Spoke with Dr Collier about pt's concerns. He stated lips and tongue numbness most likely related to neuropathy from chemo. Also orders for labs obtained. Writer updated pt on above.       Electronically signed by Veronica Bennett RN on 3/14/2025 at 11:01 AM

## 2025-03-20 NOTE — PROGRESS NOTES
Chief Complaint   Patient presents with    Follow-up     Review disease status     DIAGNOSIS:        Clinical stage T2 N0 M0 left breast upper outer quadrant invasive ductal carcinoma, grade 3, ER negative, MI negative, HER2/carol not amplified.  Post neoadjuvant treatment pathological stage of T2 N0 M0 invasive ductal carcinoma.  Repeated biomarkers ER negative, MI negative, HER2/carol not amplified.  History of left nephrectomy in 1988  Saddle PE s/p thrombectomy mid December 2024.  CURRENT THERAPY:         Neoadjuvant treatment with Keytruda/Taxol/carboplatin/Adriamycin/Cytoxan.  Treatment started 5/31/2024  S/p thrombectomy for saddle PE in December 2024.   DOAC for PE.   Status post lumpectomy 2/24/25  BRIEF CASE HISTORY:       Ms. Luci Castañeda is a very pleasant 60 y.o. female with history of multiple comorbidities as listed.  She had history of left nephrectomy in 1988.  She had cardiac cath in 2021 for left ventricular hypertrophy.  Had recent problems with restrictive and obstructive pulmonary disease.  She continues follow-up with pulmonary. She was doing fine with routine mammogram being normal until this year where she had suspicious abnormalities in the mammogram done on 3/25/2024.  Diagnostic mammogram and left breast ultrasound April 9, 2024 showed suspicious 4.5 cm mass in the left breast 1 o'clock position.  Mildly thickened left axillary lymph nodes.  A biopsy was performed on 4/26/2024 and unfortunately that was positive for invasive ductal carcinoma.  Triple negative.  Biopsy from lymph node was negative for malignancy.  The patient is seen today for further management of her breast cancer. The patient did very well after her biopsy without any complication. The patient had no symptom preceding her diagnosis of cancer. No chest pain, shortness of breath, cough, sputum or hemoptysis, no back pain or monique aches, no weight loss or decreased appetite, no fever or night sweating. No

## 2025-03-21 ENCOUNTER — HOSPITAL ENCOUNTER (OUTPATIENT)
Dept: RADIATION ONCOLOGY | Age: 60
Discharge: HOME | End: 2025-03-21

## 2025-03-21 VITALS
WEIGHT: 212 LBS | HEART RATE: 101 BPM | OXYGEN SATURATION: 94 % | BODY MASS INDEX: 32.23 KG/M2 | TEMPERATURE: 97 F | SYSTOLIC BLOOD PRESSURE: 144 MMHG | DIASTOLIC BLOOD PRESSURE: 94 MMHG | RESPIRATION RATE: 18 BRPM

## 2025-03-21 RX ORDER — METHOCARBAMOL 500 MG/1
500 TABLET, FILM COATED ORAL 3 TIMES DAILY PRN
COMMUNITY

## 2025-03-21 ASSESSMENT — PAIN SCALES - GENERAL: PAINLEVEL_OUTOF10: 0

## 2025-03-21 NOTE — PROGRESS NOTES
[facility]            Radiation Oncology          [address] [place] [address]        O: [phone]        F: [phone]       [email or web address]           Date of Service: 3/21/2025     Location:  [facility] Radiation Oncology,   [address] [place] [address]   [phone]       RADIATION ONCOLOGY FOLLOW UP NOTE    Patient ID:   Luci Castañeda  : 1965   MRN: [id number]    DIAGNOSIS:  Cancer Staging   No matching staging information was found for the patient.        INTERVAL HISTORY:   Luci Castañeda is a 60 y.o.. female with ***      AUA Score: ***    MEDICATIONS:    Current Outpatient Medications:     colchicine (COLCRYS) 0.6 MG tablet, Take 1 tablet by mouth daily (Patient taking differently: Take 1 tablet by mouth as needed), Disp: 30 tablet, Rfl: 3    famotidine (PEPCID) 20 MG tablet, Take 1 tablet by mouth nightly as needed, Disp: , Rfl:     tiotropium (SPIRIVA HANDIHALER) 18 MCG inhalation capsule, Inhale 1 capsule into the lungs daily (Patient not taking: Reported on 3/21/2025), Disp: , Rfl:     ondansetron (ZOFRAN) 4 MG tablet, Take 1 tablet by mouth 3 times daily as needed for Nausea or Vomiting, Disp: 100 tablet, Rfl: 1    lidocaine-prilocaine (EMLA) 2.5-2.5 % cream, Apply topically as needed., Disp: 30 g, Rfl: 1    apixaban (ELIQUIS) 5 MG TABS tablet, Take 1 tablet by mouth 2 times daily, Disp: 180 tablet, Rfl: 1    albuterol sulfate HFA (PROAIR HFA) 108 (90 Base) MCG/ACT inhaler, Inhale 2 puffs into the lungs every 6 hours as needed for Wheezing, Disp: 1 each, Rfl: 3    allopurinol (ZYLOPRIM) 100 MG tablet, Take 1 tablet by mouth daily, Disp: 90 tablet, Rfl: 1    docusate sodium (COLACE) 100 MG capsule, Take 1 capsule by mouth daily as needed for Constipation, Disp: 30 capsule, Rfl: 0    traZODone (DESYREL) 50 MG tablet, Take 1 tablet by mouth nightly (Patient taking differently: Take 1 tablet by mouth nightly as

## 2025-03-21 NOTE — PROGRESS NOTES
Luci Castañeda  3/21/2025  4:07 PM      Vitals:    03/21/25 1530   BP: (!) 144/94   Pulse: (!) 101   Resp: 18   Temp: 97 °F (36.1 °C)   SpO2: 94%    :     Pain Assessment: 0-10  Pain Level: 0       Wt Readings from Last 1 Encounters:   03/21/25 96.2 kg (212 lb)                Current Outpatient Medications:     methocarbamol (ROBAXIN) 500 MG tablet, Take 1 tablet by mouth 3 times daily as needed Taking at night, Disp: , Rfl:     colchicine (COLCRYS) 0.6 MG tablet, Take 1 tablet by mouth daily (Patient taking differently: Take 1 tablet by mouth as needed), Disp: 30 tablet, Rfl: 3    famotidine (PEPCID) 20 MG tablet, Take 1 tablet by mouth nightly as needed, Disp: , Rfl:     tiotropium (SPIRIVA HANDIHALER) 18 MCG inhalation capsule, Inhale 1 capsule into the lungs daily (Patient not taking: Reported on 3/21/2025), Disp: , Rfl:     ondansetron (ZOFRAN) 4 MG tablet, Take 1 tablet by mouth 3 times daily as needed for Nausea or Vomiting, Disp: 100 tablet, Rfl: 1    lidocaine-prilocaine (EMLA) 2.5-2.5 % cream, Apply topically as needed., Disp: 30 g, Rfl: 1    apixaban (ELIQUIS) 5 MG TABS tablet, Take 1 tablet by mouth 2 times daily, Disp: 180 tablet, Rfl: 1    albuterol sulfate HFA (PROAIR HFA) 108 (90 Base) MCG/ACT inhaler, Inhale 2 puffs into the lungs every 6 hours as needed for Wheezing, Disp: 1 each, Rfl: 3    allopurinol (ZYLOPRIM) 100 MG tablet, Take 1 tablet by mouth daily, Disp: 90 tablet, Rfl: 1    docusate sodium (COLACE) 100 MG capsule, Take 1 capsule by mouth daily as needed for Constipation, Disp: 30 capsule, Rfl: 0    traZODone (DESYREL) 50 MG tablet, Take 1 tablet by mouth nightly (Patient taking differently: Take 1 tablet by mouth nightly as needed for Sleep), Disp: 90 tablet, Rfl: 1    Pembrolizumab (KEYTRUDA IV), Infuse intravenously Every 3 weeks due 3/7/2025, Disp: , Rfl:     pantoprazole (PROTONIX) 20 MG tablet, TAKE ONE TABLET BY MOUTH EVERY MORNING BEFORE BREAKFAST, Disp: 90 tablet, Rfl: 3

## 2025-03-27 ENCOUNTER — TELEPHONE (OUTPATIENT)
Dept: RADIATION ONCOLOGY | Age: 60
End: 2025-03-27

## 2025-03-27 ENCOUNTER — HOSPITAL ENCOUNTER (OUTPATIENT)
Dept: RADIATION ONCOLOGY | Age: 60
Discharge: HOME | End: 2025-03-27
Payer: COMMERCIAL

## 2025-03-27 ENCOUNTER — HOSPITAL ENCOUNTER (OUTPATIENT)
Age: 60
Discharge: HOME | End: 2025-03-27
Payer: COMMERCIAL

## 2025-03-27 ENCOUNTER — HOSPITAL ENCOUNTER (OUTPATIENT)
Dept: RADIATION ONCOLOGY | Age: 60
End: 2025-03-27
Payer: COMMERCIAL

## 2025-03-27 DIAGNOSIS — C50.912 INVASIVE DUCTAL CARCINOMA OF BREAST, LEFT: ICD-10-CM

## 2025-03-27 LAB
ALBUMIN SERPL-MCNC: 3.6 G/DL (ref 3.5–5.2)
ALBUMIN/GLOB SERPL: 1.1 {RATIO} (ref 1–2.5)
ALP SERPL-CCNC: 98 U/L (ref 35–104)
ALT SERPL-CCNC: 12 U/L (ref 5–33)
ANION GAP SERPL CALCULATED.3IONS-SCNC: 11 MMOL/L (ref 9–17)
AST SERPL-CCNC: 34 U/L
BASOPHILS # BLD: 0 K/UL (ref 0–0.2)
BASOPHILS NFR BLD: 1 % (ref 0–2)
BILIRUB SERPL-MCNC: 0.4 MG/DL (ref 0.3–1.2)
BUN SERPL-MCNC: 11 MG/DL (ref 8–23)
CALCIUM SERPL-MCNC: 9.4 MG/DL (ref 8.6–10.4)
CHLORIDE SERPL-SCNC: 102 MMOL/L (ref 98–107)
CO2 SERPL-SCNC: 26 MMOL/L (ref 20–31)
CREAT SERPL-MCNC: 1 MG/DL (ref 0.5–0.9)
EOSINOPHIL # BLD: 0.1 K/UL (ref 0–0.4)
EOSINOPHILS RELATIVE PERCENT: 3 % (ref 1–4)
ERYTHROCYTE [DISTWIDTH] IN BLOOD BY AUTOMATED COUNT: 14.6 % (ref 12.5–15.4)
GFR, ESTIMATED: 64 ML/MIN/1.73M2
GLUCOSE SERPL-MCNC: 117 MG/DL (ref 70–99)
HCT VFR BLD AUTO: 37.3 % (ref 36–46)
HGB BLD-MCNC: 12.4 G/DL (ref 12–16)
LYMPHOCYTES NFR BLD: 0.6 K/UL (ref 1–4.8)
LYMPHOCYTES RELATIVE PERCENT: 15 % (ref 24–44)
MAGNESIUM SERPL-MCNC: 1.1 MG/DL (ref 1.6–2.6)
MCH RBC QN AUTO: 32.1 PG (ref 26–34)
MCHC RBC AUTO-ENTMCNC: 33.1 G/DL (ref 31–37)
MCV RBC AUTO: 96.8 FL (ref 80–100)
MONOCYTES NFR BLD: 0.5 K/UL (ref 0.1–1.2)
MONOCYTES NFR BLD: 12 % (ref 2–11)
NEUTROPHILS NFR BLD: 69 % (ref 36–66)
NEUTS SEG NFR BLD: 2.9 K/UL (ref 1.8–7.7)
PLATELET # BLD AUTO: 133 K/UL (ref 140–450)
PMV BLD AUTO: 7.7 FL (ref 6–12)
POTASSIUM SERPL-SCNC: 3.8 MMOL/L (ref 3.7–5.3)
PROT SERPL-MCNC: 6.9 G/DL (ref 6.4–8.3)
RBC # BLD AUTO: 3.85 M/UL (ref 4–5.2)
SODIUM SERPL-SCNC: 139 MMOL/L (ref 135–144)
TSH SERPL DL<=0.05 MIU/L-ACNC: 1.82 UIU/ML (ref 0.3–5)
WBC OTHER # BLD: 4.1 K/UL (ref 3.5–11)

## 2025-03-27 PROCEDURE — 80053 COMPREHEN METABOLIC PANEL: CPT

## 2025-03-27 PROCEDURE — 83735 ASSAY OF MAGNESIUM: CPT

## 2025-03-27 PROCEDURE — 84443 ASSAY THYROID STIM HORMONE: CPT

## 2025-03-27 PROCEDURE — 85025 COMPLETE CBC W/AUTO DIFF WBC: CPT

## 2025-03-27 PROCEDURE — 36415 COLL VENOUS BLD VENIPUNCTURE: CPT

## 2025-03-27 RX ORDER — BETAMETHASONE VALERATE 1.2 MG/G
CREAM TOPICAL
Qty: 145 G | Refills: 0 | Status: SHIPPED | OUTPATIENT
Start: 2025-03-27

## 2025-03-27 NOTE — DISCHARGE INSTRUCTIONS
the procedure are forwarded to medical radiation dosimetrists and medical physicists. These professionals perform highly technical calculations that will be used to set up the treatment machine (linear accelerator). The dosimetrist and physicist work closely with your radiation oncologist to develop the treatment plan, a process that typically takes 7-10 business days. Once the planning is completed, a therapist will call you with a start date. During that call your appointment time will also be determined.                Head and Neck Mask      Body Mold                     Radiation Tattoo  Daily Treatments       You will be placed on the treatment table in the same position as you were when you had your simulation scan. Once in place, a set of X-ray films will be taken to ensure that the treatment will be delivered the same way as it was simulated. Once the films and positioning are confirmed, a treatment will be delivered. Factors that affect the total length of the treatment include the complexity of your treatment and how quickly you can be positioned properly for treatment. Treatments are usually given once a day, Monday through Friday, for a number of weeks. The number of weeks is determined by national cancer guidelines and your physician. Each treatment generally takes only 10 to 15 minutes; however, you will likely be in the department for 20- 30 minutes each day.        Weekly Visits    During your treatments you will have a weekly appointment with your radiation oncologist. This appointment is to evaluate how you are tolerating your treatments and to address any questions/concerns you may have.        Follow Up  We will follow your progress and see you on a regular basis. The duration between appointments vary depending on your tolerance to your treatments and your doctor's discretion. We understand that you may be seeing many other physicians, but it is important for us to participate in this follow-up

## 2025-03-27 NOTE — TELEPHONE ENCOUNTER
Per Dr. Wan, pt SIM today was cancelled, d/t breast seroma. Pt needs to see DR. Gordillo, surgeon to have seroma drained. After this, she will reschedule SIM appt.

## 2025-03-27 NOTE — PROGRESS NOTES
Radiation Treatment Site/Plan/Fractions:  Left Breast/20 Fractions Daily      Concurrent Chemotherapy/Immunotherapy:  Keytruda      Cardiac Device:  None      Transportation Concerns:  None-pt plans to drive herself and has help with rides      Nursing Referrals and Reasons: No needs identified at this time      Contrast Given:  None          Miscellaneous Information:  Site specific information reviewed with patient.  She verbalized understanding of information and recommended skin care with steroid cream and Aquaphor.      Patient was noted to have large seroma on her simulation CT scan.  She was noted to have constant dripping of dark brown liquid from a small opening to her left breast.  Dr. Wan examined pt as well.  Bulky dressing applied to left breast and patient given dressing supplies.  Writer called Dr. Gordillo's office to request appt ASAP for pt to be seen.  Office staff stated he is in surgery today and tomorrow and they will call him and then follow up with the patient.  Patient updated and requested she call with an update after being seen by Dr. Gordillo and she verbalized understanding of information.

## 2025-03-28 ENCOUNTER — APPOINTMENT (OUTPATIENT)
Dept: RADIATION ONCOLOGY | Age: 60
End: 2025-03-28
Payer: COMMERCIAL

## 2025-03-28 ENCOUNTER — HOSPITAL ENCOUNTER (OUTPATIENT)
Dept: INFUSION THERAPY | Age: 60
Discharge: HOME | End: 2025-03-28
Payer: COMMERCIAL

## 2025-03-28 ENCOUNTER — OFFICE VISIT (OUTPATIENT)
Dept: ONCOLOGY | Age: 60
End: 2025-03-28
Payer: COMMERCIAL

## 2025-03-28 ENCOUNTER — TELEPHONE (OUTPATIENT)
Dept: ONCOLOGY | Age: 60
End: 2025-03-28

## 2025-03-28 VITALS
HEIGHT: 68 IN | OXYGEN SATURATION: 96 % | WEIGHT: 212 LBS | SYSTOLIC BLOOD PRESSURE: 142 MMHG | BODY MASS INDEX: 32.13 KG/M2 | DIASTOLIC BLOOD PRESSURE: 94 MMHG | HEART RATE: 101 BPM | RESPIRATION RATE: 12 BRPM

## 2025-03-28 DIAGNOSIS — C50.412 MALIGNANT NEOPLASM OF UPPER-OUTER QUADRANT OF LEFT BREAST IN FEMALE, ESTROGEN RECEPTOR NEGATIVE: Primary | ICD-10-CM

## 2025-03-28 DIAGNOSIS — Z17.1 MALIGNANT NEOPLASM OF UPPER-OUTER QUADRANT OF LEFT BREAST IN FEMALE, ESTROGEN RECEPTOR NEGATIVE: Primary | ICD-10-CM

## 2025-03-28 PROCEDURE — 96366 THER/PROPH/DIAG IV INF ADDON: CPT

## 2025-03-28 PROCEDURE — 96367 TX/PROPH/DG ADDL SEQ IV INF: CPT

## 2025-03-28 PROCEDURE — 3077F SYST BP >= 140 MM HG: CPT | Performed by: INTERNAL MEDICINE

## 2025-03-28 PROCEDURE — 2500000003 HC RX 250 WO HCPCS: Performed by: INTERNAL MEDICINE

## 2025-03-28 PROCEDURE — 99214 OFFICE O/P EST MOD 30 MIN: CPT | Performed by: INTERNAL MEDICINE

## 2025-03-28 PROCEDURE — 3080F DIAST BP >= 90 MM HG: CPT | Performed by: INTERNAL MEDICINE

## 2025-03-28 PROCEDURE — 2580000003 HC RX 258: Performed by: INTERNAL MEDICINE

## 2025-03-28 PROCEDURE — 96413 CHEMO IV INFUSION 1 HR: CPT

## 2025-03-28 PROCEDURE — 99211 OFF/OP EST MAY X REQ PHY/QHP: CPT | Performed by: INTERNAL MEDICINE

## 2025-03-28 PROCEDURE — 6360000002 HC RX W HCPCS: Performed by: INTERNAL MEDICINE

## 2025-03-28 RX ORDER — HEPARIN 100 UNIT/ML
500 SYRINGE INTRAVENOUS PRN
Status: DISCONTINUED | OUTPATIENT
Start: 2025-03-28 | End: 2025-03-29 | Stop reason: HOSPADM

## 2025-03-28 RX ORDER — PROCHLORPERAZINE MALEATE 5 MG/1
5 TABLET ORAL EVERY 6 HOURS PRN
COMMUNITY
Start: 2025-02-17 | End: 2025-04-04

## 2025-03-28 RX ORDER — MAGNESIUM SULFATE HEPTAHYDRATE 40 MG/ML
4000 INJECTION, SOLUTION INTRAVENOUS ONCE
Status: COMPLETED | OUTPATIENT
Start: 2025-03-28 | End: 2025-03-28

## 2025-03-28 RX ORDER — SODIUM CHLORIDE 9 MG/ML
5-250 INJECTION, SOLUTION INTRAVENOUS PRN
Status: DISCONTINUED | OUTPATIENT
Start: 2025-03-28 | End: 2025-03-29 | Stop reason: HOSPADM

## 2025-03-28 RX ORDER — SODIUM CHLORIDE 0.9 % (FLUSH) 0.9 %
5-40 SYRINGE (ML) INJECTION PRN
Status: DISCONTINUED | OUTPATIENT
Start: 2025-03-28 | End: 2025-03-29 | Stop reason: HOSPADM

## 2025-03-28 RX ADMIN — SODIUM CHLORIDE 200 MG: 9 INJECTION, SOLUTION INTRAVENOUS at 13:42

## 2025-03-28 RX ADMIN — HEPARIN 500 UNITS: 100 SYRINGE at 17:45

## 2025-03-28 RX ADMIN — MAGNESIUM SULFATE HEPTAHYDRATE 4000 MG: 40 INJECTION, SOLUTION INTRAVENOUS at 14:13

## 2025-03-28 RX ADMIN — SODIUM CHLORIDE 125 ML/HR: 0.9 INJECTION, SOLUTION INTRAVENOUS at 13:40

## 2025-03-28 RX ADMIN — SODIUM CHLORIDE, PRESERVATIVE FREE 10 ML: 5 INJECTION INTRAVENOUS at 17:45

## 2025-03-28 NOTE — PROGRESS NOTES
Chief Complaint   Patient presents with    Follow-up     Review disease status     DIAGNOSIS:        Clinical stage T2 N0 M0 left breast upper outer quadrant invasive ductal carcinoma, grade 3, ER negative, MA negative, HER2/carol not amplified.  Post neoadjuvant treatment pathological stage of T2 N0 M0 invasive ductal carcinoma.  Repeated biomarkers ER negative, MA negative, HER2/carol not amplified.  History of left nephrectomy in 1988  Saddle PE s/p thrombectomy mid December 2024.  CURRENT THERAPY:         Neoadjuvant treatment with Keytruda/Taxol/carboplatin/Adriamycin/Cytoxan.  Treatment started 5/31/2024  S/p thrombectomy for saddle PE in December 2024.   DOAC for PE.   Status post lumpectomy 2/24/25  BRIEF CASE HISTORY:       Ms. Luci Castañeda is a very pleasant 60 y.o. female with history of multiple comorbidities as listed.  She had history of left nephrectomy in 1988.  She had cardiac cath in 2021 for left ventricular hypertrophy.  Had recent problems with restrictive and obstructive pulmonary disease.  She continues follow-up with pulmonary. She was doing fine with routine mammogram being normal until this year where she had suspicious abnormalities in the mammogram done on 3/25/2024.  Diagnostic mammogram and left breast ultrasound April 9, 2024 showed suspicious 4.5 cm mass in the left breast 1 o'clock position.  Mildly thickened left axillary lymph nodes.  A biopsy was performed on 4/26/2024 and unfortunately that was positive for invasive ductal carcinoma.  Triple negative.  Biopsy from lymph node was negative for malignancy.  The patient is seen today for further management of her breast cancer. The patient did very well after her biopsy without any complication. The patient had no symptom preceding her diagnosis of cancer. No chest pain, shortness of breath, cough, sputum or hemoptysis, no back pain or monique aches, no weight loss or decreased appetite, no fever or night sweating. No

## 2025-03-28 NOTE — TELEPHONE ENCOUNTER
Instructions   from Dr. Jess Bradley MD    Proceed with treatment as planned after checking labs   RV 3 weeks     Next appointment scheduled for 4/18/2025 at 9:15am

## 2025-03-28 NOTE — PROGRESS NOTES
Patient arrives ambulatory for Keytruda C10D1  Pt denies complaints or concerns  Pt seen by Dr Ramirez , Orders to proceed with tx  Labs drawn via med port on 3/27 and reviewed, within normal limits for tx  Magnesium 1.1 - 4gr given per sliding scale  Patient tolerated tx without incident and discharged in stable condition  Next appointment 4/18 MD visit followed by tx

## 2025-03-28 NOTE — TELEPHONE ENCOUNTER
Name: Luci Castañeda  : 1965  MRN: [id number]    Oncology Navigation Follow-Up Note    Contact Type:  Medical Oncology    Notes: Writer covering for Veronica [facility] breast cancer navigator.  Dr. Bradley requested to speak w/[facility] research nurse.  Jacqueline [facility] research nurse, contacted, Dr. Bradley placed on line.      Electronically signed by Nano Li RN on 3/28/2025 at 12:39 PM

## 2025-03-31 ENCOUNTER — TELEPHONE (OUTPATIENT)
Dept: RADIATION ONCOLOGY | Age: 60
End: 2025-03-31

## 2025-03-31 NOTE — TELEPHONE ENCOUNTER
Patient called to report that she saw Dr. Gordillo today and he drained 450 mls off her left breast and ordered a breast ultrasound for today or tomorrow.  States he has scheduled her for a \"minor surgery in case he has to go in and clean out the wound area,\" of her left breast lumpectomy on 4/10/25.  Requested pt call to update our office when Dr. Gordillo has cleared her for radiation treatments and she is healed.  Will update Dr. Wan and Veronica LEDESMA/breast navigator as well.

## 2025-04-01 ENCOUNTER — TRANSCRIBE ORDERS (OUTPATIENT)
Dept: ADMINISTRATIVE | Age: 60
End: 2025-04-01

## 2025-04-01 DIAGNOSIS — C50.912 INVASIVE DUCTAL CARCINOMA OF BREAST, LEFT: Primary | ICD-10-CM

## 2025-04-03 ENCOUNTER — TRANSCRIBE ORDERS (OUTPATIENT)
Dept: ADMINISTRATIVE | Age: 60
End: 2025-04-03

## 2025-04-03 DIAGNOSIS — N20.0 CALCULUS OF KIDNEY: Primary | ICD-10-CM

## 2025-04-04 ENCOUNTER — CLINICAL DOCUMENTATION (OUTPATIENT)
Dept: ONCOLOGY | Age: 60
End: 2025-04-04

## 2025-04-04 ENCOUNTER — TELEPHONE (OUTPATIENT)
Dept: INFUSION THERAPY | Age: 60
End: 2025-04-04

## 2025-04-04 RX ORDER — CEPHALEXIN 500 MG/1
500 CAPSULE ORAL 2 TIMES DAILY
COMMUNITY
Start: 2025-04-02 | End: 2025-04-04

## 2025-04-04 NOTE — PRE-PROCEDURE INSTRUCTIONS
ARRIVE AT THE HOSPITAL ON Thursday 4/10/2025 arrive at 10:00am    Once you enter the hospital lobby, take the elevators to the second floor.  Check-In is at the surgery registration desk.      Continue to take your home medications as you normally do up to and including the night before surgery with the exception of any blood thinning medications.      Blood Thinning Medications:  Please stop prescription blood thinning medications such as Apixaban (Eliquis); Clopidogrel (Plavix); Dabigatran (Pradaxa); Prasugrel (Effient); Rivaroxaban (Xarelto); Ticagrelor (Brilinta); Warfarin (Coumadin) only as directed by your surgeon and/or the prescribing physician    Some common examples of other medications that can thin your blood are: Aspirin, Ibuprofen (Advil, Motrin), Naproxen (Aleve), Meloxicam (Mobic), Celecoxib (Celebrex), Fish Oil, many Herbal Supplements.  These medications should usually be stopped at least 7 days prior to surgery.   Stop eliquis according to the ordering physician    Stop using OTC pain relievers containing Aspirin, Ibuprofen (Advil, Motrin) or Naproxen (Aleve) seven days prior to surgery.  It is OK to continue using Tylenol for pain the week prior to surgery.    Failure to stop certain medications may interfere with your scheduled surgery.    If you receive instructions from your surgeon regarding what medications to stop prior to surgery, please follow those specific instructions.      If You Have Diabetes:  Do not take any of your diabetic medications, (injectables or by mouth) the morning of surgery unless otherwise instructed by the doctor who manages your diabetes. If you are taking insulin, contact the doctor the manages your diabetes for instructions about any changes to your insulin dosages the day before surgery.        Please take the following medication(s) the day of surgery with a small sip of water:  Allopurinol,pantoprazole and to wear scop patch    Please use your inhaler(s) if

## 2025-04-04 NOTE — PROGRESS NOTES
[facility] Nutrition Screen:    PG-SGA screening form reviewed. Score = 5. Pt reports nausea, consuming less than normal amounts of normal food, and able to do little activity and spends most of day in bed/chair. RD referral/nutrition evaluation indicated for scores > 4. RD eval to follow.

## 2025-04-07 ENCOUNTER — HOSPITAL ENCOUNTER (OUTPATIENT)
Dept: ULTRASOUND IMAGING | Age: 60
Discharge: HOME | End: 2025-04-09
Attending: SURGERY
Payer: COMMERCIAL

## 2025-04-07 ENCOUNTER — HOSPITAL ENCOUNTER (OUTPATIENT)
Dept: ULTRASOUND IMAGING | Age: 60
Discharge: HOME | End: 2025-04-09
Attending: UROLOGY
Payer: COMMERCIAL

## 2025-04-07 DIAGNOSIS — N20.0 CALCULUS OF KIDNEY: ICD-10-CM

## 2025-04-07 DIAGNOSIS — C50.912 INVASIVE DUCTAL CARCINOMA OF BREAST, LEFT: ICD-10-CM

## 2025-04-07 PROCEDURE — 76770 US EXAM ABDO BACK WALL COMP: CPT

## 2025-04-07 PROCEDURE — 76642 ULTRASOUND BREAST LIMITED: CPT

## 2025-04-07 RX ORDER — ENOXAPARIN SODIUM 100 MG/ML
80 INJECTION SUBCUTANEOUS 2 TIMES DAILY
Qty: 6 EACH | Refills: 0 | Status: SHIPPED | OUTPATIENT
Start: 2025-04-07 | End: 2025-04-10

## 2025-04-07 NOTE — TELEPHONE ENCOUNTER
Pt called to clarify bridging directions for lovenox. Dr. Walker states to hold eliquis 3 days prior to surgery. He states to take lovenox 80mcg SQ BID for 3 days, (today, tomorrow, Wednesday), hold day of procedure (Thursday), then resume lovenox for 2 days after sx (Friday and Saturday). He would like for her to resume eliquis on Sunday. Pt notified and verbalized understanding. She states she has 2 days worth of lovenox at home, and only needs 3 additional days called in. RX sent.

## 2025-04-09 ENCOUNTER — TELEPHONE (OUTPATIENT)
Dept: ONCOLOGY | Age: 60
End: 2025-04-09

## 2025-04-09 NOTE — TELEPHONE ENCOUNTER
[facility] On[facility] Nutrition Note:   Chart reviewed and called patient re: positive nutrition screen. No answer. Detailed message left requesting call back as able. Writer's contact information provided.

## 2025-04-10 ENCOUNTER — ANESTHESIA EVENT (OUTPATIENT)
Dept: OPERATING ROOM | Age: 60
End: 2025-04-10
Payer: COMMERCIAL

## 2025-04-10 ENCOUNTER — HOSPITAL ENCOUNTER (OUTPATIENT)
Age: 60
Setting detail: OUTPATIENT SURGERY
Discharge: HOME | End: 2025-04-10
Attending: SURGERY | Admitting: SURGERY
Payer: COMMERCIAL

## 2025-04-10 ENCOUNTER — ANESTHESIA (OUTPATIENT)
Dept: OPERATING ROOM | Age: 60
End: 2025-04-10
Payer: COMMERCIAL

## 2025-04-10 VITALS
TEMPERATURE: 97.2 F | SYSTOLIC BLOOD PRESSURE: 134 MMHG | OXYGEN SATURATION: 94 % | DIASTOLIC BLOOD PRESSURE: 91 MMHG | RESPIRATION RATE: 23 BRPM | WEIGHT: 212 LBS | BODY MASS INDEX: 32.13 KG/M2 | HEART RATE: 80 BPM | HEIGHT: 68 IN

## 2025-04-10 DIAGNOSIS — Z98.890 S/P BREAST LUMPECTOMY: Primary | ICD-10-CM

## 2025-04-10 DIAGNOSIS — N64.89 SEROMA OF BREAST: ICD-10-CM

## 2025-04-10 LAB
CASE NUMBER:: NORMAL
SPECIMEN DESCRIPTION: NORMAL

## 2025-04-10 PROCEDURE — 6370000000 HC RX 637 (ALT 250 FOR IP): Performed by: ANESTHESIOLOGY

## 2025-04-10 PROCEDURE — 87075 CULTR BACTERIA EXCEPT BLOOD: CPT

## 2025-04-10 PROCEDURE — 3600000012 HC SURGERY LEVEL 2 ADDTL 15MIN: Performed by: SURGERY

## 2025-04-10 PROCEDURE — 7100000011 HC PHASE II RECOVERY - ADDTL 15 MIN: Performed by: SURGERY

## 2025-04-10 PROCEDURE — 3700000000 HC ANESTHESIA ATTENDED CARE: Performed by: SURGERY

## 2025-04-10 PROCEDURE — 3600000002 HC SURGERY LEVEL 2 BASE: Performed by: SURGERY

## 2025-04-10 PROCEDURE — 6360000002 HC RX W HCPCS: Performed by: SURGERY

## 2025-04-10 PROCEDURE — 6360000002 HC RX W HCPCS: Performed by: ANESTHESIOLOGY

## 2025-04-10 PROCEDURE — 2580000003 HC RX 258: Performed by: ANESTHESIOLOGY

## 2025-04-10 PROCEDURE — 7100000000 HC PACU RECOVERY - FIRST 15 MIN: Performed by: SURGERY

## 2025-04-10 PROCEDURE — 7100000010 HC PHASE II RECOVERY - FIRST 15 MIN: Performed by: SURGERY

## 2025-04-10 PROCEDURE — 2720000010 HC SURG SUPPLY STERILE: Performed by: SURGERY

## 2025-04-10 PROCEDURE — 87205 SMEAR GRAM STAIN: CPT

## 2025-04-10 PROCEDURE — 88305 TISSUE EXAM BY PATHOLOGIST: CPT

## 2025-04-10 PROCEDURE — 6360000002 HC RX W HCPCS: Performed by: NURSE ANESTHETIST, CERTIFIED REGISTERED

## 2025-04-10 PROCEDURE — 88112 CYTOPATH CELL ENHANCE TECH: CPT

## 2025-04-10 PROCEDURE — 3700000001 HC ADD 15 MINUTES (ANESTHESIA): Performed by: SURGERY

## 2025-04-10 PROCEDURE — 2709999900 HC NON-CHARGEABLE SUPPLY: Performed by: SURGERY

## 2025-04-10 PROCEDURE — 87070 CULTURE OTHR SPECIMN AEROBIC: CPT

## 2025-04-10 PROCEDURE — 7100000001 HC PACU RECOVERY - ADDTL 15 MIN: Performed by: SURGERY

## 2025-04-10 RX ORDER — ONDANSETRON 2 MG/ML
INJECTION INTRAMUSCULAR; INTRAVENOUS
Status: DISCONTINUED | OUTPATIENT
Start: 2025-04-10 | End: 2025-04-10 | Stop reason: SDUPTHER

## 2025-04-10 RX ORDER — OXYCODONE HYDROCHLORIDE 5 MG/1
5 TABLET ORAL
Status: COMPLETED | OUTPATIENT
Start: 2025-04-10 | End: 2025-04-10

## 2025-04-10 RX ORDER — PROPOFOL 10 MG/ML
INJECTION, EMULSION INTRAVENOUS
Status: DISCONTINUED | OUTPATIENT
Start: 2025-04-10 | End: 2025-04-10 | Stop reason: SDUPTHER

## 2025-04-10 RX ORDER — LIDOCAINE HYDROCHLORIDE 10 MG/ML
1 INJECTION, SOLUTION EPIDURAL; INFILTRATION; INTRACAUDAL; PERINEURAL
Status: DISCONTINUED | OUTPATIENT
Start: 2025-04-10 | End: 2025-04-10 | Stop reason: HOSPADM

## 2025-04-10 RX ORDER — DIPHENHYDRAMINE HYDROCHLORIDE 50 MG/ML
12.5 INJECTION, SOLUTION INTRAMUSCULAR; INTRAVENOUS
Status: DISCONTINUED | OUTPATIENT
Start: 2025-04-10 | End: 2025-04-10 | Stop reason: HOSPADM

## 2025-04-10 RX ORDER — SCOPOLAMINE 1 MG/3D
1 PATCH, EXTENDED RELEASE TRANSDERMAL ONCE
Status: DISCONTINUED | OUTPATIENT
Start: 2025-04-10 | End: 2025-04-10 | Stop reason: HOSPADM

## 2025-04-10 RX ORDER — SULFAMETHOXAZOLE AND TRIMETHOPRIM 800; 160 MG/1; MG/1
1 TABLET ORAL 2 TIMES DAILY
Qty: 10 TABLET | Refills: 0 | Status: SHIPPED | OUTPATIENT
Start: 2025-04-10 | End: 2025-04-15

## 2025-04-10 RX ORDER — FENTANYL CITRATE 50 UG/ML
INJECTION, SOLUTION INTRAMUSCULAR; INTRAVENOUS
Status: DISCONTINUED | OUTPATIENT
Start: 2025-04-10 | End: 2025-04-10 | Stop reason: SDUPTHER

## 2025-04-10 RX ORDER — SODIUM CHLORIDE, SODIUM LACTATE, POTASSIUM CHLORIDE, CALCIUM CHLORIDE 600; 310; 30; 20 MG/100ML; MG/100ML; MG/100ML; MG/100ML
INJECTION, SOLUTION INTRAVENOUS CONTINUOUS
Status: DISCONTINUED | OUTPATIENT
Start: 2025-04-10 | End: 2025-04-10 | Stop reason: HOSPADM

## 2025-04-10 RX ORDER — LABETALOL HYDROCHLORIDE 5 MG/ML
INJECTION, SOLUTION INTRAVENOUS
Status: DISCONTINUED | OUTPATIENT
Start: 2025-04-10 | End: 2025-04-10 | Stop reason: SDUPTHER

## 2025-04-10 RX ORDER — HYDROCODONE BITARTRATE AND ACETAMINOPHEN 5; 325 MG/1; MG/1
1 TABLET ORAL EVERY 6 HOURS PRN
Qty: 28 TABLET | Refills: 0 | Status: SHIPPED | OUTPATIENT
Start: 2025-04-10 | End: 2025-04-17

## 2025-04-10 RX ORDER — MIDAZOLAM HYDROCHLORIDE 1 MG/ML
INJECTION, SOLUTION INTRAMUSCULAR; INTRAVENOUS
Status: DISCONTINUED | OUTPATIENT
Start: 2025-04-10 | End: 2025-04-10 | Stop reason: SDUPTHER

## 2025-04-10 RX ORDER — CEFAZOLIN SODIUM/WATER 2 G/20 ML
2000 SYRINGE (ML) INTRAVENOUS ONCE
Status: COMPLETED | OUTPATIENT
Start: 2025-04-10 | End: 2025-04-10

## 2025-04-10 RX ORDER — SODIUM CHLORIDE 9 MG/ML
INJECTION, SOLUTION INTRAVENOUS PRN
Status: DISCONTINUED | OUTPATIENT
Start: 2025-04-10 | End: 2025-04-10 | Stop reason: HOSPADM

## 2025-04-10 RX ORDER — LIDOCAINE HYDROCHLORIDE 20 MG/ML
INJECTION, SOLUTION EPIDURAL; INFILTRATION; INTRACAUDAL; PERINEURAL
Status: DISCONTINUED | OUTPATIENT
Start: 2025-04-10 | End: 2025-04-10 | Stop reason: SDUPTHER

## 2025-04-10 RX ORDER — FENTANYL CITRATE 50 UG/ML
25 INJECTION, SOLUTION INTRAMUSCULAR; INTRAVENOUS EVERY 5 MIN PRN
Status: DISCONTINUED | OUTPATIENT
Start: 2025-04-10 | End: 2025-04-10 | Stop reason: HOSPADM

## 2025-04-10 RX ORDER — PROCHLORPERAZINE EDISYLATE 5 MG/ML
5 INJECTION INTRAMUSCULAR; INTRAVENOUS
Status: COMPLETED | OUTPATIENT
Start: 2025-04-10 | End: 2025-04-10

## 2025-04-10 RX ORDER — SODIUM CHLORIDE 9 MG/ML
INJECTION, SOLUTION INTRAVENOUS CONTINUOUS
Status: DISCONTINUED | OUTPATIENT
Start: 2025-04-10 | End: 2025-04-10 | Stop reason: HOSPADM

## 2025-04-10 RX ORDER — ONDANSETRON 2 MG/ML
4 INJECTION INTRAMUSCULAR; INTRAVENOUS
Status: COMPLETED | OUTPATIENT
Start: 2025-04-10 | End: 2025-04-10

## 2025-04-10 RX ORDER — SODIUM CHLORIDE 0.9 % (FLUSH) 0.9 %
5-40 SYRINGE (ML) INJECTION EVERY 12 HOURS SCHEDULED
Status: DISCONTINUED | OUTPATIENT
Start: 2025-04-10 | End: 2025-04-10 | Stop reason: HOSPADM

## 2025-04-10 RX ORDER — DEXAMETHASONE SODIUM PHOSPHATE 10 MG/ML
INJECTION, SOLUTION INTRAMUSCULAR; INTRAVENOUS
Status: DISCONTINUED | OUTPATIENT
Start: 2025-04-10 | End: 2025-04-10 | Stop reason: SDUPTHER

## 2025-04-10 RX ORDER — FENTANYL CITRATE 50 UG/ML
50 INJECTION, SOLUTION INTRAMUSCULAR; INTRAVENOUS EVERY 5 MIN PRN
Status: COMPLETED | OUTPATIENT
Start: 2025-04-10 | End: 2025-04-10

## 2025-04-10 RX ORDER — SODIUM CHLORIDE 0.9 % (FLUSH) 0.9 %
5-40 SYRINGE (ML) INJECTION PRN
Status: DISCONTINUED | OUTPATIENT
Start: 2025-04-10 | End: 2025-04-10 | Stop reason: HOSPADM

## 2025-04-10 RX ADMIN — FENTANYL CITRATE 50 MCG: 50 INJECTION INTRAMUSCULAR; INTRAVENOUS at 11:02

## 2025-04-10 RX ADMIN — PROCHLORPERAZINE EDISYLATE 5 MG: 5 INJECTION INTRAMUSCULAR; INTRAVENOUS at 13:03

## 2025-04-10 RX ADMIN — FENTANYL CITRATE 50 MCG: 50 INJECTION INTRAMUSCULAR; INTRAVENOUS at 12:33

## 2025-04-10 RX ADMIN — LABETALOL HYDROCHLORIDE 5 MG: 5 INJECTION INTRAVENOUS at 11:39

## 2025-04-10 RX ADMIN — SODIUM CHLORIDE, POTASSIUM CHLORIDE, SODIUM LACTATE AND CALCIUM CHLORIDE: 600; 310; 30; 20 INJECTION, SOLUTION INTRAVENOUS at 11:46

## 2025-04-10 RX ADMIN — ONDANSETRON 4 MG: 2 INJECTION, SOLUTION INTRAMUSCULAR; INTRAVENOUS at 11:35

## 2025-04-10 RX ADMIN — FENTANYL CITRATE 25 MCG: 50 INJECTION INTRAMUSCULAR; INTRAVENOUS at 11:18

## 2025-04-10 RX ADMIN — DEXAMETHASONE SODIUM PHOSPHATE 10 MG: 10 INJECTION, SOLUTION INTRAMUSCULAR; INTRAVENOUS at 11:06

## 2025-04-10 RX ADMIN — SODIUM CHLORIDE, POTASSIUM CHLORIDE, SODIUM LACTATE AND CALCIUM CHLORIDE: 600; 310; 30; 20 INJECTION, SOLUTION INTRAVENOUS at 10:27

## 2025-04-10 RX ADMIN — LIDOCAINE HYDROCHLORIDE 100 MG: 20 INJECTION, SOLUTION EPIDURAL; INFILTRATION; INTRACAUDAL; PERINEURAL at 11:02

## 2025-04-10 RX ADMIN — OXYCODONE HYDROCHLORIDE 5 MG: 5 TABLET ORAL at 13:38

## 2025-04-10 RX ADMIN — ONDANSETRON 4 MG: 2 INJECTION, SOLUTION INTRAMUSCULAR; INTRAVENOUS at 12:19

## 2025-04-10 RX ADMIN — Medication 2000 MG: at 11:08

## 2025-04-10 RX ADMIN — PROPOFOL 150 MG: 10 INJECTION, EMULSION INTRAVENOUS at 11:02

## 2025-04-10 RX ADMIN — PROPOFOL 25 MCG/KG/MIN: 10 INJECTION, EMULSION INTRAVENOUS at 11:10

## 2025-04-10 RX ADMIN — FENTANYL CITRATE 50 MCG: 50 INJECTION INTRAMUSCULAR; INTRAVENOUS at 12:12

## 2025-04-10 RX ADMIN — FENTANYL CITRATE 25 MCG: 50 INJECTION INTRAMUSCULAR; INTRAVENOUS at 11:38

## 2025-04-10 RX ADMIN — MIDAZOLAM 1 MG: 1 INJECTION INTRAMUSCULAR; INTRAVENOUS at 10:54

## 2025-04-10 RX ADMIN — MIDAZOLAM 1 MG: 1 INJECTION INTRAMUSCULAR; INTRAVENOUS at 10:50

## 2025-04-10 ASSESSMENT — PAIN SCALES - GENERAL
PAINLEVEL_OUTOF10: 8
PAINLEVEL_OUTOF10: 4
PAINLEVEL_OUTOF10: 6
PAINLEVEL_OUTOF10: 6
PAINLEVEL_OUTOF10: 7

## 2025-04-10 ASSESSMENT — PAIN DESCRIPTION - DESCRIPTORS
DESCRIPTORS: BURNING
DESCRIPTORS: ACHING;BURNING
DESCRIPTORS: DISCOMFORT
DESCRIPTORS: ACHING;BURNING
DESCRIPTORS: BURNING;ACHING;SQUEEZING

## 2025-04-10 ASSESSMENT — PAIN DESCRIPTION - PAIN TYPE
TYPE: SURGICAL PAIN

## 2025-04-10 ASSESSMENT — PAIN - FUNCTIONAL ASSESSMENT
PAIN_FUNCTIONAL_ASSESSMENT: 0-10
PAIN_FUNCTIONAL_ASSESSMENT: 0-10

## 2025-04-10 ASSESSMENT — PAIN DESCRIPTION - LOCATION
LOCATION: BREAST

## 2025-04-10 ASSESSMENT — PAIN DESCRIPTION - ORIENTATION
ORIENTATION: LEFT;ANTERIOR
ORIENTATION: LEFT
ORIENTATION: LEFT;ANTERIOR
ORIENTATION: LEFT;ANTERIOR

## 2025-04-10 NOTE — ANESTHESIA POSTPROCEDURE EVALUATION
Department of Anesthesiology  Postprocedure Note    Patient: Luci Castañeda  MRN: [id number]  YOB: 1965  Date of evaluation: 4/10/2025    Procedure Summary       Date: 04/10/25 Room / Location: [address]    Anesthesia Start: 1050 Anesthesia Stop: 1205    Procedure: LEFT BREAST INCISION AND DRAINAGE  AND DEBRIDEMENT OF SEROMA (Left: Breast) Diagnosis:       Seroma of breast      (Seroma of breast [N64.89])    Surgeons: Davie Gordillo DO Responsible Provider: Alex Pham MD    Anesthesia Type: general ASA Status: 4            Anesthesia Type: No value filed.    Imani Phase I:      Imani Phase II:      Anesthesia Post Evaluation    Airway patency: patent  Cardiovascular status: hemodynamically stable  Respiratory status: acceptable    No notable events documented.

## 2025-04-10 NOTE — OP NOTE
___________________________________    [facility] [place] [facility]  Davie Gordillo DO, FACS [facility]  Office: [phone]  Cell: [phone]  ___________________________________        Operative Note      Patient: Luci Castañeda  YOB: 1965  MRN: [id number]    Date of Procedure: 4/10/2025    Pre-Op Diagnosis Codes:      * Seroma of breast [N64.89]    Post-Op Diagnosis: Same       Procedure(s):  LEFT BREAST INCISION AND DRAINAGE  AND DEBRIDEMENT OF SEROMA    Surgeon(s):  Davie Gordillo DO    Assistant:   * No surgical staff found *    Anesthesia: General    Estimated Blood Loss (mL): Minimal    Complications: Intraoperative OR malfunction of environmental services. For approximately 5 minutes during the active portion of the case with the wound wide open, Clear steam was seen expelling into the room with the relative humidity increasing to 82%. Maintenance and OR leadership was called to assess. Within approximately 5 minutes the steam cessated with relative humidity back down to 65%    Specimens:   ID Type Source Tests Collected by Time Destination   1 : LEFT BREAST FLUID Body Fluid Breast CULTURE, BODY FLUID (WITH GRAM STAIN) Davie Gordillo DO 4/10/2025 1122    A : LEFT BREAST FLUID Body Fluid Breast CYTOLOGY, NON-GYN Davie Grodillo DO 4/10/2025 1121    B : LEFT BREAST LUMPECTOMY SCAR Tissue Breast SURGICAL PATHOLOGY Davie Gordillo DO 4/10/2025 1126        Implants:  * No implants in log *      Drains:   Closed/Suction Drain Left Breast Bulb (Active)   Dressing Status New dressing applied 04/10/25 1155       Findings:  Infection Present At Time Of Surgery (PATOS) (choose all levels that have infection present): No PATOS present    Other Findings: seroma with old hematoma    Detailed Description of Procedure:   Patient was identified in the preoperative holding area and was brought back to the operative suite and placed in the supine position. Following appropriate  time out LMA anesthesia was induced and the left breast was prepped and draped in the usual sterile fashion.     The previous lumpectomy incision was identified. An elliptical incision measuring 5cm x 3cm was excised of a portion of this incision. This was sent as previous lumpectomy scar. The seroma cavity was explored and brown mixed hematoma and seroma was identified. 10cc was sent for cytology and 10cc was sent for culture. The cavity was suctioned and approximately 200cc of seroma/hematoma was removed. The cavity was irrigated with 2L of warm normal saline. Hemostasis was maintained with bovie electrocautery. The cavity was then irrigated with Irrisept antiseptic solution. A 7Fr CHELSY drain was introduced into the cavity and exited through a separate stab incision. This was secured with figure of 8 nylon. The cavity was then sprayed with surgicel hemostatic powder. The deep dermal layer was closed with 3-0 vicryl. The subcuticular layer was closed with 4-0 monocryl. The incision was further closed with reinforcing 3-0 prolene sutures. The incision was covered with bacitracin ointment, telfa, fluff, ABD, and surgical bra. The drain site was covered with biopatch dressing and tegaderm. Patient was placed in a surgical bra and transported to PACU in stable condition.    Case: Urgent    Wound Class: Unknown. No PATOS present at the time of incision, however during the case OR environmental issues caused contamination to an open wound. Will place patient on antibiotics and monitor.    Electronically signed by Davie Gordillo DO on 4/10/2025 at 12:06 PM

## 2025-04-10 NOTE — ANESTHESIA PRE PROCEDURE
MD Felipe at [facility] [place] OR   • CYSTOSCOPY Right 11/01/2022    CYSTOSCOPY INSERTION OCCLUSIVE BALLOON performed by Bismark Amaya MD at [facility] [place] OR   • CYSTOSCOPY Right 11/28/2023    CYSTOSCOPY RIGHT URETEROSCOPY HOLMIUM LASER LITHOTRIPSY RIGHT STENT PLACEMENT performed by Bismark Amaya MD at [facility] OR   • CYSTOSCOPY Right 08/27/2024    **2**CYSTOSCOPY, URETEROSCOPY, HOLMIUM LASER LITHOTRIPSY, WITH URETERAL STENT INSERTION performed by Mor Escalona MD at [facility] [place] OR   • CYSTOSCOPY N/A 09/01/2024    CYSTOSCOPY URETEROSCOPY HOLMIUM  LASER STANDBY, STENT INSERTION performed by Bismark Amaya MD at [facility] [place] OR   • CYSTOSCOPY Right 12/20/2024    CYSTOSCOPY URETERAL RIGHT STENT INSERTION (sTRINGS REMOVED) performed by Venkat Wang MD at [facility] OR   • CYSTOSCOPY INSERTION / REMOVAL STENT / STONE Right 12/31/2019    CYSTOSCOPY, STENT REMOVAL performed by Bismark Amaya MD at [facility] [place] OR   • CYSTOURETHROSCOPY/STENT REMOVAL Right 01/02/2025    CYSTOSCOPY, URETEROSCOPY, HOLMIUM LASER LITHOTRIPSY, RIGHT STENT EXCHANGE, STONE BASKETING   • DILATION AND CURETTAGE OF UTERUS N/A 06/30/2022    DILATATION AND CURETTAGE, HYSTEROSCOPY performed by Loretta Chanel MD at [facility] [place] OR   • INTRAOCULAR LENS PROSTHESIS INSERTION Bilateral 2008   • IR GUIDED NEPHROSTOMY CATH PLACEMENT RIGHT  11/01/2022    IR NEPHROSTOMY PERCUTANEOUS RIGHT 11/1/2022 Xavi Agosto MD [facility] [place] SPECIAL PROCEDURES   • IR GUIDED NEPHROSTOMY CATH PLACEMENT RIGHT  08/10/2023    IR NEPHROSTOMY PERCUTANEOUS RIGHT 8/10/2023 Xavi Agosto MD [facility] [place] SPECIAL PROCEDURES   • IR GUIDED NEPHROSTOMY CATH PLACEMENT RIGHT  08/21/2023    IR NEPHROSTOMY PERCUTANEOUS RIGHT 8/21/2023 [facility] [place] SPECIAL PROCEDURES   • IR PORT PLACEMENT > 5 YEARS  05/17/2024    IR PORT PLACEMENT EQUAL OR GREATER THAN 5 YEARS 5/17/2024 Alex Palmer MD [facility] [place] SPECIAL PROCEDURES   • KIDNEY STONE REMOVAL Right 11/01/2022    RIGHT PERCUTANEOUS NEPHROLITHOTOMY, NEPHROSTOMY PLACEMENT performed by Bismark Amaya MD at [facility] [place] OR

## 2025-04-10 NOTE — H&P
___________________________________    [facility] [place] [facility]  Davie Gordillo DO, FACS [facility]  Office: [phone]  Cell: [phone]  ___________________________________            HISTORY AND PHYSICAL UPDATE FOR SURGERY    I have seen and examined the patient. I have reviewed the history and physical and there are no changes.     Patient is scheduled for left breast incision drainage washout and drain placement today.     Discussed all risks, benefits, alternatives, participants and potential complications of the above procedure with the patient who agrees to proceed.    ___________________________________    Davie Gordillo DO FACS [facility]  [facility] [place] at [facility]  Office: [phone]  Cell: [phone]  ____________________________________

## 2025-04-10 NOTE — DISCHARGE INSTRUCTIONS
___________________________________    [facility]  Davie Gordillo DO FACS [facility]  Office: [phone]  Cell: [phone]  ___________________________________          Patient Discharge Instructions    Discharge Date:  4/10/2025    Discharged To:  Home    RESUME ACTIVITY:     WOUND CARE:   Do not remove top dressing for 24 hrs.    After 24 hours ok to shower and remove top dressing from breast incision  Leave tegaderm and biopatch on over the drain     BATHING:  Ok to shower in 24 hrs.     DRIVING: No driving while on pain medication    RETURN TO WORK:   Not until follow up     WALKING:    Yes    LIFTING: Less than 5 pounds for 1 week      DIET:   Ok to resume high protein diet    SPECIAL INSTRUCTIONS:     Call [facility] [place] at [phone] if you have a fever > 100 F, or if your incision becomes red, tender, or drains more than a small amount of clear fluid.    Hold any anticoagulation for 24 hours after surgery, and then ok to start Eliquis, Lovenox or any bridging per Dr. Bradley's instructions. If you notice bright red blood from the drain please call immediately.    Call [facility] [place] at [phone]  for follow up appointment with Dr. Gordillo in 7-10 days    ___________________________________    Davie Gordillo DO FACS [facility]  [facility]  Office: [phone]  Cell: [phone]  ____________________________________

## 2025-04-11 ENCOUNTER — CARE COORDINATION (OUTPATIENT)
Dept: OTHER | Facility: CLINIC | Age: 60
End: 2025-04-11

## 2025-04-11 LAB — SURGICAL PATHOLOGY REPORT: NORMAL

## 2025-04-11 NOTE — CARE COORDINATION
Ambulatory Care Coordination Note     2025 11:34 AM     Patient Current Location:  Home: [address] [place], [place] [id number]     This patient was received as a referral from [facility] .    ACM contacted the patient by telephone. Verified name and  with patient as identifiers. Provided introduction to self, and explanation of the ACM role.   Patient declined care management services at this time.          ACM: Dede Malhotra RN     Challenges to be reviewed by the provider   Additional needs identified to be addressed with provider No  none               Method of communication with provider: none.    Utilization: Initial Call - N/A    Care Summary Note: Pt said she is doing well today, Pt said all of her treatments are in order and she has no questions or needs today Said she has everything in place she needs and no barriers . ACM will graduate and sign off at the time     Offered patient enrollment in the Remote Patient Monitoring (RPM) program for in-home monitoring: Patient is not eligible for RPM program because: insurance coverage.    Medications Reviewed:   Not completed during this call:      Advance Care Planning:   Reviewed and current     Care Planning:   Not completed during this call    PCP/Specialist follow up:   Future Appointments         Provider Specialty Dept Phone    2025 9:15 AM Jess Bradley MD Oncology [phone]    2025 10:00 AM STV  MED ONC CHAIR 07 Infusion Therapy [phone]    2025 3:15 PM Amadeo Sanchez MD Pulmonology [phone]    2025 9:00 AM Nabil Ly MD Internal Medicine [phone]            Follow Up:   No further follow up planned at this time.     Dede ASTUDILLO, RN- [facility]  Associate Care Manager  [phone]  [email or web address]

## 2025-04-13 LAB
MICROORGANISM SPEC CULT: NORMAL
MICROORGANISM/AGENT SPEC: NORMAL
MICROORGANISM/AGENT SPEC: NORMAL
SPECIMEN DESCRIPTION: NORMAL

## 2025-04-14 LAB — SURGICAL PATHOLOGY REPORT: NORMAL

## 2025-04-17 ENCOUNTER — HOSPITAL ENCOUNTER (OUTPATIENT)
Age: 60
Discharge: HOME | End: 2025-04-17
Payer: COMMERCIAL

## 2025-04-17 DIAGNOSIS — C50.412 MALIGNANT NEOPLASM OF UPPER-OUTER QUADRANT OF LEFT BREAST IN FEMALE, ESTROGEN RECEPTOR NEGATIVE (HCC): ICD-10-CM

## 2025-04-17 DIAGNOSIS — Z17.1 MALIGNANT NEOPLASM OF UPPER-OUTER QUADRANT OF LEFT BREAST IN FEMALE, ESTROGEN RECEPTOR NEGATIVE (HCC): ICD-10-CM

## 2025-04-17 LAB
ALBUMIN SERPL-MCNC: 3.9 G/DL (ref 3.5–5.2)
ALBUMIN/GLOB SERPL: 1.2 {RATIO} (ref 1–2.5)
ALP SERPL-CCNC: 101 U/L (ref 35–104)
ALT SERPL-CCNC: 14 U/L (ref 5–33)
ANION GAP SERPL CALCULATED.3IONS-SCNC: 13 MMOL/L (ref 9–17)
AST SERPL-CCNC: 28 U/L
BASOPHILS # BLD: 0.1 K/UL (ref 0–0.2)
BASOPHILS NFR BLD: 1 % (ref 0–2)
BILIRUB SERPL-MCNC: 0.4 MG/DL (ref 0.3–1.2)
BUN SERPL-MCNC: 18 MG/DL (ref 8–23)
CALCIUM SERPL-MCNC: 9.6 MG/DL (ref 8.6–10.4)
CHLORIDE SERPL-SCNC: 102 MMOL/L (ref 98–107)
CO2 SERPL-SCNC: 22 MMOL/L (ref 20–31)
CREAT SERPL-MCNC: 1.1 MG/DL (ref 0.5–0.9)
EOSINOPHIL # BLD: 0.2 K/UL (ref 0–0.4)
EOSINOPHILS RELATIVE PERCENT: 4 % (ref 1–4)
ERYTHROCYTE [DISTWIDTH] IN BLOOD BY AUTOMATED COUNT: 15.4 % (ref 12.5–15.4)
GFR, ESTIMATED: 57 ML/MIN/1.73M2
GLUCOSE SERPL-MCNC: 110 MG/DL (ref 70–99)
HCT VFR BLD AUTO: 37.3 % (ref 36–46)
HGB BLD-MCNC: 12.4 G/DL (ref 12–16)
LYMPHOCYTES NFR BLD: 0.8 K/UL (ref 1–4.8)
LYMPHOCYTES RELATIVE PERCENT: 15 % (ref 24–44)
MCH RBC QN AUTO: 31.7 PG (ref 26–34)
MCHC RBC AUTO-ENTMCNC: 33.3 G/DL (ref 31–37)
MCV RBC AUTO: 95.1 FL (ref 80–100)
MONOCYTES NFR BLD: 0.5 K/UL (ref 0.1–1.2)
MONOCYTES NFR BLD: 11 % (ref 2–11)
NEUTROPHILS NFR BLD: 69 % (ref 36–66)
NEUTS SEG NFR BLD: 3.4 K/UL (ref 1.8–7.7)
PLATELET # BLD AUTO: 146 K/UL (ref 140–450)
PMV BLD AUTO: 7.7 FL (ref 6–12)
POTASSIUM SERPL-SCNC: 4.1 MMOL/L (ref 3.7–5.3)
PROT SERPL-MCNC: 7.1 G/DL (ref 6.4–8.3)
RBC # BLD AUTO: 3.92 M/UL (ref 4–5.2)
SODIUM SERPL-SCNC: 137 MMOL/L (ref 135–144)
WBC OTHER # BLD: 5 K/UL (ref 3.5–11)

## 2025-04-17 PROCEDURE — 80053 COMPREHEN METABOLIC PANEL: CPT

## 2025-04-17 PROCEDURE — 85025 COMPLETE CBC W/AUTO DIFF WBC: CPT

## 2025-04-17 PROCEDURE — 36415 COLL VENOUS BLD VENIPUNCTURE: CPT

## 2025-04-18 ENCOUNTER — TELEPHONE (OUTPATIENT)
Dept: ONCOLOGY | Age: 60
End: 2025-04-18

## 2025-04-18 ENCOUNTER — HOSPITAL ENCOUNTER (OUTPATIENT)
Dept: INFUSION THERAPY | Age: 60
Discharge: HOME | End: 2025-04-18
Payer: COMMERCIAL

## 2025-04-18 ENCOUNTER — OFFICE VISIT (OUTPATIENT)
Dept: ONCOLOGY | Age: 60
End: 2025-04-18
Payer: COMMERCIAL

## 2025-04-18 ENCOUNTER — CLINICAL DOCUMENTATION (OUTPATIENT)
Dept: ONCOLOGY | Age: 60
End: 2025-04-18

## 2025-04-18 VITALS
WEIGHT: 218 LBS | RESPIRATION RATE: 16 BRPM | HEART RATE: 91 BPM | SYSTOLIC BLOOD PRESSURE: 138 MMHG | TEMPERATURE: 98.3 F | DIASTOLIC BLOOD PRESSURE: 90 MMHG | BODY MASS INDEX: 33.15 KG/M2

## 2025-04-18 DIAGNOSIS — C50.912 INVASIVE DUCTAL CARCINOMA OF BREAST, LEFT: Primary | ICD-10-CM

## 2025-04-18 DIAGNOSIS — Z17.1 MALIGNANT NEOPLASM OF UPPER-OUTER QUADRANT OF LEFT BREAST IN FEMALE, ESTROGEN RECEPTOR NEGATIVE (HCC): Primary | ICD-10-CM

## 2025-04-18 DIAGNOSIS — M25.512 ACUTE PAIN OF LEFT SHOULDER: ICD-10-CM

## 2025-04-18 DIAGNOSIS — C50.412 MALIGNANT NEOPLASM OF UPPER-OUTER QUADRANT OF LEFT BREAST IN FEMALE, ESTROGEN RECEPTOR NEGATIVE (HCC): Primary | ICD-10-CM

## 2025-04-18 DIAGNOSIS — R53.83 OTHER FATIGUE: ICD-10-CM

## 2025-04-18 DIAGNOSIS — C50.412 MALIGNANT NEOPLASM OF UPPER-OUTER QUADRANT OF LEFT BREAST IN FEMALE, ESTROGEN RECEPTOR NEGATIVE: Primary | ICD-10-CM

## 2025-04-18 DIAGNOSIS — Z17.1 MALIGNANT NEOPLASM OF UPPER-OUTER QUADRANT OF LEFT BREAST IN FEMALE, ESTROGEN RECEPTOR NEGATIVE: Primary | ICD-10-CM

## 2025-04-18 DIAGNOSIS — C50.912 INVASIVE DUCTAL CARCINOMA OF BREAST, LEFT (HCC): ICD-10-CM

## 2025-04-18 LAB — MAGNESIUM SERPL-MCNC: 1.4 MG/DL (ref 1.6–2.6)

## 2025-04-18 PROCEDURE — 3075F SYST BP GE 130 - 139MM HG: CPT | Performed by: INTERNAL MEDICINE

## 2025-04-18 PROCEDURE — 96367 TX/PROPH/DG ADDL SEQ IV INF: CPT

## 2025-04-18 PROCEDURE — 2580000003 HC RX 258: Performed by: INTERNAL MEDICINE

## 2025-04-18 PROCEDURE — 3080F DIAST BP >= 90 MM HG: CPT | Performed by: INTERNAL MEDICINE

## 2025-04-18 PROCEDURE — 2500000003 HC RX 250 WO HCPCS: Performed by: INTERNAL MEDICINE

## 2025-04-18 PROCEDURE — 83735 ASSAY OF MAGNESIUM: CPT

## 2025-04-18 PROCEDURE — 6360000002 HC RX W HCPCS: Performed by: INTERNAL MEDICINE

## 2025-04-18 PROCEDURE — 96366 THER/PROPH/DIAG IV INF ADDON: CPT

## 2025-04-18 PROCEDURE — 99214 OFFICE O/P EST MOD 30 MIN: CPT | Performed by: INTERNAL MEDICINE

## 2025-04-18 PROCEDURE — 96413 CHEMO IV INFUSION 1 HR: CPT

## 2025-04-18 RX ORDER — HEPARIN 100 UNIT/ML
500 SYRINGE INTRAVENOUS PRN
Status: DISCONTINUED | OUTPATIENT
Start: 2025-04-18 | End: 2025-04-19 | Stop reason: HOSPADM

## 2025-04-18 RX ORDER — SODIUM CHLORIDE 0.9 % (FLUSH) 0.9 %
5-40 SYRINGE (ML) INJECTION PRN
Status: DISCONTINUED | OUTPATIENT
Start: 2025-04-18 | End: 2025-04-19 | Stop reason: HOSPADM

## 2025-04-18 RX ORDER — MAGNESIUM SULFATE IN WATER 40 MG/ML
2000 INJECTION, SOLUTION INTRAVENOUS ONCE
Status: COMPLETED | OUTPATIENT
Start: 2025-04-18 | End: 2025-04-18

## 2025-04-18 RX ORDER — CLOTRIMAZOLE AND BETAMETHASONE DIPROPIONATE 10; .64 MG/G; MG/G
CREAM TOPICAL
Qty: 45 G | Refills: 1 | Status: SHIPPED | OUTPATIENT
Start: 2025-04-18

## 2025-04-18 RX ORDER — FLUCONAZOLE 100 MG/1
100 TABLET ORAL DAILY
Qty: 7 TABLET | Refills: 0 | Status: SHIPPED | OUTPATIENT
Start: 2025-04-18 | End: 2025-04-25

## 2025-04-18 RX ADMIN — MAGNESIUM SULFATE HEPTAHYDRATE 2000 MG: 40 INJECTION, SOLUTION INTRAVENOUS at 11:41

## 2025-04-18 RX ADMIN — HEPARIN 500 UNITS: 100 SYRINGE at 13:43

## 2025-04-18 RX ADMIN — SODIUM CHLORIDE 200 MG: 9 INJECTION, SOLUTION INTRAVENOUS at 11:09

## 2025-04-18 RX ADMIN — SODIUM CHLORIDE, PRESERVATIVE FREE 10 ML: 5 INJECTION INTRAVENOUS at 13:43

## 2025-04-18 NOTE — PROGRESS NOTES
Spiritual Health Outpatient Oncology/Hematology Progress Note: [facility]    Situation: Writer visited with Patient in the treatment cubicle of the infusion clinic.     Assessment: Pt smiled and greeted writer. Friend, whom writer had previously met, smiled and greeted writer. Pt shared about her recent surgery and how she is recovering. Pt acknowledged her feelings and needs related to her cancer journey. Pt shared candidly the conversation she has had with God about all that she faced and continues to face. Pt endorsed being tired. Pt accessed her sense of humor as she shared her feelings and needs. Pt expressed gratitude for being able to join her family for the holiday. Pt is looking forward to seeing her family members face to face and to eating good food.     Intervention:  Writer inquired about Pt's coping and needs. Writer explored Pt's sources of support and strength. Writer offered supportive presence and active listening. Writer affirmed Pt's strengths. Writer offered words of support and encouragement.     Outcome:  Pt and Friend thanked writer for the visit.    Plan: Spiritual Health Services are available for Patient by phone and/or in person.      04/18/25 1454   Encounter Summary   Encounter Overview/Reason Spiritual/Emotional Needs   Service Provided For Friend;Patient   Referral/Consult From [facility]   Support System Family members;Friends/neighbors   Last Encounter  04/18/25   Complexity of Encounter Low   Begin Time 1050   End Time  1100   Total Time Calculated 10 min   Spiritual/Emotional needs   Type Spiritual Support   Assessment/Intervention/Outcome   Assessment Coping;Calm   Intervention Sustaining Presence/Ministry of presence;Active listening;Explored/Affirmed feelings, thoughts, concerns;Explored Coping Skills/Resources;Discussed illness injury and it’s impact;Discussed belief system/Oriental orthodox practices/varun;Discussed relationship with God   Outcome Expressed feelings, needs, and

## 2025-04-18 NOTE — TELEPHONE ENCOUNTER
Name: Luci Castañeda  : 1965  MRN: [id number]    Oncology Navigation Follow-Up Note    Contact Type:  Medical Oncology    Notes: Met with pt for ONN f/u. We discussed her ongoing fatigue and limited ROM due to pain in her left shoulder. Pt is requesting a referral back to Oncology rehab. Dr Walker updated and referral was placed. Pt had general questions which were addressed to the best of writer's knowledge. Encouraged pt to call navigator as needed with any questions, concerns or barriers. Confirmed pt has navigator's contact information.         Electronically signed by Veronica Bennett RN on 2025 at 1:00 PM

## 2025-04-18 NOTE — PATIENT INSTRUCTIONS
Diflucan and Lotrisone  Magnesium level today and with every treatment.   Proceed with treatment as planned   RV 3 weeks

## 2025-04-18 NOTE — PROGRESS NOTES
[facility]- MEDICAL NUTRITION THERAPY     Visit Type: Initial  Reason for Assessment: Positive Nutrition Screen -nausea, consuming less than normal amounts     Luci Castañeda is a 60 y.o. female with h/o breast cancer, s/p chemotherapy, s/p lumpectomy 2/24/25.   Writer met with patient in exam room prior to visit with oncologist. Pt reports her nausea is improving; states she has been using scopolamine but is now trying to go without it and see how she feels. Pt also has Zofran to use as needed. Has been consuming bland foods which are well tolerated. Pt reports appetite/intake has improved significantly. No further weight loss. Pt asking about what type of diet she should be on moving forward. Pt states she frequently consumed processed/easy to prepare meals, even prior to diagnosis, but is trying to eat less of that now. Writer discussed consuming diet as tolerated at present. If continues to tolerate diet well and appetite remains good, encouraged a well-balanced diet with limited simple carbohydrates and processed foods. Suggestions provided.   Encouraged patient to call with any nutrition questions or concerns; pt agreeable. RD will follow as needed/as requested.       Josie Alberto RD, LD, CNSC  Registered Dietitian  [facility]   [phone]

## 2025-04-18 NOTE — PROGRESS NOTES
Patient here for C11D1 keytruda.  Arrives ambulatory.  Denies any new complaints.  Labs drawn from port, results reviewed.  Mag 1.4  Replaced with 2g magnesium  Pt was seen by Dr. Bradley, ordered to proceed with treatment.  Treatment complete without incident.  Patient discharged in stable condition.  Returns 5/9/25 for MD visit and C12D1.

## 2025-04-18 NOTE — PROGRESS NOTES
Spiritual Health Outpatient Oncology/Hematology Progress Note    Situation: Writer encountered Patient and friend in the treatment cubicle of the infusion clinic.    Assessment: Pt shared that she is looking forward to seeing her family during Easter.  Pt has been feeling tired and run down lately.  She recently had surgery for her drain.  Pt is cancer free and looking forward to experiencing normalcy.      Intervention: Writer introduced herself and her services. Writer inquired about Pt's coping and needs. Writer explored Pt's sources of support and strength. Writer offered supportive presence and active listening. Writer affirmed Pt's strengths. Writer offered words of support and encouragement.     Outcome:  Pt and friend thanked writer for the visit.    Plan: Spiritual Health Services are available for Patient (and Family) by phone and/or in person.       04/18/25 1148   Encounter Summary   Encounter Overview/Reason Spiritual/Emotional Needs   Service Provided For Friend;Patient   Referral/Consult From [facility]   Support System Family members;Friends/neighbors   Last Encounter  04/18/25   Complexity of Encounter Low   Begin Time 1140   End Time  1147   Total Time Calculated 7 min   Spiritual/Emotional needs   Type Spiritual Support   Assessment/Intervention/Outcome   Assessment Calm;Coping;Hopeful;Peaceful   Intervention Active listening;Sustaining Presence/Ministry of presence   Outcome Coping;Comfort;Encouraged;Peace;Receptive   Plan and Referrals   Plan/Referrals Continue Support (comment)

## 2025-04-19 NOTE — PROGRESS NOTES
Chief Complaint   Patient presents with    Follow-up     3 week follow up    Results    Nausea     slight     DIAGNOSIS:        Clinical stage T2 N0 M0 left breast upper outer quadrant invasive ductal carcinoma, grade 3, ER negative, PA negative, HER2/carol not amplified.  Post neoadjuvant treatment pathological stage of T2 N0 M0 invasive ductal carcinoma.  Repeated biomarkers ER negative, PA negative, HER2/carol not amplified.  History of left nephrectomy in 1988  Saddle PE s/p thrombectomy mid December 2024.  CURRENT THERAPY:         Neoadjuvant treatment with Keytruda/Taxol/carboplatin/Adriamycin/Cytoxan.  Treatment started 5/31/2024  S/p thrombectomy for saddle PE in December 2024.   DOAC for PE.   Status post lumpectomy 2/24/25  BRIEF CASE HISTORY:       Ms. Luci Castañeda is a very pleasant 60 y.o. female with history of multiple comorbidities as listed.  She had history of left nephrectomy in 1988.  She had cardiac cath in 2021 for left ventricular hypertrophy.  Had recent problems with restrictive and obstructive pulmonary disease.  She continues follow-up with pulmonary. She was doing fine with routine mammogram being normal until this year where she had suspicious abnormalities in the mammogram done on 3/25/2024.  Diagnostic mammogram and left breast ultrasound April 9, 2024 showed suspicious 4.5 cm mass in the left breast 1 o'clock position.  Mildly thickened left axillary lymph nodes.  A biopsy was performed on 4/26/2024 and unfortunately that was positive for invasive ductal carcinoma.  Triple negative.  Biopsy from lymph node was negative for malignancy.  The patient is seen today for further management of her breast cancer. The patient did very well after her biopsy without any complication. The patient had no symptom preceding her diagnosis of cancer. No chest pain, shortness of breath, cough, sputum or hemoptysis, no back pain or monique aches, no weight loss or decreased appetite, no fever

## 2025-04-24 ENCOUNTER — TELEPHONE (OUTPATIENT)
Dept: ONCOLOGY | Age: 60
End: 2025-04-24

## 2025-04-24 NOTE — TELEPHONE ENCOUNTER
Name: Luci Castañeda  : 1965  MRN: [id number]    Oncology Navigation Follow-Up Note    Contact Type:  Telephone    Notes: Attempted to contact pt for ONN f/u. No answer, VM left encouraging pt to return writer's call with any concerns or barriers they may have. Provided writer's contact information in message.       Electronically signed by Veronica Bennett RN on 2025 at 8:56 AM    UPDATE: Pt returned call. She reports that she met with her surgeon and plan for CHELSY drain at least 1 more week. She has f/u scheduled for  to hopefully have drain removed.   Pt reports her rash has improved.

## 2025-04-28 ENCOUNTER — TELEPHONE (OUTPATIENT)
Dept: INFUSION THERAPY | Age: 60
End: 2025-04-28

## 2025-04-28 NOTE — TELEPHONE ENCOUNTER
Pt calling in she reports she has yellow coating on her tongue and feels numb. She reports she finished a 7 day course of diflucan for an fungal infection. She reports not improving. She reports having a cold with Runny nose, sinus pressure, voice is off and had a sore throat. She is not running a fever. She is asking if this could cause it.   Waiting on medical directions from

## 2025-04-29 NOTE — TELEPHONE ENCOUNTER
Dr. Walker said likely viral infection and flonase nasal spray may be good.  Diflucan was used due to skin infection and is not due today.  Magic mouthwash ordered.

## 2025-04-30 ENCOUNTER — TELEPHONE (OUTPATIENT)
Dept: RADIATION ONCOLOGY | Age: 60
End: 2025-04-30

## 2025-04-30 NOTE — TELEPHONE ENCOUNTER
Patient states she saw her surgeon today and had her drain removed from her breast.  States she was told she is cleared to proceed with radiation treatments by her surgeon.  Writer will update Dr. Wan and radiation oncology scheduling.

## 2025-05-01 ENCOUNTER — HOSPITAL ENCOUNTER (OUTPATIENT)
Dept: PHYSICAL THERAPY | Facility: CLINIC | Age: 60
Setting detail: THERAPIES SERIES
Discharge: HOME | End: 2025-05-01
Payer: COMMERCIAL

## 2025-05-01 PROCEDURE — 97110 THERAPEUTIC EXERCISES: CPT

## 2025-05-01 PROCEDURE — 97140 MANUAL THERAPY 1/> REGIONS: CPT

## 2025-05-01 PROCEDURE — 97530 THERAPEUTIC ACTIVITIES: CPT

## 2025-05-01 NOTE — FLOWSHEET NOTE
[] [facility]  Outpatient Rehabilitation &  Therapy  [address]  [phone]  [phone] [] [facility]  Outpatient Rehabilitation &  Therapy  [address]  P: [phone]  F: [phone] [x] [facility]  Outpatient Rehabilitation &  Therapy  [address]  P: [phone]  F: [phone] [] [facility]  Outpatient Rehabilitation &  Therapy  [address]  [phone]  [phone] [] [facility]  Outpatient Rehabilitation &  Therapy  [address]   P: [phone]  F: [phone]  [] [facility]  Outpatient Rehabilitation &  Therapy  [address]  P: [phone]  F: [phone] [] [facility]  Outpatient Rehabilitation &  Therapy  [address]  P: [phone]  F: [phone] [] [facility]  Outpatient Rehabilitation &  Therapy  [address]  P: [phone]  F: [phone] [] [facility]  Outpatient Rehabilitation &  Therapy  [address]  P: [phone]  F: [phone]  [] [facility] Outpatient Rehabilitation &  Therapy  [address]  P: [phone]  F: [phone]     Physical Therapy Daily Treatment Note    Date:  2025  Patient Name:  Luci Castañeda    :  1965  MRN: [id number]  Physician: Jess Bradley              Insurance: R AFTER 30TH VISIT   Medical Diagnosis: C50.912 (ICD-10-CM) - Invasive ductal carcinoma of breast, left (HCC)               Rehab Codes: 25.612, R07.89, R29.3, 25.512, R53.0  Onset date: 24               Next 's appt.: ongoing  Visit# / total visits:      Cancels/No Shows: 0    Subjective:  Pt returns to PT after 6 month hiatus, had a PE and thrombectomy 2024, was very fatigued and deconditioned, had had to have several blood transfusion. Had 6 weeks

## 2025-05-07 ENCOUNTER — HOSPITAL ENCOUNTER (OUTPATIENT)
Age: 60
Discharge: HOME | End: 2025-05-07
Payer: COMMERCIAL

## 2025-05-07 ENCOUNTER — HOSPITAL ENCOUNTER (OUTPATIENT)
Dept: RADIATION ONCOLOGY | Age: 60
Discharge: HOME | End: 2025-05-07
Payer: COMMERCIAL

## 2025-05-07 DIAGNOSIS — C50.412 MALIGNANT NEOPLASM OF UPPER-OUTER QUADRANT OF LEFT BREAST IN FEMALE, ESTROGEN RECEPTOR NEGATIVE (HCC): Primary | ICD-10-CM

## 2025-05-07 DIAGNOSIS — Z17.1 MALIGNANT NEOPLASM OF UPPER-OUTER QUADRANT OF LEFT BREAST IN FEMALE, ESTROGEN RECEPTOR NEGATIVE (HCC): Primary | ICD-10-CM

## 2025-05-07 DIAGNOSIS — Z17.1 MALIGNANT NEOPLASM OF UPPER-OUTER QUADRANT OF LEFT BREAST IN FEMALE, ESTROGEN RECEPTOR NEGATIVE (HCC): ICD-10-CM

## 2025-05-07 DIAGNOSIS — C50.412 MALIGNANT NEOPLASM OF UPPER-OUTER QUADRANT OF LEFT BREAST IN FEMALE, ESTROGEN RECEPTOR NEGATIVE (HCC): ICD-10-CM

## 2025-05-07 LAB
ALBUMIN SERPL-MCNC: 3.9 G/DL (ref 3.5–5.2)
ALBUMIN/GLOB SERPL: 1.4 {RATIO} (ref 1–2.5)
ALP SERPL-CCNC: 114 U/L (ref 35–104)
ALT SERPL-CCNC: 20 U/L (ref 10–35)
ANION GAP SERPL CALCULATED.3IONS-SCNC: 12 MMOL/L (ref 9–16)
AST SERPL-CCNC: 33 U/L (ref 10–35)
BASOPHILS # BLD: 0 K/UL (ref 0–0.2)
BASOPHILS NFR BLD: 1 % (ref 0–2)
BILIRUB SERPL-MCNC: 0.4 MG/DL (ref 0–1.2)
BUN SERPL-MCNC: 14 MG/DL (ref 8–23)
CALCIUM SERPL-MCNC: 9.8 MG/DL (ref 8.6–10.4)
CHLORIDE SERPL-SCNC: 103 MMOL/L (ref 98–107)
CO2 SERPL-SCNC: 24 MMOL/L (ref 20–31)
CREAT SERPL-MCNC: 0.9 MG/DL (ref 0.6–0.9)
EOSINOPHIL # BLD: 0.4 K/UL (ref 0–0.4)
EOSINOPHILS RELATIVE PERCENT: 7 % (ref 1–4)
ERYTHROCYTE [DISTWIDTH] IN BLOOD BY AUTOMATED COUNT: 15.7 % (ref 12.5–15.4)
GFR, ESTIMATED: 72 ML/MIN/1.73M2
GLUCOSE SERPL-MCNC: 142 MG/DL (ref 74–99)
HCT VFR BLD AUTO: 34.6 % (ref 36–46)
HGB BLD-MCNC: 11.4 G/DL (ref 12–16)
LYMPHOCYTES NFR BLD: 0.7 K/UL (ref 1–4.8)
LYMPHOCYTES RELATIVE PERCENT: 13 % (ref 24–44)
MAGNESIUM SERPL-MCNC: 1.3 MG/DL (ref 1.6–2.4)
MCH RBC QN AUTO: 31.4 PG (ref 26–34)
MCHC RBC AUTO-ENTMCNC: 33.1 G/DL (ref 31–37)
MCV RBC AUTO: 94.8 FL (ref 80–100)
MONOCYTES NFR BLD: 0.5 K/UL (ref 0.1–1.2)
MONOCYTES NFR BLD: 8 % (ref 2–11)
NEUTROPHILS NFR BLD: 71 % (ref 36–66)
NEUTS SEG NFR BLD: 4.1 K/UL (ref 1.8–7.7)
PLATELET # BLD AUTO: 150 K/UL (ref 140–450)
PMV BLD AUTO: 7.5 FL (ref 6–12)
POTASSIUM SERPL-SCNC: 4 MMOL/L (ref 3.7–5.3)
PROT SERPL-MCNC: 6.7 G/DL (ref 6.6–8.7)
RBC # BLD AUTO: 3.65 M/UL (ref 4–5.2)
SODIUM SERPL-SCNC: 139 MMOL/L (ref 136–145)
WBC OTHER # BLD: 5.7 K/UL (ref 3.5–11)

## 2025-05-07 PROCEDURE — 80053 COMPREHEN METABOLIC PANEL: CPT

## 2025-05-07 PROCEDURE — 36415 COLL VENOUS BLD VENIPUNCTURE: CPT

## 2025-05-07 PROCEDURE — 77334 RADIATION TREATMENT AID(S): CPT | Performed by: RADIOLOGY

## 2025-05-07 PROCEDURE — 85025 COMPLETE CBC W/AUTO DIFF WBC: CPT

## 2025-05-07 PROCEDURE — 83735 ASSAY OF MAGNESIUM: CPT

## 2025-05-07 PROCEDURE — 77290 THER RAD SIMULAJ FIELD CPLX: CPT | Performed by: RADIOLOGY

## 2025-05-08 ENCOUNTER — HOSPITAL ENCOUNTER (OUTPATIENT)
Dept: PHYSICAL THERAPY | Facility: CLINIC | Age: 60
Setting detail: THERAPIES SERIES
Discharge: HOME | End: 2025-05-08
Payer: COMMERCIAL

## 2025-05-08 PROCEDURE — 97110 THERAPEUTIC EXERCISES: CPT

## 2025-05-08 PROCEDURE — 97140 MANUAL THERAPY 1/> REGIONS: CPT

## 2025-05-08 NOTE — FLOWSHEET NOTE
[] [facility]  Outpatient Rehabilitation &  Therapy  [address]  [phone]  [phone] [] [facility]  Outpatient Rehabilitation &  Therapy  [address]  P: [phone]  F: [phone] [x] [facility]  Outpatient Rehabilitation &  Therapy  [address]  P: [phone]  F: [phone] [] [facility]  Outpatient Rehabilitation &  Therapy  [address]  [phone]  [phone] [] [facility]  Outpatient Rehabilitation &  Therapy  [address]   P: [phone]  F: [phone]  [] [facility]  Outpatient Rehabilitation &  Therapy  [address]  P: [phone]  F: [phone] [] [facility]  Outpatient Rehabilitation &  Therapy  [address]  P: [phone]  F: [phone] [] [facility]  Outpatient Rehabilitation &  Therapy  [address]  P: [phone]  F: [phone] [] [facility]  Outpatient Rehabilitation &  Therapy  [address]  P: [phone]  F: [phone]  [] [facility] Outpatient Rehabilitation &  Therapy  [address]  P: [phone]  F: [phone]     Physical Therapy Daily Treatment Note    Date:  2025  Patient Name:  Luci Castañeda    :  1965  MRN: [id number]  Physician: Jess Bradley              Insurance: [facility] AFTER 30TH VISIT   Medical Diagnosis: C50.912 (ICD-10-CM) - Invasive ductal carcinoma of breast, left (HCC)               Rehab Codes: 25.612, R07.89, R29.3, 25.512, R53.0  Onset date: 24               Next Dr's appt.: ongoing  Visit# / total visits:      Cancels/No Shows: 0    Subjective:  Pt reports she is a little sore today got tattooed yesterday for radiation, plan for 4 weeks daily. Has been getting HEP in as able. ay do a clinical trial w/ 5 months

## 2025-05-09 ENCOUNTER — TELEPHONE (OUTPATIENT)
Age: 60
End: 2025-05-09

## 2025-05-09 ENCOUNTER — OFFICE VISIT (OUTPATIENT)
Age: 60
End: 2025-05-09
Payer: COMMERCIAL

## 2025-05-09 ENCOUNTER — HOSPITAL ENCOUNTER (OUTPATIENT)
Dept: INFUSION THERAPY | Age: 60
Discharge: HOME | End: 2025-05-09
Payer: COMMERCIAL

## 2025-05-09 VITALS
RESPIRATION RATE: 14 BRPM | TEMPERATURE: 98 F | OXYGEN SATURATION: 95 % | DIASTOLIC BLOOD PRESSURE: 100 MMHG | BODY MASS INDEX: 33.91 KG/M2 | SYSTOLIC BLOOD PRESSURE: 152 MMHG | HEART RATE: 78 BPM | WEIGHT: 223 LBS

## 2025-05-09 VITALS — SYSTOLIC BLOOD PRESSURE: 128 MMHG | HEART RATE: 73 BPM | DIASTOLIC BLOOD PRESSURE: 83 MMHG

## 2025-05-09 DIAGNOSIS — Z17.1 MALIGNANT NEOPLASM OF UPPER-OUTER QUADRANT OF LEFT BREAST IN FEMALE, ESTROGEN RECEPTOR NEGATIVE (HCC): Primary | ICD-10-CM

## 2025-05-09 DIAGNOSIS — C50.412 MALIGNANT NEOPLASM OF UPPER-OUTER QUADRANT OF LEFT BREAST IN FEMALE, ESTROGEN RECEPTOR NEGATIVE (HCC): Primary | ICD-10-CM

## 2025-05-09 PROCEDURE — 96366 THER/PROPH/DIAG IV INF ADDON: CPT

## 2025-05-09 PROCEDURE — 2500000003 HC RX 250 WO HCPCS: Performed by: INTERNAL MEDICINE

## 2025-05-09 PROCEDURE — 99214 OFFICE O/P EST MOD 30 MIN: CPT | Performed by: INTERNAL MEDICINE

## 2025-05-09 PROCEDURE — 3077F SYST BP >= 140 MM HG: CPT | Performed by: INTERNAL MEDICINE

## 2025-05-09 PROCEDURE — 6360000002 HC RX W HCPCS: Performed by: INTERNAL MEDICINE

## 2025-05-09 PROCEDURE — 96413 CHEMO IV INFUSION 1 HR: CPT

## 2025-05-09 PROCEDURE — 3080F DIAST BP >= 90 MM HG: CPT | Performed by: INTERNAL MEDICINE

## 2025-05-09 PROCEDURE — 2580000003 HC RX 258: Performed by: INTERNAL MEDICINE

## 2025-05-09 PROCEDURE — 96367 TX/PROPH/DG ADDL SEQ IV INF: CPT

## 2025-05-09 RX ORDER — SODIUM CHLORIDE 0.9 % (FLUSH) 0.9 %
5-40 SYRINGE (ML) INJECTION PRN
Status: CANCELLED | OUTPATIENT
Start: 2025-05-09

## 2025-05-09 RX ORDER — EPINEPHRINE 1 MG/ML
0.3 INJECTION, SOLUTION, CONCENTRATE INTRAVENOUS PRN
Status: CANCELLED | OUTPATIENT
Start: 2025-05-09

## 2025-05-09 RX ORDER — SODIUM CHLORIDE 0.9 % (FLUSH) 0.9 %
5-40 SYRINGE (ML) INJECTION PRN
Status: DISCONTINUED | OUTPATIENT
Start: 2025-05-09 | End: 2025-05-10 | Stop reason: HOSPADM

## 2025-05-09 RX ORDER — FAMOTIDINE 10 MG/ML
20 INJECTION, SOLUTION INTRAVENOUS
Status: CANCELLED | OUTPATIENT
Start: 2025-05-09

## 2025-05-09 RX ORDER — HYDROCORTISONE SODIUM SUCCINATE 100 MG/2ML
100 INJECTION INTRAMUSCULAR; INTRAVENOUS
Status: CANCELLED | OUTPATIENT
Start: 2025-05-09

## 2025-05-09 RX ORDER — SODIUM CHLORIDE 9 MG/ML
5-250 INJECTION, SOLUTION INTRAVENOUS PRN
Status: DISCONTINUED | OUTPATIENT
Start: 2025-05-09 | End: 2025-05-10 | Stop reason: HOSPADM

## 2025-05-09 RX ORDER — SODIUM CHLORIDE 9 MG/ML
5-250 INJECTION, SOLUTION INTRAVENOUS PRN
Status: CANCELLED | OUTPATIENT
Start: 2025-05-09

## 2025-05-09 RX ORDER — MAGNESIUM SULFATE IN WATER 40 MG/ML
2000 INJECTION, SOLUTION INTRAVENOUS ONCE
Status: COMPLETED | OUTPATIENT
Start: 2025-05-09 | End: 2025-05-09

## 2025-05-09 RX ORDER — DIPHENHYDRAMINE HYDROCHLORIDE 50 MG/ML
50 INJECTION, SOLUTION INTRAMUSCULAR; INTRAVENOUS
Status: CANCELLED | OUTPATIENT
Start: 2025-05-09

## 2025-05-09 RX ORDER — ACETAMINOPHEN 325 MG/1
650 TABLET ORAL
Status: CANCELLED | OUTPATIENT
Start: 2025-05-09

## 2025-05-09 RX ORDER — SODIUM CHLORIDE 9 MG/ML
INJECTION, SOLUTION INTRAVENOUS CONTINUOUS
Status: CANCELLED | OUTPATIENT
Start: 2025-05-09

## 2025-05-09 RX ORDER — ONDANSETRON 2 MG/ML
8 INJECTION INTRAMUSCULAR; INTRAVENOUS
Status: CANCELLED | OUTPATIENT
Start: 2025-05-09

## 2025-05-09 RX ORDER — HEPARIN 100 UNIT/ML
500 SYRINGE INTRAVENOUS PRN
Status: DISCONTINUED | OUTPATIENT
Start: 2025-05-09 | End: 2025-05-10 | Stop reason: HOSPADM

## 2025-05-09 RX ORDER — MEPERIDINE HYDROCHLORIDE 50 MG/ML
12.5 INJECTION INTRAMUSCULAR; INTRAVENOUS; SUBCUTANEOUS PRN
Status: CANCELLED | OUTPATIENT
Start: 2025-05-09

## 2025-05-09 RX ORDER — ACETAMINOPHEN 325 MG/1
650 TABLET ORAL
Status: CANCELLED
Start: 2025-05-09

## 2025-05-09 RX ORDER — ALBUTEROL SULFATE 90 UG/1
4 INHALANT RESPIRATORY (INHALATION) PRN
Status: CANCELLED | OUTPATIENT
Start: 2025-05-09

## 2025-05-09 RX ORDER — HEPARIN SODIUM (PORCINE) LOCK FLUSH IV SOLN 100 UNIT/ML 100 UNIT/ML
500 SOLUTION INTRAVENOUS PRN
Status: CANCELLED | OUTPATIENT
Start: 2025-05-09

## 2025-05-09 RX ADMIN — SODIUM CHLORIDE, PRESERVATIVE FREE 10 ML: 5 INJECTION INTRAVENOUS at 09:07

## 2025-05-09 RX ADMIN — SODIUM CHLORIDE, PRESERVATIVE FREE 10 ML: 5 INJECTION INTRAVENOUS at 12:05

## 2025-05-09 RX ADMIN — SODIUM CHLORIDE 150 ML/HR: 0.9 INJECTION, SOLUTION INTRAVENOUS at 09:17

## 2025-05-09 RX ADMIN — HEPARIN 500 UNITS: 100 SYRINGE at 12:05

## 2025-05-09 RX ADMIN — MAGNESIUM SULFATE HEPTAHYDRATE 2000 MG: 40 INJECTION, SOLUTION INTRAVENOUS at 09:19

## 2025-05-09 RX ADMIN — SODIUM CHLORIDE 200 MG: 9 INJECTION, SOLUTION INTRAVENOUS at 11:33

## 2025-05-09 NOTE — PROGRESS NOTES
Pt here for C.12D.1. Keytruda  Arrives ambulatory.  Denies any new complaints.  Labs results reviewed. Magnesium 1.3  Pt was seen by Dr. Bradley, order rec'd to proceed with tx and to replace magnesium with 2 grams.   Tx complete without incident.  Pt d/c'd in stable condition.  Returns 5/30/2025 for office visit and C13D1.

## 2025-05-09 NOTE — PROGRESS NOTES
Chief Complaint   Patient presents with    Follow-up     DIAGNOSIS:        Clinical stage T2 N0 M0 left breast upper outer quadrant invasive ductal carcinoma, grade 3, ER negative, MO negative, HER2/carol not amplified.  Post neoadjuvant treatment pathological stage of T2 N0 M0 invasive ductal carcinoma.  Repeated biomarkers ER negative, MO negative, HER2/carol not amplified.  History of left nephrectomy in 1988  Saddle PE s/p thrombectomy mid December 2024.  CURRENT THERAPY:         Neoadjuvant treatment with Keytruda/Taxol/carboplatin/Adriamycin/Cytoxan.  Treatment started 5/31/2024  S/p thrombectomy for saddle PE in December 2024.   DOAC for PE.   Status post lumpectomy 2/24/25  BRIEF CASE HISTORY:       Ms. Luci Castañeda is a very pleasant 60 y.o. female with history of multiple comorbidities as listed.  She had history of left nephrectomy in 1988.  She had cardiac cath in 2021 for left ventricular hypertrophy.  Had recent problems with restrictive and obstructive pulmonary disease.  She continues follow-up with pulmonary. She was doing fine with routine mammogram being normal until this year where she had suspicious abnormalities in the mammogram done on 3/25/2024.  Diagnostic mammogram and left breast ultrasound April 9, 2024 showed suspicious 4.5 cm mass in the left breast 1 o'clock position.  Mildly thickened left axillary lymph nodes.  A biopsy was performed on 4/26/2024 and unfortunately that was positive for invasive ductal carcinoma.  Triple negative.  Biopsy from lymph node was negative for malignancy.  The patient is seen today for further management of her breast cancer. The patient did very well after her biopsy without any complication. The patient had no symptom preceding her diagnosis of cancer. No chest pain, shortness of breath, cough, sputum or hemoptysis, no back pain or monique aches, no weight loss or decreased appetite, no fever or night sweating. No headaches, and no other

## 2025-05-09 NOTE — TELEPHONE ENCOUNTER
Instructions   from Dr. Jess Bradley MD    Magnesium 2 gm IV  Proceed with treatment as planned   RV 3 weeks         Infusion nurse notified of Mag  Tx today  RV 5/30/25 at 9:30 am with tx to follow

## 2025-05-12 ENCOUNTER — HOSPITAL ENCOUNTER (OUTPATIENT)
Dept: PHYSICAL THERAPY | Facility: CLINIC | Age: 60
Setting detail: THERAPIES SERIES
Discharge: HOME | End: 2025-05-12
Payer: COMMERCIAL

## 2025-05-12 PROCEDURE — 97110 THERAPEUTIC EXERCISES: CPT

## 2025-05-12 PROCEDURE — 97140 MANUAL THERAPY 1/> REGIONS: CPT

## 2025-05-12 NOTE — FLOWSHEET NOTE
[] [facility]  Outpatient Rehabilitation &  Therapy  [address]  [phone]  [phone] [] [facility]  Outpatient Rehabilitation &  Therapy  [address]  P: [phone]  F: [phone] [x] [facility]  Outpatient Rehabilitation &  Therapy  [address]  P: [phone]  F: [phone] [] [facility]  Outpatient Rehabilitation &  Therapy  [address]  [phone]  [phone] [] [facility]  Outpatient Rehabilitation &  Therapy  [address]   P: [phone]  F: [phone]  [] [facility]  Outpatient Rehabilitation &  Therapy  [address]  P: [phone]  F: [phone] [] [facility]  Outpatient Rehabilitation &  Therapy  [address]  P: [phone]  F: [phone] [] [facility]  Outpatient Rehabilitation &  Therapy  [address]  P: [phone]  F: [phone] [] [facility]  Outpatient Rehabilitation &  Therapy  [address]  P: [phone]  F: [phone]  [] [facility] Outpatient Rehabilitation &  Therapy  [address]  P: [phone]  F: [phone]     Physical Therapy Daily Treatment Note    Date:  2025  Patient Name:  Luci Castañeda    :  1965  MRN: [id number]  Physician: Jess Bradley              Insurance: R AFTER 30TH VISIT   Medical Diagnosis: C50.912 (ICD-10-CM) - Invasive ductal carcinoma of breast, left (HCC)               Rehab Codes: 25.612, R07.89, R29.3, 25.512, R53.0  Onset date: 24               Next 's appt.: ongoing  Visit# / total visits:      Cancels/No Shows: 0    Subjective:  Pt reports she was sore after last visit w/ incr manual and tired. States her L knee and LB are painful @ 5/10 has been limping but also notes trying to be more

## 2025-05-13 ENCOUNTER — HOSPITAL ENCOUNTER (OUTPATIENT)
Dept: RADIATION ONCOLOGY | Age: 60
Discharge: HOME | End: 2025-05-13
Payer: COMMERCIAL

## 2025-05-13 PROCEDURE — 77300 RADIATION THERAPY DOSE PLAN: CPT | Performed by: RADIOLOGY

## 2025-05-13 PROCEDURE — 77295 3-D RADIOTHERAPY PLAN: CPT | Performed by: RADIOLOGY

## 2025-05-13 PROCEDURE — 77334 RADIATION TREATMENT AID(S): CPT | Performed by: RADIOLOGY

## 2025-05-20 ENCOUNTER — HOSPITAL ENCOUNTER (OUTPATIENT)
Dept: PHYSICAL THERAPY | Facility: CLINIC | Age: 60
Setting detail: THERAPIES SERIES
Discharge: HOME | End: 2025-05-20
Payer: COMMERCIAL

## 2025-05-20 ENCOUNTER — TELEPHONE (OUTPATIENT)
Dept: INFUSION THERAPY | Age: 60
End: 2025-05-20

## 2025-05-20 PROCEDURE — 97140 MANUAL THERAPY 1/> REGIONS: CPT

## 2025-05-20 NOTE — FLOWSHEET NOTE
[] [facility]  Outpatient Rehabilitation &  Therapy  [address]  [phone]  [phone] [] [facility]  Outpatient Rehabilitation &  Therapy  [address]  P: [phone]  F: [phone] [x] [facility]  Outpatient Rehabilitation &  Therapy  [address]  P: [phone]  F: [phone] [] [facility]  Outpatient Rehabilitation &  Therapy  [address]  [phone]  [phone] [] [facility]  Outpatient Rehabilitation &  Therapy  [address]   P: [phone]  F: [phone]  [] [facility]  Outpatient Rehabilitation &  Therapy  [address]  P: [phone]  F: [phone] [] [facility]  Outpatient Rehabilitation &  Therapy  [address]  P: [phone]  F: [phone] [] [facility]  Outpatient Rehabilitation &  Therapy  [address]  P: [phone]  F: [phone] [] [facility]  Outpatient Rehabilitation &  Therapy  [address]  P: [phone]  F: [phone]  [] [facility] Outpatient Rehabilitation &  Therapy  [address]  P: [phone]  F: [phone]     Physical Therapy Daily Treatment Note    Date:  2025  Patient Name:  Luci Castañeda    :  1965  MRN: [id number]  Physician: Jess Bradley              Insurance: R AFTER 30TH VISIT   Medical Diagnosis: C50.912 (ICD-10-CM) - Invasive ductal carcinoma of breast, left (HCC)               Rehab Codes: 25.612, R07.89, R29.3, 25.512, R53.0  Onset date: 24               Next 's appt.: ongoing  Visit# / total visits:      Cancels/No Shows: 0    Subjective:  Pt states she feels very tired today and with increased pain upon the L UT. Scheduled to begin radiation on Thursday. Cannot recall if she was sore after last

## 2025-05-20 NOTE — TELEPHONE ENCOUNTER
FMLA faxed to [employer] at [phone].  Copy mailed to patient and scanned to chart.  Confirmation received.

## 2025-05-22 ENCOUNTER — HOSPITAL ENCOUNTER (OUTPATIENT)
Dept: RADIATION ONCOLOGY | Age: 60
Discharge: HOME | End: 2025-05-22
Payer: COMMERCIAL

## 2025-05-22 DIAGNOSIS — Z87.898 HISTORY OF LUMP OF RIGHT BREAST: Primary | ICD-10-CM

## 2025-05-22 PROCEDURE — 77412 RADIATION TX DELIVERY LVL 3: CPT | Performed by: RADIOLOGY

## 2025-05-22 PROCEDURE — 77280 THER RAD SIMULAJ FIELD SMPL: CPT | Performed by: RADIOLOGY

## 2025-05-22 RX ORDER — BETAMETHASONE VALERATE 1.2 MG/G
CREAM TOPICAL
Qty: 45 G | Refills: 2 | Status: SHIPPED | OUTPATIENT
Start: 2025-05-22

## 2025-05-22 NOTE — PROGRESS NOTES
Writer updated by therapist that Dr. Wan ordered right breast diagnostic mammogram and ultrasound.  Patient here today for her first left breast radiation treatment and reported to Dr. Wan that she \"feels something,\" in the right breast.

## 2025-05-23 ENCOUNTER — HOSPITAL ENCOUNTER (OUTPATIENT)
Dept: RADIATION ONCOLOGY | Age: 60
Discharge: HOME | End: 2025-05-23
Payer: COMMERCIAL

## 2025-05-23 PROCEDURE — 77387 GUIDANCE FOR RADJ TX DLVR: CPT | Performed by: RADIOLOGY

## 2025-05-23 PROCEDURE — 77412 RADIATION TX DELIVERY LVL 3: CPT | Performed by: RADIOLOGY

## 2025-05-27 ENCOUNTER — HOSPITAL ENCOUNTER (OUTPATIENT)
Dept: PHYSICAL THERAPY | Facility: CLINIC | Age: 60
Setting detail: THERAPIES SERIES
Discharge: HOME | End: 2025-05-27
Payer: COMMERCIAL

## 2025-05-27 ENCOUNTER — HOSPITAL ENCOUNTER (OUTPATIENT)
Dept: RADIATION ONCOLOGY | Age: 60
Discharge: HOME | End: 2025-05-27
Payer: COMMERCIAL

## 2025-05-27 PROCEDURE — 97140 MANUAL THERAPY 1/> REGIONS: CPT

## 2025-05-27 PROCEDURE — 77412 RADIATION TX DELIVERY LVL 3: CPT | Performed by: RADIOLOGY

## 2025-05-27 PROCEDURE — 97110 THERAPEUTIC EXERCISES: CPT

## 2025-05-27 PROCEDURE — 77387 GUIDANCE FOR RADJ TX DLVR: CPT | Performed by: RADIOLOGY

## 2025-05-27 NOTE — FLOWSHEET NOTE
[] [facility]  Outpatient Rehabilitation &  Therapy  [address]  [phone]  [phone] [] [facility]  Outpatient Rehabilitation &  Therapy  [address]  P: [phone]  F: [phone] [x] [facility]  Outpatient Rehabilitation &  Therapy  [address]  P: [phone]  F: [phone] [] [facility]  Outpatient Rehabilitation &  Therapy  [address]  [phone]  [phone] [] [facility]  Outpatient Rehabilitation &  Therapy  [address]   P: [phone]  F: [phone]  [] [facility]  Outpatient Rehabilitation &  Therapy  [address]  P: [phone]  F: [phone] [] [facility]  Outpatient Rehabilitation &  Therapy  [address]  P: [phone]  F: [phone] [] [facility]  Outpatient Rehabilitation &  Therapy  [address]  P: [phone]  F: [phone] [] [facility]  Outpatient Rehabilitation &  Therapy  [address]  P: [phone]  F: [phone]  [] [facility] Outpatient Rehabilitation &  Therapy  [address]  P: [phone]  F: [phone]     Physical Therapy Daily Treatment Note    Date:  2025  Patient Name:  Luci Castañeda    :  1965  MRN: [id number]  Physician: Jess Bradley              Insurance: R AFTER 30TH VISIT   Medical Diagnosis: C50.912 (ICD-10-CM) - Invasive ductal carcinoma of breast, left (HCC)               Rehab Codes: 25.612, R07.89, R29.3, 25.512, R53.0  Onset date: 24               Next 's appt.: ongoing  Visit# / total visits: 10/18     Cancels/No Shows: 0    Subjective:  Pt states she is doing ok today, joints are achy and painful bettie shoulders. Started radiation last Thursday. Notes \"I'm more functional but tired,\" not sleeping as

## 2025-05-28 ENCOUNTER — HOSPITAL ENCOUNTER (OUTPATIENT)
Dept: RADIATION ONCOLOGY | Age: 60
Discharge: HOME | End: 2025-05-28
Payer: COMMERCIAL

## 2025-05-28 VITALS
BODY MASS INDEX: 34.52 KG/M2 | SYSTOLIC BLOOD PRESSURE: 155 MMHG | TEMPERATURE: 98.2 F | RESPIRATION RATE: 15 BRPM | OXYGEN SATURATION: 96 % | HEART RATE: 96 BPM | DIASTOLIC BLOOD PRESSURE: 104 MMHG | WEIGHT: 227 LBS

## 2025-05-28 PROCEDURE — 77412 RADIATION TX DELIVERY LVL 3: CPT | Performed by: RADIOLOGY

## 2025-05-28 PROCEDURE — 77336 RADIATION PHYSICS CONSULT: CPT | Performed by: RADIOLOGY

## 2025-05-28 PROCEDURE — 77387 GUIDANCE FOR RADJ TX DLVR: CPT | Performed by: RADIOLOGY

## 2025-05-28 NOTE — PROGRESS NOTES
[facility]       Radiation Oncology          [address] [id number]        O: [phone]        F: [phone]       [facility]             RADIATION ONCOLOGY WEEKLY PROGRESS NOTE  Patient ID:   Luci Castañeda  : 1965   MRN: [id number]    Location:  [facility] Radiation Oncology,   [address] [place] [address]   [phone]    DIAGNOSIS:  Left upper outer quadrant 1 o'clock position invasive ductal carcinoma of the left breast, grade 3, pT2 N0 M0, triple negative       TREATMENT DETAILS:  Treatment Site: Lt breast  Actual Dose: 1064cGy  Total Planned Dose: 4256 cGy, 1000 cGy bst  Treatment Technique: 3D-CRT  Fraction Technique: Daily  Therapy imaging monitoring: KV match daily with MV ports  Concurrent Chemotherapy: None    SUBJECTIVE:   Patient seen for their weekly on treatment evaluation today.  Reports some fatigue, pain in the left shoulder, some irritation of the skin but no breakdown    OBJECTIVE:     ECO Asymptomatic    Wt Readings from Last 5 Encounters:   25 101.2 kg (223 lb)   25 98.4 kg (217 lb)   25 98.9 kg (218 lb)   04/10/25 96.2 kg (212 lb)   25 96.2 kg (212 lb)     GENERAL:  General appearance is that of a well-nourished, well-developed in no apparent distress.  HEART:  Normal rate and regular rhythm  LUNGS:  Pulmonary effort normal.  ABDOMEN:  Soft, nontender, non distended  EXTREMITIES:  No edema.  No calf tenderness.  MSK:  No spinal tenderness. Normal ROM.  NEUROLOGICAL: Alert and oriented. Strength and sensation intact bilaterally. No focal deficits.   PSYCH: Mood normal, behavior normal.      LABS:  WBC   Date Value Ref Range Status   2025 5.7 3.5 - 11.0 k/uL Final   2025 5.0 3.5 - 11.0 k/uL Final   2025 4.1 3.5 - 11.0 k/uL Final     Neutrophils Absolute   Date Value Ref Range Status   2025 4.10 1.8 - 7.7 k/uL Final   2025 3.40 1.8 - 7.7

## 2025-05-28 NOTE — PROGRESS NOTES
Luci Castañeda  5/28/2025  Wt Readings from Last 3 Encounters:   05/28/25 103 kg (227 lb)   05/09/25 101.2 kg (223 lb)   05/02/25 98.4 kg (217 lb)     Body mass index is 34.52 kg/m².        Treatment Area:left breast    Patient was seen today for weekly visit.     Comfort Alteration    Fatigue: Mild    Nutritional Alteration  Anorexia: No   Nausea: No   Vomiting: No     Mucous Membrane Alteration  Drainage: No  Lymphedema: No    Skin Alteration   Sensation:intact    Radiation Dermatitis:  Intact [x]     Erythema  []     Discoloration  []     Rash []     Dry desquamation  []     Moist desquamation []       Emotional  Coping: effective      Injury, potential bleeding or infection: skin care BID reinforced with Betamethasone cream and moisturizer    Lab Results   Component Value Date    WBC 5.7 05/07/2025    HGB 11.4 (L) 05/07/2025    HCT 34.6 (L) 05/07/2025     05/07/2025         BP (!) 155/104   Pulse 96   Temp 98.2 °F (36.8 °C)   Resp 15   Wt 103 kg (227 lb)   SpO2 96%   BMI 34.52 kg/m²                     Assessment/Plan: Patient was seen today for weekly visit. She arrives ambulatory with steady gait.  She reports completing skin care as directed.  She was provided with polymem and DriGo antibacterial sheets to use under left breast to minimize friction and moisture with instructions for use.  She voices understanding and appreciation.  Dr. Wan evaluated patient.  Continue plan of care.     Zohreh Boyd RN

## 2025-05-29 ENCOUNTER — HOSPITAL ENCOUNTER (OUTPATIENT)
Age: 60
Discharge: HOME | End: 2025-05-29
Payer: COMMERCIAL

## 2025-05-29 ENCOUNTER — HOSPITAL ENCOUNTER (OUTPATIENT)
Dept: RADIATION ONCOLOGY | Age: 60
Discharge: HOME | End: 2025-05-29
Payer: COMMERCIAL

## 2025-05-29 DIAGNOSIS — C50.412 MALIGNANT NEOPLASM OF UPPER-OUTER QUADRANT OF LEFT BREAST IN FEMALE, ESTROGEN RECEPTOR NEGATIVE (HCC): Primary | ICD-10-CM

## 2025-05-29 DIAGNOSIS — Z17.1 MALIGNANT NEOPLASM OF UPPER-OUTER QUADRANT OF LEFT BREAST IN FEMALE, ESTROGEN RECEPTOR NEGATIVE (HCC): Primary | ICD-10-CM

## 2025-05-29 DIAGNOSIS — Z17.1 MALIGNANT NEOPLASM OF UPPER-OUTER QUADRANT OF LEFT BREAST IN FEMALE, ESTROGEN RECEPTOR NEGATIVE (HCC): ICD-10-CM

## 2025-05-29 DIAGNOSIS — C50.412 MALIGNANT NEOPLASM OF UPPER-OUTER QUADRANT OF LEFT BREAST IN FEMALE, ESTROGEN RECEPTOR NEGATIVE (HCC): ICD-10-CM

## 2025-05-29 LAB
ALBUMIN SERPL-MCNC: 4.2 G/DL (ref 3.5–5.2)
ALBUMIN/GLOB SERPL: 1.3 {RATIO} (ref 1–2.5)
ALP SERPL-CCNC: 120 U/L (ref 35–104)
ALT SERPL-CCNC: 21 U/L (ref 10–35)
ANION GAP SERPL CALCULATED.3IONS-SCNC: 13 MMOL/L (ref 9–16)
AST SERPL-CCNC: 31 U/L (ref 10–35)
BASOPHILS # BLD: 0 K/UL (ref 0–0.2)
BASOPHILS NFR BLD: 1 % (ref 0–2)
BILIRUB SERPL-MCNC: 0.4 MG/DL (ref 0–1.2)
BUN SERPL-MCNC: 14 MG/DL (ref 8–23)
CALCIUM SERPL-MCNC: 9.8 MG/DL (ref 8.6–10.4)
CHLORIDE SERPL-SCNC: 103 MMOL/L (ref 98–107)
CO2 SERPL-SCNC: 25 MMOL/L (ref 20–31)
CREAT SERPL-MCNC: 0.9 MG/DL (ref 0.6–0.9)
EOSINOPHIL # BLD: 0.8 K/UL (ref 0–0.4)
EOSINOPHILS RELATIVE PERCENT: 13 % (ref 1–4)
ERYTHROCYTE [DISTWIDTH] IN BLOOD BY AUTOMATED COUNT: 15.5 % (ref 12.5–15.4)
GFR, ESTIMATED: 72 ML/MIN/1.73M2
GLUCOSE SERPL-MCNC: 108 MG/DL (ref 74–99)
HCT VFR BLD AUTO: 38.6 % (ref 36–46)
HGB BLD-MCNC: 12.4 G/DL (ref 12–16)
LYMPHOCYTES NFR BLD: 0.9 K/UL (ref 1–4.8)
LYMPHOCYTES RELATIVE PERCENT: 14 % (ref 24–44)
MAGNESIUM SERPL-MCNC: 1.4 MG/DL (ref 1.6–2.4)
MCH RBC QN AUTO: 30.6 PG (ref 26–34)
MCHC RBC AUTO-ENTMCNC: 32.2 G/DL (ref 31–37)
MCV RBC AUTO: 94.9 FL (ref 80–100)
MONOCYTES NFR BLD: 0.5 K/UL (ref 0.1–1.2)
MONOCYTES NFR BLD: 8 % (ref 2–11)
NEUTROPHILS NFR BLD: 64 % (ref 36–66)
NEUTS SEG NFR BLD: 4.3 K/UL (ref 1.8–7.7)
PLATELET # BLD AUTO: 165 K/UL (ref 140–450)
PMV BLD AUTO: 6.8 FL (ref 6–12)
POTASSIUM SERPL-SCNC: 4.1 MMOL/L (ref 3.7–5.3)
PROT SERPL-MCNC: 7.4 G/DL (ref 6.6–8.7)
RBC # BLD AUTO: 4.06 M/UL (ref 4–5.2)
SODIUM SERPL-SCNC: 141 MMOL/L (ref 136–145)
WBC OTHER # BLD: 6.6 K/UL (ref 3.5–11)

## 2025-05-29 PROCEDURE — 77387 GUIDANCE FOR RADJ TX DLVR: CPT | Performed by: RADIOLOGY

## 2025-05-29 PROCEDURE — 36415 COLL VENOUS BLD VENIPUNCTURE: CPT

## 2025-05-29 PROCEDURE — 83735 ASSAY OF MAGNESIUM: CPT

## 2025-05-29 PROCEDURE — 77412 RADIATION TX DELIVERY LVL 3: CPT | Performed by: RADIOLOGY

## 2025-05-29 PROCEDURE — 80053 COMPREHEN METABOLIC PANEL: CPT

## 2025-05-29 PROCEDURE — 85025 COMPLETE CBC W/AUTO DIFF WBC: CPT

## 2025-05-30 ENCOUNTER — HOSPITAL ENCOUNTER (OUTPATIENT)
Dept: INFUSION THERAPY | Age: 60
Discharge: HOME | End: 2025-05-30
Payer: COMMERCIAL

## 2025-05-30 ENCOUNTER — TELEPHONE (OUTPATIENT)
Age: 60
End: 2025-05-30

## 2025-05-30 ENCOUNTER — CLINICAL DOCUMENTATION (OUTPATIENT)
Age: 60
End: 2025-05-30

## 2025-05-30 ENCOUNTER — OFFICE VISIT (OUTPATIENT)
Age: 60
End: 2025-05-30
Payer: COMMERCIAL

## 2025-05-30 ENCOUNTER — HOSPITAL ENCOUNTER (OUTPATIENT)
Dept: RADIATION ONCOLOGY | Age: 60
Discharge: HOME | End: 2025-05-30
Payer: COMMERCIAL

## 2025-05-30 VITALS
HEART RATE: 87 BPM | BODY MASS INDEX: 34.37 KG/M2 | DIASTOLIC BLOOD PRESSURE: 95 MMHG | WEIGHT: 226.8 LBS | OXYGEN SATURATION: 96 % | HEIGHT: 68 IN | SYSTOLIC BLOOD PRESSURE: 168 MMHG

## 2025-05-30 DIAGNOSIS — Z17.1 MALIGNANT NEOPLASM OF UPPER-OUTER QUADRANT OF LEFT BREAST IN FEMALE, ESTROGEN RECEPTOR NEGATIVE (HCC): Primary | ICD-10-CM

## 2025-05-30 DIAGNOSIS — C50.412 MALIGNANT NEOPLASM OF UPPER-OUTER QUADRANT OF LEFT BREAST IN FEMALE, ESTROGEN RECEPTOR NEGATIVE (HCC): Primary | ICD-10-CM

## 2025-05-30 PROCEDURE — 77417 THER RADIOLOGY PORT IMAGE(S): CPT | Performed by: RADIOLOGY

## 2025-05-30 PROCEDURE — 96366 THER/PROPH/DIAG IV INF ADDON: CPT

## 2025-05-30 PROCEDURE — 96367 TX/PROPH/DG ADDL SEQ IV INF: CPT

## 2025-05-30 PROCEDURE — 99214 OFFICE O/P EST MOD 30 MIN: CPT | Performed by: INTERNAL MEDICINE

## 2025-05-30 PROCEDURE — 77412 RADIATION TX DELIVERY LVL 3: CPT | Performed by: RADIOLOGY

## 2025-05-30 PROCEDURE — 96413 CHEMO IV INFUSION 1 HR: CPT

## 2025-05-30 PROCEDURE — 2500000003 HC RX 250 WO HCPCS: Performed by: INTERNAL MEDICINE

## 2025-05-30 PROCEDURE — 6360000002 HC RX W HCPCS: Performed by: INTERNAL MEDICINE

## 2025-05-30 PROCEDURE — 3077F SYST BP >= 140 MM HG: CPT | Performed by: INTERNAL MEDICINE

## 2025-05-30 PROCEDURE — 3080F DIAST BP >= 90 MM HG: CPT | Performed by: INTERNAL MEDICINE

## 2025-05-30 PROCEDURE — 2580000003 HC RX 258: Performed by: INTERNAL MEDICINE

## 2025-05-30 RX ORDER — SODIUM CHLORIDE 9 MG/ML
5-250 INJECTION, SOLUTION INTRAVENOUS PRN
Status: CANCELLED | OUTPATIENT
Start: 2025-05-30

## 2025-05-30 RX ORDER — SODIUM CHLORIDE 0.9 % (FLUSH) 0.9 %
5-40 SYRINGE (ML) INJECTION PRN
Status: CANCELLED | OUTPATIENT
Start: 2025-05-30

## 2025-05-30 RX ORDER — SODIUM CHLORIDE 0.9 % (FLUSH) 0.9 %
5-40 SYRINGE (ML) INJECTION PRN
Status: DISCONTINUED | OUTPATIENT
Start: 2025-05-30 | End: 2025-05-31 | Stop reason: HOSPADM

## 2025-05-30 RX ORDER — EPINEPHRINE 1 MG/ML
0.3 INJECTION, SOLUTION, CONCENTRATE INTRAVENOUS PRN
Status: CANCELLED | OUTPATIENT
Start: 2025-05-30

## 2025-05-30 RX ORDER — SODIUM CHLORIDE 9 MG/ML
INJECTION, SOLUTION INTRAVENOUS CONTINUOUS
OUTPATIENT
Start: 2025-06-20

## 2025-05-30 RX ORDER — MEPERIDINE HYDROCHLORIDE 50 MG/ML
12.5 INJECTION INTRAMUSCULAR; INTRAVENOUS; SUBCUTANEOUS PRN
Status: CANCELLED | OUTPATIENT
Start: 2025-05-30

## 2025-05-30 RX ORDER — ACETAMINOPHEN 325 MG/1
650 TABLET ORAL
Status: CANCELLED | OUTPATIENT
Start: 2025-05-30

## 2025-05-30 RX ORDER — ACETAMINOPHEN 325 MG/1
650 TABLET ORAL
Status: CANCELLED
Start: 2025-05-30

## 2025-05-30 RX ORDER — ACETAMINOPHEN 325 MG/1
650 TABLET ORAL
OUTPATIENT
Start: 2025-06-20

## 2025-05-30 RX ORDER — DIPHENHYDRAMINE HYDROCHLORIDE 50 MG/ML
50 INJECTION, SOLUTION INTRAMUSCULAR; INTRAVENOUS
Status: CANCELLED | OUTPATIENT
Start: 2025-05-30

## 2025-05-30 RX ORDER — HYDROCORTISONE SODIUM SUCCINATE 100 MG/2ML
100 INJECTION INTRAMUSCULAR; INTRAVENOUS
Status: CANCELLED | OUTPATIENT
Start: 2025-05-30

## 2025-05-30 RX ORDER — ACETAMINOPHEN 325 MG/1
650 TABLET ORAL
Start: 2025-06-20

## 2025-05-30 RX ORDER — ALBUTEROL SULFATE 90 UG/1
4 INHALANT RESPIRATORY (INHALATION) PRN
OUTPATIENT
Start: 2025-06-20

## 2025-05-30 RX ORDER — DIPHENHYDRAMINE HYDROCHLORIDE 50 MG/ML
50 INJECTION, SOLUTION INTRAMUSCULAR; INTRAVENOUS
OUTPATIENT
Start: 2025-06-20

## 2025-05-30 RX ORDER — ONDANSETRON 2 MG/ML
8 INJECTION INTRAMUSCULAR; INTRAVENOUS
Status: CANCELLED | OUTPATIENT
Start: 2025-05-30

## 2025-05-30 RX ORDER — SODIUM CHLORIDE 9 MG/ML
5-250 INJECTION, SOLUTION INTRAVENOUS PRN
OUTPATIENT
Start: 2025-06-20

## 2025-05-30 RX ORDER — FAMOTIDINE 10 MG/ML
20 INJECTION, SOLUTION INTRAVENOUS
Status: CANCELLED | OUTPATIENT
Start: 2025-05-30

## 2025-05-30 RX ORDER — MEPERIDINE HYDROCHLORIDE 50 MG/ML
12.5 INJECTION INTRAMUSCULAR; INTRAVENOUS; SUBCUTANEOUS PRN
OUTPATIENT
Start: 2025-06-20

## 2025-05-30 RX ORDER — HEPARIN SODIUM (PORCINE) LOCK FLUSH IV SOLN 100 UNIT/ML 100 UNIT/ML
500 SOLUTION INTRAVENOUS PRN
Status: CANCELLED | OUTPATIENT
Start: 2025-05-30

## 2025-05-30 RX ORDER — HEPARIN SODIUM (PORCINE) LOCK FLUSH IV SOLN 100 UNIT/ML 100 UNIT/ML
500 SOLUTION INTRAVENOUS PRN
OUTPATIENT
Start: 2025-06-20

## 2025-05-30 RX ORDER — MAGNESIUM SULFATE IN WATER 40 MG/ML
2000 INJECTION, SOLUTION INTRAVENOUS ONCE
Status: COMPLETED | OUTPATIENT
Start: 2025-05-30 | End: 2025-05-30

## 2025-05-30 RX ORDER — HEPARIN 100 UNIT/ML
500 SYRINGE INTRAVENOUS PRN
Status: DISCONTINUED | OUTPATIENT
Start: 2025-05-30 | End: 2025-05-31 | Stop reason: HOSPADM

## 2025-05-30 RX ORDER — ONDANSETRON 2 MG/ML
8 INJECTION INTRAMUSCULAR; INTRAVENOUS
OUTPATIENT
Start: 2025-06-20

## 2025-05-30 RX ORDER — ALBUTEROL SULFATE 90 UG/1
4 INHALANT RESPIRATORY (INHALATION) PRN
Status: CANCELLED | OUTPATIENT
Start: 2025-05-30

## 2025-05-30 RX ORDER — FAMOTIDINE 10 MG/ML
20 INJECTION, SOLUTION INTRAVENOUS
OUTPATIENT
Start: 2025-06-20

## 2025-05-30 RX ORDER — SODIUM CHLORIDE 9 MG/ML
5-250 INJECTION, SOLUTION INTRAVENOUS PRN
Status: DISCONTINUED | OUTPATIENT
Start: 2025-05-30 | End: 2025-05-31 | Stop reason: HOSPADM

## 2025-05-30 RX ORDER — HYDROCORTISONE SODIUM SUCCINATE 100 MG/2ML
100 INJECTION INTRAMUSCULAR; INTRAVENOUS
OUTPATIENT
Start: 2025-06-20

## 2025-05-30 RX ORDER — SODIUM CHLORIDE 0.9 % (FLUSH) 0.9 %
5-40 SYRINGE (ML) INJECTION PRN
OUTPATIENT
Start: 2025-06-20

## 2025-05-30 RX ORDER — EPINEPHRINE 1 MG/ML
0.3 INJECTION, SOLUTION, CONCENTRATE INTRAVENOUS PRN
OUTPATIENT
Start: 2025-06-20

## 2025-05-30 RX ORDER — SODIUM CHLORIDE 9 MG/ML
INJECTION, SOLUTION INTRAVENOUS CONTINUOUS
Status: CANCELLED | OUTPATIENT
Start: 2025-05-30

## 2025-05-30 RX ADMIN — HEPARIN 500 UNITS: 100 SYRINGE at 13:28

## 2025-05-30 RX ADMIN — SODIUM CHLORIDE, PRESERVATIVE FREE 10 ML: 5 INJECTION INTRAVENOUS at 13:27

## 2025-05-30 RX ADMIN — SODIUM CHLORIDE 200 MG: 9 INJECTION, SOLUTION INTRAVENOUS at 12:58

## 2025-05-30 RX ADMIN — MAGNESIUM SULFATE HEPTAHYDRATE 2000 MG: 40 INJECTION, SOLUTION INTRAVENOUS at 11:04

## 2025-05-30 RX ADMIN — SODIUM CHLORIDE 150 ML/HR: 0.9 INJECTION, SOLUTION INTRAVENOUS at 11:03

## 2025-05-30 NOTE — PROGRESS NOTES
[facility]- MEDICAL NUTRITION THERAPY     Visit Type: Pt request     NUTRITION RECOMMENDATIONS / MONITORING / EVALUATION  Encouraged a well-balanced/healthful diet- discussed diet with patient.   Will continue to review diet with patient as needed; also encouraged pt to contact writer with any nutrition changes/questions/concerns    Subjective/Current Data:  Luci Castañeda is a 60 y.o. female with h/o breast cancer, chemotherapy, radiation therapy.   Pt concerned about weight gain; states she lost 60 lbs during chemotherapy but has regained 15 lbs back over past couple of weeks. Pt reports she is eating better than she had throughout treatment, but not overeating. Pt does state she eats a lot of processed/convenient foods for easy preparation. Does not drink a lot of sugary beverages or eat a lot of sweets. Pt states protein intake is fair d/t meat aversion. Tries to consume fair amount of fruits and veggies. Pt admits she is not as active as she was prior to treatment d/t fatigue. Writer reviewed healthful eating, meal consistency, and activity as tolerated/medically able with patient.    Anthropometric Measures:  Height: 5' 8\"  Weight: 226 lb 12.8 oz (102.9 kg)    Ideal Body Weight: 140 lb (63.6 kg)  BMI: 34.48 kg/m2 (Obese Class I)  Usual Weight: noted 285 lb (129.3 kg) on 9/1/24  Weight Change: 26% loss over 6 months (9/2024-3/2025); now regaining weight -noted 9 lbs over past month    Wt Readings from Last 20 Encounters:   05/30/25 102.9 kg (226 lb 12.8 oz)   05/28/25 103 kg (227 lb)   05/09/25 101.2 kg (223 lb)   05/02/25 98.4 kg (217 lb)   04/18/25 98.9 kg (218 lb)   04/10/25 96.2 kg (212 lb)   03/28/25 96.2 kg (212 lb)   03/21/25 96.2 kg (212 lb)   03/07/25 96.6 kg (213 lb)   03/03/25 97 kg (213 lb 12.8 oz)   02/24/25 98.9 kg (218 lb)   02/10/25 98.9 kg (218 lb)   02/14/25 99.8 kg (220 lb)   02/05/25 99.1 kg (218 lb 6.4 oz)   01/27/25 104.3 kg (230 lb)   01/24/25 103.9 kg (229 lb)   01/13/25 103.3

## 2025-05-30 NOTE — PROGRESS NOTES
Pt here for C.13D.1 Keytruda.  Arrives ambulatory.  Denies any new complaints.  Labs drawn from port, results reviewed.  Pt was seen by Dr. Bradley, order rec'd to proceed with tx.  2G mag per Dr. Bradley.  Tx complete without incident.  Pt d/c'd in stable condition.  Returns 6/20 for MD visit and treatment.

## 2025-05-30 NOTE — TELEPHONE ENCOUNTER
Instructions   from Dr. Jess Bradley MD    Magnesium 2 gm IV  Proceed with treatment as planned   RV 3 weeks             Infusion RN notified of Mag  Tx as planned   RV 6/20/25 at 8:30 am with tx to follow

## 2025-05-30 NOTE — PROGRESS NOTES
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  Post neoadjuvant treatment pathological stage of T2 N0 M0 invasive ductal carcinoma.  Repeated biomarkers ER negative, LA negative, HER2/carol not amplified.  History of left nephrectomy in 1988  Saddle PE s/p thrombectomy mid December 2024.  CURRENT THERAPY:         Neoadjuvant treatment with Keytruda/Taxol/carboplatin/Adriamycin/Cytoxan.  Treatment started 5/31/2024  S/p thrombectomy for saddle PE in December 2024.   DOAC for PE.   Status post lumpectomy 2/24/25  XRT will be completed 6/19/25  BRIEF CASE HISTORY:       Ms. Luci Castañeda is a very pleasant 60 y.o. female with history of multiple comorbidities as listed.  She had history of left nephrectomy in 1988.  She had cardiac cath in 2021 for left ventricular hypertrophy.  Had recent problems with restrictive and obstructive pulmonary disease.  She continues follow-up with pulmonary. She was doing fine with routine mammogram being normal until this year where she had suspicious abnormalities in the mammogram done on 3/25/2024.  Diagnostic mammogram and left breast ultrasound April 9, 2024 showed suspicious 4.5 cm mass in the left breast 1 o'clock position.  Mildly thickened left axillary lymph nodes.  A biopsy was performed on 4/26/2024 and unfortunately that was positive for invasive ductal carcinoma.  Triple negative.  Biopsy from lymph node was negative for malignancy.  The patient is seen today for further management of her breast cancer. The patient did very well after her biopsy without any complication. The patient had no symptom preceding her diagnosis of cancer. No chest pain, shortness of breath, cough, sputum or hemoptysis, no back pain or monique aches, no weight loss or decreased appetite, no fever or night sweating. No

## 2025-06-02 ENCOUNTER — HOSPITAL ENCOUNTER (OUTPATIENT)
Dept: RADIATION ONCOLOGY | Age: 60
Discharge: HOME | End: 2025-06-02
Payer: COMMERCIAL

## 2025-06-02 PROCEDURE — 77412 RADIATION TX DELIVERY LVL 3: CPT | Performed by: RADIOLOGY

## 2025-06-02 PROCEDURE — 77387 GUIDANCE FOR RADJ TX DLVR: CPT | Performed by: RADIOLOGY

## 2025-06-03 ENCOUNTER — HOSPITAL ENCOUNTER (OUTPATIENT)
Dept: RADIATION ONCOLOGY | Age: 60
Discharge: HOME | End: 2025-06-03
Payer: COMMERCIAL

## 2025-06-03 PROCEDURE — 77387 GUIDANCE FOR RADJ TX DLVR: CPT | Performed by: RADIOLOGY

## 2025-06-03 PROCEDURE — 77412 RADIATION TX DELIVERY LVL 3: CPT | Performed by: RADIOLOGY

## 2025-06-04 ENCOUNTER — HOSPITAL ENCOUNTER (OUTPATIENT)
Dept: RADIATION ONCOLOGY | Age: 60
Discharge: HOME | End: 2025-06-04
Payer: COMMERCIAL

## 2025-06-04 VITALS
WEIGHT: 229.2 LBS | DIASTOLIC BLOOD PRESSURE: 100 MMHG | SYSTOLIC BLOOD PRESSURE: 155 MMHG | OXYGEN SATURATION: 95 % | HEART RATE: 90 BPM | RESPIRATION RATE: 15 BRPM | TEMPERATURE: 97.1 F | BODY MASS INDEX: 34.85 KG/M2

## 2025-06-04 DIAGNOSIS — C50.912 INVASIVE DUCTAL CARCINOMA OF BREAST, LEFT (HCC): Primary | ICD-10-CM

## 2025-06-04 PROCEDURE — 77336 RADIATION PHYSICS CONSULT: CPT | Performed by: RADIOLOGY

## 2025-06-04 PROCEDURE — 77387 GUIDANCE FOR RADJ TX DLVR: CPT | Performed by: RADIOLOGY

## 2025-06-04 PROCEDURE — 77412 RADIATION TX DELIVERY LVL 3: CPT | Performed by: RADIOLOGY

## 2025-06-04 RX ORDER — HYDROCODONE BITARTRATE AND ACETAMINOPHEN 5; 325 MG/1; MG/1
1 TABLET ORAL EVERY 8 HOURS PRN
Qty: 30 TABLET | Refills: 0 | Status: SHIPPED | OUTPATIENT
Start: 2025-06-04 | End: 2025-06-18

## 2025-06-04 ASSESSMENT — PAIN DESCRIPTION - LOCATION: LOCATION: GENERALIZED

## 2025-06-04 ASSESSMENT — PAIN SCALES - GENERAL: PAINLEVEL_OUTOF10: 6

## 2025-06-04 NOTE — PROGRESS NOTES
[facility]       Radiation Oncology          [address] [id number]        O: [phone]        F: [phone]       [facility]             RADIATION ONCOLOGY WEEKLY PROGRESS NOTE  Patient ID:   Luci Castañeda  : 1965   MRN: [id number]    Location:  [facility] Radiation Oncology,   [address] [place] [address]   [phone]    DIAGNOSIS:  Left upper outer quadrant 1 o'clock position invasive ductal carcinoma of the left breast, grade 3, pT2 N0 M0, triple negative         TREATMENT DETAILS:  Treatment Site: Lt breast  Actual Dose: 2394cGy  Total Planned Dose: 4256 cGy, 1000 cGy bst  Treatment Technique: 3D-CRT  Fraction Technique: Daily  Therapy imaging monitoring: KV match daily with MV ports  Concurrent Chemotherapy: None    SUBJECTIVE:   Patient seen for their weekly on treatment evaluation today.  Reports generalized fatigue, pain in the left upper extremity, no skin breakdown    OBJECTIVE:     ECO Symptomatic but completely ambulatory    VITAL SIGNS: BP (!) 155/100   Pulse 90   Temp 97.1 °F (36.2 °C) (Temporal)   Resp 15   Wt 104 kg (229 lb 3.2 oz)   SpO2 95%   BMI 34.85 kg/m²   Wt Readings from Last 5 Encounters:   25 104 kg (229 lb 3.2 oz)   25 102.9 kg (226 lb 12.8 oz)   25 103 kg (227 lb)   25 101.2 kg (223 lb)   25 98.4 kg (217 lb)     GENERAL:  General appearance is that of a well-nourished, well-developed in no apparent distress.  HEART:  Normal rate and regular rhythm  LUNGS:  Pulmonary effort normal.  ABDOMEN:  Soft, nontender, non distended  EXTREMITIES:  No edema.  No calf tenderness.  MSK:  No spinal tenderness. Normal ROM.  NEUROLOGICAL: Alert and oriented. Strength and sensation intact bilaterally. No focal deficits.   PSYCH: Mood normal, behavior normal.  Skin: Exam done by nursing shows an erythema but no desquamation in the treated breast    LABS:  WBC

## 2025-06-04 NOTE — PROGRESS NOTES
Luci Castañeda  6/4/2025  Wt Readings from Last 3 Encounters:   06/04/25 104 kg (229 lb 3.2 oz)   05/30/25 102.9 kg (226 lb 12.8 oz)   05/28/25 103 kg (227 lb)     Body mass index is 34.85 kg/m².        Treatment Area: left breast    Patient was seen today for weekly visit.     Comfort Alteration    Fatigue: Moderate    Nutritional Alteration  Anorexia: No   Nausea: Yes  Vomiting: No     Mucous Membrane Alteration  Drainage: No  Lymphedema: Yes    Skin Alteration   Sensation:in tact    Radiation Dermatitis:  Intact [x]     Erythema  []     Discoloration  []     Rash []     Dry desquamation  []     Moist desquamation []       Emotional  Coping: effective      Injury, potential bleeding or infection: none currently    Lab Results   Component Value Date    WBC 6.6 05/29/2025    HGB 12.4 05/29/2025    HCT 38.6 05/29/2025     05/29/2025         BP (!) 155/100   Pulse 90   Temp 97.1 °F (36.2 °C) (Temporal)   Resp 15   Wt 104 kg (229 lb 3.2 oz)   SpO2 95%   BMI 34.85 kg/m²      Pain Assessment: 0-10  Pain Level: 6           Assessment/Plan: Patient was seen today for weekly visit.  Ambulatory on arrival with the use of a wheelchair that she was using like a walker.  She states that yesterday evening, she just started to feel tired and weaker, but felt fine during the day.  She expresses generalized pain in her joints.  She has medication at home and she took some last night but she doesn't always take it.  She states she is eating well and is gaining weight, that she is not too happy about.  She has occasional nausea and has zofran at home to take as needed.  Skin is in tact and she continues to moisturize daily.  She has been using the microbial barrier sheets underneath the left breast.  She also states that her range of motion is still decreased on the left side, but the therapy she has been doing helps.  Will continue plan of care.      Andressa Costello RN

## 2025-06-05 ENCOUNTER — HOSPITAL ENCOUNTER (OUTPATIENT)
Dept: RADIATION ONCOLOGY | Age: 60
Discharge: HOME | End: 2025-06-05
Payer: COMMERCIAL

## 2025-06-05 ENCOUNTER — HOSPITAL ENCOUNTER (OUTPATIENT)
Dept: PHYSICAL THERAPY | Facility: CLINIC | Age: 60
Setting detail: THERAPIES SERIES
Discharge: HOME | End: 2025-06-05
Payer: COMMERCIAL

## 2025-06-05 PROCEDURE — 97140 MANUAL THERAPY 1/> REGIONS: CPT

## 2025-06-05 PROCEDURE — 77417 THER RADIOLOGY PORT IMAGE(S): CPT | Performed by: RADIOLOGY

## 2025-06-05 PROCEDURE — 77300 RADIATION THERAPY DOSE PLAN: CPT | Performed by: RADIOLOGY

## 2025-06-05 PROCEDURE — 97110 THERAPEUTIC EXERCISES: CPT

## 2025-06-05 PROCEDURE — 77412 RADIATION TX DELIVERY LVL 3: CPT | Performed by: RADIOLOGY

## 2025-06-05 PROCEDURE — 77334 RADIATION TREATMENT AID(S): CPT | Performed by: RADIOLOGY

## 2025-06-05 NOTE — FLOWSHEET NOTE
[] [facility]  Outpatient Rehabilitation &  Therapy  [address]  [phone]  [phone] [] [facility]  Outpatient Rehabilitation &  Therapy  [address]  P: [phone]  F: [phone] [x] [facility]  Outpatient Rehabilitation &  Therapy  [address]  P: [phone]  F: [phone] [] [facility]  Outpatient Rehabilitation &  Therapy  [address]  [phone]  [phone] [] [facility]  Outpatient Rehabilitation &  Therapy  [address]   P: [phone]  F: [phone]  [] [facility]  Outpatient Rehabilitation &  Therapy  [address]  P: [phone]  F: [phone] [] [facility]  Outpatient Rehabilitation &  Therapy  [address]  P: [phone]  F: [phone] [] [facility]  Outpatient Rehabilitation &  Therapy  [address]  P: [phone]  F: [phone] [] [facility]  Outpatient Rehabilitation &  Therapy  [address]  P: [phone]  F: [phone]  [] [facility] Outpatient Rehabilitation &  Therapy  [address]  P: [phone]  F: [phone]     Physical Therapy Daily Treatment Note    Date:  2025  Patient Name:  Luci Castañeda    :  1965  MRN: [id number]  Physician: Jess Bradley              Insurance: R AFTER 30TH VISIT   Medical Diagnosis: C50.912 (ICD-10-CM) - Invasive ductal carcinoma of breast, left (HCC)               Rehab Codes: 25.612, R07.89, R29.3, 25.512, R53.0  Onset date: 24               Next Dr's appt.: ongoing  Visit# / total visits:      Cancels/No Shows: 0    Subjective:  Pt presents very antalgic, states every joint in her body hurts, notes Dr Wan ordered pain meds took one last night w/ some relief. Notes today muscles are  Trend with IV fluids and treat sepsis .

## 2025-06-06 ENCOUNTER — HOSPITAL ENCOUNTER (OUTPATIENT)
Dept: RADIATION ONCOLOGY | Age: 60
Discharge: HOME | End: 2025-06-06
Payer: COMMERCIAL

## 2025-06-06 PROCEDURE — 77412 RADIATION TX DELIVERY LVL 3: CPT | Performed by: RADIOLOGY

## 2025-06-09 ENCOUNTER — OFFICE VISIT (OUTPATIENT)
Age: 60
End: 2025-06-09
Payer: COMMERCIAL

## 2025-06-09 ENCOUNTER — HOSPITAL ENCOUNTER (OUTPATIENT)
Dept: RADIATION ONCOLOGY | Age: 60
Discharge: HOME | End: 2025-06-09
Payer: COMMERCIAL

## 2025-06-09 VITALS
TEMPERATURE: 96.8 F | WEIGHT: 226 LBS | DIASTOLIC BLOOD PRESSURE: 90 MMHG | HEIGHT: 68 IN | OXYGEN SATURATION: 96 % | BODY MASS INDEX: 34.25 KG/M2 | SYSTOLIC BLOOD PRESSURE: 136 MMHG | HEART RATE: 80 BPM

## 2025-06-09 DIAGNOSIS — Z17.1 MALIGNANT NEOPLASM OF UPPER-OUTER QUADRANT OF LEFT BREAST IN FEMALE, ESTROGEN RECEPTOR NEGATIVE (HCC): Primary | ICD-10-CM

## 2025-06-09 DIAGNOSIS — N20.0 RECURRENT KIDNEY STONES: ICD-10-CM

## 2025-06-09 DIAGNOSIS — G25.0 BENIGN ESSENTIAL TREMOR: ICD-10-CM

## 2025-06-09 DIAGNOSIS — Z96.651 HISTORY OF RIGHT KNEE JOINT REPLACEMENT: ICD-10-CM

## 2025-06-09 DIAGNOSIS — J98.4 MIXED RESTRICTIVE AND OBSTRUCTIVE LUNG DISEASE (HCC): ICD-10-CM

## 2025-06-09 DIAGNOSIS — Z86.16 HISTORY OF COVID-19: ICD-10-CM

## 2025-06-09 DIAGNOSIS — M17.12 PRIMARY OSTEOARTHRITIS OF LEFT KNEE: ICD-10-CM

## 2025-06-09 DIAGNOSIS — R73.03 PREDIABETES: ICD-10-CM

## 2025-06-09 DIAGNOSIS — Z90.5 SOLITARY KIDNEY, ACQUIRED: ICD-10-CM

## 2025-06-09 DIAGNOSIS — I25.10 CORONARY ARTERY DISEASE INVOLVING NATIVE CORONARY ARTERY OF NATIVE HEART WITHOUT ANGINA PECTORIS: ICD-10-CM

## 2025-06-09 DIAGNOSIS — M10.9 GOUTY ARTHRITIS OF RIGHT GREAT TOE: ICD-10-CM

## 2025-06-09 DIAGNOSIS — K21.9 GASTROESOPHAGEAL REFLUX DISEASE WITHOUT ESOPHAGITIS: ICD-10-CM

## 2025-06-09 DIAGNOSIS — E83.42 HYPOMAGNESEMIA: ICD-10-CM

## 2025-06-09 DIAGNOSIS — C50.412 MALIGNANT NEOPLASM OF UPPER-OUTER QUADRANT OF LEFT BREAST IN FEMALE, ESTROGEN RECEPTOR NEGATIVE (HCC): Primary | ICD-10-CM

## 2025-06-09 DIAGNOSIS — F51.01 PRIMARY INSOMNIA: ICD-10-CM

## 2025-06-09 DIAGNOSIS — Z86.711 HISTORY OF PULMONARY EMBOLISM: ICD-10-CM

## 2025-06-09 DIAGNOSIS — J44.9 MIXED RESTRICTIVE AND OBSTRUCTIVE LUNG DISEASE (HCC): ICD-10-CM

## 2025-06-09 DIAGNOSIS — N63.10 MASS OF RIGHT BREAST, UNSPECIFIED QUADRANT: ICD-10-CM

## 2025-06-09 DIAGNOSIS — J45.20 MILD INTERMITTENT ASTHMA WITHOUT COMPLICATION: ICD-10-CM

## 2025-06-09 DIAGNOSIS — I10 ESSENTIAL HYPERTENSION: ICD-10-CM

## 2025-06-09 DIAGNOSIS — E66.01 MORBID OBESITY (HCC): ICD-10-CM

## 2025-06-09 DIAGNOSIS — K76.0 FATTY LIVER: ICD-10-CM

## 2025-06-09 PROCEDURE — 3075F SYST BP GE 130 - 139MM HG: CPT | Performed by: INTERNAL MEDICINE

## 2025-06-09 PROCEDURE — 99214 OFFICE O/P EST MOD 30 MIN: CPT | Performed by: INTERNAL MEDICINE

## 2025-06-09 PROCEDURE — 3080F DIAST BP >= 90 MM HG: CPT | Performed by: INTERNAL MEDICINE

## 2025-06-09 PROCEDURE — 77412 RADIATION TX DELIVERY LVL 3: CPT | Performed by: RADIOLOGY

## 2025-06-09 RX ORDER — TEMAZEPAM 15 MG/1
15 CAPSULE ORAL NIGHTLY PRN
COMMUNITY
End: 2025-06-09

## 2025-06-09 RX ORDER — CARVEDILOL 6.25 MG/1
6.25 TABLET ORAL 2 TIMES DAILY
Qty: 180 TABLET | Refills: 1 | Status: SHIPPED | OUTPATIENT
Start: 2025-06-09

## 2025-06-09 NOTE — PROGRESS NOTES
Grandmother     Heart Disease Paternal Grandfather     Stroke Paternal Grandfather 65        PHYSICAL EXAM     BP (!) 136/90 (BP Site: Right Upper Arm, Patient Position: Sitting, BP Cuff Size: Large Adult)   Pulse 80   Temp 96.8 °F (36 °C) (Temporal)   Ht 1.727 m (5' 8\")   Wt 102.5 kg (226 lb)   SpO2 96%   BMI 34.36 kg/m²    Physical Exam  Pulmonary:      Comments: Port right upper anterior chest nontender  Musculoskeletal:      Comments: Significantly favors left knee with ambulation.  Some discomfort in getting up and off the exam table.         The 10-year ASCVD risk score (Gerald VIEYRA, et al., 2019) is: 9.3%    Values used to calculate the score:      Age: 60 years      Sex: Female      Is Non- : No      Diabetic: No      Tobacco smoker: No      Systolic Blood Pressure: 136 mmHg      Is BP treated: No      HDL Cholesterol: 26 mg/dL      Total Cholesterol: 309 mg/dL     Results for orders placed or performed during the hospital encounter of 05/29/25   CBC with Auto Differential   Result Value Ref Range    WBC 6.6 3.5 - 11.0 k/uL    RBC 4.06 4.0 - 5.2 m/uL    Hemoglobin 12.4 12.0 - 16.0 g/dL    Hematocrit 38.6 36 - 46 %    MCV 94.9 80 - 100 fL    MCH 30.6 26 - 34 pg    MCHC 32.2 31 - 37 g/dL    RDW 15.5 (H) 12.5 - 15.4 %    Platelets 165 140 - 450 k/uL    MPV 6.8 6.0 - 12.0 fL    Neutrophils % 64 36 - 66 %    Lymphocytes % 14 (L) 24 - 44 %    Monocytes % 8 2 - 11 %    Eosinophils % 13 (H) 1 - 4 %    Basophils % 1 0 - 2 %    Neutrophils Absolute 4.30 1.8 - 7.7 k/uL    Lymphocytes Absolute 0.90 (L) 1.0 - 4.8 k/uL    Monocytes Absolute 0.50 0.1 - 1.2 k/uL    Eosinophils Absolute 0.80 (H) 0.0 - 0.4 k/uL    Basophils Absolute 0.00 0.0 - 0.2 k/uL   Comprehensive Metabolic Panel   Result Value Ref Range    Sodium 141 136 - 145 mmol/L    Potassium 4.1 3.7 - 5.3 mmol/L    Chloride 103 98 - 107 mmol/L    CO2 25 20 - 31 mmol/L    Anion Gap 13 9 - 16 mmol/L    Glucose 108 (H) 74 - 99 mg/dL    BUN

## 2025-06-10 ENCOUNTER — HOSPITAL ENCOUNTER (OUTPATIENT)
Dept: WOMENS IMAGING | Age: 60
Discharge: HOME | End: 2025-06-12
Attending: RADIOLOGY
Payer: COMMERCIAL

## 2025-06-10 ENCOUNTER — HOSPITAL ENCOUNTER (OUTPATIENT)
Dept: RADIATION ONCOLOGY | Age: 60
Discharge: HOME | End: 2025-06-10
Payer: COMMERCIAL

## 2025-06-10 ENCOUNTER — HOSPITAL ENCOUNTER (OUTPATIENT)
Dept: PHYSICAL THERAPY | Facility: CLINIC | Age: 60
Setting detail: THERAPIES SERIES
Discharge: HOME | End: 2025-06-10
Payer: COMMERCIAL

## 2025-06-10 VITALS — HEIGHT: 68 IN | BODY MASS INDEX: 34.25 KG/M2 | WEIGHT: 226 LBS

## 2025-06-10 DIAGNOSIS — Z87.898 HISTORY OF LUMP OF RIGHT BREAST: ICD-10-CM

## 2025-06-10 PROCEDURE — 97140 MANUAL THERAPY 1/> REGIONS: CPT

## 2025-06-10 PROCEDURE — 76642 ULTRASOUND BREAST LIMITED: CPT

## 2025-06-10 PROCEDURE — G0279 TOMOSYNTHESIS, MAMMO: HCPCS

## 2025-06-10 PROCEDURE — 77387 GUIDANCE FOR RADJ TX DLVR: CPT | Performed by: RADIOLOGY

## 2025-06-10 PROCEDURE — 97110 THERAPEUTIC EXERCISES: CPT

## 2025-06-10 PROCEDURE — 77412 RADIATION TX DELIVERY LVL 3: CPT | Performed by: RADIOLOGY

## 2025-06-10 NOTE — FLOWSHEET NOTE
[] [facility]  Outpatient Rehabilitation &  Therapy  [address]  [phone]  [phone] [] [facility]  Outpatient Rehabilitation &  Therapy  [address]  P: [phone]  F: [phone] [x] [facility]  Outpatient Rehabilitation &  Therapy  [address]  P: [phone]  F: [phone] [] [facility]  Outpatient Rehabilitation &  Therapy  [address]  [phone]  [phone] [] [facility]  Outpatient Rehabilitation &  Therapy  [address]   P: [phone]  F: [phone]  [] [facility]  Outpatient Rehabilitation &  Therapy  [address]  P: [phone]  F: [phone] [] [facility]  Outpatient Rehabilitation &  Therapy  [address]  P: [phone]  F: [phone] [] [facility]  Outpatient Rehabilitation &  Therapy  [address]  P: [phone]  F: [phone] [] [facility]  Outpatient Rehabilitation &  Therapy  [address]  P: [phone]  F: [phone]  [] [facility] Outpatient Rehabilitation &  Therapy  [address]  P: [phone]  F: [phone]     Physical Therapy Daily Treatment Note    Date:  6/10/2025  Patient Name:  Luci Castañeda    :  1965  MRN: [id number]  Physician: Jess Bradley              Insurance: R AFTER 30TH VISIT   Medical Diagnosis: C50.912 (ICD-10-CM) - Invasive ductal carcinoma of breast, left (HCC)               Rehab Codes: 25.612, R07.89, R29.3, 25.512, R53.0  Onset date: 24               Next Dr's appt.: ongoing  Visit# / total visits:      Cancels/No Shows: 0    Subjective:  Pt coming from radiation, states she is getting a little burned. L shoulder is painful, sore from holding position in radiation also. Sleep has been a little better.

## 2025-06-11 ENCOUNTER — HOSPITAL ENCOUNTER (OUTPATIENT)
Dept: RADIATION ONCOLOGY | Age: 60
Discharge: HOME | End: 2025-06-11
Payer: COMMERCIAL

## 2025-06-11 VITALS
DIASTOLIC BLOOD PRESSURE: 91 MMHG | HEART RATE: 75 BPM | WEIGHT: 226 LBS | TEMPERATURE: 98 F | BODY MASS INDEX: 34.36 KG/M2 | RESPIRATION RATE: 16 BRPM | SYSTOLIC BLOOD PRESSURE: 136 MMHG | OXYGEN SATURATION: 98 %

## 2025-06-11 PROCEDURE — 77412 RADIATION TX DELIVERY LVL 3: CPT | Performed by: RADIOLOGY

## 2025-06-11 PROCEDURE — 77336 RADIATION PHYSICS CONSULT: CPT | Performed by: RADIOLOGY

## 2025-06-11 PROCEDURE — 77387 GUIDANCE FOR RADJ TX DLVR: CPT | Performed by: RADIOLOGY

## 2025-06-11 ASSESSMENT — PAIN SCALES - GENERAL: PAINLEVEL_OUTOF10: 2

## 2025-06-11 NOTE — PROGRESS NOTES
[facility]       Radiation Oncology          [address]          [place] [id number]        O: [phone]        F: [phone]       [facility]             RADIATION ONCOLOGY WEEKLY PROGRESS NOTE  Patient ID:   Luci Castañeda  : 1965   MRN: [id number]    Location:  [facility] Radiation Oncology,   [address] [place] [address]   [phone]    DIAGNOSIS:  Left upper outer quadrant 1 o'clock position invasive ductal carcinoma of the left breast, grade 3, pT2 N0 M0, triple negative       TREATMENT DETAILS:  Treatment Site: Lt breast  Actual Dose: 3724cGy  Total Planned Dose: 4256 cGy, 1000 cGy bst  Treatment Technique: 3D-CRT  Fraction Technique: Daily  Therapy imaging monitoring: KV match daily with MV ports  Concurrent Chemotherapy: None    SUBJECTIVE:   Patient seen for their weekly on treatment evaluation today.  Reports some discomfort in the left breast, skin erythema but no breakdown.  Reports positional dizziness after changes in her blood pressure medication recently.  Denies fall.  Had a recent mammogram and ultrasound of the right breast which was given a BI-RADS 2.    OBJECTIVE:     ECO Asymptomatic    VITAL SIGNS: BP (!) 136/91   Pulse 75   Temp 98 °F (36.7 °C) (Bladder)   Resp 16   Wt 102.5 kg (226 lb)   SpO2 98%   BMI 34.36 kg/m²   Wt Readings from Last 5 Encounters:   25 102.5 kg (226 lb)   06/10/25 102.5 kg (226 lb)   25 102.5 kg (226 lb)   25 104 kg (229 lb 3.2 oz)   25 102.9 kg (226 lb 12.8 oz)     GENERAL:  General appearance is that of a well-nourished, well-developed in no apparent distress.  HEART:  Normal rate and regular rhythm  LUNGS:  Pulmonary effort normal.  ABDOMEN:  Soft, nontender, non distended  EXTREMITIES:  No edema.  No calf tenderness.  MSK:  No spinal tenderness. Normal ROM.  NEUROLOGICAL: Alert and oriented. Strength and sensation intact bilaterally. No focal

## 2025-06-11 NOTE — PROGRESS NOTES
Luci Castañeda  6/11/2025  Wt Readings from Last 3 Encounters:   06/11/25 102.5 kg (226 lb)   06/10/25 102.5 kg (226 lb)   06/09/25 102.5 kg (226 lb)     Body mass index is 34.36 kg/m².        Treatment Area: left breast    Patient was seen today for weekly visit.     Comfort Alteration    Fatigue: Moderate    Nutritional Alteration  Anorexia: No   Nausea: Yes  Vomiting: No     Mucous Membrane Alteration  Drainage: No  Lymphedema: Yes    Skin Alteration   Sensation:in tact    Radiation Dermatitis:  Intact [x]     Erythema  []     Discoloration  []     Rash []     Dry desquamation  []     Moist desquamation []       Emotional  Coping: effective      Injury, potential bleeding or infection: none currently    Lab Results   Component Value Date    WBC 6.6 05/29/2025    HGB 12.4 05/29/2025    HCT 38.6 05/29/2025     05/29/2025         BP (!) 136/91   Pulse 75   Temp 98 °F (36.7 °C) (Bladder)   Resp 16   Wt 102.5 kg (226 lb)   SpO2 98%   BMI 34.36 kg/m²      Pain Assessment: 0-10  Pain Level: 2           Assessment/Plan: Patient was seen today for weekly visit.  She arrives in wheelchair due to \"mild dizziness\" that she attributes to sinus congestion.    She expresses generalized pain in her joints.  She takes pain medication as needed- usually at night.  She reports pinkness to skin in treatment area.   No open areas or desquamation noted.  She continues to use antimicrobial sheets under left breast. Dr. Wan evaluated patient.  Continue plan of care.    Zohreh Boyd RN

## 2025-06-12 ENCOUNTER — HOSPITAL ENCOUNTER (OUTPATIENT)
Dept: RADIATION ONCOLOGY | Age: 60
Discharge: HOME | End: 2025-06-12
Payer: COMMERCIAL

## 2025-06-12 PROCEDURE — 77412 RADIATION TX DELIVERY LVL 3: CPT | Performed by: RADIOLOGY

## 2025-06-12 PROCEDURE — 77387 GUIDANCE FOR RADJ TX DLVR: CPT | Performed by: RADIOLOGY

## 2025-06-13 ENCOUNTER — HOSPITAL ENCOUNTER (OUTPATIENT)
Dept: RADIATION ONCOLOGY | Age: 60
Discharge: HOME | End: 2025-06-13
Payer: COMMERCIAL

## 2025-06-13 PROCEDURE — 77387 GUIDANCE FOR RADJ TX DLVR: CPT | Performed by: RADIOLOGY

## 2025-06-13 PROCEDURE — 77412 RADIATION TX DELIVERY LVL 3: CPT | Performed by: RADIOLOGY

## 2025-06-16 ENCOUNTER — HOSPITAL ENCOUNTER (OUTPATIENT)
Dept: RADIATION ONCOLOGY | Age: 60
Discharge: HOME | End: 2025-06-16
Payer: COMMERCIAL

## 2025-06-16 PROCEDURE — 77280 THER RAD SIMULAJ FIELD SMPL: CPT | Performed by: RADIOLOGY

## 2025-06-16 PROCEDURE — 77412 RADIATION TX DELIVERY LVL 3: CPT | Performed by: RADIOLOGY

## 2025-06-17 ENCOUNTER — HOSPITAL ENCOUNTER (OUTPATIENT)
Dept: RADIATION ONCOLOGY | Age: 60
Discharge: HOME | End: 2025-06-17
Payer: COMMERCIAL

## 2025-06-17 PROCEDURE — 77387 GUIDANCE FOR RADJ TX DLVR: CPT | Performed by: RADIOLOGY

## 2025-06-17 PROCEDURE — 77412 RADIATION TX DELIVERY LVL 3: CPT | Performed by: RADIOLOGY

## 2025-06-18 ENCOUNTER — HOSPITAL ENCOUNTER (OUTPATIENT)
Dept: RADIATION ONCOLOGY | Age: 60
Discharge: HOME | End: 2025-06-18
Payer: COMMERCIAL

## 2025-06-18 VITALS
RESPIRATION RATE: 16 BRPM | BODY MASS INDEX: 34.67 KG/M2 | HEART RATE: 85 BPM | WEIGHT: 228 LBS | OXYGEN SATURATION: 98 % | TEMPERATURE: 97.2 F | DIASTOLIC BLOOD PRESSURE: 95 MMHG | SYSTOLIC BLOOD PRESSURE: 156 MMHG

## 2025-06-18 PROCEDURE — 77412 RADIATION TX DELIVERY LVL 3: CPT | Performed by: RADIOLOGY

## 2025-06-18 PROCEDURE — 77387 GUIDANCE FOR RADJ TX DLVR: CPT | Performed by: RADIOLOGY

## 2025-06-18 ASSESSMENT — PAIN DESCRIPTION - LOCATION: LOCATION: GENERALIZED

## 2025-06-18 ASSESSMENT — PAIN SCALES - GENERAL: PAINLEVEL_OUTOF10: 3

## 2025-06-18 NOTE — PROGRESS NOTES
[facility]       Radiation Oncology          [address] [id number]        O: [phone]        F: [phone]       [facility]             RADIATION ONCOLOGY WEEKLY PROGRESS NOTE  Patient ID:   Luci Castañeda  : 1965   MRN: [id number]    Location:  [facility] Radiation Oncology,   [address] [place] [address]   [phone]    DIAGNOSIS:  Left upper outer quadrant 1 o'clock position invasive ductal carcinoma of the left breast, grade 3, pT2 N0 M0, triple negative       TREATMENT DETAILS:  Treatment Site: Lt breast  Actual Dose: 4256cGy, 750 cGy bst  Total Planned Dose: 4256 cGy, 1000 cGy bst  Treatment Technique: 3D-CRT  Fraction Technique: Daily  Therapy imaging monitoring: KV match daily with MV ports  Concurrent Chemotherapy: None    SUBJECTIVE:   Patient seen for their weekly on treatment evaluation today.  Reports some fatigue, minimal discomfort in the left breast, no skin breakdown in the treated area    OBJECTIVE:     ECO Asymptomatic    VITAL SIGNS: BP (!) 156/95   Pulse 85   Temp 97.2 °F (36.2 °C) (Temporal)   Resp 16   Wt 103.4 kg (228 lb)   SpO2 98%   BMI 34.67 kg/m²   Wt Readings from Last 5 Encounters:   25 103.4 kg (228 lb)   06/10/25 102.5 kg (226 lb)   25 102.6 kg (226 lb 3.2 oz)   25 102.5 kg (226 lb)   25 102.5 kg (226 lb)     GENERAL:  General appearance is that of a well-nourished, well-developed in no apparent distress.  HEART:  Normal rate and regular rhythm  LUNGS:  Pulmonary effort normal.  ABDOMEN:  Soft, nontender, non distended  EXTREMITIES:  No edema.  No calf tenderness.  MSK:  No spinal tenderness. Normal ROM.  NEUROLOGICAL: Alert and oriented. Strength and sensation intact bilaterally. No focal deficits.   PSYCH: Mood normal, behavior normal.      LABS:  WBC   Date Value Ref Range Status   2025 6.6 3.5 - 11.0 k/uL Final   2025 5.7 3.5 - 11.0

## 2025-06-18 NOTE — PROGRESS NOTES
Luci Castañeda  6/18/2025  Wt Readings from Last 3 Encounters:   06/18/25 103.4 kg (228 lb)   06/10/25 102.5 kg (226 lb)   06/12/25 102.6 kg (226 lb 3.2 oz)     Body mass index is 34.67 kg/m².        Treatment Area: left breast    Patient was seen today for weekly visit.     Comfort Alteration    Fatigue: Mild    Nutritional Alteration  Anorexia: No   Nausea: Yes   Vomiting: No     Mucous Membrane Alteration  Drainage: No  Lymphedema: No    Skin Alteration   Sensation: in tact    Radiation Dermatitis:  Intact [x]     Erythema  []     Discoloration  []     Rash []     Dry desquamation  []     Moist desquamation []       Emotional  Coping: effective      Injury, potential bleeding or infection: none currently    Lab Results   Component Value Date    WBC 6.6 05/29/2025    HGB 12.4 05/29/2025    HCT 38.6 05/29/2025     05/29/2025         BP (!) 156/95   Pulse 85   Temp 97.2 °F (36.2 °C) (Temporal)   Resp 16   Wt 103.4 kg (228 lb)   SpO2 98%   BMI 34.67 kg/m²      Pain Assessment: 0-10  Pain Level: 3           Assessment/Plan: Patient was seen today for weekly visit.  Ambulatory on arrival with the use of a wheelchair like a walker.  Pt states her usual generalized pain in her knees.  Skin is in tact.  She continues to moisturize twice daily and is using the white sheets.  She was instructed to continue moisturizing for about 2 weeks after treatment.  Provided with extra white sheets to take home.  She still has some limited range of motion on her left shoulder, but sees PT and does exercises at home that helps.  She will follow up in office in 3 months.    Andressa Costello RN

## 2025-06-19 ENCOUNTER — HOSPITAL ENCOUNTER (OUTPATIENT)
Dept: PHYSICAL THERAPY | Facility: CLINIC | Age: 60
Setting detail: THERAPIES SERIES
Discharge: HOME | End: 2025-06-19
Payer: COMMERCIAL

## 2025-06-19 ENCOUNTER — HOSPITAL ENCOUNTER (OUTPATIENT)
Age: 60
Discharge: HOME | End: 2025-06-19
Payer: COMMERCIAL

## 2025-06-19 ENCOUNTER — HOSPITAL ENCOUNTER (OUTPATIENT)
Dept: RADIATION ONCOLOGY | Age: 60
Discharge: HOME | End: 2025-06-19
Payer: COMMERCIAL

## 2025-06-19 ENCOUNTER — CLINICAL DOCUMENTATION (OUTPATIENT)
Dept: RADIATION ONCOLOGY | Age: 60
End: 2025-06-19

## 2025-06-19 DIAGNOSIS — C50.912 INVASIVE DUCTAL CARCINOMA OF BREAST, LEFT (HCC): Primary | ICD-10-CM

## 2025-06-19 DIAGNOSIS — Z17.1 MALIGNANT NEOPLASM OF UPPER-OUTER QUADRANT OF LEFT BREAST IN FEMALE, ESTROGEN RECEPTOR NEGATIVE (HCC): ICD-10-CM

## 2025-06-19 DIAGNOSIS — C50.412 MALIGNANT NEOPLASM OF UPPER-OUTER QUADRANT OF LEFT BREAST IN FEMALE, ESTROGEN RECEPTOR NEGATIVE (HCC): ICD-10-CM

## 2025-06-19 DIAGNOSIS — C50.912 INVASIVE DUCTAL CARCINOMA OF BREAST, LEFT (HCC): ICD-10-CM

## 2025-06-19 LAB
ALBUMIN SERPL-MCNC: 4.2 G/DL (ref 3.5–5.2)
ALBUMIN/GLOB SERPL: 1.4 {RATIO} (ref 1–2.5)
ALP SERPL-CCNC: 124 U/L (ref 35–104)
ALT SERPL-CCNC: 23 U/L (ref 10–35)
ANION GAP SERPL CALCULATED.3IONS-SCNC: 11 MMOL/L (ref 9–16)
AST SERPL-CCNC: 34 U/L (ref 10–35)
BASOPHILS # BLD: 0 K/UL (ref 0–0.2)
BASOPHILS NFR BLD: 0 % (ref 0–2)
BILIRUB SERPL-MCNC: 0.4 MG/DL (ref 0–1.2)
BUN SERPL-MCNC: 17 MG/DL (ref 8–23)
CALCIUM SERPL-MCNC: 9.7 MG/DL (ref 8.6–10.4)
CHLORIDE SERPL-SCNC: 103 MMOL/L (ref 98–107)
CO2 SERPL-SCNC: 26 MMOL/L (ref 20–31)
CREAT SERPL-MCNC: 0.9 MG/DL (ref 0.6–0.9)
EOSINOPHIL # BLD: 0.4 K/UL (ref 0–0.4)
EOSINOPHILS RELATIVE PERCENT: 6 % (ref 1–4)
ERYTHROCYTE [DISTWIDTH] IN BLOOD BY AUTOMATED COUNT: 15.5 % (ref 12.5–15.4)
GFR, ESTIMATED: 72 ML/MIN/1.73M2
GLUCOSE SERPL-MCNC: 110 MG/DL (ref 74–99)
HCT VFR BLD AUTO: 38.3 % (ref 36–46)
HGB BLD-MCNC: 12.6 G/DL (ref 12–16)
LYMPHOCYTES NFR BLD: 0.7 K/UL (ref 1–4.8)
LYMPHOCYTES RELATIVE PERCENT: 11 % (ref 24–44)
MAGNESIUM SERPL-MCNC: 1.4 MG/DL (ref 1.6–2.4)
MCH RBC QN AUTO: 30.9 PG (ref 26–34)
MCHC RBC AUTO-ENTMCNC: 33 G/DL (ref 31–37)
MCV RBC AUTO: 93.8 FL (ref 80–100)
MONOCYTES NFR BLD: 0.6 K/UL (ref 0.1–1.2)
MONOCYTES NFR BLD: 10 % (ref 2–11)
NEUTROPHILS NFR BLD: 73 % (ref 36–66)
NEUTS SEG NFR BLD: 4.6 K/UL (ref 1.8–7.7)
PLATELET # BLD AUTO: 139 K/UL (ref 140–450)
PMV BLD AUTO: 6.9 FL (ref 6–12)
POTASSIUM SERPL-SCNC: 4.4 MMOL/L (ref 3.7–5.3)
PROT SERPL-MCNC: 7.2 G/DL (ref 6.6–8.7)
RBC # BLD AUTO: 4.09 M/UL (ref 4–5.2)
SODIUM SERPL-SCNC: 140 MMOL/L (ref 136–145)
TSH SERPL DL<=0.05 MIU/L-ACNC: 2.16 UIU/ML (ref 0.27–4.2)
WBC OTHER # BLD: 6.3 K/UL (ref 3.5–11)

## 2025-06-19 PROCEDURE — 97140 MANUAL THERAPY 1/> REGIONS: CPT

## 2025-06-19 PROCEDURE — 97110 THERAPEUTIC EXERCISES: CPT

## 2025-06-19 PROCEDURE — 77387 GUIDANCE FOR RADJ TX DLVR: CPT | Performed by: RADIOLOGY

## 2025-06-19 PROCEDURE — 80053 COMPREHEN METABOLIC PANEL: CPT

## 2025-06-19 PROCEDURE — 84443 ASSAY THYROID STIM HORMONE: CPT

## 2025-06-19 PROCEDURE — 36415 COLL VENOUS BLD VENIPUNCTURE: CPT

## 2025-06-19 PROCEDURE — 85025 COMPLETE CBC W/AUTO DIFF WBC: CPT

## 2025-06-19 PROCEDURE — 77412 RADIATION TX DELIVERY LVL 3: CPT | Performed by: RADIOLOGY

## 2025-06-19 PROCEDURE — 83735 ASSAY OF MAGNESIUM: CPT

## 2025-06-19 NOTE — PROGRESS NOTES
[facility]            Radiation Oncology          [address] [place] [address]        O: [phone]        F: [phone]       [facility]           Dear Dr Spencer ref. provider found:    Thank you for referring Luci Castañeda to me for evaluation and treatment. Below is a summary of the patient's recently completed radiation course.  If you have questions, please do not hesitate to call me. I look forward to following this patient along with you.    Sincerely,  Electronically signed by Gial Wan MD on 2025 at 9:13 AM EDT      CC: Patient Care Team:  Nabil Ly MD as PCP - General (Internal Medicine)  Nabil Ly MD as PCP - Empaneled Provider  Bismark Amaya MD as Consulting Physician (Urology)  Patricio Bearden MD as Consulting Physician  Edy Ham MD as Consulting Physician (Pulmonology)  Veronica Bennett RN as Nurse Navigator (Oncology)  ------------------------------------------------------------------------------------------------------------------------------------------------------------------------------------------        Date of Service: 2025     Location:  [facility] Radiation Oncology,   [address] [place] [address] [phone]        RADIATION ONCOLOGY END OF TREATMENT SUMMARY:    Patient ID:   Luci Castañeda  : 1965   MRN: [id number]    DIAGNOSIS:  Left upper outer quadrant 1 o'clock position invasive ductal carcinoma of the left breast, grade 3, pT2 N0 M0, triple negative       TREATMENT DETAILS:  Treatment Site: Lt breast  Actual Dose: 4256cGy, 1000 cGy bst  Total Planned Dose: 4256 cGy, 1000 cGy bst  Treatment Technique: 3D-CRT  Fraction Technique: Daily  Therapy imaging monitoring: KV match daily with MV ports  Concurrent Chemotherapy: None  Total number of fractions: 20  Treatment days: 2025 to 2025    CLINICAL COURSE:    ECOG 1    Patient completed her course of

## 2025-06-19 NOTE — FLOWSHEET NOTE
[] [facility]  Outpatient Rehabilitation &  Therapy  [address]  [phone]  [phone] [] [facility]  Outpatient Rehabilitation &  Therapy  [address]  P: [phone]  F: [phone] [x] [facility]  Outpatient Rehabilitation &  Therapy  [address]  P: [phone]  F: [phone] [] [facility]  Outpatient Rehabilitation &  Therapy  [address]  [phone]  [phone] [] [facility]  Outpatient Rehabilitation &  Therapy  [address]   P: [phone]  F: [phone]  [] [facility]  Outpatient Rehabilitation &  Therapy  [address]  P: [phone]  F: [phone] [] [facility]  Outpatient Rehabilitation &  Therapy  [address]  P: [phone]  F: [phone] [] [facility]  Outpatient Rehabilitation &  Therapy  [address]  P: [phone]  F: [phone] [] [facility]  Outpatient Rehabilitation &  Therapy  [address]  P: [phone]  F: [phone]  [] [facility] Outpatient Rehabilitation &  Therapy  [address]  P: [phone]  F: [phone]     Physical Therapy Daily Treatment Note    Date:  2025  Patient Name:  Luci Castañeda    :  1965  MRN: [id number]  Physician: Jess Bradley              Insurance: R AFTER 30TH VISIT   Medical Diagnosis: C50.912 (ICD-10-CM) - Invasive ductal carcinoma of breast, left (HCC)               Rehab Codes: 25.612, R07.89, R29.3, 25.512, R53.0  Onset date: 24               Next Dr's appt.: ongoing  Visit# / total visits:      Cancels/No Shows: 0    Subjective:  Pt states she cont's w/ a lot of joint achiness bettie knees. Coming from last radiation today, has last keytruda infusion tomorrow. Still struggling with taking a nap,

## 2025-06-20 ENCOUNTER — TELEPHONE (OUTPATIENT)
Age: 60
End: 2025-06-20

## 2025-06-20 ENCOUNTER — CLINICAL DOCUMENTATION (OUTPATIENT)
Age: 60
End: 2025-06-20

## 2025-06-20 ENCOUNTER — OFFICE VISIT (OUTPATIENT)
Age: 60
End: 2025-06-20
Payer: COMMERCIAL

## 2025-06-20 ENCOUNTER — HOSPITAL ENCOUNTER (OUTPATIENT)
Dept: INFUSION THERAPY | Age: 60
Discharge: HOME | End: 2025-06-20
Payer: COMMERCIAL

## 2025-06-20 VITALS — OXYGEN SATURATION: 98 % | HEART RATE: 72 BPM | DIASTOLIC BLOOD PRESSURE: 91 MMHG | SYSTOLIC BLOOD PRESSURE: 140 MMHG

## 2025-06-20 VITALS
HEART RATE: 82 BPM | BODY MASS INDEX: 34.67 KG/M2 | OXYGEN SATURATION: 97 % | TEMPERATURE: 96.8 F | WEIGHT: 228 LBS | RESPIRATION RATE: 18 BRPM | DIASTOLIC BLOOD PRESSURE: 96 MMHG | SYSTOLIC BLOOD PRESSURE: 144 MMHG

## 2025-06-20 DIAGNOSIS — C50.412 MALIGNANT NEOPLASM OF UPPER-OUTER QUADRANT OF LEFT BREAST IN FEMALE, ESTROGEN RECEPTOR NEGATIVE (HCC): Primary | ICD-10-CM

## 2025-06-20 DIAGNOSIS — Z17.1 MALIGNANT NEOPLASM OF UPPER-OUTER QUADRANT OF LEFT BREAST IN FEMALE, ESTROGEN RECEPTOR NEGATIVE (HCC): Primary | ICD-10-CM

## 2025-06-20 PROCEDURE — 96366 THER/PROPH/DIAG IV INF ADDON: CPT

## 2025-06-20 PROCEDURE — 99215 OFFICE O/P EST HI 40 MIN: CPT | Performed by: INTERNAL MEDICINE

## 2025-06-20 PROCEDURE — 2580000003 HC RX 258: Performed by: INTERNAL MEDICINE

## 2025-06-20 PROCEDURE — 96413 CHEMO IV INFUSION 1 HR: CPT

## 2025-06-20 PROCEDURE — 6360000002 HC RX W HCPCS: Performed by: INTERNAL MEDICINE

## 2025-06-20 PROCEDURE — 3077F SYST BP >= 140 MM HG: CPT | Performed by: INTERNAL MEDICINE

## 2025-06-20 PROCEDURE — 96375 TX/PRO/DX INJ NEW DRUG ADDON: CPT

## 2025-06-20 PROCEDURE — 3080F DIAST BP >= 90 MM HG: CPT | Performed by: INTERNAL MEDICINE

## 2025-06-20 PROCEDURE — 2500000003 HC RX 250 WO HCPCS: Performed by: INTERNAL MEDICINE

## 2025-06-20 PROCEDURE — 96367 TX/PROPH/DG ADDL SEQ IV INF: CPT

## 2025-06-20 RX ORDER — FAMOTIDINE 10 MG/ML
20 INJECTION, SOLUTION INTRAVENOUS
Status: COMPLETED | OUTPATIENT
Start: 2025-06-20 | End: 2025-06-20

## 2025-06-20 RX ORDER — ONDANSETRON 2 MG/ML
8 INJECTION INTRAMUSCULAR; INTRAVENOUS
Status: COMPLETED | OUTPATIENT
Start: 2025-06-20 | End: 2025-06-20

## 2025-06-20 RX ORDER — SODIUM CHLORIDE 0.9 % (FLUSH) 0.9 %
5-40 SYRINGE (ML) INJECTION PRN
Status: DISCONTINUED | OUTPATIENT
Start: 2025-06-20 | End: 2025-06-21 | Stop reason: HOSPADM

## 2025-06-20 RX ORDER — MAGNESIUM SULFATE IN WATER 40 MG/ML
2000 INJECTION, SOLUTION INTRAVENOUS ONCE
Status: COMPLETED | OUTPATIENT
Start: 2025-06-20 | End: 2025-06-20

## 2025-06-20 RX ORDER — HEPARIN 100 UNIT/ML
500 SYRINGE INTRAVENOUS PRN
Status: DISCONTINUED | OUTPATIENT
Start: 2025-06-20 | End: 2025-06-21 | Stop reason: HOSPADM

## 2025-06-20 RX ADMIN — MAGNESIUM SULFATE HEPTAHYDRATE 2000 MG: 40 INJECTION, SOLUTION INTRAVENOUS at 09:46

## 2025-06-20 RX ADMIN — HEPARIN 500 UNITS: 100 SYRINGE at 12:26

## 2025-06-20 RX ADMIN — FAMOTIDINE 20 MG: 10 INJECTION, SOLUTION INTRAVENOUS at 11:42

## 2025-06-20 RX ADMIN — SODIUM CHLORIDE 200 MG: 9 INJECTION, SOLUTION INTRAVENOUS at 11:32

## 2025-06-20 RX ADMIN — SODIUM CHLORIDE, PRESERVATIVE FREE 10 ML: 5 INJECTION INTRAVENOUS at 12:26

## 2025-06-20 RX ADMIN — ONDANSETRON 8 MG: 2 INJECTION, SOLUTION INTRAMUSCULAR; INTRAVENOUS at 11:42

## 2025-06-20 NOTE — TELEPHONE ENCOUNTER
Instructions   from Dr. Jess Bradley MD    Proceed with Keytruda  Start Xeloda  RV about 4-6 weeks with CBC, CMP      RV on 7/18/25 @578h  Obtain lab results

## 2025-06-20 NOTE — PROGRESS NOTES
[facility] Outpatient Oncology/Hematology Progress Note: [facility]    Situation: Writer was present with Patient when she banged the gong in celebration of her last chemotherapy treatment. Writer remained in conversation with Pt after the celebration.     Assessment: Pt was tearful as she thanked the staff who were present for her celebration and banging of the gong to jassi her last chemotherapy treatment. Pt received and offered hugs to her care team. Pt stated her intention to return and visit them, noting that she will miss them.     Pt remained in conversation in the waiting area. Pt processed her thoughts and feelings about reaching this time in her treatment. Pt acknowledged how sick she was at times during the 13 months of treatment and how she has never before been through as difficult a health challenge. Pt credited God and God's answering of her prayers with her recovery. Pt voiced hopes that God will use her and her experience to help others.    Pt shared about the disappointing development with her treatment plan today and how it has affected her. Pt voiced her concerns about possible side effects of the chemo pill she will take the next 6 months. Pt expressed gratitude for her support system as well as acknowledged the challenges of her station in life. Pt voiced uncertainty about the future and mentioned her role as care partner to her mother and sister. Pt named her sister who lives out of town as the person with whom she is closest.     Pt is interested in accessing the services at [facility] and is aware of the importance of staying socially connected. Pt spoke of the activities she and her neighbors are doing and how she is looking forward to more.       Pt was receptive to prayer.     Intervention:  Writer inquired about Pt's coping and needs. Writer explored Pt's sources of support and strength. Writer offered supportive presence and active listening. Writer affirmed Pt's

## 2025-06-20 NOTE — PROGRESS NOTES
[facility]: Nutrition Note  Briefly met with patient at MO appt for nutrition f/u. Pt states she is tolerating diet well and appetite is good. Trying to figure out meals/snacking and listening to hunger cues. Working on snack selection. Discouraged by continuation of weight regain. Weight range last month:   Wt Readings from Last 20 Encounters:   06/20/25 103.4 kg (228 lb)   06/18/25 103.4 kg (228 lb)   06/10/25 102.5 kg (226 lb)   06/12/25 102.6 kg (226 lb 3.2 oz)   06/11/25 102.5 kg (226 lb)   06/09/25 102.5 kg (226 lb)   06/04/25 104 kg (229 lb 3.2 oz)   05/30/25 102.9 kg (226 lb 12.8 oz)   05/28/25 103 kg (227 lb)   Writer only able to meet with pt briefly as she was seen by physician, then wasn't feeling well during infusion. Writer will f/u with patient on a later date. Also, previously encouraged patient to contact writer as needed.     Josie Alberto RD, LD, CNSC  Registered Dietitian  [facility]   [phone]

## 2025-06-20 NOTE — PROGRESS NOTES
Chief Complaint   Patient presents with    Follow-up     Review status of disease     Vision Problem     DIAGNOSIS:        Clinical stage T2 N0 M0 left breast upper outer quadrant invasive ductal carcinoma, grade 3, ER negative, ND negative, HER2/carol not amplified.  Post neoadjuvant treatment pathological stage of T2 N0 M0 invasive ductal carcinoma.  Repeated biomarkers ER negative, ND negative, HER2/carol not amplified.  History of left nephrectomy in 1988  Saddle PE s/p thrombectomy mid December 2024.  CURRENT THERAPY:         Neoadjuvant treatment with Keytruda/Taxol/carboplatin/Adriamycin/Cytoxan.  Treatment started 5/31/2024. Completed one year of Keytruda 6/20/25.  S/p thrombectomy for saddle PE in December 2024.   DOAC for PE.   Status post lumpectomy 2/24/25  XRT was completed 6/19/25  Adjuvant Xeloda started July 2025  BRIEF CASE HISTORY:       Ms. Luci Castañeda is a very pleasant 60 y.o. female with history of multiple comorbidities as listed.  She had history of left nephrectomy in 1988.  She had cardiac cath in 2021 for left ventricular hypertrophy.  Had recent problems with restrictive and obstructive pulmonary disease.  She continues follow-up with pulmonary. She was doing fine with routine mammogram being normal until this year where she had suspicious abnormalities in the mammogram done on 3/25/2024.  Diagnostic mammogram and left breast ultrasound April 9, 2024 showed suspicious 4.5 cm mass in the left breast 1 o'clock position.  Mildly thickened left axillary lymph nodes.  A biopsy was performed on 4/26/2024 and unfortunately that was positive for invasive ductal carcinoma.  Triple negative.  Biopsy from lymph node was negative for malignancy.  The patient is seen today for further management of her breast cancer. The patient did very well after her biopsy without any complication. The patient had no symptom preceding her diagnosis of cancer. No chest pain, shortness of breath, cough,

## 2025-06-20 NOTE — PROGRESS NOTES
Pt here for C.14D.1 Keytruda.  Arrives ambulatory.  Denies any new complaints.  Labs drawn from port, results reviewed.  Pt was seen by Dr. Bradley, order rec'd to proceed with tx.  2G magnesium given.  Tx complete without incident.  Pt d/c'd in stable condition.  Returns 7/18 for MD visit and port draw.

## 2025-06-23 DIAGNOSIS — Z17.1 MALIGNANT NEOPLASM OF UPPER-OUTER QUADRANT OF LEFT BREAST IN FEMALE, ESTROGEN RECEPTOR NEGATIVE (HCC): Primary | ICD-10-CM

## 2025-06-23 DIAGNOSIS — C50.412 MALIGNANT NEOPLASM OF UPPER-OUTER QUADRANT OF LEFT BREAST IN FEMALE, ESTROGEN RECEPTOR NEGATIVE (HCC): Primary | ICD-10-CM

## 2025-06-24 ENCOUNTER — HOSPITAL ENCOUNTER (OUTPATIENT)
Dept: PHYSICAL THERAPY | Facility: CLINIC | Age: 60
Setting detail: THERAPIES SERIES
Discharge: HOME | End: 2025-06-24
Payer: COMMERCIAL

## 2025-06-24 PROCEDURE — 97110 THERAPEUTIC EXERCISES: CPT

## 2025-06-24 PROCEDURE — 97140 MANUAL THERAPY 1/> REGIONS: CPT

## 2025-06-24 NOTE — FLOWSHEET NOTE
Modalities     [x]  Ther Exercise 10 1   [x]  Manual Therapy 45 3   []  Ther Activities     []  Neuro Re-ed     []  Vasocompression     [] Gait     []  Other     Total Billable time 55 4       Assessment: [x] Progressing toward goals. Pt cont's w/ L knee swelling and very antalgic gait w/ difficulty sit to stand. Mod TTP pec, subscapular M's and periscapular muscles.  Cont 'd with PORi breast protocol to L upper quarter to promote tension relief and stimulate lymphatic flow. Tissue restrictions along L lateral breast incision, mod firmness, slight erythema across breast and inferior breast. Encouraged cont 'd moisturizing and protecting her skin.  Decr pain noted w/ L  shoulder movement today. Completed HEP per log w/ emphasis on comfortable ROM not pushing to pain. Added SL scapular stab ex and wall slides this date w/ fair sadia. Briefly rev aquatic ex (UE and LE) for home pool workouts. Again encouraged rest prn, eating/drinking, daily activity and self massage prn. Will cont with manual and ex progression 1x weekly as tolerated.     [] No change.     [x] Other: 5/1/25 re assess:  cerv AROM decr 50%, very tight. L shoulder AROM flex 92, abd 64, ER 44, IR L2. BFI 7.7.  UEFS 49/80, 39% deficit  [x] Patient would benefit from physical therapy in order to promote optimal healing and to reduce muscular/fascial restriction, improve body posture to decrease strain to surrounding tissue, restore shoulder/scapular flexibility and strength for improvement in function and quality of life       STG: (to be met in 12 treatments) 5/1/25 No goals met at this time, multiple complications w/ treatment        1. ? Pain: Stabilize shoulder and chest pain and ensure optimal management of pain during all ADLs ( home, occupation, community and recreation)        2. ? ROM: Optimize AROM of L shoulder to be able to position arm for radiation MET        3. ? Strength:  Bilat shoulders grossly 5/5, incr  by 2+ lbs and demo good

## 2025-06-25 RX ORDER — CAPECITABINE 500 MG/1
TABLET, FILM COATED ORAL
Qty: 112 TABLET | Refills: 5 | Status: SHIPPED | OUTPATIENT
Start: 2025-06-25

## 2025-06-26 NOTE — PROGRESS NOTES
[facility]  [address]  [address]  [place] [address]  Dept: [phone]    Chief complaint:   Chief Complaint   Patient presents with    Annual Exam       History Present Illness: Luci is a 61 yo female who presents for her annual exam.  She is doing well and finished chemo 11/19.  She had her left breast lumpectomy 2/24 and finished her radiation last week.  They are going to do another round of chemo since her mass was larger.  She says that her chemo was rough.  She also developed a PE and gout during treatments.  She is following with Dr Bradley.  She is not sexually active at this time.  She denies any vaginal discharge or irritation.  She denies any PMB.  She denies any pelvic pain.     Current Medications (OTC/Herbal):   Current Outpatient Medications   Medication Sig Dispense Refill    capecitabine (XELODA) 500 MG chemo tablet Take 4 tablets PO BID daily for 14 days followed by 7 days off. 112 tablet 5    carvedilol (COREG) 6.25 MG tablet Take 1 tablet by mouth 2 times daily 180 tablet 1    betamethasone valerate (VALISONE) 0.1 % cream Apply topically 2 times daily. 45 g 2    Meloxicam 5 MG CAPS Take by mouth Pt restarted taking recently 05/02/25      clotrimazole-betamethasone (LOTRISONE) 1-0.05 % cream Apply topically 2 times daily. 45 g 1    methocarbamol (ROBAXIN) 500 MG tablet Take 1 tablet by mouth 3 times daily as needed Taking at night      ondansetron (ZOFRAN) 4 MG tablet Take 1 tablet by mouth 3 times daily as needed for Nausea or Vomiting 100 tablet 1    colchicine (COLCRYS) 0.6 MG tablet Take 1 tablet by mouth daily 30 tablet 3    lidocaine-prilocaine (EMLA) 2.5-2.5 % cream Apply topically as needed. 30 g 1    apixaban (ELIQUIS) 5 MG TABS tablet Take 1 tablet by mouth 2 times daily 180 tablet 1    albuterol sulfate HFA (PROAIR HFA) 108 (90 Base) MCG/ACT inhaler Inhale 2 puffs into the lungs every 6 hours

## 2025-06-27 ENCOUNTER — OFFICE VISIT (OUTPATIENT)
Dept: OBGYN CLINIC | Age: 60
End: 2025-06-27

## 2025-06-27 VITALS — WEIGHT: 226.2 LBS | BODY MASS INDEX: 34.39 KG/M2 | DIASTOLIC BLOOD PRESSURE: 88 MMHG | SYSTOLIC BLOOD PRESSURE: 130 MMHG

## 2025-06-27 DIAGNOSIS — Z01.419 ENCOUNTER FOR GYNECOLOGICAL EXAMINATION: Primary | ICD-10-CM

## 2025-06-27 ASSESSMENT — ENCOUNTER SYMPTOMS
COUGH: 0
SHORTNESS OF BREATH: 0
BACK PAIN: 1
ABDOMINAL PAIN: 0

## 2025-07-01 ENCOUNTER — HOSPITAL ENCOUNTER (OUTPATIENT)
Dept: PHYSICAL THERAPY | Facility: CLINIC | Age: 60
Setting detail: THERAPIES SERIES
Discharge: HOME | End: 2025-07-01
Payer: COMMERCIAL

## 2025-07-01 ENCOUNTER — TRANSCRIBE ORDERS (OUTPATIENT)
Dept: ULTRASOUND IMAGING | Age: 60
End: 2025-07-01

## 2025-07-01 DIAGNOSIS — Z87.442 PERSONAL HISTORY OF URINARY CALCULI: Primary | ICD-10-CM

## 2025-07-01 PROCEDURE — 97140 MANUAL THERAPY 1/> REGIONS: CPT

## 2025-07-01 PROCEDURE — 97110 THERAPEUTIC EXERCISES: CPT

## 2025-07-01 NOTE — FLOWSHEET NOTE
[] [facility]  Outpatient Rehabilitation &  Therapy  [address]  [phone]  [phone] [] [facility]  Outpatient Rehabilitation &  Therapy  [address]  P: [phone]  F: [phone] [x] [facility]  Outpatient Rehabilitation &  Therapy  [address]  P: [phone]  F: [phone] [] [facility]  Outpatient Rehabilitation &  Therapy  [address]  [phone]  [phone] [] [facility]  Outpatient Rehabilitation &  Therapy  [address]   P: [phone]  F: [phone]  [] [facility]  Outpatient Rehabilitation &  Therapy  [address]  P: [phone]  F: [phone] [] [facility]  Outpatient Rehabilitation &  Therapy  [address]  P: [phone]  F: [phone] [] [facility]  Outpatient Rehabilitation &  Therapy  [address]  P: [phone]  F: [phone] [] [facility]  Outpatient Rehabilitation &  Therapy  [address]  P: [phone]  F: [phone]  [] [facility] Outpatient Rehabilitation &  Therapy  [address]  P: [phone]  F: [phone]     Physical Therapy Daily Treatment Note    Date:  2025  Patient Name:  Luci Castañeda    :  1965  MRN: [id number]  Physician: Jess Bradley              Insurance: R AFTER 30TH VISIT   Medical Diagnosis: C50.912 (ICD-10-CM) - Invasive ductal carcinoma of breast, left (HCC)               Rehab Codes: 25.612, R07.89, R29.3, 25.512, R53.0  Onset date: 24               Next Dr's appt.: ongoing  Visit# / total visits:      Cancels/No Shows: 0    Subjective:  Pt cont's w/ joint achiness and L shoulder joint pain, notes it catches at times. Sore and sensitive area side of L breast. Compliant w/ HEP and doing water ex in

## 2025-07-02 ENCOUNTER — HOSPITAL ENCOUNTER (OUTPATIENT)
Dept: ULTRASOUND IMAGING | Age: 60
Discharge: HOME | End: 2025-07-04
Payer: COMMERCIAL

## 2025-07-02 DIAGNOSIS — Z87.442 PERSONAL HISTORY OF URINARY CALCULI: ICD-10-CM

## 2025-07-02 PROCEDURE — 76775 US EXAM ABDO BACK WALL LIM: CPT

## 2025-07-07 ENCOUNTER — PHARMACY VISIT (OUTPATIENT)
Age: 60
End: 2025-07-07

## 2025-07-07 DIAGNOSIS — C50.912 INVASIVE DUCTAL CARCINOMA OF BREAST, LEFT (HCC): Primary | ICD-10-CM

## 2025-07-07 NOTE — PROGRESS NOTES
week   Clinical pharmacist will follow up prn    Electronically signed by Yi Pelletier RPH on 2025 at 1:10 PM    For internal tracking purposes only:  For Pharmacy Admin Tracking Only    Program: Medication Management  CPA in place:  Yes  Recommendation Provided To: Patient/Caregiver: 4 via Telephone  Intervention Detail: Adherence Monitorin  Intervention Accepted By: Patient/Caregiver: 4  Gap Closed?: Yes   Time Spent (min): 60

## 2025-07-08 ENCOUNTER — TELEPHONE (OUTPATIENT)
Dept: INFUSION THERAPY | Age: 60
End: 2025-07-08

## 2025-07-08 NOTE — TELEPHONE ENCOUNTER
RN notified of drug interaction between xeloda and allopurinol.  Patient was advised to stop allopurinol by RN.  Pharmacist told her to contact her PCP regarding above and Dr. Walker said to stop allopurinol.  Patient updated.

## 2025-07-10 ENCOUNTER — HOSPITAL ENCOUNTER (OUTPATIENT)
Dept: PHYSICAL THERAPY | Facility: CLINIC | Age: 60
Setting detail: THERAPIES SERIES
Discharge: HOME | End: 2025-07-10
Payer: COMMERCIAL

## 2025-07-10 PROCEDURE — 97110 THERAPEUTIC EXERCISES: CPT

## 2025-07-10 PROCEDURE — 97140 MANUAL THERAPY 1/> REGIONS: CPT

## 2025-07-10 NOTE — FLOWSHEET NOTE
[] [facility]  Outpatient Rehabilitation &  Therapy  [address]  [phone]  [phone] [] [facility]  Outpatient Rehabilitation &  Therapy  [address]  P: [phone]  F: [phone] [x] [facility]  Outpatient Rehabilitation &  Therapy  [address]  P: [phone]  F: [phone] [] [facility]  Outpatient Rehabilitation &  Therapy  [address]  [phone]  [phone] [] [facility]  Outpatient Rehabilitation &  Therapy  [address]   P: [phone]  F: [phone]  [] [facility]  Outpatient Rehabilitation &  Therapy  [address]  P: [phone]  F: [phone] [] [facility]  Outpatient Rehabilitation &  Therapy  [address]  P: [phone]  F: [phone] [] [facility]  Outpatient Rehabilitation &  Therapy  [address]  P: [phone]  F: [phone] [] [facility]  Outpatient Rehabilitation &  Therapy  [address]  P: [phone]  F: [phone]  [] [facility] Outpatient Rehabilitation &  Therapy  [address]  P: [phone]  F: [phone]     Physical Therapy Daily Treatment Note    Date:  7/10/2025  Patient Name:  Luci Castañeda    :  1965  MRN: [id number]  Physician: Jess Bradley              Insurance: R AFTER 30TH VISIT   Medical Diagnosis: C50.912 (ICD-10-CM) - Invasive ductal carcinoma of breast, left (HCC)               Rehab Codes: 25.612, R07.89, R29.3, 25.512, R53.0  Onset date: 24               Next Dr's appt.: ongoing  Visit# / total visits: 15/18     Cancels/No Shows: 0    Subjective:  Pt cont's w/ joint achiness, feels arthritic pain. Has been using home pool almost daily and feels beneficial. Notes open area on L lateral breast, peeling. \"I feel

## 2025-07-14 ENCOUNTER — HOSPITAL ENCOUNTER (OUTPATIENT)
Dept: PHYSICAL THERAPY | Facility: CLINIC | Age: 60
Setting detail: THERAPIES SERIES
Discharge: HOME | End: 2025-07-14
Payer: COMMERCIAL

## 2025-07-14 PROCEDURE — 97140 MANUAL THERAPY 1/> REGIONS: CPT

## 2025-07-14 PROCEDURE — 97110 THERAPEUTIC EXERCISES: CPT

## 2025-07-14 NOTE — FLOWSHEET NOTE
so far, notes some fatigue and a little nausea.  Pain:  [x] Yes  [] No Location:  L shoulder, chest    Pain Rating: (0-10 scale) 2/10 L breast tender,  6/10 L>R knee, stiff   Pain altered Tx:  [x] No  [] Yes  Action:  Comments:     Objective:  Modalities:   Precautions: L BrCa triple neg, Stg T2N0M0. Port R chest, planning lumpectomy/mastectomy. Neoadjuvant chemoimmunotherapy -Taxol carboplatin followed by dose dense AC 5/31/24-11/21/24.   Keytruda on day 1 every 3 weeks for 1 year 5/31/24- 5/31/25?  Radiation planned after chemo/surg for 4 weeks (Start: 5/22-6/19/25). S/p thrombectomy for saddle PE in December 2024. DOAC for PE. Status post lumpectomy 2/24/25. Xeloda (6 months) 6/2025?  R TKA, needs L TKA, L shoulder RTC repair, intention tremors  Manual: Utilized PORi breast protocol to LEFT upper quarter for gentle manual lymphatic drainage, myofascial release and joint mobility to facilitate better function, ROM, tissue mobility and dynamics of lymph system.   Exercises:  Exercise Reps/ Time Weight/ Level Comments             Diaphragmatic breathing 5 cycles       Elbow winging  1m  Hands on forehead   Supine cane flexion  10x               Bye bye's 3x ea   Flex, abd   Scapular retraction 10x       Post shld rolls 10x       Bicep curl  10x  HEP AROM   /squeeze 10x  rev W/ ball or towel at home   Toe scrunches/splay 10x rev          SL ER, abd, HAB 10x *    Cerv rot, UT, lev stretch 3x30 ea           SKTC 1x HEP Could only sadia 1 rep today   LTR 10x HEP          Wall flexion slides  10x  *     Doorway pec stretch (advanced)                 Seated: HT raises, marches, LAQ 10x ea HEP          Aquatic ex x HEP Home pool       Other: L shoulder AROM supine: flex 121° \"pain and and top of shoulder, catching\" before Rx, after      L knee pain and stiffness, needs TKA      Treatment Charges: Mins Units   []  Modalities     [x]  Ther Exercise 15 1   [x]  Manual Therapy 40 3   []  Ther Activities     []  Neuro Re-ed

## 2025-07-18 ENCOUNTER — OFFICE VISIT (OUTPATIENT)
Age: 60
End: 2025-07-18

## 2025-07-18 ENCOUNTER — TELEPHONE (OUTPATIENT)
Age: 60
End: 2025-07-18

## 2025-07-18 ENCOUNTER — HOSPITAL ENCOUNTER (OUTPATIENT)
Dept: INFUSION THERAPY | Age: 60
Discharge: HOME | End: 2025-07-18
Payer: COMMERCIAL

## 2025-07-18 VITALS
RESPIRATION RATE: 15 BRPM | OXYGEN SATURATION: 96 % | SYSTOLIC BLOOD PRESSURE: 150 MMHG | HEART RATE: 81 BPM | WEIGHT: 232.6 LBS | TEMPERATURE: 96.6 F | BODY MASS INDEX: 35.37 KG/M2 | DIASTOLIC BLOOD PRESSURE: 93 MMHG

## 2025-07-18 DIAGNOSIS — Z17.1 MALIGNANT NEOPLASM OF UPPER-OUTER QUADRANT OF LEFT BREAST IN FEMALE, ESTROGEN RECEPTOR NEGATIVE (HCC): ICD-10-CM

## 2025-07-18 DIAGNOSIS — Z17.1 MALIGNANT NEOPLASM OF UPPER-OUTER QUADRANT OF LEFT BREAST IN FEMALE, ESTROGEN RECEPTOR NEGATIVE (HCC): Primary | ICD-10-CM

## 2025-07-18 DIAGNOSIS — C50.412 MALIGNANT NEOPLASM OF UPPER-OUTER QUADRANT OF LEFT BREAST IN FEMALE, ESTROGEN RECEPTOR NEGATIVE (HCC): Primary | ICD-10-CM

## 2025-07-18 DIAGNOSIS — C50.912 INVASIVE DUCTAL CARCINOMA OF BREAST, LEFT (HCC): Primary | ICD-10-CM

## 2025-07-18 DIAGNOSIS — C50.412 MALIGNANT NEOPLASM OF UPPER-OUTER QUADRANT OF LEFT BREAST IN FEMALE, ESTROGEN RECEPTOR NEGATIVE (HCC): ICD-10-CM

## 2025-07-18 LAB
ALBUMIN SERPL-MCNC: 4.1 G/DL (ref 3.5–5.2)
ALBUMIN/GLOB SERPL: 1.5 {RATIO} (ref 1–2.5)
ALP SERPL-CCNC: 124 U/L (ref 35–104)
ALT SERPL-CCNC: 17 U/L (ref 10–35)
ANION GAP SERPL CALCULATED.3IONS-SCNC: 11 MMOL/L (ref 9–16)
AST SERPL-CCNC: 28 U/L (ref 10–35)
BASOPHILS # BLD: 0 K/UL (ref 0–0.2)
BASOPHILS NFR BLD: 0 % (ref 0–2)
BILIRUB SERPL-MCNC: 0.5 MG/DL (ref 0–1.2)
BUN SERPL-MCNC: 20 MG/DL (ref 8–23)
CALCIUM SERPL-MCNC: 9.4 MG/DL (ref 8.6–10.4)
CHLORIDE SERPL-SCNC: 103 MMOL/L (ref 98–107)
CO2 SERPL-SCNC: 24 MMOL/L (ref 20–31)
CREAT SERPL-MCNC: 1 MG/DL (ref 0.6–0.9)
EOSINOPHIL # BLD: 0.2 K/UL (ref 0–0.4)
EOSINOPHILS RELATIVE PERCENT: 4 % (ref 1–4)
ERYTHROCYTE [DISTWIDTH] IN BLOOD BY AUTOMATED COUNT: 15.1 % (ref 12.5–15.4)
GFR, ESTIMATED: 64 ML/MIN/1.73M2
GLUCOSE SERPL-MCNC: 168 MG/DL (ref 74–99)
HCT VFR BLD AUTO: 36 % (ref 36–46)
HGB BLD-MCNC: 12.1 G/DL (ref 12–16)
LYMPHOCYTES NFR BLD: 0.8 K/UL (ref 1–4.8)
LYMPHOCYTES RELATIVE PERCENT: 14 % (ref 24–44)
MCH RBC QN AUTO: 31.2 PG (ref 26–34)
MCHC RBC AUTO-ENTMCNC: 33.5 G/DL (ref 31–37)
MCV RBC AUTO: 93 FL (ref 80–100)
MONOCYTES NFR BLD: 0.5 K/UL (ref 0.1–1.2)
MONOCYTES NFR BLD: 8 % (ref 2–11)
NEUTROPHILS NFR BLD: 74 % (ref 36–66)
NEUTS SEG NFR BLD: 4.2 K/UL (ref 1.8–7.7)
PLATELET # BLD AUTO: 130 K/UL (ref 140–450)
PMV BLD AUTO: 6.8 FL (ref 6–12)
POTASSIUM SERPL-SCNC: 3.9 MMOL/L (ref 3.7–5.3)
PROT SERPL-MCNC: 6.9 G/DL (ref 6.6–8.7)
RBC # BLD AUTO: 3.87 M/UL (ref 4–5.2)
SODIUM SERPL-SCNC: 138 MMOL/L (ref 136–145)
WBC OTHER # BLD: 5.7 K/UL (ref 3.5–11)

## 2025-07-18 PROCEDURE — 6360000002 HC RX W HCPCS: Performed by: INTERNAL MEDICINE

## 2025-07-18 PROCEDURE — 85025 COMPLETE CBC W/AUTO DIFF WBC: CPT

## 2025-07-18 PROCEDURE — 36591 DRAW BLOOD OFF VENOUS DEVICE: CPT

## 2025-07-18 PROCEDURE — 80053 COMPREHEN METABOLIC PANEL: CPT

## 2025-07-18 PROCEDURE — 2500000003 HC RX 250 WO HCPCS: Performed by: INTERNAL MEDICINE

## 2025-07-18 RX ORDER — SODIUM CHLORIDE 0.9 % (FLUSH) 0.9 %
5-40 SYRINGE (ML) INJECTION PRN
Status: DISCONTINUED | OUTPATIENT
Start: 2025-07-18 | End: 2025-07-19 | Stop reason: HOSPADM

## 2025-07-18 RX ORDER — SODIUM CHLORIDE 0.9 % (FLUSH) 0.9 %
5-40 SYRINGE (ML) INJECTION PRN
OUTPATIENT
Start: 2025-07-18

## 2025-07-18 RX ORDER — FAMOTIDINE 10 MG/ML
20 INJECTION, SOLUTION INTRAVENOUS
OUTPATIENT
Start: 2025-07-18

## 2025-07-18 RX ORDER — HYDROCORTISONE SODIUM SUCCINATE 100 MG/2ML
100 INJECTION INTRAMUSCULAR; INTRAVENOUS
OUTPATIENT
Start: 2025-07-18

## 2025-07-18 RX ORDER — DIPHENHYDRAMINE HYDROCHLORIDE 50 MG/ML
50 INJECTION, SOLUTION INTRAMUSCULAR; INTRAVENOUS
OUTPATIENT
Start: 2025-07-18

## 2025-07-18 RX ORDER — ALBUTEROL SULFATE 90 UG/1
4 INHALANT RESPIRATORY (INHALATION) PRN
OUTPATIENT
Start: 2025-07-18

## 2025-07-18 RX ORDER — EPINEPHRINE 1 MG/ML
0.3 INJECTION, SOLUTION, CONCENTRATE INTRAVENOUS PRN
OUTPATIENT
Start: 2025-07-18

## 2025-07-18 RX ORDER — SODIUM CHLORIDE 9 MG/ML
25 INJECTION, SOLUTION INTRAVENOUS PRN
OUTPATIENT
Start: 2025-07-18

## 2025-07-18 RX ORDER — HEPARIN 100 UNIT/ML
500 SYRINGE INTRAVENOUS PRN
Status: DISCONTINUED | OUTPATIENT
Start: 2025-07-18 | End: 2025-07-19 | Stop reason: HOSPADM

## 2025-07-18 RX ORDER — ONDANSETRON 2 MG/ML
8 INJECTION INTRAMUSCULAR; INTRAVENOUS
OUTPATIENT
Start: 2025-07-18

## 2025-07-18 RX ORDER — HEPARIN 100 UNIT/ML
500 SYRINGE INTRAVENOUS PRN
OUTPATIENT
Start: 2025-07-18

## 2025-07-18 RX ORDER — ACETAMINOPHEN 325 MG/1
650 TABLET ORAL
OUTPATIENT
Start: 2025-07-18

## 2025-07-18 RX ORDER — SODIUM CHLORIDE 9 MG/ML
INJECTION, SOLUTION INTRAVENOUS CONTINUOUS
OUTPATIENT
Start: 2025-07-18

## 2025-07-18 RX ADMIN — Medication 500 UNITS: at 09:24

## 2025-07-18 RX ADMIN — SODIUM CHLORIDE, PRESERVATIVE FREE 10 ML: 5 INJECTION INTRAVENOUS at 09:24

## 2025-07-18 RX ADMIN — SODIUM CHLORIDE, PRESERVATIVE FREE 10 ML: 5 INJECTION INTRAVENOUS at 09:23

## 2025-07-18 NOTE — PATIENT INSTRUCTIONS
Please add magnesium level to today's labs.   Proceed with Keytruda  Start Xeloda  RV about 4-6 weeks with CBC, CMP

## 2025-07-18 NOTE — TELEPHONE ENCOUNTER
Name: Luci Castañeda  : 1965  MRN: [id number]    Oncology Navigation Follow-Up Note    Contact Type:  Telephone    Notes: Met with pt after her MO f/u. She is feeling well but is wanting to get more strength and endurance back. Pt is in PT and is asking if she can resume some physical activity. Suggested [facility] for chair yoga and modified piliates. Pt also would like to do the [employer] day .Clearance letter was faxed. Encouraged pt to call navigator as needed with any questions, concerns or barriers. Confirmed pt has navigator's contact information.       Electronically signed by Veronica Bennett RN on 2025 at 10:27 AM

## 2025-07-18 NOTE — TELEPHONE ENCOUNTER
Instructions   from Dr. Jess Bradley MD    Please add magnesium level to today's labs.   Continue  Xeloda  RV about 4-6 weeks with CBC, CMP        Mag added to today's lab  Continue Xeloda  RV 8/29/25 at 9:30 for port draw and MD at 10 am

## 2025-07-21 ENCOUNTER — PATIENT MESSAGE (OUTPATIENT)
Age: 60
End: 2025-07-21

## 2025-07-21 DIAGNOSIS — M17.12 PRIMARY OSTEOARTHRITIS OF LEFT KNEE: Primary | ICD-10-CM

## 2025-07-21 RX ORDER — MELOXICAM 5 MG/1
5 CAPSULE ORAL DAILY
Qty: 30 CAPSULE | Refills: 2 | OUTPATIENT
Start: 2025-07-21

## 2025-07-21 NOTE — TELEPHONE ENCOUNTER
Luci Castañeda is calling to request a refill on the following medication(s):    Last Visit Date (If Applicable):  3/3/2025    Next Visit Date:    Visit date not found    Medication Request:  Requested Prescriptions     Pending Prescriptions Disp Refills    Meloxicam 5 MG CAPS 30 capsule 2     Sig: Take 5 mg by mouth daily Pt restarted taking recently 05/02/25

## 2025-07-21 NOTE — TELEPHONE ENCOUNTER
Bassam Beckwith,   Could Meloxicam 15 mg po daily please be reordered?  I have taken it for awhile and it is helpful.  When I called to refill Rx I was told it was canceled in December 2024.  I was not taking it at that time for a short while d/t medical issues.  I restarted it in Jan 2025.  I have brought this med with me to my appts at the office.     Allopurinol- Oncology has stopped this med d/t its interaction with Xeloda, my new oral chemotherapy.  I am doing fine but didn't know if I would need to replace this med with something else.  Thanks for your consideration.     Kind regards,  Ghislaine Castañeda

## 2025-07-22 ENCOUNTER — HOSPITAL ENCOUNTER (OUTPATIENT)
Dept: PHYSICAL THERAPY | Facility: CLINIC | Age: 60
Setting detail: THERAPIES SERIES
Discharge: HOME | End: 2025-07-22
Payer: COMMERCIAL

## 2025-07-22 PROCEDURE — 97110 THERAPEUTIC EXERCISES: CPT

## 2025-07-22 PROCEDURE — 97140 MANUAL THERAPY 1/> REGIONS: CPT

## 2025-07-22 NOTE — FLOWSHEET NOTE
management of pain during all ADLs ( home, occupation, community and recreation)        2. ? ROM: Optimize AROM of L shoulder to be able to position arm for radiation MET        3. ? Strength:  Bilat shoulders grossly 5/5, incr  by 2+ lbs and demo good scapular/postural control and stability- slowly progressing towards        4. ? Function: Able to sleep, complete ADL's, lift/carry and reach OH w/o difficulty- progressing towards, has good and bad days        5.   Independent with Home Exercise Programs as demonstrated by performance with correct form without cues.        6.   Education on signs and symptoms, precautions regarding lymphedema- MET     LTG: (to be met in 18-28 treatments)  1. Optimize bilat shoulder functional ROM to improve QOL  2.  Improve tissue mobility of chest wall and axilla to allow for more normal motion and function- progressing, still healing from radiation  3. Continue activity to decrease risk factors associated with increased time being sedentary while undergoing chemotherapy, radiation, surgery.  4. Manage/mitigate side effects/late effects of Cancer treatment and prevent debilitation   5. Decr BFI score 2+ for activity tolerance, decr UEFS score by 15% for overall improved function                    Patient goals: Improvement in moving arm w/ decr pain    Pt. Education:  [x] Yes  [] No  [x] Reviewed Prior HEP/Ed  Method of Education: [x] Verbal  [x] Demo  [] Written  Comprehension of Education:  [x] Verbalizes understanding.  [] Demonstrates understanding.  [] Needs review.  [] Demonstrates/verbalizes HEP/Ed previously given.     Plan: [x] Continue current frequency toward long and short term goals.    [x] Specific Instructions for subsequent treatments: see above      Time In: 9:58 AM            Time Out: 11:01 AM    Electronically signed by:  Ronn Gannon, PT

## 2025-07-23 LAB
CHOLEST SERPL-MCNC: 203 MG/DL (ref 0–199)
CHOLESTEROL/HDL RATIO: 5
GLUCOSE SERPL-MCNC: 110 MG/DL (ref 74–99)
HDLC SERPL-MCNC: 41 MG/DL
LDLC SERPL CALC-MCNC: 98 MG/DL (ref 0–100)
PATIENT FASTING?: YES
TRIGL SERPL-MCNC: 319 MG/DL
VLDLC SERPL CALC-MCNC: 64 MG/DL (ref 1–30)

## 2025-07-23 NOTE — TELEPHONE ENCOUNTER
Called and spoke with Patient and informed of what Amena stated and reccommended.  Patient said that someone already informed her.

## 2025-07-23 NOTE — TELEPHONE ENCOUNTER
Pt is on eliquis. Need to avoid meloxicam and other NSAIDS meds due to potential for bleeding. OK to use tylenol.  Stay off allopurinol per oncology. Avoid foods high in uric acid.  Use colchicine if any gouty flare.

## 2025-07-29 ENCOUNTER — HOSPITAL ENCOUNTER (OUTPATIENT)
Dept: PHYSICAL THERAPY | Facility: CLINIC | Age: 60
Setting detail: THERAPIES SERIES
Discharge: HOME | End: 2025-07-29
Payer: COMMERCIAL

## 2025-07-29 PROCEDURE — 97140 MANUAL THERAPY 1/> REGIONS: CPT

## 2025-07-29 PROCEDURE — 97110 THERAPEUTIC EXERCISES: CPT

## 2025-07-29 NOTE — FLOWSHEET NOTE
[] [facility]  Outpatient Rehabilitation &  Therapy  [address]  [phone]  [phone] [] [facility]  Outpatient Rehabilitation &  Therapy  [address]  P: [phone]  F: [phone] [x] [facility]  Outpatient Rehabilitation &  Therapy  [address]  P: [phone]  F: [phone] [] [facility]  Outpatient Rehabilitation &  Therapy  [address]  [phone]  [phone] [] [facility]  Outpatient Rehabilitation &  Therapy  [address]   P: [phone]  F: [phone]  [] [facility]  Outpatient Rehabilitation &  Therapy  [address]  P: [phone]  F: [phone] [] [facility]  Outpatient Rehabilitation &  Therapy  [address]  P: [phone]  F: [phone] [] [facility]  Outpatient Rehabilitation &  Therapy  [address]  P: [phone]  F: [phone] [] [facility]  Outpatient Rehabilitation &  Therapy  [address]  P: [phone]  F: [phone]  [] [facility] Outpatient Rehabilitation &  Therapy  [address]  P: [phone]  F: [phone]     Physical Therapy Daily Treatment Note    Date:  2025  Patient Name:  Luci Castañeda    :  1965  MRN: [id number]  Physician: Jess Bradley              Insurance: R AFTER 30TH VISIT   Medical Diagnosis: C50.912 (ICD-10-CM) - Invasive ductal carcinoma of breast, left (HCC)               Rehab Codes: 25.612, R07.89, R29.3, 25.512, R53.0  Onset date: 24               Next 's appt.: ongoing  Visit# / total visits:      Cancels/No Shows: 0    Subjective:  Pt reports she has been able to get in her pool more and makes a difference. Off week of meds and feeling good, less N&T as well.  States her breast is healing, just

## 2025-07-31 ENCOUNTER — TELEPHONE (OUTPATIENT)
Age: 60
End: 2025-07-31

## 2025-07-31 NOTE — TELEPHONE ENCOUNTER
[facility]: Nutrition Note  Chart reviewed and called patient for nutrition follow-up. Pt reports she just started on oral chemo which has caused some mild nausea. Trying not to use antiemetics unless she really needs to. Discussed choosing bland/cold foods when feeling nauseous. Pt also c/o sore on back of tongue which inhibits her from eating certain foods such as salsa and and drinking carbonated beverages. Other than that, patient states she is eating/tolerating diet well. Reports her weight is now stabilizing. Writer encouraged PO intake as tolerated and to call writer with any nutrition questions/concerns. Pt agreeable. Will follow as needed.     Josie Alberto RD, LD, CNSC  Registered Dietitian  [facility]   [phone]

## 2025-08-07 ENCOUNTER — HOSPITAL ENCOUNTER (OUTPATIENT)
Dept: PHYSICAL THERAPY | Facility: CLINIC | Age: 60
Setting detail: THERAPIES SERIES
Discharge: HOME | End: 2025-08-07
Payer: COMMERCIAL

## 2025-08-07 PROCEDURE — 97110 THERAPEUTIC EXERCISES: CPT

## 2025-08-07 PROCEDURE — 97140 MANUAL THERAPY 1/> REGIONS: CPT

## 2025-08-14 ENCOUNTER — HOSPITAL ENCOUNTER (OUTPATIENT)
Dept: PHYSICAL THERAPY | Facility: CLINIC | Age: 60
Setting detail: THERAPIES SERIES
Discharge: HOME | End: 2025-08-14
Payer: COMMERCIAL

## 2025-08-14 PROCEDURE — 97110 THERAPEUTIC EXERCISES: CPT

## 2025-08-14 PROCEDURE — 97140 MANUAL THERAPY 1/> REGIONS: CPT

## 2025-08-21 ENCOUNTER — HOSPITAL ENCOUNTER (OUTPATIENT)
Dept: PHYSICAL THERAPY | Facility: CLINIC | Age: 60
Setting detail: THERAPIES SERIES
Discharge: HOME | End: 2025-08-21
Payer: COMMERCIAL

## 2025-08-21 PROCEDURE — 97140 MANUAL THERAPY 1/> REGIONS: CPT

## 2025-08-21 PROCEDURE — 97110 THERAPEUTIC EXERCISES: CPT

## 2025-08-26 ENCOUNTER — HOSPITAL ENCOUNTER (OUTPATIENT)
Dept: PHYSICAL THERAPY | Facility: CLINIC | Age: 60
Setting detail: THERAPIES SERIES
Discharge: HOME | End: 2025-08-26
Payer: COMMERCIAL

## 2025-08-26 PROCEDURE — 97140 MANUAL THERAPY 1/> REGIONS: CPT

## 2025-08-26 PROCEDURE — 97110 THERAPEUTIC EXERCISES: CPT

## 2025-08-29 ENCOUNTER — CLINICAL DOCUMENTATION (OUTPATIENT)
Age: 60
End: 2025-08-29

## 2025-08-29 ENCOUNTER — HOSPITAL ENCOUNTER (OUTPATIENT)
Dept: INFUSION THERAPY | Age: 60
Discharge: HOME | End: 2025-08-29
Payer: COMMERCIAL

## 2025-08-29 ENCOUNTER — TELEPHONE (OUTPATIENT)
Age: 60
End: 2025-08-29

## 2025-08-29 ENCOUNTER — OFFICE VISIT (OUTPATIENT)
Age: 60
End: 2025-08-29

## 2025-08-29 VITALS
WEIGHT: 232 LBS | SYSTOLIC BLOOD PRESSURE: 111 MMHG | TEMPERATURE: 97.7 F | BODY MASS INDEX: 35.28 KG/M2 | HEART RATE: 78 BPM | OXYGEN SATURATION: 97 % | DIASTOLIC BLOOD PRESSURE: 75 MMHG

## 2025-08-29 DIAGNOSIS — C50.412 MALIGNANT NEOPLASM OF UPPER-OUTER QUADRANT OF LEFT BREAST IN FEMALE, ESTROGEN RECEPTOR NEGATIVE (HCC): Primary | ICD-10-CM

## 2025-08-29 DIAGNOSIS — Z17.1 MALIGNANT NEOPLASM OF UPPER-OUTER QUADRANT OF LEFT BREAST IN FEMALE, ESTROGEN RECEPTOR NEGATIVE (HCC): ICD-10-CM

## 2025-08-29 DIAGNOSIS — Z17.1 MALIGNANT NEOPLASM OF UPPER-OUTER QUADRANT OF LEFT BREAST IN FEMALE, ESTROGEN RECEPTOR NEGATIVE (HCC): Primary | ICD-10-CM

## 2025-08-29 DIAGNOSIS — C50.912 INVASIVE DUCTAL CARCINOMA OF BREAST, LEFT (HCC): Primary | ICD-10-CM

## 2025-08-29 DIAGNOSIS — C50.412 MALIGNANT NEOPLASM OF UPPER-OUTER QUADRANT OF LEFT BREAST IN FEMALE, ESTROGEN RECEPTOR NEGATIVE (HCC): ICD-10-CM

## 2025-08-29 DIAGNOSIS — Z85.3 HISTORY OF LEFT BREAST CANCER: ICD-10-CM

## 2025-08-29 LAB
ALBUMIN SERPL-MCNC: 4.2 G/DL (ref 3.5–5.2)
ALBUMIN/GLOB SERPL: 1.6 {RATIO} (ref 1–2.5)
ALP SERPL-CCNC: 127 U/L (ref 35–104)
ALT SERPL-CCNC: 17 U/L (ref 10–35)
ANION GAP SERPL CALCULATED.3IONS-SCNC: 12 MMOL/L (ref 9–16)
AST SERPL-CCNC: 32 U/L (ref 10–35)
ATYPICAL LYMPHOCYTE ABSOLUTE COUNT: 0.08 K/UL
ATYPICAL LYMPHOCYTES: 2 %
BASOPHILS # BLD: 0 K/UL (ref 0–0.2)
BASOPHILS NFR BLD: 0 % (ref 0–2)
BILIRUB SERPL-MCNC: 0.8 MG/DL (ref 0–1.2)
BUN SERPL-MCNC: 18 MG/DL (ref 8–23)
CALCIUM SERPL-MCNC: 9.3 MG/DL (ref 8.6–10.4)
CHLORIDE SERPL-SCNC: 104 MMOL/L (ref 98–107)
CO2 SERPL-SCNC: 22 MMOL/L (ref 20–31)
CREAT SERPL-MCNC: 1 MG/DL (ref 0.6–0.9)
EOSINOPHIL # BLD: 0.12 K/UL (ref 0–0.4)
EOSINOPHILS RELATIVE PERCENT: 3 % (ref 1–4)
ERYTHROCYTE [DISTWIDTH] IN BLOOD BY AUTOMATED COUNT: 20.9 % (ref 12.5–15.4)
GFR, ESTIMATED: 64 ML/MIN/1.73M2
GLUCOSE SERPL-MCNC: 111 MG/DL (ref 74–99)
HCT VFR BLD AUTO: 33.9 % (ref 36–46)
HGB BLD-MCNC: 11.5 G/DL (ref 12–16)
LYMPHOCYTES NFR BLD: 0.94 K/UL (ref 1–4.8)
LYMPHOCYTES RELATIVE PERCENT: 23 % (ref 24–44)
MAGNESIUM SERPL-MCNC: 1.5 MG/DL (ref 1.6–2.4)
MCH RBC QN AUTO: 32.9 PG (ref 26–34)
MCHC RBC AUTO-ENTMCNC: 33.9 G/DL (ref 31–37)
MCV RBC AUTO: 97.2 FL (ref 80–100)
MONOCYTES NFR BLD: 0.21 K/UL (ref 0.1–0.8)
MONOCYTES NFR BLD: 5 % (ref 1–7)
MORPHOLOGY: ABNORMAL
NEUTROPHILS NFR BLD: 67 % (ref 36–66)
NEUTS SEG NFR BLD: 2.75 K/UL (ref 1.8–7.7)
PLATELET # BLD AUTO: 114 K/UL (ref 140–450)
PMV BLD AUTO: 6.7 FL (ref 6–12)
POTASSIUM SERPL-SCNC: 4.1 MMOL/L (ref 3.7–5.3)
PROT SERPL-MCNC: 6.8 G/DL (ref 6.6–8.7)
RBC # BLD AUTO: 3.48 M/UL (ref 4–5.2)
SODIUM SERPL-SCNC: 138 MMOL/L (ref 136–145)
WBC OTHER # BLD: 4.1 K/UL (ref 3.5–11)

## 2025-08-29 PROCEDURE — 83735 ASSAY OF MAGNESIUM: CPT

## 2025-08-29 PROCEDURE — 2500000003 HC RX 250 WO HCPCS: Performed by: INTERNAL MEDICINE

## 2025-08-29 PROCEDURE — 36591 DRAW BLOOD OFF VENOUS DEVICE: CPT

## 2025-08-29 PROCEDURE — 85025 COMPLETE CBC W/AUTO DIFF WBC: CPT

## 2025-08-29 PROCEDURE — 6360000002 HC RX W HCPCS: Performed by: INTERNAL MEDICINE

## 2025-08-29 PROCEDURE — 80053 COMPREHEN METABOLIC PANEL: CPT

## 2025-08-29 RX ORDER — SODIUM CHLORIDE 0.9 % (FLUSH) 0.9 %
5-40 SYRINGE (ML) INJECTION PRN
Status: DISCONTINUED | OUTPATIENT
Start: 2025-08-29 | End: 2025-08-30 | Stop reason: HOSPADM

## 2025-08-29 RX ORDER — HEPARIN 100 UNIT/ML
500 SYRINGE INTRAVENOUS PRN
Status: DISCONTINUED | OUTPATIENT
Start: 2025-08-29 | End: 2025-08-30 | Stop reason: HOSPADM

## 2025-08-29 RX ADMIN — SODIUM CHLORIDE, PRESERVATIVE FREE 10 ML: 5 INJECTION INTRAVENOUS at 09:58

## 2025-08-29 RX ADMIN — HEPARIN 500 UNITS: 100 SYRINGE at 11:22

## 2025-09-02 ENCOUNTER — HOSPITAL ENCOUNTER (OUTPATIENT)
Dept: PHYSICAL THERAPY | Facility: CLINIC | Age: 60
Setting detail: THERAPIES SERIES
Discharge: HOME | End: 2025-09-02
Payer: COMMERCIAL

## 2025-09-02 PROCEDURE — 97110 THERAPEUTIC EXERCISES: CPT

## 2025-09-02 PROCEDURE — 97140 MANUAL THERAPY 1/> REGIONS: CPT

## (undated) DEVICE — SINGLE-USE DIGITAL FLEXIBLE URETEROSCOPE: Brand: LITHOVUE

## (undated) DEVICE — TAPE UMBILICAL W1/16XL30IN COTTON ROUND NONRADIOPAQUE

## (undated) DEVICE — STRAP,CATHETER,ELASTIC,HOOK&LOOP: Brand: MEDLINE

## (undated) DEVICE — PROVE COVER: Brand: UNBRANDED

## (undated) DEVICE — RADIFOCUS GLIDEWIRE ADVANTAGE GUIDEWIRE: Brand: GLIDEWIRE ADVANTAGE

## (undated) DEVICE — GARMENT,MEDLINE,DVT,INT,CALF,MED, GEN2: Brand: MEDLINE

## (undated) DEVICE — GOWN,SIRUS,NONRNF,SETINSLV,XL,20/CS: Brand: MEDLINE

## (undated) DEVICE — PROTECTOR ULN NRV PUR FOAM HK LOOP STRP ANATOMICALLY

## (undated) DEVICE — RESERVOIR,SUCTION,100CC,SILICONE: Brand: MEDLINE

## (undated) DEVICE — FIBER LASER HOLM DISP SU200RT] LEONI FIBER OPTICS INC]

## (undated) DEVICE — INTENDED FOR TISSUE SEPARATION, AND OTHER PROCEDURES THAT REQUIRE A SHARP SURGICAL BLADE TO PUNCTURE OR CUT.: Brand: BARD-PARKER ® CARBON RIB-BACK BLADES

## (undated) DEVICE — MAT ABSRB W28XL48IN C6L FLR ULT W POLY BK

## (undated) DEVICE — ADAPTER URO SCP UROLOK LL

## (undated) DEVICE — 3M™ IOBAN™ 2 ANTIMICROBIAL INCISE DRAPE 6650EZ: Brand: IOBAN™ 2

## (undated) DEVICE — KIT NEPHSTMY CATH DIA7FR LINGEMAN 0.035IN SUP STIFF J DIL

## (undated) DEVICE — AGENT HEMSTAT 3GM OXIDIZED REGENERATED CELOS ABSRB FOR CONT (ORDER MULTIPLES OF 5EA)

## (undated) DEVICE — PACK PROCEDURE SURG CYSTO SVMMC LF

## (undated) DEVICE — SYRINGE MED 30ML STD CLR PLAS LUERLOCK TIP N CTRL DISP

## (undated) DEVICE — GUIDEWIRE URO L150CM DIA0.035IN STIFF NIT HYDRPHLC STR TIP

## (undated) DEVICE — GLOVE ORTHO 7 1/2   MSG9475

## (undated) DEVICE — PAD,ABDOMINAL,5"X9",ST,LF,25/BX: Brand: MEDLINE INDUSTRIES, INC.

## (undated) DEVICE — OCCLUSION BALLOON CATHETER: Brand: OCCLUDER

## (undated) DEVICE — C-ARM: Brand: UNBRANDED

## (undated) DEVICE — GLOVE ORANGE PI 7 1/2   MSG9075

## (undated) DEVICE — SUTURE ETHBND EXCEL SZ 1 L30IN NONABSORBABLE GRN L36MM CT-1 X425H

## (undated) DEVICE — NITINOL STONE RETRIEVAL BASKET: Brand: ZERO TIP

## (undated) DEVICE — SOLUTION IRRIG 1000ML 0.9% SOD CHL USP POUR PLAS BTL

## (undated) DEVICE — DRAPE,REIN 53X77,STERILE: Brand: MEDLINE

## (undated) DEVICE — CATHETER URETH 16FR BLLN 5CC SIL ALLY W/ SIL HYDRGEL 2 W F

## (undated) DEVICE — DUAL LUMEN URETERAL CATHETER

## (undated) DEVICE — GUIDEWIRE URO L150CM DIA .035IN STIFF NIT HYDRPHL STR TIP

## (undated) DEVICE — CATCHER STONE TBNG ADPT SWISS LITHOCLAST

## (undated) DEVICE — SUTURE MONOCRYL SZ 3-0 L27IN ABSRB UD L26MM SH 1/2 CIR Y416H

## (undated) DEVICE — PINNACLE INTRODUCER SHEATH: Brand: PINNACLE

## (undated) DEVICE — Y-TYPE TUR/BLADDER IRRIGATION SET, REGULATING CLAMP

## (undated) DEVICE — COVER OR TBL W40XL90IN ABSRB STD AND GRIPPY BK SAHARA

## (undated) DEVICE — 3M™ STERI-STRIP™ COMPOUND BENZOIN TINCTURE 40 BAGS/CARTON 4 CARTONS/CASE C1544: Brand: 3M™ STERI-STRIP™

## (undated) DEVICE — FIBER LSR DIA200UM FLEXSHIELD COAT HOLM HI PWR TRAC TIP

## (undated) DEVICE — DRAPE,UNDRBUT,WHT GRAD PCH,CAPPORT,20/CS: Brand: MEDLINE

## (undated) DEVICE — CATHETER URET 5FR L70CM TIP 8FR OPN END CONE TIP INJ HUB

## (undated) DEVICE — ZIPPERED TOGA, 2X LARGE: Brand: FLYTE, SURGICOOL

## (undated) DEVICE — SYSTEM FLD COLL W/ FLX DRP SUPP AND DISP BG

## (undated) DEVICE — SVMMC PEDS/UROLOGY MINOR PACK: Brand: MEDLINE INDUSTRIES, INC.

## (undated) DEVICE — GLOVE SURG SZ 65 THK91MIL LTX FREE SYN POLYISOPRENE

## (undated) DEVICE — EVACUATOR URO BLDR W/ ADPT UROVAC

## (undated) DEVICE — GOWN,AURORA,NONRNF,XL,30/CS: Brand: MEDLINE

## (undated) DEVICE — DRAINBAG,ANTI-REFLUX TOWER,L/F,2000ML,LL: Brand: MEDLINE

## (undated) DEVICE — SUTURE MONOCRYL SZ 3-0 L27IN ABSRB UD L24MM PS-1 3/8 CIR PRIM Y936H

## (undated) DEVICE — GLOVE SURG SZ 75 CRM LTX FREE POLYISOPRENE POLYMER BEAD ANTI

## (undated) DEVICE — URETEROSCOPE FLX DIGITAL 3.1X650 MM 1.2 MM AXIS DISP

## (undated) DEVICE — SHEET,DRAPE,70X100,STERILE: Brand: MEDLINE

## (undated) DEVICE — SUTURE VCRL SZ 0 L36IN ABSRB UD L36MM CT-1 1/2 CIR J946H

## (undated) DEVICE — BLADE,CARBON-STEEL,15,STRL,DISPOSABLE,TB: Brand: MEDLINE

## (undated) DEVICE — GAUZE,SPONGE,FLUFF,6"X6.75",STRL,5/TRAY: Brand: MEDLINE

## (undated) DEVICE — SOLUTION IRRIG 3000ML 0.9% SOD CHL USP UROMATIC PLAS CONT

## (undated) DEVICE — PREMIUM DRY TRAY LF: Brand: MEDLINE INDUSTRIES, INC.

## (undated) DEVICE — PERCLOSE PROGLIDE™ SUTURE-MEDIATED CLOSURE SYSTEM: Brand: PERCLOSE PROGLIDE™

## (undated) DEVICE — CLIP LIG M TI 6 SIL HNDL FOR OPN AND ENDOSCP SGL APPL

## (undated) DEVICE — STRIP,CLOSURE,WOUND,MEDI-STRIP,1/2X4: Brand: MEDLINE

## (undated) DEVICE — KIT PRB L440MM DIA3.9MM BLU SWISS LITHOCLAST TRIL

## (undated) DEVICE — GLOVE ORANGE PI 7   MSG9070

## (undated) DEVICE — SINGLE ACTION PUMPING SYSTEM

## (undated) DEVICE — SOLUTION IV 1000ML 0.9% SOD CHL PH 5 INJ USP VIAFLX PLAS

## (undated) DEVICE — HIGH PRESSURE NEPHROSTOMY BALLOON CATHETER KIT: Brand: NEPHROMAX KIT

## (undated) DEVICE — BLADE,CARBON-STEEL,11,STRL,DISPOSABLE,TB: Brand: MEDLINE

## (undated) DEVICE — TOWEL,OR,DSP,ST,NATURAL,DLX,4/PK,20PK/CS: Brand: MEDLINE

## (undated) DEVICE — SLEEVE SCD KNEE MED

## (undated) DEVICE — CRANIOTOMY DRAPE, STERILE: Brand: MEDLINE

## (undated) DEVICE — FLEXIVA  PULSE  AND  FLEXIVA  PULSE  TRACTIP  LASER  FIBERS  ARE  HIGH  POWER  SINGLE-USE FIBER: Brand: FLEXIVA PULSE

## (undated) DEVICE — SYRINGE MED 20ML STD CLR PLAS LUERLOCK TIP N CTRL DISP

## (undated) DEVICE — SHEET DRAPE FULL 70X100

## (undated) DEVICE — AEGIS 1" DISK 4MM HOLE, PEEL OPEN: Brand: MEDLINE

## (undated) DEVICE — TUBING, SUCTION, 9/32" X 20', STRAIGHT: Brand: MEDLINE INDUSTRIES, INC.

## (undated) DEVICE — STRAP,POSITIONING,KNEE/BODY,FOAM,4X60": Brand: MEDLINE

## (undated) DEVICE — STAZ MINOR LAPAROTOMY: Brand: MEDLINE INDUSTRIES, INC.

## (undated) DEVICE — PAD,SANITARY,11 IN,MAXI,W/WINGS,N-STRL: Brand: MEDLINE

## (undated) DEVICE — DRAPE C ARM UNIV W41XL74IN CLR PLAS XR VELC CLSR POLY STRP

## (undated) DEVICE — SOLUTION IRRIG 1000ML STRL H2O USP PLAS POUR BTL

## (undated) DEVICE — GARMENT COMPR STD FOR 17IN CALF UNIF THER FLOTRN

## (undated) DEVICE — PLUG,CATHETER,DRAINAGE PROTECTOR,TUBE: Brand: MEDLINE

## (undated) DEVICE — GEL US 20GM NONIRRITATING OVERWRAPPED FILE PCH TRNSMIT

## (undated) DEVICE — APPLICATOR MEDICATED 26 CC SOLUTION HI LT ORNG CHLORAPREP

## (undated) DEVICE — TUBING, SUCTION, 1/4" X 12', STRAIGHT: Brand: MEDLINE

## (undated) DEVICE — MAT FLOOR ULTRA ABS 28X48IN

## (undated) DEVICE — RENTAL LASER HOLMIUM HIGH WATTAGE CASE

## (undated) DEVICE — LIEBERMAN INTRODUCER: Brand: LIEBERMAN

## (undated) DEVICE — TAPE UMB W1/8XL36IN WHT COT STRND NONRADIOPAQUE

## (undated) DEVICE — CANISTER SUCTION VAC PORTABLE 1000 CC FOR INDIGO SYS ENGINE

## (undated) DEVICE — STRAP ARMBRD W1.5XL32IN FOAM STR YET SFT W/ HK AND LOOP

## (undated) DEVICE — BLADE LARYNSCP SZ 3 GVL STAT DISP FOR ADV VID LARYNGOSCOPY

## (undated) DEVICE — ELECTRODE PT RET AD L9FT HI MOIST COND ADH HYDRGEL CORDED

## (undated) DEVICE — SYRINGE, LUER LOCK, 30ML: Brand: MEDLINE

## (undated) DEVICE — SYRINGE MED 50ML LUERLOCK TIP

## (undated) DEVICE — 450 ML BOTTLE OF 0.05% CHLORHEXIDINE GLUCONATE IN 99.95% STERILE WATER FOR IRRIGATION, USP AND APPLICATOR.: Brand: IRRISEPT ANTIMICROBIAL WOUND LAVAGE

## (undated) DEVICE — CATHETER URET 5FR L70CM OPN END SGL LUMN INJ HUB FLEXIMA

## (undated) DEVICE — GLOVE SURG SZ 65 CRM LTX FREE POLYISOPRENE POLYMER BEAD ANTI

## (undated) DEVICE — GLOVE SURG SZ 65 L12IN FNGR THK79MIL GRN LTX FREE

## (undated) DEVICE — SPONGE LAP W18XL18IN WHT COT 4 PLY FLD STRUNG RADPQ DISP ST 2 PER PACK

## (undated) DEVICE — PROBE LITHO L570MM DIA1MM PNEUMAT SWISS LITHOCLAST

## (undated) DEVICE — DRESSING TRNSPAR W5XL4.5IN FLM SHT SEMIPERMEABLE WIND

## (undated) DEVICE — ST CHARLES CYSTO: Brand: MEDLINE INDUSTRIES, INC.

## (undated) DEVICE — Device

## (undated) DEVICE — SYSTEM PMP SGL ACT W/ 10CC VAC SYR 1 W VLV FOR ENDOSCP SURG

## (undated) DEVICE — DRAIN SURG FLAT W7MMXL20CM FULL PERF

## (undated) DEVICE — YANKAUER,FLEXIBLE HANDLE,REGLR CAPACITY: Brand: MEDLINE INDUSTRIES, INC.

## (undated) DEVICE — CATHETER ANGGRPHC 5FR DIA 125CML STNLSS STEEL LGTO HDRPHLC C

## (undated) DEVICE — GLOVE ORANGE PI 8   MSG9080

## (undated) DEVICE — GUIDEWIRE VASC L260CM DIA0.035IN L4CM DIA1.5MM J TIP PTFE S

## (undated) DEVICE — NITINOL STONE RETRIEVAL BASKET: Brand: ESCAPE

## (undated) DEVICE — TUBING, SUCTION, 3/16" X 10', STRAIGHT: Brand: MEDLINE

## (undated) DEVICE — 20 ML SYRINGE LUER-LOCK TIP: Brand: MONOJECT

## (undated) DEVICE — SYSTEM WST MGMT W/ CAP AVETA

## (undated) DEVICE — CYSTO/BLADDER IRRIGATION SET, REGULATING CLAMP

## (undated) DEVICE — GOWN,AURORA,NONREINFORCED,LARGE: Brand: MEDLINE

## (undated) DEVICE — MAT FLR ABSORBENT SM 40X28 IN 4.6 LT PNK LIQUI-LOC LF DISP

## (undated) DEVICE — MASTISOL ADHESIVE LIQ 2/3ML

## (undated) DEVICE — 4-PORT MANIFOLD: Brand: NEPTUNE 2

## (undated) DEVICE — STOCKINETTE,IMPERVIOUS,12X48,STERILE: Brand: MEDLINE

## (undated) DEVICE — SUTURE VICRYL + SZ 3-0 L27IN ABSRB UD L26MM SH 1/2 CIR VCP416H

## (undated) DEVICE — SHEET, T, LAPAROTOMY, STERILE: Brand: MEDLINE

## (undated) DEVICE — SUTURE STRATAFIX SPRL SZ 3-0 L5IN ABSRB UD FS L26MM 3/8 CIR SXMP2B411

## (undated) DEVICE — URETEROSCOPIC BIOPSY FORCEPS: Brand: PIRANHA

## (undated) DEVICE — BANDAGE,GAUZE,BULKEE II,4.5"X4.1YD,STRL: Brand: MEDLINE

## (undated) DEVICE — SUTURE ETHBND EXCEL SZ 0 L30IN NONABSORBABLE GRN CT1 L36MM X424H

## (undated) DEVICE — ANGIOGRAPHIC CATHETER: Brand: IMPULSE™

## (undated) DEVICE — SUTURE PROL SZ 3-0 L18IN NONABSORBABLE BLU L19MM PS-2 3/8 8687H

## (undated) DEVICE — SOLUTION PREP POVIDONE IOD FOR SKIN MUCOUS MEM PRIOR TO

## (undated) DEVICE — DECANTER BAG 9": Brand: MEDLINE INDUSTRIES, INC.

## (undated) DEVICE — LIQUIBAND RAPID ADHESIVE 36/CS 0.8ML: Brand: MEDLINE

## (undated) DEVICE — OSCILLATING TIP SAW CARTRIDGE: Brand: PRECISION FALCON

## (undated) DEVICE — CONTAINER,SPECIMEN,OR STERILE,4OZ: Brand: MEDLINE

## (undated) DEVICE — 1LYRTR 16FR10ML100%SIL UMS SNP: Brand: MEDLINE INDUSTRIES, INC.

## (undated) DEVICE — BLANKET WRM W40.2XL55.9IN IORT LO BODY + MISTRAL AIR

## (undated) DEVICE — HYPODERMIC SAFETY NEEDLE: Brand: MAGELLAN

## (undated) DEVICE — DRESSING TRNSPAR W2XL2.75IN FLM SHT SEMIPERMEABLE WIND

## (undated) DEVICE — SYRINGE MED 10ML LUERLOCK TIP W/O SFTY DISP

## (undated) DEVICE — SUTURE STRATAFIX SPRL SZ 2-0 L9IN ABSRB VLT MH L36MM 1/2 SXPD2B408

## (undated) DEVICE — 60 ML SYRINGE LUER-LOCK TIP: Brand: MONOJECT

## (undated) DEVICE — STAZ MAJOR BASIN: Brand: MEDLINE INDUSTRIES, INC.

## (undated) DEVICE — PAD,NON-ADHERENT,3X8,STERILE,LF,1/PK: Brand: MEDLINE

## (undated) DEVICE — ADHESIVE SKIN CLSR 0.7ML TOP DERMBND ADV

## (undated) DEVICE — INTRODUCER SHTH 18 FRX65 CM AAA HYDRPHLC COAT DRYSEAL FLEX

## (undated) DEVICE — ASPIRATOR FLR L72IN SUCT TB W/ 1 DETACH FISH STK PUSH HNDL

## (undated) DEVICE — SYSTEM WST MGMT AVETA

## (undated) DEVICE — Device: Brand: OLYMPUS

## (undated) DEVICE — Z DISCONTINUED NO SUB IDED TAPE UMB W1/8XL36IN WHT COT STRND NONRADIOPAQUE

## (undated) DEVICE — UNDERPANTS MAT L XL SEAMLESS CLR CODE WAISTBAND KNIT

## (undated) DEVICE — MARKER,SKIN,WI/RULER AND LABELS: Brand: MEDLINE

## (undated) DEVICE — SUTURE STRATAFIX SPRL SZ 1 L14IN ABSRB VLT L48CM CTX 1/2 SXPD2B405

## (undated) DEVICE — GLOVE SURG SZ 85 CRM LTX FREE POLYISOPRENE POLYMER BEAD ANTI

## (undated) DEVICE — MHPB CYSTO PACK-LF: Brand: MEDLINE INDUSTRIES, INC.

## (undated) DEVICE — CAP INJ L0.75IN INTMIT

## (undated) DEVICE — CONTAINER,SPECIMEN,4OZ,OR STRL: Brand: MEDLINE

## (undated) DEVICE — SYRINGE MEDICAL 3ML CLEAR PLASTIC STANDARD NON CONTROL LUERLOCK TIP DISPOSABLE

## (undated) DEVICE — SUTURE VCRL SZ 1 L36IN ABSRB UD L36MM CT-1 1/2 CIR J947H

## (undated) DEVICE — CATHETER URETH 18FR BLLN 5CC SIL ALLY W/ SIL HYDRGEL 2 W F

## (undated) DEVICE — FIBER ENT HOLM LSR 200 MIC STRL LTX FRE

## (undated) DEVICE — PROBE LITHO L403MM DIA3.8MM US SWISS LITHOCLAST

## (undated) DEVICE — DRAIN SURG W10XL20CM SIL SMOOTH FLAT 3/4 PERF DBL WRP

## (undated) DEVICE — SUTURE MONOCRYL SZ 4-0 L27IN ABSRB UD L19MM PS-2 1/2 CIR PRIM Y426H

## (undated) DEVICE — SVMMC GYN MIN PK

## (undated) DEVICE — GLOVE SURG SZ 6 THK91MIL LTX FREE SYN POLYISOPRENE ANTI

## (undated) DEVICE — SUTURE VICRYL SZ 3-0 L18IN ABSRB UD L26MM SH 1/2 CIR J864D

## (undated) DEVICE — CATHETER THROMCTMY BER 115 CM 6 FRX135 CM HTORQ TBNG SEL

## (undated) DEVICE — SURGICAL PROCEDURE TRAY DRSG CHNG DISP

## (undated) DEVICE — CATHETER F BLLN 5CC 20FR INF CTRL 2 W SIL ALLY AND HYDRGEL

## (undated) DEVICE — STERILE PATIENT PROTECTIVE PAD FOR IMP® KNEE POSITIONERS & COHESIVE WRAP (10 / CASE): Brand: DE MAYO KNEE POSITIONER®

## (undated) DEVICE — METER,URINE,400ML,DRAIN BAG,L/F,LL: Brand: MEDLINE

## (undated) DEVICE — OPTIFOAM GENTLE AG,POST-OP STRIP,3.5X14: Brand: MEDLINE

## (undated) DEVICE — TRAY SURG CUST CRD CATH TOLEDO

## (undated) DEVICE — SUTURE ETHILON SZ 3-0 L18IN NONABSORBABLE BLK PS-2 L19MM 3/8 1669H

## (undated) DEVICE — LEGGINGS, PAIR, 31X48, STERILE: Brand: MEDLINE

## (undated) DEVICE — SOLUTION SCRB 4OZ 10% POVIDONE IOD ANTIMIC BTL

## (undated) DEVICE — SUTURE MONOCRYL SZ 4-0 L18IN ABSRB UD L19MM PS-2 3/8 CIR PRIM Y496G

## (undated) DEVICE — SOLUTION SCRB 4OZ 4% CHG H2O AIDED FOR PREOPERATIVE SKIN